# Patient Record
Sex: MALE | Race: BLACK OR AFRICAN AMERICAN | NOT HISPANIC OR LATINO | Employment: OTHER | RURAL
[De-identification: names, ages, dates, MRNs, and addresses within clinical notes are randomized per-mention and may not be internally consistent; named-entity substitution may affect disease eponyms.]

---

## 2017-01-09 ENCOUNTER — OFFICE VISIT (OUTPATIENT)
Dept: FAMILY MEDICINE CLINIC | Facility: CLINIC | Age: 57
End: 2017-01-09

## 2017-01-09 VITALS
BODY MASS INDEX: 21.69 KG/M2 | HEART RATE: 64 BPM | SYSTOLIC BLOOD PRESSURE: 130 MMHG | DIASTOLIC BLOOD PRESSURE: 98 MMHG | WEIGHT: 130.2 LBS | OXYGEN SATURATION: 98 % | HEIGHT: 65 IN | TEMPERATURE: 98.1 F

## 2017-01-09 DIAGNOSIS — K21.00 GASTROESOPHAGEAL REFLUX DISEASE WITH ESOPHAGITIS: ICD-10-CM

## 2017-01-09 DIAGNOSIS — R74.01 ELEVATED AST (SGOT): ICD-10-CM

## 2017-01-09 DIAGNOSIS — D50.9 IRON DEFICIENCY ANEMIA, UNSPECIFIED IRON DEFICIENCY ANEMIA TYPE: ICD-10-CM

## 2017-01-09 DIAGNOSIS — E87.5 HYPERKALEMIA: ICD-10-CM

## 2017-01-09 DIAGNOSIS — F10.10 ALCOHOL ABUSE: ICD-10-CM

## 2017-01-09 DIAGNOSIS — E03.9 HYPOTHYROIDISM, UNSPECIFIED TYPE: Primary | ICD-10-CM

## 2017-01-09 PROCEDURE — 99214 OFFICE O/P EST MOD 30 MIN: CPT | Performed by: FAMILY MEDICINE

## 2017-01-09 RX ORDER — OMEPRAZOLE 40 MG/1
40 CAPSULE, DELAYED RELEASE ORAL DAILY
Qty: 30 CAPSULE | Refills: 11 | Status: SHIPPED | OUTPATIENT
Start: 2017-01-09 | End: 2017-01-10 | Stop reason: CLARIF

## 2017-01-09 RX ORDER — FERROUS SULFATE 325(65) MG
325 TABLET ORAL DAILY
Qty: 30 TABLET | Refills: 11 | Status: SHIPPED | OUTPATIENT
Start: 2017-01-09 | End: 2018-03-21 | Stop reason: SDUPTHER

## 2017-01-09 RX ORDER — LEVOTHYROXINE SODIUM 0.12 MG/1
125 TABLET ORAL DAILY
Qty: 30 TABLET | Refills: 11 | Status: SHIPPED | OUTPATIENT
Start: 2017-01-09 | End: 2017-02-13

## 2017-01-09 RX ORDER — CHOLECALCIFEROL (VITAMIN D3) 1250 MCG
50000 CAPSULE ORAL
Qty: 4 CAPSULE | Refills: 11 | Status: SHIPPED | OUTPATIENT
Start: 2017-01-09 | End: 2018-03-21 | Stop reason: SDUPTHER

## 2017-01-09 NOTE — PROGRESS NOTES
"Subjective   Emerson Bang is a 56 y.o. male.     History of Present Illness     Problem List  1. 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 Hypothyroidism   3. Alcohol abuse  4. Anemia, iron deficiency  5. Vitamin D deficiency  6. GERD  7. Last colonoscopy was 3 years ago   8. Esophagitis/gastritis/internal and external hemorhoids.    Patient is 55 yo AAF with the above medical issues. States he is doing well.  Is currently on synthroid 125 mcg PO q daily for hypothyroidism. Has history of throat cancer and is sp chemo and radiation treatment.  Has appt with Dr. Washington (ENT) soon. Continues to drink alcohol daily.  Has issues with alcohol abuse States about 2 beers a day.  On last labwork had anemia and currently taking iron 325 mg PO q daily pill.  Also had elevated liver enzymes and AST elevated.  Has yet to have hepatitis panel.  Denies any abdominal pain, jaundice or fever.      The following portions of the patient's history were reviewed and updated as appropriate: allergies, current medications, past family history, past medical history, past social history, past surgical history and problem list.    Review of Systems   Constitutional: Negative.    HENT: Negative.    Eyes: Negative.    Respiratory: Negative.    Cardiovascular: Negative.    Gastrointestinal: Negative.    Endocrine: Negative.    Genitourinary: Negative.    Musculoskeletal: Negative.    Skin: Negative.    Allergic/Immunologic: Negative.    Neurological: Negative.    Hematological: Negative.    Psychiatric/Behavioral: Negative.        Objective    Visit Vitals   • /98 (BP Location: Left arm, Patient Position: Sitting, Cuff Size: Adult)   • Pulse 64   • Temp 98.1 °F (36.7 °C) (Oral)   • Ht 65\" (165.1 cm)   • Wt 130 lb 3.2 oz (59.1 kg)   • SpO2 98%   • BMI 21.67 kg/m2       Chemistry        Component Value Date/Time     10/05/2016 0919    K 5.7 (H) " 10/05/2016 0919     10/05/2016 0919    CO2 30 10/05/2016 0919    BUN 12 10/05/2016 0919    CREATININE 0.8 10/05/2016 0919        Component Value Date/Time    CALCIUM 9.4 10/05/2016 0919    ALKPHOS 106 10/05/2016 0919     (H) 10/05/2016 0919    ALT 71 10/05/2016 0919    BILITOT 0.6 10/05/2016 0919        Lab Results   Component Value Date    WBC 5.1 10/05/2016    HGB 12.1 (L) 10/05/2016    HCT 33.3 (L) 10/05/2016    MCV 81.6 10/05/2016     10/05/2016     No results found for: CHOL  Lab Results   Component Value Date    TRIG 100 10/05/2016    TRIG 48 11/25/2015     Lab Results   Component Value Date     10/05/2016     11/25/2015     Lab Results   Component Value Date    LDLCALC 76 10/05/2016    LDLCALC 114 11/25/2015     No results found for: LDL  No results found for: HDLLDLRATIO  No components found for: CHOLHDL  Lab Results   Component Value Date    HGBA1C 5.3 10/05/2016     Lab Results   Component Value Date    TSH 37.90 (H) 10/05/2016     Physical Exam   Constitutional: He is oriented to person, place, and time. He appears well-developed and well-nourished. No distress.   HENT:   Head: Normocephalic and atraumatic.   Right Ear: External ear normal.   Left Ear: External ear normal.   Post radiation changes of next   No masses   Normal pharynx   Eyes: Conjunctivae and EOM are normal. Pupils are equal, round, and reactive to light. Right eye exhibits no discharge. Left eye exhibits no discharge. No scleral icterus.   Neck: Normal range of motion. Neck supple. No JVD present. No tracheal deviation present. No thyromegaly present.   Cardiovascular: Normal rate and regular rhythm.  Exam reveals no friction rub.    No murmur heard.  Pulmonary/Chest: Effort normal and breath sounds normal. No stridor. No respiratory distress. He has no wheezes.   Abdominal: Soft. Bowel sounds are normal. He exhibits no distension and no mass. There is no tenderness. There is no rebound and no guarding.  No hernia.   Musculoskeletal: Normal range of motion. He exhibits no edema, tenderness or deformity.   Lymphadenopathy:     He has no cervical adenopathy.   Neurological: He is alert and oriented to person, place, and time. He has normal reflexes. No cranial nerve deficit. Coordination normal.   Skin: Skin is warm and dry. No rash noted. He is not diaphoretic. No erythema. No pallor.   Psychiatric: He has a normal mood and affect. His behavior is normal. Judgment and thought content normal.   Nursing note and vitals reviewed.      Assessment/Plan   Problems Addressed this Visit        Digestive    Gastroesophageal reflux disease with esophagitis    Relevant Medications    omeprazole (PRILOSEC) 40 MG capsule       Endocrine    Hypothyroidism - Primary    Relevant Medications    levothyroxine (SYNTHROID) 125 MCG tablet    Other Relevant Orders    CBC Auto Differential    Comprehensive Metabolic Panel    Lipid Panel    TSH    T4, Free    T3, Free    Ferritin    Iron Profile    Folate    Vitamin B12    Ethanol level    Hepatitis Panel, Acute       Hematopoietic and Hemostatic    Iron deficiency anemia    Relevant Medications    ferrous sulfate 325 (65 FE) MG tablet       Other    Hyperkalemia    Elevated AST (SGOT)    Alcohol abuse        -will get labwork including cbc, cmp, check thyroid studies due to hypothyroidism  - counseled on alcohol abuse and possible need for support or AA.  Will get alcohol level  - recheck AST levels   - recheck potassium levels  - will get hepatitis panel. Consider Ultrasound of liver if still elevated. Possible Gastroenterology referral  -  Recheck hemoglobin and iron levels for iron deficiency anemia  -  For GERD/Gastritis/ esophagitis - continue with omeprazole  - recheck in 1 month

## 2017-01-09 NOTE — MR AVS SNAPSHOT
Emerson Bang   1/9/2017 11:00 AM   Office Visit    Dept Phone:  [phone]   Encounter #:  [id number]    Provider:  Cristino He MD   Department:  [facility]                Your Full Care Plan              Today's Medication Changes          These changes are accurate as of: 1/9/17 11:28 AM.  If you have any questions, ask your nurse or doctor.               Medication(s)that have changed:     omeprazole 40 MG capsule   Commonly known as:  PRILOSEC   Take 1 capsule by mouth Daily.   What changed:  when to take this   Changed by:  Cristino He MD         Stop taking medication(s)listed here:     ondansetron ODT 4 MG disintegrating tablet   Commonly known as:  ZOFRAN-ODT   Stopped by:  Cristino He MD           terazosin 5 MG capsule   Commonly known as:  HYTRIN   Stopped by:  Cristino He MD                Where to Get Your Medications      These medications were sent to [address] [phone] [place] [id number] [place] [id number]     Phone:  [phone]     ferrous sulfate 325 (65 FE) MG tablet    levothyroxine 125 MCG tablet    omeprazole 40 MG capsule    Vitamin D3 60789 UNITS capsule                  Your Updated Medication List          This list is accurate as of: 1/9/17 11:28 AM.  Always use your most recent med list.                ferrous sulfate 325 (65 FE) MG tablet   Take 1 tablet by mouth Daily.       levothyroxine 125 MCG tablet   Commonly known as:  SYNTHROID   Take 1 tablet by mouth Daily.       omeprazole 40 MG capsule   Commonly known as:  PRILOSEC   Take 1 capsule by mouth Daily.       Vitamin D3 64144 UNITS capsule   Take 1 capsule by mouth Every 7 (Seven) Days.               We Performed the Following     CBC Auto Differential     Comprehensive Metabolic Panel     Ethanol level     Ferritin     Folate     Hepatitis Panel, Acute   "   Iron Profile     Lipid Panel     T3, Free     T4, Free     TSH     Vitamin B12       You Were Diagnosed With        Codes Comments    Hypothyroidism, unspecified type    -  Primary ICD-10-CM: E03.9  ICD-9-CM: 244.9       Instructions     None    Patient Instructions History      Upcoming Appointments     Visit Type Date Time Department    OFFICE VISIT 1[facility]9/2017 11:00 [facility]    FOLLOW UP 2/10/2017  9:15 AM [facility] OTOLARYNGOLOGY HOP    OFFICE VISIT 2/13/2017 11:00 AM [facility]    FOLLOW UP - ONCOLOGY 9/11/2017  9:00 AM [facility] RAD ONC MAD      MyChart Signup     Our records indicate that you have declined [facility] eMoovhart signup. If you would like to sign up for eMoovhart, please email [email or web address] or call [phone] to obtain an activation code.             Other Info from Your Visit           Your Appointments     Feb 10, 2017  9:15 AM CST   Follow Up with Bharathi Washington MD   [facility] OTOLARYNGOLOGY (--)    [address] [place] [address] Dr leung Fl[address]opher  [facility] [address] [phone]           Arrive 15 minutes prior to appointment.            Feb 13, 2017 11:00 AM CST   Office Visit with Cristino He MD   [facility] (--)    [address] [place] [id number] [place] [id number] [phone]           Arrive 15 minutes prior to appointment.            Sep 11, 2017  9:00 AM CDT   FOLLOW UP with James Morrison MD   [facility] RADIATION ONCOLOGY (--)    [address] [place] [address]   [place] [address] [phone]              Allergies     No Known Allergies      Reason for Visit     Hypothyroidism           Vital Signs     Blood Pressure Pulse Temperature Height Weight Oxygen Saturation    130/98 (BP Location: Left arm, Patient Position: Sitting, Cuff Size: Adult) 64 98.1 °F (36.7 °C) (Oral) 65\" (165.1 cm) 130 lb 3.2 oz (59.1 kg) 98%    Body Mass Index Smoking Status "                21.67 kg/m2 Former Smoker          Problems and Diagnoses Noted     Underactive thyroid    -  Primary

## 2017-01-10 LAB
ALBUMIN SERPL-MCNC: 4.7 GM/DL (ref 3.4–4.8)
ALP SERPL-CCNC: 109 U/L (ref 38–126)
ALT SERPL-CCNC: 56 U/L (ref 21–72)
ANION GAP SERPL CALCULATED.3IONS-SCNC: 15 MMOL/L (ref 5–15)
AST SERPL-CCNC: 100 U/L (ref 17–59)
BILIRUB SERPL-MCNC: 0.7 MG/DL (ref 0.2–1.3)
BUN SERPL-MCNC: 14 MG/DL (ref 7–21)
CALCIUM SERPL-MCNC: 10 MG/DL (ref 8.4–10.2)
CHLORIDE SERPL-SCNC: 100 MMOL/L (ref 95–110)
CHOLEST SERPL-MCNC: 298 MG/DL (ref 0–199)
CO2 SERPL-SCNC: 27 MMOL/L (ref 22–31)
CREAT SERPL-MCNC: 0.8 MG/DL (ref 0.7–1.3)
ETHANOL BLD-MCNC: 0.02 GM/DL (ref 0–0)
FERRITIN SERPL IA-MCNC: 178 NG/ML (ref 17.9–464)
FOLATE SERPL-MCNC: >20 NG/ML
GLUCOSE SERPL-MCNC: 122 MG/DL (ref 60–100)
HAV IGM SERPL QL IA: NEGATIVE
HBV CORE IGM SERPL QL IA: NEGATIVE
HBV SURFACE AG SERPL QL IA: NEGATIVE
HCV AB SERPL QL IA: NEGATIVE
HDLC SERPL-MCNC: 110 MG/DL (ref 60–200)
LDLC SERPL CALC-MCNC: 176 MG/DL (ref 0–129)
POTASSIUM SERPL-SCNC: 5.1 MMOL/L (ref 3.5–5.1)
PROT SERPL-MCNC: 8.8 GM/DL (ref 6.3–8.6)
SODIUM SERPL-SCNC: 142 MMOL/L (ref 137–145)
T4 FREE SERPL-MCNC: 0.54 NG/DL (ref 0.78–2.19)
TRIGL SERPL-MCNC: 59 MG/DL (ref 20–199)
TSH SERPL-ACNC: 35 UIU/ML (ref 0.46–4.68)
VIT B12 SERPL-MCNC: 793 PG/ML (ref 239–931)

## 2017-01-10 RX ORDER — NICOTINE POLACRILEX 4 MG/1
2 GUM, CHEWING ORAL DAILY
Qty: 60 EACH | Refills: 11 | Status: SHIPPED | OUTPATIENT
Start: 2017-01-10 | End: 2018-02-01

## 2017-01-16 ENCOUNTER — TELEPHONE (OUTPATIENT)
Dept: FAMILY MEDICINE CLINIC | Facility: CLINIC | Age: 57
End: 2017-01-16

## 2017-01-16 RX ORDER — LEVOTHYROXINE SODIUM 0.03 MG/1
25 TABLET ORAL DAILY
Qty: 30 TABLET | Refills: 0 | Status: SHIPPED | OUTPATIENT
Start: 2017-01-16 | End: 2017-02-10 | Stop reason: ALTCHOICE

## 2017-01-16 NOTE — TELEPHONE ENCOUNTER
Spoke with patient and relayed message. Patient expressed understanding.     From Dr. He:   TSH is still elevated and t4 low.  Already taking 125 mcg of synthroid daily.  Have pt add another 25 mcg PO daily for total of 150 mcg of synthroid daily so that he doesn't waste medication.  Give 1 months supply of 25 mcg daily of synthroid with no refills. Will reevaluate in one month and give new prescription of 150 mcg then.  Have pt call back if any questions. Thanks

## 2017-02-10 ENCOUNTER — OFFICE VISIT (OUTPATIENT)
Dept: OTOLARYNGOLOGY | Facility: CLINIC | Age: 57
End: 2017-02-10

## 2017-02-10 VITALS — BODY MASS INDEX: 22.33 KG/M2 | WEIGHT: 134 LBS | HEIGHT: 65 IN | TEMPERATURE: 97.7 F

## 2017-02-10 DIAGNOSIS — Z85.818 ENCOUNTER FOR FOLLOW-UP SURVEILLANCE OF TONSILLAR CANCER: Primary | ICD-10-CM

## 2017-02-10 DIAGNOSIS — J37.0 CHRONIC LARYNGITIS: ICD-10-CM

## 2017-02-10 DIAGNOSIS — Z08 ENCOUNTER FOR FOLLOW-UP SURVEILLANCE OF TONSILLAR CANCER: Primary | ICD-10-CM

## 2017-02-10 PROCEDURE — 99213 OFFICE O/P EST LOW 20 MIN: CPT | Performed by: OTOLARYNGOLOGY

## 2017-02-10 PROCEDURE — 31575 DIAGNOSTIC LARYNGOSCOPY: CPT | Performed by: OTOLARYNGOLOGY

## 2017-02-10 RX ORDER — TERAZOSIN 5 MG/1
5 CAPSULE ORAL NIGHTLY
COMMUNITY
End: 2018-02-01 | Stop reason: SDUPTHER

## 2017-02-10 NOTE — PROGRESS NOTES
Subjective   Emerson Bang is a 56 y.o. male.       History of Present Illness   Patient has a history of extensive squamous cell cancer of the tonsil that involved the pharyngeal wall and palate.  Achieved a complete response to chemoradiation.  Treatment was completed in 2009.  Is having no specific problems.  Is eating reasonably well.  Weight is 134 pounds.  Denies hemoptysis.      The following portions of the patient's history were reviewed and updated as appropriate: allergies, current medications, past family history, past medical history, past social history, past surgical history and problem list.     reports that he has quit smoking. He has never used smokeless tobacco. He reports that he drinks alcohol. He reports that he does not use illicit drugs.   Patient is not a tobacco user and has not been counseled for use of tobacco products      Review of Systems   Constitutional: Negative for fever.           Objective   Physical Exam  General: Thin but otherwise well-developed male in no acute distress.  Voice is hypernasal but speech is intelligible  Ears: External ears no deformity, canals no discharge, tympanic membranes intact clear and mobile bilaterally.  Nose: Nares show no discharge mass polyp or purulence.  Boggy mucosa is present.  No gross external deformity.  Septum: To the right  Oral cavity: Lips and gums without lesions.  Tongue and floor of mouth without lesions.  Parotid and submandibular ducts unobstructed.  No mucosal lesions on the buccal mucosa or vestibule of the mouth.  Pharynx: Extensive posttreatment changes in the area of the right tonsil, pharyngeal wall, and palate.  No evidence of recurrent disease.  Neck: Healed tracheostomy site.  Postradiation changes are noted.  No abnormal mass or lymphadenopathy is noted.  No thyromegaly.    Procedure Note    Pre-operative Diagnosis:   Chief Complaint   Patient presents with   • Cancer Surveillance     1 year       Post-operative  Diagnosis: Chronic laryngitis; no evidence of recurrent disease    Anesthesia: topical with xylocaine and neosynephrine    Endoscopy Type:  Flexible Laryngoscopy    Procedure Details:    The patient was placed in the sitting position.  After topical anesthesia and decongestion, the 4 mm laryngoscope was passed.  The nasal cavities, nasopharynx, oropharynx, hypopharynx, and larynx were all examined.  Vocal cords were examined during respiration and phonation.  The following findings were noted:    Findings: No masses in the nose.  Some crusted secretions at the junction of the nasopharynx to oropharynx with no associated neoplasm.  Tongue base is without evidence of neoplasm.  Endolarynx shows chronic edema and erythema consistent with postradiation changes but no evidence of neoplasm.  Vocal cord mobility is intact.    Condition:  Stable.  Patient tolerated procedure well.    Complications:  None      Assessment/Plan   Emerson was seen today for cancer surveillance.    Diagnoses and all orders for this visit:    Encounter for follow-up surveillance of tonsillar cancer    Chronic laryngitis        Plan: Maintain tobacco abstinence.  Continue to work on maintaining nutritional status.  Return in a year, call sooner for problems.

## 2017-02-13 ENCOUNTER — OFFICE VISIT (OUTPATIENT)
Dept: FAMILY MEDICINE CLINIC | Facility: CLINIC | Age: 57
End: 2017-02-13

## 2017-02-13 VITALS
WEIGHT: 131 LBS | SYSTOLIC BLOOD PRESSURE: 164 MMHG | DIASTOLIC BLOOD PRESSURE: 70 MMHG | TEMPERATURE: 97.9 F | BODY MASS INDEX: 21.83 KG/M2 | HEIGHT: 65 IN | OXYGEN SATURATION: 98 % | HEART RATE: 103 BPM

## 2017-02-13 DIAGNOSIS — R26.89 BALANCE PROBLEM: ICD-10-CM

## 2017-02-13 DIAGNOSIS — Z23 NEED FOR VACCINATION: ICD-10-CM

## 2017-02-13 DIAGNOSIS — R74.01 ELEVATED AST (SGOT): ICD-10-CM

## 2017-02-13 DIAGNOSIS — E03.9 HYPOTHYROIDISM, UNSPECIFIED TYPE: ICD-10-CM

## 2017-02-13 DIAGNOSIS — D50.9 IRON DEFICIENCY ANEMIA, UNSPECIFIED IRON DEFICIENCY ANEMIA TYPE: ICD-10-CM

## 2017-02-13 DIAGNOSIS — F10.10 ALCOHOL ABUSE: Primary | ICD-10-CM

## 2017-02-13 PROCEDURE — 99213 OFFICE O/P EST LOW 20 MIN: CPT | Performed by: FAMILY MEDICINE

## 2017-02-13 PROCEDURE — 83735 ASSAY OF MAGNESIUM: CPT | Performed by: FAMILY MEDICINE

## 2017-02-13 PROCEDURE — 36415 COLL VENOUS BLD VENIPUNCTURE: CPT | Performed by: FAMILY MEDICINE

## 2017-02-13 PROCEDURE — 90715 TDAP VACCINE 7 YRS/> IM: CPT | Performed by: FAMILY MEDICINE

## 2017-02-13 PROCEDURE — 82607 VITAMIN B-12: CPT | Performed by: FAMILY MEDICINE

## 2017-02-13 PROCEDURE — 83540 ASSAY OF IRON: CPT | Performed by: FAMILY MEDICINE

## 2017-02-13 PROCEDURE — 82728 ASSAY OF FERRITIN: CPT | Performed by: FAMILY MEDICINE

## 2017-02-13 PROCEDURE — 82746 ASSAY OF FOLIC ACID SERUM: CPT | Performed by: FAMILY MEDICINE

## 2017-02-13 PROCEDURE — 83550 IRON BINDING TEST: CPT | Performed by: FAMILY MEDICINE

## 2017-02-13 PROCEDURE — 90471 IMMUNIZATION ADMIN: CPT | Performed by: FAMILY MEDICINE

## 2017-02-13 PROCEDURE — 80076 HEPATIC FUNCTION PANEL: CPT | Performed by: FAMILY MEDICINE

## 2017-02-13 PROCEDURE — 84425 ASSAY OF VITAMIN B-1: CPT | Performed by: FAMILY MEDICINE

## 2017-02-13 RX ORDER — SIMVASTATIN 40 MG
40 TABLET ORAL NIGHTLY
Qty: 30 TABLET | Refills: 11 | Status: SHIPPED | OUTPATIENT
Start: 2017-02-13 | End: 2018-03-21 | Stop reason: SDUPTHER

## 2017-02-13 RX ORDER — LEVOTHYROXINE SODIUM 0.15 MG/1
150 TABLET ORAL DAILY
Qty: 30 TABLET | Refills: 11 | Status: SHIPPED | OUTPATIENT
Start: 2017-02-13 | End: 2017-06-29 | Stop reason: SDUPTHER

## 2017-02-13 NOTE — PROGRESS NOTES
Subjective   Emerson Bang is a 56 y.o. male.     Problem List  1. 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 Hypothyroidism   3. Alcohol abuse  4. Anemia, iron deficiency  5. Vitamin D deficiency  6. GERD  7. Last colonoscopy was 3 years ago   8. Esophagitis/gastritis/internal and external hemorhoids.  9. Elevated AST level    Patient is 57 yo AAM with the above medical issues. Is here for recheck. Is here today with daughter    Pt states he is doing overall well but daughter has concerns that he is losing balance and falling at home. Pt lives currently alone but daughter checks on him daily.  Does have history of alcohol abuse and continues to drink beer and liquor daily. Pt not interested in seeing AA at this time.   Denies any abdominal pain but does have elevated AST levels.  Has not had ultrasound of liver done yet.  Denies any fever, abdominal pain or jaundice.  Does have elevated TSH levels and is taking synthroid 125 mcg PO q daily. Last TSH was 35  An extra dose of 25 mcg of synthroid was sent to pharmacy for a total dose of 150 mcg PO q daily but pt did not pick it up yet.      Pt also had labwork and cholesterol levels were elevated. Based on ASCVD risk is >5%.  Pt is not on aspirin or statin medication currently    Regarding Iron deficiency anemia. Last hgb was normal. Currently on ferrous sulfate 325 mg PO q daily.      Is currently seeing Dr. Washington for history of throat cancer/ sqaumous cell carcinoma of right tonsil and pharynx.   Recent visit states he is doing well.       Alcohol Problem   This is a chronic problem. The current episode started more than 1 year ago. The problem occurs daily. The problem has been unchanged. Pertinent negatives include no abdominal pain, anorexia, arthralgias, change in bowel habit, chest pain, chills, congestion, coughing, diaphoresis, fatigue, fever, headaches, joint swelling,  "myalgias, nausea, neck pain, numbness, rash, sore throat, swollen glands, urinary symptoms, vertigo, vomiting or weakness. Nothing aggravates the symptoms. He has tried nothing for the symptoms. The treatment provided no relief.        The following portions of the patient's history were reviewed and updated as appropriate: allergies, current medications, past family history, past medical history, past social history, past surgical history and problem list.    Review of Systems   Constitutional: Negative.  Negative for chills, diaphoresis, fatigue and fever.   HENT: Negative.  Negative for congestion and sore throat.    Eyes: Negative.    Respiratory: Negative.  Negative for cough.    Cardiovascular: Negative.  Negative for chest pain.   Gastrointestinal: Negative.  Negative for abdominal pain, anorexia, change in bowel habit, nausea and vomiting.   Endocrine: Negative.    Genitourinary: Negative.    Musculoskeletal: Positive for gait problem. Negative for arthralgias, joint swelling, myalgias and neck pain.   Skin: Negative.  Negative for rash.   Allergic/Immunologic: Negative.    Neurological: Negative for vertigo, weakness, numbness and headaches.   Hematological: Negative.    Psychiatric/Behavioral: Negative.        Objective    Visit Vitals   • /70 (BP Location: Right arm, Patient Position: Sitting, Cuff Size: Adult)   • Pulse 103   • Temp 97.9 °F (36.6 °C) (Oral)   • Ht 65\" (165.1 cm)   • Wt 131 lb (59.4 kg)   • SpO2 98%   • BMI 21.8 kg/m2           Chemistry        Component Value Date/Time     01/09/2017 1159    K 5.1 01/09/2017 1159     01/09/2017 1159    CO2 27 01/09/2017 1159    BUN 14 01/09/2017 1159    CREATININE 0.8 01/09/2017 1159        Component Value Date/Time    CALCIUM 10.0 01/09/2017 1159    ALKPHOS 109 01/09/2017 1159     (H) 01/09/2017 1159    ALT 56 01/09/2017 1159    BILITOT 0.7 01/09/2017 1159        Lab Results   Component Value Date    WBC 6.0 01/09/2017    HGB " 13.7 01/09/2017    HCT 38.2 (L) 01/09/2017    MCV 82.0 01/09/2017     01/09/2017     No results found for: CHOL  Lab Results   Component Value Date    TRIG 59 01/09/2017    TRIG 100 10/05/2016    TRIG 48 11/25/2015     Lab Results   Component Value Date     01/09/2017     10/05/2016     11/25/2015     Lab Results   Component Value Date    LDLCALC 176 (H) 01/09/2017    LDLCALC 76 10/05/2016    LDLCALC 114 11/25/2015     No results found for: LDL  No results found for: HDLLDLRATIO  No components found for: CHOLHDL  Lab Results   Component Value Date    HGBA1C 5.3 10/05/2016     Lab Results   Component Value Date    TSH 35.00 (H) 01/09/2017    [id number] 99 01/09/2017     Physical Exam   Constitutional: He is oriented to person, place, and time. No distress.   Thin appearance    HENT:   Head: Normocephalic and atraumatic.   Right Ear: External ear normal.   Left Ear: External ear normal.   Pt has speech issues based on previous surgery and radiation therapy    Eyes: Conjunctivae and EOM are normal. Pupils are equal, round, and reactive to light. Right eye exhibits no discharge. Left eye exhibits no discharge. No scleral icterus.   Neck: Normal range of motion. Neck supple. No JVD present. No tracheal deviation present. No thyromegaly present.   Cardiovascular: Normal rate, regular rhythm and normal heart sounds.    Pulmonary/Chest: Effort normal and breath sounds normal. No stridor. No respiratory distress.   Abdominal: Soft. Bowel sounds are normal. He exhibits no distension and no mass. There is no tenderness. There is no rebound and no guarding. No hernia.   No hepatomegaly   Musculoskeletal: Normal range of motion. He exhibits no edema, tenderness or deformity.   Get up and go test  <10 seconds  Motor strength 5/5    Lymphadenopathy:     He has no cervical adenopathy.   Neurological: He is alert and oriented to person, place, and time. He has normal reflexes. No cranial nerve deficit.  Coordination normal.   No asterixis   Skin: Skin is warm and dry. No rash noted. He is not diaphoretic. No erythema. No pallor.   Psychiatric: He has a normal mood and affect. His behavior is normal. Judgment and thought content normal.   Nursing note and vitals reviewed.      Assessment/Plan   Problems Addressed this Visit        Endocrine    Hypothyroidism    Relevant Medications    levothyroxine (SYNTHROID) 150 MCG tablet       Hematopoietic and Hemostatic    Iron deficiency anemia       Other    Elevated AST (SGOT)    Relevant Orders    US Liver    Alcohol abuse - Primary    Relevant Orders    Ambulatory Referral to Home Health    Hepatic Function Panel    Ferritin    Iron Profile    Folate    Vitamin B12    Vitamin B1, Whole Blood    Magnesium    Balance problem    Need for vaccination    Relevant Orders    Tdap Vaccine Greater Than or Equal To 8yo IM (Completed)      - for balance problem will start [facility] for nursing care, BP checks, PT assessment and  to help with alcohol abuse.  Advised about alcohol withdrawal and wellfare checks at home  - for alcohol abuse - recommend AA.   Request Home Health to help pt with resources to help quit.  Gave information about alcohol withdrawal. Consider Benzodiazpene if pt not getting better  - for balance issues. PT assessment,  Consider MRI of brain or Neurology referral.  Consider EKG on next visit.  Also will check folate, b12 and B1 levels today.  - hypothyroidism - TSH elevated. Will adjust dosage and go up on synthroid from 125 mcg daily to 150 mcg daily. Recheck TSH in a few months  - for eleveted AST - recheck hepatic function. Recent hepatitis panel negative. Will get ultrasound of liver. Consider referral to Gastroenterology in future  - will get tetanus vaccination today  - recheck in 1 month  - continue with iron pill for iron deficiency anemia.

## 2017-02-13 NOTE — PATIENT INSTRUCTIONS
new prescription of synthroid 150 mcg to take once a day  Will set up with Home Health for alcohol issues BP readings   blood pressure machine and bring on next visit recordings  -will get ultrasound of liver at [facility]  - get labwork today check vitamin levels and liver enzymes.    Delirium Tremens  Delirium tremens is the worst form of alcohol withdrawal. It usually happens 2 to 4 days after the last drink, but it may occur up to 7 to 10 days after the last drink. It involves sudden and severe changes in your mental and nervous system. Many people keep drinking to get rid of the discomfort felt during delirium tremens. Symptoms may last up to 7 days. Delirium tremens is a serious condition and can be life-threatening. Most people need treatment in a hospital or a detox unit for intravenous (IV) fluids, medicines to help with symptoms, and close monitoring by a trained staff.   CAUSES   Delirium tremens can occur when you suddenly quit or greatly reduce the amount of alcohol you normally drink.   SYMPTOMS   · Fast heart rate.  · Elevated temperature.  · High blood pressure.  · Seizures.  · Tremors.  · Sweating.  · Fast breathing.  · Anxiety.  · Lack of coordination.  · Trouble thinking clearly.  · Nausea.  · Seeing, hearing, or feeling things that are not really there (hallucinations).  DIAGNOSIS   · The diagnosis is usually made from the history of alcohol consumption and the presence of symptoms.  · Medical tests may be done to rule out other conditions that can act like alcohol withdrawal.  TREATMENT   · Medicines are usually prescribed to prevent withdrawal symptoms. Some people need a calming drug (sedative) for a week or more until they are done with their withdrawal.  · Going to the hospital is necessary if you are seriously ill. You may be given:    Thiamine to prevent a serious brain disorder.    Multivitamins and folate.    IV fluids if you are dehydrated.    Minerals.    Sedatives if you are  anxious, agitated, or cannot sleep.  HOME CARE INSTRUCTIONS   After treatment, you must:  · Follow all instructions from your caregiver very carefully.  · Take all medicines as prescribed. If you cannot, call your caregiver right away.  · Keep all appointments for further evaluation and counseling.  · Not use drugs, alcohol, or any other mind-altering or mood-altering drugs.  · Not operate a motor vehicle or hazardous machinery until your caregiver says it is okay.  SEEK IMMEDIATE MEDICAL CARE IF:   · You have a seizure (convulsions).  · You become very shaky or agitated.  · You have severe nausea and vomiting.  · You throw up blood. This may be bright red or look like black coffee grounds.  · You have blood in your stool. This may be bright red, bad smelling, or appear black and tarry.  · You become lightheaded or faint.  If you have any of the above symptoms, do not drive. Have someone else drive you, or call your local emergency services (911 in [place]  FOR MORE INFORMATION  · Treatment centers are listed in telephone book under: Alcoholism and Addiction Treatment, Substance Abuse Treatment or Cocaine, Narcotics, and Alcoholics Anonymous. Most hospitals and clinics can refer you to a specialized care center.  · The [place] government maintains a toll-free number for getting treatment referrals: 1-455.152.5507 or 1-741.182.5416 (TDD). They also maintain a website: http://findtreatment.samhsa.gov. Other websites for more information are: www.mentalhealth.samhsa.gov and www.pat.gov.  · In [place], treatment centers are listed in the telephone book under each Province. Listings are available under: [employer] or similar titles.     This information is not intended to replace advice given to you by your health care provider. Make sure you discuss any questions you have with your health care provider.     Document Released: 12/18/2006 Document Revised: 03/11/2013 Document Reviewed: 06/08/2016  [employer]  Interactive Patient Education ©2016 [employer]  Alcohol Withdrawal  Alcohol withdrawal is a group of symptoms that can develop when a person who drinks heavily and regularly stops drinking or drinks less.  CAUSES  Heavy and regular drinking can cause chemicals that send signals from the brain to the body (neurotransmitters) to deactivate. Alcohol withdrawal develops when deactivated neurotransmitters reactivate because a person stops drinking or drinks less.  RISK FACTORS  The more a person drinks and the longer he or she drinks, the greater the risk of alcohol withdrawal. Severe withdrawal is more likely to develop in someone who:  · Had severe alcohol withdrawal in the past.  · Had a seizure during a previous episode of alcohol withdrawal.  · Is elderly.  · Is pregnant.  · Has been abusing drugs.  · Has other medical problems, including:    Infection.    Heart, lung, or liver disease.    Seizures.    Mental health problems.  SYMPTOMS  Symptoms of this condition can be mild to moderate, or they can be severe.  Mild to moderate symptoms may include:  · Fatigue.  · Nightmares.  · Trouble sleeping.  · Depression.  · Anxiety.  · Inability to think clearly.  · Mood swings.  · Irritability.  · Loss of appetite.  · Nausea or vomiting.  · Clammy skin.  · Extreme sweating.  · Rapid heartbeat.  · Shakiness.  · Uncontrollable shaking (tremor).  Severe symptoms may include:  · Fever.  · Seizures.  · Severe confusion.  · Feeling or seeing things that are not there (hallucinations).  Symptoms usually begin within eight hours after a person stops drinking or drinks less. They can last for weeks.  DIAGNOSIS  Alcohol withdrawal is diagnosed with a medical history and physical exam. Sometimes, urine and blood tests are also done.  TREATMENT  Treatment may involve:  · Monitoring blood pressure, pulse, and breathing.  · Getting fluids through an IV tube.  · Medicine to reduce anxiety.  · Medicine to prevent or control  seizures.  · Multivitamins and B vitamins.  · Having a health care provider check on you daily.  If symptoms are moderate to severe or if there is a risk of severe withdrawal, treatment may be done at a hospital or treatment center.  HOME CARE INSTRUCTIONS  · Take medicines and vitamin supplements only as directed by your health care provider.  · Do not drink alcohol.  · Have someone stay with you or be available if you need help.  · Drink enough fluid to keep your urine clear or pale yellow.  · Consider joining a 12-step program or another alcohol support group.  SEEK MEDICAL CARE IF:  · Your symptoms get worse or do not go away.  · You cannot keep food or water in your stomach.  · You are struggling with not drinking alcohol.  · You cannot stop drinking alcohol.  SEEK IMMEDIATE MEDICAL CARE IF:   · You have an irregular heartbeat.  · You have chest pain.  · You have trouble breathing.  · You have symptoms of severe withdrawal, such as:    A fever.    Seizures.    Severe confusion.    Hallucinations.     This information is not intended to replace advice given to you by your health care provider. Make sure you discuss any questions you have with your health care provider.     Document Released: 09/27/2006 Document Revised: 01/08/2016 Document Reviewed: 10/06/2015  [employer] Interactive Patient Education ©2016 [employer]

## 2017-02-15 LAB
ALBUMIN SERPL-MCNC: 4.7 G/DL (ref 3.4–4.8)
ALP SERPL-CCNC: 119 U/L (ref 38–126)
ALT SERPL W P-5'-P-CCNC: 49 U/L (ref 21–72)
AST SERPL-CCNC: 94 U/L (ref 17–59)
BILIRUB CONJ SERPL-MCNC: 0 MG/DL (ref 0–0.3)
BILIRUB INDIRECT SERPL-MCNC: 0.4 MG/DL (ref 0–1.1)
BILIRUB SERPL-MCNC: 0.8 MG/DL (ref 0.2–1.3)
FERRITIN SERPL-MCNC: 246 NG/ML (ref 17.9–464)
FOLATE SERPL-MCNC: >20 NG/ML (ref 2.76–21)
IRON 24H UR-MRATE: 127 MCG/DL (ref 49–181)
IRON SATN MFR SERPL: 43 % (ref 20–55)
MAGNESIUM SERPL-MCNC: 1.4 MG/DL (ref 1.6–2.3)
PROT SERPL-MCNC: 8.4 G/DL (ref 6.3–8.6)
TIBC SERPL-MCNC: 292 MCG/DL (ref 261–462)
VIT B12 BLD-MCNC: 795 PG/ML (ref 239–931)

## 2017-02-16 LAB — VIT B1 BLD-SCNC: 145.2 NMOL/L (ref 66.5–200)

## 2017-02-20 ENCOUNTER — TELEPHONE (OUTPATIENT)
Dept: FAMILY MEDICINE CLINIC | Facility: CLINIC | Age: 57
End: 2017-02-20

## 2017-02-20 RX ORDER — LANOLIN ALCOHOL/MO/W.PET/CERES
400 CREAM (GRAM) TOPICAL DAILY
Qty: 30 TABLET | Refills: 11 | Status: SHIPPED | OUTPATIENT
Start: 2017-02-20 | End: 2018-05-10 | Stop reason: SDUPTHER

## 2017-02-20 NOTE — TELEPHONE ENCOUNTER
CALLED PT HOME AND MOBILE NUMBERS , AND L/M ON BOTH . CALLED IN MAGNESIUM OXIDE TO PHARMACY ALREADY .     Call pt.  Liver enzymes elevated still. Awaiting ultrasound of liver. Also magnesium levels low. Recommend taking Magnesium Oxide 400 mg once a day.  Give 30 pills and 11 refills and send to pharmacy please. Rest of labs okay. Thanks

## 2017-02-20 NOTE — TELEPHONE ENCOUNTER
Spoke with pt daughter about lab results , us date and time and told them i called in his medications to his pharmacy.

## 2017-03-16 ENCOUNTER — OFFICE VISIT (OUTPATIENT)
Dept: FAMILY MEDICINE CLINIC | Facility: CLINIC | Age: 57
End: 2017-03-16

## 2017-03-16 VITALS
TEMPERATURE: 97.9 F | BODY MASS INDEX: 19.26 KG/M2 | WEIGHT: 130 LBS | DIASTOLIC BLOOD PRESSURE: 74 MMHG | HEART RATE: 115 BPM | SYSTOLIC BLOOD PRESSURE: 112 MMHG | HEIGHT: 69 IN | RESPIRATION RATE: 16 BRPM

## 2017-03-16 DIAGNOSIS — E03.9 HYPOTHYROIDISM, UNSPECIFIED TYPE: Primary | ICD-10-CM

## 2017-03-16 DIAGNOSIS — R79.0 LOW MAGNESIUM LEVELS: ICD-10-CM

## 2017-03-16 DIAGNOSIS — F10.10 ALCOHOL ABUSE: ICD-10-CM

## 2017-03-16 PROCEDURE — 99213 OFFICE O/P EST LOW 20 MIN: CPT | Performed by: FAMILY MEDICINE

## 2017-03-16 NOTE — PROGRESS NOTES
Subjective   Emerson Bang is a 56 y.o. male.     History of Present Illness     Problem List  1. 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 Hypothyroidism   3. Alcohol abuse  4. Anemia, iron deficiency  5. Vitamin D deficiency  6. GERD  7. Last colonoscopy was 3 years ago   8. Esophagitis/gastritis/internal and external hemorhoids.  9. Elevated AST level  10. Hypomagnesemia     Pt is 55 yo AAM with the above medical issues. Is here for recheck.  On last visit Home Health was ordered for gait instability, alcohol problem, medication management.  Pt was evaluated by [facility] but pt was absent for followup visits.  Pt states he is doing fine. Daughter is not with pt today. Pt recently had labwork done and has normal levels of Vitamin B1, B12, folate, iron and ferritin.  Pt did have low magnesium and was start on magnesium supplements which he states he is taking. Regarding his alcohol problem states he has cut down to 1 or 2 beers a week. Denies any falling today.  Has history of hypothyroidism and pt's last TSH was 35.00.  Currently taking Synthroid 150 mcg daily.  Pt also has history of throat cancer/squamous cell carcinoma of right tonsil pharynx.  Has appt later this year with Dr. Morrison his Radiation Oncologist as well as Dr. Washington (ENT)        The following portions of the patient's history were reviewed and updated as appropriate: allergies, current medications, past family history, past medical history, past social history, past surgical history and problem list.    Review of Systems   Constitutional: Negative.    HENT: Negative.    Eyes: Negative.    Respiratory: Negative.    Cardiovascular: Negative.    Gastrointestinal: Negative.    Endocrine: Negative.    Genitourinary: Negative.    Musculoskeletal: Negative.    Skin: Negative.    Allergic/Immunologic: Negative.    Neurological: Negative.    Hematological: Negative.   "  Psychiatric/Behavioral: Negative.        Objective    Visit Vitals   • /74   • Pulse 115   • Temp 97.9 °F (36.6 °C)   • Resp 16   • Ht 69\" (175.3 cm)   • Wt 130 lb (59 kg)   • BMI 19.2 kg/m2       Chemistry        Component Value Date/Time     01/09/2017 1159    K 5.1 01/09/2017 1159     01/09/2017 1159    CO2 27 01/09/2017 1159    BUN 14 01/09/2017 1159    CREATININE 0.8 01/09/2017 1159        Component Value Date/Time    CALCIUM 10.0 01/09/2017 1159    ALKPHOS 119 02/13/2017 1152    ALKPHOS 109 01/09/2017 1159    AST 94 (H) 02/13/2017 1152     (H) 01/09/2017 1159    ALT 49 02/13/2017 1152    ALT 56 01/09/2017 1159    BILITOT 0.8 02/13/2017 1152    BILITOT 0.7 01/09/2017 1159        No results found for: CHOL  Lab Results   Component Value Date    TRIG 59 01/09/2017    TRIG 100 10/05/2016    TRIG 48 11/25/2015     Lab Results   Component Value Date     01/09/2017     10/05/2016     11/25/2015     Lab Results   Component Value Date    LDLCALC 176 (H) 01/09/2017    LDLCALC 76 10/05/2016    LDLCALC 114 11/25/2015     No results found for: LDL  No results found for: HDLLDLRATIO  No components found for: CHOLHDL  Lab Results   Component Value Date    TSH 35.00 (H) 01/09/2017    S1SMWQH 99 01/09/2017       Physical Exam   Constitutional: He is oriented to person, place, and time. He appears well-developed. No distress.   HENT:   Head: Normocephalic and atraumatic.   Mouth/Throat: Oropharynx is clear and moist.   Eyes: Conjunctivae and EOM are normal. Pupils are equal, round, and reactive to light. Right eye exhibits no discharge. Left eye exhibits no discharge.   Neck: Normal range of motion. Neck supple. No JVD present. No tracheal deviation present. No thyromegaly present.   Cardiovascular: Normal rate and regular rhythm.    Pulmonary/Chest: Effort normal and breath sounds normal. No stridor. No respiratory distress. He has no wheezes.   Abdominal: Soft. Bowel sounds are " normal. He exhibits no distension and no mass. There is no tenderness. There is no rebound and no guarding. No hernia.   Musculoskeletal: Normal range of motion. He exhibits no edema, tenderness or deformity.   Lymphadenopathy:     He has no cervical adenopathy.   Neurological: He is alert and oriented to person, place, and time. He has normal reflexes. No cranial nerve deficit. Coordination normal.   Skin: Skin is warm and dry. No rash noted. He is not diaphoretic. No erythema. No pallor.   Psychiatric: He has a normal mood and affect. His behavior is normal. Judgment and thought content normal.   Nursing note and vitals reviewed.      Assessment/Plan   Problems Addressed this Visit        Endocrine    Hypothyroidism - Primary    Relevant Orders    TSH    T4, Free    T3, Free       Other    Alcohol abuse    Low magnesium levels      - will repeat thyroid studies in 3 months  - pt to continue with Home Health services for PT/OT and alcohol counseling  - continue with magnesium supplement  - counseled today to continue cutting down on drinking. Pt reports no signs of withdrawal today  - recheck in 2-3 months

## 2017-04-14 ENCOUNTER — TELEPHONE (OUTPATIENT)
Dept: FAMILY MEDICINE CLINIC | Facility: CLINIC | Age: 57
End: 2017-04-14

## 2017-04-14 NOTE — TELEPHONE ENCOUNTER
----- Message from Joann Zee sent at 4/14/2017  1:59 PM CDT -----  Regarding: clarification on labs  Contact: [phone]  Stephy with [employer] would like for you to call her regarding clarification on patient's labs.  tryed to return call,no answer,no voice mail.

## 2017-04-18 ENCOUNTER — TELEPHONE (OUTPATIENT)
Dept: FAMILY MEDICINE CLINIC | Facility: CLINIC | Age: 57
End: 2017-04-18

## 2017-04-18 NOTE — TELEPHONE ENCOUNTER
----- Message from Joann Cassius Zee sent at 4/18/2017  1:48 PM CDT -----  Regarding: Home Health  Contact: [phone]  Please call Deyanira with [facility] as she has some questions regarding patient.  Returned her call,will talk to  tomorrow to see what he wants to do.

## 2017-04-21 ENCOUNTER — TELEPHONE (OUTPATIENT)
Dept: FAMILY MEDICINE CLINIC | Facility: CLINIC | Age: 57
End: 2017-04-21

## 2017-04-21 RX ORDER — LISINOPRIL AND HYDROCHLOROTHIAZIDE 12.5; 1 MG/1; MG/1
1 TABLET ORAL DAILY
Qty: 30 TABLET | Refills: 11 | Status: SHIPPED | OUTPATIENT
Start: 2017-04-21 | End: 2018-08-20 | Stop reason: SDUPTHER

## 2017-04-21 NOTE — TELEPHONE ENCOUNTER
----- Message from Stephy Greene sent at 4/21/2017 11:35 AM CDT -----  Madina at [facility] would like to have the last couple of office notes on Mr. Bang please. She can be reached at [phone] or you can fax the information to [phone].  Faxed  office visits for feb. And march

## 2017-04-21 NOTE — TELEPHONE ENCOUNTER
----- Message from Stephy Greene sent at 4/21/2017  1:05 PM CDT -----  Madina at [facility] called and is concerned that mr. Bang BP has been running at high levels for awhile. It was at 161/92. Please call her at [phone].  Returned her call,left message that we had sent in lisinopril/hctz to his pharm.,keep eye if he starts having a dry cough and let us know

## 2017-06-16 ENCOUNTER — OFFICE VISIT (OUTPATIENT)
Dept: FAMILY MEDICINE CLINIC | Facility: CLINIC | Age: 57
End: 2017-06-16

## 2017-06-16 VITALS
HEART RATE: 86 BPM | HEIGHT: 69 IN | WEIGHT: 123.2 LBS | SYSTOLIC BLOOD PRESSURE: 126 MMHG | TEMPERATURE: 97.6 F | BODY MASS INDEX: 18.25 KG/M2 | RESPIRATION RATE: 16 BRPM | DIASTOLIC BLOOD PRESSURE: 76 MMHG

## 2017-06-16 DIAGNOSIS — R74.01 ELEVATED AST (SGOT): ICD-10-CM

## 2017-06-16 DIAGNOSIS — E03.9 HYPOTHYROIDISM, UNSPECIFIED TYPE: ICD-10-CM

## 2017-06-16 DIAGNOSIS — D50.9 IRON DEFICIENCY ANEMIA, UNSPECIFIED IRON DEFICIENCY ANEMIA TYPE: ICD-10-CM

## 2017-06-16 DIAGNOSIS — Z85.819 HISTORY OF THROAT CANCER: Primary | ICD-10-CM

## 2017-06-16 DIAGNOSIS — C14.0 SQUAMOUS CELL CARCINOMA OF PHARYNX (HCC): ICD-10-CM

## 2017-06-16 DIAGNOSIS — Z71.41 ALCOHOL ABUSE COUNSELING AND SURVEILLANCE: ICD-10-CM

## 2017-06-16 DIAGNOSIS — F10.10 ALCOHOL ABUSE: ICD-10-CM

## 2017-06-16 DIAGNOSIS — R79.0 LOW MAGNESIUM LEVELS: ICD-10-CM

## 2017-06-16 DIAGNOSIS — E55.9 VITAMIN D DEFICIENCY: ICD-10-CM

## 2017-06-16 LAB
25(OH)D3 SERPL-MCNC: 38.2 NG/ML (ref 30–100)
ALBUMIN SERPL-MCNC: 4.5 G/DL (ref 3.4–4.8)
ALBUMIN/GLOB SERPL: 1.2 G/DL (ref 1.1–1.8)
ALP SERPL-CCNC: 115 U/L (ref 38–126)
ALT SERPL W P-5'-P-CCNC: 53 U/L (ref 21–72)
ANION GAP SERPL CALCULATED.3IONS-SCNC: 16 MMOL/L (ref 5–15)
ARTICHOKE IGE QN: 102 MG/DL (ref 1–129)
AST SERPL-CCNC: 71 U/L (ref 17–59)
BASOPHILS # BLD AUTO: 0.19 10*3/MM3 (ref 0–0.2)
BASOPHILS NFR BLD AUTO: 2.9 % (ref 0–2)
BILIRUB SERPL-MCNC: 0.4 MG/DL (ref 0.2–1.3)
BUN BLD-MCNC: 8 MG/DL (ref 7–21)
BUN/CREAT SERPL: 10.5 (ref 7–25)
CALCIUM SPEC-SCNC: 9.5 MG/DL (ref 8.4–10.2)
CHLORIDE SERPL-SCNC: 98 MMOL/L (ref 95–110)
CHOLEST SERPL-MCNC: 222 MG/DL (ref 0–199)
CO2 SERPL-SCNC: 25 MMOL/L (ref 22–31)
CREAT BLD-MCNC: 0.76 MG/DL (ref 0.7–1.3)
DEPRECATED RDW RBC AUTO: 40.7 FL (ref 35.1–43.9)
EOSINOPHIL # BLD AUTO: 0.21 10*3/MM3 (ref 0–0.7)
EOSINOPHIL NFR BLD AUTO: 3.3 % (ref 0–7)
ERYTHROCYTE [DISTWIDTH] IN BLOOD BY AUTOMATED COUNT: 14.3 % (ref 11.5–14.5)
GFR SERPL CREATININE-BSD FRML MDRD: 128 ML/MIN/1.73 (ref 56–130)
GLOBULIN UR ELPH-MCNC: 3.9 GM/DL (ref 2.3–3.5)
GLUCOSE BLD-MCNC: 97 MG/DL (ref 60–100)
HCT VFR BLD AUTO: 31.5 % (ref 39–49)
HDLC SERPL-MCNC: 116 MG/DL (ref 60–200)
HGB BLD-MCNC: 11.1 G/DL (ref 13.7–17.3)
IMM GRANULOCYTES # BLD: 0.01 10*3/MM3 (ref 0–0.02)
IMM GRANULOCYTES NFR BLD: 0.2 % (ref 0–0.5)
LDLC/HDLC SERPL: 0.83 {RATIO} (ref 0–3.55)
LYMPHOCYTES # BLD AUTO: 2.72 10*3/MM3 (ref 0.6–4.2)
LYMPHOCYTES NFR BLD AUTO: 42.1 % (ref 10–50)
MAGNESIUM SERPL-MCNC: 1.5 MG/DL (ref 1.6–2.3)
MCH RBC QN AUTO: 28 PG (ref 26.5–34)
MCHC RBC AUTO-ENTMCNC: 35.2 G/DL (ref 31.5–36.3)
MCV RBC AUTO: 79.5 FL (ref 80–98)
MONOCYTES # BLD AUTO: 1.09 10*3/MM3 (ref 0–0.9)
MONOCYTES NFR BLD AUTO: 16.9 % (ref 0–12)
NEUTROPHILS # BLD AUTO: 2.24 10*3/MM3 (ref 2–8.6)
NEUTROPHILS NFR BLD AUTO: 34.6 % (ref 37–80)
PLATELET # BLD AUTO: 319 10*3/MM3 (ref 150–450)
PMV BLD AUTO: 11.6 FL (ref 8–12)
POTASSIUM BLD-SCNC: 4.3 MMOL/L (ref 3.5–5.1)
PROT SERPL-MCNC: 8.4 G/DL (ref 6.3–8.6)
RBC # BLD AUTO: 3.96 10*6/MM3 (ref 4.37–5.74)
SODIUM BLD-SCNC: 139 MMOL/L (ref 137–145)
T4 FREE SERPL-MCNC: 0.53 NG/DL (ref 0.78–2.19)
TRIGL SERPL-MCNC: 47 MG/DL (ref 20–199)
TSH SERPL DL<=0.05 MIU/L-ACNC: 32.3 MIU/ML (ref 0.46–4.68)
WBC NRBC COR # BLD: 6.46 10*3/MM3 (ref 3.2–9.8)

## 2017-06-16 PROCEDURE — 82306 VITAMIN D 25 HYDROXY: CPT | Performed by: FAMILY MEDICINE

## 2017-06-16 PROCEDURE — 84481 FREE ASSAY (FT-3): CPT | Performed by: FAMILY MEDICINE

## 2017-06-16 PROCEDURE — 80053 COMPREHEN METABOLIC PANEL: CPT | Performed by: FAMILY MEDICINE

## 2017-06-16 PROCEDURE — 84439 ASSAY OF FREE THYROXINE: CPT | Performed by: FAMILY MEDICINE

## 2017-06-16 PROCEDURE — 99214 OFFICE O/P EST MOD 30 MIN: CPT | Performed by: FAMILY MEDICINE

## 2017-06-16 PROCEDURE — 84443 ASSAY THYROID STIM HORMONE: CPT | Performed by: FAMILY MEDICINE

## 2017-06-16 PROCEDURE — 83735 ASSAY OF MAGNESIUM: CPT | Performed by: FAMILY MEDICINE

## 2017-06-16 PROCEDURE — 85025 COMPLETE CBC W/AUTO DIFF WBC: CPT | Performed by: FAMILY MEDICINE

## 2017-06-16 PROCEDURE — 80061 LIPID PANEL: CPT | Performed by: FAMILY MEDICINE

## 2017-06-16 NOTE — PROGRESS NOTES
Subjective   Emerson Bang is a 56 y.o. male.     History of Present Illness     Problem List  1. 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 Hypothyroidism   3. Alcohol abuse  4. Anemia, iron deficiency  5. Vitamin D deficiency  6. GERD  7. Last colonoscopy was 3 years ago   8. Esophagitis/gastritis/internal and external hemorhoids.  9. Elevated AST level  10. Hypomagnesemia   11. Hyperlipidemia     Pt is 57 yo AAM with the above medical issues. Is here for recheck. States he has cut down on alcohol intake and drinks twice a week now not daily.  Drinks 2-3 beers in one sitting along with some hard liquor.  Has history of low magnesium and iron deficiency anemia and is due for recheck.  Also Due for recheck on AST. Last liver US was normal. Takes Magnesium oxide 400 mg PO q daily for low magnesium     Also has history of throat cancer/squamous cell carcinoma or right tonsil/pharynx, sp chemoradiation therapy. Is seeing ENT regarding this and pt needs a new Radiation Oncologist for regular folllowup.  Next appt for pt was supposed to be in September 2017.  Pt denies any pain in throat or trouble swallowing. Has issues with speaking full sentences. Last thyroid study back in March show TSH slightly high and T3 and T4 levels low. Pt is currently taking Synthroid 150 mcg PO q daily. Pt's last appt with ENT was in February 2017 and pt had laryngoscopy and results of procedure showed it was normal. No masses or lesions. He does not see Dr. Washington (ENT) until February 2018    Daughter states pt is compliant with medication. Also takes aspirin and simvastatin for hyperlipidemia. Is due for recheck on lipid levels    BP at goal today. Pt taking Lisionpril-HCTZ daily    Also on Vitamin D pill once a week. Is due for recheck on Vitamin D levels     The following portions of the patient's history were reviewed and updated as appropriate: allergies,  "current medications, past family history, past medical history, past social history, past surgical history and problem list.    Review of Systems   Constitutional: Negative.    HENT: Positive for voice change.    Eyes: Negative.    Respiratory: Negative.    Cardiovascular: Negative.    Gastrointestinal: Negative.    Endocrine: Negative.    Genitourinary: Negative.    Musculoskeletal: Negative.    Skin: Negative.    Allergic/Immunologic: Negative.    Neurological: Negative.    Hematological: Negative.    Psychiatric/Behavioral: Negative.        Objective    /76  Pulse 86  Temp 97.6 °F (36.4 °C)  Resp 16  Ht 69\" (175.3 cm)  Wt 123 lb 3.2 oz (55.9 kg)  BMI 18.19 kg/m2    Physical Exam   Constitutional: He appears well-developed and well-nourished. No distress.   HENT:   Head: Normocephalic and atraumatic.   Right Ear: External ear normal.   No lymphadenopathy    Eyes: Conjunctivae and EOM are normal. Pupils are equal, round, and reactive to light. Right eye exhibits no discharge. Left eye exhibits no discharge. No scleral icterus.   Neck: Normal range of motion. Neck supple. No JVD present. No tracheal deviation present. No thyromegaly present.   Cardiovascular: Normal rate and regular rhythm.    Pulmonary/Chest: Effort normal and breath sounds normal. No stridor. No respiratory distress. He has no wheezes.   Abdominal: Soft. Bowel sounds are normal. He exhibits no distension and no mass. There is no tenderness. There is no rebound and no guarding. No hernia.   Lymphadenopathy:     He has no cervical adenopathy.   Skin: He is not diaphoretic.   Nursing note and vitals reviewed.      Assessment/Plan   Problems Addressed this Visit        Respiratory    Squamous cell carcinoma of pharynx    Relevant Orders    Ambulatory Referral to Radiation Oncology-External (Completed)       Digestive    Vitamin D deficiency       Endocrine    Hypothyroidism    Relevant Orders    CBC Auto Differential    Comprehensive " Metabolic Panel    Vitamin D 25 Hydroxy    Lipid Panel    Magnesium       Hematopoietic and Hemostatic    Iron deficiency anemia       Other    Elevated AST (SGOT)    Alcohol abuse    Low magnesium levels    History of throat cancer - Primary    Relevant Orders    Ambulatory Referral to Radiation Oncology-External (Completed)    Alcohol abuse counseling and surveillance        - For history of throat cancer/ squamous cell carcinoma of pharynx.  Pt will need to followup with Radiation Oncologist. Will refer to Dr. Orr in [place] for cancer surveillance and continued care.  Consultation and Services appreciated. Previous records are with previous Radiation Oncologist Dr. Morrison  - for iron deficiency anemia - will recheck cbc. Continue ferrous sulfate  - Vitamin D deficiency - continue vitamin D once a week.  Recheck Vitamin D levels  - low magnesium - recheck magnesium levels - continue with magnesium oxide once a day  - For hyperlipidemia - recheck lipid panel -continue with aspirin and statin   - Elevated AST - recheck LFT. Advised pt to cut down or quit alcohol. Consider Gastroenterology referral if consistently elevated. Recent US of liver was normal at [facility]  - hypothyroidism - reccheck thyroid function studies.  Consider changing dosage of synthroid if needed  - recheck in 3 months or earlier if any problems arise  - Counseled pt to quit or cut down drinking spent >5 minutes counseling

## 2017-06-18 LAB — T3FREE SERPL-MCNC: 1.8 PG/ML (ref 2–4.4)

## 2017-06-29 RX ORDER — LEVOTHYROXINE SODIUM 175 UG/1
175 TABLET ORAL DAILY
Qty: 30 TABLET | Refills: 11 | Status: SHIPPED | OUTPATIENT
Start: 2017-06-29 | End: 2017-11-10 | Stop reason: SDUPTHER

## 2017-09-15 ENCOUNTER — OFFICE VISIT (OUTPATIENT)
Dept: FAMILY MEDICINE CLINIC | Facility: CLINIC | Age: 57
End: 2017-09-15

## 2017-09-15 VITALS
BODY MASS INDEX: 17.36 KG/M2 | HEART RATE: 83 BPM | RESPIRATION RATE: 16 BRPM | SYSTOLIC BLOOD PRESSURE: 118 MMHG | HEIGHT: 69 IN | DIASTOLIC BLOOD PRESSURE: 78 MMHG | TEMPERATURE: 98.6 F | WEIGHT: 117.2 LBS

## 2017-09-15 DIAGNOSIS — F10.10 ALCOHOL ABUSE: ICD-10-CM

## 2017-09-15 DIAGNOSIS — D64.9 ANEMIA, UNSPECIFIED TYPE: ICD-10-CM

## 2017-09-15 DIAGNOSIS — E78.5 HYPERLIPIDEMIA, UNSPECIFIED HYPERLIPIDEMIA TYPE: ICD-10-CM

## 2017-09-15 DIAGNOSIS — E83.42 HYPOMAGNESEMIA: ICD-10-CM

## 2017-09-15 DIAGNOSIS — E55.9 VITAMIN D DEFICIENCY: ICD-10-CM

## 2017-09-15 DIAGNOSIS — E03.9 HYPOTHYROIDISM, UNSPECIFIED TYPE: ICD-10-CM

## 2017-09-15 DIAGNOSIS — R63.6 UNDERWEIGHT DUE TO INADEQUATE CALORIC INTAKE: Primary | ICD-10-CM

## 2017-09-15 DIAGNOSIS — Z13.1 ENCOUNTER FOR SCREENING FOR DIABETES MELLITUS: ICD-10-CM

## 2017-09-15 DIAGNOSIS — R74.01 ELEVATED AST (SGOT): ICD-10-CM

## 2017-09-15 PROCEDURE — 99214 OFFICE O/P EST MOD 30 MIN: CPT | Performed by: FAMILY MEDICINE

## 2017-09-15 PROCEDURE — 36415 COLL VENOUS BLD VENIPUNCTURE: CPT | Performed by: FAMILY MEDICINE

## 2017-09-15 NOTE — PROGRESS NOTES
Subjective   Emerson Bang is a 57 y.o. male.       Problem List  1. 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 Hypothyroidism, unspecified   3. Alcohol abuse  4. Anemia, iron deficiency  5. Vitamin D deficiency  6. GERD  7. Last colonoscopy was 3 years ago   8. Esophagitis/gastritis/internal and external hemorhoids.  9. Elevated AST level  10. Hypomagnesemia   11. Hyperlipidemia ASCVD risk >5%  12. Essential Hypertension     Pt is 56 yo AAM with the above medical issues. Is here for recheck. Continues to drink alcohol but has cut down to twice a week. Drinks a six pack a week. Pt had elevated AST levels on last lab draw.  Also has hypothyroidism and is due for recheck on labwork. Currently takes synthroid 175 mcg PO q daily.  Pt also has iron deficiency anemia and takes ferrous sulfate 325 mg PO q daily.  Regarding low magnesium pt takes mag oxide 400 mg PO q daily. Pt is seeing anew  Radiation Oncologist in [place] Do not have records today. Per Patient, he recently had scope done to look for any throat cancer and states it was normal.    Pt also has vitamin D deficiency and takes vitamin D once a week.  Continues to take lisinopril-HCTZ for hypertension. BP at goal.  Pt has history of throat cancer and has next appt with ENT in February 2018.  Pt has lost 6 lbs since last visit. Pt eats very little daily either 1-2 a day. Total calories add up to about <1000 calories/day.      Weight Loss   This is a new problem. The current episode started more than 1 month ago. The problem occurs daily. The problem has been unchanged. Pertinent negatives include no abdominal pain, anorexia, arthralgias, change in bowel habit, chest pain, chills, congestion, coughing, diaphoresis, fatigue, fever, headaches, joint swelling, myalgias, nausea, neck pain, numbness, rash, sore throat, swollen glands, urinary symptoms, vertigo, visual change,  "vomiting or weakness. Nothing aggravates the symptoms. He has tried nothing for the symptoms. The treatment provided no relief.        The following portions of the patient's history were reviewed and updated as appropriate: allergies, current medications, past family history, past medical history, past social history, past surgical history and problem list.    Review of Systems   Constitutional: Positive for unexpected weight change and weight loss. Negative for chills, diaphoresis, fatigue and fever.   HENT: Negative.  Negative for congestion and sore throat.    Eyes: Negative.    Respiratory: Negative.  Negative for cough.    Cardiovascular: Negative for chest pain.   Gastrointestinal: Negative.  Negative for abdominal pain, anorexia, change in bowel habit, nausea and vomiting.   Endocrine: Negative.    Genitourinary: Negative.    Musculoskeletal: Negative.  Negative for arthralgias, joint swelling, myalgias and neck pain.   Skin: Negative.  Negative for rash.   Allergic/Immunologic: Negative.    Neurological: Negative.  Negative for vertigo, weakness, numbness and headaches.   Hematological: Negative.    Psychiatric/Behavioral: Negative.        Objective    /78  Pulse 83  Temp 98.6 °F (37 °C)  Resp 16  Ht 69\" (175.3 cm)  Wt 117 lb 3.2 oz (53.2 kg)  BMI 17.31 kg/m2      Chemistry        Component Value Date/Time     06/16/2017 1055    K 4.3 06/16/2017 1055    CL 98 06/16/2017 1055    CO2 25.0 06/16/2017 1055    BUN 8 06/16/2017 1055    CREATININE 0.76 06/16/2017 1055        Component Value Date/Time    CALCIUM 9.5 06/16/2017 1055    ALKPHOS 115 06/16/2017 1055    AST 71 (H) 06/16/2017 1055    ALT 53 06/16/2017 1055    BILITOT 0.4 06/16/2017 1055        Lab Results   Component Value Date    WBC 6.46 06/16/2017    HGB 11.1 (L) 06/16/2017    HCT 31.5 (L) 06/16/2017    MCV 79.5 (L) 06/16/2017     06/16/2017     Lab Results   Component Value Date    CHOL 222 (H) 06/16/2017     Lab Results "   Component Value Date    TRIG 47 06/16/2017    TRIG 59 01/09/2017    TRIG 100 10/05/2016     Lab Results   Component Value Date     06/16/2017     01/09/2017     10/05/2016     Lab Results   Component Value Date    LDLCALC 176 (H) 01/09/2017    LDLCALC 76 10/05/2016    LDLCALC 114 11/25/2015     No results found for: LDL  No results found for: HDLLDLRATIO  No components found for: CHOLHDL  Lab Results   Component Value Date    HGBA1C 5.3 10/05/2016     Physical Exam   Constitutional: He is oriented to person, place, and time. No distress.   Thin appearance    HENT:   Head: Normocephalic and atraumatic.   Right Ear: External ear normal.   Left Ear: External ear normal.   No cervical lympadenopathy   Eyes: Conjunctivae and EOM are normal. Pupils are equal, round, and reactive to light. Right eye exhibits no discharge. Left eye exhibits no discharge. No scleral icterus.   Neck: Normal range of motion. Neck supple. No JVD present. No tracheal deviation present. No thyromegaly present.   Cardiovascular: Normal rate, regular rhythm and normal heart sounds.    Pulmonary/Chest: Effort normal. No stridor. No respiratory distress. He has no wheezes.   Abdominal: Soft. He exhibits no distension and no mass. There is no tenderness. There is no rebound and no guarding. No hernia.   Musculoskeletal: Normal range of motion. He exhibits no edema, tenderness or deformity.   Lymphadenopathy:     He has no cervical adenopathy.   Neurological: He is alert and oriented to person, place, and time. He has normal reflexes. No cranial nerve deficit. Coordination normal.   Skin: Skin is warm and dry. No rash noted. He is not diaphoretic. No erythema. No pallor.   Psychiatric: He has a normal mood and affect. His behavior is normal.   Nursing note and vitals reviewed.      Assessment/Plan   Problems Addressed this Visit        Cardiovascular and Mediastinum    Hyperlipidemia     Continue statin - recheck lipid panel.            Relevant Orders    Lipid Panel       Digestive    Vitamin D deficiency     Recheck Vitamin D levels - continue vitamin D once a week          Relevant Orders    Vitamin D 25 Hydroxy       Endocrine    Hypothyroidism     Recheck thyroid levels. Continue synthroid          Relevant Orders    TSH    T4, Free       Hematopoietic and Hemostatic    Anemia     Anemia workup including iron panel. Continue iron pill          Relevant Orders    Folate    Vitamin B12    Vitamin B1, Whole Blood    Ferritin    Reticulocytes    Peripheral Blood Smear    Hgb. Frac. Profile       Other    Elevated AST (SGOT)     Recheck liver enzymes. Counseled to abstain from alcohol if possilbe          Relevant Orders    Comprehensive Metabolic Panel    Alcohol abuse     Counseled pt to quit drinking. He does not want to quit.  Recommend AA         Relevant Orders    Ethanol    Encounter for screening for diabetes mellitus     Recheck hga1c         Relevant Orders    Comprehensive Metabolic Panel    Hemoglobin A1c    Hypomagnesemia     Recheck magnesium level. Continues Mag oxide         Relevant Orders    Magnesium    Underweight due to inadequate caloric intake - Primary     - I told pt I am concerned about recent weight loss. He is not getting enough calories a day  - gave diet information to increase caloric intake  - will refer to Dietician at [facility]. Consultation appreciated  - recheck in 1 month          Relevant Orders    Ambulatory Referral to Nutrition Services

## 2017-09-15 NOTE — PATIENT INSTRUCTIONS
Get flu vaccine at any pharmacy High-Protein and High-Calorie Diet  Eating high-protein and high-calorie foods can help you to gain weight, heal after an injury, and recover after an illness or surgery.   WHAT IS MY PLAN?  The specific amount of daily protein and calories you need depends on:  · Your body weight.  · The reason this diet is recommended for you.  Generally, a high-protein, high-calorie diet involves:   · Eating 250-500 extra calories each day.  · Making sure that 10-35% of your daily calories come from protein.  Talk to your health care provider about how much protein and how many calories you need each day. Follow the diet as directed by your health care provider.   WHAT DO I NEED TO KNOW ABOUT THIS DIET?  · Ask your health care provider if you should take a nutritional supplement.    · Try to eat six small meals each day instead of three large meals.    · Eat a balanced diet, including one food that is high in protein at each meal.  · Keep nutritious snacks handy, such as nuts, trail mixes, dried fruit, and yogurt.    · If you have kidney disease or diabetes, eating too much protein may put extra stress on your kidneys. Talk to your health care provider if you have either of those conditions.  WHAT ARE SOME HIGH-PROTEIN FOODS?  Grains  Quinoa. Bulgur wheat.  Vegetables  Soybeans. Peas.  Meats and Other Protein Sources  Beef, pork, and poultry. Fish and seafood. Eggs. Tofu. Textured vegetable protein (TVP). Peanut butter. Nuts and seeds. Dried beans. Protein powders.  Dairy   Whole milk. Whole-milk yogurt. Powdered milk. Cheese. Cottage Cheese. Eggnog.  Beverages   High-protein supplement drinks. Soy milk.  Other  Protein bars.  The items listed above may not be a complete list of recommended foods or beverages. Contact your dietitian for more options.  WHAT ARE SOME HIGH-CALORIE FOODS?  Grains   Pasta. Quick breads. Muffins. Pancakes. Ready-to-eat cereal.  Vegetables   Vegetables cooked in oil or  butter. Fried potatoes.  Fruits   Dried fruit. Fruit leather. Canned fruit in syrup. Fruit juice. Avocados.  Meats and Other Protein Sources   Peanut butter. Nuts and seeds.  Dairy   Heavy cream. Whipped cream. Cream cheese. Sour cream. Ice cream. Custard. Pudding.  Beverages   Meal-replacement beverages. Nutrition shakes. Fruit juice. Sugar-sweetened soft drinks.  Condiments   Salad dressing. Mayonnaise. Oniel sauce. Fruit preserves or jelly. Honey. Syrup.  Sweets/Desserts   Cake. Cookies. Pie. Pastries. Candy bars. Chocolate.  Fats and Oils   Butter or margarine. Oil. Gravy.  Other   Meal-replacement bars.  The items listed above may not be a complete list of recommended foods or beverages. Contact your dietitian for more options.  WHAT ARE SOME TIPS FOR INCLUDING HIGH-PROTEIN AND HIGH-CALORIE FOODS IN MY DIET?  · Add whole milk, half-and-half, or heavy cream to cereal, pudding, soup, or hot cocoa.  · Add whole milk to instant breakfast drinks.  · Add peanut butter to oatmeal or smoothies.  · Add powdered milk to baked goods, smoothies, or milkshakes.  · Add powdered milk, cream, or butter to mashed potatoes.  · Add cheese to cooked vegetables.  · Make whole-milk yogurt parfaits. Top them with granola, fruit, or nuts.  · Add cottage cheese to your fruit.  · Add avocados, cheese, or both to sandwiches or salads.  · Add meat, poultry, or seafood to rice, pasta, casseroles, salads, and soups.    · Use mayonnaise when making egg salad, chicken salad, or tuna salad.  · Use peanut butter as a topping for pretzels, celery, or crackers.  · Add beans to casseroles, dips, and spreads.  · Add pureed beans to sauces and soups.  · Replace calorie-free drinks with calorie-containing drinks, such as milk and fruit juice.     This information is not intended to replace advice given to you by your health care provider. Make sure you discuss any questions you have with your health care provider.     Document Released: 12/18/2006  Document Revised: 01/08/2016 Document Reviewed: 06/02/2015  Elsevier Interactive Patient Education ©2017 [employer]

## 2017-09-15 NOTE — ASSESSMENT & PLAN NOTE
- I told pt I am concerned about recent weight loss. He is not getting enough calories a day  - gave diet information to increase caloric intake  - will refer to Dietician at [facility]. Consultation appreciated  - recheck in 1 month

## 2017-11-01 ENCOUNTER — OFFICE VISIT (OUTPATIENT)
Dept: FAMILY MEDICINE CLINIC | Facility: CLINIC | Age: 57
End: 2017-11-01

## 2017-11-01 VITALS
WEIGHT: 124.8 LBS | SYSTOLIC BLOOD PRESSURE: 128 MMHG | BODY MASS INDEX: 18.48 KG/M2 | DIASTOLIC BLOOD PRESSURE: 88 MMHG | RESPIRATION RATE: 16 BRPM | HEART RATE: 84 BPM | TEMPERATURE: 98.6 F | HEIGHT: 69 IN

## 2017-11-01 DIAGNOSIS — D50.9 IRON DEFICIENCY ANEMIA, UNSPECIFIED IRON DEFICIENCY ANEMIA TYPE: ICD-10-CM

## 2017-11-01 DIAGNOSIS — F10.10 ALCOHOL ABUSE: ICD-10-CM

## 2017-11-01 DIAGNOSIS — R63.6 UNDERWEIGHT DUE TO INADEQUATE CALORIC INTAKE: ICD-10-CM

## 2017-11-01 DIAGNOSIS — C14.0 SQUAMOUS CELL CARCINOMA OF PHARYNX (HCC): ICD-10-CM

## 2017-11-01 DIAGNOSIS — Z23 NEED FOR IMMUNIZATION AGAINST INFLUENZA: Primary | ICD-10-CM

## 2017-11-01 DIAGNOSIS — E55.9 VITAMIN D DEFICIENCY: ICD-10-CM

## 2017-11-01 DIAGNOSIS — Z85.819 HISTORY OF THROAT CANCER: ICD-10-CM

## 2017-11-01 DIAGNOSIS — E03.9 HYPOTHYROIDISM, UNSPECIFIED TYPE: ICD-10-CM

## 2017-11-01 DIAGNOSIS — E83.42 HYPOMAGNESEMIA: ICD-10-CM

## 2017-11-01 LAB
25(OH)D3 SERPL-MCNC: 29.6 NG/ML (ref 30–100)
ALBUMIN SERPL-MCNC: 4.3 G/DL (ref 3.4–4.8)
ALBUMIN/GLOB SERPL: 1.1 G/DL (ref 1.1–1.8)
ALP SERPL-CCNC: 87 U/L (ref 38–126)
ALT SERPL W P-5'-P-CCNC: 30 U/L (ref 21–72)
ANION GAP SERPL CALCULATED.3IONS-SCNC: 15 MMOL/L (ref 5–15)
ARTICHOKE IGE QN: 81 MG/DL (ref 1–129)
AST SERPL-CCNC: 51 U/L (ref 17–59)
BASOPHILS # BLD AUTO: 0.08 10*3/MM3 (ref 0–0.2)
BASOPHILS NFR BLD AUTO: 1.2 % (ref 0–2)
BILIRUB SERPL-MCNC: 0.3 MG/DL (ref 0.2–1.3)
BUN BLD-MCNC: 9 MG/DL (ref 7–21)
BUN/CREAT SERPL: 11.3 (ref 7–25)
CALCIUM SPEC-SCNC: 9.7 MG/DL (ref 8.4–10.2)
CHLORIDE SERPL-SCNC: 95 MMOL/L (ref 95–110)
CHOLEST SERPL-MCNC: 182 MG/DL (ref 0–199)
CO2 SERPL-SCNC: 29 MMOL/L (ref 22–31)
CREAT BLD-MCNC: 0.8 MG/DL (ref 0.7–1.3)
CYTOLOGIST CVX/VAG CYTO: NORMAL
DEPRECATED RDW RBC AUTO: 39.1 FL (ref 35.1–43.9)
EOSINOPHIL # BLD AUTO: 0.35 10*3/MM3 (ref 0–0.7)
EOSINOPHIL NFR BLD AUTO: 5.2 % (ref 0–7)
ERYTHROCYTE [DISTWIDTH] IN BLOOD BY AUTOMATED COUNT: 13.4 % (ref 11.5–14.5)
ETHANOL BLD-MCNC: <10 MG/DL (ref 0–10)
ETHANOL UR QL: <0.01 %
FERRITIN SERPL-MCNC: 147 NG/ML (ref 17.9–464)
FOLATE SERPL-MCNC: >20 NG/ML (ref 2.76–21)
GFR SERPL CREATININE-BSD FRML MDRD: 120 ML/MIN/1.73 (ref 56–130)
GLOBULIN UR ELPH-MCNC: 3.8 GM/DL (ref 2.3–3.5)
GLUCOSE BLD-MCNC: 105 MG/DL (ref 60–100)
HBA1C MFR BLD: 5.4 % (ref 4–5.6)
HCT VFR BLD AUTO: 32.7 % (ref 39–49)
HCT VFR BLD AUTO: 32.7 % (ref 39–49)
HDLC SERPL-MCNC: 84 MG/DL (ref 60–200)
HGB BLD-MCNC: 11.6 G/DL (ref 13.7–17.3)
HGB RETIC QN: 28.1 PG (ref 30–38)
IMM GRANULOCYTES # BLD: 0.01 10*3/MM3 (ref 0–0.02)
IMM GRANULOCYTES NFR BLD: 0.1 % (ref 0–0.5)
IMM RETICS NFR: 15 % (ref 3–15.9)
IRON 24H UR-MRATE: 58 MCG/DL (ref 49–181)
IRON SATN MFR SERPL: 20 % (ref 20–55)
LDLC/HDLC SERPL: 1.02 {RATIO} (ref 0–3.55)
LYMPHOCYTES # BLD AUTO: 2.67 10*3/MM3 (ref 0.6–4.2)
LYMPHOCYTES NFR BLD AUTO: 39.6 % (ref 10–50)
MAGNESIUM SERPL-MCNC: 1.4 MG/DL (ref 1.6–2.3)
MCH RBC QN AUTO: 28.6 PG (ref 26.5–34)
MCHC RBC AUTO-ENTMCNC: 35.5 G/DL (ref 31.5–36.3)
MCV RBC AUTO: 80.5 FL (ref 80–98)
MONOCYTES # BLD AUTO: 1.33 10*3/MM3 (ref 0–0.9)
MONOCYTES NFR BLD AUTO: 19.7 % (ref 0–12)
NEUTROPHILS # BLD AUTO: 2.31 10*3/MM3 (ref 2–8.6)
NEUTROPHILS NFR BLD AUTO: 34.2 % (ref 37–80)
NRBC BLD MANUAL-RTO: 0 /100 WBC (ref 0–0)
PATH INTERP BLD-IMP: NORMAL
PLATELET # BLD AUTO: 358 10*3/MM3 (ref 150–450)
PMV BLD AUTO: 11.8 FL (ref 8–12)
POTASSIUM BLD-SCNC: 4.6 MMOL/L (ref 3.5–5.1)
PROT SERPL-MCNC: 8.1 G/DL (ref 6.3–8.6)
RBC # BLD AUTO: 4.06 10*6/MM3 (ref 4.37–5.74)
RETICS #: 0.06 10*6/MM3 (ref 0.03–0.12)
RETICS/RBC NFR AUTO: 1.49 % (ref 0.64–2.26)
RETICULOCYTE PRODUCTION INDEX: 0.67 % (ref 0.64–2.26)
SODIUM BLD-SCNC: 139 MMOL/L (ref 137–145)
T4 FREE SERPL-MCNC: 0.68 NG/DL (ref 0.78–2.19)
TIBC SERPL-MCNC: 293 MCG/DL (ref 261–462)
TRIGL SERPL-MCNC: 60 MG/DL (ref 20–199)
TSH SERPL DL<=0.05 MIU/L-ACNC: 78.2 MIU/ML (ref 0.46–4.68)
VIT B12 BLD-MCNC: 886 PG/ML (ref 239–931)
WBC NRBC COR # BLD: 6.75 10*3/MM3 (ref 3.2–9.8)

## 2017-11-01 PROCEDURE — 82728 ASSAY OF FERRITIN: CPT | Performed by: FAMILY MEDICINE

## 2017-11-01 PROCEDURE — 82306 VITAMIN D 25 HYDROXY: CPT | Performed by: FAMILY MEDICINE

## 2017-11-01 PROCEDURE — 85025 COMPLETE CBC W/AUTO DIFF WBC: CPT | Performed by: FAMILY MEDICINE

## 2017-11-01 PROCEDURE — 80053 COMPREHEN METABOLIC PANEL: CPT | Performed by: FAMILY MEDICINE

## 2017-11-01 PROCEDURE — 82607 VITAMIN B-12: CPT | Performed by: FAMILY MEDICINE

## 2017-11-01 PROCEDURE — 90686 IIV4 VACC NO PRSV 0.5 ML IM: CPT | Performed by: FAMILY MEDICINE

## 2017-11-01 PROCEDURE — 83550 IRON BINDING TEST: CPT | Performed by: FAMILY MEDICINE

## 2017-11-01 PROCEDURE — 85660 RBC SICKLE CELL TEST: CPT | Performed by: FAMILY MEDICINE

## 2017-11-01 PROCEDURE — 82746 ASSAY OF FOLIC ACID SERUM: CPT | Performed by: FAMILY MEDICINE

## 2017-11-01 PROCEDURE — 90471 IMMUNIZATION ADMIN: CPT | Performed by: FAMILY MEDICINE

## 2017-11-01 PROCEDURE — 85060 BLOOD SMEAR INTERPRETATION: CPT | Performed by: FAMILY MEDICINE

## 2017-11-01 PROCEDURE — 83540 ASSAY OF IRON: CPT | Performed by: FAMILY MEDICINE

## 2017-11-01 PROCEDURE — 83021 HEMOGLOBIN CHROMOTOGRAPHY: CPT | Performed by: FAMILY MEDICINE

## 2017-11-01 PROCEDURE — 84481 FREE ASSAY (FT-3): CPT | Performed by: FAMILY MEDICINE

## 2017-11-01 PROCEDURE — 83735 ASSAY OF MAGNESIUM: CPT | Performed by: FAMILY MEDICINE

## 2017-11-01 PROCEDURE — 99213 OFFICE O/P EST LOW 20 MIN: CPT | Performed by: FAMILY MEDICINE

## 2017-11-01 PROCEDURE — 80307 DRUG TEST PRSMV CHEM ANLYZR: CPT | Performed by: FAMILY MEDICINE

## 2017-11-01 PROCEDURE — 83036 HEMOGLOBIN GLYCOSYLATED A1C: CPT | Performed by: FAMILY MEDICINE

## 2017-11-01 PROCEDURE — 84425 ASSAY OF VITAMIN B-1: CPT | Performed by: FAMILY MEDICINE

## 2017-11-01 PROCEDURE — 84443 ASSAY THYROID STIM HORMONE: CPT | Performed by: FAMILY MEDICINE

## 2017-11-01 PROCEDURE — 84439 ASSAY OF FREE THYROXINE: CPT | Performed by: FAMILY MEDICINE

## 2017-11-01 PROCEDURE — 80061 LIPID PANEL: CPT | Performed by: FAMILY MEDICINE

## 2017-11-01 PROCEDURE — 85046 RETICYTE/HGB CONCENTRATE: CPT | Performed by: FAMILY MEDICINE

## 2017-11-01 NOTE — PROGRESS NOTES
Subjective   Emerson Bang is a 57 y.o. male.     History of Present Illness     Problem List  1. 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 Hypothyroidism, unspecified   3. Alcohol abuse  4. Anemia, iron deficiency  5. Vitamin D deficiency  6. GERD  7. Last colonoscopy was 3 years ago   8. Esophagitis/gastritis/internal and external hemorhoids.  9. Elevated AST level  10. Hypomagnesemia   11. Hyperlipidemia ASCVD risk >5%  12. Essential Hypertension     Pt is 58 yo AAM with the above medical issues. Is here for recheck.  Pt just got labwork done today and results are pending. Has history of hypothyroidism and is taking synthroid 175 mcg PO q daily.  Pt also has vitamin D deficiency and iron deficiency and is taking Vitamin D once a week along with iron pill daily.  Pt states he is doing well.  No issues with swallowing or pain on swallowing.  Pt has history of throat cancer and is seeing ENT along with Radiation Oncologist.  He continues to drink alcohol but has cut down to once a week. Pt has also gained 7 lbs since last visit and has seen dietician.  BP at goal today     The following portions of the patient's history were reviewed and updated as appropriate: allergies, current medications, past family history, past medical history, past social history, past surgical history and problem list.    Review of Systems   Constitutional: Positive for unexpected weight change. Negative for chills, diaphoresis, fatigue and fever.   HENT: Negative.  Negative for congestion and sore throat.    Eyes: Negative.    Respiratory: Negative.  Negative for cough.    Cardiovascular: Negative for chest pain.   Gastrointestinal: Negative.  Negative for abdominal pain, nausea and vomiting.   Endocrine: Negative.    Genitourinary: Negative.    Musculoskeletal: Negative.  Negative for arthralgias, joint swelling, myalgias and neck pain.   Skin: Negative.   "Negative for rash.   Allergic/Immunologic: Negative.    Neurological: Negative.  Negative for weakness, numbness and headaches.   Hematological: Negative.    Psychiatric/Behavioral: Negative.        Objective    /88  Pulse 84  Temp 98.6 °F (37 °C)  Resp 16  Ht 69\" (175.3 cm)  Wt 124 lb 12.8 oz (56.6 kg)  BMI 18.43 kg/m2  Office Visit on 06/16/2017   Component Date Value Ref Range Status   • WBC 06/16/2017 6.46  3.20 - 9.80 10*3/mm3 Final   • RBC 06/16/2017 3.96* 4.37 - 5.74 10*6/mm3 Final   • Hemoglobin 06/16/2017 11.1* 13.7 - 17.3 g/dL Final   • Hematocrit 06/16/2017 31.5* 39.0 - 49.0 % Final   • MCV 06/16/2017 79.5* 80.0 - 98.0 fL Final   • MCH 06/16/2017 28.0  26.5 - 34.0 pg Final   • MCHC 06/16/2017 35.2  31.5 - 36.3 g/dL Final   • RDW 06/16/2017 14.3  11.5 - 14.5 % Final   • RDW-SD 06/16/2017 40.7  35.1 - 43.9 fl Final   • MPV 06/16/2017 11.6  8.0 - 12.0 fL Final   • Platelets 06/16/2017 319  150 - 450 10*3/mm3 Final   • Neutrophil % 06/16/2017 34.6* 37.0 - 80.0 % Final   • Lymphocyte % 06/16/2017 42.1  10.0 - 50.0 % Final   • Monocyte % 06/16/2017 16.9* 0.0 - 12.0 % Final   • Eosinophil % 06/16/2017 3.3  0.0 - 7.0 % Final   • Basophil % 06/16/2017 2.9* 0.0 - 2.0 % Final   • Immature Grans % 06/16/2017 0.2  0.0 - 0.5 % Final   • Neutrophils, Absolute 06/16/2017 2.24  2.00 - 8.60 10*3/mm3 Final   • Lymphocytes, Absolute 06/16/2017 2.72  0.60 - 4.20 10*3/mm3 Final   • Monocytes, Absolute 06/16/2017 1.09* 0.00 - 0.90 10*3/mm3 Final   • Eosinophils, Absolute 06/16/2017 0.21  0.00 - 0.70 10*3/mm3 Final   • Basophils, Absolute 06/16/2017 0.19  0.00 - 0.20 10*3/mm3 Final   • Immature Grans, Absolute 06/16/2017 0.01  0.00 - 0.02 10*3/mm3 Final   • Glucose 06/16/2017 97  60 - 100 mg/dL Final   • BUN 06/16/2017 8  7 - 21 mg/dL Final   • Creatinine 06/16/2017 0.76  0.70 - 1.30 mg/dL Final   • Sodium 06/16/2017 139  137 - 145 mmol/L Final   • Potassium 06/16/2017 4.3  3.5 - 5.1 mmol/L Final   • Chloride " 06/16/2017 98  95 - 110 mmol/L Final   • CO2 06/16/2017 25.0  22.0 - 31.0 mmol/L Final   • Calcium 06/16/2017 9.5  8.4 - 10.2 mg/dL Final   • Total Protein 06/16/2017 8.4  6.3 - 8.6 g/dL Final   • Albumin 06/16/2017 4.50  3.40 - 4.80 g/dL Final   • ALT (SGPT) 06/16/2017 53  21 - 72 U/L Final   • AST (SGOT) 06/16/2017 71* 17 - 59 U/L Final   • Alkaline Phosphatase 06/16/2017 115  38 - 126 U/L Final   • Total Bilirubin 06/16/2017 0.4  0.2 - 1.3 mg/dL Final   • eGFR   Amer 06/16/2017 128  56 - 130 mL/min/1.73 Final   • Globulin 06/16/2017 3.9* 2.3 - 3.5 gm/dL Final   • A/G Ratio 06/16/2017 1.2  1.1 - 1.8 g/dL Final   • BUN/Creatinine Ratio 06/16/2017 10.5  7.0 - 25.0 Final   • Anion Gap 06/16/2017 16.0* 5.0 - 15.0 mmol/L Final   • 25 Hydroxy, Vitamin D 06/16/2017 38.2  30.0 - 100.0 ng/ml Final   • Total Cholesterol 06/16/2017 222* 0 - 199 mg/dL Final   • Triglycerides 06/16/2017 47  20 - 199 mg/dL Final   • HDL Cholesterol 06/16/2017 116  60 - 200 mg/dL Final   • LDL Cholesterol  06/16/2017 102  1 - 129 mg/dL Final   • LDL/HDL Ratio 06/16/2017 0.83  0.00 - 3.55 Final   • Magnesium 06/16/2017 1.5* 1.6 - 2.3 mg/dL Final       Physical Exam   Constitutional: He is oriented to person, place, and time. No distress.   Thin appearance    HENT:   Head: Normocephalic and atraumatic.   Right Ear: External ear normal.   Left Ear: External ear normal.   No cervical lympadenopathy   Eyes: Conjunctivae and EOM are normal. Pupils are equal, round, and reactive to light. Right eye exhibits no discharge. Left eye exhibits no discharge. No scleral icterus.   Neck: Normal range of motion. Neck supple. No JVD present. No tracheal deviation present. No thyromegaly present.   Cardiovascular: Normal rate, regular rhythm and normal heart sounds.    Pulmonary/Chest: Effort normal. No stridor. No respiratory distress. He has no wheezes.   Abdominal: Soft. He exhibits no distension and no mass. There is no tenderness. There is no rebound  and no guarding. No hernia.   Musculoskeletal: Normal range of motion. He exhibits no edema, tenderness or deformity.   Lymphadenopathy:     He has no cervical adenopathy.   Neurological: He is alert and oriented to person, place, and time. He has normal reflexes. No cranial nerve deficit. Coordination normal.   Skin: Skin is warm and dry. No rash noted. He is not diaphoretic. No erythema. No pallor.   Psychiatric: He has a normal mood and affect. His behavior is normal.   Nursing note and vitals reviewed.      Assessment/Plan   Problems Addressed this Visit        Respiratory    Squamous cell carcinoma of pharynx       Digestive    Vitamin D deficiency       Endocrine    Hypothyroidism       Hematopoietic and Hemostatic    Iron deficiency anemia       Other    Alcohol abuse    History of throat cancer    Hypomagnesemia    Need for immunization against influenza - Primary    Relevant Orders    Flu Vaccine Quad PF 3YR+ (FLUARIX/FLUZONE [id number]) (Completed)      - awaiting labs to be completed. Will adjust dosage of medications if needed once results are finished  - pt agrees to influenza vaccination today. Signed consent form  - pt has gained 7 lbs since last visit.  He continues to have good appetite  - recheck in 3 months or earlier if any problems arise

## 2017-11-02 LAB
HGB A MFR BLD: 61.9 % (ref 94–98)
HGB A2 MFR BLD COLUMN CHROM: 2.7 % (ref 0.7–3.1)
HGB C MFR BLD: 35.4 %
HGB F MFR BLD: 0 % (ref 0–2)
HGB FRACT BLD-IMP: ABNORMAL
HGB S BLD QL SOLY: NEGATIVE
HGB S MFR BLD: 0 %
T3FREE SERPL-MCNC: 2.4 PG/ML (ref 2–4.4)

## 2017-11-06 LAB — VIT B1 BLD-SCNC: 68.4 NMOL/L (ref 66.5–200)

## 2017-11-10 RX ORDER — LEVOTHYROXINE SODIUM 0.2 MG/1
200 TABLET ORAL DAILY
Qty: 30 TABLET | Refills: 11 | Status: SHIPPED | OUTPATIENT
Start: 2017-11-10 | End: 2018-02-05 | Stop reason: SDUPTHER

## 2017-12-04 RX ORDER — TERAZOSIN 5 MG/1
CAPSULE ORAL
Qty: 30 CAPSULE | Refills: 11 | Status: SHIPPED | OUTPATIENT
Start: 2017-12-04 | End: 2018-08-20 | Stop reason: SDUPTHER

## 2018-02-01 ENCOUNTER — OFFICE VISIT (OUTPATIENT)
Dept: FAMILY MEDICINE CLINIC | Facility: CLINIC | Age: 58
End: 2018-02-01

## 2018-02-01 VITALS
TEMPERATURE: 98.6 F | RESPIRATION RATE: 16 BRPM | DIASTOLIC BLOOD PRESSURE: 70 MMHG | HEART RATE: 98 BPM | SYSTOLIC BLOOD PRESSURE: 110 MMHG | HEIGHT: 69 IN | WEIGHT: 128 LBS | BODY MASS INDEX: 18.96 KG/M2

## 2018-02-01 DIAGNOSIS — R71.8 TARGET CELLS PRESENT ON PERIPHERAL BLOOD SMEAR: ICD-10-CM

## 2018-02-01 DIAGNOSIS — E03.9 HYPOTHYROIDISM, UNSPECIFIED TYPE: ICD-10-CM

## 2018-02-01 DIAGNOSIS — R63.6 UNDERWEIGHT DUE TO INADEQUATE CALORIC INTAKE: ICD-10-CM

## 2018-02-01 DIAGNOSIS — D64.9 ANEMIA, UNSPECIFIED TYPE: Primary | ICD-10-CM

## 2018-02-01 DIAGNOSIS — K21.00 GASTROESOPHAGEAL REFLUX DISEASE WITH ESOPHAGITIS: ICD-10-CM

## 2018-02-01 DIAGNOSIS — R79.0 LOW MAGNESIUM LEVELS: ICD-10-CM

## 2018-02-01 DIAGNOSIS — E83.42 HYPOMAGNESEMIA: ICD-10-CM

## 2018-02-01 DIAGNOSIS — Z71.41 ALCOHOL ABUSE COUNSELING AND SURVEILLANCE: ICD-10-CM

## 2018-02-01 DIAGNOSIS — D58.2 HEMOGLOBIN C TRAIT (HCC): ICD-10-CM

## 2018-02-01 DIAGNOSIS — E78.5 HYPERLIPIDEMIA, UNSPECIFIED HYPERLIPIDEMIA TYPE: ICD-10-CM

## 2018-02-01 DIAGNOSIS — Z85.819 HISTORY OF THROAT CANCER: ICD-10-CM

## 2018-02-01 DIAGNOSIS — D50.9 IRON DEFICIENCY ANEMIA, UNSPECIFIED IRON DEFICIENCY ANEMIA TYPE: ICD-10-CM

## 2018-02-01 DIAGNOSIS — E55.9 VITAMIN D DEFICIENCY: ICD-10-CM

## 2018-02-01 LAB
25(OH)D3 SERPL-MCNC: 56.4 NG/ML (ref 30–100)
ALBUMIN SERPL-MCNC: 4.4 G/DL (ref 3.4–4.8)
ALBUMIN/GLOB SERPL: 1.2 G/DL (ref 1.1–1.8)
ALP SERPL-CCNC: 94 U/L (ref 38–126)
ALT SERPL W P-5'-P-CCNC: 30 U/L (ref 21–72)
ANION GAP SERPL CALCULATED.3IONS-SCNC: 14 MMOL/L (ref 5–15)
ARTICHOKE IGE QN: 64 MG/DL (ref 1–129)
AST SERPL-CCNC: 34 U/L (ref 17–59)
BASOPHILS # BLD AUTO: 0.11 10*3/MM3 (ref 0–0.2)
BASOPHILS NFR BLD AUTO: 1.8 % (ref 0–2)
BILIRUB SERPL-MCNC: 0.3 MG/DL (ref 0.2–1.3)
BUN BLD-MCNC: 14 MG/DL (ref 7–21)
BUN/CREAT SERPL: 15.6 (ref 7–25)
CALCIUM SPEC-SCNC: 9.9 MG/DL (ref 8.4–10.2)
CHLORIDE SERPL-SCNC: 94 MMOL/L (ref 95–110)
CHOLEST SERPL-MCNC: 154 MG/DL (ref 0–199)
CO2 SERPL-SCNC: 26 MMOL/L (ref 22–31)
CREAT BLD-MCNC: 0.9 MG/DL (ref 0.7–1.3)
DEPRECATED RDW RBC AUTO: 37.4 FL (ref 35.1–43.9)
EOSINOPHIL # BLD AUTO: 0.36 10*3/MM3 (ref 0–0.7)
EOSINOPHIL NFR BLD AUTO: 5.9 % (ref 0–7)
ERYTHROCYTE [DISTWIDTH] IN BLOOD BY AUTOMATED COUNT: 13.2 % (ref 11.5–14.5)
FERRITIN SERPL-MCNC: 113 NG/ML (ref 17.9–464)
GFR SERPL CREATININE-BSD FRML MDRD: 105 ML/MIN/1.73 (ref 56–130)
GLOBULIN UR ELPH-MCNC: 3.6 GM/DL (ref 2.3–3.5)
GLUCOSE BLD-MCNC: 111 MG/DL (ref 60–100)
HCT VFR BLD AUTO: 34.3 % (ref 39–49)
HDLC SERPL-MCNC: 78 MG/DL (ref 60–200)
HGB BLD-MCNC: 12.2 G/DL (ref 13.7–17.3)
IMM GRANULOCYTES # BLD: 0.01 10*3/MM3 (ref 0–0.02)
IMM GRANULOCYTES NFR BLD: 0.2 % (ref 0–0.5)
IRON 24H UR-MRATE: 89 MCG/DL (ref 49–181)
IRON SATN MFR SERPL: 28 % (ref 20–55)
LDLC/HDLC SERPL: 0.85 {RATIO} (ref 0–3.55)
LYMPHOCYTES # BLD AUTO: 2.31 10*3/MM3 (ref 0.6–4.2)
LYMPHOCYTES NFR BLD AUTO: 37.6 % (ref 10–50)
MAGNESIUM SERPL-MCNC: 1.8 MG/DL (ref 1.6–2.3)
MCH RBC QN AUTO: 27.6 PG (ref 26.5–34)
MCHC RBC AUTO-ENTMCNC: 35.6 G/DL (ref 31.5–36.3)
MCV RBC AUTO: 77.6 FL (ref 80–98)
MONOCYTES # BLD AUTO: 0.99 10*3/MM3 (ref 0–0.9)
MONOCYTES NFR BLD AUTO: 16.1 % (ref 0–12)
NEUTROPHILS # BLD AUTO: 2.36 10*3/MM3 (ref 2–8.6)
NEUTROPHILS NFR BLD AUTO: 38.4 % (ref 37–80)
PLATELET # BLD AUTO: 312 10*3/MM3 (ref 150–450)
PMV BLD AUTO: 12 FL (ref 8–12)
POTASSIUM BLD-SCNC: 3.7 MMOL/L (ref 3.5–5.1)
PROT SERPL-MCNC: 8 G/DL (ref 6.3–8.6)
RBC # BLD AUTO: 4.42 10*6/MM3 (ref 4.37–5.74)
SODIUM BLD-SCNC: 134 MMOL/L (ref 137–145)
T4 FREE SERPL-MCNC: 1.5 NG/DL (ref 0.78–2.19)
TIBC SERPL-MCNC: 314 MCG/DL (ref 261–462)
TRIGL SERPL-MCNC: 49 MG/DL (ref 20–199)
TSH SERPL DL<=0.05 MIU/L-ACNC: 0.13 MIU/ML (ref 0.46–4.68)
WBC NRBC COR # BLD: 6.14 10*3/MM3 (ref 3.2–9.8)

## 2018-02-01 PROCEDURE — 82306 VITAMIN D 25 HYDROXY: CPT | Performed by: FAMILY MEDICINE

## 2018-02-01 PROCEDURE — 83540 ASSAY OF IRON: CPT | Performed by: FAMILY MEDICINE

## 2018-02-01 PROCEDURE — 83550 IRON BINDING TEST: CPT | Performed by: FAMILY MEDICINE

## 2018-02-01 PROCEDURE — 99214 OFFICE O/P EST MOD 30 MIN: CPT | Performed by: FAMILY MEDICINE

## 2018-02-01 PROCEDURE — 84481 FREE ASSAY (FT-3): CPT | Performed by: FAMILY MEDICINE

## 2018-02-01 PROCEDURE — 84443 ASSAY THYROID STIM HORMONE: CPT | Performed by: FAMILY MEDICINE

## 2018-02-01 PROCEDURE — 80053 COMPREHEN METABOLIC PANEL: CPT | Performed by: FAMILY MEDICINE

## 2018-02-01 PROCEDURE — 84439 ASSAY OF FREE THYROXINE: CPT | Performed by: FAMILY MEDICINE

## 2018-02-01 PROCEDURE — 80061 LIPID PANEL: CPT | Performed by: FAMILY MEDICINE

## 2018-02-01 PROCEDURE — 83735 ASSAY OF MAGNESIUM: CPT | Performed by: FAMILY MEDICINE

## 2018-02-01 PROCEDURE — 82728 ASSAY OF FERRITIN: CPT | Performed by: FAMILY MEDICINE

## 2018-02-01 PROCEDURE — 85025 COMPLETE CBC W/AUTO DIFF WBC: CPT | Performed by: FAMILY MEDICINE

## 2018-02-01 RX ORDER — OMEPRAZOLE 40 MG/1
40 CAPSULE, DELAYED RELEASE ORAL DAILY
Qty: 30 CAPSULE | Refills: 11 | Status: SHIPPED | OUTPATIENT
Start: 2018-02-01 | End: 2018-02-12 | Stop reason: CLARIF

## 2018-02-01 NOTE — PATIENT INSTRUCTIONS
Target Cells and Hemoglobin C      Will set up appt with Hematoligst for hemoglobin C ,target cell and anemia    Take Vitamin D    Get labwork fasting    May adjust medication based on lab results    STop drinking - consider AA      Appt with Dr. Felix DOSHI on February 9thsAlcohol Abuse and Nutrition  Alcohol abuse is any pattern of alcohol consumption that harms your health, relationships, or work. Alcohol abuse can affect how your body breaks down and absorbs nutrients from food by causing your liver to work abnormally. Additionally, many people who abuse alcohol do not eat enough carbohydrates, protein, fat, vitamins, and minerals. This can cause poor nutrition (malnutrition) and a lack of nutrients (nutrient deficiencies), which can lead to further complications.  Nutrients that are commonly lacking (deficient) among people who abuse alcohol include:  · Vitamins.  ¨ Vitamin A. This is stored in your liver. It is important for your vision, metabolism, and ability to fight off infections (immunity).  ¨ B vitamins. These include vitamins such as folate, thiamin, and niacin. These are important in new cell growth and maintenance.  ¨ Vitamin C. This plays an important role in iron absorption, wound healing, and immunity.  ¨ Vitamin D. This is produced by your liver, but you can also get vitamin D from food. Vitamin D is necessary for your body to absorb and use calcium.  · Minerals.  ¨ Calcium. This is important for your bones and your heart and blood vessel (cardiovascular) function.  ¨ Iron. This is important for blood, muscle, and nervous system functioning.  ¨ Magnesium. This plays an important role in muscle and nerve function, and it helps to control blood sugar and blood pressure.  ¨ Zinc. This is important for the normal function of your nervous system and digestive system (gastrointestinal tract).  Nutrition is an essential component of therapy for alcohol abuse. Your health care provider or dietitian will  work with you to design a plan that can help restore nutrients to your body and prevent potential complications.  What is my plan?  Your dietitian may develop a specific diet plan that is based on your condition and any other complications you may have. A diet plan will commonly include:  · A balanced diet.  ¨ Grains: 6-8 oz per day.  ¨ Vegetables: 2-3 cups per day.  ¨ Fruits: 1-2 cups per day.  ¨ Meat and other protein: 5-6 oz per day.  ¨ Dairy: 2-3 cups per day.  · Vitamin and mineral supplements.  What do I need to know about alcohol and nutrition?  · Consume foods that are high in antioxidants, such as grapes, berries, nuts, green tea, and dark green and orange vegetables. This can help to counteract some of the stress that is placed on your liver by consuming alcohol.  · Avoid food and drinks that are high in fat and sugar. Foods such as sugared soft drinks, salty snack foods, and candy contain empty calories. This means that they lack important nutrients such as protein, fiber, and vitamins.  · Eat frequent meals and snacks. Try to eat 5-6 small meals each day.  · Eat a variety of fresh fruits and vegetables each day. This will help you get plenty of water, fiber, and vitamins in your diet.  · Drink plenty of water and other clear fluids. Try to drink at least 48-64 oz (1.5-2 L) of water per day.  · If you are a vegetarian, eat a variety of protein-rich foods. Pair whole grains with plant-based proteins at meals and snacks to obtain the greatest nutrient benefit from your food. For example, eat rice with beans, put peanut butter on whole-grain toast, or eat oatmeal with sunflower seeds.  · Soak beans and whole grains overnight before cooking. This can help your body to absorb the nutrients more easily.  · Include foods fortified with vitamins and minerals in your diet. Commonly fortified foods include milk, orange juice, cereal, and bread.  · If you are malnourished, your dietitian may recommend a  high-protein, high-calorie diet. This may include:  ¨ 2,000-3,000 calories (kilocalories) per day.  ¨  grams of protein per day.  · Your health care provider may recommend a complete nutritional supplement beverage. This can help to restore calories, protein, and vitamins to your body. Depending on your condition, you may be advised to consume this instead of or in addition to meals.  · Limit your intake of caffeine. Replace drinks like coffee and black tea with decaffeinated coffee and herbal tea.  · Eat a variety of foods that are high in omega fatty acids. These include fish, nuts and seeds, and soybeans. These foods may help your liver to recover and may also stabilize your mood.  · Certain medicines may cause changes in your appetite, taste, and weight. Work with your health care provider and dietitian to make any adjustments to your medicines and diet plan.  · Include other healthy lifestyle choices in your daily routine.  ¨ Be physically active.  ¨ Get enough sleep.  ¨ Spend time doing activities that you enjoy.  · If you are unable to take in enough food and calories by mouth, your health care provider may recommend a feeding tube. This is a tube that passes through your nose and throat, directly into your stomach. Nutritional supplement beverages can be given to you through the feeding tube to help you get the nutrients you need.  · Take vitamin or mineral supplements as recommended by your health care provider.  What foods can I eat?  Grains   Enriched pasta. Enriched rice. Fortified whole-grain bread. Fortified whole-grain cereal. Barley. Brown rice. Quinoa. Millet.  Vegetables   All fresh, frozen, and canned vegetables. Spinach. Kale. Artichoke. Carrots. Winter squash and pumpkin. Sweet potatoes. Broccoli. Cabbage. Cucumbers. Tomatoes. Sweet peppers. Green beans. Peas. Corn.  Fruits   All fresh and frozen fruits. Berries. Grapes. Lucio. Papaya. Guava. Cherries. Apples. Bananas. Peaches. Plums.  Pineapple. Watermelon. Cantaloupe. Oranges. Avocado.  Meats and Other Protein Sources   Beef liver. Lean beef. Pork. Fresh and canned chicken. Fresh fish. Oysters. Sardines. Canned tuna. Shrimp. Eggs with yolks. Nuts and seeds. Peanut butter. Beans and lentils. Soybeans. Tofu.  Dairy   Whole, low-fat, and nonfat milk. Whole, low-fat, and nonfat yogurt. Cottage cheese. Sour cream. Hard and soft cheeses.  Beverages   Water. Herbal tea. Decaffeinated coffee. Decaffeinated green tea. 100% fruit juice. 100% vegetable juice. Instant breakfast shakes.  Condiments   Ketchup. Mayonnaise. Mustard. Salad dressing. Barbecue sauce.  Sweets and Desserts   Sugar-free ice cream. Sugar-free pudding. Sugar-free gelatin.  Fats and Oils   Butter. Vegetable oil, flaxseed oil, olive oil, and walnut oil.  Other   Complete nutrition shakes. Protein bars. Sugar-free gum.  The items listed above may not be a complete list of recommended foods or beverages. Contact your dietitian for more options.   What foods are not recommended?  Grains   Sugar-sweetened breakfast cereals. Flavored instant oatmeal. Fried breads.  Vegetables   Breaded or deep-fried vegetables.  Fruits   Dried fruit with added sugar. Candied fruit. Canned fruit in syrup.  Meats and Other Protein Sources   Breaded or deep-fried meats.  Dairy   Flavored milks. Fried cheese curds or fried cheese sticks.  Beverages   Alcohol. Sugar-sweetened soft drinks. Sugar-sweetened tea. Caffeinated coffee and tea.  Condiments   Sugar. Honey. Agave nectar. Molasses.  Sweets and Desserts   Chocolate. Cake. Cookies. Candy.  Other   Potato chips. Pretzels. Salted nuts. Candied nuts.  The items listed above may not be a complete list of foods and beverages to avoid. Contact your dietitian for more information.   This information is not intended to replace advice given to you by your health care provider. Make sure you discuss any questions you have with your health care provider.  Document  Released: 10/12/2006 Document Revised: 04/26/2017 Document Reviewed: 07/21/2015  Elsevier Interactive Patient Education © 2017 [employer]

## 2018-02-01 NOTE — PROGRESS NOTES
Subjective   Emerson Bang is a 57 y.o. male.     Problem List  1. 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 Hypothyroidism, unspecified   3. Alcohol abuse  4. 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 Vitamin D deficiency  6. GERD  7. Last colonoscopy was 3 years ago   8. Esophagitis/gastritis/internal and external hemorhoids.  9. Elevated AST level  10. Hypomagnesemia   11. Hyperlipidemia ASCVD risk >5%  12. Essential Hypertension     Pt is 58 yo AAM with the above medical issues. Is here for recheck.  Pt just got labwork done today and results are pending. Has history of hypothyroidism and is taking synthroid 175 mcg PO q daily.  Pt also has vitamin D deficiency and iron deficiency and is taking Vitamin D once a week along with iron pill daily.  Pt states he is doing well.  No issues with swallowing or pain on swallowing.  Pt has history of throat cancer and is seeing ENT along with Radiation Oncologist [place].  He continues to drink alcohol but has cut down to once a week. Pt has also gained 4 lbs since last visit and has seen dietician.  BP at goal today     Pt on last labwork had low Vitamin D along  With elevated TSH at 78.00. He is taking synthroid 200 mcg PO q daily T3 was at 2.4 and T4 was 0.68.  Pt also has anemia and last hga1c was 11.6.  Peripheral smear showed anemia borderline with numberous target cells. Magnesium levels were low at 1.4 and takes magnesium oxdie 400 mg daily Pt on hemoglobin electrophoresis has elevations of hemoglobin C. Pt does have some fatigue and dizziness intermittently but denies  Fever/nights or weight loss Last weight on pt was 124 lbs. He is 128 lbs today. Denies any swallowing issues.  Appetite is good    Continues to drink 2 times a week. Mainly beer. May drink 3 beers on each occasion. Does not want to quit drinking or see AA    Anemia   Presents  "for follow-up visit. Symptoms include light-headedness. There has been no abdominal pain, anorexia, bruising/bleeding easily, confusion, fever, leg swelling, malaise/fatigue, pallor, palpitations, paresthesias, pica or weight loss. Signs of blood loss that are not present include hematemesis, melena, menorrhagia and vaginal bleeding. There are no compliance problems.  Compliance with medications is 0-25%. Side effects of medications include fatigue.          The following portions of the patient's history were reviewed and updated as appropriate: allergies, current medications, past family history, past medical history, past social history, past surgical history and problem list.    Review of Systems   Constitutional: Positive for fatigue and unexpected weight change. Negative for chills, diaphoresis, fever, malaise/fatigue and weight loss.   HENT: Negative.  Negative for congestion and sore throat.    Eyes: Negative.    Respiratory: Negative.  Negative for cough.    Cardiovascular: Negative for chest pain and palpitations.   Gastrointestinal: Negative.  Negative for abdominal pain, anorexia, hematemesis, melena, nausea and vomiting.   Endocrine: Negative.    Genitourinary: Negative for menorrhagia and vaginal bleeding.   Musculoskeletal: Negative.  Negative for arthralgias, joint swelling, myalgias and neck pain.   Skin: Negative.  Negative for pallor and rash.   Allergic/Immunologic: Negative.    Neurological: Positive for dizziness and light-headedness. Negative for weakness, numbness, headaches and paresthesias.   Hematological: Negative.  Does not bruise/bleed easily.   Psychiatric/Behavioral: Negative.  Negative for confusion.       Objective    /70  Pulse 98  Temp 98.6 °F (37 °C)  Resp 16  Ht 175.3 cm (69\")  Wt 58.1 kg (128 lb)  BMI 18.9 kg/m2  Office Visit on 09/15/2017   Component Date Value Ref Range Status   • Glucose 11/01/2017 105* 60 - 100 mg/dL Final   • BUN 11/01/2017 9  7 - 21 mg/dL " Final   • Creatinine 11/01/2017 0.80  0.70 - 1.30 mg/dL Final   • Sodium 11/01/2017 139  137 - 145 mmol/L Final   • Potassium 11/01/2017 4.6  3.5 - 5.1 mmol/L Final   • Chloride 11/01/2017 95  95 - 110 mmol/L Final   • CO2 11/01/2017 29.0  22.0 - 31.0 mmol/L Final   • Calcium 11/01/2017 9.7  8.4 - 10.2 mg/dL Final   • Total Protein 11/01/2017 8.1  6.3 - 8.6 g/dL Final   • Albumin 11/01/2017 4.30  3.40 - 4.80 g/dL Final   • ALT (SGPT) 11/01/2017 30  21 - 72 U/L Final   • AST (SGOT) 11/01/2017 51  17 - 59 U/L Final   • Alkaline Phosphatase 11/01/2017 87  38 - 126 U/L Final   • Total Bilirubin 11/01/2017 0.3  0.2 - 1.3 mg/dL Final   • eGFR  African Amer 11/01/2017 120  56 - 130 mL/min/1.73 Final   • Globulin 11/01/2017 3.8* 2.3 - 3.5 gm/dL Final   • A/G Ratio 11/01/2017 1.1  1.1 - 1.8 g/dL Final   • BUN/Creatinine Ratio 11/01/2017 11.3  7.0 - 25.0 Final   • Anion Gap 11/01/2017 15.0  5.0 - 15.0 mmol/L Final   • Hemoglobin A1C 11/01/2017 5.4  4 - 5.6 % Final   • Total Cholesterol 11/01/2017 182  0 - 199 mg/dL Final   • Triglycerides 11/01/2017 60  20 - 199 mg/dL Final   • HDL Cholesterol 11/01/2017 84  60 - 200 mg/dL Final   • LDL Cholesterol  11/01/2017 81  1 - 129 mg/dL Final   • LDL/HDL Ratio 11/01/2017 1.02  0.00 - 3.55 Final   • TSH 11/01/2017 78.200* 0.460 - 4.680 mIU/mL Final   • Free T4 11/01/2017 0.68* 0.78 - 2.19 ng/dL Final   • 25 Hydroxy, Vitamin D 11/01/2017 29.6* 30.0 - 100.0 ng/ml Final   • Ethanol 11/01/2017 <10  0 - 10 mg/dL Final   • Ethanol % 11/01/2017 <0.010  % Final   • Folate 11/01/2017 >20.00  2.76 - 21.00 ng/mL Final   • Magnesium 11/01/2017 1.4* 1.6 - 2.3 mg/dL Final   • Vitamin B-12 11/01/2017 886  239 - 931 pg/mL Final   • Vitamin B1, Whole Blood 11/01/2017 68.4  66.5 - 200.0 nmol/L Final    This test was developed and its performance characteristics  determined by [facility]. It has not been cleared or approved  by the Food and Drug Administration.   • Ferritin 11/01/2017 147.00  17.90 - 464.00  ng/mL Final   • Reticulocyte % 11/01/2017 1.49  0.64 - 2.26 % Final   • Reticulocyte Absolute 11/01/2017 0.0605  0.0250 - 0.1250 10*6/mm3 Final   • Immature Reticulocyte Fraction 11/01/2017 15.0  3.0 - 15.9 % Final   • Reticulocyte Production Index 11/01/2017 0.67  0.64 - 2.26 % Final   • Hematocrit 11/01/2017 32.7* 39.0 - 49.0 % Final   • Reticulocyte Hgb 11/01/2017 28.1* 30.0 - 38.0 pg Final   • Performed by: 11/01/2017 Raghav Eaton M.D.   Final   • Pathologist Interpretation 11/01/2017    Final                    Value:Red cells are normocytic and normochromic.  Numerous target cells are present.  The leukocyte count is normal, and my differential count follows:  Neutrophils 38%, bands 1%, lymphocytes 36%, reactive lymphocytes 3%, monocytes 15%, eosinophils 7%.  Platelets are normal in number and morphology.    Hemoglobin fractionation pending - target cells may suggest thalassemia.    Impression:  Anemia, borderline, with numerous target cells.   • Hgb Solubility 11/01/2017 Negative  Negative Final   • Hgb F Quant 11/01/2017 0.0  0.0 - 2.0 % Final   • Hgb A 11/01/2017 61.9* 94.0 - 98.0 % Final   • Hgb S 11/01/2017 0.0  0.0 % Final   • Hgb C 11/01/2017 35.4* 0.0 % Final   • Hgb A2 Quant 11/01/2017 2.7  0.7 - 3.1 % Final   • Hgb Interp. 11/01/2017 Comment   Final    Hemoglobin pattern and concentration are consistent with  Hgb C trait (heterozygous). Suggest clinical and hematologic  correlation.              Hgb C-Trait Interpretation Ranges              Hgb A      50.0 - 70.0%              Hgb C      30.0 - 45.0%              Hgb A2      2.0 -  4.5%       Physical Exam   Constitutional: He is oriented to person, place, and time. No distress.   Thin appearance    HENT:   Head: Normocephalic and atraumatic.   Right Ear: External ear normal.   Left Ear: External ear normal.   No cervical lympadenopathy   Eyes: Conjunctivae and EOM are normal. Pupils are equal, round, and reactive to light. Right eye exhibits  no discharge. Left eye exhibits no discharge. No scleral icterus.   Neck: Normal range of motion. Neck supple. No JVD present. No tracheal deviation present. No thyromegaly present.   Cardiovascular: Normal rate, regular rhythm and normal heart sounds.    Pulmonary/Chest: Effort normal. No stridor. No respiratory distress. He has no wheezes.   Abdominal: Soft. He exhibits no distension and no mass. There is no tenderness. There is no rebound and no guarding. No hernia.   Musculoskeletal: Normal range of motion. He exhibits no edema, tenderness or deformity.   Lymphadenopathy:     He has no cervical adenopathy.   Neurological: He is alert and oriented to person, place, and time. He has normal reflexes. No cranial nerve deficit. Coordination normal.   Skin: Skin is warm and dry. No rash noted. He is not diaphoretic. No erythema. No pallor.   Psychiatric: He has a normal mood and affect. His behavior is normal.   Nursing note and vitals reviewed.      Assessment/Plan   Problems Addressed this Visit        Cardiovascular and Mediastinum    Hyperlipidemia    Relevant Orders    CBC Auto Differential    Comprehensive Metabolic Panel    TSH    T4, Free    T3, Free    Vitamin D 25 Hydroxy    Lipid Panel    Ferritin    Iron Profile    Magnesium       Digestive    Gastroesophageal reflux disease with esophagitis    Relevant Orders    CBC Auto Differential    Comprehensive Metabolic Panel    TSH    T4, Free    T3, Free    Vitamin D 25 Hydroxy    Lipid Panel    Ferritin    Iron Profile    Magnesium    Vitamin D deficiency    Relevant Orders    CBC Auto Differential    Comprehensive Metabolic Panel    TSH    T4, Free    T3, Free    Vitamin D 25 Hydroxy    Lipid Panel    Ferritin    Iron Profile    Magnesium       Endocrine    Hypothyroidism - Primary    Relevant Orders    CBC Auto Differential    Comprehensive Metabolic Panel    TSH    T4, Free    T3, Free    Vitamin D 25 Hydroxy    Lipid Panel    Ferritin    Iron Profile     Magnesium       Hematopoietic and Hemostatic    Iron deficiency anemia    Relevant Orders    CBC Auto Differential    Comprehensive Metabolic Panel    TSH    T4, Free    T3, Free    Vitamin D 25 Hydroxy    Lipid Panel    Ferritin    Iron Profile    Magnesium    Anemia    Relevant Orders    CBC Auto Differential    Comprehensive Metabolic Panel    TSH    T4, Free    T3, Free    Vitamin D 25 Hydroxy    Lipid Panel    Ferritin    Iron Profile    Magnesium    CBC Auto Differential    Comprehensive Metabolic Panel    TSH    T4, Free    T3, Free    Vitamin D 25 Hydroxy    Lipid Panel    Ferritin    Iron Profile    Magnesium    Hemoglobin C trait       Other    Low magnesium levels    Relevant Orders    CBC Auto Differential    Comprehensive Metabolic Panel    TSH    T4, Free    T3, Free    Vitamin D 25 Hydroxy    Lipid Panel    Ferritin    Iron Profile    Magnesium    History of throat cancer    Relevant Orders    CBC Auto Differential    Comprehensive Metabolic Panel    TSH    T4, Free    T3, Free    Vitamin D 25 Hydroxy    Lipid Panel    Ferritin    Iron Profile    Magnesium    Alcohol abuse counseling and surveillance    Relevant Orders    CBC Auto Differential    Comprehensive Metabolic Panel    TSH    T4, Free    T3, Free    Vitamin D 25 Hydroxy    Lipid Panel    Ferritin    Iron Profile    Magnesium    Hypomagnesemia    Relevant Orders    CBC Auto Differential    Comprehensive Metabolic Panel    TSH    T4, Free    T3, Free    Vitamin D 25 Hydroxy    Lipid Panel    Ferritin    Iron Profile    Magnesium    Underweight due to inadequate caloric intake    Relevant Orders    CBC Auto Differential    Comprehensive Metabolic Panel    TSH    T4, Free    T3, Free    Vitamin D 25 Hydroxy    Lipid Panel    Ferritin    Iron Profile    Magnesium      Other Visit Diagnoses     Target cells present on peripheral blood smear          - iron deficiency anemia/target cells/hemoglobin C - referral to Hematologist/oncologist for  further evaluation. Consultation appreciated. Continue iron pill. Recheck iron levels and ferritin   -vitamin D deficiency- recheck vitamin D levels continue vitamin D  -GERD -continue omeprazole but will send prilosec 40 mg PO q daily. Gave samples today     - low magnesium/hypomangesemia- recheck magnesium levels. Continue magnesium oxide 400 mg PO q daily   - Hypothyroidism -recheck thyroid levels. Continue synthroid 200 mcg PO q diily  - hyperlipidemia -r echeck lipid panel. Continue statin  --alcohol abuse - counseled on aclohol abuse - pt advised AA but he declined. Coiunseled pt to cut down but he does not want to quit drinking. Counseled for >5 minutes    - pt has gained 4 lbs since last visit.  He continues to have good appetite. He was 124 lbs and is now up to 128 lbs today   - history of thyroid cancer - seeing Radiation Oncologist in [place] also has appt with ENT soon Dr. [place]   - recheck in 3 months or earlier if any problems arise

## 2018-02-02 LAB — T3FREE SERPL-MCNC: 3.1 PG/ML (ref 2–4.4)

## 2018-02-05 RX ORDER — LEVOTHYROXINE SODIUM 175 UG/1
175 TABLET ORAL DAILY
Qty: 30 TABLET | Refills: 11 | Status: SHIPPED | OUTPATIENT
Start: 2018-02-05 | End: 2018-04-24

## 2018-02-09 ENCOUNTER — OFFICE VISIT (OUTPATIENT)
Dept: OTOLARYNGOLOGY | Facility: CLINIC | Age: 58
End: 2018-02-09

## 2018-02-09 VITALS — WEIGHT: 130 LBS | OXYGEN SATURATION: 98 % | BODY MASS INDEX: 21.66 KG/M2 | HEIGHT: 65 IN

## 2018-02-09 DIAGNOSIS — Z85.819: Primary | ICD-10-CM

## 2018-02-09 DIAGNOSIS — J37.0 CHRONIC LARYNGITIS: ICD-10-CM

## 2018-02-09 PROCEDURE — 31575 DIAGNOSTIC LARYNGOSCOPY: CPT | Performed by: OTOLARYNGOLOGY

## 2018-02-09 PROCEDURE — 99213 OFFICE O/P EST LOW 20 MIN: CPT | Performed by: OTOLARYNGOLOGY

## 2018-02-09 NOTE — PROGRESS NOTES
Subjective   Emerson Bang is a 57 y.o. male.       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  Was treated with concurrent chemoradiation and experienced a complete response.  Was eventually de-cannulated.  Is here for surveillance.  Is tobacco abstinent.  Is eating and swallowing at baseline.  Voice which is chronically hypernasal is unchanged.  No hemoptysis.  Is maintaining his weight.      The following portions of the patient's history were reviewed and updated as appropriate: allergies, current medications, past family history, past medical history, past social history, past surgical history and problem list.     reports that he has quit smoking. He has never used smokeless tobacco. He reports that he drinks alcohol. He reports that he does not use illicit drugs.   Patient is not a tobacco user and has not been counseled for use of tobacco products      Review of Systems   Constitutional: Negative for fever and unexpected weight change.           Objective   Physical Exam  General: Well-developed thin male in no acute distress.  Alert and oriented ×3. Head: Normocephalic.  Voice: Hypernasal. Speech: Reeder chewable  Ears: External ears no deformity, canals no discharge, tympanic membranes intact clear and mobile bilaterally.  Nose: Nares show no discharge mass polyp or purulence.  Boggy mucosa is present.  No gross external deformity.  Septum: To the right  Oral cavity: Lips and gums without lesions.  Tongue and floor of mouth without lesions.  Parotid and submandibular ducts unobstructed.  No mucosal lesions on the buccal mucosa or vestibule of the mouth.  Pharynx: Defect in the soft palate with some modest dried secretions.  Scarring consistent with posttreatment changes.  No evidence of recurrent neoplasm.  Neck: No lymphadenopathy.  No thyromegaly.  Trachea and larynx  midline.  No masses in the parotid or submandibular glands.  Posttreatment changes and a well-healed tracheostomy scar are noted.    Procedure Note    Pre-operative Diagnosis:   Chief Complaint   Patient presents with   • Cancer Surveillance       Post-operative Diagnosis: No evidence of recurrence; chronic laryngitis    Anesthesia: topical with xylocaine and neosynephrine    Endoscopy Type:  Flexible Laryngoscopy    Procedure Details:    The patient was placed in the sitting position.  After topical anesthesia and decongestion, the 4 mm laryngoscope was passed.  The nasal cavities, nasopharynx, oropharynx, hypopharynx, and larynx were all examined.  Vocal cords were examined during respiration and phonation.  The following findings were noted:    Findings: No masses in the nose.  Palatal defect is noted along with posttreatment changes in the oropharynx, no evidence of tumor.  No evidence of mass of the tongue base or hypopharynx.  Endolarynx shows chronic appearing edema consistent with postradiation laryngitis.  Vocal cord mobility is intact.  No evidence of the laryngeal neoplasm    Condition:  Stable.  Patient tolerated procedure well.    Complications:  None    Assessment/Plan   Emerson was seen today for cancer surveillance.    Diagnoses and all orders for this visit:    Hx of malignant neoplasm of pharynx    Chronic laryngitis        Plan: Continue tobacco abstinence and annual surveillance visits.  Return in 1 year, call sooner for problems.

## 2018-02-12 ENCOUNTER — PRIOR AUTHORIZATION (OUTPATIENT)
Dept: FAMILY MEDICINE CLINIC | Facility: CLINIC | Age: 58
End: 2018-02-12

## 2018-02-12 NOTE — TELEPHONE ENCOUNTER
P/A denied for omeprazole 40 mg.  Patient must try and fail on OTC lansoprazole, OTC Prevacid 24HR, OTC esomeprazole, and OTC Nexium 24HR.  New RX e-scribed.

## 2018-03-22 RX ORDER — SIMVASTATIN 40 MG
TABLET ORAL
Qty: 30 TABLET | Refills: 11 | Status: SHIPPED | OUTPATIENT
Start: 2018-03-22 | End: 2018-08-20 | Stop reason: SDUPTHER

## 2018-03-22 RX ORDER — METHOCARBAMOL 750 MG/1
TABLET ORAL
Qty: 4 CAPSULE | Refills: 11 | Status: SHIPPED | OUTPATIENT
Start: 2018-03-22 | End: 2018-11-01 | Stop reason: SDUPTHER

## 2018-03-22 RX ORDER — FERROUS SULFATE 325(65) MG
TABLET ORAL
Qty: 30 TABLET | Refills: 11 | Status: SHIPPED | OUTPATIENT
Start: 2018-03-22 | End: 2018-08-20 | Stop reason: SDUPTHER

## 2018-03-22 RX ORDER — ASPIRIN 81 MG
TABLET, DELAYED RELEASE (ENTERIC COATED) ORAL
Qty: 30 TABLET | Refills: 11 | Status: SHIPPED | OUTPATIENT
Start: 2018-03-22 | End: 2018-08-20 | Stop reason: SDUPTHER

## 2018-03-29 ENCOUNTER — OFFICE VISIT (OUTPATIENT)
Dept: FAMILY MEDICINE CLINIC | Facility: CLINIC | Age: 58
End: 2018-03-29

## 2018-03-29 VITALS
WEIGHT: 124.8 LBS | HEIGHT: 65 IN | TEMPERATURE: 98.6 F | BODY MASS INDEX: 20.79 KG/M2 | RESPIRATION RATE: 16 BRPM | DIASTOLIC BLOOD PRESSURE: 68 MMHG | HEART RATE: 75 BPM | SYSTOLIC BLOOD PRESSURE: 120 MMHG

## 2018-03-29 DIAGNOSIS — E78.5 HYPERLIPIDEMIA, UNSPECIFIED HYPERLIPIDEMIA TYPE: ICD-10-CM

## 2018-03-29 DIAGNOSIS — F10.10 ALCOHOL ABUSE: ICD-10-CM

## 2018-03-29 DIAGNOSIS — E03.9 HYPOTHYROIDISM, UNSPECIFIED TYPE: Primary | ICD-10-CM

## 2018-03-29 DIAGNOSIS — D58.2 HEMOGLOBIN C TRAIT (HCC): ICD-10-CM

## 2018-03-29 DIAGNOSIS — C14.0 SQUAMOUS CELL CARCINOMA OF PHARYNX (HCC): ICD-10-CM

## 2018-03-29 DIAGNOSIS — Z85.819 HISTORY OF THROAT CANCER: ICD-10-CM

## 2018-03-29 DIAGNOSIS — E55.9 VITAMIN D DEFICIENCY: ICD-10-CM

## 2018-03-29 DIAGNOSIS — K21.00 GASTROESOPHAGEAL REFLUX DISEASE WITH ESOPHAGITIS: ICD-10-CM

## 2018-03-29 DIAGNOSIS — E83.42 HYPOMAGNESEMIA: ICD-10-CM

## 2018-03-29 DIAGNOSIS — D64.9 ANEMIA, UNSPECIFIED TYPE: ICD-10-CM

## 2018-03-29 DIAGNOSIS — Z71.41 ALCOHOL ABUSE COUNSELING AND SURVEILLANCE: ICD-10-CM

## 2018-03-29 DIAGNOSIS — D50.9 IRON DEFICIENCY ANEMIA, UNSPECIFIED IRON DEFICIENCY ANEMIA TYPE: ICD-10-CM

## 2018-03-29 PROCEDURE — 99214 OFFICE O/P EST MOD 30 MIN: CPT | Performed by: FAMILY MEDICINE

## 2018-03-29 NOTE — PATIENT INSTRUCTIONS
Get labwork for thyroid tomorrow     - iron deficiency anemia/target cells/hemoglobin C - referral to Hematologist/oncologist for further evaluation. Consultation appreciated. Continue iron pill/ferrous sulfate 325 mg daily  Hematologist following - next appt in 4 weeks.  Pt advised to stop drinkin alcohol  -vitamin D deficiency- recheck vitamin D levels continue vitamin Donce a week   -GERD -continue nexium for acid reflux. Gave samples today     - low magnesium/hypomangesemia- recheck magnesium levels. Continue magnesium oxide 400 mg PO q daily   - Hypothyroidism -recheck thyroid levels. Pt currently taking synthroid 175 mcg daily.  Recheck thyroid studies  - hyperlipidemia - Continue statin  --alcohol abuse - counseled on aclohol abuse - pt advised AA but he declined. Coiunseled pt to cut down but he does not want to quit drinking. Counseled for >5 minutes. Pt continues to drink. He does not want to quit drinking   - pt has gained 4 lbs since last visit.  He continues to have good appetite. He was 124 lbs and is now up to 128 lbs today   - history of thyroid cancer - Pt recently saw ENT Dr. Washington and is doing well. Pt has appt with Dr. Orr soon in July  --hypertension - BP at goal today.  He is taking lisinopril-HCTZ 10-12.5 mg daily  -Pt advised to bring BP medications on next visit   - underweight - weight is stable today.

## 2018-03-29 NOTE — PROGRESS NOTES
Subjective   Emerson Bang is a 57 y.o. male.     Problem List  1. 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 Hypothyroidism, unspecified   3. Alcohol abuse  4. 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 Vitamin D deficiency  6. GERD  7. Last colonoscopy was 3 years ago   8. Esophagitis/gastritis/internal and external hemorhoids.  9. Elevated AST level  10. Hypomagnesemia   11. Hyperlipidemia ASCVD risk >5%  12. Essential Hypertension   13. BPH    2/1/18 Pt is 56 yo AAM with the above medical issues. Is here for recheck.  Pt just got labwork done today and results are pending. Has history of hypothyroidism and is taking synthroid 175 mcg PO q daily.  Pt also has vitamin D deficiency and iron deficiency and is taking Vitamin D once a week along with iron pill daily.  Pt states he is doing well.  No issues with swallowing or pain on swallowing.  Pt has history of throat cancer and is seeing ENT along with Radiation Oncologist [place].  He continues to drink alcohol but has cut down to once a week. Pt has also gained 4 lbs since last visit and has seen dietician.  BP at goal today     Pt on last labwork had low Vitamin D along  With elevated TSH at 78.00. He is taking synthroid 200 mcg PO q daily T3 was at 2.4 and T4 was 0.68.  Pt also has anemia and last hga1c was 11.6.  Peripheral smear showed anemia borderline with numberous target cells. Magnesium levels were low at 1.4 and takes magnesium oxdie 400 mg daily Pt on hemoglobin electrophoresis has elevations of hemoglobin C. Pt does have some fatigue and dizziness intermittently but denies  Fever/nights or weight loss Last weight on pt was 124 lbs. He is 128 lbs today. Denies any swallowing issues.  Appetite is good    Continues to drink 2 times a week. Mainly beer. May drink 3 beers on each occasion. Does not want to quit drinking or see  AA    3/19/18 pt is here for followup. He recently saw Hematologist Dr. Mckeon this past Tuesday.  Pt does have history of target cells and Hemoglobin C on peripheral smear.  He did get labwork recently and pt may be  Considered to have bone marrow biopsy in the future.  He does have intermittent fatigue. And it is harder for him to get up in the morning.  He also continues to drink twice a week and may drink 4 beers on each occasion with hard liquor.  Pt recently saw ENT Dr. Washington for his history of cancer in pharynx and chronic laryngitis. He had flexible laryngoscopy  Pt is doing well and has next appt with Radiation Oncologist Dr. Orr in July. Weight today is 128.4 lbs. It is stable from last visit     Anemia   Presents for follow-up visit. Symptoms include light-headedness. There has been no abdominal pain, anorexia, bruising/bleeding easily, confusion, fever, leg swelling, malaise/fatigue, pallor, palpitations, paresthesias, pica or weight loss. Signs of blood loss that are not present include hematemesis, melena, menorrhagia and vaginal bleeding. There are no compliance problems.  Compliance with medications is 0-25%. Side effects of medications include fatigue.          The following portions of the patient's history were reviewed and updated as appropriate: allergies, current medications, past family history, past medical history, past social history, past surgical history and problem list.    Review of Systems   Constitutional: Positive for fatigue and unexpected weight change. Negative for chills, diaphoresis, fever, malaise/fatigue and weight loss.   HENT: Negative.  Negative for congestion and sore throat.    Eyes: Negative.    Respiratory: Negative.  Negative for cough.    Cardiovascular: Negative for chest pain and palpitations.   Gastrointestinal: Negative.  Negative for abdominal pain, anorexia, hematemesis, melena, nausea and vomiting.   Endocrine: Negative.    Genitourinary: Negative for  "menorrhagia and vaginal bleeding.   Musculoskeletal: Negative.  Negative for arthralgias, joint swelling, myalgias and neck pain.   Skin: Negative.  Negative for pallor and rash.   Allergic/Immunologic: Negative.    Neurological: Positive for dizziness and light-headedness. Negative for weakness, numbness, headaches and paresthesias.   Hematological: Negative.  Does not bruise/bleed easily.   Psychiatric/Behavioral: Negative.  Negative for confusion.       Objective    /68   Pulse 75   Temp 98.6 °F (37 °C)   Resp 16   Ht 165.1 cm (65\")   Wt 56.6 kg (124 lb 12.8 oz)   BMI 20.77 kg/m²   Office Visit on 02/01/2018   Component Date Value Ref Range Status   • WBC 02/01/2018 6.14  3.20 - 9.80 10*3/mm3 Final   • RBC 02/01/2018 4.42  4.37 - 5.74 10*6/mm3 Final   • Hemoglobin 02/01/2018 12.2* 13.7 - 17.3 g/dL Final   • Hematocrit 02/01/2018 34.3* 39.0 - 49.0 % Final   • MCV 02/01/2018 77.6* 80.0 - 98.0 fL Final   • MCH 02/01/2018 27.6  26.5 - 34.0 pg Final   • MCHC 02/01/2018 35.6  31.5 - 36.3 g/dL Final   • RDW 02/01/2018 13.2  11.5 - 14.5 % Final   • RDW-SD 02/01/2018 37.4  35.1 - 43.9 fl Final   • MPV 02/01/2018 12.0  8.0 - 12.0 fL Final   • Platelets 02/01/2018 312  150 - 450 10*3/mm3 Final   • Neutrophil % 02/01/2018 38.4  37.0 - 80.0 % Final   • Lymphocyte % 02/01/2018 37.6  10.0 - 50.0 % Final   • Monocyte % 02/01/2018 16.1* 0.0 - 12.0 % Final   • Eosinophil % 02/01/2018 5.9  0.0 - 7.0 % Final   • Basophil % 02/01/2018 1.8  0.0 - 2.0 % Final   • Immature Grans % 02/01/2018 0.2  0.0 - 0.5 % Final   • Neutrophils, Absolute 02/01/2018 2.36  2.00 - 8.60 10*3/mm3 Final   • Lymphocytes, Absolute 02/01/2018 2.31  0.60 - 4.20 10*3/mm3 Final   • Monocytes, Absolute 02/01/2018 0.99* 0.00 - 0.90 10*3/mm3 Final   • Eosinophils, Absolute 02/01/2018 0.36  0.00 - 0.70 10*3/mm3 Final   • Basophils, Absolute 02/01/2018 0.11  0.00 - 0.20 10*3/mm3 Final   • Immature Grans, Absolute 02/01/2018 0.01  0.00 - 0.02 10*3/mm3 Final "   • Glucose 02/01/2018 111* 60 - 100 mg/dL Final   • BUN 02/01/2018 14  7 - 21 mg/dL Final   • Creatinine 02/01/2018 0.90  0.70 - 1.30 mg/dL Final   • Sodium 02/01/2018 134* 137 - 145 mmol/L Final   • Potassium 02/01/2018 3.7  3.5 - 5.1 mmol/L Final   • Chloride 02/01/2018 94* 95 - 110 mmol/L Final   • CO2 02/01/2018 26.0  22.0 - 31.0 mmol/L Final   • Calcium 02/01/2018 9.9  8.4 - 10.2 mg/dL Final   • Total Protein 02/01/2018 8.0  6.3 - 8.6 g/dL Final   • Albumin 02/01/2018 4.40  3.40 - 4.80 g/dL Final   • ALT (SGPT) 02/01/2018 30  21 - 72 U/L Final   • AST (SGOT) 02/01/2018 34  17 - 59 U/L Final   • Alkaline Phosphatase 02/01/2018 94  38 - 126 U/L Final   • Total Bilirubin 02/01/2018 0.3  0.2 - 1.3 mg/dL Final   • eGFR  African Amer 02/01/2018 105  56 - 130 mL/min/1.73 Final   • Globulin 02/01/2018 3.6* 2.3 - 3.5 gm/dL Final   • A/G Ratio 02/01/2018 1.2  1.1 - 1.8 g/dL Final   • BUN/Creatinine Ratio 02/01/2018 15.6  7.0 - 25.0 Final   • Anion Gap 02/01/2018 14.0  5.0 - 15.0 mmol/L Final   • TSH 02/01/2018 0.130* 0.460 - 4.680 mIU/mL Final   • Free T4 02/01/2018 1.50  0.78 - 2.19 ng/dL Final   • T3, Free 02/02/2018 3.1  2.0 - 4.4 pg/mL Final   • 25 Hydroxy, Vitamin D 02/01/2018 56.4  30.0 - 100.0 ng/ml Final   • Total Cholesterol 02/01/2018 154  0 - 199 mg/dL Final   • Triglycerides 02/01/2018 49  20 - 199 mg/dL Final   • HDL Cholesterol 02/01/2018 78  60 - 200 mg/dL Final   • LDL Cholesterol  02/01/2018 64  1 - 129 mg/dL Final   • LDL/HDL Ratio 02/01/2018 0.85  0.00 - 3.55 Final   • Ferritin 02/01/2018 113.00  17.90 - 464.00 ng/mL Final   • Iron 02/01/2018 89  49 - 181 mcg/dL Final   • TIBC 02/01/2018 314  261 - 462 mcg/dL Final   • Iron Saturation 02/01/2018 28  20 - 55 % Final   • Magnesium 02/01/2018 1.8  1.6 - 2.3 mg/dL Final       Physical Exam   Constitutional: He is oriented to person, place, and time. No distress.   Thin appearance    HENT:   Head: Normocephalic and atraumatic.   Right Ear: External ear  normal.   Left Ear: External ear normal.   No cervical lympadenopathy   Eyes: Conjunctivae and EOM are normal. Pupils are equal, round, and reactive to light. Right eye exhibits no discharge. Left eye exhibits no discharge. No scleral icterus.   Neck: Normal range of motion. Neck supple. No JVD present. No tracheal deviation present. No thyromegaly present.   Cardiovascular: Normal rate, regular rhythm and normal heart sounds.    Pulmonary/Chest: Effort normal. No stridor. No respiratory distress. He has no wheezes.   Abdominal: Soft. He exhibits no distension and no mass. There is no tenderness. There is no rebound and no guarding. No hernia.   Musculoskeletal: Normal range of motion. He exhibits no edema, tenderness or deformity.   Lymphadenopathy:     He has no cervical adenopathy.   Neurological: He is alert and oriented to person, place, and time. He has normal reflexes. No cranial nerve deficit. Coordination normal.   Skin: Skin is warm and dry. No rash noted. He is not diaphoretic. No erythema. No pallor.   Psychiatric: He has a normal mood and affect. His behavior is normal.   Nursing note and vitals reviewed.      Assessment/Plan   Problems Addressed this Visit        Cardiovascular and Mediastinum    Hyperlipidemia - Primary       Respiratory    Squamous cell carcinoma of pharynx       Digestive    Gastroesophageal reflux disease with esophagitis    Vitamin D deficiency       Endocrine    Hypothyroidism       Hematopoietic and Hemostatic    Iron deficiency anemia    Anemia    Hemoglobin C trait       Other    Alcohol abuse    History of throat cancer    Alcohol abuse counseling and surveillance    Hypomagnesemia      Other Visit Diagnoses    None.     - iron deficiency anemia/target cells/hemoglobin C - referral to Hematologist/oncologist for further evaluation. Consultation appreciated. Continue iron pill/ferrous sulfate 325 mg daily  Hematologist following - next appt in 4 weeks.  Pt advised to stop  drinkin alcohol  -vitamin D deficiency- recheck vitamin D levels continue vitamin Donce a week   -GERD -continue nexium for acid reflux. Gave samples today     - low magnesium/hypomangesemia- recheck magnesium levels. Continue magnesium oxide 400 mg PO q daily   - Hypothyroidism -recheck thyroid levels. Pt currently taking synthroid 175 mcg daily.  Recheck thyroid studies  - hyperlipidemia - Continue simvastatin and aspirin   --alcohol abuse - counseled on aclohol abuse - pt advised AA but he declined. Coiunseled pt to cut down but he does not want to quit drinking. Counseled for >5 minutes. Pt continues to drink. He does not want to quit drinking   - pt has gained 4 lbs since last visit.  He continues to have good appetite. He was 124 lbs and is now up to 128 lbs today   - history of thyroid cancer - Pt recently saw ENT Dr. Washington and is doing well. Pt has appt with Dr. Orr soon in July  --hypertension - BP at goal today.  He is taking lisinopril-HCTZ 10-12.5 mg daily  -Pt advised to bring BP medications on next visit   - underweight - weight is stable today.   -recheck in 6 weeks               Review of Systems   Constitutional: Positive for fatigue. Negative for chills, diaphoresis, fever, malaise/fatigue and unexpected weight loss.   HENT: Negative.  Negative for congestion and sore throat.    Eyes: Negative.    Respiratory: Negative.  Negative for cough.    Cardiovascular: Negative for chest pain and palpitations.   Gastrointestinal: Negative.  Negative for abdominal pain, anorexia, hematemesis, melena, nausea and vomiting.   Endocrine: Negative.    Genitourinary: Negative for menorrhagia and vaginal bleeding.   Musculoskeletal: Negative.  Negative for arthralgias, joint swelling, myalgias and neck pain.   Skin: Negative.  Negative for pallor and rash.   Allergic/Immunologic: Negative.    Neurological: Positive for dizziness and light-headedness. Negative for weakness, numbness, headaches, paresthesias and  confusion.   Hematological: Negative.  Does not bruise/bleed easily.   Psychiatric/Behavioral: Negative.        Physical Exam   Constitutional: He is oriented to person, place, and time. No distress.   Thin appearance    HENT:   Head: Normocephalic and atraumatic.   Right Ear: External ear normal.   Left Ear: External ear normal.   No cervical lympadenopathy   Eyes: Conjunctivae and EOM are normal. Pupils are equal, round, and reactive to light. Right eye exhibits no discharge. Left eye exhibits no discharge. No scleral icterus.   Neck: Normal range of motion. Neck supple. No JVD present. No tracheal deviation present. No thyromegaly present.   Cardiovascular: Normal rate, regular rhythm and normal heart sounds.    Pulmonary/Chest: Effort normal. No stridor. No respiratory distress. He has no wheezes.   Abdominal: Soft. He exhibits no distension and no mass. There is no tenderness. There is no rebound and no guarding. No hernia.   Musculoskeletal: Normal range of motion. He exhibits no edema, tenderness or deformity.   Lymphadenopathy:     He has no cervical adenopathy.   Neurological: He is alert and oriented to person, place, and time. He has normal reflexes. No cranial nerve deficit. Coordination normal.   Skin: Skin is warm and dry. No rash noted. He is not diaphoretic. No erythema. No pallor.   Psychiatric: He has a normal mood and affect. His behavior is normal.   Nursing note and vitals reviewed.

## 2018-04-20 LAB
T4 FREE SERPL-MCNC: 0.89 NG/DL (ref 0.78–2.19)
TSH SERPL DL<=0.05 MIU/L-ACNC: 21.7 MIU/ML (ref 0.46–4.68)

## 2018-04-20 PROCEDURE — 84481 FREE ASSAY (FT-3): CPT | Performed by: FAMILY MEDICINE

## 2018-04-20 PROCEDURE — 36415 COLL VENOUS BLD VENIPUNCTURE: CPT | Performed by: FAMILY MEDICINE

## 2018-04-20 PROCEDURE — 84443 ASSAY THYROID STIM HORMONE: CPT | Performed by: FAMILY MEDICINE

## 2018-04-20 PROCEDURE — 84439 ASSAY OF FREE THYROXINE: CPT | Performed by: FAMILY MEDICINE

## 2018-04-22 LAB — T3FREE SERPL-MCNC: 2.1 PG/ML (ref 2–4.4)

## 2018-04-24 RX ORDER — LEVOTHYROXINE SODIUM 0.2 MG/1
200 TABLET ORAL DAILY
Qty: 30 TABLET | Refills: 3 | Status: SHIPPED | OUTPATIENT
Start: 2018-04-24 | End: 2018-05-13 | Stop reason: SDUPTHER

## 2018-05-10 ENCOUNTER — OFFICE VISIT (OUTPATIENT)
Dept: FAMILY MEDICINE CLINIC | Facility: CLINIC | Age: 58
End: 2018-05-10

## 2018-05-10 VITALS
OXYGEN SATURATION: 98 % | DIASTOLIC BLOOD PRESSURE: 68 MMHG | WEIGHT: 127.6 LBS | HEART RATE: 92 BPM | TEMPERATURE: 98.3 F | HEIGHT: 65 IN | BODY MASS INDEX: 21.26 KG/M2 | SYSTOLIC BLOOD PRESSURE: 108 MMHG

## 2018-05-10 DIAGNOSIS — E55.9 VITAMIN D DEFICIENCY: ICD-10-CM

## 2018-05-10 DIAGNOSIS — E78.5 HYPERLIPIDEMIA, UNSPECIFIED HYPERLIPIDEMIA TYPE: ICD-10-CM

## 2018-05-10 DIAGNOSIS — F10.10 ALCOHOL ABUSE: ICD-10-CM

## 2018-05-10 DIAGNOSIS — D50.9 IRON DEFICIENCY ANEMIA, UNSPECIFIED IRON DEFICIENCY ANEMIA TYPE: ICD-10-CM

## 2018-05-10 DIAGNOSIS — Z85.819 HISTORY OF THROAT CANCER: ICD-10-CM

## 2018-05-10 DIAGNOSIS — Z71.41 ALCOHOL ABUSE COUNSELING AND SURVEILLANCE: ICD-10-CM

## 2018-05-10 DIAGNOSIS — E03.9 HYPOTHYROIDISM, UNSPECIFIED TYPE: Primary | ICD-10-CM

## 2018-05-10 DIAGNOSIS — K21.00 GASTROESOPHAGEAL REFLUX DISEASE WITH ESOPHAGITIS: ICD-10-CM

## 2018-05-10 DIAGNOSIS — D64.9 ANEMIA, UNSPECIFIED TYPE: ICD-10-CM

## 2018-05-10 DIAGNOSIS — Z00.00 ANNUAL PHYSICAL EXAM: ICD-10-CM

## 2018-05-10 PROCEDURE — 80061 LIPID PANEL: CPT | Performed by: FAMILY MEDICINE

## 2018-05-10 PROCEDURE — 82306 VITAMIN D 25 HYDROXY: CPT | Performed by: FAMILY MEDICINE

## 2018-05-10 PROCEDURE — 84481 FREE ASSAY (FT-3): CPT | Performed by: FAMILY MEDICINE

## 2018-05-10 PROCEDURE — 84443 ASSAY THYROID STIM HORMONE: CPT | Performed by: FAMILY MEDICINE

## 2018-05-10 PROCEDURE — 84439 ASSAY OF FREE THYROXINE: CPT | Performed by: FAMILY MEDICINE

## 2018-05-10 PROCEDURE — 99214 OFFICE O/P EST MOD 30 MIN: CPT | Performed by: FAMILY MEDICINE

## 2018-05-10 NOTE — PATIENT INSTRUCTIONS
Get labwork      Do not take prilosec or nexium with synthroid medication. Take prilosec or nexium 30 minutes on empty stomach prior to breakfast. Then take synthroid at breakfast.  Example if you eat at 9 am. Take nexium or prilosec at 8am       Keep cell phone nearby. Will call with results of blood work. If needed may need to adjust thyroid medication

## 2018-05-10 NOTE — PROGRESS NOTES
Subjective   Emerson Bang is a 57 y.o. male.     Problem List  1. 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 Hypothyroidism, unspecified   3. Alcohol abuse  4. 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 Vitamin D deficiency  6. GERD  7. Last colonoscopy was 3 years ago   8. Esophagitis/gastritis/internal and external hemorhoids.  9. Elevated AST level  10. Hypomagnesemia   11. Hyperlipidemia ASCVD risk >5%  12. Essential Hypertension   13. BPH    2/1/18 Pt is 58 yo AAM with the above medical issues. Is here for recheck.  Pt just got labwork done today and results are pending. Has history of hypothyroidism and is taking synthroid 175 mcg PO q daily.  Pt also has vitamin D deficiency and iron deficiency and is taking Vitamin D once a week along with iron pill daily.  Pt states he is doing well.  No issues with swallowing or pain on swallowing.  Pt has history of throat cancer and is seeing ENT along with Radiation Oncologist IN [place].  He continues to drink alcohol but has cut down to once a week. Pt has also gained 4 lbs since last visit and has seen dietician.  BP at goal today     Pt on last labwork had low Vitamin D along  With elevated TSH at 78.00. He is taking synthroid 200 mcg PO q daily T3 was at 2.4 and T4 was 0.68.  Pt also has anemia and last hga1c was 11.6.  Peripheral smear showed anemia borderline with numberous target cells. Magnesium levels were low at 1.4 and takes magnesium oxdie 400 mg daily Pt on hemoglobin electrophoresis has elevations of hemoglobin C. Pt does have some fatigue and dizziness intermittently but denies  Fever/nights or weight loss Last weight on pt was 124 lbs. He is 128 lbs today. Denies any swallowing issues.  Appetite is good    Continues to drink 2 times a week. Mainly beer. May drink 3 beers on each occasion. Does not want to quit drinking or see  AA    3/19/18 pt is here for followup. He recently saw Hematologist Dr. Mckeon this past Tuesday.  Pt does have history of target cells and Hemoglobin C on peripheral smear.  He did get labwork recently and pt may be  Considered to have bone marrow biopsy in the future.  He does have intermittent fatigue. And it is harder for him to get up in the morning.  He also continues to drink twice a week and may drink 4 beers on each occasion with hard liquor.  Pt recently saw ENT Dr. Washington for his history of cancer in pharynx and chronic laryngitis. He had flexible laryngoscopy  Pt is doing well and has next appt with Radiation Oncologist Dr. Orr in July. Weight today is 128.4 lbs. It is stable from last visit     5/10/18 pt is here for recheck and followup.. Pt recently had labwork done on 4/2018 that showed TSH being high at 21.70 and T4 at 0.89 and  T3 sy  2.1.  His synthroid was at 175 mcg PO q daily and it was increased to 200 mcg PO q daily.  He did not  new prescription of synthroid 200 mcg  PO  q daily. Pt was contacted and a voicemail was left but he did not call back. He continues to take the 175 mcg of synthroid   He also has nexium 20 mg PO q daily on medication list for GERD. Pt also has been referred to Hematologist for his history of anemia and hemoglobin C.  Anemia workup was recommended per Hematologist office note along with possible bone marrow biopsy. Do not have last office note from Hematologist. Per patient if anemia is stable no bone marrow biopsy was needed. Pt states he is doing overall well today. No issues. He has cut down on alcohol to 1/2 pint vodka     Anemia   Presents for follow-up visit. Symptoms include light-headedness. There has been no abdominal pain, anorexia, bruising/bleeding easily, confusion, fever, leg swelling, malaise/fatigue, pallor, palpitations, paresthesias, pica or weight loss. Signs of blood loss that are not present include hematemesis, melena, menorrhagia and  vaginal bleeding. There are no compliance problems.  Compliance with medications is 0-25%. Side effects of medications include fatigue.   Alcohol Problem   This is a chronic problem. The current episode started more than 1 year ago. The problem occurs constantly. The problem has been unchanged. Associated symptoms include fatigue. Pertinent negatives include no abdominal pain, anorexia, arthralgias, chest pain, chills, congestion, coughing, diaphoresis, fever, headaches, joint swelling, myalgias, nausea, neck pain, numbness, rash, sore throat, visual change, vomiting or weakness. Nothing aggravates the symptoms. He has tried nothing for the symptoms. The treatment provided no relief.   Thyroid Problem   Presents for follow-up visit. Symptoms include fatigue. Patient reports no anxiety, cold intolerance, constipation, depressed mood, diaphoresis, diarrhea, dry skin, hair loss, heat intolerance, hoarse voice, leg swelling, menstrual problem, nail problem, palpitations, tremors, visual change, weight gain or weight loss. The symptoms have been stable.          The following portions of the patient's history were reviewed and updated as appropriate: allergies, current medications, past family history, past medical history, past social history, past surgical history and problem list.  Patient Active Problem List   Diagnosis   • Hyperkalemia   • Iron deficiency anemia   • Gastroesophageal reflux disease with esophagitis   • Elevated AST (SGOT)   • Alcohol abuse   • Hypothyroidism   • Balance problem   • Need for vaccination   • Low magnesium levels   • Squamous cell carcinoma of pharynx   • History of throat cancer   • Vitamin D deficiency   • Alcohol abuse counseling and surveillance   • Encounter for screening for diabetes mellitus   • Hypomagnesemia   • Anemia   • Hyperlipidemia   • Underweight due to inadequate caloric intake   • Need for immunization against influenza   • Hemoglobin C trait     Current Outpatient  Prescriptions on File Prior to Visit   Medication Sig Dispense Refill   • ASPIRIN LOW DOSE 81 MG EC tablet TAKE ONE TABLET BY MOUTH ONCE DAILY 30 tablet 11   • D3-50 30306 units capsule TAKE ONE CAPSULE BY MOUTH ONCE A WEEK (EVERY  7  DAYS) 4 capsule 11   • esomeprazole (NEXIUM 24HR) 20 MG capsule Take 1 capsule by mouth Every Morning Before Breakfast. 30 capsule 11   • ferrous sulfate 325 (65 FE) MG tablet TAKE ONE TABLET BY MOUTH ONCE DAILY 30 tablet 11   • levothyroxine (SYNTHROID) 200 MCG tablet Take 1 tablet by mouth Daily. 30 tablet 3   • lisinopril-hydrochlorothiazide (ZESTORETIC) 10-12.5 MG per tablet Take 1 tablet by mouth Daily. 30 tablet 11   • magnesium oxide (MAGOX) 400 (241.3 MG) MG tablet tablet Take 2 tablets by mouth Daily. 30 each 11   • Magnesium Oxide 400 (240 MG) MG tablet Take 1 tablet by mouth Daily. 30 tablet 11   • Multiple Vitamin (MULTI VITAMIN PO) Take  by mouth.     • simvastatin (ZOCOR) 40 MG tablet TAKE ONE TABLET BY MOUTH ONCE DAILY IN THE EVENING 30 tablet 11   • terazosin (HYTRIN) 5 MG capsule TAKE ONE CAPSULE BY MOUTH EVERY NIGHT 30 capsule 11     No current facility-administered medications on file prior to visit.        Review of Systems   Constitutional: Positive for fatigue and unexpected weight change. Negative for chills, diaphoresis, fever, malaise/fatigue, weight gain and weight loss.   HENT: Negative.  Negative for congestion, hoarse voice and sore throat.    Eyes: Negative.    Respiratory: Negative.  Negative for cough.    Cardiovascular: Negative for chest pain and palpitations.   Gastrointestinal: Negative.  Negative for abdominal pain, anorexia, constipation, diarrhea, hematemesis, melena, nausea and vomiting.   Endocrine: Negative.  Negative for cold intolerance and heat intolerance.   Genitourinary: Negative for menorrhagia, menstrual problem and vaginal bleeding.   Musculoskeletal: Negative.  Negative for arthralgias, joint swelling, myalgias and neck pain.   Skin:  "Negative.  Negative for pallor and rash.   Allergic/Immunologic: Negative.    Neurological: Positive for dizziness and light-headedness. Negative for tremors, weakness, numbness, headaches and paresthesias.   Hematological: Negative.  Does not bruise/bleed easily.   Psychiatric/Behavioral: Negative.  Negative for confusion. The patient is not nervous/anxious.        Objective    /68 (BP Location: Left arm, Patient Position: Sitting, Cuff Size: Adult)   Pulse 92   Temp 98.3 °F (36.8 °C) (Oral)   Ht 165.1 cm (65\")   Wt 57.9 kg (127 lb 9.6 oz)   SpO2 98%   BMI 21.23 kg/m²     There were no vitals taken for this visit.  Office Visit on 03/29/2018   Component Date Value Ref Range Status   • TSH 04/20/2018 21.700* 0.460 - 4.680 mIU/mL Final   • Free T4 04/20/2018 0.89  0.78 - 2.19 ng/dL Final   • T3, Free 04/22/2018 2.1  2.0 - 4.4 pg/mL Final       Physical Exam   Constitutional: He is oriented to person, place, and time. No distress.   Thin appearance    HENT:   Head: Normocephalic and atraumatic.   Right Ear: External ear normal.   Left Ear: External ear normal.   No cervical lympadenopathy   Eyes: Conjunctivae and EOM are normal. Pupils are equal, round, and reactive to light. Right eye exhibits no discharge. Left eye exhibits no discharge. No scleral icterus.   Neck: Normal range of motion. Neck supple. No JVD present. No tracheal deviation present. No thyromegaly present.   Cardiovascular: Normal rate, regular rhythm and normal heart sounds.    Pulmonary/Chest: Effort normal. No stridor. No respiratory distress. He has no wheezes.   Abdominal: Soft. He exhibits no distension and no mass. There is no tenderness. There is no rebound and no guarding. No hernia.   Musculoskeletal: Normal range of motion. He exhibits no edema, tenderness or deformity.   Lymphadenopathy:     He has no cervical adenopathy.   Neurological: He is alert and oriented to person, place, and time. He has normal reflexes. No cranial " nerve deficit. Coordination normal.   Skin: Skin is warm and dry. No rash noted. He is not diaphoretic. No erythema. No pallor.   Psychiatric: He has a normal mood and affect. His behavior is normal.   Nursing note and vitals reviewed.      Assessment/Plan   Problems Addressed this Visit        Cardiovascular and Mediastinum    Hyperlipidemia       Digestive    Gastroesophageal reflux disease with esophagitis       Endocrine    Hypothyroidism - Primary       Hematopoietic and Hemostatic    Iron deficiency anemia    Anemia       Other    Alcohol abuse    History of throat cancer    Alcohol abuse counseling and surveillance      Other Visit Diagnoses    None.       -annual physical exam performed today   - iron deficiency anemia/target cells/hemoglobin C - referral to Hematologist/oncologist for further evaluation. Consultation appreciated. Continue iron pill/ferrous sulfate 325 mg daily  Hematologist following - next appt in 4 weeks.  Pt advised to stop drinkin alcohol. Will get last office report from Dr. Mckeon office  -vitamin D deficiency- recheck vitamin D levels continue vitamin Donce a week. Recheck vitamin D  -GERD -continue nexium for acid reflux. Gave samples today .  Pt advised to take nexium or prilosec separate from thyroid medication at least 30 minutes apart   - low magnesium/hypomangesemia- recheck magnesium levels. Continue magnesium oxide 400 mg PO q daily   - Hypothyroidism -recheck thyroid levels. Pt did not take synthroid 200 mcg daily. If still not at goal will adjust medication. Continue on synthroid 175 mcg daily for now   - hyperlipidemia - Continue simvastatin and aspirin. Recheck lipid panel   --alcohol abuse - counseled on aclohol abuse - pt advised AA but he declined. Coiunseled pt to cut down but he does not want to quit drinking. Counseled for >5 minutes. Pt continues to drink. He does not want to quit drinking   - pt has gained 4 lbs since last visit.  He continues to have good  appetite. He was 124 lbs and is now up to 128 lbs today   - history of thyroid cancer - Pt recently saw ENT Dr. Washington and is doing well. Pt has appt with Dr. Orr soon in July  --hypertension - BP at goal today.  He is taking lisinopril-HCTZ 10-12.5 mg daily  -Pt advised to bring BP medications on next visit   - underweight - weight is stable today.   -recheck in 6 weeks    -advised pt to be safe and call with any questions or concerns. All questions answered today            This document has been electronically signed by Cristino He MD on May 10, 2018 10:31 AM                 Review of Systems   Constitutional: Positive for fatigue. Negative for chills, diaphoresis, fever, malaise/fatigue, unexpected weight gain and unexpected weight loss.   HENT: Negative.  Negative for congestion, hoarse voice and sore throat.    Eyes: Negative.    Respiratory: Negative.  Negative for cough.    Cardiovascular: Negative for chest pain and palpitations.   Gastrointestinal: Negative.  Negative for abdominal pain, anorexia, constipation, diarrhea, hematemesis, melena, nausea and vomiting.   Endocrine: Negative.  Negative for cold intolerance and heat intolerance.   Genitourinary: Negative for menorrhagia, menstrual problem and vaginal bleeding.   Musculoskeletal: Negative.  Negative for arthralgias, joint swelling, myalgias and neck pain.   Skin: Negative.  Negative for dry skin, pallor and rash.   Allergic/Immunologic: Negative.    Neurological: Positive for dizziness and light-headedness. Negative for tremors, weakness, numbness, paresthesias and confusion.   Hematological: Negative.  Does not bruise/bleed easily.   Psychiatric/Behavioral: Negative.  Negative for depressed mood. The patient is not nervous/anxious.        Physical Exam   Constitutional: He is oriented to person, place, and time. No distress.   Thin appearance    HENT:   Head: Normocephalic and atraumatic.   Right Ear: External ear normal.   Left Ear:  External ear normal.   No cervical lympadenopathy   Eyes: Conjunctivae and EOM are normal. Pupils are equal, round, and reactive to light. Right eye exhibits no discharge. Left eye exhibits no discharge. No scleral icterus.   Neck: Normal range of motion. Neck supple. No JVD present. No tracheal deviation present. No thyromegaly present.   Cardiovascular: Normal rate, regular rhythm and normal heart sounds.    Pulmonary/Chest: Effort normal. No stridor. No respiratory distress. He has no wheezes.   Abdominal: Soft. He exhibits no distension and no mass. There is no tenderness. There is no rebound and no guarding. No hernia.   Musculoskeletal: Normal range of motion. He exhibits no edema, tenderness or deformity.   Lymphadenopathy:     He has no cervical adenopathy.   Neurological: He is alert and oriented to person, place, and time. He has normal reflexes. No cranial nerve deficit. Coordination normal.   Skin: Skin is warm and dry. No rash noted. He is not diaphoretic. No erythema. No pallor.   Psychiatric: He has a normal mood and affect. His behavior is normal.   Nursing note and vitals reviewed.

## 2018-05-11 LAB
25(OH)D3 SERPL-MCNC: 34 NG/ML (ref 30–100)
ARTICHOKE IGE QN: 103 MG/DL (ref 1–129)
CHOLEST SERPL-MCNC: 276 MG/DL (ref 0–199)
HDLC SERPL-MCNC: 153 MG/DL (ref 60–200)
LDLC/HDLC SERPL: 0.71 {RATIO} (ref 0–3.55)
T4 FREE SERPL-MCNC: 0.52 NG/DL (ref 0.78–2.19)
TRIGL SERPL-MCNC: 70 MG/DL (ref 20–199)
TSH SERPL DL<=0.05 MIU/L-ACNC: 58.7 MIU/ML (ref 0.46–4.68)

## 2018-05-12 LAB — T3FREE SERPL-MCNC: 1.4 PG/ML (ref 2–4.4)

## 2018-05-13 RX ORDER — ERGOCALCIFEROL 1.25 MG/1
50000 CAPSULE ORAL
Qty: 4 CAPSULE | Refills: 3 | Status: SHIPPED | OUTPATIENT
Start: 2018-05-13 | End: 2018-08-20 | Stop reason: SDUPTHER

## 2018-05-13 RX ORDER — LEVOTHYROXINE SODIUM 0.2 MG/1
200 TABLET ORAL DAILY
Qty: 30 TABLET | Refills: 3 | Status: SHIPPED | OUTPATIENT
Start: 2018-05-13 | End: 2018-08-20 | Stop reason: SDUPTHER

## 2018-06-21 ENCOUNTER — OFFICE VISIT (OUTPATIENT)
Dept: FAMILY MEDICINE CLINIC | Facility: CLINIC | Age: 58
End: 2018-06-21

## 2018-06-21 VITALS
SYSTOLIC BLOOD PRESSURE: 120 MMHG | BODY MASS INDEX: 19.99 KG/M2 | OXYGEN SATURATION: 98 % | HEIGHT: 65 IN | DIASTOLIC BLOOD PRESSURE: 80 MMHG | WEIGHT: 120 LBS | TEMPERATURE: 98.6 F | HEART RATE: 92 BPM

## 2018-06-21 DIAGNOSIS — R63.6 UNDERWEIGHT: ICD-10-CM

## 2018-06-21 DIAGNOSIS — K21.00 GASTROESOPHAGEAL REFLUX DISEASE WITH ESOPHAGITIS: ICD-10-CM

## 2018-06-21 DIAGNOSIS — F10.10 ALCOHOL ABUSE: ICD-10-CM

## 2018-06-21 DIAGNOSIS — E78.5 HYPERLIPIDEMIA, UNSPECIFIED HYPERLIPIDEMIA TYPE: ICD-10-CM

## 2018-06-21 DIAGNOSIS — E55.9 VITAMIN D DEFICIENCY: ICD-10-CM

## 2018-06-21 DIAGNOSIS — Z00.00 GENERAL MEDICAL EXAMINATION: Primary | ICD-10-CM

## 2018-06-21 DIAGNOSIS — Z85.819 HISTORY OF THROAT CANCER: ICD-10-CM

## 2018-06-21 DIAGNOSIS — E03.9 ACQUIRED HYPOTHYROIDISM: ICD-10-CM

## 2018-06-21 DIAGNOSIS — I10 ESSENTIAL HYPERTENSION: ICD-10-CM

## 2018-06-21 DIAGNOSIS — D64.9 ANEMIA, UNSPECIFIED TYPE: ICD-10-CM

## 2018-06-21 PROCEDURE — 99214 OFFICE O/P EST MOD 30 MIN: CPT | Performed by: FAMILY MEDICINE

## 2018-06-21 NOTE — PATIENT INSTRUCTIONS
Bring medications on next visit  1. Get labwork to check blood count, kidney function and thyroid.  If not improving will send to Endocrinologist     High-Protein and High-Calorie Diet  Eating high-protein and high-calorie foods can help you to gain weight, heal after an injury, and recover after an illness or surgery.  What is my plan?  The specific amount of daily protein and calories you need depends on:  · Your body weight.  · The reason this diet is recommended for you.    Generally, a high-protein, high-calorie diet involves:  · Eating 250-500 extra calories each day.  · Making sure that 10-35% of your daily calories come from protein.    Talk to your health care provider about how much protein and how many calories you need each day. Follow the diet as directed by your health care provider.  What do I need to know about this diet?  · Ask your health care provider if you should take a nutritional supplement.  · Try to eat six small meals each day instead of three large meals.  · Eat a balanced diet, including one food that is high in protein at each meal.  · Keep nutritious snacks handy, such as nuts, trail mixes, dried fruit, and yogurt.  · If you have kidney disease or diabetes, eating too much protein may put extra stress on your kidneys. Talk to your health care provider if you have either of those conditions.  What are some high-protein foods?  Grains  Quinoa. Bulgur wheat.  Vegetables  Soybeans. Peas.  Meats and Other Protein Sources  Beef, pork, and poultry. Fish and seafood. Eggs. Tofu. Textured vegetable protein (TVP). Peanut butter. Nuts and seeds. Dried beans. Protein powders.  Dairy  Whole milk. Whole-milk yogurt. Powdered milk. Cheese. Cottage Cheese. Eggnog.  Beverages  High-protein supplement drinks. Soy milk.  Other  Protein bars.  The items listed above may not be a complete list of recommended foods or beverages. Contact your dietitian for more options.  What are some high-calorie  foods?  Grains  Pasta. Quick breads. Muffins. Pancakes. Ready-to-eat cereal.  Vegetables  Vegetables cooked in oil or butter. Fried potatoes.  Fruits  Dried fruit. Fruit leather. Canned fruit in syrup. Fruit juice. Avocados.  Meats and Other Protein Sources  Peanut butter. Nuts and seeds.  Dairy  Heavy cream. Whipped cream. Cream cheese. Sour cream. Ice cream. Custard. Pudding.  Beverages  Meal-replacement beverages. Nutrition shakes. Fruit juice. Sugar-sweetened soft drinks.  Condiments  Salad dressing. Mayonnaise. Oniel sauce. Fruit preserves or jelly. Honey. Syrup.  Sweets/Desserts  Cake. Cookies. Pie. Pastries. Candy bars. Chocolate.  Fats and Oils  Butter or margarine. Oil. Gravy.  Other  Meal-replacement bars.  The items listed above may not be a complete list of recommended foods or beverages. Contact your dietitian for more options.  What are some tips for including high-protein and high-calorie foods in my diet?  · Add whole milk, half-and-half, or heavy cream to cereal, pudding, soup, or hot cocoa.  · Add whole milk to instant breakfast drinks.  · Add peanut butter to oatmeal or smoothies.  · Add powdered milk to baked goods, smoothies, or milkshakes.  · Add powdered milk, cream, or butter to mashed potatoes.  · Add cheese to cooked vegetables.  · Make whole-milk yogurt parfaits. Top them with granola, fruit, or nuts.  · Add cottage cheese to your fruit.  · Add avocados, cheese, or both to sandwiches or salads.  · Add meat, poultry, or seafood to rice, pasta, casseroles, salads, and soups.  · Use mayonnaise when making egg salad, chicken salad, or tuna salad.  · Use peanut butter as a topping for pretzels, celery, or crackers.  · Add beans to casseroles, dips, and spreads.  · Add pureed beans to sauces and soups.  · Replace calorie-free drinks with calorie-containing drinks, such as milk and fruit juice.  This information is not intended to replace advice given to you by your health care provider. Make  sure you discuss any questions you have with your health care provider.  Document Released: 12/18/2006 Document Revised: 05/25/2017 Document Reviewed: 06/02/2015  [employer] Interactive Patient Education © 2018 [employer]

## 2018-06-21 NOTE — PROGRESS NOTES
Subjective   Emerson Bang is a 57 y.o. male.     Problem List  1. 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 Hypothyroidism, unspecified   3. Alcohol abuse  4. 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 Vitamin D deficiency  6. GERD  7. Last colonoscopy was 3 years ago   8. Esophagitis/gastritis/internal and external hemorhoids.  9. Elevated AST level  10. Hypomagnesemia   11. Hyperlipidemia ASCVD risk >5%  12. Essential Hypertension   13. BPH    2/1/18 Pt is 56 yo AAM with the above medical issues. Is here for recheck.  Pt just got labwork done today and results are pending. Has history of hypothyroidism and is taking synthroid 175 mcg PO q daily.  Pt also has vitamin D deficiency and iron deficiency and is taking Vitamin D once a week along with iron pill daily.  Pt states he is doing well.  No issues with swallowing or pain on swallowing.  Pt has history of throat cancer and is seeing ENT along with Radiation Oncologist IN [place].  He continues to drink alcohol but has cut down to once a week. Pt has also gained 4 lbs since last visit and has seen dietician.  BP at goal today     Pt on last labwork had low Vitamin D along  With elevated TSH at 78.00. He is taking synthroid 200 mcg PO q daily T3 was at 2.4 and T4 was 0.68.  Pt also has anemia and last hga1c was 11.6.  Peripheral smear showed anemia borderline with numberous target cells. Magnesium levels were low at 1.4 and takes magnesium oxdie 400 mg daily Pt on hemoglobin electrophoresis has elevations of hemoglobin C. Pt does have some fatigue and dizziness intermittently but denies  Fever/nights or weight loss Last weight on pt was 124 lbs. He is 128 lbs today. Denies any swallowing issues.  Appetite is good    Continues to drink 2 times a week. Mainly beer. May drink 3 beers on each occasion. Does not want to quit drinking or see  AA    3/19/18 pt is here for followup. He recently saw Hematologist Dr. Mckeon this past Tuesday.  Pt does have history of target cells and Hemoglobin C on peripheral smear.  He did get labwork recently and pt may be  Considered to have bone marrow biopsy in the future.  He does have intermittent fatigue. And it is harder for him to get up in the morning.  He also continues to drink twice a week and may drink 4 beers on each occasion with hard liquor.  Pt recently saw ENT Dr. Washington for his history of cancer in pharynx and chronic laryngitis. He had flexible laryngoscopy  Pt is doing well and has next appt with Radiation Oncologist Dr. Orr in July. Weight today is 128.4 lbs. It is stable from last visit     5/10/18 pt is here for recheck and followup.. Pt recently had labwork done on 4/2018 that showed TSH being high at 21.70 and T4 at 0.89 and  T3 sy  2.1.  His synthroid was at 175 mcg PO q daily and it was increased to 200 mcg PO q daily.  He did not  new prescription of synthroid 200 mcg  PO  q daily. Pt was contacted and a voicemail was left but he did not call back. He continues to take the 175 mcg of synthroid   He also has nexium 20 mg PO q daily on medication list for GERD. Pt also has been referred to Hematologist for his history of anemia and hemoglobin C.  Anemia workup was recommended per Hematologist office note along with possible bone marrow biopsy. Do not have last office note from Hematologist. Per patient if anemia is stable no bone marrow biopsy was needed. Pt states he is doing overall well today. No issues. He has cut down on alcohol to 1/2 pint vodka     6/21/18 pt is here for followup and recheck.  He is currently on Vitamin D for deficiency. He takes nexium for GERD. Is on synthroid 200 mcg PO q daily  Will last TSH at 58.70 and  T4 at 0.42 and T3 at 1.4.  He also takes lisinpril 10-12.5 mg PO q daily. His dosage of synthroid was increased to 200 mcg PO q daily but he is unsure of  which dosage he is taking.  He was advised to not take it together with his PPI.   For hypertension. BP is at goal. For hyperilpidemia he is on aspirin and simvastatin. Last lipid panel showed cholesterol at 276 with HDL at 153 and LDL at 103.  He continues to drink alcohol and does not want to quit. He drinks a pint of whiskey about once a week. He has history of throat cancer and is currently seeing ENT and Radiation Oncologist.  He lost 7 lbs since last visit in May and today is 120 lbs     Anemia   Presents for follow-up visit. Symptoms include light-headedness. There has been no abdominal pain, anorexia, bruising/bleeding easily, confusion, fever, leg swelling, malaise/fatigue, pallor, palpitations, paresthesias, pica or weight loss. Signs of blood loss that are not present include hematemesis, melena, menorrhagia and vaginal bleeding. There are no compliance problems.  Compliance with medications is 0-25%. Side effects of medications include fatigue.   Alcohol Problem   This is a chronic problem. The current episode started more than 1 year ago. The problem occurs constantly. The problem has been unchanged. Associated symptoms include fatigue. Pertinent negatives include no abdominal pain, anorexia, arthralgias, chest pain, chills, congestion, coughing, diaphoresis, fever, headaches, joint swelling, myalgias, nausea, neck pain, numbness, rash, sore throat, visual change, vomiting or weakness. Nothing aggravates the symptoms. He has tried nothing for the symptoms. The treatment provided no relief.   Thyroid Problem   Presents for follow-up visit. Symptoms include fatigue. Patient reports no anxiety, cold intolerance, constipation, depressed mood, diaphoresis, diarrhea, dry skin, hair loss, heat intolerance, hoarse voice, leg swelling, menstrual problem, nail problem, palpitations, tremors, visual change, weight gain or weight loss. The symptoms have been stable.          The following portions of the patient's  history were reviewed and updated as appropriate: allergies, current medications, past family history, past medical history, past social history, past surgical history and problem list.  Patient Active Problem List   Diagnosis   • Hyperkalemia   • Iron deficiency anemia   • Gastroesophageal reflux disease with esophagitis   • Elevated AST (SGOT)   • Alcohol abuse   • Hypothyroidism   • Balance problem   • Need for vaccination   • Low magnesium levels   • Squamous cell carcinoma of pharynx   • History of throat cancer   • Vitamin D deficiency   • Alcohol abuse counseling and surveillance   • Encounter for screening for diabetes mellitus   • Hypomagnesemia   • Anemia   • Hyperlipidemia   • Underweight due to inadequate caloric intake   • Need for immunization against influenza   • Hemoglobin C trait   • Annual physical exam   • Essential hypertension   • General medical examination   • Underweight     Current Outpatient Prescriptions on File Prior to Visit   Medication Sig Dispense Refill   • ASPIRIN LOW DOSE 81 MG EC tablet TAKE ONE TABLET BY MOUTH ONCE DAILY 30 tablet 11   • D3-50 16759 units capsule TAKE ONE CAPSULE BY MOUTH ONCE A WEEK (EVERY  7  DAYS) 4 capsule 11   • esomeprazole (NEXIUM 24HR) 20 MG capsule Take 1 capsule by mouth Every Morning Before Breakfast. 30 capsule 11   • ferrous sulfate 325 (65 FE) MG tablet TAKE ONE TABLET BY MOUTH ONCE DAILY 30 tablet 11   • levothyroxine (SYNTHROID) 200 MCG tablet Take 1 tablet by mouth Daily. 30 tablet 3   • lisinopril-hydrochlorothiazide (ZESTORETIC) 10-12.5 MG per tablet Take 1 tablet by mouth Daily. 30 tablet 11   • magnesium oxide (MAGOX) 400 (241.3 MG) MG tablet tablet Take 2 tablets by mouth Daily. 30 each 11   • Multiple Vitamin (MULTI VITAMIN PO) Take  by mouth.     • simvastatin (ZOCOR) 40 MG tablet TAKE ONE TABLET BY MOUTH ONCE DAILY IN THE EVENING 30 tablet 11   • terazosin (HYTRIN) 5 MG capsule TAKE ONE CAPSULE BY MOUTH EVERY NIGHT 30 capsule 11   •  "vitamin D (ERGOCALCIFEROL) 43936 units capsule capsule Take 1 capsule by mouth Every 7 (Seven) Days. 4 capsule 3     No current facility-administered medications on file prior to visit.        Review of Systems   Constitutional: Positive for fatigue and unexpected weight change. Negative for chills, diaphoresis, fever, malaise/fatigue, weight gain and weight loss.   HENT: Negative.  Negative for congestion, hoarse voice and sore throat.    Eyes: Negative.    Respiratory: Negative.  Negative for cough.    Cardiovascular: Negative for chest pain and palpitations.   Gastrointestinal: Negative.  Negative for abdominal pain, anorexia, constipation, diarrhea, hematemesis, melena, nausea and vomiting.   Endocrine: Negative.  Negative for cold intolerance and heat intolerance.   Genitourinary: Negative for menorrhagia, menstrual problem and vaginal bleeding.   Musculoskeletal: Negative.  Negative for arthralgias, joint swelling, myalgias and neck pain.   Skin: Negative.  Negative for pallor and rash.   Allergic/Immunologic: Negative.    Neurological: Positive for dizziness and light-headedness. Negative for tremors, weakness, numbness, headaches and paresthesias.   Hematological: Negative.  Does not bruise/bleed easily.   Psychiatric/Behavioral: Negative.  Negative for confusion. The patient is not nervous/anxious.        Objective    /80   Pulse 92   Temp 98.6 °F (37 °C)   Ht 165.1 cm (65\")   Wt 54.4 kg (120 lb)   SpO2 98%   BMI 19.97 kg/m²     /80   Pulse 92   Temp 98.6 °F (37 °C)   Ht 165.1 cm (65\")   Wt 54.4 kg (120 lb)   SpO2 98%   BMI 19.97 kg/m²     /80   Pulse 92   Temp 98.6 °F (37 °C)   Ht 165.1 cm (65\")   Wt 54.4 kg (120 lb)   SpO2 98%   BMI 19.97 kg/m²   Office Visit on 05/10/2018   Component Date Value Ref Range Status   • Total Cholesterol 05/10/2018 276* 0 - 199 mg/dL Final   • Triglycerides 05/10/2018 70  20 - 199 mg/dL Final   • HDL Cholesterol 05/10/2018 153  60 - 200 " mg/dL Final   • LDL Cholesterol  05/10/2018 103  1 - 129 mg/dL Final   • LDL/HDL Ratio 05/10/2018 0.71  0.00 - 3.55 Final   • 25 Hydroxy, Vitamin D 05/10/2018 34.0  30.0 - 100.0 ng/ml Final   • TSH 05/10/2018 58.700* 0.460 - 4.680 mIU/mL Final   • Free T4 05/10/2018 0.52* 0.78 - 2.19 ng/dL Final   • T3, Free 05/10/2018 1.4* 2.0 - 4.4 pg/mL Final       Physical Exam   Constitutional: He is oriented to person, place, and time. No distress.   Thin appearance    HENT:   Head: Normocephalic and atraumatic.   Right Ear: External ear normal.   Left Ear: External ear normal.   No cervical lympadenopathy   Eyes: Conjunctivae and EOM are normal. Pupils are equal, round, and reactive to light. Right eye exhibits no discharge. Left eye exhibits no discharge. No scleral icterus.   Neck: Normal range of motion. Neck supple. No JVD present. No tracheal deviation present. No thyromegaly present.   Cardiovascular: Normal rate, regular rhythm and normal heart sounds.    Pulmonary/Chest: Effort normal. No stridor. No respiratory distress. He has no wheezes.   Abdominal: Soft. He exhibits no distension and no mass. There is no tenderness. There is no rebound and no guarding. No hernia.   Musculoskeletal: Normal range of motion. He exhibits no edema, tenderness or deformity.   Lymphadenopathy:     He has no cervical adenopathy.   Neurological: He is alert and oriented to person, place, and time. He has normal reflexes. No cranial nerve deficit. Coordination normal.   Skin: Skin is warm and dry. No rash noted. He is not diaphoretic. No erythema. No pallor.   Psychiatric: He has a normal mood and affect. His behavior is normal.   Nursing note and vitals reviewed.      Assessment/Plan   Problems Addressed this Visit        Cardiovascular and Mediastinum    Hyperlipidemia    Essential hypertension    Relevant Orders    CBC Auto Differential    Comprehensive Metabolic Panel       Digestive    Gastroesophageal reflux disease with esophagitis     Vitamin D deficiency       Endocrine    Hypothyroidism    Relevant Orders    TSH    T3, Free    T4, Free       Hematopoietic and Hemostatic    Anemia       Other    Alcohol abuse    History of throat cancer    General medical examination - Primary    Underweight        -annual physical exam performed today   -history of throat cancer -Dr. Washington (ENT) and Dr. Orr (Radiation Oncologist) following   - iron deficiency anemia/target cells/hemoglobin C - referral to Hematologist/oncologist for further evaluation. Consultation appreciated. Continue iron pill/ferrous sulfate 325 mg daily  Hematologist following - Pt advised to stop drinkin alcohol. Will get last office report from Dr. Mckeon office.  Recheck CBC   -vitamin D deficiency- recheck vitamin D levels continue vitamin Donce a week. Recheck vitamin D  -GERD -continue nexium for acid reflux. Gave samples today .  Pt advised to take nexium eparate from thyroid medication at least 30 minutes apart   - low magnesium/hypomangesemia- recheck magnesium levels. Continue magnesium oxide 400 mg PO q daily   - Hypothyroidism -recheck thyroid levels. Pt did not take synthroid 200 mcg daily. If still not at goal will adjust medication. Continue on synthroid 200  mcg daily for now. If levels not at goal, will refer to Endocrinologist   - hyperlipidemia - Continue simvastatin and aspirin. Recheck lipid panel   --alcohol abuse - counseled on aclohol abuse - pt advised AA but he declined. Coiunseled pt to cut down but he does not want to quit drinking. Counseled for >5 minutes. Pt continues to drink. He does not want to quit drinking.  He continues to drink whiskey once a week  -underweight - gave information on high caloric today   - pt has gained 4 lbs since last visit.  He continues to have good appetite. He was 124 lbs and is now up to 128 lbs today   - history of thyroid cancer - Pt recently saw ENT Dr. Washington and is doing well. Pt has appt with Dr. Orr soon in  July  --hypertension - BP at goal today.  He is taking lisinopril-HCTZ 10-12.5 mg daily. Check cbc and cmp   -Pt advised to bring BP medications on next visit   - underweight - weight is stable today.   -recheck in 6 weeks    -advised pt to be safe and call with any questions or concerns. All questions answered today            This document has been electronically signed by Cristino He MD on June 21, 2018 9:15 AM                 Review of Systems   Constitutional: Positive for fatigue. Negative for chills, diaphoresis, fever, malaise/fatigue, unexpected weight gain and unexpected weight loss.   HENT: Negative.  Negative for congestion, hoarse voice and sore throat.    Eyes: Negative.    Respiratory: Negative.  Negative for cough.    Cardiovascular: Negative for chest pain and palpitations.   Gastrointestinal: Negative.  Negative for abdominal pain, anorexia, constipation, diarrhea, hematemesis, melena, nausea and vomiting.   Endocrine: Negative.  Negative for cold intolerance and heat intolerance.   Genitourinary: Negative for menorrhagia, menstrual problem and vaginal bleeding.   Musculoskeletal: Negative.  Negative for arthralgias, joint swelling, myalgias and neck pain.   Skin: Negative.  Negative for dry skin, pallor and rash.   Allergic/Immunologic: Negative.    Neurological: Positive for dizziness and light-headedness. Negative for tremors, weakness, numbness, paresthesias and confusion.   Hematological: Negative.  Does not bruise/bleed easily.   Psychiatric/Behavioral: Negative.  Negative for depressed mood. The patient is not nervous/anxious.        Physical Exam   Constitutional: He is oriented to person, place, and time. No distress.   Thin appearance    HENT:   Head: Normocephalic and atraumatic.   Right Ear: External ear normal.   Left Ear: External ear normal.   No cervical lympadenopathy   Eyes: Conjunctivae and EOM are normal. Pupils are equal, round, and reactive to light. Right eye exhibits no  discharge. Left eye exhibits no discharge. No scleral icterus.   Neck: Normal range of motion. Neck supple. No JVD present. No tracheal deviation present. No thyromegaly present.   Cardiovascular: Normal rate, regular rhythm and normal heart sounds.    Pulmonary/Chest: Effort normal. No stridor. No respiratory distress. He has no wheezes.   Abdominal: Soft. He exhibits no distension and no mass. There is no tenderness. There is no rebound and no guarding. No hernia.   Musculoskeletal: Normal range of motion. He exhibits no edema, tenderness or deformity.   Lymphadenopathy:     He has no cervical adenopathy.   Neurological: He is alert and oriented to person, place, and time. He has normal reflexes. No cranial nerve deficit. Coordination normal.   Skin: Skin is warm and dry. No rash noted. He is not diaphoretic. No erythema. No pallor.   Psychiatric: He has a normal mood and affect. His behavior is normal.   Nursing note and vitals reviewed.

## 2018-07-17 LAB
ALBUMIN SERPL-MCNC: 4.3 G/DL (ref 3.4–4.8)
ALBUMIN/GLOB SERPL: 1.2 G/DL (ref 1.1–1.8)
ALP SERPL-CCNC: 92 U/L (ref 38–126)
ALT SERPL W P-5'-P-CCNC: 77 U/L (ref 21–72)
ANION GAP SERPL CALCULATED.3IONS-SCNC: 15 MMOL/L (ref 5–15)
AST SERPL-CCNC: 131 U/L (ref 17–59)
BASOPHILS # BLD AUTO: 0.05 10*3/MM3 (ref 0–0.2)
BASOPHILS NFR BLD AUTO: 0.9 % (ref 0–2)
BILIRUB SERPL-MCNC: 0.3 MG/DL (ref 0.2–1.3)
BUN BLD-MCNC: 14 MG/DL (ref 7–21)
BUN/CREAT SERPL: 15.1 (ref 7–25)
CALCIUM SPEC-SCNC: 9.8 MG/DL (ref 8.4–10.2)
CHLORIDE SERPL-SCNC: 97 MMOL/L (ref 95–110)
CO2 SERPL-SCNC: 24 MMOL/L (ref 22–31)
CREAT BLD-MCNC: 0.93 MG/DL (ref 0.7–1.3)
DEPRECATED RDW RBC AUTO: 42 FL (ref 35.1–43.9)
EOSINOPHIL # BLD AUTO: 0.33 10*3/MM3 (ref 0–0.7)
EOSINOPHIL NFR BLD AUTO: 5.9 % (ref 0–7)
ERYTHROCYTE [DISTWIDTH] IN BLOOD BY AUTOMATED COUNT: 14.8 % (ref 11.5–14.5)
GFR SERPL CREATININE-BSD FRML MDRD: 100 ML/MIN/1.73 (ref 49–113)
GLOBULIN UR ELPH-MCNC: 3.5 GM/DL (ref 2.3–3.5)
GLUCOSE BLD-MCNC: 120 MG/DL (ref 60–100)
HCT VFR BLD AUTO: 30.8 % (ref 39–49)
HGB BLD-MCNC: 11.2 G/DL (ref 13.7–17.3)
IMM GRANULOCYTES # BLD: 0.02 10*3/MM3 (ref 0–0.02)
IMM GRANULOCYTES NFR BLD: 0.4 % (ref 0–0.5)
LYMPHOCYTES # BLD AUTO: 1.98 10*3/MM3 (ref 0.6–4.2)
LYMPHOCYTES NFR BLD AUTO: 35.3 % (ref 10–50)
MCH RBC QN AUTO: 28.7 PG (ref 26.5–34)
MCHC RBC AUTO-ENTMCNC: 36.4 G/DL (ref 31.5–36.3)
MCV RBC AUTO: 79 FL (ref 80–98)
MONOCYTES # BLD AUTO: 1.04 10*3/MM3 (ref 0–0.9)
MONOCYTES NFR BLD AUTO: 18.5 % (ref 0–12)
NEUTROPHILS # BLD AUTO: 2.19 10*3/MM3 (ref 2–8.6)
NEUTROPHILS NFR BLD AUTO: 39 % (ref 37–80)
PLATELET # BLD AUTO: 177 10*3/MM3 (ref 150–450)
PMV BLD AUTO: 11.1 FL (ref 8–12)
POTASSIUM BLD-SCNC: 4.4 MMOL/L (ref 3.5–5.1)
PROT SERPL-MCNC: 7.8 G/DL (ref 6.3–8.6)
RBC # BLD AUTO: 3.9 10*6/MM3 (ref 4.37–5.74)
SODIUM BLD-SCNC: 136 MMOL/L (ref 137–145)
T4 FREE SERPL-MCNC: 0.8 NG/DL (ref 0.78–2.19)
TSH SERPL DL<=0.05 MIU/L-ACNC: 63 MIU/ML (ref 0.46–4.68)
WBC NRBC COR # BLD: 5.61 10*3/MM3 (ref 3.2–9.8)

## 2018-07-17 PROCEDURE — 80053 COMPREHEN METABOLIC PANEL: CPT | Performed by: FAMILY MEDICINE

## 2018-07-17 PROCEDURE — 84439 ASSAY OF FREE THYROXINE: CPT | Performed by: FAMILY MEDICINE

## 2018-07-17 PROCEDURE — 84481 FREE ASSAY (FT-3): CPT | Performed by: FAMILY MEDICINE

## 2018-07-17 PROCEDURE — 85025 COMPLETE CBC W/AUTO DIFF WBC: CPT | Performed by: FAMILY MEDICINE

## 2018-07-17 PROCEDURE — 36415 COLL VENOUS BLD VENIPUNCTURE: CPT | Performed by: FAMILY MEDICINE

## 2018-07-17 PROCEDURE — 84443 ASSAY THYROID STIM HORMONE: CPT | Performed by: FAMILY MEDICINE

## 2018-07-18 LAB — T3FREE SERPL-MCNC: 1.8 PG/ML (ref 2–4.4)

## 2018-07-19 NOTE — PROGRESS NOTES
Subjective   Emerson Bang is a 58 y.o. male.   Problem List  1. 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 Hypothyroidism, unspecified   3. Alcohol abuse  4. 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 Vitamin D deficiency  6. GERD  7. Last colonoscopy was 3 years ago   8. Esophagitis/gastritis/internal and external hemorhoids.  9. Elevated AST level  10. Hypomagnesemia   11. Hyperlipidemia ASCVD risk >5%  12. Essential Hypertension   13. BPH     2/1/18 Pt is 56 yo AAM with the above medical issues. Is here for recheck.  Pt just got labwork done today and results are pending. Has history of hypothyroidism and is taking synthroid 175 mcg PO q daily.  Pt also has vitamin D deficiency and iron deficiency and is taking Vitamin D once a week along with iron pill daily.  Pt states he is doing well.  No issues with swallowing or pain on swallowing.  Pt has history of throat cancer and is seeing ENT along with Radiation Oncologist [place].  He continues to drink alcohol but has cut down to once a week. Pt has also gained 4 lbs since last visit and has seen dietician.  BP at goal today      Pt on last labwork had low Vitamin D along  With elevated TSH at 78.00. He is taking synthroid 200 mcg PO q daily T3 was at 2.4 and T4 was 0.68.  Pt also has anemia and last hga1c was 11.6.  Peripheral smear showed anemia borderline with numberous target cells. Magnesium levels were low at 1.4 and takes magnesium oxdie 400 mg daily Pt on hemoglobin electrophoresis has elevations of hemoglobin C. Pt does have some fatigue and dizziness intermittently but denies  Fever/nights or weight loss Last weight on pt was 124 lbs. He is 128 lbs today. Denies any swallowing issues.  Appetite is good     Continues to drink 2 times a week. Mainly beer. May drink 3 beers on each occasion. Does not want to quit drinking or see  AA     3/19/18 pt is here for followup. He recently saw Hematologist Dr. Mckeon this past Tuesday.  Pt does have history of target cells and Hemoglobin C on peripheral smear.  He did get labwork recently and pt may be  Considered to have bone marrow biopsy in the future.  He does have intermittent fatigue. And it is harder for him to get up in the morning.  He also continues to drink twice a week and may drink 4 beers on each occasion with hard liquor.  Pt recently saw ENT Dr. Washington for his history of cancer in pharynx and chronic laryngitis. He had flexible laryngoscopy  Pt is doing well and has next appt with Radiation Oncologist Dr. Orr in July. Weight today is 128.4 lbs. It is stable from last visit      5/10/18 pt is here for recheck and followup.. Pt recently had labwork done on 4/2018 that showed TSH being high at 21.70 and T4 at 0.89 and  T3 sy  2.1.  His synthroid was at 175 mcg PO q daily and it was increased to 200 mcg PO q daily.  He did not  new prescription of synthroid 200 mcg  PO  q daily. Pt was contacted and a voicemail was left but he did not call back. He continues to take the 175 mcg of synthroid   He also has nexium 20 mg PO q daily on medication list for GERD. Pt also has been referred to Hematologist for his history of anemia and hemoglobin C.  Anemia workup was recommended per Hematologist office note along with possible bone marrow biopsy. Do not have last office note from Hematologist. Per patient if anemia is stable no bone marrow biopsy was needed. Pt states he is doing overall well today. No issues. He has cut down on alcohol to 1/2 pint vodka      6/21/18 pt is here for followup and recheck.  He is currently on Vitamin D for deficiency. He takes nexium for GERD. Is on synthroid 200 mcg PO q daily  Will last TSH at 58.70 and  T4 at 0.42 and T3 at 1.4.  He also takes lisinpril 10-12.5 mg PO q daily. His dosage of synthroid was increased to 200 mcg PO q daily but he is unsure of  which dosage he is taking.  He was advised to not take it together with his PPI.   For hypertension. BP is at goal. For hyperilpidemia he is on aspirin and simvastatin. Last lipid panel showed cholesterol at 276 with HDL at 153 and LDL at 103.  He continues to drink alcohol and does not want to quit. He drinks a pint of whiskey about once a week. He has history of throat cancer and is currently seeing ENT and Radiation Oncologist.  He lost 7 lbs since last visit in May and today is 120 lbs     7/20/18 pt is here for followup and recheck. He has a history of thyroid cancer sp chemotherapy and radiation therapy  He recently has labwork that showed TSH was at  63 and T3 is at 1.8 and T4 at 0.80. On CMP glucose at 120 and sodium at 136 with ALT at 77 and AST at 131. He conitnues to have elevated liver enzymes and US in Februatry 2018 showed normal liver. He continues to drink alcohol. For hypertension he is taking lisionpri-HCTZ 10-12.5 mg daily. For hyperlipidemia he is on aspirin and simvastatin.  For iron deficiency anemia is he ion Ferous sulfate 325 mg PO q daily. For GERD he is on nexium 20 mg PO qhs and for his hypothyroidism he takes synthroid 200 mcg PO q daily. His BP is at goal. Also his weight is 121 lbs  And last visit it was 120. For low magnesium he takes magnesium oxide. He recently saw his Radiation Oncologist Dr. Orr this past Wednesday. He was checked and is doing well in regards to history of thyroid cancer. He denies any weakness or fatigue. He also continues to drink 1 pint of whiskey a week      Anemia   Presents for follow-up visit. Symptoms include weight loss. There has been no abdominal pain, anorexia, bruising/bleeding easily, confusion, fever, leg swelling, light-headedness, malaise/fatigue, pallor, palpitations, paresthesias or pica. Signs of blood loss that are not present include hematemesis and hematochezia. There are no compliance problems.    Thyroid Problem   Presents for follow-up  visit. Symptoms include anxiety, fatigue, hoarse voice and weight loss. Patient reports no cold intolerance, constipation, depressed mood, diaphoresis, diarrhea, dry skin, hair loss, heat intolerance, leg swelling, menstrual problem, palpitations, tremors, visual change or weight gain. The symptoms have been worsening.   Alcohol Problem   This is a chronic problem. The current episode started more than 1 month ago. The problem occurs constantly. The problem has been waxing and waning. Associated symptoms include fatigue. Pertinent negatives include no abdominal pain, anorexia, arthralgias, change in bowel habit, chest pain, chills, congestion, diaphoresis, fever, headaches, joint swelling, myalgias, neck pain, numbness, sore throat, swollen glands, urinary symptoms, vertigo, visual change, vomiting or weakness. Nothing aggravates the symptoms. He has tried nothing (synthroid ) for the symptoms. The treatment provided no relief.              The following portions of the patient's history were reviewed and updated as appropriate: allergies, current medications, past family history, past medical history, past social history, past surgical history and problem list.  Patient Active Problem List   Diagnosis   • Hyperkalemia   • Iron deficiency anemia   • Gastroesophageal reflux disease with esophagitis   • Elevated AST (SGOT)   • Alcohol abuse   • Hypothyroidism   • Balance problem   • Need for vaccination   • Low magnesium levels   • Squamous cell carcinoma of pharynx (CMS/HCC)   • History of throat cancer   • Vitamin D deficiency   • Alcohol abuse counseling and surveillance   • Encounter for screening for diabetes mellitus   • Hypomagnesemia   • Anemia   • Hyperlipidemia   • Underweight due to inadequate caloric intake   • Need for immunization against influenza   • 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HCC)   • Annual physical exam   • Essential hypertension   • General medical examination   • Underweight     Current Outpatient  Prescriptions on File Prior to Visit   Medication Sig Dispense Refill   • ASPIRIN LOW DOSE 81 MG EC tablet TAKE ONE TABLET BY MOUTH ONCE DAILY 30 tablet 11   • D3-50 47480 units capsule TAKE ONE CAPSULE BY MOUTH ONCE A WEEK (EVERY  7  DAYS) 4 capsule 11   • esomeprazole (NEXIUM 24HR) 20 MG capsule Take 1 capsule by mouth Every Morning Before Breakfast. 30 capsule 11   • ferrous sulfate 325 (65 FE) MG tablet TAKE ONE TABLET BY MOUTH ONCE DAILY 30 tablet 11   • levothyroxine (SYNTHROID) 200 MCG tablet Take 1 tablet by mouth Daily. 30 tablet 3   • lisinopril-hydrochlorothiazide (ZESTORETIC) 10-12.5 MG per tablet Take 1 tablet by mouth Daily. 30 tablet 11   • magnesium oxide (MAGOX) 400 (241.3 MG) MG tablet tablet Take 2 tablets by mouth Daily. 30 each 11   • Multiple Vitamin (MULTI VITAMIN PO) Take  by mouth.     • simvastatin (ZOCOR) 40 MG tablet TAKE ONE TABLET BY MOUTH ONCE DAILY IN THE EVENING 30 tablet 11   • terazosin (HYTRIN) 5 MG capsule TAKE ONE CAPSULE BY MOUTH EVERY NIGHT 30 capsule 11   • vitamin D (ERGOCALCIFEROL) 54846 units capsule capsule Take 1 capsule by mouth Every 7 (Seven) Days. 4 capsule 3     No current facility-administered medications on file prior to visit.      .  Review of Systems   Constitutional: Positive for fatigue and unexpected weight loss. Negative for chills, diaphoresis, fever, malaise/fatigue and unexpected weight gain.   HENT: Positive for hoarse voice. Negative for congestion and sore throat.    Cardiovascular: Negative for chest pain and palpitations.   Gastrointestinal: Negative for abdominal pain, anorexia, change in bowel habit, constipation, diarrhea, hematemesis, hematochezia and vomiting.   Endocrine: Negative for cold intolerance and heat intolerance.   Genitourinary: Negative for menstrual problem.   Musculoskeletal: Negative for arthralgias, joint swelling, myalgias and neck pain.   Skin: Negative for dry skin and pallor.   Neurological: Negative for vertigo, tremors,  "weakness, light-headedness, numbness, paresthesias and confusion.   Hematological: Does not bruise/bleed easily.   Psychiatric/Behavioral: Negative for depressed mood. The patient is nervous/anxious.        Objective    /72   Pulse 88   Temp 97.9 °F (36.6 °C)   Ht 165.1 cm (65\")   Wt 55.1 kg (121 lb 6.4 oz)   SpO2 98%   BMI 20.20 kg/m²     Office Visit on 06/21/2018   Component Date Value Ref Range Status   • TSH 07/17/2018 63.000* 0.460 - 4.680 mIU/mL Final   • T3, Free 07/17/2018 1.8* 2.0 - 4.4 pg/mL Final   • Free T4 07/17/2018 0.80  0.78 - 2.19 ng/dL Final   • WBC 07/17/2018 5.61  3.20 - 9.80 10*3/mm3 Final   • RBC 07/17/2018 3.90* 4.37 - 5.74 10*6/mm3 Final   • Hemoglobin 07/17/2018 11.2* 13.7 - 17.3 g/dL Final   • Hematocrit 07/17/2018 30.8* 39.0 - 49.0 % Final   • MCV 07/17/2018 79.0* 80.0 - 98.0 fL Final   • MCH 07/17/2018 28.7  26.5 - 34.0 pg Final   • MCHC 07/17/2018 36.4* 31.5 - 36.3 g/dL Final   • RDW 07/17/2018 14.8* 11.5 - 14.5 % Final   • RDW-SD 07/17/2018 42.0  35.1 - 43.9 fl Final   • MPV 07/17/2018 11.1  8.0 - 12.0 fL Final   • Platelets 07/17/2018 177  150 - 450 10*3/mm3 Final   • Neutrophil % 07/17/2018 39.0  37.0 - 80.0 % Final   • Lymphocyte % 07/17/2018 35.3  10.0 - 50.0 % Final   • Monocyte % 07/17/2018 18.5* 0.0 - 12.0 % Final   • Eosinophil % 07/17/2018 5.9  0.0 - 7.0 % Final   • Basophil % 07/17/2018 0.9  0.0 - 2.0 % Final   • Immature Grans % 07/17/2018 0.4  0.0 - 0.5 % Final   • Neutrophils, Absolute 07/17/2018 2.19  2.00 - 8.60 10*3/mm3 Final   • Lymphocytes, Absolute 07/17/2018 1.98  0.60 - 4.20 10*3/mm3 Final   • Monocytes, Absolute 07/17/2018 1.04* 0.00 - 0.90 10*3/mm3 Final   • Eosinophils, Absolute 07/17/2018 0.33  0.00 - 0.70 10*3/mm3 Final   • Basophils, Absolute 07/17/2018 0.05  0.00 - 0.20 10*3/mm3 Final   • Immature Grans, Absolute 07/17/2018 0.02  0.00 - 0.02 10*3/mm3 Final   • Glucose 07/17/2018 120* 60 - 100 mg/dL Final   • BUN 07/17/2018 14  7 - 21 mg/dL Final "   • Creatinine 07/17/2018 0.93  0.70 - 1.30 mg/dL Final   • Sodium 07/17/2018 136* 137 - 145 mmol/L Final   • Potassium 07/17/2018 4.4  3.5 - 5.1 mmol/L Final   • Chloride 07/17/2018 97  95 - 110 mmol/L Final   • CO2 07/17/2018 24.0  22.0 - 31.0 mmol/L Final   • Calcium 07/17/2018 9.8  8.4 - 10.2 mg/dL Final   • Total Protein 07/17/2018 7.8  6.3 - 8.6 g/dL Final   • Albumin 07/17/2018 4.30  3.40 - 4.80 g/dL Final   • ALT (SGPT) 07/17/2018 77* 21 - 72 U/L Final   • AST (SGOT) 07/17/2018 131* 17 - 59 U/L Final   • Alkaline Phosphatase 07/17/2018 92  38 - 126 U/L Final   • Total Bilirubin 07/17/2018 0.3  0.2 - 1.3 mg/dL Final   • eGFR   Amer 07/17/2018 100  49 - 113 mL/min/1.73 Final   • Globulin 07/17/2018 3.5  2.3 - 3.5 gm/dL Final   • A/G Ratio 07/17/2018 1.2  1.1 - 1.8 g/dL Final   • BUN/Creatinine Ratio 07/17/2018 15.1  7.0 - 25.0 Final   • Anion Gap 07/17/2018 15.0  5.0 - 15.0 mmol/L Final       Physical Exam   Constitutional: He is oriented to person, place, and time. No distress.   Thin appearance   HENT:   Head: Normocephalic and atraumatic.   Right Ear: External ear normal.   Eyes: Pupils are equal, round, and reactive to light. Conjunctivae and EOM are normal. Right eye exhibits no discharge. Left eye exhibits no discharge. No scleral icterus.   Neck: Normal range of motion. Neck supple. No JVD present. No tracheal deviation present. No thyromegaly present.   Cardiovascular: Normal rate, regular rhythm and normal heart sounds.    Pulmonary/Chest: Effort normal. No stridor. No respiratory distress. He has no wheezes.   Abdominal: Soft. He exhibits no distension. There is no tenderness. There is no guarding.   Musculoskeletal: Normal range of motion. He exhibits no edema or deformity.   Lymphadenopathy:     He has no cervical adenopathy.   Neurological: He is alert and oriented to person, place, and time. No cranial nerve deficit. Coordination normal.   Skin: Skin is warm and dry. No rash noted. No  erythema. No pallor.   Psychiatric: He has a normal mood and affect. His behavior is normal.   Nursing note and vitals reviewed.        Assessment/Plan   Problems Addressed this Visit        Cardiovascular and Mediastinum    Hyperlipidemia    Essential hypertension       Digestive    Gastroesophageal reflux disease with esophagitis    Vitamin D deficiency       Endocrine    Hypothyroidism    Relevant Orders    Ambulatory Referral to Endocrinology       Hematopoietic and Hemostatic    Iron deficiency anemia    Anemia    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HCC)       Other    History of throat cancer    Alcohol abuse counseling and surveillance    Relevant Orders    Ambulatory Referral to Gastroenterology    Underweight      Other Visit Diagnoses     Elevated liver enzymes    -  Primary    Relevant Orders    Ambulatory Referral to Gastroenterology      annual physical exam performed last visit   -history of throat cancer -Dr. Washington (ENT) and Dr. Orr (Radiation Oncologist) following. He recently was seen by Dr. Orr   - iron deficiency anemia/target cells/hemoglobin C - referred to Hematologist/oncologist for further evaluation. Consultation appreciated. Continue iron pill/ferrous sulfate 325 mg daily  Hematologist following - Pt advised to stop drinkin alcohol. Will get last office report from Dr. Mckeon office.  Recheck CBC   -vitamin D deficiency- ontinue vitamin Donce a week. Recheck vitamin D  -GERD -continue nexium for acid reflux. Gave samples today .  Pt advised to take nexium eparate from thyroid medication at least 30 minutes apart   - low magnesium/hypomangesemia- recheck magnesium levels. Continue magnesium oxide 400 mg PO q daily   - Hypothyroidism -recent thyroid levels not at goal.  Will refer to Dr. Proctor for further evaluation.Consultation appreciated  If still not at goal will adjust medication. Continue on synthroid 200  mcg daily for now.   - hyperlipidemia - Continue simvastatin and aspirin. Recheck lipid  panel   --alcohol abuse - counseled on aclohol abuse - pt advised AA but he declined. Coiunseled pt to cut down but he does not want to quit drinking. Counseled for >5 minutes. Pt continues to drink. He does not want to quit drinking.  He continues to drink whiskey once a week  -elevated liver enzymes- recent US of liver was normal in February 2018.  Will refer to [facility]/Dr. Chester for further evaluation. Counseled to quit drinking alcohol   -underweight - gave information on high caloric today. Pt's weight stable   --hypertension - BP at goal today.  He is taking lisinopril-HCTZ 10-12.5 mg daily.   -Pt advised to bring BP medications on next visit   - underweight - weight is stable today.   -recheck in 2months    -advised pt to be safe and call with any questions or concerns all questions addressed today        This document has been electronically signed by Cristino He MD on July 20, 2018 9:23 AM

## 2018-07-20 ENCOUNTER — OFFICE VISIT (OUTPATIENT)
Dept: FAMILY MEDICINE CLINIC | Facility: CLINIC | Age: 58
End: 2018-07-20

## 2018-07-20 VITALS
OXYGEN SATURATION: 98 % | TEMPERATURE: 97.9 F | WEIGHT: 121.4 LBS | BODY MASS INDEX: 20.23 KG/M2 | SYSTOLIC BLOOD PRESSURE: 126 MMHG | HEART RATE: 88 BPM | HEIGHT: 65 IN | DIASTOLIC BLOOD PRESSURE: 72 MMHG

## 2018-07-20 DIAGNOSIS — R63.6 UNDERWEIGHT: ICD-10-CM

## 2018-07-20 DIAGNOSIS — E03.9 HYPOTHYROIDISM, UNSPECIFIED TYPE: ICD-10-CM

## 2018-07-20 DIAGNOSIS — Z85.819 HISTORY OF THROAT CANCER: ICD-10-CM

## 2018-07-20 DIAGNOSIS — D50.9 IRON DEFICIENCY ANEMIA, UNSPECIFIED IRON DEFICIENCY ANEMIA TYPE: ICD-10-CM

## 2018-07-20 DIAGNOSIS — K21.00 GASTROESOPHAGEAL REFLUX DISEASE WITH ESOPHAGITIS: ICD-10-CM

## 2018-07-20 DIAGNOSIS — E78.5 HYPERLIPIDEMIA, UNSPECIFIED HYPERLIPIDEMIA TYPE: ICD-10-CM

## 2018-07-20 DIAGNOSIS — R74.8 ELEVATED LIVER ENZYMES: Primary | ICD-10-CM

## 2018-07-20 DIAGNOSIS — E55.9 VITAMIN D DEFICIENCY: ICD-10-CM

## 2018-07-20 DIAGNOSIS — D58.2 HEMOGLOBIN C TRAIT (HCC): ICD-10-CM

## 2018-07-20 DIAGNOSIS — Z71.41 ALCOHOL ABUSE COUNSELING AND SURVEILLANCE: ICD-10-CM

## 2018-07-20 DIAGNOSIS — D64.9 ANEMIA, UNSPECIFIED TYPE: ICD-10-CM

## 2018-07-20 DIAGNOSIS — I10 ESSENTIAL HYPERTENSION: ICD-10-CM

## 2018-07-20 PROCEDURE — 99214 OFFICE O/P EST MOD 30 MIN: CPT | Performed by: FAMILY MEDICINE

## 2018-08-18 NOTE — PROGRESS NOTES
Subjective   Emerson Bang is a 58 y.o. male.   Problem List  1. 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 Hypothyroidism, unspecified   3. Alcohol abuse  4. 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 Vitamin D deficiency  6. GERD  7. Last colonoscopy was 3 years ago   8. Esophagitis/gastritis/internal and external hemorhoids.  9. Elevated AST level  10. Hypomagnesemia   11. Hyperlipidemia ASCVD risk >5%  12. Essential Hypertension   13. BPH     2/1/18 Pt is 56 yo AAM with the above medical issues. Is here for recheck.  Pt just got labwork done today and results are pending. Has history of hypothyroidism and is taking synthroid 175 mcg PO q daily.  Pt also has vitamin D deficiency and iron deficiency and is taking Vitamin D once a week along with iron pill daily.  Pt states he is doing well.  No issues with swallowing or pain on swallowing.  Pt has history of throat cancer and is seeing ENT along with Radiation Oncologist [place].  He continues to drink alcohol but has cut down to once a week. Pt has also gained 4 lbs since last visit and has seen dietician.  BP at goal today      Pt on last labwork had low Vitamin D along  With elevated TSH at 78.00. He is taking synthroid 200 mcg PO q daily T3 was at 2.4 and T4 was 0.68.  Pt also has anemia and last hga1c was 11.6.  Peripheral smear showed anemia borderline with numberous target cells. Magnesium levels were low at 1.4 and takes magnesium oxdie 400 mg daily Pt on hemoglobin electrophoresis has elevations of hemoglobin C. Pt does have some fatigue and dizziness intermittently but denies  Fever/nights or weight loss Last weight on pt was 124 lbs. He is 128 lbs today. Denies any swallowing issues.  Appetite is good     Continues to drink 2 times a week. Mainly beer. May drink 3 beers on each occasion. Does not want to quit drinking or see  AA     3/19/18 pt is here for followup. He recently saw Hematologist Dr. Mckeon this past Tuesday.  Pt does have history of target cells and Hemoglobin C on peripheral smear.  He did get labwork recently and pt may be  Considered to have bone marrow biopsy in the future.  He does have intermittent fatigue. And it is harder for him to get up in the morning.  He also continues to drink twice a week and may drink 4 beers on each occasion with hard liquor.  Pt recently saw ENT Dr. Washington for his history of cancer in pharynx and chronic laryngitis. He had flexible laryngoscopy  Pt is doing well and has next appt with Radiation Oncologist Dr. Orr in July. Weight today is 128.4 lbs. It is stable from last visit      5/10/18 pt is here for recheck and followup.. Pt recently had labwork done on 4/2018 that showed TSH being high at 21.70 and T4 at 0.89 and  T3 sy  2.1.  His synthroid was at 175 mcg PO q daily and it was increased to 200 mcg PO q daily.  He did not  new prescription of synthroid 200 mcg  PO  q daily. Pt was contacted and a voicemail was left but he did not call back. He continues to take the 175 mcg of synthroid   He also has nexium 20 mg PO q daily on medication list for GERD. Pt also has been referred to Hematologist for his history of anemia and hemoglobin C.  Anemia workup was recommended per Hematologist office note along with possible bone marrow biopsy. Do not have last office note from Hematologist. Per patient if anemia is stable no bone marrow biopsy was needed. Pt states he is doing overall well today. No issues. He has cut down on alcohol to 1/2 pint vodka      6/21/18 pt is here for followup and recheck.  He is currently on Vitamin D for deficiency. He takes nexium for GERD. Is on synthroid 200 mcg PO q daily  Will last TSH at 58.70 and  T4 at 0.42 and T3 at 1.4.  He also takes lisinpril 10-12.5 mg PO q daily. His dosage of synthroid was increased to 200 mcg PO q daily but he is unsure of  which dosage he is taking.  He was advised to not take it together with his PPI.   For hypertension. BP is at goal. For hyperilpidemia he is on aspirin and simvastatin. Last lipid panel showed cholesterol at 276 with HDL at 153 and LDL at 103.  He continues to drink alcohol and does not want to quit. He drinks a pint of whiskey about once a week. He has history of throat cancer and is currently seeing ENT and Radiation Oncologist.  He lost 7 lbs since last visit in May and today is 120 lbs     7/20/18 pt is here for followup and recheck. He has a history of thyroid cancer sp chemotherapy and radiation therapy  He recently has labwork that showed TSH was at  63 and T3 is at 1.8 and T4 at 0.80. On CMP glucose at 120 and sodium at 136 with ALT at 77 and AST at 131. He conitnues to have elevated liver enzymes and US in Februatry 2018 showed normal liver. He continues to drink alcohol. For hypertension he is taking lisionpri-HCTZ 10-12.5 mg daily. For hyperlipidemia he is on aspirin and simvastatin.  For iron deficiency anemia is he ion Ferous sulfate 325 mg PO q daily. For GERD he is on nexium 20 mg PO qhs and for his hypothyroidism he takes synthroid 200 mcg PO q daily. His BP is at goal. Also his weight is 121 lbs  And last visit it was 120. For low magnesium he takes magnesium oxide. He recently saw his Radiation Oncologist Dr. Orr this past Wednesday. He was checked and is doing well in regards to history of thyroid cancer. He denies any weakness or fatigue. He also continues to drink 1 pint of whiskey a week     8/20/18. Pt is here for followup. He missed his appt with Endocrinologist regarding his acquired hypothyroidism. He is on synthroid 200 mcg PO q daily. His TSH is high and T3 is low.    He also continues to drink 1/2 pint a week of whiskey he has a long history of alcohol abuse. He is needing refills on all his medicaiton. He continues to take BP medication and simvastatin for hyperlipidemia. He also  takes magnesium and vitamin D supplements.  BP is controlled today.  He is on nexium for GERD     Anemia   Presents for follow-up visit. Symptoms include weight loss. There has been no abdominal pain, anorexia, bruising/bleeding easily, confusion, fever, leg swelling, light-headedness, malaise/fatigue, pallor, palpitations, paresthesias or pica. Signs of blood loss that are not present include hematemesis and hematochezia. There are no compliance problems.    Thyroid Problem   Presents for follow-up visit. Symptoms include anxiety, fatigue, hoarse voice and weight loss. Patient reports no cold intolerance, constipation, depressed mood, diaphoresis, diarrhea, dry skin, hair loss, heat intolerance, leg swelling, menstrual problem, palpitations, tremors, visual change or weight gain. The symptoms have been worsening.   Alcohol Problem   This is a chronic problem. The current episode started more than 1 month ago. The problem occurs constantly. The problem has been waxing and waning. Associated symptoms include fatigue. Pertinent negatives include no abdominal pain, anorexia, arthralgias, change in bowel habit, chest pain, chills, congestion, diaphoresis, fever, headaches, joint swelling, myalgias, neck pain, numbness, sore throat, swollen glands, urinary symptoms, vertigo, visual change, vomiting or weakness. Nothing aggravates the symptoms. He has tried nothing (synthroid ) for the symptoms. The treatment provided no relief.              The following portions of the patient's history were reviewed and updated as appropriate: allergies, current medications, past family history, past medical history, past social history, past surgical history and problem list.  Patient Active Problem List   Diagnosis   • Hyperkalemia   • Iron deficiency anemia   • Gastroesophageal reflux disease with esophagitis   • Elevated AST (SGOT)   • Alcohol abuse   • Hypothyroidism   • Balance problem   • Need for vaccination   • Low magnesium  levels   • Squamous cell carcinoma of pharynx (CMS/HCC)   • History of throat cancer   • Vitamin D deficiency   • Alcohol abuse counseling and surveillance   • Encounter for screening for diabetes mellitus   • Hypomagnesemia   • Anemia   • Hyperlipidemia   • Underweight due to inadequate caloric intake   • Need for immunization against influenza   • 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HCC)   • Annual physical exam   • Essential hypertension   • General medical examination   • Underweight     Current Outpatient Prescriptions on File Prior to Visit   Medication Sig Dispense Refill   • D3-50 66685 units capsule TAKE ONE CAPSULE BY MOUTH ONCE A WEEK (EVERY  7  DAYS) 4 capsule 11   • Multiple Vitamin (MULTI VITAMIN PO) Take  by mouth.     • [DISCONTINUED] ASPIRIN LOW DOSE 81 MG EC tablet TAKE ONE TABLET BY MOUTH ONCE DAILY 30 tablet 11   • [DISCONTINUED] esomeprazole (NEXIUM 24HR) 20 MG capsule Take 1 capsule by mouth Every Morning Before Breakfast. 30 capsule 11   • [DISCONTINUED] ferrous sulfate 325 (65 FE) MG tablet TAKE ONE TABLET BY MOUTH ONCE DAILY 30 tablet 11   • [DISCONTINUED] levothyroxine (SYNTHROID) 200 MCG tablet Take 1 tablet by mouth Daily. 30 tablet 3   • [DISCONTINUED] lisinopril-hydrochlorothiazide (ZESTORETIC) 10-12.5 MG per tablet Take 1 tablet by mouth Daily. 30 tablet 11   • [DISCONTINUED] magnesium oxide (MAGOX) 400 (241.3 MG) MG tablet tablet Take 2 tablets by mouth Daily. 30 each 11   • [DISCONTINUED] simvastatin (ZOCOR) 40 MG tablet TAKE ONE TABLET BY MOUTH ONCE DAILY IN THE EVENING 30 tablet 11   • [DISCONTINUED] terazosin (HYTRIN) 5 MG capsule TAKE ONE CAPSULE BY MOUTH EVERY NIGHT 30 capsule 11   • [DISCONTINUED] vitamin D (ERGOCALCIFEROL) 62331 units capsule capsule Take 1 capsule by mouth Every 7 (Seven) Days. 4 capsule 3     No current facility-administered medications on file prior to visit.      .  Review of Systems   Constitutional: Positive for fatigue and unexpected weight loss. Negative for  "chills, diaphoresis, fever, malaise/fatigue and unexpected weight gain.   HENT: Positive for hoarse voice. Negative for congestion and sore throat.    Cardiovascular: Negative for chest pain and palpitations.   Gastrointestinal: Negative for abdominal pain, anorexia, change in bowel habit, constipation, diarrhea, hematemesis, hematochezia and vomiting.   Endocrine: Negative for cold intolerance and heat intolerance.   Genitourinary: Negative for menstrual problem.   Musculoskeletal: Negative for arthralgias, joint swelling, myalgias and neck pain.   Skin: Negative for dry skin and pallor.   Neurological: Negative for vertigo, tremors, weakness, light-headedness, numbness, paresthesias and confusion.   Hematological: Does not bruise/bleed easily.   Psychiatric/Behavioral: Negative for depressed mood. The patient is nervous/anxious.        Objective    /76   Pulse 90   Temp 97.9 °F (36.6 °C)   Ht 165.1 cm (65\")   Wt 53.9 kg (118 lb 12.8 oz)   SpO2 97%   BMI 19.77 kg/m²     /76   Pulse 90   Temp 97.9 °F (36.6 °C)   Ht 165.1 cm (65\")   Wt 53.9 kg (118 lb 12.8 oz)   SpO2 97%   BMI 19.77 kg/m²     Office Visit on 06/21/2018   Component Date Value Ref Range Status   • TSH 07/17/2018 63.000* 0.460 - 4.680 mIU/mL Final   • T3, Free 07/17/2018 1.8* 2.0 - 4.4 pg/mL Final   • Free T4 07/17/2018 0.80  0.78 - 2.19 ng/dL Final   • WBC 07/17/2018 5.61  3.20 - 9.80 10*3/mm3 Final   • RBC 07/17/2018 3.90* 4.37 - 5.74 10*6/mm3 Final   • Hemoglobin 07/17/2018 11.2* 13.7 - 17.3 g/dL Final   • Hematocrit 07/17/2018 30.8* 39.0 - 49.0 % Final   • MCV 07/17/2018 79.0* 80.0 - 98.0 fL Final   • MCH 07/17/2018 28.7  26.5 - 34.0 pg Final   • MCHC 07/17/2018 36.4* 31.5 - 36.3 g/dL Final   • RDW 07/17/2018 14.8* 11.5 - 14.5 % Final   • RDW-SD 07/17/2018 42.0  35.1 - 43.9 fl Final   • MPV 07/17/2018 11.1  8.0 - 12.0 fL Final   • Platelets 07/17/2018 177  150 - 450 10*3/mm3 Final   • Neutrophil % 07/17/2018 39.0  37.0 - 80.0 " % Final   • Lymphocyte % 07/17/2018 35.3  10.0 - 50.0 % Final   • Monocyte % 07/17/2018 18.5* 0.0 - 12.0 % Final   • Eosinophil % 07/17/2018 5.9  0.0 - 7.0 % Final   • Basophil % 07/17/2018 0.9  0.0 - 2.0 % Final   • Immature Grans % 07/17/2018 0.4  0.0 - 0.5 % Final   • Neutrophils, Absolute 07/17/2018 2.19  2.00 - 8.60 10*3/mm3 Final   • Lymphocytes, Absolute 07/17/2018 1.98  0.60 - 4.20 10*3/mm3 Final   • Monocytes, Absolute 07/17/2018 1.04* 0.00 - 0.90 10*3/mm3 Final   • Eosinophils, Absolute 07/17/2018 0.33  0.00 - 0.70 10*3/mm3 Final   • Basophils, Absolute 07/17/2018 0.05  0.00 - 0.20 10*3/mm3 Final   • Immature Grans, Absolute 07/17/2018 0.02  0.00 - 0.02 10*3/mm3 Final   • Glucose 07/17/2018 120* 60 - 100 mg/dL Final   • BUN 07/17/2018 14  7 - 21 mg/dL Final   • Creatinine 07/17/2018 0.93  0.70 - 1.30 mg/dL Final   • Sodium 07/17/2018 136* 137 - 145 mmol/L Final   • Potassium 07/17/2018 4.4  3.5 - 5.1 mmol/L Final   • Chloride 07/17/2018 97  95 - 110 mmol/L Final   • CO2 07/17/2018 24.0  22.0 - 31.0 mmol/L Final   • Calcium 07/17/2018 9.8  8.4 - 10.2 mg/dL Final   • Total Protein 07/17/2018 7.8  6.3 - 8.6 g/dL Final   • Albumin 07/17/2018 4.30  3.40 - 4.80 g/dL Final   • ALT (SGPT) 07/17/2018 77* 21 - 72 U/L Final   • AST (SGOT) 07/17/2018 131* 17 - 59 U/L Final   • Alkaline Phosphatase 07/17/2018 92  38 - 126 U/L Final   • Total Bilirubin 07/17/2018 0.3  0.2 - 1.3 mg/dL Final   • eGFR   Amer 07/17/2018 100  49 - 113 mL/min/1.73 Final   • Globulin 07/17/2018 3.5  2.3 - 3.5 gm/dL Final   • A/G Ratio 07/17/2018 1.2  1.1 - 1.8 g/dL Final   • BUN/Creatinine Ratio 07/17/2018 15.1  7.0 - 25.0 Final   • Anion Gap 07/17/2018 15.0  5.0 - 15.0 mmol/L Final       Physical Exam   Constitutional: He is oriented to person, place, and time. No distress.   Thin appearance   HENT:   Head: Normocephalic and atraumatic.   Right Ear: External ear normal.   Eyes: Pupils are equal, round, and reactive to light.  Conjunctivae and EOM are normal. Right eye exhibits no discharge. Left eye exhibits no discharge. No scleral icterus.   Neck: Normal range of motion. Neck supple. No JVD present. No tracheal deviation present. No thyromegaly present.   Cardiovascular: Normal rate, regular rhythm and normal heart sounds.    Pulmonary/Chest: Effort normal. No stridor. No respiratory distress. He has no wheezes.   Abdominal: Soft. He exhibits no distension. There is no tenderness. There is no guarding.   Musculoskeletal: Normal range of motion. He exhibits no edema or deformity.   Lymphadenopathy:     He has no cervical adenopathy.   Neurological: He is alert and oriented to person, place, and time. No cranial nerve deficit. Coordination normal.   Skin: Skin is warm and dry. No rash noted. No erythema. No pallor.   Psychiatric: He has a normal mood and affect. His behavior is normal.   Nursing note and vitals reviewed.        Assessment/Plan   Problems Addressed this Visit        Cardiovascular and Mediastinum    Hyperlipidemia    Relevant Medications    simvastatin (ZOCOR) 40 MG tablet       Digestive    Vitamin D deficiency       Endocrine    Hypothyroidism    Relevant Medications    levothyroxine (SYNTHROID) 200 MCG tablet       Hematopoietic and Hemostatic    Anemia - Primary    Relevant Medications    ferrous sulfate 325 (65 FE) MG tablet       Other    Alcohol abuse    History of throat cancer    Alcohol abuse counseling and surveillance    Hypomagnesemia      annual physical exam performed last visit   -history of throat cancer -Dr. Washington (ENT) and Dr. Orr (Radiation Oncologist) following. He recently was seen by Dr. Orr   - iron deficiency anemia/target cells/hemoglobin C - referred to Hematologist/oncologist for further evaluation. Consultation appreciated. Continue iron pill/ferrous sulfate 325 mg daily  Hematologist following - Pt advised to stop drinkin alcohol. Will get last office report from Dr. Mckeon office.   Recheck CBC   -vitamin D deficiency- ontinue vitamin Donce a week. Recheck vitamin D  -GERD -continue nexium for acid reflux. Gave samples today .  Pt advised to take nexium eparate from thyroid medication at least 30 minutes apart   - low magnesium/hypomangesemia- recheck magnesium levels. Continue magnesium oxide 400 mg PO q daily   - Hypothyroidism -recent thyroid levels not at goal.  Will refer to Dr. Proctor for further evaluation.Consultation appreciated  If still not at goal will adjust medication. Continue on synthroid 200  mcg daily for now.   - hyperlipidemia - Continue simvastatin and aspirin. Recheck lipid panel   --alcohol abuse - counseled on aclohol abuse - pt advised AA but he declined. Coiunseled pt to cut down but he does not want to quit drinking. Counseled for >5 minutes. Pt continues to drink. He does not want to quit drinking.  He continues to drink whiskey once a week  -elevated liver enzymes- recent US of liver was normal in February 2018.  Will refer to [facility]/Dr. Chester for further evaluation. Counseled to quit drinking alcohol   -underweight - gave information on high caloric today. Pt's weight stable   --hypertension - BP at goal today.  He is taking lisinopril-HCTZ 10-12.5 mg daily.   -Pt advised to bring BP medications on next visit   - underweight - weight is stable today.   -recheck in 2months  After pt seees all Specialist    -advised pt to be safe and call with any questions or concerns all questions addressed today        This document has been electronically signed by Cristino He MD on August 20, 2018 9:49 AM

## 2018-08-20 ENCOUNTER — OFFICE VISIT (OUTPATIENT)
Dept: FAMILY MEDICINE CLINIC | Facility: CLINIC | Age: 58
End: 2018-08-20

## 2018-08-20 VITALS
BODY MASS INDEX: 19.79 KG/M2 | OXYGEN SATURATION: 97 % | DIASTOLIC BLOOD PRESSURE: 76 MMHG | HEIGHT: 65 IN | WEIGHT: 118.8 LBS | SYSTOLIC BLOOD PRESSURE: 120 MMHG | TEMPERATURE: 97.9 F | HEART RATE: 90 BPM

## 2018-08-20 DIAGNOSIS — Z85.819 HISTORY OF THROAT CANCER: ICD-10-CM

## 2018-08-20 DIAGNOSIS — E55.9 VITAMIN D DEFICIENCY: ICD-10-CM

## 2018-08-20 DIAGNOSIS — Z71.41 ALCOHOL ABUSE COUNSELING AND SURVEILLANCE: ICD-10-CM

## 2018-08-20 DIAGNOSIS — F10.10 ALCOHOL ABUSE: ICD-10-CM

## 2018-08-20 DIAGNOSIS — E83.42 HYPOMAGNESEMIA: ICD-10-CM

## 2018-08-20 DIAGNOSIS — D64.9 ANEMIA, UNSPECIFIED TYPE: Primary | ICD-10-CM

## 2018-08-20 DIAGNOSIS — E78.5 HYPERLIPIDEMIA, UNSPECIFIED HYPERLIPIDEMIA TYPE: ICD-10-CM

## 2018-08-20 DIAGNOSIS — E03.9 HYPOTHYROIDISM, UNSPECIFIED TYPE: ICD-10-CM

## 2018-08-20 PROCEDURE — 99214 OFFICE O/P EST MOD 30 MIN: CPT | Performed by: FAMILY MEDICINE

## 2018-08-20 RX ORDER — LEVOTHYROXINE SODIUM 0.2 MG/1
200 TABLET ORAL DAILY
Qty: 30 TABLET | Refills: 3 | Status: SHIPPED | OUTPATIENT
Start: 2018-08-20 | End: 2018-10-04 | Stop reason: SDUPTHER

## 2018-08-20 RX ORDER — TERAZOSIN 5 MG/1
5 CAPSULE ORAL NIGHTLY
Qty: 30 CAPSULE | Refills: 3 | Status: SHIPPED | OUTPATIENT
Start: 2018-08-20 | End: 2018-10-04 | Stop reason: SDUPTHER

## 2018-08-20 RX ORDER — ASPIRIN 81 MG/1
81 TABLET ORAL DAILY
Qty: 30 TABLET | Refills: 3 | Status: SHIPPED | OUTPATIENT
Start: 2018-08-20 | End: 2018-10-04 | Stop reason: SDUPTHER

## 2018-08-20 RX ORDER — FERROUS SULFATE 325(65) MG
1 TABLET ORAL DAILY
Qty: 30 TABLET | Refills: 3 | Status: SHIPPED | OUTPATIENT
Start: 2018-08-20 | End: 2018-10-04 | Stop reason: SDUPTHER

## 2018-08-20 RX ORDER — ERGOCALCIFEROL 1.25 MG/1
50000 CAPSULE ORAL
Qty: 4 CAPSULE | Refills: 3 | Status: SHIPPED | OUTPATIENT
Start: 2018-08-20 | End: 2018-10-04 | Stop reason: SDUPTHER

## 2018-08-20 RX ORDER — LISINOPRIL AND HYDROCHLOROTHIAZIDE 12.5; 1 MG/1; MG/1
1 TABLET ORAL DAILY
Qty: 30 TABLET | Refills: 3 | Status: SHIPPED | OUTPATIENT
Start: 2018-08-20 | End: 2018-10-04 | Stop reason: SDUPTHER

## 2018-08-20 RX ORDER — SIMVASTATIN 40 MG
40 TABLET ORAL EVERY EVENING
Qty: 30 TABLET | Refills: 3 | Status: SHIPPED | OUTPATIENT
Start: 2018-08-20 | End: 2018-10-04 | Stop reason: SDUPTHER

## 2018-08-20 NOTE — PATIENT INSTRUCTIONS
Call for appt with Endocrinologist for followup on thyroid   - appt with Gastroenterologist is on 9/13/18

## 2018-09-13 ENCOUNTER — OFFICE VISIT (OUTPATIENT)
Dept: GASTROENTEROLOGY | Facility: CLINIC | Age: 58
End: 2018-09-13

## 2018-09-13 VITALS
WEIGHT: 122.2 LBS | HEART RATE: 77 BPM | DIASTOLIC BLOOD PRESSURE: 70 MMHG | BODY MASS INDEX: 20.36 KG/M2 | HEIGHT: 65 IN | OXYGEN SATURATION: 98 % | SYSTOLIC BLOOD PRESSURE: 118 MMHG

## 2018-09-13 DIAGNOSIS — R10.13 EPIGASTRIC PAIN: Primary | ICD-10-CM

## 2018-09-13 PROCEDURE — 99202 OFFICE O/P NEW SF 15 MIN: CPT | Performed by: INTERNAL MEDICINE

## 2018-09-13 RX ORDER — DEXTROSE AND SODIUM CHLORIDE 5; .45 G/100ML; G/100ML
30 INJECTION, SOLUTION INTRAVENOUS CONTINUOUS PRN
Status: CANCELLED | OUTPATIENT
Start: 2018-10-16

## 2018-09-13 RX ORDER — OMEPRAZOLE 40 MG/1
40 CAPSULE, DELAYED RELEASE ORAL DAILY
COMMUNITY
End: 2018-10-04 | Stop reason: SDUPTHER

## 2018-09-13 RX ORDER — SODIUM, POTASSIUM,MAG SULFATES 17.5-3.13G
1 SOLUTION, RECONSTITUTED, ORAL ORAL EVERY 12 HOURS
Qty: 1 BOTTLE | Refills: 0 | Status: SHIPPED | OUTPATIENT
Start: 2018-09-13 | End: 2018-09-18 | Stop reason: ALTCHOICE

## 2018-09-13 NOTE — PROGRESS NOTES
[facility]      Chief Complaint:   Chief Complaint   Patient presents with   • Alcohol Problem       Subjective     HPI:   Patient with history of EtOH abuse.  Patient continues to drink alcohol despite knowing the liver disease at has developed from this.  Patient has had diffuse epigastric abdominal pain and generalized abdominal pain.  Patient has marked elevation of liver function tests.  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     Plan; schedule patient for EGD and colonoscopy to evaluate for esophageal varices and colonic tumors.  Will have patient follow-up in GI office following these procedures.         Past Medical History:   Past Medical History:   Diagnosis Date   • Alcohol abuse    • Allergic rhinitis     vs URI   • Anemia    • At risk for falls    • Benign prostatic hyperplasia    • Chronic gastritis    • Complication of gastrostomy (CMS/HCC)     site not healing   • COPD (chronic obstructive pulmonary disease) (CMS/HCC)    • Dysphagia     odynophagia   • Epigastric pain    • Esophagitis    • GERD (gastroesophageal reflux disease)    • Hypothyroidism, unspecified    • Low back pain    • Malaise and fatigue    • Nausea with vomiting, unspecified    • Pain in left knee    • Pain in right knee    • Primary malignant neoplasm of pharynx (CMS/HCC)    • Screening for malignant neoplasm of colon    • Vitamin D deficiency        Family History:  Family History   Problem Relation Age of Onset   • Coronary artery disease Mother    • Diabetes Mother    • Liver cancer Sister    • Heart disease Other        Social History:   reports that he has quit smoking. He has never used smokeless tobacco. He reports that he drinks alcohol. He reports that he does not use drugs.    Medications:   Prior to Admission medications    Medication Sig Start Date End Date Taking? Authorizing Provider   aspirin (ASPIRIN LOW DOSE) 81 MG EC tablet Take 1 tablet by mouth Daily.  8/20/18  Yes Cristino He MD   D3-50 05328 units capsule TAKE ONE CAPSULE BY MOUTH ONCE A WEEK (EVERY  7  DAYS) 3/22/18  Yes Cristino He MD   ferrous sulfate 325 (65 FE) MG tablet Take 1 tablet by mouth Daily. 8/20/18  Yes Cristino He MD   levothyroxine (SYNTHROID) 200 MCG tablet Take 1 tablet by mouth Daily. 8/20/18  Yes Cristino He MD   lisinopril-hydrochlorothiazide (ZESTORETIC) 10-12.5 MG per tablet Take 1 tablet by mouth Daily. 8/20/18  Yes Cristino He MD   magnesium oxide (MAGOX) 400 (241.3 Mg) MG tablet tablet Take 2 tablets by mouth Daily. 8/20/18  Yes Cristino He MD   omeprazole (priLOSEC) 40 MG capsule Take 40 mg by mouth Daily.   Yes ProviderEsperanza MD   simvastatin (ZOCOR) 40 MG tablet Take 1 tablet by mouth Every Evening. 8/20/18  Yes Cristino He MD   terazosin (HYTRIN) 5 MG capsule Take 1 capsule by mouth Every Night. 8/20/18  Yes Cristino He MD   vitamin D (ERGOCALCIFEROL) 51953 units capsule capsule Take 1 capsule by mouth Every 7 (Seven) Days. 8/20/18  Yes Cristino He MD   esomeprazole (NEXIUM 24HR) 20 MG capsule Take 1 capsule by mouth Every Morning Before Breakfast. 8/20/18   Cristino He MD   Multiple Vitamin (MULTI VITAMIN PO) Take  by mouth.    Provider, MD Esperanza   sodium-potassium-magnesium sulfates (SUPREP) 17.5-3.13-1.6 GM/180ML solution oral solution Take 1 bottle by mouth Every 12 (Twelve) Hours. 9/13/18   Kaushal Chester MD       Allergies:  Patient has no known allergies.    ROS:    Review of Systems   Constitutional: Negative for activity change, appetite change, chills, diaphoresis, fatigue, fever and unexpected weight change.   HENT: Negative for sore throat and trouble swallowing.    Respiratory: Negative for shortness of breath.    Gastrointestinal: Negative for abdominal distention, abdominal pain, anal bleeding, blood in stool, constipation, diarrhea, nausea, rectal pain and vomiting.   Musculoskeletal: Negative for  "arthralgias.   Skin: Negative for pallor.   Neurological: Negative for light-headedness.     Objective     Blood pressure 118/70, pulse 77, height 165.1 cm (65\"), weight 55.4 kg (122 lb 3.2 oz), SpO2 98 %.    Physical Exam   Constitutional: He is oriented to person, place, and time. He appears well-developed and well-nourished. No distress.   HENT:   Head: Normocephalic and atraumatic.   Cardiovascular: Normal rate, regular rhythm, normal heart sounds and intact distal pulses.  Exam reveals no gallop and no friction rub.    No murmur heard.  Pulmonary/Chest: Breath sounds normal. No respiratory distress. He has no wheezes. He has no rales. He exhibits no tenderness.   Abdominal: Soft. Bowel sounds are normal. He exhibits no distension and no mass. There is no tenderness. There is no rebound and no guarding. No hernia.   Musculoskeletal: Normal range of motion. He exhibits no edema.   Neurological: He is alert and oriented to person, place, and time.   Skin: Skin is warm and dry. No rash noted. He is not diaphoretic. No erythema. No pallor.   Psychiatric: He has a normal mood and affect. His behavior is normal. Judgment and thought content normal.        Assessment/Plan   Emerson was seen today for alcohol problem.    Diagnoses and all orders for this visit:    Epigastric pain  -     Case Request; Standing  -     dextrose 5 % and sodium chloride 0.45 % infusion; Infuse 30 mL/hr into a venous catheter Continuous As Needed (Start Prior to Procedure).  -     Case Request    Other orders  -     Follow Anesthesia Guidelines / Standing Orders; Future  -     Implement Anesthesia Orders Day of Procedure; Standing  -     Obtain Informed Consent; Standing  -     sodium-potassium-magnesium sulfates (SUPREP) 17.5-3.13-1.6 GM/180ML solution oral solution; Take 1 bottle by mouth Every 12 (Twelve) Hours.        ESOPHAGOGASTRODUODENOSCOPY (N/A), COLONOSCOPY (N/A)     Diagnosis Plan   1. Epigastric pain  Case Request    dextrose 5 % " and sodium chloride 0.45 % infusion    Case Request       Anticipated Surgical Procedure:  Orders Placed This Encounter   Procedures   • Follow Anesthesia Guidelines / Standing Orders     Standing Status:   Future       The risks, benefits, and alternatives of this procedure have been discussed with the patient or the responsible party- the patient understands and agrees to proceed.

## 2018-09-18 ENCOUNTER — TELEPHONE (OUTPATIENT)
Dept: GASTROENTEROLOGY | Facility: CLINIC | Age: 58
End: 2018-09-18

## 2018-09-18 DIAGNOSIS — R10.13 EPIGASTRIC PAIN: Primary | ICD-10-CM

## 2018-09-18 NOTE — TELEPHONE ENCOUNTER
09/18/2018, 1110 - Patient's pharmacy of [facility], [place], [place] telephoned per this staff member [phone] with notification alternate bowel preparation, Golytely, submitted per this staff member via E-Scribe and request for confirmation of facsimile number in order to submit instructions for utilization of Golytely.  Spoke with Lead Pharmacy Technician, Martinez.  Martinez confirmed facsimile number - [phone].    09/18/2018, 1114 - Patient telephoned per this staff member [phone].  Zero answer.  Voice message submitted on patient self identified answering device with date, time, office contact information, and notification alternate bowel preparation, Golytely, submitted to patient's pharmacy of [facility], [facility], [place], [place] via E-Scribe per this staff member as facsimile received from pharmacy stating patient's insurance rejected Suprep Bowel Preparation.  Patient instructed to obtain instructions for Golytely utilization upon collecting bowel preparation from pharmacy.  Patient instructed to contact office with questions.    09/18/2018, 1117 - Patient returned this staff member's telephone call.  Previous message reiterated.  Patient given reminder of scheduled EGD/Colonoscopy date/time - Tuesday, October 16, 2018 at 1:15 p.m. Patient verbalized understanding.

## 2018-10-02 NOTE — PROGRESS NOTES
Subjective   Emerson Bang is a 58 y.o. male.   Problem List  1. 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 Hypothyroidism, unspecified   3. Alcohol abuse  4. 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 Vitamin D deficiency  6. GERD  7. Last colonoscopy was 3 years ago   8. Esophagitis/gastritis/internal and external hemorhoids.  9. Elevated AST level  10. Hypomagnesemia   11. Hyperlipidemia ASCVD risk >5%  12. Essential Hypertension   13. BPH     2/1/18 Pt is 58 yo AAM with the above medical issues. Is here for recheck.  Pt just got labwork done today and results are pending. Has history of hypothyroidism and is taking synthroid 175 mcg PO q daily.  Pt also has vitamin D deficiency and iron deficiency and is taking Vitamin D once a week along with iron pill daily.  Pt states he is doing well.  No issues with swallowing or pain on swallowing.  Pt has history of throat cancer and is seeing ENT along with Radiation Oncologist IN [place].  He continues to drink alcohol but has cut down to once a week. Pt has also gained 4 lbs since last visit and has seen dietician.  BP at goal today      Pt on last labwork had low Vitamin D along  With elevated TSH at 78.00. He is taking synthroid 200 mcg PO q daily T3 was at 2.4 and T4 was 0.68.  Pt also has anemia and last hga1c was 11.6.  Peripheral smear showed anemia borderline with numberous target cells. Magnesium levels were low at 1.4 and takes magnesium oxdie 400 mg daily Pt on hemoglobin electrophoresis has elevations of hemoglobin C. Pt does have some fatigue and dizziness intermittently but denies  Fever/nights or weight loss Last weight on pt was 124 lbs. He is 128 lbs today. Denies any swallowing issues.  Appetite is good     Continues to drink 2 times a week. Mainly beer. May drink 3 beers on each occasion. Does not want to quit drinking or see  AA     3/19/18 pt is here for followup. He recently saw Hematologist Dr. Mckeon this past Tuesday.  Pt does have history of target cells and Hemoglobin C on peripheral smear.  He did get labwork recently and pt may be  Considered to have bone marrow biopsy in the future.  He does have intermittent fatigue. And it is harder for him to get up in the morning.  He also continues to drink twice a week and may drink 4 beers on each occasion with hard liquor.  Pt recently saw ENT Dr. Washington for his history of cancer in pharynx and chronic laryngitis. He had flexible laryngoscopy  Pt is doing well and has next appt with Radiation Oncologist Dr. Orr in July. Weight today is 128.4 lbs. It is stable from last visit      5/10/18 pt is here for recheck and followup.. Pt recently had labwork done on 4/2018 that showed TSH being high at 21.70 and T4 at 0.89 and  T3 sy  2.1.  His synthroid was at 175 mcg PO q daily and it was increased to 200 mcg PO q daily.  He did not  new prescription of synthroid 200 mcg  PO  q daily. Pt was contacted and a voicemail was left but he did not call back. He continues to take the 175 mcg of synthroid   He also has nexium 20 mg PO q daily on medication list for GERD. Pt also has been referred to Hematologist for his history of anemia and hemoglobin C.  Anemia workup was recommended per Hematologist office note along with possible bone marrow biopsy. Do not have last office note from Hematologist. Per patient if anemia is stable no bone marrow biopsy was needed. Pt states he is doing overall well today. No issues. He has cut down on alcohol to 1/2 pint vodka      6/21/18 pt is here for followup and recheck.  He is currently on Vitamin D for deficiency. He takes nexium for GERD. Is on synthroid 200 mcg PO q daily  Will last TSH at 58.70 and  T4 at 0.42 and T3 at 1.4.  He also takes lisinpril 10-12.5 mg PO q daily. His dosage of synthroid was increased to 200 mcg PO q daily but he is unsure of  which dosage he is taking.  He was advised to not take it together with his PPI.   For hypertension. BP is at goal. For hyperilpidemia he is on aspirin and simvastatin. Last lipid panel showed cholesterol at 276 with HDL at 153 and LDL at 103.  He continues to drink alcohol and does not want to quit. He drinks a pint of whiskey about once a week. He has history of throat cancer and is currently seeing ENT and Radiation Oncologist.  He lost 7 lbs since last visit in May and today is 120 lbs     7/20/18 pt is here for followup and recheck. He has a history of thyroid cancer sp chemotherapy and radiation therapy  He recently has labwork that showed TSH was at  63 and T3 is at 1.8 and T4 at 0.80. On CMP glucose at 120 and sodium at 136 with ALT at 77 and AST at 131. He conitnues to have elevated liver enzymes and US in Februatry 2018 showed normal liver. He continues to drink alcohol. For hypertension he is taking lisionpri-HCTZ 10-12.5 mg daily. For hyperlipidemia he is on aspirin and simvastatin.  For iron deficiency anemia is he ion Ferous sulfate 325 mg PO q daily. For GERD he is on nexium 20 mg PO qhs and for his hypothyroidism he takes synthroid 200 mcg PO q daily. His BP is at goal. Also his weight is 121 lbs  And last visit it was 120. For low magnesium he takes magnesium oxide. He recently saw his Radiation Oncologist Dr. Orr this past Wednesday. He was checked and is doing well in regards to history of thyroid cancer. He denies any weakness or fatigue. He also continues to drink 1 pint of whiskey a week     8/20/18. Pt is here for followup. He missed his appt with Endocrinologist regarding his acquired hypothyroidism. He is on synthroid 200 mcg PO q daily. His TSH is high and T3 is low.    He also continues to drink 1/2 pint a week of whiskey he has a long history of alcohol abuse. He is needing refills on all his medicaiton. He continues to take BP medication and simvastatin for hyperlipidemia. He also  takes magnesium and vitamin D supplements.  BP is controlled today.  He is on nexium for GERD    10/4/18 pt is here for recheck and followup. He has history of acquired hypothryoidism and is on synthroid 200 mcg PO q daily He was referred to Endorcrinologist and missed appt on 8/9/18. He did see Gastroenterologist and has a EGD and colonboscopy pending for epigastric pain abnd GERD. He is on prilosec.  For hypertebnsiobn he takes lisinopril-HCTZ 10-12.5 mg Po q daily. He also takes aspirin and lipitor. He has a history of throat cancer and did have chemotherapy and radiation therapy. He has been seeing ENT and Radiation Oncologist regarding this. His weight is stable since last visit      Anemia   Presents for follow-up visit. Symptoms include weight loss. There has been no abdominal pain, anorexia, bruising/bleeding easily, confusion, fever, leg swelling, light-headedness, malaise/fatigue, pallor, palpitations, paresthesias or pica. Signs of blood loss that are not present include hematemesis and hematochezia. There are no compliance problems.    Thyroid Problem   Presents for follow-up visit. Symptoms include anxiety, fatigue, hoarse voice and weight loss. Patient reports no cold intolerance, constipation, depressed mood, diaphoresis, diarrhea, dry skin, hair loss, heat intolerance, leg swelling, menstrual problem, palpitations, tremors, visual change or weight gain. The symptoms have been worsening.   Alcohol Problem   This is a chronic problem. The current episode started more than 1 month ago. The problem occurs constantly. The problem has been waxing and waning. Associated symptoms include fatigue. Pertinent negatives include no abdominal pain, anorexia, arthralgias, change in bowel habit, chest pain, chills, congestion, diaphoresis, fever, headaches, joint swelling, myalgias, neck pain, numbness, sore throat, swollen glands, urinary symptoms, vertigo, visual change, vomiting or weakness. Nothing aggravates  the symptoms. He has tried nothing (synthroid ) for the symptoms. The treatment provided no relief.              The following portions of the patient's history were reviewed and updated as appropriate: allergies, current medications, past family history, past medical history, past social history, past surgical history and problem list.  Patient Active Problem List   Diagnosis   • Hyperkalemia   • Iron deficiency anemia   • Gastroesophageal reflux disease with esophagitis   • Elevated AST (SGOT)   • Alcohol abuse   • Hypothyroidism   • Balance problem   • Need for vaccination   • Low magnesium levels   • Squamous cell carcinoma of pharynx (CMS/HCC)   • History of throat cancer   • Vitamin D deficiency   • Alcohol abuse counseling and surveillance   • Encounter for screening for diabetes mellitus   • Hypomagnesemia   • Anemia   • Hyperlipidemia   • Underweight due to inadequate caloric intake   • Need for immunization against influenza   • 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HCC)   • Annual physical exam   • Essential hypertension   • General medical examination   • Underweight   • Epigastric pain     Current Outpatient Prescriptions on File Prior to Visit   Medication Sig Dispense Refill   • aspirin (ASPIRIN LOW DOSE) 81 MG EC tablet Take 1 tablet by mouth Daily. 30 tablet 3   • D3-50 87302 units capsule TAKE ONE CAPSULE BY MOUTH ONCE A WEEK (EVERY  7  DAYS) 4 capsule 11   • esomeprazole (NEXIUM 24HR) 20 MG capsule Take 1 capsule by mouth Every Morning Before Breakfast. 30 capsule 3   • ferrous sulfate 325 (65 FE) MG tablet Take 1 tablet by mouth Daily. 30 tablet 3   • levothyroxine (SYNTHROID) 200 MCG tablet Take 1 tablet by mouth Daily. 30 tablet 3   • lisinopril-hydrochlorothiazide (ZESTORETIC) 10-12.5 MG per tablet Take 1 tablet by mouth Daily. 30 tablet 3   • magnesium oxide (MAGOX) 400 (241.3 Mg) MG tablet tablet Take 2 tablets by mouth Daily. 30 each 3   • Multiple Vitamin (MULTI VITAMIN PO) Take  by mouth.     •  "omeprazole (priLOSEC) 40 MG capsule Take 40 mg by mouth Daily.     • polyethylene glycol (GoLYTELY) 236 g solution Utilize as directed per instructions submitted via facsimile to pharmacy from office of Dr. Kaushal Paz M.D. 4000 mL 0   • simvastatin (ZOCOR) 40 MG tablet Take 1 tablet by mouth Every Evening. 30 tablet 3   • terazosin (HYTRIN) 5 MG capsule Take 1 capsule by mouth Every Night. 30 capsule 3   • vitamin D (ERGOCALCIFEROL) 06971 units capsule capsule Take 1 capsule by mouth Every 7 (Seven) Days. 4 capsule 3     No current facility-administered medications on file prior to visit.      .  Review of Systems   Constitutional: Positive for fatigue and unexpected weight loss. Negative for chills, diaphoresis, fever, malaise/fatigue and unexpected weight gain.   HENT: Positive for hoarse voice. Negative for congestion and sore throat.    Cardiovascular: Negative for chest pain and palpitations.   Gastrointestinal: Negative for abdominal pain, anorexia, change in bowel habit, constipation, diarrhea, hematemesis, hematochezia and vomiting.   Endocrine: Negative for cold intolerance and heat intolerance.   Genitourinary: Negative for menstrual problem.   Musculoskeletal: Negative for arthralgias, joint swelling, myalgias and neck pain.   Skin: Negative for dry skin and pallor.   Neurological: Negative for vertigo, tremors, weakness, light-headedness, numbness, paresthesias and confusion.   Hematological: Does not bruise/bleed easily.   Psychiatric/Behavioral: Negative for depressed mood. The patient is nervous/anxious.        Objective    /70   Pulse 85   Temp 98.6 °F (37 °C)   Ht 165.1 cm (65\")   Wt 54 kg (119 lb)   SpO2 97%   BMI 19.80 kg/m²       Office Visit on 06/21/2018   Component Date Value Ref Range Status   • TSH 07/17/2018 63.000* 0.460 - 4.680 mIU/mL Final   • T3, Free 07/17/2018 1.8* 2.0 - 4.4 pg/mL Final   • Free T4 07/17/2018 0.80  0.78 - 2.19 ng/dL Final   • WBC 07/17/2018 5.61  " 3.20 - 9.80 10*3/mm3 Final   • RBC 07/17/2018 3.90* 4.37 - 5.74 10*6/mm3 Final   • Hemoglobin 07/17/2018 11.2* 13.7 - 17.3 g/dL Final   • Hematocrit 07/17/2018 30.8* 39.0 - 49.0 % Final   • MCV 07/17/2018 79.0* 80.0 - 98.0 fL Final   • MCH 07/17/2018 28.7  26.5 - 34.0 pg Final   • MCHC 07/17/2018 36.4* 31.5 - 36.3 g/dL Final   • RDW 07/17/2018 14.8* 11.5 - 14.5 % Final   • RDW-SD 07/17/2018 42.0  35.1 - 43.9 fl Final   • MPV 07/17/2018 11.1  8.0 - 12.0 fL Final   • Platelets 07/17/2018 177  150 - 450 10*3/mm3 Final   • Neutrophil % 07/17/2018 39.0  37.0 - 80.0 % Final   • Lymphocyte % 07/17/2018 35.3  10.0 - 50.0 % Final   • Monocyte % 07/17/2018 18.5* 0.0 - 12.0 % Final   • Eosinophil % 07/17/2018 5.9  0.0 - 7.0 % Final   • Basophil % 07/17/2018 0.9  0.0 - 2.0 % Final   • Immature Grans % 07/17/2018 0.4  0.0 - 0.5 % Final   • Neutrophils, Absolute 07/17/2018 2.19  2.00 - 8.60 10*3/mm3 Final   • Lymphocytes, Absolute 07/17/2018 1.98  0.60 - 4.20 10*3/mm3 Final   • Monocytes, Absolute 07/17/2018 1.04* 0.00 - 0.90 10*3/mm3 Final   • Eosinophils, Absolute 07/17/2018 0.33  0.00 - 0.70 10*3/mm3 Final   • Basophils, Absolute 07/17/2018 0.05  0.00 - 0.20 10*3/mm3 Final   • Immature Grans, Absolute 07/17/2018 0.02  0.00 - 0.02 10*3/mm3 Final   • Glucose 07/17/2018 120* 60 - 100 mg/dL Final   • BUN 07/17/2018 14  7 - 21 mg/dL Final   • Creatinine 07/17/2018 0.93  0.70 - 1.30 mg/dL Final   • Sodium 07/17/2018 136* 137 - 145 mmol/L Final   • Potassium 07/17/2018 4.4  3.5 - 5.1 mmol/L Final   • Chloride 07/17/2018 97  95 - 110 mmol/L Final   • CO2 07/17/2018 24.0  22.0 - 31.0 mmol/L Final   • Calcium 07/17/2018 9.8  8.4 - 10.2 mg/dL Final   • Total Protein 07/17/2018 7.8  6.3 - 8.6 g/dL Final   • Albumin 07/17/2018 4.30  3.40 - 4.80 g/dL Final   • ALT (SGPT) 07/17/2018 77* 21 - 72 U/L Final   • AST (SGOT) 07/17/2018 131* 17 - 59 U/L Final   • Alkaline Phosphatase 07/17/2018 92  38 - 126 U/L Final   • Total Bilirubin 07/17/2018  0.3  0.2 - 1.3 mg/dL Final   • eGFR   Amer 07/17/2018 100  49 - 113 mL/min/1.73 Final   • Globulin 07/17/2018 3.5  2.3 - 3.5 gm/dL Final   • A/G Ratio 07/17/2018 1.2  1.1 - 1.8 g/dL Final   • BUN/Creatinine Ratio 07/17/2018 15.1  7.0 - 25.0 Final   • Anion Gap 07/17/2018 15.0  5.0 - 15.0 mmol/L Final       Physical Exam   Constitutional: He is oriented to person, place, and time. No distress.   Thin appearance   HENT:   Head: Normocephalic and atraumatic.   Right Ear: External ear normal.   Eyes: Pupils are equal, round, and reactive to light. Conjunctivae and EOM are normal. Right eye exhibits no discharge. Left eye exhibits no discharge. No scleral icterus.   Neck: Normal range of motion. Neck supple. No JVD present. No tracheal deviation present. No thyromegaly present.   Cardiovascular: Normal rate, regular rhythm and normal heart sounds.    Pulmonary/Chest: Effort normal. No stridor. No respiratory distress. He has no wheezes.   Abdominal: Soft. He exhibits no distension. There is no tenderness. There is no guarding.   Musculoskeletal: Normal range of motion. He exhibits no edema or deformity.   Lymphadenopathy:     He has no cervical adenopathy.   Neurological: He is alert and oriented to person, place, and time. No cranial nerve deficit. Coordination normal.   Skin: Skin is warm and dry. No rash noted. No erythema. No pallor.   Psychiatric: He has a normal mood and affect. His behavior is normal.   Nursing note and vitals reviewed.        Assessment/Plan   Problems Addressed this Visit        Cardiovascular and Mediastinum    Essential hypertension - Primary       Digestive    Gastroesophageal reflux disease with esophagitis    Vitamin D deficiency       Endocrine    Hypothyroidism       Nervous and Auditory    Epigastric pain       Other    History of throat cancer    Underweight      annual physical exam performed last visit   -has upcoming EGD and colonoscopy soon with Gastroenterologist  -refilled  medication today  -will get labwork including thyroid studies, CBC, cmp vitamin D and lipid panel  -history of throat cancer -Dr. Washington (ENT) and Dr. Orr (Radiation Oncologist) following. He recently was seen by Dr. Orr   - iron deficiency anemia/target cells/hemoglobin C - referred to Hematologist/oncologist for further evaluation. Consultation appreciated. Continue iron pill/ferrous sulfate 325 mg daily  Hematologist following - Pt advised to stop drinkin alcohol. Will get last office report from Dr. Mckeon office.  Recheck CBC   -vitamin D deficiency- ontinue vitamin Donce a week. Recheck vitamin D  -GERD -continue nexium for acid reflux. Gave samples today .  Pt advised to take nexium eparate from thyroid medication at least 30 minutes apart   - low magnesium/hypomangesemia- recheck magnesium levels. Continue magnesium oxide 400 mg PO q daily   - Hypothyroidism -recent thyroid levels not at goal.  Referred to Dr. Proctor/[facility] Ruffin and pt missed appt for further evaluation.Consultation appreciated  If still not at goal will adjust medication. Continue on synthroid 200  mcg daily for now.. Pt advised to call for an appt provided number today   - hyperlipidemia - Continue simvastatin and aspirin. Recheck lipid panel   --alcohol abuse - counseled on aclohol abuse - pt advised AA but he declined. Coiunseled pt to cut down but he does not want to quit drinking. Counseled for >5 minutes. Pt continues to drink. He does not want to quit drinking.  He continues to drink whiskey once a week  -elevated liver enzymes- recent US of liver was normal in February 2018.  Will refer to BAR[facility] Melara/Dr. Chester for further evaluation. Counseled to quit drinking alcohol   -underweight - gave information on high caloric today. Pt's weight stable   --hypertension - BP at goal today.  He is taking lisinopril-HCTZ 10-12.5 mg daily.   -Pt advised to bring BP medications on next visit     -recheck in 6 weeks   After pt  seees all Specialist    -advised pt to be safe and call with any questions or concerns all questions addressed today        This document has been electronically signed by Cristino He MD on October 4, 2018 10:43 AM

## 2018-10-04 ENCOUNTER — OFFICE VISIT (OUTPATIENT)
Dept: FAMILY MEDICINE CLINIC | Facility: CLINIC | Age: 58
End: 2018-10-04

## 2018-10-04 VITALS
HEIGHT: 65 IN | BODY MASS INDEX: 19.83 KG/M2 | DIASTOLIC BLOOD PRESSURE: 70 MMHG | WEIGHT: 119 LBS | SYSTOLIC BLOOD PRESSURE: 112 MMHG | HEART RATE: 85 BPM | OXYGEN SATURATION: 97 % | TEMPERATURE: 98.6 F

## 2018-10-04 DIAGNOSIS — R10.13 EPIGASTRIC PAIN: ICD-10-CM

## 2018-10-04 DIAGNOSIS — E55.9 VITAMIN D DEFICIENCY: ICD-10-CM

## 2018-10-04 DIAGNOSIS — R63.6 UNDERWEIGHT: ICD-10-CM

## 2018-10-04 DIAGNOSIS — E03.9 ACQUIRED HYPOTHYROIDISM: ICD-10-CM

## 2018-10-04 DIAGNOSIS — I10 ESSENTIAL HYPERTENSION: ICD-10-CM

## 2018-10-04 DIAGNOSIS — K21.00 GASTROESOPHAGEAL REFLUX DISEASE WITH ESOPHAGITIS: ICD-10-CM

## 2018-10-04 DIAGNOSIS — Z00.00 GENERAL MEDICAL EXAMINATION: Primary | ICD-10-CM

## 2018-10-04 DIAGNOSIS — E78.5 HYPERLIPIDEMIA, UNSPECIFIED HYPERLIPIDEMIA TYPE: ICD-10-CM

## 2018-10-04 DIAGNOSIS — Z85.819 HISTORY OF THROAT CANCER: ICD-10-CM

## 2018-10-04 PROCEDURE — 99214 OFFICE O/P EST MOD 30 MIN: CPT | Performed by: FAMILY MEDICINE

## 2018-10-04 RX ORDER — SIMVASTATIN 40 MG
40 TABLET ORAL EVERY EVENING
Qty: 30 TABLET | Refills: 3 | Status: SHIPPED | OUTPATIENT
Start: 2018-10-04 | End: 2018-11-15 | Stop reason: SDUPTHER

## 2018-10-04 RX ORDER — FERROUS SULFATE 325(65) MG
1 TABLET ORAL DAILY
Qty: 30 TABLET | Refills: 3 | Status: SHIPPED | OUTPATIENT
Start: 2018-10-04 | End: 2018-11-15 | Stop reason: SDUPTHER

## 2018-10-04 RX ORDER — TERAZOSIN 5 MG/1
5 CAPSULE ORAL NIGHTLY
Qty: 30 CAPSULE | Refills: 3 | Status: SHIPPED | OUTPATIENT
Start: 2018-10-04 | End: 2018-11-15 | Stop reason: SDUPTHER

## 2018-10-04 RX ORDER — LISINOPRIL AND HYDROCHLOROTHIAZIDE 12.5; 1 MG/1; MG/1
1 TABLET ORAL DAILY
Qty: 30 TABLET | Refills: 3 | Status: SHIPPED | OUTPATIENT
Start: 2018-10-04 | End: 2018-11-15 | Stop reason: SDUPTHER

## 2018-10-04 RX ORDER — LEVOTHYROXINE SODIUM 0.2 MG/1
200 TABLET ORAL DAILY
Qty: 30 TABLET | Refills: 3 | Status: SHIPPED | OUTPATIENT
Start: 2018-10-04 | End: 2018-11-15 | Stop reason: SDUPTHER

## 2018-10-04 RX ORDER — ERGOCALCIFEROL 1.25 MG/1
50000 CAPSULE ORAL
Qty: 4 CAPSULE | Refills: 3 | Status: SHIPPED | OUTPATIENT
Start: 2018-10-04 | End: 2018-11-15 | Stop reason: SDUPTHER

## 2018-10-04 RX ORDER — ASPIRIN 81 MG/1
81 TABLET ORAL DAILY
Qty: 30 TABLET | Refills: 3 | Status: SHIPPED | OUTPATIENT
Start: 2018-10-04 | End: 2018-11-15 | Stop reason: SDUPTHER

## 2018-10-04 RX ORDER — OMEPRAZOLE 40 MG/1
40 CAPSULE, DELAYED RELEASE ORAL DAILY
Qty: 30 CAPSULE | Refills: 3 | Status: SHIPPED | OUTPATIENT
Start: 2018-10-04 | End: 2018-11-15 | Stop reason: SDUPTHER

## 2018-10-16 ENCOUNTER — HOSPITAL ENCOUNTER (OUTPATIENT)
Facility: HOSPITAL | Age: 58
Setting detail: HOSPITAL OUTPATIENT SURGERY
Discharge: HOME | End: 2018-10-16
Attending: INTERNAL MEDICINE | Admitting: INTERNAL MEDICINE

## 2018-10-16 ENCOUNTER — ANESTHESIA (OUTPATIENT)
Dept: GASTROENTEROLOGY | Facility: HOSPITAL | Age: 58
End: 2018-10-16

## 2018-10-16 ENCOUNTER — ANESTHESIA EVENT (OUTPATIENT)
Dept: GASTROENTEROLOGY | Facility: HOSPITAL | Age: 58
End: 2018-10-16

## 2018-10-16 VITALS
BODY MASS INDEX: 19.66 KG/M2 | SYSTOLIC BLOOD PRESSURE: 140 MMHG | DIASTOLIC BLOOD PRESSURE: 87 MMHG | WEIGHT: 118 LBS | RESPIRATION RATE: 20 BRPM | TEMPERATURE: 96.4 F | HEART RATE: 98 BPM | OXYGEN SATURATION: 100 % | HEIGHT: 65 IN

## 2018-10-16 DIAGNOSIS — R10.13 EPIGASTRIC PAIN: ICD-10-CM

## 2018-10-16 PROCEDURE — 45378 DIAGNOSTIC COLONOSCOPY: CPT | Performed by: INTERNAL MEDICINE

## 2018-10-16 PROCEDURE — 88305 TISSUE EXAM BY PATHOLOGIST: CPT | Performed by: INTERNAL MEDICINE

## 2018-10-16 PROCEDURE — 25010000002 PROPOFOL 10 MG/ML EMULSION: Performed by: NURSE ANESTHETIST, CERTIFIED REGISTERED

## 2018-10-16 PROCEDURE — 88305 TISSUE EXAM BY PATHOLOGIST: CPT | Performed by: PATHOLOGY

## 2018-10-16 PROCEDURE — 25010000002 MIDAZOLAM PER 1 MG: Performed by: NURSE ANESTHETIST, CERTIFIED REGISTERED

## 2018-10-16 PROCEDURE — 43239 EGD BIOPSY SINGLE/MULTIPLE: CPT | Performed by: INTERNAL MEDICINE

## 2018-10-16 RX ORDER — MIDAZOLAM HYDROCHLORIDE 1 MG/ML
INJECTION INTRAMUSCULAR; INTRAVENOUS AS NEEDED
Status: DISCONTINUED | OUTPATIENT
Start: 2018-10-16 | End: 2018-10-16 | Stop reason: SURG

## 2018-10-16 RX ORDER — PROMETHAZINE HYDROCHLORIDE 25 MG/1
25 TABLET ORAL ONCE AS NEEDED
Status: DISCONTINUED | OUTPATIENT
Start: 2018-10-16 | End: 2018-10-16 | Stop reason: HOSPADM

## 2018-10-16 RX ORDER — MEPERIDINE HYDROCHLORIDE 50 MG/ML
12.5 INJECTION INTRAMUSCULAR; INTRAVENOUS; SUBCUTANEOUS
Status: DISCONTINUED | OUTPATIENT
Start: 2018-10-16 | End: 2018-10-16 | Stop reason: HOSPADM

## 2018-10-16 RX ORDER — PROMETHAZINE HYDROCHLORIDE 25 MG/ML
12.5 INJECTION, SOLUTION INTRAMUSCULAR; INTRAVENOUS ONCE AS NEEDED
Status: DISCONTINUED | OUTPATIENT
Start: 2018-10-16 | End: 2018-10-16 | Stop reason: HOSPADM

## 2018-10-16 RX ORDER — ONDANSETRON 2 MG/ML
4 INJECTION INTRAMUSCULAR; INTRAVENOUS ONCE AS NEEDED
Status: DISCONTINUED | OUTPATIENT
Start: 2018-10-16 | End: 2018-10-16 | Stop reason: HOSPADM

## 2018-10-16 RX ORDER — PROMETHAZINE HYDROCHLORIDE 25 MG/1
25 SUPPOSITORY RECTAL ONCE AS NEEDED
Status: DISCONTINUED | OUTPATIENT
Start: 2018-10-16 | End: 2018-10-16 | Stop reason: HOSPADM

## 2018-10-16 RX ORDER — DEXAMETHASONE SODIUM PHOSPHATE 4 MG/ML
8 INJECTION, SOLUTION INTRA-ARTICULAR; INTRALESIONAL; INTRAMUSCULAR; INTRAVENOUS; SOFT TISSUE ONCE AS NEEDED
Status: DISCONTINUED | OUTPATIENT
Start: 2018-10-16 | End: 2018-10-16 | Stop reason: HOSPADM

## 2018-10-16 RX ORDER — DEXTROSE AND SODIUM CHLORIDE 5; .45 G/100ML; G/100ML
30 INJECTION, SOLUTION INTRAVENOUS CONTINUOUS PRN
Status: DISCONTINUED | OUTPATIENT
Start: 2018-10-16 | End: 2018-10-16 | Stop reason: HOSPADM

## 2018-10-16 RX ORDER — PROPOFOL 10 MG/ML
VIAL (ML) INTRAVENOUS AS NEEDED
Status: DISCONTINUED | OUTPATIENT
Start: 2018-10-16 | End: 2018-10-16 | Stop reason: SURG

## 2018-10-16 RX ADMIN — PROPOFOL 50 MG: 10 INJECTION, EMULSION INTRAVENOUS at 14:51

## 2018-10-16 RX ADMIN — PROPOFOL 40 MG: 10 INJECTION, EMULSION INTRAVENOUS at 15:01

## 2018-10-16 RX ADMIN — MIDAZOLAM HYDROCHLORIDE 2 MG: 2 INJECTION, SOLUTION INTRAMUSCULAR; INTRAVENOUS at 14:45

## 2018-10-16 RX ADMIN — PROPOFOL 30 MG: 10 INJECTION, EMULSION INTRAVENOUS at 14:55

## 2018-10-16 RX ADMIN — DEXTROSE AND SODIUM CHLORIDE 30 ML/HR: 5; 450 INJECTION, SOLUTION INTRAVENOUS at 14:12

## 2018-10-16 NOTE — ANESTHESIA PREPROCEDURE EVALUATION
Anesthesia Evaluation     Patient summary reviewed and Nursing notes reviewed                Airway   Mallampati: II  Dental    (+) poor dentition        Pulmonary    (+) a smoker Former, COPD mild, decreased breath sounds,   Cardiovascular - normal exam    (+) hypertension well controlled less than 2 medications, hyperlipidemia,       Neuro/Psych  GI/Hepatic/Renal/Endo    (+)  GERD poorly controlled,  hypothyroidism,     Musculoskeletal     Abdominal  - normal exam   Substance History   (+) alcohol use,      OB/GYN          Other      history of cancer remission    ROS/Med Hx Other: Cancer pharnyx                  Anesthesia Plan    ASA 3     MAC     intravenous induction   Anesthetic plan, all risks, benefits, and alternatives have been provided, discussed and informed consent has been obtained with: patient.

## 2018-10-16 NOTE — ANESTHESIA POSTPROCEDURE EVALUATION
Patient: Emerson Bang    Procedure Summary     Date:  10/16/18 Room / Location:  [facility] ENDOSCOPY 3 / [facility] ENDOSCOPY    Anesthesia Start:  1446 Anesthesia Stop:  1507    Procedures:       ESOPHAGOGASTRODUODENOSCOPY (N/A )      COLONOSCOPY (N/A ) Diagnosis:       Epigastric pain      (Epigastric pain [R10.13])    Surgeon:  Kaushal Chester MD Provider:  Deb Sung CRNA    Anesthesia Type:  MAC ASA Status:  3          Anesthesia Type: MAC  Last vitals  BP   166/87 (10/16/18 1358)   Temp   97.2 °F (36.2 °C) (10/16/18 1358)   Pulse   80 (10/16/18 1358)   Resp   18 (10/16/18 1358)     SpO2   98 % (10/16/18 1358)     Post Anesthesia Care and Evaluation    Patient location during evaluation: bedside  Patient participation: complete - patient participated  Level of consciousness: sleepy but conscious  Pain score: 0  Pain management: adequate  Airway patency: patent  Anesthetic complications: No anesthetic complications  PONV Status: none  Cardiovascular status: acceptable  Respiratory status: acceptable  Hydration status: acceptable

## 2018-10-16 NOTE — H&P
Progress Notes  Encounter Date: 10/16/2018  Kaushal Chester MD   Gastroenterology   Expand All Collapse All    []Manual[]Template  []Copied  [facility]        Chief Complaint:       Chief Complaint   Patient presents with   • Alcohol Problem            Subjective         HPI:   Patient with history of EtOH abuse.  Patient continues to drink alcohol despite knowing the liver disease at has developed from this.  Patient has had diffuse epigastric abdominal pain and generalized abdominal pain.  Patient has marked elevation of liver function tests.  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      Plan; schedule patient for EGD and colonoscopy to evaluate for esophageal varices and colonic tumors.  Will have patient follow-up in GI office following these procedures.            Past Medical History:   Medical History        Past Medical History:   Diagnosis Date   • Alcohol abuse     • Allergic rhinitis       vs URI   • Anemia     • At risk for falls     • Benign prostatic hyperplasia     • Chronic gastritis     • Complication of gastrostomy (CMS/HCC)       site not healing   • COPD (chronic obstructive pulmonary disease) (CMS/HCC)     • Dysphagia       odynophagia   • Epigastric pain     • Esophagitis     • GERD (gastroesophageal reflux disease)     • Hypothyroidism, unspecified     • Low back pain     • Malaise and fatigue     • Nausea with vomiting, unspecified     • Pain in left knee     • Pain in right knee     • Primary malignant neoplasm of pharynx (CMS/HCC)     • Screening for malignant neoplasm of colon     • Vitamin D deficiency              Family History:        Family History   Problem Relation Age of Onset   • Coronary artery disease Mother     • Diabetes Mother     • Liver cancer Sister     • Heart disease Other           Social History:   reports that he has quit smoking. He has never used smokeless tobacco. He reports that he drinks alcohol. He reports  that he does not use drugs.     Medications:           Prior to Admission medications    Medication Sig Start Date End Date Taking? Authorizing Provider   aspirin (ASPIRIN LOW DOSE) 81 MG EC tablet Take 1 tablet by mouth Daily. 8/20/18   Yes Cristino He MD   D3-50 59988 units capsule TAKE ONE CAPSULE BY MOUTH ONCE A WEEK (EVERY  7  DAYS) 3/22/18   Yes Cristino He MD   ferrous sulfate 325 (65 FE) MG tablet Take 1 tablet by mouth Daily. 8/20/18   Yes Cristino He MD   levothyroxine (SYNTHROID) 200 MCG tablet Take 1 tablet by mouth Daily. 8/20/18   Yes Cristino He MD   lisinopril-hydrochlorothiazide (ZESTORETIC) 10-12.5 MG per tablet Take 1 tablet by mouth Daily. 8/20/18   Yes Cristino He MD   magnesium oxide (MAGOX) 400 (241.3 Mg) MG tablet tablet Take 2 tablets by mouth Daily. 8/20/18   Yes Cristino He MD   omeprazole (priLOSEC) 40 MG capsule Take 40 mg by mouth Daily.     Yes Esperanza Foote MD   simvastatin (ZOCOR) 40 MG tablet Take 1 tablet by mouth Every Evening. 8/20/18   Yes Cristino He MD   terazosin (HYTRIN) 5 MG capsule Take 1 capsule by mouth Every Night. 8/20/18   Yes Cristino He MD   vitamin D (ERGOCALCIFEROL) 51228 units capsule capsule Take 1 capsule by mouth Every 7 (Seven) Days. 8/20/18   Yes Cristino He MD   esomeprazole (NEXIUM 24HR) 20 MG capsule Take 1 capsule by mouth Every Morning Before Breakfast. 8/20/18     Cristino He MD   Multiple Vitamin (MULTI VITAMIN PO) Take  by mouth.       ProviderEsperanza MD   sodium-potassium-magnesium sulfates (SUPREP) 17.5-3.13-1.6 GM/180ML solution oral solution Take 1 bottle by mouth Every 12 (Twelve) Hours. 9/13/18     Kaushal Chester MD         Allergies:  Patient has no known allergies.     ROS:    Review of Systems   Constitutional: Negative for activity change, appetite change, chills, diaphoresis, fatigue, fever and unexpected weight change.   HENT: Negative for sore throat and trouble  "swallowing.    Respiratory: Negative for shortness of breath.    Gastrointestinal: Negative for abdominal distention, abdominal pain, anal bleeding, blood in stool, constipation, diarrhea, nausea, rectal pain and vomiting.   Musculoskeletal: Negative for arthralgias.   Skin: Negative for pallor.   Neurological: Negative for light-headedness.         Objective         Blood pressure 118/70, pulse 77, height 165.1 cm (65\"), weight 55.4 kg (122 lb 3.2 oz), SpO2 98 %.     Physical Exam   Constitutional: He is oriented to person, place, and time. He appears well-developed and well-nourished. No distress.   HENT:   Head: Normocephalic and atraumatic.   Cardiovascular: Normal rate, regular rhythm, normal heart sounds and intact distal pulses.  Exam reveals no gallop and no friction rub.    No murmur heard.  Pulmonary/Chest: Breath sounds normal. No respiratory distress. He has no wheezes. He has no rales. He exhibits no tenderness.   Abdominal: Soft. Bowel sounds are normal. He exhibits no distension and no mass. There is no tenderness. There is no rebound and no guarding. No hernia.   Musculoskeletal: Normal range of motion. He exhibits no edema.   Neurological: He is alert and oriented to person, place, and time.   Skin: Skin is warm and dry. No rash noted. He is not diaphoretic. No erythema. No pallor.   Psychiatric: He has a normal mood and affect. His behavior is normal. Judgment and thought content normal.            Assessment/Plan      Emerson was seen today for alcohol problem.     Diagnoses and all orders for this visit:     Epigastric pain  -     Case Request; Standing  -     dextrose 5 % and sodium chloride 0.45 % infusion; Infuse 30 mL/hr into a venous catheter Continuous As Needed (Start Prior to Procedure).  -     Case Request     Other orders  -     Follow Anesthesia Guidelines / Standing Orders; Future  -     Implement Anesthesia Orders Day of Procedure; Standing  -     Obtain Informed Consent; Standing  - "     sodium-potassium-magnesium sulfates (SUPREP) 17.5-3.13-1.6 GM/180ML solution oral solution; Take 1 bottle by mouth Every 12 (Twelve) Hours.           ESOPHAGOGASTRODUODENOSCOPY (N/A), COLONOSCOPY (N/A)       Diagnosis Plan   1. Epigastric pain  Case Request     dextrose 5 % and sodium chloride 0.45 % infusion     Case Request         Anticipated Surgical Procedure:        Orders Placed This Encounter   Procedures   • Follow Anesthesia Guidelines / Standing Orders       Standing Status:   Future         The risks, benefits, and alternatives of this procedure have been discussed with the patient or the responsible party- the patient understands and agrees to proceed.                                                                                                          Office Visit on 9/13/2018            Detailed Report

## 2018-10-22 LAB
LAB AP CASE REPORT: NORMAL
PATH REPORT.FINAL DX SPEC: NORMAL
PATH REPORT.GROSS SPEC: NORMAL

## 2018-11-01 ENCOUNTER — LAB (OUTPATIENT)
Dept: LAB | Facility: HOSPITAL | Age: 58
End: 2018-11-01

## 2018-11-01 ENCOUNTER — OFFICE VISIT (OUTPATIENT)
Dept: GASTROENTEROLOGY | Facility: CLINIC | Age: 58
End: 2018-11-01

## 2018-11-01 VITALS
SYSTOLIC BLOOD PRESSURE: 136 MMHG | HEART RATE: 89 BPM | BODY MASS INDEX: 19.89 KG/M2 | WEIGHT: 119.4 LBS | HEIGHT: 65 IN | DIASTOLIC BLOOD PRESSURE: 70 MMHG

## 2018-11-01 DIAGNOSIS — R79.89 ELEVATED LIVER FUNCTION TESTS: Primary | ICD-10-CM

## 2018-11-01 DIAGNOSIS — K74.60 CIRRHOSIS OF LIVER WITHOUT ASCITES, UNSPECIFIED HEPATIC CIRRHOSIS TYPE (HCC): ICD-10-CM

## 2018-11-01 DIAGNOSIS — F10.10 ETOH ABUSE: ICD-10-CM

## 2018-11-01 DIAGNOSIS — R79.89 ELEVATED LIVER FUNCTION TESTS: ICD-10-CM

## 2018-11-01 LAB
ALBUMIN SERPL-MCNC: 4.5 G/DL (ref 3.4–4.8)
ALBUMIN/GLOB SERPL: 1 G/DL (ref 1.1–1.8)
ALP SERPL-CCNC: 136 U/L (ref 38–126)
ALT SERPL W P-5'-P-CCNC: 32 U/L (ref 21–72)
ANION GAP SERPL CALCULATED.3IONS-SCNC: 10 MMOL/L (ref 5–15)
AST SERPL-CCNC: 50 U/L (ref 17–59)
BASOPHILS # BLD AUTO: 0.21 10*3/MM3 (ref 0–0.2)
BASOPHILS NFR BLD AUTO: 2.8 % (ref 0–2)
BILIRUB SERPL-MCNC: 0.4 MG/DL (ref 0.2–1.3)
BUN BLD-MCNC: 11 MG/DL (ref 7–21)
BUN/CREAT SERPL: 15.5 (ref 7–25)
CALCIUM SPEC-SCNC: 9.5 MG/DL (ref 8.4–10.2)
CHLORIDE SERPL-SCNC: 100 MMOL/L (ref 95–110)
CO2 SERPL-SCNC: 28 MMOL/L (ref 22–31)
CREAT BLD-MCNC: 0.71 MG/DL (ref 0.7–1.3)
DEPRECATED RDW RBC AUTO: 39.6 FL (ref 35.1–43.9)
EOSINOPHIL # BLD AUTO: 0.45 10*3/MM3 (ref 0–0.7)
EOSINOPHIL NFR BLD AUTO: 6.1 % (ref 0–7)
ERYTHROCYTE [DISTWIDTH] IN BLOOD BY AUTOMATED COUNT: 13.5 % (ref 11.5–14.5)
GFR SERPL CREATININE-BSD FRML MDRD: 137 ML/MIN/1.73 (ref 49–113)
GLOBULIN UR ELPH-MCNC: 4.4 GM/DL (ref 2.3–3.5)
GLUCOSE BLD-MCNC: 115 MG/DL (ref 60–100)
HCT VFR BLD AUTO: 32.8 % (ref 39–49)
HGB BLD-MCNC: 12.1 G/DL (ref 13.7–17.3)
IMM GRANULOCYTES # BLD: 0.01 10*3/MM3 (ref 0–0.02)
IMM GRANULOCYTES NFR BLD: 0.1 % (ref 0–0.5)
LYMPHOCYTES # BLD AUTO: 2.64 10*3/MM3 (ref 0.6–4.2)
LYMPHOCYTES NFR BLD AUTO: 35.7 % (ref 10–50)
MCH RBC QN AUTO: 29.6 PG (ref 26.5–34)
MCHC RBC AUTO-ENTMCNC: 36.9 G/DL (ref 31.5–36.3)
MCV RBC AUTO: 80.2 FL (ref 80–98)
MONOCYTES # BLD AUTO: 1.29 10*3/MM3 (ref 0–0.9)
MONOCYTES NFR BLD AUTO: 17.4 % (ref 0–12)
NEUTROPHILS # BLD AUTO: 2.8 10*3/MM3 (ref 2–8.6)
NEUTROPHILS NFR BLD AUTO: 37.9 % (ref 37–80)
NRBC BLD MANUAL-RTO: 0 /100 WBC (ref 0–0)
PLATELET # BLD AUTO: 336 10*3/MM3 (ref 150–450)
PMV BLD AUTO: 10.5 FL (ref 8–12)
POTASSIUM BLD-SCNC: 4 MMOL/L (ref 3.5–5.1)
PROT SERPL-MCNC: 8.9 G/DL (ref 6.3–8.6)
RBC # BLD AUTO: 4.09 10*6/MM3 (ref 4.37–5.74)
SODIUM BLD-SCNC: 138 MMOL/L (ref 137–145)
WBC NRBC COR # BLD: 7.4 10*3/MM3 (ref 3.2–9.8)

## 2018-11-01 PROCEDURE — 80053 COMPREHEN METABOLIC PANEL: CPT

## 2018-11-01 PROCEDURE — 99214 OFFICE O/P EST MOD 30 MIN: CPT | Performed by: INTERNAL MEDICINE

## 2018-11-01 PROCEDURE — 85025 COMPLETE CBC W/AUTO DIFF WBC: CPT

## 2018-11-01 PROCEDURE — 36415 COLL VENOUS BLD VENIPUNCTURE: CPT

## 2018-11-01 NOTE — PATIENT INSTRUCTIONS

## 2018-11-01 NOTE — PROGRESS NOTES
"[facility]      Chief Complaint:   Chief Complaint   Patient presents with   • Abdominal Pain   • EGD/Colonoscopy Performed 10/16/2018       Subjective     HPI:   Patient with elevated liver enzymes EtOH abuse and epigastric abdominal pain patient follow-up from EGD and colonoscopy.  EGD showed mild esophagitis and gastritis colonoscopy was normal.  Patient did not have repeat liver enzymes done nor did he have his ultrasound of the right upper quadrant.  Patient continues to drink alcohol large amounts.  Discussed with patient importance of stopping drinking as this will most likely kill him if he does not.  Patient's abdominal pain is most likely secondary to Changes Secondary to EtOH.    Plan; We'll Have Patient Follow-Up in 4 Weeks Will Have Patient Do Lab Work Including CMP and CBC and Ultrasound of the Right Upper Quadrant.    Past Medical History:   Past Medical History:   Diagnosis Date   • Alcohol abuse    • Allergic rhinitis     vs URI   • Anemia    • At risk for falls    • Benign prostatic hyperplasia    • Chronic gastritis    • Complication of gastrostomy (CMS/HCC)     site not healing   • COPD (chronic obstructive pulmonary disease) (CMS/HCC)    • Dysphagia     odynophagia   • Epigastric pain    • Esophagitis    • GERD (gastroesophageal reflux disease)    • Hypothyroidism, unspecified    • Low back pain    • Malaise and fatigue    • Nausea with vomiting, unspecified    • Pain in left knee    • Pain in right knee    • Primary malignant neoplasm of pharynx (CMS/HCC)    • Screening for malignant neoplasm of colon    • Vitamin D deficiency        Family History:  Family History   Problem Relation Age of Onset   • Coronary artery disease Mother    • Diabetes Mother    • Other Mother         \"Heart And Lung Problems\"   • Liver cancer Sister    • Heart disease Other    • Stroke Other    • Hypertension Other    • Diabetes Other    • Cancer Other    • Colon polyps Other    • Other Father " "        Unknown   • Other Other         \"Lung Problems\"       Social History:   reports that he has quit smoking. He has never used smokeless tobacco. He reports that he drinks alcohol. He reports that he does not use drugs.    Medications:   Prior to Admission medications    Medication Sig Start Date End Date Taking? Authorizing Provider   aspirin (ASPIRIN LOW DOSE) 81 MG EC tablet Take 1 tablet by mouth Daily. 10/4/18  Yes Cristino He MD   ferrous sulfate 325 (65 FE) MG tablet Take 1 tablet by mouth Daily. 10/4/18  Yes Cristino He MD   levothyroxine (SYNTHROID) 200 MCG tablet Take 1 tablet by mouth Daily. 10/4/18  Yes Cristino He MD   lisinopril-hydrochlorothiazide (ZESTORETIC) 10-12.5 MG per tablet Take 1 tablet by mouth Daily. 10/4/18  Yes Cristino He MD   magnesium oxide (MAGOX) 400 (241.3 Mg) MG tablet tablet Take 2 tablets by mouth Daily. 10/4/18  Yes Cristino He MD   Multiple Vitamin (MULTI VITAMIN PO) Take  by mouth.   Yes Esperanza Foote MD   omeprazole (priLOSEC) 40 MG capsule Take 1 capsule by mouth Daily. 10/4/18  Yes Cristino He MD   simvastatin (ZOCOR) 40 MG tablet Take 1 tablet by mouth Every Evening. 10/4/18  Yes Cristino He MD   terazosin (HYTRIN) 5 MG capsule Take 1 capsule by mouth Every Night. 10/4/18  Yes Cristino He MD   vitamin D (ERGOCALCIFEROL) 40216 units capsule capsule Take 1 capsule by mouth Every 7 (Seven) Days. 10/4/18  Yes Cristino He MD   D3-50 40642 units capsule TAKE ONE CAPSULE BY MOUTH ONCE A WEEK (EVERY  7  DAYS) 3/22/18 11/1/18  Cristino He MD       Allergies:  Patient has no known allergies.    ROS:    Review of Systems   HENT: Negative for congestion, sore throat and trouble swallowing.    Respiratory: Negative for apnea, cough, choking, chest tightness, shortness of breath, wheezing and stridor.    Cardiovascular: Negative for chest pain, palpitations and leg swelling.   Gastrointestinal: Positive for abdominal pain. " "Negative for abdominal distention, anal bleeding, blood in stool, constipation, diarrhea, nausea, rectal pain and vomiting.   Endocrine: Negative for polydipsia and polyphagia.   Musculoskeletal: Negative for arthralgias.   Skin: Negative for color change, pallor, rash and wound.   Allergic/Immunologic: Negative for food allergies.   Neurological: Negative for dizziness, syncope, weakness and headaches.   Psychiatric/Behavioral: Negative for agitation, behavioral problems, confusion and decreased concentration.     Objective     Blood pressure 136/70, pulse 89, height 165.1 cm (65\"), weight 54.2 kg (119 lb 6.4 oz).    Physical Exam   Constitutional: He is oriented to person, place, and time. He appears well-developed and well-nourished. No distress.   HENT:   Head: Normocephalic and atraumatic.   Cardiovascular: Normal rate, regular rhythm, normal heart sounds and intact distal pulses.  Exam reveals no gallop and no friction rub.    No murmur heard.  Pulmonary/Chest: Breath sounds normal. No respiratory distress. He has no wheezes. He has no rales. He exhibits no tenderness.   Abdominal: Soft. Bowel sounds are normal. He exhibits no distension and no mass. There is no tenderness. There is no rebound and no guarding. No hernia.   Musculoskeletal: Normal range of motion. He exhibits no edema.   Neurological: He is alert and oriented to person, place, and time.   Skin: Skin is warm and dry. No rash noted. He is not diaphoretic. No erythema. No pallor.   Psychiatric: He has a normal mood and affect. His behavior is normal. Judgment and thought content normal.        Assessment/Plan   Emerson was seen today for abdominal pain and egd/colonoscopy performed 10/16/2018.    Diagnoses and all orders for this visit:    Elevated liver function tests  -     US Liver; Future  -     Comprehensive Metabolic Panel; Future  -     CBC & Differential; Future    Cirrhosis of liver without ascites, unspecified hepatic cirrhosis type " (CMS/HCC)  -     US Liver; Future  -     Comprehensive Metabolic Panel; Future  -     CBC & Differential; Future    ETOH abuse  -     US Liver; Future  -     Comprehensive Metabolic Panel; Future  -     CBC & Differential; Future        * Surgery not found *     Diagnosis Plan   1. Elevated liver function tests  US Liver    Comprehensive Metabolic Panel    CBC & Differential   2. Cirrhosis of liver without ascites, unspecified hepatic cirrhosis type (CMS/HCC)  US Liver    Comprehensive Metabolic Panel    CBC & Differential   3. ETOH abuse  US Liver    Comprehensive Metabolic Panel    CBC & Differential       Anticipated Surgical Procedure:  Orders Placed This Encounter   Procedures   • US Liver     Standing Status:   Future     Standing Expiration Date:   11/1/2019     Order Specific Question:   Reason for Exam:     Answer:   liver dx   • Comprehensive Metabolic Panel     Standing Status:   Future     Standing Expiration Date:   11/1/2019   • CBC & Differential     Standing Status:   Future     Standing Expiration Date:   11/1/2019     Order Specific Question:   Manual Differential     Answer:   No       The risks, benefits, and alternatives of this procedure have been discussed with the patient or the responsible party- the patient understands and agrees to proceed.

## 2018-11-08 ENCOUNTER — HOSPITAL ENCOUNTER (OUTPATIENT)
Dept: ULTRASOUND IMAGING | Facility: HOSPITAL | Age: 58
Discharge: HOME | End: 2018-11-08
Admitting: INTERNAL MEDICINE

## 2018-11-08 DIAGNOSIS — K74.60 CIRRHOSIS OF LIVER WITHOUT ASCITES, UNSPECIFIED HEPATIC CIRRHOSIS TYPE (HCC): ICD-10-CM

## 2018-11-08 DIAGNOSIS — F10.10 ETOH ABUSE: ICD-10-CM

## 2018-11-08 DIAGNOSIS — R79.89 ELEVATED LIVER FUNCTION TESTS: ICD-10-CM

## 2018-11-08 PROCEDURE — 76705 ECHO EXAM OF ABDOMEN: CPT

## 2018-11-12 NOTE — PROGRESS NOTES
Subjective   Emerson Bang is a 58 y.o. male.   Problem List  1. 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 Hypothyroidism, unspecified   3. Alcohol abuse  4. 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 Vitamin D deficiency  6. GERD  7. Last colonoscopy was 3 years ago   8. Esophagitis/gastritis/internal and external hemorhoids.  9. Elevated AST level  10. Hypomagnesemia   11. Hyperlipidemia ASCVD risk >5%  12. Essential Hypertension   13. BPH  14. Gastritis      2/1/18 Pt is 56 yo AAM with the above medical issues. Is here for recheck.  Pt just got labwork done today and results are pending. Has history of hypothyroidism and is taking synthroid 175 mcg PO q daily.  Pt also has vitamin D deficiency and iron deficiency and is taking Vitamin D once a week along with iron pill daily.  Pt states he is doing well.  No issues with swallowing or pain on swallowing.  Pt has history of throat cancer and is seeing ENT along with Radiation Oncologist [place].  He continues to drink alcohol but has cut down to once a week. Pt has also gained 4 lbs since last visit and has seen dietician.  BP at goal today      Pt on last labwork had low Vitamin D along  With elevated TSH at 78.00. He is taking synthroid 200 mcg PO q daily T3 was at 2.4 and T4 was 0.68.  Pt also has anemia and last hga1c was 11.6.  Peripheral smear showed anemia borderline with numberous target cells. Magnesium levels were low at 1.4 and takes magnesium oxdie 400 mg daily Pt on hemoglobin electrophoresis has elevations of hemoglobin C. Pt does have some fatigue and dizziness intermittently but denies  Fever/nights or weight loss Last weight on pt was 124 lbs. He is 128 lbs today. Denies any swallowing issues.  Appetite is good     Continues to drink 2 times a week. Mainly beer. May drink 3 beers on each occasion. Does not want to quit drinking  or see AA     3/19/18 pt is here for followup. He recently saw Hematologist Dr. Mckeon this past Tuesday.  Pt does have history of target cells and Hemoglobin C on peripheral smear.  He did get labwork recently and pt may be  Considered to have bone marrow biopsy in the future.  He does have intermittent fatigue. And it is harder for him to get up in the morning.  He also continues to drink twice a week and may drink 4 beers on each occasion with hard liquor.  Pt recently saw ENT Dr. Washington for his history of cancer in pharynx and chronic laryngitis. He had flexible laryngoscopy  Pt is doing well and has next appt with Radiation Oncologist Dr. Orr in July. Weight today is 128.4 lbs. It is stable from last visit      5/10/18 pt is here for recheck and followup.. Pt recently had labwork done on 4/2018 that showed TSH being high at 21.70 and T4 at 0.89 and  T3 sy  2.1.  His synthroid was at 175 mcg PO q daily and it was increased to 200 mcg PO q daily.  He did not  new prescription of synthroid 200 mcg  PO  q daily. Pt was contacted and a voicemail was left but he did not call back. He continues to take the 175 mcg of synthroid   He also has nexium 20 mg PO q daily on medication list for GERD. Pt also has been referred to Hematologist for his history of anemia and hemoglobin C.  Anemia workup was recommended per Hematologist office note along with possible bone marrow biopsy. Do not have last office note from Hematologist. Per patient if anemia is stable no bone marrow biopsy was needed. Pt states he is doing overall well today. No issues. He has cut down on alcohol to 1/2 pint vodka      6/21/18 pt is here for followup and recheck.  He is currently on Vitamin D for deficiency. He takes nexium for GERD. Is on synthroid 200 mcg PO q daily  Will last TSH at 58.70 and  T4 at 0.42 and T3 at 1.4.  He also takes lisinpril 10-12.5 mg PO q daily. His dosage of synthroid was increased to 200 mcg PO q daily but he is  unsure of which dosage he is taking.  He was advised to not take it together with his PPI.   For hypertension. BP is at goal. For hyperilpidemia he is on aspirin and simvastatin. Last lipid panel showed cholesterol at 276 with HDL at 153 and LDL at 103.  He continues to drink alcohol and does not want to quit. He drinks a pint of whiskey about once a week. He has history of throat cancer and is currently seeing ENT and Radiation Oncologist.  He lost 7 lbs since last visit in May and today is 120 lbs     7/20/18 pt is here for followup and recheck. He has a history of thyroid cancer sp chemotherapy and radiation therapy  He recently has labwork that showed TSH was at  63 and T3 is at 1.8 and T4 at 0.80. On CMP glucose at 120 and sodium at 136 with ALT at 77 and AST at 131. He conitnues to have elevated liver enzymes and US in Februatry 2018 showed normal liver. He continues to drink alcohol. For hypertension he is taking lisionpri-HCTZ 10-12.5 mg daily. For hyperlipidemia he is on aspirin and simvastatin.  For iron deficiency anemia is he ion Ferous sulfate 325 mg PO q daily. For GERD he is on nexium 20 mg PO qhs and for his hypothyroidism he takes synthroid 200 mcg PO q daily. His BP is at goal. Also his weight is 121 lbs  And last visit it was 120. For low magnesium he takes magnesium oxide. He recently saw his Radiation Oncologist Dr. Orr this past Wednesday. He was checked and is doing well in regards to history of thyroid cancer. He denies any weakness or fatigue. He also continues to drink 1 pint of whiskey a week     8/20/18. Pt is here for followup. He missed his appt with Endocrinologist regarding his acquired hypothyroidism. He is on synthroid 200 mcg PO q daily. His TSH is high and T3 is low.    He also continues to drink 1/2 pint a week of whiskey he has a long history of alcohol abuse. He is needing refills on all his medicaiton. He continues to take BP medication and simvastatin for hyperlipidemia.  He also takes magnesium and vitamin D supplements.  BP is controlled today.  He is on nexium for GERD    10/4/18 pt is here for recheck and followup. He has history of acquired hypothryoidism and is on synthroid 200 mcg PO q daily He was referred to Endorcrinologist and missed appt on 8/9/18. He did see Gastroenterologist and has a EGD and colonboscopy pending for epigastric pain abnd GERD. He is on prilosec.  For hypertebnsiobn he takes lisinopril-HCTZ 10-12.5 mg Po q daily. He also takes aspirin and lipitor. He has a history of throat cancer and did have chemotherapy and radiation therapy. He has been seeing ENT and Radiation Oncologist regarding this. His weight is stable since last visit     11/15/18 pt is here for recheck and followup. He has history of noncompliance. He has not seen Endocrinology yet regarding his acquired hypothyroidism. His last TSH was 63.00 and T3 was low at 1.8 in July 2018.  He is currently on synthroid 200 mcg PO q daily. He has seen Gastroenterology for elevated liver enzymes.  He did have US of liver done on 11/1/18 that show no suspicious hepatic lesion or ductal dilation. Findings suggestive of small cholelith. No wall thickening or pericholecystic fluid collection. . Common bile duct borderline prominent at 6.4 mm. HE did have EGD on 10/16/18 that showed severe gastritis with normal esophagus . Colonoscopy on 10/16/18 showed internal/exeternal hemorrhoids with no specimens collected. Pt continues to take prilosec for GERD/gastritis and is on simvastatin for hyperlipdiemia. He is also on lisionpril-HCTZ for hypertension. He continues to drink alcohol about 1-2 times a week. He continues followup with Radiation Oncologist and ENT regarding his history of sqaumous cell carcinoma or right tonsil/larynx . His BP is elevated today. He is currently taking lisionpril-HCTZ 1 tablet daily.  He denies any chest pain or dizziness. He gained 2 lbs since last visit and weight today is 119 lbs  from 121 lbs. He has nasal congestion lately. No fever. No sinus tenderness.  Pt denies any chest pain or dizziness.       Anemia   Presents for follow-up visit. Symptoms include weight loss. There has been no abdominal pain, anorexia, bruising/bleeding easily, confusion, fever, leg swelling, light-headedness, malaise/fatigue, pallor, palpitations, paresthesias or pica. Signs of blood loss that are not present include hematemesis and hematochezia. There are no compliance problems.    Thyroid Problem   Presents for follow-up visit. Symptoms include anxiety, fatigue, hoarse voice and weight loss. Patient reports no cold intolerance, constipation, depressed mood, diaphoresis, diarrhea, dry skin, hair loss, heat intolerance, leg swelling, menstrual problem, palpitations, tremors, visual change or weight gain. The symptoms have been worsening.   Alcohol Problem   This is a chronic problem. The current episode started more than 1 month ago. The problem occurs constantly. The problem has been waxing and waning. Associated symptoms include congestion and fatigue. Pertinent negatives include no abdominal pain, anorexia, arthralgias, change in bowel habit, chest pain, chills, diaphoresis, fever, headaches, joint swelling, myalgias, neck pain, numbness, sore throat, swollen glands, urinary symptoms, vertigo, visual change, vomiting or weakness. Nothing aggravates the symptoms. He has tried nothing (synthroid ) for the symptoms. The treatment provided no relief.              The following portions of the patient's history were reviewed and updated as appropriate: allergies, current medications, past family history, past medical history, past social history, past surgical history and problem list.  Patient Active Problem List   Diagnosis   • Hyperkalemia   • Iron deficiency anemia   • Gastroesophageal reflux disease with esophagitis   • Elevated AST (SGOT)   • Alcohol abuse   • Hypothyroidism   • Balance problem   • Need for  vaccination   • Low magnesium levels   • Squamous cell carcinoma of pharynx (CMS/HCC)   • History of throat cancer   • Vitamin D deficiency   • Alcohol abuse counseling and surveillance   • Encounter for screening for diabetes mellitus   • Hypomagnesemia   • Anemia   • Hyperlipidemia   • Underweight due to inadequate caloric intake   • Need for immunization against influenza   • 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HCC)   • Annual physical exam   • Essential hypertension   • General medical examination   • Underweight   • Epigastric pain   • Gastritis without bleeding   • Need for influenza vaccination   • Nasal congestion     Current Outpatient Medications on File Prior to Visit   Medication Sig Dispense Refill   • Multiple Vitamin (MULTI VITAMIN PO) Take  by mouth.     • [DISCONTINUED] aspirin (ASPIRIN LOW DOSE) 81 MG EC tablet Take 1 tablet by mouth Daily. 30 tablet 3   • [DISCONTINUED] ferrous sulfate 325 (65 FE) MG tablet Take 1 tablet by mouth Daily. 30 tablet 3   • [DISCONTINUED] levothyroxine (SYNTHROID) 200 MCG tablet Take 1 tablet by mouth Daily. 30 tablet 3   • [DISCONTINUED] lisinopril-hydrochlorothiazide (ZESTORETIC) 10-12.5 MG per tablet Take 1 tablet by mouth Daily. 30 tablet 3   • [DISCONTINUED] magnesium oxide (MAGOX) 400 (241.3 Mg) MG tablet tablet Take 2 tablets by mouth Daily. 30 each 3   • [DISCONTINUED] omeprazole (priLOSEC) 40 MG capsule Take 1 capsule by mouth Daily. 30 capsule 3   • [DISCONTINUED] simvastatin (ZOCOR) 40 MG tablet Take 1 tablet by mouth Every Evening. 30 tablet 3   • [DISCONTINUED] terazosin (HYTRIN) 5 MG capsule Take 1 capsule by mouth Every Night. 30 capsule 3   • [DISCONTINUED] vitamin D (ERGOCALCIFEROL) 75757 units capsule capsule Take 1 capsule by mouth Every 7 (Seven) Days. 4 capsule 3     No current facility-administered medications on file prior to visit.      .  Review of Systems   Constitutional: Positive for fatigue and unexpected weight loss. Negative for chills, diaphoresis,  "fever, malaise/fatigue and unexpected weight gain.   HENT: Positive for congestion and hoarse voice. Negative for sore throat.    Cardiovascular: Negative for chest pain and palpitations.   Gastrointestinal: Negative for abdominal pain, anorexia, change in bowel habit, constipation, diarrhea, hematemesis, hematochezia and vomiting.   Endocrine: Negative for cold intolerance and heat intolerance.   Genitourinary: Negative for menstrual problem.   Musculoskeletal: Negative for arthralgias, joint swelling, myalgias and neck pain.   Skin: Negative for dry skin and pallor.   Neurological: Negative for vertigo, tremors, weakness, light-headedness, numbness, paresthesias and confusion.   Hematological: Does not bruise/bleed easily.   Psychiatric/Behavioral: Negative for depressed mood. The patient is nervous/anxious.        Objective    /92   Pulse 100   Temp 97.9 °F (36.6 °C)   Ht 165.1 cm (65\")   Wt 55.1 kg (121 lb 6.4 oz)   SpO2 100%   BMI 20.20 kg/m²       Lab on 11/01/2018   Component Date Value Ref Range Status   • Glucose 11/01/2018 115* 60 - 100 mg/dL Final   • BUN 11/01/2018 11  7 - 21 mg/dL Final   • Creatinine 11/01/2018 0.71  0.70 - 1.30 mg/dL Final   • Sodium 11/01/2018 138  137 - 145 mmol/L Final   • Potassium 11/01/2018 4.0  3.5 - 5.1 mmol/L Final   • Chloride 11/01/2018 100  95 - 110 mmol/L Final   • CO2 11/01/2018 28.0  22.0 - 31.0 mmol/L Final   • Calcium 11/01/2018 9.5  8.4 - 10.2 mg/dL Final   • Total Protein 11/01/2018 8.9* 6.3 - 8.6 g/dL Final   • Albumin 11/01/2018 4.50  3.40 - 4.80 g/dL Final   • ALT (SGPT) 11/01/2018 32  21 - 72 U/L Final   • AST (SGOT) 11/01/2018 50  17 - 59 U/L Final   • Alkaline Phosphatase 11/01/2018 136* 38 - 126 U/L Final   • Total Bilirubin 11/01/2018 0.4  0.2 - 1.3 mg/dL Final   • eGFR  African Amer 11/01/2018 137* 49 - 113 mL/min/1.73 Final   • Globulin 11/01/2018 4.4* 2.3 - 3.5 gm/dL Final   • A/G Ratio 11/01/2018 1.0* 1.1 - 1.8 g/dL Final   • BUN/Creatinine " Ratio 11/01/2018 15.5  7.0 - 25.0 Final   • Anion Gap 11/01/2018 10.0  5.0 - 15.0 mmol/L Final   • WBC 11/01/2018 7.40  3.20 - 9.80 10*3/mm3 Final   • RBC 11/01/2018 4.09* 4.37 - 5.74 10*6/mm3 Final   • Hemoglobin 11/01/2018 12.1* 13.7 - 17.3 g/dL Final   • Hematocrit 11/01/2018 32.8* 39.0 - 49.0 % Final   • MCV 11/01/2018 80.2  80.0 - 98.0 fL Final   • MCH 11/01/2018 29.6  26.5 - 34.0 pg Final   • MCHC 11/01/2018 36.9* 31.5 - 36.3 g/dL Final   • RDW 11/01/2018 13.5  11.5 - 14.5 % Final   • RDW-SD 11/01/2018 39.6  35.1 - 43.9 fl Final   • MPV 11/01/2018 10.5  8.0 - 12.0 fL Final   • Platelets 11/01/2018 336  150 - 450 10*3/mm3 Final   • Neutrophil % 11/01/2018 37.9  37.0 - 80.0 % Final   • Lymphocyte % 11/01/2018 35.7  10.0 - 50.0 % Final   • Monocyte % 11/01/2018 17.4* 0.0 - 12.0 % Final   • Eosinophil % 11/01/2018 6.1  0.0 - 7.0 % Final   • Basophil % 11/01/2018 2.8* 0.0 - 2.0 % Final   • Immature Grans % 11/01/2018 0.1  0.0 - 0.5 % Final   • Neutrophils, Absolute 11/01/2018 2.80  2.00 - 8.60 10*3/mm3 Final   • Lymphocytes, Absolute 11/01/2018 2.64  0.60 - 4.20 10*3/mm3 Final   • Monocytes, Absolute 11/01/2018 1.29* 0.00 - 0.90 10*3/mm3 Final   • Eosinophils, Absolute 11/01/2018 0.45  0.00 - 0.70 10*3/mm3 Final   • Basophils, Absolute 11/01/2018 0.21* 0.00 - 0.20 10*3/mm3 Final   • Immature Grans, Absolute 11/01/2018 0.01  0.00 - 0.02 10*3/mm3 Final   • nRBC 11/01/2018 0.0  0.0 - 0.0 /100 WBC Final       Physical Exam   Constitutional: He is oriented to person, place, and time. No distress.   Thin appearance   HENT:   Head: Normocephalic and atraumatic.   Right Ear: External ear normal.   Eyes: Conjunctivae and EOM are normal. Pupils are equal, round, and reactive to light. Right eye exhibits no discharge. Left eye exhibits no discharge. No scleral icterus.   Neck: Normal range of motion. Neck supple. No JVD present. No tracheal deviation present. No thyromegaly present.   Cardiovascular: Normal rate, regular  rhythm and normal heart sounds.   Pulmonary/Chest: Effort normal. No stridor. No respiratory distress. He has no wheezes.   Abdominal: Soft. He exhibits no distension. There is no tenderness. There is no guarding.   Musculoskeletal: Normal range of motion. He exhibits no edema or deformity.   Lymphadenopathy:     He has no cervical adenopathy.   Neurological: He is alert and oriented to person, place, and time. No cranial nerve deficit. Coordination normal.   Skin: Skin is warm and dry. No rash noted. No erythema. No pallor.   Psychiatric: He has a normal mood and affect. His behavior is normal.   Nursing note and vitals reviewed.        Assessment/Plan   Problems Addressed this Visit        Cardiovascular and Mediastinum    Hyperlipidemia    Relevant Medications    simvastatin (ZOCOR) 40 MG tablet    Essential hypertension    Relevant Medications    terazosin (HYTRIN) 5 MG capsule    lisinopril-hydrochlorothiazide (ZESTORETIC) 10-12.5 MG per tablet       Respiratory    Nasal congestion       Digestive    Gastroesophageal reflux disease with esophagitis    Relevant Medications    omeprazole (priLOSEC) 40 MG capsule    Vitamin D deficiency    Gastritis without bleeding    Relevant Medications    omeprazole (priLOSEC) 40 MG capsule       Endocrine    Hypothyroidism - Primary    Relevant Medications    levothyroxine (SYNTHROID) 200 MCG tablet       Hematopoietic and Hemostatic    Iron deficiency anemia    Relevant Medications    ferrous sulfate 325 (65 FE) MG tablet       Other    Alcohol abuse    Alcohol abuse counseling and surveillance    Underweight    Need for influenza vaccination    Relevant Orders    Flucelvax Quad=>4Years ([id number]) (Completed)      annual physical exam performed last visit   -reviewed EGD and colonoscopy today   -Awaiting labwork today   -refilled medication today  -influenza vaccination today  -will get labwork including thyroid studies, CBC, cmp vitamin D and lipid panel  -history of  throat cancer -Dr. Washington (ENT) and Dr. Orr (Radiation Oncologist) following. He recently was seen by Dr. Orr   - iron deficiency anemia/target cells/hemoglobin C - referred to Hematologist/oncologist for further evaluation. Consultation appreciated. Continue iron pill/ferrous sulfate 325 mg daily  Hematologist following - Pt advised to stop drinkin alcohol. Will get last office report from Dr. Mckeon office.  Recheck CBC   -vitamin D deficiency- ontinue vitamin Donce a week. Recheck vitamin D  -GERD -continue nexium for acid reflux. Gave samples today .  Pt advised to take nexium eparate from thyroid medication at least 30 minutes apart   - low magnesium/hypomangesemia- recheck magnesium levels. Continue magnesium oxide 400 mg PO q daily   - Hypothyroidism -recent thyroid levels not at goal.  Referred to Dr. Proctor/[facility] and pt missed appt for further evaluation.Consultation appreciated  If still not at goal will adjust medication. Continue on synthroid 200  mcg daily for now.. Pt advised to call for an appt provided number today. Number provided today.    - hyperlipidemia - Continue simvastatin and aspirin. Recheck lipid panel   --alcohol abuse - counseled on aclohol abuse - pt advised AA but he declined. Coiunseled pt to cut down but he does not want to quit drinking. Counseled for >5 minutes. Pt continues to drink. He does not want to quit drinking.  He continues to drink whiskey once a week. He has cut down on drinking due to liver issues. He has appt with Gastroenterology soon. He denies any abdominal pain   -for nasal congestion flonase PRN   -elevated liver enzymes- recent US of liver was normal in February 2018.  Will refer to [facility]/Dr. Chester for further evaluation. Counseled to quit drinking alcohol   -underweight - gave information on high caloric today. Pt's weight stable   --hypertension - BP not  at goal today.  He is taking lisinopril-HCTZ 10-12.5 mg daily.  He did not  take medicatoin for past 2 days. Refilled medicaiton. Advised pt to take medication again   -Pt advised to bring BP medications on next visit   -recheck in 6 weeks   After pt seees all Specialist    -advised pt to be safe and call with any questions or concerns all questions addressed today        This document has been electronically signed by Cristino He MD on November 15, 2018 10:15 AM

## 2018-11-15 ENCOUNTER — LAB (OUTPATIENT)
Dept: LAB | Facility: HOSPITAL | Age: 58
End: 2018-11-15

## 2018-11-15 ENCOUNTER — OFFICE VISIT (OUTPATIENT)
Dept: FAMILY MEDICINE CLINIC | Facility: CLINIC | Age: 58
End: 2018-11-15

## 2018-11-15 VITALS
OXYGEN SATURATION: 100 % | HEIGHT: 65 IN | HEART RATE: 100 BPM | DIASTOLIC BLOOD PRESSURE: 92 MMHG | BODY MASS INDEX: 20.23 KG/M2 | SYSTOLIC BLOOD PRESSURE: 138 MMHG | WEIGHT: 121.4 LBS | TEMPERATURE: 97.9 F

## 2018-11-15 DIAGNOSIS — E78.5 HYPERLIPIDEMIA, UNSPECIFIED HYPERLIPIDEMIA TYPE: ICD-10-CM

## 2018-11-15 DIAGNOSIS — D50.9 IRON DEFICIENCY ANEMIA, UNSPECIFIED IRON DEFICIENCY ANEMIA TYPE: ICD-10-CM

## 2018-11-15 DIAGNOSIS — F10.10 ALCOHOL ABUSE: ICD-10-CM

## 2018-11-15 DIAGNOSIS — I10 ESSENTIAL HYPERTENSION: ICD-10-CM

## 2018-11-15 DIAGNOSIS — K29.70 GASTRITIS WITHOUT BLEEDING, UNSPECIFIED CHRONICITY, UNSPECIFIED GASTRITIS TYPE: ICD-10-CM

## 2018-11-15 DIAGNOSIS — Z23 NEED FOR INFLUENZA VACCINATION: ICD-10-CM

## 2018-11-15 DIAGNOSIS — Z71.41 ALCOHOL ABUSE COUNSELING AND SURVEILLANCE: ICD-10-CM

## 2018-11-15 DIAGNOSIS — R09.81 NASAL CONGESTION: ICD-10-CM

## 2018-11-15 DIAGNOSIS — E03.9 ACQUIRED HYPOTHYROIDISM: ICD-10-CM

## 2018-11-15 DIAGNOSIS — K21.00 GASTROESOPHAGEAL REFLUX DISEASE WITH ESOPHAGITIS: ICD-10-CM

## 2018-11-15 DIAGNOSIS — E55.9 VITAMIN D DEFICIENCY: ICD-10-CM

## 2018-11-15 DIAGNOSIS — R63.6 UNDERWEIGHT: ICD-10-CM

## 2018-11-15 DIAGNOSIS — Z00.00 GENERAL MEDICAL EXAMINATION: ICD-10-CM

## 2018-11-15 DIAGNOSIS — E03.9 ACQUIRED HYPOTHYROIDISM: Primary | ICD-10-CM

## 2018-11-15 LAB
25(OH)D3 SERPL-MCNC: 28.9 NG/ML (ref 30–100)
ALBUMIN SERPL-MCNC: 4.5 G/DL (ref 3.4–4.8)
ALBUMIN/GLOB SERPL: 1.2 G/DL (ref 1.1–1.8)
ALP SERPL-CCNC: 120 U/L (ref 38–126)
ALT SERPL W P-5'-P-CCNC: 18 U/L (ref 21–72)
ANION GAP SERPL CALCULATED.3IONS-SCNC: 14 MMOL/L (ref 5–15)
ARTICHOKE IGE QN: 97 MG/DL (ref 1–129)
AST SERPL-CCNC: 28 U/L (ref 17–59)
BASOPHILS # BLD AUTO: 0.12 10*3/MM3 (ref 0–0.2)
BASOPHILS NFR BLD AUTO: 1.6 % (ref 0–2)
BILIRUB SERPL-MCNC: 0.4 MG/DL (ref 0.2–1.3)
BUN BLD-MCNC: 11 MG/DL (ref 7–21)
BUN/CREAT SERPL: 14.9 (ref 7–25)
CALCIUM SPEC-SCNC: 10.6 MG/DL (ref 8.4–10.2)
CHLORIDE SERPL-SCNC: 92 MMOL/L (ref 95–110)
CHOLEST SERPL-MCNC: 186 MG/DL (ref 0–199)
CO2 SERPL-SCNC: 29 MMOL/L (ref 22–31)
CREAT BLD-MCNC: 0.74 MG/DL (ref 0.7–1.3)
DEPRECATED RDW RBC AUTO: 38 FL (ref 35.1–43.9)
EOSINOPHIL # BLD AUTO: 0.52 10*3/MM3 (ref 0–0.7)
EOSINOPHIL NFR BLD AUTO: 7.1 % (ref 0–7)
ERYTHROCYTE [DISTWIDTH] IN BLOOD BY AUTOMATED COUNT: 13 % (ref 11.5–14.5)
GFR SERPL CREATININE-BSD FRML MDRD: 131 ML/MIN/1.73 (ref 49–113)
GLOBULIN UR ELPH-MCNC: 3.8 GM/DL (ref 2.3–3.5)
GLUCOSE BLD-MCNC: 149 MG/DL (ref 60–100)
HBA1C MFR BLD: 5.4 % (ref 4–5.6)
HCT VFR BLD AUTO: 35.6 % (ref 39–49)
HDLC SERPL-MCNC: 78 MG/DL (ref 60–200)
HGB BLD-MCNC: 12.8 G/DL (ref 13.7–17.3)
IMM GRANULOCYTES # BLD: 0.01 10*3/MM3 (ref 0–0.02)
IMM GRANULOCYTES NFR BLD: 0.1 % (ref 0–0.5)
LDLC/HDLC SERPL: 1.16 {RATIO} (ref 0–3.55)
LYMPHOCYTES # BLD AUTO: 2.56 10*3/MM3 (ref 0.6–4.2)
LYMPHOCYTES NFR BLD AUTO: 35.1 % (ref 10–50)
MCH RBC QN AUTO: 29 PG (ref 26.5–34)
MCHC RBC AUTO-ENTMCNC: 36 G/DL (ref 31.5–36.3)
MCV RBC AUTO: 80.5 FL (ref 80–98)
MONOCYTES # BLD AUTO: 1.34 10*3/MM3 (ref 0–0.9)
MONOCYTES NFR BLD AUTO: 18.4 % (ref 0–12)
NEUTROPHILS # BLD AUTO: 2.74 10*3/MM3 (ref 2–8.6)
NEUTROPHILS NFR BLD AUTO: 37.7 % (ref 37–80)
PLATELET # BLD AUTO: 371 10*3/MM3 (ref 150–450)
PMV BLD AUTO: 12.2 FL (ref 8–12)
POTASSIUM BLD-SCNC: 4.5 MMOL/L (ref 3.5–5.1)
PROT SERPL-MCNC: 8.3 G/DL (ref 6.3–8.6)
RBC # BLD AUTO: 4.42 10*6/MM3 (ref 4.37–5.74)
SODIUM BLD-SCNC: 135 MMOL/L (ref 137–145)
T4 FREE SERPL-MCNC: 1.35 NG/DL (ref 0.78–2.19)
TRIGL SERPL-MCNC: 87 MG/DL (ref 20–199)
TSH SERPL DL<=0.05 MIU/L-ACNC: 0.08 MIU/ML (ref 0.46–4.68)
WBC NRBC COR # BLD: 7.29 10*3/MM3 (ref 3.2–9.8)

## 2018-11-15 PROCEDURE — 85025 COMPLETE CBC W/AUTO DIFF WBC: CPT

## 2018-11-15 PROCEDURE — 90471 IMMUNIZATION ADMIN: CPT | Performed by: FAMILY MEDICINE

## 2018-11-15 PROCEDURE — 99214 OFFICE O/P EST MOD 30 MIN: CPT | Performed by: FAMILY MEDICINE

## 2018-11-15 PROCEDURE — 84481 FREE ASSAY (FT-3): CPT

## 2018-11-15 PROCEDURE — 80053 COMPREHEN METABOLIC PANEL: CPT

## 2018-11-15 PROCEDURE — 83036 HEMOGLOBIN GLYCOSYLATED A1C: CPT

## 2018-11-15 PROCEDURE — 84443 ASSAY THYROID STIM HORMONE: CPT

## 2018-11-15 PROCEDURE — 80061 LIPID PANEL: CPT

## 2018-11-15 PROCEDURE — 84439 ASSAY OF FREE THYROXINE: CPT

## 2018-11-15 PROCEDURE — 82306 VITAMIN D 25 HYDROXY: CPT

## 2018-11-15 PROCEDURE — 90686 IIV4 VACC NO PRSV 0.5 ML IM: CPT | Performed by: FAMILY MEDICINE

## 2018-11-15 RX ORDER — ERGOCALCIFEROL 1.25 MG/1
50000 CAPSULE ORAL
Qty: 4 CAPSULE | Refills: 6 | Status: SHIPPED | OUTPATIENT
Start: 2018-11-15 | End: 2018-12-04 | Stop reason: SDUPTHER

## 2018-11-15 RX ORDER — ASPIRIN 81 MG/1
81 TABLET ORAL DAILY
Qty: 30 TABLET | Refills: 6 | Status: SHIPPED | OUTPATIENT
Start: 2018-11-15 | End: 2019-08-16 | Stop reason: SDUPTHER

## 2018-11-15 RX ORDER — SIMVASTATIN 40 MG
40 TABLET ORAL EVERY EVENING
Qty: 30 TABLET | Refills: 6 | Status: SHIPPED | OUTPATIENT
Start: 2018-11-15 | End: 2019-04-24 | Stop reason: HOSPADM

## 2018-11-15 RX ORDER — LEVOTHYROXINE SODIUM 0.2 MG/1
200 TABLET ORAL DAILY
Qty: 30 TABLET | Refills: 6 | Status: SHIPPED | OUTPATIENT
Start: 2018-11-15 | End: 2019-01-15 | Stop reason: DRUGHIGH

## 2018-11-15 RX ORDER — FERROUS SULFATE 325(65) MG
1 TABLET ORAL DAILY
Qty: 30 TABLET | Refills: 6 | Status: SHIPPED | OUTPATIENT
Start: 2018-11-15 | End: 2019-04-24 | Stop reason: HOSPADM

## 2018-11-15 RX ORDER — TERAZOSIN 5 MG/1
5 CAPSULE ORAL NIGHTLY
Qty: 30 CAPSULE | Refills: 6 | Status: SHIPPED | OUTPATIENT
Start: 2018-11-15 | End: 2019-04-24 | Stop reason: HOSPADM

## 2018-11-15 RX ORDER — LISINOPRIL AND HYDROCHLOROTHIAZIDE 12.5; 1 MG/1; MG/1
1 TABLET ORAL DAILY
Qty: 30 TABLET | Refills: 6 | Status: SHIPPED | OUTPATIENT
Start: 2018-11-15 | End: 2019-04-24 | Stop reason: HOSPADM

## 2018-11-15 RX ORDER — OMEPRAZOLE 40 MG/1
40 CAPSULE, DELAYED RELEASE ORAL DAILY
Qty: 30 CAPSULE | Refills: 6 | Status: SHIPPED | OUTPATIENT
Start: 2018-11-15 | End: 2019-04-24 | Stop reason: HOSPADM

## 2018-11-15 RX ORDER — FLUTICASONE PROPIONATE 50 MCG
2 SPRAY, SUSPENSION (ML) NASAL EVERY OTHER DAY
Qty: 1 BOTTLE | Refills: 3 | Status: SHIPPED | OUTPATIENT
Start: 2018-11-15 | End: 2019-04-24 | Stop reason: HOSPADM

## 2018-11-16 LAB — T3FREE SERPL-MCNC: 3.5 PG/ML (ref 2–4.4)

## 2018-11-20 ENCOUNTER — OFFICE VISIT (OUTPATIENT)
Dept: GASTROENTEROLOGY | Facility: CLINIC | Age: 58
End: 2018-11-20

## 2018-11-20 VITALS
HEART RATE: 91 BPM | BODY MASS INDEX: 20.12 KG/M2 | HEIGHT: 65 IN | WEIGHT: 120.8 LBS | SYSTOLIC BLOOD PRESSURE: 134 MMHG | DIASTOLIC BLOOD PRESSURE: 60 MMHG

## 2018-11-20 DIAGNOSIS — K74.60 CIRRHOSIS OF LIVER WITHOUT ASCITES, UNSPECIFIED HEPATIC CIRRHOSIS TYPE (HCC): ICD-10-CM

## 2018-11-20 DIAGNOSIS — R79.89 ELEVATED LIVER FUNCTION TESTS: Primary | ICD-10-CM

## 2018-11-20 PROCEDURE — 99213 OFFICE O/P EST LOW 20 MIN: CPT | Performed by: INTERNAL MEDICINE

## 2018-11-20 NOTE — PATIENT INSTRUCTIONS

## 2018-11-20 NOTE — PROGRESS NOTES
"[facility]      Chief Complaint:   Chief Complaint   Patient presents with   • Elevated Hepatic Enzymes       Subjective     HPI:   Patient with cirrhosis of the liver secondary to EtOH abuse.  Patient has decreased his alcohol intake significantly to 2 beers a day and a pint of vodka per week.  This is a dramatic decrease in what he has previously been drinking.  Patient is also improved his eating over the past few weeks.  Stating that he is eating more food and healthier food.    Plan; we will have patient follow-up in 3 months.  Discussed patient the importance of decreasing the amount of alcohol he drinks significantly by that time.    Past Medical History:   Past Medical History:   Diagnosis Date   • Alcohol abuse    • Allergic rhinitis     vs URI   • Anemia    • At risk for falls    • Benign prostatic hyperplasia    • Chronic gastritis    • Complication of gastrostomy (CMS/HCC)     site not healing   • COPD (chronic obstructive pulmonary disease) (CMS/HCC)    • Dysphagia     odynophagia   • Epigastric pain    • Esophagitis    • GERD (gastroesophageal reflux disease)    • Hypothyroidism, unspecified    • Low back pain    • Malaise and fatigue    • Nausea with vomiting, unspecified    • Pain in left knee    • Pain in right knee    • Primary malignant neoplasm of pharynx (CMS/HCC)    • Screening for malignant neoplasm of colon    • Vitamin D deficiency        Family History:  Family History   Problem Relation Age of Onset   • Coronary artery disease Mother    • Diabetes Mother    • Other Mother         \"Heart And Lung Problems\"   • Liver cancer Sister    • Heart disease Other    • Stroke Other    • Hypertension Other    • Diabetes Other    • Cancer Other    • Colon polyps Other    • Other Father         Unknown   • Other Other         \"Lung Problems\"       Social History:   reports that he has quit smoking. he has never used smokeless tobacco. He reports that he drinks alcohol. He " reports that he does not use drugs.    Medications:   Prior to Admission medications    Medication Sig Start Date End Date Taking? Authorizing Provider   aspirin (ASPIRIN LOW DOSE) 81 MG EC tablet Take 1 tablet by mouth Daily. 11/15/18  Yes Cristino He MD   ferrous sulfate 325 (65 FE) MG tablet Take 1 tablet by mouth Daily. 11/15/18  Yes Cristino He MD   fluticasone (FLONASE) 50 MCG/ACT nasal spray 2 sprays into the nostril(s) as directed by provider Every Other Day. 11/15/18  Yes Cristino He MD   levothyroxine (SYNTHROID) 200 MCG tablet Take 1 tablet by mouth Daily. 11/15/18  Yes Cristino He MD   lisinopril-hydrochlorothiazide (ZESTORETIC) 10-12.5 MG per tablet Take 1 tablet by mouth Daily. 11/15/18  Yes Cristino He MD   magnesium oxide (MAGOX) 400 (241.3 Mg) MG tablet tablet Take 2 tablets by mouth Daily. 11/15/18  Yes Cristino He MD   Multiple Vitamin (MULTI VITAMIN PO) Take  by mouth.   Yes Esperanza Foote MD   omeprazole (priLOSEC) 40 MG capsule Take 1 capsule by mouth Daily. 11/15/18  Yes Cristino He MD   simvastatin (ZOCOR) 40 MG tablet Take 1 tablet by mouth Every Evening. 11/15/18  Yes Cristino He MD   terazosin (HYTRIN) 5 MG capsule Take 1 capsule by mouth Every Night. 11/15/18  Yes Cristino He MD   vitamin D (ERGOCALCIFEROL) 94509 units capsule capsule Take 1 capsule by mouth Every 7 (Seven) Days. 11/15/18  Yes Cristino He MD       Allergies:  Patient has no known allergies.    ROS:    Review of Systems   Constitutional: Negative for activity change, appetite change and unexpected weight change.   HENT: Negative for congestion, sore throat and trouble swallowing.    Respiratory: Negative for cough, choking and shortness of breath.    Cardiovascular: Negative for chest pain.   Gastrointestinal: Negative for abdominal distention, abdominal pain, anal bleeding, blood in stool, constipation, diarrhea, nausea, rectal pain and vomiting.   Endocrine:  "Negative for heat intolerance, polydipsia and polyphagia.   Genitourinary: Negative for difficulty urinating.   Musculoskeletal: Negative for arthralgias.   Skin: Negative for color change, pallor, rash and wound.   Allergic/Immunologic: Negative for food allergies.   Neurological: Negative for dizziness, syncope, weakness and headaches.   Psychiatric/Behavioral: Negative for agitation, behavioral problems, confusion and decreased concentration.     Objective     Blood pressure 134/60, pulse 91, height 165.1 cm (65\"), weight 54.8 kg (120 lb 12.8 oz).    Physical Exam   Constitutional: He is oriented to person, place, and time. He appears well-developed and well-nourished. No distress.   HENT:   Head: Normocephalic and atraumatic.   Cardiovascular: Normal rate, regular rhythm, normal heart sounds and intact distal pulses. Exam reveals no gallop and no friction rub.   No murmur heard.  Pulmonary/Chest: Breath sounds normal. No respiratory distress. He has no wheezes. He has no rales. He exhibits no tenderness.   Abdominal: Soft. Bowel sounds are normal. He exhibits no distension and no mass. There is no tenderness. There is no rebound and no guarding. No hernia.   Musculoskeletal: Normal range of motion. He exhibits no edema.   Neurological: He is alert and oriented to person, place, and time.   Skin: Skin is warm and dry. No rash noted. He is not diaphoretic. No erythema. No pallor.   Psychiatric: He has a normal mood and affect. His behavior is normal. Judgment and thought content normal.        Assessment/Plan   Emerson was seen today for elevated hepatic enzymes.    Diagnoses and all orders for this visit:    Elevated liver function tests    Cirrhosis of liver without ascites, unspecified hepatic cirrhosis type (CMS/HCC)        * Surgery not found *     Diagnosis Plan   1. Elevated liver function tests     2. Cirrhosis of liver without ascites, unspecified hepatic cirrhosis type (CMS/HCC)         Anticipated " Surgical Procedure:  No orders of the defined types were placed in this encounter.      The risks, benefits, and alternatives of this procedure have been discussed with the patient or the responsible party- the patient understands and agrees to proceed.

## 2018-12-04 RX ORDER — ERGOCALCIFEROL 1.25 MG/1
CAPSULE ORAL
Qty: 8 CAPSULE | Refills: 6 | Status: SHIPPED | OUTPATIENT
Start: 2018-12-04 | End: 2019-04-24 | Stop reason: HOSPADM

## 2019-01-11 ENCOUNTER — OFFICE VISIT (OUTPATIENT)
Dept: ENDOCRINOLOGY | Facility: CLINIC | Age: 59
End: 2019-01-11

## 2019-01-11 ENCOUNTER — APPOINTMENT (OUTPATIENT)
Dept: LAB | Facility: HOSPITAL | Age: 59
End: 2019-01-11

## 2019-01-11 VITALS
DIASTOLIC BLOOD PRESSURE: 78 MMHG | HEART RATE: 86 BPM | WEIGHT: 122 LBS | BODY MASS INDEX: 20.33 KG/M2 | SYSTOLIC BLOOD PRESSURE: 124 MMHG | HEIGHT: 65 IN

## 2019-01-11 DIAGNOSIS — K90.9 INTESTINAL MALABSORPTION, UNSPECIFIED TYPE: ICD-10-CM

## 2019-01-11 DIAGNOSIS — E03.9 ACQUIRED HYPOTHYROIDISM: Primary | ICD-10-CM

## 2019-01-11 DIAGNOSIS — E53.8 B12 DEFICIENCY: ICD-10-CM

## 2019-01-11 LAB
25(OH)D3 SERPL-MCNC: 34.8 NG/ML (ref 30–100)
ALBUMIN SERPL-MCNC: 4.7 G/DL (ref 3.4–4.8)
ALBUMIN/GLOB SERPL: 1.2 G/DL (ref 1.1–1.8)
ALP SERPL-CCNC: 154 U/L (ref 38–126)
ALT SERPL W P-5'-P-CCNC: 10 U/L (ref 21–72)
ANION GAP SERPL CALCULATED.3IONS-SCNC: 12 MMOL/L (ref 5–15)
AST SERPL-CCNC: 43 U/L (ref 17–59)
BASOPHILS # BLD AUTO: 0.13 10*3/MM3 (ref 0–0.2)
BASOPHILS NFR BLD AUTO: 1.7 % (ref 0–2)
BILIRUB SERPL-MCNC: 0.2 MG/DL (ref 0.2–1.3)
BUN BLD-MCNC: 11 MG/DL (ref 7–21)
BUN/CREAT SERPL: 13.8 (ref 7–25)
CALCIUM SPEC-SCNC: 9.9 MG/DL (ref 8.4–10.2)
CHLORIDE SERPL-SCNC: 91 MMOL/L (ref 95–110)
CO2 SERPL-SCNC: 29 MMOL/L (ref 22–31)
CREAT BLD-MCNC: 0.8 MG/DL (ref 0.7–1.3)
DEPRECATED RDW RBC AUTO: 36.2 FL (ref 35.1–43.9)
EOSINOPHIL # BLD AUTO: 0.22 10*3/MM3 (ref 0–0.7)
EOSINOPHIL NFR BLD AUTO: 2.9 % (ref 0–7)
ERYTHROCYTE [DISTWIDTH] IN BLOOD BY AUTOMATED COUNT: 13.3 % (ref 11.5–14.5)
GFR SERPL CREATININE-BSD FRML MDRD: 120 ML/MIN/1.73 (ref 49–113)
GLOBULIN UR ELPH-MCNC: 4 GM/DL (ref 2.3–3.5)
GLUCOSE BLD-MCNC: 138 MG/DL (ref 60–100)
HCT VFR BLD AUTO: 33.9 % (ref 39–49)
HGB BLD-MCNC: 12.3 G/DL (ref 13.7–17.3)
IMM GRANULOCYTES # BLD AUTO: 0.02 10*3/MM3 (ref 0–0.02)
IMM GRANULOCYTES NFR BLD AUTO: 0.3 % (ref 0–0.5)
LYMPHOCYTES # BLD AUTO: 2.75 10*3/MM3 (ref 0.6–4.2)
LYMPHOCYTES NFR BLD AUTO: 36.4 % (ref 10–50)
MCH RBC QN AUTO: 27.4 PG (ref 26.5–34)
MCHC RBC AUTO-ENTMCNC: 36.3 G/DL (ref 31.5–36.3)
MCV RBC AUTO: 75.5 FL (ref 80–98)
MONOCYTES # BLD AUTO: 1.27 10*3/MM3 (ref 0–0.9)
MONOCYTES NFR BLD AUTO: 16.8 % (ref 0–12)
NEUTROPHILS # BLD AUTO: 3.16 10*3/MM3 (ref 2–8.6)
NEUTROPHILS NFR BLD AUTO: 41.9 % (ref 37–80)
PLATELET # BLD AUTO: 417 10*3/MM3 (ref 150–450)
PMV BLD AUTO: 11.3 FL (ref 8–12)
POTASSIUM BLD-SCNC: 3.6 MMOL/L (ref 3.5–5.1)
PROT SERPL-MCNC: 8.7 G/DL (ref 6.3–8.6)
RBC # BLD AUTO: 4.49 10*6/MM3 (ref 4.37–5.74)
SODIUM BLD-SCNC: 132 MMOL/L (ref 137–145)
TSH SERPL DL<=0.05 MIU/L-ACNC: <0.02 MIU/ML (ref 0.46–4.68)
VIT B12 BLD-MCNC: 659 PG/ML (ref 239–931)
WBC NRBC COR # BLD: 7.55 10*3/MM3 (ref 3.2–9.8)

## 2019-01-11 PROCEDURE — 80053 COMPREHEN METABOLIC PANEL: CPT | Performed by: NURSE PRACTITIONER

## 2019-01-11 PROCEDURE — 82306 VITAMIN D 25 HYDROXY: CPT | Performed by: NURSE PRACTITIONER

## 2019-01-11 PROCEDURE — 83516 IMMUNOASSAY NONANTIBODY: CPT | Performed by: NURSE PRACTITIONER

## 2019-01-11 PROCEDURE — 85025 COMPLETE CBC W/AUTO DIFF WBC: CPT | Performed by: NURSE PRACTITIONER

## 2019-01-11 PROCEDURE — 99244 OFF/OP CNSLTJ NEW/EST MOD 40: CPT | Performed by: NURSE PRACTITIONER

## 2019-01-11 PROCEDURE — 82784 ASSAY IGA/IGD/IGG/IGM EACH: CPT | Performed by: NURSE PRACTITIONER

## 2019-01-11 PROCEDURE — 86255 FLUORESCENT ANTIBODY SCREEN: CPT | Performed by: NURSE PRACTITIONER

## 2019-01-11 PROCEDURE — 82607 VITAMIN B-12: CPT | Performed by: NURSE PRACTITIONER

## 2019-01-11 PROCEDURE — 36415 COLL VENOUS BLD VENIPUNCTURE: CPT | Performed by: NURSE PRACTITIONER

## 2019-01-11 PROCEDURE — 84443 ASSAY THYROID STIM HORMONE: CPT | Performed by: NURSE PRACTITIONER

## 2019-01-11 NOTE — PROGRESS NOTES
Referring provider Cristino He MD     History       58 year old male presents for consultation    Reason - hypothyroidism not controlled       Duration  - since 2015    Timing - constant     Quality - not controlled     Severity - moderate     Context - routine blood work by primary revealed hypothyroidism     Location/size - no ultrasound     Quantity -     Lab Results   Component Value Date    TSH 0.080 (L) 11/15/2018         Symptoms - fatigue     Alleviating Factors - compliance    Aggravating Factors - taking 30 minutes after meal      Past Medical History:   Diagnosis Date   • Alcohol abuse    • Allergic rhinitis     vs URI   • Anemia    • At risk for falls    • Benign prostatic hyperplasia    • Chronic gastritis    • Complication of gastrostomy (CMS/HCC)     site not healing   • COPD (chronic obstructive pulmonary disease) (CMS/HCC)    • Dysphagia     odynophagia   • Epigastric pain    • Esophagitis    • GERD (gastroesophageal reflux disease)    • Hypothyroidism, unspecified    • Low back pain    • Malaise and fatigue    • Nausea with vomiting, unspecified    • Pain in left knee    • Pain in right knee    • Primary malignant neoplasm of pharynx (CMS/HCC)    • Screening for malignant neoplasm of colon    • Vitamin D deficiency      Past Surgical History:   Procedure Laterality Date   • ABDOMINAL WALL SURGERY  11/04/2008    Laparotomy with repair of stomach wall perforation. 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 Left upper quadrant abdominal wall abscess debridement. Gastrostomy tube erosion through & through the anterior gastric wall.Lupper quadrant abdominal wall abscess   • COLONOSCOPY  04/21/2014    Internal & external hemorrhoids found.   • COLONOSCOPY  2014    [place]   • COLONOSCOPY N/A 10/16/2018    Procedure: COLONOSCOPY;  Surgeon: Kaushal Chester MD;  Location: [facility] ENDOSCOPY;  Service: Gastroenterology   • ENDOSCOPY N/A 10/16/2018    Procedure: ESOPHAGOGASTRODUODENOSCOPY;  Surgeon: Nemo  "Kaushal LÓPEZ MD;  Location: [facility] ENDOSCOPY;  Service: Gastroenterology   • TRACHEOSTOMY  11/10/2008    Respiratory failure   • UPPER GASTROINTESTINAL ENDOSCOPY  04/21/2014    Esophagitis seen. Biopsy taken. Gastritis in stomach. Biopsy taken. Normal duodenum. Biopsy taken.   • UPPER GASTROINTESTINAL ENDOSCOPY  10/16/2018     Family History   Problem Relation Age of Onset   • Coronary artery disease Mother    • Diabetes Mother    • Other Mother         \"Heart And Lung Problems\"   • Liver cancer Sister    • Heart disease Other    • Stroke Other    • Hypertension Other    • Diabetes Other    • Cancer Other    • Colon polyps Other    • Other Father         Unknown   • Other Other         \"Lung Problems\"   • Thyroid disease Neg Hx      Social History     Tobacco Use   • Smoking status: Former Smoker   • Smokeless tobacco: Never Used   Substance Use Topics   • Alcohol use: Yes     Comment: 11/20/2018 - Patient confirmed heavy alcoholic beverage consumption in past with current consumption of 2 - 3 beers twice per week.   • Drug use: No           Current Outpatient Medications:   •  aspirin (ASPIRIN LOW DOSE) 81 MG EC tablet, Take 1 tablet by mouth Daily., Disp: 30 tablet, Rfl: 6  •  ferrous sulfate 325 (65 FE) MG tablet, Take 1 tablet by mouth Daily., Disp: 30 tablet, Rfl: 6  •  fluticasone (FLONASE) 50 MCG/ACT nasal spray, 2 sprays into the nostril(s) as directed by provider Every Other Day., Disp: 1 bottle, Rfl: 3  •  levothyroxine (SYNTHROID) 200 MCG tablet, Take 1 tablet by mouth Daily., Disp: 30 tablet, Rfl: 6  •  lisinopril-hydrochlorothiazide (ZESTORETIC) 10-12.5 MG per tablet, Take 1 tablet by mouth Daily., Disp: 30 tablet, Rfl: 6  •  magnesium oxide (MAGOX) 400 (241.3 Mg) MG tablet tablet, Take 2 tablets by mouth Daily., Disp: 60 each, Rfl: 6  •  Multiple Vitamin (MULTI VITAMIN PO), Take  by mouth., Disp: , Rfl:   •  omeprazole (priLOSEC) 40 MG capsule, Take 1 capsule by mouth Daily., Disp: 30 capsule, Rfl: 6  •  " "simvastatin (ZOCOR) 40 MG tablet, Take 1 tablet by mouth Every Evening., Disp: 30 tablet, Rfl: 6  •  terazosin (HYTRIN) 5 MG capsule, Take 1 capsule by mouth Every Night., Disp: 30 capsule, Rfl: 6  •  vitamin D (ERGOCALCIFEROL) 88968 units capsule capsule, Take 2 caps weekly, Disp: 8 capsule, Rfl: 6        Review of Systems   Constitutional: Negative for activity change, appetite change, diaphoresis and fatigue.   HENT: Negative for facial swelling, sneezing, sore throat, tinnitus, trouble swallowing and voice change.    Eyes: Negative for photophobia, pain, discharge, redness, itching and visual disturbance.   Respiratory: Negative for apnea, cough, choking, chest tightness and shortness of breath.    Cardiovascular: Negative for chest pain, palpitations and leg swelling.   Gastrointestinal: Negative for abdominal distention, abdominal pain, constipation, diarrhea, nausea and vomiting.   Endocrine: Negative for cold intolerance, heat intolerance, polydipsia, polyphagia and polyuria.   Genitourinary: Negative for difficulty urinating, dysuria, frequency, hematuria and urgency.   Musculoskeletal: Negative for arthralgias, back pain, gait problem, joint swelling, myalgias, neck pain and neck stiffness.   Skin: Negative for color change, pallor, rash and wound.   Neurological: Negative for dizziness, tremors, weakness, light-headedness, numbness and headaches.   Hematological: Negative for adenopathy. Does not bruise/bleed easily.   Psychiatric/Behavioral: Negative for behavioral problems, confusion and sleep disturbance.       /78 (BP Location: Right arm, Patient Position: Sitting, Cuff Size: Adult)   Pulse 86   Ht 165.1 cm (65\")   Wt 55.3 kg (122 lb)   BMI 20.30 kg/m²     Physical Exam   Constitutional: He is oriented to person, place, and time. He appears well-developed and well-nourished. No distress.   HENT:   Head: Normocephalic and atraumatic.   Right Ear: External ear normal.   Left Ear: External ear " normal.   Nose: Nose normal.   Eyes: Conjunctivae and EOM are normal. Pupils are equal, round, and reactive to light.   Neck: Normal range of motion. Neck supple. No tracheal deviation present. No thyromegaly present.   Cardiovascular: Normal rate, regular rhythm and normal heart sounds.   No murmur heard.  Pulmonary/Chest: Effort normal and breath sounds normal. No respiratory distress. He has no wheezes.   Abdominal: Soft. Bowel sounds are normal. There is no tenderness. There is no rebound and no guarding.   Musculoskeletal: Normal range of motion. He exhibits no edema, tenderness or deformity.   Neurological: He is alert and oriented to person, place, and time. No cranial nerve deficit.   Skin: Skin is warm and dry. No rash noted.   Psychiatric: He has a normal mood and affect. His behavior is normal. Judgment and thought content normal.           Labs    Lab Results   Component Value Date    GLUCOSE 149 (H) 11/15/2018    BUN 11 11/15/2018    CREATININE 0.74 11/15/2018    EGFRIFAFRI 131 (H) 11/15/2018    BCR 14.9 11/15/2018    K 4.5 11/15/2018    CO2 29.0 11/15/2018    CALCIUM 10.6 (H) 11/15/2018    ALBUMIN 4.50 11/15/2018    AST 28 11/15/2018    ALT 18 (L) 11/15/2018       Lab Results   Component Value Date    WBC 7.29 11/15/2018    HGB 12.8 (L) 11/15/2018    HCT 35.6 (L) 11/15/2018    MCV 80.5 11/15/2018     11/15/2018       Lab Results   Component Value Date    QPKQ84PC 28.9 (L) 11/15/2018       Lab Results   Component Value Date    HGBA1C 5.4 11/15/2018    HGBA1C 5.4 11/01/2017             Lab Results   Component Value Date    TSH 0.080 (L) 11/15/2018    TSH 63.000 (H) 07/17/2018       Lab Results   Component Value Date    FREET4 1.35 11/15/2018    FREET4 0.80 07/17/2018       Lab Results   Component Value Date    F9HKURG 99 01/09/2017       Thyroid Peroxidase Antibodies    No results found for: THYROIDAB    ThyroidStimulating Immunoglobulin    No results found for: LABTHYR      Assessment          ICD-10-CM ICD-9-CM   1. Acquired hypothyroidism E03.9 244.9   2. B12 deficiency E53.8 266.2   3. Intestinal malabsorption, unspecified type K90.9 579.9       Hypothyroidism not controlled     In summary  is a 58 year old male with hypothyroidism not controlled he has been referred to us for further evaluation.     PLAN:    Repeat a TSH today     He is currently on levothyroxine 200 mcg daily     Medication must me taken on an empty stomach at least one hour before food, drink and other medications. Only take with water. If taking vitamins or antiacids needs to be 4 hours before or after.    He will try to take a lunch because he takes Prilosec at breakfast    Due to variability in TSH since 2015  Check celiac and b12 for malabsorption    He states he has a history of B12 def    If no improvement will change to brand name synthroid     Follow up in 6 weeks       Records from Dr. He received and reviewed  Thank you for this consultation

## 2019-01-14 LAB
ENDOMYSIUM IGA SER QL: NEGATIVE
GLIADIN PEPTIDE IGA SER-ACNC: 6 UNITS (ref 0–19)
IGA SERPL-MCNC: 428 MG/DL (ref 90–386)
TTG IGA SER-ACNC: 4 U/ML (ref 0–3)

## 2019-01-15 ENCOUNTER — TELEPHONE (OUTPATIENT)
Dept: ENDOCRINOLOGY | Facility: CLINIC | Age: 59
End: 2019-01-15

## 2019-01-15 RX ORDER — LEVOTHYROXINE SODIUM 175 UG/1
175 TABLET ORAL DAILY
Qty: 30 TABLET | Refills: 5 | Status: SHIPPED | OUTPATIENT
Start: 2019-01-15 | End: 2019-04-12 | Stop reason: CLARIF

## 2019-01-15 NOTE — TELEPHONE ENCOUNTER
----- Message from ZARA Fenton sent at 1/11/2019  1:53 PM CST -----  Please call --- you will have to call him and his sister--- thyroid is overactive we need to decrease his thyroid pill he takes 200 mcg decrease to 175 mcg -- he needs labs in one month and he can do it a [facility] in [place] and I will call with result s

## 2019-01-15 NOTE — TELEPHONE ENCOUNTER
----- Message from ZRAA Fenton sent at 1/14/2019  4:18 PM CST -----  Let him and his sister know the celiac panel which checks for gluten showed a weak positive --- I don't want to say he is allergic to gluten based on this test we will repeat it next time -- the true test is biopsy with a colonoscopy

## 2019-01-25 ENCOUNTER — PRIOR AUTHORIZATION (OUTPATIENT)
Dept: FAMILY MEDICINE CLINIC | Facility: CLINIC | Age: 59
End: 2019-01-25

## 2019-02-08 ENCOUNTER — OFFICE VISIT (OUTPATIENT)
Dept: OTOLARYNGOLOGY | Facility: CLINIC | Age: 59
End: 2019-02-08

## 2019-02-08 VITALS — WEIGHT: 124 LBS | HEIGHT: 65 IN | BODY MASS INDEX: 20.66 KG/M2 | RESPIRATION RATE: 17 BRPM

## 2019-02-08 DIAGNOSIS — J37.0 CHRONIC LARYNGITIS: ICD-10-CM

## 2019-02-08 DIAGNOSIS — Z08 ENCOUNTER FOR FOLLOW-UP SURVEILLANCE OF TONSILLAR CANCER: Primary | ICD-10-CM

## 2019-02-08 DIAGNOSIS — H92.01 RIGHT EAR PAIN: ICD-10-CM

## 2019-02-08 DIAGNOSIS — Z85.818 ENCOUNTER FOR FOLLOW-UP SURVEILLANCE OF TONSILLAR CANCER: Primary | ICD-10-CM

## 2019-02-08 PROBLEM — R09.81 NASAL CONGESTION: Status: RESOLVED | Noted: 2018-11-15 | Resolved: 2019-02-08

## 2019-02-08 PROBLEM — C14.0 SQUAMOUS CELL CARCINOMA OF PHARYNX (HCC): Status: RESOLVED | Noted: 2017-06-16 | Resolved: 2019-02-08

## 2019-02-08 PROCEDURE — 99214 OFFICE O/P EST MOD 30 MIN: CPT | Performed by: OTOLARYNGOLOGY

## 2019-02-08 PROCEDURE — 31575 DIAGNOSTIC LARYNGOSCOPY: CPT | Performed by: OTOLARYNGOLOGY

## 2019-02-08 NOTE — PROGRESS NOTES
Subjective   Emerson Bang is a 58 y.o. male.       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  Was treated with concurrent chemoradiation with a complete response that left a significant soft tissue defect in the palate.  Is here for surveillance.  Remains tobacco abstinent.  States he has been having some right ear pain in the last couple of weeks.  No otorrhea.  Nothing in particular brought this on.  Is not having any more trouble swallowing that he ever does.  No hemoptysis.  Weight remains stable.      The following portions of the patient's history were reviewed and updated as appropriate: allergies, current medications, past family history, past medical history, past social history, past surgical history and problem list.     reports that he has quit smoking. he has never used smokeless tobacco. He reports that he drinks alcohol. He reports that he does not use drugs.   Patient is not a tobacco user and has not been counseled for use of tobacco products      Review of Systems   Constitutional: Negative for fever.   HENT: Positive for ear pain.            Objective   Physical Exam  General: Thin but otherwise well-developed male in no acute distress.  Alert and oriented x3. Head: Normocephalic.Voice: Hypernasal. Speech: Intelligible  Ears: External ears no deformity, canals no discharge, tympanic membranes intact clear and mobile bilaterally.  Nose: Nares show no discharge mass polyp or purulence.  Boggy mucosa is present.  No gross external deformity.  Septum: To the right  Oral cavity: Lips and gums without lesions.  Tongue and floor of mouth without lesions.  Parotid and submandibular ducts unobstructed.  No mucosal lesions on the buccal mucosa or vestibule of the mouth.  Pharynx: Defect in the soft palate on the right with no evidence of recurrent tumor.  Posttreatment changes and some dried secretions on the  posterior pharyngeal wall.  No mass or ulcer  Neck: No lymphadenopathy.  No thyromegaly.  Trachea and larynx midline.  No masses in the parotid or submandibular glands.  Postradiation changes noted.  Healed tracheostomy scar in the midline.  TMJs are mildly tender to palpation more so on the right than the left    Procedure Note    Pre-operative Diagnosis:   Chief Complaint   Patient presents with   • Cancer Surveillance     hx neoplasm pharynx        Post-operative Diagnosis: No evidence of recurrence; chronic laryngitis    Anesthesia: topical with xylocaine and neosynephrine    Endoscopy Type:  Flexible Laryngoscopy    Procedure Details:    The patient was placed in the sitting position.  After topical anesthesia and decongestion, the 4 mm laryngoscope was passed.  The nasal cavities, nasopharynx, oropharynx, hypopharynx, and larynx were all examined.  Vocal cords were examined during respiration and phonation.  The following findings were noted:    Findings: No masses in the nose or nasopharynx.  Some dried secretions in the nasopharynx without evidence of underlying ulcer or neoplasm.  Defect in the palate which is unchanged from previous.  Hypopharynx shows no evidence of neoplasm endolarynx shows chronic appearing edema and erythema consistent with postradiation changes but no evidence of neoplasm.  Vocal cord mobility is intact.    Condition:  Stable.  Patient tolerated procedure well.    Complications:  None      Assessment/Plan   Emerson was seen today for cancer surveillance.    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  Suspect his ear pain is musculoskeletal in nature.  Advised soft diet and that he see his dentist.  See me again in 1 year, call sooner for problems.

## 2019-02-19 ENCOUNTER — OFFICE VISIT (OUTPATIENT)
Dept: GASTROENTEROLOGY | Facility: CLINIC | Age: 59
End: 2019-02-19

## 2019-02-19 VITALS
HEIGHT: 65 IN | HEART RATE: 79 BPM | WEIGHT: 126.8 LBS | BODY MASS INDEX: 21.13 KG/M2 | DIASTOLIC BLOOD PRESSURE: 68 MMHG | OXYGEN SATURATION: 98 % | SYSTOLIC BLOOD PRESSURE: 136 MMHG

## 2019-02-19 DIAGNOSIS — K70.30 ALCOHOLIC CIRRHOSIS OF LIVER WITHOUT ASCITES (HCC): Primary | ICD-10-CM

## 2019-02-19 PROCEDURE — 99213 OFFICE O/P EST LOW 20 MIN: CPT | Performed by: INTERNAL MEDICINE

## 2019-02-19 NOTE — PROGRESS NOTES
"[facility]      Chief Complaint:   Chief Complaint   Patient presents with   • Elevated Liver Function Testing   • Cirrhosis Of Liver Without Ascites       Subjective     HPI:   Patient with cirrhosis of the liver secondary to EtOH.  Patient has decreased drinking significantly LFTs are markedly improved.  Patient feels well at this time no nausea vomiting or abdominal pain.  Discussed with patient the importance of continued abstinence from alcohol as this may save his life from progression of his liver disease.  Discussed with patient that if he does continue to drink a will end up with liver failure.  Patient understands this and will continue to attempt to decrease his amount of alcohol.    Plan; patient follow-up in 3 months with repeat labs including CMP CBC    Past Medical History:   Past Medical History:   Diagnosis Date   • Alcohol abuse    • Allergic rhinitis     vs URI   • Anemia    • At risk for falls    • Benign prostatic hyperplasia    • Chronic gastritis    • Complication of gastrostomy (CMS/HCC)     site not healing   • COPD (chronic obstructive pulmonary disease) (CMS/HCC)    • Dysphagia     odynophagia   • Epigastric pain    • Esophagitis    • GERD (gastroesophageal reflux disease)    • Hypothyroidism, unspecified    • Low back pain    • Malaise and fatigue    • Nasal congestion 11/15/2018   • Nausea with vomiting, unspecified    • Pain in left knee    • Pain in right knee    • Primary malignant neoplasm of pharynx (CMS/HCC)    • Screening for malignant neoplasm of colon    • Vitamin D deficiency        Family History:  Family History   Problem Relation Age of Onset   • Coronary artery disease Mother    • Diabetes Mother    • Other Mother         \"Heart And Lung Problems\"   • Liver cancer Sister    • Heart disease Other    • Stroke Other    • Hypertension Other    • Diabetes Other    • Cancer Other    • Colon polyps Other    • Other Father         Unknown   • Other Other  " "       \"Lung Problems\"   • Thyroid disease Neg Hx        Social History:   reports that he has quit smoking. His smoking use included cigarettes. He has a 45.00 pack-year smoking history. he has never used smokeless tobacco. He reports that he drinks alcohol. He reports that he does not use drugs.    Medications:   Prior to Admission medications    Medication Sig Start Date End Date Taking? Authorizing Provider   aspirin (ASPIRIN LOW DOSE) 81 MG EC tablet Take 1 tablet by mouth Daily. 11/15/18  Yes Cristino He MD   ferrous sulfate 325 (65 FE) MG tablet Take 1 tablet by mouth Daily. 11/15/18  Yes Cristino He MD   fluticasone (FLONASE) 50 MCG/ACT nasal spray 2 sprays into the nostril(s) as directed by provider Every Other Day. 11/15/18  Yes Cristino He MD   levothyroxine (SYNTHROID, LEVOTHROID) 175 MCG tablet Take 1 tablet by mouth Daily. 1/15/19 1/15/20 Yes Toby Ruffin APRN   lisinopril-hydrochlorothiazide (ZESTORETIC) 10-12.5 MG per tablet Take 1 tablet by mouth Daily. 11/15/18  Yes Cristino He MD   magnesium oxide (MAGOX) 400 (241.3 Mg) MG tablet tablet Take 2 tablets by mouth Daily. 11/15/18  Yes Cristino He MD   Multiple Vitamin (MULTI VITAMIN PO) Take  by mouth.   Yes Esperanza Foote MD   omeprazole (priLOSEC) 40 MG capsule Take 1 capsule by mouth Daily. 11/15/18  Yes Cristino He MD   simvastatin (ZOCOR) 40 MG tablet Take 1 tablet by mouth Every Evening. 11/15/18  Yes Cristino He MD   terazosin (HYTRIN) 5 MG capsule Take 1 capsule by mouth Every Night. 11/15/18  Yes Cristino He MD   vitamin D (ERGOCALCIFEROL) 50473 units capsule capsule Take 2 caps weekly 12/4/18  Yes Cristino He MD       Allergies:  Patient has no known allergies.    ROS:    Review of Systems   Constitutional: Negative for activity change, appetite change and unexpected weight change.   HENT: Negative for congestion, sore throat and trouble swallowing.    Respiratory: Negative for " "cough, choking and shortness of breath.    Cardiovascular: Negative for chest pain.   Gastrointestinal: Negative for abdominal distention, abdominal pain, anal bleeding, blood in stool, constipation, diarrhea, nausea, rectal pain and vomiting.   Endocrine: Negative for heat intolerance, polydipsia and polyphagia.   Genitourinary: Negative for difficulty urinating.   Musculoskeletal: Negative for arthralgias.   Skin: Negative for color change, pallor, rash and wound.   Allergic/Immunologic: Negative for food allergies.   Neurological: Negative for dizziness, syncope, weakness and headaches.   Psychiatric/Behavioral: Negative for agitation, behavioral problems, confusion and decreased concentration.     Objective     Blood pressure 136/68, pulse 79, height 165.1 cm (65\"), weight 57.5 kg (126 lb 12.8 oz), SpO2 98 %.    Physical Exam   Constitutional: He is oriented to person, place, and time. He appears well-developed and well-nourished. No distress.   HENT:   Head: Normocephalic and atraumatic.   Cardiovascular: Normal rate, regular rhythm, normal heart sounds and intact distal pulses. Exam reveals no gallop and no friction rub.   No murmur heard.  Pulmonary/Chest: Breath sounds normal. No respiratory distress. He has no wheezes. He has no rales. He exhibits no tenderness.   Abdominal: Soft. Bowel sounds are normal. He exhibits no distension and no mass. There is no tenderness. There is no rebound and no guarding. No hernia.   Musculoskeletal: Normal range of motion. He exhibits no edema.   Neurological: He is alert and oriented to person, place, and time.   Skin: Skin is warm and dry. No rash noted. He is not diaphoretic. No erythema. No pallor.   Psychiatric: He has a normal mood and affect. His behavior is normal. Judgment and thought content normal.        Assessment/Plan   Emerson was seen today for elevated liver function testing and cirrhosis of liver without ascites.    Diagnoses and all orders for this " visit:    Alcoholic cirrhosis of liver without ascites (CMS/HCC)  -     Comprehensive Metabolic Panel; Future  -     CBC & Differential; Future        * Surgery not found *     Diagnosis Plan   1. Alcoholic cirrhosis of liver without ascites (CMS/HCC)  Comprehensive Metabolic Panel    CBC & Differential       Anticipated Surgical Procedure:  Orders Placed This Encounter   Procedures   • Comprehensive Metabolic Panel     Standing Status:   Future   • CBC & Differential     Standing Status:   Future     Order Specific Question:   Manual Differential     Answer:   No       The risks, benefits, and alternatives of this procedure have been discussed with the patient or the responsible party- the patient understands and agrees to proceed.

## 2019-02-19 NOTE — PATIENT INSTRUCTIONS
Cirrhosis  Cirrhosis is long-term (chronic) liver injury. The liver is your largest internal organ, and it performs many functions. The liver converts food into energy, removes toxic material from your blood, makes important proteins, and absorbs necessary vitamins from your diet.  If you have cirrhosis, it means many of your healthy liver cells have been replaced by scar tissue. This prevents blood from flowing through your liver, which makes it difficult for your liver to function. This scarring is not reversible, but treatment can prevent it from getting worse.  What are the causes?  Hepatitis C and long-term alcohol abuse are the most common causes of cirrhosis. Other causes include:  · Nonalcoholic fatty liver disease.  · Hepatitis B infection.  · Autoimmune hepatitis.  · Diseases that cause blockage of ducts inside the liver.  · Inherited liver diseases.  · Reactions to certain long-term medicines.  · Parasitic infections.  · Long-term exposure to certain toxins.    What increases the risk?  You may have a higher risk of cirrhosis if you:  · Have certain hepatitis viruses.  · Abuse alcohol, especially if you are female.  · Are overweight.  · Share needles.  · Have unprotected sex with someone who has hepatitis.    What are the signs or symptoms?  You may not have any signs and symptoms at first. Symptoms may not develop until the damage to your liver starts to get worse. Signs and symptoms of cirrhosis may include:  · Tenderness in the right-upper part of your abdomen.  · Weakness and tiredness (fatigue).  · Loss of appetite.  · Nausea.  · Weight loss and muscle loss.  · Itchiness.  · Yellow skin and eyes (jaundice).  · Buildup of fluid in the abdomen (ascites).  · Swelling of the feet and ankles (edema).  · Appearance of tiny blood vessels under the skin.  · Mental confusion.  · Easy bruising and bleeding.    How is this diagnosed?  Your health care provider may suspect cirrhosis based on your symptoms and  medical history, especially if you have other medical conditions or a history of alcohol abuse. Your health care provider will do a physical exam to feel your liver and check for signs of cirrhosis. Your health care provider may perform other tests, including:  · Blood tests to check:  ? Whether you have hepatitis B or C.  ? Kidney function.  ? Liver function.  · Imaging tests such as:  ? MRI or CT scan to look for changes seen in advanced cirrhosis.  ? Ultrasound to see if normal liver tissue is being replaced by scar tissue.  · A procedure using a long needle to take a sample of liver tissue (biopsy) for examination under a microscope. Liver biopsy can confirm the diagnosis of cirrhosis.    How is this treated?  Treatment depends on how damaged your liver is and what caused the damage. Treatment may include treating cirrhosis symptoms or treating the underlying causes of the condition to try to slow the progression of the damage. Treatment may include:  · Making lifestyle changes, such as:  ? Eating a healthy diet.  ? Restricting salt intake.  ? Maintaining a healthy weight.  ? Not abusing drugs or alcohol.  · Taking medicines to:  ? Treat liver infections or other infections.  ? Control itching.  ? Reduce fluid buildup.  ? Reduce certain blood toxins.  ? Reduce risk of bleeding from enlarged blood vessels in the stomach or esophagus (varices).  · If varices are causing bleeding problems, you may need treatment with a procedure that ties up the vessels causing them to fall off (band ligation).  · If cirrhosis is causing your liver to fail, your health care provider may recommend a liver transplant.  · Other treatments may be recommended depending on any complications of cirrhosis, such as liver-related kidney failure (hepatorenal syndrome).    Follow these instructions at home:  · Take medicines only as directed by your health care provider. Do not use drugs that are toxic to your liver. Ask your health care  provider before taking any new medicines, including over-the-counter medicines.  · Rest as needed.  · Eat a well-balanced diet. Ask your health care provider or dietitian for more information.  · You may have to follow a low-salt diet or restrict your water intake as directed.  · Do not drink alcohol. This is especially important if you are taking acetaminophen.  · Keep all follow-up visits as directed by your health care provider. This is important.  Contact a health care provider if:  · You have fatigue or weakness that is getting worse.  · You develop swelling of the hands, feet, legs, or face.  · You have a fever.  · You develop loss of appetite.  · You have nausea or vomiting.  · You develop jaundice.  · You develop easy bruising or bleeding.  Get help right away if:  · You vomit bright red blood or a material that looks like coffee grounds.  · You have blood in your stools.  · Your stools appear black and tarry.  · You become confused.  · You have chest pain or trouble breathing.  This information is not intended to replace advice given to you by your health care provider. Make sure you discuss any questions you have with your health care provider.  Document Released: 12/18/2006 Document Revised: 04/27/2017 Document Reviewed: 08/26/2015  [employer] Patient Education © 2018 [employer]  BMI for Adults  Body mass index (BMI) is a number that is calculated from a person's weight and height. In most adults, the number is used to find how much of an adult's weight is made up of fat. BMI is not as accurate as a direct measure of body fat.  How is BMI calculated?  BMI is calculated by dividing weight in kilograms by height in meters squared. It can also be calculated by dividing weight in pounds by height in inches squared, then multiplying the resulting number by 703. Charts are available to help you find your BMI quickly and easily without doing this calculation.  How is BMI interpreted?  Health care  professionals use BMI charts to identify whether an adult is underweight, at a normal weight, or overweight based on the following guidelines:  · Underweight: BMI less than 18.5.  · Normal weight: BMI between 18.5 and 24.9.  · Overweight: BMI between 25 and 29.9.  · Obese: BMI of 30 and above.    BMI is usually interpreted the same for males and females.  Weight includes both fat and muscle, so someone with a muscular build, such as an athlete, may have a BMI that is higher than 24.9. In cases like these, BMI may not accurately depict body fat. To determine if excess body fat is the cause of a BMI of 25 or higher, further assessments may need to be done by a health care provider.  Why is BMI a useful tool?  BMI is used to identify a possible weight problem that may be related to a medical problem or may increase the risk for medical problems. BMI can also be used to promote changes to reach a healthy weight.  This information is not intended to replace advice given to you by your health care provider. Make sure you discuss any questions you have with your health care provider.  Document Released: 08/29/2005 Document Revised: 04/27/2017 Document Reviewed: 05/15/2015  [employer] Patient Education © 2018 [employer]

## 2019-04-02 ENCOUNTER — OFFICE VISIT (OUTPATIENT)
Dept: FAMILY MEDICINE CLINIC | Facility: CLINIC | Age: 59
End: 2019-04-02

## 2019-04-02 VITALS
WEIGHT: 111.8 LBS | SYSTOLIC BLOOD PRESSURE: 118 MMHG | TEMPERATURE: 98.6 F | HEIGHT: 65 IN | BODY MASS INDEX: 18.63 KG/M2 | DIASTOLIC BLOOD PRESSURE: 76 MMHG

## 2019-04-02 DIAGNOSIS — E55.9 VITAMIN D DEFICIENCY: ICD-10-CM

## 2019-04-02 DIAGNOSIS — R07.0 THROAT PAIN: ICD-10-CM

## 2019-04-02 DIAGNOSIS — Z85.819 HISTORY OF THROAT CANCER: ICD-10-CM

## 2019-04-02 DIAGNOSIS — F10.10 ALCOHOL ABUSE: ICD-10-CM

## 2019-04-02 DIAGNOSIS — E03.9 ACQUIRED HYPOTHYROIDISM: ICD-10-CM

## 2019-04-02 DIAGNOSIS — E78.5 HYPERLIPIDEMIA, UNSPECIFIED HYPERLIPIDEMIA TYPE: ICD-10-CM

## 2019-04-02 DIAGNOSIS — R63.6 UNDERWEIGHT: ICD-10-CM

## 2019-04-02 DIAGNOSIS — Z71.41 ALCOHOL ABUSE COUNSELING AND SURVEILLANCE: ICD-10-CM

## 2019-04-02 DIAGNOSIS — J06.9 UPPER RESPIRATORY TRACT INFECTION, UNSPECIFIED TYPE: ICD-10-CM

## 2019-04-02 DIAGNOSIS — I10 ESSENTIAL HYPERTENSION: ICD-10-CM

## 2019-04-02 DIAGNOSIS — J02.9 ACUTE PHARYNGITIS, UNSPECIFIED ETIOLOGY: Primary | ICD-10-CM

## 2019-04-02 LAB
EXPIRATION DATE: NORMAL
INTERNAL CONTROL: NORMAL
Lab: NORMAL
S PYO AG THROAT QL: NEGATIVE

## 2019-04-02 PROCEDURE — 99214 OFFICE O/P EST MOD 30 MIN: CPT | Performed by: FAMILY MEDICINE

## 2019-04-02 PROCEDURE — 87880 STREP A ASSAY W/OPTIC: CPT | Performed by: FAMILY MEDICINE

## 2019-04-02 RX ORDER — AZITHROMYCIN 250 MG/1
TABLET, FILM COATED ORAL
Qty: 6 TABLET | Refills: 0 | Status: SHIPPED | OUTPATIENT
Start: 2019-04-02 | End: 2019-04-12

## 2019-04-02 NOTE — PATIENT INSTRUCTIONS
Sore Throat  A sore throat is pain, burning, irritation, or scratchiness in the throat. When you have a sore throat, you may feel pain or tenderness in your throat when you swallow or talk.  Many things can cause a sore throat, including:  · An infection.  · Seasonal allergies.  · Dryness in the air.  · Irritants, such as smoke or pollution.  · Gastroesophageal reflux disease (GERD).  · A tumor.    A sore throat is often the first sign of another sickness. It may happen with other symptoms, such as coughing, sneezing, fever, and swollen neck glands. Most sore throats go away without medical treatment.  Follow these instructions at home:  · Take over-the-counter medicines only as told by your health care provider.  · Drink enough fluids to keep your urine clear or pale yellow.  · Rest as needed.  · To help with pain, try:  ? Sipping warm liquids, such as broth, herbal tea, or warm water.  ? Eating or drinking cold or frozen liquids, such as frozen ice pops.  ? Gargling with a salt-water mixture 3-4 times a day or as needed. To make a salt-water mixture, completely dissolve ½-1 tsp of salt in 1 cup of warm water.  ? Sucking on hard candy or throat lozenges.  ? Putting a cool-mist humidifier in your bedroom at night to moisten the air.  ? Sitting in the bathroom with the door closed for 5-10 minutes while you run hot water in the shower.  · Do not use any tobacco products, such as cigarettes, chewing tobacco, and e-cigarettes. If you need help quitting, ask your health care provider.  Contact a health care provider if:  · You have a fever for more than 2-3 days.  · You have symptoms that last (are persistent) for more than 2-3 days.  · Your throat does not get better within 7 days.  · You have a fever and your symptoms suddenly get worse.  Get help right away if:  · You have difficulty breathing.  · You cannot swallow fluids, soft foods, or your saliva.  · You have increased swelling in your throat or neck.  · You have  persistent nausea and vomiting.  This information is not intended to replace advice given to you by your health care provider. Make sure you discuss any questions you have with your health care provider.  Document Released: 01/25/2006 Document Revised: 08/13/2017 Document Reviewed: 10/07/2016  [employer] Interactive Patient Education © 2019 [employer]    Pharyngitis  Pharyngitis is redness, pain, and swelling (inflammation) of the throat (pharynx). It is a very common cause of sore throat. Pharyngitis can be caused by a bacteria, but it is usually caused by a virus. Most cases of pharyngitis get better on their own without treatment.  What are the causes?  This condition may be caused by:  · Infection by viruses (viral). Viral pharyngitis spreads from person to person (is contagious) through coughing, sneezing, and sharing of personal items or utensils such as cups, forks, spoons, and toothbrushes.  · Infection by bacteria (bacterial). Bacterial pharyngitis may be spread by touching the nose or face after coming in contact with the bacteria, or through more intimate contact, such as kissing.  · Allergies. Allergies can cause buildup of mucus in the throat (post-nasal drip), leading to inflammation and irritation. Allergies can also cause blocked nasal passages, forcing breathing through the mouth, which dries and irritates the throat.    What increases the risk?  You are more likely to develop this condition if:  · You are 5-24 years old.  · You are exposed to crowded environments such as , school, or dormitory living.  · You live in a cold climate.  · You have a weakened disease-fighting (immune) system.    What are the signs or symptoms?  Symptoms of this condition vary by the cause (viral, bacterial, or allergies) and can include:  · Sore throat.  · Fatigue.  · Low-grade fever.  · Headache.  · Joint pain and muscle aches.  · Skin rashes.  · Swollen glands in the throat (lymph nodes).  · Plaque-like film  on the throat or tonsils. This is often a symptom of bacterial pharyngitis.  · Vomiting.  · Stuffy nose (nasal congestion).  · Cough.  · Red, itchy eyes (conjunctivitis).  · Loss of appetite.    How is this diagnosed?  This condition is often diagnosed based on your medical history and a physical exam. Your health care provider will ask you questions about your illness and your symptoms. A swab of your throat may be done to check for bacteria (rapid strep test). Other lab tests may also be done, depending on the suspected cause, but these are rare.  How is this treated?  This condition usually gets better in 3-4 days without medicine. Bacterial pharyngitis may be treated with antibiotic medicines.  Follow these instructions at home:  · Take over-the-counter and prescription medicines only as told by your health care provider.  ? If you were prescribed an antibiotic medicine, take it as told by your health care provider. Do not stop taking the antibiotic even if you start to feel better.  ? Do not give children aspirin because of the association with Reye syndrome.  · Drink enough water and fluids to keep your urine clear or pale yellow.  · Get a lot of rest.  · Gargle with a salt-water mixture 3-4 times a day or as needed. To make a salt-water mixture, completely dissolve ½-1 tsp of salt in 1 cup of warm water.  · If your health care provider approves, you may use throat lozenges or sprays to soothe your throat.  Contact a health care provider if:  · You have large, tender lumps in your neck.  · You have a rash.  · You cough up green, yellow-brown, or bloody spit.  Get help right away if:  · Your neck becomes stiff.  · You drool or are unable to swallow liquids.  · You cannot drink or take medicines without vomiting.  · You have severe pain that does not go away, even after you take medicine.  · You have trouble breathing, and it is not caused by a stuffy nose.  · You have new pain and swelling in your joints such  as the knees, ankles, wrists, or elbows.  Summary  · Pharyngitis is redness, pain, and swelling (inflammation) of the throat (pharynx).  · While pharyngitis can be caused by a bacteria, the most common causes are viral.  · Most cases of pharyngitis get better on their own without treatment.  · Bacterial pharyngitis is treated with antibiotic medicines.  This information is not intended to replace advice given to you by your health care provider. Make sure you discuss any questions you have with your health care provider.  Document Released: 12/18/2006 Document Revised: 01/23/2018 Document Reviewed: 01/23/2018  [employer] Interactive Patient Education © 2019 [employer]    Alcohol Withdrawal Syndrome  Alcohol withdrawal syndrome is a group of symptoms that can develop when a person who drinks heavily and regularly stops drinking or drinks less. Alcohol withdrawal syndrome can be mild or severe, and it may even be life-threatening.  Alcohol withdrawal syndrome usually affects people who have alcohol use disorder, which may also be called alcoholism. Alcohol use disorder is when a person is unable to control his or her alcohol use, and drinking too much or too often causes problems at home, at work, or in relationships.  What are the causes?  Drinking heavily and drinking on a regular basis cause changes in brain chemistry. Over time, the body becomes dependent on alcohol. When alcohol use stops, the chemistry system in the brain becomes unbalanced and causes the symptoms of alcohol withdrawal.  What increases the risk?  Alcohol withdrawal syndrome is more likely to occur in people who drink more than the recommended limit of alcohol (2 drinks a day for men or 1 drink a day for non-pregnant women). It is also more likely to affect heavy drinkers who have been using alcohol for long periods of time. The more a person drinks and the longer he or she drinks, the greater the risk of alcohol withdrawal syndrome.  Severe  withdrawal is more likely to develop in someone who:  · Had severe alcohol withdrawal in the past.  · Had a seizure during a previous episode of alcohol withdrawal.  · Is elderly.  · Uses other drugs.  · Has a long-term (chronic) medical problem, such as heart, lung, or liver disease.  · Has depression.  · Does not get enough nutrients from his or her diet (malnutrition).    What are the signs or symptoms?  Symptoms of this condition can be mild to moderate, or they can be severe. Symptoms may develop a few hours (or up to a day) after a person changes his or her drinking patterns. During the 48 hours after he or she has stopped drinking, the following symptoms may go away or get better:  · Uncontrollable shaking (tremor).  · Sweating.  · Headache.  · Anxiety.  · Inability to relax (agitation).  · Trouble sleeping (insomnia).  · Irregular heartbeats (palpitations).  · Alcohol cravings.  · Seizure.    The following symptoms may get worse 24-48 hours after a person has decreased or stopped alcohol use, and they may gradually improve over a period of days or weeks:  · Nausea and vomiting.  · Fatigue.  · Sensitivity to light and sounds.  · Confusion and inability to think clearly.  · Loss of appetite.  · Mood swings, irritability, depression, and anxiety.  · Insomnia and nightmares.    The following symptoms are severe and life-threatening. When these symptoms occur together, they are called delirium tremens (DTs):  · High blood pressure.  · Increased heart rate.  · Trouble breathing.  · Seizures. These may go away along with other symptoms, or they may persist.  · Seeing, hearing, feeling, smelling, or tasting things that are not there (hallucinations). If you experience hallucinations, they usually begin 12-24 hours after a change in drinking patterns.    Delirium tremens requires immediate hospitalization.  How is this diagnosed?  This condition may be diagnosed based on:  · Your symptoms and medical history.  · Your  history of alcohol use. Your health care provider may ask questions about your drinking behavior. It is important to be honest when you answer these questions.  · A psychological assessment.  · A physical exam.  · Blood tests or urine tests to measure blood alcohol level and to rule out other causes of symptoms.  · MRI or CT scan. This may be done if you seem to have abnormal thinking or behaviors (altered mental status).    Diagnosis can be difficult. People going through withdrawal often avoid seeking medical care and are not thinking clearly. Friends and family members play an important role in recognizing symptoms and encouraging loved ones to get treatment.  How is this treated?  Most people with symptoms of withdrawal can be treated outside of a hospital setting (outpatient treatment), with close monitoring such as daily check-ins with a health care provider and counseling. You may need treatment at a hospital or treatment center (inpatient treatment) if:  · You have a history of delirium tremens or seizures.  · You have severe symptoms.  · You are addicted to other drugs.  · You cannot swallow medicine.  · You have a serious medical condition such as heart failure.  · You experienced withdrawal in the past but then you continued drinking alcohol.  · You are not likely to commit to an outpatient treatment schedule.    Treatment may involve:  · Monitoring your blood pressure, pulse, and breathing.  · IV fluids to keep you hydrated.  · Medicines to reduce withdrawal symptoms and discomfort (benzodiazepines).  · Medicine to reduce anxiety.  · Medicine to prevent or control seizures.  · Multivitamins and B vitamins.  · Having a health care provider check on you daily.    It is important to get treatment for alcohol withdrawal early. Getting treatment early can:  · Speed up your recovery from withdrawal symptoms.  · Make you more successful with long-term stoppage of alcohol use (sobriety).    If you need help to  stop drinking, your health care provider may recommend a long-term treatment plan that includes:  · Medicines to help treat alcohol use disorder.  · Substance abuse counseling.  · Support groups.    Follow these instructions at home:  · Take over-the-counter and prescription medicines (including vitamin supplements) only as told by your health care provider.  · Do not drink alcohol.  · Do not drive until your health care provider approves.  · Have someone you trust stay with you or be available if you need help with your symptoms or with not drinking.  · Drink enough fluid to keep your urine pale yellow.  · Consider joining an alcohol support group or treatment program. These can provide emotional support, advice, and guidance.  · Keep all follow-up visits as told by your health care provider. This is important.  Contact a health care provider if:  · Your symptoms get worse instead of better.  · You cannot eat or drink without vomiting.  · You are struggling with not drinking alcohol.  · You cannot stop drinking alcohol.  Get help right away if:  · You have an irregular heartbeat.  · You have chest pain.  · You have trouble breathing.  · You have a seizure for the first time.  · You hallucinate.  · You become very confused.  Summary  · Alcohol withdrawal is a group of symptoms that can develop when a person who drinks heavily and regularly stops drinking or drinks less.  · Symptoms of this condition can be mild to moderate, or they can be severe.  · Treatment may include hospitalization, medicine, and counseling.  This information is not intended to replace advice given to you by your health care provider. Make sure you discuss any questions you have with your health care provider.  Document Released: 09/27/2006 Document Revised: 08/24/2018 Document Reviewed: 08/24/2018  [employer] Interactive Patient Education © 2019 [employer]    Sore Throat  A sore throat is pain, burning, irritation, or scratchiness in the  throat. When you have a sore throat, you may feel pain or tenderness in your throat when you swallow or talk.  Many things can cause a sore throat, including:  · An infection.  · Seasonal allergies.  · Dryness in the air.  · Irritants, such as smoke or pollution.  · Gastroesophageal reflux disease (GERD).  · A tumor.    A sore throat is often the first sign of another sickness. It may happen with other symptoms, such as coughing, sneezing, fever, and swollen neck glands. Most sore throats go away without medical treatment.  Follow these instructions at home:  · Take over-the-counter medicines only as told by your health care provider.  · Drink enough fluids to keep your urine clear or pale yellow.  · Rest as needed.  · To help with pain, try:  ? Sipping warm liquids, such as broth, herbal tea, or warm water.  ? Eating or drinking cold or frozen liquids, such as frozen ice pops.  ? Gargling with a salt-water mixture 3-4 times a day or as needed. To make a salt-water mixture, completely dissolve ½-1 tsp of salt in 1 cup of warm water.  ? Sucking on hard candy or throat lozenges.  ? Putting a cool-mist humidifier in your bedroom at night to moisten the air.  ? Sitting in the bathroom with the door closed for 5-10 minutes while you run hot water in the shower.  · Do not use any tobacco products, such as cigarettes, chewing tobacco, and e-cigarettes. If you need help quitting, ask your health care provider.  Contact a health care provider if:  · You have a fever for more than 2-3 days.  · You have symptoms that last (are persistent) for more than 2-3 days.  · Your throat does not get better within 7 days.  · You have a fever and your symptoms suddenly get worse.  Get help right away if:  · You have difficulty breathing.  · You cannot swallow fluids, soft foods, or your saliva.  · You have increased swelling in your throat or neck.  · You have persistent nausea and vomiting.  This information is not intended to replace  advice given to you by your health care provider. Make sure you discuss any questions you have with your health care provider.  Document Released: 01/25/2006 Document Revised: 08/13/2017 Document Reviewed: 10/07/2016  [employer] Interactive Patient Education © 2019 [employer]    Pharyngitis  Pharyngitis is redness, pain, and swelling (inflammation) of the throat (pharynx). It is a very common cause of sore throat. Pharyngitis can be caused by a bacteria, but it is usually caused by a virus. Most cases of pharyngitis get better on their own without treatment.  What are the causes?  This condition may be caused by:  · Infection by viruses (viral). Viral pharyngitis spreads from person to person (is contagious) through coughing, sneezing, and sharing of personal items or utensils such as cups, forks, spoons, and toothbrushes.  · Infection by bacteria (bacterial). Bacterial pharyngitis may be spread by touching the nose or face after coming in contact with the bacteria, or through more intimate contact, such as kissing.  · Allergies. Allergies can cause buildup of mucus in the throat (post-nasal drip), leading to inflammation and irritation. Allergies can also cause blocked nasal passages, forcing breathing through the mouth, which dries and irritates the throat.    What increases the risk?  You are more likely to develop this condition if:  · You are 5-24 years old.  · You are exposed to crowded environments such as , school, or dormitory living.  · You live in a cold climate.  · You have a weakened disease-fighting (immune) system.    What are the signs or symptoms?  Symptoms of this condition vary by the cause (viral, bacterial, or allergies) and can include:  · Sore throat.  · Fatigue.  · Low-grade fever.  · Headache.  · Joint pain and muscle aches.  · Skin rashes.  · Swollen glands in the throat (lymph nodes).  · Plaque-like film on the throat or tonsils. This is often a symptom of bacterial  pharyngitis.  · Vomiting.  · Stuffy nose (nasal congestion).  · Cough.  · Red, itchy eyes (conjunctivitis).  · Loss of appetite.    How is this diagnosed?  This condition is often diagnosed based on your medical history and a physical exam. Your health care provider will ask you questions about your illness and your symptoms. A swab of your throat may be done to check for bacteria (rapid strep test). Other lab tests may also be done, depending on the suspected cause, but these are rare.  How is this treated?  This condition usually gets better in 3-4 days without medicine. Bacterial pharyngitis may be treated with antibiotic medicines.  Follow these instructions at home:  · Take over-the-counter and prescription medicines only as told by your health care provider.  ? If you were prescribed an antibiotic medicine, take it as told by your health care provider. Do not stop taking the antibiotic even if you start to feel better.  ? Do not give children aspirin because of the association with Reye syndrome.  · Drink enough water and fluids to keep your urine clear or pale yellow.  · Get a lot of rest.  · Gargle with a salt-water mixture 3-4 times a day or as needed. To make a salt-water mixture, completely dissolve ½-1 tsp of salt in 1 cup of warm water.  · If your health care provider approves, you may use throat lozenges or sprays to soothe your throat.  Contact a health care provider if:  · You have large, tender lumps in your neck.  · You have a rash.  · You cough up green, yellow-brown, or bloody spit.  Get help right away if:  · Your neck becomes stiff.  · You drool or are unable to swallow liquids.  · You cannot drink or take medicines without vomiting.  · You have severe pain that does not go away, even after you take medicine.  · You have trouble breathing, and it is not caused by a stuffy nose.  · You have new pain and swelling in your joints such as the knees, ankles, wrists, or elbows.  Summary  · Pharyngitis  is redness, pain, and swelling (inflammation) of the throat (pharynx).  · While pharyngitis can be caused by a bacteria, the most common causes are viral.  · Most cases of pharyngitis get better on their own without treatment.  · Bacterial pharyngitis is treated with antibiotic medicines.  This information is not intended to replace advice given to you by your health care provider. Make sure you discuss any questions you have with your health care provider.  Document Released: 12/18/2006 Document Revised: 01/23/2018 Document Reviewed: 01/23/2018  [employer] Interactive Patient Education © 2019 [email or web address]

## 2019-04-02 NOTE — PROGRESS NOTES
Subjective:  Emerson Bang is a 58 y.o. male who presents for       Patient Active Problem List   Diagnosis   • Hyperkalemia   • Iron deficiency anemia   • Gastroesophageal reflux disease with esophagitis   • Elevated AST (SGOT)   • Alcohol abuse   • Hypothyroidism   • Balance problem   • Need for vaccination   • Low magnesium levels   • History of throat cancer   • Vitamin D deficiency   • Alcohol abuse counseling and surveillance   • Encounter for screening for diabetes mellitus   • Hypomagnesemia   • Anemia   • Hyperlipidemia   • Underweight due to inadequate caloric intake   • Need for immunization against influenza   • 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HCC)   • Annual physical exam   • Essential hypertension   • General medical examination   • Underweight   • Epigastric pain   • Gastritis without bleeding   • Need for influenza vaccination   • Elevated liver function tests   • B12 deficiency   • Intestinal malabsorption   • Throat pain   • Acute pharyngitis   • Upper respiratory tract infection           Current Outpatient Medications:   •  aspirin (ASPIRIN LOW DOSE) 81 MG EC tablet, Take 1 tablet by mouth Daily., Disp: 30 tablet, Rfl: 6  •  ferrous sulfate 325 (65 FE) MG tablet, Take 1 tablet by mouth Daily., Disp: 30 tablet, Rfl: 6  •  fluticasone (FLONASE) 50 MCG/ACT nasal spray, 2 sprays into the nostril(s) as directed by provider Every Other Day., Disp: 1 bottle, Rfl: 3  •  levothyroxine (SYNTHROID, LEVOTHROID) 175 MCG tablet, Take 1 tablet by mouth Daily., Disp: 30 tablet, Rfl: 5  •  lisinopril-hydrochlorothiazide (ZESTORETIC) 10-12.5 MG per tablet, Take 1 tablet by mouth Daily., Disp: 30 tablet, Rfl: 6  •  magnesium oxide (MAGOX) 400 (241.3 Mg) MG tablet tablet, Take 2 tablets by mouth Daily., Disp: 60 each, Rfl: 6  •  Multiple Vitamin (MULTI VITAMIN PO), Take  by mouth., Disp: , Rfl:   •  omeprazole (priLOSEC) 40 MG capsule, Take 1 capsule by mouth Daily., Disp: 30 capsule, Rfl: 6  •  simvastatin (ZOCOR)  40 MG tablet, Take 1 tablet by mouth Every Evening., Disp: 30 tablet, Rfl: 6  •  terazosin (HYTRIN) 5 MG capsule, Take 1 capsule by mouth Every Night., Disp: 30 capsule, Rfl: 6  •  vitamin D (ERGOCALCIFEROL) 08593 units capsule capsule, Take 2 caps weekly, Disp: 8 capsule, Rfl: 6    Pt with history of squamous cell carcinoma  Tonsillar cancer, hypothyroidism, Vitamin D deficiency,  GERD, HLP, HTN who is here for recheck. He was treated for chemoradiation and has been seen by ENT Dr. Washington in February for cancer surveillance. He did have larygoscopy in February.  Today he has been having issue with sore throat. He also has seen Endocrinology for uncontrolled acquired hypothyroidism. He was checked for celiac and b12 malabsorption his last TSH was low and pt's dosage of synthroid was decreased from 200 to 175 mcg daily.  Celiac panel showed weak positiveresult . He has also seen Gastroenterology Dr. Chester for elevated LFT and alcoholic cirrhosis. Pt was counseled several occasions to quit or cut down on alcohol.      His sister is with patient today.  He states his throat has been hurting on the right side for 2 weeks.  He does not smokes. He has trouble eating both solids and liquids. He continues to drink alcohol. But has not done so lately due to trouble swallowing.      Sore Throat    This is a new problem. The current episode started in the past 7 days. The problem has been unchanged. There has been no fever. The pain is at a severity of 4/10. The pain is mild. Associated symptoms include congestion, coughing, a plugged ear sensation and trouble swallowing. Pertinent negatives include no abdominal pain, diarrhea, drooling, ear discharge, ear pain, headaches, hoarse voice, neck pain, shortness of breath, stridor, swollen glands or vomiting. He has tried nothing for the symptoms. The treatment provided no relief.   Cough   This is a new problem. The current episode started more than 1 year ago. The problem has  been unchanged. Associated symptoms include a sore throat. Pertinent negatives include no chest pain, chills, ear congestion, ear pain, fever, headaches, heartburn, hemoptysis, myalgias, nasal congestion, postnasal drip or shortness of breath.          Review of Systems  Review of Systems   Constitutional: Positive for activity change and fatigue. Negative for chills and fever.   HENT: Positive for congestion, sore throat and trouble swallowing. Negative for drooling, ear discharge, ear pain, hoarse voice and postnasal drip.    Respiratory: Positive for cough. Negative for hemoptysis, shortness of breath and stridor.    Cardiovascular: Negative for chest pain.   Gastrointestinal: Negative for abdominal pain, diarrhea, heartburn and vomiting.   Musculoskeletal: Negative for myalgias and neck pain.   Neurological: Negative for headaches.       Patient Active Problem List   Diagnosis   • Hyperkalemia   • Iron deficiency anemia   • Gastroesophageal reflux disease with esophagitis   • Elevated AST (SGOT)   • Alcohol abuse   • Hypothyroidism   • Balance problem   • Need for vaccination   • Low magnesium levels   • History of throat cancer   • Vitamin D deficiency   • Alcohol abuse counseling and surveillance   • Encounter for screening for diabetes mellitus   • Hypomagnesemia   • Anemia   • Hyperlipidemia   • Underweight due to inadequate caloric intake   • Need for immunization against influenza   • 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HCC)   • Annual physical exam   • Essential hypertension   • General medical examination   • Underweight   • Epigastric pain   • Gastritis without bleeding   • Need for influenza vaccination   • Elevated liver function tests   • B12 deficiency   • Intestinal malabsorption   • Throat pain   • Acute pharyngitis   • Upper respiratory tract infection     Past Surgical History:   Procedure Laterality Date   • ABDOMINAL WALL SURGERY  11/04/2008    Laparotomy with repair of stomach wall perforation.  "Gastrostomy tube in Witzel tunnel. Left upper quadrant abdominal wall abscess debridement. Gastrostomy tube erosion through & through the anterior gastric wall.Lupper quadrant abdominal wall abscess   • COLONOSCOPY  04/21/2014    Internal & external hemorrhoids found.   • COLONOSCOPY  2014    [place]   • COLONOSCOPY N/A 10/16/2018    Procedure: COLONOSCOPY;  Surgeon: Kaushal Chester MD;  Location: [facility] ENDOSCOPY;  Service: Gastroenterology   • ENDOSCOPY N/A 10/16/2018    Procedure: ESOPHAGOGASTRODUODENOSCOPY;  Surgeon: Kaushal Chester MD;  Location: [facility] ENDOSCOPY;  Service: Gastroenterology   • TRACHEOSTOMY  11/10/2008    Respiratory failure   • UPPER GASTROINTESTINAL ENDOSCOPY  04/21/2014    Esophagitis seen. Biopsy taken. Gastritis in stomach. Biopsy taken. Normal duodenum. Biopsy taken.   • UPPER GASTROINTESTINAL ENDOSCOPY  10/16/2018     Social History     Socioeconomic History   • Marital status: Single     Spouse name: Not on file   • Number of children: Not on file   • Years of education: Not on file   • Highest education level: Not on file   Occupational History   • Occupation: Disabled     Comment: Patient resides alone.   Tobacco Use   • Smoking status: Former Smoker     Packs/day: 1.50     Years: 30.00     Pack years: 45.00     Types: Cigarettes   • Smokeless tobacco: Never Used   Substance and Sexual Activity   • Alcohol use: Yes     Comment: 02/19/2019 - Patient confirmed heavy alcoholic beverage consumption in past with current consumption of 2 - 3 beers twice per week.   • Drug use: No   • Sexual activity: Defer     Family History   Problem Relation Age of Onset   • Coronary artery disease Mother    • Diabetes Mother    • Other Mother         \"Heart And Lung Problems\"   • Liver cancer Sister    • Heart disease Other    • Stroke Other    • Hypertension Other    • Diabetes Other    • Cancer Other    • Colon polyps Other    • Other Father         Unknown   • Other Other         \"Lung " "Problems\"   • Thyroid disease Neg Hx      POCT rapid strep A   Order: [id number]   Status:  Final result   Visible to patient:  No (Not Released) Dx:  Throat pain    Ref Range & Units 09:41   Rapid Strep A Screen Negative, VALID, INVALID, Not Performed Negative    Internal Control Passed Passed    Lot Number  [id number]    Expiration Date  03/29/2021    [facility] LABORATORY         Specimen Collected: 04/02/19 09:41               Office Visit on 01/11/2019   Component Date Value Ref Range Status   • TSH 01/11/2019 <0.020* 0.460 - 4.680 mIU/mL Final   • Gliadin Deamidated Peptide Ab, IgA 01/11/2019 6  0 - 19 units Final                       Negative                   0 - 19                     Weak Positive             20 - 30                     Moderate to Strong Positive   >30   • Tissue Transglutaminase IgA 01/11/2019 4* 0 - 3 U/mL Final                                  Negative        0 -  3                                Weak Positive   4 - 10                                Positive           >10   Tissue Transglutaminase (tTG) has been identified   as the endomysial antigen.  Studies have demonstr-   ated that endomysial IgA antibodies have over 99%   specificity for gluten sensitive enteropathy.   • IgA 01/11/2019 428* 90 - 386 mg/dL Final   • Glucose 01/11/2019 138* 60 - 100 mg/dL Final   • BUN 01/11/2019 11  7 - 21 mg/dL Final   • Creatinine 01/11/2019 0.80  0.70 - 1.30 mg/dL Final   • Sodium 01/11/2019 132* 137 - 145 mmol/L Final   • Potassium 01/11/2019 3.6  3.5 - 5.1 mmol/L Final   • Chloride 01/11/2019 91* 95 - 110 mmol/L Final   • CO2 01/11/2019 29.0  22.0 - 31.0 mmol/L Final   • Calcium 01/11/2019 9.9  8.4 - 10.2 mg/dL Final   • Total Protein 01/11/2019 8.7* 6.3 - 8.6 g/dL Final   • Albumin 01/11/2019 4.70  3.40 - 4.80 g/dL Final   • ALT (SGPT) 01/11/2019 10* 21 - 72 U/L Final   • AST (SGOT) 01/11/2019 43  17 - 59 U/L Final   • Alkaline Phosphatase 01/11/2019 154* 38 - " 126 U/L Final   • Total Bilirubin 01/11/2019 0.2  0.2 - 1.3 mg/dL Final   • eGFR  African Amer 01/11/2019 120* 49 - 113 mL/min/1.73 Final   • Globulin 01/11/2019 4.0* 2.3 - 3.5 gm/dL Final   • A/G Ratio 01/11/2019 1.2  1.1 - 1.8 g/dL Final   • BUN/Creatinine Ratio 01/11/2019 13.8  7.0 - 25.0 Final   • Anion Gap 01/11/2019 12.0  5.0 - 15.0 mmol/L Final   • 25 Hydroxy, Vitamin D 01/11/2019 34.8  30.0 - 100.0 ng/ml Final   • Vitamin B-12 01/11/2019 659  239 - 931 pg/mL Final   • WBC 01/11/2019 7.55  3.20 - 9.80 10*3/mm3 Final   • RBC 01/11/2019 4.49  4.37 - 5.74 10*6/mm3 Final   • Hemoglobin 01/11/2019 12.3* 13.7 - 17.3 g/dL Final   • Hematocrit 01/11/2019 33.9* 39.0 - 49.0 % Final   • MCV 01/11/2019 75.5* 80.0 - 98.0 fL Final   • MCH 01/11/2019 27.4  26.5 - 34.0 pg Final   • MCHC 01/11/2019 36.3  31.5 - 36.3 g/dL Final   • RDW 01/11/2019 13.3  11.5 - 14.5 % Final   • RDW-SD 01/11/2019 36.2  35.1 - 43.9 fl Final   • MPV 01/11/2019 11.3  8.0 - 12.0 fL Final   • Platelets 01/11/2019 417  150 - 450 10*3/mm3 Final   • Neutrophil % 01/11/2019 41.9  37.0 - 80.0 % Final   • Lymphocyte % 01/11/2019 36.4  10.0 - 50.0 % Final   • Monocyte % 01/11/2019 16.8* 0.0 - 12.0 % Final   • Eosinophil % 01/11/2019 2.9  0.0 - 7.0 % Final   • Basophil % 01/11/2019 1.7  0.0 - 2.0 % Final   • Immature Grans % 01/11/2019 0.3  0.0 - 0.5 % Final   • Neutrophils, Absolute 01/11/2019 3.16  2.00 - 8.60 10*3/mm3 Final   • Lymphocytes, Absolute 01/11/2019 2.75  0.60 - 4.20 10*3/mm3 Final   • Monocytes, Absolute 01/11/2019 1.27* 0.00 - 0.90 10*3/mm3 Final   • Eosinophils, Absolute 01/11/2019 0.22  0.00 - 0.70 10*3/mm3 Final   • Basophils, Absolute 01/11/2019 0.13  0.00 - 0.20 10*3/mm3 Final   • Immature Grans, Absolute 01/11/2019 0.02  0.00 - 0.02 10*3/mm3 Final   • Endomysial IgA 01/11/2019 Negative  Negative Final   Lab on 11/15/2018   Component Date Value Ref Range Status   • WBC 11/15/2018 7.29  3.20 - 9.80 10*3/mm3 Final   • RBC 11/15/2018 4.42   4.37 - 5.74 10*6/mm3 Final   • Hemoglobin 11/15/2018 12.8* 13.7 - 17.3 g/dL Final   • Hematocrit 11/15/2018 35.6* 39.0 - 49.0 % Final   • MCV 11/15/2018 80.5  80.0 - 98.0 fL Final   • MCH 11/15/2018 29.0  26.5 - 34.0 pg Final   • MCHC 11/15/2018 36.0  31.5 - 36.3 g/dL Final   • RDW 11/15/2018 13.0  11.5 - 14.5 % Final   • RDW-SD 11/15/2018 38.0  35.1 - 43.9 fl Final   • MPV 11/15/2018 12.2* 8.0 - 12.0 fL Final   • Platelets 11/15/2018 371  150 - 450 10*3/mm3 Final   • Neutrophil % 11/15/2018 37.7  37.0 - 80.0 % Final   • Lymphocyte % 11/15/2018 35.1  10.0 - 50.0 % Final   • Monocyte % 11/15/2018 18.4* 0.0 - 12.0 % Final   • Eosinophil % 11/15/2018 7.1* 0.0 - 7.0 % Final   • Basophil % 11/15/2018 1.6  0.0 - 2.0 % Final   • Immature Grans % 11/15/2018 0.1  0.0 - 0.5 % Final   • Neutrophils, Absolute 11/15/2018 2.74  2.00 - 8.60 10*3/mm3 Final   • Lymphocytes, Absolute 11/15/2018 2.56  0.60 - 4.20 10*3/mm3 Final   • Monocytes, Absolute 11/15/2018 1.34* 0.00 - 0.90 10*3/mm3 Final   • Eosinophils, Absolute 11/15/2018 0.52  0.00 - 0.70 10*3/mm3 Final   • Basophils, Absolute 11/15/2018 0.12  0.00 - 0.20 10*3/mm3 Final   • Immature Grans, Absolute 11/15/2018 0.01  0.00 - 0.02 10*3/mm3 Final   • Hemoglobin A1C 11/15/2018 5.4  4 - 5.6 % Final   • Total Cholesterol 11/15/2018 186  0 - 199 mg/dL Final   • Triglycerides 11/15/2018 87  20 - 199 mg/dL Final   • HDL Cholesterol 11/15/2018 78  60 - 200 mg/dL Final   • LDL Cholesterol  11/15/2018 97  1 - 129 mg/dL Final   • LDL/HDL Ratio 11/15/2018 1.16  0.00 - 3.55 Final   • Glucose 11/15/2018 149* 60 - 100 mg/dL Final   • BUN 11/15/2018 11  7 - 21 mg/dL Final   • Creatinine 11/15/2018 0.74  0.70 - 1.30 mg/dL Final   • Sodium 11/15/2018 135* 137 - 145 mmol/L Final   • Potassium 11/15/2018 4.5  3.5 - 5.1 mmol/L Final   • Chloride 11/15/2018 92* 95 - 110 mmol/L Final   • CO2 11/15/2018 29.0  22.0 - 31.0 mmol/L Final   • Calcium 11/15/2018 10.6* 8.4 - 10.2 mg/dL Final   • Total Protein  11/15/2018 8.3  6.3 - 8.6 g/dL Final   • Albumin 11/15/2018 4.50  3.40 - 4.80 g/dL Final   • ALT (SGPT) 11/15/2018 18* 21 - 72 U/L Final   • AST (SGOT) 11/15/2018 28  17 - 59 U/L Final   • Alkaline Phosphatase 11/15/2018 120  38 - 126 U/L Final   • Total Bilirubin 11/15/2018 0.4  0.2 - 1.3 mg/dL Final   • eGFR   Amer 11/15/2018 131* 49 - 113 mL/min/1.73 Final   • Globulin 11/15/2018 3.8* 2.3 - 3.5 gm/dL Final   • A/G Ratio 11/15/2018 1.2  1.1 - 1.8 g/dL Final   • BUN/Creatinine Ratio 11/15/2018 14.9  7.0 - 25.0 Final   • Anion Gap 11/15/2018 14.0  5.0 - 15.0 mmol/L Final   • TSH 11/15/2018 0.080* 0.460 - 4.680 mIU/mL Final   • Free T4 11/15/2018 1.35  0.78 - 2.19 ng/dL Final   • T3, Free 11/15/2018 3.5  2.0 - 4.4 pg/mL Final   • 25 Hydroxy, Vitamin D 11/15/2018 28.9* 30.0 - 100.0 ng/ml Final   Lab on 11/01/2018   Component Date Value Ref Range Status   • Glucose 11/01/2018 115* 60 - 100 mg/dL Final   • BUN 11/01/2018 11  7 - 21 mg/dL Final   • Creatinine 11/01/2018 0.71  0.70 - 1.30 mg/dL Final   • Sodium 11/01/2018 138  137 - 145 mmol/L Final   • Potassium 11/01/2018 4.0  3.5 - 5.1 mmol/L Final   • Chloride 11/01/2018 100  95 - 110 mmol/L Final   • CO2 11/01/2018 28.0  22.0 - 31.0 mmol/L Final   • Calcium 11/01/2018 9.5  8.4 - 10.2 mg/dL Final   • Total Protein 11/01/2018 8.9* 6.3 - 8.6 g/dL Final   • Albumin 11/01/2018 4.50  3.40 - 4.80 g/dL Final   • ALT (SGPT) 11/01/2018 32  21 - 72 U/L Final   • AST (SGOT) 11/01/2018 50  17 - 59 U/L Final   • Alkaline Phosphatase 11/01/2018 136* 38 - 126 U/L Final   • Total Bilirubin 11/01/2018 0.4  0.2 - 1.3 mg/dL Final   • eGFR  African Amer 11/01/2018 137* 49 - 113 mL/min/1.73 Final   • Globulin 11/01/2018 4.4* 2.3 - 3.5 gm/dL Final   • A/G Ratio 11/01/2018 1.0* 1.1 - 1.8 g/dL Final   • BUN/Creatinine Ratio 11/01/2018 15.5  7.0 - 25.0 Final   • Anion Gap 11/01/2018 10.0  5.0 - 15.0 mmol/L Final   • WBC 11/01/2018 7.40  3.20 - 9.80 10*3/mm3 Final   • RBC 11/01/2018  4.09* 4.37 - 5.74 10*6/mm3 Final   • Hemoglobin 11/01/2018 12.1* 13.7 - 17.3 g/dL Final   • Hematocrit 11/01/2018 32.8* 39.0 - 49.0 % Final   • MCV 11/01/2018 80.2  80.0 - 98.0 fL Final   • MCH 11/01/2018 29.6  26.5 - 34.0 pg Final   • MCHC 11/01/2018 36.9* 31.5 - 36.3 g/dL Final   • RDW 11/01/2018 13.5  11.5 - 14.5 % Final   • RDW-SD 11/01/2018 39.6  35.1 - 43.9 fl Final   • MPV 11/01/2018 10.5  8.0 - 12.0 fL Final   • Platelets 11/01/2018 336  150 - 450 10*3/mm3 Final   • Neutrophil % 11/01/2018 37.9  37.0 - 80.0 % Final   • Lymphocyte % 11/01/2018 35.7  10.0 - 50.0 % Final   • Monocyte % 11/01/2018 17.4* 0.0 - 12.0 % Final   • Eosinophil % 11/01/2018 6.1  0.0 - 7.0 % Final   • Basophil % 11/01/2018 2.8* 0.0 - 2.0 % Final   • Immature Grans % 11/01/2018 0.1  0.0 - 0.5 % Final   • Neutrophils, Absolute 11/01/2018 2.80  2.00 - 8.60 10*3/mm3 Final   • Lymphocytes, Absolute 11/01/2018 2.64  0.60 - 4.20 10*3/mm3 Final   • Monocytes, Absolute 11/01/2018 1.29* 0.00 - 0.90 10*3/mm3 Final   • Eosinophils, Absolute 11/01/2018 0.45  0.00 - 0.70 10*3/mm3 Final   • Basophils, Absolute 11/01/2018 0.21* 0.00 - 0.20 10*3/mm3 Final   • Immature Grans, Absolute 11/01/2018 0.01  0.00 - 0.02 10*3/mm3 Final   • nRBC 11/01/2018 0.0  0.0 - 0.0 /100 WBC Final   Admission on 10/16/2018, Discharged on 10/16/2018   Component Date Value Ref Range Status   • Case Report 10/16/2018    Final                    Value:Surgical Pathology Report                         Case: [id number]                                  Authorizing Provider:  Kaushal Chester MD        Collected:           10/16/2018 02:56 PM          Ordering Location:     [facility]             Received:            10/17/2018 08:27 AM                                 [place] [facility]                                                     Pathologist:           Carlos Martinez MD                                                        Specimen:    Gastric, Antrum, antrum  bx                                                                • Final Diagnosis 10/16/2018    Final                    Value:This result contains rich text formatting which cannot be displayed here.   • Gross Description 10/16/2018    Final                    Value:This result contains rich text formatting which cannot be displayed here.      US Liver  Narrative: EXAM DESCRIPTION: ULTRASOUND LIVER    CLINICAL HISTORY: 14-year-old male with history given for  elevated liver function studies.    COMPARISON: None    FINDINGS:   Liver  Size: No enlargement suspected.   Echogenicity: Relatively homogeneous without focal mass or ductal  dilation.   Surface nodularity: None   Mass (size and location): None.     Bile ducts  Intrahepatic ducts: Normal.   Common bile duct diameter: 6.4 mm     Gallbladder  There is a small fold or catheter near the fundus. There is small  echogenicity present at this location that favors cholelith. No  wall thickening or pericholecystic fluid collection.     Pancreas  Obscured by artifact.    Right kidney  Cortical-medullary differentiation maintained. No shadowing  nephrolithiasis. No suspicious lesion identified.  Hydronephrosis: None.  Size: 9.6 x 4.2 x 4.9 cm     Abdominal aorta and IVC  Visualized portions are normal.     Ascites  None   Impression: No suspicious hepatic lesion or ductal dilation.    Findings suggestive of small cholelith. No wall thickening or  pericholecystic fluid collection.    Common bile duct borderline prominent 6.4 mm.    Electronically signed by:  Uday Hebert MD  11/8/2018 7:49 PM  [facility] Workstation: [id number]    @Deed@  Immunization History   Administered Date(s) Administered   • Flu Mist 10/19/2012   • Flu Vaccine Quad PF >36MO 11/01/2017   • Tdap 02/13/2017   • Tetanus 01/01/2009   • flucelvax quad pfs =>4 YRS 11/15/2018   • influenza Split 10/07/2016       The following portions of the patient's history were reviewed and updated as appropriate:  "allergies, current medications, past family history, past medical history, past social history, past surgical history and problem list.        Physical Exam  /76   Temp 98.6 °F (37 °C)   Ht 165.1 cm (65\")   Wt 50.7 kg (111 lb 12.8 oz)   BMI 18.60 kg/m²     Physical Exam   Constitutional: He is oriented to person, place, and time. He appears well-developed and well-nourished. No distress.   Underweight    HENT:   Head: Normocephalic and atraumatic.   Right Ear: External ear normal.   Mouth/Throat: He does not have dentures. No oral lesions. Dental caries present. No dental abscesses or lacerations. No oropharyngeal exudate, posterior oropharyngeal edema or posterior oropharyngeal erythema.       Eyes: Conjunctivae and EOM are normal. Pupils are equal, round, and reactive to light.   Neck: Normal range of motion. Neck supple.       Cardiovascular: Normal rate, regular rhythm and normal heart sounds.   No murmur heard.  Pulmonary/Chest: Effort normal and breath sounds normal. No respiratory distress.   Abdominal: Soft. Bowel sounds are normal. He exhibits no distension. There is no tenderness.   Musculoskeletal: Normal range of motion. He exhibits no edema or deformity.   Neurological: He is alert and oriented to person, place, and time. No cranial nerve deficit.   Skin: Skin is warm. No rash noted. He is not diaphoretic. No erythema. No pallor.   Psychiatric: He has a normal mood and affect. His behavior is normal.   Nursing note and vitals reviewed.      Assessment/Plan   Problems Addressed this Visit        Cardiovascular and Mediastinum    Hyperlipidemia    Essential hypertension       Respiratory    Acute pharyngitis - Primary    Upper respiratory tract infection       Digestive    Vitamin D deficiency       Endocrine    Hypothyroidism       Nervous and Auditory    Throat pain    Relevant Orders    POCT rapid strep A       Other    History of throat cancer    Underweight        -for throat pain/acute " pharyngitis-  Got rapid strep test today . Recommend Magic Mouth Wash, 5 ml every 6 hours PRN. Also honey. Azithromycin 250 mg  Daily for 5 days.  Strep test negative today   -recheck in 1 week   -advised to go to ER or call 911 if symptoms worrisome or sever  -if not improving consider ENT followup  -continue synthroid 175 mcg PO q daily for hypothryoidism - Endocrinology following  -alcohol abuse/ - counseled pt >5 minutes to quit drinking alcohol. Gave information on withdrawal   -alcohol cirrhosis - Gastroenterology following  -BP at goal today        This document has been electronically signed by Cristino He MD on April 2, 2019 9:49 AM                This document has been electronically signed by Cristino He MD on April 2, 2019 9:36 AM                  This document has been electronically signed by Cristino He MD on April 2, 2019 9:36 AM

## 2019-04-07 NOTE — PROGRESS NOTES
Subjective:  Emerson Bang is a 58 y.o. male who presents for       Patient Active Problem List   Diagnosis   • Hyperkalemia   • Iron deficiency anemia   • Gastroesophageal reflux disease with esophagitis   • Elevated AST (SGOT)   • Alcohol abuse   • Hypothyroidism   • Balance problem   • Need for vaccination   • Low magnesium levels   • History of throat cancer   • Vitamin D deficiency   • Alcohol abuse counseling and surveillance   • Encounter for screening for diabetes mellitus   • Hypomagnesemia   • Anemia   • Hyperlipidemia   • Underweight due to inadequate caloric intake   • Need for immunization against influenza   • 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HCC)   • Annual physical exam   • Essential hypertension   • General medical examination   • Underweight   • Epigastric pain   • Gastritis without bleeding   • Need for influenza vaccination   • Elevated liver function tests   • B12 deficiency   • Intestinal malabsorption   • Throat pain   • Acute pharyngitis   • Upper respiratory tract infection           Current Outpatient Medications:   •  aspirin (ASPIRIN LOW DOSE) 81 MG EC tablet, Take 1 tablet by mouth Daily., Disp: 30 tablet, Rfl: 6  •  azithromycin (ZITHROMAX Z-LISSET) 250 MG tablet, Take 2 tablets the first day, then 1 tablet daily for 4 days., Disp: 6 tablet, Rfl: 0  •  ferrous sulfate 325 (65 FE) MG tablet, Take 1 tablet by mouth Daily., Disp: 30 tablet, Rfl: 6  •  fluticasone (FLONASE) 50 MCG/ACT nasal spray, 2 sprays into the nostril(s) as directed by provider Every Other Day., Disp: 1 bottle, Rfl: 3  •  levothyroxine (SYNTHROID, LEVOTHROID) 175 MCG tablet, Take 1 tablet by mouth Daily., Disp: 30 tablet, Rfl: 5  •  lisinopril-hydrochlorothiazide (ZESTORETIC) 10-12.5 MG per tablet, Take 1 tablet by mouth Daily., Disp: 30 tablet, Rfl: 6  •  magnesium oxide (MAGOX) 400 (241.3 Mg) MG tablet tablet, Take 2 tablets by mouth Daily., Disp: 60 each, Rfl: 6  •  Multiple Vitamin (MULTI VITAMIN PO), Take  by mouth.,  Disp: , Rfl:   •  nystatin (MYCOSTATIN) 050617 UNIT/ML suspension, Swish and swallow 5 mL 4 (Four) Times a Day for 10 days., Disp: 60 mL, Rfl: 1  •  omeprazole (priLOSEC) 40 MG capsule, Take 1 capsule by mouth Daily., Disp: 30 capsule, Rfl: 6  •  simvastatin (ZOCOR) 40 MG tablet, Take 1 tablet by mouth Every Evening., Disp: 30 tablet, Rfl: 6  •  terazosin (HYTRIN) 5 MG capsule, Take 1 capsule by mouth Every Night., Disp: 30 capsule, Rfl: 6  •  vitamin D (ERGOCALCIFEROL) 43221 units capsule capsule, Take 2 caps weekly, Disp: 8 capsule, Rfl: 6    HPI     Pt is 57 yo male with history of throat cancer sp chemoradiation alcohol abuse, acquired hypothyroidism,  GERD , underweight who is here for recheck on acute pharyngitis. He was given prescription for Magic Mouth Wash along with azithromycin. He finished abx already. He has been doing Magic Mouth wash. He states it hurts to swallow both solids and liquids. He has not drank alcohol since last visit. No fever. Pt's pain is 6/10 on severity today.  He has been eating soups and liquids. He lost 3 lbs since last visit     Sore Throat    This is a new problem. The current episode started in the past 7 days. The problem has been unchanged. There has been no fever. The pain is at a severity of 4/10. The pain is mild. Associated symptoms include congestion, coughing, a plugged ear sensation and trouble swallowing. Pertinent negatives include no abdominal pain, diarrhea, drooling, ear discharge, ear pain, headaches, hoarse voice, neck pain, shortness of breath, stridor, swollen glands or vomiting. He has tried nothing for the symptoms. The treatment provided no relief.   Cough   This is a new problem. The current episode started more than 1 year ago. The problem has been unchanged. Associated symptoms include a sore throat. Pertinent negatives include no chest pain, chills, ear congestion, ear pain, fever, headaches, heartburn, hemoptysis, myalgias, nasal congestion, postnasal  drip or shortness of breath.       Review of Systems  Review of Systems   Constitutional: Positive for activity change. Negative for appetite change, chills, diaphoresis, fatigue and fever.   HENT: Positive for sore throat. Negative for congestion, postnasal drip, rhinorrhea, sinus pressure, sinus pain, sneezing, trouble swallowing and voice change.    Respiratory: Negative for cough, choking, chest tightness, shortness of breath, wheezing and stridor.    Cardiovascular: Negative for chest pain.   Gastrointestinal: Negative for diarrhea, nausea and vomiting.   Neurological: Negative for weakness and headaches.       Patient Active Problem List   Diagnosis   • Hyperkalemia   • Iron deficiency anemia   • Gastroesophageal reflux disease with esophagitis   • Elevated AST (SGOT)   • Alcohol abuse   • Hypothyroidism   • Balance problem   • Need for vaccination   • Low magnesium levels   • History of throat cancer   • Vitamin D deficiency   • Alcohol abuse counseling and surveillance   • Encounter for screening for diabetes mellitus   • Hypomagnesemia   • Anemia   • Hyperlipidemia   • Underweight due to inadequate caloric intake   • Need for immunization against influenza   • 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HCC)   • Annual physical exam   • Essential hypertension   • General medical examination   • Underweight   • Epigastric pain   • Gastritis without bleeding   • Need for influenza vaccination   • Elevated liver function tests   • B12 deficiency   • Intestinal malabsorption   • Throat pain   • Acute pharyngitis   • Upper respiratory tract infection     Past Surgical History:   Procedure Laterality Date   • ABDOMINAL WALL SURGERY  11/04/2008    Laparotomy with repair of stomach wall perforation. 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 Left upper quadrant abdominal wall abscess debridement. Gastrostomy tube erosion through & through the anterior gastric wall.Lupper quadrant abdominal wall abscess   • COLONOSCOPY  04/21/2014    Internal  "& external hemorrhoids found.   • COLONOSCOPY  2014    [facility]   • COLONOSCOPY N/A 10/16/2018    Procedure: COLONOSCOPY;  Surgeon: Kaushal Chester MD;  Location: [facility] ENDOSCOPY;  Service: Gastroenterology   • ENDOSCOPY N/A 10/16/2018    Procedure: ESOPHAGOGASTRODUODENOSCOPY;  Surgeon: Kaushal Chester MD;  Location: [facility] ENDOSCOPY;  Service: Gastroenterology   • TRACHEOSTOMY  11/10/2008    Respiratory failure   • UPPER GASTROINTESTINAL ENDOSCOPY  04/21/2014    Esophagitis seen. Biopsy taken. Gastritis in stomach. Biopsy taken. Normal duodenum. Biopsy taken.   • UPPER GASTROINTESTINAL ENDOSCOPY  10/16/2018     Social History     Socioeconomic History   • Marital status: Single     Spouse name: Not on file   • Number of children: Not on file   • Years of education: Not on file   • Highest education level: Not on file   Occupational History   • Occupation: Disabled     Comment: Patient resides alone.   Tobacco Use   • Smoking status: Former Smoker     Packs/day: 1.50     Years: 30.00     Pack years: 45.00     Types: Cigarettes   • Smokeless tobacco: Never Used   Substance and Sexual Activity   • Alcohol use: Yes     Comment: 02/19/2019 - Patient confirmed heavy alcoholic beverage consumption in past with current consumption of 2 - 3 beers twice per week.   • Drug use: No   • Sexual activity: Defer     Family History   Problem Relation Age of Onset   • Coronary artery disease Mother    • Diabetes Mother    • Other Mother         \"Heart And Lung Problems\"   • Liver cancer Sister    • Heart disease Other    • Stroke Other    • Hypertension Other    • Diabetes Other    • Cancer Other    • Colon polyps Other    • Other Father         Unknown   • Other Other         \"Lung Problems\"   • Thyroid disease Neg Hx      Office Visit on 04/02/2019   Component Date Value Ref Range Status   • Rapid Strep A Screen 04/02/2019 Negative  Negative, VALID, INVALID, Not Performed Final   • Internal Control 04/02/2019 Passed  Passed " Final   • Lot Number 04/02/2019 [id number]   Final   • Expiration Date 04/02/2019 03/29/2021   Final   Office Visit on 01/11/2019   Component Date Value Ref Range Status   • TSH 01/11/2019 <0.020* 0.460 - 4.680 mIU/mL Final   • Gliadin Deamidated Peptide Ab, IgA 01/11/2019 6  0 - 19 units Final                       Negative                   0 - 19                     Weak Positive             20 - 30                     Moderate to Strong Positive   >30   • Tissue Transglutaminase IgA 01/11/2019 4* 0 - 3 U/mL Final                                  Negative        0 -  3                                Weak Positive   4 - 10                                Positive           >10   Tissue Transglutaminase (tTG) has been identified   as the endomysial antigen.  Studies have demonstr-   ated that endomysial IgA antibodies have over 99%   specificity for gluten sensitive enteropathy.   • IgA 01/11/2019 428* 90 - 386 mg/dL Final   • Glucose 01/11/2019 138* 60 - 100 mg/dL Final   • BUN 01/11/2019 11  7 - 21 mg/dL Final   • Creatinine 01/11/2019 0.80  0.70 - 1.30 mg/dL Final   • Sodium 01/11/2019 132* 137 - 145 mmol/L Final   • Potassium 01/11/2019 3.6  3.5 - 5.1 mmol/L Final   • Chloride 01/11/2019 91* 95 - 110 mmol/L Final   • CO2 01/11/2019 29.0  22.0 - 31.0 mmol/L Final   • Calcium 01/11/2019 9.9  8.4 - 10.2 mg/dL Final   • Total Protein 01/11/2019 8.7* 6.3 - 8.6 g/dL Final   • Albumin 01/11/2019 4.70  3.40 - 4.80 g/dL Final   • ALT (SGPT) 01/11/2019 10* 21 - 72 U/L Final   • AST (SGOT) 01/11/2019 43  17 - 59 U/L Final   • Alkaline Phosphatase 01/11/2019 154* 38 - 126 U/L Final   • Total Bilirubin 01/11/2019 0.2  0.2 - 1.3 mg/dL Final   • eGFR  African Amer 01/11/2019 120* 49 - 113 mL/min/1.73 Final   • Globulin 01/11/2019 4.0* 2.3 - 3.5 gm/dL Final   • A/G Ratio 01/11/2019 1.2  1.1 - 1.8 g/dL Final   • BUN/Creatinine Ratio 01/11/2019 13.8  7.0 - 25.0 Final   • Anion Gap 01/11/2019 12.0  5.0 - 15.0 mmol/L Final   • 25  Hydroxy, Vitamin D 01/11/2019 34.8  30.0 - 100.0 ng/ml Final   • Vitamin B-12 01/11/2019 659  239 - 931 pg/mL Final   • WBC 01/11/2019 7.55  3.20 - 9.80 10*3/mm3 Final   • RBC 01/11/2019 4.49  4.37 - 5.74 10*6/mm3 Final   • Hemoglobin 01/11/2019 12.3* 13.7 - 17.3 g/dL Final   • Hematocrit 01/11/2019 33.9* 39.0 - 49.0 % Final   • MCV 01/11/2019 75.5* 80.0 - 98.0 fL Final   • MCH 01/11/2019 27.4  26.5 - 34.0 pg Final   • MCHC 01/11/2019 36.3  31.5 - 36.3 g/dL Final   • RDW 01/11/2019 13.3  11.5 - 14.5 % Final   • RDW-SD 01/11/2019 36.2  35.1 - 43.9 fl Final   • MPV 01/11/2019 11.3  8.0 - 12.0 fL Final   • Platelets 01/11/2019 417  150 - 450 10*3/mm3 Final   • Neutrophil % 01/11/2019 41.9  37.0 - 80.0 % Final   • Lymphocyte % 01/11/2019 36.4  10.0 - 50.0 % Final   • Monocyte % 01/11/2019 16.8* 0.0 - 12.0 % Final   • Eosinophil % 01/11/2019 2.9  0.0 - 7.0 % Final   • Basophil % 01/11/2019 1.7  0.0 - 2.0 % Final   • Immature Grans % 01/11/2019 0.3  0.0 - 0.5 % Final   • Neutrophils, Absolute 01/11/2019 3.16  2.00 - 8.60 10*3/mm3 Final   • Lymphocytes, Absolute 01/11/2019 2.75  0.60 - 4.20 10*3/mm3 Final   • Monocytes, Absolute 01/11/2019 1.27* 0.00 - 0.90 10*3/mm3 Final   • Eosinophils, Absolute 01/11/2019 0.22  0.00 - 0.70 10*3/mm3 Final   • Basophils, Absolute 01/11/2019 0.13  0.00 - 0.20 10*3/mm3 Final   • Immature Grans, Absolute 01/11/2019 0.02  0.00 - 0.02 10*3/mm3 Final   • Endomysial IgA 01/11/2019 Negative  Negative Final   Lab on 11/15/2018   Component Date Value Ref Range Status   • WBC 11/15/2018 7.29  3.20 - 9.80 10*3/mm3 Final   • RBC 11/15/2018 4.42  4.37 - 5.74 10*6/mm3 Final   • Hemoglobin 11/15/2018 12.8* 13.7 - 17.3 g/dL Final   • Hematocrit 11/15/2018 35.6* 39.0 - 49.0 % Final   • MCV 11/15/2018 80.5  80.0 - 98.0 fL Final   • MCH 11/15/2018 29.0  26.5 - 34.0 pg Final   • MCHC 11/15/2018 36.0  31.5 - 36.3 g/dL Final   • RDW 11/15/2018 13.0  11.5 - 14.5 % Final   • RDW-SD 11/15/2018 38.0  35.1 - 43.9 fl  Final   • MPV 11/15/2018 12.2* 8.0 - 12.0 fL Final   • Platelets 11/15/2018 371  150 - 450 10*3/mm3 Final   • Neutrophil % 11/15/2018 37.7  37.0 - 80.0 % Final   • Lymphocyte % 11/15/2018 35.1  10.0 - 50.0 % Final   • Monocyte % 11/15/2018 18.4* 0.0 - 12.0 % Final   • Eosinophil % 11/15/2018 7.1* 0.0 - 7.0 % Final   • Basophil % 11/15/2018 1.6  0.0 - 2.0 % Final   • Immature Grans % 11/15/2018 0.1  0.0 - 0.5 % Final   • Neutrophils, Absolute 11/15/2018 2.74  2.00 - 8.60 10*3/mm3 Final   • Lymphocytes, Absolute 11/15/2018 2.56  0.60 - 4.20 10*3/mm3 Final   • Monocytes, Absolute 11/15/2018 1.34* 0.00 - 0.90 10*3/mm3 Final   • Eosinophils, Absolute 11/15/2018 0.52  0.00 - 0.70 10*3/mm3 Final   • Basophils, Absolute 11/15/2018 0.12  0.00 - 0.20 10*3/mm3 Final   • Immature Grans, Absolute 11/15/2018 0.01  0.00 - 0.02 10*3/mm3 Final   • Hemoglobin A1C 11/15/2018 5.4  4 - 5.6 % Final   • Total Cholesterol 11/15/2018 186  0 - 199 mg/dL Final   • Triglycerides 11/15/2018 87  20 - 199 mg/dL Final   • HDL Cholesterol 11/15/2018 78  60 - 200 mg/dL Final   • LDL Cholesterol  11/15/2018 97  1 - 129 mg/dL Final   • LDL/HDL Ratio 11/15/2018 1.16  0.00 - 3.55 Final   • Glucose 11/15/2018 149* 60 - 100 mg/dL Final   • BUN 11/15/2018 11  7 - 21 mg/dL Final   • Creatinine 11/15/2018 0.74  0.70 - 1.30 mg/dL Final   • Sodium 11/15/2018 135* 137 - 145 mmol/L Final   • Potassium 11/15/2018 4.5  3.5 - 5.1 mmol/L Final   • Chloride 11/15/2018 92* 95 - 110 mmol/L Final   • CO2 11/15/2018 29.0  22.0 - 31.0 mmol/L Final   • Calcium 11/15/2018 10.6* 8.4 - 10.2 mg/dL Final   • Total Protein 11/15/2018 8.3  6.3 - 8.6 g/dL Final   • Albumin 11/15/2018 4.50  3.40 - 4.80 g/dL Final   • ALT (SGPT) 11/15/2018 18* 21 - 72 U/L Final   • AST (SGOT) 11/15/2018 28  17 - 59 U/L Final   • Alkaline Phosphatase 11/15/2018 120  38 - 126 U/L Final   • Total Bilirubin 11/15/2018 0.4  0.2 - 1.3 mg/dL Final   • eGFR   Amer 11/15/2018 131* 49 - 113 mL/min/1.73  Final   • Globulin 11/15/2018 3.8* 2.3 - 3.5 gm/dL Final   • A/G Ratio 11/15/2018 1.2  1.1 - 1.8 g/dL Final   • BUN/Creatinine Ratio 11/15/2018 14.9  7.0 - 25.0 Final   • Anion Gap 11/15/2018 14.0  5.0 - 15.0 mmol/L Final   • TSH 11/15/2018 0.080* 0.460 - 4.680 mIU/mL Final   • Free T4 11/15/2018 1.35  0.78 - 2.19 ng/dL Final   • T3, Free 11/15/2018 3.5  2.0 - 4.4 pg/mL Final   • 25 Hydroxy, Vitamin D 11/15/2018 28.9* 30.0 - 100.0 ng/ml Final   Lab on 11/01/2018   Component Date Value Ref Range Status   • Glucose 11/01/2018 115* 60 - 100 mg/dL Final   • BUN 11/01/2018 11  7 - 21 mg/dL Final   • Creatinine 11/01/2018 0.71  0.70 - 1.30 mg/dL Final   • Sodium 11/01/2018 138  137 - 145 mmol/L Final   • Potassium 11/01/2018 4.0  3.5 - 5.1 mmol/L Final   • Chloride 11/01/2018 100  95 - 110 mmol/L Final   • CO2 11/01/2018 28.0  22.0 - 31.0 mmol/L Final   • Calcium 11/01/2018 9.5  8.4 - 10.2 mg/dL Final   • Total Protein 11/01/2018 8.9* 6.3 - 8.6 g/dL Final   • Albumin 11/01/2018 4.50  3.40 - 4.80 g/dL Final   • ALT (SGPT) 11/01/2018 32  21 - 72 U/L Final   • AST (SGOT) 11/01/2018 50  17 - 59 U/L Final   • Alkaline Phosphatase 11/01/2018 136* 38 - 126 U/L Final   • Total Bilirubin 11/01/2018 0.4  0.2 - 1.3 mg/dL Final   • eGFR  African Amer 11/01/2018 137* 49 - 113 mL/min/1.73 Final   • Globulin 11/01/2018 4.4* 2.3 - 3.5 gm/dL Final   • A/G Ratio 11/01/2018 1.0* 1.1 - 1.8 g/dL Final   • BUN/Creatinine Ratio 11/01/2018 15.5  7.0 - 25.0 Final   • Anion Gap 11/01/2018 10.0  5.0 - 15.0 mmol/L Final   • WBC 11/01/2018 7.40  3.20 - 9.80 10*3/mm3 Final   • RBC 11/01/2018 4.09* 4.37 - 5.74 10*6/mm3 Final   • Hemoglobin 11/01/2018 12.1* 13.7 - 17.3 g/dL Final   • Hematocrit 11/01/2018 32.8* 39.0 - 49.0 % Final   • MCV 11/01/2018 80.2  80.0 - 98.0 fL Final   • MCH 11/01/2018 29.6  26.5 - 34.0 pg Final   • MCHC 11/01/2018 36.9* 31.5 - 36.3 g/dL Final   • RDW 11/01/2018 13.5  11.5 - 14.5 % Final   • RDW-SD 11/01/2018 39.6  35.1 - 43.9  fl Final   • MPV 11/01/2018 10.5  8.0 - 12.0 fL Final   • Platelets 11/01/2018 336  150 - 450 10*3/mm3 Final   • Neutrophil % 11/01/2018 37.9  37.0 - 80.0 % Final   • Lymphocyte % 11/01/2018 35.7  10.0 - 50.0 % Final   • Monocyte % 11/01/2018 17.4* 0.0 - 12.0 % Final   • Eosinophil % 11/01/2018 6.1  0.0 - 7.0 % Final   • Basophil % 11/01/2018 2.8* 0.0 - 2.0 % Final   • Immature Grans % 11/01/2018 0.1  0.0 - 0.5 % Final   • Neutrophils, Absolute 11/01/2018 2.80  2.00 - 8.60 10*3/mm3 Final   • Lymphocytes, Absolute 11/01/2018 2.64  0.60 - 4.20 10*3/mm3 Final   • Monocytes, Absolute 11/01/2018 1.29* 0.00 - 0.90 10*3/mm3 Final   • Eosinophils, Absolute 11/01/2018 0.45  0.00 - 0.70 10*3/mm3 Final   • Basophils, Absolute 11/01/2018 0.21* 0.00 - 0.20 10*3/mm3 Final   • Immature Grans, Absolute 11/01/2018 0.01  0.00 - 0.02 10*3/mm3 Final   • nRBC 11/01/2018 0.0  0.0 - 0.0 /100 WBC Final   Admission on 10/16/2018, Discharged on 10/16/2018   Component Date Value Ref Range Status   • Case Report 10/16/2018    Final                    Value:Surgical Pathology Report                         Case: [id number]                                  Authorizing Provider:  Kaushal Chester MD        Collected:           10/16/2018 02:56 PM          Ordering Location:     [facility]             Received:            10/17/2018 08:27 AM                                 [facility]                                                     Pathologist:           Carlos Martinez MD                                                        Specimen:    Gastric, Antrum, antrum bx                                                                • Final Diagnosis 10/16/2018    Final                    Value:This result contains rich text formatting which cannot be displayed here.   • Gross Description 10/16/2018    Final                    Value:This result contains rich text formatting which cannot be displayed here.      US  "Liver  Narrative: EXAM DESCRIPTION: ULTRASOUND LIVER    CLINICAL HISTORY: 14-year-old male with history given for  elevated liver function studies.    COMPARISON: None    FINDINGS:   Liver  Size: No enlargement suspected.   Echogenicity: Relatively homogeneous without focal mass or ductal  dilation.   Surface nodularity: None   Mass (size and location): None.     Bile ducts  Intrahepatic ducts: Normal.   Common bile duct diameter: 6.4 mm     Gallbladder  There is a small fold or catheter near the fundus. There is small  echogenicity present at this location that favors cholelith. No  wall thickening or pericholecystic fluid collection.     Pancreas  Obscured by artifact.    Right kidney  Cortical-medullary differentiation maintained. No shadowing  nephrolithiasis. No suspicious lesion identified.  Hydronephrosis: None.  Size: 9.6 x 4.2 x 4.9 cm     Abdominal aorta and IVC  Visualized portions are normal.     Ascites  None   Impression: No suspicious hepatic lesion or ductal dilation.    Findings suggestive of small cholelith. No wall thickening or  pericholecystic fluid collection.    Common bile duct borderline prominent 6.4 mm.    Electronically signed by:  Uday Hebert MD  11/8/2018 7:49 PM  [facility] Workstation: [id number]    [email or web address]  Immunization History   Administered Date(s) Administered   • Flu Mist 10/19/2012   • Flu Vaccine Quad PF >36MO 11/01/2017   • Tdap 02/13/2017   • Tetanus 01/01/2009   • flucelvax quad pfs =>4 YRS 11/15/2018   • influenza Split 10/07/2016       The following portions of the patient's history were reviewed and updated as appropriate: allergies, current medications, past family history, past medical history, past social history, past surgical history and problem list.        Physical Exam  /80   Pulse 70   Temp 98.6 °F (37 °C)   Ht 165.1 cm (65\")   Wt 49.2 kg (108 lb 6.4 oz)   SpO2 97%   BMI 18.04 kg/m²     Physical Exam   Constitutional: He is oriented to person, place, " and time. He appears well-developed and well-nourished.   Appears thin/underweight    HENT:   Head: Normocephalic and atraumatic.   Right Ear: External ear normal.   Mouth/Throat:       Oral thrush    Eyes: Conjunctivae and EOM are normal. Pupils are equal, round, and reactive to light.   Neck: Normal range of motion. Neck supple.   Cardiovascular: Normal rate, regular rhythm and normal heart sounds.   No murmur heard.  Pulmonary/Chest: Effort normal and breath sounds normal. No respiratory distress.   Abdominal: Soft. Bowel sounds are normal. He exhibits no distension. There is no tenderness.   Musculoskeletal: Normal range of motion. He exhibits no edema or deformity.   Neurological: He is alert and oriented to person, place, and time. No cranial nerve deficit.   Skin: Skin is warm. No rash noted. He is not diaphoretic. No erythema. No pallor.   Psychiatric: He has a normal mood and affect. His behavior is normal.   Nursing note and vitals reviewed.      Assessment/Plan   Problems Addressed this Visit        Cardiovascular and Mediastinum    Hyperlipidemia       Respiratory    Acute pharyngitis    Upper respiratory tract infection       Digestive    Gastroesophageal reflux disease with esophagitis    B12 deficiency       Endocrine    Hypothyroidism       Other    Alcohol abuse    History of throat cancer    Underweight      Other Visit Diagnoses     Oral thrush    -  Primary        -for throat pain/acute pharyngitis-  rapid strep test negative last visit . Recommend Magic Mouth Wash, 5 ml every 6 hours PRN.  Pt has thrush today. Nystatin swish and swallow every 6  hours for 10 days. DRug information and thrust information provided  -underweight - pt lost 3 lbs since last visit.   -recheck in 2 weeks   -advised to go to ER or call 911 if symptoms worrisome or sever  -if not improving consider ENT followup. He has an appt coming up soon  -continue synthroid 175 mcg PO q daily for hypothryoidism - Endocrinology  following  -alcohol abuse/ - counseled pt >5 minutes to quit drinking alcohol. Gave information on withdrawal   -alcohol cirrhosis - Gastroenterology following  -BP at goal today        This document has been electronically signed by Cristino He MD on April 9, 2019 11:07 AM                      This document has been electronically signed by Cristino He MD on April 9, 2019 11:07 AM

## 2019-04-09 ENCOUNTER — OFFICE VISIT (OUTPATIENT)
Dept: FAMILY MEDICINE CLINIC | Facility: CLINIC | Age: 59
End: 2019-04-09

## 2019-04-09 VITALS
SYSTOLIC BLOOD PRESSURE: 120 MMHG | DIASTOLIC BLOOD PRESSURE: 80 MMHG | WEIGHT: 108.4 LBS | HEART RATE: 70 BPM | OXYGEN SATURATION: 97 % | TEMPERATURE: 98.6 F | HEIGHT: 65 IN | BODY MASS INDEX: 18.06 KG/M2

## 2019-04-09 DIAGNOSIS — E53.8 B12 DEFICIENCY: ICD-10-CM

## 2019-04-09 DIAGNOSIS — J02.9 ACUTE PHARYNGITIS, UNSPECIFIED ETIOLOGY: ICD-10-CM

## 2019-04-09 DIAGNOSIS — J06.9 UPPER RESPIRATORY TRACT INFECTION, UNSPECIFIED TYPE: ICD-10-CM

## 2019-04-09 DIAGNOSIS — B37.0 ORAL THRUSH: Primary | ICD-10-CM

## 2019-04-09 DIAGNOSIS — Z85.819 HISTORY OF THROAT CANCER: ICD-10-CM

## 2019-04-09 DIAGNOSIS — E78.5 HYPERLIPIDEMIA, UNSPECIFIED HYPERLIPIDEMIA TYPE: ICD-10-CM

## 2019-04-09 DIAGNOSIS — E03.9 ACQUIRED HYPOTHYROIDISM: ICD-10-CM

## 2019-04-09 DIAGNOSIS — F10.10 ALCOHOL ABUSE: ICD-10-CM

## 2019-04-09 DIAGNOSIS — K21.00 GASTROESOPHAGEAL REFLUX DISEASE WITH ESOPHAGITIS: ICD-10-CM

## 2019-04-09 DIAGNOSIS — R63.6 UNDERWEIGHT: ICD-10-CM

## 2019-04-09 PROCEDURE — 99214 OFFICE O/P EST MOD 30 MIN: CPT | Performed by: FAMILY MEDICINE

## 2019-04-09 NOTE — PATIENT INSTRUCTIONS
Nystatin oral suspension  What is this medicine?  NYSTATIN (gene STAT in) is an antifungal medicine. It is used to treat certain kinds of fungal or yeast infections.  This medicine may be used for other purposes; ask your health care provider or pharmacist if you have questions.  COMMON BRAND NAME(S): Mycostatin, Nystex  What should I tell my health care provider before I take this medicine?  They need to know if you have any of these conditions:  -diabetes  -kidney disease  -an unusual or allergic reaction to nystatin, ethylenediamine, parabens, thimerosal, other foods, dyes or preservatives  -pregnant or trying to get pregnant  -breast-feeding  How should I use this medicine?  Follow the directions on the prescription label. Shake well before using. Use a specially marked dropper to measure every dose. Ask your pharmacist if you do not have one. Put one half of the dose in each side of your mouth. Swish the medicine around in your mouth and gargle. Hold your dose in your mouth for as long as you can. Swallow or spit out as directed by your doctor. Take your medicine at regular intervals. Do not take your medicine more often than directed. Do not skip doses or stop your medicine early even if you feel better. Do not stop taking except on your doctor's advice.  Talk to your pediatrician regarding the use of this medicine in children. Special care may be needed.  Overdosage: If you think you have taken too much of this medicine contact a poison control center or emergency room at once.  NOTE: This medicine is only for you. Do not share this medicine with others.  What if I miss a dose?  If you miss a dose, take it as soon as you can. If it is almost time for your next dose, take only that dose. Do not take double or extra doses.  What may interact with this medicine?  Interactions are not expected.  This list may not describe all possible interactions. Give your health care provider a list of all the medicines, herbs,  non-prescription drugs, or dietary supplements you use. Also tell them if you smoke, drink alcohol, or use illegal drugs. Some items may interact with your medicine.  What should I watch for while using this medicine?  Tell your doctor or health care professional if your symptoms do not improve or get worse. If you wear dentures talk to your doctor about how to clean them.  What side effects may I notice from receiving this medicine?  Side effects that you should report to your doctor or health care professional as soon as possible:  -allergic reactions like skin rash, itching or hives, swelling of the face, lips, or tongue  -fast heart beat  -redness, blistering, peeling or loosening of the skin, including inside the mouth  -trouble breathing  Side effects that usually do not require medical attention (report to your doctor or health care professional if they continue or are bothersome):  -diarrhea  -muscle aches or pains  -nausea, vomiting  -stomach upset  This list may not describe all possible side effects. Call your doctor for medical advice about side effects. You may report side effects to FDA at 4-068-FDA-9474.  Where should I keep my medicine?  Keep out of the reach of children.  Store at room temperature between 15 and 25 degrees C (59 and 77 degrees F). Protect from light. Throw away any unused medicine after the expiration date.  NOTE: This sheet is a summary. It may not cover all possible information. If you have questions about this medicine, talk to your doctor, pharmacist, or health care provider.  © 2019 [employer] (2010-01-19 13:21:00)    Oral Thrush, Adult  Oral thrush, also called oral candidiasis, is a fungal infection that develops in the mouth and throat and on the tongue. It causes white patches to form on the mouth and tongue. Thrush is most common in older adults, but it can occur at any age.  Many cases of thrush are mild, but this infection can also be serious. Thrush can be a  repeated (recurrent) problem for certain people who have a weak body defense system (immune system). The weakness can be caused by chronic illnesses, or by taking medicines that limit the body's ability to fight infection. If a person has difficulty fighting infection, the fungus that causes thrush can spread through the body. This can cause life-threatening blood or organ infections.  What are the causes?  This condition is caused by a fungus (yeast) called Candida albicans.  · This fungus is normally present in small amounts in the mouth and on other mucous membranes. It usually causes no harm.  · If conditions are present that allow the fungus to grow without control, it invades surrounding tissues and becomes an infection.  · Other Candida species can also lead to thrush (rare).    What increases the risk?  This condition is more likely to develop in:  · People with a weakened immune system.  · Older adults.  · People with HIV (human immunodeficiency virus).  · People with diabetes.  · People with dry mouth (xerostomia).  · Pregnant women.  · People with poor dental care, especially people who have false teeth.  · People who use antibiotic medicines.    What are the signs or symptoms?  Symptoms of this condition can vary from mild and moderate to severe and persistent. Symptoms may include:  · A burning feeling in the mouth and throat. This can occur at the start of a thrush infection.  · White patches that stick to the mouth and tongue. The tissue around the patches may be red, raw, and painful. If rubbed (during tooth brushing, for example), the patches and the tissue of the mouth may bleed easily.  · A bad taste in the mouth or difficulty tasting foods.  · A cottony feeling in the mouth.  · Pain during eating and swallowing.  · Poor appetite.  · Cracking at the corners of the mouth.    How is this diagnosed?  This condition is diagnosed based on:  · Physical exam. Your health care provider will look in your  mouth.  · Health history. Your health care provider will ask you questions about your health.    How is this treated?  This condition is treated with medicines called antifungals, which prevent the growth of fungi. These medicines are either applied directly to the affected area (topical) or swallowed (oral). The treatment will depend on the severity of the condition.  Mild thrush  Mild cases of thrush may clear up with the use of an antifungal mouth rinse or lozenges. Treatment usually lasts about 14 days.  Moderate to severe thrush  · More severe thrush infections that have spread to the esophagus are treated with an oral antifungal medicine. A topical antifungal medicine may also be used.  · For some severe infections, treatment may need to continue for more than 14 days.  · Oral antifungal medicines are rarely used during pregnancy because they may be harmful to the unborn child. If you are pregnant, talk with your health care provider about options for treatment.  Persistent or recurrent thrush  For cases of thrush that do not go away or keep coming back:  · Treatment may be needed twice as long as the symptoms last.  · Treatment will include both oral and topical antifungal medicines.  · People with a weakened immune system can take an antifungal medicine on a continuous basis to prevent thrush infections.    It is important to treat conditions that make a person more likely to get thrush, such as diabetes or HIV.  Follow these instructions at home:  Medicines  · Take over-the-counter and prescription medicines only as told by your health care provider.  · Talk with your health care provider about an over-the-counter medicine called gentian violet, which kills bacteria and fungi.  Relieving soreness and discomfort  To help reduce the discomfort of thrush:  · Drink cold liquids such as water or iced tea.  · Try flavored ice treats or frozen juices.  · Eat foods that are easy to swallow, such as gelatin, ice  cream, or custard.  · Try drinking from a straw if the patches in your mouth are painful.    General instructions  · Eat plain, unflavored yogurt as directed by your health care provider. Check the label to make sure the yogurt contains live cultures. This yogurt can help healthy bacteria to grow in the mouth and can stop the growth of the fungus that causes thrush.  · If you wear dentures, remove the dentures before going to bed, brush them vigorously, and soak them in a cleaning solution as directed by your health care provider.  · Rinse your mouth with a warm salt-water mixture several times a day. To make a salt-water mixture, completely dissolve 1/2-1 tsp of salt in 1 cup of warm water.  Contact a health care provider if:  · Your symptoms are getting worse or are not improving within 7 days of starting treatment.  · You have symptoms of a spreading infection, such as white patches on the skin outside of the mouth.  This information is not intended to replace advice given to you by your health care provider. Make sure you discuss any questions you have with your health care provider.  Document Released: 09/12/2005 Document Revised: 09/11/2017 Document Reviewed: 09/11/2017  [employer] Patient Education © 2019 [employer]

## 2019-04-12 ENCOUNTER — HOSPITAL ENCOUNTER (OUTPATIENT)
Dept: CT IMAGING | Facility: HOSPITAL | Age: 59
Discharge: HOME | End: 2019-04-12
Admitting: OTOLARYNGOLOGY

## 2019-04-12 ENCOUNTER — OFFICE VISIT (OUTPATIENT)
Dept: OTOLARYNGOLOGY | Facility: CLINIC | Age: 59
End: 2019-04-12

## 2019-04-12 ENCOUNTER — APPOINTMENT (OUTPATIENT)
Dept: PREADMISSION TESTING | Facility: HOSPITAL | Age: 59
End: 2019-04-12

## 2019-04-12 ENCOUNTER — TELEPHONE (OUTPATIENT)
Dept: ENDOCRINOLOGY | Facility: CLINIC | Age: 59
End: 2019-04-12

## 2019-04-12 ENCOUNTER — HOSPITAL ENCOUNTER (INPATIENT)
Facility: HOSPITAL | Age: 59
LOS: 12 days | Discharge: NURSING, REHAB OR LONG-TERM CARE | End: 2019-04-24
Attending: EMERGENCY MEDICINE | Admitting: SURGERY

## 2019-04-12 ENCOUNTER — LAB (OUTPATIENT)
Dept: LAB | Facility: HOSPITAL | Age: 59
End: 2019-04-12

## 2019-04-12 ENCOUNTER — PREP FOR SURGERY (OUTPATIENT)
Dept: OTHER | Facility: HOSPITAL | Age: 59
End: 2019-04-12

## 2019-04-12 ENCOUNTER — APPOINTMENT (OUTPATIENT)
Dept: GENERAL RADIOLOGY | Facility: HOSPITAL | Age: 59
End: 2019-04-12

## 2019-04-12 ENCOUNTER — OFFICE VISIT (OUTPATIENT)
Dept: ENDOCRINOLOGY | Facility: CLINIC | Age: 59
End: 2019-04-12

## 2019-04-12 ENCOUNTER — APPOINTMENT (OUTPATIENT)
Dept: LAB | Facility: HOSPITAL | Age: 59
End: 2019-04-12

## 2019-04-12 VITALS — TEMPERATURE: 97 F | WEIGHT: 105 LBS | HEIGHT: 65 IN | BODY MASS INDEX: 17.49 KG/M2

## 2019-04-12 VITALS
HEIGHT: 65 IN | BODY MASS INDEX: 17.66 KG/M2 | WEIGHT: 106 LBS | DIASTOLIC BLOOD PRESSURE: 82 MMHG | HEART RATE: 85 BPM | SYSTOLIC BLOOD PRESSURE: 124 MMHG

## 2019-04-12 DIAGNOSIS — R13.12 OROPHARYNGEAL DYSPHAGIA: ICD-10-CM

## 2019-04-12 DIAGNOSIS — D38.0 NEOPLASM OF UNCERTAIN BEHAVIOR OF LARYNX: ICD-10-CM

## 2019-04-12 DIAGNOSIS — Z74.09 IMPAIRED MOBILITY AND ADLS: ICD-10-CM

## 2019-04-12 DIAGNOSIS — D38.0 NEOPLASM OF UNCERTAIN BEHAVIOR OF LARYNX: Primary | ICD-10-CM

## 2019-04-12 DIAGNOSIS — R13.14 PHARYNGOESOPHAGEAL DYSPHAGIA: ICD-10-CM

## 2019-04-12 DIAGNOSIS — J38.00 VOCAL CORD PARALYSIS: ICD-10-CM

## 2019-04-12 DIAGNOSIS — Z74.09 IMPAIRED PHYSICAL MOBILITY: ICD-10-CM

## 2019-04-12 DIAGNOSIS — N17.9 ACUTE RENAL FAILURE, UNSPECIFIED ACUTE RENAL FAILURE TYPE (HCC): Primary | ICD-10-CM

## 2019-04-12 DIAGNOSIS — E03.8 OTHER SPECIFIED HYPOTHYROIDISM: Primary | ICD-10-CM

## 2019-04-12 DIAGNOSIS — R64 CACHEXIA (HCC): ICD-10-CM

## 2019-04-12 DIAGNOSIS — Z78.9 IMPAIRED MOBILITY AND ADLS: ICD-10-CM

## 2019-04-12 PROBLEM — Z00.00 ANNUAL PHYSICAL EXAM: Status: RESOLVED | Noted: 2018-05-10 | Resolved: 2019-04-12

## 2019-04-12 LAB
ANION GAP SERPL CALCULATED.3IONS-SCNC: 26 MMOL/L
BASOPHILS # BLD AUTO: 0.08 10*3/MM3 (ref 0–0.2)
BASOPHILS NFR BLD AUTO: 0.8 % (ref 0–1.5)
BUN BLD-MCNC: 122 MG/DL (ref 8–23)
BUN/CREAT SERPL: 28.8 (ref 7–25)
CALCIUM SPEC-SCNC: 11 MG/DL (ref 8.6–10.5)
CHLORIDE SERPL-SCNC: 89 MMOL/L (ref 98–107)
CO2 SERPL-SCNC: 23 MMOL/L (ref 22–29)
CREAT BLD-MCNC: 4.24 MG/DL (ref 0.76–1.27)
DEPRECATED RDW RBC AUTO: 34.6 FL (ref 37–54)
EOSINOPHIL # BLD AUTO: 0.08 10*3/MM3 (ref 0–0.4)
EOSINOPHIL NFR BLD AUTO: 0.8 % (ref 0.3–6.2)
ERYTHROCYTE [DISTWIDTH] IN BLOOD BY AUTOMATED COUNT: 13.2 % (ref 12.3–15.4)
GFR SERPL CREATININE-BSD FRML MDRD: 17 ML/MIN/1.73
GLUCOSE BLD-MCNC: 122 MG/DL (ref 65–99)
HCT VFR BLD AUTO: 38.7 % (ref 37.5–51)
HGB BLD-MCNC: 13.7 G/DL (ref 13–17.7)
HOLD SPECIMEN: NORMAL
HOLD SPECIMEN: NORMAL
IMM GRANULOCYTES # BLD AUTO: 0.02 10*3/MM3 (ref 0–0.05)
IMM GRANULOCYTES NFR BLD AUTO: 0.2 % (ref 0–0.5)
LYMPHOCYTES # BLD AUTO: 1.51 10*3/MM3 (ref 0.7–3.1)
LYMPHOCYTES NFR BLD AUTO: 15.9 % (ref 19.6–45.3)
MCH RBC QN AUTO: 26.2 PG (ref 26.6–33)
MCHC RBC AUTO-ENTMCNC: 35.4 G/DL (ref 31.5–35.7)
MCV RBC AUTO: 74 FL (ref 79–97)
MONOCYTES # BLD AUTO: 1.08 10*3/MM3 (ref 0.1–0.9)
MONOCYTES NFR BLD AUTO: 11.4 % (ref 5–12)
NEUTROPHILS # BLD AUTO: 6.72 10*3/MM3 (ref 1.4–7)
NEUTROPHILS NFR BLD AUTO: 70.9 % (ref 42.7–76)
NRBC BLD AUTO-RTO: 0 /100 WBC (ref 0–0)
PLATELET # BLD AUTO: 565 10*3/MM3 (ref 140–450)
PMV BLD AUTO: 11.7 FL (ref 6–12)
POTASSIUM BLD-SCNC: 4.4 MMOL/L (ref 3.5–5.2)
RBC # BLD AUTO: 5.23 10*6/MM3 (ref 4.14–5.8)
SODIUM BLD-SCNC: 138 MMOL/L (ref 136–145)
T4 FREE SERPL-MCNC: 2.11 NG/DL (ref 0.93–1.7)
TSH SERPL DL<=0.05 MIU/L-ACNC: 0.04 MIU/ML (ref 0.27–4.2)
WBC NRBC COR # BLD: 9.49 10*3/MM3 (ref 3.4–10.8)
WHOLE BLOOD HOLD SPECIMEN: NORMAL

## 2019-04-12 PROCEDURE — 70490 CT SOFT TISSUE NECK W/O DYE: CPT

## 2019-04-12 PROCEDURE — 99213 OFFICE O/P EST LOW 20 MIN: CPT | Performed by: NURSE PRACTITIONER

## 2019-04-12 PROCEDURE — 85025 COMPLETE CBC W/AUTO DIFF WBC: CPT | Performed by: EMERGENCY MEDICINE

## 2019-04-12 PROCEDURE — 31575 DIAGNOSTIC LARYNGOSCOPY: CPT | Performed by: OTOLARYNGOLOGY

## 2019-04-12 PROCEDURE — 80048 BASIC METABOLIC PNL TOTAL CA: CPT

## 2019-04-12 PROCEDURE — 84443 ASSAY THYROID STIM HORMONE: CPT | Performed by: NURSE PRACTITIONER

## 2019-04-12 PROCEDURE — 99284 EMERGENCY DEPT VISIT MOD MDM: CPT

## 2019-04-12 PROCEDURE — 71045 X-RAY EXAM CHEST 1 VIEW: CPT

## 2019-04-12 PROCEDURE — 99223 1ST HOSP IP/OBS HIGH 75: CPT | Performed by: FAMILY MEDICINE

## 2019-04-12 PROCEDURE — 36415 COLL VENOUS BLD VENIPUNCTURE: CPT

## 2019-04-12 PROCEDURE — 99214 OFFICE O/P EST MOD 30 MIN: CPT | Performed by: OTOLARYNGOLOGY

## 2019-04-12 PROCEDURE — 84439 ASSAY OF FREE THYROXINE: CPT | Performed by: NURSE PRACTITIONER

## 2019-04-12 RX ORDER — SODIUM CHLORIDE 9 MG/ML
150 INJECTION, SOLUTION INTRAVENOUS CONTINUOUS
Status: DISCONTINUED | OUTPATIENT
Start: 2019-04-12 | End: 2019-04-13

## 2019-04-12 RX ORDER — SODIUM CHLORIDE 0.9 % (FLUSH) 0.9 %
3 SYRINGE (ML) INJECTION EVERY 12 HOURS SCHEDULED
Status: DISCONTINUED | OUTPATIENT
Start: 2019-04-12 | End: 2019-04-24 | Stop reason: HOSPADM

## 2019-04-12 RX ORDER — SODIUM CHLORIDE 9 MG/ML
125 INJECTION, SOLUTION INTRAVENOUS CONTINUOUS
Status: DISCONTINUED | OUTPATIENT
Start: 2019-04-12 | End: 2019-04-12 | Stop reason: SDUPTHER

## 2019-04-12 RX ORDER — SODIUM CHLORIDE 0.9 % (FLUSH) 0.9 %
3-10 SYRINGE (ML) INJECTION AS NEEDED
Status: DISCONTINUED | OUTPATIENT
Start: 2019-04-12 | End: 2019-04-24 | Stop reason: HOSPADM

## 2019-04-12 RX ORDER — FAMOTIDINE 10 MG/ML
20 INJECTION, SOLUTION INTRAVENOUS DAILY
Status: DISCONTINUED | OUTPATIENT
Start: 2019-04-13 | End: 2019-04-16

## 2019-04-12 RX ORDER — LEVOTHYROXINE SODIUM ANHYDROUS 100 UG/5ML
75 INJECTION, POWDER, LYOPHILIZED, FOR SOLUTION INTRAVENOUS
Status: DISCONTINUED | OUTPATIENT
Start: 2019-04-13 | End: 2019-04-16

## 2019-04-12 RX ORDER — HYDRALAZINE HYDROCHLORIDE 20 MG/ML
10 INJECTION INTRAMUSCULAR; INTRAVENOUS EVERY 6 HOURS PRN
Status: DISCONTINUED | OUTPATIENT
Start: 2019-04-12 | End: 2019-04-24 | Stop reason: HOSPADM

## 2019-04-12 RX ORDER — LEVOTHYROXINE SODIUM 150 MCG
150 TABLET ORAL DAILY
Qty: 30 TABLET | Refills: 11 | Status: SHIPPED | OUTPATIENT
Start: 2019-04-12 | End: 2019-04-24 | Stop reason: HOSPADM

## 2019-04-12 RX ADMIN — NYSTATIN 500000 UNITS: 100000 SUSPENSION ORAL at 21:14

## 2019-04-12 RX ADMIN — SODIUM CHLORIDE 1000 ML: 900 INJECTION, SOLUTION INTRAVENOUS at 15:13

## 2019-04-12 RX ADMIN — SODIUM CHLORIDE 125 ML/HR: 900 INJECTION, SOLUTION INTRAVENOUS at 15:13

## 2019-04-12 NOTE — PROGRESS NOTES
"Subjective   Emerson Bang is a 58 y.o. male.     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  Was treated with concurrent chemoradiation with a complete response that did leave a significant soft tissue defect in the palate.  Was seen on 2/8/2019.  Did not have any evidence of neoplasm on exam including fiberoptic exam at that point.  Reports that approximately 1 month ago he developed throat pain and difficulty swallowing.  Nothing in particular brought this on.  No hemoptysis.  Symptoms are present on a daily basis.  Weight is down 21 pounds since last visit with me.      The following portions of the patient's history were reviewed and updated as appropriate: allergies, current medications, past family history, past medical history, past social history, past surgical history and problem list.      Emerson Bang reports that he has quit smoking. His smoking use included cigarettes. He has a 45.00 pack-year smoking history. He has never used smokeless tobacco. He reports that he drinks alcohol. He reports that he does not use drugs.  Patient is not a tobacco user and has not been counseled for use of tobacco products    Family History   Problem Relation Age of Onset   • Coronary artery disease Mother    • Diabetes Mother    • Other Mother         \"Heart And Lung Problems\"   • Liver cancer Sister    • Heart disease Other    • Stroke Other    • Hypertension Other    • Diabetes Other    • Cancer Other    • Colon polyps Other    • Other Father         Unknown   • Other Other         \"Lung Problems\"   • Thyroid disease Neg Hx        No Known Allergies      Current Outpatient Medications:   •  aspirin (ASPIRIN LOW DOSE) 81 MG EC tablet, Take 1 tablet by mouth Daily., Disp: 30 tablet, Rfl: 6  •  azithromycin (ZITHROMAX Z-LISSET) 250 MG tablet, Take 2 tablets the first day, then 1 tablet daily for 4 days., " Disp: 6 tablet, Rfl: 0  •  ferrous sulfate 325 (65 FE) MG tablet, Take 1 tablet by mouth Daily., Disp: 30 tablet, Rfl: 6  •  fluticasone (FLONASE) 50 MCG/ACT nasal spray, 2 sprays into the nostril(s) as directed by provider Every Other Day., Disp: 1 bottle, Rfl: 3  •  levothyroxine (SYNTHROID, LEVOTHROID) 175 MCG tablet, Take 1 tablet by mouth Daily., Disp: 30 tablet, Rfl: 5  •  lisinopril-hydrochlorothiazide (ZESTORETIC) 10-12.5 MG per tablet, Take 1 tablet by mouth Daily., Disp: 30 tablet, Rfl: 6  •  magnesium oxide (MAGOX) 400 (241.3 Mg) MG tablet tablet, Take 2 tablets by mouth Daily., Disp: 60 each, Rfl: 6  •  Multiple Vitamin (MULTI VITAMIN PO), Take  by mouth., Disp: , Rfl:   •  nystatin (MYCOSTATIN) 772277 UNIT/ML suspension, Swish and swallow 5 mL 4 (Four) Times a Day for 10 days., Disp: 60 mL, Rfl: 1  •  omeprazole (priLOSEC) 40 MG capsule, Take 1 capsule by mouth Daily., Disp: 30 capsule, Rfl: 6  •  simvastatin (ZOCOR) 40 MG tablet, Take 1 tablet by mouth Every Evening., Disp: 30 tablet, Rfl: 6  •  terazosin (HYTRIN) 5 MG capsule, Take 1 capsule by mouth Every Night., Disp: 30 capsule, Rfl: 6  •  vitamin D (ERGOCALCIFEROL) 88580 units capsule capsule, Take 2 caps weekly, Disp: 8 capsule, Rfl: 6    Past Medical History:   Diagnosis Date   • Alcohol abuse    • Allergic rhinitis     vs URI   • Anemia    • At risk for falls    • Benign prostatic hyperplasia    • Chronic gastritis    • Complication of gastrostomy (CMS/HCC)     site not healing   • COPD (chronic obstructive pulmonary disease) (CMS/HCC)    • Dysphagia     odynophagia   • Epigastric pain    • Esophagitis    • GERD (gastroesophageal reflux disease)    • Hypothyroidism, unspecified    • Low back pain    • Malaise and fatigue    • Nasal congestion 11/15/2018   • Nausea with vomiting, unspecified    • Pain in left knee    • Pain in right knee    • Primary malignant neoplasm of pharynx (CMS/HCC)    • Screening for malignant neoplasm of colon    •  Vitamin D deficiency          Review of Systems   Constitutional: Positive for unexpected weight change.   HENT: Positive for sore throat and trouble swallowing.    Respiratory: Negative for cough.            Objective   Physical Exam  General: Thin, chronically ill-appearing male who is in no acute distress.  He appears alert and oriented.  Head normocephalic.  Voice hyponasal and harsh, significantly changed from previous.  Speech: Somewhat reticent but fluent  Ears: External ears no deformity, canals no discharge, tympanic membranes intact clear and mobile bilaterally.  Nose: Nares show no discharge mass polyp or purulence.  Boggy mucosa is present.  No gross external deformity.  Septum: To the right  Oral cavity: Lips and gums without lesions.  Tongue and floor of mouth without lesions.  Parotid and submandibular ducts unobstructed.  No mucosal lesions on the buccal mucosa or vestibule of the mouth.  Teeth: Numerous caries  Pharynx: Soft tissue defect in the palate and right tonsil/pharyngeal region with some crusted secretions, unchanged from previous.  Neck: Posttreatment changes in a well-healed tracheostomy site.  Subjectively tender to palpation of the sternocleidomastoid muscles bilaterally.  No palpable masses or lymphadenopathy.  Trachea and larynx are midline.  No masses in the parotid or submandibular glands  Chest: Clear.  Heart: Regular.  Abdomen: Benign.    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  After topical anesthesia and decongestion, the 4 mm laryngoscope was passed.  The nasal cavities, nasopharynx, oropharynx, hypopharynx, and larynx  were all examined.  Vocal cords were examined during respiration and phonation.  The following findings were noted:    Findings: No masses in the nose.  Some dried secretions present in the nasopharynx which is chronic.  Tongue base shows no overt neoplasm.  Endolarynx shows paralysis of the right true vocal cord which is a new finding.  There is pooling of secretions in the right piriform sinus.  Overt neoplasm is not appreciated but the right vocal cord paralysis and pooling of secretions suggest a piriform sinus lesion involving the lateral aspect right hemilarynx.    Condition:  Stable.  Patient tolerated procedure well.    Complications:  None        Assessment/Plan   Emerson was seen today for cancer surveillance.    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  Recommend proceeding expeditiously with direct laryngoscopy with biopsy and if possible esophagoscopy.  His larynx does not appear to be obstructed so I believe he could likely be oral endotracheally intubated and extubated without having to perform a tracheostomy although I did explain to the patient that if he experiences significant airway distress during surgery and emergent tracheostomy might have to be performed.  I explained the nature of the procedure to him including risks of bleeding and damage to the throat or voice box as well as almost certain need for additional treatment depending on findings.  Benefit would be to establish a definitive diagnosis.  The alternative would be observation which would leave in almost certain malignancy undetected.  Patient voices understanding all the above and wished to proceed with surgery.  This will be performed on Monday, 4/15/2019.  We will plan on obtaining a CT scan of the neck with contrast prior to surgery that morning.

## 2019-04-12 NOTE — PROGRESS NOTES
Subjective    Emerson Bang is a 58 y.o. male. he is here today for follow-up.    History of Present Illness       58 year old male presents for follow up      Reason - hypothyroidism not controlled         Duration  - since 2015     Timing - constant      Quality - not controlled      Severity - moderate      Context - routine blood work by primary revealed hypothyroidism      Location/size - no ultrasound      Quantity -       Lab Results   Component Value Date    TSH <0.020 (L) 01/11/2019                Symptoms - fatigue     Alleviating Factors - compliance     Aggravating Factors - taking 30 minutes after meal               Evaluation history:  TSH   Date Value Ref Range Status   01/11/2019 <0.020 (L) 0.460 - 4.680 mIU/mL Final     Free T4   Date Value Ref Range Status   11/15/2018 1.35 0.78 - 2.19 ng/dL Final     T3, Free   Date Value Ref Range Status   11/15/2018 3.5 2.0 - 4.4 pg/mL Final       Current medications:  Current Outpatient Medications   Medication Sig Dispense Refill   • aspirin (ASPIRIN LOW DOSE) 81 MG EC tablet Take 1 tablet by mouth Daily. 30 tablet 6   • azithromycin (ZITHROMAX Z-LISSET) 250 MG tablet Take 2 tablets the first day, then 1 tablet daily for 4 days. 6 tablet 0   • ferrous sulfate 325 (65 FE) MG tablet Take 1 tablet by mouth Daily. 30 tablet 6   • fluticasone (FLONASE) 50 MCG/ACT nasal spray 2 sprays into the nostril(s) as directed by provider Every Other Day. 1 bottle 3   • levothyroxine (SYNTHROID, LEVOTHROID) 175 MCG tablet Take 1 tablet by mouth Daily. 30 tablet 5   • lisinopril-hydrochlorothiazide (ZESTORETIC) 10-12.5 MG per tablet Take 1 tablet by mouth Daily. 30 tablet 6   • magnesium oxide (MAGOX) 400 (241.3 Mg) MG tablet tablet Take 2 tablets by mouth Daily. 60 each 6   • Multiple Vitamin (MULTI VITAMIN PO) Take  by mouth.     • nystatin (MYCOSTATIN) 271982 UNIT/ML suspension Swish and swallow 5 mL 4 (Four) Times a Day for 10 days. 60 mL 1   • omeprazole (priLOSEC) 40  MG capsule Take 1 capsule by mouth Daily. 30 capsule 6   • simvastatin (ZOCOR) 40 MG tablet Take 1 tablet by mouth Every Evening. 30 tablet 6   • terazosin (HYTRIN) 5 MG capsule Take 1 capsule by mouth Every Night. 30 capsule 6   • vitamin D (ERGOCALCIFEROL) 96567 units capsule capsule Take 2 caps weekly 8 capsule 6     No current facility-administered medications for this visit.        The following portions of the patient's history were reviewed and updated as appropriate:   Past Medical History:   Diagnosis Date   • Alcohol abuse    • Allergic rhinitis     vs URI   • Anemia    • At risk for falls    • Benign prostatic hyperplasia    • Chronic gastritis    • Complication of gastrostomy (CMS/HCC)     site not healing   • COPD (chronic obstructive pulmonary disease) (CMS/HCC)    • Dysphagia     odynophagia   • Epigastric pain    • Esophagitis    • GERD (gastroesophageal reflux disease)    • Hypothyroidism, unspecified    • Low back pain    • Malaise and fatigue    • Nasal congestion 11/15/2018   • Nausea with vomiting, unspecified    • Pain in left knee    • Pain in right knee    • Primary malignant neoplasm of pharynx (CMS/HCC)    • Screening for malignant neoplasm of colon    • Vitamin D deficiency      Past Surgical History:   Procedure Laterality Date   • ABDOMINAL WALL SURGERY  11/04/2008    Laparotomy with repair of stomach wall perforation. 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 Left upper quadrant abdominal wall abscess debridement. Gastrostomy tube erosion through & through the anterior gastric wall.Lupper quadrant abdominal wall abscess   • COLONOSCOPY  04/21/2014    Internal & external hemorrhoids found.   • COLONOSCOPY  2014    [place]   • COLONOSCOPY N/A 10/16/2018    Procedure: COLONOSCOPY;  Surgeon: Kaushal Chester MD;  Location: [facility] ENDOSCOPY;  Service: Gastroenterology   • ENDOSCOPY N/A 10/16/2018    Procedure: ESOPHAGOGASTRODUODENOSCOPY;  Surgeon: Kaushal Chester MD;  Location: [facility]  "ENDOSCOPY;  Service: Gastroenterology   • TRACHEOSTOMY  11/10/2008    Respiratory failure   • UPPER GASTROINTESTINAL ENDOSCOPY  04/21/2014    Esophagitis seen. Biopsy taken. Gastritis in stomach. Biopsy taken. Normal duodenum. Biopsy taken.   • UPPER GASTROINTESTINAL ENDOSCOPY  10/16/2018     Family History   Problem Relation Age of Onset   • Coronary artery disease Mother    • Diabetes Mother    • Other Mother         \"Heart And Lung Problems\"   • Liver cancer Sister    • Heart disease Other    • Stroke Other    • Hypertension Other    • Diabetes Other    • Cancer Other    • Colon polyps Other    • Other Father         Unknown   • Other Other         \"Lung Problems\"   • Thyroid disease Neg Hx        No Known Allergies  Social History     Socioeconomic History   • Marital status: Single     Spouse name: Not on file   • Number of children: Not on file   • Years of education: Not on file   • Highest education level: Not on file   Occupational History   • Occupation: Disabled     Comment: Patient resides alone.   Tobacco Use   • Smoking status: Former Smoker     Packs/day: 1.50     Years: 30.00     Pack years: 45.00     Types: Cigarettes   • Smokeless tobacco: Never Used   Substance and Sexual Activity   • Alcohol use: Yes     Comment: 02/19/2019 - Patient confirmed heavy alcoholic beverage consumption in past with current consumption of 2 - 3 beers twice per week.   • Drug use: No   • Sexual activity: Defer       Review of Systems  Review of Systems   Constitutional: Negative for activity change, appetite change, diaphoresis and fatigue.   HENT: Negative for facial swelling, sneezing, sore throat, tinnitus, trouble swallowing and voice change.    Eyes: Negative for photophobia, pain, discharge, redness, itching and visual disturbance.   Respiratory: Negative for apnea, cough, choking, chest tightness and shortness of breath.    Cardiovascular: Negative for chest pain, palpitations and leg swelling.   Gastrointestinal: " "Negative for abdominal distention, abdominal pain, constipation, diarrhea, nausea and vomiting.   Endocrine: Negative for cold intolerance, heat intolerance, polydipsia, polyphagia and polyuria.   Genitourinary: Negative for difficulty urinating, dysuria, frequency, hematuria and urgency.   Musculoskeletal: Negative for arthralgias, back pain, gait problem, joint swelling, myalgias, neck pain and neck stiffness.   Skin: Negative for color change, pallor, rash and wound.   Neurological: Negative for dizziness, tremors, weakness, light-headedness, numbness and headaches.   Hematological: Negative for adenopathy. Does not bruise/bleed easily.   Psychiatric/Behavioral: Negative for behavioral problems, confusion and sleep disturbance.        Objective    /82 (BP Location: Right arm, Patient Position: Sitting, Cuff Size: Adult)   Pulse 85   Ht 165.1 cm (65\")   Wt 48.1 kg (106 lb)   BMI 17.64 kg/m²   Physical Exam   Constitutional: He is oriented to person, place, and time. He appears well-developed and well-nourished. No distress.   HENT:   Head: Normocephalic and atraumatic.   Right Ear: External ear normal.   Left Ear: External ear normal.   Nose: Nose normal.   Eyes: Conjunctivae and EOM are normal. Pupils are equal, round, and reactive to light.   Neck: Normal range of motion. Neck supple. No tracheal deviation present. No thyromegaly present.   Cardiovascular: Normal rate, regular rhythm and normal heart sounds.   No murmur heard.  Pulmonary/Chest: Effort normal and breath sounds normal. No respiratory distress. He has no wheezes.   Abdominal: Soft. Bowel sounds are normal. There is no tenderness. There is no rebound and no guarding.   Musculoskeletal: Normal range of motion. He exhibits no edema, tenderness or deformity.   Neurological: He is alert and oriented to person, place, and time. No cranial nerve deficit.   Skin: Skin is warm and dry. No rash noted.   Psychiatric: He has a normal mood and affect. " His behavior is normal. Judgment and thought content normal.       Lab Review  Lab Results   Component Value Date    TSH <0.020 (L) 01/11/2019     Lab Results   Component Value Date    FREET4 1.35 11/15/2018        Assessment/Plan      1. Other specified hypothyroidism    . This diagnosis was discussed and reviewed with the patient including the advantages of drug therapy.     1. Orders placed during this encounter include:  Orders Placed This Encounter   Procedures   • T4, Free   • TSH       Medications prescribed:  Outpatient Encounter Medications as of 4/12/2019   Medication Sig Dispense Refill   • aspirin (ASPIRIN LOW DOSE) 81 MG EC tablet Take 1 tablet by mouth Daily. 30 tablet 6   • azithromycin (ZITHROMAX Z-LISSET) 250 MG tablet Take 2 tablets the first day, then 1 tablet daily for 4 days. 6 tablet 0   • ferrous sulfate 325 (65 FE) MG tablet Take 1 tablet by mouth Daily. 30 tablet 6   • fluticasone (FLONASE) 50 MCG/ACT nasal spray 2 sprays into the nostril(s) as directed by provider Every Other Day. 1 bottle 3   • levothyroxine (SYNTHROID, LEVOTHROID) 175 MCG tablet Take 1 tablet by mouth Daily. 30 tablet 5   • lisinopril-hydrochlorothiazide (ZESTORETIC) 10-12.5 MG per tablet Take 1 tablet by mouth Daily. 30 tablet 6   • magnesium oxide (MAGOX) 400 (241.3 Mg) MG tablet tablet Take 2 tablets by mouth Daily. 60 each 6   • Multiple Vitamin (MULTI VITAMIN PO) Take  by mouth.     • nystatin (MYCOSTATIN) 066425 UNIT/ML suspension Swish and swallow 5 mL 4 (Four) Times a Day for 10 days. 60 mL 1   • omeprazole (priLOSEC) 40 MG capsule Take 1 capsule by mouth Daily. 30 capsule 6   • simvastatin (ZOCOR) 40 MG tablet Take 1 tablet by mouth Every Evening. 30 tablet 6   • terazosin (HYTRIN) 5 MG capsule Take 1 capsule by mouth Every Night. 30 capsule 6   • vitamin D (ERGOCALCIFEROL) 04804 units capsule capsule Take 2 caps weekly 8 capsule 6     No facility-administered encounter medications on file as of 4/12/2019.       Hypothyroidism not controlled            levothyroxine 200 mcg daily --decreased to 175 mcg daily      Lab Results   Component Value Date    TSH <0.020 (L) 01/11/2019     Repeat lab today      Medication must me taken on an empty stomach at least one hour before food, drink and other medications. Only take with water. If taking vitamins or antiacids needs to be 4 hours before or after.     He will try to take a lunch because he takes Prilosec at breakfast     Due to variability in TSH since 2015  need brand name synthroid           If no improvement will change to brand name synthroid       Lab Results   Component Value Date    ENZKEOTV30 659 01/11/2019       Celiac showed weak negative       Labs today          4. Return in about 3 months (around 7/12/2019) for Recheck.

## 2019-04-12 NOTE — TELEPHONE ENCOUNTER
----- Message from ZARA Fenton sent at 4/12/2019  3:53 PM CDT -----  Thyroid level still overactive decrease levothryoxine to 150 mcg daily and can we try to get brand name approved

## 2019-04-13 ENCOUNTER — ANESTHESIA EVENT (OUTPATIENT)
Dept: PERIOP | Facility: HOSPITAL | Age: 59
End: 2019-04-13

## 2019-04-13 LAB
ANION GAP SERPL CALCULATED.3IONS-SCNC: 17 MMOL/L
BASOPHILS # BLD AUTO: 0.08 10*3/MM3 (ref 0–0.2)
BASOPHILS NFR BLD AUTO: 1 % (ref 0–1.5)
BUN BLD-MCNC: 87 MG/DL (ref 8–23)
BUN/CREAT SERPL: 55.1 (ref 7–25)
CALCIUM SPEC-SCNC: 10.1 MG/DL (ref 8.6–10.5)
CHLORIDE SERPL-SCNC: 104 MMOL/L (ref 98–107)
CO2 SERPL-SCNC: 22 MMOL/L (ref 22–29)
CREAT BLD-MCNC: 1.58 MG/DL (ref 0.76–1.27)
DEPRECATED RDW RBC AUTO: 35 FL (ref 37–54)
EOSINOPHIL # BLD AUTO: 0.19 10*3/MM3 (ref 0–0.4)
EOSINOPHIL NFR BLD AUTO: 2.5 % (ref 0.3–6.2)
ERYTHROCYTE [DISTWIDTH] IN BLOOD BY AUTOMATED COUNT: 13 % (ref 12.3–15.4)
GFR SERPL CREATININE-BSD FRML MDRD: 54 ML/MIN/1.73
GLUCOSE BLD-MCNC: 93 MG/DL (ref 65–99)
HCT VFR BLD AUTO: 36.1 % (ref 37.5–51)
HGB BLD-MCNC: 12.4 G/DL (ref 13–17.7)
IMM GRANULOCYTES # BLD AUTO: 0.02 10*3/MM3 (ref 0–0.05)
IMM GRANULOCYTES NFR BLD AUTO: 0.3 % (ref 0–0.5)
LYMPHOCYTES # BLD AUTO: 1.44 10*3/MM3 (ref 0.7–3.1)
LYMPHOCYTES NFR BLD AUTO: 18.8 % (ref 19.6–45.3)
MAGNESIUM SERPL-MCNC: 2.2 MG/DL (ref 1.6–2.4)
MCH RBC QN AUTO: 25.8 PG (ref 26.6–33)
MCHC RBC AUTO-ENTMCNC: 34.3 G/DL (ref 31.5–35.7)
MCV RBC AUTO: 75.2 FL (ref 79–97)
MONOCYTES # BLD AUTO: 1.21 10*3/MM3 (ref 0.1–0.9)
MONOCYTES NFR BLD AUTO: 15.8 % (ref 5–12)
NEUTROPHILS # BLD AUTO: 4.74 10*3/MM3 (ref 1.4–7)
NEUTROPHILS NFR BLD AUTO: 61.6 % (ref 42.7–76)
NRBC BLD AUTO-RTO: 0 /100 WBC (ref 0–0)
PLATELET # BLD AUTO: 557 10*3/MM3 (ref 140–450)
PMV BLD AUTO: 12.5 FL (ref 6–12)
POTASSIUM BLD-SCNC: 4.3 MMOL/L (ref 3.5–5.2)
RBC # BLD AUTO: 4.8 10*6/MM3 (ref 4.14–5.8)
SODIUM BLD-SCNC: 143 MMOL/L (ref 136–145)
WBC NRBC COR # BLD: 7.68 10*3/MM3 (ref 3.4–10.8)

## 2019-04-13 PROCEDURE — 99232 SBSQ HOSP IP/OBS MODERATE 35: CPT | Performed by: FAMILY MEDICINE

## 2019-04-13 PROCEDURE — 80048 BASIC METABOLIC PNL TOTAL CA: CPT | Performed by: FAMILY MEDICINE

## 2019-04-13 PROCEDURE — 92610 EVALUATE SWALLOWING FUNCTION: CPT | Performed by: SPEECH-LANGUAGE PATHOLOGIST

## 2019-04-13 PROCEDURE — 99231 SBSQ HOSP IP/OBS SF/LOW 25: CPT | Performed by: OTOLARYNGOLOGY

## 2019-04-13 PROCEDURE — 85025 COMPLETE CBC W/AUTO DIFF WBC: CPT | Performed by: FAMILY MEDICINE

## 2019-04-13 PROCEDURE — 83735 ASSAY OF MAGNESIUM: CPT | Performed by: FAMILY MEDICINE

## 2019-04-13 RX ORDER — DEXTROSE, SODIUM CHLORIDE, AND POTASSIUM CHLORIDE 5; .9; .15 G/100ML; G/100ML; G/100ML
150 INJECTION INTRAVENOUS CONTINUOUS
Status: DISCONTINUED | OUTPATIENT
Start: 2019-04-13 | End: 2019-04-13

## 2019-04-13 RX ORDER — DEXTROSE AND SODIUM CHLORIDE 5; .9 G/100ML; G/100ML
100 INJECTION, SOLUTION INTRAVENOUS CONTINUOUS
Status: DISCONTINUED | OUTPATIENT
Start: 2019-04-13 | End: 2019-04-14

## 2019-04-13 RX ADMIN — FAMOTIDINE 20 MG: 10 INJECTION INTRAVENOUS at 08:41

## 2019-04-13 RX ADMIN — NYSTATIN 500000 UNITS: 100000 SUSPENSION ORAL at 18:25

## 2019-04-13 RX ADMIN — NYSTATIN 500000 UNITS: 100000 SUSPENSION ORAL at 11:38

## 2019-04-13 RX ADMIN — LEVOTHYROXINE SODIUM ANHYDROUS 75 MCG: 100 INJECTION, POWDER, LYOPHILIZED, FOR SOLUTION INTRAVENOUS at 11:38

## 2019-04-13 RX ADMIN — NYSTATIN 500000 UNITS: 100000 SUSPENSION ORAL at 20:05

## 2019-04-13 RX ADMIN — DEXTROSE AND SODIUM CHLORIDE 100 ML/HR: 5; 900 INJECTION, SOLUTION INTRAVENOUS at 11:44

## 2019-04-13 RX ADMIN — DEXTROSE AND SODIUM CHLORIDE 100 ML/HR: 5; 900 INJECTION, SOLUTION INTRAVENOUS at 22:19

## 2019-04-13 RX ADMIN — SODIUM CHLORIDE 150 ML/HR: 9 INJECTION, SOLUTION INTRAVENOUS at 07:36

## 2019-04-13 RX ADMIN — NYSTATIN 500000 UNITS: 100000 SUSPENSION ORAL at 08:41

## 2019-04-13 RX ADMIN — SODIUM CHLORIDE 150 ML/HR: 9 INJECTION, SOLUTION INTRAVENOUS at 00:03

## 2019-04-14 LAB
ANION GAP SERPL CALCULATED.3IONS-SCNC: 13 MMOL/L
BASOPHILS # BLD AUTO: 0.08 10*3/MM3 (ref 0–0.2)
BASOPHILS NFR BLD AUTO: 1.1 % (ref 0–1.5)
BUN BLD-MCNC: 42 MG/DL (ref 8–23)
BUN/CREAT SERPL: 46.2 (ref 7–25)
CALCIUM SPEC-SCNC: 10 MG/DL (ref 8.6–10.5)
CHLORIDE SERPL-SCNC: 110 MMOL/L (ref 98–107)
CO2 SERPL-SCNC: 27 MMOL/L (ref 22–29)
CREAT BLD-MCNC: 0.91 MG/DL (ref 0.76–1.27)
DEPRECATED RDW RBC AUTO: 35.1 FL (ref 37–54)
EOSINOPHIL # BLD AUTO: 0.21 10*3/MM3 (ref 0–0.4)
EOSINOPHIL NFR BLD AUTO: 2.9 % (ref 0.3–6.2)
ERYTHROCYTE [DISTWIDTH] IN BLOOD BY AUTOMATED COUNT: 13.1 % (ref 12.3–15.4)
GFR SERPL CREATININE-BSD FRML MDRD: 103 ML/MIN/1.73
GLUCOSE BLD-MCNC: 163 MG/DL (ref 65–99)
HCT VFR BLD AUTO: 35 % (ref 37.5–51)
HGB BLD-MCNC: 12.1 G/DL (ref 13–17.7)
IMM GRANULOCYTES # BLD AUTO: 0.02 10*3/MM3 (ref 0–0.05)
IMM GRANULOCYTES NFR BLD AUTO: 0.3 % (ref 0–0.5)
LYMPHOCYTES # BLD AUTO: 1.33 10*3/MM3 (ref 0.7–3.1)
LYMPHOCYTES NFR BLD AUTO: 18.1 % (ref 19.6–45.3)
MCH RBC QN AUTO: 26.1 PG (ref 26.6–33)
MCHC RBC AUTO-ENTMCNC: 34.6 G/DL (ref 31.5–35.7)
MCV RBC AUTO: 75.4 FL (ref 79–97)
MONOCYTES # BLD AUTO: 1.43 10*3/MM3 (ref 0.1–0.9)
MONOCYTES NFR BLD AUTO: 19.5 % (ref 5–12)
NEUTROPHILS # BLD AUTO: 4.26 10*3/MM3 (ref 1.4–7)
NEUTROPHILS NFR BLD AUTO: 58.1 % (ref 42.7–76)
NRBC BLD AUTO-RTO: 0 /100 WBC (ref 0–0)
PLATELET # BLD AUTO: 512 10*3/MM3 (ref 140–450)
PMV BLD AUTO: 12.3 FL (ref 6–12)
POTASSIUM BLD-SCNC: 3.9 MMOL/L (ref 3.5–5.2)
RBC # BLD AUTO: 4.64 10*6/MM3 (ref 4.14–5.8)
SODIUM BLD-SCNC: 150 MMOL/L (ref 136–145)
WBC NRBC COR # BLD: 7.33 10*3/MM3 (ref 3.4–10.8)

## 2019-04-14 PROCEDURE — 85025 COMPLETE CBC W/AUTO DIFF WBC: CPT | Performed by: FAMILY MEDICINE

## 2019-04-14 PROCEDURE — 99232 SBSQ HOSP IP/OBS MODERATE 35: CPT | Performed by: FAMILY MEDICINE

## 2019-04-14 PROCEDURE — 80048 BASIC METABOLIC PNL TOTAL CA: CPT | Performed by: FAMILY MEDICINE

## 2019-04-14 PROCEDURE — 99221 1ST HOSP IP/OBS SF/LOW 40: CPT | Performed by: SURGERY

## 2019-04-14 RX ORDER — DEXTROSE MONOHYDRATE 50 MG/ML
100 INJECTION, SOLUTION INTRAVENOUS CONTINUOUS
Status: DISCONTINUED | OUTPATIENT
Start: 2019-04-14 | End: 2019-04-17

## 2019-04-14 RX ORDER — BUPIVACAINE HCL/0.9 % NACL/PF 0.1 %
2 PLASTIC BAG, INJECTION (ML) EPIDURAL
Status: DISPENSED | OUTPATIENT
Start: 2019-04-15 | End: 2019-04-16

## 2019-04-14 RX ORDER — SCOLOPAMINE TRANSDERMAL SYSTEM 1 MG/1
1 PATCH, EXTENDED RELEASE TRANSDERMAL
Status: DISCONTINUED | OUTPATIENT
Start: 2019-04-14 | End: 2019-04-24 | Stop reason: HOSPADM

## 2019-04-14 RX ADMIN — NYSTATIN 500000 UNITS: 100000 SUSPENSION ORAL at 08:33

## 2019-04-14 RX ADMIN — DEXTROSE MONOHYDRATE 75 ML/HR: 50 INJECTION, SOLUTION INTRAVENOUS at 23:07

## 2019-04-14 RX ADMIN — LEVOTHYROXINE SODIUM ANHYDROUS 75 MCG: 100 INJECTION, POWDER, LYOPHILIZED, FOR SOLUTION INTRAVENOUS at 12:00

## 2019-04-14 RX ADMIN — SCOPALAMINE 1 PATCH: 1 PATCH, EXTENDED RELEASE TRANSDERMAL at 12:23

## 2019-04-14 RX ADMIN — NYSTATIN 500000 UNITS: 100000 SUSPENSION ORAL at 12:22

## 2019-04-14 RX ADMIN — NYSTATIN 500000 UNITS: 100000 SUSPENSION ORAL at 17:05

## 2019-04-14 RX ADMIN — FAMOTIDINE 20 MG: 10 INJECTION INTRAVENOUS at 08:34

## 2019-04-14 RX ADMIN — NYSTATIN 500000 UNITS: 100000 SUSPENSION ORAL at 20:37

## 2019-04-15 ENCOUNTER — ANESTHESIA (OUTPATIENT)
Dept: PERIOP | Facility: HOSPITAL | Age: 59
End: 2019-04-15

## 2019-04-15 LAB
ANION GAP SERPL CALCULATED.3IONS-SCNC: 14 MMOL/L
BASOPHILS # BLD AUTO: 0.09 10*3/MM3 (ref 0–0.2)
BASOPHILS NFR BLD AUTO: 1.3 % (ref 0–1.5)
BUN BLD-MCNC: 19 MG/DL (ref 8–23)
BUN/CREAT SERPL: 23.8 (ref 7–25)
CALCIUM SPEC-SCNC: 9.6 MG/DL (ref 8.6–10.5)
CHLORIDE SERPL-SCNC: 110 MMOL/L (ref 98–107)
CO2 SERPL-SCNC: 26 MMOL/L (ref 22–29)
CREAT BLD-MCNC: 0.8 MG/DL (ref 0.76–1.27)
DEPRECATED RDW RBC AUTO: 35.8 FL (ref 37–54)
EOSINOPHIL # BLD AUTO: 0.18 10*3/MM3 (ref 0–0.4)
EOSINOPHIL NFR BLD AUTO: 2.5 % (ref 0.3–6.2)
ERYTHROCYTE [DISTWIDTH] IN BLOOD BY AUTOMATED COUNT: 13.2 % (ref 12.3–15.4)
GFR SERPL CREATININE-BSD FRML MDRD: 120 ML/MIN/1.73
GLUCOSE BLD-MCNC: 142 MG/DL (ref 65–99)
HCT VFR BLD AUTO: 34.6 % (ref 37.5–51)
HGB BLD-MCNC: 11.7 G/DL (ref 13–17.7)
IMM GRANULOCYTES # BLD AUTO: 0.02 10*3/MM3 (ref 0–0.05)
IMM GRANULOCYTES NFR BLD AUTO: 0.3 % (ref 0–0.5)
LYMPHOCYTES # BLD AUTO: 1.63 10*3/MM3 (ref 0.7–3.1)
LYMPHOCYTES NFR BLD AUTO: 22.7 % (ref 19.6–45.3)
MCH RBC QN AUTO: 25.9 PG (ref 26.6–33)
MCHC RBC AUTO-ENTMCNC: 33.8 G/DL (ref 31.5–35.7)
MCV RBC AUTO: 76.5 FL (ref 79–97)
MONOCYTES # BLD AUTO: 1.39 10*3/MM3 (ref 0.1–0.9)
MONOCYTES NFR BLD AUTO: 19.4 % (ref 5–12)
NEUTROPHILS # BLD AUTO: 3.87 10*3/MM3 (ref 1.4–7)
NEUTROPHILS NFR BLD AUTO: 53.8 % (ref 42.7–76)
NRBC BLD AUTO-RTO: 0 /100 WBC (ref 0–0)
PLATELET # BLD AUTO: 420 10*3/MM3 (ref 140–450)
PMV BLD AUTO: 12.6 FL (ref 6–12)
POTASSIUM BLD-SCNC: 3.7 MMOL/L (ref 3.5–5.2)
RBC # BLD AUTO: 4.52 10*6/MM3 (ref 4.14–5.8)
SODIUM BLD-SCNC: 150 MMOL/L (ref 136–145)
WBC NRBC COR # BLD: 7.18 10*3/MM3 (ref 3.4–10.8)

## 2019-04-15 PROCEDURE — C2627 CATH, SUPRAPUBIC/CYSTOSCOPIC: HCPCS | Performed by: OTOLARYNGOLOGY

## 2019-04-15 PROCEDURE — 85025 COMPLETE CBC W/AUTO DIFF WBC: CPT | Performed by: FAMILY MEDICINE

## 2019-04-15 PROCEDURE — 25010000002 HYDROMORPHONE 1 MG/ML SOLUTION: Performed by: SURGERY

## 2019-04-15 PROCEDURE — 88331 PATH CONSLTJ SURG 1 BLK 1SPC: CPT | Performed by: OTOLARYNGOLOGY

## 2019-04-15 PROCEDURE — 88305 TISSUE EXAM BY PATHOLOGIST: CPT | Performed by: OTOLARYNGOLOGY

## 2019-04-15 PROCEDURE — 25010000002 FENTANYL CITRATE (PF) 100 MCG/2ML SOLUTION: Performed by: NURSE ANESTHETIST, CERTIFIED REGISTERED

## 2019-04-15 PROCEDURE — 0CBS8ZX EXCISION OF LARYNX, VIA NATURAL OR ARTIFICIAL OPENING ENDOSCOPIC, DIAGNOSTIC: ICD-10-PCS | Performed by: OTOLARYNGOLOGY

## 2019-04-15 PROCEDURE — 31535 LARYNGOSCOPY W/BIOPSY: CPT | Performed by: OTOLARYNGOLOGY

## 2019-04-15 PROCEDURE — 0DH60UZ INSERTION OF FEEDING DEVICE INTO STOMACH, OPEN APPROACH: ICD-10-PCS | Performed by: SURGERY

## 2019-04-15 PROCEDURE — 88305 TISSUE EXAM BY PATHOLOGIST: CPT | Performed by: PATHOLOGY

## 2019-04-15 PROCEDURE — 25010000002 SUCCINYLCHOLINE PER 20 MG: Performed by: NURSE ANESTHETIST, CERTIFIED REGISTERED

## 2019-04-15 PROCEDURE — 25010000002 PHENYLEPHRINE PER 1 ML: Performed by: NURSE ANESTHETIST, CERTIFIED REGISTERED

## 2019-04-15 PROCEDURE — 80048 BASIC METABOLIC PNL TOTAL CA: CPT | Performed by: FAMILY MEDICINE

## 2019-04-15 PROCEDURE — 43830 GSTRST OPEN WO CONSTJ TUBE: CPT | Performed by: SURGERY

## 2019-04-15 PROCEDURE — 25010000002 PROPOFOL 10 MG/ML EMULSION: Performed by: NURSE ANESTHETIST, CERTIFIED REGISTERED

## 2019-04-15 PROCEDURE — 88331 PATH CONSLTJ SURG 1 BLK 1SPC: CPT | Performed by: PATHOLOGY

## 2019-04-15 PROCEDURE — 99232 SBSQ HOSP IP/OBS MODERATE 35: CPT | Performed by: FAMILY MEDICINE

## 2019-04-15 PROCEDURE — 25010000002 NEOSTIGMINE 4 MG/4ML SOLUTION PREFILLED SYRINGE: Performed by: NURSE ANESTHETIST, CERTIFIED REGISTERED

## 2019-04-15 PROCEDURE — 0CBM8ZX EXCISION OF PHARYNX, VIA NATURAL OR ARTIFICIAL OPENING ENDOSCOPIC, DIAGNOSTIC: ICD-10-PCS | Performed by: OTOLARYNGOLOGY

## 2019-04-15 RX ORDER — DIPHENHYDRAMINE HYDROCHLORIDE 50 MG/ML
12.5 INJECTION INTRAMUSCULAR; INTRAVENOUS
Status: DISCONTINUED | OUTPATIENT
Start: 2019-04-15 | End: 2019-04-15 | Stop reason: HOSPADM

## 2019-04-15 RX ORDER — ONDANSETRON 2 MG/ML
4 INJECTION INTRAMUSCULAR; INTRAVENOUS ONCE AS NEEDED
Status: DISCONTINUED | OUTPATIENT
Start: 2019-04-15 | End: 2019-04-15 | Stop reason: HOSPADM

## 2019-04-15 RX ORDER — ACETAMINOPHEN 325 MG/1
650 TABLET ORAL ONCE AS NEEDED
Status: DISCONTINUED | OUTPATIENT
Start: 2019-04-15 | End: 2019-04-15 | Stop reason: HOSPADM

## 2019-04-15 RX ORDER — BUPIVACAINE HYDROCHLORIDE AND EPINEPHRINE 5; 5 MG/ML; UG/ML
INJECTION, SOLUTION EPIDURAL; INTRACAUDAL; PERINEURAL AS NEEDED
Status: DISCONTINUED | OUTPATIENT
Start: 2019-04-15 | End: 2019-04-24 | Stop reason: HOSPADM

## 2019-04-15 RX ORDER — PROMETHAZINE HYDROCHLORIDE 25 MG/ML
12.5 INJECTION, SOLUTION INTRAMUSCULAR; INTRAVENOUS ONCE AS NEEDED
Status: DISCONTINUED | OUTPATIENT
Start: 2019-04-15 | End: 2019-04-15 | Stop reason: HOSPADM

## 2019-04-15 RX ORDER — FENTANYL CITRATE 50 UG/ML
INJECTION, SOLUTION INTRAMUSCULAR; INTRAVENOUS AS NEEDED
Status: DISCONTINUED | OUTPATIENT
Start: 2019-04-15 | End: 2019-04-15 | Stop reason: SURG

## 2019-04-15 RX ORDER — FLUMAZENIL 0.1 MG/ML
0.2 INJECTION INTRAVENOUS AS NEEDED
Status: DISCONTINUED | OUTPATIENT
Start: 2019-04-15 | End: 2019-04-15 | Stop reason: HOSPADM

## 2019-04-15 RX ORDER — EPHEDRINE SULFATE 50 MG/ML
5 INJECTION, SOLUTION INTRAVENOUS ONCE AS NEEDED
Status: DISCONTINUED | OUTPATIENT
Start: 2019-04-15 | End: 2019-04-15 | Stop reason: HOSPADM

## 2019-04-15 RX ORDER — SODIUM CHLORIDE 9 MG/ML
INJECTION, SOLUTION INTRAVENOUS CONTINUOUS PRN
Status: DISCONTINUED | OUTPATIENT
Start: 2019-04-15 | End: 2019-04-15 | Stop reason: SURG

## 2019-04-15 RX ORDER — ACETAMINOPHEN 650 MG/1
650 SUPPOSITORY RECTAL ONCE AS NEEDED
Status: DISCONTINUED | OUTPATIENT
Start: 2019-04-15 | End: 2019-04-15 | Stop reason: HOSPADM

## 2019-04-15 RX ORDER — PROPOFOL 10 MG/ML
VIAL (ML) INTRAVENOUS AS NEEDED
Status: DISCONTINUED | OUTPATIENT
Start: 2019-04-15 | End: 2019-04-15 | Stop reason: SURG

## 2019-04-15 RX ORDER — NALOXONE HCL 0.4 MG/ML
0.4 VIAL (ML) INJECTION AS NEEDED
Status: DISCONTINUED | OUTPATIENT
Start: 2019-04-15 | End: 2019-04-15 | Stop reason: HOSPADM

## 2019-04-15 RX ORDER — PROMETHAZINE HYDROCHLORIDE 25 MG/1
25 SUPPOSITORY RECTAL ONCE AS NEEDED
Status: DISCONTINUED | OUTPATIENT
Start: 2019-04-15 | End: 2019-04-15 | Stop reason: HOSPADM

## 2019-04-15 RX ORDER — NEOSTIGMINE METHYLSULFATE 4 MG/4 ML
SYRINGE (ML) INTRAVENOUS AS NEEDED
Status: DISCONTINUED | OUTPATIENT
Start: 2019-04-15 | End: 2019-04-15 | Stop reason: SURG

## 2019-04-15 RX ORDER — GLYCOPYRROLATE 0.2 MG/ML
INJECTION INTRAMUSCULAR; INTRAVENOUS AS NEEDED
Status: DISCONTINUED | OUTPATIENT
Start: 2019-04-15 | End: 2019-04-15 | Stop reason: SURG

## 2019-04-15 RX ORDER — ROCURONIUM BROMIDE 10 MG/ML
INJECTION, SOLUTION INTRAVENOUS AS NEEDED
Status: DISCONTINUED | OUTPATIENT
Start: 2019-04-15 | End: 2019-04-15 | Stop reason: SURG

## 2019-04-15 RX ORDER — DEXAMETHASONE SODIUM PHOSPHATE 4 MG/ML
8 INJECTION, SOLUTION INTRA-ARTICULAR; INTRALESIONAL; INTRAMUSCULAR; INTRAVENOUS; SOFT TISSUE ONCE AS NEEDED
Status: DISCONTINUED | OUTPATIENT
Start: 2019-04-15 | End: 2019-04-15 | Stop reason: HOSPADM

## 2019-04-15 RX ORDER — LABETALOL HYDROCHLORIDE 5 MG/ML
5 INJECTION, SOLUTION INTRAVENOUS
Status: DISCONTINUED | OUTPATIENT
Start: 2019-04-15 | End: 2019-04-15 | Stop reason: HOSPADM

## 2019-04-15 RX ORDER — SUCCINYLCHOLINE CHLORIDE 20 MG/ML
INJECTION INTRAMUSCULAR; INTRAVENOUS AS NEEDED
Status: DISCONTINUED | OUTPATIENT
Start: 2019-04-15 | End: 2019-04-15 | Stop reason: SURG

## 2019-04-15 RX ORDER — LIDOCAINE HYDROCHLORIDE 20 MG/ML
INJECTION, SOLUTION INFILTRATION; PERINEURAL AS NEEDED
Status: DISCONTINUED | OUTPATIENT
Start: 2019-04-15 | End: 2019-04-15 | Stop reason: SURG

## 2019-04-15 RX ORDER — HYDROMORPHONE HCL 110MG/55ML
0.5 PATIENT CONTROLLED ANALGESIA SYRINGE INTRAVENOUS
Status: DISCONTINUED | OUTPATIENT
Start: 2019-04-15 | End: 2019-04-15

## 2019-04-15 RX ORDER — ALBUTEROL SULFATE 2.5 MG/3ML
2.5 SOLUTION RESPIRATORY (INHALATION) ONCE
Status: DISCONTINUED | OUTPATIENT
Start: 2019-04-15 | End: 2019-04-15 | Stop reason: HOSPADM

## 2019-04-15 RX ORDER — PROMETHAZINE HYDROCHLORIDE 25 MG/1
25 TABLET ORAL ONCE AS NEEDED
Status: DISCONTINUED | OUTPATIENT
Start: 2019-04-15 | End: 2019-04-15 | Stop reason: HOSPADM

## 2019-04-15 RX ADMIN — DEXTROSE MONOHYDRATE 75 ML/HR: 50 INJECTION, SOLUTION INTRAVENOUS at 14:00

## 2019-04-15 RX ADMIN — FENTANYL CITRATE 50 MCG: 50 INJECTION, SOLUTION INTRAMUSCULAR; INTRAVENOUS at 12:10

## 2019-04-15 RX ADMIN — PHENYLEPHRINE HYDROCHLORIDE 100 MCG: 10 INJECTION INTRAVENOUS at 12:25

## 2019-04-15 RX ADMIN — FENTANYL CITRATE 50 MCG: 50 INJECTION, SOLUTION INTRAMUSCULAR; INTRAVENOUS at 12:12

## 2019-04-15 RX ADMIN — HYDROMORPHONE HYDROCHLORIDE 0.5 MG: 1 INJECTION, SOLUTION INTRAMUSCULAR; INTRAVENOUS; SUBCUTANEOUS at 16:45

## 2019-04-15 RX ADMIN — ROCURONIUM BROMIDE 35 MG: 10 INJECTION INTRAVENOUS at 12:15

## 2019-04-15 RX ADMIN — FENTANYL CITRATE 50 MCG: 50 INJECTION, SOLUTION INTRAMUSCULAR; INTRAVENOUS at 12:00

## 2019-04-15 RX ADMIN — SODIUM CHLORIDE, PRESERVATIVE FREE 10 ML: 5 INJECTION INTRAVENOUS at 09:04

## 2019-04-15 RX ADMIN — NYSTATIN 500000 UNITS: 100000 SUSPENSION ORAL at 21:17

## 2019-04-15 RX ADMIN — PHENYLEPHRINE HYDROCHLORIDE 100 MCG: 10 INJECTION INTRAVENOUS at 12:21

## 2019-04-15 RX ADMIN — SODIUM CHLORIDE, PRESERVATIVE FREE 3 ML: 5 INJECTION INTRAVENOUS at 21:17

## 2019-04-15 RX ADMIN — FAMOTIDINE 20 MG: 10 INJECTION INTRAVENOUS at 09:04

## 2019-04-15 RX ADMIN — LIDOCAINE HYDROCHLORIDE 50 MG: 20 INJECTION, SOLUTION INFILTRATION; PERINEURAL at 12:00

## 2019-04-15 RX ADMIN — SUCCINYLCHOLINE CHLORIDE 90 MG: 20 INJECTION, SOLUTION INTRAMUSCULAR; INTRAVENOUS at 12:00

## 2019-04-15 RX ADMIN — DEXTROSE MONOHYDRATE 150 ML/HR: 50 INJECTION, SOLUTION INTRAVENOUS at 21:17

## 2019-04-15 RX ADMIN — PROPOFOL 150 MG: 10 INJECTION, EMULSION INTRAVENOUS at 12:00

## 2019-04-15 RX ADMIN — Medication 3 MG: at 12:55

## 2019-04-15 RX ADMIN — SODIUM CHLORIDE: 9 INJECTION, SOLUTION INTRAVENOUS at 11:50

## 2019-04-15 RX ADMIN — GLYCOPYRROLATE 0.6 MG: 0.2 INJECTION INTRAMUSCULAR; INTRAVENOUS at 12:55

## 2019-04-15 NOTE — ANESTHESIA PROCEDURE NOTES
Airway  Urgency: elective    Difficult airway    General Information and Staff    Patient location during procedure: OR  CRNA: Daniel Amaya CRNA    Indications and Patient Condition  Indications for airway management: airway protection    Preoxygenated: yes  Mask difficulty assessment: 2 - vent by mask + OA or adjuvant +/- NMBA    Final Airway Details  Final airway type: endotracheal airway      Successful airway: ETT  Cuffed: yes   Successful intubation technique: direct laryngoscopy  Facilitating devices/methods: intubating stylet  Blade: Samuel  Blade size: 3  ETT size (mm): 7.5  Cormack-Lehane Classification: grade I - full view of glottis  Placement verified by: chest auscultation and capnometry   Measured from: lips  ETT to lips (cm): 20  Number of attempts at approach: 1

## 2019-04-15 NOTE — ANESTHESIA PREPROCEDURE EVALUATION
Anesthesia Evaluation     Patient summary reviewed and Nursing notes reviewed   NPO Solid Status: > 8 hours  NPO Liquid Status: > 8 hours           Airway   Mallampati: II  TM distance: >3 FB  Neck ROM: limited  Small opening and Difficult intubation highly probable  Comment: Pain with palpation. Cachexia.   Dental    (+) poor dentation    Comment: Remaining upper and lower dentition in hopeless repair.    Pulmonary - normal exam   (+) a smoker Former, COPD severe, decreased breath sounds, wheezes,     ROS comment:   - lines/tubes: None    - cardiac: Size within normal limits.    - mediastinum: Contour within normal limits.     - lungs: No evidence of a focal air space process, pulmonary  interstitial edema, nodule(s)/mass.     - pleura: No evidence of  fluid.      - osseous: Unremarkable for age.     IMPRESSION:  No acute cardiopulmonary process identified.        Electronically signed by:  Amanda Marquez MD  4/12/2019 3:01 PM CDT  PE comment: Albuterol treatment ordered pre-op.  Cardiovascular - normal exam    Rhythm: regular  Rate: normal    (+) hypertension, hyperlipidemia,       Neuro/Psych  (+) psychiatric history (Alcohol abuse.),     GI/Hepatic/Renal/Endo    (+)  GERD well controlled,  liver disease (Cirrhosis of the liver.), hypothyroidism,     Musculoskeletal     (+) neck pain (Throat pain with palpation and swallowing.),   Abdominal    Substance History   (+) alcohol use,      OB/GYN negative ob/gyn ROS         Other      history of cancer (Pharynx.) active                    Anesthesia Plan    ASA 4     general   Awake fiberoptic intubation  intravenous induction   Anesthetic plan, all risks, benefits, and alternatives have been provided, discussed and informed consent has been obtained with: patient and child.    Plan discussed with CRNA.

## 2019-04-15 NOTE — ANESTHESIA POSTPROCEDURE EVALUATION
Patient: Emerson Bang    Procedure Summary     Date:  04/15/19 Room / Location:  [facility] OR  / [facility] OR    Anesthesia Start:  1149 Anesthesia Stop:  1312    Procedures:       DIRECT LARYNGOSCOPY with biopsy, ESOPHAGOSCOPY (N/A Esophagus)      GASTROSTOMY FEEDING TUBE INSERTION (N/A Abdomen) Diagnosis:       Neoplasm of uncertain behavior of larynx      Pharyngoesophageal dysphagia      (Neoplasm of uncertain behavior of larynx [D38.0])      (Pharyngoesophageal dysphagia [R13.14])    Surgeon:  Bharathi Washington MD; Trenton Valera MD Provider:  Bradford Durbin MD    Anesthesia Type:  general ASA Status:  4          Anesthesia Type: general  Last vitals  BP   127/74 (04/15/19 1045)   Temp   97.9 °F (36.6 °C) (04/15/19 1045)   Pulse   77 (04/15/19 1045)   Resp   18 (04/15/19 1045)     SpO2   99 % (04/15/19 1045)     Post Anesthesia Care and Evaluation    Patient location during evaluation: PACU  Patient participation: complete - patient participated  Level of consciousness: awake and alert  Pain score: 0  Pain management: adequate  Airway patency: patent  Anesthetic complications: No anesthetic complications  PONV Status: none  Cardiovascular status: acceptable  Respiratory status: acceptable  Hydration status: acceptable

## 2019-04-16 ENCOUNTER — APPOINTMENT (OUTPATIENT)
Dept: INTERVENTIONAL RADIOLOGY/VASCULAR | Facility: HOSPITAL | Age: 59
End: 2019-04-16

## 2019-04-16 ENCOUNTER — APPOINTMENT (OUTPATIENT)
Dept: ULTRASOUND IMAGING | Facility: HOSPITAL | Age: 59
End: 2019-04-16

## 2019-04-16 LAB
ANION GAP SERPL CALCULATED.3IONS-SCNC: 12 MMOL/L
BASOPHILS # BLD AUTO: 0.05 10*3/MM3 (ref 0–0.2)
BASOPHILS NFR BLD AUTO: 0.3 % (ref 0–1.5)
BUN BLD-MCNC: 18 MG/DL (ref 8–23)
BUN/CREAT SERPL: 14.6 (ref 7–25)
CALCIUM SPEC-SCNC: 9 MG/DL (ref 8.6–10.5)
CHLORIDE SERPL-SCNC: 105 MMOL/L (ref 98–107)
CO2 SERPL-SCNC: 26 MMOL/L (ref 22–29)
CREAT BLD-MCNC: 1.23 MG/DL (ref 0.76–1.27)
CRP SERPL-MCNC: 4.51 MG/DL (ref 0–0.5)
DEPRECATED RDW RBC AUTO: 35.8 FL (ref 37–54)
EOSINOPHIL # BLD AUTO: 0.01 10*3/MM3 (ref 0–0.4)
EOSINOPHIL NFR BLD AUTO: 0.1 % (ref 0.3–6.2)
ERYTHROCYTE [DISTWIDTH] IN BLOOD BY AUTOMATED COUNT: 13.2 % (ref 12.3–15.4)
GFR SERPL CREATININE-BSD FRML MDRD: 73 ML/MIN/1.73
GLUCOSE BLD-MCNC: 184 MG/DL (ref 65–99)
HCT VFR BLD AUTO: 29.5 % (ref 37.5–51)
HGB BLD-MCNC: 10.1 G/DL (ref 13–17.7)
IMM GRANULOCYTES # BLD AUTO: 0.06 10*3/MM3 (ref 0–0.05)
IMM GRANULOCYTES NFR BLD AUTO: 0.3 % (ref 0–0.5)
LAB AP CASE REPORT: NORMAL
LYMPHOCYTES # BLD AUTO: 1.77 10*3/MM3 (ref 0.7–3.1)
LYMPHOCYTES NFR BLD AUTO: 9.9 % (ref 19.6–45.3)
Lab: NORMAL
MCH RBC QN AUTO: 26.1 PG (ref 26.6–33)
MCHC RBC AUTO-ENTMCNC: 34.2 G/DL (ref 31.5–35.7)
MCV RBC AUTO: 76.2 FL (ref 79–97)
MONOCYTES # BLD AUTO: 1.26 10*3/MM3 (ref 0.1–0.9)
MONOCYTES NFR BLD AUTO: 7.1 % (ref 5–12)
NEUTROPHILS # BLD AUTO: 14.65 10*3/MM3 (ref 1.4–7)
NEUTROPHILS NFR BLD AUTO: 82.3 % (ref 42.7–76)
NRBC BLD AUTO-RTO: 0 /100 WBC (ref 0–0)
PATH REPORT.FINAL DX SPEC: NORMAL
PATH REPORT.GROSS SPEC: NORMAL
PLATELET # BLD AUTO: 388 10*3/MM3 (ref 140–450)
PMV BLD AUTO: 12.2 FL (ref 6–12)
POTASSIUM BLD-SCNC: 4.2 MMOL/L (ref 3.5–5.2)
RBC # BLD AUTO: 3.87 10*6/MM3 (ref 4.14–5.8)
SODIUM BLD-SCNC: 143 MMOL/L (ref 136–145)
WBC NRBC COR # BLD: 17.8 10*3/MM3 (ref 3.4–10.8)

## 2019-04-16 PROCEDURE — 76937 US GUIDE VASCULAR ACCESS: CPT

## 2019-04-16 PROCEDURE — 25010000002 METOCLOPRAMIDE PER 10 MG: Performed by: FAMILY MEDICINE

## 2019-04-16 PROCEDURE — C1751 CATH, INF, PER/CENT/MIDLINE: HCPCS

## 2019-04-16 PROCEDURE — 85025 COMPLETE CBC W/AUTO DIFF WBC: CPT | Performed by: FAMILY MEDICINE

## 2019-04-16 PROCEDURE — 36410 VNPNXR 3YR/> PHY/QHP DX/THER: CPT

## 2019-04-16 PROCEDURE — 99232 SBSQ HOSP IP/OBS MODERATE 35: CPT | Performed by: FAMILY MEDICINE

## 2019-04-16 PROCEDURE — 25010000002 HYDROMORPHONE 1 MG/ML SOLUTION: Performed by: SURGERY

## 2019-04-16 PROCEDURE — 99231 SBSQ HOSP IP/OBS SF/LOW 25: CPT | Performed by: OTOLARYNGOLOGY

## 2019-04-16 PROCEDURE — 80048 BASIC METABOLIC PNL TOTAL CA: CPT | Performed by: FAMILY MEDICINE

## 2019-04-16 PROCEDURE — 86140 C-REACTIVE PROTEIN: CPT | Performed by: FAMILY MEDICINE

## 2019-04-16 RX ORDER — SODIUM CHLORIDE 0.9 % (FLUSH) 0.9 %
10 SYRINGE (ML) INJECTION EVERY 12 HOURS SCHEDULED
Status: DISCONTINUED | OUTPATIENT
Start: 2019-04-16 | End: 2019-04-24 | Stop reason: HOSPADM

## 2019-04-16 RX ORDER — SODIUM CHLORIDE 0.9 % (FLUSH) 0.9 %
10 SYRINGE (ML) INJECTION AS NEEDED
Status: DISCONTINUED | OUTPATIENT
Start: 2019-04-16 | End: 2019-04-24 | Stop reason: HOSPADM

## 2019-04-16 RX ORDER — ACETAMINOPHEN 160 MG/5ML
650 SOLUTION ORAL EVERY 6 HOURS PRN
Status: DISCONTINUED | OUTPATIENT
Start: 2019-04-16 | End: 2019-04-24 | Stop reason: HOSPADM

## 2019-04-16 RX ORDER — METOCLOPRAMIDE HYDROCHLORIDE 5 MG/ML
10 INJECTION INTRAMUSCULAR; INTRAVENOUS
Status: DISCONTINUED | OUTPATIENT
Start: 2019-04-16 | End: 2019-04-24

## 2019-04-16 RX ORDER — FAMOTIDINE 20 MG/1
20 TABLET, FILM COATED ORAL DAILY
Status: DISCONTINUED | OUTPATIENT
Start: 2019-04-16 | End: 2019-04-16

## 2019-04-16 RX ORDER — FAMOTIDINE 10 MG/ML
20 INJECTION, SOLUTION INTRAVENOUS EVERY 12 HOURS SCHEDULED
Status: DISCONTINUED | OUTPATIENT
Start: 2019-04-16 | End: 2019-04-17

## 2019-04-16 RX ORDER — LEVOTHYROXINE SODIUM 0.15 MG/1
150 TABLET ORAL
Status: DISCONTINUED | OUTPATIENT
Start: 2019-04-16 | End: 2019-04-16

## 2019-04-16 RX ORDER — SODIUM CHLORIDE 0.9 % (FLUSH) 0.9 %
20 SYRINGE (ML) INJECTION AS NEEDED
Status: DISCONTINUED | OUTPATIENT
Start: 2019-04-16 | End: 2019-04-24 | Stop reason: HOSPADM

## 2019-04-16 RX ORDER — LEVOTHYROXINE SODIUM ANHYDROUS 100 UG/5ML
75 INJECTION, POWDER, LYOPHILIZED, FOR SOLUTION INTRAVENOUS
Status: DISCONTINUED | OUTPATIENT
Start: 2019-04-17 | End: 2019-04-17

## 2019-04-16 RX ADMIN — FAMOTIDINE 20 MG: 10 INJECTION INTRAVENOUS at 20:27

## 2019-04-16 RX ADMIN — NYSTATIN 500000 UNITS: 100000 SUSPENSION ORAL at 18:00

## 2019-04-16 RX ADMIN — NYSTATIN 500000 UNITS: 100000 SUSPENSION ORAL at 14:21

## 2019-04-16 RX ADMIN — METOCLOPRAMIDE 10 MG: 5 INJECTION, SOLUTION INTRAMUSCULAR; INTRAVENOUS at 20:18

## 2019-04-16 RX ADMIN — HYDROMORPHONE HYDROCHLORIDE 0.5 MG: 1 INJECTION, SOLUTION INTRAMUSCULAR; INTRAVENOUS; SUBCUTANEOUS at 14:29

## 2019-04-16 RX ADMIN — SODIUM CHLORIDE, PRESERVATIVE FREE 10 ML: 5 INJECTION INTRAVENOUS at 14:22

## 2019-04-16 RX ADMIN — METOCLOPRAMIDE 10 MG: 5 INJECTION, SOLUTION INTRAMUSCULAR; INTRAVENOUS at 14:21

## 2019-04-16 RX ADMIN — SODIUM CHLORIDE, PRESERVATIVE FREE 10 ML: 5 INJECTION INTRAVENOUS at 14:23

## 2019-04-16 RX ADMIN — SODIUM CHLORIDE, PRESERVATIVE FREE 10 ML: 5 INJECTION INTRAVENOUS at 20:19

## 2019-04-16 RX ADMIN — HYDROMORPHONE HYDROCHLORIDE 0.5 MG: 1 INJECTION, SOLUTION INTRAMUSCULAR; INTRAVENOUS; SUBCUTANEOUS at 20:19

## 2019-04-16 RX ADMIN — NYSTATIN 500000 UNITS: 100000 SUSPENSION ORAL at 08:51

## 2019-04-16 RX ADMIN — ACETAMINOPHEN 650 MG: 650 SOLUTION ORAL at 09:42

## 2019-04-16 RX ADMIN — METOCLOPRAMIDE 10 MG: 5 INJECTION, SOLUTION INTRAMUSCULAR; INTRAVENOUS at 18:00

## 2019-04-16 RX ADMIN — LEVOTHYROXINE SODIUM 150 MCG: 150 TABLET ORAL at 09:42

## 2019-04-16 RX ADMIN — FAMOTIDINE 20 MG: 10 INJECTION INTRAVENOUS at 14:21

## 2019-04-16 RX ADMIN — DEXTROSE MONOHYDRATE 100 ML/HR: 50 INJECTION, SOLUTION INTRAVENOUS at 18:10

## 2019-04-16 RX ADMIN — FAMOTIDINE 20 MG: 20 TABLET ORAL at 09:42

## 2019-04-17 ENCOUNTER — APPOINTMENT (OUTPATIENT)
Dept: GENERAL RADIOLOGY | Facility: HOSPITAL | Age: 59
End: 2019-04-17

## 2019-04-17 LAB
ANION GAP SERPL CALCULATED.3IONS-SCNC: 12 MMOL/L
BASOPHILS # BLD AUTO: 0.07 10*3/MM3 (ref 0–0.2)
BASOPHILS NFR BLD AUTO: 0.6 % (ref 0–1.5)
BUN BLD-MCNC: 17 MG/DL (ref 8–23)
BUN/CREAT SERPL: 14.2 (ref 7–25)
CALCIUM SPEC-SCNC: 8.7 MG/DL (ref 8.6–10.5)
CHLORIDE SERPL-SCNC: 99 MMOL/L (ref 98–107)
CO2 SERPL-SCNC: 28 MMOL/L (ref 22–29)
CREAT BLD-MCNC: 1.2 MG/DL (ref 0.76–1.27)
DEPRECATED RDW RBC AUTO: 35.8 FL (ref 37–54)
EOSINOPHIL # BLD AUTO: 0.15 10*3/MM3 (ref 0–0.4)
EOSINOPHIL NFR BLD AUTO: 1.2 % (ref 0.3–6.2)
ERYTHROCYTE [DISTWIDTH] IN BLOOD BY AUTOMATED COUNT: 13.1 % (ref 12.3–15.4)
GFR SERPL CREATININE-BSD FRML MDRD: 75 ML/MIN/1.73
GLUCOSE BLD-MCNC: 148 MG/DL (ref 65–99)
HCT VFR BLD AUTO: 24.9 % (ref 37.5–51)
HGB BLD-MCNC: 8.8 G/DL (ref 13–17.7)
IMM GRANULOCYTES # BLD AUTO: 0.04 10*3/MM3 (ref 0–0.05)
IMM GRANULOCYTES NFR BLD AUTO: 0.3 % (ref 0–0.5)
LYMPHOCYTES # BLD AUTO: 1.28 10*3/MM3 (ref 0.7–3.1)
LYMPHOCYTES NFR BLD AUTO: 10.6 % (ref 19.6–45.3)
MCH RBC QN AUTO: 26.5 PG (ref 26.6–33)
MCHC RBC AUTO-ENTMCNC: 35.3 G/DL (ref 31.5–35.7)
MCV RBC AUTO: 75 FL (ref 79–97)
MONOCYTES # BLD AUTO: 1.41 10*3/MM3 (ref 0.1–0.9)
MONOCYTES NFR BLD AUTO: 11.6 % (ref 5–12)
NEUTROPHILS # BLD AUTO: 9.16 10*3/MM3 (ref 1.4–7)
NEUTROPHILS NFR BLD AUTO: 75.7 % (ref 42.7–76)
NRBC BLD AUTO-RTO: 0 /100 WBC (ref 0–0)
PLATELET # BLD AUTO: 325 10*3/MM3 (ref 140–450)
PMV BLD AUTO: 12.8 FL (ref 6–12)
POTASSIUM BLD-SCNC: 4.2 MMOL/L (ref 3.5–5.2)
RBC # BLD AUTO: 3.32 10*6/MM3 (ref 4.14–5.8)
SODIUM BLD-SCNC: 139 MMOL/L (ref 136–145)
WBC NRBC COR # BLD: 12.11 10*3/MM3 (ref 3.4–10.8)

## 2019-04-17 PROCEDURE — 80048 BASIC METABOLIC PNL TOTAL CA: CPT | Performed by: FAMILY MEDICINE

## 2019-04-17 PROCEDURE — 97162 PT EVAL MOD COMPLEX 30 MIN: CPT

## 2019-04-17 PROCEDURE — 74018 RADEX ABDOMEN 1 VIEW: CPT

## 2019-04-17 PROCEDURE — 97530 THERAPEUTIC ACTIVITIES: CPT

## 2019-04-17 PROCEDURE — 99232 SBSQ HOSP IP/OBS MODERATE 35: CPT | Performed by: FAMILY MEDICINE

## 2019-04-17 PROCEDURE — 85025 COMPLETE CBC W/AUTO DIFF WBC: CPT | Performed by: FAMILY MEDICINE

## 2019-04-17 PROCEDURE — 99024 POSTOP FOLLOW-UP VISIT: CPT | Performed by: SURGERY

## 2019-04-17 PROCEDURE — 25010000002 HYDROMORPHONE 1 MG/ML SOLUTION: Performed by: SURGERY

## 2019-04-17 PROCEDURE — 25010000002 METOCLOPRAMIDE PER 10 MG: Performed by: FAMILY MEDICINE

## 2019-04-17 RX ORDER — FAMOTIDINE 20 MG/1
20 TABLET, FILM COATED ORAL 2 TIMES DAILY
Status: DISCONTINUED | OUTPATIENT
Start: 2019-04-17 | End: 2019-04-24 | Stop reason: HOSPADM

## 2019-04-17 RX ORDER — LEVOTHYROXINE SODIUM 0.15 MG/1
150 TABLET ORAL
Status: DISCONTINUED | OUTPATIENT
Start: 2019-04-18 | End: 2019-04-18

## 2019-04-17 RX ORDER — LEVOTHYROXINE SODIUM 0.15 MG/1
150 TABLET ORAL
Status: DISCONTINUED | OUTPATIENT
Start: 2019-04-18 | End: 2019-04-17

## 2019-04-17 RX ORDER — FAMOTIDINE 20 MG/1
20 TABLET, FILM COATED ORAL 2 TIMES DAILY
Status: DISCONTINUED | OUTPATIENT
Start: 2019-04-17 | End: 2019-04-17

## 2019-04-17 RX ADMIN — SODIUM CHLORIDE, PRESERVATIVE FREE 3 ML: 5 INJECTION INTRAVENOUS at 21:33

## 2019-04-17 RX ADMIN — NYSTATIN 500000 UNITS: 100000 SUSPENSION ORAL at 17:28

## 2019-04-17 RX ADMIN — SCOPALAMINE 1 PATCH: 1 PATCH, EXTENDED RELEASE TRANSDERMAL at 11:53

## 2019-04-17 RX ADMIN — FAMOTIDINE 20 MG: 20 TABLET ORAL at 21:32

## 2019-04-17 RX ADMIN — SODIUM CHLORIDE, PRESERVATIVE FREE 10 ML: 5 INJECTION INTRAVENOUS at 21:33

## 2019-04-17 RX ADMIN — NYSTATIN 500000 UNITS: 100000 SUSPENSION ORAL at 21:32

## 2019-04-17 RX ADMIN — DEXTROSE MONOHYDRATE 100 ML/HR: 50 INJECTION, SOLUTION INTRAVENOUS at 04:01

## 2019-04-17 RX ADMIN — METOCLOPRAMIDE 10 MG: 5 INJECTION, SOLUTION INTRAMUSCULAR; INTRAVENOUS at 11:53

## 2019-04-17 RX ADMIN — HYDROMORPHONE HYDROCHLORIDE 0.5 MG: 1 INJECTION, SOLUTION INTRAMUSCULAR; INTRAVENOUS; SUBCUTANEOUS at 16:02

## 2019-04-17 RX ADMIN — HYDROMORPHONE HYDROCHLORIDE 0.5 MG: 1 INJECTION, SOLUTION INTRAMUSCULAR; INTRAVENOUS; SUBCUTANEOUS at 21:32

## 2019-04-17 RX ADMIN — HYDROMORPHONE HYDROCHLORIDE 0.5 MG: 1 INJECTION, SOLUTION INTRAMUSCULAR; INTRAVENOUS; SUBCUTANEOUS at 06:37

## 2019-04-17 RX ADMIN — NYSTATIN 500000 UNITS: 100000 SUSPENSION ORAL at 11:53

## 2019-04-17 RX ADMIN — METOCLOPRAMIDE 10 MG: 5 INJECTION, SOLUTION INTRAMUSCULAR; INTRAVENOUS at 08:38

## 2019-04-17 RX ADMIN — LEVOTHYROXINE SODIUM ANHYDROUS 75 MCG: 100 INJECTION, POWDER, LYOPHILIZED, FOR SOLUTION INTRAVENOUS at 06:37

## 2019-04-17 RX ADMIN — SODIUM CHLORIDE, PRESERVATIVE FREE 10 ML: 5 INJECTION INTRAVENOUS at 21:32

## 2019-04-17 RX ADMIN — HYDROMORPHONE HYDROCHLORIDE 0.5 MG: 1 INJECTION, SOLUTION INTRAMUSCULAR; INTRAVENOUS; SUBCUTANEOUS at 00:09

## 2019-04-17 RX ADMIN — METOCLOPRAMIDE 10 MG: 5 INJECTION, SOLUTION INTRAMUSCULAR; INTRAVENOUS at 21:32

## 2019-04-17 RX ADMIN — NYSTATIN 500000 UNITS: 100000 SUSPENSION ORAL at 08:37

## 2019-04-17 RX ADMIN — SODIUM CHLORIDE, PRESERVATIVE FREE 20 ML: 5 INJECTION INTRAVENOUS at 08:35

## 2019-04-17 RX ADMIN — FAMOTIDINE 20 MG: 10 INJECTION INTRAVENOUS at 08:38

## 2019-04-17 RX ADMIN — METOCLOPRAMIDE 10 MG: 5 INJECTION, SOLUTION INTRAMUSCULAR; INTRAVENOUS at 17:28

## 2019-04-18 PROBLEM — N17.9 ACUTE RENAL FAILURE (HCC): Status: RESOLVED | Noted: 2019-04-12 | Resolved: 2019-04-18

## 2019-04-18 LAB
ANION GAP SERPL CALCULATED.3IONS-SCNC: 14 MMOL/L
BASOPHILS # BLD AUTO: 0.07 10*3/MM3 (ref 0–0.2)
BASOPHILS NFR BLD AUTO: 0.6 % (ref 0–1.5)
BUN BLD-MCNC: 11 MG/DL (ref 8–23)
BUN/CREAT SERPL: 12.1 (ref 7–25)
CALCIUM SPEC-SCNC: 9 MG/DL (ref 8.6–10.5)
CHLORIDE SERPL-SCNC: 96 MMOL/L (ref 98–107)
CO2 SERPL-SCNC: 28 MMOL/L (ref 22–29)
CREAT BLD-MCNC: 0.91 MG/DL (ref 0.76–1.27)
DEPRECATED RDW RBC AUTO: 34.8 FL (ref 37–54)
EOSINOPHIL # BLD AUTO: 0.17 10*3/MM3 (ref 0–0.4)
EOSINOPHIL NFR BLD AUTO: 1.5 % (ref 0.3–6.2)
ERYTHROCYTE [DISTWIDTH] IN BLOOD BY AUTOMATED COUNT: 12.9 % (ref 12.3–15.4)
GFR SERPL CREATININE-BSD FRML MDRD: 103 ML/MIN/1.73
GLUCOSE BLD-MCNC: 104 MG/DL (ref 65–99)
HCT VFR BLD AUTO: 24.9 % (ref 37.5–51)
HGB BLD-MCNC: 8.7 G/DL (ref 13–17.7)
IMM GRANULOCYTES # BLD AUTO: 0.04 10*3/MM3 (ref 0–0.05)
IMM GRANULOCYTES NFR BLD AUTO: 0.4 % (ref 0–0.5)
LYMPHOCYTES # BLD AUTO: 1.31 10*3/MM3 (ref 0.7–3.1)
LYMPHOCYTES NFR BLD AUTO: 11.5 % (ref 19.6–45.3)
MCH RBC QN AUTO: 26.2 PG (ref 26.6–33)
MCHC RBC AUTO-ENTMCNC: 34.9 G/DL (ref 31.5–35.7)
MCV RBC AUTO: 75 FL (ref 79–97)
MONOCYTES # BLD AUTO: 1.29 10*3/MM3 (ref 0.1–0.9)
MONOCYTES NFR BLD AUTO: 11.3 % (ref 5–12)
NEUTROPHILS # BLD AUTO: 8.52 10*3/MM3 (ref 1.4–7)
NEUTROPHILS NFR BLD AUTO: 74.7 % (ref 42.7–76)
NRBC BLD AUTO-RTO: 0 /100 WBC (ref 0–0)
PLATELET # BLD AUTO: 334 10*3/MM3 (ref 140–450)
PMV BLD AUTO: 13 FL (ref 6–12)
POTASSIUM BLD-SCNC: 3.6 MMOL/L (ref 3.5–5.2)
RBC # BLD AUTO: 3.32 10*6/MM3 (ref 4.14–5.8)
SODIUM BLD-SCNC: 138 MMOL/L (ref 136–145)
WBC NRBC COR # BLD: 11.4 10*3/MM3 (ref 3.4–10.8)

## 2019-04-18 PROCEDURE — 25010000002 HYDROMORPHONE 1 MG/ML SOLUTION: Performed by: SURGERY

## 2019-04-18 PROCEDURE — 99232 SBSQ HOSP IP/OBS MODERATE 35: CPT | Performed by: FAMILY MEDICINE

## 2019-04-18 PROCEDURE — 99024 POSTOP FOLLOW-UP VISIT: CPT | Performed by: SURGERY

## 2019-04-18 PROCEDURE — 92526 ORAL FUNCTION THERAPY: CPT | Performed by: SPEECH-LANGUAGE PATHOLOGIST

## 2019-04-18 PROCEDURE — 80048 BASIC METABOLIC PNL TOTAL CA: CPT | Performed by: FAMILY MEDICINE

## 2019-04-18 PROCEDURE — 97166 OT EVAL MOD COMPLEX 45 MIN: CPT

## 2019-04-18 PROCEDURE — 25010000002 METOCLOPRAMIDE PER 10 MG: Performed by: FAMILY MEDICINE

## 2019-04-18 PROCEDURE — 97116 GAIT TRAINING THERAPY: CPT

## 2019-04-18 PROCEDURE — 97110 THERAPEUTIC EXERCISES: CPT

## 2019-04-18 PROCEDURE — 85025 COMPLETE CBC W/AUTO DIFF WBC: CPT | Performed by: FAMILY MEDICINE

## 2019-04-18 PROCEDURE — 94760 N-INVAS EAR/PLS OXIMETRY 1: CPT

## 2019-04-18 RX ORDER — LEVOTHYROXINE SODIUM ANHYDROUS 100 UG/5ML
75 INJECTION, POWDER, LYOPHILIZED, FOR SOLUTION INTRAVENOUS
Status: DISCONTINUED | OUTPATIENT
Start: 2019-04-18 | End: 2019-04-22

## 2019-04-18 RX ADMIN — HYDROMORPHONE HYDROCHLORIDE 0.5 MG: 1 INJECTION, SOLUTION INTRAMUSCULAR; INTRAVENOUS; SUBCUTANEOUS at 10:07

## 2019-04-18 RX ADMIN — HYDROMORPHONE HYDROCHLORIDE 0.5 MG: 1 INJECTION, SOLUTION INTRAMUSCULAR; INTRAVENOUS; SUBCUTANEOUS at 15:06

## 2019-04-18 RX ADMIN — SODIUM CHLORIDE, PRESERVATIVE FREE 3 ML: 5 INJECTION INTRAVENOUS at 21:11

## 2019-04-18 RX ADMIN — METOCLOPRAMIDE 10 MG: 5 INJECTION, SOLUTION INTRAMUSCULAR; INTRAVENOUS at 17:39

## 2019-04-18 RX ADMIN — METOCLOPRAMIDE 10 MG: 5 INJECTION, SOLUTION INTRAMUSCULAR; INTRAVENOUS at 21:12

## 2019-04-18 RX ADMIN — SODIUM CHLORIDE, PRESERVATIVE FREE 10 ML: 5 INJECTION INTRAVENOUS at 10:32

## 2019-04-18 RX ADMIN — HYDROMORPHONE HYDROCHLORIDE 0.5 MG: 1 INJECTION, SOLUTION INTRAMUSCULAR; INTRAVENOUS; SUBCUTANEOUS at 04:23

## 2019-04-18 RX ADMIN — LEVOTHYROXINE SODIUM ANHYDROUS 75 MCG: 100 INJECTION, POWDER, LYOPHILIZED, FOR SOLUTION INTRAVENOUS at 10:07

## 2019-04-18 RX ADMIN — SODIUM CHLORIDE, PRESERVATIVE FREE 10 ML: 5 INJECTION INTRAVENOUS at 21:10

## 2019-04-18 RX ADMIN — NYSTATIN 500000 UNITS: 100000 SUSPENSION ORAL at 21:10

## 2019-04-18 RX ADMIN — HYDROMORPHONE HYDROCHLORIDE 0.5 MG: 1 INJECTION, SOLUTION INTRAMUSCULAR; INTRAVENOUS; SUBCUTANEOUS at 21:10

## 2019-04-18 RX ADMIN — NYSTATIN 500000 UNITS: 100000 SUSPENSION ORAL at 10:31

## 2019-04-18 RX ADMIN — FAMOTIDINE 20 MG: 20 TABLET ORAL at 10:31

## 2019-04-18 RX ADMIN — FAMOTIDINE 20 MG: 20 TABLET ORAL at 21:10

## 2019-04-18 RX ADMIN — METOCLOPRAMIDE 10 MG: 5 INJECTION, SOLUTION INTRAMUSCULAR; INTRAVENOUS at 10:07

## 2019-04-19 LAB
ANION GAP SERPL CALCULATED.3IONS-SCNC: 14 MMOL/L
BASOPHILS # BLD AUTO: 0.08 10*3/MM3 (ref 0–0.2)
BASOPHILS NFR BLD AUTO: 1 % (ref 0–1.5)
BUN BLD-MCNC: 13 MG/DL (ref 8–23)
BUN/CREAT SERPL: 19.7 (ref 7–25)
CALCIUM SPEC-SCNC: 8.9 MG/DL (ref 8.6–10.5)
CHLORIDE SERPL-SCNC: 95 MMOL/L (ref 98–107)
CO2 SERPL-SCNC: 27 MMOL/L (ref 22–29)
CREAT BLD-MCNC: 0.66 MG/DL (ref 0.76–1.27)
DEPRECATED RDW RBC AUTO: 34.7 FL (ref 37–54)
EOSINOPHIL # BLD AUTO: 0.2 10*3/MM3 (ref 0–0.4)
EOSINOPHIL NFR BLD AUTO: 2.5 % (ref 0.3–6.2)
ERYTHROCYTE [DISTWIDTH] IN BLOOD BY AUTOMATED COUNT: 12.8 % (ref 12.3–15.4)
GFR SERPL CREATININE-BSD FRML MDRD: 149 ML/MIN/1.73
GLUCOSE BLD-MCNC: 115 MG/DL (ref 65–99)
HCT VFR BLD AUTO: 24.2 % (ref 37.5–51)
HGB BLD-MCNC: 8.4 G/DL (ref 13–17.7)
IMM GRANULOCYTES # BLD AUTO: 0.03 10*3/MM3 (ref 0–0.05)
IMM GRANULOCYTES NFR BLD AUTO: 0.4 % (ref 0–0.5)
LYMPHOCYTES # BLD AUTO: 1.38 10*3/MM3 (ref 0.7–3.1)
LYMPHOCYTES NFR BLD AUTO: 17.1 % (ref 19.6–45.3)
MCH RBC QN AUTO: 25.9 PG (ref 26.6–33)
MCHC RBC AUTO-ENTMCNC: 34.7 G/DL (ref 31.5–35.7)
MCV RBC AUTO: 74.7 FL (ref 79–97)
MONOCYTES # BLD AUTO: 1.35 10*3/MM3 (ref 0.1–0.9)
MONOCYTES NFR BLD AUTO: 16.7 % (ref 5–12)
NEUTROPHILS # BLD AUTO: 5.05 10*3/MM3 (ref 1.4–7)
NEUTROPHILS NFR BLD AUTO: 62.3 % (ref 42.7–76)
NRBC BLD AUTO-RTO: 0 /100 WBC (ref 0–0)
PLATELET # BLD AUTO: 338 10*3/MM3 (ref 140–450)
PMV BLD AUTO: 12.9 FL (ref 6–12)
POTASSIUM BLD-SCNC: 3.1 MMOL/L (ref 3.5–5.2)
RBC # BLD AUTO: 3.24 10*6/MM3 (ref 4.14–5.8)
SODIUM BLD-SCNC: 136 MMOL/L (ref 136–145)
WBC NRBC COR # BLD: 8.09 10*3/MM3 (ref 3.4–10.8)

## 2019-04-19 PROCEDURE — 80048 BASIC METABOLIC PNL TOTAL CA: CPT | Performed by: FAMILY MEDICINE

## 2019-04-19 PROCEDURE — 99232 SBSQ HOSP IP/OBS MODERATE 35: CPT | Performed by: FAMILY MEDICINE

## 2019-04-19 PROCEDURE — 25010000002 HYDROMORPHONE 1 MG/ML SOLUTION: Performed by: SURGERY

## 2019-04-19 PROCEDURE — 97116 GAIT TRAINING THERAPY: CPT

## 2019-04-19 PROCEDURE — 85025 COMPLETE CBC W/AUTO DIFF WBC: CPT | Performed by: FAMILY MEDICINE

## 2019-04-19 PROCEDURE — 97535 SELF CARE MNGMENT TRAINING: CPT

## 2019-04-19 PROCEDURE — 97110 THERAPEUTIC EXERCISES: CPT

## 2019-04-19 PROCEDURE — 25010000002 METOCLOPRAMIDE PER 10 MG: Performed by: FAMILY MEDICINE

## 2019-04-19 RX ADMIN — HYDROMORPHONE HYDROCHLORIDE 0.5 MG: 1 INJECTION, SOLUTION INTRAMUSCULAR; INTRAVENOUS; SUBCUTANEOUS at 01:28

## 2019-04-19 RX ADMIN — METOCLOPRAMIDE 10 MG: 5 INJECTION, SOLUTION INTRAMUSCULAR; INTRAVENOUS at 20:32

## 2019-04-19 RX ADMIN — FAMOTIDINE 20 MG: 20 TABLET ORAL at 09:14

## 2019-04-19 RX ADMIN — SODIUM CHLORIDE, PRESERVATIVE FREE 10 ML: 5 INJECTION INTRAVENOUS at 20:32

## 2019-04-19 RX ADMIN — LEVOTHYROXINE SODIUM ANHYDROUS 75 MCG: 100 INJECTION, POWDER, LYOPHILIZED, FOR SOLUTION INTRAVENOUS at 06:13

## 2019-04-19 RX ADMIN — FAMOTIDINE 20 MG: 20 TABLET ORAL at 20:32

## 2019-04-19 RX ADMIN — NYSTATIN 500000 UNITS: 100000 SUSPENSION ORAL at 17:49

## 2019-04-19 RX ADMIN — METOCLOPRAMIDE 10 MG: 5 INJECTION, SOLUTION INTRAMUSCULAR; INTRAVENOUS at 09:18

## 2019-04-19 RX ADMIN — NYSTATIN 500000 UNITS: 100000 SUSPENSION ORAL at 11:56

## 2019-04-19 RX ADMIN — HYDROMORPHONE HYDROCHLORIDE 0.5 MG: 1 INJECTION, SOLUTION INTRAMUSCULAR; INTRAVENOUS; SUBCUTANEOUS at 06:13

## 2019-04-19 RX ADMIN — METOCLOPRAMIDE 10 MG: 5 INJECTION, SOLUTION INTRAMUSCULAR; INTRAVENOUS at 17:50

## 2019-04-19 RX ADMIN — NYSTATIN 500000 UNITS: 100000 SUSPENSION ORAL at 09:14

## 2019-04-19 RX ADMIN — SODIUM CHLORIDE, PRESERVATIVE FREE 10 ML: 5 INJECTION INTRAVENOUS at 20:33

## 2019-04-19 RX ADMIN — HYDROMORPHONE HYDROCHLORIDE 0.5 MG: 1 INJECTION, SOLUTION INTRAMUSCULAR; INTRAVENOUS; SUBCUTANEOUS at 16:14

## 2019-04-20 LAB
ANION GAP SERPL CALCULATED.3IONS-SCNC: 15 MMOL/L
BASOPHILS # BLD AUTO: 0.05 10*3/MM3 (ref 0–0.2)
BASOPHILS NFR BLD AUTO: 0.5 % (ref 0–1.5)
BUN BLD-MCNC: 12 MG/DL (ref 8–23)
BUN/CREAT SERPL: 17.9 (ref 7–25)
CALCIUM SPEC-SCNC: 9.4 MG/DL (ref 8.6–10.5)
CHLORIDE SERPL-SCNC: 94 MMOL/L (ref 98–107)
CO2 SERPL-SCNC: 28 MMOL/L (ref 22–29)
CREAT BLD-MCNC: 0.67 MG/DL (ref 0.76–1.27)
DEPRECATED RDW RBC AUTO: 34.3 FL (ref 37–54)
EOSINOPHIL # BLD AUTO: 0.13 10*3/MM3 (ref 0–0.4)
EOSINOPHIL NFR BLD AUTO: 1.4 % (ref 0.3–6.2)
ERYTHROCYTE [DISTWIDTH] IN BLOOD BY AUTOMATED COUNT: 12.9 % (ref 12.3–15.4)
GFR SERPL CREATININE-BSD FRML MDRD: 147 ML/MIN/1.73
GLUCOSE BLD-MCNC: 158 MG/DL (ref 65–99)
HCT VFR BLD AUTO: 23.9 % (ref 37.5–51)
HGB BLD-MCNC: 8.3 G/DL (ref 13–17.7)
IMM GRANULOCYTES # BLD AUTO: 0.03 10*3/MM3 (ref 0–0.05)
IMM GRANULOCYTES NFR BLD AUTO: 0.3 % (ref 0–0.5)
LYMPHOCYTES # BLD AUTO: 1.37 10*3/MM3 (ref 0.7–3.1)
LYMPHOCYTES NFR BLD AUTO: 14.4 % (ref 19.6–45.3)
MCH RBC QN AUTO: 25.7 PG (ref 26.6–33)
MCHC RBC AUTO-ENTMCNC: 34.7 G/DL (ref 31.5–35.7)
MCV RBC AUTO: 74 FL (ref 79–97)
MONOCYTES # BLD AUTO: 1.77 10*3/MM3 (ref 0.1–0.9)
MONOCYTES NFR BLD AUTO: 18.6 % (ref 5–12)
NEUTROPHILS # BLD AUTO: 6.18 10*3/MM3 (ref 1.7–7)
NEUTROPHILS NFR BLD AUTO: 64.8 % (ref 42.7–76)
NRBC BLD AUTO-RTO: 0 /100 WBC (ref 0–0.2)
PLATELET # BLD AUTO: 344 10*3/MM3 (ref 140–450)
PMV BLD AUTO: 12.4 FL (ref 6–12)
POTASSIUM BLD-SCNC: 3.7 MMOL/L (ref 3.5–5.2)
RBC # BLD AUTO: 3.23 10*6/MM3 (ref 4.14–5.8)
SODIUM BLD-SCNC: 137 MMOL/L (ref 136–145)
WBC NRBC COR # BLD: 9.53 10*3/MM3 (ref 3.4–10.8)

## 2019-04-20 PROCEDURE — 25010000002 METOCLOPRAMIDE PER 10 MG: Performed by: FAMILY MEDICINE

## 2019-04-20 PROCEDURE — 85025 COMPLETE CBC W/AUTO DIFF WBC: CPT | Performed by: FAMILY MEDICINE

## 2019-04-20 PROCEDURE — 80048 BASIC METABOLIC PNL TOTAL CA: CPT | Performed by: FAMILY MEDICINE

## 2019-04-20 PROCEDURE — 99024 POSTOP FOLLOW-UP VISIT: CPT | Performed by: SURGERY

## 2019-04-20 PROCEDURE — 97110 THERAPEUTIC EXERCISES: CPT

## 2019-04-20 PROCEDURE — 97535 SELF CARE MNGMENT TRAINING: CPT

## 2019-04-20 PROCEDURE — 99232 SBSQ HOSP IP/OBS MODERATE 35: CPT | Performed by: STUDENT IN AN ORGANIZED HEALTH CARE EDUCATION/TRAINING PROGRAM

## 2019-04-20 RX ORDER — LOPERAMIDE HYDROCHLORIDE 2 MG/1
2 CAPSULE ORAL
Status: DISCONTINUED | OUTPATIENT
Start: 2019-04-20 | End: 2019-04-24 | Stop reason: HOSPADM

## 2019-04-20 RX ORDER — TAMSULOSIN HYDROCHLORIDE 0.4 MG/1
0.4 CAPSULE ORAL NIGHTLY
Status: DISCONTINUED | OUTPATIENT
Start: 2019-04-20 | End: 2019-04-21

## 2019-04-20 RX ORDER — LOPERAMIDE HYDROCHLORIDE 2 MG/1
4 CAPSULE ORAL ONCE
Status: COMPLETED | OUTPATIENT
Start: 2019-04-20 | End: 2019-04-20

## 2019-04-20 RX ADMIN — SODIUM CHLORIDE, PRESERVATIVE FREE 10 ML: 5 INJECTION INTRAVENOUS at 06:19

## 2019-04-20 RX ADMIN — FAMOTIDINE 20 MG: 20 TABLET ORAL at 22:25

## 2019-04-20 RX ADMIN — SCOPALAMINE 1 PATCH: 1 PATCH, EXTENDED RELEASE TRANSDERMAL at 11:31

## 2019-04-20 RX ADMIN — TAMSULOSIN HYDROCHLORIDE 0.4 MG: 0.4 CAPSULE ORAL at 22:25

## 2019-04-20 RX ADMIN — LEVOTHYROXINE SODIUM ANHYDROUS 75 MCG: 100 INJECTION, POWDER, LYOPHILIZED, FOR SOLUTION INTRAVENOUS at 06:18

## 2019-04-20 RX ADMIN — METOCLOPRAMIDE 10 MG: 5 INJECTION, SOLUTION INTRAMUSCULAR; INTRAVENOUS at 11:31

## 2019-04-20 RX ADMIN — LOPERAMIDE HYDROCHLORIDE 4 MG: 2 CAPSULE ORAL at 03:11

## 2019-04-20 RX ADMIN — FAMOTIDINE 20 MG: 20 TABLET ORAL at 09:19

## 2019-04-20 RX ADMIN — SODIUM CHLORIDE, PRESERVATIVE FREE 10 ML: 5 INJECTION INTRAVENOUS at 22:25

## 2019-04-20 RX ADMIN — SODIUM CHLORIDE, PRESERVATIVE FREE 10 ML: 5 INJECTION INTRAVENOUS at 22:26

## 2019-04-20 RX ADMIN — METOCLOPRAMIDE 10 MG: 5 INJECTION, SOLUTION INTRAMUSCULAR; INTRAVENOUS at 17:26

## 2019-04-20 RX ADMIN — METOCLOPRAMIDE 10 MG: 5 INJECTION, SOLUTION INTRAMUSCULAR; INTRAVENOUS at 22:25

## 2019-04-20 RX ADMIN — METOCLOPRAMIDE 10 MG: 5 INJECTION, SOLUTION INTRAMUSCULAR; INTRAVENOUS at 09:19

## 2019-04-21 ENCOUNTER — APPOINTMENT (OUTPATIENT)
Dept: GENERAL RADIOLOGY | Facility: HOSPITAL | Age: 59
End: 2019-04-21

## 2019-04-21 PROBLEM — R33.9 URINARY RETENTION: Status: ACTIVE | Noted: 2019-04-21

## 2019-04-21 PROBLEM — D64.9 ANEMIA: Status: ACTIVE | Noted: 2019-04-21

## 2019-04-21 LAB
ANION GAP SERPL CALCULATED.3IONS-SCNC: 16 MMOL/L
ANISOCYTOSIS BLD QL: NORMAL
BASOPHILS # BLD AUTO: 0.05 10*3/MM3 (ref 0–0.2)
BASOPHILS NFR BLD AUTO: 0.6 % (ref 0–1.5)
BUN BLD-MCNC: 14 MG/DL (ref 8–23)
BUN/CREAT SERPL: 22.2 (ref 7–25)
CALCIUM SPEC-SCNC: 9.6 MG/DL (ref 8.6–10.5)
CHLORIDE SERPL-SCNC: 94 MMOL/L (ref 98–107)
CO2 SERPL-SCNC: 28 MMOL/L (ref 22–29)
CREAT BLD-MCNC: 0.63 MG/DL (ref 0.76–1.27)
DEPRECATED RDW RBC AUTO: 34.5 FL (ref 37–54)
EOSINOPHIL # BLD AUTO: 0.24 10*3/MM3 (ref 0–0.4)
EOSINOPHIL NFR BLD AUTO: 2.6 % (ref 0.3–6.2)
ERYTHROCYTE [DISTWIDTH] IN BLOOD BY AUTOMATED COUNT: 12.9 % (ref 12.3–15.4)
GFR SERPL CREATININE-BSD FRML MDRD: >150 ML/MIN/1.73
GLUCOSE BLD-MCNC: 142 MG/DL (ref 65–99)
HCT VFR BLD AUTO: 22.3 % (ref 37.5–51)
HEMOCCULT STL QL: NEGATIVE
HGB BLD-MCNC: 7.8 G/DL (ref 13–17.7)
IMM GRANULOCYTES # BLD AUTO: 0.03 10*3/MM3 (ref 0–0.05)
IMM GRANULOCYTES NFR BLD AUTO: 0.3 % (ref 0–0.5)
IRON 24H UR-MRATE: 29 MCG/DL (ref 59–158)
IRON SATN MFR SERPL: 11 % (ref 20–50)
LYMPHOCYTES # BLD AUTO: 1.37 10*3/MM3 (ref 0.7–3.1)
LYMPHOCYTES NFR BLD AUTO: 15.1 % (ref 19.6–45.3)
MCH RBC QN AUTO: 26 PG (ref 26.6–33)
MCHC RBC AUTO-ENTMCNC: 35 G/DL (ref 31.5–35.7)
MCV RBC AUTO: 74.3 FL (ref 79–97)
MONOCYTES # BLD AUTO: 1.94 10*3/MM3 (ref 0.1–0.9)
MONOCYTES NFR BLD AUTO: 21.3 % (ref 5–12)
NEUTROPHILS # BLD AUTO: 5.46 10*3/MM3 (ref 1.7–7)
NEUTROPHILS NFR BLD AUTO: 60.1 % (ref 42.7–76)
NRBC BLD AUTO-RTO: 0.2 /100 WBC (ref 0–0.2)
PLAT MORPH BLD: NORMAL
PLATELET # BLD AUTO: 350 10*3/MM3 (ref 140–450)
PMV BLD AUTO: 12.7 FL (ref 6–12)
POLYCHROMASIA BLD QL SMEAR: NORMAL
POTASSIUM BLD-SCNC: 3.5 MMOL/L (ref 3.5–5.2)
RBC # BLD AUTO: 3 10*6/MM3 (ref 4.14–5.8)
SODIUM BLD-SCNC: 138 MMOL/L (ref 136–145)
TARGETS BLD QL SMEAR: NORMAL
TIBC SERPL-MCNC: 253 MCG/DL (ref 298–536)
TRANSFERRIN SERPL-MCNC: 170 MG/DL (ref 200–360)
WBC MORPH BLD: NORMAL
WBC NRBC COR # BLD: 9.09 10*3/MM3 (ref 3.4–10.8)

## 2019-04-21 PROCEDURE — 85007 BL SMEAR W/DIFF WBC COUNT: CPT | Performed by: FAMILY MEDICINE

## 2019-04-21 PROCEDURE — 71045 X-RAY EXAM CHEST 1 VIEW: CPT

## 2019-04-21 PROCEDURE — 84466 ASSAY OF TRANSFERRIN: CPT | Performed by: STUDENT IN AN ORGANIZED HEALTH CARE EDUCATION/TRAINING PROGRAM

## 2019-04-21 PROCEDURE — 99232 SBSQ HOSP IP/OBS MODERATE 35: CPT | Performed by: STUDENT IN AN ORGANIZED HEALTH CARE EDUCATION/TRAINING PROGRAM

## 2019-04-21 PROCEDURE — 85025 COMPLETE CBC W/AUTO DIFF WBC: CPT | Performed by: FAMILY MEDICINE

## 2019-04-21 PROCEDURE — 83540 ASSAY OF IRON: CPT | Performed by: STUDENT IN AN ORGANIZED HEALTH CARE EDUCATION/TRAINING PROGRAM

## 2019-04-21 PROCEDURE — 97530 THERAPEUTIC ACTIVITIES: CPT

## 2019-04-21 PROCEDURE — 25010000002 METOCLOPRAMIDE PER 10 MG: Performed by: FAMILY MEDICINE

## 2019-04-21 PROCEDURE — 80048 BASIC METABOLIC PNL TOTAL CA: CPT | Performed by: FAMILY MEDICINE

## 2019-04-21 PROCEDURE — 82272 OCCULT BLD FECES 1-3 TESTS: CPT | Performed by: FAMILY MEDICINE

## 2019-04-21 RX ORDER — TAMSULOSIN HYDROCHLORIDE 0.4 MG/1
0.8 CAPSULE ORAL NIGHTLY
Status: DISCONTINUED | OUTPATIENT
Start: 2019-04-21 | End: 2019-04-24 | Stop reason: HOSPADM

## 2019-04-21 RX ORDER — FERROUS SULFATE 220 (44)/5
220 ELIXIR ORAL DAILY
Status: DISCONTINUED | OUTPATIENT
Start: 2019-04-21 | End: 2019-04-24 | Stop reason: HOSPADM

## 2019-04-21 RX ADMIN — METOCLOPRAMIDE 10 MG: 5 INJECTION, SOLUTION INTRAMUSCULAR; INTRAVENOUS at 17:41

## 2019-04-21 RX ADMIN — SODIUM CHLORIDE, PRESERVATIVE FREE 10 ML: 5 INJECTION INTRAVENOUS at 21:14

## 2019-04-21 RX ADMIN — FAMOTIDINE 20 MG: 20 TABLET ORAL at 21:13

## 2019-04-21 RX ADMIN — TAMSULOSIN HYDROCHLORIDE 0.8 MG: 0.4 CAPSULE ORAL at 21:13

## 2019-04-21 RX ADMIN — LEVOTHYROXINE SODIUM ANHYDROUS 75 MCG: 100 INJECTION, POWDER, LYOPHILIZED, FOR SOLUTION INTRAVENOUS at 06:28

## 2019-04-21 RX ADMIN — METOCLOPRAMIDE 10 MG: 5 INJECTION, SOLUTION INTRAMUSCULAR; INTRAVENOUS at 09:39

## 2019-04-21 RX ADMIN — SODIUM CHLORIDE, PRESERVATIVE FREE 10 ML: 5 INJECTION INTRAVENOUS at 21:13

## 2019-04-21 RX ADMIN — METOCLOPRAMIDE 10 MG: 5 INJECTION, SOLUTION INTRAMUSCULAR; INTRAVENOUS at 21:13

## 2019-04-21 RX ADMIN — METOCLOPRAMIDE 10 MG: 5 INJECTION, SOLUTION INTRAMUSCULAR; INTRAVENOUS at 12:22

## 2019-04-21 RX ADMIN — Medication 220 MG: at 17:41

## 2019-04-22 PROBLEM — D63.8 ANEMIA OF CHRONIC DISEASE: Status: ACTIVE | Noted: 2019-04-21

## 2019-04-22 LAB
ANION GAP SERPL CALCULATED.3IONS-SCNC: 11 MMOL/L
BASOPHILS # BLD AUTO: 0.06 10*3/MM3 (ref 0–0.2)
BASOPHILS NFR BLD AUTO: 0.5 % (ref 0–1.5)
BUN BLD-MCNC: 17 MG/DL (ref 8–23)
BUN/CREAT SERPL: 26.2 (ref 7–25)
CALCIUM SPEC-SCNC: 9.7 MG/DL (ref 8.6–10.5)
CHLORIDE SERPL-SCNC: 98 MMOL/L (ref 98–107)
CO2 SERPL-SCNC: 29 MMOL/L (ref 22–29)
CREAT BLD-MCNC: 0.65 MG/DL (ref 0.76–1.27)
DEPRECATED RDW RBC AUTO: 34.8 FL (ref 37–54)
EOSINOPHIL # BLD AUTO: 0.31 10*3/MM3 (ref 0–0.4)
EOSINOPHIL NFR BLD AUTO: 2.8 % (ref 0.3–6.2)
ERYTHROCYTE [DISTWIDTH] IN BLOOD BY AUTOMATED COUNT: 12.9 % (ref 12.3–15.4)
GFR SERPL CREATININE-BSD FRML MDRD: >150 ML/MIN/1.73
GLUCOSE BLD-MCNC: 144 MG/DL (ref 65–99)
HCT VFR BLD AUTO: 21.6 % (ref 37.5–51)
HGB BLD-MCNC: 7.6 G/DL (ref 13–17.7)
IMM GRANULOCYTES # BLD AUTO: 0.05 10*3/MM3 (ref 0–0.05)
IMM GRANULOCYTES NFR BLD AUTO: 0.4 % (ref 0–0.5)
LYMPHOCYTES # BLD AUTO: 1.62 10*3/MM3 (ref 0.7–3.1)
LYMPHOCYTES NFR BLD AUTO: 14.5 % (ref 19.6–45.3)
MCH RBC QN AUTO: 26.4 PG (ref 26.6–33)
MCHC RBC AUTO-ENTMCNC: 35.2 G/DL (ref 31.5–35.7)
MCV RBC AUTO: 75 FL (ref 79–97)
MONOCYTES # BLD AUTO: 2.23 10*3/MM3 (ref 0.1–0.9)
MONOCYTES NFR BLD AUTO: 19.9 % (ref 5–12)
NEUTROPHILS # BLD AUTO: 6.91 10*3/MM3 (ref 1.7–7)
NEUTROPHILS NFR BLD AUTO: 61.9 % (ref 42.7–76)
NRBC BLD AUTO-RTO: 0.2 /100 WBC (ref 0–0.2)
PLATELET # BLD AUTO: 363 10*3/MM3 (ref 140–450)
PMV BLD AUTO: 12.1 FL (ref 6–12)
POTASSIUM BLD-SCNC: 3.5 MMOL/L (ref 3.5–5.2)
RBC # BLD AUTO: 2.88 10*6/MM3 (ref 4.14–5.8)
SODIUM BLD-SCNC: 138 MMOL/L (ref 136–145)
WBC NRBC COR # BLD: 11.18 10*3/MM3 (ref 3.4–10.8)

## 2019-04-22 PROCEDURE — 85025 COMPLETE CBC W/AUTO DIFF WBC: CPT | Performed by: FAMILY MEDICINE

## 2019-04-22 PROCEDURE — 99024 POSTOP FOLLOW-UP VISIT: CPT | Performed by: SURGERY

## 2019-04-22 PROCEDURE — 25010000002 METOCLOPRAMIDE PER 10 MG: Performed by: FAMILY MEDICINE

## 2019-04-22 PROCEDURE — 25010000002 ENOXAPARIN PER 10 MG: Performed by: STUDENT IN AN ORGANIZED HEALTH CARE EDUCATION/TRAINING PROGRAM

## 2019-04-22 PROCEDURE — 97535 SELF CARE MNGMENT TRAINING: CPT

## 2019-04-22 PROCEDURE — 97110 THERAPEUTIC EXERCISES: CPT

## 2019-04-22 PROCEDURE — 80048 BASIC METABOLIC PNL TOTAL CA: CPT | Performed by: FAMILY MEDICINE

## 2019-04-22 PROCEDURE — 99232 SBSQ HOSP IP/OBS MODERATE 35: CPT | Performed by: STUDENT IN AN ORGANIZED HEALTH CARE EDUCATION/TRAINING PROGRAM

## 2019-04-22 RX ORDER — LEVOTHYROXINE SODIUM ANHYDROUS 100 UG/5ML
50 INJECTION, POWDER, LYOPHILIZED, FOR SOLUTION INTRAVENOUS
Status: DISCONTINUED | OUTPATIENT
Start: 2019-04-23 | End: 2019-04-24

## 2019-04-22 RX ADMIN — METOCLOPRAMIDE 10 MG: 5 INJECTION, SOLUTION INTRAMUSCULAR; INTRAVENOUS at 21:49

## 2019-04-22 RX ADMIN — SODIUM CHLORIDE 1000 ML: 9 INJECTION, SOLUTION INTRAVENOUS at 05:56

## 2019-04-22 RX ADMIN — FAMOTIDINE 20 MG: 20 TABLET ORAL at 21:48

## 2019-04-22 RX ADMIN — SODIUM CHLORIDE, PRESERVATIVE FREE 10 ML: 5 INJECTION INTRAVENOUS at 21:47

## 2019-04-22 RX ADMIN — SODIUM CHLORIDE, PRESERVATIVE FREE 3 ML: 5 INJECTION INTRAVENOUS at 21:46

## 2019-04-22 RX ADMIN — METOCLOPRAMIDE 10 MG: 5 INJECTION, SOLUTION INTRAMUSCULAR; INTRAVENOUS at 16:26

## 2019-04-22 RX ADMIN — SODIUM CHLORIDE, PRESERVATIVE FREE 10 ML: 5 INJECTION INTRAVENOUS at 21:48

## 2019-04-22 RX ADMIN — FAMOTIDINE 20 MG: 20 TABLET ORAL at 07:51

## 2019-04-22 RX ADMIN — METOCLOPRAMIDE 10 MG: 5 INJECTION, SOLUTION INTRAMUSCULAR; INTRAVENOUS at 11:39

## 2019-04-22 RX ADMIN — SODIUM CHLORIDE, PRESERVATIVE FREE 10 ML: 5 INJECTION INTRAVENOUS at 07:51

## 2019-04-22 RX ADMIN — TAMSULOSIN HYDROCHLORIDE 0.8 MG: 0.4 CAPSULE ORAL at 21:48

## 2019-04-22 RX ADMIN — ENOXAPARIN SODIUM 40 MG: 40 INJECTION SUBCUTANEOUS at 16:26

## 2019-04-22 RX ADMIN — Medication 220 MG: at 07:51

## 2019-04-22 RX ADMIN — LEVOTHYROXINE SODIUM ANHYDROUS 75 MCG: 100 INJECTION, POWDER, LYOPHILIZED, FOR SOLUTION INTRAVENOUS at 05:56

## 2019-04-22 RX ADMIN — METOCLOPRAMIDE 10 MG: 5 INJECTION, SOLUTION INTRAMUSCULAR; INTRAVENOUS at 07:50

## 2019-04-22 RX ADMIN — SODIUM CHLORIDE, PRESERVATIVE FREE 10 ML: 5 INJECTION INTRAVENOUS at 07:52

## 2019-04-23 LAB
ANION GAP SERPL CALCULATED.3IONS-SCNC: 12 MMOL/L
BASOPHILS # BLD AUTO: 0.08 10*3/MM3 (ref 0–0.2)
BASOPHILS NFR BLD AUTO: 0.7 % (ref 0–1.5)
BUN BLD-MCNC: 13 MG/DL (ref 8–23)
BUN/CREAT SERPL: 23.6 (ref 7–25)
CALCIUM SPEC-SCNC: 9.9 MG/DL (ref 8.6–10.5)
CHLORIDE SERPL-SCNC: 96 MMOL/L (ref 98–107)
CO2 SERPL-SCNC: 28 MMOL/L (ref 22–29)
CREAT BLD-MCNC: 0.55 MG/DL (ref 0.76–1.27)
DEPRECATED RDW RBC AUTO: 35.1 FL (ref 37–54)
EOSINOPHIL # BLD AUTO: 0.63 10*3/MM3 (ref 0–0.4)
EOSINOPHIL NFR BLD AUTO: 5.4 % (ref 0.3–6.2)
ERYTHROCYTE [DISTWIDTH] IN BLOOD BY AUTOMATED COUNT: 12.9 % (ref 12.3–15.4)
GFR SERPL CREATININE-BSD FRML MDRD: >150 ML/MIN/1.73
GLUCOSE BLD-MCNC: 142 MG/DL (ref 65–99)
HCT VFR BLD AUTO: 22.4 % (ref 37.5–51)
HGB BLD-MCNC: 7.8 G/DL (ref 13–17.7)
IMM GRANULOCYTES # BLD AUTO: 0.06 10*3/MM3 (ref 0–0.05)
IMM GRANULOCYTES NFR BLD AUTO: 0.5 % (ref 0–0.5)
LYMPHOCYTES # BLD AUTO: 1.68 10*3/MM3 (ref 0.7–3.1)
LYMPHOCYTES NFR BLD AUTO: 14.4 % (ref 19.6–45.3)
MCH RBC QN AUTO: 26.4 PG (ref 26.6–33)
MCHC RBC AUTO-ENTMCNC: 34.8 G/DL (ref 31.5–35.7)
MCV RBC AUTO: 75.9 FL (ref 79–97)
MONOCYTES # BLD AUTO: 2.33 10*3/MM3 (ref 0.1–0.9)
MONOCYTES NFR BLD AUTO: 19.9 % (ref 5–12)
NEUTROPHILS # BLD AUTO: 6.9 10*3/MM3 (ref 1.7–7)
NEUTROPHILS NFR BLD AUTO: 59.1 % (ref 42.7–76)
NRBC BLD AUTO-RTO: 0 /100 WBC (ref 0–0.2)
PLATELET # BLD AUTO: 438 10*3/MM3 (ref 140–450)
PMV BLD AUTO: 12.8 FL (ref 6–12)
POTASSIUM BLD-SCNC: 3.8 MMOL/L (ref 3.5–5.2)
RBC # BLD AUTO: 2.95 10*6/MM3 (ref 4.14–5.8)
SODIUM BLD-SCNC: 136 MMOL/L (ref 136–145)
WBC NRBC COR # BLD: 11.68 10*3/MM3 (ref 3.4–10.8)

## 2019-04-23 PROCEDURE — 97110 THERAPEUTIC EXERCISES: CPT

## 2019-04-23 PROCEDURE — 97110 THERAPEUTIC EXERCISES: CPT | Performed by: PHYSICAL THERAPIST

## 2019-04-23 PROCEDURE — 97535 SELF CARE MNGMENT TRAINING: CPT

## 2019-04-23 PROCEDURE — 97116 GAIT TRAINING THERAPY: CPT | Performed by: PHYSICAL THERAPIST

## 2019-04-23 PROCEDURE — 25010000002 METOCLOPRAMIDE PER 10 MG: Performed by: FAMILY MEDICINE

## 2019-04-23 PROCEDURE — 80048 BASIC METABOLIC PNL TOTAL CA: CPT | Performed by: FAMILY MEDICINE

## 2019-04-23 PROCEDURE — 85025 COMPLETE CBC W/AUTO DIFF WBC: CPT | Performed by: FAMILY MEDICINE

## 2019-04-23 PROCEDURE — 25010000002 ENOXAPARIN PER 10 MG: Performed by: STUDENT IN AN ORGANIZED HEALTH CARE EDUCATION/TRAINING PROGRAM

## 2019-04-23 PROCEDURE — 99232 SBSQ HOSP IP/OBS MODERATE 35: CPT | Performed by: FAMILY MEDICINE

## 2019-04-23 PROCEDURE — 99024 POSTOP FOLLOW-UP VISIT: CPT | Performed by: SURGERY

## 2019-04-23 RX ADMIN — ENOXAPARIN SODIUM 40 MG: 40 INJECTION SUBCUTANEOUS at 12:59

## 2019-04-23 RX ADMIN — SODIUM CHLORIDE, PRESERVATIVE FREE 10 ML: 5 INJECTION INTRAVENOUS at 20:01

## 2019-04-23 RX ADMIN — TAMSULOSIN HYDROCHLORIDE 0.8 MG: 0.4 CAPSULE ORAL at 20:00

## 2019-04-23 RX ADMIN — SODIUM CHLORIDE, PRESERVATIVE FREE 10 ML: 5 INJECTION INTRAVENOUS at 09:39

## 2019-04-23 RX ADMIN — SODIUM CHLORIDE, PRESERVATIVE FREE 10 ML: 5 INJECTION INTRAVENOUS at 20:00

## 2019-04-23 RX ADMIN — METOCLOPRAMIDE 10 MG: 5 INJECTION, SOLUTION INTRAMUSCULAR; INTRAVENOUS at 09:39

## 2019-04-23 RX ADMIN — METOCLOPRAMIDE 10 MG: 5 INJECTION, SOLUTION INTRAMUSCULAR; INTRAVENOUS at 16:58

## 2019-04-23 RX ADMIN — SODIUM CHLORIDE, PRESERVATIVE FREE 3 ML: 5 INJECTION INTRAVENOUS at 20:01

## 2019-04-23 RX ADMIN — FAMOTIDINE 20 MG: 20 TABLET ORAL at 20:01

## 2019-04-23 RX ADMIN — METOCLOPRAMIDE 10 MG: 5 INJECTION, SOLUTION INTRAMUSCULAR; INTRAVENOUS at 12:59

## 2019-04-23 RX ADMIN — FAMOTIDINE 20 MG: 20 TABLET ORAL at 09:39

## 2019-04-23 RX ADMIN — METOCLOPRAMIDE 10 MG: 5 INJECTION, SOLUTION INTRAMUSCULAR; INTRAVENOUS at 20:00

## 2019-04-23 RX ADMIN — SODIUM CHLORIDE, PRESERVATIVE FREE 10 ML: 5 INJECTION INTRAVENOUS at 09:40

## 2019-04-23 RX ADMIN — SCOPALAMINE 1 PATCH: 1 PATCH, EXTENDED RELEASE TRANSDERMAL at 12:59

## 2019-04-23 RX ADMIN — Medication 220 MG: at 09:39

## 2019-04-23 RX ADMIN — LEVOTHYROXINE SODIUM ANHYDROUS 50 MCG: 100 INJECTION, POWDER, LYOPHILIZED, FOR SOLUTION INTRAVENOUS at 06:09

## 2019-04-24 VITALS
HEIGHT: 65 IN | BODY MASS INDEX: 17.65 KG/M2 | DIASTOLIC BLOOD PRESSURE: 57 MMHG | HEART RATE: 105 BPM | TEMPERATURE: 98.3 F | SYSTOLIC BLOOD PRESSURE: 117 MMHG | RESPIRATION RATE: 18 BRPM | OXYGEN SATURATION: 94 % | WEIGHT: 105.9 LBS

## 2019-04-24 DIAGNOSIS — C11.9 MALIGNANT NEOPLASM NASOPHARYNX (HCC): Primary | ICD-10-CM

## 2019-04-24 PROBLEM — R33.9 URINARY RETENTION: Status: RESOLVED | Noted: 2019-04-21 | Resolved: 2019-04-24

## 2019-04-24 LAB
ANION GAP SERPL CALCULATED.3IONS-SCNC: 10 MMOL/L
BASOPHILS # BLD AUTO: 0.08 10*3/MM3 (ref 0–0.2)
BASOPHILS NFR BLD AUTO: 0.9 % (ref 0–1.5)
BUN BLD-MCNC: 16 MG/DL (ref 8–23)
BUN/CREAT SERPL: 29.1 (ref 7–25)
CALCIUM SPEC-SCNC: 9.9 MG/DL (ref 8.6–10.5)
CHLORIDE SERPL-SCNC: 96 MMOL/L (ref 98–107)
CO2 SERPL-SCNC: 29 MMOL/L (ref 22–29)
CREAT BLD-MCNC: 0.55 MG/DL (ref 0.76–1.27)
DEPRECATED RDW RBC AUTO: 35.1 FL (ref 37–54)
EOSINOPHIL # BLD AUTO: 0.5 10*3/MM3 (ref 0–0.4)
EOSINOPHIL NFR BLD AUTO: 5.3 % (ref 0.3–6.2)
ERYTHROCYTE [DISTWIDTH] IN BLOOD BY AUTOMATED COUNT: 13 % (ref 12.3–15.4)
GFR SERPL CREATININE-BSD FRML MDRD: >150 ML/MIN/1.73
GLUCOSE BLD-MCNC: 137 MG/DL (ref 65–99)
HCT VFR BLD AUTO: 21.5 % (ref 37.5–51)
HGB BLD-MCNC: 7.6 G/DL (ref 13–17.7)
IMM GRANULOCYTES # BLD AUTO: 0.03 10*3/MM3 (ref 0–0.05)
IMM GRANULOCYTES NFR BLD AUTO: 0.3 % (ref 0–0.5)
LYMPHOCYTES # BLD AUTO: 1.34 10*3/MM3 (ref 0.7–3.1)
LYMPHOCYTES NFR BLD AUTO: 14.3 % (ref 19.6–45.3)
MCH RBC QN AUTO: 26.6 PG (ref 26.6–33)
MCHC RBC AUTO-ENTMCNC: 35.3 G/DL (ref 31.5–35.7)
MCV RBC AUTO: 75.2 FL (ref 79–97)
MONOCYTES # BLD AUTO: 1.93 10*3/MM3 (ref 0.1–0.9)
MONOCYTES NFR BLD AUTO: 20.5 % (ref 5–12)
NEUTROPHILS # BLD AUTO: 5.52 10*3/MM3 (ref 1.7–7)
NEUTROPHILS NFR BLD AUTO: 58.7 % (ref 42.7–76)
NRBC BLD AUTO-RTO: 0 /100 WBC (ref 0–0.2)
PLATELET # BLD AUTO: 437 10*3/MM3 (ref 140–450)
PMV BLD AUTO: 11.9 FL (ref 6–12)
POTASSIUM BLD-SCNC: 4.2 MMOL/L (ref 3.5–5.2)
RBC # BLD AUTO: 2.86 10*6/MM3 (ref 4.14–5.8)
SODIUM BLD-SCNC: 135 MMOL/L (ref 136–145)
WBC NRBC COR # BLD: 9.4 10*3/MM3 (ref 3.4–10.8)

## 2019-04-24 PROCEDURE — 97535 SELF CARE MNGMENT TRAINING: CPT

## 2019-04-24 PROCEDURE — 99238 HOSP IP/OBS DSCHRG MGMT 30/<: CPT | Performed by: FAMILY MEDICINE

## 2019-04-24 PROCEDURE — 97110 THERAPEUTIC EXERCISES: CPT

## 2019-04-24 PROCEDURE — 80048 BASIC METABOLIC PNL TOTAL CA: CPT | Performed by: FAMILY MEDICINE

## 2019-04-24 PROCEDURE — 85025 COMPLETE CBC W/AUTO DIFF WBC: CPT | Performed by: FAMILY MEDICINE

## 2019-04-24 PROCEDURE — 97116 GAIT TRAINING THERAPY: CPT

## 2019-04-24 PROCEDURE — 85007 BL SMEAR W/DIFF WBC COUNT: CPT | Performed by: FAMILY MEDICINE

## 2019-04-24 RX ORDER — LEVOTHYROXINE SODIUM 0.1 MG/1
100 TABLET ORAL
Status: DISCONTINUED | OUTPATIENT
Start: 2019-04-25 | End: 2019-04-24 | Stop reason: HOSPADM

## 2019-04-24 RX ORDER — LEVOTHYROXINE SODIUM 0.1 MG/1
100 TABLET ORAL DAILY
Qty: 30 TABLET | Refills: 0 | Status: SHIPPED | OUTPATIENT
Start: 2019-04-24 | End: 2019-05-22 | Stop reason: DRUGHIGH

## 2019-04-24 RX ORDER — METOCLOPRAMIDE HYDROCHLORIDE 5 MG/5ML
10 SOLUTION ORAL
Status: DISCONTINUED | OUTPATIENT
Start: 2019-04-24 | End: 2019-04-24 | Stop reason: HOSPADM

## 2019-04-24 RX ORDER — METOCLOPRAMIDE HYDROCHLORIDE 5 MG/5ML
10 SOLUTION ORAL
Qty: 1200 ML | Refills: 0 | Status: SHIPPED | OUTPATIENT
Start: 2019-04-24 | End: 2019-05-24

## 2019-04-24 RX ORDER — ACETAMINOPHEN 160 MG/5ML
650 SOLUTION ORAL EVERY 6 HOURS PRN
Start: 2019-04-24 | End: 2019-08-16

## 2019-04-24 RX ORDER — SCOLOPAMINE TRANSDERMAL SYSTEM 1 MG/1
1 PATCH, EXTENDED RELEASE TRANSDERMAL
Qty: 24 EACH | Refills: 0 | Status: SHIPPED | OUTPATIENT
Start: 2019-04-26 | End: 2019-08-16

## 2019-04-24 RX ORDER — FAMOTIDINE 20 MG/1
20 TABLET, FILM COATED ORAL 2 TIMES DAILY
Qty: 60 TABLET | Refills: 0 | Status: SHIPPED | OUTPATIENT
Start: 2019-04-24 | End: 2019-08-16

## 2019-04-24 RX ADMIN — SODIUM CHLORIDE, PRESERVATIVE FREE 10 ML: 5 INJECTION INTRAVENOUS at 07:32

## 2019-04-24 RX ADMIN — Medication 220 MG: at 07:32

## 2019-04-24 RX ADMIN — LEVOTHYROXINE SODIUM ANHYDROUS 50 MCG: 100 INJECTION, POWDER, LYOPHILIZED, FOR SOLUTION INTRAVENOUS at 05:38

## 2019-04-24 RX ADMIN — FAMOTIDINE 20 MG: 20 TABLET ORAL at 07:31

## 2019-04-24 RX ADMIN — METOCLOPRAMIDE 10 MG: 5 SOLUTION ORAL at 10:35

## 2019-04-26 ENCOUNTER — HOSPITAL ENCOUNTER (OUTPATIENT)
Dept: PET IMAGING | Facility: HOSPITAL | Age: 59
Discharge: HOME | End: 2019-04-26
Admitting: OTOLARYNGOLOGY

## 2019-04-26 DIAGNOSIS — C11.9 MALIGNANT NEOPLASM NASOPHARYNX (HCC): ICD-10-CM

## 2019-04-26 PROCEDURE — 78815 PET IMAGE W/CT SKULL-THIGH: CPT

## 2019-04-26 PROCEDURE — 0 FLUDEOXYGLUCOSE F18 SOLUTION: Performed by: OTOLARYNGOLOGY

## 2019-04-26 PROCEDURE — A9552 F18 FDG: HCPCS | Performed by: OTOLARYNGOLOGY

## 2019-04-26 RX ADMIN — FLUDEOXYGLUCOSE F18 1 DOSE: 300 INJECTION INTRAVENOUS at 10:10

## 2019-04-30 ENCOUNTER — OFFICE VISIT (OUTPATIENT)
Dept: OTOLARYNGOLOGY | Facility: CLINIC | Age: 59
End: 2019-04-30

## 2019-04-30 VITALS — HEIGHT: 65 IN | BODY MASS INDEX: 17.66 KG/M2 | WEIGHT: 106 LBS | OXYGEN SATURATION: 100 %

## 2019-04-30 DIAGNOSIS — C13.9 MALIGNANT NEOPLASM HYPOPHARYNX (HCC): Primary | ICD-10-CM

## 2019-04-30 DIAGNOSIS — R13.14 PHARYNGOESOPHAGEAL DYSPHAGIA: ICD-10-CM

## 2019-04-30 DIAGNOSIS — J38.00 VOCAL CORD PARALYSIS: ICD-10-CM

## 2019-04-30 PROCEDURE — 99214 OFFICE O/P EST MOD 30 MIN: CPT | Performed by: OTOLARYNGOLOGY

## 2019-04-30 RX ORDER — MAGNESIUM OXIDE 400 MG/1
TABLET ORAL
Refills: 3 | COMMUNITY
Start: 2019-03-26 | End: 2019-08-16

## 2019-04-30 NOTE — PROGRESS NOTES
Subjective   Emerson Bang is a 58 y.o. male.       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  Also underwent open gastrostomy tube due to dysphasia caused by the tumor.  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  After his surgery was discharged to a nursing home.  Returns today having undergone a PET scan, the results of which are personally reviewed.  This shows activity in the right hypopharynx consistent with his known malignancy, and no evidence of malignancy elsewhere in the body including the neck and chest.      The following portions of the patient's history were reviewed and updated as appropriate: allergies, current medications, past family history, past medical history, past social history, past surgical history and problem list.     reports that he has quit smoking. His smoking use included cigarettes. He has a 45.00 pack-year smoking history. He quit smokeless tobacco use about 10 years ago. He reports that he drinks alcohol. He reports that he does not use drugs.   Patient is not a tobacco user and has not been counseled for use of tobacco products      Review of Systems   Constitutional: Negative for fever.   HENT: Positive for ear pain, sore throat, trouble swallowing and voice change.            Objective   Physical Exam  General: Thin, chronically ill-appearing male in no acute distress.  No stridor.  Voice is very harsh.  Neck: No palpable masses or abnormal lymphadenopathy    Assessment/Plan   Emerson was seen today for cancer surveillance.    Diagnoses and all orders for this visit:    Malignant neoplasm  hypopharynx (CMS/HCC)    Vocal cord paralysis    Pharyngoesophageal dysphagia      Plan: Given that the patient is already undergone chemoradiation in the same general area as his current lesion and both necks were treated with radiation, I suspect surgical resection is his only 10 potentially curative option at this point.  At a minimum this would require a laryngopharyngectomy and likely some sort of reconstructive procedure.  Explained this in the past of the patient in layman's terms and reminded him of this again today.  He would like to pursue a consultation for potential surgical resection and I have recommended that he be seen by Dr. Nolan at the [facility] and he is agreeable.  Consultation will be arranged.  I told the patient that once he has completed treatment, I would be glad to resume surveillance and local follow-up at the discretion of Dr. Nolan.

## 2019-05-20 ENCOUNTER — LAB (OUTPATIENT)
Dept: LAB | Facility: HOSPITAL | Age: 59
End: 2019-05-20

## 2019-05-20 ENCOUNTER — TELEPHONE (OUTPATIENT)
Dept: GASTROENTEROLOGY | Facility: CLINIC | Age: 59
End: 2019-05-20

## 2019-05-20 DIAGNOSIS — K70.30 ALCOHOLIC CIRRHOSIS OF LIVER WITHOUT ASCITES (HCC): Primary | ICD-10-CM

## 2019-05-20 DIAGNOSIS — E03.9 ACQUIRED HYPOTHYROIDISM: ICD-10-CM

## 2019-05-20 DIAGNOSIS — K70.30 ALCOHOLIC CIRRHOSIS OF LIVER WITHOUT ASCITES (HCC): ICD-10-CM

## 2019-05-20 PROCEDURE — 85025 COMPLETE CBC W/AUTO DIFF WBC: CPT | Performed by: INTERNAL MEDICINE

## 2019-05-20 PROCEDURE — 85007 BL SMEAR W/DIFF WBC COUNT: CPT

## 2019-05-20 PROCEDURE — 85025 COMPLETE CBC W/AUTO DIFF WBC: CPT

## 2019-05-20 PROCEDURE — 80053 COMPREHEN METABOLIC PANEL: CPT | Performed by: INTERNAL MEDICINE

## 2019-05-20 PROCEDURE — 80053 COMPREHEN METABOLIC PANEL: CPT

## 2019-05-20 PROCEDURE — 84443 ASSAY THYROID STIM HORMONE: CPT

## 2019-05-20 NOTE — TELEPHONE ENCOUNTER
05/30/2019, 0919 - Patient telephoned per this staff member [phone].  Patient unavailable.  Spoke with individual identifying herself as patient's sister, Ev.  Patient's verbal release for Gastroenterology noted to list sister, Ev, with whom this staff member may speak regardng patient's medical information.  Sister made aware of patient need to complete laboratory testing as ordered 02/19/2019 per Dr. Kaushal Paz M.D. as soon as possible today to ensure laboratory values available for review with patient during clinical appointment with Dr. Kaushal Paz M.D. tomcamryn Tuesday, May 21, 2019 at 10:45 A.M.  Sister stated she would transport patient to [facility], [place], [place] for completion of laboratory testing.

## 2019-05-21 ENCOUNTER — OFFICE VISIT (OUTPATIENT)
Dept: GASTROENTEROLOGY | Facility: CLINIC | Age: 59
End: 2019-05-21

## 2019-05-21 VITALS
HEART RATE: 100 BPM | BODY MASS INDEX: 17.99 KG/M2 | OXYGEN SATURATION: 94 % | SYSTOLIC BLOOD PRESSURE: 104 MMHG | WEIGHT: 108 LBS | DIASTOLIC BLOOD PRESSURE: 62 MMHG | HEIGHT: 65 IN

## 2019-05-21 DIAGNOSIS — F10.10 ALCOHOL ABUSE: ICD-10-CM

## 2019-05-21 DIAGNOSIS — R63.6 UNDERWEIGHT DUE TO INADEQUATE CALORIC INTAKE: Primary | ICD-10-CM

## 2019-05-21 LAB
ALBUMIN SERPL-MCNC: 3.6 G/DL (ref 3.5–5.2)
ALBUMIN SERPL-MCNC: 3.9 G/DL (ref 3.5–5.2)
ALBUMIN/GLOB SERPL: 0.6 G/DL
ALBUMIN/GLOB SERPL: 0.8 G/DL
ALP SERPL-CCNC: 87 U/L (ref 39–117)
ALP SERPL-CCNC: 89 U/L (ref 39–117)
ALT SERPL W P-5'-P-CCNC: 10 U/L (ref 1–41)
ALT SERPL W P-5'-P-CCNC: 7 U/L (ref 1–41)
ANION GAP SERPL CALCULATED.3IONS-SCNC: 10 MMOL/L
ANION GAP SERPL CALCULATED.3IONS-SCNC: 12.4 MMOL/L
AST SERPL-CCNC: 17 U/L (ref 1–40)
AST SERPL-CCNC: 19 U/L (ref 1–40)
BASOPHILS # BLD AUTO: 0.09 10*3/MM3 (ref 0–0.2)
BASOPHILS NFR BLD AUTO: 0.6 % (ref 0–1.5)
BILIRUB SERPL-MCNC: 0.3 MG/DL (ref 0.2–1.2)
BILIRUB SERPL-MCNC: 0.3 MG/DL (ref 0.2–1.2)
BUN BLD-MCNC: 20 MG/DL (ref 8–23)
BUN BLD-MCNC: 21 MG/DL (ref 8–23)
BUN/CREAT SERPL: 27 (ref 7–25)
BUN/CREAT SERPL: 32.3 (ref 7–25)
CALCIUM SPEC-SCNC: 10.6 MG/DL (ref 8.6–10.5)
CALCIUM SPEC-SCNC: 10.8 MG/DL (ref 8.6–10.5)
CHLORIDE SERPL-SCNC: 92 MMOL/L (ref 98–107)
CHLORIDE SERPL-SCNC: 92 MMOL/L (ref 98–107)
CO2 SERPL-SCNC: 26.6 MMOL/L (ref 22–29)
CO2 SERPL-SCNC: 30 MMOL/L (ref 22–29)
CREAT BLD-MCNC: 0.65 MG/DL (ref 0.76–1.27)
CREAT BLD-MCNC: 0.74 MG/DL (ref 0.76–1.27)
DEPRECATED RDW RBC AUTO: 38.1 FL (ref 37–54)
DEPRECATED RDW RBC AUTO: 38.3 FL (ref 37–54)
EOSINOPHIL # BLD AUTO: 0.1 10*3/MM3 (ref 0–0.4)
EOSINOPHIL # BLD MANUAL: 0.2 10*3/MM3 (ref 0–0.4)
EOSINOPHIL NFR BLD AUTO: 0.7 % (ref 0.3–6.2)
EOSINOPHIL NFR BLD MANUAL: 2 % (ref 0.3–6.2)
ERYTHROCYTE [DISTWIDTH] IN BLOOD BY AUTOMATED COUNT: 14 % (ref 12.3–15.4)
ERYTHROCYTE [DISTWIDTH] IN BLOOD BY AUTOMATED COUNT: 14.2 % (ref 12.3–15.4)
GFR SERPL CREATININE-BSD FRML MDRD: 131 ML/MIN/1.73
GFR SERPL CREATININE-BSD FRML MDRD: >150 ML/MIN/1.73
GLOBULIN UR ELPH-MCNC: 5.2 GM/DL
GLOBULIN UR ELPH-MCNC: 5.7 GM/DL
GLUCOSE BLD-MCNC: 107 MG/DL (ref 65–99)
GLUCOSE BLD-MCNC: 88 MG/DL (ref 65–99)
HCT VFR BLD AUTO: 30.3 % (ref 37.5–51)
HCT VFR BLD AUTO: 30.7 % (ref 37.5–51)
HGB BLD-MCNC: 10.2 G/DL (ref 13–17.7)
HGB BLD-MCNC: 9.9 G/DL (ref 13–17.7)
IMM GRANULOCYTES # BLD AUTO: 0.14 10*3/MM3 (ref 0–0.05)
IMM GRANULOCYTES NFR BLD AUTO: 0.9 % (ref 0–0.5)
LYMPHOCYTES # BLD AUTO: 2.36 10*3/MM3 (ref 0.7–3.1)
LYMPHOCYTES # BLD MANUAL: 2.89 10*3/MM3 (ref 0.7–3.1)
LYMPHOCYTES NFR BLD AUTO: 15.8 % (ref 19.6–45.3)
LYMPHOCYTES NFR BLD MANUAL: 12 % (ref 5–12)
LYMPHOCYTES NFR BLD MANUAL: 29 % (ref 19.6–45.3)
MCH RBC QN AUTO: 24.8 PG (ref 26.6–33)
MCH RBC QN AUTO: 25.5 PG (ref 26.6–33)
MCHC RBC AUTO-ENTMCNC: 32.7 G/DL (ref 31.5–35.7)
MCHC RBC AUTO-ENTMCNC: 33.2 G/DL (ref 31.5–35.7)
MCV RBC AUTO: 75.8 FL (ref 79–97)
MCV RBC AUTO: 76.8 FL (ref 79–97)
MONOCYTES # BLD AUTO: 1.2 10*3/MM3 (ref 0.1–0.9)
MONOCYTES # BLD AUTO: 2.03 10*3/MM3 (ref 0.1–0.9)
MONOCYTES NFR BLD AUTO: 13.6 % (ref 5–12)
NEUTROPHILS # BLD AUTO: 10.26 10*3/MM3 (ref 1.7–7)
NEUTROPHILS # BLD AUTO: 5.68 10*3/MM3 (ref 1.7–7)
NEUTROPHILS NFR BLD AUTO: 68.4 % (ref 42.7–76)
NEUTROPHILS NFR BLD MANUAL: 57 % (ref 42.7–76)
NRBC BLD AUTO-RTO: 0 /100 WBC (ref 0–0.2)
NRBC BLD AUTO-RTO: 0 /100 WBC (ref 0–0.2)
NRBC SPEC MANUAL: 1 /100 WBC (ref 0–0.2)
PLAT MORPH BLD: NORMAL
PLATELET # BLD AUTO: 500 10*3/MM3 (ref 140–450)
PLATELET # BLD AUTO: 563 10*3/MM3 (ref 140–450)
PMV BLD AUTO: 11.4 FL (ref 6–12)
PMV BLD AUTO: 12 FL (ref 6–12)
POTASSIUM BLD-SCNC: 4.6 MMOL/L (ref 3.5–5.2)
POTASSIUM BLD-SCNC: 4.8 MMOL/L (ref 3.5–5.2)
PROT SERPL-MCNC: 9.1 G/DL (ref 6–8.5)
PROT SERPL-MCNC: 9.3 G/DL (ref 6–8.5)
RBC # BLD AUTO: 4 10*6/MM3 (ref 4.14–5.8)
RBC # BLD AUTO: 4 10*6/MM3 (ref 4.14–5.8)
SODIUM BLD-SCNC: 131 MMOL/L (ref 136–145)
SODIUM BLD-SCNC: 132 MMOL/L (ref 136–145)
TARGETS BLD QL SMEAR: ABNORMAL
TSH SERPL DL<=0.05 MIU/L-ACNC: 13.9 MIU/ML (ref 0.27–4.2)
WBC MORPH BLD: NORMAL
WBC NRBC COR # BLD: 14.98 10*3/MM3 (ref 3.4–10.8)
WBC NRBC COR # BLD: 9.97 10*3/MM3 (ref 3.4–10.8)

## 2019-05-21 PROCEDURE — 99213 OFFICE O/P EST LOW 20 MIN: CPT | Performed by: INTERNAL MEDICINE

## 2019-05-21 NOTE — PROGRESS NOTES
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  Patient is no longer drinking alcohol because he is in a nursing home.  Patient had lab work done yesterday but the lab work is not completed as of this time.  Patient denies any abdominal pain nausea vomiting.  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  Patient currently has a PEG tube for feedings.    Plan; outpatient follow-up in 1 week to evaluate the labs and review these.    Past Medical History:   Past Medical History:   Diagnosis Date   • Alcohol abuse    • Allergic rhinitis     vs URI   • Anemia    • At risk for falls    • Benign prostatic hyperplasia    • Chronic gastritis    • Cirrhosis of liver (CMS/HCC)    • Complication of gastrostomy (CMS/HCC)     site not healing   • COPD (chronic obstructive pulmonary disease) (CMS/HCC)    • Dysphagia     odynophagia   • Epigastric pain    • Esophagitis    • GERD (gastroesophageal reflux disease)    • Hypertension    • Hypothyroidism, unspecified    • Low back pain    • Malaise and fatigue    • Nasal congestion 11/15/2018   • Nausea with vomiting, unspecified    • Pain in left knee    • Pain in right knee    • Primary malignant neoplasm of pharynx (CMS/HCC)    • Screening for malignant neoplasm of colon    • Vitamin D deficiency        Past Surgical History:    Past Surgical History:   Procedure Laterality Date   • ABDOMINAL WALL SURGERY  11/04/2008    Laparotomy with repair of stomach wall perforation. 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 Left upper quadrant abdominal wall abscess debridement. Gastrostomy tube erosion through & through the anterior gastric wall.Lupper quadrant abdominal wall abscess   • COLONOSCOPY  04/21/2014    Internal & external hemorrhoids found.   •  "COLONOSCOPY  2014    [place]   • COLONOSCOPY N/A 10/16/2018    Procedure: COLONOSCOPY;  Surgeon: Kaushal Chester MD;  Location: [facility] ENDOSCOPY;  Service: Gastroenterology   • DIRECT LARYNGOSCOPY, ESOPHAGOSCOPY, BRONCHOSCOPY N/A 4/15/2019    Procedure: DIRECT LARYNGOSCOPY with biopsy, ESOPHAGOSCOPY;  Surgeon: Bharathi Washington MD;  Location: [facility] OR;  Service: ENT   • ENDOSCOPY N/A 10/16/2018    Procedure: ESOPHAGOGASTRODUODENOSCOPY;  Surgeon: Kaushal Chester MD;  Location: [facility] ENDOSCOPY;  Service: Gastroenterology   • GASTROSTOMY FEEDING TUBE INSERTION N/A 4/15/2019    Procedure: GASTROSTOMY FEEDING TUBE INSERTION;  Surgeon: Trenton Valera MD;  Location: [facility] OR;  Service: General   • TRACHEOSTOMY  11/10/2008    Respiratory failure   • UPPER GASTROINTESTINAL ENDOSCOPY  04/21/2014    Esophagitis seen. Biopsy taken. Gastritis in stomach. Biopsy taken. Normal duodenum. Biopsy taken.   • UPPER GASTROINTESTINAL ENDOSCOPY  10/16/2018       Family History:  Family History   Problem Relation Age of Onset   • Coronary artery disease Mother    • Diabetes Mother    • Other Mother         \"Heart And Lung Problems\"   • Liver cancer Sister    • Heart disease Other    • Stroke Other    • Hypertension Other    • Diabetes Other    • Cancer Other    • Colon polyps Other    • Other Father         Unknown   • Other Other         \"Lung Problems\"   • Thyroid disease Neg Hx        Social History:   reports that he has quit smoking. His smoking use included cigarettes. He has a 45.00 pack-year smoking history. He quit smokeless tobacco use about 10 years ago. He reports that he does not drink alcohol or use drugs.    Medications:   Prior to Admission medications    Medication Sig Start Date End Date Taking? Authorizing Provider   acetaminophen (TYLENOL) 160 MG/5ML solution 20.3 mL by Per G Tube route Every 6 (Six) Hours As Needed for Mild Pain . 4/24/19  Yes Adonis Matthews MD   aspirin (ASPIRIN " "LOW DOSE) 81 MG EC tablet Take 1 tablet by mouth Daily. 11/15/18  Yes Cristino He MD   famotidine (PEPCID) 20 MG tablet 1 tablet by Per G Tube route 2 (Two) Times a Day. 4/24/19  Yes Adonis Matthews MD   levothyroxine (SYNTHROID, LEVOTHROID) 100 MCG tablet 1 tablet by Per G Tube route Daily. 4/24/19  Yes Adonis Matthews MD   magnesium oxide (MAG-OX) 400 MG tablet  3/26/19  Yes Esperanza Foote MD   metoclopramide (REGLAN) 5 MG/5ML solution 10 mL by Per G Tube route 4 (Four) Times a Day Before Meals & at Bedtime for 30 days. 4/24/19 5/24/19 Yes Adonis Matthews MD   Scopolamine (TRANSDERM-SCOP) 1.5 MG/3DAYS patch Place 1 patch on the skin as directed by provider Every 72 (Seventy-Two) Hours. 4/26/19  Yes Adonis Matthews MD       Allergies:  Patient has no known allergies.    ROS:    Review of Systems   Constitutional: Negative for activity change, appetite change and unexpected weight change.   HENT: Negative for congestion, sore throat and trouble swallowing.    Respiratory: Negative for cough, choking and shortness of breath.    Cardiovascular: Negative for chest pain.   Gastrointestinal: Negative for abdominal distention, abdominal pain, anal bleeding, blood in stool, constipation, diarrhea, nausea, rectal pain and vomiting.   Endocrine: Negative for heat intolerance, polydipsia and polyphagia.   Genitourinary: Negative for difficulty urinating.   Musculoskeletal: Negative for arthralgias.   Skin: Negative for color change, pallor, rash and wound.   Allergic/Immunologic: Negative for food allergies.   Neurological: Negative for dizziness, syncope, weakness and headaches.   Psychiatric/Behavioral: Negative for agitation, behavioral problems, confusion and decreased concentration.     Objective     Blood pressure 104/62, pulse 100, height 165.1 cm (65\"), weight 49 kg (108 lb), SpO2 94 %.    Physical Exam   Constitutional: He is oriented to person, place, and time. He " appears well-developed and well-nourished. No distress.   HENT:   Head: Normocephalic and atraumatic.   Cardiovascular: Normal rate, regular rhythm, normal heart sounds and intact distal pulses. Exam reveals no gallop and no friction rub.   No murmur heard.  Pulmonary/Chest: Breath sounds normal. No respiratory distress. He has no wheezes. He has no rales. He exhibits no tenderness.   Abdominal: Soft. Bowel sounds are normal. He exhibits no distension and no mass. There is no tenderness. There is no rebound and no guarding. No hernia.   Musculoskeletal: Normal range of motion. He exhibits no edema.   Neurological: He is alert and oriented to person, place, and time.   Skin: Skin is warm and dry. No rash noted. He is not diaphoretic. No erythema. No pallor.   Psychiatric: He has a normal mood and affect. His behavior is normal. Judgment and thought content normal.        Assessment/Plan   Emerson was seen today for alcoholic cirrhosis of liver without ascites.    Diagnoses and all orders for this visit:    Underweight due to inadequate caloric intake    Alcohol abuse        * Surgery not found *     Diagnosis Plan   1. Underweight due to inadequate caloric intake     2. Alcohol abuse         Anticipated Surgical Procedure:  No orders of the defined types were placed in this encounter.      The risks, benefits, and alternatives of this procedure have been discussed with the patient or the responsible party- the patient understands and agrees to proceed.

## 2019-05-21 NOTE — PATIENT INSTRUCTIONS

## 2019-05-22 ENCOUNTER — TELEPHONE (OUTPATIENT)
Dept: ENDOCRINOLOGY | Facility: CLINIC | Age: 59
End: 2019-05-22

## 2019-05-22 RX ORDER — LEVOTHYROXINE SODIUM 175 UG/1
175 TABLET ORAL DAILY
Qty: 30 TABLET | Refills: 5 | Status: SHIPPED | OUTPATIENT
Start: 2019-05-22 | End: 2019-08-09 | Stop reason: HOSPADM

## 2019-05-22 NOTE — TELEPHONE ENCOUNTER
The sister called back and states that she did get your message about the medication increase. She is asking if you can fax that RX to the nursing home instead. They can get his meds for him there. He is at [facility] in [place]. Please fax to [facility]. Fax [phone]

## 2019-05-22 NOTE — TELEPHONE ENCOUNTER
Increase to 175 daily  ------    Notes recorded by Toby Ruffin APRN on 5/22/2019 at 7:49 AM CDT  You will have to call and talk to his sister -- his TSH is low again at 13.9 what dosage of thyroid medication is he taking? We need to increase

## 2019-05-28 ENCOUNTER — OFFICE VISIT (OUTPATIENT)
Dept: GASTROENTEROLOGY | Facility: CLINIC | Age: 59
End: 2019-05-28

## 2019-05-28 VITALS
DIASTOLIC BLOOD PRESSURE: 60 MMHG | HEIGHT: 65 IN | HEART RATE: 94 BPM | OXYGEN SATURATION: 95 % | WEIGHT: 107.6 LBS | BODY MASS INDEX: 17.93 KG/M2 | SYSTOLIC BLOOD PRESSURE: 110 MMHG

## 2019-05-28 DIAGNOSIS — R63.6 UNDERWEIGHT DUE TO INADEQUATE CALORIC INTAKE: ICD-10-CM

## 2019-05-28 DIAGNOSIS — F10.10 ALCOHOL ABUSE: Primary | ICD-10-CM

## 2019-05-28 PROCEDURE — 99213 OFFICE O/P EST LOW 20 MIN: CPT | Performed by: INTERNAL MEDICINE

## 2019-05-28 NOTE — PATIENT INSTRUCTIONS

## 2019-05-29 RX ORDER — DOCUSATE SODIUM 50 MG/5 ML
LIQUID (ML) ORAL
COMMUNITY
End: 2019-08-16

## 2019-05-29 RX ORDER — METOCLOPRAMIDE HYDROCHLORIDE 5 MG/5ML
5 SOLUTION ORAL
COMMUNITY

## 2019-05-29 RX ORDER — ERGOCALCIFEROL 1.25 MG/1
1000 CAPSULE ORAL DAILY
COMMUNITY
End: 2020-01-28

## 2019-05-29 RX ORDER — FENOFIBRATE 48 MG/1
67 TABLET, COATED ORAL DAILY
COMMUNITY
End: 2019-09-13 | Stop reason: SDUPTHER

## 2019-07-15 ENCOUNTER — TELEPHONE (OUTPATIENT)
Dept: OTOLARYNGOLOGY | Facility: CLINIC | Age: 59
End: 2019-07-15

## 2019-07-15 ENCOUNTER — HOSPITAL ENCOUNTER (INPATIENT)
Facility: HOSPITAL | Age: 59
LOS: 24 days | Discharge: HOME HEALTH | End: 2019-08-09
Attending: EMERGENCY MEDICINE | Admitting: INTERNAL MEDICINE

## 2019-07-15 ENCOUNTER — APPOINTMENT (OUTPATIENT)
Dept: CT IMAGING | Facility: HOSPITAL | Age: 59
End: 2019-07-15

## 2019-07-15 ENCOUNTER — APPOINTMENT (OUTPATIENT)
Dept: GENERAL RADIOLOGY | Facility: HOSPITAL | Age: 59
End: 2019-07-15

## 2019-07-15 DIAGNOSIS — Z74.09 IMPAIRED MOBILITY AND ADLS: ICD-10-CM

## 2019-07-15 DIAGNOSIS — E22.2 SIADH (SYNDROME OF INAPPROPRIATE ADH PRODUCTION) (HCC): ICD-10-CM

## 2019-07-15 DIAGNOSIS — I95.0 IDIOPATHIC HYPOTENSION: Chronic | ICD-10-CM

## 2019-07-15 DIAGNOSIS — Z74.09 IMPAIRED FUNCTIONAL MOBILITY, BALANCE, GAIT, AND ENDURANCE: ICD-10-CM

## 2019-07-15 DIAGNOSIS — Z78.9 IMPAIRED MOBILITY AND ADLS: ICD-10-CM

## 2019-07-15 DIAGNOSIS — E87.1 HYPONATREMIA: ICD-10-CM

## 2019-07-15 DIAGNOSIS — Z74.09 IMPAIRED FUNCTIONAL MOBILITY AND ACTIVITY TOLERANCE: ICD-10-CM

## 2019-07-15 DIAGNOSIS — E03.9 ACQUIRED HYPOTHYROIDISM: Chronic | ICD-10-CM

## 2019-07-15 DIAGNOSIS — K56.609 SBO (SMALL BOWEL OBSTRUCTION) (HCC): Primary | ICD-10-CM

## 2019-07-15 LAB
BASOPHILS # BLD AUTO: 0.03 10*3/MM3 (ref 0–0.2)
BASOPHILS NFR BLD AUTO: 0.3 % (ref 0–1.5)
DEPRECATED RDW RBC AUTO: 44.5 FL (ref 37–54)
EOSINOPHIL # BLD AUTO: 0.03 10*3/MM3 (ref 0–0.4)
EOSINOPHIL NFR BLD AUTO: 0.3 % (ref 0.3–6.2)
ERYTHROCYTE [DISTWIDTH] IN BLOOD BY AUTOMATED COUNT: 17.8 % (ref 12.3–15.4)
HCT VFR BLD AUTO: 32.9 % (ref 37.5–51)
HGB BLD-MCNC: 11.4 G/DL (ref 13–17.7)
IMM GRANULOCYTES # BLD AUTO: 0.02 10*3/MM3 (ref 0–0.05)
IMM GRANULOCYTES NFR BLD AUTO: 0.2 % (ref 0–0.5)
LYMPHOCYTES # BLD AUTO: 1.27 10*3/MM3 (ref 0.7–3.1)
LYMPHOCYTES NFR BLD AUTO: 12.5 % (ref 19.6–45.3)
MCH RBC QN AUTO: 25.1 PG (ref 26.6–33)
MCHC RBC AUTO-ENTMCNC: 34.7 G/DL (ref 31.5–35.7)
MCV RBC AUTO: 72.5 FL (ref 79–97)
MONOCYTES # BLD AUTO: 1.52 10*3/MM3 (ref 0.1–0.9)
MONOCYTES NFR BLD AUTO: 14.9 % (ref 5–12)
NEUTROPHILS # BLD AUTO: 7.32 10*3/MM3 (ref 1.7–7)
NEUTROPHILS NFR BLD AUTO: 71.8 % (ref 42.7–76)
NRBC BLD AUTO-RTO: 0 /100 WBC (ref 0–0.2)
PLATELET # BLD AUTO: 373 10*3/MM3 (ref 140–450)
PMV BLD AUTO: 11.2 FL (ref 6–12)
RBC # BLD AUTO: 4.54 10*6/MM3 (ref 4.14–5.8)
WBC NRBC COR # BLD: 10.19 10*3/MM3 (ref 3.4–10.8)

## 2019-07-15 PROCEDURE — 93010 ELECTROCARDIOGRAM REPORT: CPT | Performed by: INTERNAL MEDICINE

## 2019-07-15 PROCEDURE — 25010000002 METOCLOPRAMIDE PER 10 MG: Performed by: EMERGENCY MEDICINE

## 2019-07-15 PROCEDURE — 71045 X-RAY EXAM CHEST 1 VIEW: CPT

## 2019-07-15 PROCEDURE — 93005 ELECTROCARDIOGRAM TRACING: CPT | Performed by: EMERGENCY MEDICINE

## 2019-07-15 PROCEDURE — 74176 CT ABD & PELVIS W/O CONTRAST: CPT

## 2019-07-15 PROCEDURE — 25010000002 ONDANSETRON PER 1 MG: Performed by: EMERGENCY MEDICINE

## 2019-07-15 PROCEDURE — 85025 COMPLETE CBC W/AUTO DIFF WBC: CPT | Performed by: EMERGENCY MEDICINE

## 2019-07-15 PROCEDURE — 99284 EMERGENCY DEPT VISIT MOD MDM: CPT

## 2019-07-15 RX ORDER — ONDANSETRON 2 MG/ML
4 INJECTION INTRAMUSCULAR; INTRAVENOUS ONCE
Status: COMPLETED | OUTPATIENT
Start: 2019-07-15 | End: 2019-07-15

## 2019-07-15 RX ORDER — DOXAZOSIN MESYLATE 1 MG/1
1 TABLET ORAL NIGHTLY
COMMUNITY
End: 2019-09-13 | Stop reason: SDUPTHER

## 2019-07-15 RX ORDER — SODIUM CHLORIDE 9 MG/ML
125 INJECTION, SOLUTION INTRAVENOUS CONTINUOUS
Status: DISCONTINUED | OUTPATIENT
Start: 2019-07-15 | End: 2019-07-17

## 2019-07-15 RX ORDER — OXYCODONE HYDROCHLORIDE 15 MG/1
10 TABLET ORAL EVERY 4 HOURS PRN
COMMUNITY
End: 2019-08-16

## 2019-07-15 RX ORDER — SODIUM CHLORIDE 0.9 % (FLUSH) 0.9 %
10 SYRINGE (ML) INJECTION AS NEEDED
Status: DISCONTINUED | OUTPATIENT
Start: 2019-07-15 | End: 2019-08-09 | Stop reason: HOSPADM

## 2019-07-15 RX ORDER — METOCLOPRAMIDE HYDROCHLORIDE 5 MG/ML
10 INJECTION INTRAMUSCULAR; INTRAVENOUS ONCE
Status: COMPLETED | OUTPATIENT
Start: 2019-07-15 | End: 2019-07-15

## 2019-07-15 RX ADMIN — METOCLOPRAMIDE 10 MG: 5 INJECTION, SOLUTION INTRAMUSCULAR; INTRAVENOUS at 23:17

## 2019-07-15 RX ADMIN — ONDANSETRON 4 MG: 2 INJECTION INTRAMUSCULAR; INTRAVENOUS at 21:39

## 2019-07-15 RX ADMIN — SODIUM CHLORIDE 125 ML/HR: 900 INJECTION, SOLUTION INTRAVENOUS at 21:38

## 2019-07-15 NOTE — TELEPHONE ENCOUNTER
Spoke with Ev, Patient's sister regarding message about trouble having bowel movement and stomach pain. Let her know since  does not know what surgery he had or what's going on to have him try a glycerin suppository. She states he does not feel comfortable giving himself a suppository and wants to know if there is anything liquid he recommends so they can use his tube.Let her know that I will speak with dr chung and get back with her, it may be a little bit because he is in surgery.

## 2019-07-15 NOTE — TELEPHONE ENCOUNTER
----- Message from Bharathi Washington MD sent at 7/15/2019  9:59 AM CDT -----  Contact: [phone]  Tell them that since I dont know exactly what surgery he had or what his situation is, the only thing I can recommend is a glycerin suppository to try to get his bowels moving which should help.  If that does not work they need to call Dr. Nolan and ask what he thinks  ----- Message -----  From: Ant Green  Sent: 7/15/2019   9:21 AM  To: Bharathi Washington MD, Kerrie Busby    Mr. Bang' sister called to say that Mr. Bang was told by Dr. Nolan in [place] told him to start eating some food, he had mashed potatoes and it didn't agree with him and that he hasn't had a bowel movement and his stomach is in pain. Wants to know if there is anything OTC or something liquid by prescription that he can take to help him.      Ev [phone] cell [phone]

## 2019-07-16 ENCOUNTER — APPOINTMENT (OUTPATIENT)
Dept: ONCOLOGY | Facility: CLINIC | Age: 59
End: 2019-07-16

## 2019-07-16 ENCOUNTER — DOCUMENTATION (OUTPATIENT)
Dept: ONCOLOGY | Facility: CLINIC | Age: 59
End: 2019-07-16

## 2019-07-16 ENCOUNTER — APPOINTMENT (OUTPATIENT)
Dept: ONCOLOGY | Facility: HOSPITAL | Age: 59
End: 2019-07-16

## 2019-07-16 PROBLEM — K56.609 SBO (SMALL BOWEL OBSTRUCTION): Status: ACTIVE | Noted: 2019-07-16

## 2019-07-16 LAB
ALBUMIN SERPL-MCNC: 3.6 G/DL (ref 3.5–5.2)
ALBUMIN/GLOB SERPL: 0.8 G/DL
ALP SERPL-CCNC: 100 U/L (ref 39–117)
ALT SERPL W P-5'-P-CCNC: 14 U/L (ref 1–41)
ANION GAP SERPL CALCULATED.3IONS-SCNC: 16 MMOL/L (ref 5–15)
AST SERPL-CCNC: 18 U/L (ref 1–40)
BACTERIA UR QL AUTO: ABNORMAL /HPF
BILIRUB SERPL-MCNC: <0.2 MG/DL (ref 0.2–1.2)
BILIRUB UR QL STRIP: NEGATIVE
BUN BLD-MCNC: 57 MG/DL (ref 8–23)
BUN/CREAT SERPL: 51.4 (ref 7–25)
CALCIUM SPEC-SCNC: 9.6 MG/DL (ref 8.6–10.5)
CHLORIDE SERPL-SCNC: 89 MMOL/L (ref 98–107)
CLARITY UR: CLEAR
CO2 SERPL-SCNC: 33 MMOL/L (ref 22–29)
COLOR UR: YELLOW
CREAT BLD-MCNC: 1.11 MG/DL (ref 0.76–1.27)
FOLATE SERPL-MCNC: >20 NG/ML (ref 4.78–24.2)
GFR SERPL CREATININE-BSD FRML MDRD: 82 ML/MIN/1.73
GLOBULIN UR ELPH-MCNC: 4.7 GM/DL
GLUCOSE BLD-MCNC: 148 MG/DL (ref 65–99)
GLUCOSE UR STRIP-MCNC: NEGATIVE MG/DL
HGB UR QL STRIP.AUTO: NEGATIVE
HOLD SPECIMEN: NORMAL
HYALINE CASTS UR QL AUTO: ABNORMAL /LPF
IRON 24H UR-MRATE: 25 MCG/DL (ref 59–158)
IRON SATN MFR SERPL: 8 % (ref 20–50)
KETONES UR QL STRIP: ABNORMAL
LEUKOCYTE ESTERASE UR QL STRIP.AUTO: ABNORMAL
LIPASE SERPL-CCNC: 6 U/L (ref 13–60)
NITRITE UR QL STRIP: NEGATIVE
PH UR STRIP.AUTO: <=5 [PH] (ref 5–9)
POTASSIUM BLD-SCNC: 3 MMOL/L (ref 3.5–5.2)
POTASSIUM BLD-SCNC: 3.7 MMOL/L (ref 3.5–5.2)
PROT SERPL-MCNC: 8.3 G/DL (ref 6–8.5)
PROT UR QL STRIP: ABNORMAL
RBC # UR: ABNORMAL /HPF
REF LAB TEST METHOD: ABNORMAL
SODIUM BLD-SCNC: 138 MMOL/L (ref 136–145)
SP GR UR STRIP: 1.02 (ref 1–1.03)
SQUAMOUS #/AREA URNS HPF: ABNORMAL /HPF
TIBC SERPL-MCNC: 304 MCG/DL (ref 298–536)
TRANSFERRIN SERPL-MCNC: 204 MG/DL (ref 200–360)
UROBILINOGEN UR QL STRIP: ABNORMAL
VIT B12 BLD-MCNC: 1878 PG/ML (ref 211–946)
WBC UR QL AUTO: ABNORMAL /HPF
WHOLE BLOOD HOLD SPECIMEN: NORMAL

## 2019-07-16 PROCEDURE — 84466 ASSAY OF TRANSFERRIN: CPT | Performed by: INTERNAL MEDICINE

## 2019-07-16 PROCEDURE — 82607 VITAMIN B-12: CPT | Performed by: INTERNAL MEDICINE

## 2019-07-16 PROCEDURE — 81001 URINALYSIS AUTO W/SCOPE: CPT | Performed by: EMERGENCY MEDICINE

## 2019-07-16 PROCEDURE — 25010000003 POTASSIUM CHLORIDE 10 MEQ/100ML SOLUTION: Performed by: INTERNAL MEDICINE

## 2019-07-16 PROCEDURE — 83690 ASSAY OF LIPASE: CPT | Performed by: EMERGENCY MEDICINE

## 2019-07-16 PROCEDURE — 82746 ASSAY OF FOLIC ACID SERUM: CPT | Performed by: INTERNAL MEDICINE

## 2019-07-16 PROCEDURE — 99222 1ST HOSP IP/OBS MODERATE 55: CPT | Performed by: SURGERY

## 2019-07-16 PROCEDURE — 83540 ASSAY OF IRON: CPT | Performed by: INTERNAL MEDICINE

## 2019-07-16 PROCEDURE — 99254 IP/OBS CNSLTJ NEW/EST MOD 60: CPT | Performed by: INTERNAL MEDICINE

## 2019-07-16 PROCEDURE — 84132 ASSAY OF SERUM POTASSIUM: CPT | Performed by: FAMILY MEDICINE

## 2019-07-16 PROCEDURE — 80053 COMPREHEN METABOLIC PANEL: CPT | Performed by: EMERGENCY MEDICINE

## 2019-07-16 RX ORDER — FENOFIBRATE 48 MG/1
48 TABLET, COATED ORAL DAILY
Status: DISCONTINUED | OUTPATIENT
Start: 2019-07-16 | End: 2019-07-17

## 2019-07-16 RX ORDER — OXYCODONE HYDROCHLORIDE 5 MG/1
10 TABLET ORAL EVERY 4 HOURS PRN
Status: DISCONTINUED | OUTPATIENT
Start: 2019-07-16 | End: 2019-07-16

## 2019-07-16 RX ORDER — MELATONIN
500 DAILY
Status: DISCONTINUED | OUTPATIENT
Start: 2019-07-16 | End: 2019-07-17

## 2019-07-16 RX ORDER — ASPIRIN 81 MG/1
81 TABLET ORAL DAILY
Status: DISCONTINUED | OUTPATIENT
Start: 2019-07-16 | End: 2019-07-17

## 2019-07-16 RX ORDER — POTASSIUM CHLORIDE 7.45 MG/ML
10 INJECTION INTRAVENOUS
Status: DISCONTINUED | OUTPATIENT
Start: 2019-07-16 | End: 2019-07-17

## 2019-07-16 RX ORDER — SODIUM CHLORIDE 0.9 % (FLUSH) 0.9 %
3-10 SYRINGE (ML) INJECTION AS NEEDED
Status: DISCONTINUED | OUTPATIENT
Start: 2019-07-16 | End: 2019-07-17

## 2019-07-16 RX ORDER — TERAZOSIN 1 MG/1
1 CAPSULE ORAL NIGHTLY
Status: DISCONTINUED | OUTPATIENT
Start: 2019-07-16 | End: 2019-07-17

## 2019-07-16 RX ORDER — ACETAMINOPHEN 160 MG/5ML
650 SOLUTION ORAL EVERY 6 HOURS PRN
Status: DISCONTINUED | OUTPATIENT
Start: 2019-07-16 | End: 2019-07-17

## 2019-07-16 RX ORDER — SODIUM CHLORIDE 0.9 % (FLUSH) 0.9 %
3 SYRINGE (ML) INJECTION EVERY 12 HOURS SCHEDULED
Status: DISCONTINUED | OUTPATIENT
Start: 2019-07-16 | End: 2019-07-17

## 2019-07-16 RX ORDER — FAMOTIDINE 20 MG/1
20 TABLET, FILM COATED ORAL 2 TIMES DAILY
Status: DISCONTINUED | OUTPATIENT
Start: 2019-07-16 | End: 2019-07-17

## 2019-07-16 RX ORDER — SCOLOPAMINE TRANSDERMAL SYSTEM 1 MG/1
1 PATCH, EXTENDED RELEASE TRANSDERMAL
Status: DISCONTINUED | OUTPATIENT
Start: 2019-07-16 | End: 2019-07-31

## 2019-07-16 RX ORDER — DOCUSATE SODIUM 50 MG/5 ML
100 LIQUID (ML) ORAL 2 TIMES DAILY PRN
Status: DISCONTINUED | OUTPATIENT
Start: 2019-07-16 | End: 2019-07-17

## 2019-07-16 RX ADMIN — POTASSIUM CHLORIDE 10 MEQ: 7.46 INJECTION, SOLUTION INTRAVENOUS at 08:50

## 2019-07-16 RX ADMIN — SODIUM CHLORIDE 125 ML/HR: 900 INJECTION, SOLUTION INTRAVENOUS at 03:27

## 2019-07-16 RX ADMIN — POTASSIUM CHLORIDE 10 MEQ: 7.46 INJECTION, SOLUTION INTRAVENOUS at 07:50

## 2019-07-16 RX ADMIN — POTASSIUM CHLORIDE 10 MEQ: 7.46 INJECTION, SOLUTION INTRAVENOUS at 03:28

## 2019-07-16 RX ADMIN — POTASSIUM CHLORIDE 10 MEQ: 7.46 INJECTION, SOLUTION INTRAVENOUS at 05:49

## 2019-07-16 RX ADMIN — SODIUM CHLORIDE 125 ML/HR: 900 INJECTION, SOLUTION INTRAVENOUS at 18:43

## 2019-07-16 RX ADMIN — POTASSIUM CHLORIDE 10 MEQ: 7.46 INJECTION, SOLUTION INTRAVENOUS at 06:50

## 2019-07-16 RX ADMIN — SODIUM CHLORIDE, PRESERVATIVE FREE 3 ML: 5 INJECTION INTRAVENOUS at 03:28

## 2019-07-16 RX ADMIN — POTASSIUM CHLORIDE 10 MEQ: 7.46 INJECTION, SOLUTION INTRAVENOUS at 04:29

## 2019-07-16 RX ADMIN — SCOPALAMINE 1 PATCH: 1 PATCH, EXTENDED RELEASE TRANSDERMAL at 01:54

## 2019-07-16 NOTE — PROGRESS NOTES
Oncology SW referral received from acute care.  LCSW presented to pt room on [address] to offer support and assess for needs, strengths, coping and support.    Pt admitted to acute care with bowel obstruction; G tube has been placed.   Pt. was recently diagnosed with hypopharyngeal cancer and underwent extensive surgery at [facility] to include total laryngectomy. Thus, pt is non verbal.     Medical oncology has been consulted for recommendations regarding further treatment. Pt. Was seen by Dr. Sabas Martínez for consult this day.    Oncology SW presented to the room.  Visitors in the room were identified as pt sister and brother in law. Pt. Nodded permission for SW to speak with them present and as pt is non verbal he indicated family to respond on his behalf.  SW introduced self and explained role as oncology SW and general information provided as to oncology supports.  No treatment plan as been determined at present, per family.  Pt. Is single and lives in [place] with his adult daughter who is reportedly pt POA. No immediate needs were voiced by family and SW offered contact information with family stating understanding of support and willingness to call as plans determined and needs arise.

## 2019-07-17 ENCOUNTER — APPOINTMENT (OUTPATIENT)
Dept: GENERAL RADIOLOGY | Facility: HOSPITAL | Age: 59
End: 2019-07-17

## 2019-07-17 ENCOUNTER — ANESTHESIA (OUTPATIENT)
Dept: PERIOP | Facility: HOSPITAL | Age: 59
End: 2019-07-17

## 2019-07-17 ENCOUNTER — ANESTHESIA EVENT (OUTPATIENT)
Dept: PERIOP | Facility: HOSPITAL | Age: 59
End: 2019-07-17

## 2019-07-17 PROBLEM — K56.609 SBO (SMALL BOWEL OBSTRUCTION) (HCC): Status: RESOLVED | Noted: 2019-07-16 | Resolved: 2019-07-17

## 2019-07-17 LAB
A-A DO2: ABNORMAL MMHG
ABO GROUP BLD: NORMAL
ANION GAP SERPL CALCULATED.3IONS-SCNC: 13 MMOL/L (ref 5–15)
ARTERIAL PATENCY WRIST A: ABNORMAL
ARTERIAL PATENCY WRIST A: ABNORMAL
ATMOSPHERIC PRESS: 747 MMHG
ATMOSPHERIC PRESS: 747 MMHG
BASE EXCESS BLDA CALC-SCNC: 2.3 MMOL/L (ref 0–2)
BASE EXCESS BLDA CALC-SCNC: 4 MMOL/L (ref 0–2)
BASOPHILS # BLD AUTO: 0.03 10*3/MM3 (ref 0–0.2)
BASOPHILS NFR BLD AUTO: 0.5 % (ref 0–1.5)
BDY SITE: ABNORMAL
BDY SITE: ABNORMAL
BLD GP AB SCN SERPL QL: NEGATIVE
BUN BLD-MCNC: 36 MG/DL (ref 8–23)
BUN/CREAT SERPL: 51.4 (ref 7–25)
CA-I BLD-MCNC: 4.4 MG/DL (ref 4.6–5.6)
CALCIUM SPEC-SCNC: 9.3 MG/DL (ref 8.6–10.5)
CHLORIDE SERPL-SCNC: 93 MMOL/L (ref 98–107)
CO2 SERPL-SCNC: 34 MMOL/L (ref 22–29)
COHGB MFR BLD: 0.7 % (ref 0–5)
CREAT BLD-MCNC: 0.7 MG/DL (ref 0.76–1.27)
DEPRECATED RDW RBC AUTO: 45.9 FL (ref 37–54)
EOSINOPHIL # BLD AUTO: 0.08 10*3/MM3 (ref 0–0.4)
EOSINOPHIL NFR BLD AUTO: 1.2 % (ref 0.3–6.2)
ERYTHROCYTE [DISTWIDTH] IN BLOOD BY AUTOMATED COUNT: 17.7 % (ref 12.3–15.4)
FERRITIN SERPL-MCNC: 425.7 NG/ML (ref 30–400)
GFR SERPL CREATININE-BSD FRML MDRD: 139 ML/MIN/1.73
GLUCOSE BLD-MCNC: 80 MG/DL (ref 65–99)
GLUCOSE BLDA-MCNC: 71 MMOL/L (ref 65–95)
HCO3 BLDA-SCNC: 26.4 MMOL/L (ref 20–26)
HCO3 BLDA-SCNC: 27.3 MMOL/L (ref 20–26)
HCT VFR BLD AUTO: 33.7 % (ref 37.5–51)
HCT VFR BLD CALC: 27.8 % (ref 38–51)
HGB BLD-MCNC: 11.3 G/DL (ref 13–17.7)
HGB BLDA-MCNC: 9.1 G/DL (ref 14–18)
HOROWITZ INDEX BLD+IHG-RTO: 40 %
IMM GRANULOCYTES # BLD AUTO: 0.02 10*3/MM3 (ref 0–0.05)
IMM GRANULOCYTES NFR BLD AUTO: 0.3 % (ref 0–0.5)
LYMPHOCYTES # BLD AUTO: 1.2 10*3/MM3 (ref 0.7–3.1)
LYMPHOCYTES NFR BLD AUTO: 18.3 % (ref 19.6–45.3)
Lab: ABNORMAL
Lab: NORMAL
MCH RBC QN AUTO: 25.3 PG (ref 26.6–33)
MCHC RBC AUTO-ENTMCNC: 33.5 G/DL (ref 31.5–35.7)
MCV RBC AUTO: 75.6 FL (ref 79–97)
METHGB BLD QL: 1.8 % (ref 0–3)
MODALITY: ABNORMAL
MODALITY: ABNORMAL
MONOCYTES # BLD AUTO: 1 10*3/MM3 (ref 0.1–0.9)
MONOCYTES NFR BLD AUTO: 15.2 % (ref 5–12)
NEUTROPHILS # BLD AUTO: 4.24 10*3/MM3 (ref 1.7–7)
NEUTROPHILS NFR BLD AUTO: 64.5 % (ref 42.7–76)
NOTE: ABNORMAL
NRBC BLD AUTO-RTO: 0 /100 WBC (ref 0–0.2)
OXYHGB MFR BLDV: 95.9 % (ref 94–99)
PAW @ PEAK INSP FLOW SETTING VENT: 23 CMH2O
PCO2 BLDA: 35.1 MM HG (ref 35–45)
PCO2 BLDA: 38.4 MM HG (ref 35–45)
PCO2 TEMP ADJ BLD: ABNORMAL MM HG (ref 35–48)
PEEP RESPIRATORY: 5 CM[H2O]
PH BLDA: 7.45 PH UNITS (ref 7.35–7.45)
PH BLDA: 7.5 PH UNITS (ref 7.35–7.45)
PH, TEMP CORRECTED: ABNORMAL PH UNITS
PLATELET # BLD AUTO: 344 10*3/MM3 (ref 140–450)
PMV BLD AUTO: 12.2 FL (ref 6–12)
PO2 BLDA: 127 MM HG (ref 83–108)
PO2 BLDA: 149 MM HG (ref 83–108)
PO2 TEMP ADJ BLD: ABNORMAL MM HG (ref 83–108)
POTASSIUM BLD-SCNC: 3.5 MMOL/L (ref 3.5–5.2)
POTASSIUM BLD-SCNC: 3.7 MMOL/L (ref 3.5–5.2)
POTASSIUM BLDA-SCNC: 3.7 MMOL/L (ref 3.4–4.5)
PSV: 10 CMH2O
RBC # BLD AUTO: 4.46 10*6/MM3 (ref 4.14–5.8)
RH BLD: POSITIVE
SAO2 % BLDCOA: 96.3 % (ref 94–99)
SAO2 % BLDCOA: 98.4 % (ref 94–99)
SET MECH RESP RATE: 12
SODIUM BLD-SCNC: 140 MMOL/L (ref 136–145)
SODIUM BLDA-SCNC: 146 MMOL/L (ref 136–146)
T&S EXPIRATION DATE: NORMAL
VENTILATOR MODE: ABNORMAL
VENTILATOR MODE: ABNORMAL
VT ON VENT VENT: 500 ML
WBC NRBC COR # BLD: 6.57 10*3/MM3 (ref 3.4–10.8)

## 2019-07-17 PROCEDURE — 93010 ELECTROCARDIOGRAM REPORT: CPT | Performed by: INTERNAL MEDICINE

## 2019-07-17 PROCEDURE — 86900 BLOOD TYPING SEROLOGIC ABO: CPT | Performed by: SURGERY

## 2019-07-17 PROCEDURE — 94002 VENT MGMT INPAT INIT DAY: CPT

## 2019-07-17 PROCEDURE — 82805 BLOOD GASES W/O2 SATURATION: CPT

## 2019-07-17 PROCEDURE — 94799 UNLISTED PULMONARY SVC/PX: CPT

## 2019-07-17 PROCEDURE — 83050 HGB METHEMOGLOBIN QUAN: CPT

## 2019-07-17 PROCEDURE — 85025 COMPLETE CBC W/AUTO DIFF WBC: CPT | Performed by: STUDENT IN AN ORGANIZED HEALTH CARE EDUCATION/TRAINING PROGRAM

## 2019-07-17 PROCEDURE — 44005 FREEING OF BOWEL ADHESION: CPT | Performed by: SURGERY

## 2019-07-17 PROCEDURE — P9041 ALBUMIN (HUMAN),5%, 50ML: HCPCS | Performed by: NURSE ANESTHETIST, CERTIFIED REGISTERED

## 2019-07-17 PROCEDURE — 99232 SBSQ HOSP IP/OBS MODERATE 35: CPT | Performed by: INTERNAL MEDICINE

## 2019-07-17 PROCEDURE — 25010000002 FENTANYL CITRATE (PF) 100 MCG/2ML SOLUTION: Performed by: NURSE ANESTHETIST, CERTIFIED REGISTERED

## 2019-07-17 PROCEDURE — 94762 N-INVAS EAR/PLS OXIMTRY CONT: CPT

## 2019-07-17 PROCEDURE — 71045 X-RAY EXAM CHEST 1 VIEW: CPT

## 2019-07-17 PROCEDURE — 25010000002 MIDAZOLAM PER 1 MG: Performed by: NURSE ANESTHETIST, CERTIFIED REGISTERED

## 2019-07-17 PROCEDURE — 25010000002 PROPOFOL 10 MG/ML EMULSION: Performed by: NURSE ANESTHETIST, CERTIFIED REGISTERED

## 2019-07-17 PROCEDURE — 25010000002 HYDROMORPHONE 1 MG/ML SOLUTION: Performed by: INTERNAL MEDICINE

## 2019-07-17 PROCEDURE — 25010000003 POTASSIUM CHLORIDE 10 MEQ/100ML SOLUTION: Performed by: INTERNAL MEDICINE

## 2019-07-17 PROCEDURE — 80048 BASIC METABOLIC PNL TOTAL CA: CPT | Performed by: STUDENT IN AN ORGANIZED HEALTH CARE EDUCATION/TRAINING PROGRAM

## 2019-07-17 PROCEDURE — 99024 POSTOP FOLLOW-UP VISIT: CPT | Performed by: SURGERY

## 2019-07-17 PROCEDURE — 82375 ASSAY CARBOXYHB QUANT: CPT

## 2019-07-17 PROCEDURE — 82728 ASSAY OF FERRITIN: CPT | Performed by: INTERNAL MEDICINE

## 2019-07-17 PROCEDURE — 93005 ELECTROCARDIOGRAM TRACING: CPT | Performed by: SURGERY

## 2019-07-17 PROCEDURE — 84132 ASSAY OF SERUM POTASSIUM: CPT | Performed by: INTERNAL MEDICINE

## 2019-07-17 PROCEDURE — 25010000002 ALBUMIN HUMAN 5% PER 50 ML: Performed by: NURSE ANESTHETIST, CERTIFIED REGISTERED

## 2019-07-17 PROCEDURE — 25010000002 PHENYLEPHRINE PER 1 ML: Performed by: NURSE ANESTHETIST, CERTIFIED REGISTERED

## 2019-07-17 PROCEDURE — 86850 RBC ANTIBODY SCREEN: CPT | Performed by: SURGERY

## 2019-07-17 PROCEDURE — 74022 RADEX COMPL AQT ABD SERIES: CPT

## 2019-07-17 PROCEDURE — 25010000002 NA FERRIC GLUC CPLX PER 12.5 MG: Performed by: INTERNAL MEDICINE

## 2019-07-17 PROCEDURE — 86901 BLOOD TYPING SEROLOGIC RH(D): CPT | Performed by: SURGERY

## 2019-07-17 PROCEDURE — 82803 BLOOD GASES ANY COMBINATION: CPT

## 2019-07-17 PROCEDURE — C1751 CATH, INF, PER/CENT/MIDLINE: HCPCS | Performed by: ANESTHESIOLOGY

## 2019-07-17 PROCEDURE — 25010000002 CEFOXITIN: Performed by: SURGERY

## 2019-07-17 RX ORDER — SODIUM CHLORIDE, SODIUM LACTATE, POTASSIUM CHLORIDE, CALCIUM CHLORIDE 600; 310; 30; 20 MG/100ML; MG/100ML; MG/100ML; MG/100ML
500 INJECTION, SOLUTION INTRAVENOUS ONCE
Status: COMPLETED | OUTPATIENT
Start: 2019-07-17 | End: 2019-07-17

## 2019-07-17 RX ORDER — NALOXONE HCL 0.4 MG/ML
0.1 VIAL (ML) INJECTION
Status: DISCONTINUED | OUTPATIENT
Start: 2019-07-17 | End: 2019-08-09 | Stop reason: HOSPADM

## 2019-07-17 RX ORDER — FENTANYL CITRATE 50 UG/ML
INJECTION, SOLUTION INTRAMUSCULAR; INTRAVENOUS AS NEEDED
Status: DISCONTINUED | OUTPATIENT
Start: 2019-07-17 | End: 2019-07-17 | Stop reason: SURG

## 2019-07-17 RX ORDER — MIDAZOLAM HYDROCHLORIDE 1 MG/ML
INJECTION INTRAMUSCULAR; INTRAVENOUS AS NEEDED
Status: DISCONTINUED | OUTPATIENT
Start: 2019-07-17 | End: 2019-07-17 | Stop reason: SURG

## 2019-07-17 RX ORDER — SODIUM CHLORIDE, SODIUM GLUCONATE, SODIUM ACETATE, POTASSIUM CHLORIDE, AND MAGNESIUM CHLORIDE 526; 502; 368; 37; 30 MG/100ML; MG/100ML; MG/100ML; MG/100ML; MG/100ML
INJECTION, SOLUTION INTRAVENOUS CONTINUOUS PRN
Status: DISCONTINUED | OUTPATIENT
Start: 2019-07-17 | End: 2019-07-17 | Stop reason: SURG

## 2019-07-17 RX ORDER — SODIUM CHLORIDE 0.9 % (FLUSH) 0.9 %
3 SYRINGE (ML) INJECTION EVERY 12 HOURS SCHEDULED
Status: DISCONTINUED | OUTPATIENT
Start: 2019-07-17 | End: 2019-07-17 | Stop reason: HOSPADM

## 2019-07-17 RX ORDER — SODIUM FERRIC GLUCONATE COMPLEX IN SUCROSE 12.5 MG/ML
125 INJECTION INTRAVENOUS DAILY
Status: DISCONTINUED | OUTPATIENT
Start: 2019-07-17 | End: 2019-07-17

## 2019-07-17 RX ORDER — SODIUM CHLORIDE, SODIUM LACTATE, POTASSIUM CHLORIDE, CALCIUM CHLORIDE 600; 310; 30; 20 MG/100ML; MG/100ML; MG/100ML; MG/100ML
100 INJECTION, SOLUTION INTRAVENOUS CONTINUOUS
Status: DISCONTINUED | OUTPATIENT
Start: 2019-07-17 | End: 2019-07-18

## 2019-07-17 RX ORDER — SODIUM CHLORIDE 0.9 % (FLUSH) 0.9 %
3 SYRINGE (ML) INJECTION EVERY 12 HOURS SCHEDULED
Status: DISCONTINUED | OUTPATIENT
Start: 2019-07-18 | End: 2019-07-17

## 2019-07-17 RX ORDER — ONDANSETRON 2 MG/ML
4 INJECTION INTRAMUSCULAR; INTRAVENOUS EVERY 6 HOURS PRN
Status: DISCONTINUED | OUTPATIENT
Start: 2019-07-17 | End: 2019-08-09 | Stop reason: HOSPADM

## 2019-07-17 RX ORDER — PANTOPRAZOLE SODIUM 40 MG/10ML
40 INJECTION, POWDER, LYOPHILIZED, FOR SOLUTION INTRAVENOUS
Status: DISCONTINUED | OUTPATIENT
Start: 2019-07-18 | End: 2019-08-09 | Stop reason: HOSPADM

## 2019-07-17 RX ORDER — ROCURONIUM BROMIDE 10 MG/ML
INJECTION, SOLUTION INTRAVENOUS AS NEEDED
Status: DISCONTINUED | OUTPATIENT
Start: 2019-07-17 | End: 2019-07-17 | Stop reason: SURG

## 2019-07-17 RX ORDER — NOREPINEPHRINE BIT/0.9 % NACL 8 MG/250ML
.02-.3 INFUSION BOTTLE (ML) INTRAVENOUS
Status: DISCONTINUED | OUTPATIENT
Start: 2019-07-18 | End: 2019-07-20

## 2019-07-17 RX ORDER — SODIUM CHLORIDE 0.9 % (FLUSH) 0.9 %
3-10 SYRINGE (ML) INJECTION AS NEEDED
Status: DISCONTINUED | OUTPATIENT
Start: 2019-07-17 | End: 2019-07-17

## 2019-07-17 RX ORDER — ALBUTEROL SULFATE 2.5 MG/3ML
2.5 SOLUTION RESPIRATORY (INHALATION)
Status: DISCONTINUED | OUTPATIENT
Start: 2019-07-18 | End: 2019-07-18 | Stop reason: SDUPTHER

## 2019-07-17 RX ORDER — PROPOFOL 10 MG/ML
VIAL (ML) INTRAVENOUS AS NEEDED
Status: DISCONTINUED | OUTPATIENT
Start: 2019-07-17 | End: 2019-07-17 | Stop reason: SURG

## 2019-07-17 RX ORDER — SODIUM CHLORIDE 0.9 % (FLUSH) 0.9 %
3-10 SYRINGE (ML) INJECTION AS NEEDED
Status: DISCONTINUED | OUTPATIENT
Start: 2019-07-17 | End: 2019-07-17 | Stop reason: HOSPADM

## 2019-07-17 RX ORDER — ENALAPRILAT 2.5 MG/2ML
0.62 INJECTION INTRAVENOUS EVERY 6 HOURS PRN
Status: DISCONTINUED | OUTPATIENT
Start: 2019-07-17 | End: 2019-08-09 | Stop reason: HOSPADM

## 2019-07-17 RX ORDER — SODIUM CHLORIDE, SODIUM LACTATE, POTASSIUM CHLORIDE, CALCIUM CHLORIDE 600; 310; 30; 20 MG/100ML; MG/100ML; MG/100ML; MG/100ML
100 INJECTION, SOLUTION INTRAVENOUS CONTINUOUS
Status: DISCONTINUED | OUTPATIENT
Start: 2019-07-18 | End: 2019-07-17

## 2019-07-17 RX ORDER — ALBUMIN, HUMAN INJ 5% 5 %
SOLUTION INTRAVENOUS CONTINUOUS PRN
Status: DISCONTINUED | OUTPATIENT
Start: 2019-07-17 | End: 2019-07-17 | Stop reason: SURG

## 2019-07-17 RX ADMIN — PROPOFOL 40 MG: 10 INJECTION, EMULSION INTRAVENOUS at 21:09

## 2019-07-17 RX ADMIN — ROCURONIUM BROMIDE 20 MG: 10 INJECTION INTRAVENOUS at 21:45

## 2019-07-17 RX ADMIN — ROCURONIUM BROMIDE 30 MG: 10 INJECTION INTRAVENOUS at 21:14

## 2019-07-17 RX ADMIN — ROCURONIUM BROMIDE 10 MG: 10 INJECTION INTRAVENOUS at 21:55

## 2019-07-17 RX ADMIN — SODIUM CHLORIDE, POTASSIUM CHLORIDE, SODIUM LACTATE AND CALCIUM CHLORIDE 100 ML/HR: 600; 310; 30; 20 INJECTION, SOLUTION INTRAVENOUS at 14:02

## 2019-07-17 RX ADMIN — HYDROMORPHONE HYDROCHLORIDE 1 MG: 1 INJECTION, SOLUTION INTRAMUSCULAR; INTRAVENOUS; SUBCUTANEOUS at 05:50

## 2019-07-17 RX ADMIN — POTASSIUM CHLORIDE 10 MEQ: 7.46 INJECTION, SOLUTION INTRAVENOUS at 09:26

## 2019-07-17 RX ADMIN — MIDAZOLAM HYDROCHLORIDE 1 MG: 2 INJECTION, SOLUTION INTRAMUSCULAR; INTRAVENOUS at 21:00

## 2019-07-17 RX ADMIN — ALBUMIN HUMAN: 0.05 INJECTION, SOLUTION INTRAVENOUS at 22:28

## 2019-07-17 RX ADMIN — POTASSIUM CHLORIDE 10 MEQ: 7.46 INJECTION, SOLUTION INTRAVENOUS at 11:26

## 2019-07-17 RX ADMIN — CEFOXITIN 2 G: 2 INJECTION, POWDER, FOR SOLUTION INTRAVENOUS at 21:16

## 2019-07-17 RX ADMIN — SODIUM CHLORIDE, POTASSIUM CHLORIDE, SODIUM LACTATE AND CALCIUM CHLORIDE 500 ML/HR: 600; 310; 30; 20 INJECTION, SOLUTION INTRAVENOUS at 11:04

## 2019-07-17 RX ADMIN — PHENYLEPHRINE HYDROCHLORIDE 100 MCG: 10 INJECTION INTRAVENOUS at 21:32

## 2019-07-17 RX ADMIN — SODIUM CHLORIDE, SODIUM GLUCONATE, SODIUM ACETATE, POTASSIUM CHLORIDE, AND MAGNESIUM CHLORIDE: 526; 502; 368; 37; 30 INJECTION, SOLUTION INTRAVENOUS at 22:46

## 2019-07-17 RX ADMIN — SODIUM CHLORIDE 125 ML/HR: 900 INJECTION, SOLUTION INTRAVENOUS at 04:19

## 2019-07-17 RX ADMIN — SODIUM CHLORIDE 125 MG: 9 INJECTION, SOLUTION INTRAVENOUS at 13:54

## 2019-07-17 RX ADMIN — PHENYLEPHRINE HYDROCHLORIDE 100 MCG: 10 INJECTION INTRAVENOUS at 21:15

## 2019-07-17 RX ADMIN — PHENYLEPHRINE HYDROCHLORIDE 100 MCG: 10 INJECTION INTRAVENOUS at 21:51

## 2019-07-17 RX ADMIN — SODIUM CHLORIDE, SODIUM GLUCONATE, SODIUM ACETATE, POTASSIUM CHLORIDE, AND MAGNESIUM CHLORIDE: 526; 502; 368; 37; 30 INJECTION, SOLUTION INTRAVENOUS at 22:21

## 2019-07-17 RX ADMIN — MIDAZOLAM HYDROCHLORIDE 1 MG: 2 INJECTION, SOLUTION INTRAMUSCULAR; INTRAVENOUS at 21:06

## 2019-07-17 RX ADMIN — PHENYLEPHRINE HYDROCHLORIDE 100 MCG: 10 INJECTION INTRAVENOUS at 21:42

## 2019-07-17 RX ADMIN — POTASSIUM CHLORIDE 10 MEQ: 7.46 INJECTION, SOLUTION INTRAVENOUS at 08:26

## 2019-07-17 RX ADMIN — PHENYLEPHRINE HYDROCHLORIDE 100 MCG: 10 INJECTION INTRAVENOUS at 21:58

## 2019-07-17 RX ADMIN — FENTANYL CITRATE 50 MCG: 50 INJECTION, SOLUTION INTRAMUSCULAR; INTRAVENOUS at 21:27

## 2019-07-17 RX ADMIN — SODIUM CHLORIDE, SODIUM GLUCONATE, SODIUM ACETATE, POTASSIUM CHLORIDE, AND MAGNESIUM CHLORIDE: 526; 502; 368; 37; 30 INJECTION, SOLUTION INTRAVENOUS at 21:17

## 2019-07-17 RX ADMIN — PROPOFOL 20 MG: 10 INJECTION, EMULSION INTRAVENOUS at 21:11

## 2019-07-17 RX ADMIN — PHENYLEPHRINE HYDROCHLORIDE 100 MCG: 10 INJECTION INTRAVENOUS at 22:18

## 2019-07-17 RX ADMIN — PHENYLEPHRINE HYDROCHLORIDE 100 MCG: 10 INJECTION INTRAVENOUS at 22:10

## 2019-07-17 RX ADMIN — POTASSIUM CHLORIDE 10 MEQ: 7.46 INJECTION, SOLUTION INTRAVENOUS at 10:26

## 2019-07-17 RX ADMIN — FENTANYL CITRATE 25 MCG: 50 INJECTION, SOLUTION INTRAMUSCULAR; INTRAVENOUS at 22:02

## 2019-07-17 NOTE — ANESTHESIA PREPROCEDURE EVALUATION
Anesthesia Evaluation     Patient summary reviewed   NPO Solid Status: > 8 hours  NPO Liquid Status: > 8 hours           Airway   Mallampati: II  No difficulty expected  Comment: Patient has a trach after total laryngectomy performed.  Dental    (+) poor dentition and edentulous    Pulmonary     breath sounds clear to auscultation  (+) COPD, shortness of breath, recent URI,   Cardiovascular - normal exam  Exercise tolerance: poor (<4 METS)    NYHA Classification: III  ECG reviewed  PT is on anticoagulation therapy    (+) hypertension, hyperlipidemia,       Neuro/Psych  (+) psychiatric history,     GI/Hepatic/Renal/Endo    (+)  GERD poorly controlled,  liver disease fatty liver disease, hypothyroidism,     Musculoskeletal     (+) neck pain,   Abdominal    Substance History   (+) alcohol use,      OB/GYN          Other      history of cancer          Anesthesia Evaluation     Patient summary reviewed and Nursing notes reviewed   NPO Solid Status: > 8 hours  NPO Liquid Status: > 8 hours           Airway   Mallampati: II  TM distance: >3 FB  Neck ROM: limited  Small opening and Difficult intubation highly probable  Comment: Pain with palpation. Cachexia.   Dental    (+) poor dentation    Comment: Remaining upper and lower dentition in hopeless repair.    Pulmonary - normal exam   (+) a smoker Former, COPD severe, decreased breath sounds, wheezes,     ROS comment:   - lines/tubes: None    - cardiac: Size within normal limits.    - mediastinum: Contour within normal limits.     - lungs: No evidence of a focal air space process, pulmonary  interstitial edema, nodule(s)/mass.     - pleura: No evidence of  fluid.      - osseous: Unremarkable for age.     IMPRESSION:  No acute cardiopulmonary process identified.        Electronically signed by:  Amanda Marquez MD  4/12/2019 3:01 PM CDT  PE comment: Albuterol treatment ordered pre-op.  Cardiovascular - normal exam    Rhythm: regular  Rate: normal    (+) hypertension,  hyperlipidemia,       Neuro/Psych  (+) psychiatric history (Alcohol abuse.),     GI/Hepatic/Renal/Endo    (+)  GERD well controlled,  liver disease (Cirrhosis of the liver.), hypothyroidism,     Musculoskeletal     (+) neck pain (Throat pain with palpation and swallowing.),   Abdominal    Substance History   (+) alcohol use,      OB/GYN negative ob/gyn ROS         Other      history of cancer (Pharynx.) active                    Anesthesia Plan    ASA 4     general   Awake fiberoptic intubation  intravenous induction   Anesthetic plan, all risks, benefits, and alternatives have been provided, discussed and informed consent has been obtained with: patient and child.    Plan discussed with CRNA.     Anesthesia Evaluation     Patient summary reviewed and Nursing notes reviewed   NPO Solid Status: > 8 hours  NPO Liquid Status: > 8 hours           Airway   Mallampati: II  TM distance: >3 FB  Neck ROM: limited  Small opening and Difficult intubation highly probable  Comment: Pain with palpation. Cachexia.   Dental    (+) poor dentation    Comment: Remaining upper and lower dentition in hopeless repair.    Pulmonary - normal exam   (+) a smoker Former, COPD severe, decreased breath sounds, wheezes,     ROS comment:   - lines/tubes: None    - cardiac: Size within normal limits.    - mediastinum: Contour within normal limits.     - lungs: No evidence of a focal air space process, pulmonary  interstitial edema, nodule(s)/mass.     - pleura: No evidence of  fluid.      - osseous: Unremarkable for age.     IMPRESSION:  No acute cardiopulmonary process identified.        Electronically signed by:  Amanda Marquez MD  4/12/2019 3:01 PM CDT  PE comment: Albuterol treatment ordered pre-op.  Cardiovascular - normal exam    Rhythm: regular  Rate: normal    (+) hypertension, hyperlipidemia,       Neuro/Psych  (+) psychiatric history (Alcohol abuse.),     GI/Hepatic/Renal/Endo    (+)  GERD well controlled,  liver disease (Cirrhosis of the  liver.), hypothyroidism,     Musculoskeletal     (+) neck pain (Throat pain with palpation and swallowing.),   Abdominal    Substance History   (+) alcohol use,      OB/GYN negative ob/gyn ROS         Other      history of cancer (Pharynx.) active                    Anesthesia Plan    ASA 4     general   Awake fiberoptic intubation  intravenous induction   Anesthetic plan, all risks, benefits, and alternatives have been provided, discussed and informed consent has been obtained with: patient and child.    Plan discussed with CRNA.     Anesthesia Evaluation     Patient summary reviewed and Nursing notes reviewed   NPO Solid Status: > 8 hours  NPO Liquid Status: > 8 hours           Airway   Mallampati: II  TM distance: >3 FB  Neck ROM: limited  Small opening and Difficult intubation highly probable  Comment: Pain with palpation. Cachexia.   Dental    (+) poor dentation    Comment: Remaining upper and lower dentition in hopeless repair.    Pulmonary - normal exam   (+) a smoker Former, COPD severe, decreased breath sounds, wheezes,     ROS comment:   - lines/tubes: None    - cardiac: Size within normal limits.    - mediastinum: Contour within normal limits.     - lungs: No evidence of a focal air space process, pulmonary  interstitial edema, nodule(s)/mass.     - pleura: No evidence of  fluid.      - osseous: Unremarkable for age.     IMPRESSION:  No acute cardiopulmonary process identified.        Electronically signed by:  Amanda Marquez MD  4/12/2019 3:01 PM CDT  PE comment: Albuterol treatment ordered pre-op.  Cardiovascular - normal exam    Rhythm: regular  Rate: normal    (+) hypertension, hyperlipidemia,       Neuro/Psych  (+) psychiatric history (Alcohol abuse.),     GI/Hepatic/Renal/Endo    (+)  GERD well controlled,  liver disease (Cirrhosis of the liver.), hypothyroidism,     Musculoskeletal     (+) neck pain (Throat pain with palpation and swallowing.),   Abdominal    Substance History   (+) alcohol use,       OB/GYN negative ob/gyn ROS         Other      history of cancer (Pharynx.) active                    Anesthesia Plan    ASA 4     general   Awake fiberoptic intubation  intravenous induction   Anesthetic plan, all risks, benefits, and alternatives have been provided, discussed and informed consent has been obtained with: patient and child.    Plan discussed with CRNA.     Anesthesia Evaluation     Patient summary reviewed and Nursing notes reviewed   NPO Solid Status: > 8 hours  NPO Liquid Status: > 8 hours           Airway   Mallampati: II  TM distance: >3 FB  Neck ROM: limited  Small opening and Difficult intubation highly probable  Comment: Pain with palpation. Cachexia.   Dental    (+) poor dentation    Comment: Remaining upper and lower dentition in hopeless repair.    Pulmonary - normal exam   (+) a smoker Former, COPD severe, decreased breath sounds, wheezes,     ROS comment:   - lines/tubes: None    - cardiac: Size within normal limits.    - mediastinum: Contour within normal limits.     - lungs: No evidence of a focal air space process, pulmonary  interstitial edema, nodule(s)/mass.     - pleura: No evidence of  fluid.      - osseous: Unremarkable for age.     IMPRESSION:  No acute cardiopulmonary process identified.        Electronically signed by:  Amanda Marquez MD  4/12/2019 3:01 PM CDT  PE comment: Albuterol treatment ordered pre-op.  Cardiovascular - normal exam    Rhythm: regular  Rate: normal    (+) hypertension, hyperlipidemia,       Neuro/Psych  (+) psychiatric history (Alcohol abuse.),     GI/Hepatic/Renal/Endo    (+)  GERD well controlled,  liver disease (Cirrhosis of the liver.), hypothyroidism,     Musculoskeletal     (+) neck pain (Throat pain with palpation and swallowing.),   Abdominal    Substance History   (+) alcohol use,      OB/GYN negative ob/gyn ROS         Other      history of cancer (Pharynx.) active                    Anesthesia Plan    ASA 4     general   Awake fiberoptic  intubation  intravenous induction   Anesthetic plan, all risks, benefits, and alternatives have been provided, discussed and informed consent has been obtained with: patient and child.    Plan discussed with CRNA.             Anesthesia Plan    ASA 4     general   (This gentleman did well recently for his recent  laryngeal resection and tracheostomy; he presents today for ex lap for adhesion-mediated sbo.  The patient has a gastric tube to gravity drainage.  With gently sedation, we will change the tracheostomy to a shiley trach probably a size 6.  Then complete the induction with attachment to the ventilator tubing.)  intravenous induction

## 2019-07-18 LAB
ANION GAP SERPL CALCULATED.3IONS-SCNC: 24 MMOL/L (ref 5–15)
ANION GAP SERPL CALCULATED.3IONS-SCNC: 24 MMOL/L (ref 5–15)
ARTERIAL PATENCY WRIST A: ABNORMAL
ATMOSPHERIC PRESS: 747 MMHG
ATMOSPHERIC PRESS: 748 MMHG
ATMOSPHERIC PRESS: 748 MMHG
BASE EXCESS BLDA CALC-SCNC: 1.5 MMOL/L (ref 0–2)
BASE EXCESS BLDA CALC-SCNC: 2.2 MMOL/L (ref 0–2)
BASE EXCESS BLDA CALC-SCNC: 2.8 MMOL/L (ref 0–2)
BASOPHILS # BLD AUTO: 0.02 10*3/MM3 (ref 0–0.2)
BASOPHILS # BLD AUTO: 0.02 10*3/MM3 (ref 0–0.2)
BASOPHILS NFR BLD AUTO: 0.1 % (ref 0–1.5)
BASOPHILS NFR BLD AUTO: 0.2 % (ref 0–1.5)
BDY SITE: ABNORMAL
BUN BLD-MCNC: 24 MG/DL (ref 8–23)
BUN BLD-MCNC: 24 MG/DL (ref 8–23)
BUN/CREAT SERPL: 32.9 (ref 7–25)
BUN/CREAT SERPL: 44.4 (ref 7–25)
CALCIUM SPEC-SCNC: 7.9 MG/DL (ref 8.6–10.5)
CALCIUM SPEC-SCNC: 8.1 MG/DL (ref 8.6–10.5)
CHLORIDE SERPL-SCNC: 95 MMOL/L (ref 98–107)
CHLORIDE SERPL-SCNC: 97 MMOL/L (ref 98–107)
CO2 SERPL-SCNC: 24 MMOL/L (ref 22–29)
CO2 SERPL-SCNC: 24 MMOL/L (ref 22–29)
CREAT BLD-MCNC: 0.54 MG/DL (ref 0.76–1.27)
CREAT BLD-MCNC: 0.73 MG/DL (ref 0.76–1.27)
DEPRECATED RDW RBC AUTO: 45.6 FL (ref 37–54)
DEPRECATED RDW RBC AUTO: 46.7 FL (ref 37–54)
EOSINOPHIL # BLD AUTO: 0.01 10*3/MM3 (ref 0–0.4)
EOSINOPHIL # BLD AUTO: 0.09 10*3/MM3 (ref 0–0.4)
EOSINOPHIL NFR BLD AUTO: 0.1 % (ref 0.3–6.2)
EOSINOPHIL NFR BLD AUTO: 1.1 % (ref 0.3–6.2)
ERYTHROCYTE [DISTWIDTH] IN BLOOD BY AUTOMATED COUNT: 17.5 % (ref 12.3–15.4)
ERYTHROCYTE [DISTWIDTH] IN BLOOD BY AUTOMATED COUNT: 17.8 % (ref 12.3–15.4)
GFR SERPL CREATININE-BSD FRML MDRD: 132 ML/MIN/1.73
GFR SERPL CREATININE-BSD FRML MDRD: >150 ML/MIN/1.73
GLUCOSE BLD-MCNC: 121 MG/DL (ref 65–99)
GLUCOSE BLD-MCNC: 90 MG/DL (ref 65–99)
HCO3 BLDA-SCNC: 25.8 MMOL/L (ref 20–26)
HCO3 BLDA-SCNC: 27.5 MMOL/L (ref 20–26)
HCO3 BLDA-SCNC: 27.7 MMOL/L (ref 20–26)
HCT VFR BLD AUTO: 31 % (ref 37.5–51)
HCT VFR BLD AUTO: 32.3 % (ref 37.5–51)
HGB BLD-MCNC: 10.5 G/DL (ref 13–17.7)
HGB BLD-MCNC: 10.8 G/DL (ref 13–17.7)
HOROWITZ INDEX BLD+IHG-RTO: 30 %
IMM GRANULOCYTES # BLD AUTO: 0.02 10*3/MM3 (ref 0–0.05)
IMM GRANULOCYTES # BLD AUTO: 0.05 10*3/MM3 (ref 0–0.05)
IMM GRANULOCYTES NFR BLD AUTO: 0.2 % (ref 0–0.5)
IMM GRANULOCYTES NFR BLD AUTO: 0.4 % (ref 0–0.5)
LYMPHOCYTES # BLD AUTO: 0.62 10*3/MM3 (ref 0.7–3.1)
LYMPHOCYTES # BLD AUTO: 1.6 10*3/MM3 (ref 0.7–3.1)
LYMPHOCYTES NFR BLD AUTO: 19.2 % (ref 19.6–45.3)
LYMPHOCYTES NFR BLD AUTO: 4.6 % (ref 19.6–45.3)
Lab: ABNORMAL
MAGNESIUM SERPL-MCNC: 2.2 MG/DL (ref 1.6–2.4)
MAGNESIUM SERPL-MCNC: 2.3 MG/DL (ref 1.6–2.4)
MCH RBC QN AUTO: 25.2 PG (ref 26.6–33)
MCH RBC QN AUTO: 25.3 PG (ref 26.6–33)
MCHC RBC AUTO-ENTMCNC: 33.4 G/DL (ref 31.5–35.7)
MCHC RBC AUTO-ENTMCNC: 33.9 G/DL (ref 31.5–35.7)
MCV RBC AUTO: 74.3 FL (ref 79–97)
MCV RBC AUTO: 75.6 FL (ref 79–97)
MODALITY: ABNORMAL
MONOCYTES # BLD AUTO: 0.66 10*3/MM3 (ref 0.1–0.9)
MONOCYTES # BLD AUTO: 1.14 10*3/MM3 (ref 0.1–0.9)
MONOCYTES NFR BLD AUTO: 7.9 % (ref 5–12)
MONOCYTES NFR BLD AUTO: 8.5 % (ref 5–12)
NEUTROPHILS # BLD AUTO: 11.59 10*3/MM3 (ref 1.7–7)
NEUTROPHILS # BLD AUTO: 5.96 10*3/MM3 (ref 1.7–7)
NEUTROPHILS NFR BLD AUTO: 71.4 % (ref 42.7–76)
NEUTROPHILS NFR BLD AUTO: 86.3 % (ref 42.7–76)
NRBC BLD AUTO-RTO: 0 /100 WBC (ref 0–0.2)
NRBC BLD AUTO-RTO: 0 /100 WBC (ref 0–0.2)
PAW @ PEAK INSP FLOW SETTING VENT: 23 CMH2O
PCO2 BLDA: 38.7 MM HG (ref 35–45)
PCO2 BLDA: 42 MM HG (ref 35–45)
PCO2 BLDA: 46.3 MM HG (ref 35–45)
PEEP RESPIRATORY: 5 CM[H2O]
PH BLDA: 7.38 PH UNITS (ref 7.35–7.45)
PH BLDA: 7.42 PH UNITS (ref 7.35–7.45)
PH BLDA: 7.43 PH UNITS (ref 7.35–7.45)
PHOSPHATE SERPL-MCNC: 1.5 MG/DL (ref 2.5–4.5)
PHOSPHATE SERPL-MCNC: 2.6 MG/DL (ref 2.5–4.5)
PLATELET # BLD AUTO: 333 10*3/MM3 (ref 140–450)
PLATELET # BLD AUTO: 365 10*3/MM3 (ref 140–450)
PMV BLD AUTO: 11.3 FL (ref 6–12)
PMV BLD AUTO: 11.4 FL (ref 6–12)
PO2 BLDA: 106 MM HG (ref 83–108)
PO2 BLDA: 113 MM HG (ref 83–108)
PO2 BLDA: 98 MM HG (ref 83–108)
POTASSIUM BLD-SCNC: 3.5 MMOL/L (ref 3.5–5.2)
POTASSIUM BLD-SCNC: 3.7 MMOL/L (ref 3.5–5.2)
PSV: 10 CMH2O
PSV: 10 CMH2O
PSV: 8 CMH2O
RBC # BLD AUTO: 4.17 10*6/MM3 (ref 4.14–5.8)
RBC # BLD AUTO: 4.27 10*6/MM3 (ref 4.14–5.8)
SAO2 % BLDCOA: 95.7 % (ref 94–99)
SAO2 % BLDCOA: 96.6 % (ref 94–99)
SAO2 % BLDCOA: 97.8 % (ref 94–99)
SET MECH RESP RATE: 12
SET MECH RESP RATE: 6
SODIUM BLD-SCNC: 143 MMOL/L (ref 136–145)
SODIUM BLD-SCNC: 145 MMOL/L (ref 136–145)
VENTILATOR MODE: ABNORMAL
VT ON VENT VENT: 500 ML
VT ON VENT VENT: 500 ML
WBC NRBC COR # BLD: 13.43 10*3/MM3 (ref 3.4–10.8)
WBC NRBC COR # BLD: 8.35 10*3/MM3 (ref 3.4–10.8)

## 2019-07-18 PROCEDURE — 82803 BLOOD GASES ANY COMBINATION: CPT

## 2019-07-18 PROCEDURE — 99024 POSTOP FOLLOW-UP VISIT: CPT | Performed by: SURGERY

## 2019-07-18 PROCEDURE — 84100 ASSAY OF PHOSPHORUS: CPT | Performed by: SURGERY

## 2019-07-18 PROCEDURE — 25010000002 HYDROMORPHONE 1 MG/ML SOLUTION: Performed by: SURGERY

## 2019-07-18 PROCEDURE — 94640 AIRWAY INHALATION TREATMENT: CPT

## 2019-07-18 PROCEDURE — 25010000002 ENOXAPARIN PER 10 MG: Performed by: SURGERY

## 2019-07-18 PROCEDURE — 94799 UNLISTED PULMONARY SVC/PX: CPT

## 2019-07-18 PROCEDURE — 97167 OT EVAL HIGH COMPLEX 60 MIN: CPT

## 2019-07-18 PROCEDURE — 25010000002 ONDANSETRON PER 1 MG: Performed by: SURGERY

## 2019-07-18 PROCEDURE — 85025 COMPLETE CBC W/AUTO DIFF WBC: CPT | Performed by: SURGERY

## 2019-07-18 PROCEDURE — 94003 VENT MGMT INPAT SUBQ DAY: CPT

## 2019-07-18 PROCEDURE — 83735 ASSAY OF MAGNESIUM: CPT | Performed by: SURGERY

## 2019-07-18 PROCEDURE — 36600 WITHDRAWAL OF ARTERIAL BLOOD: CPT

## 2019-07-18 PROCEDURE — 25010000002 CEFOXITIN PER 1 G: Performed by: SURGERY

## 2019-07-18 PROCEDURE — 97163 PT EVAL HIGH COMPLEX 45 MIN: CPT

## 2019-07-18 PROCEDURE — 0DN80ZZ RELEASE SMALL INTESTINE, OPEN APPROACH: ICD-10-PCS | Performed by: SURGERY

## 2019-07-18 PROCEDURE — 80048 BASIC METABOLIC PNL TOTAL CA: CPT | Performed by: SURGERY

## 2019-07-18 PROCEDURE — 25010000002 PROPOFOL 1000 MG/ML EMULSION: Performed by: SURGERY

## 2019-07-18 RX ORDER — ALBUTEROL SULFATE 90 UG/1
2 AEROSOL, METERED RESPIRATORY (INHALATION) EVERY 6 HOURS
Status: DISCONTINUED | OUTPATIENT
Start: 2019-07-18 | End: 2019-07-29

## 2019-07-18 RX ORDER — DEXTROSE, SODIUM CHLORIDE, AND POTASSIUM CHLORIDE 5; .45; .15 G/100ML; G/100ML; G/100ML
100 INJECTION INTRAVENOUS CONTINUOUS
Status: DISPENSED | OUTPATIENT
Start: 2019-07-18 | End: 2019-07-22

## 2019-07-18 RX ADMIN — SODIUM CHLORIDE, POTASSIUM CHLORIDE, SODIUM LACTATE AND CALCIUM CHLORIDE 100 ML/HR: 600; 310; 30; 20 INJECTION, SOLUTION INTRAVENOUS at 08:20

## 2019-07-18 RX ADMIN — ONDANSETRON 4 MG: 2 INJECTION INTRAMUSCULAR; INTRAVENOUS at 01:28

## 2019-07-18 RX ADMIN — CEFOXITIN SODIUM 2 G: 2 POWDER, FOR SOLUTION INTRAVENOUS at 08:40

## 2019-07-18 RX ADMIN — CEFOXITIN SODIUM 2 G: 2 POWDER, FOR SOLUTION INTRAVENOUS at 16:23

## 2019-07-18 RX ADMIN — POTASSIUM CHLORIDE, DEXTROSE MONOHYDRATE AND SODIUM CHLORIDE 100 ML/HR: 150; 5; 450 INJECTION, SOLUTION INTRAVENOUS at 20:51

## 2019-07-18 RX ADMIN — HYDROMORPHONE HYDROCHLORIDE 1 MG: 1 INJECTION, SOLUTION INTRAMUSCULAR; INTRAVENOUS; SUBCUTANEOUS at 10:20

## 2019-07-18 RX ADMIN — SODIUM CHLORIDE, PRESERVATIVE FREE 10 ML: 5 INJECTION INTRAVENOUS at 21:00

## 2019-07-18 RX ADMIN — PANTOPRAZOLE SODIUM 40 MG: 40 INJECTION, POWDER, FOR SOLUTION INTRAVENOUS at 08:18

## 2019-07-18 RX ADMIN — LEVOTHYROXINE SODIUM 175 MCG: 150 TABLET ORAL at 08:19

## 2019-07-18 RX ADMIN — PROPOFOL 20 MCG/KG/MIN: 10 INJECTION, EMULSION INTRAVENOUS at 00:09

## 2019-07-18 RX ADMIN — SODIUM CHLORIDE, POTASSIUM CHLORIDE, SODIUM LACTATE AND CALCIUM CHLORIDE 100 ML/HR: 600; 310; 30; 20 INJECTION, SOLUTION INTRAVENOUS at 00:22

## 2019-07-18 RX ADMIN — ALBUTEROL SULFATE 2 PUFF: 90 AEROSOL, METERED RESPIRATORY (INHALATION) at 12:56

## 2019-07-18 RX ADMIN — ENOXAPARIN SODIUM 40 MG: 40 INJECTION SUBCUTANEOUS at 08:17

## 2019-07-18 RX ADMIN — HYDROMORPHONE HYDROCHLORIDE 1 MG: 1 INJECTION, SOLUTION INTRAMUSCULAR; INTRAVENOUS; SUBCUTANEOUS at 04:31

## 2019-07-18 RX ADMIN — NOREPINEPHRINE BITARTRATE 0.3 MCG/KG/MIN: 1 INJECTION, SOLUTION, CONCENTRATE INTRAVENOUS at 00:22

## 2019-07-18 RX ADMIN — HYDROMORPHONE HYDROCHLORIDE 1 MG: 1 INJECTION, SOLUTION INTRAMUSCULAR; INTRAVENOUS; SUBCUTANEOUS at 17:59

## 2019-07-18 RX ADMIN — PROPOFOL 40 MCG/KG/MIN: 10 INJECTION, EMULSION INTRAVENOUS at 06:59

## 2019-07-18 RX ADMIN — ONDANSETRON 4 MG: 2 INJECTION INTRAMUSCULAR; INTRAVENOUS at 08:40

## 2019-07-18 RX ADMIN — POTASSIUM PHOSPHATE, MONOBASIC AND POTASSIUM PHOSPHATE, DIBASIC 20 MMOL: 224; 236 INJECTION, SOLUTION, CONCENTRATE INTRAVENOUS at 11:17

## 2019-07-18 RX ADMIN — CEFOXITIN SODIUM 2 G: 2 POWDER, FOR SOLUTION INTRAVENOUS at 03:45

## 2019-07-18 RX ADMIN — HYDROMORPHONE HYDROCHLORIDE 1 MG: 1 INJECTION, SOLUTION INTRAMUSCULAR; INTRAVENOUS; SUBCUTANEOUS at 14:13

## 2019-07-18 RX ADMIN — ALBUTEROL SULFATE 2 PUFF: 90 AEROSOL, METERED RESPIRATORY (INHALATION) at 07:09

## 2019-07-18 RX ADMIN — HYDROMORPHONE HYDROCHLORIDE 1 MG: 1 INJECTION, SOLUTION INTRAMUSCULAR; INTRAVENOUS; SUBCUTANEOUS at 20:58

## 2019-07-18 RX ADMIN — ALBUTEROL SULFATE 2 PUFF: 90 AEROSOL, METERED RESPIRATORY (INHALATION) at 18:49

## 2019-07-18 NOTE — ANESTHESIA PROCEDURE NOTES
Arterial Line      Patient reassessed immediately prior to procedure    Patient location during procedure: OR   Line placed for MD/Surgeon request and hemodynamic monitoring.  Performed By   Anesthesiologist: Bradford Durbin MD  CRNA: Deb Sung CRNA  Preanesthetic Checklist  Completed: patient identified, site marked, surgical consent, pre-op evaluation, timeout performed, IV checked, risks and benefits discussed and monitors and equipment checked  Arterial Line Prep   Sterile Tech: cap, gloves, sterile barriers and mask  Prep: ChloraPrep  Patient monitoring: EKG, continuous pulse oximetry and blood pressure monitoring  Arterial Line Procedure   Laterality:right  Location:  radial artery   Guidance: Doppler guided and ultrasound guided  Number of attempts: 1  Successful placement: yes  Post Assessment   Dressing Type: wrist guard applied, secured with tape and occlusive dressing applied.   Complications no  Circ/Move/Sens Assessment: normal and unchanged.   Patient Tolerance: patient tolerated the procedure well with no apparent complications

## 2019-07-18 NOTE — ANESTHESIA PROCEDURE NOTES
Central Line      Patient reassessed immediately prior to procedure    Start time: 7/17/2019 10:27 PM  Stop Time:7/17/2019 10:40 PM  Indications: MD/Surgeon request and vascular access  Staff  Anesthesiologist: Bradford Durbin MD  Preanesthetic Checklist  Completed: patient identified, site marked, surgical consent, pre-op evaluation, timeout performed, IV checked, risks and benefits discussed and monitors and equipment checked  Central Line Prep  Sterile Tech:gloves, gown, sterile barriers, mask and cap  Prep: chloraprep  Patient monitoring: blood pressure monitoring, continuous pulse oximetry and EKG  Central Line Procedure  Laterality:left  Location:internal jugular  Catheter Type:triple lumen  Catheter Size:7 Fr  Guidance:landmark techniqueImages: still images not obtained  Assessment  Post procedure:line sutured, biopatch applied and occlusive dressing applied  Assessement:blood return through all ports and free fluid flow  Complications:no  Patient Tolerance:patient tolerated the procedure well with no apparent complications

## 2019-07-18 NOTE — ANESTHESIA PROCEDURE NOTES
Peripheral IV    Patient location during procedure: OR  Line placed for Difficult Access.  Performed By   CRNA: Deb Sung CRNA  Preanesthetic Checklist  Completed: patient identified, site marked, surgical consent, pre-op evaluation, timeout performed, IV checked, risks and benefits discussed and monitors and equipment checked  Peripheral IV Prep   Patient position: supine   Prep: ChloraPrep  Patient monitoring: heart rate, cardiac monitor and continuous pulse ox  Peripheral IV Procedure   Laterality:left  Location:  Forearm  Catheter size: 18 G         Post Assessment   Dressing Type: transparent and tape.    IV Dressing/Site: clean, dry and intact

## 2019-07-18 NOTE — ANESTHESIA POSTPROCEDURE EVALUATION
Patient: Emerson Bang    Procedure Summary     Date:  07/17/19 Room / Location:  [facility] OR 09 / [facility] OR    Anesthesia Start:  2104 Anesthesia Stop:  2312    Procedure:  DIAGNOSTIC LAPAROSCOPY, EXPLORATORY LAPAROTOMY, LYSIS OF ADHESIONS (N/A Abdomen) Diagnosis:       SBO (small bowel obstruction) (CMS/HCC)      (SBO (small bowel obstruction) (CMS/HCC) [K56.609])    Surgeon:  Parminder Kc MD Provider:  Bradford Durbin MD    Anesthesia Type:  general ASA Status:  4          Anesthesia Type: general  Last vitals  BP   118/55 (07/17/19 1809)   Temp   97.7 °F (36.5 °C) (07/17/19 1809)   Pulse   82 (07/17/19 1809)   Resp   18 (07/17/19 1809)     SpO2   94 % (07/17/19 1809)     Post Anesthesia Care and Evaluation    Patient location during evaluation: ICU  Patient participation: complete - patient cannot participate  Level of consciousness: obtunded/minimal responses  Pain management: adequate  Airway patency: patent  Anesthetic complications: No anesthetic complications  PONV Status: none  Cardiovascular status: acceptable  Respiratory status: trach and ventilator  Hydration status: acceptable

## 2019-07-18 NOTE — ANESTHESIA PROCEDURE NOTES
Airway    Additional Comments  Uncuffed #6 trach switched to #6 cuffed fred pt tolerated procedure well

## 2019-07-19 LAB
ALBUMIN SERPL-MCNC: 2.6 G/DL (ref 3.5–5.2)
ALBUMIN/GLOB SERPL: 0.8 G/DL
ALP SERPL-CCNC: 64 U/L (ref 39–117)
ALT SERPL W P-5'-P-CCNC: 9 U/L (ref 1–41)
ANION GAP SERPL CALCULATED.3IONS-SCNC: 7 MMOL/L (ref 5–15)
ARTERIAL PATENCY WRIST A: ABNORMAL
ARTERIAL PATENCY WRIST A: ABNORMAL
AST SERPL-CCNC: 26 U/L (ref 1–40)
ATMOSPHERIC PRESS: 747 MMHG
ATMOSPHERIC PRESS: 748 MMHG
BASE EXCESS BLDA CALC-SCNC: 10.5 MMOL/L (ref 0–2)
BASE EXCESS BLDA CALC-SCNC: 8.1 MMOL/L (ref 0–2)
BASOPHILS # BLD AUTO: 0.02 10*3/MM3 (ref 0–0.2)
BASOPHILS NFR BLD AUTO: 0.3 % (ref 0–1.5)
BDY SITE: ABNORMAL
BDY SITE: ABNORMAL
BILIRUB SERPL-MCNC: <0.2 MG/DL (ref 0.2–1.2)
BUN BLD-MCNC: 15 MG/DL (ref 8–23)
BUN/CREAT SERPL: 24.2 (ref 7–25)
CALCIUM SPEC-SCNC: 7.4 MG/DL (ref 8.6–10.5)
CHLORIDE SERPL-SCNC: 103 MMOL/L (ref 98–107)
CO2 SERPL-SCNC: 33 MMOL/L (ref 22–29)
CREAT BLD-MCNC: 0.62 MG/DL (ref 0.76–1.27)
DEPRECATED RDW RBC AUTO: 46.6 FL (ref 37–54)
EOSINOPHIL # BLD AUTO: 0.11 10*3/MM3 (ref 0–0.4)
EOSINOPHIL NFR BLD AUTO: 1.5 % (ref 0.3–6.2)
ERYTHROCYTE [DISTWIDTH] IN BLOOD BY AUTOMATED COUNT: 18.1 % (ref 12.3–15.4)
GFR SERPL CREATININE-BSD FRML MDRD: >150 ML/MIN/1.73
GLOBULIN UR ELPH-MCNC: 3.1 GM/DL
GLUCOSE BLD-MCNC: 183 MG/DL (ref 65–99)
HCO3 BLDA-SCNC: 34 MMOL/L (ref 20–26)
HCO3 BLDA-SCNC: 35 MMOL/L (ref 20–26)
HCT VFR BLD AUTO: 24.3 % (ref 37.5–51)
HGB BLD-MCNC: 8.3 G/DL (ref 13–17.7)
HOLD SPECIMEN: NORMAL
HOLD SPECIMEN: NORMAL
HOROWITZ INDEX BLD+IHG-RTO: 30 %
HOROWITZ INDEX BLD+IHG-RTO: 30 %
IMM GRANULOCYTES # BLD AUTO: 0.03 10*3/MM3 (ref 0–0.05)
IMM GRANULOCYTES NFR BLD AUTO: 0.4 % (ref 0–0.5)
LARGE PLATELETS: NORMAL
LYMPHOCYTES # BLD AUTO: 1.02 10*3/MM3 (ref 0.7–3.1)
LYMPHOCYTES NFR BLD AUTO: 13.5 % (ref 19.6–45.3)
Lab: ABNORMAL
Lab: ABNORMAL
MAGNESIUM SERPL-MCNC: 2.1 MG/DL (ref 1.6–2.4)
MCH RBC QN AUTO: 25.5 PG (ref 26.6–33)
MCHC RBC AUTO-ENTMCNC: 34.2 G/DL (ref 31.5–35.7)
MCV RBC AUTO: 74.8 FL (ref 79–97)
MICROCYTES BLD QL: NORMAL
MODALITY: ABNORMAL
MODALITY: ABNORMAL
MONOCYTES # BLD AUTO: 1.04 10*3/MM3 (ref 0.1–0.9)
MONOCYTES NFR BLD AUTO: 13.8 % (ref 5–12)
NEUTROPHILS # BLD AUTO: 5.34 10*3/MM3 (ref 1.7–7)
NEUTROPHILS NFR BLD AUTO: 70.5 % (ref 42.7–76)
NRBC BLD AUTO-RTO: 0.3 /100 WBC (ref 0–0.2)
PAW @ PEAK INSP FLOW SETTING VENT: 15 CMH2O
PCO2 BLDA: 45.9 MM HG (ref 35–45)
PCO2 BLDA: 55.2 MM HG (ref 35–45)
PEEP RESPIRATORY: 5 CM[H2O]
PEEP RESPIRATORY: 5 CM[H2O]
PH BLDA: 7.4 PH UNITS (ref 7.35–7.45)
PH BLDA: 7.49 PH UNITS (ref 7.35–7.45)
PHOSPHATE SERPL-MCNC: 1.5 MG/DL (ref 2.5–4.5)
PLATELET # BLD AUTO: 253 10*3/MM3 (ref 140–450)
PMV BLD AUTO: 11.5 FL (ref 6–12)
PO2 BLDA: 89.5 MM HG (ref 83–108)
PO2 BLDA: 90.3 MM HG (ref 83–108)
POTASSIUM BLD-SCNC: 4 MMOL/L (ref 3.5–5.2)
PROT SERPL-MCNC: 5.7 G/DL (ref 6–8.5)
PSV: 10 CMH2O
PSV: 10 CMH2O
RBC # BLD AUTO: 3.25 10*6/MM3 (ref 4.14–5.8)
SAO2 % BLDCOA: 95.4 % (ref 94–99)
SAO2 % BLDCOA: 96.8 % (ref 94–99)
SET MECH RESP RATE: 10
SMUDGE CELLS BLD QL SMEAR: NORMAL
SODIUM BLD-SCNC: 143 MMOL/L (ref 136–145)
TARGETS BLD QL SMEAR: NORMAL
VENTILATOR MODE: ABNORMAL
VENTILATOR MODE: ABNORMAL
VT ON VENT VENT: 500 ML
WBC NRBC COR # BLD: 7.56 10*3/MM3 (ref 3.4–10.8)
WHOLE BLOOD HOLD SPECIMEN: NORMAL

## 2019-07-19 PROCEDURE — 87205 SMEAR GRAM STAIN: CPT | Performed by: INTERNAL MEDICINE

## 2019-07-19 PROCEDURE — 97110 THERAPEUTIC EXERCISES: CPT

## 2019-07-19 PROCEDURE — 99024 POSTOP FOLLOW-UP VISIT: CPT | Performed by: SURGERY

## 2019-07-19 PROCEDURE — 25010000002 ALBUMIN HUMAN 5% PER 50 ML: Performed by: SURGERY

## 2019-07-19 PROCEDURE — 85025 COMPLETE CBC W/AUTO DIFF WBC: CPT | Performed by: INTERNAL MEDICINE

## 2019-07-19 PROCEDURE — 94799 UNLISTED PULMONARY SVC/PX: CPT

## 2019-07-19 PROCEDURE — 36600 WITHDRAWAL OF ARTERIAL BLOOD: CPT

## 2019-07-19 PROCEDURE — 25010000002 HYDROMORPHONE 1 MG/ML SOLUTION: Performed by: SURGERY

## 2019-07-19 PROCEDURE — 84100 ASSAY OF PHOSPHORUS: CPT | Performed by: INTERNAL MEDICINE

## 2019-07-19 PROCEDURE — 94003 VENT MGMT INPAT SUBQ DAY: CPT

## 2019-07-19 PROCEDURE — 25010000002 ENOXAPARIN PER 10 MG: Performed by: SURGERY

## 2019-07-19 PROCEDURE — 80053 COMPREHEN METABOLIC PANEL: CPT | Performed by: INTERNAL MEDICINE

## 2019-07-19 PROCEDURE — 85007 BL SMEAR W/DIFF WBC COUNT: CPT | Performed by: INTERNAL MEDICINE

## 2019-07-19 PROCEDURE — 83735 ASSAY OF MAGNESIUM: CPT | Performed by: SURGERY

## 2019-07-19 PROCEDURE — P9041 ALBUMIN (HUMAN),5%, 50ML: HCPCS | Performed by: SURGERY

## 2019-07-19 PROCEDURE — 99231 SBSQ HOSP IP/OBS SF/LOW 25: CPT | Performed by: INTERNAL MEDICINE

## 2019-07-19 PROCEDURE — 94762 N-INVAS EAR/PLS OXIMTRY CONT: CPT

## 2019-07-19 PROCEDURE — 82803 BLOOD GASES ANY COMBINATION: CPT

## 2019-07-19 PROCEDURE — 25010000002 NA FERRIC GLUC CPLX PER 12.5 MG: Performed by: INTERNAL MEDICINE

## 2019-07-19 PROCEDURE — 87070 CULTURE OTHR SPECIMN AEROBIC: CPT | Performed by: INTERNAL MEDICINE

## 2019-07-19 RX ORDER — BACITRACIN ZINC 500 [USP'U]/G
OINTMENT TOPICAL DAILY
Status: DISCONTINUED | OUTPATIENT
Start: 2019-07-19 | End: 2019-08-09 | Stop reason: HOSPADM

## 2019-07-19 RX ORDER — SODIUM FERRIC GLUCONATE COMPLEX IN SUCROSE 12.5 MG/ML
125 INJECTION INTRAVENOUS ONCE
Status: DISCONTINUED | OUTPATIENT
Start: 2019-07-19 | End: 2019-07-19 | Stop reason: ALTCHOICE

## 2019-07-19 RX ORDER — ALBUMIN, HUMAN INJ 5% 5 %
500 SOLUTION INTRAVENOUS ONCE
Status: COMPLETED | OUTPATIENT
Start: 2019-07-19 | End: 2019-07-19

## 2019-07-19 RX ADMIN — ALBUTEROL SULFATE 2 PUFF: 90 AEROSOL, METERED RESPIRATORY (INHALATION) at 07:01

## 2019-07-19 RX ADMIN — ALBUTEROL SULFATE 2 PUFF: 90 AEROSOL, METERED RESPIRATORY (INHALATION) at 01:45

## 2019-07-19 RX ADMIN — HYDROMORPHONE HYDROCHLORIDE 1 MG: 1 INJECTION, SOLUTION INTRAMUSCULAR; INTRAVENOUS; SUBCUTANEOUS at 09:40

## 2019-07-19 RX ADMIN — POTASSIUM CHLORIDE, DEXTROSE MONOHYDRATE AND SODIUM CHLORIDE 100 ML/HR: 150; 5; 450 INJECTION, SOLUTION INTRAVENOUS at 05:00

## 2019-07-19 RX ADMIN — BACITRACIN: 500 OINTMENT TOPICAL at 18:34

## 2019-07-19 RX ADMIN — NOREPINEPHRINE BITARTRATE 0.04 MCG/KG/MIN: 1 INJECTION, SOLUTION, CONCENTRATE INTRAVENOUS at 06:21

## 2019-07-19 RX ADMIN — SODIUM CHLORIDE 125 MG: 9 INJECTION, SOLUTION INTRAVENOUS at 13:49

## 2019-07-19 RX ADMIN — ALBUTEROL SULFATE 2 PUFF: 90 AEROSOL, METERED RESPIRATORY (INHALATION) at 12:43

## 2019-07-19 RX ADMIN — ENOXAPARIN SODIUM 40 MG: 40 INJECTION SUBCUTANEOUS at 09:14

## 2019-07-19 RX ADMIN — HYDROMORPHONE HYDROCHLORIDE 1 MG: 1 INJECTION, SOLUTION INTRAMUSCULAR; INTRAVENOUS; SUBCUTANEOUS at 20:58

## 2019-07-19 RX ADMIN — HYDROMORPHONE HYDROCHLORIDE 1 MG: 1 INJECTION, SOLUTION INTRAMUSCULAR; INTRAVENOUS; SUBCUTANEOUS at 00:58

## 2019-07-19 RX ADMIN — HYDROMORPHONE HYDROCHLORIDE 1 MG: 1 INJECTION, SOLUTION INTRAMUSCULAR; INTRAVENOUS; SUBCUTANEOUS at 14:03

## 2019-07-19 RX ADMIN — POTASSIUM PHOSPHATE, MONOBASIC AND POTASSIUM PHOSPHATE, DIBASIC 20 MMOL: 224; 236 INJECTION, SOLUTION, CONCENTRATE INTRAVENOUS at 09:12

## 2019-07-19 RX ADMIN — POTASSIUM CHLORIDE, DEXTROSE MONOHYDRATE AND SODIUM CHLORIDE 100 ML/HR: 150; 5; 450 INJECTION, SOLUTION INTRAVENOUS at 15:31

## 2019-07-19 RX ADMIN — ALBUMIN HUMAN 500 ML: 0.05 INJECTION, SOLUTION INTRAVENOUS at 09:13

## 2019-07-19 RX ADMIN — PANTOPRAZOLE SODIUM 40 MG: 40 INJECTION, POWDER, FOR SOLUTION INTRAVENOUS at 09:14

## 2019-07-19 RX ADMIN — HYDROMORPHONE HYDROCHLORIDE 1 MG: 1 INJECTION, SOLUTION INTRAMUSCULAR; INTRAVENOUS; SUBCUTANEOUS at 04:01

## 2019-07-19 RX ADMIN — SCOPALAMINE 1 PATCH: 1 PATCH, EXTENDED RELEASE TRANSDERMAL at 02:07

## 2019-07-20 LAB
ANION GAP SERPL CALCULATED.3IONS-SCNC: 11 MMOL/L (ref 5–15)
BASOPHILS # BLD AUTO: 0.02 10*3/MM3 (ref 0–0.2)
BASOPHILS NFR BLD AUTO: 0.3 % (ref 0–1.5)
BUN BLD-MCNC: 4 MG/DL (ref 8–23)
BUN/CREAT SERPL: 8 (ref 7–25)
CALCIUM SPEC-SCNC: 8.2 MG/DL (ref 8.6–10.5)
CHLORIDE SERPL-SCNC: 98 MMOL/L (ref 98–107)
CO2 SERPL-SCNC: 26 MMOL/L (ref 22–29)
CREAT BLD-MCNC: 0.5 MG/DL (ref 0.76–1.27)
DEPRECATED RDW RBC AUTO: 48.4 FL (ref 37–54)
EOSINOPHIL # BLD AUTO: 0.36 10*3/MM3 (ref 0–0.4)
EOSINOPHIL NFR BLD AUTO: 4.6 % (ref 0.3–6.2)
ERYTHROCYTE [DISTWIDTH] IN BLOOD BY AUTOMATED COUNT: 18.4 % (ref 12.3–15.4)
GFR SERPL CREATININE-BSD FRML MDRD: >150 ML/MIN/1.73
GLUCOSE BLD-MCNC: 141 MG/DL (ref 65–99)
HCT VFR BLD AUTO: 27.6 % (ref 37.5–51)
HGB BLD-MCNC: 9.2 G/DL (ref 13–17.7)
IMM GRANULOCYTES # BLD AUTO: 0.11 10*3/MM3 (ref 0–0.05)
IMM GRANULOCYTES NFR BLD AUTO: 1.4 % (ref 0–0.5)
LYMPHOCYTES # BLD AUTO: 1.34 10*3/MM3 (ref 0.7–3.1)
LYMPHOCYTES NFR BLD AUTO: 17 % (ref 19.6–45.3)
MAGNESIUM SERPL-MCNC: 1.8 MG/DL (ref 1.6–2.4)
MCH RBC QN AUTO: 25.2 PG (ref 26.6–33)
MCHC RBC AUTO-ENTMCNC: 33.3 G/DL (ref 31.5–35.7)
MCV RBC AUTO: 75.6 FL (ref 79–97)
MONOCYTES # BLD AUTO: 1.09 10*3/MM3 (ref 0.1–0.9)
MONOCYTES NFR BLD AUTO: 13.8 % (ref 5–12)
NEUTROPHILS # BLD AUTO: 4.97 10*3/MM3 (ref 1.7–7)
NEUTROPHILS NFR BLD AUTO: 62.9 % (ref 42.7–76)
NRBC BLD AUTO-RTO: 0.5 /100 WBC (ref 0–0.2)
PHOSPHATE SERPL-MCNC: 1.4 MG/DL (ref 2.5–4.5)
PLATELET # BLD AUTO: 216 10*3/MM3 (ref 140–450)
PMV BLD AUTO: 12 FL (ref 6–12)
POTASSIUM BLD-SCNC: 3.6 MMOL/L (ref 3.5–5.2)
RBC # BLD AUTO: 3.65 10*6/MM3 (ref 4.14–5.8)
SODIUM BLD-SCNC: 135 MMOL/L (ref 136–145)
WBC NRBC COR # BLD: 7.89 10*3/MM3 (ref 3.4–10.8)

## 2019-07-20 PROCEDURE — 94760 N-INVAS EAR/PLS OXIMETRY 1: CPT

## 2019-07-20 PROCEDURE — 85025 COMPLETE CBC W/AUTO DIFF WBC: CPT | Performed by: SURGERY

## 2019-07-20 PROCEDURE — 94799 UNLISTED PULMONARY SVC/PX: CPT

## 2019-07-20 PROCEDURE — 97110 THERAPEUTIC EXERCISES: CPT

## 2019-07-20 PROCEDURE — 80048 BASIC METABOLIC PNL TOTAL CA: CPT | Performed by: SURGERY

## 2019-07-20 PROCEDURE — 25010000002 HYDROMORPHONE 1 MG/ML SOLUTION: Performed by: SURGERY

## 2019-07-20 PROCEDURE — 99024 POSTOP FOLLOW-UP VISIT: CPT | Performed by: SURGERY

## 2019-07-20 PROCEDURE — 84100 ASSAY OF PHOSPHORUS: CPT | Performed by: SURGERY

## 2019-07-20 PROCEDURE — 25010000002 ONDANSETRON PER 1 MG: Performed by: SURGERY

## 2019-07-20 PROCEDURE — 97530 THERAPEUTIC ACTIVITIES: CPT

## 2019-07-20 PROCEDURE — 25010000002 ENOXAPARIN PER 10 MG: Performed by: SURGERY

## 2019-07-20 PROCEDURE — 83735 ASSAY OF MAGNESIUM: CPT | Performed by: SURGERY

## 2019-07-20 RX ADMIN — ALBUTEROL SULFATE 2 PUFF: 90 AEROSOL, METERED RESPIRATORY (INHALATION) at 19:17

## 2019-07-20 RX ADMIN — PANTOPRAZOLE SODIUM 40 MG: 40 INJECTION, POWDER, FOR SOLUTION INTRAVENOUS at 08:32

## 2019-07-20 RX ADMIN — BACITRACIN: 500 OINTMENT TOPICAL at 08:33

## 2019-07-20 RX ADMIN — HYDROMORPHONE HYDROCHLORIDE 1 MG: 1 INJECTION, SOLUTION INTRAMUSCULAR; INTRAVENOUS; SUBCUTANEOUS at 12:41

## 2019-07-20 RX ADMIN — ONDANSETRON 4 MG: 2 INJECTION INTRAMUSCULAR; INTRAVENOUS at 05:14

## 2019-07-20 RX ADMIN — POTASSIUM CHLORIDE, DEXTROSE MONOHYDRATE AND SODIUM CHLORIDE 100 ML/HR: 150; 5; 450 INJECTION, SOLUTION INTRAVENOUS at 02:50

## 2019-07-20 RX ADMIN — POTASSIUM CHLORIDE, DEXTROSE MONOHYDRATE AND SODIUM CHLORIDE 100 ML/HR: 150; 5; 450 INJECTION, SOLUTION INTRAVENOUS at 12:44

## 2019-07-20 RX ADMIN — ALBUTEROL SULFATE 2 PUFF: 90 AEROSOL, METERED RESPIRATORY (INHALATION) at 13:44

## 2019-07-20 RX ADMIN — LEVOTHYROXINE SODIUM 175 MCG: 150 TABLET ORAL at 06:05

## 2019-07-20 RX ADMIN — HYDROMORPHONE HYDROCHLORIDE 1 MG: 1 INJECTION, SOLUTION INTRAMUSCULAR; INTRAVENOUS; SUBCUTANEOUS at 05:14

## 2019-07-20 RX ADMIN — HYDROMORPHONE HYDROCHLORIDE 1 MG: 1 INJECTION, SOLUTION INTRAMUSCULAR; INTRAVENOUS; SUBCUTANEOUS at 09:31

## 2019-07-20 RX ADMIN — HYDROMORPHONE HYDROCHLORIDE 1 MG: 1 INJECTION, SOLUTION INTRAMUSCULAR; INTRAVENOUS; SUBCUTANEOUS at 17:36

## 2019-07-20 RX ADMIN — ENOXAPARIN SODIUM 40 MG: 40 INJECTION SUBCUTANEOUS at 08:32

## 2019-07-20 RX ADMIN — POTASSIUM PHOSPHATE, MONOBASIC AND POTASSIUM PHOSPHATE, DIBASIC 30 MMOL: 224; 236 INJECTION, SOLUTION, CONCENTRATE INTRAVENOUS at 09:50

## 2019-07-21 ENCOUNTER — APPOINTMENT (OUTPATIENT)
Dept: GENERAL RADIOLOGY | Facility: HOSPITAL | Age: 59
End: 2019-07-21

## 2019-07-21 LAB
ALBUMIN SERPL-MCNC: 3 G/DL (ref 3.5–5.2)
ALBUMIN/GLOB SERPL: 0.8 G/DL
ALP SERPL-CCNC: 69 U/L (ref 39–117)
ALT SERPL W P-5'-P-CCNC: 12 U/L (ref 1–41)
ANION GAP SERPL CALCULATED.3IONS-SCNC: 10 MMOL/L (ref 5–15)
ANION GAP SERPL CALCULATED.3IONS-SCNC: 12 MMOL/L (ref 5–15)
AST SERPL-CCNC: 24 U/L (ref 1–40)
BACTERIA SPEC RESP CULT: NORMAL
BILIRUB SERPL-MCNC: 0.2 MG/DL (ref 0.2–1.2)
BUN BLD-MCNC: 3 MG/DL (ref 8–23)
BUN BLD-MCNC: 3 MG/DL (ref 8–23)
BUN/CREAT SERPL: 6 (ref 7–25)
BUN/CREAT SERPL: 6.4 (ref 7–25)
CA-I BLD-MCNC: 4.29 MG/DL (ref 4.6–5.6)
CALCIUM SPEC-SCNC: 8.3 MG/DL (ref 8.6–10.5)
CALCIUM SPEC-SCNC: 8.4 MG/DL (ref 8.6–10.5)
CHLORIDE SERPL-SCNC: 94 MMOL/L (ref 98–107)
CHLORIDE SERPL-SCNC: 95 MMOL/L (ref 98–107)
CHOLEST SERPL-MCNC: 155 MG/DL (ref 0–200)
CO2 SERPL-SCNC: 24 MMOL/L (ref 22–29)
CO2 SERPL-SCNC: 27 MMOL/L (ref 22–29)
CREAT BLD-MCNC: 0.47 MG/DL (ref 0.76–1.27)
CREAT BLD-MCNC: 0.5 MG/DL (ref 0.76–1.27)
CRP SERPL-MCNC: 5.29 MG/DL (ref 0–0.5)
GFR SERPL CREATININE-BSD FRML MDRD: >150 ML/MIN/1.73
GFR SERPL CREATININE-BSD FRML MDRD: >150 ML/MIN/1.73
GLOBULIN UR ELPH-MCNC: 3.6 GM/DL
GLUCOSE BLD-MCNC: 123 MG/DL (ref 65–99)
GLUCOSE BLD-MCNC: 125 MG/DL (ref 65–99)
GRAM STN SPEC: NORMAL
MAGNESIUM SERPL-MCNC: 1.4 MG/DL (ref 1.6–2.4)
PHOSPHATE SERPL-MCNC: 1.8 MG/DL (ref 2.5–4.5)
POTASSIUM BLD-SCNC: 3.9 MMOL/L (ref 3.5–5.2)
POTASSIUM BLD-SCNC: 4 MMOL/L (ref 3.5–5.2)
PREALB SERPL-MCNC: 8.1 MG/DL (ref 20–40)
PROT SERPL-MCNC: 6.6 G/DL (ref 6–8.5)
SODIUM BLD-SCNC: 130 MMOL/L (ref 136–145)
SODIUM BLD-SCNC: 132 MMOL/L (ref 136–145)
TRIGL SERPL-MCNC: 106 MG/DL (ref 0–150)

## 2019-07-21 PROCEDURE — 80053 COMPREHEN METABOLIC PANEL: CPT | Performed by: INTERNAL MEDICINE

## 2019-07-21 PROCEDURE — 25010000002 HYDROMORPHONE 1 MG/ML SOLUTION: Performed by: SURGERY

## 2019-07-21 PROCEDURE — 94799 UNLISTED PULMONARY SVC/PX: CPT

## 2019-07-21 PROCEDURE — 84478 ASSAY OF TRIGLYCERIDES: CPT | Performed by: INTERNAL MEDICINE

## 2019-07-21 PROCEDURE — 97110 THERAPEUTIC EXERCISES: CPT

## 2019-07-21 PROCEDURE — 25010000002 ENOXAPARIN PER 10 MG: Performed by: SURGERY

## 2019-07-21 PROCEDURE — 82330 ASSAY OF CALCIUM: CPT | Performed by: INTERNAL MEDICINE

## 2019-07-21 PROCEDURE — 84134 ASSAY OF PREALBUMIN: CPT | Performed by: INTERNAL MEDICINE

## 2019-07-21 PROCEDURE — 99024 POSTOP FOLLOW-UP VISIT: CPT | Performed by: SURGERY

## 2019-07-21 PROCEDURE — 94760 N-INVAS EAR/PLS OXIMETRY 1: CPT

## 2019-07-21 PROCEDURE — 86140 C-REACTIVE PROTEIN: CPT | Performed by: INTERNAL MEDICINE

## 2019-07-21 PROCEDURE — 97535 SELF CARE MNGMENT TRAINING: CPT

## 2019-07-21 PROCEDURE — 82465 ASSAY BLD/SERUM CHOLESTEROL: CPT | Performed by: INTERNAL MEDICINE

## 2019-07-21 PROCEDURE — 97530 THERAPEUTIC ACTIVITIES: CPT

## 2019-07-21 PROCEDURE — 74018 RADEX ABDOMEN 1 VIEW: CPT

## 2019-07-21 PROCEDURE — 83735 ASSAY OF MAGNESIUM: CPT | Performed by: INTERNAL MEDICINE

## 2019-07-21 PROCEDURE — 84100 ASSAY OF PHOSPHORUS: CPT | Performed by: INTERNAL MEDICINE

## 2019-07-21 RX ORDER — LEVOTHYROXINE SODIUM ANHYDROUS 100 UG/5ML
125 INJECTION, POWDER, LYOPHILIZED, FOR SOLUTION INTRAVENOUS
Status: DISCONTINUED | OUTPATIENT
Start: 2019-07-21 | End: 2019-08-09 | Stop reason: HOSPADM

## 2019-07-21 RX ORDER — BISACODYL 10 MG
10 SUPPOSITORY, RECTAL RECTAL DAILY
Status: DISCONTINUED | OUTPATIENT
Start: 2019-07-21 | End: 2019-08-09 | Stop reason: HOSPADM

## 2019-07-21 RX ADMIN — HYDROMORPHONE HYDROCHLORIDE 1 MG: 1 INJECTION, SOLUTION INTRAMUSCULAR; INTRAVENOUS; SUBCUTANEOUS at 18:14

## 2019-07-21 RX ADMIN — SODIUM CHLORIDE, PRESERVATIVE FREE 10 ML: 5 INJECTION INTRAVENOUS at 08:47

## 2019-07-21 RX ADMIN — PANTOPRAZOLE SODIUM 40 MG: 40 INJECTION, POWDER, FOR SOLUTION INTRAVENOUS at 08:53

## 2019-07-21 RX ADMIN — HYDROMORPHONE HYDROCHLORIDE 1 MG: 1 INJECTION, SOLUTION INTRAMUSCULAR; INTRAVENOUS; SUBCUTANEOUS at 08:47

## 2019-07-21 RX ADMIN — ALBUTEROL SULFATE 2 PUFF: 90 AEROSOL, METERED RESPIRATORY (INHALATION) at 20:11

## 2019-07-21 RX ADMIN — POTASSIUM CHLORIDE, DEXTROSE MONOHYDRATE AND SODIUM CHLORIDE 100 ML/HR: 150; 5; 450 INJECTION, SOLUTION INTRAVENOUS at 10:57

## 2019-07-21 RX ADMIN — BISACODYL 10 MG: 10 SUPPOSITORY RECTAL at 11:00

## 2019-07-21 RX ADMIN — HYDROMORPHONE HYDROCHLORIDE 1 MG: 1 INJECTION, SOLUTION INTRAMUSCULAR; INTRAVENOUS; SUBCUTANEOUS at 03:45

## 2019-07-21 RX ADMIN — ALBUTEROL SULFATE 2 PUFF: 90 AEROSOL, METERED RESPIRATORY (INHALATION) at 15:06

## 2019-07-21 RX ADMIN — LEVOTHYROXINE SODIUM ANHYDROUS 125 MCG: 100 INJECTION, POWDER, LYOPHILIZED, FOR SOLUTION INTRAVENOUS at 06:01

## 2019-07-21 RX ADMIN — ALBUTEROL SULFATE 2 PUFF: 90 AEROSOL, METERED RESPIRATORY (INHALATION) at 07:05

## 2019-07-21 RX ADMIN — HYDROMORPHONE HYDROCHLORIDE 1 MG: 1 INJECTION, SOLUTION INTRAMUSCULAR; INTRAVENOUS; SUBCUTANEOUS at 21:15

## 2019-07-21 RX ADMIN — POTASSIUM CHLORIDE, DEXTROSE MONOHYDRATE AND SODIUM CHLORIDE 100 ML/HR: 150; 5; 450 INJECTION, SOLUTION INTRAVENOUS at 20:48

## 2019-07-21 RX ADMIN — ENOXAPARIN SODIUM 40 MG: 40 INJECTION SUBCUTANEOUS at 08:53

## 2019-07-21 RX ADMIN — BACITRACIN: 500 OINTMENT TOPICAL at 16:00

## 2019-07-21 RX ADMIN — POTASSIUM PHOSPHATE, MONOBASIC AND POTASSIUM PHOSPHATE, DIBASIC 30 MMOL: 224; 236 INJECTION, SOLUTION, CONCENTRATE INTRAVENOUS at 10:56

## 2019-07-22 LAB
ALBUMIN SERPL-MCNC: 3 G/DL (ref 3.5–5.2)
ALBUMIN/GLOB SERPL: 0.9 G/DL
ALP SERPL-CCNC: 72 U/L (ref 39–117)
ALT SERPL W P-5'-P-CCNC: 12 U/L (ref 1–41)
ANION GAP SERPL CALCULATED.3IONS-SCNC: 10 MMOL/L (ref 5–15)
AST SERPL-CCNC: 21 U/L (ref 1–40)
BILIRUB SERPL-MCNC: 0.2 MG/DL (ref 0.2–1.2)
BUN BLD-MCNC: 3 MG/DL (ref 8–23)
BUN/CREAT SERPL: 6.4 (ref 7–25)
CA-I BLD-MCNC: 4.23 MG/DL (ref 4.6–5.6)
CA-I BLD-MCNC: 4.45 MG/DL (ref 4.6–5.6)
CALCIUM SPEC-SCNC: 8.3 MG/DL (ref 8.6–10.5)
CHLORIDE SERPL-SCNC: 96 MMOL/L (ref 98–107)
CO2 SERPL-SCNC: 26 MMOL/L (ref 22–29)
CREAT BLD-MCNC: 0.47 MG/DL (ref 0.76–1.27)
CRP SERPL-MCNC: 3.59 MG/DL (ref 0–0.5)
GFR SERPL CREATININE-BSD FRML MDRD: >150 ML/MIN/1.73
GLOBULIN UR ELPH-MCNC: 3.4 GM/DL
GLUCOSE BLD-MCNC: 139 MG/DL (ref 65–99)
GLUCOSE BLDC GLUCOMTR-MCNC: 123 MG/DL (ref 70–130)
GLUCOSE BLDC GLUCOMTR-MCNC: 128 MG/DL (ref 70–130)
GLUCOSE BLDC GLUCOMTR-MCNC: 154 MG/DL (ref 70–130)
MAGNESIUM SERPL-MCNC: 1.5 MG/DL (ref 1.6–2.4)
POTASSIUM BLD-SCNC: 3.9 MMOL/L (ref 3.5–5.2)
PREALB SERPL-MCNC: 10 MG/DL (ref 20–40)
PROT SERPL-MCNC: 6.4 G/DL (ref 6–8.5)
SODIUM BLD-SCNC: 132 MMOL/L (ref 136–145)

## 2019-07-22 PROCEDURE — 99024 POSTOP FOLLOW-UP VISIT: CPT | Performed by: SURGERY

## 2019-07-22 PROCEDURE — 25010000002 CALCIUM GLUCONATE PER 10 ML: Performed by: INTERNAL MEDICINE

## 2019-07-22 PROCEDURE — 25010000002 HYDROMORPHONE 1 MG/ML SOLUTION: Performed by: SURGERY

## 2019-07-22 PROCEDURE — 86140 C-REACTIVE PROTEIN: CPT | Performed by: INTERNAL MEDICINE

## 2019-07-22 PROCEDURE — 97530 THERAPEUTIC ACTIVITIES: CPT

## 2019-07-22 PROCEDURE — 83735 ASSAY OF MAGNESIUM: CPT | Performed by: INTERNAL MEDICINE

## 2019-07-22 PROCEDURE — 94799 UNLISTED PULMONARY SVC/PX: CPT

## 2019-07-22 PROCEDURE — 84134 ASSAY OF PREALBUMIN: CPT | Performed by: INTERNAL MEDICINE

## 2019-07-22 PROCEDURE — 80053 COMPREHEN METABOLIC PANEL: CPT | Performed by: INTERNAL MEDICINE

## 2019-07-22 PROCEDURE — 97110 THERAPEUTIC EXERCISES: CPT

## 2019-07-22 PROCEDURE — 25010000002 MAGNESIUM SULFATE PER 500 MG OF MAGNESIUM: Performed by: INTERNAL MEDICINE

## 2019-07-22 PROCEDURE — 82962 GLUCOSE BLOOD TEST: CPT

## 2019-07-22 PROCEDURE — 25010000002 ENOXAPARIN PER 10 MG: Performed by: SURGERY

## 2019-07-22 PROCEDURE — 94760 N-INVAS EAR/PLS OXIMETRY 1: CPT

## 2019-07-22 PROCEDURE — 82330 ASSAY OF CALCIUM: CPT | Performed by: INTERNAL MEDICINE

## 2019-07-22 RX ADMIN — HYDROMORPHONE HYDROCHLORIDE 1 MG: 1 INJECTION, SOLUTION INTRAMUSCULAR; INTRAVENOUS; SUBCUTANEOUS at 16:08

## 2019-07-22 RX ADMIN — HYDROMORPHONE HYDROCHLORIDE 1 MG: 1 INJECTION, SOLUTION INTRAMUSCULAR; INTRAVENOUS; SUBCUTANEOUS at 21:51

## 2019-07-22 RX ADMIN — POTASSIUM CHLORIDE, DEXTROSE MONOHYDRATE AND SODIUM CHLORIDE 100 ML/HR: 150; 5; 450 INJECTION, SOLUTION INTRAVENOUS at 05:52

## 2019-07-22 RX ADMIN — CALCIUM GLUCONATE: 94 INJECTION, SOLUTION INTRAVENOUS at 18:21

## 2019-07-22 RX ADMIN — ALBUTEROL SULFATE 2 PUFF: 90 AEROSOL, METERED RESPIRATORY (INHALATION) at 15:30

## 2019-07-22 RX ADMIN — HYDROMORPHONE HYDROCHLORIDE 1 MG: 1 INJECTION, SOLUTION INTRAMUSCULAR; INTRAVENOUS; SUBCUTANEOUS at 08:49

## 2019-07-22 RX ADMIN — LEVOTHYROXINE SODIUM ANHYDROUS 125 MCG: 100 INJECTION, POWDER, LYOPHILIZED, FOR SOLUTION INTRAVENOUS at 05:47

## 2019-07-22 RX ADMIN — I.V. FAT EMULSION 30.8 G: 20 EMULSION INTRAVENOUS at 18:20

## 2019-07-22 RX ADMIN — ALBUTEROL SULFATE 2 PUFF: 90 AEROSOL, METERED RESPIRATORY (INHALATION) at 09:09

## 2019-07-22 RX ADMIN — BACITRACIN: 500 OINTMENT TOPICAL at 08:24

## 2019-07-22 RX ADMIN — ENOXAPARIN SODIUM 40 MG: 40 INJECTION SUBCUTANEOUS at 08:20

## 2019-07-22 RX ADMIN — SCOPALAMINE 1 PATCH: 1 PATCH, EXTENDED RELEASE TRANSDERMAL at 02:40

## 2019-07-22 RX ADMIN — PANTOPRAZOLE SODIUM 40 MG: 40 INJECTION, POWDER, FOR SOLUTION INTRAVENOUS at 08:19

## 2019-07-22 RX ADMIN — HYDROMORPHONE HYDROCHLORIDE 1 MG: 1 INJECTION, SOLUTION INTRAMUSCULAR; INTRAVENOUS; SUBCUTANEOUS at 05:48

## 2019-07-22 RX ADMIN — ALBUTEROL SULFATE 2 PUFF: 90 AEROSOL, METERED RESPIRATORY (INHALATION) at 20:35

## 2019-07-23 LAB
ALBUMIN SERPL-MCNC: 3.4 G/DL (ref 3.5–5.2)
ALBUMIN/GLOB SERPL: 0.9 G/DL
ALP SERPL-CCNC: 78 U/L (ref 39–117)
ALT SERPL W P-5'-P-CCNC: 12 U/L (ref 1–41)
ANION GAP SERPL CALCULATED.3IONS-SCNC: 13 MMOL/L (ref 5–15)
AST SERPL-CCNC: 20 U/L (ref 1–40)
BILIRUB SERPL-MCNC: 0.2 MG/DL (ref 0.2–1.2)
BUN BLD-MCNC: 7 MG/DL (ref 8–23)
BUN/CREAT SERPL: 12.7 (ref 7–25)
CA-I BLD-MCNC: 4.46 MG/DL (ref 4.6–5.6)
CALCIUM SPEC-SCNC: 9.4 MG/DL (ref 8.6–10.5)
CHLORIDE SERPL-SCNC: 96 MMOL/L (ref 98–107)
CO2 SERPL-SCNC: 24 MMOL/L (ref 22–29)
CREAT BLD-MCNC: 0.55 MG/DL (ref 0.76–1.27)
GFR SERPL CREATININE-BSD FRML MDRD: >150 ML/MIN/1.73
GLOBULIN UR ELPH-MCNC: 4 GM/DL
GLUCOSE BLD-MCNC: 160 MG/DL (ref 65–99)
GLUCOSE BLDC GLUCOMTR-MCNC: 121 MG/DL (ref 70–130)
GLUCOSE BLDC GLUCOMTR-MCNC: 132 MG/DL (ref 70–130)
GLUCOSE BLDC GLUCOMTR-MCNC: 173 MG/DL (ref 70–130)
GLUCOSE BLDC GLUCOMTR-MCNC: 174 MG/DL (ref 70–130)
MAGNESIUM SERPL-MCNC: 1.7 MG/DL (ref 1.6–2.4)
PHOSPHATE SERPL-MCNC: 3.2 MG/DL (ref 2.5–4.5)
POTASSIUM BLD-SCNC: 4.1 MMOL/L (ref 3.5–5.2)
PROT SERPL-MCNC: 7.4 G/DL (ref 6–8.5)
SODIUM BLD-SCNC: 133 MMOL/L (ref 136–145)

## 2019-07-23 PROCEDURE — 63710000001 INSULIN ASPART PER 5 UNITS: Performed by: INTERNAL MEDICINE

## 2019-07-23 PROCEDURE — 97110 THERAPEUTIC EXERCISES: CPT

## 2019-07-23 PROCEDURE — 82330 ASSAY OF CALCIUM: CPT | Performed by: INTERNAL MEDICINE

## 2019-07-23 PROCEDURE — 94799 UNLISTED PULMONARY SVC/PX: CPT

## 2019-07-23 PROCEDURE — 25010000002 CALCIUM GLUCONATE PER 10 ML: Performed by: INTERNAL MEDICINE

## 2019-07-23 PROCEDURE — 99024 POSTOP FOLLOW-UP VISIT: CPT | Performed by: SURGERY

## 2019-07-23 PROCEDURE — 84100 ASSAY OF PHOSPHORUS: CPT | Performed by: INTERNAL MEDICINE

## 2019-07-23 PROCEDURE — 80053 COMPREHEN METABOLIC PANEL: CPT | Performed by: INTERNAL MEDICINE

## 2019-07-23 PROCEDURE — 25010000002 ONDANSETRON PER 1 MG: Performed by: SURGERY

## 2019-07-23 PROCEDURE — 82962 GLUCOSE BLOOD TEST: CPT

## 2019-07-23 PROCEDURE — 94760 N-INVAS EAR/PLS OXIMETRY 1: CPT

## 2019-07-23 PROCEDURE — 25010000002 ENOXAPARIN PER 10 MG: Performed by: SURGERY

## 2019-07-23 PROCEDURE — 25010000002 MAGNESIUM SULFATE PER 500 MG OF MAGNESIUM: Performed by: INTERNAL MEDICINE

## 2019-07-23 PROCEDURE — 83735 ASSAY OF MAGNESIUM: CPT | Performed by: INTERNAL MEDICINE

## 2019-07-23 RX ADMIN — I.V. FAT EMULSION 30.8 G: 20 EMULSION INTRAVENOUS at 20:03

## 2019-07-23 RX ADMIN — SODIUM CHLORIDE 500 ML: 9 INJECTION, SOLUTION INTRAVENOUS at 21:42

## 2019-07-23 RX ADMIN — ALBUTEROL SULFATE 2 PUFF: 90 AEROSOL, METERED RESPIRATORY (INHALATION) at 01:12

## 2019-07-23 RX ADMIN — PANTOPRAZOLE SODIUM 40 MG: 40 INJECTION, POWDER, FOR SOLUTION INTRAVENOUS at 09:21

## 2019-07-23 RX ADMIN — INSULIN ASPART 2 UNITS: 100 INJECTION, SOLUTION INTRAVENOUS; SUBCUTANEOUS at 09:21

## 2019-07-23 RX ADMIN — ONDANSETRON 4 MG: 2 INJECTION INTRAMUSCULAR; INTRAVENOUS at 19:31

## 2019-07-23 RX ADMIN — LEVOTHYROXINE SODIUM ANHYDROUS 125 MCG: 100 INJECTION, POWDER, LYOPHILIZED, FOR SOLUTION INTRAVENOUS at 05:18

## 2019-07-23 RX ADMIN — ALBUTEROL SULFATE 2 PUFF: 90 AEROSOL, METERED RESPIRATORY (INHALATION) at 21:03

## 2019-07-23 RX ADMIN — ENOXAPARIN SODIUM 40 MG: 40 INJECTION SUBCUTANEOUS at 09:20

## 2019-07-23 RX ADMIN — ALBUTEROL SULFATE 2 PUFF: 90 AEROSOL, METERED RESPIRATORY (INHALATION) at 12:54

## 2019-07-23 RX ADMIN — ONDANSETRON 4 MG: 2 INJECTION INTRAMUSCULAR; INTRAVENOUS at 12:46

## 2019-07-23 RX ADMIN — CALCIUM GLUCONATE: 94 INJECTION, SOLUTION INTRAVENOUS at 20:03

## 2019-07-23 RX ADMIN — ALBUTEROL SULFATE 2 PUFF: 90 AEROSOL, METERED RESPIRATORY (INHALATION) at 07:30

## 2019-07-24 LAB
BASOPHILS # BLD AUTO: 0.02 10*3/MM3 (ref 0–0.2)
BASOPHILS NFR BLD AUTO: 0.2 % (ref 0–1.5)
DEPRECATED RDW RBC AUTO: 46.1 FL (ref 37–54)
EOSINOPHIL # BLD AUTO: 0.16 10*3/MM3 (ref 0–0.4)
EOSINOPHIL NFR BLD AUTO: 1.9 % (ref 0.3–6.2)
ERYTHROCYTE [DISTWIDTH] IN BLOOD BY AUTOMATED COUNT: 18.1 % (ref 12.3–15.4)
GLUCOSE BLDC GLUCOMTR-MCNC: 126 MG/DL (ref 70–130)
GLUCOSE BLDC GLUCOMTR-MCNC: 129 MG/DL (ref 70–130)
GLUCOSE BLDC GLUCOMTR-MCNC: 141 MG/DL (ref 70–130)
GLUCOSE BLDC GLUCOMTR-MCNC: 142 MG/DL (ref 70–130)
HCT VFR BLD AUTO: 27.3 % (ref 37.5–51)
HGB BLD-MCNC: 9.6 G/DL (ref 13–17.7)
IMM GRANULOCYTES # BLD AUTO: 0.04 10*3/MM3 (ref 0–0.05)
IMM GRANULOCYTES NFR BLD AUTO: 0.5 % (ref 0–0.5)
LYMPHOCYTES # BLD AUTO: 1.74 10*3/MM3 (ref 0.7–3.1)
LYMPHOCYTES NFR BLD AUTO: 20.7 % (ref 19.6–45.3)
MAGNESIUM SERPL-MCNC: 1.8 MG/DL (ref 1.6–2.4)
MCH RBC QN AUTO: 25.4 PG (ref 26.6–33)
MCHC RBC AUTO-ENTMCNC: 35.2 G/DL (ref 31.5–35.7)
MCV RBC AUTO: 72.2 FL (ref 79–97)
MONOCYTES # BLD AUTO: 1.55 10*3/MM3 (ref 0.1–0.9)
MONOCYTES NFR BLD AUTO: 18.4 % (ref 5–12)
NEUTROPHILS # BLD AUTO: 4.9 10*3/MM3 (ref 1.7–7)
NEUTROPHILS NFR BLD AUTO: 58.3 % (ref 42.7–76)
NRBC BLD AUTO-RTO: 0.2 /100 WBC (ref 0–0.2)
PHOSPHATE SERPL-MCNC: 3.5 MG/DL (ref 2.5–4.5)
PLATELET # BLD AUTO: 304 10*3/MM3 (ref 140–450)
PMV BLD AUTO: 11.9 FL (ref 6–12)
RBC # BLD AUTO: 3.78 10*6/MM3 (ref 4.14–5.8)
WBC NRBC COR # BLD: 8.41 10*3/MM3 (ref 3.4–10.8)

## 2019-07-24 PROCEDURE — 82962 GLUCOSE BLOOD TEST: CPT

## 2019-07-24 PROCEDURE — 94799 UNLISTED PULMONARY SVC/PX: CPT

## 2019-07-24 PROCEDURE — 85025 COMPLETE CBC W/AUTO DIFF WBC: CPT | Performed by: INTERNAL MEDICINE

## 2019-07-24 PROCEDURE — 84100 ASSAY OF PHOSPHORUS: CPT | Performed by: INTERNAL MEDICINE

## 2019-07-24 PROCEDURE — 94760 N-INVAS EAR/PLS OXIMETRY 1: CPT

## 2019-07-24 PROCEDURE — 25010000002 MAGNESIUM SULFATE PER 500 MG OF MAGNESIUM: Performed by: INTERNAL MEDICINE

## 2019-07-24 PROCEDURE — 99024 POSTOP FOLLOW-UP VISIT: CPT | Performed by: SURGERY

## 2019-07-24 PROCEDURE — 97110 THERAPEUTIC EXERCISES: CPT

## 2019-07-24 PROCEDURE — 83735 ASSAY OF MAGNESIUM: CPT | Performed by: INTERNAL MEDICINE

## 2019-07-24 PROCEDURE — 25010000002 CALCIUM GLUCONATE PER 10 ML: Performed by: INTERNAL MEDICINE

## 2019-07-24 PROCEDURE — 97530 THERAPEUTIC ACTIVITIES: CPT

## 2019-07-24 PROCEDURE — 25010000002 HYDROMORPHONE 1 MG/ML SOLUTION: Performed by: SURGERY

## 2019-07-24 PROCEDURE — 25010000002 ENOXAPARIN PER 10 MG: Performed by: SURGERY

## 2019-07-24 PROCEDURE — 97535 SELF CARE MNGMENT TRAINING: CPT

## 2019-07-24 RX ADMIN — ENOXAPARIN SODIUM 40 MG: 40 INJECTION SUBCUTANEOUS at 10:36

## 2019-07-24 RX ADMIN — HYDROMORPHONE HYDROCHLORIDE 1 MG: 1 INJECTION, SOLUTION INTRAMUSCULAR; INTRAVENOUS; SUBCUTANEOUS at 10:37

## 2019-07-24 RX ADMIN — HYDROMORPHONE HYDROCHLORIDE 1 MG: 1 INJECTION, SOLUTION INTRAMUSCULAR; INTRAVENOUS; SUBCUTANEOUS at 01:59

## 2019-07-24 RX ADMIN — ALBUTEROL SULFATE 2 PUFF: 90 AEROSOL, METERED RESPIRATORY (INHALATION) at 12:49

## 2019-07-24 RX ADMIN — CALCIUM GLUCONATE: 94 INJECTION, SOLUTION INTRAVENOUS at 18:04

## 2019-07-24 RX ADMIN — BACITRACIN: 500 OINTMENT TOPICAL at 10:35

## 2019-07-24 RX ADMIN — I.V. FAT EMULSION 30.8 G: 20 EMULSION INTRAVENOUS at 18:05

## 2019-07-24 RX ADMIN — HYDROMORPHONE HYDROCHLORIDE 1 MG: 1 INJECTION, SOLUTION INTRAMUSCULAR; INTRAVENOUS; SUBCUTANEOUS at 21:08

## 2019-07-24 RX ADMIN — PANTOPRAZOLE SODIUM 40 MG: 40 INJECTION, POWDER, FOR SOLUTION INTRAVENOUS at 10:36

## 2019-07-24 RX ADMIN — LEVOTHYROXINE SODIUM ANHYDROUS 125 MCG: 100 INJECTION, POWDER, LYOPHILIZED, FOR SOLUTION INTRAVENOUS at 05:24

## 2019-07-24 RX ADMIN — ALBUTEROL SULFATE 2 PUFF: 90 AEROSOL, METERED RESPIRATORY (INHALATION) at 07:10

## 2019-07-24 RX ADMIN — ALBUTEROL SULFATE 2 PUFF: 90 AEROSOL, METERED RESPIRATORY (INHALATION) at 20:05

## 2019-07-24 RX ADMIN — ALBUTEROL SULFATE 2 PUFF: 90 AEROSOL, METERED RESPIRATORY (INHALATION) at 02:59

## 2019-07-25 ENCOUNTER — APPOINTMENT (OUTPATIENT)
Dept: GENERAL RADIOLOGY | Facility: HOSPITAL | Age: 59
End: 2019-07-25

## 2019-07-25 LAB
ALBUMIN SERPL-MCNC: 3.3 G/DL (ref 3.5–5.2)
ALBUMIN/GLOB SERPL: 0.8 G/DL
ALP SERPL-CCNC: 85 U/L (ref 39–117)
ALT SERPL W P-5'-P-CCNC: 12 U/L (ref 1–41)
ANION GAP SERPL CALCULATED.3IONS-SCNC: 11 MMOL/L (ref 5–15)
AST SERPL-CCNC: 21 U/L (ref 1–40)
BILIRUB SERPL-MCNC: 0.2 MG/DL (ref 0.2–1.2)
BUN BLD-MCNC: 15 MG/DL (ref 8–23)
BUN/CREAT SERPL: 25 (ref 7–25)
CALCIUM SPEC-SCNC: 9.1 MG/DL (ref 8.6–10.5)
CHLORIDE SERPL-SCNC: 96 MMOL/L (ref 98–107)
CO2 SERPL-SCNC: 26 MMOL/L (ref 22–29)
CREAT BLD-MCNC: 0.6 MG/DL (ref 0.76–1.27)
GFR SERPL CREATININE-BSD FRML MDRD: >150 ML/MIN/1.73
GLOBULIN UR ELPH-MCNC: 4 GM/DL
GLUCOSE BLD-MCNC: 173 MG/DL (ref 65–99)
GLUCOSE BLDC GLUCOMTR-MCNC: 132 MG/DL (ref 70–130)
GLUCOSE BLDC GLUCOMTR-MCNC: 136 MG/DL (ref 70–130)
GLUCOSE BLDC GLUCOMTR-MCNC: 151 MG/DL (ref 70–130)
POTASSIUM BLD-SCNC: 4.3 MMOL/L (ref 3.5–5.2)
PROT SERPL-MCNC: 7.3 G/DL (ref 6–8.5)
SODIUM BLD-SCNC: 133 MMOL/L (ref 136–145)

## 2019-07-25 PROCEDURE — 74022 RADEX COMPL AQT ABD SERIES: CPT

## 2019-07-25 PROCEDURE — 25010000002 CALCIUM GLUCONATE PER 10 ML: Performed by: INTERNAL MEDICINE

## 2019-07-25 PROCEDURE — 25010000002 ENOXAPARIN PER 10 MG: Performed by: SURGERY

## 2019-07-25 PROCEDURE — 94799 UNLISTED PULMONARY SVC/PX: CPT

## 2019-07-25 PROCEDURE — 25010000002 MAGNESIUM SULFATE PER 500 MG OF MAGNESIUM: Performed by: INTERNAL MEDICINE

## 2019-07-25 PROCEDURE — 94760 N-INVAS EAR/PLS OXIMETRY 1: CPT

## 2019-07-25 PROCEDURE — 99024 POSTOP FOLLOW-UP VISIT: CPT | Performed by: SURGERY

## 2019-07-25 PROCEDURE — 25010000002 HYDROMORPHONE 1 MG/ML SOLUTION: Performed by: SURGERY

## 2019-07-25 PROCEDURE — 25010000002 ONDANSETRON PER 1 MG: Performed by: SURGERY

## 2019-07-25 PROCEDURE — 82962 GLUCOSE BLOOD TEST: CPT

## 2019-07-25 PROCEDURE — 80053 COMPREHEN METABOLIC PANEL: CPT | Performed by: SURGERY

## 2019-07-25 PROCEDURE — 97530 THERAPEUTIC ACTIVITIES: CPT

## 2019-07-25 RX ADMIN — CALCIUM GLUCONATE: 94 INJECTION, SOLUTION INTRAVENOUS at 17:30

## 2019-07-25 RX ADMIN — ONDANSETRON 4 MG: 2 INJECTION INTRAMUSCULAR; INTRAVENOUS at 08:35

## 2019-07-25 RX ADMIN — SODIUM CHLORIDE 500 ML: 9 INJECTION, SOLUTION INTRAVENOUS at 16:25

## 2019-07-25 RX ADMIN — ALBUTEROL SULFATE 2 PUFF: 90 AEROSOL, METERED RESPIRATORY (INHALATION) at 07:48

## 2019-07-25 RX ADMIN — ALBUTEROL SULFATE 2 PUFF: 90 AEROSOL, METERED RESPIRATORY (INHALATION) at 02:22

## 2019-07-25 RX ADMIN — LEVOTHYROXINE SODIUM ANHYDROUS 125 MCG: 100 INJECTION, POWDER, LYOPHILIZED, FOR SOLUTION INTRAVENOUS at 05:32

## 2019-07-25 RX ADMIN — SCOPALAMINE 1 PATCH: 1 PATCH, EXTENDED RELEASE TRANSDERMAL at 04:10

## 2019-07-25 RX ADMIN — ALBUTEROL SULFATE 2 PUFF: 90 AEROSOL, METERED RESPIRATORY (INHALATION) at 20:58

## 2019-07-25 RX ADMIN — HYDROMORPHONE HYDROCHLORIDE 1 MG: 1 INJECTION, SOLUTION INTRAMUSCULAR; INTRAVENOUS; SUBCUTANEOUS at 00:50

## 2019-07-25 RX ADMIN — PANTOPRAZOLE SODIUM 40 MG: 40 INJECTION, POWDER, FOR SOLUTION INTRAVENOUS at 08:35

## 2019-07-25 RX ADMIN — BACITRACIN: 500 OINTMENT TOPICAL at 08:35

## 2019-07-25 RX ADMIN — ENOXAPARIN SODIUM 40 MG: 40 INJECTION SUBCUTANEOUS at 08:35

## 2019-07-25 RX ADMIN — I.V. FAT EMULSION 30.8 G: 20 EMULSION INTRAVENOUS at 17:30

## 2019-07-26 ENCOUNTER — APPOINTMENT (OUTPATIENT)
Dept: GENERAL RADIOLOGY | Facility: HOSPITAL | Age: 59
End: 2019-07-26

## 2019-07-26 LAB
BASOPHILS # BLD AUTO: 0.02 10*3/MM3 (ref 0–0.2)
BASOPHILS NFR BLD AUTO: 0.3 % (ref 0–1.5)
DEPRECATED RDW RBC AUTO: 46 FL (ref 37–54)
EOSINOPHIL # BLD AUTO: 0.22 10*3/MM3 (ref 0–0.4)
EOSINOPHIL NFR BLD AUTO: 2.9 % (ref 0.3–6.2)
ERYTHROCYTE [DISTWIDTH] IN BLOOD BY AUTOMATED COUNT: 18.2 % (ref 12.3–15.4)
GLUCOSE BLDC GLUCOMTR-MCNC: 115 MG/DL (ref 70–130)
GLUCOSE BLDC GLUCOMTR-MCNC: 115 MG/DL (ref 70–130)
GLUCOSE BLDC GLUCOMTR-MCNC: 124 MG/DL (ref 70–130)
GLUCOSE BLDC GLUCOMTR-MCNC: 137 MG/DL (ref 70–130)
HCT VFR BLD AUTO: 24.1 % (ref 37.5–51)
HGB BLD-MCNC: 8.5 G/DL (ref 13–17.7)
IMM GRANULOCYTES # BLD AUTO: 0.04 10*3/MM3 (ref 0–0.05)
IMM GRANULOCYTES NFR BLD AUTO: 0.5 % (ref 0–0.5)
LYMPHOCYTES # BLD AUTO: 1.43 10*3/MM3 (ref 0.7–3.1)
LYMPHOCYTES NFR BLD AUTO: 18.7 % (ref 19.6–45.3)
MAGNESIUM SERPL-MCNC: 1.8 MG/DL (ref 1.6–2.4)
MCH RBC QN AUTO: 25.6 PG (ref 26.6–33)
MCHC RBC AUTO-ENTMCNC: 35.3 G/DL (ref 31.5–35.7)
MCV RBC AUTO: 72.6 FL (ref 79–97)
MONOCYTES # BLD AUTO: 1.42 10*3/MM3 (ref 0.1–0.9)
MONOCYTES NFR BLD AUTO: 18.5 % (ref 5–12)
NEUTROPHILS # BLD AUTO: 4.53 10*3/MM3 (ref 1.7–7)
NEUTROPHILS NFR BLD AUTO: 59.1 % (ref 42.7–76)
NRBC BLD AUTO-RTO: 0 /100 WBC (ref 0–0.2)
PHOSPHATE SERPL-MCNC: 3.2 MG/DL (ref 2.5–4.5)
PLATELET # BLD AUTO: 324 10*3/MM3 (ref 140–450)
PMV BLD AUTO: 11.9 FL (ref 6–12)
RBC # BLD AUTO: 3.32 10*6/MM3 (ref 4.14–5.8)
WBC NRBC COR # BLD: 7.66 10*3/MM3 (ref 3.4–10.8)

## 2019-07-26 PROCEDURE — 94799 UNLISTED PULMONARY SVC/PX: CPT

## 2019-07-26 PROCEDURE — 25010000002 MAGNESIUM SULFATE PER 500 MG OF MAGNESIUM: Performed by: SURGERY

## 2019-07-26 PROCEDURE — 85025 COMPLETE CBC W/AUTO DIFF WBC: CPT | Performed by: FAMILY MEDICINE

## 2019-07-26 PROCEDURE — 82962 GLUCOSE BLOOD TEST: CPT

## 2019-07-26 PROCEDURE — 99024 POSTOP FOLLOW-UP VISIT: CPT | Performed by: SURGERY

## 2019-07-26 PROCEDURE — 25010000002 CALCIUM GLUCONATE PER 10 ML: Performed by: SURGERY

## 2019-07-26 PROCEDURE — 94760 N-INVAS EAR/PLS OXIMETRY 1: CPT

## 2019-07-26 PROCEDURE — 83735 ASSAY OF MAGNESIUM: CPT | Performed by: INTERNAL MEDICINE

## 2019-07-26 PROCEDURE — 74022 RADEX COMPL AQT ABD SERIES: CPT

## 2019-07-26 PROCEDURE — 25010000002 ENOXAPARIN PER 10 MG: Performed by: SURGERY

## 2019-07-26 PROCEDURE — 97110 THERAPEUTIC EXERCISES: CPT

## 2019-07-26 PROCEDURE — 25010000002 POTASSIUM CHLORIDE PER 2 MEQ OF POTASSIUM: Performed by: SURGERY

## 2019-07-26 PROCEDURE — 84100 ASSAY OF PHOSPHORUS: CPT | Performed by: INTERNAL MEDICINE

## 2019-07-26 RX ADMIN — PANTOPRAZOLE SODIUM 40 MG: 40 INJECTION, POWDER, FOR SOLUTION INTRAVENOUS at 08:47

## 2019-07-26 RX ADMIN — ALBUTEROL SULFATE 2 PUFF: 90 AEROSOL, METERED RESPIRATORY (INHALATION) at 07:37

## 2019-07-26 RX ADMIN — I.V. FAT EMULSION 30.8 G: 20 EMULSION INTRAVENOUS at 17:41

## 2019-07-26 RX ADMIN — ALBUTEROL SULFATE 2 PUFF: 90 AEROSOL, METERED RESPIRATORY (INHALATION) at 12:02

## 2019-07-26 RX ADMIN — ENOXAPARIN SODIUM 40 MG: 40 INJECTION SUBCUTANEOUS at 08:47

## 2019-07-26 RX ADMIN — BACITRACIN: 500 OINTMENT TOPICAL at 08:46

## 2019-07-26 RX ADMIN — ALBUTEROL SULFATE 2 PUFF: 90 AEROSOL, METERED RESPIRATORY (INHALATION) at 19:22

## 2019-07-26 RX ADMIN — LEVOTHYROXINE SODIUM ANHYDROUS 125 MCG: 100 INJECTION, POWDER, LYOPHILIZED, FOR SOLUTION INTRAVENOUS at 06:39

## 2019-07-26 RX ADMIN — POTASSIUM CHLORIDE: 2 INJECTION, SOLUTION, CONCENTRATE INTRAVENOUS at 17:41

## 2019-07-27 LAB
ALBUMIN SERPL-MCNC: 3.5 G/DL (ref 3.5–5.2)
ALBUMIN/GLOB SERPL: 0.9 G/DL
ALP SERPL-CCNC: 102 U/L (ref 39–117)
ALT SERPL W P-5'-P-CCNC: 17 U/L (ref 1–41)
ANION GAP SERPL CALCULATED.3IONS-SCNC: 14 MMOL/L (ref 5–15)
AST SERPL-CCNC: 21 U/L (ref 1–40)
BASOPHILS # BLD AUTO: 0.03 10*3/MM3 (ref 0–0.2)
BASOPHILS NFR BLD AUTO: 0.4 % (ref 0–1.5)
BILIRUB SERPL-MCNC: 0.2 MG/DL (ref 0.2–1.2)
BUN BLD-MCNC: 11 MG/DL (ref 8–23)
BUN/CREAT SERPL: 21.2 (ref 7–25)
CALCIUM SPEC-SCNC: 9.4 MG/DL (ref 8.6–10.5)
CHLORIDE SERPL-SCNC: 87 MMOL/L (ref 98–107)
CO2 SERPL-SCNC: 24 MMOL/L (ref 22–29)
CREAT BLD-MCNC: 0.52 MG/DL (ref 0.76–1.27)
DEPRECATED RDW RBC AUTO: 44.8 FL (ref 37–54)
EOSINOPHIL # BLD AUTO: 0.23 10*3/MM3 (ref 0–0.4)
EOSINOPHIL NFR BLD AUTO: 3.3 % (ref 0.3–6.2)
ERYTHROCYTE [DISTWIDTH] IN BLOOD BY AUTOMATED COUNT: 17.7 % (ref 12.3–15.4)
GFR SERPL CREATININE-BSD FRML MDRD: >150 ML/MIN/1.73
GLOBULIN UR ELPH-MCNC: 4.1 GM/DL
GLUCOSE BLD-MCNC: 136 MG/DL (ref 65–99)
GLUCOSE BLDC GLUCOMTR-MCNC: 132 MG/DL (ref 70–130)
GLUCOSE BLDC GLUCOMTR-MCNC: 136 MG/DL (ref 70–130)
GLUCOSE BLDC GLUCOMTR-MCNC: 152 MG/DL (ref 70–130)
GLUCOSE BLDC GLUCOMTR-MCNC: 164 MG/DL (ref 70–130)
HCT VFR BLD AUTO: 25.8 % (ref 37.5–51)
HEMOCCULT STL QL: NEGATIVE
HGB BLD-MCNC: 9.3 G/DL (ref 13–17.7)
IMM GRANULOCYTES # BLD AUTO: 0.02 10*3/MM3 (ref 0–0.05)
IMM GRANULOCYTES NFR BLD AUTO: 0.3 % (ref 0–0.5)
LYMPHOCYTES # BLD AUTO: 1.62 10*3/MM3 (ref 0.7–3.1)
LYMPHOCYTES NFR BLD AUTO: 22.9 % (ref 19.6–45.3)
MCH RBC QN AUTO: 25.5 PG (ref 26.6–33)
MCHC RBC AUTO-ENTMCNC: 36 G/DL (ref 31.5–35.7)
MCV RBC AUTO: 70.7 FL (ref 79–97)
MONOCYTES # BLD AUTO: 1.26 10*3/MM3 (ref 0.1–0.9)
MONOCYTES NFR BLD AUTO: 17.8 % (ref 5–12)
NEUTROPHILS # BLD AUTO: 3.91 10*3/MM3 (ref 1.7–7)
NEUTROPHILS NFR BLD AUTO: 55.3 % (ref 42.7–76)
NRBC BLD AUTO-RTO: 0 /100 WBC (ref 0–0.2)
PLATELET # BLD AUTO: 284 10*3/MM3 (ref 140–450)
PMV BLD AUTO: 11.6 FL (ref 6–12)
POTASSIUM BLD-SCNC: 4.4 MMOL/L (ref 3.5–5.2)
PROT SERPL-MCNC: 7.6 G/DL (ref 6–8.5)
RBC # BLD AUTO: 3.65 10*6/MM3 (ref 4.14–5.8)
SODIUM BLD-SCNC: 125 MMOL/L (ref 136–145)
SODIUM BLD-SCNC: 125 MMOL/L (ref 136–145)
SODIUM UR-SCNC: 175 MMOL/L
WBC NRBC COR # BLD: 7.07 10*3/MM3 (ref 3.4–10.8)

## 2019-07-27 PROCEDURE — 97530 THERAPEUTIC ACTIVITIES: CPT

## 2019-07-27 PROCEDURE — 82272 OCCULT BLD FECES 1-3 TESTS: CPT | Performed by: FAMILY MEDICINE

## 2019-07-27 PROCEDURE — 82962 GLUCOSE BLOOD TEST: CPT

## 2019-07-27 PROCEDURE — 25010000002 MAGNESIUM SULFATE PER 500 MG OF MAGNESIUM: Performed by: SURGERY

## 2019-07-27 PROCEDURE — 97110 THERAPEUTIC EXERCISES: CPT

## 2019-07-27 PROCEDURE — 25010000002 POTASSIUM CHLORIDE PER 2 MEQ OF POTASSIUM: Performed by: SURGERY

## 2019-07-27 PROCEDURE — 85025 COMPLETE CBC W/AUTO DIFF WBC: CPT | Performed by: FAMILY MEDICINE

## 2019-07-27 PROCEDURE — 84300 ASSAY OF URINE SODIUM: CPT | Performed by: FAMILY MEDICINE

## 2019-07-27 PROCEDURE — 97535 SELF CARE MNGMENT TRAINING: CPT

## 2019-07-27 PROCEDURE — 80053 COMPREHEN METABOLIC PANEL: CPT | Performed by: SURGERY

## 2019-07-27 PROCEDURE — 25010000002 ENOXAPARIN PER 10 MG: Performed by: SURGERY

## 2019-07-27 PROCEDURE — 84295 ASSAY OF SERUM SODIUM: CPT | Performed by: FAMILY MEDICINE

## 2019-07-27 PROCEDURE — 94799 UNLISTED PULMONARY SVC/PX: CPT

## 2019-07-27 PROCEDURE — 94760 N-INVAS EAR/PLS OXIMETRY 1: CPT

## 2019-07-27 PROCEDURE — 25010000002 CALCIUM GLUCONATE PER 10 ML: Performed by: SURGERY

## 2019-07-27 RX ADMIN — BACITRACIN: 500 OINTMENT TOPICAL at 08:42

## 2019-07-27 RX ADMIN — ALBUTEROL SULFATE 2 PUFF: 90 AEROSOL, METERED RESPIRATORY (INHALATION) at 19:40

## 2019-07-27 RX ADMIN — ALBUTEROL SULFATE 2 PUFF: 90 AEROSOL, METERED RESPIRATORY (INHALATION) at 01:42

## 2019-07-27 RX ADMIN — LEVOTHYROXINE SODIUM ANHYDROUS 125 MCG: 100 INJECTION, POWDER, LYOPHILIZED, FOR SOLUTION INTRAVENOUS at 05:37

## 2019-07-27 RX ADMIN — ALBUTEROL SULFATE 2 PUFF: 90 AEROSOL, METERED RESPIRATORY (INHALATION) at 12:17

## 2019-07-27 RX ADMIN — ENOXAPARIN SODIUM 40 MG: 40 INJECTION SUBCUTANEOUS at 08:42

## 2019-07-27 RX ADMIN — I.V. FAT EMULSION 30.8 G: 20 EMULSION INTRAVENOUS at 17:17

## 2019-07-27 RX ADMIN — POTASSIUM CHLORIDE: 2 INJECTION, SOLUTION, CONCENTRATE INTRAVENOUS at 17:17

## 2019-07-27 RX ADMIN — PANTOPRAZOLE SODIUM 40 MG: 40 INJECTION, POWDER, FOR SOLUTION INTRAVENOUS at 08:42

## 2019-07-27 RX ADMIN — ALBUTEROL SULFATE 2 PUFF: 90 AEROSOL, METERED RESPIRATORY (INHALATION) at 07:00

## 2019-07-28 LAB
ANION GAP SERPL CALCULATED.3IONS-SCNC: 14 MMOL/L (ref 5–15)
ANION GAP SERPL CALCULATED.3IONS-SCNC: 14 MMOL/L (ref 5–15)
ANION GAP SERPL CALCULATED.3IONS-SCNC: 16 MMOL/L (ref 5–15)
ANISOCYTOSIS BLD QL: NORMAL
BASOPHILS # BLD AUTO: 0.05 10*3/MM3 (ref 0–0.2)
BASOPHILS NFR BLD AUTO: 0.6 % (ref 0–1.5)
BUN BLD-MCNC: 14 MG/DL (ref 8–23)
BUN BLD-MCNC: 14 MG/DL (ref 8–23)
BUN BLD-MCNC: 15 MG/DL (ref 8–23)
BUN/CREAT SERPL: 28.6 (ref 7–25)
BUN/CREAT SERPL: 28.8 (ref 7–25)
BUN/CREAT SERPL: 29.8 (ref 7–25)
CALCIUM SPEC-SCNC: 8.7 MG/DL (ref 8.6–10.5)
CALCIUM SPEC-SCNC: 8.9 MG/DL (ref 8.6–10.5)
CALCIUM SPEC-SCNC: 9.2 MG/DL (ref 8.6–10.5)
CHLORIDE SERPL-SCNC: 84 MMOL/L (ref 98–107)
CHLORIDE SERPL-SCNC: 85 MMOL/L (ref 98–107)
CHLORIDE SERPL-SCNC: 87 MMOL/L (ref 98–107)
CO2 SERPL-SCNC: 20 MMOL/L (ref 22–29)
CO2 SERPL-SCNC: 20 MMOL/L (ref 22–29)
CO2 SERPL-SCNC: 22 MMOL/L (ref 22–29)
CREAT BLD-MCNC: 0.47 MG/DL (ref 0.76–1.27)
CREAT BLD-MCNC: 0.49 MG/DL (ref 0.76–1.27)
CREAT BLD-MCNC: 0.52 MG/DL (ref 0.76–1.27)
DEPRECATED RDW RBC AUTO: 44.6 FL (ref 37–54)
EOSINOPHIL # BLD AUTO: 0.24 10*3/MM3 (ref 0–0.4)
EOSINOPHIL NFR BLD AUTO: 3.1 % (ref 0.3–6.2)
ERYTHROCYTE [DISTWIDTH] IN BLOOD BY AUTOMATED COUNT: 17.3 % (ref 12.3–15.4)
GFR SERPL CREATININE-BSD FRML MDRD: >150 ML/MIN/1.73
GLUCOSE BLD-MCNC: 124 MG/DL (ref 65–99)
GLUCOSE BLD-MCNC: 128 MG/DL (ref 65–99)
GLUCOSE BLD-MCNC: 140 MG/DL (ref 65–99)
GLUCOSE BLDC GLUCOMTR-MCNC: 105 MG/DL (ref 70–130)
GLUCOSE BLDC GLUCOMTR-MCNC: 133 MG/DL (ref 70–130)
GLUCOSE BLDC GLUCOMTR-MCNC: 136 MG/DL (ref 70–130)
GLUCOSE BLDC GLUCOMTR-MCNC: 94 MG/DL (ref 70–130)
HCT VFR BLD AUTO: 25.7 % (ref 37.5–51)
HGB BLD-MCNC: 9.1 G/DL (ref 13–17.7)
IMM GRANULOCYTES # BLD AUTO: 0.03 10*3/MM3 (ref 0–0.05)
IMM GRANULOCYTES NFR BLD AUTO: 0.4 % (ref 0–0.5)
LYMPHOCYTES # BLD AUTO: 1.4 10*3/MM3 (ref 0.7–3.1)
LYMPHOCYTES NFR BLD AUTO: 18.2 % (ref 19.6–45.3)
MAGNESIUM SERPL-MCNC: 1.8 MG/DL (ref 1.6–2.4)
MCH RBC QN AUTO: 25.1 PG (ref 26.6–33)
MCHC RBC AUTO-ENTMCNC: 35.4 G/DL (ref 31.5–35.7)
MCV RBC AUTO: 70.8 FL (ref 79–97)
MONOCYTES # BLD AUTO: 1.45 10*3/MM3 (ref 0.1–0.9)
MONOCYTES NFR BLD AUTO: 18.8 % (ref 5–12)
NEUTROPHILS # BLD AUTO: 4.53 10*3/MM3 (ref 1.7–7)
NEUTROPHILS NFR BLD AUTO: 58.9 % (ref 42.7–76)
NRBC BLD AUTO-RTO: 0 /100 WBC (ref 0–0.2)
PHOSPHATE SERPL-MCNC: 3.6 MG/DL (ref 2.5–4.5)
PLATELET # BLD AUTO: 463 10*3/MM3 (ref 140–450)
PMV BLD AUTO: 11.2 FL (ref 6–12)
POTASSIUM BLD-SCNC: 4.5 MMOL/L (ref 3.5–5.2)
POTASSIUM BLD-SCNC: 5.1 MMOL/L (ref 3.5–5.2)
POTASSIUM BLD-SCNC: 5.1 MMOL/L (ref 3.5–5.2)
RBC # BLD AUTO: 3.63 10*6/MM3 (ref 4.14–5.8)
SMALL PLATELETS BLD QL SMEAR: NORMAL
SODIUM BLD-SCNC: 120 MMOL/L (ref 136–145)
SODIUM BLD-SCNC: 121 MMOL/L (ref 136–145)
SODIUM BLD-SCNC: 121 MMOL/L (ref 136–145)
WBC MORPH BLD: NORMAL
WBC NRBC COR # BLD: 7.7 10*3/MM3 (ref 3.4–10.8)

## 2019-07-28 PROCEDURE — 25010000002 MAGNESIUM SULFATE PER 500 MG OF MAGNESIUM: Performed by: SURGERY

## 2019-07-28 PROCEDURE — 25010000002 CALCIUM GLUCONATE PER 10 ML: Performed by: SURGERY

## 2019-07-28 PROCEDURE — 97535 SELF CARE MNGMENT TRAINING: CPT

## 2019-07-28 PROCEDURE — 84100 ASSAY OF PHOSPHORUS: CPT | Performed by: INTERNAL MEDICINE

## 2019-07-28 PROCEDURE — 25010000002 ENOXAPARIN PER 10 MG: Performed by: SURGERY

## 2019-07-28 PROCEDURE — 83735 ASSAY OF MAGNESIUM: CPT | Performed by: INTERNAL MEDICINE

## 2019-07-28 PROCEDURE — 80048 BASIC METABOLIC PNL TOTAL CA: CPT | Performed by: FAMILY MEDICINE

## 2019-07-28 PROCEDURE — 25010000002 POTASSIUM CHLORIDE PER 2 MEQ OF POTASSIUM: Performed by: SURGERY

## 2019-07-28 PROCEDURE — 85007 BL SMEAR W/DIFF WBC COUNT: CPT | Performed by: FAMILY MEDICINE

## 2019-07-28 PROCEDURE — 82962 GLUCOSE BLOOD TEST: CPT

## 2019-07-28 PROCEDURE — 97110 THERAPEUTIC EXERCISES: CPT

## 2019-07-28 PROCEDURE — 97530 THERAPEUTIC ACTIVITIES: CPT

## 2019-07-28 PROCEDURE — 85025 COMPLETE CBC W/AUTO DIFF WBC: CPT | Performed by: FAMILY MEDICINE

## 2019-07-28 PROCEDURE — 94799 UNLISTED PULMONARY SVC/PX: CPT

## 2019-07-28 PROCEDURE — 94760 N-INVAS EAR/PLS OXIMETRY 1: CPT

## 2019-07-28 RX ORDER — SODIUM CHLORIDE 1000 MG
2 TABLET, SOLUBLE MISCELLANEOUS
Status: DISCONTINUED | OUTPATIENT
Start: 2019-07-28 | End: 2019-07-29

## 2019-07-28 RX ORDER — SODIUM CHLORIDE 9 MG/ML
75 INJECTION, SOLUTION INTRAVENOUS CONTINUOUS
Status: DISCONTINUED | OUTPATIENT
Start: 2019-07-28 | End: 2019-07-29

## 2019-07-28 RX ADMIN — SODIUM CHLORIDE 75 ML/HR: 9 INJECTION, SOLUTION INTRAVENOUS at 10:59

## 2019-07-28 RX ADMIN — LEVOTHYROXINE SODIUM ANHYDROUS 125 MCG: 100 INJECTION, POWDER, LYOPHILIZED, FOR SOLUTION INTRAVENOUS at 06:05

## 2019-07-28 RX ADMIN — ENOXAPARIN SODIUM 40 MG: 40 INJECTION SUBCUTANEOUS at 08:25

## 2019-07-28 RX ADMIN — BACITRACIN: 500 OINTMENT TOPICAL at 10:59

## 2019-07-28 RX ADMIN — PANTOPRAZOLE SODIUM 40 MG: 40 INJECTION, POWDER, FOR SOLUTION INTRAVENOUS at 08:25

## 2019-07-28 RX ADMIN — I.V. FAT EMULSION 30.8 G: 20 EMULSION INTRAVENOUS at 17:22

## 2019-07-28 RX ADMIN — POTASSIUM CHLORIDE: 2 INJECTION, SOLUTION, CONCENTRATE INTRAVENOUS at 17:22

## 2019-07-28 RX ADMIN — SODIUM CHLORIDE TAB 1 GM 2 G: 1 TAB at 17:23

## 2019-07-28 RX ADMIN — ALBUTEROL SULFATE 2 PUFF: 90 AEROSOL, METERED RESPIRATORY (INHALATION) at 13:38

## 2019-07-28 RX ADMIN — ALBUTEROL SULFATE 2 PUFF: 90 AEROSOL, METERED RESPIRATORY (INHALATION) at 07:00

## 2019-07-28 RX ADMIN — SCOPALAMINE 1 PATCH: 1 PATCH, EXTENDED RELEASE TRANSDERMAL at 02:13

## 2019-07-28 RX ADMIN — SODIUM CHLORIDE TAB 1 GM 2 G: 1 TAB at 13:56

## 2019-07-29 LAB
ALBUMIN SERPL-MCNC: 3.6 G/DL (ref 3.5–5.2)
ALBUMIN/GLOB SERPL: 0.8 G/DL
ALP SERPL-CCNC: 102 U/L (ref 39–117)
ALT SERPL W P-5'-P-CCNC: 13 U/L (ref 1–41)
ANION GAP SERPL CALCULATED.3IONS-SCNC: 10 MMOL/L (ref 5–15)
ANION GAP SERPL CALCULATED.3IONS-SCNC: 11 MMOL/L (ref 5–15)
ANION GAP SERPL CALCULATED.3IONS-SCNC: 11 MMOL/L (ref 5–15)
ANION GAP SERPL CALCULATED.3IONS-SCNC: 12 MMOL/L (ref 5–15)
ARTERIAL PATENCY WRIST A: NORMAL
AST SERPL-CCNC: 19 U/L (ref 1–40)
ATMOSPHERIC PRESS: NORMAL MMHG
BASE EXCESS BLDA CALC-SCNC: NORMAL MMOL/L (ref 0–2)
BASOPHILS # BLD AUTO: 0.06 10*3/MM3 (ref 0–0.2)
BASOPHILS NFR BLD AUTO: 0.8 % (ref 0–1.5)
BDY SITE: NORMAL
BILIRUB SERPL-MCNC: 0.2 MG/DL (ref 0.2–1.2)
BUN BLD-MCNC: 11 MG/DL (ref 8–23)
BUN BLD-MCNC: 12 MG/DL (ref 8–23)
BUN BLD-MCNC: 12 MG/DL (ref 8–23)
BUN BLD-MCNC: 13 MG/DL (ref 8–23)
BUN/CREAT SERPL: 22 (ref 7–25)
BUN/CREAT SERPL: 23.1 (ref 7–25)
BUN/CREAT SERPL: 24 (ref 7–25)
BUN/CREAT SERPL: 25.5 (ref 7–25)
CA-I BLD-MCNC: 4.68 MG/DL (ref 4.6–5.6)
CALCIUM SPEC-SCNC: 8.8 MG/DL (ref 8.6–10.5)
CALCIUM SPEC-SCNC: 8.9 MG/DL (ref 8.6–10.5)
CALCIUM SPEC-SCNC: 9.1 MG/DL (ref 8.6–10.5)
CALCIUM SPEC-SCNC: 9.3 MG/DL (ref 8.6–10.5)
CHLORIDE SERPL-SCNC: 85 MMOL/L (ref 98–107)
CHLORIDE SERPL-SCNC: 85 MMOL/L (ref 98–107)
CHLORIDE SERPL-SCNC: 86 MMOL/L (ref 98–107)
CHLORIDE SERPL-SCNC: 87 MMOL/L (ref 98–107)
CO2 SERPL-SCNC: 23 MMOL/L (ref 22–29)
CO2 SERPL-SCNC: 23 MMOL/L (ref 22–29)
CO2 SERPL-SCNC: 24 MMOL/L (ref 22–29)
CO2 SERPL-SCNC: 25 MMOL/L (ref 22–29)
CORTIS AM PEAK SERPL-MCNC: 4.25 MCG/DL
CREAT BLD-MCNC: 0.5 MG/DL (ref 0.76–1.27)
CREAT BLD-MCNC: 0.5 MG/DL (ref 0.76–1.27)
CREAT BLD-MCNC: 0.51 MG/DL (ref 0.76–1.27)
CREAT BLD-MCNC: 0.52 MG/DL (ref 0.76–1.27)
CRP SERPL-MCNC: 1.56 MG/DL (ref 0–0.5)
DEPRECATED RDW RBC AUTO: 44 FL (ref 37–54)
EOSINOPHIL # BLD AUTO: 0.24 10*3/MM3 (ref 0–0.4)
EOSINOPHIL NFR BLD AUTO: 3.1 % (ref 0.3–6.2)
ERYTHROCYTE [DISTWIDTH] IN BLOOD BY AUTOMATED COUNT: 17.3 % (ref 12.3–15.4)
GFR SERPL CREATININE-BSD FRML MDRD: >150 ML/MIN/1.73
GLOBULIN UR ELPH-MCNC: 4.5 GM/DL
GLUCOSE BLD-MCNC: 118 MG/DL (ref 65–99)
GLUCOSE BLD-MCNC: 124 MG/DL (ref 65–99)
GLUCOSE BLD-MCNC: 143 MG/DL (ref 65–99)
GLUCOSE BLD-MCNC: 96 MG/DL (ref 65–99)
GLUCOSE BLDC GLUCOMTR-MCNC: 135 MG/DL (ref 70–130)
HCO3 BLDA-SCNC: NORMAL MMOL/L (ref 20–26)
HCT VFR BLD AUTO: 28.6 % (ref 37.5–51)
HGB BLD-MCNC: 10.2 G/DL (ref 13–17.7)
HOROWITZ INDEX BLD+IHG-RTO: NORMAL %
IMM GRANULOCYTES # BLD AUTO: 0.04 10*3/MM3 (ref 0–0.05)
IMM GRANULOCYTES NFR BLD AUTO: 0.5 % (ref 0–0.5)
LYMPHOCYTES # BLD AUTO: 1.61 10*3/MM3 (ref 0.7–3.1)
LYMPHOCYTES NFR BLD AUTO: 20.9 % (ref 19.6–45.3)
Lab: NORMAL
MCH RBC QN AUTO: 25.6 PG (ref 26.6–33)
MCHC RBC AUTO-ENTMCNC: 35.7 G/DL (ref 31.5–35.7)
MCV RBC AUTO: 71.7 FL (ref 79–97)
MODALITY: NORMAL
MONOCYTES # BLD AUTO: 1.18 10*3/MM3 (ref 0.1–0.9)
MONOCYTES NFR BLD AUTO: 15.3 % (ref 5–12)
NEUTROPHILS # BLD AUTO: 4.56 10*3/MM3 (ref 1.7–7)
NEUTROPHILS NFR BLD AUTO: 59.4 % (ref 42.7–76)
NRBC BLD AUTO-RTO: 0 /100 WBC (ref 0–0.2)
OSMOLALITY UR: 549 MOSM/KG (ref 38–1400)
PCO2 BLDA: NORMAL MM HG (ref 35–45)
PH BLDA: NORMAL PH UNITS (ref 7.35–7.45)
PLATELET # BLD AUTO: 470 10*3/MM3 (ref 140–450)
PMV BLD AUTO: 11.1 FL (ref 6–12)
PO2 BLDA: NORMAL MM HG (ref 83–108)
POTASSIUM BLD-SCNC: 4.3 MMOL/L (ref 3.5–5.2)
POTASSIUM BLD-SCNC: 4.4 MMOL/L (ref 3.5–5.2)
POTASSIUM BLD-SCNC: 4.5 MMOL/L (ref 3.5–5.2)
POTASSIUM BLD-SCNC: 4.7 MMOL/L (ref 3.5–5.2)
PREALB SERPL-MCNC: 11 MG/DL (ref 20–40)
PROT SERPL-MCNC: 8.1 G/DL (ref 6–8.5)
RBC # BLD AUTO: 3.99 10*6/MM3 (ref 4.14–5.8)
SAO2 % BLDCOA: NORMAL % (ref 94–99)
SODIUM BLD-SCNC: 120 MMOL/L (ref 136–145)
SODIUM BLD-SCNC: 120 MMOL/L (ref 136–145)
SODIUM BLD-SCNC: 121 MMOL/L (ref 136–145)
SODIUM BLD-SCNC: 121 MMOL/L (ref 136–145)
TRIGL SERPL-MCNC: 157 MG/DL (ref 0–150)
URATE SERPL-MCNC: 1.8 MG/DL (ref 3.4–7)
VENTILATOR MODE: NORMAL
WBC NRBC COR # BLD: 7.69 10*3/MM3 (ref 3.4–10.8)

## 2019-07-29 PROCEDURE — 97110 THERAPEUTIC EXERCISES: CPT

## 2019-07-29 PROCEDURE — 25010000002 MAGNESIUM SULFATE PER 500 MG OF MAGNESIUM: Performed by: INTERNAL MEDICINE

## 2019-07-29 PROCEDURE — 82330 ASSAY OF CALCIUM: CPT | Performed by: INTERNAL MEDICINE

## 2019-07-29 PROCEDURE — 85025 COMPLETE CBC W/AUTO DIFF WBC: CPT | Performed by: FAMILY MEDICINE

## 2019-07-29 PROCEDURE — 94799 UNLISTED PULMONARY SVC/PX: CPT

## 2019-07-29 PROCEDURE — 82533 TOTAL CORTISOL: CPT | Performed by: INTERNAL MEDICINE

## 2019-07-29 PROCEDURE — 99024 POSTOP FOLLOW-UP VISIT: CPT | Performed by: SURGERY

## 2019-07-29 PROCEDURE — 97535 SELF CARE MNGMENT TRAINING: CPT

## 2019-07-29 PROCEDURE — 94760 N-INVAS EAR/PLS OXIMETRY 1: CPT

## 2019-07-29 PROCEDURE — 25010000002 ENOXAPARIN PER 10 MG: Performed by: SURGERY

## 2019-07-29 PROCEDURE — 83935 ASSAY OF URINE OSMOLALITY: CPT | Performed by: INTERNAL MEDICINE

## 2019-07-29 PROCEDURE — 25010000002 CALCIUM GLUCONATE PER 10 ML: Performed by: INTERNAL MEDICINE

## 2019-07-29 PROCEDURE — 84478 ASSAY OF TRIGLYCERIDES: CPT | Performed by: INTERNAL MEDICINE

## 2019-07-29 PROCEDURE — 97116 GAIT TRAINING THERAPY: CPT

## 2019-07-29 PROCEDURE — 25010000002 POTASSIUM CHLORIDE PER 2 MEQ OF POTASSIUM: Performed by: INTERNAL MEDICINE

## 2019-07-29 PROCEDURE — 80053 COMPREHEN METABOLIC PANEL: CPT | Performed by: FAMILY MEDICINE

## 2019-07-29 PROCEDURE — 84134 ASSAY OF PREALBUMIN: CPT | Performed by: INTERNAL MEDICINE

## 2019-07-29 PROCEDURE — 86140 C-REACTIVE PROTEIN: CPT | Performed by: INTERNAL MEDICINE

## 2019-07-29 PROCEDURE — 94640 AIRWAY INHALATION TREATMENT: CPT

## 2019-07-29 PROCEDURE — 82962 GLUCOSE BLOOD TEST: CPT

## 2019-07-29 PROCEDURE — 84550 ASSAY OF BLOOD/URIC ACID: CPT | Performed by: INTERNAL MEDICINE

## 2019-07-29 RX ORDER — SODIUM CHLORIDE 0.9 % (FLUSH) 0.9 %
10 SYRINGE (ML) INJECTION 2 TIMES DAILY
Status: DISCONTINUED | OUTPATIENT
Start: 2019-07-29 | End: 2019-08-09 | Stop reason: HOSPADM

## 2019-07-29 RX ORDER — ALBUTEROL SULFATE 2.5 MG/3ML
2.5 SOLUTION RESPIRATORY (INHALATION)
Status: DISCONTINUED | OUTPATIENT
Start: 2019-07-29 | End: 2019-08-09 | Stop reason: HOSPADM

## 2019-07-29 RX ADMIN — SODIUM CHLORIDE TAB 1 GM 2 G: 1 TAB at 11:50

## 2019-07-29 RX ADMIN — ALBUTEROL SULFATE 2.5 MG: 2.5 SOLUTION RESPIRATORY (INHALATION) at 13:31

## 2019-07-29 RX ADMIN — SODIUM CHLORIDE TAB 1 GM 2 G: 1 TAB at 17:39

## 2019-07-29 RX ADMIN — ALBUTEROL SULFATE 2.5 MG: 2.5 SOLUTION RESPIRATORY (INHALATION) at 19:12

## 2019-07-29 RX ADMIN — ENOXAPARIN SODIUM 40 MG: 40 INJECTION SUBCUTANEOUS at 09:06

## 2019-07-29 RX ADMIN — SODIUM CHLORIDE, PRESERVATIVE FREE 10 ML: 5 INJECTION INTRAVENOUS at 09:05

## 2019-07-29 RX ADMIN — SODIUM CHLORIDE TAB 1 GM 2 G: 1 TAB at 09:08

## 2019-07-29 RX ADMIN — PANTOPRAZOLE SODIUM 40 MG: 40 INJECTION, POWDER, FOR SOLUTION INTRAVENOUS at 09:05

## 2019-07-29 RX ADMIN — BACITRACIN: 500 OINTMENT TOPICAL at 09:05

## 2019-07-29 RX ADMIN — POTASSIUM CHLORIDE: 2 INJECTION, SOLUTION, CONCENTRATE INTRAVENOUS at 17:40

## 2019-07-29 RX ADMIN — LEVOTHYROXINE SODIUM ANHYDROUS 125 MCG: 100 INJECTION, POWDER, LYOPHILIZED, FOR SOLUTION INTRAVENOUS at 06:27

## 2019-07-30 LAB
ANION GAP SERPL CALCULATED.3IONS-SCNC: 10 MMOL/L (ref 5–15)
ANION GAP SERPL CALCULATED.3IONS-SCNC: 11 MMOL/L (ref 5–15)
ANION GAP SERPL CALCULATED.3IONS-SCNC: 12 MMOL/L (ref 5–15)
ANION GAP SERPL CALCULATED.3IONS-SCNC: 13 MMOL/L (ref 5–15)
BASOPHILS # BLD AUTO: 0.05 10*3/MM3 (ref 0–0.2)
BASOPHILS NFR BLD AUTO: 0.5 % (ref 0–1.5)
BUN BLD-MCNC: 12 MG/DL (ref 8–23)
BUN BLD-MCNC: 13 MG/DL (ref 8–23)
BUN BLD-MCNC: 14 MG/DL (ref 8–23)
BUN BLD-MCNC: 14 MG/DL (ref 8–23)
BUN/CREAT SERPL: 24.5 (ref 7–25)
BUN/CREAT SERPL: 25 (ref 7–25)
BUN/CREAT SERPL: 25.5 (ref 7–25)
BUN/CREAT SERPL: 26.4 (ref 7–25)
CALCIUM SPEC-SCNC: 9 MG/DL (ref 8.6–10.5)
CALCIUM SPEC-SCNC: 9.7 MG/DL (ref 8.6–10.5)
CALCIUM SPEC-SCNC: 9.7 MG/DL (ref 8.6–10.5)
CALCIUM SPEC-SCNC: 9.9 MG/DL (ref 8.6–10.5)
CHLORIDE SERPL-SCNC: 86 MMOL/L (ref 98–107)
CHLORIDE SERPL-SCNC: 93 MMOL/L (ref 98–107)
CHLORIDE SERPL-SCNC: 95 MMOL/L (ref 98–107)
CHLORIDE SERPL-SCNC: 96 MMOL/L (ref 98–107)
CO2 SERPL-SCNC: 21 MMOL/L (ref 22–29)
CO2 SERPL-SCNC: 22 MMOL/L (ref 22–29)
CO2 SERPL-SCNC: 25 MMOL/L (ref 22–29)
CO2 SERPL-SCNC: 25 MMOL/L (ref 22–29)
CREAT BLD-MCNC: 0.47 MG/DL (ref 0.76–1.27)
CREAT BLD-MCNC: 0.53 MG/DL (ref 0.76–1.27)
CREAT BLD-MCNC: 0.53 MG/DL (ref 0.76–1.27)
CREAT BLD-MCNC: 0.56 MG/DL (ref 0.76–1.27)
DEPRECATED RDW RBC AUTO: 43.7 FL (ref 37–54)
EOSINOPHIL # BLD AUTO: 0.11 10*3/MM3 (ref 0–0.4)
EOSINOPHIL NFR BLD AUTO: 1.1 % (ref 0.3–6.2)
ERYTHROCYTE [DISTWIDTH] IN BLOOD BY AUTOMATED COUNT: 17.3 % (ref 12.3–15.4)
GFR SERPL CREATININE-BSD FRML MDRD: >150 ML/MIN/1.73
GLUCOSE BLD-MCNC: 137 MG/DL (ref 65–99)
GLUCOSE BLD-MCNC: 138 MG/DL (ref 65–99)
GLUCOSE BLD-MCNC: 148 MG/DL (ref 65–99)
GLUCOSE BLD-MCNC: 161 MG/DL (ref 65–99)
GLUCOSE BLDC GLUCOMTR-MCNC: 133 MG/DL (ref 70–130)
GLUCOSE BLDC GLUCOMTR-MCNC: 134 MG/DL (ref 70–130)
GLUCOSE BLDC GLUCOMTR-MCNC: 143 MG/DL (ref 70–130)
HCT VFR BLD AUTO: 27.2 % (ref 37.5–51)
HGB BLD-MCNC: 9.7 G/DL (ref 13–17.7)
IMM GRANULOCYTES # BLD AUTO: 0.07 10*3/MM3 (ref 0–0.05)
IMM GRANULOCYTES NFR BLD AUTO: 0.7 % (ref 0–0.5)
LYMPHOCYTES # BLD AUTO: 1.85 10*3/MM3 (ref 0.7–3.1)
LYMPHOCYTES NFR BLD AUTO: 18.8 % (ref 19.6–45.3)
MAGNESIUM SERPL-MCNC: 2 MG/DL (ref 1.6–2.4)
MCH RBC QN AUTO: 25.3 PG (ref 26.6–33)
MCHC RBC AUTO-ENTMCNC: 35.7 G/DL (ref 31.5–35.7)
MCV RBC AUTO: 71 FL (ref 79–97)
MONOCYTES # BLD AUTO: 1.5 10*3/MM3 (ref 0.1–0.9)
MONOCYTES NFR BLD AUTO: 15.2 % (ref 5–12)
NEUTROPHILS # BLD AUTO: 6.28 10*3/MM3 (ref 1.7–7)
NEUTROPHILS NFR BLD AUTO: 63.7 % (ref 42.7–76)
NRBC BLD AUTO-RTO: 0.2 /100 WBC (ref 0–0.2)
PHOSPHATE SERPL-MCNC: 3.6 MG/DL (ref 2.5–4.5)
PLATELET # BLD AUTO: 515 10*3/MM3 (ref 140–450)
PMV BLD AUTO: 10.7 FL (ref 6–12)
POTASSIUM BLD-SCNC: 4.5 MMOL/L (ref 3.5–5.2)
POTASSIUM BLD-SCNC: 4.6 MMOL/L (ref 3.5–5.2)
POTASSIUM BLD-SCNC: 4.6 MMOL/L (ref 3.5–5.2)
POTASSIUM BLD-SCNC: 4.8 MMOL/L (ref 3.5–5.2)
RBC # BLD AUTO: 3.83 10*6/MM3 (ref 4.14–5.8)
SODIUM BLD-SCNC: 120 MMOL/L (ref 136–145)
SODIUM BLD-SCNC: 129 MMOL/L (ref 136–145)
SODIUM BLD-SCNC: 130 MMOL/L (ref 136–145)
SODIUM BLD-SCNC: 130 MMOL/L (ref 136–145)
TSH SERPL DL<=0.05 MIU/L-ACNC: 3.12 MIU/ML (ref 0.27–4.2)
WBC NRBC COR # BLD: 9.86 10*3/MM3 (ref 3.4–10.8)

## 2019-07-30 PROCEDURE — 94799 UNLISTED PULMONARY SVC/PX: CPT

## 2019-07-30 PROCEDURE — 25010000002 POTASSIUM CHLORIDE PER 2 MEQ OF POTASSIUM: Performed by: INTERNAL MEDICINE

## 2019-07-30 PROCEDURE — 85025 COMPLETE CBC W/AUTO DIFF WBC: CPT | Performed by: FAMILY MEDICINE

## 2019-07-30 PROCEDURE — 97530 THERAPEUTIC ACTIVITIES: CPT

## 2019-07-30 PROCEDURE — 99024 POSTOP FOLLOW-UP VISIT: CPT | Performed by: SURGERY

## 2019-07-30 PROCEDURE — 94760 N-INVAS EAR/PLS OXIMETRY 1: CPT

## 2019-07-30 PROCEDURE — 82962 GLUCOSE BLOOD TEST: CPT

## 2019-07-30 PROCEDURE — 25010000002 CALCIUM GLUCONATE PER 10 ML: Performed by: INTERNAL MEDICINE

## 2019-07-30 PROCEDURE — 80048 BASIC METABOLIC PNL TOTAL CA: CPT | Performed by: FAMILY MEDICINE

## 2019-07-30 PROCEDURE — 84443 ASSAY THYROID STIM HORMONE: CPT | Performed by: INTERNAL MEDICINE

## 2019-07-30 PROCEDURE — 63710000001 INSULIN ASPART PER 5 UNITS: Performed by: INTERNAL MEDICINE

## 2019-07-30 PROCEDURE — 25010000002 MAGNESIUM SULFATE PER 500 MG OF MAGNESIUM: Performed by: INTERNAL MEDICINE

## 2019-07-30 PROCEDURE — 83735 ASSAY OF MAGNESIUM: CPT | Performed by: INTERNAL MEDICINE

## 2019-07-30 PROCEDURE — 84100 ASSAY OF PHOSPHORUS: CPT | Performed by: INTERNAL MEDICINE

## 2019-07-30 PROCEDURE — 25010000002 ENOXAPARIN PER 10 MG: Performed by: SURGERY

## 2019-07-30 RX ORDER — SODIUM CHLORIDE 9 MG/ML
INJECTION, SOLUTION INTRAVENOUS
Status: DISPENSED
Start: 2019-07-30 | End: 2019-07-31

## 2019-07-30 RX ORDER — NOREPINEPHRINE BIT/0.9 % NACL 8 MG/250ML
.02-.3 INFUSION BOTTLE (ML) INTRAVENOUS
Status: DISCONTINUED | OUTPATIENT
Start: 2019-07-30 | End: 2019-08-01

## 2019-07-30 RX ORDER — MIDODRINE HYDROCHLORIDE 5 MG/1
5 TABLET ORAL
Status: DISCONTINUED | OUTPATIENT
Start: 2019-07-31 | End: 2019-07-31

## 2019-07-30 RX ORDER — SODIUM CHLORIDE 9 MG/ML
INJECTION, SOLUTION INTRAVENOUS
Status: COMPLETED
Start: 2019-07-30 | End: 2019-07-30

## 2019-07-30 RX ADMIN — SODIUM CHLORIDE, PRESERVATIVE FREE 10 ML: 5 INJECTION INTRAVENOUS at 10:10

## 2019-07-30 RX ADMIN — BACITRACIN: 500 OINTMENT TOPICAL at 10:08

## 2019-07-30 RX ADMIN — SODIUM CHLORIDE 250 ML: 9 INJECTION, SOLUTION INTRAVENOUS at 14:04

## 2019-07-30 RX ADMIN — SODIUM CHLORIDE, PRESERVATIVE FREE 10 ML: 5 INJECTION INTRAVENOUS at 20:42

## 2019-07-30 RX ADMIN — PANTOPRAZOLE SODIUM 40 MG: 40 INJECTION, POWDER, FOR SOLUTION INTRAVENOUS at 10:11

## 2019-07-30 RX ADMIN — ALBUTEROL SULFATE 2.5 MG: 2.5 SOLUTION RESPIRATORY (INHALATION) at 13:04

## 2019-07-30 RX ADMIN — POTASSIUM CHLORIDE: 2 INJECTION, SOLUTION, CONCENTRATE INTRAVENOUS at 18:24

## 2019-07-30 RX ADMIN — SODIUM CHLORIDE 500 ML: 9 INJECTION, SOLUTION INTRAVENOUS at 20:39

## 2019-07-30 RX ADMIN — SODIUM CHLORIDE, PRESERVATIVE FREE 10 ML: 5 INJECTION INTRAVENOUS at 20:41

## 2019-07-30 RX ADMIN — SODIUM CHLORIDE 250 ML: 9 INJECTION, SOLUTION INTRAVENOUS at 05:08

## 2019-07-30 RX ADMIN — SODIUM CHLORIDE 250 ML: 9 INJECTION, SOLUTION INTRAVENOUS at 10:09

## 2019-07-30 RX ADMIN — ALBUTEROL SULFATE 2.5 MG: 2.5 SOLUTION RESPIRATORY (INHALATION) at 07:22

## 2019-07-30 RX ADMIN — ALBUTEROL SULFATE 2.5 MG: 2.5 SOLUTION RESPIRATORY (INHALATION) at 19:15

## 2019-07-30 RX ADMIN — ENOXAPARIN SODIUM 40 MG: 40 INJECTION SUBCUTANEOUS at 10:11

## 2019-07-30 RX ADMIN — Medication 15 MG: at 00:55

## 2019-07-30 RX ADMIN — ALBUTEROL SULFATE 2.5 MG: 2.5 SOLUTION RESPIRATORY (INHALATION) at 00:31

## 2019-07-30 RX ADMIN — LEVOTHYROXINE SODIUM ANHYDROUS 125 MCG: 100 INJECTION, POWDER, LYOPHILIZED, FOR SOLUTION INTRAVENOUS at 05:10

## 2019-07-30 RX ADMIN — INSULIN ASPART 2 UNITS: 100 INJECTION, SOLUTION INTRAVENOUS; SUBCUTANEOUS at 18:25

## 2019-07-31 PROBLEM — D58.2 HEMOGLOBIN C TRAIT (HCC): Chronic | Status: ACTIVE | Noted: 2018-02-01

## 2019-07-31 PROBLEM — R64 MALIGNANT CACHEXIA (HCC): Chronic | Status: ACTIVE | Noted: 2019-04-12

## 2019-07-31 PROBLEM — D64.9 ANEMIA: Chronic | Status: ACTIVE | Noted: 2017-09-15

## 2019-07-31 PROBLEM — E03.9 HYPOTHYROIDISM: Chronic | Status: ACTIVE | Noted: 2017-01-09

## 2019-07-31 PROBLEM — I10 HYPERTENSION: Chronic | Status: ACTIVE | Noted: 2018-06-21

## 2019-07-31 PROBLEM — I95.9 HYPOTENSION: Chronic | Status: ACTIVE | Noted: 2019-07-31

## 2019-07-31 PROBLEM — K56.609 SBO (SMALL BOWEL OBSTRUCTION) (HCC): Chronic | Status: ACTIVE | Noted: 2019-07-16

## 2019-07-31 LAB
ANION GAP SERPL CALCULATED.3IONS-SCNC: 11 MMOL/L (ref 5–15)
ANION GAP SERPL CALCULATED.3IONS-SCNC: 12 MMOL/L (ref 5–15)
ANION GAP SERPL CALCULATED.3IONS-SCNC: 12 MMOL/L (ref 5–15)
ANION GAP SERPL CALCULATED.3IONS-SCNC: 13 MMOL/L (ref 5–15)
BASOPHILS # BLD AUTO: 0.09 10*3/MM3 (ref 0–0.2)
BASOPHILS NFR BLD AUTO: 0.7 % (ref 0–1.5)
BUN BLD-MCNC: 13 MG/DL (ref 8–23)
BUN BLD-MCNC: 14 MG/DL (ref 8–23)
BUN BLD-MCNC: 15 MG/DL (ref 8–23)
BUN BLD-MCNC: 16 MG/DL (ref 8–23)
BUN/CREAT SERPL: 24.6 (ref 7–25)
BUN/CREAT SERPL: 25.5 (ref 7–25)
BUN/CREAT SERPL: 26 (ref 7–25)
BUN/CREAT SERPL: 28.1 (ref 7–25)
CALCIUM SPEC-SCNC: 10 MG/DL (ref 8.6–10.5)
CALCIUM SPEC-SCNC: 9.6 MG/DL (ref 8.6–10.5)
CALCIUM SPEC-SCNC: 9.7 MG/DL (ref 8.6–10.5)
CALCIUM SPEC-SCNC: 9.8 MG/DL (ref 8.6–10.5)
CHLORIDE SERPL-SCNC: 92 MMOL/L (ref 98–107)
CHLORIDE SERPL-SCNC: 95 MMOL/L (ref 98–107)
CO2 SERPL-SCNC: 23 MMOL/L (ref 22–29)
CO2 SERPL-SCNC: 26 MMOL/L (ref 22–29)
CREAT BLD-MCNC: 0.5 MG/DL (ref 0.76–1.27)
CREAT BLD-MCNC: 0.55 MG/DL (ref 0.76–1.27)
CREAT BLD-MCNC: 0.57 MG/DL (ref 0.76–1.27)
CREAT BLD-MCNC: 0.61 MG/DL (ref 0.76–1.27)
DEPRECATED RDW RBC AUTO: 44.3 FL (ref 37–54)
EOSINOPHIL # BLD AUTO: 0.05 10*3/MM3 (ref 0–0.4)
EOSINOPHIL NFR BLD AUTO: 0.4 % (ref 0.3–6.2)
ERYTHROCYTE [DISTWIDTH] IN BLOOD BY AUTOMATED COUNT: 17.6 % (ref 12.3–15.4)
GFR SERPL CREATININE-BSD FRML MDRD: >150 ML/MIN/1.73
GLUCOSE BLD-MCNC: 123 MG/DL (ref 65–99)
GLUCOSE BLD-MCNC: 155 MG/DL (ref 65–99)
GLUCOSE BLD-MCNC: 161 MG/DL (ref 65–99)
GLUCOSE BLD-MCNC: 161 MG/DL (ref 65–99)
GLUCOSE BLDC GLUCOMTR-MCNC: 131 MG/DL (ref 70–130)
GLUCOSE BLDC GLUCOMTR-MCNC: 132 MG/DL (ref 70–130)
GLUCOSE BLDC GLUCOMTR-MCNC: 195 MG/DL (ref 70–130)
HCT VFR BLD AUTO: 25 % (ref 37.5–51)
HGB BLD-MCNC: 8.8 G/DL (ref 13–17.7)
IMM GRANULOCYTES # BLD AUTO: 0.06 10*3/MM3 (ref 0–0.05)
IMM GRANULOCYTES NFR BLD AUTO: 0.4 % (ref 0–0.5)
LYMPHOCYTES # BLD AUTO: 1.49 10*3/MM3 (ref 0.7–3.1)
LYMPHOCYTES NFR BLD AUTO: 10.8 % (ref 19.6–45.3)
MCH RBC QN AUTO: 25 PG (ref 26.6–33)
MCHC RBC AUTO-ENTMCNC: 35.2 G/DL (ref 31.5–35.7)
MCV RBC AUTO: 71 FL (ref 79–97)
MONOCYTES # BLD AUTO: 2.05 10*3/MM3 (ref 0.1–0.9)
MONOCYTES NFR BLD AUTO: 14.9 % (ref 5–12)
NEUTROPHILS # BLD AUTO: 10.03 10*3/MM3 (ref 1.7–7)
NEUTROPHILS NFR BLD AUTO: 72.8 % (ref 42.7–76)
NRBC BLD AUTO-RTO: 0 /100 WBC (ref 0–0.2)
PLATELET # BLD AUTO: 480 10*3/MM3 (ref 140–450)
PMV BLD AUTO: 11 FL (ref 6–12)
POTASSIUM BLD-SCNC: 4.7 MMOL/L (ref 3.5–5.2)
POTASSIUM BLD-SCNC: 4.8 MMOL/L (ref 3.5–5.2)
POTASSIUM BLD-SCNC: 4.9 MMOL/L (ref 3.5–5.2)
POTASSIUM BLD-SCNC: 5 MMOL/L (ref 3.5–5.2)
RBC # BLD AUTO: 3.52 10*6/MM3 (ref 4.14–5.8)
SODIUM BLD-SCNC: 129 MMOL/L (ref 136–145)
SODIUM BLD-SCNC: 130 MMOL/L (ref 136–145)
SODIUM BLD-SCNC: 130 MMOL/L (ref 136–145)
SODIUM BLD-SCNC: 131 MMOL/L (ref 136–145)
WBC NRBC COR # BLD: 13.77 10*3/MM3 (ref 3.4–10.8)

## 2019-07-31 PROCEDURE — 94799 UNLISTED PULMONARY SVC/PX: CPT

## 2019-07-31 PROCEDURE — 63710000001 INSULIN ASPART PER 5 UNITS: Performed by: INTERNAL MEDICINE

## 2019-07-31 PROCEDURE — 82962 GLUCOSE BLOOD TEST: CPT

## 2019-07-31 PROCEDURE — 99024 POSTOP FOLLOW-UP VISIT: CPT | Performed by: SURGERY

## 2019-07-31 PROCEDURE — 80048 BASIC METABOLIC PNL TOTAL CA: CPT | Performed by: FAMILY MEDICINE

## 2019-07-31 PROCEDURE — 94760 N-INVAS EAR/PLS OXIMETRY 1: CPT

## 2019-07-31 PROCEDURE — 25010000002 ENOXAPARIN PER 10 MG: Performed by: SURGERY

## 2019-07-31 PROCEDURE — 25010000002 NA FERRIC GLUC CPLX PER 12.5 MG: Performed by: INTERNAL MEDICINE

## 2019-07-31 PROCEDURE — 85025 COMPLETE CBC W/AUTO DIFF WBC: CPT | Performed by: FAMILY MEDICINE

## 2019-07-31 PROCEDURE — 99231 SBSQ HOSP IP/OBS SF/LOW 25: CPT | Performed by: INTERNAL MEDICINE

## 2019-07-31 RX ORDER — ACETAMINOPHEN 160 MG/5ML
650 SOLUTION ORAL EVERY 6 HOURS PRN
Status: DISCONTINUED | OUTPATIENT
Start: 2019-07-31 | End: 2019-08-09 | Stop reason: HOSPADM

## 2019-07-31 RX ORDER — TOLVAPTAN 30 MG/1
7.5 TABLET ORAL ONCE
Status: COMPLETED | OUTPATIENT
Start: 2019-07-31 | End: 2019-07-31

## 2019-07-31 RX ORDER — MIDODRINE HYDROCHLORIDE 5 MG/1
10 TABLET ORAL
Status: DISCONTINUED | OUTPATIENT
Start: 2019-07-31 | End: 2019-08-09 | Stop reason: HOSPADM

## 2019-07-31 RX ADMIN — PANTOPRAZOLE SODIUM 40 MG: 40 INJECTION, POWDER, FOR SOLUTION INTRAVENOUS at 08:36

## 2019-07-31 RX ADMIN — SCOPALAMINE 1 PATCH: 1 PATCH, EXTENDED RELEASE TRANSDERMAL at 04:06

## 2019-07-31 RX ADMIN — MIDODRINE HYDROCHLORIDE 5 MG: 5 TABLET ORAL at 08:36

## 2019-07-31 RX ADMIN — ALBUTEROL SULFATE 2.5 MG: 2.5 SOLUTION RESPIRATORY (INHALATION) at 19:35

## 2019-07-31 RX ADMIN — BACITRACIN: 500 OINTMENT TOPICAL at 12:09

## 2019-07-31 RX ADMIN — ALBUTEROL SULFATE 2.5 MG: 2.5 SOLUTION RESPIRATORY (INHALATION) at 08:05

## 2019-07-31 RX ADMIN — MIDODRINE HYDROCHLORIDE 10 MG: 5 TABLET ORAL at 18:31

## 2019-07-31 RX ADMIN — SODIUM CHLORIDE, PRESERVATIVE FREE 10 ML: 5 INJECTION INTRAVENOUS at 21:41

## 2019-07-31 RX ADMIN — NOREPINEPHRINE BITARTRATE 0.02 MCG/KG/MIN: 1 INJECTION INTRAVENOUS at 02:33

## 2019-07-31 RX ADMIN — INSULIN ASPART 2 UNITS: 100 INJECTION, SOLUTION INTRAVENOUS; SUBCUTANEOUS at 06:52

## 2019-07-31 RX ADMIN — ALBUTEROL SULFATE 2.5 MG: 2.5 SOLUTION RESPIRATORY (INHALATION) at 12:28

## 2019-07-31 RX ADMIN — MIDODRINE HYDROCHLORIDE 10 MG: 5 TABLET ORAL at 12:08

## 2019-07-31 RX ADMIN — TOLVAPTAN 7.5 MG: 30 TABLET ORAL at 12:08

## 2019-07-31 RX ADMIN — BISACODYL 10 MG: 10 SUPPOSITORY RECTAL at 08:36

## 2019-07-31 RX ADMIN — ENOXAPARIN SODIUM 40 MG: 40 INJECTION SUBCUTANEOUS at 08:36

## 2019-07-31 RX ADMIN — SODIUM CHLORIDE, PRESERVATIVE FREE 10 ML: 5 INJECTION INTRAVENOUS at 21:39

## 2019-07-31 RX ADMIN — LEVOTHYROXINE SODIUM ANHYDROUS 125 MCG: 100 INJECTION, POWDER, LYOPHILIZED, FOR SOLUTION INTRAVENOUS at 05:43

## 2019-07-31 RX ADMIN — SODIUM CHLORIDE 125 MG: 9 INJECTION, SOLUTION INTRAVENOUS at 16:30

## 2019-07-31 RX ADMIN — ALBUTEROL SULFATE 2.5 MG: 2.5 SOLUTION RESPIRATORY (INHALATION) at 01:09

## 2019-07-31 RX ADMIN — SODIUM CHLORIDE, PRESERVATIVE FREE 10 ML: 5 INJECTION INTRAVENOUS at 08:36

## 2019-08-01 PROBLEM — K56.609 SBO (SMALL BOWEL OBSTRUCTION) (HCC): Chronic | Status: RESOLVED | Noted: 2019-07-16 | Resolved: 2019-08-01

## 2019-08-01 PROBLEM — E87.1 HYPONATREMIA: Status: ACTIVE | Noted: 2019-08-01

## 2019-08-01 PROBLEM — D50.9 IRON DEFICIENCY ANEMIA: Status: ACTIVE | Noted: 2017-09-15

## 2019-08-01 PROBLEM — E43 SEVERE PROTEIN-CALORIE MALNUTRITION (HCC): Status: ACTIVE | Noted: 2019-04-12

## 2019-08-01 LAB
ALBUMIN SERPL-MCNC: 4 G/DL (ref 3.5–5.2)
ALBUMIN/GLOB SERPL: 0.9 G/DL
ALP SERPL-CCNC: 113 U/L (ref 39–117)
ALT SERPL W P-5'-P-CCNC: 18 U/L (ref 1–41)
ANION GAP SERPL CALCULATED.3IONS-SCNC: 12 MMOL/L (ref 5–15)
ANION GAP SERPL CALCULATED.3IONS-SCNC: 9 MMOL/L (ref 5–15)
AST SERPL-CCNC: 31 U/L (ref 1–40)
BASOPHILS # BLD AUTO: 0.08 10*3/MM3 (ref 0–0.2)
BASOPHILS NFR BLD AUTO: 0.7 % (ref 0–1.5)
BILIRUB SERPL-MCNC: 0.2 MG/DL (ref 0.2–1.2)
BUN BLD-MCNC: 12 MG/DL (ref 8–23)
BUN BLD-MCNC: 12 MG/DL (ref 8–23)
BUN/CREAT SERPL: 23.5 (ref 7–25)
BUN/CREAT SERPL: 23.5 (ref 7–25)
CALCIUM SPEC-SCNC: 10.1 MG/DL (ref 8.6–10.5)
CALCIUM SPEC-SCNC: 10.1 MG/DL (ref 8.6–10.5)
CHLORIDE SERPL-SCNC: 95 MMOL/L (ref 98–107)
CHLORIDE SERPL-SCNC: 96 MMOL/L (ref 98–107)
CO2 SERPL-SCNC: 24 MMOL/L (ref 22–29)
CO2 SERPL-SCNC: 29 MMOL/L (ref 22–29)
CREAT BLD-MCNC: 0.51 MG/DL (ref 0.76–1.27)
CREAT BLD-MCNC: 0.51 MG/DL (ref 0.76–1.27)
DEPRECATED RDW RBC AUTO: 44.5 FL (ref 37–54)
EOSINOPHIL # BLD AUTO: 0.23 10*3/MM3 (ref 0–0.4)
EOSINOPHIL NFR BLD AUTO: 2.1 % (ref 0.3–6.2)
ERYTHROCYTE [DISTWIDTH] IN BLOOD BY AUTOMATED COUNT: 17.5 % (ref 12.3–15.4)
GFR SERPL CREATININE-BSD FRML MDRD: >150 ML/MIN/1.73
GFR SERPL CREATININE-BSD FRML MDRD: >150 ML/MIN/1.73
GLOBULIN UR ELPH-MCNC: 4.3 GM/DL
GLUCOSE BLD-MCNC: 131 MG/DL (ref 65–99)
GLUCOSE BLD-MCNC: 152 MG/DL (ref 65–99)
GLUCOSE BLDC GLUCOMTR-MCNC: 117 MG/DL (ref 70–130)
GLUCOSE BLDC GLUCOMTR-MCNC: 128 MG/DL (ref 70–130)
GLUCOSE BLDC GLUCOMTR-MCNC: 133 MG/DL (ref 70–130)
GLUCOSE BLDC GLUCOMTR-MCNC: 139 MG/DL (ref 70–130)
GLUCOSE BLDC GLUCOMTR-MCNC: 153 MG/DL (ref 70–130)
HCT VFR BLD AUTO: 27.5 % (ref 37.5–51)
HGB BLD-MCNC: 9.4 G/DL (ref 13–17.7)
IMM GRANULOCYTES # BLD AUTO: 0.06 10*3/MM3 (ref 0–0.05)
IMM GRANULOCYTES NFR BLD AUTO: 0.6 % (ref 0–0.5)
LYMPHOCYTES # BLD AUTO: 1.71 10*3/MM3 (ref 0.7–3.1)
LYMPHOCYTES NFR BLD AUTO: 15.9 % (ref 19.6–45.3)
MAGNESIUM SERPL-MCNC: 1.8 MG/DL (ref 1.6–2.4)
MCH RBC QN AUTO: 24.8 PG (ref 26.6–33)
MCHC RBC AUTO-ENTMCNC: 34.2 G/DL (ref 31.5–35.7)
MCV RBC AUTO: 72.6 FL (ref 79–97)
MONOCYTES # BLD AUTO: 1.75 10*3/MM3 (ref 0.1–0.9)
MONOCYTES NFR BLD AUTO: 16.2 % (ref 5–12)
NEUTROPHILS # BLD AUTO: 6.95 10*3/MM3 (ref 1.7–7)
NEUTROPHILS NFR BLD AUTO: 64.5 % (ref 42.7–76)
NRBC BLD AUTO-RTO: 0 /100 WBC (ref 0–0.2)
PHOSPHATE SERPL-MCNC: 3.7 MG/DL (ref 2.5–4.5)
PLATELET # BLD AUTO: 586 10*3/MM3 (ref 140–450)
PMV BLD AUTO: 10.4 FL (ref 6–12)
POTASSIUM BLD-SCNC: 4.4 MMOL/L (ref 3.5–5.2)
POTASSIUM BLD-SCNC: 4.6 MMOL/L (ref 3.5–5.2)
PROT SERPL-MCNC: 8.3 G/DL (ref 6–8.5)
RBC # BLD AUTO: 3.79 10*6/MM3 (ref 4.14–5.8)
SODIUM BLD-SCNC: 132 MMOL/L (ref 136–145)
SODIUM BLD-SCNC: 133 MMOL/L (ref 136–145)
WBC NRBC COR # BLD: 10.78 10*3/MM3 (ref 3.4–10.8)

## 2019-08-01 PROCEDURE — 94760 N-INVAS EAR/PLS OXIMETRY 1: CPT

## 2019-08-01 PROCEDURE — 99024 POSTOP FOLLOW-UP VISIT: CPT | Performed by: SURGERY

## 2019-08-01 PROCEDURE — 84100 ASSAY OF PHOSPHORUS: CPT | Performed by: INTERNAL MEDICINE

## 2019-08-01 PROCEDURE — 85025 COMPLETE CBC W/AUTO DIFF WBC: CPT | Performed by: FAMILY MEDICINE

## 2019-08-01 PROCEDURE — 94799 UNLISTED PULMONARY SVC/PX: CPT

## 2019-08-01 PROCEDURE — 25010000002 NA FERRIC GLUC CPLX PER 12.5 MG: Performed by: INTERNAL MEDICINE

## 2019-08-01 PROCEDURE — 25010000002 ENOXAPARIN PER 10 MG: Performed by: SURGERY

## 2019-08-01 PROCEDURE — 82962 GLUCOSE BLOOD TEST: CPT

## 2019-08-01 PROCEDURE — 83735 ASSAY OF MAGNESIUM: CPT | Performed by: INTERNAL MEDICINE

## 2019-08-01 PROCEDURE — 80053 COMPREHEN METABOLIC PANEL: CPT | Performed by: FAMILY MEDICINE

## 2019-08-01 RX ORDER — SODIUM CHLORIDE 1000 MG
1 TABLET, SOLUBLE MISCELLANEOUS
Status: DISCONTINUED | OUTPATIENT
Start: 2019-08-01 | End: 2019-08-09 | Stop reason: HOSPADM

## 2019-08-01 RX ADMIN — SODIUM CHLORIDE, PRESERVATIVE FREE 10 ML: 5 INJECTION INTRAVENOUS at 20:25

## 2019-08-01 RX ADMIN — SODIUM CHLORIDE, PRESERVATIVE FREE 10 ML: 5 INJECTION INTRAVENOUS at 09:05

## 2019-08-01 RX ADMIN — ALBUTEROL SULFATE 2.5 MG: 2.5 SOLUTION RESPIRATORY (INHALATION) at 13:23

## 2019-08-01 RX ADMIN — BACITRACIN 1 EACH: 500 OINTMENT TOPICAL at 09:04

## 2019-08-01 RX ADMIN — MIDODRINE HYDROCHLORIDE 10 MG: 5 TABLET ORAL at 18:17

## 2019-08-01 RX ADMIN — ALBUTEROL SULFATE 2.5 MG: 2.5 SOLUTION RESPIRATORY (INHALATION) at 19:27

## 2019-08-01 RX ADMIN — LEVOTHYROXINE SODIUM ANHYDROUS 125 MCG: 100 INJECTION, POWDER, LYOPHILIZED, FOR SOLUTION INTRAVENOUS at 06:37

## 2019-08-01 RX ADMIN — PANTOPRAZOLE SODIUM 40 MG: 40 INJECTION, POWDER, FOR SOLUTION INTRAVENOUS at 09:04

## 2019-08-01 RX ADMIN — Medication 1 G: at 18:17

## 2019-08-01 RX ADMIN — BISACODYL 10 MG: 10 SUPPOSITORY RECTAL at 09:28

## 2019-08-01 RX ADMIN — SODIUM CHLORIDE, PRESERVATIVE FREE 10 ML: 5 INJECTION INTRAVENOUS at 20:24

## 2019-08-01 RX ADMIN — SODIUM CHLORIDE 125 MG: 9 INJECTION, SOLUTION INTRAVENOUS at 08:53

## 2019-08-01 RX ADMIN — MIDODRINE HYDROCHLORIDE 10 MG: 5 TABLET ORAL at 06:39

## 2019-08-01 RX ADMIN — ENOXAPARIN SODIUM 40 MG: 40 INJECTION SUBCUTANEOUS at 09:05

## 2019-08-01 RX ADMIN — ALBUTEROL SULFATE 2.5 MG: 2.5 SOLUTION RESPIRATORY (INHALATION) at 07:30

## 2019-08-01 RX ADMIN — Medication 1 G: at 12:02

## 2019-08-01 RX ADMIN — MIDODRINE HYDROCHLORIDE 10 MG: 5 TABLET ORAL at 12:01

## 2019-08-01 NOTE — PROGRESS NOTES
"[facility]  [address] [place] [address]. [address]  T - [phone]  F - [phone]     Progress Note          PATIENT  DEMOGRAPHICS   PATIENT NAME: Emerson Bang                      PHYSICIAN: Ulices Naik MD  : 1960  MRN: [id number]   LOS: 16 days    Patient Care Team:  Cristino He MD as PCP - General  Subjective   SUBJECTIVE   Events reviewed bp  Better, off scopolamine. Off pressor         Objective   OBJECTIVE   Vital Signs  Temp:  [97.7 °F (36.5 °C)-99.2 °F (37.3 °C)] 97.8 °F (36.6 °C)  Heart Rate:  [] 96  Resp:  [14-20] 20  BP: ()/(52-73) 117/62    Flowsheet Rows      First Filed Value   Admission Height  165.1 cm (65\") Documented at 07/15/2019 1952   Admission Weight  49.9 kg (110 lb) Documented at 07/15/2019 1952           I/O last 3 completed shifts:  In: 17396.5 [I.V.:136.5; Other:1416; NG/GT:1060]  Out: 3470 [Urine:3470]    PHYSICAL EXAM    Physical Exam   Constitutional: He is oriented to person, place, and time. He appears well-developed.   HENT:   Head: Normocephalic.   Eyes: Pupils are equal, round, and reactive to light.   Cardiovascular: Normal rate, regular rhythm and normal heart sounds.   Pulmonary/Chest: Effort normal and breath sounds normal.   Abdominal: Soft. Bowel sounds are normal.   Musculoskeletal: He exhibits edema.   Neurological: He is alert and oriented to person, place, and time.       RESULTS   Results Review:    Results from last 7 days   Lab Units 19  0253 19  0015 19  1753  19  0757  19  0835   SODIUM mmol/L 133* 132* 131*   < > 121*   < > 125*   POTASSIUM mmol/L 4.6 4.4 4.7   < > 4.3   < > 4.4   CHLORIDE mmol/L 95* 96* 95*   < > 86*   < > 87*   CO2 mmol/L 29.0 24.0 23.0   < > 24.0   < > 24.0   BUN mg/dL 12 12 13   < > 11   < > 11   CREATININE mg/dL 0.51* 0.51* 0.50*   < > 0.50*   < > 0.52*   CALCIUM mg/dL 10.1 10.1 10.0   < > 8.9   < > 9.4   BILIRUBIN mg/dL 0.2  --   --   --  0.2  --  " 0.2   ALK PHOS U/L 113  --   --   --  102  --  102   ALT (SGPT) U/L 18  --   --   --  13  --  17   AST (SGOT) U/L 31  --   --   --  19  --  21   GLUCOSE mg/dL 152* 131* 123*   < > 118*   < > 136*    < > = values in this interval not displayed.       Estimated Creatinine Clearance: 104.8 mL/min (A) (by C-G formula based on SCr of 0.51 mg/dL (L)).    Results from last 7 days   Lab Units 08/01/19  0253 07/30/19  0751 07/28/19  0532   MAGNESIUM mg/dL 1.8 2.0 1.8   PHOSPHORUS mg/dL 3.7 3.6 3.6       Results from last 7 days   Lab Units 07/29/19  1220   URIC ACID mg/dL 1.8*       Results from last 7 days   Lab Units 08/01/19  0253 07/31/19  0318 07/30/19  0750 07/29/19  0529 07/28/19  0532   WBC 10*3/mm3 10.78 13.77* 9.86 7.69 7.70   HEMOGLOBIN g/dL 9.4* 8.8* 9.7* 10.2* 9.1*   PLATELETS 10*3/mm3 586* 480* 515* 470* 463*               Imaging Results (last 24 hours)     ** No results found for the last 24 hours. **           MEDICATIONS      albuterol 2.5 mg Nebulization Q6H - RT   bacitracin  Topical Daily   bisacodyl 10 mg Rectal Daily   enoxaparin 40 mg Subcutaneous Daily   ferric gluconate (FERRLECIT) 125 mg in sodium chloride 0.9 % 100 mL 125 mg Intravenous Daily   insulin aspart 0-7 Units Subcutaneous 4x Daily AC & at Bedtime   levothyroxine sodium 125 mcg Intravenous Q AM   midodrine 10 mg Oral TID AC   pantoprazole 40 mg Intravenous Q24H   sodium chloride 10 mL Intravenous BID   sodium chloride 10 mL Intravenous BID   sodium chloride 10 mL Intravenous BID          Assessment/Plan   ASSESSMENT / PLAN      Hypotension    Hypothyroidism    Squamous cell carcinoma of pharynx (CMS/HCC)    Anemia    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HCC)    Malignant cachexia (CMS/HCC)    SBO (small bowel obstruction) (CMS/HCC)    1.hyponatremia.  This could be related to underlying malignancy and cannot rule out SIADH.  Also he is getting large volume through TPN plus also tube feeding and unable to correct with salt tablet or saline.    Urine  studies are consistent with siadh. Responded well with samsca. Na 133. Check na daily now. Lower free water to 10cc/hr. Concentrate tpn. Add salt tab 1 gm tid. His urine sodium is elevated. tsh and cortisol normal uric acid low.    2.small bowel obstruction status post laparotomy and lysis of adhesion.  Patient is now on tube feeding.    3.severe protein calorie malnutrition    4.history of hypopharyngeal cancer awaiting chemo and radiation once stable    5.urinary retention now has Palomo in place    6. Hypotension - off levophed and scopolamine. On midodrine to 10mg tid                This document has been electronically signed by Ulices Naik MD on August 1, 2019 11:18 AM

## 2019-08-01 NOTE — PLAN OF CARE
Problem: Patient Care Overview  Goal: Plan of Care Review  Outcome: Ongoing (interventions implemented as appropriate)   08/01/19 0557   Coping/Psychosocial   Plan of Care Reviewed With patient   Plan of Care Review   Progress improving   OTHER   Outcome Summary Levophed drip D/C'd; Tube feed tolerated well at goal rate of 40; Palomo with adequate output; Left IJ; V/S stable; Will continue to monitor.        Problem: Fall Risk (Adult)  Goal: Absence of Fall  Outcome: Ongoing (interventions implemented as appropriate)      Problem: Skin Injury Risk (Adult)  Goal: Skin Health and Integrity  Outcome: Ongoing (interventions implemented as appropriate)      Problem: Surgery Nonspecified (Adult)  Goal: Signs and Symptoms of Listed Potential Problems Will be Absent, Minimized or Managed (Surgery Nonspecified)  Outcome: Ongoing (interventions implemented as appropriate)      Problem: Airway, Artificial (Adult)  Goal: Signs and Symptoms of Listed Potential Problems Will be Absent, Minimized or Managed (Airway, Artificial)  Outcome: Ongoing (interventions implemented as appropriate)      Problem: Nutrition, Enteral (Adult)  Goal: Signs and Symptoms of Listed Potential Problems Will be Absent, Minimized or Managed (Nutrition, Enteral)  Outcome: Ongoing (interventions implemented as appropriate)

## 2019-08-01 NOTE — PROGRESS NOTES
FAMILY MEDICINE DAILY PROGRESS NOTE  NAME: Emerson Bang  : 1960  MRN: [id number]     LOS: 16 days     PROVIDER OF SERVICE: Bharathi Daily III, MD    Chief Complaint: Hypotension    Subjective:     Interval History:  History taken from: patient  No acute overnight events.  Patient did require vasopressors overnight.  Patient with successful bowel movement and urinary output at goal.  Patient with full orientation, limited in verbal response by tracheostomy collar.    Feeding: N.p.o., Peptamen 1.5 kg at 40 mL's per hour, free water 10 cc/h  Analgesia: Acetaminophen  Sedation: N/A  Thromboembolic prophylaxis: Enoxaparin  Head of bed elevation: 30 degrees  Ulcer prophylaxis: Pantoprazole 40 mg IV  Glycemic control: Sliding scale insulin  Spontaneous breathing trial: N/A  Bowel regimen: Bisacodyl 10 mg PA daily  Indwelling catheter removal: Triple-lumen central venous catheter, G-tube, Palomo  De-escalation of antibiotics: N/A      Review of Systems:   Review of Systems   Unable to perform ROS: Other (Acuity of condition with tracheostomy collar)   Constitutional: Negative for activity change, appetite change, chills, diaphoresis and fever.   HENT: Negative for congestion, rhinorrhea, sinus pressure, sinus pain and sore throat.    Eyes: Negative for visual disturbance.   Respiratory: Negative for apnea, cough, choking, chest tightness, shortness of breath and wheezing.    Cardiovascular: Negative for chest pain, palpitations and leg swelling.   Gastrointestinal: Negative for abdominal distention, abdominal pain, blood in stool, constipation, diarrhea, nausea and vomiting.   Genitourinary: Negative for difficulty urinating, dysuria, frequency, hematuria and urgency.   Musculoskeletal: Negative for arthralgias, back pain, joint swelling, myalgias and neck pain.   Skin: Negative for color change, pallor, rash and wound.   Neurological: Negative for dizziness, weakness, numbness and headaches.    Psychiatric/Behavioral: Negative for agitation and behavioral problems.       Objective:     Vital Signs  Temp:  [97.7 °F (36.5 °C)-99.2 °F (37.3 °C)] 97.9 °F (36.6 °C)  Heart Rate:  [] 85  Resp:  [14-20] 20  BP: ()/(52-73) 127/66    Physical Exam  Physical Exam   Constitutional: He is oriented to person, place, and time. No distress.   Thin cachectic patient of  descent in no acute cardiopulmonary distress   HENT:   Head: Normocephalic and atraumatic.   Nose: Nose normal.   Tracheostomy collar with condensation placed right of center intact and clean at this time.   Eyes: Conjunctivae and EOM are normal. Right eye exhibits no discharge. Left eye exhibits no discharge. No scleral icterus.   Neck: Normal range of motion. Neck supple. No thyromegaly present.   Cardiovascular: Normal rate, regular rhythm, normal heart sounds and intact distal pulses. Exam reveals no gallop and no friction rub.   No murmur heard.  Pulmonary/Chest: Effort normal and breath sounds normal. No respiratory distress. He has no wheezes. He has no rales. He exhibits no tenderness.   Abdominal: Soft. Bowel sounds are normal. He exhibits no distension and no mass. There is no tenderness. There is no guarding.   Midline incision without redness or irritation abdominal musculature able to be palpated, no distention noted.   Musculoskeletal: Normal range of motion. He exhibits no edema, tenderness or deformity.   Lymphadenopathy:     He has no cervical adenopathy.   Neurological: He is alert and oriented to person, place, and time. No cranial nerve deficit.   Skin: Skin is warm and dry. Capillary refill takes 2 to 3 seconds. He is not diaphoretic.       Medication Review    Current Facility-Administered Medications:   •  acetaminophen (TYLENOL) 160 MG/5ML solution 650 mg, 650 mg, Oral, Q6H PRN, Flynn Harvey MD  •  albuterol (PROVENTIL) nebulizer solution 0.083% 2.5 mg/3mL, 2.5 mg, Nebulization, Q6H - RT, Cristino Holland MD,  2.5 mg at 19 1323  •  bacitracin ointment, , Topical, Daily, Cristino Holland MD, 1 each at 19 0904  •  bisacodyl (DULCOLAX) suppository 10 mg, 10 mg, Rectal, Daily, Parminder Kc MD, 10 mg at 19 0928  •  enalaprilat (VASOTEC) injection 0.625 mg, 0.625 mg, Intravenous, Q6H PRN, Parminder Kc MD  •  enoxaparin (LOVENOX) syringe 40 mg, 40 mg, Subcutaneous, Daily, Parminder Kc MD, 40 mg at 19 0905  •  ferric gluconate (FERRLECIT) 125 mg in sodium chloride 0.9 % 100 mL, 125 mg, Intravenous, Daily, Krysten Martínez MD, 125 mg at 19 0853  •  insulin aspart (novoLOG) injection 0-7 Units, 0-7 Units, Subcutaneous, 4x Daily AC & at Bedtime, Rolando, Niles Williamson MD, 2 Units at 19 0652  •  levothyroxine sodium injection 125 mcg, 125 mcg, Intravenous, Q AM, Cristino Holland MD, 125 mcg at 19 0637  •  midodrine (PROAMATINE) tablet 10 mg, 10 mg, Oral, TID AC, Ulices Naik MD, 10 mg at 19 1201  •  [] HYDROmorphone (DILAUDID) injection 1 mg, 1 mg, Intravenous, Q3H PRN, 1 mg at 19 0050 **AND** naloxone (NARCAN) injection 0.1 mg, 0.1 mg, Intravenous, Q5 Min PRN, Parminder Kc MD  •  ondansetron (ZOFRAN) injection 4 mg, 4 mg, Intravenous, Q6H PRN, Parminder Kc MD, 4 mg at 19 0835  •  pantoprazole (PROTONIX) injection 40 mg, 40 mg, Intravenous, Q24H, Parminder Kc MD, 40 mg at 19 0904  •  [COMPLETED] Insert peripheral IV, , , Once **AND** sodium chloride 0.9 % flush 10 mL, 10 mL, Intravenous, PRN, Pillo Cardenas MD, 10 mL at 19  •  sodium chloride 0.9 % flush 10 mL, 10 mL, Intravenous, BID, Jose Antonio Leiva MD, 10 mL at 19  •  sodium chloride 0.9 % flush 10 mL, 10 mL, Intravenous, BID, Jose Antonio Leiva MD, 10 mL at 19  •  sodium chloride 0.9 % flush 10 mL, 10 mL, Intravenous, BID, Jose Antonio Leiva MD, 10 mL at 19  •  sodium chloride tablet 1 g, 1 g, Oral, TID With Meals, Ulices Naik MD, 1 g at  08/01/19 1202     Diagnostic Data    Lab Results (last 24 hours)     Procedure Component Value Units Date/Time    POC Glucose Once [[id number]]  (Abnormal) Collected:  08/01/19 1159    Specimen:  Blood Updated:  08/01/19 1222     Glucose 139 mg/dL      Comment: RN NotifiedOperator: [id number] MILIND CORTEZMeter ID: [id number]       POC Glucose Once [[id number]]  (Normal) Collected:  08/01/19 0636    Specimen:  Blood Updated:  08/01/19 0656     Glucose 128 mg/dL      Comment: RN NotifiedOperator: [id number] RAHAT PERSAUDMeter ID: [id number]       Comprehensive Metabolic Panel [[id number]]  (Abnormal) Collected:  08/01/19 0253    Specimen:  Blood Updated:  08/01/19 0326     Glucose 152 mg/dL      BUN 12 mg/dL      Creatinine 0.51 mg/dL      Sodium 133 mmol/L      Potassium 4.6 mmol/L      Chloride 95 mmol/L      CO2 29.0 mmol/L      Calcium 10.1 mg/dL      Total Protein 8.3 g/dL      Albumin 4.00 g/dL      ALT (SGPT) 18 U/L      AST (SGOT) 31 U/L      Alkaline Phosphatase 113 U/L      Total Bilirubin 0.2 mg/dL      eGFR  African Amer >150 mL/min/1.73      Globulin 4.3 gm/dL      A/G Ratio 0.9 g/dL      BUN/Creatinine Ratio 23.5     Anion Gap 9.0 mmol/L     Narrative:       GFR Normal >60  Chronic Kidney Disease <60  Kidney Failure <15    Magnesium [[id number]]  (Normal) Collected:  08/01/19 0253    Specimen:  Blood Updated:  08/01/19 0325     Magnesium 1.8 mg/dL     Phosphorus [[id number]]  (Normal) Collected:  08/01/19 0253    Specimen:  Blood Updated:  08/01/19 0325     Phosphorus 3.7 mg/dL     CBC & Differential [[id number]] Collected:  08/01/19 0253    Specimen:  Blood Updated:  08/01/19 0302    Narrative:       The following orders were created for panel order CBC & Differential.  Procedure                               Abnormality         Status                     ---------                               -----------         ------                     Scan Slide[[id number]]                                                                   CBC Auto Differential[[id number]]        Abnormal            Final result                 Please view results for these tests on the individual orders.    CBC Auto Differential [[id number]]  (Abnormal) Collected:  08/01/19 0253    Specimen:  Blood Updated:  08/01/19 0302     WBC 10.78 10*3/mm3      RBC 3.79 10*6/mm3      Hemoglobin 9.4 g/dL      Hematocrit 27.5 %      MCV 72.6 fL      MCH 24.8 pg      MCHC 34.2 g/dL      RDW 17.5 %      RDW-SD 44.5 fl      MPV 10.4 fL      Platelets 586 10*3/mm3      Neutrophil % 64.5 %      Lymphocyte % 15.9 %      Monocyte % 16.2 %      Eosinophil % 2.1 %      Basophil % 0.7 %      Immature Grans % 0.6 %      Neutrophils, Absolute 6.95 10*3/mm3      Lymphocytes, Absolute 1.71 10*3/mm3      Monocytes, Absolute 1.75 10*3/mm3      Eosinophils, Absolute 0.23 10*3/mm3      Basophils, Absolute 0.08 10*3/mm3      Immature Grans, Absolute 0.06 10*3/mm3      nRBC 0.0 /100 WBC     Basic Metabolic Panel [[id number]]  (Abnormal) Collected:  08/01/19 0015    Specimen:  Blood Updated:  08/01/19 0059     Glucose 131 mg/dL      BUN 12 mg/dL      Creatinine 0.51 mg/dL      Sodium 132 mmol/L      Potassium 4.4 mmol/L      Chloride 96 mmol/L      CO2 24.0 mmol/L      Calcium 10.1 mg/dL      eGFR  African Amer >150 mL/min/1.73      BUN/Creatinine Ratio 23.5     Anion Gap 12.0 mmol/L     Narrative:       GFR Normal >60  Chronic Kidney Disease <60  Kidney Failure <15    POC Glucose Once [[id number]]  (Abnormal) Collected:  07/31/19 2146    Specimen:  Blood Updated:  07/31/19 2158     Glucose 132 mg/dL      Comment: RN NotifiedOperator: [id number] RAHAT JESSICAMeter ID: [id number]       Basic Metabolic Panel [[id number]]  (Abnormal) Collected:  07/31/19 1753    Specimen:  Blood Updated:  07/31/19 1832     Glucose 123 mg/dL      BUN 13 mg/dL      Creatinine 0.50 mg/dL      Sodium 131 mmol/L      Potassium 4.7 mmol/L      Chloride 95 mmol/L      CO2 23.0 mmol/L      Calcium 10.0  mg/dL      eGFR  African Amer >150 mL/min/1.73      BUN/Creatinine Ratio 26.0     Anion Gap 13.0 mmol/L     Narrative:       GFR Normal >60  Chronic Kidney Disease <60  Kidney Failure <15            I reviewed the patient's new clinical results.    Assessment/Plan:     Active Hospital Problems    Diagnosis POA   • **Hypotension [I95.9] Yes     Patient is continue to maintain blood pressure stability on tube feeds and reduced fluid intake from nephrology secondary to hyponatremia requiring desmopressin analog.  We will continue to monitor off of vasopressors.  Addition of oral alpha agonist therapy per nephrology will titrate to effect.  -Nephrology consult, Dr. Naik, recommendations appreciated  - Midodrine 10 mg p.o. 3 times daily AC  -Vital signs per hospital ICU protocol     • Hyponatremia [E87.1] Yes     Sodium 133, sodium tablets added by nephrology service.  We will continue to monitor sodium status post desmopressin analog activity.  -Nephrology consult, Dr. Naik, recommendations appreciated  -Restrict free water to 10 cc/h  - Sodium tablet 1 g 3 times daily.     • Severe protein-calorie malnutrition (CMS/HCC) [E43] Unknown     Followed by dietitian.  Tube feeds of 45 mL's per hour.  Reduction of free water with tube feeds to 10 cc/h secondary to hyponatremia.  - Dietary consult, recommendations appreciated  -Nephrology consult, Dr. Naik, recommendations appreciated  - Peptamen 1.5 kcal tube feeds at 45 mL's per hour with 10 cc/h free water.       • 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HCC) [D58.2] Yes     Trending H&H daily with hematology oncology following, Dr. Martínez.  -Daily CBC     • Iron deficiency anemia [D50.9] Yes     Followed by hematology oncology.  Patient with iron infusions intravenously.  H&H 9.4/27, stable at this time.  No active signs of bleed.  We will continue to follow with daily CBC.  -Hematology consult, Dr. Martínez, recommendations appreciated  -Ferrous gluconate 125 mg IV daily     •  Squamous cell carcinoma of pharynx (CMS/HCC) [C14.0] Yes     Not acutely managed during hospital visit, however is primary cause for severe protein malnutrition.  Currently stable without signs of extension.  -We will continue to follow along with hematology/oncology, Dr. Martínez, recommendations appreciated     • Hypothyroidism [E03.9] Yes     Resume home medication of thyroid supplementation.  Levothyroxine 125 mcg IV daily           DVT prophylaxis: Enoxaparin 40 mg subcutaneous daily  Code Status and Medical Interventions:   Ordered at: 07/17/19 0683     Level Of Support Discussed With:    Patient     Code Status:    CPR     Medical Interventions (Level of Support Prior to Arrest):    Full       Plan for disposition: Anticipated discharge in 72 to 96 hours         This document has been electronically signed by Bharathi Daily III, MD on August 1, 2019 2:44 PM

## 2019-08-01 NOTE — PROGRESS NOTES
GENERAL SURGERY PROGRESS NOTE     LOS: 16 days     Chief Complaint:     Malnutrition     Interval History:     Mr. Bang, 60 YO man, POD 15 s/p lysis of adhesions is doing well. He had a BM yesterday. He is tolerating TF well at 40 cc/hr. He has been off Levophed since 2100 last night and has been maintaining BP.     F NPO, TF at 40 cc/hr with 1.5 kcal per cc  A Narcan  S NA  T Levonox  H 30 degrees  U Protonix  G 152  S NA  B 1 BM  I Urinary catheter, G tube, trach, IV   D NA      Medication Review:     albuterol 2.5 mg Nebulization Q6H - RT   bacitracin  Topical Daily   bisacodyl 10 mg Rectal Daily   enoxaparin 40 mg Subcutaneous Daily   ferric gluconate (FERRLECIT) 125 mg in sodium chloride 0.9 % 100 mL 125 mg Intravenous Daily   insulin aspart 0-7 Units Subcutaneous 4x Daily AC & at Bedtime   levothyroxine sodium 125 mcg Intravenous Q AM   midodrine 10 mg Oral TID AC   pantoprazole 40 mg Intravenous Q24H   sodium chloride 10 mL Intravenous BID   sodium chloride 10 mL Intravenous BID   sodium chloride 10 mL Intravenous BID         norepinephrine 0.02-0.3 mcg/kg/min Last Rate: Stopped (07/31/19 2131)   Objective     Vital Signs:  Temp:  [97.7 °F (36.5 °C)-99.2 °F (37.3 °C)] 97.8 °F (36.6 °C)  Heart Rate:  [] 94  Resp:  [13-20] 14  BP: ()/(49-72) 106/64    Intake/Output Summary (Last 24 hours) at 8/1/2019 0532  Last data filed at 8/1/2019 0400  Gross per 24 hour   Intake 1681 ml   Output 2570 ml   Net -889 ml       Physical Exam   Constitutional: He appears well-developed. No distress.   Cardiovascular: Normal rate, regular rhythm, normal heart sounds and intact distal pulses.   Pulmonary/Chest: Effort normal and breath sounds normal.   Abdominal: Soft. Bowel sounds are normal. He exhibits no distension. There is no tenderness.   Neurological: He is alert.   Skin: Skin is warm and dry. He is not diaphoretic.   Psychiatric: He has a normal mood and affect. His behavior is normal.   Nursing note and  vitals reviewed.      Results Review:    Results from last 7 days   Lab Units 08/01/19  0253 08/01/19  0015 07/31/19  1753   SODIUM mmol/L 133* 132* 131*   POTASSIUM mmol/L 4.6 4.4 4.7   CHLORIDE mmol/L 95* 96* 95*   CO2 mmol/L 29.0 24.0 23.0   BUN mg/dL 12 12 13   CREATININE mg/dL 0.51* 0.51* 0.50*   GLUCOSE mg/dL 152* 131* 123*   CALCIUM mg/dL 10.1 10.1 10.0     Results from last 7 days   Lab Units 08/01/19  0253 07/31/19  0318 07/30/19  0750   WBC 10*3/mm3 10.78 13.77* 9.86   HEMOGLOBIN g/dL 9.4* 8.8* 9.7*   HEMATOCRIT % 27.5* 25.0* 27.2*   PLATELETS 10*3/mm3 586* 480* 515*       Assessment:    Hypotension    Hypothyroidism    Squamous cell carcinoma of pharynx (CMS/HCC)    Anemia    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HCC)    Malignant cachexia (CMS/HCC)    SBO (small bowel obstruction) (CMS/HCC)      Pt is progressing well as they have gotten off Levophed and been able to maintain BP.    Plan:    Tube feeds are at goal  BM positive- no distension  Staples out  Will gladly see as OP once discharged and anything other surgical issue  that arises while in hospital          This document has been electronically signed by Gege Mead, Medical Student on August 1, 2019 5:32 AM

## 2019-08-01 NOTE — PROGRESS NOTES
Adult Nutrition  Assessment    Patient Name:  Emerson Bang  YOB: 1960  MRN: [id number]  Admit Date:  7/15/2019    Assessment Date:  8/1/2019    Comments:  Per MD notes pt has not had levophed since last night. Peptamen 1.5 at 40ml/hr- nephrology decreased flushes to 10ml/hr- goal rate for TF is 45ml/hr. Na 133L, alb 4.0. SSI and salt tablets TID in place. Pt wt dropped last from 120s 7/24 to 7/30 to 105# last night. Admit wt  107#. Recommend increase TF to goal rate 45ml/hr as tolerated- will consider attempting bolus TF after t/f out of CCU if pt is wanting to try to go back to bolus Tf at home. RD to monitor hospital course and make recs as indicated.     Reason for Assessment     Row Name 08/01/19 1349          Reason for Assessment    Reason For Assessment  follow-up protocol     Diagnosis  gastrointestinal disease     Identified At Risk by Screening Criteria  MST SCORE 2+;BMI             Labs/Tests/Procedures/Meds     Row Name 08/01/19 1349          Labs/Procedures/Meds    Lab Results Reviewed  reviewed     Lab Results Comments  Glu 128/139, Na 133L, Alb 4.0, triglyc and platelets elev        Diagnostic Tests/Procedures    Diagnostic Test/Procedure Reviewed  reviewed        Medications    Pertinent Medications Reviewed  reviewed     Pertinent Medications Comments  SSI, salt tablet TID             Nutrition Prescription Ordered     Row Name 08/01/19 1350          Nutrition Prescription PO    Current PO Diet  NPO        Nutrition Prescription EN    Enteral Route  Gastrostomy     Product  Peptamen 1.5 (Vital 1.5)     TF Delivery Method  Continuous     Continuous TF Goal Rate (mL/hr)  45 mL/hr     Continuous TF Current Rate (mL/hr)  40 mL/hr     Water flush (mL)   10 mL     Water Flush Frequency  Per hour                 Electronically signed by:  Alexia Bowden RD  08/01/19 1:52 PM

## 2019-08-01 NOTE — PLAN OF CARE
Problem: Patient Care Overview  Goal: Discharge Needs Assessment  Outcome: Ongoing (interventions implemented as appropriate)      Problem: Nutrition, Enteral (Adult)  Goal: Signs and Symptoms of Listed Potential Problems Will be Absent, Minimized or Managed (Nutrition, Enteral)  Outcome: Ongoing (interventions implemented as appropriate)

## 2019-08-02 LAB
ANION GAP SERPL CALCULATED.3IONS-SCNC: 13 MMOL/L (ref 5–15)
BASOPHILS # BLD AUTO: 0.07 10*3/MM3 (ref 0–0.2)
BASOPHILS NFR BLD AUTO: 0.8 % (ref 0–1.5)
BUN BLD-MCNC: 16 MG/DL (ref 8–23)
BUN/CREAT SERPL: 28.1 (ref 7–25)
CALCIUM SPEC-SCNC: 9.6 MG/DL (ref 8.6–10.5)
CHLORIDE SERPL-SCNC: 94 MMOL/L (ref 98–107)
CO2 SERPL-SCNC: 25 MMOL/L (ref 22–29)
CREAT BLD-MCNC: 0.57 MG/DL (ref 0.76–1.27)
DEPRECATED RDW RBC AUTO: 43.9 FL (ref 37–54)
EOSINOPHIL # BLD AUTO: 0.28 10*3/MM3 (ref 0–0.4)
EOSINOPHIL NFR BLD AUTO: 3.3 % (ref 0.3–6.2)
ERYTHROCYTE [DISTWIDTH] IN BLOOD BY AUTOMATED COUNT: 17.3 % (ref 12.3–15.4)
GFR SERPL CREATININE-BSD FRML MDRD: >150 ML/MIN/1.73
GLUCOSE BLD-MCNC: 136 MG/DL (ref 65–99)
GLUCOSE BLDC GLUCOMTR-MCNC: 125 MG/DL (ref 70–130)
HCT VFR BLD AUTO: 23.9 % (ref 37.5–51)
HGB BLD-MCNC: 8.3 G/DL (ref 13–17.7)
IMM GRANULOCYTES # BLD AUTO: 0.04 10*3/MM3 (ref 0–0.05)
IMM GRANULOCYTES NFR BLD AUTO: 0.5 % (ref 0–0.5)
LYMPHOCYTES # BLD AUTO: 1.41 10*3/MM3 (ref 0.7–3.1)
LYMPHOCYTES NFR BLD AUTO: 16.6 % (ref 19.6–45.3)
MCH RBC QN AUTO: 25.1 PG (ref 26.6–33)
MCHC RBC AUTO-ENTMCNC: 34.7 G/DL (ref 31.5–35.7)
MCV RBC AUTO: 72.2 FL (ref 79–97)
MONOCYTES # BLD AUTO: 1.68 10*3/MM3 (ref 0.1–0.9)
MONOCYTES NFR BLD AUTO: 19.8 % (ref 5–12)
NEUTROPHILS # BLD AUTO: 4.99 10*3/MM3 (ref 1.7–7)
NEUTROPHILS NFR BLD AUTO: 59 % (ref 42.7–76)
NRBC BLD AUTO-RTO: 0 /100 WBC (ref 0–0.2)
PLATELET # BLD AUTO: 574 10*3/MM3 (ref 140–450)
PMV BLD AUTO: 11 FL (ref 6–12)
POTASSIUM BLD-SCNC: 4.8 MMOL/L (ref 3.5–5.2)
RBC # BLD AUTO: 3.31 10*6/MM3 (ref 4.14–5.8)
SODIUM BLD-SCNC: 132 MMOL/L (ref 136–145)
WBC NRBC COR # BLD: 8.47 10*3/MM3 (ref 3.4–10.8)

## 2019-08-02 PROCEDURE — 25010000002 NA FERRIC GLUC CPLX PER 12.5 MG: Performed by: INTERNAL MEDICINE

## 2019-08-02 PROCEDURE — 94799 UNLISTED PULMONARY SVC/PX: CPT

## 2019-08-02 PROCEDURE — 25010000002 ENOXAPARIN PER 10 MG: Performed by: SURGERY

## 2019-08-02 PROCEDURE — 80048 BASIC METABOLIC PNL TOTAL CA: CPT | Performed by: INTERNAL MEDICINE

## 2019-08-02 PROCEDURE — 94760 N-INVAS EAR/PLS OXIMETRY 1: CPT

## 2019-08-02 PROCEDURE — 82962 GLUCOSE BLOOD TEST: CPT

## 2019-08-02 PROCEDURE — 85025 COMPLETE CBC W/AUTO DIFF WBC: CPT | Performed by: FAMILY MEDICINE

## 2019-08-02 PROCEDURE — 99231 SBSQ HOSP IP/OBS SF/LOW 25: CPT | Performed by: INTERNAL MEDICINE

## 2019-08-02 RX ADMIN — SODIUM CHLORIDE, PRESERVATIVE FREE 10 ML: 5 INJECTION INTRAVENOUS at 09:05

## 2019-08-02 RX ADMIN — ALBUTEROL SULFATE 2.5 MG: 2.5 SOLUTION RESPIRATORY (INHALATION) at 12:32

## 2019-08-02 RX ADMIN — SODIUM CHLORIDE 125 MG: 9 INJECTION, SOLUTION INTRAVENOUS at 14:01

## 2019-08-02 RX ADMIN — MIDODRINE HYDROCHLORIDE 10 MG: 5 TABLET ORAL at 18:00

## 2019-08-02 RX ADMIN — SODIUM CHLORIDE, PRESERVATIVE FREE 10 ML: 5 INJECTION INTRAVENOUS at 21:55

## 2019-08-02 RX ADMIN — ALBUTEROL SULFATE 2.5 MG: 2.5 SOLUTION RESPIRATORY (INHALATION) at 18:48

## 2019-08-02 RX ADMIN — SODIUM CHLORIDE, PRESERVATIVE FREE 10 ML: 5 INJECTION INTRAVENOUS at 09:04

## 2019-08-02 RX ADMIN — Medication 1 G: at 13:16

## 2019-08-02 RX ADMIN — BACITRACIN: 500 OINTMENT TOPICAL at 09:05

## 2019-08-02 RX ADMIN — Medication 1 G: at 18:00

## 2019-08-02 RX ADMIN — SODIUM CHLORIDE, PRESERVATIVE FREE 10 ML: 5 INJECTION INTRAVENOUS at 21:54

## 2019-08-02 RX ADMIN — MIDODRINE HYDROCHLORIDE 10 MG: 5 TABLET ORAL at 13:16

## 2019-08-02 RX ADMIN — PANTOPRAZOLE SODIUM 40 MG: 40 INJECTION, POWDER, FOR SOLUTION INTRAVENOUS at 09:05

## 2019-08-02 RX ADMIN — MIDODRINE HYDROCHLORIDE 10 MG: 5 TABLET ORAL at 09:04

## 2019-08-02 RX ADMIN — ENOXAPARIN SODIUM 40 MG: 40 INJECTION SUBCUTANEOUS at 09:06

## 2019-08-02 RX ADMIN — LEVOTHYROXINE SODIUM ANHYDROUS 125 MCG: 100 INJECTION, POWDER, LYOPHILIZED, FOR SOLUTION INTRAVENOUS at 05:23

## 2019-08-02 RX ADMIN — Medication 1 G: at 09:04

## 2019-08-02 RX ADMIN — ALBUTEROL SULFATE 2.5 MG: 2.5 SOLUTION RESPIRATORY (INHALATION) at 07:10

## 2019-08-02 RX ADMIN — SODIUM CHLORIDE, PRESERVATIVE FREE 10 ML: 5 INJECTION INTRAVENOUS at 09:06

## 2019-08-02 NOTE — PROGRESS NOTES
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  Patient was found to have a high-grade small bowel obstruction and underwent surgery by Dr. Kc for lysis of adhesions (7/17/2019).  The patient required intensive care secondary to being hemodynamically unstable.  Patient was started on TPN for severe protein caloric malnutrition.  Patient was started on Samsca for hyponatremia.    Review of Systems   Constitutional: Negative for activity change and fatigue.   HENT: Negative for ear pain and sore throat.    Eyes: Negative for pain and discharge.   Respiratory: Negative for cough and shortness of breath.    Cardiovascular: Negative for chest pain and palpitations.   Gastrointestinal: Negative for abdominal pain and nausea.   Endocrine: Negative for cold intolerance and heat intolerance.   Genitourinary: Negative for difficulty urinating and dysuria.   Musculoskeletal: Negative for arthralgias and gait problem.   Skin: Negative for color change and rash.   Neurological: Negative for dizziness and weakness.   Psychiatric/Behavioral: Negative for agitation and confusion.        Objective    Temp:  [96.5 °F (35.8 °C)-98.1 °F (36.7 °C)] 96.9 °F (36.1 °C)  Heart Rate:  [] 92  Resp:  [17-18] 18  BP: ()/(52-75) 100/58    Physical Exam   Constitutional: He is oriented to person, place, and time. He appears well-developed. He appears cachectic.   HENT:   Head: Normocephalic and atraumatic.   Tracheostomy collar     Eyes: EOM are normal. Pupils are equal, round, and reactive to light.   Neck: Normal range of motion. Neck supple.   Cardiovascular: Normal rate and regular rhythm.   Pulmonary/Chest: Effort normal and breath sounds normal.   Abdominal: Soft. Bowel sounds are normal.   Musculoskeletal: Normal range of motion.   Neurological: He is alert and oriented to person, place, and time.   Skin: Skin is warm and dry.   Midline incision without redness or erythema.     Psychiatric: He has a normal mood and affect. His behavior is normal.     Results Review:  I have reviewed the labs, radiology results, and diagnostic studies.    Laboratory Data:   Results from last 7 days   Lab Units 08/02/19  1005 08/01/19  0253 08/01/19  0015  07/29/19  0757  07/27/19  0835   SODIUM mmol/L 132* 133* 132*   < > 121*   < > 125*   POTASSIUM mmol/L 4.8 4.6 4.4   < > 4.3   < > 4.4   CHLORIDE mmol/L 94* 95* 96*   < > 86*   < > 87*   CO2 mmol/L 25.0 29.0 24.0   < > 24.0   < > 24.0   BUN mg/dL 16 12 12   < > 11   < > 11   CREATININE mg/dL 0.57* 0.51* 0.51*   < > 0.50*   < > 0.52*   GLUCOSE mg/dL 136* 152* 131*   < > 118*   < > 136*   CALCIUM mg/dL 9.6 10.1 10.1   < > 8.9   < > 9.4   BILIRUBIN mg/dL  --  0.2  --   --  0.2  --  0.2   ALK PHOS U/L  --  113  --   --  102  --  102   ALT (SGPT) U/L  --  18  --   --  13  --  17   AST (SGOT) U/L  --  31  --   --  19  --  21   ANION GAP mmol/L 13.0 9.0 12.0   < > 11.0   < > 14.0    < > = values in this interval not displayed.     Estimated Creatinine Clearance: 101.6 mL/min (A) (by C-G formula based on SCr of 0.57 mg/dL (L)).  Results from last 7 days   Lab Units 08/01/19  0253 07/30/19  0751 07/28/19  0532   MAGNESIUM mg/dL 1.8 2.0 1.8   PHOSPHORUS mg/dL 3.7 3.6 3.6     Results from last 7 days   Lab Units 07/29/19  1220   URIC ACID mg/dL 1.8*     Results from last 7 days   Lab Units 08/02/19  0534 08/01/19  0253 07/31/19  0318 07/30/19  0750 07/29/19  0529   WBC 10*3/mm3 8.47 10.78 13.77* 9.86 7.69    HEMOGLOBIN g/dL 8.3* 9.4* 8.8* 9.7* 10.2*   HEMATOCRIT % 23.9* 27.5* 25.0* 27.2* 28.6*   PLATELETS 10*3/mm3 574* 586* 480* 515* 470*           Culture Data:   No results found for: BLOODCX  No results found for: URINECX  No results found for: RESPCX  No results found for: WOUNDCX  No results found for: STOOLCX  No components found for: BODYFLD    Radiology Data:   Imaging Results (last 24 hours)     ** No results found for the last 24 hours. **          I have reviewed the patient's current medications.     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  Hypotension, improved:  Continue Midodrine 10 mg three times daily.    2.  Hyponatremia, improved:  Continue sodium chloride 1 gram, three times daily.  Dr. Naik following.  Sodium 132 today.    3.  Severe protein-calorie malnutrition:   Dietary following.  Continue tube feeding.   4.  Iron deficiency anemia:  Dr. Sen following.  IV iron 125 mg x 3 days.   5.  Squamous cell carcinoma, pharynx:  Dr. Prieto following.    6.  Hypothyroidism:  Continue home Synthroid.        Discharge Planning: I expect patient to be discharged to home in 2-3 days.      This document has been electronically signed by ZARA Isaacs on August 2, 2019 1:52 PM

## 2019-08-02 NOTE — PLAN OF CARE
Problem: Patient Care Overview  Goal: Plan of Care Review  Outcome: Ongoing (interventions implemented as appropriate)   08/01/19 2018 08/02/19 0104   Coping/Psychosocial   Plan of Care Reviewed With patient --    Plan of Care Review   Progress --  improving   OTHER   Outcome Summary --  tolerating tube feed, VSS, will continue to monitor       Problem: Fall Risk (Adult)  Goal: Absence of Fall  Outcome: Ongoing (interventions implemented as appropriate)   08/02/19 0104   Fall Risk (Adult)   Absence of Fall making progress toward outcome       Problem: Skin Injury Risk (Adult)  Goal: Skin Health and Integrity  Outcome: Ongoing (interventions implemented as appropriate)   08/02/19 0104   Skin Injury Risk (Adult)   Skin Health and Integrity making progress toward outcome       Problem: Surgery Nonspecified (Adult)  Goal: Signs and Symptoms of Listed Potential Problems Will be Absent, Minimized or Managed (Surgery Nonspecified)  Outcome: Ongoing (interventions implemented as appropriate)   08/01/19 0557   Goal/Outcome Evaluation   Problems Assessed (Surgery) all   Problems Present (Surgery) none       Problem: Airway, Artificial (Adult)  Goal: Signs and Symptoms of Listed Potential Problems Will be Absent, Minimized or Managed (Airway, Artificial)  Outcome: Ongoing (interventions implemented as appropriate)   08/01/19 0557 08/02/19 0104   Goal/Outcome Evaluation   Problems Assessed (Artificial Airway) all --    Problems Present (Artificial Airway) --  communication impairment       Problem: Nutrition, Enteral (Adult)  Goal: Signs and Symptoms of Listed Potential Problems Will be Absent, Minimized or Managed (Nutrition, Enteral)  Outcome: Ongoing (interventions implemented as appropriate)   08/01/19 1757 08/02/19 0104   Goal/Outcome Evaluation   Problems Assessed (Enteral Nutrition) all --    Problems Present (Enteral Nutrition) --  malnutrition

## 2019-08-02 NOTE — PROGRESS NOTES
Emerson Bang  [id number]  1960    Subjective     Patient was seen in follow-up for hypopharyngeal cancer and anemia.  Status post adhesiolysis for SBO.   Reports abdominal pain. Mainly around G tube. Improved.  Anemia worsening.   Unable to provide comprehensive review of system as patient is nonverbal.          Mr. Bang is a pleasant 59-year-old male who was seen in consultation at the request of Dr Miller for evaluation of hypopharyngeal cancer.  Patient unfortunately is a non-verbal due to his tracheostomy and most of the history was obtained by reviewing old medical charts.  No family member available at bedside to discuss the case.     Patient was admitted to our hospital on July 15, 2019 with nausea, vomiting and abdominal pain.  Emergency room he underwent CT scan of abdomen which showed findings consistent with high-grade small bowel obstruction likely due to adhesions.  Surgery was consulted and patient is currently being treated conservatively for his small bowel obstruction.     Patient has complicated oncologic history.  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  Unfortunately, patient developed recurrence of symptoms with throat pain and difficulty swallowing with hoarseness in April 2019.  He underwent CT scan of the neck on April 12, 2019 which showed soft tissue fullness in the right supraglottic larynx suspicious for malignancy.  Patient underwent direct laryngoscopy and biopsy on April 15 followed by placement of G-tube by Dr. Valera.  Laryngoscopy revealed circumferential tumor of the right pyriform sinus involving the lateral pharyngeal wall, extending around the medial wall of the pyriform sinus, lateral larynx.  Biopsy was obtained which show moderately differentiated squamous cell carcinoma.  Patient had PET CT scan performed on April 26, 2019 which showed  hypermetabolic activity with the soft tissue thickening in the right side of the larynx and a 3 mm right lower lobe lung nodule without any hypermetabolic activity.  Patient was referred to [facility] for further evaluation and underwent total laryngectomy, right partial pharyngectomy, right thyroid lobectomy, right neck dissection on June 12, 2019.His final pathology showed Moderately differentiated squamous cells carcinoma of the right hypopharynx, measuring 4.7 x 4.5 cm, involving the hyoid bone, prevertebral fascia, and carotid sheath tissue.  The pharyngeal margin was involved by invasive squamous cell carcinoma.  Perineural invasion was present.  Lymphovascular invasion was not present.  0 of 11 regional lymph nodes were involved with metastatic cancer.     Due to positive margin concurrent chemoradiation was recommended however meanwhile patient is currently being admitted with small bowel obstruction in the hospital.  I been asked to assist with evaluation/management of hypopharyngeal cancer.          Past Medical History, Past Surgical History, Social History, Family History have been reviewed and are without significant changes except as mentioned.      ROS   Unable to obtain as patient is non-verbal.              Medications:  The current medication list was reviewed in the EMR    ALLERGIES:  No Known Allergies    Objective      Vitals:    08/02/19 0904 08/02/19 1100 08/02/19 1232 08/02/19 1245   BP: 98/54 100/58     BP Location:  Right arm     Patient Position:  Lying     Pulse: 89 95 93 92   Resp:  18 18 18   Temp:  96.9 °F (36.1 °C)     TempSrc:       SpO2:  99% 98%    Weight:       Height:         No flowsheet data found.    Physical Exam      GENERAL: Alert, awake, oriented. Thin cachetic male.  Vitals as above.   HEAD: Normocephalic, atraumatic.   EYES: PERRL, EOMI.vision is grossly intact.  Severe conjunctival pallor  NECK: Supple, no adenopathy or thyromegaly.   THROAT: Normal oral  cavity and pharynx. No inflammation, swelling, exudate, or lesions.  CARDIAC: Normal S1 and S2. No S3, S4 or murmurs. Rhythm is regular.  Extremities are warm and well perfused.   LUNGS: Clear to auscultation and percussion without rales, rhonchi, wheezing or diminished breath sounds.  ABDOMEN: Generalized tenderness + distension + No BS.   BACK:  No bony tenderness.   EXTREMITIES: No significant deformity or joint abnormality.  Peripheral pulses intact. No varicosities.  SKIN: No rash or bruising.  NEUROLOGICAL: Grossly non-focal exam. No focal weakness. Gait: Not assessed due to clinical condition.   PSYCH: Mood and affect normal. No hallucination or suicidal thoughts.   LYMPHATIC: No cervical, supraclavicular or axillary adenopathy          RECENT LABS: Independently reviewed and summarized  Hematology WBC   Date Value Ref Range Status   08/02/2019 8.47 3.40 - 10.80 10*3/mm3 Final     RBC   Date Value Ref Range Status   08/02/2019 3.31 (L) 4.14 - 5.80 10*6/mm3 Final     Hemoglobin   Date Value Ref Range Status   08/02/2019 8.3 (L) 13.0 - 17.7 g/dL Final     Hematocrit   Date Value Ref Range Status   08/02/2019 23.9 (L) 37.5 - 51.0 % Final     Platelets   Date Value Ref Range Status   08/02/2019 574 (H) 140 - 450 10*3/mm3 Final          Imaging (independently reviewed and summarized):   X-ray abdomen on July 17, showed persistent small bowel obstruction.    Diagnosis:   (1) Small bowel obstruction  (2) Hypopharyngeal cancer  (3) Severe protein caloric malnutrition   (4) Hypothyroidism   (5) Anemia     Assessment/Plan     (1) SBO: Status post adhesiolysis.     (2) Hypopharyngeal cancer: Patient with stage IV hypopharyngeal squamous cell carcinoma treated with total laryngectomy, right partial pharyngectomy, neck dissection, parapharyngeal space resection and cricopharyngeal myotomy on June 12, 2019.  His margins were positive.  Given positive margin he could potentially benefit from additional chemoradiation.   However, patient needs to be stable and able to tolerate nutrition before we consider any adjuvant treatment.  We will follow-up as outpatient in a week after his discharge to discuss the possibility of adjuvant chemoradiation.     (3) Severe protein caloric malnutrition: Nutrition following. Feeding through G tube.      (4) Hypothyroidism: On Synthroid.    (5) Anemia: Worsening. Likely from iron deficiency. Repeat iron infusion tomorrow. We will check B12 and folate.       Thank you for involving me in Mr Bang' care.      Please call us if any questions/concerns.      Conner Martínez MD   Hematology Oncology             8/2/2019      CC:

## 2019-08-02 NOTE — PLAN OF CARE
Problem: Patient Care Overview  Goal: Plan of Care Review  Outcome: Ongoing (interventions implemented as appropriate)   08/02/19 1439   Coping/Psychosocial   Plan of Care Reviewed With patient   Plan of Care Review   Progress no change   OTHER   Outcome Summary vss, tolerating tube feed, denies pain      08/02/19 1439   Coping/Psychosocial   Plan of Care Reviewed With patient   Plan of Care Review   Progress no change   OTHER   Outcome Summary vss, tolerating tube feed, denies pain

## 2019-08-02 NOTE — PROGRESS NOTES
"[facility]  [address] [place] [address]. [address]   - [phone]  F - [phone]     Progress Note          PATIENT  DEMOGRAPHICS   PATIENT NAME: Emerson Bang                      PHYSICIAN: Ulices Naik MD  : 1960  MRN: [id number]   LOS: 17 days    Patient Care Team:  Cristino He MD as PCP - General  Subjective   SUBJECTIVE   On the floor now bp stable         Objective   OBJECTIVE   Vital Signs  Temp:  [96.5 °F (35.8 °C)-98.1 °F (36.7 °C)] 96.9 °F (36.1 °C)  Heart Rate:  [] 92  Resp:  [17-18] 18  BP: ()/(52-75) 100/58    Flowsheet Rows      First Filed Value   Admission Height  165.1 cm (65\") Documented at 07/15/2019 1952   Admission Weight  49.9 kg (110 lb) Documented at 07/15/2019 1952           I/O last 3 completed shifts:  In: 1593.8 [I.V.:11.8; Other:681; NG/GT:901]  Out: 1820 [Urine:1820]    PHYSICAL EXAM    Physical Exam   Constitutional: He is oriented to person, place, and time. He appears well-developed.   HENT:   Head: Normocephalic.   Eyes: Pupils are equal, round, and reactive to light.   Cardiovascular: Normal rate, regular rhythm and normal heart sounds.   Pulmonary/Chest: Effort normal and breath sounds normal.   Abdominal: Soft. Bowel sounds are normal.   Musculoskeletal: He exhibits edema.   Neurological: He is alert and oriented to person, place, and time.       RESULTS   Results Review:    Results from last 7 days   Lab Units 19  1005 19  0253 19  0015  19  0757  19  0835   SODIUM mmol/L 132* 133* 132*   < > 121*   < > 125*   POTASSIUM mmol/L 4.8 4.6 4.4   < > 4.3   < > 4.4   CHLORIDE mmol/L 94* 95* 96*   < > 86*   < > 87*   CO2 mmol/L 25.0 29.0 24.0   < > 24.0   < > 24.0   BUN mg/dL 16 12 12   < > 11   < > 11   CREATININE mg/dL 0.57* 0.51* 0.51*   < > 0.50*   < > 0.52*   CALCIUM mg/dL 9.6 10.1 10.1   < > 8.9   < > 9.4   BILIRUBIN mg/dL  --  0.2  --   --  0.2  --  0.2   ALK PHOS U/L  --  113  --   -- "  102  --  102   ALT (SGPT) U/L  --  18  --   --  13  --  17   AST (SGOT) U/L  --  31  --   --  19  --  21   GLUCOSE mg/dL 136* 152* 131*   < > 118*   < > 136*    < > = values in this interval not displayed.       Estimated Creatinine Clearance: 101.6 mL/min (A) (by C-G formula based on SCr of 0.57 mg/dL (L)).    Results from last 7 days   Lab Units 08/01/19  0253 07/30/19  0751 07/28/19  0532   MAGNESIUM mg/dL 1.8 2.0 1.8   PHOSPHORUS mg/dL 3.7 3.6 3.6       Results from last 7 days   Lab Units 07/29/19  1220   URIC ACID mg/dL 1.8*       Results from last 7 days   Lab Units 08/02/19  0534 08/01/19  0253 07/31/19  0318 07/30/19  0750 07/29/19  0529   WBC 10*3/mm3 8.47 10.78 13.77* 9.86 7.69   HEMOGLOBIN g/dL 8.3* 9.4* 8.8* 9.7* 10.2*   PLATELETS 10*3/mm3 574* 586* 480* 515* 470*               Imaging Results (last 24 hours)     ** No results found for the last 24 hours. **           MEDICATIONS      albuterol 2.5 mg Nebulization Q6H - RT   bacitracin  Topical Daily   bisacodyl 10 mg Rectal Daily   enoxaparin 40 mg Subcutaneous Daily   ferric gluconate (FERRLECIT) 125 mg in sodium chloride 0.9 % 100 mL 125 mg Intravenous Daily   levothyroxine sodium 125 mcg Intravenous Q AM   midodrine 10 mg Oral TID AC   pantoprazole 40 mg Intravenous Q24H   sodium chloride 10 mL Intravenous BID   sodium chloride 10 mL Intravenous BID   sodium chloride 10 mL Intravenous BID   sodium chloride 1 g Oral TID With Meals          Assessment/Plan   ASSESSMENT / PLAN      Hypotension    Hypothyroidism    Squamous cell carcinoma of pharynx (CMS/HCC)    Iron deficiency anemia    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HCC)    Severe protein-calorie malnutrition (CMS/HCC)    Hyponatremia    1.hyponatremia.  This could be related to underlying malignancy and cannot rule out SIADH.  Also he is getting large volume through TPN plus also tube feeding and unable to correct with salt tablet or saline.    Urine studies are consistent with siadh. Responded well with  samsca. Na 132. Lower free water to 10cc/hr. Concentrate tpn. keep salt tab 1 gm tid. His urine sodium is elevated. tsh and cortisol normal uric acid low.    Will review lab in am and if all stable will see in the office    2.small bowel obstruction status post laparotomy and lysis of adhesion.  Patient is now on tube feeding.    3.severe protein calorie malnutrition    4.history of hypopharyngeal cancer awaiting chemo and radiation once stable    5.urinary retention now has Palomo in place    6. Hypotension - off levophed and scopolamine. On midodrine to 10mg tid                This document has been electronically signed by Ulices Naik MD on August 2, 2019 2:16 PM

## 2019-08-03 LAB
ALBUMIN SERPL-MCNC: 3.8 G/DL (ref 3.5–5.2)
ALBUMIN/GLOB SERPL: 0.9 G/DL
ALP SERPL-CCNC: 107 U/L (ref 39–117)
ALT SERPL W P-5'-P-CCNC: 15 U/L (ref 1–41)
ANION GAP SERPL CALCULATED.3IONS-SCNC: 14 MMOL/L (ref 5–15)
AST SERPL-CCNC: 19 U/L (ref 1–40)
BASOPHILS # BLD AUTO: 0.09 10*3/MM3 (ref 0–0.2)
BASOPHILS NFR BLD AUTO: 1.2 % (ref 0–1.5)
BILIRUB SERPL-MCNC: <0.2 MG/DL (ref 0.2–1.2)
BUN BLD-MCNC: 16 MG/DL (ref 8–23)
BUN/CREAT SERPL: 30.8 (ref 7–25)
CALCIUM SPEC-SCNC: 9.7 MG/DL (ref 8.6–10.5)
CHLORIDE SERPL-SCNC: 95 MMOL/L (ref 98–107)
CO2 SERPL-SCNC: 25 MMOL/L (ref 22–29)
CREAT BLD-MCNC: 0.52 MG/DL (ref 0.76–1.27)
DEPRECATED RDW RBC AUTO: 45 FL (ref 37–54)
EOSINOPHIL # BLD AUTO: 0.25 10*3/MM3 (ref 0–0.4)
EOSINOPHIL NFR BLD AUTO: 3.4 % (ref 0.3–6.2)
ERYTHROCYTE [DISTWIDTH] IN BLOOD BY AUTOMATED COUNT: 17.4 % (ref 12.3–15.4)
FOLATE SERPL-MCNC: 16.5 NG/ML (ref 4.78–24.2)
GFR SERPL CREATININE-BSD FRML MDRD: >150 ML/MIN/1.73
GLOBULIN UR ELPH-MCNC: 4.4 GM/DL
GLUCOSE BLD-MCNC: 135 MG/DL (ref 65–99)
HCT VFR BLD AUTO: 26.6 % (ref 37.5–51)
HGB BLD-MCNC: 9.3 G/DL (ref 13–17.7)
IMM GRANULOCYTES # BLD AUTO: 0.03 10*3/MM3 (ref 0–0.05)
IMM GRANULOCYTES NFR BLD AUTO: 0.4 % (ref 0–0.5)
LYMPHOCYTES # BLD AUTO: 1.51 10*3/MM3 (ref 0.7–3.1)
LYMPHOCYTES NFR BLD AUTO: 20.6 % (ref 19.6–45.3)
MAGNESIUM SERPL-MCNC: 1.8 MG/DL (ref 1.6–2.4)
MCH RBC QN AUTO: 25.5 PG (ref 26.6–33)
MCHC RBC AUTO-ENTMCNC: 35 G/DL (ref 31.5–35.7)
MCV RBC AUTO: 72.9 FL (ref 79–97)
MONOCYTES # BLD AUTO: 1.37 10*3/MM3 (ref 0.1–0.9)
MONOCYTES NFR BLD AUTO: 18.7 % (ref 5–12)
NEUTROPHILS # BLD AUTO: 4.08 10*3/MM3 (ref 1.7–7)
NEUTROPHILS NFR BLD AUTO: 55.7 % (ref 42.7–76)
NRBC BLD AUTO-RTO: 0 /100 WBC (ref 0–0.2)
PHOSPHATE SERPL-MCNC: 3.5 MG/DL (ref 2.5–4.5)
PLATELET # BLD AUTO: 620 10*3/MM3 (ref 140–450)
PMV BLD AUTO: 10.7 FL (ref 6–12)
POTASSIUM BLD-SCNC: 4.6 MMOL/L (ref 3.5–5.2)
PROT SERPL-MCNC: 8.2 G/DL (ref 6–8.5)
RBC # BLD AUTO: 3.65 10*6/MM3 (ref 4.14–5.8)
SODIUM BLD-SCNC: 134 MMOL/L (ref 136–145)
VIT B12 BLD-MCNC: 1592 PG/ML (ref 211–946)
WBC NRBC COR # BLD: 7.33 10*3/MM3 (ref 3.4–10.8)

## 2019-08-03 PROCEDURE — 82746 ASSAY OF FOLIC ACID SERUM: CPT | Performed by: INTERNAL MEDICINE

## 2019-08-03 PROCEDURE — 97164 PT RE-EVAL EST PLAN CARE: CPT

## 2019-08-03 PROCEDURE — 84100 ASSAY OF PHOSPHORUS: CPT | Performed by: INTERNAL MEDICINE

## 2019-08-03 PROCEDURE — 25010000002 ENOXAPARIN PER 10 MG: Performed by: SURGERY

## 2019-08-03 PROCEDURE — 97168 OT RE-EVAL EST PLAN CARE: CPT

## 2019-08-03 PROCEDURE — 83735 ASSAY OF MAGNESIUM: CPT | Performed by: INTERNAL MEDICINE

## 2019-08-03 PROCEDURE — 94799 UNLISTED PULMONARY SVC/PX: CPT

## 2019-08-03 PROCEDURE — 97530 THERAPEUTIC ACTIVITIES: CPT

## 2019-08-03 PROCEDURE — 82607 VITAMIN B-12: CPT | Performed by: INTERNAL MEDICINE

## 2019-08-03 PROCEDURE — 80053 COMPREHEN METABOLIC PANEL: CPT | Performed by: NURSE PRACTITIONER

## 2019-08-03 PROCEDURE — 25010000002 NA FERRIC GLUC CPLX PER 12.5 MG: Performed by: INTERNAL MEDICINE

## 2019-08-03 PROCEDURE — 85025 COMPLETE CBC W/AUTO DIFF WBC: CPT | Performed by: FAMILY MEDICINE

## 2019-08-03 PROCEDURE — 94760 N-INVAS EAR/PLS OXIMETRY 1: CPT

## 2019-08-03 RX ADMIN — SODIUM CHLORIDE 125 MG: 9 INJECTION, SOLUTION INTRAVENOUS at 11:09

## 2019-08-03 RX ADMIN — SODIUM CHLORIDE, PRESERVATIVE FREE 10 ML: 5 INJECTION INTRAVENOUS at 09:13

## 2019-08-03 RX ADMIN — SODIUM CHLORIDE, PRESERVATIVE FREE 10 ML: 5 INJECTION INTRAVENOUS at 21:16

## 2019-08-03 RX ADMIN — PANTOPRAZOLE SODIUM 40 MG: 40 INJECTION, POWDER, FOR SOLUTION INTRAVENOUS at 09:12

## 2019-08-03 RX ADMIN — ENOXAPARIN SODIUM 40 MG: 40 INJECTION SUBCUTANEOUS at 09:13

## 2019-08-03 RX ADMIN — ALBUTEROL SULFATE 2.5 MG: 2.5 SOLUTION RESPIRATORY (INHALATION) at 18:52

## 2019-08-03 RX ADMIN — SODIUM CHLORIDE, PRESERVATIVE FREE 10 ML: 5 INJECTION INTRAVENOUS at 09:14

## 2019-08-03 RX ADMIN — ALBUTEROL SULFATE 2.5 MG: 2.5 SOLUTION RESPIRATORY (INHALATION) at 00:55

## 2019-08-03 RX ADMIN — Medication 1 G: at 11:10

## 2019-08-03 RX ADMIN — LEVOTHYROXINE SODIUM ANHYDROUS 125 MCG: 100 INJECTION, POWDER, LYOPHILIZED, FOR SOLUTION INTRAVENOUS at 05:50

## 2019-08-03 RX ADMIN — ALBUTEROL SULFATE 2.5 MG: 2.5 SOLUTION RESPIRATORY (INHALATION) at 13:26

## 2019-08-03 RX ADMIN — MIDODRINE HYDROCHLORIDE 10 MG: 5 TABLET ORAL at 09:12

## 2019-08-03 RX ADMIN — Medication 1 G: at 09:15

## 2019-08-03 RX ADMIN — MIDODRINE HYDROCHLORIDE 10 MG: 5 TABLET ORAL at 11:10

## 2019-08-03 RX ADMIN — MIDODRINE HYDROCHLORIDE 10 MG: 5 TABLET ORAL at 21:17

## 2019-08-03 RX ADMIN — Medication 1 G: at 21:18

## 2019-08-03 RX ADMIN — ALBUTEROL SULFATE 2.5 MG: 2.5 SOLUTION RESPIRATORY (INHALATION) at 07:39

## 2019-08-03 RX ADMIN — BACITRACIN: 500 OINTMENT TOPICAL at 11:10

## 2019-08-03 NOTE — PLAN OF CARE
Problem: Patient Care Overview  Goal: Plan of Care Review  Outcome: Ongoing (interventions implemented as appropriate)   08/03/19 6012   Coping/Psychosocial   Plan of Care Reviewed With patient   Plan of Care Review   Progress no change   OTHER   Outcome Summary vss, tolerating tube feed, voiding well

## 2019-08-03 NOTE — PLAN OF CARE
Problem: Patient Care Overview  Goal: Plan of Care Review  Outcome: Ongoing (interventions implemented as appropriate)   08/03/19 0125   Coping/Psychosocial   Plan of Care Reviewed With patient   Plan of Care Review   Progress no change   OTHER   Outcome Summary Patient VSS, tolerating tube feeding, no complaints of pain, voiding on own, continue to monitor     Goal: Individualization and Mutuality  Outcome: Ongoing (interventions implemented as appropriate)    Goal: Discharge Needs Assessment  Outcome: Ongoing (interventions implemented as appropriate)      Problem: Fall Risk (Adult)  Goal: Absence of Fall  Outcome: Ongoing (interventions implemented as appropriate)      Problem: Skin Injury Risk (Adult)  Goal: Skin Health and Integrity  Outcome: Ongoing (interventions implemented as appropriate)      Problem: Surgery Nonspecified (Adult)  Goal: Signs and Symptoms of Listed Potential Problems Will be Absent, Minimized or Managed (Surgery Nonspecified)  Outcome: Ongoing (interventions implemented as appropriate)      Problem: Airway, Artificial (Adult)  Goal: Signs and Symptoms of Listed Potential Problems Will be Absent, Minimized or Managed (Airway, Artificial)  Outcome: Ongoing (interventions implemented as appropriate)      Problem: Nutrition, Enteral (Adult)  Goal: Signs and Symptoms of Listed Potential Problems Will be Absent, Minimized or Managed (Nutrition, Enteral)  Outcome: Ongoing (interventions implemented as appropriate)

## 2019-08-03 NOTE — THERAPY RE-EVALUATION
Acute Care - Physical Therapy Re-Evaluation  [facility]     Patient Name: Emerson Bang  : 1960  MRN: [id number]  Today's Date: 8/3/2019   Onset of Illness/Injury or Date of Surgery: 07/15/19  Date of Referral to PT: 19  Referring Physician: Shanda ZUÑIGA      Admit Date: 7/15/2019    Visit Dx:     ICD-10-CM ICD-9-CM   1. SBO (small bowel obstruction) (CMS/[facility]) K56.609 560.9   2. Impaired functional mobility and activity tolerance Z74.09 V49.89   3. Impaired mobility and ADLs Z74.09 799.89   4. SIADH (syndrome of inappropriate ADH production) (CMS/[facility]) E22.2 253.6   5. Acquired hypothyroidism E03.9 244.9   6. Idiopathic hypotension I95.0 458.9   7. Hyponatremia E87.1 276.1   8. Impaired functional mobility, balance, gait, and endurance Z74.09 V49.89     Patient Active Problem List   Diagnosis   • Hyperkalemia   • Iron deficiency anemia   • Gastroesophageal reflux disease with esophagitis   • Elevated AST (SGOT)   • Alcohol abuse   • Hypothyroidism   • Balance problem   • Need for vaccination   • Low magnesium levels   • Squamous cell carcinoma of pharynx (CMS/HCC)   • History of throat cancer   • Vitamin D deficiency   • Alcohol abuse counseling and surveillance   • Encounter for screening for diabetes mellitus   • Hypomagnesemia   • Iron deficiency anemia   • Hyperlipidemia   • Underweight due to inadequate caloric intake   • Need for immunization against influenza   • 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)   • Hypertension   • General medical examination   • Underweight   • Epigastric pain   • Gastritis without bleeding   • Need for influenza vaccination   • Elevated liver function tests   • B12 deficiency   • Intestinal malabsorption   • Throat pain   • Acute pharyngitis   • Upper respiratory tract infection   • Neoplasm of uncertain behavior of larynx   • Pharyngoesophageal dysphagia   • Severe protein-calorie malnutrition (CMS/HCC)   • Anemia of chronic disease   • Hypotension   • Hyponatremia      Past Medical History:   Diagnosis Date   • Alcohol abuse    • Allergic rhinitis     vs URI   • Anemia    • At risk for falls    • Benign prostatic hyperplasia    • Chronic gastritis    • Cirrhosis of liver (CMS/HCC)    • Complication of gastrostomy (CMS/HCC)     site not healing   • COPD (chronic obstructive pulmonary disease) (CMS/HCC)    • Dysphagia     odynophagia   • Epigastric pain    • Esophagitis    • GERD (gastroesophageal reflux disease)    • Hypertension    • Hypothyroidism, unspecified    • Low back pain    • Malaise and fatigue    • Nasal congestion 11/15/2018   • Nausea with vomiting, unspecified    • Pain in left knee    • Pain in right knee    • Primary malignant neoplasm of pharynx (CMS/HCC)    • Screening for malignant neoplasm of colon    • Vitamin D deficiency      Past Surgical History:   Procedure Laterality Date   • ABDOMINAL WALL SURGERY  11/04/2008    Laparotomy with repair of stomach wall perforation. 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 Left upper quadrant abdominal wall abscess debridement. Gastrostomy tube erosion through & through the anterior gastric wall.Lupper quadrant abdominal wall abscess   • COLONOSCOPY  04/21/2014    Internal & external hemorrhoids found.   • COLONOSCOPY  2014    [place]   • COLONOSCOPY N/A 10/16/2018    Procedure: COLONOSCOPY;  Surgeon: Kaushal Chester MD;  Location: [facility] ENDOSCOPY;  Service: Gastroenterology   • DIAGNOSTIC LAPAROSCOPY EXPLORATORY LAPAROTOMY N/A 7/17/2019    Procedure: DIAGNOSTIC LAPAROSCOPY, EXPLORATORY LAPAROTOMY, LYSIS OF ADHESIONS;  Surgeon: Parminder Kc MD;  Location: [facility] OR;  Service: General   • DIRECT LARYNGOSCOPY, ESOPHAGOSCOPY, BRONCHOSCOPY N/A 4/15/2019    Procedure: DIRECT LARYNGOSCOPY with biopsy, ESOPHAGOSCOPY;  Surgeon: Bharathi Washington MD;  Location: [facility] OR;  Service: ENT   • ENDOSCOPY N/A 10/16/2018    Procedure: ESOPHAGOGASTRODUODENOSCOPY;  Surgeon: Kaushal Chester MD;  Location: [facility] ENDOSCOPY;   Service: Gastroenterology   • GASTROSTOMY FEEDING TUBE INSERTION N/A 4/15/2019    Procedure: GASTROSTOMY FEEDING TUBE INSERTION;  Surgeon: Trenton Valera MD;  Location: [facility];  Service: General   • TRACHEOSTOMY  11/10/2008    Respiratory failure   • UPPER GASTROINTESTINAL ENDOSCOPY  04/21/2014    Esophagitis seen. Biopsy taken. Gastritis in stomach. Biopsy taken. Normal duodenum. Biopsy taken.   • UPPER GASTROINTESTINAL ENDOSCOPY  10/16/2018        PT ASSESSMENT (last 12 hours)      Physical Therapy Evaluation     Row Name 08/03/19 1325          PT Evaluation Time/Intention    Subjective Information  no complaints  -GB     Document Type  re-evaluation  -GB     Mode of Treatment  co-treatment;physical therapy;occupational therapy  -GB     Patient Effort  good  -GB     Row Name 08/03/19 1325          General Information    Patient Profile Reviewed?  yes  -GB     Patient Observations  alert;cooperative;agree to therapy  -GB     Prior Level of Function  independent:;all household mobility;gait  -GB     Equipment Currently Used at Home  none  -GB     Existing Precautions/Restrictions  fall;oxygen therapy device and L/min multiple lines and tubes  -GB     Limitations/Impairments  safety/cognitive  -GB     Risks Reviewed  patient and family:;LOB;dizziness  -GB     Benefits Reviewed  patient and family:;improve function;increase independence;increase strength  -GB     Barriers to Rehab  medically complex  -GB     Row Name 08/03/19 1325          Relationship/Environment    Lives With  child(lola), adult  -GB     Row Name 08/03/19 1325          Cognitive Assessment/Intervention- PT/OT    Affect/Mental Status (Cognitive)  WFL  -GB     Orientation Status (Cognition)  oriented to;person;place  -GB     Follows Commands (Cognition)  over 90% accuracy;verbal cues/prompting required;repetition of directions required  -GB     Row Name 08/03/19 1323          Safety Issues, Functional Mobility    Impairments Affecting  Function (Mobility)  balance;endurance/activity tolerance;coordination;pain;strength;shortness of breath;postural/trunk control;motor planning;motor control  -     Row Name 08/03/19 1325          Bed Mobility Assessment/Treatment    Supine-Sit [place] (Bed Mobility)  contact guard;nonverbal cues (demo/gesture);verbal cues  -     Assistive Device (Bed Mobility)  bed rails;head of bed elevated  -     Row Name 08/03/19 1325          Transfer Assessment/Treatment    Transfer Assessment/Treatment  sit-stand transfer;stand-sit transfer;bed-chair transfer  -     Bed-Chair [place] (Transfers)  contact guard;nonverbal cues (demo/gesture);verbal cues;1 person assist;2 person assist  -     Assistive Device (Bed-Chair Transfers)  walker, front-wheeled  -     Sit-Stand [place] (Transfers)  contact guard;nonverbal cues (demo/gesture);verbal cues  -     Stand-Sit [place] (Transfers)  contact guard;nonverbal cues (demo/gesture);verbal cues  -     Row Name 08/03/19 1325          Gait/Stairs Assessment/Training    [place] Level (Gait)  contact guard;minimum assist (75% patient effort);1 person assist;1 person to manage equipment  -     Row Name 08/03/19 1325          General ROM    GENERAL ROM COMMENTS  ashley UE/LE AROM WFL  -     Row Name 08/03/19 1325          MMT (Manual Muscle Testing)    General MMT Comments  ashley LE at least 4/5 hip, knee & ankle flx/ext  -     Row Name 08/03/19 1325          Sensory Assessment/Intervention    Sensory General Assessment  no sensation deficits identified  -     Row Name 08/03/19 1325          Pain Scale: Numbers Pre/Post-Treatment    Pain Scale: Numbers, Pretreatment  3/10  -GB     Pain Scale: Numbers, Post-Treatment  3/10  -GB     Pain Location  abdomen  -     Row Name             Wound 07/17/19 2144 abdomen incision    Wound - Properties Group Date first assessed: 07/17/19  -EL Time first assessed: 2144  -EL Present On Admission : no  -EL  Location: abdomen  -EL Type: incision  -EL, laparoscopic x3     Row Name             Wound 07/17/19 1005 midline abdomen incision;surgical    Wound - Properties Group Date first assessed: 07/17/19  -EL Time first assessed: 1005  -EL Orientation: midline  -EL Location: abdomen  -EL Type: incision;surgical  -EL    Row Name             Wound 07/19/19 0800 Left anterior thigh other (see comments)    Wound - Properties Group Date first assessed: 07/19/19  -RG Time first assessed: 0800  -RG Present On Admission : yes;picture taken  -RG Side: Left  -RG Orientation: anterior  -RG Location: thigh  -RG Type: other (see comments)  -RG, skin graft sight.      Row Name 08/03/19 1325          Plan of Care Review    Plan of Care Reviewed With  patient;scarlett HANSEN     Row Name 08/03/19 1325          Physical Therapy Clinical Impression    Date of Referral to PT  08/03/19  -GB     Patient/Family Goals Statement (PT Clinical Impression)  dtr plans to help him at return home  -GB     Criteria for Skilled Interventions Met (PT Clinical Impression)  yes  -GB     Pathology/Pathophysiology Noted (Describe Specifically for Each System)  musculoskeletal;cardiovascular  -GB     Impairments Found (describe specific impairments)  aerobic capacity/endurance;gait, locomotion, and balance  -GB     Rehab Potential (PT Clinical Summary)  good, to achieve stated therapy goals  -GB     Predicted Duration of Therapy (PT)  till d/c or transfer till goals for indep mobiltiy met  -GB     Care Plan Review (PT)  patient/other agree to care plan  -GB     Care Plan Review, Other Participant (PT Clinical Impression)  scarlett HANSEN     Row Name 08/03/19 1325          Vital Signs    Pre Systolic BP Rehab  117  -GB     Pre Treatment Diastolic BP  56  -GB     Intra Systolic BP Rehab  124  -GB     Intra Treatment Diastolic BP  68  -GB     Post Systolic BP Rehab  136  -GB     Post Treatment Diastolic BP  73  -GB     Pretreatment Heart Rate (beats/min)  104  -GB      Posttreatment Heart Rate (beats/min)  93  -GB     Pre SpO2 (%)  100  -GB     O2 Delivery Pre Treatment  trach collar  -GB     Intra SpO2 (%)  98  -GB     O2 Delivery Intra Treatment  trach collar  -GB     Post SpO2 (%)  100  -GB     O2 Delivery Post Treatment  trach collar  -GB     Pre Patient Position  Supine  -GB     Intra Patient Position  Sitting  -GB     Post Patient Position  Sitting  -GB     Row Name 08/03/19 1325          Physical Therapy Goals    Bed Mobility Goal Selection (PT)  bed mobility, PT goal 1  -GB     Transfer Goal Selection (PT)  transfer, PT goal 1  -GB     Gait Training Goal Selection (PT)  gait training, PT goal 1  -GB     Additional Documentation  Stairs Goal Selection (PT) (Row)  -GB     Row Name 08/03/19 1325          Bed Mobility Goal 1 (PT)    Activity/Assistive Device (Bed Mobility Goal 1, PT)  bed mobility activities, all  -GB     [place] Level/Cues Needed (Bed Mobility Goal 1, PT)  independent;conditional independence  -GB     Time Frame (Bed Mobility Goal 1, PT)  long term goal (LTG);1 week  -GB     Barriers (Bed Mobility Goal 1, PT)  fatigue; LOB  -GB     Progress/Outcomes (Bed Mobility Goal 1, PT)  goal not met  -GB     Row Name 08/03/19 1325          Transfer Goal 1 (PT)    Activity/Assistive Device (Transfer Goal 1, PT)  bed-to-chair/chair-to-bed;toilet  -GB     [place] Level/Cues Needed (Transfer Goal 1, PT)  conditional independence  -GB     Time Frame (Transfer Goal 1, PT)  1 week  -GB     Barriers (Transfers Goal 1, PT)  fatigue/ LOB  -GB     Progress/Outcome (Transfer Goal 1, PT)  continuing progress toward goal;goal not met  -GB     Row Name 08/03/19 1325          Gait Training Goal 1 (PT)    Activity/Assistive Device (Gait Training Goal 1, PT)  gait (walking locomotion);assistive device use;decrease fall risk;increase endurance/gait distance  -GB     [place] Level (Gait Training Goal 1, PT)  conditional independence;supervision required  -GB     Distance  (Gait Goal 1, PT)  100 ft or more w/ VSS  -GB     Time Frame (Gait Training Goal 1, PT)  by discharge  -GB     Barriers (Gait Training Goal 1, PT)  weakness; fatigue  -GB     Progress/Outcome (Gait Training Goal 1, PT)  goal ongoing  -GB     Row Name 08/03/19 1325          Strength Goal 1 (PT)    Strength Goal 1 (PT)  patient will tolerate 2 sets of 15 reps for 3 LE antigravity exercises w/ VSS  -GB     Time Frame (Strength Goal 1, PT)  1 week  -GB     Barriers (Strength Goal 1, PT)  resp/cardiac precautions  -GB     Progress/Outcome (Strength Goal 1, PT)  goal not met;goal ongoing  -     Row Name 08/03/19 1323          Positioning and Restraints    Pre-Treatment Position  in bed  -GB     Post Treatment Position  chair  -GB     In Chair  notified nsg;reclined;sitting;call light within reach;encouraged to call for assist;with family/caregiver;legs elevated nursg states he is trusted to not get up w/out help;RN aware  -GB       User Key  (r) = Recorded By, (t) = Taken By, (c) = Cosigned By    Initials Name Provider Type    GB Danna Huddleston, PT Physical Therapist    Lorna Mckenzie, RN Registered Nurse    Xenia Hannah RN Registered Nurse        Physical Therapy Education     Title: PT OT SLP Therapies (In Progress)     Topic: Physical Therapy (In Progress)     Point: Mobility training (In Progress)     Learning Progress Summary           Patient Acceptance, E, NR by GIAN at 7/29/2019 10:02 AM    Acceptance, E,TB, VU by PRASANNA at 7/22/2019  4:14 PM    Acceptance, E,TB, VU by PRASANNA at 7/20/2019 12:41 PM    Acceptance, E, VU by ALECIA at 7/18/2019  1:38 PM    Comment:  Role of PT, PT POC                   Point: Home exercise program (Done)     Learning Progress Summary           Patient Acceptance, E,TB, VU by PRASANNA at 7/22/2019  4:14 PM    Acceptance, E,TB, VU by LN at 7/20/2019 12:41 PM                   Point: Precautions (Done)     Learning Progress Summary           Patient Acceptance, D, NR,VU by DONNA at  8/3/2019  4:45 PM    Comment:  Discussed d/c planning with her LTG is d/c to home with her.    Acceptance, E,TB, VU by PRASANNA at 7/22/2019  4:14 PM    Acceptance, E,TB, VU by LN at 7/20/2019 12:41 PM   Family Acceptance, D, NR,VU by DONNA at 8/3/2019  4:45 PM    Comment:  Discussed d/c planning with her LTG is d/c to home with her.                               User Key     Initials Effective Dates Name Provider Type Discipline    GB 04/03/18 -  Danna Huddleston, PT Physical Therapist PT    GIAN 03/07/18 -  Abran Wan, PTA Physical Therapy Assistant PT    LN 03/07/18 -  Lynda Ramos, PTA Physical Therapy Assistant PT    LF 07/23/18 -  Isis Miller, PT Physical Therapist PT              PT Recommendation and Plan  Anticipated Discharge Disposition (PT): anticipate therapy at next level of care, inpatient rehabilitation facility, transitional care, home with 24/7 care, home with home health  Planned Therapy Interventions (PT Eval): balance training, bed mobility training, gait training, home exercise program, patient/family education, ROM (range of motion), stair training, strengthening, transfer training  Therapy Frequency (PT Clinical Impression): daily  Outcome Summary/Treatment Plan (PT)  Anticipated Discharge Disposition (PT): anticipate therapy at next level of care, inpatient rehabilitation facility, transitional care, home with 24/7 care, home with home health  Plan of Care Reviewed With: patient, daughter  Outcome Summary: PT eval completed. Dtr w/ questions re: patient handling at home. Pt able to move sup-sit-sup, short distance gait and stand to sit w/ min- mod assist of two. Dtr present w/ questions re: prep for care at home. Pt had no increased pain or intolerance of activity & remained up in chair. Dtr well involved in prep for care so pt may benefit from dc to home w/ care of family and HH PT to assist in progressing his mobility safety.   Outcome Measures     Row Name 08/03/19 1600 08/03/19 1324           How much help from another person do you currently need...    Turning from your back to your side while in flat bed without using bedrails?  3  -GB  --     Moving from lying on back to sitting on the side of a flat bed without bedrails?  3  -GB  --     Moving to and from a bed to a chair (including a wheelchair)?  3  -GB  --     Standing up from a chair using your arms (e.g., wheelchair, bedside chair)?  3  -GB  --     Climbing 3-5 steps with a railing?  2  -GB  --     To walk in hospital room?  2  -GB  --     AM-PAC 6 Clicks Score (PT)  16  -GB  --        How much help from another is currently needed...    Putting on and taking off regular lower body clothing?  --  3  -BH     Bathing (including washing, rinsing, and drying)  --  3  -BH     Toileting (which includes using toilet bed pan or urinal)  --  3  -BH     Putting on and taking off regular upper body clothing  --  3  -     Taking care of personal grooming (such as brushing teeth)  --  4  -     Eating meals  --  1 tube feedings  -     AM-PAC 6 Clicks Score (OT)  --  17  -        Functional Assessment    Outcome Measure Options  AM-PAC 6 Clicks Basic Mobility (PT)  -  AM-PAC 6 Clicks Daily Activity (OT)  -       User Key  (r) = Recorded By, (t) = Taken By, (c) = Cosigned By    Initials Name Provider Type    Danna Medeiros, PT Physical Therapist     Jayne Antunez, OTR/L Occupational Therapist         Time Calculation:   PT Charges     Row Name 08/03/19 1656             Time Calculation    Start Time  1324  -GB      Stop Time  1414  -GB      Time Calculation (min)  50 min  -GB      PT Received On  08/03/19  -GB      PT Goal Re-Cert Due Date  08/12/19  -GB        User Key  (r) = Recorded By, (t) = Taken By, (c) = Cosigned By    Initials Name Provider Type    Danna Medeiros, PT Physical Therapist        Therapy Charges for Today     Code Description Service Date Service Provider Modifiers Qty    58382016787   PT THER SUPP EA 15 MIN 8/3/2019 Danna Huddleston, PT GP 1    18662024778 HC PT RE-EVAL ESTABLISHED PLAN 2 8/3/2019 Danna Huddleston, PT GP 1          PT G-Codes  Outcome Measure Options: AM-PAC 6 Clicks Basic Mobility (PT)  AM-PAC 6 Clicks Score (PT): 16  AM-PAC 6 Clicks Score (OT): 17      Danna Huddleston, PT  8/3/2019

## 2019-08-03 NOTE — PROGRESS NOTES
[facility]  INPATIENT PROGRESS NOTE    Length of Stay: 18  Date of Admission: 7/15/2019  Primary Care Physician: Cristino He MD    Subjective   Chief Complaint: No complaints    HPI:     8/3/2019:  The patient has no complaints.  Physical and occupational therapy restarted today.  Vital signs stable.     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  Patient was found to have a high-grade small bowel obstruction and underwent surgery by Dr. Kc for lysis of adhesions (7/17/2019).  The patient required intensive care secondary to being hemodynamically unstable.  Patient was started on TPN for severe protein caloric malnutrition.  Patient was started on Samsca for hyponatremia.    Review of Systems   Constitutional: Negative for activity change and fatigue.   HENT: Negative for ear pain and sore throat.    Eyes: Negative for pain and discharge.   Respiratory: Negative for cough and shortness of breath.    Cardiovascular: Negative for chest pain and palpitations.   Gastrointestinal: Negative for abdominal pain and nausea.   Endocrine: Negative for cold intolerance and heat intolerance.   Genitourinary: Negative for difficulty urinating and dysuria.   Musculoskeletal: Negative for arthralgias and gait problem.   Skin: Negative for color change and rash.   Neurological: Negative for dizziness and weakness.   Psychiatric/Behavioral: Negative for agitation and confusion.        Objective    Temp:  [97.4 °F (36.3 °C)-98 °F (36.7 °C)] 97.6 °F (36.4 °C)  Heart Rate:  [] 98  Resp:  [16-18] 18  BP: (112-125)/(60-80) 121/80    Physical Exam   Constitutional: He is oriented to person, place, and  time. He appears well-developed. He appears cachectic.   HENT:   Head: Normocephalic and atraumatic.   Tracheostomy collar    Eyes: EOM are normal. Pupils are equal, round, and reactive to light.   Neck: Normal range of motion. Neck supple.   Cardiovascular: Normal rate and regular rhythm.   Pulmonary/Chest: Effort normal and breath sounds normal.   Abdominal: Soft. Bowel sounds are normal.   Musculoskeletal: Normal range of motion.   Neurological: He is alert and oriented to person, place, and time.   Skin: Skin is warm and dry.   Midline incision without redness or erythema.     Psychiatric: He has a normal mood and affect. His behavior is normal.     Results Review:  I have reviewed the labs, radiology results, and diagnostic studies.    Laboratory Data:   Results from last 7 days   Lab Units 08/03/19  1007 08/02/19  1005 08/01/19  0253  07/29/19  0757   SODIUM mmol/L 134* 132* 133*   < > 121*   POTASSIUM mmol/L 4.6 4.8 4.6   < > 4.3   CHLORIDE mmol/L 95* 94* 95*   < > 86*   CO2 mmol/L 25.0 25.0 29.0   < > 24.0   BUN mg/dL 16 16 12   < > 11   CREATININE mg/dL 0.52* 0.57* 0.51*   < > 0.50*   GLUCOSE mg/dL 135* 136* 152*   < > 118*   CALCIUM mg/dL 9.7 9.6 10.1   < > 8.9   BILIRUBIN mg/dL <0.2*  --  0.2  --  0.2   ALK PHOS U/L 107  --  113  --  102   ALT (SGPT) U/L 15  --  18  --  13   AST (SGOT) U/L 19  --  31  --  19   ANION GAP mmol/L 14.0 13.0 9.0   < > 11.0    < > = values in this interval not displayed.     Estimated Creatinine Clearance: 112 mL/min (A) (by C-G formula based on SCr of 0.52 mg/dL (L)).  Results from last 7 days   Lab Units 08/03/19  0529 08/01/19  0253 07/30/19  0751   MAGNESIUM mg/dL 1.8 1.8 2.0   PHOSPHORUS mg/dL 3.5 3.7 3.6     Results from last 7 days   Lab Units 07/29/19  1220   URIC ACID mg/dL 1.8*     Results from last 7 days   Lab Units 08/03/19  0529 08/02/19  0534 08/01/19  0253 07/31/19  0318 07/30/19  0750   WBC 10*3/mm3 7.33 8.47 10.78 13.77* 9.86   HEMOGLOBIN g/dL 9.3* 8.3* 9.4*  8.8* 9.7*   HEMATOCRIT % 26.6* 23.9* 27.5* 25.0* 27.2*   PLATELETS 10*3/mm3 620* 574* 586* 480* 515*           Culture Data:   No results found for: BLOODCX  No results found for: URINECX  No results found for: RESPCX  No results found for: WOUNDCX  No results found for: STOOLCX  No components found for: BODYFLD    Radiology Data:   Imaging Results (last 24 hours)     ** No results found for the last 24 hours. **          I have reviewed the patient's current medications.     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  Hypotension, resolved:  Continue Midodrine 10 mg three times daily.    2.  Hyponatremia, improved:  Continue sodium chloride 1 gram, three times daily.  Dr. Naik following.  Sodium 134 today.    3.  Severe protein-calorie malnutrition:   Dietary following.  Continue tube feeding.   4.  Iron deficiency anemia:  Dr. Sen following.  IV iron 125 mg x 3 days.   5.  Squamous cell carcinoma, pharynx:  Dr. Prieto following.    6.  Hypothyroidism:  Continue home Synthroid.        Discharge Planning: I expect patient to be discharged to home in 1-2 days.      This document has been electronically signed by ZARA Isaacs on August 3, 2019 1:49 PM

## 2019-08-03 NOTE — PLAN OF CARE
Problem: Patient Care Overview  Goal: Plan of Care Review  Outcome: Ongoing (interventions implemented as appropriate)   08/03/19 8057   Coping/Psychosocial   Plan of Care Reviewed With patient;daughter   OTHER   Outcome Summary PT eval completed. Dtr w/ questions re: patient handling at home. Pt able to move sup-sit-sup, short distance gait and stand to sit w/ min- mod assist of two. Dtr present w/ questions re: prep for care at home. Pt had no increased pain or intolerance of activity & remained up in chair. Dtr well involved in prep for care so pt may benefit from dc to home w/ care of family and HH PT to assist in progressing his mobility safety.

## 2019-08-03 NOTE — PLAN OF CARE
Problem: Patient Care Overview  Goal: Plan of Care Review  Outcome: Ongoing (interventions implemented as appropriate)   08/03/19 1324   Coping/Psychosocial   Plan of Care Reviewed With patient;daughter   Plan of Care Review   Progress improving   OTHER   Outcome Summary OT re-eval, co-eval with PT completed this date after t/f to ICU. Pt engaged well. Pt was CGA for sup to sit. Pt CGA for sit to stand but CGA of 2 with assist with all lines and tube for moiblity with RW from bed to chair. Spent time on home safety education with dtr and pt. Pt may need a BSC and rolling walker at d/c. Pt could benefit from further skilled OT to reach PLOF/max independence with ADL. Recommend 24/7 care and further therapy at d/c.

## 2019-08-04 LAB
ALBUMIN SERPL-MCNC: 3.7 G/DL (ref 3.5–5.2)
ALBUMIN/GLOB SERPL: 0.8 G/DL
ALP SERPL-CCNC: 106 U/L (ref 39–117)
ALT SERPL W P-5'-P-CCNC: 13 U/L (ref 1–41)
ANION GAP SERPL CALCULATED.3IONS-SCNC: 12 MMOL/L (ref 5–15)
AST SERPL-CCNC: 18 U/L (ref 1–40)
BASOPHILS # BLD AUTO: 0.09 10*3/MM3 (ref 0–0.2)
BASOPHILS NFR BLD AUTO: 1.2 % (ref 0–1.5)
BILIRUB SERPL-MCNC: 0.2 MG/DL (ref 0.2–1.2)
BUN BLD-MCNC: 17 MG/DL (ref 8–23)
BUN/CREAT SERPL: 34 (ref 7–25)
CALCIUM SPEC-SCNC: 9.9 MG/DL (ref 8.6–10.5)
CHLORIDE SERPL-SCNC: 92 MMOL/L (ref 98–107)
CO2 SERPL-SCNC: 28 MMOL/L (ref 22–29)
CREAT BLD-MCNC: 0.5 MG/DL (ref 0.76–1.27)
DACRYOCYTES BLD QL SMEAR: NORMAL
DEPRECATED RDW RBC AUTO: 44.9 FL (ref 37–54)
EOSINOPHIL # BLD AUTO: 0.31 10*3/MM3 (ref 0–0.4)
EOSINOPHIL NFR BLD AUTO: 4 % (ref 0.3–6.2)
ERYTHROCYTE [DISTWIDTH] IN BLOOD BY AUTOMATED COUNT: 17.6 % (ref 12.3–15.4)
GFR SERPL CREATININE-BSD FRML MDRD: >150 ML/MIN/1.73
GLOBULIN UR ELPH-MCNC: 4.6 GM/DL
GLUCOSE BLD-MCNC: 129 MG/DL (ref 65–99)
HCT VFR BLD AUTO: 27.5 % (ref 37.5–51)
HGB BLD-MCNC: 9.5 G/DL (ref 13–17.7)
IMM GRANULOCYTES # BLD AUTO: 0.03 10*3/MM3 (ref 0–0.05)
IMM GRANULOCYTES NFR BLD AUTO: 0.4 % (ref 0–0.5)
LARGE PLATELETS: NORMAL
LYMPHOCYTES # BLD AUTO: 1.41 10*3/MM3 (ref 0.7–3.1)
LYMPHOCYTES NFR BLD AUTO: 18.1 % (ref 19.6–45.3)
MCH RBC QN AUTO: 25 PG (ref 26.6–33)
MCHC RBC AUTO-ENTMCNC: 34.5 G/DL (ref 31.5–35.7)
MCV RBC AUTO: 72.4 FL (ref 79–97)
MICROCYTES BLD QL: NORMAL
MONOCYTES # BLD AUTO: 1.44 10*3/MM3 (ref 0.1–0.9)
MONOCYTES NFR BLD AUTO: 18.5 % (ref 5–12)
NEUTROPHILS # BLD AUTO: 4.51 10*3/MM3 (ref 1.7–7)
NEUTROPHILS NFR BLD AUTO: 57.8 % (ref 42.7–76)
NRBC BLD AUTO-RTO: 0 /100 WBC (ref 0–0.2)
PLATELET # BLD AUTO: 595 10*3/MM3 (ref 140–450)
PMV BLD AUTO: 10.6 FL (ref 6–12)
POTASSIUM BLD-SCNC: 4.1 MMOL/L (ref 3.5–5.2)
PROT SERPL-MCNC: 8.3 G/DL (ref 6–8.5)
RBC # BLD AUTO: 3.8 10*6/MM3 (ref 4.14–5.8)
SODIUM BLD-SCNC: 132 MMOL/L (ref 136–145)
TARGETS BLD QL SMEAR: NORMAL
WBC MORPH BLD: NORMAL
WBC NRBC COR # BLD: 7.79 10*3/MM3 (ref 3.4–10.8)

## 2019-08-04 PROCEDURE — 97110 THERAPEUTIC EXERCISES: CPT

## 2019-08-04 PROCEDURE — 85007 BL SMEAR W/DIFF WBC COUNT: CPT | Performed by: FAMILY MEDICINE

## 2019-08-04 PROCEDURE — 94760 N-INVAS EAR/PLS OXIMETRY 1: CPT

## 2019-08-04 PROCEDURE — 80053 COMPREHEN METABOLIC PANEL: CPT | Performed by: NURSE PRACTITIONER

## 2019-08-04 PROCEDURE — 94799 UNLISTED PULMONARY SVC/PX: CPT

## 2019-08-04 PROCEDURE — 85025 COMPLETE CBC W/AUTO DIFF WBC: CPT | Performed by: FAMILY MEDICINE

## 2019-08-04 PROCEDURE — 25010000002 ENOXAPARIN PER 10 MG: Performed by: SURGERY

## 2019-08-04 PROCEDURE — 97530 THERAPEUTIC ACTIVITIES: CPT

## 2019-08-04 RX ADMIN — SODIUM CHLORIDE, PRESERVATIVE FREE 10 ML: 5 INJECTION INTRAVENOUS at 22:00

## 2019-08-04 RX ADMIN — ENOXAPARIN SODIUM 40 MG: 40 INJECTION SUBCUTANEOUS at 08:12

## 2019-08-04 RX ADMIN — LEVOTHYROXINE SODIUM ANHYDROUS 125 MCG: 100 INJECTION, POWDER, LYOPHILIZED, FOR SOLUTION INTRAVENOUS at 06:25

## 2019-08-04 RX ADMIN — SODIUM CHLORIDE, PRESERVATIVE FREE 10 ML: 5 INJECTION INTRAVENOUS at 08:11

## 2019-08-04 RX ADMIN — Medication 1 G: at 08:12

## 2019-08-04 RX ADMIN — Medication 1 G: at 12:29

## 2019-08-04 RX ADMIN — SODIUM CHLORIDE, PRESERVATIVE FREE 10 ML: 5 INJECTION INTRAVENOUS at 08:13

## 2019-08-04 RX ADMIN — SODIUM CHLORIDE, PRESERVATIVE FREE 10 ML: 5 INJECTION INTRAVENOUS at 08:12

## 2019-08-04 RX ADMIN — ALBUTEROL SULFATE 2.5 MG: 2.5 SOLUTION RESPIRATORY (INHALATION) at 00:19

## 2019-08-04 RX ADMIN — Medication 1 G: at 17:56

## 2019-08-04 RX ADMIN — ALBUTEROL SULFATE 2.5 MG: 2.5 SOLUTION RESPIRATORY (INHALATION) at 19:15

## 2019-08-04 RX ADMIN — ALBUTEROL SULFATE 2.5 MG: 2.5 SOLUTION RESPIRATORY (INHALATION) at 07:17

## 2019-08-04 RX ADMIN — MIDODRINE HYDROCHLORIDE 10 MG: 5 TABLET ORAL at 12:29

## 2019-08-04 RX ADMIN — BACITRACIN: 500 OINTMENT TOPICAL at 08:12

## 2019-08-04 RX ADMIN — ACETAMINOPHEN 650 MG: 650 SOLUTION ORAL at 02:00

## 2019-08-04 RX ADMIN — BISACODYL 10 MG: 10 SUPPOSITORY RECTAL at 08:12

## 2019-08-04 RX ADMIN — PANTOPRAZOLE SODIUM 40 MG: 40 INJECTION, POWDER, FOR SOLUTION INTRAVENOUS at 08:12

## 2019-08-04 RX ADMIN — MIDODRINE HYDROCHLORIDE 10 MG: 5 TABLET ORAL at 17:56

## 2019-08-04 RX ADMIN — ALBUTEROL SULFATE 2.5 MG: 2.5 SOLUTION RESPIRATORY (INHALATION) at 12:37

## 2019-08-04 RX ADMIN — MIDODRINE HYDROCHLORIDE 10 MG: 5 TABLET ORAL at 08:12

## 2019-08-04 NOTE — PLAN OF CARE
Problem: Patient Care Overview  Goal: Plan of Care Review  Outcome: Ongoing (interventions implemented as appropriate)   08/04/19 1035   Coping/Psychosocial   Plan of Care Reviewed With patient   Plan of Care Review   Progress improving   OTHER   Outcome Summary Pt tolerated tx well this date. Pt was sitting in chair upon arrival. Pt gave good effort with UE ther ex. No goals met this tx. Continue OT POC.

## 2019-08-04 NOTE — PLAN OF CARE
Problem: Patient Care Overview  Goal: Plan of Care Review  Outcome: Ongoing (interventions implemented as appropriate)   08/04/19 1106   Coping/Psychosocial   Plan of Care Reviewed With patient   Plan of Care Review   Progress improving   OTHER   Outcome Summary pt cooperative and gave good effort with PT. transfers to eob with sba and stands with cga but needed no help. gt to bsc with fww but really did not need ad. gt limited by tubes and lines. cont as able.

## 2019-08-04 NOTE — PROGRESS NOTES
[facility] Services  INPATIENT PROGRESS NOTE    Length of Stay: 19  Date of Admission: 7/15/2019  Primary Care Physician: Cristino He MD    Subjective   Chief Complaint: No complaints    HPI:     8/4/2019:  No complaints.  Working well with therapy.  Up in chair today.      8/3/2019:  The patient has no complaints.  Physical and occupational therapy restarted today.  Vital signs stable.     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  Patient was found to have a high-grade small bowel obstruction and underwent surgery by Dr. Kc for lysis of adhesions (7/17/2019).  The patient required intensive care secondary to being hemodynamically unstable.  Patient was started on TPN for severe protein caloric malnutrition.  Patient was started on Samsca for hyponatremia.    Review of Systems   Constitutional: Negative for activity change and fatigue.   HENT: Negative for ear pain and sore throat.    Eyes: Negative for pain and discharge.   Respiratory: Negative for cough and shortness of breath.    Cardiovascular: Negative for chest pain and palpitations.   Gastrointestinal: Negative for abdominal pain and nausea.   Endocrine: Negative for cold intolerance and heat intolerance.   Genitourinary: Negative for difficulty urinating and dysuria.   Musculoskeletal: Negative for arthralgias and gait problem.   Skin: Negative for color change and rash.   Neurological: Negative for dizziness and weakness.   Psychiatric/Behavioral: Negative for agitation and confusion.        Objective    Temp:  [96.2 °F (35.7 °C)-97.6 °F (36.4 °C)] 96.7 °F (35.9 °C)  Heart Rate:  [] 94  Resp:  [16-18] 16  BP: (114-140)/(59-88)  134/76    Physical Exam   Constitutional: He is oriented to person, place, and time. He appears well-developed. He appears cachectic.   HENT:   Head: Normocephalic and atraumatic.   Tracheostomy collar    Eyes: EOM are normal. Pupils are equal, round, and reactive to light.   Neck: Normal range of motion. Neck supple.   Cardiovascular: Normal rate and regular rhythm.   Pulmonary/Chest: Effort normal and breath sounds normal.   Abdominal: Soft. Bowel sounds are normal.   Musculoskeletal: Normal range of motion.   Neurological: He is alert and oriented to person, place, and time.   Skin: Skin is warm and dry.   Midline incision without redness or erythema.     Psychiatric: He has a normal mood and affect. His behavior is normal.     Results Review:  I have reviewed the labs, radiology results, and diagnostic studies.    Laboratory Data:   Results from last 7 days   Lab Units 08/04/19  0653 08/03/19  1007 08/02/19  1005 08/01/19  0253   SODIUM mmol/L 132* 134* 132* 133*   POTASSIUM mmol/L 4.1 4.6 4.8 4.6   CHLORIDE mmol/L 92* 95* 94* 95*   CO2 mmol/L 28.0 25.0 25.0 29.0   BUN mg/dL 17 16 16 12   CREATININE mg/dL 0.50* 0.52* 0.57* 0.51*   GLUCOSE mg/dL 129* 135* 136* 152*   CALCIUM mg/dL 9.9 9.7 9.6 10.1   BILIRUBIN mg/dL 0.2 <0.2*  --  0.2   ALK PHOS U/L 106 107  --  113   ALT (SGPT) U/L 13 15  --  18   AST (SGOT) U/L 18 19  --  31   ANION GAP mmol/L 12.0 14.0 13.0 9.0     Estimated Creatinine Clearance: 101.4 mL/min (A) (by C-G formula based on SCr of 0.5 mg/dL (L)).  Results from last 7 days   Lab Units 08/03/19  0529 08/01/19  0253 07/30/19  0751   MAGNESIUM mg/dL 1.8 1.8 2.0   PHOSPHORUS mg/dL 3.5 3.7 3.6     Results from last 7 days   Lab Units 07/29/19  1220   URIC ACID mg/dL 1.8*     Results from last 7 days   Lab Units 08/04/19  0653 08/03/19  0529 08/02/19  0534 08/01/19  0253 07/31/19  0318   WBC 10*3/mm3 7.79 7.33 8.47 10.78 13.77*   HEMOGLOBIN g/dL 9.5* 9.3* 8.3* 9.4* 8.8*   HEMATOCRIT % 27.5* 26.6* 23.9*  27.5* 25.0*   PLATELETS 10*3/mm3 595* 620* 574* 586* 480*           Culture Data:   No results found for: BLOODCX  No results found for: URINECX  No results found for: RESPCX  No results found for: WOUNDCX  No results found for: STOOLCX  No components found for: BODYFLD    Radiology Data:   Imaging Results (last 24 hours)     ** No results found for the last 24 hours. **          I have reviewed the patient's current medications.     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  Hypotension, resolved:  Continue Midodrine 10 mg three times daily.    2.  Hyponatremia, improved:  Continue sodium chloride 1 gram, three times daily.  Dr. Naik following.  Sodium 134 today.    3.  Severe protein-calorie malnutrition:   Dietary following.  Continue tube feeding.   4.  Iron deficiency anemia:  Dr. Sen following.  IV iron 125 mg x 3 days.   5.  Squamous cell carcinoma, pharynx:  Dr. Prieto following.    6.  Hypothyroidism:  Continue home Synthroid.        Discharge Planning: I expect patient to be discharged to home in 1-2 days.      This document has been electronically signed by ZARA Isaacs on August 4, 2019 12:38 PM

## 2019-08-04 NOTE — THERAPY TREATMENT NOTE
Acute Care - Occupational Therapy Treatment Note  [facility]     Patient Name: Emerson Bang  : 1960  MRN: [id number]  Today's Date: 2019  Onset of Illness/Injury or Date of Surgery: 07/15/19  Date of Referral to OT: 19  Referring Physician: Shanda ZUÑIGA    Admit Date: 7/15/2019       ICD-10-CM ICD-9-CM   1. SBO (small bowel obstruction) (CMS/[facility]) K56.609 560.9   2. Impaired functional mobility and activity tolerance Z74.09 V49.89   3. Impaired mobility and ADLs Z74.09 799.89   4. SIADH (syndrome of inappropriate ADH production) (CMS/[facility]) E22.2 253.6   5. Acquired hypothyroidism E03.9 244.9   6. Idiopathic hypotension I95.0 458.9   7. Hyponatremia E87.1 276.1   8. Impaired functional mobility, balance, gait, and endurance Z74.09 V49.89     Patient Active Problem List   Diagnosis   • Hyperkalemia   • Iron deficiency anemia   • Gastroesophageal reflux disease with esophagitis   • Elevated AST (SGOT)   • Alcohol abuse   • Hypothyroidism   • Balance problem   • Need for vaccination   • Low magnesium levels   • Squamous cell carcinoma of pharynx (CMS/HCC)   • History of throat cancer   • Vitamin D deficiency   • Alcohol abuse counseling and surveillance   • Encounter for screening for diabetes mellitus   • Hypomagnesemia   • Iron deficiency anemia   • Hyperlipidemia   • Underweight due to inadequate caloric intake   • Need for immunization against influenza   • 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HCC)   • Hypertension   • General medical examination   • Underweight   • Epigastric pain   • Gastritis without bleeding   • Need for influenza vaccination   • Elevated liver function tests   • B12 deficiency   • Intestinal malabsorption   • Throat pain   • Acute pharyngitis   • Upper respiratory tract infection   • Neoplasm of uncertain behavior of larynx   • Pharyngoesophageal dysphagia   • Severe protein-calorie malnutrition (CMS/HCC)   • Anemia of chronic disease   • Hypotension   • Hyponatremia     Past  Medical History:   Diagnosis Date   • Alcohol abuse    • Allergic rhinitis     vs URI   • Anemia    • At risk for falls    • Benign prostatic hyperplasia    • Chronic gastritis    • Cirrhosis of liver (CMS/HCC)    • Complication of gastrostomy (CMS/HCC)     site not healing   • COPD (chronic obstructive pulmonary disease) (CMS/HCC)    • Dysphagia     odynophagia   • Epigastric pain    • Esophagitis    • GERD (gastroesophageal reflux disease)    • Hypertension    • Hypothyroidism, unspecified    • Low back pain    • Malaise and fatigue    • Nasal congestion 11/15/2018   • Nausea with vomiting, unspecified    • Pain in left knee    • Pain in right knee    • Primary malignant neoplasm of pharynx (CMS/HCC)    • Screening for malignant neoplasm of colon    • Vitamin D deficiency      Past Surgical History:   Procedure Laterality Date   • ABDOMINAL WALL SURGERY  11/04/2008    Laparotomy with repair of stomach wall perforation. 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 Left upper quadrant abdominal wall abscess debridement. Gastrostomy tube erosion through & through the anterior gastric wall.Lupper quadrant abdominal wall abscess   • COLONOSCOPY  04/21/2014    Internal & external hemorrhoids found.   • COLONOSCOPY  2014    [place]   • COLONOSCOPY N/A 10/16/2018    Procedure: COLONOSCOPY;  Surgeon: Kaushal Chester MD;  Location: [facility] ENDOSCOPY;  Service: Gastroenterology   • DIAGNOSTIC LAPAROSCOPY EXPLORATORY LAPAROTOMY N/A 7/17/2019    Procedure: DIAGNOSTIC LAPAROSCOPY, EXPLORATORY LAPAROTOMY, LYSIS OF ADHESIONS;  Surgeon: Parminder Kc MD;  Location: [facility] OR;  Service: General   • DIRECT LARYNGOSCOPY, ESOPHAGOSCOPY, BRONCHOSCOPY N/A 4/15/2019    Procedure: DIRECT LARYNGOSCOPY with biopsy, ESOPHAGOSCOPY;  Surgeon: Bharathi Washington MD;  Location: [facility] OR;  Service: ENT   • ENDOSCOPY N/A 10/16/2018    Procedure: ESOPHAGOGASTRODUODENOSCOPY;  Surgeon: Kaushal Chester MD;  Location: [facility] ENDOSCOPY;  Service:  Gastroenterology   • GASTROSTOMY FEEDING TUBE INSERTION N/A 4/15/2019    Procedure: GASTROSTOMY FEEDING TUBE INSERTION;  Surgeon: Trenton Valera MD;  Location: [facility];  Service: General   • TRACHEOSTOMY  11/10/2008    Respiratory failure   • UPPER GASTROINTESTINAL ENDOSCOPY  04/21/2014    Esophagitis seen. Biopsy taken. Gastritis in stomach. Biopsy taken. Normal duodenum. Biopsy taken.   • UPPER GASTROINTESTINAL ENDOSCOPY  10/16/2018       Therapy Treatment    Rehabilitation Treatment Summary     Row Name 08/04/19 0950             Treatment Time/Intention    Discipline  occupational therapy assistant  -CS      Document Type  therapy note (daily note)  -CS      Subjective Information  no complaints  -CS      Mode of Treatment  occupational therapy  -CS      Therapy Frequency (OT Eval)  other (see comments) 5-7 days a week   -CS      Patient Effort  good  -CS      Existing Precautions/Restrictions  fall;oxygen therapy device and L/min multiple lines and tubes  -CS      Recorded by [CS] Brenda Thomas COTA/L 08/04/19 1034      Row Name 08/04/19 0950             Vital Signs    Pretreatment Heart Rate (beats/min)  92  -CS      Pre SpO2 (%)  100  -CS      O2 Delivery Pre Treatment  trach collar  -CS      Pre Patient Position  Sitting  -CS      Intra Patient Position  Sitting  -CS      Post Patient Position  Sitting  -CS      Recorded by [CS] Brenda Thomas COTA/L 08/04/19 1034      Row Name 08/04/19 0950             Cognitive Assessment/Intervention- PT/OT    Affect/Mental Status (Cognitive)  WFL  -CS      Orientation Status (Cognition)  oriented to;person;place  -CS      Follows Commands (Cognition)  over 90% accuracy;verbal cues/prompting required;repetition of directions required  -CS      Recorded by [CS] Brenda Thomas COTA/L 08/04/19 1034      Row Name 08/04/19 0950             Therapeutic Exercise    Upper Extremity (Therapeutic Exercise)  bicep curl, bilateral  -CS      Upper Extremity  Range of Motion (Therapeutic Exercise)  shoulder flexion/extension, bilateral;shoulder internal/external rotation, bilateral;elbow flexion/extension, bilateral;forearm supination/pronation, bilateral;wrist flexion/extension, bilateral  -CS      Hand (Therapeutic Exercise)  finger flexion/extension, bilateral  -CS      Weight/Resistance (Therapeutic Exercise)  2 pounds  -CS      Exercise Type (Therapeutic Exercise)  AROM (active range of motion)  -CS      Position (Therapeutic Exercise)  seated  -CS      Sets/Reps (Therapeutic Exercise)  1/20  -CS      Equipment (Therapeutic Exercise)  free weight, barbell  -CS      Expected Outcome (Therapeutic Exercise)  improve functional tolerance, self-care activity;improve performance, BADLs;improve performance, transfer skills;increase active range of motion  -CS      Recorded by [CS] Brenda Thomas COTA/L 08/04/19 1034      Row Name 08/04/19 0950             Positioning and Restraints    Pre-Treatment Position  sitting in chair/recliner  -CS      Post Treatment Position  chair  -CS      In Chair  reclined;call light within reach;encouraged to call for assist;exit alarm on  -CS      Recorded by [CS] Brenda Thomas COTA/L 08/04/19 1034      Row Name 08/04/19 0950             Pain Scale: Numbers Pre/Post-Treatment    Pain Scale: Numbers, Pretreatment  0/10 - no pain  -CS      Pain Scale: Numbers, Post-Treatment  0/10 - no pain  -CS      Recorded by [CS] Brenda Thomas COTA/L 08/04/19 1034      Row Name                Wound 07/17/19 2144 abdomen incision    Wound - Properties Group Date first assessed: 07/17/19 [EL] Time first assessed: 2144 [EL] Present On Admission : no [EL] Location: abdomen [EL] Type: incision [EL], laparoscopic x3  Recorded by:  [EL] Lorna Benedict RN 07/17/19 2217    Row Name                Wound 07/17/19 1005 midline abdomen incision;surgical    Wound - Properties Group Date first assessed: 07/17/19 [EL] Time first assessed: 1005 [EL]  Orientation: midline [EL] Location: abdomen [EL] Type: incision;surgical [EL] Recorded by:  [EL] Lorna Benedict RN 07/17/19 2218    Row Name                Wound 07/19/19 0800 Left anterior thigh other (see comments)    Wound - Properties Group Date first assessed: 07/19/19 [RG] Time first assessed: 0800 [RG] Present On Admission : yes;picture taken [RG2] Side: Left [RG] Orientation: anterior [RG] Location: thigh [RG] Type: other (see comments) [RG3], skin graft sight.   Recorded by:  [RG] Xenia Ascencio RN 07/19/19 1515 [RG2] Xenia Ascencio RN 07/19/19 1528 [RG3] Xenia Ascencio RN 07/20/19 0806    Row Name 08/04/19 0950             Outcome Summary/Treatment Plan (OT)    Daily Summary of Progress (OT)  progress toward functional goals is good  -CS      Plan for Continued Treatment (OT)  cont OT POC  -CS      Anticipated Discharge Disposition (OT)  anticipate therapy at next level of care;home with 24/7 care;home with home health  -CS      Recorded by [CS] Brenda Thomas COTA/LIANE 08/04/19 1034        User Key  (r) = Recorded By, (t) = Taken By, (c) = Cosigned By    Initials Name Effective Dates Discipline    EL Lorna Benedict RN 06/05/17 -  Nurse    Xenia Hannah RN 10/17/16 -  Nurse    Brenda Lee MADSEN/LIANE 03/07/18 -  OT        Wound 07/17/19 2144 abdomen incision (Active)   Dressing Appearance open to air 8/4/2019  8:15 AM   Closure Approximated 8/4/2019  8:15 AM   Drainage Amount none 8/4/2019  8:15 AM       Wound 07/17/19 1005 midline abdomen incision;surgical (Active)   Dressing Appearance open to air 8/4/2019  8:15 AM       Wound 07/19/19 0800 Left anterior thigh other (see comments) (Active)   Dressing Appearance dry;intact 8/4/2019  8:15 AM   Closure SAUMYA 8/4/2019  8:15 AM   Base dressing in place, unable to visualize 8/4/2019  8:15 AM   Care, Wound cleansed with;soap and water 8/4/2019  4:00 AM   Dressing Care, Wound dressing changed;non-adherent 8/4/2019  4:00 AM     Rehab Goal  Summary     Row Name 08/04/19 0950             Occupational Therapy Goals    Transfer Goal Selection (OT)  transfer, OT goal 1  -CS      Bathing Goal Selection (OT)  bathing, OT goal 1  -CS      Dressing Goal Selection (OT)  dressing, OT goal 1  -CS      Toileting Goal Selection (OT)  toileting, OT goal 1  -CS      Grooming Goal Selection (OT)  grooming, OT goal 1  -CS      Endurance Goal Selection (OT)  endurance, OT goal 1  -CS      Functional Mobility Goal Selection (OT)  functional mobility, OT goal 1  -CS      Additional Documentation  Functional Mobility Goal Selection (OT) (Row)  -CS         Transfer Goal 1 (OT)    Activity/Assistive Device (Transfer Goal 1, OT)  toilet  -CS      [place] Level/Cues Needed (Transfer Goal 1, OT)  contact guard assist  -CS      Time Frame (Transfer Goal 1, OT)  long term goal (LTG);by discharge  -CS      Progress/Outcome (Transfer Goal 1, OT)  goal not met;continuing progress toward goal  -CS         Bathing Goal 1 (OT)    Activity/Assistive Device (Bathing Goal 1, OT)  bathing skills, all  -CS      [place] Level/Cues Needed (Bathing Goal 1, OT)  set-up required;verbal cues required;contact guard assist  -CS      Time Frame (Bathing Goal 1, OT)  long term goal (LTG);by discharge  -CS      Progress/Outcomes (Bathing Goal 1, OT)  goal met;goal ongoing previously met please address again for accuracy   -CS         Dressing Goal 1 (OT)    Activity/Assistive Device (Dressing Goal 1, OT)  dressing skills, all  -CS      [place]/Cues Needed (Dressing Goal 1, OT)  set-up required;verbal cues required;contact guard assist  -CS      Time Frame (Dressing Goal 1, OT)  long term goal (LTG);by discharge  -CS      Progress/Outcome (Dressing Goal 1, OT)  goal not met;continuing progress toward goal  -CS         Toileting Goal 1 (OT)    Activity/Device (Toileting Goal 1, OT)  toileting skills, all  -CS      [place] Level/Cues Needed (Toileting Goal 1, OT)  contact guard assist   -CS      Time Frame (Toileting Goal 1, OT)  long term goal (LTG);by discharge  -CS      Progress/Outcome (Toileting Goal 1, OT)  goal not met  -CS         Grooming Goal 1 (OT)    Activity/Device (Grooming Goal 1, OT)  grooming skills, all  -CS      [place] (Grooming Goal 1, OT)  supervision required;set-up required  -CS      Time Frame (Grooming Goal 1, OT)  long term goal (LTG);by discharge  -CS      Progress/Outcome (Grooming Goal 1, OT)  goal met  -CS          Endurance Goal 1 (OT)    Activity Level (Endurance Goal 1, OT)  endurance 2 fair + 25 min functional task 3 or less rest breaks O2 90 or up  -CS      Time Frame (Endurance Goal 1, OT)  long term goal (LTG);by discharge  -CS      Progress/Outcome (Endurance Goal 1, OT)  goal met  -CS         Functional Mobility Goal 1 (OT)    Activity/Assistive Device (Functional Mobility Goal 1, OT)  walker, rolling AD as needed  -CS      [place] Level/Cues Needed (Functional Mobility Goal 1, OT)  standby assist;verbal cues required  -CS      Distance Goal 1 (Functional Mobility, OT)  for ADL tasks with good safety and balance.   -CS      Time Frame (Functional Mobility Goal 1, OT)  long term goal (LTG);by discharge  -CS      Progress/Outcome (Functional Mobility Goal 1, OT)  goal not met  -CS         Patient Education Goal (OT)    Activity (Patient Education Goal, OT)  Pt will communciate good home safety awareness.   -CS      [place]/Cues/Accuracy (Memory Goal 2, OT)  verbalizes understanding  -CS      Time Frame (Patient Education Goal, OT)  long term goal (LTG);by discharge  -CS      Progress/Outcome (Patient Education Goal, OT)  goal not met  -CS        User Key  (r) = Recorded By, (t) = Taken By, (c) = Cosigned By    Initials Name Provider Type Discipline    CS Brenda Thomas COTA/LIANE Occupational Therapy Assistant OT        Occupational Therapy Education     Title: PT OT SLP Therapies (In Progress)     Topic: Occupational Therapy (In Progress)      Point: ADL training (In Progress)     Description: Instruct learner(s) on proper safety adaptation and remediation techniques during self care or transfers.   Instruct in proper use of assistive devices.    Learning Progress Summary           Patient Acceptance, E,TB,D, NR by  at 8/4/2019 10:35 AM    Acceptance, E, VU,NR by  at 8/3/2019  2:45 PM    Comment:  Educated about OT and POC. Educated to call for assist and not get up on his own. Educated on need for assist with t/f, mobility and functional tasks. Educated on home safety with ADL need for BSC and RW.    Acceptance, E, VU,NR by  at 7/30/2019 11:58 AM    Comment:  Educated about OT and POC. Educated to call for assist. Educated on safety throughout.    Acceptance, E, VU by  at 7/27/2019 12:40 PM    Acceptance, E,TB,D, NR by CS at 7/26/2019  1:27 PM    Acceptance, E,TB,D, NR by CS at 7/25/2019 12:58 PM    Acceptance, E, NR by  at 7/24/2019 11:48 AM    Acceptance, E,TB,D, NR by CS at 7/23/2019  1:02 PM    Acceptance, E, NR by  at 7/18/2019  1:59 PM    Comment:  Educated about OT and POC. Educated on safety throughout. Educated to call for assist.   Family Acceptance, E, VU,NR by  at 8/3/2019  2:45 PM    Comment:  Educated about OT and POC. Educated to call for assist and not get up on his own. Educated on need for assist with t/f, mobility and functional tasks. Educated on home safety with ADL need for BSC and RW.                   Point: Home exercise program (In Progress)     Description: Instruct learner(s) on appropriate technique for monitoring, assisting and/or progressing therapeutic exercises/activities.    Learning Progress Summary           Patient Acceptance, E,TB,D, NR by  at 8/4/2019 10:35 AM    Acceptance, E,TB,D, NR by CS at 7/26/2019  1:27 PM    Acceptance, E,TB,D, NR by  at 7/25/2019 12:58 PM    Acceptance, E,TB,D, NR by  at 7/23/2019  1:02 PM                   Point: Precautions (In Progress)     Description: Instruct  learner(s) on prescribed precautions during self-care and functional transfers.    Learning Progress Summary           Patient Acceptance, E,TB,D, NR by  at 8/4/2019 10:35 AM    Acceptance, E, VU,NR by  at 8/3/2019  2:45 PM    Comment:  Educated about OT and POC. Educated to call for assist and not get up on his own. Educated on need for assist with t/f, mobility and functional tasks. Educated on home safety with ADL need for BSC and RW.    Acceptance, E, VU,NR by  at 7/30/2019 11:58 AM    Comment:  Educated about OT and POC. Educated to call for assist. Educated on safety throughout.    Acceptance, E,TB,D, NR by  at 7/26/2019  1:27 PM    Acceptance, E,TB,D, NR by  at 7/25/2019 12:58 PM    Acceptance, E,TB,D, NR by  at 7/23/2019  1:02 PM    Acceptance, E, NR by  at 7/18/2019  1:59 PM    Comment:  Educated about OT and POC. Educated on safety throughout. Educated to call for assist.   Family Acceptance, E, VU,NR by  at 8/3/2019  2:45 PM    Comment:  Educated about OT and POC. Educated to call for assist and not get up on his own. Educated on need for assist with t/f, mobility and functional tasks. Educated on home safety with ADL need for BSC and RW.                   Point: Body mechanics (In Progress)     Description: Instruct learner(s) on proper positioning and spine alignment during self-care, functional mobility activities and/or exercises.    Learning Progress Summary           Patient Acceptance, E,TB,D, NR by  at 8/4/2019 10:35 AM    Acceptance, E,TB,D, NR by  at 7/26/2019  1:27 PM    Acceptance, E,TB,D, NR by  at 7/25/2019 12:58 PM    Acceptance, E, NR by  at 7/24/2019 11:48 AM    Acceptance, E,TB,D, NR by  at 7/23/2019  1:02 PM                               User Key     Initials Effective Dates Name Provider Type Discipline     06/08/18 -  Jayne Antunez OTR/L Occupational Therapist OT     03/07/18 -  Kerrie Woodson COTA/L Occupational Therapy Assistant OT      03/07/18 -  Brenda Thomas COTA/L Occupational Therapy Assistant OT                OT Recommendation and Plan  Outcome Summary/Treatment Plan (OT)  Daily Summary of Progress (OT): progress toward functional goals is good  Plan for Continued Treatment (OT): cont OT POC  Anticipated Discharge Disposition (OT): anticipate therapy at next level of care, home with 24/7 care, home with home health  Therapy Frequency (OT Eval): other (see comments)(5-7 days a week )  Daily Summary of Progress (OT): progress toward functional goals is good  Plan of Care Review  Plan of Care Reviewed With: patient  Plan of Care Reviewed With: patient  Outcome Summary: Pt tolerated tx well this date. Pt was sitting in chair upon arrival. Pt gave good effort with UE ther ex. No goals met this tx. Continue OT POC.  Outcome Measures     Row Name 08/04/19 1000 08/03/19 1600 08/03/19 1324       How much help from another person do you currently need...    Turning from your back to your side while in flat bed without using bedrails?  --  3  -GB  --    Moving from lying on back to sitting on the side of a flat bed without bedrails?  --  3  -GB  --    Moving to and from a bed to a chair (including a wheelchair)?  --  3  -GB  --    Standing up from a chair using your arms (e.g., wheelchair, bedside chair)?  --  3  -GB  --    Climbing 3-5 steps with a railing?  --  2  -GB  --    To walk in hospital room?  --  2  -GB  --    AM-PAC 6 Clicks Score (PT)  --  16  -GB  --       How much help from another is currently needed...    Putting on and taking off regular lower body clothing?  3  -CS  --  3  -BH    Bathing (including washing, rinsing, and drying)  3  -CS  --  3  -BH    Toileting (which includes using toilet bed pan or urinal)  3  -CS  --  3  -BH    Putting on and taking off regular upper body clothing  3  -CS  --  3  -BH    Taking care of personal grooming (such as brushing teeth)  4  -CS  --  4  -BH    Eating meals  1  -CS  --  1 tube feedings   -    AM-PAC 6 Clicks Score (OT)  17  -CS  --  17  -       Functional Assessment    Outcome Measure Options  --  AM-PAC 6 Clicks Basic Mobility (PT)  -  AM-PAC 6 Clicks Daily Activity (OT)  -      User Key  (r) = Recorded By, (t) = Taken By, (c) = Cosigned By    Initials Name Provider Type     Danna Huddleston, PT Physical Therapist     Jayne Antunez, OTR/L Occupational Therapist     Brenda Thomas COTA/LIANE Occupational Therapy Assistant           Time Calculation:   Time Calculation- OT     Row Name 08/04/19 1037             Time Calculation- OT    OT Start Time  0950  -CS      OT Stop Time  1014  -      OT Time Calculation (min)  24 min  -CS      Total Timed Code Minutes- OT  24 minute(s)  -CS      OT Received On  08/04/19  -        User Key  (r) = Recorded By, (t) = Taken By, (c) = Cosigned By    Initials Name Provider Type     Brenda Thomas COTA/LIANE Occupational Therapy Assistant        Therapy Charges for Today     Code Description Service Date Service Provider Modifiers Qty    [id number]  OT THER PROC EA 15 MIN 8/4/2019 Brenda Thomas COTA/L GO 2               ROSEANNA Barton  8/4/2019

## 2019-08-04 NOTE — PLAN OF CARE
Problem: Patient Care Overview  Goal: Plan of Care Review  Outcome: Ongoing (interventions implemented as appropriate)   08/04/19 0122   Coping/Psychosocial   Plan of Care Reviewed With patient   Plan of Care Review   Progress improving   OTHER   Outcome Summary pt tolerating tube feeds, resting well, trach care done, VSS       Problem: Fall Risk (Adult)  Goal: Absence of Fall  Outcome: Ongoing (interventions implemented as appropriate)      Problem: Skin Injury Risk (Adult)  Goal: Skin Health and Integrity  Outcome: Ongoing (interventions implemented as appropriate)      Problem: Surgery Nonspecified (Adult)  Goal: Signs and Symptoms of Listed Potential Problems Will be Absent, Minimized or Managed (Surgery Nonspecified)  Outcome: Ongoing (interventions implemented as appropriate)      Problem: Airway, Artificial (Adult)  Goal: Signs and Symptoms of Listed Potential Problems Will be Absent, Minimized or Managed (Airway, Artificial)  Outcome: Ongoing (interventions implemented as appropriate)      Problem: Nutrition, Enteral (Adult)  Goal: Signs and Symptoms of Listed Potential Problems Will be Absent, Minimized or Managed (Nutrition, Enteral)  Outcome: Ongoing (interventions implemented as appropriate)

## 2019-08-04 NOTE — THERAPY TREATMENT NOTE
Acute Care - Physical Therapy Treatment Note  [facility]     Patient Name: Emerson Bang  : 1960  MRN: [id number]  Today's Date: 2019  Onset of Illness/Injury or Date of Surgery: 07/15/19  Date of Referral to PT: 19  Referring Physician: Shanda ZUÑIGA    Admit Date: 7/15/2019    Visit Dx:    ICD-10-CM ICD-9-CM   1. SBO (small bowel obstruction) (CMS/Formerly [facility]) K56.609 560.9   2. Impaired functional mobility and activity tolerance Z74.09 V49.89   3. Impaired mobility and ADLs Z74.09 799.89   4. SIADH (syndrome of inappropriate ADH production) (CMS/Formerly [facility]) E22.2 253.6   5. Acquired hypothyroidism E03.9 244.9   6. Idiopathic hypotension I95.0 458.9   7. Hyponatremia E87.1 276.1   8. Impaired functional mobility, balance, gait, and endurance Z74.09 V49.89     Patient Active Problem List   Diagnosis   • Hyperkalemia   • Iron deficiency anemia   • Gastroesophageal reflux disease with esophagitis   • Elevated AST (SGOT)   • Alcohol abuse   • Hypothyroidism   • Balance problem   • Need for vaccination   • Low magnesium levels   • Squamous cell carcinoma of pharynx (CMS/HCC)   • History of throat cancer   • Vitamin D deficiency   • Alcohol abuse counseling and surveillance   • Encounter for screening for diabetes mellitus   • Hypomagnesemia   • Iron deficiency anemia   • Hyperlipidemia   • Underweight due to inadequate caloric intake   • Need for immunization against influenza   • 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[facility])   • Hypertension   • General medical examination   • Underweight   • Epigastric pain   • Gastritis without bleeding   • Need for influenza vaccination   • Elevated liver function tests   • B12 deficiency   • Intestinal malabsorption   • Throat pain   • Acute pharyngitis   • Upper respiratory tract infection   • Neoplasm of uncertain behavior of larynx   • Pharyngoesophageal dysphagia   • Severe protein-calorie malnutrition (CMS/HCC)   • Anemia of chronic disease   • Hypotension   • Hyponatremia        Therapy Treatment    Rehabilitation Treatment Summary     Row Name 08/04/19 0950 08/04/19 0849          Treatment Time/Intention    Discipline  occupational therapy assistant  -CS  physical therapy assistant  -RW     Document Type  therapy note (daily note)  -CS  therapy note (daily note)  -RW     Subjective Information  no complaints  -CS  -- non verbal  -RW     Mode of Treatment  occupational therapy  -CS  physical therapy  -RW     Patient/Family Observations  --  pt in bed on trach  -RW     Total Minutes, Physical Therapy Treatment  --  26  -RW     Therapy Frequency (PT Clinical Impression)  --  daily  -RW     Therapy Frequency (OT Eval)  other (see comments) 5-7 days a week   -CS  --     Patient Effort  good  -CS  good  -RW     Comment  --  [place] [facility]  [place]     Existing Precautions/Restrictions  fall;oxygen therapy device and L/min multiple lines and tubes  -CS  fall;oxygen therapy device and L/min multiple lines and tubes  -RW     Recorded by [CS] Brenda Thomas COTA/L 08/04/19 1034 [RW] Armen Busby, PTA 08/04/19 1105     Row Name 08/04/19 0950 08/04/19 0849          Vital Signs    Pre Systolic BP Rehab  --  162  -RW     Pre Treatment Diastolic BP  --  92  -RW     Pretreatment Heart Rate (beats/min)  92  -CS  105  -RW     Pre SpO2 (%)  100  -CS  100  -RW     O2 Delivery Pre Treatment  trach collar  -CS  trach collar  -RW     Pre Patient Position  Sitting  -CS  --     Intra Patient Position  Sitting  -CS  --     Post Patient Position  Sitting  -CS  --     Recorded by [CS] Brenda Thomas COTA/LIANE 08/04/19 1034 [RW] Armen Busby, PTA 08/04/19 1105     Row Name 08/04/19 0950 08/04/19 0849          Cognitive Assessment/Intervention- PT/OT    Affect/Mental Status (Cognitive)  WFL  -CS  WFL  -RW     Orientation Status (Cognition)  oriented to;person;place  -CS  oriented to;person;place  -RW     Follows Commands (Cognition)  over 90% accuracy;verbal cues/prompting required;repetition of  directions required  -  WFL  -RW     Personal Safety Interventions  --  gait belt;nonskid shoes/slippers when out of bed  -RW     Recorded by [] Brenda Thomas, TENA/LIANE 08/04/19 1034 [RW] Armen Busby, [facility] 08/04/19 1105     Row Name 08/04/19 0849             Safety Issues, Functional Mobility    Impairments Affecting Function (Mobility)  balance;endurance/activity tolerance;coordination;pain;strength;shortness of breath;postural/trunk control;motor planning;motor control  -RW      Recorded by [RW] Armen Busby, [facility] 08/04/19 1105      Row Name 08/04/19 0849             Bed Mobility Assessment/Treatment    Supine-Sit [place] (Bed Mobility)  supervision assist only to remove covers  -RW      Assistive Device (Bed Mobility)  bed rails;head of bed elevated  -RW      Recorded by [RW] Armen Busby, [facility] 08/04/19 1105      Row Name 08/04/19 0849             Transfer Assessment/Treatment    Transfer Assessment/Treatment  sit-stand transfer;stand-sit transfer;bed-chair transfer  -RW      Recorded by [RW] Armen Busby, [facility] 08/04/19 1105      Row Name 08/04/19 0849             Bed-Chair Transfer    Bed-Chair [place] (Transfers)  contact guard;1 person assist  -RW      Assistive Device (Bed-Chair Transfers)  walker, front-wheeled  -RW      Recorded by [RW] Armen Busby, [facility] 08/04/19 1105      Row Name 08/04/19 0849             Sit-Stand Transfer    Sit-Stand [place] (Transfers)  contact guard;nonverbal cues (demo/gesture);verbal cues  -RW      Recorded by [RW] Armen Busby, [facility] 08/04/19 1105      Row Name 08/04/19 0849             Stand-Sit Transfer    Stand-Sit [place] (Transfers)  contact guard;nonverbal cues (demo/gesture);verbal cues  -RW      Recorded by [RW] Armen Busby, [facility] 08/04/19 1105      Row Name 08/04/19 0849             Gait/Stairs Assessment/Training    [place] Level (Gait)  contact guard;1 person assist  -RW      Assistive Device (Gait)  walker, front-wheeled   -RW      Distance in Feet (Gait)  6  -RW      Pattern (Gait)  step-through  -RW      Comment (Gait/Stairs)  gt limited by tubes and lines  -RW      Recorded by [RW] Armen Busby, \[facility]\"" 08/04/19 1105      Row Name 08/04/19 0849             Lower Extremity Seated Therapeutic Exercise    Performed, Seated Lower Extremity (Therapeutic Exercise)  LAQ (long arc quad), knee extension;knee flexion/extension  -RW      Exercise Type, Seated Lower Extremity (Therapeutic Exercise)  AROM (active range of motion);resistive exercise  -RW      Expected Outcomes, Seated Lower Extremity (Therapeutic Exercise)  improve functional tolerance, self-care activity;improve performance, transfer skills;improve performance, gait skills  -RW      Sets/Reps Detail, Seated Lower Extremity (Therapeutic Exercise)  15  -RW      Recorded by [RW] Armen Busby, \[facility]\"" 08/04/19 1105      Row Name 08/04/19 0849             Lower Extremity Supine Therapeutic Exercise    Performed, Supine Lower Extremity (Therapeutic Exercise)  gluteal sets;ankle pumps;heel slides  -RW      Exercise Type, Supine Lower Extremity (Therapeutic Exercise)  AROM (active range of motion)  -RW      Expected Outcomes, Supine Lower Extremity (Therapeutic Exercise)  improve functional tolerance, self-care activity;improve performance, transfer skills  -RW      Sets/Reps Detail, Supine Lower Extremity (Therapeutic Exercise)  15  -RW      Recorded by [RW] Armen Busby, \[facility]\"" 08/04/19 1105      Row Name 08/04/19 0950             Therapeutic Exercise    Upper Extremity (Therapeutic Exercise)  bicep curl, bilateral  -CS      Upper Extremity Range of Motion (Therapeutic Exercise)  shoulder flexion/extension, bilateral;shoulder internal/external rotation, bilateral;elbow flexion/extension, bilateral;forearm supination/pronation, bilateral;wrist flexion/extension, bilateral  -CS      Hand (Therapeutic Exercise)  finger flexion/extension, bilateral  -CS      Weight/Resistance (Therapeutic  Exercise)  2 pounds  -CS      Exercise Type (Therapeutic Exercise)  AROM (active range of motion)  -CS      Position (Therapeutic Exercise)  seated  -CS      Sets/Reps (Therapeutic Exercise)  1/20  -CS      Equipment (Therapeutic Exercise)  free weight, barbell  -CS      Expected Outcome (Therapeutic Exercise)  improve functional tolerance, self-care activity;improve performance, BADLs;improve performance, transfer skills;increase active range of motion  -CS      Recorded by [CS] Brenda Thomas COTA/L 08/04/19 1034      Row Name 08/04/19 0950 08/04/19 0849          Positioning and Restraints    Pre-Treatment Position  sitting in chair/recliner  -CS  in bed  -RW     Post Treatment Position  chair  -CS  chair  -RW     In Chair  reclined;call light within reach;encouraged to call for assist;exit alarm on  -CS  reclined;call light within reach;encouraged to call for assist  -RW     Recorded by [CS] Brenda Thomas COTA/LIANE 08/04/19 1034 [RW] Armen uBsby, PTA 08/04/19 1105     Row Name 08/04/19 0950 08/04/19 0849          Pain Scale: Numbers Pre/Post-Treatment    Pain Scale: Numbers, Pretreatment  0/10 - no pain  -CS  0/10 - no pain  -RW     Pain Scale: Numbers, Post-Treatment  0/10 - no pain  -CS  0/10 - no pain  -RW     Recorded by [CS] Brenda Thomas COTA/LIANE 08/04/19 1034 [RW] Armen Busby, PTA 08/04/19 1105     Row Name                Wound 07/17/19 2144 abdomen incision    Wound - Properties Group Date first assessed: 07/17/19 [EL] Time first assessed: 2144 [EL] Present On Admission : no [EL] Location: abdomen [EL] Type: incision [EL], laparoscopic x3  Recorded by:  [EL] Lorna Benedict RN 07/17/19 2217    Row Name                Wound 07/17/19 1005 midline abdomen incision;surgical    Wound - Properties Group Date first assessed: 07/17/19 [EL] Time first assessed: 1005 [EL] Orientation: midline [EL] Location: abdomen [EL] Type: incision;surgical [EL] Recorded by:  [EL] Lorna Benedict RN 07/17/19  2218    Row Name                Wound 07/19/19 0800 Left anterior thigh other (see comments)    Wound - Properties Group Date first assessed: 07/19/19 [RG] Time first assessed: 0800 [RG] Present On Admission : yes;picture taken [RG2] Side: Left [RG] Orientation: anterior [RG] Location: thigh [RG] Type: other (see comments) [RG3], skin graft sight.   Recorded by:  [RG] Xenia Ascencio RN 07/19/19 1515 [RG2] Xenia Ascencio RN 07/19/19 1528 [RG3] Xenia Ascencio RN 07/20/19 0806    Row Name 08/04/19 0950             Outcome Summary/Treatment Plan (OT)    Daily Summary of Progress (OT)  progress toward functional goals is good  -CS      Plan for Continued Treatment (OT)  cont OT POC  -CS      Anticipated Discharge Disposition (OT)  anticipate therapy at next level of care;home with 24/7 care;home with home health  -CS      Recorded by [CS] Brenda Thomas COTA/L 08/04/19 1034      Row Name 08/04/19 0849             Outcome Summary/Treatment Plan (PT)    Daily Summary of Progress (PT)  progress toward functional goals as expected  -RW      Anticipated Discharge Disposition (PT)  anticipate therapy at next level of care;inpatient rehabilitation facility;transitional care;home with 24/7 care;home with home health  -RW      Recorded by [RW] Armen Busby, PTA 08/04/19 1105        User Key  (r) = Recorded By, (t) = Taken By, (c) = Cosigned By    Initials Name Effective Dates Discipline    Lorna Mckenzie RN 06/05/17 -  Nurse    Xenia Hannah RN 10/17/16 -  Nurse    Armen Watson, PTA 03/07/18 -  PT    CS Brenda Thomas COTA/LIANE 03/07/18 -  OT          Wound 07/17/19 2144 abdomen incision (Active)   Dressing Appearance open to air 8/4/2019  8:15 AM   Closure Approximated 8/4/2019  8:15 AM   Drainage Amount none 8/4/2019  8:15 AM       Wound 07/17/19 1005 midline abdomen incision;surgical (Active)   Dressing Appearance open to air 8/4/2019  8:15 AM       Wound 07/19/19 0800 Left anterior thigh other  (see comments) (Active)   Dressing Appearance dry;intact 8/4/2019  8:15 AM   Closure SAUMYA 8/4/2019  8:15 AM   Base dressing in place, unable to visualize 8/4/2019  8:15 AM   Care, Wound cleansed with;soap and water 8/4/2019  4:00 AM   Dressing Care, Wound dressing changed;non-adherent 8/4/2019  4:00 AM       Rehab Goal Summary     Row Name 08/04/19 1106 08/04/19 0950          Physical Therapy Goals    Bed Mobility Goal Selection (PT)  bed mobility, PT goal 1  -RW  --     Transfer Goal Selection (PT)  transfer, PT goal 1  -RW  --     Gait Training Goal Selection (PT)  gait training, PT goal 1  -RW  --        Bed Mobility Goal 1 (PT)    Activity/Assistive Device (Bed Mobility Goal 1, PT)  bed mobility activities, all  -RW  --     [place] Level/Cues Needed (Bed Mobility Goal 1, PT)  independent;conditional independence  -RW  --     Time Frame (Bed Mobility Goal 1, PT)  long term goal (LTG);1 week  -RW  --     Barriers (Bed Mobility Goal 1, PT)  fatigue; LOB  -RW  --     Progress/Outcomes (Bed Mobility Goal 1, PT)  goal not met  -RW  --        Transfer Goal 1 (PT)    Activity/Assistive Device (Transfer Goal 1, PT)  bed-to-chair/chair-to-bed;toilet  -RW  --     [place] Level/Cues Needed (Transfer Goal 1, PT)  conditional independence  -RW  --     Time Frame (Transfer Goal 1, PT)  1 week  -RW  --     Barriers (Transfers Goal 1, PT)  fatigue/ LOB  -RW  --     Progress/Outcome (Transfer Goal 1, PT)  continuing progress toward goal;goal not met  -RW  --        Gait Training Goal 1 (PT)    Activity/Assistive Device (Gait Training Goal 1, PT)  gait (walking locomotion);assistive device use;decrease fall risk;increase endurance/gait distance  -RW  --     [place] Level (Gait Training Goal 1, PT)  conditional independence;supervision required  -RW  --     Distance (Gait Goal 1, PT)  100 ft or more w/ VSS  -RW  --     Time Frame (Gait Training Goal 1, PT)  by discharge  -RW  --     Barriers (Gait Training Goal 1, PT)   weakness; fatigue  -RW  --     Progress/Outcome (Gait Training Goal 1, PT)  goal ongoing  -RW  --        Strength Goal 1 (PT)    Strength Goal 1 (PT)  patient will tolerate 2 sets of 15 reps for 3 LE antigravity exercises w/ VSS  -RW  --     Time Frame (Strength Goal 1, PT)  1 week  -RW  --     Barriers (Strength Goal 1, PT)  resp/cardiac precautions  -RW  --     Progress/Outcome (Strength Goal 1, PT)  goal not met;goal ongoing  -RW  --        Occupational Therapy Goals    Transfer Goal Selection (OT)  --  transfer, OT goal 1  -CS     Bathing Goal Selection (OT)  --  bathing, OT goal 1  -CS     Dressing Goal Selection (OT)  --  dressing, OT goal 1  -CS     Toileting Goal Selection (OT)  --  toileting, OT goal 1  -CS     Grooming Goal Selection (OT)  --  grooming, OT goal 1  -CS     Endurance Goal Selection (OT)  --  endurance, OT goal 1  -CS     Functional Mobility Goal Selection (OT)  --  functional mobility, OT goal 1  -CS     Additional Documentation  --  Functional Mobility Goal Selection (OT) (Row)  -CS        Transfer Goal 1 (OT)    Activity/Assistive Device (Transfer Goal 1, OT)  --  toilet  -CS     [place] Level/Cues Needed (Transfer Goal 1, OT)  --  contact guard assist  -CS     Time Frame (Transfer Goal 1, OT)  --  long term goal (LTG);by discharge  -CS     Progress/Outcome (Transfer Goal 1, OT)  --  goal not met;continuing progress toward goal  -CS        Bathing Goal 1 (OT)    Activity/Assistive Device (Bathing Goal 1, OT)  --  bathing skills, all  -CS     [place] Level/Cues Needed (Bathing Goal 1, OT)  --  set-up required;verbal cues required;contact guard assist  -CS     Time Frame (Bathing Goal 1, OT)  --  long term goal (LTG);by discharge  -CS     Progress/Outcomes (Bathing Goal 1, OT)  --  goal met;goal ongoing previously met please address again for accuracy   -CS        Dressing Goal 1 (OT)    Activity/Assistive Device (Dressing Goal 1, OT)  --  dressing skills, all  -CS      [place]/Cues Needed (Dressing Goal 1, OT)  --  set-up required;verbal cues required;contact guard assist  -CS     Time Frame (Dressing Goal 1, OT)  --  long term goal (LTG);by discharge  -CS     Progress/Outcome (Dressing Goal 1, OT)  --  goal not met;continuing progress toward goal  -CS        Toileting Goal 1 (OT)    Activity/Device (Toileting Goal 1, OT)  --  toileting skills, all  -CS     [place] Level/Cues Needed (Toileting Goal 1, OT)  --  contact guard assist  -CS     Time Frame (Toileting Goal 1, OT)  --  long term goal (LTG);by discharge  -CS     Progress/Outcome (Toileting Goal 1, OT)  --  goal not met  -CS        Grooming Goal 1 (OT)    Activity/Device (Grooming Goal 1, OT)  --  grooming skills, all  -CS     [place] (Grooming Goal 1, OT)  --  supervision required;set-up required  -CS     Time Frame (Grooming Goal 1, OT)  --  long term goal (LTG);by discharge  -CS     Progress/Outcome (Grooming Goal 1, OT)  --  goal met  -CS         Endurance Goal 1 (OT)    Activity Level (Endurance Goal 1, OT)  --  endurance 2 fair + 25 min functional task 3 or less rest breaks O2 90 or up  -CS     Time Frame (Endurance Goal 1, OT)  --  long term goal (LTG);by discharge  -CS     Progress/Outcome (Endurance Goal 1, OT)  --  goal met  -CS        Functional Mobility Goal 1 (OT)    Activity/Assistive Device (Functional Mobility Goal 1, OT)  --  walker, rolling AD as needed  -CS     [place] Level/Cues Needed (Functional Mobility Goal 1, OT)  --  standby assist;verbal cues required  -CS     Distance Goal 1 (Functional Mobility, OT)  --  for ADL tasks with good safety and balance.   -CS     Time Frame (Functional Mobility Goal 1, OT)  --  long term goal (LTG);by discharge  -CS     Progress/Outcome (Functional Mobility Goal 1, OT)  --  goal not met  -CS        Patient Education Goal (OT)    Activity (Patient Education Goal, OT)  --  Pt will communciate good home safety awareness.   -CS      [place]/Cues/Accuracy (Memory Goal 2, OT)  --  verbalizes understanding  -CS     Time Frame (Patient Education Goal, OT)  --  long term goal (LTG);by discharge  -CS     Progress/Outcome (Patient Education Goal, OT)  --  goal not met  -CS       User Key  (r) = Recorded By, (t) = Taken By, (c) = Cosigned By    Initials Name Provider Type Discipline    RW Armen Busby, PTA Physical Therapy Assistant PT    CS Brenda Thomas, MADSEN/L Occupational Therapy Assistant OT          Physical Therapy Education     Title: PT OT SLP Therapies (In Progress)     Topic: Physical Therapy (In Progress)     Point: Mobility training (In Progress)     Learning Progress Summary           Patient Acceptance, E, NR by GIAN at 7/29/2019 10:02 AM    Acceptance, E,TB, VU by PRASANNA at 7/22/2019  4:14 PM    Acceptance, E,TB, VU by PRASANNA at 7/20/2019 12:41 PM    Acceptance, E, VU by ALECIA at 7/18/2019  1:38 PM    Comment:  Role of PT, PT POC                   Point: Home exercise program (Done)     Learning Progress Summary           Patient Acceptance, E,TB, VU by PRASANNA at 7/22/2019  4:14 PM    Acceptance, E,TB, VU by PRASANNA at 7/20/2019 12:41 PM                   Point: Precautions (Done)     Learning Progress Summary           Patient Acceptance, D, NR,VU by DONNA at 8/3/2019  4:45 PM    Comment:  Discussed d/c planning with her LTG is d/c to home with her.    Acceptance, E,TB, VU by PRASANNA at 7/22/2019  4:14 PM    Acceptance, E,TB, VU by PRASANNA at 7/20/2019 12:41 PM   Family Acceptance, D, NR,VU by DONNA at 8/3/2019  4:45 PM    Comment:  Discussed d/c planning with her LTG is d/c to home with her.                               User Key     Initials Effective Dates Name Provider Type Discipline    GB 04/03/18 -  Danna Huddleston, PT Physical Therapist PT    GIAN 03/07/18 -  Abran Wan, PTA Physical Therapy Assistant PT    LN 03/07/18 -  Lynda Ramos, PTA Physical Therapy Assistant PT    LF 07/23/18 -  Isis Miller, PT Physical Therapist PT                 PT Recommendation and Plan  Anticipated Discharge Disposition (PT): anticipate therapy at next level of care, inpatient rehabilitation facility, transitional care, home with 24/7 care, home with home health  Therapy Frequency (PT Clinical Impression): daily  Outcome Summary/Treatment Plan (PT)  Daily Summary of Progress (PT): progress toward functional goals as expected  Anticipated Equipment Needs at Discharge (PT): (TBD)  Anticipated Discharge Disposition (PT): anticipate therapy at next level of care, inpatient rehabilitation facility, transitional care, home with 24/7 care, home with home health  Plan of Care Reviewed With: patient  Progress: improving  Outcome Summary: pt cooperative and gave good effort with PT. transfers to eob wiht sba and stands with cga but needed no help. gt to bsc with fww but really did not need ad. gt limited by tubes and lines. cont as able.  Outcome Measures     Row Name 08/04/19 1100 08/04/19 1000 08/03/19 1600       How much help from another person do you currently need...    Turning from your back to your side while in flat bed without using bedrails?  3  -RW  --  3  -GB    Moving from lying on back to sitting on the side of a flat bed without bedrails?  4  -RW  --  3  -GB    Moving to and from a bed to a chair (including a wheelchair)?  4  -RW  --  3  -GB    Standing up from a chair using your arms (e.g., wheelchair, bedside chair)?  4  -RW  --  3  -GB    Climbing 3-5 steps with a railing?  2  -RW  --  2  -GB    To walk in hospital room?  3  -RW  --  2  -GB    AM-PAC 6 Clicks Score (PT)  20  -RW  --  16  -GB       How much help from another is currently needed...    Putting on and taking off regular lower body clothing?  --  3  -CS  --    Bathing (including washing, rinsing, and drying)  --  3  -CS  --    Toileting (which includes using toilet bed pan or urinal)  --  3  -CS  --    Putting on and taking off regular upper body clothing  --  3  -CS  --    Taking care of  personal grooming (such as brushing teeth)  --  4  -CS  --    Eating meals  --  1  -CS  --    AM-PAC 6 Clicks Score (OT)  --  17  -CS  --       Functional Assessment    Outcome Measure Options  --  --  AM-PAC 6 Clicks Basic Mobility (PT)  -    Row Name 08/03/19 1324             How much help from another is currently needed...    Putting on and taking off regular lower body clothing?  3  -      Bathing (including washing, rinsing, and drying)  3  -      Toileting (which includes using toilet bed pan or urinal)  3  -      Putting on and taking off regular upper body clothing  3  -      Taking care of personal grooming (such as brushing teeth)  4  -      Eating meals  1 tube feedings  -      AM-PAC 6 Clicks Score (OT)  17  -         Functional Assessment    Outcome Measure Options  AM-PAC 6 Clicks Daily Activity (OT)  -        User Key  (r) = Recorded By, (t) = Taken By, (c) = Cosigned By    Initials Name Provider Type     Danna Huddleston, PT Physical Therapist     Jayne Antunez, OTR/L Occupational Therapist    Armen Watson, MICHAEL Physical Therapy Assistant    CS Brenda Thomas, MADSEN/L Occupational Therapy Assistant         Time Calculation:   PT Charges     Row Name 08/04/19 1109             Time Calculation    Start Time  0849  -RW      Stop Time  0914  -RW      Time Calculation (min)  25 min  -         Time Calculation- PT    Total Timed Code Minutes- PT  25 minute(s)  -        User Key  (r) = Recorded By, (t) = Taken By, (c) = Cosigned By    Initials Name Provider Type    Armen Watson PTA Physical Therapy Assistant        Therapy Charges for Today     Code Description Service Date Service Provider Modifiers Qty    52636774225 HC PT THER PROC EA 15 MIN 8/4/2019 Armen Busby, MICHAEL GP 1    86172985947 HC PT THERAPEUTIC ACT EA 15 MIN 8/4/2019 Armen Busby, MICHAEL GP 1          PT G-Codes  Outcome Measure Options: AM-PAC 6 Clicks Basic Mobility (PT)  AM-PAC 6 Clicks  Score (PT): 20  AM-PAC 6 Clicks Score (OT): 17    Armen Busby, PTA  8/4/2019

## 2019-08-05 LAB
ALBUMIN SERPL-MCNC: 3.7 G/DL (ref 3.5–5.2)
ALBUMIN/GLOB SERPL: 0.8 G/DL
ALP SERPL-CCNC: 107 U/L (ref 39–117)
ALT SERPL W P-5'-P-CCNC: 13 U/L (ref 1–41)
ANION GAP SERPL CALCULATED.3IONS-SCNC: 10 MMOL/L (ref 5–15)
ANISOCYTOSIS BLD QL: NORMAL
AST SERPL-CCNC: 19 U/L (ref 1–40)
BASOPHILS # BLD AUTO: 0.09 10*3/MM3 (ref 0–0.2)
BASOPHILS NFR BLD AUTO: 1.1 % (ref 0–1.5)
BILIRUB SERPL-MCNC: 0.2 MG/DL (ref 0.2–1.2)
BUN BLD-MCNC: 16 MG/DL (ref 8–23)
BUN/CREAT SERPL: 30.8 (ref 7–25)
CALCIUM SPEC-SCNC: 9.9 MG/DL (ref 8.6–10.5)
CHLORIDE SERPL-SCNC: 95 MMOL/L (ref 98–107)
CO2 SERPL-SCNC: 27 MMOL/L (ref 22–29)
CREAT BLD-MCNC: 0.52 MG/DL (ref 0.76–1.27)
DEPRECATED RDW RBC AUTO: 45.6 FL (ref 37–54)
EOSINOPHIL # BLD AUTO: 0.24 10*3/MM3 (ref 0–0.4)
EOSINOPHIL NFR BLD AUTO: 2.9 % (ref 0.3–6.2)
ERYTHROCYTE [DISTWIDTH] IN BLOOD BY AUTOMATED COUNT: 17.5 % (ref 12.3–15.4)
GFR SERPL CREATININE-BSD FRML MDRD: >150 ML/MIN/1.73
GLOBULIN UR ELPH-MCNC: 4.6 GM/DL
GLUCOSE BLD-MCNC: 120 MG/DL (ref 65–99)
HCT VFR BLD AUTO: 27 % (ref 37.5–51)
HGB BLD-MCNC: 9.4 G/DL (ref 13–17.7)
HYPOCHROMIA BLD QL: NORMAL
IMM GRANULOCYTES # BLD AUTO: 0.05 10*3/MM3 (ref 0–0.05)
IMM GRANULOCYTES NFR BLD AUTO: 0.6 % (ref 0–0.5)
LYMPHOCYTES # BLD AUTO: 1.26 10*3/MM3 (ref 0.7–3.1)
LYMPHOCYTES NFR BLD AUTO: 15.2 % (ref 19.6–45.3)
MCH RBC QN AUTO: 25.3 PG (ref 26.6–33)
MCHC RBC AUTO-ENTMCNC: 34.8 G/DL (ref 31.5–35.7)
MCV RBC AUTO: 72.6 FL (ref 79–97)
MONOCYTES # BLD AUTO: 1.4 10*3/MM3 (ref 0.1–0.9)
MONOCYTES NFR BLD AUTO: 16.9 % (ref 5–12)
NEUTROPHILS # BLD AUTO: 5.26 10*3/MM3 (ref 1.7–7)
NEUTROPHILS NFR BLD AUTO: 63.3 % (ref 42.7–76)
NRBC BLD AUTO-RTO: 0 /100 WBC (ref 0–0.2)
PLATELET # BLD AUTO: 593 10*3/MM3 (ref 140–450)
PMV BLD AUTO: 10.2 FL (ref 6–12)
POTASSIUM BLD-SCNC: 4.3 MMOL/L (ref 3.5–5.2)
PROT SERPL-MCNC: 8.3 G/DL (ref 6–8.5)
RBC # BLD AUTO: 3.72 10*6/MM3 (ref 4.14–5.8)
SMALL PLATELETS BLD QL SMEAR: NORMAL
SODIUM BLD-SCNC: 132 MMOL/L (ref 136–145)
TARGETS BLD QL SMEAR: NORMAL
WBC MORPH BLD: NORMAL
WBC NRBC COR # BLD: 8.3 10*3/MM3 (ref 3.4–10.8)

## 2019-08-05 PROCEDURE — 94799 UNLISTED PULMONARY SVC/PX: CPT

## 2019-08-05 PROCEDURE — 25010000002 ENOXAPARIN PER 10 MG: Performed by: SURGERY

## 2019-08-05 PROCEDURE — 97110 THERAPEUTIC EXERCISES: CPT

## 2019-08-05 PROCEDURE — 97535 SELF CARE MNGMENT TRAINING: CPT

## 2019-08-05 PROCEDURE — 85025 COMPLETE CBC W/AUTO DIFF WBC: CPT | Performed by: FAMILY MEDICINE

## 2019-08-05 PROCEDURE — 85007 BL SMEAR W/DIFF WBC COUNT: CPT | Performed by: FAMILY MEDICINE

## 2019-08-05 PROCEDURE — 80053 COMPREHEN METABOLIC PANEL: CPT | Performed by: NURSE PRACTITIONER

## 2019-08-05 PROCEDURE — 94760 N-INVAS EAR/PLS OXIMETRY 1: CPT

## 2019-08-05 PROCEDURE — 97530 THERAPEUTIC ACTIVITIES: CPT

## 2019-08-05 RX ADMIN — SODIUM CHLORIDE, PRESERVATIVE FREE 10 ML: 5 INJECTION INTRAVENOUS at 20:27

## 2019-08-05 RX ADMIN — MIDODRINE HYDROCHLORIDE 10 MG: 5 TABLET ORAL at 19:33

## 2019-08-05 RX ADMIN — PANTOPRAZOLE SODIUM 40 MG: 40 INJECTION, POWDER, FOR SOLUTION INTRAVENOUS at 08:00

## 2019-08-05 RX ADMIN — BACITRACIN: 500 OINTMENT TOPICAL at 08:07

## 2019-08-05 RX ADMIN — Medication 1 G: at 08:00

## 2019-08-05 RX ADMIN — SODIUM CHLORIDE, PRESERVATIVE FREE 10 ML: 5 INJECTION INTRAVENOUS at 08:03

## 2019-08-05 RX ADMIN — ALBUTEROL SULFATE 2.5 MG: 2.5 SOLUTION RESPIRATORY (INHALATION) at 18:40

## 2019-08-05 RX ADMIN — Medication 1 G: at 11:46

## 2019-08-05 RX ADMIN — MIDODRINE HYDROCHLORIDE 10 MG: 5 TABLET ORAL at 08:00

## 2019-08-05 RX ADMIN — ALBUTEROL SULFATE 2.5 MG: 2.5 SOLUTION RESPIRATORY (INHALATION) at 13:58

## 2019-08-05 RX ADMIN — MIDODRINE HYDROCHLORIDE 10 MG: 5 TABLET ORAL at 11:59

## 2019-08-05 RX ADMIN — ALBUTEROL SULFATE 2.5 MG: 2.5 SOLUTION RESPIRATORY (INHALATION) at 02:31

## 2019-08-05 RX ADMIN — LEVOTHYROXINE SODIUM ANHYDROUS 125 MCG: 100 INJECTION, POWDER, LYOPHILIZED, FOR SOLUTION INTRAVENOUS at 06:34

## 2019-08-05 RX ADMIN — ENOXAPARIN SODIUM 40 MG: 40 INJECTION SUBCUTANEOUS at 08:00

## 2019-08-05 RX ADMIN — Medication 1 G: at 20:26

## 2019-08-05 RX ADMIN — ALBUTEROL SULFATE 2.5 MG: 2.5 SOLUTION RESPIRATORY (INHALATION) at 07:34

## 2019-08-05 RX ADMIN — ACETAMINOPHEN 650 MG: 650 SOLUTION ORAL at 01:27

## 2019-08-05 NOTE — PLAN OF CARE
Problem: Patient Care Overview  Goal: Plan of Care Review  Outcome: Ongoing (interventions implemented as appropriate)   08/05/19 2966   Coping/Psychosocial   Plan of Care Reviewed With patient   Plan of Care Review   Progress no change   OTHER   Outcome Summary VSS, pt resting well, trach care done, tolerating tube feeds, pt was complaining of being unable to urinate, bladder scan showed 500cc, MD called anchored berumen, urine is dark yellow and cloudy        Problem: Fall Risk (Adult)  Goal: Absence of Fall  Outcome: Ongoing (interventions implemented as appropriate)      Problem: Skin Injury Risk (Adult)  Goal: Skin Health and Integrity  Outcome: Ongoing (interventions implemented as appropriate)      Problem: Surgery Nonspecified (Adult)  Goal: Signs and Symptoms of Listed Potential Problems Will be Absent, Minimized or Managed (Surgery Nonspecified)  Outcome: Ongoing (interventions implemented as appropriate)      Problem: Airway, Artificial (Adult)  Goal: Signs and Symptoms of Listed Potential Problems Will be Absent, Minimized or Managed (Airway, Artificial)  Outcome: Ongoing (interventions implemented as appropriate)      Problem: Nutrition, Enteral (Adult)  Goal: Signs and Symptoms of Listed Potential Problems Will be Absent, Minimized or Managed (Nutrition, Enteral)  Outcome: Ongoing (interventions implemented as appropriate)

## 2019-08-05 NOTE — PLAN OF CARE
Problem: Patient Care Overview  Goal: Plan of Care Review  Outcome: Ongoing (interventions implemented as appropriate)   08/05/19 1143   Coping/Psychosocial   Plan of Care Reviewed With patient   Plan of Care Review   Progress improving   OTHER   Outcome Summary Pt tolerated tx well this date. Pt completed an ADL. Pt gave good effort with UE ther ex. Continue OT POC.

## 2019-08-05 NOTE — THERAPY TREATMENT NOTE
Acute Care - Occupational Therapy Treatment Note  [facility]     Patient Name: Emerson Bang  : 1960  MRN: [id number]  Today's Date: 2019  Onset of Illness/Injury or Date of Surgery: 07/15/19  Date of Referral to OT: 19  Referring Physician: Shanda ZUÑIGA    Admit Date: 7/15/2019       ICD-10-CM ICD-9-CM   1. SBO (small bowel obstruction) (CMS/[facility]) K56.609 560.9   2. Impaired functional mobility and activity tolerance Z74.09 V49.89   3. Impaired mobility and ADLs Z74.09 799.89   4. SIADH (syndrome of inappropriate ADH production) (CMS/[facility]) E22.2 253.6   5. Acquired hypothyroidism E03.9 244.9   6. Idiopathic hypotension I95.0 458.9   7. Hyponatremia E87.1 276.1   8. Impaired functional mobility, balance, gait, and endurance Z74.09 V49.89     Patient Active Problem List   Diagnosis   • Hyperkalemia   • Iron deficiency anemia   • Gastroesophageal reflux disease with esophagitis   • Elevated AST (SGOT)   • Alcohol abuse   • Hypothyroidism   • Balance problem   • Need for vaccination   • Low magnesium levels   • Squamous cell carcinoma of pharynx (CMS/HCC)   • History of throat cancer   • Vitamin D deficiency   • Alcohol abuse counseling and surveillance   • Encounter for screening for diabetes mellitus   • Hypomagnesemia   • Iron deficiency anemia   • Hyperlipidemia   • Underweight due to inadequate caloric intake   • Need for immunization against influenza   • 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HCC)   • Hypertension   • General medical examination   • Underweight   • Epigastric pain   • Gastritis without bleeding   • Need for influenza vaccination   • Elevated liver function tests   • B12 deficiency   • Intestinal malabsorption   • Throat pain   • Acute pharyngitis   • Upper respiratory tract infection   • Neoplasm of uncertain behavior of larynx   • Pharyngoesophageal dysphagia   • Severe protein-calorie malnutrition (CMS/HCC)   • Anemia of chronic disease   • Hypotension   • Hyponatremia     Past  Medical History:   Diagnosis Date   • Alcohol abuse    • Allergic rhinitis     vs URI   • Anemia    • At risk for falls    • Benign prostatic hyperplasia    • Chronic gastritis    • Cirrhosis of liver (CMS/HCC)    • Complication of gastrostomy (CMS/HCC)     site not healing   • COPD (chronic obstructive pulmonary disease) (CMS/HCC)    • Dysphagia     odynophagia   • Epigastric pain    • Esophagitis    • GERD (gastroesophageal reflux disease)    • Hypertension    • Hypothyroidism, unspecified    • Low back pain    • Malaise and fatigue    • Nasal congestion 11/15/2018   • Nausea with vomiting, unspecified    • Pain in left knee    • Pain in right knee    • Primary malignant neoplasm of pharynx (CMS/HCC)    • Screening for malignant neoplasm of colon    • Vitamin D deficiency      Past Surgical History:   Procedure Laterality Date   • ABDOMINAL WALL SURGERY  11/04/2008    Laparotomy with repair of stomach wall perforation. 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 Left upper quadrant abdominal wall abscess debridement. Gastrostomy tube erosion through & through the anterior gastric wall.Lupper quadrant abdominal wall abscess   • COLONOSCOPY  04/21/2014    Internal & external hemorrhoids found.   • COLONOSCOPY  2014    [place]   • COLONOSCOPY N/A 10/16/2018    Procedure: COLONOSCOPY;  Surgeon: Kaushal Chester MD;  Location: [facility] ENDOSCOPY;  Service: Gastroenterology   • DIAGNOSTIC LAPAROSCOPY EXPLORATORY LAPAROTOMY N/A 7/17/2019    Procedure: DIAGNOSTIC LAPAROSCOPY, EXPLORATORY LAPAROTOMY, LYSIS OF ADHESIONS;  Surgeon: Parminder Kc MD;  Location: [facility] OR;  Service: General   • DIRECT LARYNGOSCOPY, ESOPHAGOSCOPY, BRONCHOSCOPY N/A 4/15/2019    Procedure: DIRECT LARYNGOSCOPY with biopsy, ESOPHAGOSCOPY;  Surgeon: Bharathi Washington MD;  Location: [facility] OR;  Service: ENT   • ENDOSCOPY N/A 10/16/2018    Procedure: ESOPHAGOGASTRODUODENOSCOPY;  Surgeon: Kaushal Chester MD;  Location: [facility] ENDOSCOPY;  Service:  Gastroenterology   • GASTROSTOMY FEEDING TUBE INSERTION N/A 4/15/2019    Procedure: GASTROSTOMY FEEDING TUBE INSERTION;  Surgeon: Trenton Valera MD;  Location: [facility];  Service: General   • TRACHEOSTOMY  11/10/2008    Respiratory failure   • UPPER GASTROINTESTINAL ENDOSCOPY  04/21/2014    Esophagitis seen. Biopsy taken. Gastritis in stomach. Biopsy taken. Normal duodenum. Biopsy taken.   • UPPER GASTROINTESTINAL ENDOSCOPY  10/16/2018       Therapy Treatment    Rehabilitation Treatment Summary     Row Name 08/05/19 0845             Treatment Time/Intention    Discipline  occupational therapy assistant  -CS      Document Type  therapy note (daily note)  -CS      Subjective Information  no complaints  -CS      Mode of Treatment  occupational therapy  -CS      Therapy Frequency (OT Eval)  other (see comments) 5-7 days a week   -CS      Patient Effort  good  -CS      Existing Precautions/Restrictions  fall;oxygen therapy device and L/min  -CS      Recorded by [CS] Brenda Thomas COTA/L 08/05/19 1140      Row Name 08/05/19 0845             Vital Signs    Pretreatment Heart Rate (beats/min)  88  -CS      Pre SpO2 (%)  98  -CS      O2 Delivery Pre Treatment  trach collar  -CS      Pre Patient Position  Supine  -CS      Post Patient Position  Supine  -CS      Recorded by [CS] Brenda Thomas COTA/L 08/05/19 1140      Row Name 08/05/19 0845             Cognitive Assessment/Intervention- PT/OT    Affect/Mental Status (Cognitive)  WFL  -CS      Orientation Status (Cognition)  oriented to;person;place  -CS      Follows Commands (Cognition)  over 90% accuracy;verbal cues/prompting required;repetition of directions required  -CS      Recorded by [CS] Brenda Thomas COTA/L 08/05/19 1140      Row Name 08/05/19 0845             Bed Mobility Assessment/Treatment    Rolling Left [place] (Bed Mobility)  supervision  -CS      Rolling Right [place] (Bed Mobility)  supervision  -CS      Recorded by [CS]  Brenda Thomas COTA/L 08/05/19 1140      Row Name 08/05/19 0845             Bathing Assessment/Intervention    Bathing [place] Level  bathing skills;upper body;lower body;set up  -CS      Bathing Position  sitting up in bed  -CS      Recorded by [CS] Brenda Thomas COTA/L 08/05/19 1140      Row Name 08/05/19 0845             Upper Body Dressing Assessment/Training    Upper Body Dressing [place] Level  doff;don;set up HG  -CS      Upper Body Dressing Position  sitting up in bed  -CS      Recorded by [CS] Brenda Thomas COTA/L 08/05/19 1140      Row Name 08/05/19 0845             Lower Body Dressing Assessment/Training    Lower Body Dressing [place] Level  doff;don;socks;set up  -CS      Lower Body Dressing Position  sitting up in bed  -CS      Recorded by [CS] Brenda Thomas COTA/L 08/05/19 1140      Row Name 08/05/19 0845             Grooming Assessment/Training    [place] Level (Grooming)  grooming skills;oral care regimen;wash face, hands;set up apply lotion/deodorant  -CS      Grooming Position  sitting up in bed  -CS      Recorded by [CS] Brenda Thomas COTA/L 08/05/19 1140      Row Name 08/05/19 0845             Therapeutic Exercise    Upper Extremity (Therapeutic Exercise)  bicep curl, bilateral  -CS      Upper Extremity Range of Motion (Therapeutic Exercise)  shoulder flexion/extension, bilateral;shoulder internal/external rotation, bilateral;elbow flexion/extension, bilateral;forearm supination/pronation, bilateral;wrist flexion/extension, bilateral  -CS      Hand (Therapeutic Exercise)  finger flexion/extension, bilateral  -CS      Weight/Resistance (Therapeutic Exercise)  2 pounds  -CS      Exercise Type (Therapeutic Exercise)  AROM (active range of motion)  -CS      Position (Therapeutic Exercise)  seated  -CS      Sets/Reps (Therapeutic Exercise)  1/20  -CS      Equipment (Therapeutic Exercise)  free weight, barbell  -CS      Expected Outcome (Therapeutic Exercise)   improve functional tolerance, self-care activity;improve performance, BADLs;improve performance, transfer skills;increase active range of motion  -CS      Recorded by [CS] Brenda Thomas COTA/L 08/05/19 1140      Row Name 08/05/19 0845             Positioning and Restraints    Pre-Treatment Position  in bed  -CS      Post Treatment Position  bed  -CS      In Bed  fowlers;call light within reach;encouraged to call for assist;exit alarm on  -CS      Recorded by [CS] Brenda Thomas COTA/L 08/05/19 1140      Row Name 08/05/19 0845             Pain Scale: Numbers Pre/Post-Treatment    Pain Scale: Numbers, Pretreatment  0/10 - no pain  -CS      Pain Scale: Numbers, Post-Treatment  0/10 - no pain  -CS      Recorded by [CS] Brenda Thomas COTA/L 08/05/19 1140      Row Name                Wound 07/17/19 2144 abdomen incision    Wound - Properties Group Date first assessed: 07/17/19 [EL] Time first assessed: 2144 [EL] Present On Admission : no [EL] Location: abdomen [EL] Type: incision [EL], laparoscopic x3  Recorded by:  [EL] Lorna Benedict RN 07/17/19 2217    Row Name                Wound 07/17/19 1005 midline abdomen incision;surgical    Wound - Properties Group Date first assessed: 07/17/19 [EL] Time first assessed: 1005 [EL] Orientation: midline [EL] Location: abdomen [EL] Type: incision;surgical [EL] Recorded by:  [EL] Lorna Benedict RN 07/17/19 2218    Row Name                Wound 07/19/19 0800 Left anterior thigh other (see comments)    Wound - Properties Group Date first assessed: 07/19/19 [RG] Time first assessed: 0800 [RG] Present On Admission : yes;picture taken [RG2] Side: Left [RG] Orientation: anterior [RG] Location: thigh [RG] Type: other (see comments) [RG3], skin graft sight.   Recorded by:  [RG] Xenia Ascencio RN 07/19/19 1515 [RG2] Xenia Ascencio RN 07/19/19 1528 [RG3] Xenia Ascencio RN 07/20/19 0806    Row Name 08/05/19 0845             Outcome Summary/Treatment Plan (OT)    Daily  Summary of Progress (OT)  progress toward functional goals is good  -CS      Plan for Continued Treatment (OT)  cont OT POC  -CS      Anticipated Discharge Disposition (OT)  anticipate therapy at next level of care;home with 24/7 care;home with home health  -CS      Recorded by [CS] Brenda Thomas, MADSEN/L 08/05/19 1140        User Key  (r) = Recorded By, (t) = Taken By, (c) = Cosigned By    Initials Name Effective Dates Discipline    Lorna Mckenzie RN 06/05/17 -  Nurse     Xenia Ascencio RN 10/17/16 -  Nurse    Brenda Lee MADSEN/L 03/07/18 -  OT        Wound 07/17/19 2144 abdomen incision (Active)   Dressing Appearance open to air 8/5/2019  8:00 AM   Closure Approximated 8/5/2019  8:00 AM   Base dry 8/5/2019  8:00 AM   Drainage Amount none 8/4/2019  8:22 PM       Wound 07/17/19 1005 midline abdomen incision;surgical (Active)   Dressing Appearance open to air 8/5/2019  8:00 AM   Closure Approximated 8/5/2019  8:00 AM   Base scab 8/5/2019  8:00 AM       Wound 07/19/19 0800 Left anterior thigh other (see comments) (Active)   Dressing Appearance dry;intact 8/5/2019  8:00 AM   Closure SAUMYA 8/5/2019  8:00 AM   Base dressing in place, unable to visualize 8/5/2019  8:00 AM   Care, Wound cleansed with;soap and water 8/5/2019  1:00 AM   Dressing Care, Wound dressing changed 8/5/2019  1:00 AM     Rehab Goal Summary     Row Name 08/05/19 0845             Occupational Therapy Goals    Transfer Goal Selection (OT)  transfer, OT goal 1  -CS      Bathing Goal Selection (OT)  bathing, OT goal 1  -CS      Dressing Goal Selection (OT)  dressing, OT goal 1  -CS      Toileting Goal Selection (OT)  toileting, OT goal 1  -CS      Grooming Goal Selection (OT)  grooming, OT goal 1  -CS      Endurance Goal Selection (OT)  endurance, OT goal 1  -CS      Functional Mobility Goal Selection (OT)  functional mobility, OT goal 1  -CS      Additional Documentation  Functional Mobility Goal Selection (OT) (Row)  -CS          Transfer Goal 1 (OT)    Activity/Assistive Device (Transfer Goal 1, OT)  toilet  -CS      [place] Level/Cues Needed (Transfer Goal 1, OT)  contact guard assist  -CS      Time Frame (Transfer Goal 1, OT)  long term goal (LTG);by discharge  -CS      Progress/Outcome (Transfer Goal 1, OT)  goal not met;continuing progress toward goal  -CS         Bathing Goal 1 (OT)    Activity/Assistive Device (Bathing Goal 1, OT)  bathing skills, all  -CS      [place] Level/Cues Needed (Bathing Goal 1, OT)  set-up required;verbal cues required;contact guard assist  -CS      Time Frame (Bathing Goal 1, OT)  long term goal (LTG);by discharge  -CS      Progress/Outcomes (Bathing Goal 1, OT)  goal met;goal ongoing previously met please address again for accuracy   -CS         Dressing Goal 1 (OT)    Activity/Assistive Device (Dressing Goal 1, OT)  dressing skills, all  -CS      [place]/Cues Needed (Dressing Goal 1, OT)  set-up required;verbal cues required;contact guard assist  -CS      Time Frame (Dressing Goal 1, OT)  long term goal (LTG);by discharge  -CS      Progress/Outcome (Dressing Goal 1, OT)  goal met  -CS         Toileting Goal 1 (OT)    Activity/Device (Toileting Goal 1, OT)  toileting skills, all  -CS      [place] Level/Cues Needed (Toileting Goal 1, OT)  contact guard assist  -CS      Time Frame (Toileting Goal 1, OT)  long term goal (LTG);by discharge  -CS      Progress/Outcome (Toileting Goal 1, OT)  goal not met  -CS         Grooming Goal 1 (OT)    Activity/Device (Grooming Goal 1, OT)  grooming skills, all  -CS      [place] (Grooming Goal 1, OT)  supervision required;set-up required  -CS      Time Frame (Grooming Goal 1, OT)  long term goal (LTG);by discharge  -CS      Progress/Outcome (Grooming Goal 1, OT)  goal met  -CS          Endurance Goal 1 (OT)    Activity Level (Endurance Goal 1, OT)  endurance 2 fair + 25 min functional task 3 or less rest breaks O2 90 or up  -CS      Time Frame  (Endurance Goal 1, OT)  long term goal (LTG);by discharge  -CS      Progress/Outcome (Endurance Goal 1, OT)  goal met  -CS         Functional Mobility Goal 1 (OT)    Activity/Assistive Device (Functional Mobility Goal 1, OT)  walker, rolling AD as needed  -CS      [place] Level/Cues Needed (Functional Mobility Goal 1, OT)  standby assist;verbal cues required  -CS      Distance Goal 1 (Functional Mobility, OT)  for ADL tasks with good safety and balance.   -CS      Time Frame (Functional Mobility Goal 1, OT)  long term goal (LTG);by discharge  -CS      Progress/Outcome (Functional Mobility Goal 1, OT)  goal not met  -CS         Patient Education Goal (OT)    Activity (Patient Education Goal, OT)  Pt will communciate good home safety awareness.   -CS      [place]/Cues/Accuracy (Memory Goal 2, OT)  verbalizes understanding  -CS      Time Frame (Patient Education Goal, OT)  long term goal (LTG);by discharge  -CS      Progress/Outcome (Patient Education Goal, OT)  goal not met  -CS        User Key  (r) = Recorded By, (t) = Taken By, (c) = Cosigned By    Initials Name Provider Type Discipline    CS Brenda Thomas COTA/L Occupational Therapy Assistant OT        Occupational Therapy Education     Title: PT OT SLP Therapies (In Progress)     Topic: Occupational Therapy (Done)     Point: ADL training (Done)     Description: Instruct learner(s) on proper safety adaptation and remediation techniques during self care or transfers.   Instruct in proper use of assistive devices.    Learning Progress Summary           Patient Acceptance, E,TB,D, NR,VU by  at 8/5/2019 11:42 AM    Acceptance, E,TB,D, NR by  at 8/4/2019 10:35 AM    Acceptance, E, VU,NR by  at 8/3/2019  2:45 PM    Comment:  Educated about OT and POC. Educated to call for assist and not get up on his own. Educated on need for assist with t/f, mobility and functional tasks. Educated on home safety with ADL need for BSC and RW.    Acceptance, E, VU,NR  by  at 7/30/2019 11:58 AM    Comment:  Educated about OT and POC. Educated to call for assist. Educated on safety throughout.    Acceptance, E, VU by BB at 7/27/2019 12:40 PM    Acceptance, E,TB,D, NR by CS at 7/26/2019  1:27 PM    Acceptance, E,TB,D, NR by CS at 7/25/2019 12:58 PM    Acceptance, E, NR by BB at 7/24/2019 11:48 AM    Acceptance, E,TB,D, NR by CS at 7/23/2019  1:02 PM    Acceptance, E, NR by  at 7/18/2019  1:59 PM    Comment:  Educated about OT and POC. Educated on safety throughout. Educated to call for assist.   Family Acceptance, E, VU,NR by  at 8/3/2019  2:45 PM    Comment:  Educated about OT and POC. Educated to call for assist and not get up on his own. Educated on need for assist with t/f, mobility and functional tasks. Educated on home safety with ADL need for BSC and RW.                   Point: Home exercise program (Done)     Description: Instruct learner(s) on appropriate technique for monitoring, assisting and/or progressing therapeutic exercises/activities.    Learning Progress Summary           Patient Acceptance, E,TB,D, NR,VU by CS at 8/5/2019 11:42 AM    Acceptance, E,TB,D, NR by CS at 8/4/2019 10:35 AM    Acceptance, E,TB,D, NR by CS at 7/26/2019  1:27 PM    Acceptance, E,TB,D, NR by CS at 7/25/2019 12:58 PM    Acceptance, E,TB,D, NR by CS at 7/23/2019  1:02 PM                   Point: Precautions (Done)     Description: Instruct learner(s) on prescribed precautions during self-care and functional transfers.    Learning Progress Summary           Patient Acceptance, E,TB,D, NR,VU by CS at 8/5/2019 11:42 AM    Acceptance, E,TB,D, NR by CS at 8/4/2019 10:35 AM    Acceptance, E, VU,NR by  at 8/3/2019  2:45 PM    Comment:  Educated about OT and POC. Educated to call for assist and not get up on his own. Educated on need for assist with t/f, mobility and functional tasks. Educated on home safety with ADL need for BSC and RW.    Acceptance, E, VU,NR by  at 7/30/2019 11:58 AM     Comment:  Educated about OT and POC. Educated to call for assist. Educated on safety throughout.    Acceptance, E,TB,D, NR by  at 7/26/2019  1:27 PM    Acceptance, E,TB,D, NR by  at 7/25/2019 12:58 PM    Acceptance, E,TB,D, NR by  at 7/23/2019  1:02 PM    Acceptance, E, NR by  at 7/18/2019  1:59 PM    Comment:  Educated about OT and POC. Educated on safety throughout. Educated to call for assist.   Family Acceptance, E, VU,NR by  at 8/3/2019  2:45 PM    Comment:  Educated about OT and POC. Educated to call for assist and not get up on his own. Educated on need for assist with t/f, mobility and functional tasks. Educated on home safety with ADL need for BSC and RW.                   Point: Body mechanics (Done)     Description: Instruct learner(s) on proper positioning and spine alignment during self-care, functional mobility activities and/or exercises.    Learning Progress Summary           Patient Acceptance, E,TB,D, NR,VU by  at 8/5/2019 11:42 AM    Acceptance, E,TB,D, NR by  at 8/4/2019 10:35 AM    Acceptance, E,TB,D, NR by  at 7/26/2019  1:27 PM    Acceptance, E,TB,D, NR by  at 7/25/2019 12:58 PM    Acceptance, E, NR by  at 7/24/2019 11:48 AM    Acceptance, E,TB,D, NR by  at 7/23/2019  1:02 PM                               User Key     Initials Effective Dates Name Provider Type Discipline     06/08/18 -  Jayne Antunez, OTR/L Occupational Therapist OT     03/07/18 -  Kerrie Woodson MADSEN/L Occupational Therapy Assistant OT     03/07/18 -  Brenda Thomas COTA/L Occupational Therapy Assistant OT                OT Recommendation and Plan  Outcome Summary/Treatment Plan (OT)  Daily Summary of Progress (OT): progress toward functional goals is good  Plan for Continued Treatment (OT): cont OT POC  Anticipated Discharge Disposition (OT): anticipate therapy at next level of care, home with 24/7 care, home with home health  Therapy Frequency (OT Eval): other (see comments)(5-7 days  a week )  Daily Summary of Progress (OT): progress toward functional goals is good  Plan of Care Review  Plan of Care Reviewed With: patient  Plan of Care Reviewed With: patient  Outcome Summary: Pt tolerated tx well this date. Pt completed an ADL. Pt gave good effort with UE ther ex. Continue OT POC.  Outcome Measures     Row Name 08/05/19 1100 08/04/19 1100 08/04/19 1000       How much help from another person do you currently need...    Turning from your back to your side while in flat bed without using bedrails?  --  3  -RW  --    Moving from lying on back to sitting on the side of a flat bed without bedrails?  --  4  -RW  --    Moving to and from a bed to a chair (including a wheelchair)?  --  4  -RW  --    Standing up from a chair using your arms (e.g., wheelchair, bedside chair)?  --  4  -RW  --    Climbing 3-5 steps with a railing?  --  2  -RW  --    To walk in hospital room?  --  3  -RW  --    AM-PAC 6 Clicks Score (PT)  --  20  -RW  --       How much help from another is currently needed...    Putting on and taking off regular lower body clothing?  3  -CS  --  3  -CS    Bathing (including washing, rinsing, and drying)  3  -CS  --  3  -CS    Toileting (which includes using toilet bed pan or urinal)  3  -CS  --  3  -CS    Putting on and taking off regular upper body clothing  3  -CS  --  3  -CS    Taking care of personal grooming (such as brushing teeth)  4  -CS  --  4  -CS    Eating meals  1  -CS  --  1  -CS    AM-PAC 6 Clicks Score (OT)  17  -CS  --  17  -CS    Row Name 08/03/19 1600 08/03/19 1324          How much help from another person do you currently need...    Turning from your back to your side while in flat bed without using bedrails?  3  -GB  --     Moving from lying on back to sitting on the side of a flat bed without bedrails?  3  -GB  --     Moving to and from a bed to a chair (including a wheelchair)?  3  -GB  --     Standing up from a chair using your arms (e.g., wheelchair, bedside chair)?   3  -GB  --     Climbing 3-5 steps with a railing?  2  -GB  --     To walk in hospital room?  2  -GB  --     AM-PAC 6 Clicks Score (PT)  16  -GB  --        How much help from another is currently needed...    Putting on and taking off regular lower body clothing?  --  3  -BH     Bathing (including washing, rinsing, and drying)  --  3  -BH     Toileting (which includes using toilet bed pan or urinal)  --  3  -BH     Putting on and taking off regular upper body clothing  --  3  -     Taking care of personal grooming (such as brushing teeth)  --  4  -     Eating meals  --  1 tube feedings  -     AM-PAC 6 Clicks Score (OT)  --  17  -        Functional Assessment    Outcome Measure Options  AM-PAC 6 Clicks Basic Mobility (PT)  -  AM-PAC 6 Clicks Daily Activity (OT)  -       User Key  (r) = Recorded By, (t) = Taken By, (c) = Cosigned By    Initials Name Provider Type     Danna Huddleston, PT Physical Therapist     Jayne Antunez, OTR/L Occupational Therapist    Armen Watson, PTA Physical Therapy Assistant     Brenad Thomas MADSEN/LIANE Occupational Therapy Assistant           Time Calculation:   Time Calculation- OT     Row Name 08/05/19 1146             Time Calculation- OT    OT Start Time  0845  -CS      OT Stop Time  0938  -CS      OT Time Calculation (min)  53 min  -      Total Timed Code Minutes- OT  53 minute(s)  -      OT Received On  08/05/19  -        User Key  (r) = Recorded By, (t) = Taken By, (c) = Cosigned By    Initials Name Provider Type     Brenda Thomas MADSEN/LIANE Occupational Therapy Assistant        Therapy Charges for Today     Code Description Service Date Service Provider Modifiers Qty    53595608483 HC OT THER PROC EA 15 MIN 8/4/2019 Brenda Thomas COTA/L GO 2    28542515742 HC OT SELF CARE/MGMT/TRAIN EA 15 MIN 8/5/2019 Brenda Thomas COTA/L GO 2    13063942863 HC OT THER PROC EA 15 MIN 8/5/2019 Brenda Thomas COTA/L GO 2               Brenda ROB  TENA Thomas/LIANE  8/5/2019

## 2019-08-05 NOTE — THERAPY TREATMENT NOTE
Acute Care - Physical Therapy Treatment Note  [facility]     Patient Name: Emerson Bang  : 1960  MRN: [id number]  Today's Date: 2019  Onset of Illness/Injury or Date of Surgery: 07/15/19  Date of Referral to PT: 19  Referring Physician: Shanda ZUÑIGA    Admit Date: 7/15/2019    Visit Dx:    ICD-10-CM ICD-9-CM   1. SBO (small bowel obstruction) (CMS/[facility]) K56.609 560.9   2. Impaired functional mobility and activity tolerance Z74.09 V49.89   3. Impaired mobility and ADLs Z74.09 799.89   4. SIADH (syndrome of inappropriate ADH production) (CMS/[facility]) E22.2 253.6   5. Acquired hypothyroidism E03.9 244.9   6. Idiopathic hypotension I95.0 458.9   7. Hyponatremia E87.1 276.1   8. Impaired functional mobility, balance, gait, and endurance Z74.09 V49.89     Patient Active Problem List   Diagnosis   • Hyperkalemia   • Iron deficiency anemia   • Gastroesophageal reflux disease with esophagitis   • Elevated AST (SGOT)   • Alcohol abuse   • Hypothyroidism   • Balance problem   • Need for vaccination   • Low magnesium levels   • Squamous cell carcinoma of pharynx (CMS/HCC)   • History of throat cancer   • Vitamin D deficiency   • Alcohol abuse counseling and surveillance   • Encounter for screening for diabetes mellitus   • Hypomagnesemia   • Iron deficiency anemia   • Hyperlipidemia   • Underweight due to inadequate caloric intake   • Need for immunization against influenza   • 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[facility])   • Hypertension   • General medical examination   • Underweight   • Epigastric pain   • Gastritis without bleeding   • Need for influenza vaccination   • Elevated liver function tests   • B12 deficiency   • Intestinal malabsorption   • Throat pain   • Acute pharyngitis   • Upper respiratory tract infection   • Neoplasm of uncertain behavior of larynx   • Pharyngoesophageal dysphagia   • Severe protein-calorie malnutrition (CMS/HCC)   • Anemia of chronic disease   • Hypotension   • Hyponatremia        Therapy Treatment    Rehabilitation Treatment Summary     Row Name 08/05/19 1320 08/05/19 0845          Treatment Time/Intention    Discipline  physical therapy assistant  -JA  occupational therapy assistant  -CS     Document Type  therapy note (daily note)  -JA  therapy note (daily note)  -CS     Subjective Information  no complaints  -  no complaints  -CS     Mode of Treatment  physical therapy  -JA  occupational therapy  -CS     Patient/Family Observations  Pt. in bed no family present   -  --     Therapy Frequency (PT Clinical Impression)  daily  -  --     Therapy Frequency (OT Eval)  --  other (see comments) 5-7 days a week   -CS     Patient Effort  good  -  good  -CS     Existing Precautions/Restrictions  fall;oxygen therapy device and L/min multiple lines and tubes  -  fall;oxygen therapy device and L/min  -CS     Treatment Considerations/Comments  After treatment discussed with respiratory therapy regarding amb. out of room with portable O2 and reports pt. may amb. out of room with adaptor placed on trach that respiratory placed at bedside and place pt. on 6L of o2 with portable tank while amb. out or room  (Significant)   -  --     Recorded by [JA] Ken aHas, PTA 08/05/19 1611 [CS] Brenda Thomas MADSEN/L 08/05/19 1140     Row Name 08/05/19 1320 08/05/19 0845          Vital Signs    Pre Systolic BP Rehab  108  -JA  --     Pre Treatment Diastolic BP  57  -JA  --     Pretreatment Heart Rate (beats/min)  98  -JA  88  -CS     Posttreatment Heart Rate (beats/min)  106  -JA  --     Pre SpO2 (%)  97  -JA  98  -CS     O2 Delivery Pre Treatment  trach collar  -JA  trach collar  -CS     Pre Patient Position  Supine  -JA  Supine  -CS     Intra Patient Position  Standing  -JA  --     Post Patient Position  Supine  -JA  Supine  -CS     Recorded by [JA] Ken Haas, PTA 08/05/19 1611 [CS] Brenda Thomas MADSEN/L 08/05/19 1140     Row Name 08/05/19 1320 08/05/19 0845           Cognitive Assessment/Intervention- PT/OT    Affect/Mental Status (Cognitive)  WFL  -JA  WFL  -CS     Orientation Status (Cognition)  oriented to;person;place  -JA  oriented to;person;place  -CS     Follows Commands (Cognition)  WFL  -JA  over 90% accuracy;verbal cues/prompting required;repetition of directions required  -CS     Recorded by [JA] Ken Haas, PTA 08/05/19 1611 [CS] Brenda Thomas COTA/L 08/05/19 1140     Row Name 08/05/19 1320             Safety Issues, Functional Mobility    Impairments Affecting Function (Mobility)  balance;endurance/activity tolerance;coordination;pain;strength;shortness of breath;postural/trunk control;motor planning;motor control  -JA      Recorded by [CLINTON] Ken Haas, PTA 08/05/19 1611      Row Name 08/05/19 1320 08/05/19 0845          Bed Mobility Assessment/Treatment    Rolling Left [place] (Bed Mobility)  --  supervision  -CS     Rolling Right [place] (Bed Mobility)  --  supervision  -CS     Supine-Sit [place] (Bed Mobility)  supervision assist only to remove covers  -JA  --     Sit-Supine [place] (Bed Mobility)  supervision  -JA  --     Assistive Device (Bed Mobility)  bed rails;head of bed elevated  -JA  --     Recorded by [CLINTON] Ken Haas, PTA 08/05/19 1611 [CS] Brenda Thomas COTA/L 08/05/19 1140     Row Name 08/05/19 1320             Transfer Assessment/Treatment    Transfer Assessment/Treatment  sit-stand transfer;stand-sit transfer  -JA      Recorded by [CLINTON] Ken Haas, PTA 08/05/19 1611      Row Name 08/05/19 1320             Sit-Stand Transfer    Sit-Stand [place] (Transfers)  contact guard;minimum assist (75% patient effort)  -JA      Assistive Device (Sit-Stand Transfers)  other (see comments) no AD   -JA      Recorded by [CLINTON] Ken Haas, PTA 08/05/19 1611      Row Name 08/05/19 1320             Stand-Sit Transfer    Stand-Sit [place] (Transfers)  contact guard;verbal cues  -JA       Assistive Device (Stand-Sit Transfers)  other (see comments) no AD  -JA      Recorded by [JA] Ken Haas, PTA 08/05/19 1611      Row Name 08/05/19 1320             Gait/Stairs Assessment/Training    [place] Level (Gait)  minimum assist (75% patient effort)  -JA      Assistive Device (Gait)  other (see comments) no AD  -JA      Distance in Feet (Gait)  4' F-B x3 reps + 4' F-Bx4 reps in room at EOB  -JA      Pattern (Gait)  step-through  -JA      Recorded by [JA] Ken Haas, PTA 08/05/19 1611      Row Name 08/05/19 0845             Bathing Assessment/Intervention    Bathing [place] Level  bathing skills;upper body;lower body;set up  -CS      Bathing Position  sitting up in bed  -CS      Recorded by [CS] Brenda Thomas COTA/L 08/05/19 1140      Row Name 08/05/19 0845             Upper Body Dressing Assessment/Training    Upper Body Dressing [place] Level  doff;don;set up HG  -CS      Upper Body Dressing Position  sitting up in bed  -CS      Recorded by [CS] Brenda Thomas MADSEN/L 08/05/19 1140      Row Name 08/05/19 0845             Lower Body Dressing Assessment/Training    Lower Body Dressing [place] Level  doff;don;socks;set up  -CS      Lower Body Dressing Position  sitting up in bed  -CS      Recorded by [CS] Brenda Thomas MADSEN/L 08/05/19 1140      Row Name 08/05/19 0845             Grooming Assessment/Training    [place] Level (Grooming)  grooming skills;oral care regimen;wash face, hands;set up apply lotion/deodorant  -CS      Grooming Position  sitting up in bed  -CS      Recorded by [CS] Brenda Thomas MADSEN/L 08/05/19 1140      Row Name 08/05/19 1320             Lower Extremity Seated Therapeutic Exercise    Performed, Seated Lower Extremity (Therapeutic Exercise)  LAQ (long arc quad), knee extension;hip flexion/extension;hip abduction/adduction;ankle dorsiflexion/plantarflexion  -JA      Exercise Type, Seated Lower Extremity (Therapeutic Exercise)   AROM (active range of motion)  -JA      Sets/Reps Detail, Seated Lower Extremity (Therapeutic Exercise)  x20  -JA      Recorded by [JA] Ken Haas PTA 08/05/19 1611      Row Name 08/05/19 0845             Therapeutic Exercise    Upper Extremity (Therapeutic Exercise)  bicep curl, bilateral  -CS      Upper Extremity Range of Motion (Therapeutic Exercise)  shoulder flexion/extension, bilateral;shoulder internal/external rotation, bilateral;elbow flexion/extension, bilateral;forearm supination/pronation, bilateral;wrist flexion/extension, bilateral  -CS      Hand (Therapeutic Exercise)  finger flexion/extension, bilateral  -CS      Weight/Resistance (Therapeutic Exercise)  2 pounds  -CS      Exercise Type (Therapeutic Exercise)  AROM (active range of motion)  -CS      Position (Therapeutic Exercise)  seated  -CS      Sets/Reps (Therapeutic Exercise)  1/20  -CS      Equipment (Therapeutic Exercise)  free weight, barbell  -CS      Expected Outcome (Therapeutic Exercise)  improve functional tolerance, self-care activity;improve performance, BADLs;improve performance, transfer skills;increase active range of motion  -CS      Recorded by [CS] Brenda Thomas COTA/L 08/05/19 1140      Row Name 08/05/19 1320 08/05/19 0845          Positioning and Restraints    Pre-Treatment Position  in bed  -JA  in bed  -CS     Post Treatment Position  bed  -JA  bed  -CS     In Bed  supine;call light within reach;encouraged to call for assist  -  fowlers;call light within reach;encouraged to call for assist;exit alarm on  -CS     Recorded by [JA] Ken Haas PTA 08/05/19 1611 [CS] Brenda Thomas COTA/L 08/05/19 1140     Row Name 08/05/19 1320 08/05/19 0845          Pain Scale: Numbers Pre/Post-Treatment    Pain Scale: Numbers, Pretreatment  4/10  -JA  0/10 - no pain  -CS     Pain Scale: Numbers, Post-Treatment  4/10  -JA  0/10 - no pain  -CS     Pain Location  abdomen  -JA  --     Pain Intervention(s)   Repositioned;Ambulation/increased activity  -JA  --     Recorded by [JA] Ken Haas, MICHAEL 08/05/19 1611 [CS] Brenda Thomas COTA/L 08/05/19 1140     Row Name                Wound 07/17/19 2144 abdomen incision    Wound - Properties Group Date first assessed: 07/17/19 [EL] Time first assessed: 2144 [EL] Present On Admission : no [EL] Location: abdomen [EL] Type: incision [EL], laparoscopic x3  Recorded by:  [EL] Lorna Benedict RN 07/17/19 2217    Row Name                Wound 07/17/19 1005 midline abdomen incision;surgical    Wound - Properties Group Date first assessed: 07/17/19 [EL] Time first assessed: 1005 [EL] Orientation: midline [EL] Location: abdomen [EL] Type: incision;surgical [EL] Recorded by:  [EL] Lorna Benedict RN 07/17/19 2218    Row Name                Wound 07/19/19 0800 Left anterior thigh other (see comments)    Wound - Properties Group Date first assessed: 07/19/19 [RG] Time first assessed: 0800 [RG] Present On Admission : yes;picture taken [RG2] Side: Left [RG] Orientation: anterior [RG] Location: thigh [RG] Type: other (see comments) [RG3], skin graft sight.   Recorded by:  [RG] Xenia Ascencio RN 07/19/19 1515 [RG2] Xenia Ascencio RN 07/19/19 1528 [RG3] Xenia Ascencio RN 07/20/19 0806    Row Name 08/05/19 0845             Outcome Summary/Treatment Plan (OT)    Daily Summary of Progress (OT)  progress toward functional goals is good  -CS      Plan for Continued Treatment (OT)  cont OT POC  -CS      Anticipated Discharge Disposition (OT)  anticipate therapy at next level of care;home with 24/7 care;home with home health  -CS      Recorded by [CS] Brenda Thomas COTA/L 08/05/19 1140      Row Name 08/05/19 1320             Outcome Summary/Treatment Plan (PT)    Daily Summary of Progress (PT)  progress toward functional goals as expected  -JA      Plan for Continued Treatment (PT)  Gt out of room with RW  if pt. agreeable   -JA      Anticipated Discharge Disposition (PT)   anticipate therapy at next level of care;inpatient rehabilitation facility;transitional care;home with 24/7 care;home with home health  -JA      Recorded by [CLINTON] Ken Haas, PTA 08/05/19 1611        User Key  (r) = Recorded By, (t) = Taken By, (c) = Cosigned By    Initials Name Effective Dates Discipline    Lorna Mckenzie RN 06/05/17 -  Nurse    Xenia Hannah RN 10/17/16 -  Nurse    Ken Lagunas, PTA 03/07/18 -  PT    CS Brenda Thomas, MADSEN/L 03/07/18 -  OT          Wound 07/17/19 2144 abdomen incision (Active)   Dressing Appearance open to air 8/5/2019  8:00 AM   Closure Approximated 8/5/2019  8:00 AM   Base dry 8/5/2019  8:00 AM   Drainage Amount none 8/4/2019  8:22 PM       Wound 07/17/19 1005 midline abdomen incision;surgical (Active)   Dressing Appearance open to air 8/5/2019  8:00 AM   Closure Approximated 8/5/2019  8:00 AM   Base scab 8/5/2019  8:00 AM       Wound 07/19/19 0800 Left anterior thigh other (see comments) (Active)   Dressing Appearance dry;intact 8/5/2019  8:00 AM   Closure SAUMYA 8/5/2019  8:00 AM   Base dressing in place, unable to visualize 8/5/2019  8:00 AM   Care, Wound cleansed with;soap and water 8/5/2019  1:00 AM   Dressing Care, Wound dressing changed 8/5/2019  1:00 AM       Rehab Goal Summary     Row Name 08/05/19 1320 08/05/19 0845          Physical Therapy Goals    Bed Mobility Goal Selection (PT)  bed mobility, PT goal 1  -JA  --     Transfer Goal Selection (PT)  transfer, PT goal 1  -JA  --     Gait Training Goal Selection (PT)  gait training, PT goal 1  -JA  --        Bed Mobility Goal 1 (PT)    Activity/Assistive Device (Bed Mobility Goal 1, PT)  bed mobility activities, all  -JA  --     [place] Level/Cues Needed (Bed Mobility Goal 1, PT)  independent;conditional independence  -JA  --     Time Frame (Bed Mobility Goal 1, PT)  long term goal (LTG);1 week  -JA  --     Barriers (Bed Mobility Goal 1, PT)  fatigue; LOB  -JA  --     Progress/Outcomes  (Bed Mobility Goal 1, PT)  goal not met  -JA  --        Transfer Goal 1 (PT)    Activity/Assistive Device (Transfer Goal 1, PT)  bed-to-chair/chair-to-bed;toilet  -JA  --     [place] Level/Cues Needed (Transfer Goal 1, PT)  conditional independence  -JA  --     Time Frame (Transfer Goal 1, PT)  1 week  -JA  --     Barriers (Transfers Goal 1, PT)  fatigue/ LOB  -JA  --     Progress/Outcome (Transfer Goal 1, PT)  continuing progress toward goal;goal not met  -JA  --        Gait Training Goal 1 (PT)    Activity/Assistive Device (Gait Training Goal 1, PT)  gait (walking locomotion);assistive device use;decrease fall risk;increase endurance/gait distance  -JA  --     [place] Level (Gait Training Goal 1, PT)  conditional independence;supervision required  -JA  --     Distance (Gait Goal 1, PT)  100 ft or more w/ VSS  -JA  --     Time Frame (Gait Training Goal 1, PT)  by discharge  -JA  --     Barriers (Gait Training Goal 1, PT)  weakness; fatigue  -JA  --     Progress/Outcome (Gait Training Goal 1, PT)  goal ongoing  -JA  --        Strength Goal 1 (PT)    Strength Goal 1 (PT)  patient will tolerate 2 sets of 15 reps for 3 LE antigravity exercises w/ VSS  -JA  --     Time Frame (Strength Goal 1, PT)  1 week  -JA  --     Barriers (Strength Goal 1, PT)  resp/cardiac precautions  -JA  --     Progress/Outcome (Strength Goal 1, PT)  goal not met;goal ongoing  -JA  --        Occupational Therapy Goals    Transfer Goal Selection (OT)  --  transfer, OT goal 1  -CS     Bathing Goal Selection (OT)  --  bathing, OT goal 1  -CS     Dressing Goal Selection (OT)  --  dressing, OT goal 1  -CS     Toileting Goal Selection (OT)  --  toileting, OT goal 1  -CS     Grooming Goal Selection (OT)  --  grooming, OT goal 1  -CS     Endurance Goal Selection (OT)  --  endurance, OT goal 1  -CS     Functional Mobility Goal Selection (OT)  --  functional mobility, OT goal 1  -CS     Additional Documentation  --  Functional Mobility Goal  Selection (OT) (Row)  -CS        Transfer Goal 1 (OT)    Activity/Assistive Device (Transfer Goal 1, OT)  --  toilet  -CS     [place] Level/Cues Needed (Transfer Goal 1, OT)  --  contact guard assist  -CS     Time Frame (Transfer Goal 1, OT)  --  long term goal (LTG);by discharge  -CS     Progress/Outcome (Transfer Goal 1, OT)  --  goal not met;continuing progress toward goal  -CS        Bathing Goal 1 (OT)    Activity/Assistive Device (Bathing Goal 1, OT)  --  bathing skills, all  -CS     [place] Level/Cues Needed (Bathing Goal 1, OT)  --  set-up required;verbal cues required;contact guard assist  -CS     Time Frame (Bathing Goal 1, OT)  --  long term goal (LTG);by discharge  -CS     Progress/Outcomes (Bathing Goal 1, OT)  --  goal met;goal ongoing previously met please address again for accuracy   -CS        Dressing Goal 1 (OT)    Activity/Assistive Device (Dressing Goal 1, OT)  --  dressing skills, all  -CS     [place]/Cues Needed (Dressing Goal 1, OT)  --  set-up required;verbal cues required;contact guard assist  -CS     Time Frame (Dressing Goal 1, OT)  --  long term goal (LTG);by discharge  -CS     Progress/Outcome (Dressing Goal 1, OT)  --  goal met  -CS        Toileting Goal 1 (OT)    Activity/Device (Toileting Goal 1, OT)  --  toileting skills, all  -CS     [place] Level/Cues Needed (Toileting Goal 1, OT)  --  contact guard assist  -CS     Time Frame (Toileting Goal 1, OT)  --  long term goal (LTG);by discharge  -CS     Progress/Outcome (Toileting Goal 1, OT)  --  goal not met  -CS        Grooming Goal 1 (OT)    Activity/Device (Grooming Goal 1, OT)  --  grooming skills, all  -CS     [place] (Grooming Goal 1, OT)  --  supervision required;set-up required  -CS     Time Frame (Grooming Goal 1, OT)  --  long term goal (LTG);by discharge  -CS     Progress/Outcome (Grooming Goal 1, OT)  --  goal met  -CS         Endurance Goal 1 (OT)    Activity Level (Endurance Goal 1, OT)  --   endurance 2 fair + 25 min functional task 3 or less rest breaks O2 90 or up  -CS     Time Frame (Endurance Goal 1, OT)  --  long term goal (LTG);by discharge  -CS     Progress/Outcome (Endurance Goal 1, OT)  --  goal met  -CS        Functional Mobility Goal 1 (OT)    Activity/Assistive Device (Functional Mobility Goal 1, OT)  --  walker, rolling AD as needed  -CS     [place] Level/Cues Needed (Functional Mobility Goal 1, OT)  --  standby assist;verbal cues required  -CS     Distance Goal 1 (Functional Mobility, OT)  --  for ADL tasks with good safety and balance.   -CS     Time Frame (Functional Mobility Goal 1, OT)  --  long term goal (LTG);by discharge  -CS     Progress/Outcome (Functional Mobility Goal 1, OT)  --  goal not met  -CS        Patient Education Goal (OT)    Activity (Patient Education Goal, OT)  --  Pt will communciate good home safety awareness.   -CS     [place]/Cues/Accuracy (Memory Goal 2, OT)  --  verbalizes understanding  -CS     Time Frame (Patient Education Goal, OT)  --  long term goal (LTG);by discharge  -CS     Progress/Outcome (Patient Education Goal, OT)  --  goal not met  -CS       User Key  (r) = Recorded By, (t) = Taken By, (c) = Cosigned By    Initials Name Provider Type Discipline    Ken Lagunas, PTA Physical Therapy Assistant PT    Brenda Lee COTA/LIANE Occupational Therapy Assistant OT          Physical Therapy Education     Title: PT OT SLP Therapies (In Progress)     Topic: Physical Therapy (In Progress)     Point: Mobility training (In Progress)     Learning Progress Summary           Patient Acceptance, E, NR by GIAN at 7/29/2019 10:02 AM    Acceptance, E,TB, VU by PRASANNA at 7/22/2019  4:14 PM    Acceptance, E,TB, VU by PRASANNA at 7/20/2019 12:41 PM    Acceptance, E, VU by ALECIA at 7/18/2019  1:38 PM    Comment:  Role of PT, PT POC                   Point: Home exercise program (Done)     Learning Progress Summary           Patient Acceptance, E,TB, VU by PRASANNA at  7/22/2019  4:14 PM    Acceptance, E,TB, VU by PRASANNA at 7/20/2019 12:41 PM                   Point: Precautions (Done)     Learning Progress Summary           Patient Acceptance, D, NR,VU by DONNA at 8/3/2019  4:45 PM    Comment:  Discussed d/c planning with her LTG is d/c to home with her.    Acceptance, E,TB, VU by PRASANNA at 7/22/2019  4:14 PM    Acceptance, E,TB, VU by PRASANNA at 7/20/2019 12:41 PM   Family Acceptance, D, NR,VU by DONNA at 8/3/2019  4:45 PM    Comment:  Discussed d/c planning with her LTG is d/c to home with her.                               User Key     Initials Effective Dates Name Provider Type Discipline     04/03/18 -  Danna Huddleston, PT Physical Therapist PT    GIAN 03/07/18 -  Abran Wan, PTA Physical Therapy Assistant PT    LN 03/07/18 -  Lynda Ramos, PTA Physical Therapy Assistant PT     07/23/18 -  Isis Miller, PT Physical Therapist PT                PT Recommendation and Plan  Anticipated Discharge Disposition (PT): anticipate therapy at next level of care, inpatient rehabilitation facility, transitional care, home with 24/7 care, home with home health  Therapy Frequency (PT Clinical Impression): daily  Outcome Summary/Treatment Plan (PT)  Daily Summary of Progress (PT): progress toward functional goals as expected  Plan for Continued Treatment (PT): Gt out of room with RW  if pt. agreeable   Anticipated Equipment Needs at Discharge (PT): (TBD)  Anticipated Discharge Disposition (PT): anticipate therapy at next level of care, inpatient rehabilitation facility, transitional care, home with 24/7 care, home with home health  Plan of Care Reviewed With: patient  Progress: improving  Outcome Summary: Pt. transferred sup-sit-sup SBA, sit-stand-sit CGA-Jaleel, pt. amb. 4' F-B x3 reps + 4' F-B x4 reps at EOB in room, performed x20 reps seated therex EOB. Gt out of room if pt. agreeable   Outcome Measures     Row Name 08/05/19 1320 08/05/19 1100 08/04/19 1100       How much help from another  person do you currently need...    Turning from your back to your side while in flat bed without using bedrails?  4  -JA  --  3  -RW    Moving from lying on back to sitting on the side of a flat bed without bedrails?  4  -JA  --  4  -RW    Moving to and from a bed to a chair (including a wheelchair)?  4  -JA  --  4  -RW    Standing up from a chair using your arms (e.g., wheelchair, bedside chair)?  3  -JA  --  4  -RW    Climbing 3-5 steps with a railing?  2  -JA  --  2  -RW    To walk in hospital room?  3  -JA  --  3  -RW    AM-PAC 6 Clicks Score (PT)  20  -JA  --  20  -RW       How much help from another is currently needed...    Putting on and taking off regular lower body clothing?  --  3  -CS  --    Bathing (including washing, rinsing, and drying)  --  3  -CS  --    Toileting (which includes using toilet bed pan or urinal)  --  3  -CS  --    Putting on and taking off regular upper body clothing  --  3  -CS  --    Taking care of personal grooming (such as brushing teeth)  --  4  -CS  --    Eating meals  --  1  -CS  --    AM-PAC 6 Clicks Score (OT)  --  17  -CS  --       Functional Assessment    Outcome Measure Options  AM-PAC 6 Clicks Basic Mobility (PT)  -  --  --    Row Name 08/04/19 1000 08/03/19 1600 08/03/19 1324       How much help from another person do you currently need...    Turning from your back to your side while in flat bed without using bedrails?  --  3  -GB  --    Moving from lying on back to sitting on the side of a flat bed without bedrails?  --  3  -GB  --    Moving to and from a bed to a chair (including a wheelchair)?  --  3  -GB  --    Standing up from a chair using your arms (e.g., wheelchair, bedside chair)?  --  3  -GB  --    Climbing 3-5 steps with a railing?  --  2  -GB  --    To walk in hospital room?  --  2  -GB  --    AM-PAC 6 Clicks Score (PT)  --  16  -GB  --       How much help from another is currently needed...    Putting on and taking off regular lower body clothing?  3  -CS   --  3  -BH    Bathing (including washing, rinsing, and drying)  3  -CS  --  3  -BH    Toileting (which includes using toilet bed pan or urinal)  3  -CS  --  3  -BH    Putting on and taking off regular upper body clothing  3  -CS  --  3  -BH    Taking care of personal grooming (such as brushing teeth)  4  -CS  --  4  -BH    Eating meals  1  -CS  --  1 tube feedings  -    AM-PAC 6 Clicks Score (OT)  17  -CS  --  17  -       Functional Assessment    Outcome Measure Options  --  AM-PAC 6 Clicks Basic Mobility (PT)  -  AM-PAC 6 Clicks Daily Activity (OT)  -      User Key  (r) = Recorded By, (t) = Taken By, (c) = Cosigned By    Initials Name Provider Type     Danna Huddleston, PT Physical Therapist     Jayne Antunez, OTR/L Occupational Therapist    Ken Lagunas, PTA Physical Therapy Assistant    RW Armen Busby, PTA Physical Therapy Assistant    CS Brenda Thomas, MADSEN/L Occupational Therapy Assistant         Time Calculation:   PT Charges     Row Name 08/05/19 1613             Time Calculation    Start Time  1320  -JA      Stop Time  1345  -      Time Calculation (min)  25 min  -         Time Calculation- PT    Total Timed Code Minutes- PT  25 minute(s)  -         Timed Charges    08289 - PT Therapeutic Exercise Minutes  10  -JA      53023 - PT Therapeutic Activity Minutes  15  -JA        User Key  (r) = Recorded By, (t) = Taken By, (c) = Cosigned By    Initials Name Provider Type    Ken Lagunas, PTA Physical Therapy Assistant        Therapy Charges for Today     Code Description Service Date Service Provider Modifiers Qty    84537549516 HC PT THER PROC EA 15 MIN 8/5/2019 Ken Haas, PTA GP 1    05432360290 HC PT THERAPEUTIC ACT EA 15 MIN 8/5/2019 Ken Haas, PTA GP 1          PT G-Codes  Outcome Measure Options: AM-PAC 6 Clicks Basic Mobility (PT)  AM-PAC 6 Clicks Score (PT): 20  AM-PAC 6 Clicks Score (OT): 17    Ken Haas  PTA  8/5/2019

## 2019-08-05 NOTE — PROGRESS NOTES
[facility] Medicine Services  INPATIENT PROGRESS NOTE    Length of Stay: 20  Date of Admission: 7/15/2019  Primary Care Physician: Cristino He MD    Subjective   Chief Complaint: No complaints    HPI:       8/5/2019: Patient is hypotensive this morning and developed urinary retention during the night that required replacement of Palomo catheter.    8/4/2019:  No complaints.  Working well with therapy.  Up in chair today.      8/3/2019:  The patient has no complaints.  Physical and occupational therapy restarted today.  Vital signs stable.     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  Patient was found to have a high-grade small bowel obstruction and underwent surgery by Dr. Kc for lysis of adhesions (7/17/2019).  The patient required intensive care secondary to being hemodynamically unstable.  Patient was started on TPN for severe protein caloric malnutrition.  Patient was started on Samsca for hyponatremia.    Review of Systems   Constitutional: Negative for activity change and fatigue.   HENT: Negative for ear pain and sore throat.    Eyes: Negative for pain and discharge.   Respiratory: Negative for cough and shortness of breath.    Cardiovascular: Negative for chest pain and palpitations.   Gastrointestinal: Negative for abdominal pain and nausea.   Endocrine: Negative for cold intolerance and heat intolerance.   Genitourinary: Negative for difficulty urinating and dysuria.   Musculoskeletal: Negative for arthralgias and gait problem.   Skin: Negative for color change and rash.   Neurological: Negative for dizziness and weakness.   Psychiatric/Behavioral: Negative for agitation and confusion.         Objective    Temp:  [96 °F (35.6 °C)-98.8 °F (37.1 °C)] 96.8 °F (36 °C)  Heart Rate:  [] 100  Resp:  [16-18] 16  BP: ()/(52-76) 88/60    Physical Exam   Constitutional: He is oriented to person, place, and time. He appears well-developed. He appears cachectic.   HENT:   Head: Normocephalic and atraumatic.   Tracheostomy collar    Eyes: EOM are normal. Pupils are equal, round, and reactive to light.   Neck: Normal range of motion. Neck supple.   Cardiovascular: Normal rate and regular rhythm.   Pulmonary/Chest: Effort normal and breath sounds normal.   Abdominal: Soft. Bowel sounds are normal.   Musculoskeletal: Normal range of motion.   Neurological: He is alert and oriented to person, place, and time.   Skin: Skin is warm and dry.   Midline incision without redness or erythema.     Psychiatric: He has a normal mood and affect. His behavior is normal.     Results Review:  I have reviewed the labs, radiology results, and diagnostic studies.    Laboratory Data:   Results from last 7 days   Lab Units 08/05/19  0655 08/04/19  0653 08/03/19  1007   SODIUM mmol/L 132* 132* 134*   POTASSIUM mmol/L 4.3 4.1 4.6   CHLORIDE mmol/L 95* 92* 95*   CO2 mmol/L 27.0 28.0 25.0   BUN mg/dL 16 17 16   CREATININE mg/dL 0.52* 0.50* 0.52*   GLUCOSE mg/dL 120* 129* 135*   CALCIUM mg/dL 9.9 9.9 9.7   BILIRUBIN mg/dL 0.2 0.2 <0.2*   ALK PHOS U/L 107 106 107   ALT (SGPT) U/L 13 13 15   AST (SGOT) U/L 19 18 19   ANION GAP mmol/L 10.0 12.0 14.0     Estimated Creatinine Clearance: 99.6 mL/min (A) (by C-G formula based on SCr of 0.52 mg/dL (L)).  Results from last 7 days   Lab Units 08/03/19  0529 08/01/19  0253 07/30/19  0751   MAGNESIUM mg/dL 1.8 1.8 2.0   PHOSPHORUS mg/dL 3.5 3.7 3.6     Results from last 7 days   Lab Units 07/29/19  1220   URIC ACID mg/dL 1.8*     Results from last 7 days   Lab Units 08/05/19  0655 08/04/19  0653 08/03/19  0529 08/02/19  0534 08/01/19  0253   WBC 10*3/mm3 8.30 7.79 7.33 8.47 10.78   HEMOGLOBIN  g/dL 9.4* 9.5* 9.3* 8.3* 9.4*   HEMATOCRIT % 27.0* 27.5* 26.6* 23.9* 27.5*   PLATELETS 10*3/mm3 593* 595* 620* 574* 586*           Culture Data:   No results found for: BLOODCX  No results found for: URINECX  No results found for: RESPCX  No results found for: WOUNDCX  No results found for: STOOLCX  No components found for: BODYFLD    Radiology Data:   Imaging Results (last 24 hours)     ** No results found for the last 24 hours. **          I have reviewed the patient's current medications.     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  Hypotension:  Continue Midodrine 10 mg three times daily.    2.  Hyponatremia, improved:  Continue sodium chloride 1 gram, three times daily.  Dr. Naik following.  Sodium 132 today.    3.  Severe protein-calorie malnutrition:   Dietary following.  Continue tube feeding.   4.  Iron deficiency anemia:  Dr. Sen following.  IV iron 125 mg x 3 days.   5.  Squamous cell carcinoma, pharynx:  Dr. Prieto following.    6.  Hypothyroidism:  Continue home Synthroid.    7.  Urinary retention:  Palomo catheter replaced.       Discharge Planning: I expect patient to be discharged to home in 1-2 days.      This document has been electronically signed by ZARA Isaacs on August 5, 2019 11:03 AM

## 2019-08-05 NOTE — PLAN OF CARE
Problem: Patient Care Overview  Goal: Plan of Care Review  Outcome: Ongoing (interventions implemented as appropriate)   08/05/19 1320   Coping/Psychosocial   Plan of Care Reviewed With patient   Plan of Care Review   Progress improving   OTHER   Outcome Summary Pt. transferred sup-sit-sup SBA, sit-stand-sit CGA-Jaleel, pt. amb. 4' F-B x3 reps + 4' F-B x4 reps at EOB in room, performed x20 reps seated therex EOB. Gt out of room if pt. agreeable      Goal: Discharge Needs Assessment  Outcome: Ongoing (interventions implemented as appropriate)   07/31/19 1558 08/01/19 4987   Discharge Needs Assessment   Readmission Within the Last 30 Days --  no previous admission in last 30 days   Concerns to be Addressed --  denies needs/concerns at this time   Patient/Family Anticipates Transition to --  long term care facility   Patient/Family Anticipated Services at Transition --  skilled nursing   Transportation Concerns --  car, none   Transportation Anticipated --  family or friend will provide   Anticipated Changes Related to Illness none --    Equipment Needed After Discharge --  none   Discharge Facility/Level of Care Needs nursing facility, skilled --    Current Discharge Risk chronically ill;dependent with mobility/activities of daily living --    Disability   Equipment Currently Used at Home --  none

## 2019-08-05 NOTE — PROGRESS NOTES
Adult Nutrition  Assessment    Patient Name:  Emerson Bang  YOB: 1960  MRN: [id number]  Admit Date:  7/15/2019    Assessment Date:  8/5/2019    Comments:  Pt Tf at 40ml/hr with 10ml/hr flush- goal rate for TF is 45ml/hr- if to be bolused is 4-5can/day (1 at each meal time and 1-2 at night) if pt chooses to return to bolus feedings. Na improved w/ salt tablets TID. RD to follow hospital course.     Reason for Assessment     Row Name 08/05/19 1142          Reason for Assessment    Reason For Assessment  follow-up protocol     Diagnosis  gastrointestinal disease     Identified At Risk by Screening Criteria  MST SCORE 2+;BMI         Nutrition/Diet History     Row Name 08/05/19 1142          Nutrition/Diet History    Typical Food/Fluid Intake  Pt asleep at RD visit- TF at 40ml/hr and flushes at 10ml/hr         Anthropometrics     Row Name 08/05/19 0700          Anthropometrics    Weight  47.2 kg (104 lb)         Labs/Tests/Procedures/Meds     Row Name 08/05/19 1142          Labs/Procedures/Meds    Lab Results Reviewed  reviewed     Lab Results Comments  Glu x2 days 120-135, Na 132L, Alb 3.7        Diagnostic Tests/Procedures    Diagnostic Test/Procedure Reviewed  reviewed        Medications    Pertinent Medications Reviewed  reviewed     Pertinent Medications Comments  salt tablet TID                       Electronically signed by:  Alexia Bowden RD  08/05/19 11:44 AM

## 2019-08-06 LAB
ALBUMIN SERPL-MCNC: 3.6 G/DL (ref 3.5–5.2)
ALBUMIN/GLOB SERPL: 0.8 G/DL
ALP SERPL-CCNC: 94 U/L (ref 39–117)
ALT SERPL W P-5'-P-CCNC: 8 U/L (ref 1–41)
ANION GAP SERPL CALCULATED.3IONS-SCNC: 13 MMOL/L (ref 5–15)
AST SERPL-CCNC: 16 U/L (ref 1–40)
BASOPHILS # BLD AUTO: 0.11 10*3/MM3 (ref 0–0.2)
BASOPHILS NFR BLD AUTO: 1.6 % (ref 0–1.5)
BILIRUB SERPL-MCNC: <0.2 MG/DL (ref 0.2–1.2)
BUN BLD-MCNC: 16 MG/DL (ref 8–23)
BUN/CREAT SERPL: 37.2 (ref 7–25)
CALCIUM SPEC-SCNC: 9.6 MG/DL (ref 8.6–10.5)
CHLORIDE SERPL-SCNC: 94 MMOL/L (ref 98–107)
CO2 SERPL-SCNC: 26 MMOL/L (ref 22–29)
CREAT BLD-MCNC: 0.43 MG/DL (ref 0.76–1.27)
DEPRECATED RDW RBC AUTO: 45.3 FL (ref 37–54)
EOSINOPHIL # BLD AUTO: 0.31 10*3/MM3 (ref 0–0.4)
EOSINOPHIL NFR BLD AUTO: 4.4 % (ref 0.3–6.2)
ERYTHROCYTE [DISTWIDTH] IN BLOOD BY AUTOMATED COUNT: 17.4 % (ref 12.3–15.4)
GFR SERPL CREATININE-BSD FRML MDRD: >150 ML/MIN/1.73
GLOBULIN UR ELPH-MCNC: 4.3 GM/DL
GLUCOSE BLD-MCNC: 148 MG/DL (ref 65–99)
HCT VFR BLD AUTO: 23.7 % (ref 37.5–51)
HGB BLD-MCNC: 8.2 G/DL (ref 13–17.7)
IMM GRANULOCYTES # BLD AUTO: 0.04 10*3/MM3 (ref 0–0.05)
IMM GRANULOCYTES NFR BLD AUTO: 0.6 % (ref 0–0.5)
LYMPHOCYTES # BLD AUTO: 1.88 10*3/MM3 (ref 0.7–3.1)
LYMPHOCYTES NFR BLD AUTO: 26.6 % (ref 19.6–45.3)
MCH RBC QN AUTO: 25.1 PG (ref 26.6–33)
MCHC RBC AUTO-ENTMCNC: 34.6 G/DL (ref 31.5–35.7)
MCV RBC AUTO: 72.5 FL (ref 79–97)
MONOCYTES # BLD AUTO: 1.35 10*3/MM3 (ref 0.1–0.9)
MONOCYTES NFR BLD AUTO: 19.1 % (ref 5–12)
NEUTROPHILS # BLD AUTO: 3.38 10*3/MM3 (ref 1.7–7)
NEUTROPHILS NFR BLD AUTO: 47.7 % (ref 42.7–76)
NRBC BLD AUTO-RTO: 0 /100 WBC (ref 0–0.2)
PLATELET # BLD AUTO: 592 10*3/MM3 (ref 140–450)
PMV BLD AUTO: 10.5 FL (ref 6–12)
POTASSIUM BLD-SCNC: 4 MMOL/L (ref 3.5–5.2)
PROT SERPL-MCNC: 7.9 G/DL (ref 6–8.5)
RBC # BLD AUTO: 3.27 10*6/MM3 (ref 4.14–5.8)
SODIUM BLD-SCNC: 133 MMOL/L (ref 136–145)
WBC NRBC COR # BLD: 7.07 10*3/MM3 (ref 3.4–10.8)

## 2019-08-06 PROCEDURE — 97110 THERAPEUTIC EXERCISES: CPT

## 2019-08-06 PROCEDURE — 25010000002 ENOXAPARIN PER 10 MG: Performed by: SURGERY

## 2019-08-06 PROCEDURE — 94760 N-INVAS EAR/PLS OXIMETRY 1: CPT

## 2019-08-06 PROCEDURE — 97116 GAIT TRAINING THERAPY: CPT

## 2019-08-06 PROCEDURE — 94799 UNLISTED PULMONARY SVC/PX: CPT

## 2019-08-06 PROCEDURE — 80053 COMPREHEN METABOLIC PANEL: CPT | Performed by: NURSE PRACTITIONER

## 2019-08-06 PROCEDURE — 85025 COMPLETE CBC W/AUTO DIFF WBC: CPT | Performed by: FAMILY MEDICINE

## 2019-08-06 RX ADMIN — ALBUTEROL SULFATE 2.5 MG: 2.5 SOLUTION RESPIRATORY (INHALATION) at 12:41

## 2019-08-06 RX ADMIN — SODIUM CHLORIDE, PRESERVATIVE FREE 10 ML: 5 INJECTION INTRAVENOUS at 08:56

## 2019-08-06 RX ADMIN — Medication 1 G: at 08:55

## 2019-08-06 RX ADMIN — Medication 1 G: at 12:15

## 2019-08-06 RX ADMIN — SODIUM CHLORIDE, PRESERVATIVE FREE 10 ML: 5 INJECTION INTRAVENOUS at 20:50

## 2019-08-06 RX ADMIN — BISACODYL 10 MG: 10 SUPPOSITORY RECTAL at 08:56

## 2019-08-06 RX ADMIN — MIDODRINE HYDROCHLORIDE 10 MG: 5 TABLET ORAL at 12:15

## 2019-08-06 RX ADMIN — MIDODRINE HYDROCHLORIDE 10 MG: 5 TABLET ORAL at 17:20

## 2019-08-06 RX ADMIN — BACITRACIN: 500 OINTMENT TOPICAL at 08:57

## 2019-08-06 RX ADMIN — ENOXAPARIN SODIUM 40 MG: 40 INJECTION SUBCUTANEOUS at 08:56

## 2019-08-06 RX ADMIN — ALBUTEROL SULFATE 2.5 MG: 2.5 SOLUTION RESPIRATORY (INHALATION) at 18:52

## 2019-08-06 RX ADMIN — SODIUM CHLORIDE, PRESERVATIVE FREE 10 ML: 5 INJECTION INTRAVENOUS at 08:57

## 2019-08-06 RX ADMIN — MIDODRINE HYDROCHLORIDE 10 MG: 5 TABLET ORAL at 08:55

## 2019-08-06 RX ADMIN — ALBUTEROL SULFATE 2.5 MG: 2.5 SOLUTION RESPIRATORY (INHALATION) at 07:06

## 2019-08-06 RX ADMIN — ALBUTEROL SULFATE 2.5 MG: 2.5 SOLUTION RESPIRATORY (INHALATION) at 00:37

## 2019-08-06 RX ADMIN — LEVOTHYROXINE SODIUM ANHYDROUS 125 MCG: 100 INJECTION, POWDER, LYOPHILIZED, FOR SOLUTION INTRAVENOUS at 05:32

## 2019-08-06 RX ADMIN — ACETAMINOPHEN 650 MG: 650 SOLUTION ORAL at 05:40

## 2019-08-06 RX ADMIN — PANTOPRAZOLE SODIUM 40 MG: 40 INJECTION, POWDER, FOR SOLUTION INTRAVENOUS at 08:55

## 2019-08-06 RX ADMIN — Medication 1 G: at 17:20

## 2019-08-06 NOTE — PROGRESS NOTES
[facility] Medicine Services  INPATIENT PROGRESS NOTE    Length of Stay: 21  Date of Admission: 7/15/2019  Primary Care Physician: Cristino He MD    Subjective   Chief Complaint: No complaints    HPI:     8/6/2019: Patient's Palomo catheter was removed today but he was unable to void adequately and complained of dysuria.  Urology has been consulted and Palomo catheter has been replaced.  Blood pressure has remained stable.    8/5/2019: Patient is hypotensive this morning and developed urinary retention during the night that required replacement of Palomo catheter.    8/4/2019:  No complaints.  Working well with therapy.  Up in chair today.      8/3/2019:  The patient has no complaints.  Physical and occupational therapy restarted today.  Vital signs stable.     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  Patient was found to have a high-grade small bowel obstruction and underwent surgery by Dr. Kc for lysis of adhesions (7/17/2019).  The patient required intensive care secondary to being hemodynamically unstable.  Patient was started on TPN for severe protein caloric malnutrition.  Patient was started on Samsca for hyponatremia.    Review of Systems   Constitutional: Negative for activity change and fatigue.   HENT: Negative for ear pain and sore throat.    Eyes: Negative for pain and discharge.   Respiratory: Negative for cough and shortness of breath.    Cardiovascular: Negative for chest pain and palpitations.   Gastrointestinal: Negative for abdominal pain and nausea.   Endocrine: Negative for cold intolerance and heat intolerance.   Genitourinary: Negative for difficulty urinating and dysuria.    Musculoskeletal: Negative for arthralgias and gait problem.   Skin: Negative for color change and rash.   Neurological: Negative for dizziness and weakness.   Psychiatric/Behavioral: Negative for agitation and confusion.        Objective    Temp:  [96.6 °F (35.9 °C)-97.8 °F (36.6 °C)] 97.6 °F (36.4 °C)  Heart Rate:  [] 72  Resp:  [16-18] 18  BP: ()/(53-70) 106/53    Physical Exam   Constitutional: He is oriented to person, place, and time. He appears well-developed. He appears cachectic.   HENT:   Head: Normocephalic and atraumatic.   Tracheostomy collar    Eyes: EOM are normal. Pupils are equal, round, and reactive to light.   Neck: Normal range of motion. Neck supple.   Cardiovascular: Normal rate and regular rhythm.   Pulmonary/Chest: Effort normal and breath sounds normal.   Abdominal: Soft. Bowel sounds are normal.   Musculoskeletal: Normal range of motion.   Neurological: He is alert and oriented to person, place, and time.   Skin: Skin is warm and dry.   Midline incision without redness or erythema.     Psychiatric: He has a normal mood and affect. His behavior is normal.     Results Review:  I have reviewed the labs, radiology results, and diagnostic studies.    Laboratory Data:   Results from last 7 days   Lab Units 08/06/19  0612 08/05/19  0655 08/04/19  0653   SODIUM mmol/L 133* 132* 132*   POTASSIUM mmol/L 4.0 4.3 4.1   CHLORIDE mmol/L 94* 95* 92*   CO2 mmol/L 26.0 27.0 28.0   BUN mg/dL 16 16 17   CREATININE mg/dL 0.43* 0.52* 0.50*   GLUCOSE mg/dL 148* 120* 129*   CALCIUM mg/dL 9.6 9.9 9.9   BILIRUBIN mg/dL <0.2* 0.2 0.2   ALK PHOS U/L 94 107 106   ALT (SGPT) U/L 8 13 13   AST (SGOT) U/L 16 19 18   ANION GAP mmol/L 13.0 10.0 12.0     Estimated Creatinine Clearance: 118.1 mL/min (A) (by C-G formula based on SCr of 0.43 mg/dL (L)).  Results from last 7 days   Lab Units 08/03/19  0529 08/01/19  0253   MAGNESIUM mg/dL 1.8 1.8   PHOSPHORUS mg/dL 3.5 3.7         Results from last 7 days   Lab  Units 08/06/19  0612 08/05/19  0655 08/04/19  0653 08/03/19  0529 08/02/19  0534   WBC 10*3/mm3 7.07 8.30 7.79 7.33 8.47   HEMOGLOBIN g/dL 8.2* 9.4* 9.5* 9.3* 8.3*   HEMATOCRIT % 23.7* 27.0* 27.5* 26.6* 23.9*   PLATELETS 10*3/mm3 592* 593* 595* 620* 574*           Culture Data:   No results found for: BLOODCX  No results found for: URINECX  No results found for: RESPCX  No results found for: WOUNDCX  No results found for: STOOLCX  No components found for: BODYFLD    Radiology Data:   Imaging Results (last 24 hours)     ** No results found for the last 24 hours. **          I have reviewed the patient's current medications.     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  Hypotension:  Continue Midodrine 10 mg three times daily.    2.  Hyponatremia, improved:  Continue sodium chloride 1 gram, three times daily.  Dr. Naik following.  Sodium 133 today.    3.  Severe protein-calorie malnutrition:   Dietary following.  Continue tube feeding.   4.  Iron deficiency anemia:  Dr. Sen following.  IV iron 125 mg x 3 days.   5.  Squamous cell carcinoma, pharynx:  Dr. Prieto following.    6.  Hypothyroidism:  Continue home Synthroid.    7.  Urinary retention:  Palomo catheter replaced.  Urology consulted.      Discharge Planning: I expect patient to be discharged to home in 1-2 days.      This document has been electronically signed by ZARA Isaacs on August 6, 2019 2:24 PM

## 2019-08-06 NOTE — PLAN OF CARE
Problem: Patient Care Overview  Goal: Plan of Care Review  Outcome: Ongoing (interventions implemented as appropriate)   08/06/19 1045 08/06/19 1123   Coping/Psychosocial   Plan of Care Reviewed With patient --    Plan of Care Review   Progress --  improving   OTHER   Outcome Summary Pt declined any OOB/EOB @ this time. Pt agreeable to bed ex. Pt tolerated 2 sets x 20 reps BUE ther ex in all planes with good tolerance using 2lb HW and requiring several RB's to complete task. No new goals met @ this time. Cont OT POC. --

## 2019-08-06 NOTE — THERAPY TREATMENT NOTE
Acute Care - Occupational Therapy Treatment Note  [facility]     Patient Name: Emerson Bang  : 1960  MRN: [id number]  Today's Date: 2019  Onset of Illness/Injury or Date of Surgery: 07/15/19  Date of Referral to OT: 19  Referring Physician: Shanda ZUÑIGA    Admit Date: 7/15/2019       ICD-10-CM ICD-9-CM   1. SBO (small bowel obstruction) (CMS/[facility]) K56.609 560.9   2. Impaired functional mobility and activity tolerance Z74.09 V49.89   3. Impaired mobility and ADLs Z74.09 799.89   4. SIADH (syndrome of inappropriate ADH production) (CMS/[facility]) E22.2 253.6   5. Acquired hypothyroidism E03.9 244.9   6. Idiopathic hypotension I95.0 458.9   7. Hyponatremia E87.1 276.1   8. Impaired functional mobility, balance, gait, and endurance Z74.09 V49.89     Patient Active Problem List   Diagnosis   • Hyperkalemia   • Iron deficiency anemia   • Gastroesophageal reflux disease with esophagitis   • Elevated AST (SGOT)   • Alcohol abuse   • Hypothyroidism   • Balance problem   • Need for vaccination   • Low magnesium levels   • Squamous cell carcinoma of pharynx (CMS/HCC)   • History of throat cancer   • Vitamin D deficiency   • Alcohol abuse counseling and surveillance   • Encounter for screening for diabetes mellitus   • Hypomagnesemia   • Iron deficiency anemia   • Hyperlipidemia   • Underweight due to inadequate caloric intake   • Need for immunization against influenza   • 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HCC)   • Hypertension   • General medical examination   • Underweight   • Epigastric pain   • Gastritis without bleeding   • Need for influenza vaccination   • Elevated liver function tests   • B12 deficiency   • Intestinal malabsorption   • Throat pain   • Acute pharyngitis   • Upper respiratory tract infection   • Neoplasm of uncertain behavior of larynx   • Pharyngoesophageal dysphagia   • Severe protein-calorie malnutrition (CMS/HCC)   • Anemia of chronic disease   • Hypotension   • Hyponatremia     Past  Medical History:   Diagnosis Date   • Alcohol abuse    • Allergic rhinitis     vs URI   • Anemia    • At risk for falls    • Benign prostatic hyperplasia    • Chronic gastritis    • Cirrhosis of liver (CMS/HCC)    • Complication of gastrostomy (CMS/HCC)     site not healing   • COPD (chronic obstructive pulmonary disease) (CMS/HCC)    • Dysphagia     odynophagia   • Epigastric pain    • Esophagitis    • GERD (gastroesophageal reflux disease)    • Hypertension    • Hypothyroidism, unspecified    • Low back pain    • Malaise and fatigue    • Nasal congestion 11/15/2018   • Nausea with vomiting, unspecified    • Pain in left knee    • Pain in right knee    • Primary malignant neoplasm of pharynx (CMS/HCC)    • Screening for malignant neoplasm of colon    • Vitamin D deficiency      Past Surgical History:   Procedure Laterality Date   • ABDOMINAL WALL SURGERY  11/04/2008    Laparotomy with repair of stomach wall perforation. 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 Left upper quadrant abdominal wall abscess debridement. Gastrostomy tube erosion through & through the anterior gastric wall.Lupper quadrant abdominal wall abscess   • COLONOSCOPY  04/21/2014    Internal & external hemorrhoids found.   • COLONOSCOPY  2014    [place]   • COLONOSCOPY N/A 10/16/2018    Procedure: COLONOSCOPY;  Surgeon: Kaushal Chester MD;  Location: [facility] ENDOSCOPY;  Service: Gastroenterology   • DIAGNOSTIC LAPAROSCOPY EXPLORATORY LAPAROTOMY N/A 7/17/2019    Procedure: DIAGNOSTIC LAPAROSCOPY, EXPLORATORY LAPAROTOMY, LYSIS OF ADHESIONS;  Surgeon: Parminder Kc MD;  Location: [facility] OR;  Service: General   • DIRECT LARYNGOSCOPY, ESOPHAGOSCOPY, BRONCHOSCOPY N/A 4/15/2019    Procedure: DIRECT LARYNGOSCOPY with biopsy, ESOPHAGOSCOPY;  Surgeon: Bharathi [place] MD;  Location: [facility] OR;  Service: ENT   • ENDOSCOPY N/A 10/16/2018    Procedure: ESOPHAGOGASTRODUODENOSCOPY;  Surgeon: Kaushal Chester MD;  Location: [facility] ENDOSCOPY;  Service:  Gastroenterology   • GASTROSTOMY FEEDING TUBE INSERTION N/A 4/15/2019    Procedure: GASTROSTOMY FEEDING TUBE INSERTION;  Surgeon: Trenton Valera MD;  Location: [facility];  Service: General   • TRACHEOSTOMY  11/10/2008    Respiratory failure   • UPPER GASTROINTESTINAL ENDOSCOPY  04/21/2014    Esophagitis seen. Biopsy taken. Gastritis in stomach. Biopsy taken. Normal duodenum. Biopsy taken.   • UPPER GASTROINTESTINAL ENDOSCOPY  10/16/2018       Therapy Treatment    Rehabilitation Treatment Summary     Row Name 08/06/19 1123 08/06/19 1045          Treatment Time/Intention    Discipline  physical therapy assistant  -GIAN  occupational therapy assistant  -KD     Document Type  therapy note (daily note)  -GIAN  therapy note (daily note)  -KD     Subjective Information  --  no complaints  -KD     Mode of Treatment  physical therapy  -GIAN  occupational therapy  -KD     Patient/Family Observations  --  pt supine in bed  -KD     Therapy Frequency (PT Clinical Impression)  daily  -GIAN  --     Therapy Frequency (OT Eval)  --  other (see comments) 5-7x/wk  -KD     Patient Effort  good  -GIAN  good  -KD     Existing Precautions/Restrictions  fall;oxygen therapy device and L/min multiple lines and tubes  -GIAN  fall;oxygen therapy device and L/min  -KD     Recorded by [GIAN] Abran Wan, PTA 08/06/19 1138 [KD] Isabelle Schafer, MADSEN/L 08/06/19 1415     Row Name 08/06/19 1123 08/06/19 1045          Vital Signs    Pre Systolic BP Rehab  98  -GIAN  106  -KD     Pre Treatment Diastolic BP  56  -GIAN  53  -KD     Post Systolic BP Rehab  135  -JC2  --     Post Treatment Diastolic BP  54  -JC2  --     Pretreatment Heart Rate (beats/min)  91  -GIAN  72  -KD     Intratreatment Heart Rate (beats/min)  81  -JC2  --     Posttreatment Heart Rate (beats/min)  96  -JC2  78  -KD     Pre SpO2 (%)  96  -GIAN  95  -KD     O2 Delivery Pre Treatment  trach collar RA  -JC2  trach collar  -KD     Intra SpO2 (%)  93  -JC2  --     O2 Delivery Intra Treatment   trach collar  -JC2  --     Post SpO2 (%)  90  -JC2  92  -KD     O2 Delivery Post Treatment  trach collar  -JC2  trach collar  -KD     Pre Patient Position  Supine  -JC2  Supine  -KD     Intra Patient Position  --  Supine  -KD     Post Patient Position  Sitting  -JC2  Supine  -KD     Recorded by [GIAN] Abran Wan, PTA 08/06/19 1138  [JC2] Abran Wan, PTA 08/06/19 1147 [KD] Isabelle Schafer, MADSEN/L 08/06/19 1423     Row Name 08/06/19 1123 08/06/19 1045          Cognitive Assessment/Intervention- PT/OT    Affect/Mental Status (Cognitive)  WFL  -GIAN  WFL  -KD     Orientation Status (Cognition)  oriented to;person;place  -GIAN  oriented to;person  -KD     Follows Commands (Cognition)  WFL  -GIAN  WFL  -KD     Personal Safety Interventions  fall prevention program maintained;gait belt;muscle strengthening facilitated;nonskid shoes/slippers when out of bed;supervised activity  -JC2  fall prevention program maintained  -KD     Recorded by [GIAN] Abran Wan, PTA 08/06/19 1138  [JC2] Abran Wan, PTA 08/06/19 1302 [KD] Isabelle Schafer MADSEN/L 08/06/19 1423     Row Name 08/06/19 1123             Safety Issues, Functional Mobility    Impairments Affecting Function (Mobility)  balance;endurance/activity tolerance;coordination;pain;strength;shortness of breath;postural/trunk control;motor planning;motor control  -GIAN      Recorded by [GIAN] Abran Wan, PTA 08/06/19 1138      Row Name 08/06/19 1123             Bed Mobility Assessment/Treatment    Bed Mobility Assessment/Treatment  supine-sit  -GIAN      Supine-Sit [place] (Bed Mobility)  conditional independence  -JC2      Sit-Supine [place] (Bed Mobility)  --  -GIAN      Assistive Device (Bed Mobility)  bed rails;head of bed elevated  -JC3      Recorded by [GIAN] Abran Wan, PTA 08/06/19 1302  [JC2] Abran Wan, PTA 08/06/19 1304  [JC3] Abran Wan, PTA 08/06/19 1138      Row Name 08/06/19 1123             Transfer Assessment/Treatment     Transfer Assessment/Treatment  sit-stand transfer;stand-sit transfer  -GIAN      Recorded by [GIAN] Abran Wan, PTA 08/06/19 1138      Row Name 08/06/19 1123             Bed-Chair Transfer    Bed-Chair [place] (Transfers)  contact guard  -GIAN      Assistive Device (Bed-Chair Transfers)  walker, front-wheeled  -GIAN      Recorded by [GIAN] Abran Wan, PTA 08/06/19 1302      Row Name 08/06/19 1123             Sit-Stand Transfer    Sit-Stand [place] (Transfers)  contact guard  -GIAN      Assistive Device (Sit-Stand Transfers)  other (see comments) no AD   -JC2      Recorded by [GIAN] Abran Wan, PTA 08/06/19 1302  [JC2] Abran Wan, PTA 08/06/19 1138      Row Name 08/06/19 1123             Stand-Sit Transfer    Stand-Sit [place] (Transfers)  contact guard  -GIAN      Assistive Device (Stand-Sit Transfers)  other (see comments) no AD  -JC2      Recorded by [GIAN] Abran Wan, PTA 08/06/19 1302  [JC2] Abran Wan, PTA 08/06/19 1138      Row Name 08/06/19 1123             Gait/Stairs Assessment/Training    Gait/Stairs Assessment/Training  gait/ambulation independence;gait/ambulation assistive device;distance ambulated;gait pattern;gait deviations  -GIAN      [place] Level (Gait)  contact guard  -GIAN      Assistive Device (Gait)  other (see comments) no AD  -JC2      Distance in Feet (Gait)  140 in room  -GIAN      Pattern (Gait)  step-through  -JC2      Deviations/Abnormal Patterns (Gait)  essence decreased  -GIAN      Recorded by [GIAN] Abran Wan, PTA 08/06/19 1302  [JC2] Abran Wan, PTA 08/06/19 1138      Row Name 08/06/19 1123             Therapeutic Exercise    Therapeutic Exercise  supine, lower extremities  -GIAN      Recorded by [GIAN] Abran Wan, PTA 08/06/19 1302      Row Name 08/06/19 1123             Lower Extremity Supine Therapeutic Exercise    Performed, Supine Lower Extremity (Therapeutic Exercise)  ankle dorsiflexion/plantarflexion;hip abduction/adduction;heel  slides  -GIAN      Exercise Type, Supine Lower Extremity (Therapeutic Exercise)  AROM (active range of motion)  -GIAN      Sets/Reps Detail, Supine Lower Extremity (Therapeutic Exercise)  15x1 ashley  -GIAN      Recorded by [GIAN] Abran Wan PTA 08/06/19 1302      Row Name 08/06/19 1045             Therapeutic Exercise    Upper Extremity Range of Motion (Therapeutic Exercise)  shoulder flexion/extension, bilateral;shoulder abduction/adduction, bilateral;shoulder horizontal abduction/adduction, bilateral;shoulder internal/external rotation, bilateral;elbow flexion/extension, bilateral;forearm supination/pronation, bilateral;wrist flexion/extension, bilateral  -KD      Weight/Resistance (Therapeutic Exercise)  2 pounds  -KD      Exercise Type (Therapeutic Exercise)  AROM (active range of motion)  -KD      Position (Therapeutic Exercise)  supine  -KD      Sets/Reps (Therapeutic Exercise)  2/20  -KD      Equipment (Therapeutic Exercise)  free weight, barbell  -KD      Expected Outcome (Therapeutic Exercise)  improve functional tolerance, self-care activity;improve performance, transfer skills  -KD      Comment (Therapeutic Exercise)  RB's PRN  -KD      Recorded by [KD] Isabelle Schafer COTA/L 08/06/19 1423      Row Name 08/06/19 1123 08/06/19 1045          Positioning and Restraints    Pre-Treatment Position  in bed  -GIAN  in bed  -KD     Post Treatment Position  bed  -GIAN  bed  -KD     In Bed  fowlers;call light within reach;encouraged to call for assist;exit alarm on all needs met  -GIAN  fowlers;call light within reach;encouraged to call for assist;exit alarm on  -KD     Recorded by [GIAN] Abran Wan PTA 08/06/19 1302 [KD] Isabelle Schafer COTA/L 08/06/19 1423     Row Name 08/06/19 1123             Pain Assessment    Additional Documentation  Pain Scale: Numbers Pre/Post-Treatment (Group)  -GIAN      Recorded by [GIAN] Abran Wan PTA 08/06/19 1302      Row Name 08/06/19 1123 08/06/19 1045          Pain Scale: Numbers  Pre/Post-Treatment    Pain Scale: Numbers, Pretreatment  --  -GIAN  0/10 - no pain  -KD     Pain Scale: Numbers, Post-Treatment  --  -GIAN  0/10 - no pain  -KD     Pain Location  --  -GIAN  --     Recorded by [GIAN] Abran Wan PTA 08/06/19 1302 [KD] Isabelle Schafer MADSEN/L 08/06/19 1423     Row Name                Wound 07/17/19 2144 abdomen incision    Wound - Properties Group Date first assessed: 07/17/19 [EL] Time first assessed: 2144 [EL] Location: abdomen [EL] Present On Admission : no [EL] Type: incision [EL], laparoscopic x3  Recorded by:  [EL] Lorna Benedict RN 07/17/19 2217    Row Name                Wound 07/17/19 1005 midline abdomen incision;surgical    Wound - Properties Group Date first assessed: 07/17/19 [EL] Time first assessed: 1005 [EL] Orientation: midline [EL] Location: abdomen [EL] Type: incision;surgical [EL] Recorded by:  [EL] Lorna Benedict RN 07/17/19 2218    Row Name                Wound 07/19/19 0800 Left anterior thigh other (see comments)    Wound - Properties Group Date first assessed: 07/19/19 [RG] Time first assessed: 0800 [RG] Side: Left [RG] Orientation: anterior [RG] Location: thigh [RG] Present On Admission : yes;picture taken [RG2] Type: other (see comments) [RG3], skin graft sight.   Recorded by:  [RG] Xenia Ascencio RN 07/19/19 1515 [RG2] Xenia Ascencio RN 07/19/19 1528 [RG3] Xenia Ascencio RN 07/20/19 0806    Row Name 08/06/19 1045             Outcome Summary/Treatment Plan (OT)    Daily Summary of Progress (OT)  progress toward functional goals as expected  -KD      Plan for Continued Treatment (OT)  cont ot poc  -KD      Anticipated Discharge Disposition (OT)  anticipate therapy at next level of care  -KD      Recorded by [KD] Isabelle Schafer MADSEN/L 08/06/19 1423      Row Name 08/06/19 1123             Outcome Summary/Treatment Plan (PT)    Daily Summary of Progress (PT)  progress toward functional goals as expected  -GIAN      Plan for Continued Treatment (PT)   continue  -GIAN      Anticipated Discharge Disposition (PT)  anticipate therapy at next level of care;inpatient rehabilitation facility;transitional care;home with 24/7 care;home with home health  -JC2      Recorded by [GIAN] Abran Wan, PTA 08/06/19 1302  [JC2] Abran Wan, PTA 08/06/19 1138        User Key  (r) = Recorded By, (t) = Taken By, (c) = Cosigned By    Initials Name Effective Dates Discipline    Lorna Mckenzie RN 06/05/17 -  Nurse    Xenia Hannah RN 10/17/16 -  Nurse    Abran Ferreira, PTA 03/07/18 -  PT    KD Isabelle Schafer COTA/L 03/07/18 -  OT        Wound 07/17/19 2144 abdomen incision (Active)   Dressing Appearance open to air 8/5/2019  8:05 PM   Closure Approximated 8/5/2019  8:05 PM   Base dry 8/6/2019  8:54 AM       Wound 07/17/19 1005 midline abdomen incision;surgical (Active)   Dressing Appearance open to air 8/5/2019  8:05 PM   Closure Approximated 8/5/2019  8:05 PM   Base scab 8/6/2019  8:54 AM       Wound 07/19/19 0800 Left anterior thigh other (see comments) (Active)   Dressing Appearance dry;intact 8/5/2019  8:05 PM   Closure SAUMYA 8/5/2019  8:05 PM   Base dressing in place, unable to visualize 8/5/2019  8:05 PM     Rehab Goal Summary     Row Name 08/06/19 1123 08/06/19 1045          Physical Therapy Goals    Bed Mobility Goal Selection (PT)  bed mobility, PT goal 1  -GIAN  --     Transfer Goal Selection (PT)  transfer, PT goal 1  -GIAN  --     Gait Training Goal Selection (PT)  gait training, PT goal 1  -GIAN  --        Bed Mobility Goal 1 (PT)    Activity/Assistive Device (Bed Mobility Goal 1, PT)  bed mobility activities, all  -GIAN  --     [place] Level/Cues Needed (Bed Mobility Goal 1, PT)  independent;conditional independence  -GIAN  --     Time Frame (Bed Mobility Goal 1, PT)  long term goal (LTG);1 week  -GIAN  --     Barriers (Bed Mobility Goal 1, PT)  fatigue; LOB  -GIAN  --     Progress/Outcomes (Bed Mobility Goal 1, PT)  goal not met  -GIAN  --        Transfer Goal  1 (PT)    Activity/Assistive Device (Transfer Goal 1, PT)  bed-to-chair/chair-to-bed;toilet  -GIAN  --     [place] Level/Cues Needed (Transfer Goal 1, PT)  conditional independence  -GIAN  --     Time Frame (Transfer Goal 1, PT)  1 week  -GIAN  --     Barriers (Transfers Goal 1, PT)  fatigue/ LOB  -GIAN  --     Progress/Outcome (Transfer Goal 1, PT)  continuing progress toward goal;goal not met  -GIAN  --        Gait Training Goal 1 (PT)    Activity/Assistive Device (Gait Training Goal 1, PT)  gait (walking locomotion);assistive device use;decrease fall risk;increase endurance/gait distance  -GIAN  --     [place] Level (Gait Training Goal 1, PT)  conditional independence;supervision required  -GIAN  --     Distance (Gait Goal 1, PT)  100 ft or more w/ VSS  -GIAN  --     Time Frame (Gait Training Goal 1, PT)  by discharge  -GAIN  --     Barriers (Gait Training Goal 1, PT)  weakness; fatigue  -GIAN  --     Progress/Outcome (Gait Training Goal 1, PT)  goal ongoing  -GIAN  --        Strength Goal 1 (PT)    Strength Goal 1 (PT)  patient will tolerate 2 sets of 15 reps for 3 LE antigravity exercises w/ VSS  -GIAN  --     Time Frame (Strength Goal 1, PT)  1 week  -GIAN  --     Barriers (Strength Goal 1, PT)  resp/cardiac precautions  -GIAN  --     Progress/Outcome (Strength Goal 1, PT)  goal not met;goal ongoing  -GIAN  --        Occupational Therapy Goals    Transfer Goal Selection (OT)  --  transfer, OT goal 1  -KD     Bathing Goal Selection (OT)  --  bathing, OT goal 1  -KD     Dressing Goal Selection (OT)  --  dressing, OT goal 1  -KD     Toileting Goal Selection (OT)  --  toileting, OT goal 1  -KD     Grooming Goal Selection (OT)  --  grooming, OT goal 1  -KD     Endurance Goal Selection (OT)  --  endurance, OT goal 1  -KD     Functional Mobility Goal Selection (OT)  --  functional mobility, OT goal 1  -KD     Additional Documentation  --  Functional Mobility Goal Selection (OT) (Row)  -KD        Transfer Goal 1 (OT)     Activity/Assistive Device (Transfer Goal 1, OT)  --  toilet  -KD     [place] Level/Cues Needed (Transfer Goal 1, OT)  --  contact guard assist  -KD     Time Frame (Transfer Goal 1, OT)  --  long term goal (LTG);by discharge  -KD     Progress/Outcome (Transfer Goal 1, OT)  --  goal not met;continuing progress toward goal  -KD        Bathing Goal 1 (OT)    Activity/Assistive Device (Bathing Goal 1, OT)  --  bathing skills, all  -KD     [place] Level/Cues Needed (Bathing Goal 1, OT)  --  set-up required;verbal cues required;contact guard assist  -KD     Time Frame (Bathing Goal 1, OT)  --  long term goal (LTG);by discharge  -KD     Progress/Outcomes (Bathing Goal 1, OT)  --  goal met;goal ongoing previously met please address again for accuracy   -KD        Dressing Goal 1 (OT)    Activity/Assistive Device (Dressing Goal 1, OT)  --  dressing skills, all  -KD     [place]/Cues Needed (Dressing Goal 1, OT)  --  set-up required;verbal cues required;contact guard assist  -KD     Time Frame (Dressing Goal 1, OT)  --  long term goal (LTG);by discharge  -KD     Progress/Outcome (Dressing Goal 1, OT)  --  goal met  -KD        Toileting Goal 1 (OT)    Activity/Device (Toileting Goal 1, OT)  --  toileting skills, all  -KD     [place] Level/Cues Needed (Toileting Goal 1, OT)  --  contact guard assist  -KD     Time Frame (Toileting Goal 1, OT)  --  long term goal (LTG);by discharge  -KD     Progress/Outcome (Toileting Goal 1, OT)  --  goal not met  -KD        Grooming Goal 1 (OT)    Activity/Device (Grooming Goal 1, OT)  --  grooming skills, all  -KD     [place] (Grooming Goal 1, OT)  --  supervision required;set-up required  -KD     Time Frame (Grooming Goal 1, OT)  --  long term goal (LTG);by discharge  -KD     Progress/Outcome (Grooming Goal 1, OT)  --  goal met  -KD         Endurance Goal 1 (OT)    Activity Level (Endurance Goal 1, OT)  --  endurance 2 fair + 25 min functional task 3 or less rest breaks  O2 90 or up  -KD     Progress/Outcome (Endurance Goal 1, OT)  --  goal met  -KD        Functional Mobility Goal 1 (OT)    Activity/Assistive Device (Functional Mobility Goal 1, OT)  --  walker, rolling AD as needed  -KD     [place] Level/Cues Needed (Functional Mobility Goal 1, OT)  --  standby assist;verbal cues required  -KD     Distance Goal 1 (Functional Mobility, OT)  --  for ADL tasks with good safety and balance.   -KD     Time Frame (Functional Mobility Goal 1, OT)  --  long term goal (LTG);by discharge  -KD     Progress/Outcome (Functional Mobility Goal 1, OT)  --  goal not met  -KD        Patient Education Goal (OT)    Activity (Patient Education Goal, OT)  --  Pt will communciate good home safety awareness.   -KD     [place]/Cues/Accuracy (Memory Goal 2, OT)  --  verbalizes understanding  -KD     Time Frame (Patient Education Goal, OT)  --  long term goal (LTG);by discharge  -KD     Progress/Outcome (Patient Education Goal, OT)  --  goal not met  -KD       User Key  (r) = Recorded By, (t) = Taken By, (c) = Cosigned By    Initials Name Provider Type Discipline    GIAN Abran Wan, PTA Physical Therapy Assistant PT    KD Isabelle Schafer, TENA/L Occupational Therapy Assistant OT        Occupational Therapy Education     Title: PT OT SLP Therapies (In Progress)     Topic: Occupational Therapy (Done)     Point: ADL training (Done)     Description: Instruct learner(s) on proper safety adaptation and remediation techniques during self care or transfers.   Instruct in proper use of assistive devices.    Learning Progress Summary           Patient Acceptance, E,TB,D, NR,VU by  at 8/5/2019 11:42 AM    Acceptance, E,TB,D, NR by CS at 8/4/2019 10:35 AM    Acceptance, E, VU,NR by  at 8/3/2019  2:45 PM    Comment:  Educated about OT and POC. Educated to call for assist and not get up on his own. Educated on need for assist with t/f, mobility and functional tasks. Educated on home safety with ADL need  for BSC and RW.    Acceptance, E, VU,NR by  at 7/30/2019 11:58 AM    Comment:  Educated about OT and POC. Educated to call for assist. Educated on safety throughout.    Acceptance, E, VU by  at 7/27/2019 12:40 PM    Acceptance, E,TB,D, NR by CS at 7/26/2019  1:27 PM    Acceptance, E,TB,D, NR by CS at 7/25/2019 12:58 PM    Acceptance, E, NR by BB at 7/24/2019 11:48 AM    Acceptance, E,TB,D, NR by CS at 7/23/2019  1:02 PM    Acceptance, E, NR by  at 7/18/2019  1:59 PM    Comment:  Educated about OT and POC. Educated on safety throughout. Educated to call for assist.   Family Acceptance, E, VU,NR by  at 8/3/2019  2:45 PM    Comment:  Educated about OT and POC. Educated to call for assist and not get up on his own. Educated on need for assist with t/f, mobility and functional tasks. Educated on home safety with ADL need for BSC and RW.                   Point: Home exercise program (Done)     Description: Instruct learner(s) on appropriate technique for monitoring, assisting and/or progressing therapeutic exercises/activities.    Learning Progress Summary           Patient Acceptance, E,TB,D, NR,VU by CS at 8/5/2019 11:42 AM    Acceptance, E,TB,D, NR by  at 8/4/2019 10:35 AM    Acceptance, E,TB,D, NR by CS at 7/26/2019  1:27 PM    Acceptance, E,TB,D, NR by  at 7/25/2019 12:58 PM    Acceptance, E,TB,D, NR by  at 7/23/2019  1:02 PM                   Point: Precautions (Done)     Description: Instruct learner(s) on prescribed precautions during self-care and functional transfers.    Learning Progress Summary           Patient Acceptance, E,TB,D, NR,VU by  at 8/5/2019 11:42 AM    Acceptance, E,TB,D, NR by  at 8/4/2019 10:35 AM    Acceptance, E, VU,NR by  at 8/3/2019  2:45 PM    Comment:  Educated about OT and POC. Educated to call for assist and not get up on his own. Educated on need for assist with t/f, mobility and functional tasks. Educated on home safety with ADL need for BSC and RW.    Acceptance,  E, VU,NR by  at 7/30/2019 11:58 AM    Comment:  Educated about OT and POC. Educated to call for assist. Educated on safety throughout.    Acceptance, E,TB,D, NR by  at 7/26/2019  1:27 PM    Acceptance, E,TB,D, NR by  at 7/25/2019 12:58 PM    Acceptance, E,TB,D, NR by  at 7/23/2019  1:02 PM    Acceptance, E, NR by  at 7/18/2019  1:59 PM    Comment:  Educated about OT and POC. Educated on safety throughout. Educated to call for assist.   Family Acceptance, E, VU,NR by  at 8/3/2019  2:45 PM    Comment:  Educated about OT and POC. Educated to call for assist and not get up on his own. Educated on need for assist with t/f, mobility and functional tasks. Educated on home safety with ADL need for BSC and RW.                   Point: Body mechanics (Done)     Description: Instruct learner(s) on proper positioning and spine alignment during self-care, functional mobility activities and/or exercises.    Learning Progress Summary           Patient Acceptance, E,TB,D, NR,VU by  at 8/5/2019 11:42 AM    Acceptance, E,TB,D, NR by  at 8/4/2019 10:35 AM    Acceptance, E,TB,D, NR by  at 7/26/2019  1:27 PM    Acceptance, E,TB,D, NR by  at 7/25/2019 12:58 PM    Acceptance, E, NR by  at 7/24/2019 11:48 AM    Acceptance, E,TB,D, NR by  at 7/23/2019  1:02 PM                               User Key     Initials Effective Dates Name Provider Type Discipline     06/08/18 -  Jayne Antunez OTLAWSON/L Occupational Therapist OT     03/07/18 -  Kerrie Woodson COTA/L Occupational Therapy Assistant OT     03/07/18 -  Brenda Thomas COTA/LIANE Occupational Therapy Assistant OT                OT Recommendation and Plan  Outcome Summary/Treatment Plan (OT)  Daily Summary of Progress (OT): progress toward functional goals as expected  Plan for Continued Treatment (OT): cont ot poc  Anticipated Discharge Disposition (OT): anticipate therapy at next level of care  Therapy Frequency (OT Eval): other (see  comments)(5-7x/wk)  Daily Summary of Progress (OT): progress toward functional goals as expected  Plan of Care Review  Plan of Care Reviewed With: patient  Plan of Care Reviewed With: patient  Outcome Summary: Pt declined any OOB/EOB @ this time. Pt agreeable to bed ex. Pt tolerated 2 sets x 20 reps BUE ther ex in all planes with good tolerance using 2lb HW and requiring several RB's to complete task. No new goals met @ this time. Cont OT POC.  Outcome Measures     Row Name 08/06/19 1123 08/06/19 1045 08/05/19 1320       How much help from another person do you currently need...    Turning from your back to your side while in flat bed without using bedrails?  4  -GIAN  --  4  -JA    Moving from lying on back to sitting on the side of a flat bed without bedrails?  4  -GIAN  --  4  -JA    Moving to and from a bed to a chair (including a wheelchair)?  3  -GIAN  --  4  -JA    Standing up from a chair using your arms (e.g., wheelchair, bedside chair)?  3  -GIAN  --  3  -JA    Climbing 3-5 steps with a railing?  3  -GIAN  --  2  -JA    To walk in hospital room?  3  -GIAN  --  3  -JA    AM-PAC 6 Clicks Score (PT)  20  -GIAN  --  20  -JA       How much help from another is currently needed...    Putting on and taking off regular lower body clothing?  --  3  -KD  --    Bathing (including washing, rinsing, and drying)  --  3  -KD  --    Toileting (which includes using toilet bed pan or urinal)  --  3  -KD  --    Putting on and taking off regular upper body clothing  --  3  -KD  --    Taking care of personal grooming (such as brushing teeth)  --  4  -KD  --    Eating meals  --  1  -KD  --    AM-PAC 6 Clicks Score (OT)  --  17  -KD  --       Functional Assessment    Outcome Measure Options  AM-PAC 6 Clicks Basic Mobility (PT)  -GIAN  --  AM-PAC 6 Clicks Basic Mobility (PT)  -JA    Row Name 08/05/19 1100 08/04/19 1100 08/04/19 1000       How much help from another person do you currently need...    Turning from your back to your side while in  flat bed without using bedrails?  --  3  -RW  --    Moving from lying on back to sitting on the side of a flat bed without bedrails?  --  4  -RW  --    Moving to and from a bed to a chair (including a wheelchair)?  --  4  -RW  --    Standing up from a chair using your arms (e.g., wheelchair, bedside chair)?  --  4  -RW  --    Climbing 3-5 steps with a railing?  --  2  -RW  --    To walk in hospital room?  --  3  -RW  --    AM-PAC 6 Clicks Score (PT)  --  20  -RW  --       How much help from another is currently needed...    Putting on and taking off regular lower body clothing?  3  -CS  --  3  -CS    Bathing (including washing, rinsing, and drying)  3  -CS  --  3  -CS    Toileting (which includes using toilet bed pan or urinal)  3  -CS  --  3  -CS    Putting on and taking off regular upper body clothing  3  -CS  --  3  -CS    Taking care of personal grooming (such as brushing teeth)  4  -CS  --  4  -CS    Eating meals  1  -CS  --  1  -CS    AM-PAC 6 Clicks Score (OT)  17  -CS  --  17  -CS    Row Name 08/03/19 1600             How much help from another person do you currently need...    Turning from your back to your side while in flat bed without using bedrails?  3  -GB      Moving from lying on back to sitting on the side of a flat bed without bedrails?  3  -GB      Moving to and from a bed to a chair (including a wheelchair)?  3  -GB      Standing up from a chair using your arms (e.g., wheelchair, bedside chair)?  3  -GB      Climbing 3-5 steps with a railing?  2  -GB      To walk in hospital room?  2  -GB      AM-PAC 6 Clicks Score (PT)  16  -GB         Functional Assessment    Outcome Measure Options  AM-PAC 6 Clicks Basic Mobility (PT)  -GB        User Key  (r) = Recorded By, (t) = Taken By, (c) = Cosigned By    Initials Name Provider Type    Danna Medeiros, PT Physical Therapist    Ken Lagunas, PTA Physical Therapy Assistant    Abran Ferreira, PTA Physical Therapy Assistant    RW  Armen Busby, MICHAEL Physical Therapy Assistant    KD Isabelle Schafer COTA/L Occupational Therapy Assistant    Brenda Lee COTA/L Occupational Therapy Assistant           Time Calculation:   Time Calculation- OT     Row Name 08/06/19 1427             Time Calculation- OT    OT Start Time  1044  -KD      OT Stop Time  1122  -KD      OT Time Calculation (min)  38 min  -KD      Total Timed Code Minutes- OT  38 minute(s)  -KD      OT Received On  08/06/19  -        User Key  (r) = Recorded By, (t) = Taken By, (c) = Cosigned By    Initials Name Provider Type    Isabelle Colon COTA/L Occupational Therapy Assistant        Therapy Charges for Today     Code Description Service Date Service Provider Modifiers Qty    42170693221 HC OT THER PROC EA 15 MIN 8/6/2019 Isabelle Schafer COTA/L GO 1    27026693791 HC OT THER PROC EA 15 MIN 8/6/2019 Isabelle Schafer COTA/L GO 3               ROSEANNA Ruffin  8/6/2019

## 2019-08-06 NOTE — PLAN OF CARE
Problem: Patient Care Overview  Goal: Plan of Care Review  Outcome: Ongoing (interventions implemented as appropriate)   08/06/19 4683   Coping/Psychosocial   Plan of Care Reviewed With patient   Plan of Care Review   Progress no change   OTHER   Outcome Summary BP still low, suctioned, berumen replaced, Shanda aware, possible urology consult, cont to monitor

## 2019-08-06 NOTE — PLAN OF CARE
Problem: Patient Care Overview  Goal: Plan of Care Review  Outcome: Ongoing (interventions implemented as appropriate)   08/06/19 1123   Coping/Psychosocial   Plan of Care Reviewed With patient   Plan of Care Review   Progress improving   OTHER   Outcome Summary pt responded well to therapy w/ increased gait to 140 ft CGA w/ RW. pt is mod indep w/ sup-sit and CGA for t/fs. pt participated in LE ther ex. no new goals met at this time. pt would continue to benefit from PT services.

## 2019-08-06 NOTE — THERAPY TREATMENT NOTE
Acute Care - Physical Therapy Treatment Note  [facility]     Patient Name: Emerson Bang  : 1960  MRN: [id number]  Today's Date: 2019  Onset of Illness/Injury or Date of Surgery: 07/15/19  Date of Referral to PT: 19  Referring Physician: Shanda ZUÑIGA    Admit Date: 7/15/2019    Visit Dx:    ICD-10-CM ICD-9-CM   1. SBO (small bowel obstruction) (CMS/[facility]) K56.609 560.9   2. Impaired functional mobility and activity tolerance Z74.09 V49.89   3. Impaired mobility and ADLs Z74.09 799.89   4. SIADH (syndrome of inappropriate ADH production) (CMS/[facility]) E22.2 253.6   5. Acquired hypothyroidism E03.9 244.9   6. Idiopathic hypotension I95.0 458.9   7. Hyponatremia E87.1 276.1   8. Impaired functional mobility, balance, gait, and endurance Z74.09 V49.89     Patient Active Problem List   Diagnosis   • Hyperkalemia   • Iron deficiency anemia   • Gastroesophageal reflux disease with esophagitis   • Elevated AST (SGOT)   • Alcohol abuse   • Hypothyroidism   • Balance problem   • Need for vaccination   • Low magnesium levels   • Squamous cell carcinoma of pharynx (CMS/HCC)   • History of throat cancer   • Vitamin D deficiency   • Alcohol abuse counseling and surveillance   • Encounter for screening for diabetes mellitus   • Hypomagnesemia   • Iron deficiency anemia   • Hyperlipidemia   • Underweight due to inadequate caloric intake   • Need for immunization against influenza   • 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[facility])   • Hypertension   • General medical examination   • Underweight   • Epigastric pain   • Gastritis without bleeding   • Need for influenza vaccination   • Elevated liver function tests   • B12 deficiency   • Intestinal malabsorption   • Throat pain   • Acute pharyngitis   • Upper respiratory tract infection   • Neoplasm of uncertain behavior of larynx   • Pharyngoesophageal dysphagia   • Severe protein-calorie malnutrition (CMS/HCC)   • Anemia of chronic disease   • Hypotension   • Hyponatremia        Therapy Treatment    Rehabilitation Treatment Summary     Row Name 08/06/19 1123             Treatment Time/Intention    Discipline  physical therapy assistant  -GIAN      Document Type  therapy note (daily note)  -GIAN      Mode of Treatment  physical therapy  -GIAN      Therapy Frequency (PT Clinical Impression)  daily  -GIAN      Patient Effort  good  -GIAN      Existing Precautions/Restrictions  fall;oxygen therapy device and L/min multiple lines and tubes  -GIAN      Recorded by [GIAN] Abran Wan, PTA 08/06/19 1138      Row Name 08/06/19 1123             Vital Signs    Pre Systolic BP Rehab  98  -GIAN      Pre Treatment Diastolic BP  56  -GIAN      Post Systolic BP Rehab  135  -JC2      Post Treatment Diastolic BP  54  -JC2      Pretreatment Heart Rate (beats/min)  91  -GIAN      Intratreatment Heart Rate (beats/min)  81  -JC2      Posttreatment Heart Rate (beats/min)  96  -JC2      Pre SpO2 (%)  96  -GIAN      O2 Delivery Pre Treatment  trach collar RA  -JC2      Intra SpO2 (%)  93  -JC2      O2 Delivery Intra Treatment  trach collar  -JC2      Post SpO2 (%)  90  -JC2      O2 Delivery Post Treatment  trach collar  -JC2      Pre Patient Position  Supine  -JC2      Post Patient Position  Sitting  -JC2      Recorded by [GIAN] Abran Wan, PTA 08/06/19 1138  [JC2] Abran Wan, PTA 08/06/19 1147      Row Name 08/06/19 1123             Cognitive Assessment/Intervention- PT/OT    Affect/Mental Status (Cognitive)  WFL  -GIAN      Orientation Status (Cognition)  oriented to;person;place  -GIAN      Follows Commands (Cognition)  WFL  -GIAN      Personal Safety Interventions  fall prevention program maintained;gait belt;muscle strengthening facilitated;nonskid shoes/slippers when out of bed;supervised activity  -JC2      Recorded by [GIAN] Abran Wan, PTA 08/06/19 1138  [JC2] Abran Wan, PTA 08/06/19 1302      Row Name 08/06/19 1123             Safety Issues, Functional Mobility    Impairments Affecting Function  (Mobility)  balance;endurance/activity tolerance;coordination;pain;strength;shortness of breath;postural/trunk control;motor planning;motor control  -GIAN      Recorded by [GIAN] Abran Wan, PTA 08/06/19 1138      Row Name 08/06/19 1123             Bed Mobility Assessment/Treatment    Bed Mobility Assessment/Treatment  supine-sit  -GIAN      Supine-Sit [place] (Bed Mobility)  conditional independence  -JC2      Sit-Supine [place] (Bed Mobility)  --  -GIAN      Assistive Device (Bed Mobility)  bed rails;head of bed elevated  -JC3      Recorded by [GIAN] Abran Wan, PTA 08/06/19 1302  [JC2] Abran Wan, PTA 08/06/19 1304  [JC3] Abran Wan, PTA 08/06/19 1138      Row Name 08/06/19 1123             Transfer Assessment/Treatment    Transfer Assessment/Treatment  sit-stand transfer;stand-sit transfer  -GIAN      Recorded by [GIAN] Abran Wan, PTA 08/06/19 1138      Row Name 08/06/19 1123             Bed-Chair Transfer    Bed-Chair [place] (Transfers)  contact guard  -GIAN      Assistive Device (Bed-Chair Transfers)  walker, front-wheeled  -GIAN      Recorded by [GIAN] Abran Wan, PTA 08/06/19 1302      Row Name 08/06/19 1123             Sit-Stand Transfer    Sit-Stand [place] (Transfers)  contact guard  -GIAN      Assistive Device (Sit-Stand Transfers)  other (see comments) no AD   -JC2      Recorded by [GIAN] Abran Wan, PTA 08/06/19 1302  [JC2] Abran Wan, PTA 08/06/19 1138      Row Name 08/06/19 1123             Stand-Sit Transfer    Stand-Sit [place] (Transfers)  contact guard  -GIAN      Assistive Device (Stand-Sit Transfers)  other (see comments) no AD  -JC2      Recorded by [GIAN] Abran Wan, PTA 08/06/19 1302  [JC2] Abran Wan, PTA 08/06/19 1138      Row Name 08/06/19 1123             Gait/Stairs Assessment/Training    Gait/Stairs Assessment/Training  gait/ambulation independence;gait/ambulation assistive device;distance ambulated;gait pattern;gait deviations   -GIAN      [place] Level (Gait)  contact guard  -GIAN      Assistive Device (Gait)  other (see comments) no AD  -JC2      Distance in Feet (Gait)  140 in room  -GIAN      Pattern (Gait)  step-through  -JC2      Deviations/Abnormal Patterns (Gait)  essence decreased  -GINA      Recorded by [GIAN] Abran Wan, PTA 08/06/19 1302  [JC2] Abran Wan, PTA 08/06/19 1138      Row Name 08/06/19 1123             Therapeutic Exercise    Therapeutic Exercise  supine, lower extremities  -GIAN      Recorded by [GIAN] Abran Wan, PTA 08/06/19 1302      Row Name 08/06/19 1123             Lower Extremity Supine Therapeutic Exercise    Performed, Supine Lower Extremity (Therapeutic Exercise)  ankle dorsiflexion/plantarflexion;hip abduction/adduction;heel slides  -GIAN      Exercise Type, Supine Lower Extremity (Therapeutic Exercise)  AROM (active range of motion)  -GIAN      Sets/Reps Detail, Supine Lower Extremity (Therapeutic Exercise)  15x1 ashley  -GIAN      Recorded by [GIAN] Abran Wan PTA 08/06/19 1302      Row Name 08/06/19 1123             Positioning and Restraints    Pre-Treatment Position  in bed  -GIAN      Post Treatment Position  bed  -GIAN      In Bed  fowlers;call light within reach;encouraged to call for assist;exit alarm on all needs met  -GIAN      Recorded by [GIAN] Abran Wan, PTA 08/06/19 1302      Row Name 08/06/19 1123             Pain Assessment    Additional Documentation  Pain Scale: Numbers Pre/Post-Treatment (Group)  -GIAN      Recorded by [GIAN] Abran Wan, PTA 08/06/19 1302      Row Name 08/06/19 1123             Pain Scale: Numbers Pre/Post-Treatment    Pain Scale: Numbers, Pretreatment  --  -GIAN      Pain Scale: Numbers, Post-Treatment  --  -GIAN      Pain Location  --  -GIAN      Recorded by [GIAN] Abran Wan, PTA 08/06/19 1302      Row Name                Wound 07/17/19 2144 abdomen incision    Wound - Properties Group Date first assessed: 07/17/19 [EL] Time first assessed: 2144 [EL] Location:  abdomen [EL] Present On Admission : no [EL] Type: incision [EL], laparoscopic x3  Recorded by:  [EL] Lorna Benedict RN 07/17/19 2217    Row Name                Wound 07/17/19 1005 midline abdomen incision;surgical    Wound - Properties Group Date first assessed: 07/17/19 [EL] Time first assessed: 1005 [EL] Orientation: midline [EL] Location: abdomen [EL] Type: incision;surgical [EL] Recorded by:  [EL] Lorna Benedict RN 07/17/19 2218    Row Name                Wound 07/19/19 0800 Left anterior thigh other (see comments)    Wound - Properties Group Date first assessed: 07/19/19 [RG] Time first assessed: 0800 [RG] Side: Left [RG] Orientation: anterior [RG] Location: thigh [RG] Present On Admission : yes;picture taken [RG2] Type: other (see comments) [RG3], skin graft sight.   Recorded by:  [RG] Xenia Ascencio RN 07/19/19 1515 [RG2] Xenia Ascencio RN 07/19/19 1528 [RG3] Xenia Ascencio RN 07/20/19 0806    Row Name 08/06/19 1123             Outcome Summary/Treatment Plan (PT)    Daily Summary of Progress (PT)  progress toward functional goals as expected  -GIAN      Plan for Continued Treatment (PT)  continue  -GIAN      Anticipated Discharge Disposition (PT)  anticipate therapy at next level of care;inpatient rehabilitation facility;transitional care;home with 24/7 care;home with home health  -JC2      Recorded by [GIAN] Abran Wan, PTA 08/06/19 1302  [JC2] Abran Wan, PTA 08/06/19 1138        User Key  (r) = Recorded By, (t) = Taken By, (c) = Cosigned By    Initials Name Effective Dates Discipline    EL Lorna Benedict RN 06/05/17 -  Nurse    Xenia Hannah RN 10/17/16 -  Nurse    Abran Ferreira, PTA 03/07/18 -  PT          Wound 07/17/19 4217 abdomen incision (Active)   Dressing Appearance open to air 8/5/2019  8:05 PM   Closure Approximated 8/5/2019  8:05 PM   Base dry 8/6/2019  8:54 AM       Wound 07/17/19 1005 midline abdomen incision;surgical (Active)   Dressing Appearance open to air  8/5/2019  8:05 PM   Closure Approximated 8/5/2019  8:05 PM   Base scab 8/6/2019  8:54 AM       Wound 07/19/19 0800 Left anterior thigh other (see comments) (Active)   Dressing Appearance dry;intact 8/5/2019  8:05 PM   Closure SAUMYA 8/5/2019  8:05 PM   Base dressing in place, unable to visualize 8/5/2019  8:05 PM       Rehab Goal Summary     Row Name 08/06/19 1123             Physical Therapy Goals    Bed Mobility Goal Selection (PT)  bed mobility, PT goal 1  -GIAN      Transfer Goal Selection (PT)  transfer, PT goal 1  -GIAN      Gait Training Goal Selection (PT)  gait training, PT goal 1  -GIAN         Bed Mobility Goal 1 (PT)    Activity/Assistive Device (Bed Mobility Goal 1, PT)  bed mobility activities, all  -GIAN      [place] Level/Cues Needed (Bed Mobility Goal 1, PT)  independent;conditional independence  -GIAN      Time Frame (Bed Mobility Goal 1, PT)  long term goal (LTG);1 week  -GIAN      Barriers (Bed Mobility Goal 1, PT)  fatigue; LOB  -GIAN      Progress/Outcomes (Bed Mobility Goal 1, PT)  goal not met  -GIAN         Transfer Goal 1 (PT)    Activity/Assistive Device (Transfer Goal 1, PT)  bed-to-chair/chair-to-bed;toilet  -GIAN      [place] Level/Cues Needed (Transfer Goal 1, PT)  conditional independence  -GIAN      Time Frame (Transfer Goal 1, PT)  1 week  -GIAN      Barriers (Transfers Goal 1, PT)  fatigue/ LOB  -GIAN      Progress/Outcome (Transfer Goal 1, PT)  continuing progress toward goal;goal not met  -GIAN         Gait Training Goal 1 (PT)    Activity/Assistive Device (Gait Training Goal 1, PT)  gait (walking locomotion);assistive device use;decrease fall risk;increase endurance/gait distance  -GIAN      [place] Level (Gait Training Goal 1, PT)  conditional independence;supervision required  -GIAN      Distance (Gait Goal 1, PT)  100 ft or more w/ VSS  -GIAN      Time Frame (Gait Training Goal 1, PT)  by discharge  -GIAN      Barriers (Gait Training Goal 1, PT)  weakness; fatigue  -GIAN      Progress/Outcome (Gait  Training Goal 1, PT)  goal ongoing  -GIAN         Strength Goal 1 (PT)    Strength Goal 1 (PT)  patient will tolerate 2 sets of 15 reps for 3 LE antigravity exercises w/ VSS  -GIAN      Time Frame (Strength Goal 1, PT)  1 week  -GIAN      Barriers (Strength Goal 1, PT)  resp/cardiac precautions  -GIAN      Progress/Outcome (Strength Goal 1, PT)  goal not met;goal ongoing  -GIAN        User Key  (r) = Recorded By, (t) = Taken By, (c) = Cosigned By    Initials Name Provider Type Discipline    Abran Ferreira, MICHAEL Physical Therapy Assistant PT          Physical Therapy Education     Title: PT OT SLP Therapies (In Progress)     Topic: Physical Therapy (In Progress)     Point: Mobility training (In Progress)     Learning Progress Summary           Patient Acceptance, E, NR by GIAN at 8/6/2019  1:04 PM    Acceptance, E, NR by GIAN at 7/29/2019 10:02 AM    Acceptance, E,TB, VU by LN at 7/22/2019  4:14 PM    Acceptance, E,TB, VU by PRASANNA at 7/20/2019 12:41 PM    Acceptance, E, VU by ALECIA at 7/18/2019  1:38 PM    Comment:  Role of PT, PT POC                   Point: Home exercise program (Done)     Learning Progress Summary           Patient Acceptance, E,TB, VU by LN at 7/22/2019  4:14 PM    Acceptance, E,TB, VU by LN at 7/20/2019 12:41 PM                   Point: Precautions (Done)     Learning Progress Summary           Patient Acceptance, D, NR,VU by GB at 8/3/2019  4:45 PM    Comment:  Discussed d/c planning with her LTG is d/c to home with her.    Acceptance, E,TB, VU by LN at 7/22/2019  4:14 PM    Acceptance, E,TB, VU by LN at 7/20/2019 12:41 PM   Family Acceptance, D, NR,VU by GB at 8/3/2019  4:45 PM    Comment:  Discussed d/c planning with her LTG is d/c to home with her.                               User Key     Initials Effective Dates Name Provider Type Discipline     04/03/18 -  Danna Huddleston, PT Physical Therapist PT    GIAN 03/07/18 -  Abran Wan PTA Physical Therapy Assistant PT    PRASANNA 03/07/18 -  Rachel  Lynda VILLATORO PTA Physical Therapy Assistant PT     07/23/18 -  Isis Miller, FREYA Physical Therapist PT                PT Recommendation and Plan  Anticipated Discharge Disposition (PT): anticipate therapy at next level of care, inpatient rehabilitation facility, transitional care, home with 24/7 care, home with home health  Therapy Frequency (PT Clinical Impression): daily  Outcome Summary/Treatment Plan (PT)  Daily Summary of Progress (PT): progress toward functional goals as expected  Plan for Continued Treatment (PT): continue  Anticipated Equipment Needs at Discharge (PT): (TBD)  Anticipated Discharge Disposition (PT): anticipate therapy at next level of care, inpatient rehabilitation facility, transitional care, home with 24/7 care, home with home health  Plan of Care Reviewed With: patient  Progress: improving  Outcome Summary: pt responded well to therapy w/ increased gait to 140 ft CGA w/ RW. pt is mod indep w/ sup-sit and CGA for t/fs. pt participated in LE ther ex. no new goals met at this time. pt would continue to benefit from PT services.   Outcome Measures     Row Name 08/06/19 1123 08/05/19 1320 08/05/19 1100       How much help from another person do you currently need...    Turning from your back to your side while in flat bed without using bedrails?  4  -GIAN  4  -JA  --    Moving from lying on back to sitting on the side of a flat bed without bedrails?  4  -GIAN  4  -JA  --    Moving to and from a bed to a chair (including a wheelchair)?  3  -GIAN  4  -JA  --    Standing up from a chair using your arms (e.g., wheelchair, bedside chair)?  3  -GIAN  3  -JA  --    Climbing 3-5 steps with a railing?  3  -GIAN  2  -JA  --    To walk in hospital room?  3  -GIAN  3  -JA  --    AM-PAC 6 Clicks Score (PT)  20  -GIAN  20  -JA  --       How much help from another is currently needed...    Putting on and taking off regular lower body clothing?  --  --  3  -CS    Bathing (including washing, rinsing, and drying)  --  --  3  -CS     Toileting (which includes using toilet bed pan or urinal)  --  --  3  -CS    Putting on and taking off regular upper body clothing  --  --  3  -CS    Taking care of personal grooming (such as brushing teeth)  --  --  4  -CS    Eating meals  --  --  1  -CS    AM-PAC 6 Clicks Score (OT)  --  --  17  -CS       Functional Assessment    Outcome Measure Options  AM-PAC 6 Clicks Basic Mobility (PT)  -GIAN  AM-[facility] 6 Clicks Basic Mobility (PT)  -JA  --    Row Name 08/04/19 1100 08/04/19 1000 08/03/19 1600       How much help from another person do you currently need...    Turning from your back to your side while in flat bed without using bedrails?  3  -RW  --  3  -GB    Moving from lying on back to sitting on the side of a flat bed without bedrails?  4  -RW  --  3  -GB    Moving to and from a bed to a chair (including a wheelchair)?  4  -RW  --  3  -GB    Standing up from a chair using your arms (e.g., wheelchair, bedside chair)?  4  -RW  --  3  -GB    Climbing 3-5 steps with a railing?  2  -RW  --  2  -GB    To walk in hospital room?  3  -RW  --  2  -GB    AM-PAC 6 Clicks Score (PT)  20  -RW  --  16  -GB       How much help from another is currently needed...    Putting on and taking off regular lower body clothing?  --  3  -CS  --    Bathing (including washing, rinsing, and drying)  --  3  -CS  --    Toileting (which includes using toilet bed pan or urinal)  --  3  -CS  --    Putting on and taking off regular upper body clothing  --  3  -CS  --    Taking care of personal grooming (such as brushing teeth)  --  4  -CS  --    Eating meals  --  1  -CS  --    AM-PAC 6 Clicks Score (OT)  --  17  -CS  --       Functional Assessment    Outcome Measure Options  --  --  AM-PAC 6 Clicks Basic Mobility (PT)  -GB    Row Name 08/03/19 1324             How much help from another is currently needed...    Putting on and taking off regular lower body clothing?  3  -BH      Bathing (including washing, rinsing, and drying)  3  -BH       Toileting (which includes using toilet bed pan or urinal)  3  -      Putting on and taking off regular upper body clothing  3  -      Taking care of personal grooming (such as brushing teeth)  4  -      Eating meals  1 tube feedings  -      AM-PAC 6 Clicks Score (OT)  17  -         Functional Assessment    Outcome Measure Options  AM-PAC 6 Clicks Daily Activity (OT)  -        User Key  (r) = Recorded By, (t) = Taken By, (c) = Cosigned By    Initials Name Provider Type     Danna Huddleston, PT Physical Therapist     Jayne Antunez, OTR/L Occupational Therapist    Ken Lagunas, PTA Physical Therapy Assistant    Abran Ferreira, PTA Physical Therapy Assistant    Armen Watson, PTA Physical Therapy Assistant    CS Brenda Thomas, MADSEN/L Occupational Therapy Assistant         Time Calculation:   PT Charges     Row Name 08/06/19 1306             Time Calculation    Start Time  1123  -GIAN      Stop Time  1147  -GIAN      Time Calculation (min)  24 min  -GIAN         Time Calculation- PT    Total Timed Code Minutes- PT  24 minute(s)  -        User Key  (r) = Recorded By, (t) = Taken By, (c) = Cosigned By    Initials Name Provider Type    Abran Ferreira PTA Physical Therapy Assistant        Therapy Charges for Today     Code Description Service Date Service Provider Modifiers Qty    48596860717 HC GAIT TRAINING EA 15 MIN 8/6/2019 Abran Wan PTA GP 1    [id number] HC PT THER PROC EA 15 MIN 8/6/2019 Abran Wan PTA GP 1          PT G-Codes  Outcome Measure Options: AM-PAC 6 Clicks Basic Mobility (PT)  AM-PAC 6 Clicks Score (PT): 20  AM-PAC 6 Clicks Score (OT): 17    Abran Wan PTA  8/6/2019

## 2019-08-07 LAB
ALBUMIN SERPL-MCNC: 3.8 G/DL (ref 3.5–5.2)
ALBUMIN/GLOB SERPL: 0.9 G/DL
ALP SERPL-CCNC: 95 U/L (ref 39–117)
ALT SERPL W P-5'-P-CCNC: 9 U/L (ref 1–41)
ANION GAP SERPL CALCULATED.3IONS-SCNC: 11 MMOL/L (ref 5–15)
AST SERPL-CCNC: 20 U/L (ref 1–40)
BASOPHILS # BLD AUTO: 0.08 10*3/MM3 (ref 0–0.2)
BASOPHILS NFR BLD AUTO: 1.2 % (ref 0–1.5)
BILIRUB SERPL-MCNC: <0.2 MG/DL (ref 0.2–1.2)
BUN BLD-MCNC: 15 MG/DL (ref 8–23)
BUN/CREAT SERPL: 30 (ref 7–25)
CALCIUM SPEC-SCNC: 9.8 MG/DL (ref 8.6–10.5)
CHLORIDE SERPL-SCNC: 96 MMOL/L (ref 98–107)
CO2 SERPL-SCNC: 27 MMOL/L (ref 22–29)
CREAT BLD-MCNC: 0.5 MG/DL (ref 0.76–1.27)
DEPRECATED RDW RBC AUTO: 45.4 FL (ref 37–54)
EOSINOPHIL # BLD AUTO: 0.25 10*3/MM3 (ref 0–0.4)
EOSINOPHIL NFR BLD AUTO: 3.8 % (ref 0.3–6.2)
ERYTHROCYTE [DISTWIDTH] IN BLOOD BY AUTOMATED COUNT: 17.6 % (ref 12.3–15.4)
GFR SERPL CREATININE-BSD FRML MDRD: >150 ML/MIN/1.73
GLOBULIN UR ELPH-MCNC: 4.4 GM/DL
GLUCOSE BLD-MCNC: 107 MG/DL (ref 65–99)
HCT VFR BLD AUTO: 25.3 % (ref 37.5–51)
HGB BLD-MCNC: 8.8 G/DL (ref 13–17.7)
IMM GRANULOCYTES # BLD AUTO: 0.04 10*3/MM3 (ref 0–0.05)
IMM GRANULOCYTES NFR BLD AUTO: 0.6 % (ref 0–0.5)
LYMPHOCYTES # BLD AUTO: 1.47 10*3/MM3 (ref 0.7–3.1)
LYMPHOCYTES NFR BLD AUTO: 22.1 % (ref 19.6–45.3)
MCH RBC QN AUTO: 25.3 PG (ref 26.6–33)
MCHC RBC AUTO-ENTMCNC: 34.8 G/DL (ref 31.5–35.7)
MCV RBC AUTO: 72.7 FL (ref 79–97)
MONOCYTES # BLD AUTO: 1.55 10*3/MM3 (ref 0.1–0.9)
MONOCYTES NFR BLD AUTO: 23.3 % (ref 5–12)
NEUTROPHILS # BLD AUTO: 3.26 10*3/MM3 (ref 1.7–7)
NEUTROPHILS NFR BLD AUTO: 49 % (ref 42.7–76)
NRBC BLD AUTO-RTO: 0 /100 WBC (ref 0–0.2)
PLATELET # BLD AUTO: 585 10*3/MM3 (ref 140–450)
PMV BLD AUTO: 10.8 FL (ref 6–12)
POTASSIUM BLD-SCNC: 4.2 MMOL/L (ref 3.5–5.2)
PROT SERPL-MCNC: 8.2 G/DL (ref 6–8.5)
RBC # BLD AUTO: 3.48 10*6/MM3 (ref 4.14–5.8)
SODIUM BLD-SCNC: 134 MMOL/L (ref 136–145)
WBC NRBC COR # BLD: 6.65 10*3/MM3 (ref 3.4–10.8)

## 2019-08-07 PROCEDURE — 97116 GAIT TRAINING THERAPY: CPT

## 2019-08-07 PROCEDURE — 80053 COMPREHEN METABOLIC PANEL: CPT | Performed by: NURSE PRACTITIONER

## 2019-08-07 PROCEDURE — 97110 THERAPEUTIC EXERCISES: CPT

## 2019-08-07 PROCEDURE — 85025 COMPLETE CBC W/AUTO DIFF WBC: CPT | Performed by: FAMILY MEDICINE

## 2019-08-07 PROCEDURE — 94799 UNLISTED PULMONARY SVC/PX: CPT

## 2019-08-07 PROCEDURE — 94760 N-INVAS EAR/PLS OXIMETRY 1: CPT

## 2019-08-07 PROCEDURE — 25010000002 ENOXAPARIN PER 10 MG: Performed by: SURGERY

## 2019-08-07 RX ADMIN — ALBUTEROL SULFATE 2.5 MG: 2.5 SOLUTION RESPIRATORY (INHALATION) at 06:26

## 2019-08-07 RX ADMIN — Medication 1 G: at 18:02

## 2019-08-07 RX ADMIN — ALBUTEROL SULFATE 2.5 MG: 2.5 SOLUTION RESPIRATORY (INHALATION) at 01:06

## 2019-08-07 RX ADMIN — ENOXAPARIN SODIUM 40 MG: 40 INJECTION SUBCUTANEOUS at 08:55

## 2019-08-07 RX ADMIN — SODIUM CHLORIDE, PRESERVATIVE FREE 10 ML: 5 INJECTION INTRAVENOUS at 20:55

## 2019-08-07 RX ADMIN — SODIUM CHLORIDE, PRESERVATIVE FREE 10 ML: 5 INJECTION INTRAVENOUS at 08:59

## 2019-08-07 RX ADMIN — ALBUTEROL SULFATE 2.5 MG: 2.5 SOLUTION RESPIRATORY (INHALATION) at 19:10

## 2019-08-07 RX ADMIN — LEVOTHYROXINE SODIUM ANHYDROUS 125 MCG: 100 INJECTION, POWDER, LYOPHILIZED, FOR SOLUTION INTRAVENOUS at 05:54

## 2019-08-07 RX ADMIN — PANTOPRAZOLE SODIUM 40 MG: 40 INJECTION, POWDER, FOR SOLUTION INTRAVENOUS at 08:55

## 2019-08-07 RX ADMIN — SODIUM CHLORIDE, PRESERVATIVE FREE 10 ML: 5 INJECTION INTRAVENOUS at 08:56

## 2019-08-07 RX ADMIN — MIDODRINE HYDROCHLORIDE 10 MG: 5 TABLET ORAL at 08:55

## 2019-08-07 RX ADMIN — ALBUTEROL SULFATE 2.5 MG: 2.5 SOLUTION RESPIRATORY (INHALATION) at 12:03

## 2019-08-07 RX ADMIN — BACITRACIN: 500 OINTMENT TOPICAL at 08:59

## 2019-08-07 RX ADMIN — Medication 1 G: at 08:55

## 2019-08-07 RX ADMIN — MIDODRINE HYDROCHLORIDE 10 MG: 5 TABLET ORAL at 12:00

## 2019-08-07 RX ADMIN — MIDODRINE HYDROCHLORIDE 10 MG: 5 TABLET ORAL at 18:02

## 2019-08-07 RX ADMIN — Medication 1 G: at 12:00

## 2019-08-07 RX ADMIN — BISACODYL 10 MG: 10 SUPPOSITORY RECTAL at 09:00

## 2019-08-07 NOTE — PROGRESS NOTES
Adult Nutrition  Assessment    Patient Name:  Emerson Bang  YOB: 1960  MRN: [id number]  Admit Date:  7/15/2019    Assessment Date:  8/7/2019    Comments:  Pt continues with Peptamen 1.5 at 40ml/hr- Na 134L w/ salt tabs TID, Alb 3.8. Noted 7/ 15 weight 110#- now 101#- down 9# since admit. No noted intolerance to TF at current rate. RD put order to increase to goal rate of 45ml/hr. RD to follow.    Reason for Assessment     Row Name 08/07/19 0905          Reason for Assessment    Reason For Assessment  follow-up protocol     Diagnosis  gastrointestinal disease     Identified At Risk by Screening Criteria  MST SCORE 2+;BMI           Anthropometrics     Row Name 08/07/19 0600          Anthropometrics    Weight  45.9 kg (101 lb 3.2 oz)               Nutrition Prescription Ordered     Row Name 08/07/19 0905          Nutrition Prescription PO    Current PO Diet  NPO        Nutrition Prescription EN    Enteral Route  Gastrostomy     Product  Peptamen 1.5 (Vital 1.5)     TF Delivery Method  Continuous     Continuous TF Goal Rate (mL/hr)  45 mL/hr     Water flush (mL)   10 mL     Water Flush Frequency  Per hour                 Electronically signed by:  Alexia Bowden RD  08/07/19 9:21 AM

## 2019-08-07 NOTE — PLAN OF CARE
Problem: Patient Care Overview  Goal: Plan of Care Review  Outcome: Ongoing (interventions implemented as appropriate)   08/06/19 7641   Coping/Psychosocial   Plan of Care Reviewed With patient   Plan of Care Review   Progress no change   OTHER   Outcome Summary Palomo in place draining without difficulty.     Goal: Individualization and Mutuality  Outcome: Ongoing (interventions implemented as appropriate)    Goal: Discharge Needs Assessment  Outcome: Ongoing (interventions implemented as appropriate)      Problem: Fall Risk (Adult)  Goal: Absence of Fall  Outcome: Outcome(s) achieved Date Met: 08/06/19      Problem: Skin Injury Risk (Adult)  Goal: Skin Health and Integrity  Outcome: Outcome(s) achieved Date Met: 08/06/19      Problem: Surgery Nonspecified (Adult)  Goal: Signs and Symptoms of Listed Potential Problems Will be Absent, Minimized or Managed (Surgery Nonspecified)  Outcome: Ongoing (interventions implemented as appropriate)      Problem: Airway, Artificial (Adult)  Goal: Signs and Symptoms of Listed Potential Problems Will be Absent, Minimized or Managed (Airway, Artificial)  Outcome: Ongoing (interventions implemented as appropriate)      Problem: Nutrition, Enteral (Adult)  Goal: Signs and Symptoms of Listed Potential Problems Will be Absent, Minimized or Managed (Nutrition, Enteral)  Outcome: Ongoing (interventions implemented as appropriate)

## 2019-08-07 NOTE — THERAPY TREATMENT NOTE
Acute Care - Occupational Therapy Treatment Note  [facility]     Patient Name: Emerson Bang  : 1960  MRN: [id number]  Today's Date: 2019  Onset of Illness/Injury or Date of Surgery: 07/15/19  Date of Referral to OT: 19  Referring Physician: Shanda ZUÑIGA    Admit Date: 7/15/2019       ICD-10-CM ICD-9-CM   1. SBO (small bowel obstruction) (CMS/Formerly [facility]) K56.609 560.9   2. Impaired functional mobility and activity tolerance Z74.09 V49.89   3. Impaired mobility and ADLs Z74.09 799.89   4. SIADH (syndrome of inappropriate ADH production) (CMS/Formerly [facility]) E22.2 253.6   5. Acquired hypothyroidism E03.9 244.9   6. Idiopathic hypotension I95.0 458.9   7. Hyponatremia E87.1 276.1   8. Impaired functional mobility, balance, gait, and endurance Z74.09 V49.89     Patient Active Problem List   Diagnosis   • Hyperkalemia   • Iron deficiency anemia   • Gastroesophageal reflux disease with esophagitis   • Elevated AST (SGOT)   • Alcohol abuse   • Hypothyroidism   • Balance problem   • Need for vaccination   • Low magnesium levels   • Squamous cell carcinoma of pharynx (CMS/HCC)   • History of throat cancer   • Vitamin D deficiency   • Alcohol abuse counseling and surveillance   • Encounter for screening for diabetes mellitus   • Hypomagnesemia   • Iron deficiency anemia   • Hyperlipidemia   • Underweight due to inadequate caloric intake   • Need for immunization against influenza   • 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HCC)   • Hypertension   • General medical examination   • Underweight   • Epigastric pain   • Gastritis without bleeding   • Need for influenza vaccination   • Elevated liver function tests   • B12 deficiency   • Intestinal malabsorption   • Throat pain   • Acute pharyngitis   • Upper respiratory tract infection   • Neoplasm of uncertain behavior of larynx   • Pharyngoesophageal dysphagia   • Severe protein-calorie malnutrition (CMS/HCC)   • Anemia of chronic disease   • Hypotension   • Hyponatremia     Past  Medical History:   Diagnosis Date   • Alcohol abuse    • Allergic rhinitis     vs URI   • Anemia    • At risk for falls    • Benign prostatic hyperplasia    • Chronic gastritis    • Cirrhosis of liver (CMS/HCC)    • Complication of gastrostomy (CMS/HCC)     site not healing   • COPD (chronic obstructive pulmonary disease) (CMS/HCC)    • Dysphagia     odynophagia   • Epigastric pain    • Esophagitis    • GERD (gastroesophageal reflux disease)    • Hypertension    • Hypothyroidism, unspecified    • Low back pain    • Malaise and fatigue    • Nasal congestion 11/15/2018   • Nausea with vomiting, unspecified    • Pain in left knee    • Pain in right knee    • Primary malignant neoplasm of pharynx (CMS/HCC)    • Screening for malignant neoplasm of colon    • Vitamin D deficiency      Past Surgical History:   Procedure Laterality Date   • ABDOMINAL WALL SURGERY  11/04/2008    Laparotomy with repair of stomach wall perforation. 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 Left upper quadrant abdominal wall abscess debridement. Gastrostomy tube erosion through & through the anterior gastric wall.Lupper quadrant abdominal wall abscess   • COLONOSCOPY  04/21/2014    Internal & external hemorrhoids found.   • COLONOSCOPY  2014    [place]   • COLONOSCOPY N/A 10/16/2018    Procedure: COLONOSCOPY;  Surgeon: Kaushal Chester MD;  Location: [facility] ENDOSCOPY;  Service: Gastroenterology   • DIAGNOSTIC LAPAROSCOPY EXPLORATORY LAPAROTOMY N/A 7/17/2019    Procedure: DIAGNOSTIC LAPAROSCOPY, EXPLORATORY LAPAROTOMY, LYSIS OF ADHESIONS;  Surgeon: Parminder Kc MD;  Location: [facility] OR;  Service: General   • DIRECT LARYNGOSCOPY, ESOPHAGOSCOPY, BRONCHOSCOPY N/A 4/15/2019    Procedure: DIRECT LARYNGOSCOPY with biopsy, ESOPHAGOSCOPY;  Surgeon: Bharathi Washington MD;  Location: [facility] OR;  Service: ENT   • ENDOSCOPY N/A 10/16/2018    Procedure: ESOPHAGOGASTRODUODENOSCOPY;  Surgeon: Kaushal Chester MD;  Location: [facility] ENDOSCOPY;  Service:  Gastroenterology   • GASTROSTOMY FEEDING TUBE INSERTION N/A 4/15/2019    Procedure: GASTROSTOMY FEEDING TUBE INSERTION;  Surgeon: Trenton Valera MD;  Location: [facility];  Service: General   • TRACHEOSTOMY  11/10/2008    Respiratory failure   • UPPER GASTROINTESTINAL ENDOSCOPY  04/21/2014    Esophagitis seen. Biopsy taken. Gastritis in stomach. Biopsy taken. Normal duodenum. Biopsy taken.   • UPPER GASTROINTESTINAL ENDOSCOPY  10/16/2018       Therapy Treatment    Rehabilitation Treatment Summary     Row Name 08/07/19 0849             Treatment Time/Intention    Discipline  occupational therapy assistant  -CS      Document Type  therapy note (daily note)  -CS      Subjective Information  no complaints  -CS      Mode of Treatment  occupational therapy  -CS      Therapy Frequency (OT Eval)  other (see comments)  5-7 days a week   -CS      Patient Effort  good  -CS      Existing Precautions/Restrictions  fall;oxygen therapy device and L/min  -CS      Recorded by [CS] Brenda Thomas COTA/L 08/07/19 1313      Row Name 08/07/19 0849             Vital Signs    Pre Systolic BP Rehab  88  -CS      Pre Treatment Diastolic BP  57  -CS      Pretreatment Heart Rate (beats/min)  101  -CS      Pre SpO2 (%)  95  -CS      O2 Delivery Pre Treatment  trach collar  -CS      Pre Patient Position  Supine  -CS      Post Patient Position  Supine  -CS      Recorded by [CS] Brenda Thomas COTA/L 08/07/19 1313      Row Name 08/07/19 0849             Cognitive Assessment/Intervention- PT/OT    Affect/Mental Status (Cognitive)  WFL  -CS      Orientation Status (Cognition)  oriented to;person;place  -CS      Follows Commands (Cognition)  WFL  -CS      Recorded by [CS] Brenda Thomas COTA/L 08/07/19 1313      Row Name 08/07/19 0849             Therapeutic Exercise    Upper Extremity (Therapeutic Exercise)  bicep curl, bilateral  -CS      Upper Extremity Range of Motion (Therapeutic Exercise)  shoulder flexion/extension,  bilateral;elbow flexion/extension, bilateral;forearm supination/pronation, bilateral;wrist flexion/extension, bilateral  -CS      Hand (Therapeutic Exercise)  finger flexion/extension, bilateral  -CS      Weight/Resistance (Therapeutic Exercise)  2 pounds  -CS      Exercise Type (Therapeutic Exercise)  AROM (active range of motion)  -CS      Position (Therapeutic Exercise)  seated  -CS      Sets/Reps (Therapeutic Exercise)  2/20  -CS      Equipment (Therapeutic Exercise)  free weight, barbell  -CS      Expected Outcome (Therapeutic Exercise)  improve functional tolerance, self-care activity;improve performance, BADLs;improve performance, transfer skills;increase active range of motion  -CS      Recorded by [CS] Brenda Thomas COTA/L 08/07/19 1313      Row Name 08/07/19 0849             Positioning and Restraints    Pre-Treatment Position  in bed  -CS      Post Treatment Position  bed  -CS      In Bed  supine;call light within reach;encouraged to call for assist;exit alarm on  -CS      Recorded by [CS] Brenda Thomas COTA/L 08/07/19 1313      Row Name 08/07/19 0849             Pain Scale: Numbers Pre/Post-Treatment    Pain Scale: Numbers, Pretreatment  3/10  -CS      Pain Scale: Numbers, Post-Treatment  0/10 - no pain  -CS      Pain Location  abdomen  -CS      Recorded by [CS] Brenda Thomas COTA/L 08/07/19 1313      Row Name                Wound 07/17/19 2144 abdomen incision    Wound - Properties Group Date first assessed: 07/17/19 [EL] Time first assessed: 2144 [EL] Location: abdomen [EL] Present On Admission : no [EL] Type: incision [EL], laparoscopic x3  Recorded by:  [EL] Lorna Benedict RN 07/17/19 2217    Row Name                Wound 07/17/19 1005 midline abdomen incision;surgical    Wound - Properties Group Date first assessed: 07/17/19 [EL] Time first assessed: 1005 [EL] Orientation: midline [EL] Location: abdomen [EL] Type: incision;surgical [EL] Recorded by:  [EL] Lorna Benedict RN 07/17/19  2218    Row Name                Wound 07/19/19 0800 Left anterior thigh other (see comments)    Wound - Properties Group Date first assessed: 07/19/19 [RG] Time first assessed: 0800 [RG] Side: Left [RG] Orientation: anterior [RG] Location: thigh [RG] Present On Admission : yes;picture taken [RG2] Type: other (see comments) [RG3], skin graft sight.   Recorded by:  [RG] Xenia Ascencio RN 07/19/19 1515 [RG2] Xenia Ascencio RN 07/19/19 1528 [RG3] Xenia Ascencio RN 07/20/19 0806    Row Name 08/07/19 0849             Outcome Summary/Treatment Plan (OT)    Daily Summary of Progress (OT)  progress toward functional goals is good  -CS      Plan for Continued Treatment (OT)  cont OT POC  -CS      Anticipated Discharge Disposition (OT)  anticipate therapy at next level of care  -CS      Recorded by [CS] Brenda Thomas COTA/L 08/07/19 1313        User Key  (r) = Recorded By, (t) = Taken By, (c) = Cosigned By    Initials Name Effective Dates Discipline    Lorna Mckenzie RN 06/05/17 -  Nurse    Xenia Hannah RN 10/17/16 -  Nurse    CS Brenda Thomas COTA/L 03/07/18 -  OT        Wound 07/17/19 2144 abdomen incision (Active)   Dressing Appearance open to air 8/7/2019  8:50 AM   Closure Approximated 8/7/2019  8:50 AM   Base dry;clean 8/7/2019  8:50 AM   Periwound intact;dry 8/6/2019  8:45 PM   Periwound Temperature warm 8/6/2019  8:45 PM   Drainage Amount none 8/7/2019  8:50 AM   Dressing Care, Wound open to air 8/6/2019  8:45 PM   Periwound Care, Wound dry periwound area maintained 8/6/2019  8:45 PM       Wound 07/17/19 1005 midline abdomen incision;surgical (Active)   Dressing Appearance open to air 8/7/2019  8:50 AM   Closure Approximated 8/7/2019  8:50 AM   Periwound intact;dry 8/6/2019  8:45 PM   Periwound Temperature warm 8/6/2019  8:45 PM   Drainage Amount none 8/7/2019  8:50 AM   Dressing Care, Wound open to air 8/6/2019  8:45 PM       Wound 07/19/19 0800 Left anterior thigh other (see comments)  (Active)   Dressing Appearance dry;intact 8/7/2019  8:50 AM   Closure SAUMYA 8/7/2019  8:50 AM   Base dressing in place, unable to visualize 8/7/2019  8:50 AM   Periwound dry;intact 8/6/2019  8:45 PM   Drainage Amount scant 8/7/2019  8:50 AM   Care, Wound barrier applied 8/7/2019  8:50 AM   Dressing Care, Wound dressing changed 8/7/2019  8:50 AM     Rehab Goal Summary     Row Name 08/07/19 0849             Occupational Therapy Goals    Transfer Goal Selection (OT)  transfer, OT goal 1  -CS      Bathing Goal Selection (OT)  bathing, OT goal 1  -CS      Dressing Goal Selection (OT)  dressing, OT goal 1  -CS      Toileting Goal Selection (OT)  toileting, OT goal 1  -CS      Grooming Goal Selection (OT)  grooming, OT goal 1  -CS      Endurance Goal Selection (OT)  endurance, OT goal 1  -CS      Functional Mobility Goal Selection (OT)  functional mobility, OT goal 1  -CS      Additional Documentation  Functional Mobility Goal Selection (OT) (Row)  -CS         Transfer Goal 1 (OT)    Activity/Assistive Device (Transfer Goal 1, OT)  toilet  -CS      [place] Level/Cues Needed (Transfer Goal 1, OT)  contact guard assist  -CS      Time Frame (Transfer Goal 1, OT)  long term goal (LTG);by discharge  -CS      Progress/Outcome (Transfer Goal 1, OT)  goal not met;continuing progress toward goal  -CS         Bathing Goal 1 (OT)    Activity/Assistive Device (Bathing Goal 1, OT)  bathing skills, all  -CS      [place] Level/Cues Needed (Bathing Goal 1, OT)  set-up required;verbal cues required;contact guard assist  -CS      Time Frame (Bathing Goal 1, OT)  long term goal (LTG);by discharge  -CS      Progress/Outcomes (Bathing Goal 1, OT)  goal met;goal ongoing previously met please address again for accuracy   -CS         Dressing Goal 1 (OT)    Activity/Assistive Device (Dressing Goal 1, OT)  dressing skills, all  -CS      [place]/Cues Needed (Dressing Goal 1, OT)  set-up required;verbal cues required;contact guard  assist  -CS      Time Frame (Dressing Goal 1, OT)  long term goal (LTG);by discharge  -CS      Progress/Outcome (Dressing Goal 1, OT)  goal met  -CS         Toileting Goal 1 (OT)    Activity/Device (Toileting Goal 1, OT)  toileting skills, all  -CS      [place] Level/Cues Needed (Toileting Goal 1, OT)  contact guard assist  -CS      Time Frame (Toileting Goal 1, OT)  long term goal (LTG);by discharge  -CS      Progress/Outcome (Toileting Goal 1, OT)  goal not met  -CS         Grooming Goal 1 (OT)    Activity/Device (Grooming Goal 1, OT)  grooming skills, all  -CS      [place] (Grooming Goal 1, OT)  supervision required;set-up required  -CS      Time Frame (Grooming Goal 1, OT)  long term goal (LTG);by discharge  -CS      Progress/Outcome (Grooming Goal 1, OT)  goal met  -CS          Endurance Goal 1 (OT)    Activity Level (Endurance Goal 1, OT)  endurance 2 fair + 25 min functional task 3 or less rest breaks O2 90 or up  -CS      Time Frame (Endurance Goal 1, OT)  long term goal (LTG);by discharge  -CS      Progress/Outcome (Endurance Goal 1, OT)  goal met  -CS         Functional Mobility Goal 1 (OT)    Activity/Assistive Device (Functional Mobility Goal 1, OT)  walker, rolling AD as needed  -CS      [place] Level/Cues Needed (Functional Mobility Goal 1, OT)  standby assist;verbal cues required  -CS      Distance Goal 1 (Functional Mobility, OT)  for ADL tasks with good safety and balance.   -CS      Time Frame (Functional Mobility Goal 1, OT)  long term goal (LTG);by discharge  -CS      Progress/Outcome (Functional Mobility Goal 1, OT)  goal not met  -CS         Patient Education Goal (OT)    Activity (Patient Education Goal, OT)  Pt will communciate good home safety awareness.   -CS      [place]/Cues/Accuracy (Memory Goal 2, OT)  verbalizes understanding  -CS      Time Frame (Patient Education Goal, OT)  long term goal (LTG);by discharge  -CS      Progress/Outcome (Patient Education Goal, OT)   goal not met  -        User Key  (r) = Recorded By, (t) = Taken By, (c) = Cosigned By    Initials Name Provider Type Discipline    CS Brenda Thomas COTA/L Occupational Therapy Assistant OT        Occupational Therapy Education     Title: PT OT SLP Therapies (In Progress)     Topic: Occupational Therapy (In Progress)     Point: ADL training (In Progress)     Description: Instruct learner(s) on proper safety adaptation and remediation techniques during self care or transfers.   Instruct in proper use of assistive devices.    Learning Progress Summary           Patient Acceptance, E,TB,D, NR by  at 8/7/2019  1:14 PM    Acceptance, E,TB,D, NR,VU by  at 8/5/2019 11:42 AM    Acceptance, E,TB,D, NR by  at 8/4/2019 10:35 AM    Acceptance, E, VU,NR by  at 8/3/2019  2:45 PM    Comment:  Educated about OT and POC. Educated to call for assist and not get up on his own. Educated on need for assist with t/f, mobility and functional tasks. Educated on home safety with ADL need for BSC and RW.    Acceptance, E, VU,NR by  at 7/30/2019 11:58 AM    Comment:  Educated about OT and POC. Educated to call for assist. Educated on safety throughout.    Acceptance, E, VU by  at 7/27/2019 12:40 PM    Acceptance, E,TB,D, NR by  at 7/26/2019  1:27 PM    Acceptance, E,TB,D, NR by  at 7/25/2019 12:58 PM    Acceptance, E, NR by  at 7/24/2019 11:48 AM    Acceptance, E,TB,D, NR by  at 7/23/2019  1:02 PM    Acceptance, E, NR by  at 7/18/2019  1:59 PM    Comment:  Educated about OT and POC. Educated on safety throughout. Educated to call for assist.   Family Acceptance, E, VU,NR by  at 8/3/2019  2:45 PM    Comment:  Educated about OT and POC. Educated to call for assist and not get up on his own. Educated on need for assist with t/f, mobility and functional tasks. Educated on home safety with ADL need for BSC and RW.                   Point: Home exercise program (In Progress)     Description: Instruct learner(s) on  appropriate technique for monitoring, assisting and/or progressing therapeutic exercises/activities.    Learning Progress Summary           Patient Acceptance, E,TB,D, NR by  at 8/7/2019  1:14 PM    Acceptance, E,TB,D, NR,VU by  at 8/5/2019 11:42 AM    Acceptance, E,TB,D, NR by CS at 8/4/2019 10:35 AM    Acceptance, E,TB,D, NR by  at 7/26/2019  1:27 PM    Acceptance, E,TB,D, NR by  at 7/25/2019 12:58 PM    Acceptance, E,TB,D, NR by  at 7/23/2019  1:02 PM                   Point: Precautions (In Progress)     Description: Instruct learner(s) on prescribed precautions during self-care and functional transfers.    Learning Progress Summary           Patient Acceptance, E,TB,D, NR by CS at 8/7/2019  1:14 PM    Acceptance, E,TB,D, NR,VU by  at 8/5/2019 11:42 AM    Acceptance, E,TB,D, NR by  at 8/4/2019 10:35 AM    Acceptance, E, VU,NR by  at 8/3/2019  2:45 PM    Comment:  Educated about OT and POC. Educated to call for assist and not get up on his own. Educated on need for assist with t/f, mobility and functional tasks. Educated on home safety with ADL need for BSC and RW.    Acceptance, E, VU,NR by  at 7/30/2019 11:58 AM    Comment:  Educated about OT and POC. Educated to call for assist. Educated on safety throughout.    Acceptance, E,TB,D, NR by  at 7/26/2019  1:27 PM    Acceptance, E,TB,D, NR by  at 7/25/2019 12:58 PM    Acceptance, E,TB,D, NR by  at 7/23/2019  1:02 PM    Acceptance, E, NR by  at 7/18/2019  1:59 PM    Comment:  Educated about OT and POC. Educated on safety throughout. Educated to call for assist.   Family Acceptance, E, VU,NR by  at 8/3/2019  2:45 PM    Comment:  Educated about OT and POC. Educated to call for assist and not get up on his own. Educated on need for assist with t/f, mobility and functional tasks. Educated on home safety with ADL need for BSC and RW.                   Point: Body mechanics (In Progress)     Description: Instruct learner(s) on proper  positioning and spine alignment during self-care, functional mobility activities and/or exercises.    Learning Progress Summary           Patient Acceptance, E,TB,D, NR by CS at 8/7/2019  1:14 PM    Acceptance, E,TB,D, NR,VU by CS at 8/5/2019 11:42 AM    Acceptance, E,TB,D, NR by CS at 8/4/2019 10:35 AM    Acceptance, E,TB,D, NR by CS at 7/26/2019  1:27 PM    Acceptance, E,TB,D, NR by CS at 7/25/2019 12:58 PM    Acceptance, E, NR by BB at 7/24/2019 11:48 AM    Acceptance, E,TB,D, NR by CS at 7/23/2019  1:02 PM                               User Key     Initials Effective Dates Name Provider Type Discipline     06/08/18 -  Jayne Antunez OTR/L Occupational Therapist OT    BB 03/07/18 -  Kerrie Woodson, MADSEN/L Occupational Therapy Assistant OT     03/07/18 -  Brenda Thomas, MADSEN/L Occupational Therapy Assistant OT                OT Recommendation and Plan  Outcome Summary/Treatment Plan (OT)  Daily Summary of Progress (OT): progress toward functional goals is good  Plan for Continued Treatment (OT): cont OT POC  Anticipated Discharge Disposition (OT): anticipate therapy at next level of care  Therapy Frequency (OT Eval): other (see comments)( 5-7 days a week )  Daily Summary of Progress (OT): progress toward functional goals is good  Plan of Care Review  Plan of Care Reviewed With: patient  Plan of Care Reviewed With: patient  Outcome Summary: Pt tolerated tx well this date. Pt gave good effort with UE ther ex. No goals met this tx. Continue OT POC.  Outcome Measures     Row Name 08/06/19 1123 08/06/19 1045 08/05/19 1320       How much help from another person do you currently need...    Turning from your back to your side while in flat bed without using bedrails?  4  -GIAN  --  4  -JA    Moving from lying on back to sitting on the side of a flat bed without bedrails?  4  -GIAN  --  4  -JA    Moving to and from a bed to a chair (including a wheelchair)?  3  -GIAN  --  4  -JA    Standing up from a chair using  your arms (e.g., wheelchair, bedside chair)?  3  -GIAN  --  3  -JA    Climbing 3-5 steps with a railing?  3  -GIAN  --  2  -JA    To walk in hospital room?  3  -GIAN  --  3  -JA    AM-PAC 6 Clicks Score (PT)  20  -GIAN  --  20  -JA       How much help from another is currently needed...    Putting on and taking off regular lower body clothing?  --  3  -KD  --    Bathing (including washing, rinsing, and drying)  --  3  -KD  --    Toileting (which includes using toilet bed pan or urinal)  --  3  -KD  --    Putting on and taking off regular upper body clothing  --  3  -KD  --    Taking care of personal grooming (such as brushing teeth)  --  4  -KD  --    Eating meals  --  1  -KD  --    AM-PAC 6 Clicks Score (OT)  --  17  -KD  --       Functional Assessment    Outcome Measure Options  AM-PAC 6 Clicks Basic Mobility (PT)  -  --  AM-PAC 6 Clicks Basic Mobility (PT)  -    Row Name 08/05/19 1100             How much help from another is currently needed...    Putting on and taking off regular lower body clothing?  3  -CS      Bathing (including washing, rinsing, and drying)  3  -CS      Toileting (which includes using toilet bed pan or urinal)  3  -CS      Putting on and taking off regular upper body clothing  3  -CS      Taking care of personal grooming (such as brushing teeth)  4  -CS      Eating meals  1  -CS      AM-PAC 6 Clicks Score (OT)  17  -CS        User Key  (r) = Recorded By, (t) = Taken By, (c) = Cosigned By    Initials Name Provider Type    Ken Lagunas, PTA Physical Therapy Assistant    Abran Ferreira, PTA Physical Therapy Assistant    Isabelle Colon, MADSEN/L Occupational Therapy Assistant    Brenda Lee, MADSEN/L Occupational Therapy Assistant           Time Calculation:   Time Calculation- OT     Row Name 08/07/19 1316             Time Calculation- OT    OT Start Time  0849  -CS      OT Stop Time  0930  -CS      OT Time Calculation (min)  41 min  -CS      Total Timed Code Minutes- OT   41 minute(s)  -CS      OT Received On  08/07/19  -CS        User Key  (r) = Recorded By, (t) = Taken By, (c) = Cosigned By    Initials Name Provider Type    CS Brenda Thomas COTA/LIANE Occupational Therapy Assistant        Therapy Charges for Today     Code Description Service Date Service Provider Modifiers Qty    [id number] HC OT THER PROC EA 15 MIN 8/7/2019 Brenda Thomas COTA/L GO 3               ROSEANNA Barton  8/7/2019

## 2019-08-07 NOTE — DISCHARGE PLACEMENT REQUEST
"Ana Bang (59 y.o. Male)     Date of Birth Social Security Number Address Home Phone MRN    1960  [facility] [address] [phone] [id number]    Islam Marital Status          [facility] Single       Admission Date Admission Type Admitting Provider Attending Provider Department, Room/Bed    7/15/19 Emergency Cristino Holland MD Olalekan, David B, MD [address], 359/1    Discharge Date Discharge Disposition Discharge Destination                       Attending Provider:  Cristino Holland MD    Allergies:  No Known Allergies    Isolation:  None   Infection:  None   Code Status:  CPR    Ht:  165.1 cm (65\")   Wt:  45.9 kg (101 lb 3.2 oz)    Admission Cmt:  None   Principal Problem:  Hypotension [I95.9]                 Active Insurance as of 7/15/2019     Primary Coverage     Payor Plan Insurance Group Employer/Plan Group    [place]      Payor Plan Address Payor Plan Phone Number Payor Plan Fax Number Effective Dates    [address] [phone]  7/15/2019 - None Entered    [place] [id number]       Subscriber Name Subscriber Birth Date Member ID       ANA BANG 1960 [id number]                 Emergency Contacts      (Rel.) Home Phone Work Phone Mobile Phone    Ev Alexander (Sister) [phone] -- [phone]    Martha Darby (Daughter) [phone] -- [phone]        [address] [address]  Dept. Phone:  [phone]  Dept. Fax:   Date Ordered: Aug 7, 2019         Patient:  Ana Bang MRN:  [id number]    [facility] [address] :  1960  SSN:    Phone: [phone] Sex:  M     Weight: 45.9 kg (101 lb 3.2 oz)         Ht Readings from Last 1 Encounters:   19 165.1 cm (65\")         DME Enteral Feeding              (Order ID: [id number])    Diagnosis:     Quantity:  1     Tube Feeding " Delivery Method: Pump Fed  Pump Type:  Infusion Pump With Alarm  Supplies: : Enteral feeding supply kit; Pump fed  Supplies:  IV Pole  Tube Feeding Formula: Peptamen 1.5 at 45ml/hr  Tube Feeding Instructions: 45 ml/hr  Length of Need (99 Months = Lifetime): 99 Months = Lifetime        Authorizing Provider's Phone: [phone]   Authorizing Provider:Shanda Mejia APRN  Authorizing Provider's NPI: [id number]  Order Entered By: Shanda Mejia APRN 2019  2:57 PM     Electronically signed by: Shanda Mejia APRN 2019  2:57 PM               History & Physical      Ariela Miller MD at 2019 12:26 AM              HISTORY AND PHYSICAL  NAME: Emerson Bang  : 1960  MRN: [id number]    DATE OF ADMISSION: 19    DATE & TIME SEEN: 19 2:04 AM    PCP: Cristino He MD    CODE STATUS:   Code Status and Medical Interventions:   Ordered at: 19 0109     Level Of Support Discussed With:    Patient     Code Status:    CPR     Medical Interventions (Level of Support Prior to Arrest):    Full       CHIEF COMPLAINT abdominal pain     HPI:  Emerson Bang is a 59 y.o. male with a CMH to include COPD, BPH, anemia, HTN, hypothyroidism, end stage liver disease secondary to alcohol abuse and squamous cell carcinoma of the hypopharynx presents to ED with abdominal pain. He has squamous cell carcinoma of hypopharynx that involves the lateral pharyngeal wall anterior and medial aspect of the piriform sinus/last aspect of the larynx with paralysis of the right vocal cord. He has an appointment tomorrow for his first round of chemotherapy and radiation. He has a hx of squamous cell carcinoma of the right tonsil. He was treated with chemotherapy at that time in . History is obtained by sister as patient is nonverbal.     He had a g-tube placed due to dysphagia caused by the tumor. He had this placed a few months ago. He recently had follow-up and was told he could  "resume a diet. On Friday, he ate some mashed potatoes. He has not had a bowel movement in over a week. He stated complaining of abdominal pain that started yesterday and has gotten progressively worse. He has a history of abdominal surgery. He does have a history of abdominal surgery. In 2008, he had a laparotomy with repair of stomach wall perforation with left upper quadrant abdominal wall abscess debridement.     In the ED, CT abdomen showed \"Findings consistent with a relatively high-grade small bowel obstruction most likely related to an adhesion.\" He was given Reglan and Zofran. He was started on IVF at 125cc/hr. General surgery was consulted, and Dr. Kc agreed to see and evaluate the patient inpatient.       CONCURRENT MEDICAL HISTORY:  Past Medical History:   Diagnosis Date   • Alcohol abuse    • Allergic rhinitis     vs URI   • Anemia    • At risk for falls    • Benign prostatic hyperplasia    • Chronic gastritis    • Cirrhosis of liver (CMS/HCC)    • Complication of gastrostomy (CMS/HCC)     site not healing   • COPD (chronic obstructive pulmonary disease) (CMS/HCC)    • Dysphagia     odynophagia   • Epigastric pain    • Esophagitis    • GERD (gastroesophageal reflux disease)    • Hypertension    • Hypothyroidism, unspecified    • Low back pain    • Malaise and fatigue    • Nasal congestion 11/15/2018   • Nausea with vomiting, unspecified    • Pain in left knee    • Pain in right knee    • Primary malignant neoplasm of pharynx (CMS/HCC)    • Screening for malignant neoplasm of colon    • Vitamin D deficiency        PAST SURGICAL HISTORY:  Past Surgical History:   Procedure Laterality Date   • ABDOMINAL WALL SURGERY  11/04/2008    Laparotomy with repair of stomach wall perforation. 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 Left upper quadrant abdominal wall abscess debridement. Gastrostomy tube erosion through & through the anterior gastric wall.Lupper quadrant abdominal wall abscess   • COLONOSCOPY  04/21/2014 " "   Internal & external hemorrhoids found.   • COLONOSCOPY  2014    [place]   • COLONOSCOPY N/A 10/16/2018    Procedure: COLONOSCOPY;  Surgeon: Kaushal Chester MD;  Location: [facility] ENDOSCOPY;  Service: Gastroenterology   • DIRECT LARYNGOSCOPY, ESOPHAGOSCOPY, BRONCHOSCOPY N/A 4/15/2019    Procedure: DIRECT LARYNGOSCOPY with biopsy, ESOPHAGOSCOPY;  Surgeon: Bharathi Washington MD;  Location: [facility] OR;  Service: ENT   • ENDOSCOPY N/A 10/16/2018    Procedure: ESOPHAGOGASTRODUODENOSCOPY;  Surgeon: Kaushal Chester MD;  Location: [facility] ENDOSCOPY;  Service: Gastroenterology   • GASTROSTOMY FEEDING TUBE INSERTION N/A 4/15/2019    Procedure: GASTROSTOMY FEEDING TUBE INSERTION;  Surgeon: Trenton Valera MD;  Location: [facility] OR;  Service: General   • TRACHEOSTOMY  11/10/2008    Respiratory failure   • UPPER GASTROINTESTINAL ENDOSCOPY  04/21/2014    Esophagitis seen. Biopsy taken. Gastritis in stomach. Biopsy taken. Normal duodenum. Biopsy taken.   • UPPER GASTROINTESTINAL ENDOSCOPY  10/16/2018       FAMILY HISTORY:  Family History   Problem Relation Age of Onset   • Coronary artery disease Mother    • Diabetes Mother    • Other Mother         \"Heart And Lung Problems\"   • Liver cancer Sister    • Heart disease Other    • Stroke Other    • Hypertension Other    • Diabetes Other    • Cancer Other    • Colon polyps Other    • Other Father         Unknown   • Other Other         \"Lung Problems\"   • Thyroid disease Neg Hx         SOCIAL HISTORY:  Social History     Socioeconomic History   • Marital status: Single     Spouse name: Not on file   • Number of children: Not on file   • Years of education: Not on file   • Highest education level: Not on file   Occupational History   • Occupation: Disabled     Comment: Patient resides alone.   Tobacco Use   • Smoking status: Former Smoker     Packs/day: 1.50     Years: 30.00     Pack years: 45.00     Types: Cigarettes   • Smokeless tobacco: Former User     Quit " date: 4/12/2009   Substance and Sexual Activity   • Alcohol use: No     Comment: 02/19/2019 - Patient confirmed heavy alcoholic beverage consumption in past with current consumption of 2 - 3 beers twice per week.   • Drug use: No   • Sexual activity: Defer       HOME MEDICATIONS:    Current Facility-Administered Medications:   •  acetaminophen (TYLENOL) 160 MG/5ML solution 650 mg, 650 mg, Per G Tube, Q6H PRN, Ariela Miller MD  •  aspirin EC tablet 81 mg, 81 mg, Oral, Daily, Ariela Miller MD  •  cholecalciferol (VITAMIN D3) tablet 500 Units, 500 Units, Oral, Daily, Ariela Miller MD  •  docusate sodium (COLACE) liquid 100 mg, 100 mg, Per G Tube, BID PRN, Ariela Miller MD  •  famotidine (PEPCID) tablet 20 mg, 20 mg, Per G Tube, BID, Ariela Miller MD  •  fenofibrate (TRICOR) tablet 48 mg, 48 mg, Oral, Daily, Ariela Miller MD  •  HYDROcodone-acetaminophen (HYCET) 7.5-325 MG/15ML solution 15 mL, 15 mL, Oral, Q4H PRN, Ariela Miller MD  •  levothyroxine (SYNTHROID, LEVOTHROID) tablet 175 mcg, 175 mcg, Oral, Q AM, Ariela Miller MD  •  oxyCODONE (ROXICODONE) immediate release tablet 10 mg, 10 mg, Oral, Q4H PRN, Cristino Holland MD  •  potassium chloride 10 mEq in 100 mL IVPB, 10 mEq, Intravenous, Q1H PRN, Cristino Holland MD  •  Scopolamine (TRANSDERM-SCOP) 1.5 MG/3DAYS patch 1 patch, 1 patch, Transdermal, Q72H, Ariela Miller MD, 1 patch at 07/16/19 0154  •  [COMPLETED] Insert peripheral IV, , , Once **AND** sodium chloride 0.9 % flush 10 mL, 10 mL, Intravenous, PRN, Pilol Cardenas MD  •  sodium chloride 0.9 % flush 3 mL, 3 mL, Intravenous, Q12H, Ariela Miller MD  •  sodium chloride 0.9 % flush 3-10 mL, 3-10 mL, Intravenous, PRN, Ariela Miller MD  •  sodium chloride 0.9 % infusion, 125 mL/hr, Intravenous, Continuous, Pillo Cardenas MD, Last Rate: 125 mL/hr at 07/16/19 0051, 125 mL/hr at 07/16/19 0051  •  terazosin (HYTRIN) capsule 1  mg, 1 mg, Oral, Nightly, Ariela Miller MD    ALLERGIES:  Patient has no known allergies.    REVIEW OF SYSTEMS  Review of Systems   Constitutional: Negative for chills, diaphoresis and fever.   HENT: Positive for trouble swallowing and voice change. Negative for sneezing and sore throat.    Eyes: Negative for pain and discharge.   Respiratory: Negative for cough and shortness of breath.    Gastrointestinal: Positive for abdominal pain, constipation, nausea and vomiting. Negative for diarrhea.   Endocrine: Negative for cold intolerance and heat intolerance.   Genitourinary: Negative for difficulty urinating, dysuria, frequency and urgency.   Musculoskeletal: Negative for arthralgias and myalgias.   Skin: Negative for color change and pallor.   Allergic/Immunologic: Negative for environmental allergies and food allergies.   Neurological: Positive for weakness. Negative for dizziness and syncope.   Psychiatric/Behavioral: Negative for confusion and sleep disturbance.       PHYSICAL EXAM:  Temp:  [96.4 °F (35.8 °C)-98.3 °F (36.8 °C)] 98 °F (36.7 °C)  Heart Rate:  [64-94] 78  Resp:  [18] 18  BP: (103-148)/(63-84) 148/63  Body mass index is 17.96 kg/m².  Physical Exam   Constitutional: Vital signs are normal. He appears cachectic. No distress.   HENT:   Head: Normocephalic and atraumatic.   Nose: Nose normal.   Eyes: Conjunctivae and EOM are normal.   Neck: Normal range of motion. Neck supple.   trach   Cardiovascular: Normal rate, regular rhythm and normal heart sounds.   No murmur heard.  Pulmonary/Chest: Effort normal and breath sounds normal. No respiratory distress. He has no wheezes. He has no rales.   Abdominal: Soft. He exhibits no distension. Bowel sounds are decreased. There is tenderness in the right upper quadrant and right lower quadrant. There is no guarding.   g-tube to gravity  Midline abdominal scar   Musculoskeletal: Normal range of motion.   Neurological:   Unable to assess, patient nonverbal    Skin: Skin is warm and dry.   Psychiatric:   Unable to assess, patient nonverbal   Vitals reviewed.      DIAGNOSTIC DATA:   Lab Results (last 24 hours)     Procedure Component Value Units Date/Time    Extra Tubes [[id number]] Collected:  07/16/19 0020    Specimen:  Blood, Venous Line Updated:  07/16/19 0121    Narrative:       The following orders were created for panel order Extra Tubes.  Procedure                               Abnormality         Status                     ---------                               -----------         ------                     Light Blue Top[[id number]]                                   Final result               Red Top[[id number]]                                          Final result               Lavender Top[[id number]]                                     Final result               Lavender Top[[id number]]                                     Final result                 Please view results for these tests on the individual orders.    Light Blue Top [[id number]] Collected:  07/16/19 0020    Specimen:  Blood Updated:  07/16/19 0121     Extra Tube hold for add-on     Comment: Auto resulted       Red Top [[id number]] Collected:  07/16/19 0020    Specimen:  Blood Updated:  07/16/19 0121     Extra Tube Hold for add-ons.     Comment: Auto resulted.       Lavender Top [[id number]] Collected:  07/16/19 0020    Specimen:  Blood Updated:  07/16/19 0121     Extra Tube hold for add-on     Comment: Auto resulted       Lavender Top [[id number]] Collected:  07/16/19 0020    Specimen:  Blood Updated:  07/16/19 0121     Extra Tube hold for add-on     Comment: Auto resulted       Comprehensive Metabolic Panel [[id number]]  (Abnormal) Collected:  07/16/19 0020    Specimen:  Blood Updated:  07/16/19 0049     Glucose 148 mg/dL      BUN 57 mg/dL      Creatinine 1.11 mg/dL      Sodium 138 mmol/L      Potassium 3.0 mmol/L      Chloride 89 mmol/L      CO2 33.0 mmol/L      Calcium 9.6 mg/dL      Total Protein 8.3  g/dL      Albumin 3.60 g/dL      ALT (SGPT) 14 U/L      AST (SGOT) 18 U/L      Alkaline Phosphatase 100 U/L      Total Bilirubin <0.2 mg/dL      eGFR  Janna Gordon 82 mL/min/1.73      Globulin 4.7 gm/dL      A/G Ratio 0.8 g/dL      BUN/Creatinine Ratio 51.4     Anion Gap 16.0 mmol/L     Narrative:       GFR Normal >60  Chronic Kidney Disease <60  Kidney Failure <15    Lipase [[id number]]  (Abnormal) Collected:  07/16/19 0020    Specimen:  Blood Updated:  07/16/19 0049     Lipase 6 U/L     CBC & Differential [[id number]] Collected:  07/15/19 2143    Specimen:  Blood Updated:  07/15/19 2146    Narrative:       The following orders were created for panel order CBC & Differential.  Procedure                               Abnormality         Status                     ---------                               -----------         ------                     Scan Slide[[id number]]                                                                  CBC Auto Differential[[id number]]        Abnormal            Final result                 Please view results for these tests on the individual orders.    CBC Auto Differential [[id number]]  (Abnormal) Collected:  07/15/19 2143    Specimen:  Blood Updated:  07/15/19 2146     WBC 10.19 10*3/mm3      RBC 4.54 10*6/mm3      Hemoglobin 11.4 g/dL      Hematocrit 32.9 %      MCV 72.5 fL      MCH 25.1 pg      MCHC 34.7 g/dL      RDW 17.8 %      RDW-SD 44.5 fl      MPV 11.2 fL      Platelets 373 10*3/mm3      Neutrophil % 71.8 %      Lymphocyte % 12.5 %      Monocyte % 14.9 %      Eosinophil % 0.3 %      Basophil % 0.3 %      Immature Grans % 0.2 %      Neutrophils, Absolute 7.32 10*3/mm3      Lymphocytes, Absolute 1.27 10*3/mm3      Monocytes, Absolute 1.52 10*3/mm3      Eosinophils, Absolute 0.03 10*3/mm3      Basophils, Absolute 0.03 10*3/mm3      Immature Grans, Absolute 0.02 10*3/mm3      nRBC 0.0 /100 WBC            Imaging Results (last 24 hours)     Procedure Component Value Units Date/Time     CT Abdomen Pelvis Without Contrast [[id number]] Collected:  07/15/19 2300     Updated:  07/15/19 2338    Narrative:       CT abdomen and pelvis without contrast on   7/15/2019     CLINICAL INDICATION: Vomiting    TECHNIQUE: Multiple axial images are obtained throughout the  abdomen and pelvis without the administration of IV contrast,  oral contrast was administered. This exam was performed according  to our departmental dose-optimization program, which includes  automated exposure control, adjustment of the mA and/or kV  according to patient size and/or use of iterative reconstruction  technique.   Total DLP is 308.2 mGy*cm.    COMPARISON: 5/28/2012    FINDINGS:  Abdomen: Mild bilateral gynecomastia is partially imaged. There  is minimal bibasilar atelectasis. There is evidence of calcified  granulomatous disease in the lung bases. Gastrostomy tube is  noted in the distal stomach. There is a very small likely old  autoinfarcted spleen again noted in the left upper quadrant.  There are calcifications throughout the pancreas with chronic  pancreatic ductal dilatation consistent with changes of chronic  pancreatitis. The unenhanced solid abdominal organs are otherwise  unremarkable. Vascular calcifications are noted. There is no  abdominal adenopathy. There is no free air in the abdomen. Very  small amount of free fluid is noted especially in the right  paracolic gutter. There are multiple dilated fluid-filled loops  of small bowel with decompressed colon and decompressed distal  ileum consistent with small bowel obstruction. Transition point  appears to be in the left abdomen seen best on coronal image 24.  Most likely this is related to an adhesion.    Pelvis: Small amount of free fluid is noted in the pelvis. There  is no pelvic adenopathy. The pelvic portion of the GI tract  including the appendix is otherwise unremarkable. No acute bony  abnormality is noted.      Impression:       Findings consistent with a  relatively high-grade  small bowel obstruction most likely related to an adhesion.    Electronically signed by:  Cameron Lujan  7/15/2019 11:37 PM  CDT Workstation: [id number]    XR Chest 1 View [[id number]] Collected:  07/15/19 2046     Updated:  07/15/19 2102    Narrative:         Chest single view on  7/15/2019     CLINICAL INDICATION: Vomiting, nausea    COMPARISON: 4/21/2019    FINDINGS: There is minimal basilar atelectasis and/or scarring.  Tracheostomy tube tip overlies the midthoracic trachea. The lungs  are otherwise clear. Cardiac, hilar and mediastinal contours are  within normal limits. Pulmonary vascularity is within normal  limits. Partially imaged dilated gaseous small bowel loops in the  upper abdomen could be related to small bowel obstruction,  consider CT follow-up.      Impression:       1. No acute cardiopulmonary disease.  2. Partially imaged gaseous distended loops of bowel in the upper  abdomen could be related to bowel obstruction, consider CT  follow-up.    Electronically signed by:  Cameron Lujan  7/15/2019 9:01 PM  CDT Workstation: [id number]          I reviewed the patient's new clinical results.    ASSESSMENT AND PLAN: This is a 59 y.o. male with:    Active Hospital Problems    Diagnosis POA   • **SBO (small bowel obstruction) (CMS/HCC) [K56.609] Yes   • Malignant cachexia (CMS/HCC) [R64] Yes     Body mass index is 17.76 kg/m².       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 Kc has been consulted. He has agreed to see patient  -G-tube to gravity. N/V has improved.   -NPO    Neoplasm of hypopharynx   -He was scheduled to have start chemotherapy and radiation  tomorrow  -Will consult hem/onc to see patient   -Pain management  -Full Code    Hypothyroidism  -continue synthroid    Malignant Cachexia  -will resume tube feeds after resolution of SBO    Anemia  -stable  -monitor H/H     Hypertension  -monitor     DVT prophylaxis: SCDs for now. If patient is not a candidate for surgery, can start Lovenox.   Code status is   Code Status and Medical Interventions:   Ordered at: 07/16/19 0109     Level Of Support Discussed With:    Patient     Code Status:    CPR     Medical Interventions (Level of Support Prior to Arrest):    Full      BRITTNI #[id number]  , reviewed and consistent with patient reported medications.      I discussed the patients findings and my recommendations with patient.     Dr. Holland is the attending on record at time of admission, he is aware of the patient's status and agrees with the above history and physical.        This document has been electronically signed by Ariela Miller MD on July 16, 2019 2:04 AM          Electronically signed by Cristino Holland MD at 7/17/2019  7:58 AM          Operative/Procedure Notes (all)      Parminder Kc MD at 7/17/2019  8:44 PM  Version 1 of 1         DIAGNOSTIC LAPAROSCOPY EXPLORATORY LAPAROTOMY  Progress Note    Emerson Boyer Merritt  7/17/2019    Pre-op Diagnosis:   SBO (small bowel obstruction) (CMS/HCC) [K56.609]       Post-Op Diagnosis Codes:  SBO (small bowel obstruction)secondary to adhesions (CMS/HCC) [K56.609]    Procedure:  CHANGE GASTROSTOMY TUBE, DIAGNOSTIC LAPAROSCOPY, EXPLORATORY LAPAROTOMY, LYSIS OF ADHESIONS    Surgeon(s):  Parminder Kc MD    Anesthesia: General    Staff:   Circulator: Judy Zheng RN; Lorna Benedict RN  Scrub Person: Ap Perez  Assistant: Tiffanie Thomas CSA    Estimated Blood Loss: minimal    Urine Voided: 200 mL    Specimens:  None                        Drains:   Gastrostomy/Enterostomy Gastrostomy (Active)   Surrounding Skin Dry;Intact 7/17/2019  8:28 AM    Securement anchored to abdomen with adhesive device 7/17/2019  8:28 AM   Drain Status Open to gravity drainage 7/17/2019  8:28 AM   Drainage Appearance Green 7/17/2019  8:28 AM   Site Description Unable to view 7/17/2019  8:28 AM   Dressing Status Clean;Dry;Intact 7/17/2019  8:28 AM   Dressing Type Split gauze 7/16/2019  8:30 PM   Output (mL) 600 mL 7/17/2019  5:00 PM       Urethral Catheter 16 Fr. (Active)       [REMOVED] Urethral Catheter Coude (Removed)       Findings: Acute SBO secondary to adhesions    Complications: None      Parminder Kc MD     Date: 7/17/2019  Time: 11:34 PM        Electronically signed by Parminder Kc MD at 7/17/2019 11:35 PM     Parminder Kc MD at 7/18/2019  8:52 PM  Version 1 of 1         DIAGNOSTIC LAPAROSCOPY EXPLORATORY LAPAROTOMY  Procedure Note    Emerson Bang  7/17/2019    Pre-op Diagnosis:   Complete SBO (small bowel obstruction) (CMS/HCC) [K56.609]    Post-op Diagnosis:     Same    Procedure:  DIAGNOSTIC LAPAROSCOPY, EXPLORATORY LAPAROTOMY, LYSIS OF ADHESIONS    Surgeon(s):  Parminder Kc MD    Anesthesia: General    Staff:   Circulator: Judy Zheng RN; Lorna Benedict RN  Scrub Person: Ap Perez  Assistant: Tiffanie Thomas CSA    Estimated Blood Loss: minimal    Specimens:                ID Type Source Tests Collected by Time   1 :  Arterial Blood Blood, Arterial Line BLOOD GAS, ARTERIAL W/CO-OXIMETRY Parminder Kc MD 7/17/2019 2224         Drains:   Gastrostomy/Enterostomy Gastrostomy (Active)   Surrounding Skin Dry;Intact 7/18/2019  8:00 PM   Securement anchored to abdomen with adhesive device 7/18/2019  8:00 PM   Drain Status Open to gravity drainage 7/18/2019  8:00 PM   Drainage Appearance Green 7/18/2019  8:00 PM   Site Description Unable to view 7/18/2019  8:00 PM   Dressing Status Clean;Dry;Intact 7/18/2019  8:00 PM   Dressing Type Split gauze 7/18/2019  8:00 PM   Intake (mL) 30 mL 7/18/2019  8:21 AM   Output (mL) 400 mL 7/18/2019  5:32 PM        Urethral Catheter 16 Fr. (Active)   Daily Indications Monitoring of strict I &O 7/18/2019  8:00 PM   Site Assessment Clean;Skin intact 7/18/2019  8:00 PM   Collection Container Standard drainage bag 7/18/2019  8:00 PM   Securement Method Securing device 7/18/2019  8:00 PM   Catheter care complete Yes 7/18/2019  8:00 PM   Output (mL) 150 mL 7/18/2019  5:32 PM       [REMOVED] Urethral Catheter Coude (Removed)       Findings: Complete small bowel obstruction with the small bowel densely attached to adhesions in the upper abdominal wall    Complications: None    Indications:  This is a 59-year-old with an intestinal obstruction unresponsive to medical management. Taken to the operating room for laparotomy.  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    Description of procedure: The patient was brought to the operating room and placed supine on the operating table.  After adequate general endotracheal anesthesia, the abdominal area was prepped and draped in a sterile manner. A briefing and time out were performed and all parties were in agreement.     The patient's gastrostomy tube is been prepped into the field.  The old gastrostomy tube was removed and immediately replaced with a #20 Palomo catheter.  The balloon was inflated with saline and the tubing connected to a drainage bag.    A transverse 5 mm incision was made in the epigastrium slightly to the left of the midline subcostally and carried into the subcutaneous tissue.  A 5 mm atraumatic trocar was passed of the abdomen under direct vision with no visceral injury.  The abdomen was insufflated to a total of 15 mmHg, 4.0 L of CO2.  5 mm laparoscope demonstrated an excellent view of the abdominal cavity.  There were numerous adhesions in the left upper quadrant but the remainder of the bowel appeared to be free in the lower abdomen.  Marked intestinal distention was noted.  It  was also possible this is a that of the ileocecal valve the valve was collapsed.  I attempted laparoscopically to trace this superiorly but there is a limitation because of the amount of intestinal dilatation that was noted.  After unsuccessfully being able to lyse all adhesions laparoscopically I elected to proceed with an open procedure.    The abdomen was approached due to previous midline incision.  A 10 blade knife was used to make a skin incision that was carried to the linea alba. The linea alba was divided and the abdomen was entered with no visceral injury.  Upon entering the abdomen, there were numerous dilated loops of small intestine. A small amount of peritoneal fluid which was aspirated.  No palpable masses were noted in the pelvis.    Obstruction was near the patient's previous gastrostomy tube.  A wound protector was placed and adhesions to this area were taken down sharply.  Small serosal separations were oversewn with interrupted 3-0 Vicryl suture.  No further adhesions and no enterotomies were noted.  Upon lysing these adhesions, the small bowel was completely free from beginning to end. There was excellent arterial pulsation in the mesentery throughout the intestine. The intestine was replaced into the abdominal cavity.  There was little to no omentum available to cover the intestine.    At this point, the patient became hemodynamically unstable.  He required pressor support as well as large volumes of fluid.  Ultimately blood pressure was able to be restored to normal levels and urine output increased.  Peak inspiratory pressures were noted to be low so it was felt safe to be able to close the abdomen primarily. The midline wound was closed with short running segments of running #1 PDS in 4-1 ratio of suture length to incision length.  The skin was brought together with staples.    The procedure was terminated. It was well tolerated. Sponge and needle counts were correct, and the patient was  transferred to intensive care unit in critical condition.            This document has been electronically signed by Parminder Kc MD on July 18, 2019 8:53 PM       Date: 7/18/2019  Time: 8:53 PM    Electronically signed by Parminder Kc MD at 7/18/2019  9:00 PM          Physician Progress Notes (last 24 hours) (Notes from 8/6/2019  3:14 PM through 8/7/2019  3:14 PM)      Shanda Mejia APRN at 8/7/2019  2:05 PM     Attestation signed by Saul Gomez MD at 8/7/2019  2:38 PM    I personally evaluated and examined the patient in conjunction with ZARA Willson and agree with the assessment, treatment plan, and disposition of the patient as recorded.  Cardiac vascular: Normal S1 and S2.  Lungs: Good air entry bilaterally with coarse rhonchi.                      [facility] Medicine Services  INPATIENT PROGRESS NOTE    Length of Stay: 22  Date of Admission: 7/15/2019  Primary Care Physician: Cristino He MD    Subjective   Chief Complaint: No complaints    HPI:     8/7/2019:  The patient was hypotensive this morning.  Awaiting urology consult.      8/6/2019: Patient's Palomo catheter was removed today but he was unable to void adequately and complained of dysuria.  Urology has been consulted and Palomo catheter has been replaced.  Blood pressure has remained stable.    8/5/2019: Patient is hypotensive this morning and developed urinary retention during the night that required replacement of Palomo catheter.    8/4/2019:  No complaints.  Working well with therapy.  Up in chair today.      8/3/2019:  The patient has no complaints.  Physical and occupational therapy restarted today.  Vital signs stable.     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  Patient was found to have a high-grade small bowel obstruction and underwent surgery by Dr. Kc for lysis of adhesions (7/17/2019).  The patient required intensive care secondary to being hemodynamically unstable.  Patient was started on TPN for severe protein caloric malnutrition.  Patient was started on Samsca for hyponatremia.    Review of Systems   Constitutional: Negative for activity change and fatigue.   HENT: Negative for ear pain and sore throat.    Eyes: Negative for pain and discharge.   Respiratory: Negative for cough and shortness of breath.    Cardiovascular: Negative for chest pain and palpitations.   Gastrointestinal: Negative for abdominal pain and nausea.   Endocrine: Negative for cold intolerance and heat intolerance.   Genitourinary: Negative for difficulty urinating and dysuria.   Musculoskeletal: Negative for arthralgias and gait problem.   Skin: Negative for color change and rash.   Neurological: Negative for dizziness and weakness.   Psychiatric/Behavioral: Negative for agitation and confusion.        Objective    Temp:  [96.6 °F (35.9 °C)-98.3 °F (36.8 °C)] 97.3 °F (36.3 °C)  Heart Rate:  [] 92  Resp:  [16-19] 18  BP: ()/(56-70) 104/59    Physical Exam   Constitutional: He is oriented to person, place, and time. He appears well-developed. He appears cachectic.   HENT:   Head: Normocephalic and atraumatic.   Tracheostomy collar    Eyes: EOM are normal. Pupils are equal, round, and reactive to light.   Neck: Normal range of motion. Neck supple.   Cardiovascular: Normal rate and regular rhythm.   Pulmonary/Chest: Effort normal and breath sounds normal.   Abdominal: Soft. Bowel sounds are normal.   Musculoskeletal: Normal range of motion.   Neurological: He is alert and oriented to person, place, and time.   Skin: Skin is warm and dry.   Midline incision without redness or erythema.     Psychiatric: He has a normal mood and affect. His  behavior is normal.     Results Review:  I have reviewed the labs, radiology results, and diagnostic studies.    Laboratory Data:   Results from last 7 days   Lab Units 08/07/19  0505 08/06/19  0612 08/05/19  0655   SODIUM mmol/L 134* 133* 132*   POTASSIUM mmol/L 4.2 4.0 4.3   CHLORIDE mmol/L 96* 94* 95*   CO2 mmol/L 27.0 26.0 27.0   BUN mg/dL 15 16 16   CREATININE mg/dL 0.50* 0.43* 0.52*   GLUCOSE mg/dL 107* 148* 120*   CALCIUM mg/dL 9.8 9.6 9.9   BILIRUBIN mg/dL <0.2* <0.2* 0.2   ALK PHOS U/L 95 94 107   ALT (SGPT) U/L 9 8 13   AST (SGOT) U/L 20 16 19   ANION GAP mmol/L 11.0 13.0 10.0     Estimated Creatinine Clearance: 100.7 mL/min (A) (by C-G formula based on SCr of 0.5 mg/dL (L)).  Results from last 7 days   Lab Units 08/03/19  0529 08/01/19  0253   MAGNESIUM mg/dL 1.8 1.8   PHOSPHORUS mg/dL 3.5 3.7         Results from last 7 days   Lab Units 08/07/19  0505 08/06/19  0612 08/05/19  0655 08/04/19  0653 08/03/19  0529   WBC 10*3/mm3 6.65 7.07 8.30 7.79 7.33   HEMOGLOBIN g/dL 8.8* 8.2* 9.4* 9.5* 9.3*   HEMATOCRIT % 25.3* 23.7* 27.0* 27.5* 26.6*   PLATELETS 10*3/mm3 585* 592* 593* 595* 620*           Culture Data:   No results found for: BLOODCX  No results found for: URINECX  No results found for: RESPCX  No results found for: WOUNDCX  No results found for: STOOLCX  No components found for: BODYFLD    Radiology Data:   Imaging Results (last 24 hours)     ** No results found for the last 24 hours. **          I have reviewed the patient's current medications.     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  Hypotension:  Continue Midodrine 10 mg three times daily.    2.  Hyponatremia,  improved:  Continue sodium chloride 1 gram, three times daily.  Dr. Naik following.  Sodium 134 today.    3.  Severe protein-calorie malnutrition:   Dietary following.  Continue tube feeding.   4.  Iron deficiency anemia:  Dr. Sen following.  IV iron 125 mg x 3 days.   5.  Squamous cell carcinoma, pharynx:  Dr. Pireto following.    6.  Hypothyroidism:  Continue home Synthroid.    7.  Urinary retention:  Palomo catheter replaced.  Urology consulted.      Discharge Planning: I expect patient to be discharged to home in 1-2 days.      This document has been electronically signed by ZARA Isaacs on August 7, 2019 2:05 PM          Electronically signed by Saul Gomez MD at 8/7/2019  2:38 PM

## 2019-08-07 NOTE — THERAPY TREATMENT NOTE
Acute Care - Physical Therapy Treatment Note  [facility]     Patient Name: Emerson Bang  : 1960  MRN: [id number]  Today's Date: 2019  Onset of Illness/Injury or Date of Surgery: 07/15/19  Date of Referral to PT: 19  Referring Physician: Shanda ZUÑIGA    Admit Date: 7/15/2019    Visit Dx:    ICD-10-CM ICD-9-CM   1. SBO (small bowel obstruction) (CMS/[facility]) K56.609 560.9   2. Impaired functional mobility and activity tolerance Z74.09 V49.89   3. Impaired mobility and ADLs Z74.09 799.89   4. SIADH (syndrome of inappropriate ADH production) (CMS/[facility]) E22.2 253.6   5. Acquired hypothyroidism E03.9 244.9   6. Idiopathic hypotension I95.0 458.9   7. Hyponatremia E87.1 276.1   8. Impaired functional mobility, balance, gait, and endurance Z74.09 V49.89     Patient Active Problem List   Diagnosis   • Hyperkalemia   • Iron deficiency anemia   • Gastroesophageal reflux disease with esophagitis   • Elevated AST (SGOT)   • Alcohol abuse   • Hypothyroidism   • Balance problem   • Need for vaccination   • Low magnesium levels   • Squamous cell carcinoma of pharynx (CMS/HCC)   • History of throat cancer   • Vitamin D deficiency   • Alcohol abuse counseling and surveillance   • Encounter for screening for diabetes mellitus   • Hypomagnesemia   • Iron deficiency anemia   • Hyperlipidemia   • Underweight due to inadequate caloric intake   • Need for immunization against influenza   • 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[facility])   • Hypertension   • General medical examination   • Underweight   • Epigastric pain   • Gastritis without bleeding   • Need for influenza vaccination   • Elevated liver function tests   • B12 deficiency   • Intestinal malabsorption   • Throat pain   • Acute pharyngitis   • Upper respiratory tract infection   • Neoplasm of uncertain behavior of larynx   • Pharyngoesophageal dysphagia   • Severe protein-calorie malnutrition (CMS/HCC)   • Anemia of chronic disease   • Hypotension   • Hyponatremia        Therapy Treatment    Rehabilitation Treatment Summary     Row Name 08/07/19 1336 08/07/19 0849          Treatment Time/Intention    Discipline  physical therapy assistant  -TW  occupational therapy assistant  -CS     Document Type  therapy note (daily note)  -TW  therapy note (daily note)  -CS     Subjective Information  no complaints  -TW  no complaints  -CS     Mode of Treatment  physical therapy;individual therapy  -TW  occupational therapy  -CS     Patient/Family Observations  Pt supine and agreeable to tx.  -TW  --     Therapy Frequency (PT Clinical Impression)  daily  -TW  --     Therapy Frequency (OT Eval)  --  other (see comments)  5-7 days a week   -CS     Patient Effort  good  -TW  good  -CS     Existing Precautions/Restrictions  fall;oxygen therapy device and L/min  -TW  fall;oxygen therapy device and L/min  -CS     Recorded by [TW] Joseph Donnelly PTA 08/07/19 1537 [CS] Brenda Thomas COTA/L 08/07/19 1313     Row Name 08/07/19 1336 08/07/19 0849          Vital Signs    Pre Systolic BP Rehab  99  -TW  88  -CS     Pre Treatment Diastolic BP  58  -TW  57  -CS     Post Systolic BP Rehab  104  -TW  --     Post Treatment Diastolic BP  60  -TW  --     Pretreatment Heart Rate (beats/min)  98  -TW  101  -CS     Posttreatment Heart Rate (beats/min)  101  -TW  --     Pre SpO2 (%)  97  -TW  95  -CS     O2 Delivery Pre Treatment  trach collar  -TW  trach collar  -CS     Post SpO2 (%)  98  -TW  --     Pre Patient Position  Supine  -TW  Supine  -CS     Intra Patient Position  Standing  -TW  --     Post Patient Position  Sitting  -TW  Supine  -CS     Recorded by [TW] Joseph Donnelly PTA 08/07/19 4185 [CS] Brenda Thomas COTA/L 08/07/19 1313     Row Name 08/07/19 1336 08/07/19 0849          Cognitive Assessment/Intervention- PT/OT    Affect/Mental Status (Cognitive)  WFL  -TW  WFL  -CS     Orientation Status (Cognition)  oriented to;person;place  -TW  oriented to;person;place  -CS     Follows  Commands (Cognition)  WFL  -TW  WFL  -CS     Personal Safety Interventions  fall prevention program maintained;gait belt;nonskid shoes/slippers when out of bed  -TW  --     Recorded by [TW] Joseph Donnelly, MICHAEL 08/07/19 1535 [CS] Brenda Thomas COTA/L 08/07/19 1313     Row Name 08/07/19 1336             Safety Issues, Functional Mobility    Impairments Affecting Function (Mobility)  balance;endurance/activity tolerance;coordination;pain;strength;shortness of breath;postural/trunk control;motor planning;motor control  -TW      Recorded by [TW] Joseph Donnelly, PTA 08/07/19 1535      Row Name 08/07/19 1336             Bed Mobility Assessment/Treatment    Bed Mobility Assessment/Treatment  supine-sit  -TW      Supine-Sit [place] (Bed Mobility)  conditional independence  -TW      Sit-Supine [place] (Bed Mobility)  not tested  -TW      Assistive Device (Bed Mobility)  bed rails;head of bed elevated  -TW      Recorded by [TW] Joseph Donnelly, PTA 08/07/19 1535      Row Name 08/07/19 1336             Transfer Assessment/Treatment    Transfer Assessment/Treatment  sit-stand transfer;stand-sit transfer  -TW      Recorded by [TW] Joseph Donnelly, PTA 08/07/19 1535      Row Name 08/07/19 1336             Sit-Stand Transfer    Sit-Stand [place] (Transfers)  contact guard  -TW      Assistive Device (Sit-Stand Transfers)  other (see comments) no AD   -TW      Recorded by [TW] Joseph Donnelly, PTA 08/07/19 1535      Row Name 08/07/19 1336             Stand-Sit Transfer    Stand-Sit [place] (Transfers)  contact guard  -TW      Assistive Device (Stand-Sit Transfers)  other (see comments) no AD  -TW      Recorded by [TW] Joseph Donnelly, PTA 08/07/19 1535      Row Name 08/07/19 1336             Gait/Stairs Assessment/Training    Gait/Stairs Assessment/Training  gait/ambulation independence;gait/ambulation assistive device;distance ambulated;gait pattern;gait deviations  -TW       [place] Level (Gait)  contact guard  -TW      Assistive Device (Gait)  other (see comments) no AD  -TW      Distance in Feet (Gait)  212ft  -TW      Pattern (Gait)  step-through  -TW      Deviations/Abnormal Patterns (Gait)  essence decreased  -TW      Recorded by [TW] Joseph Donnelly PTA 08/07/19 1535      Row Name 08/07/19 1336             Lower Extremity Seated Therapeutic Exercise    Performed, Seated Lower Extremity (Therapeutic Exercise)  LAQ (long arc quad), knee extension;ankle dorsiflexion/plantarflexion;hip flexion/extension;hip abduction/adduction  -TW      Exercise Type, Seated Lower Extremity (Therapeutic Exercise)  AROM (active range of motion)  -TW      Sets/Reps Detail, Seated Lower Extremity (Therapeutic Exercise)  1/20  -TW      Recorded by [TW] Joseph Donnelly PTA 08/07/19 1535      Row Name 08/07/19 0849             Therapeutic Exercise    Upper Extremity (Therapeutic Exercise)  bicep curl, bilateral  -CS      Upper Extremity Range of Motion (Therapeutic Exercise)  shoulder flexion/extension, bilateral;elbow flexion/extension, bilateral;forearm supination/pronation, bilateral;wrist flexion/extension, bilateral  -CS      Hand (Therapeutic Exercise)  finger flexion/extension, bilateral  -CS      Weight/Resistance (Therapeutic Exercise)  2 pounds  -CS      Exercise Type (Therapeutic Exercise)  AROM (active range of motion)  -CS      Position (Therapeutic Exercise)  seated  -CS      Sets/Reps (Therapeutic Exercise)  2/20  -CS      Equipment (Therapeutic Exercise)  free weight, barbell  -CS      Expected Outcome (Therapeutic Exercise)  improve functional tolerance, self-care activity;improve performance, BADLs;improve performance, transfer skills;increase active range of motion  -CS      Recorded by [CS] Brenda Thomas COTA/L 08/07/19 1313      Row Name 08/07/19 1336 08/07/19 0849          Positioning and Restraints    Pre-Treatment Position  in bed  -TW  in bed  -CS     Post  Treatment Position  chair  -TW  bed  -CS     In Bed  --  supine;call light within reach;encouraged to call for assist;exit alarm on  -CS     In Chair  reclined;call light within reach;encouraged to call for assist;exit alarm on  -TW  --     Recorded by [TW] Joseph Donnelly PTA 08/07/19 1535 [CS] Brenda Thomas MADSEN/L 08/07/19 1313     Row Name 08/07/19 1336 08/07/19 0849          Pain Scale: Numbers Pre/Post-Treatment    Pain Scale: Numbers, Pretreatment  0/10 - no pain  -TW  3/10  -CS     Pain Scale: Numbers, Post-Treatment  0/10 - no pain  -TW  0/10 - no pain  -CS     Pain Location  --  abdomen  -CS     Recorded by [TW] Joseph Donnelly PTA 08/07/19 1535 [CS] Brenda Thomas MADSEN/L 08/07/19 1313     Row Name                Wound 07/17/19 2144 abdomen incision    Wound - Properties Group Date first assessed: 07/17/19 [EL] Time first assessed: 2144 [EL] Location: abdomen [EL] Present On Admission : no [EL] Type: incision [EL], laparoscopic x3  Recorded by:  [EL] Lorna Benedict RN 07/17/19 2217    Row Name                Wound 07/17/19 1005 midline abdomen incision;surgical    Wound - Properties Group Date first assessed: 07/17/19 [EL] Time first assessed: 1005 [EL] Orientation: midline [EL] Location: abdomen [EL] Type: incision;surgical [EL] Recorded by:  [EL] Lorna Benedict RN 07/17/19 2218    Row Name                Wound 07/19/19 0800 Left anterior thigh other (see comments)    Wound - Properties Group Date first assessed: 07/19/19 [RG] Time first assessed: 0800 [RG] Side: Left [RG] Orientation: anterior [RG] Location: thigh [RG] Present On Admission : yes;picture taken [RG2] Type: other (see comments) [RG3], skin graft sight.   Recorded by:  [RG] Xenia Ascencio RN 07/19/19 1515 [RG2] Xenia Ascencio RN 07/19/19 1528 [RG3] Xenia Ascencio RN 07/20/19 0806    Row Name 08/07/19 0849             Outcome Summary/Treatment Plan (OT)    Daily Summary of Progress (OT)  progress toward functional  goals is good  -CS      Plan for Continued Treatment (OT)  cont OT POC  -CS      Anticipated Discharge Disposition (OT)  anticipate therapy at next level of care  -CS      Recorded by [CS] Brenda Thomas COTA/L 08/07/19 1313      Row Name 08/07/19 1336             Outcome Summary/Treatment Plan (PT)    Daily Summary of Progress (PT)  progress toward functional goals is good  -TW      Plan for Continued Treatment (PT)  Cont  -TW      Anticipated Discharge Disposition (PT)  anticipate therapy at next level of care;inpatient rehabilitation facility;transitional care;home with 24/7 care;home with home health  -TW      Recorded by [TW] Joseph Donnelly, PTA 08/07/19 1535        User Key  (r) = Recorded By, (t) = Taken By, (c) = Cosigned By    Initials Name Effective Dates Discipline    Lorna Mckenzie RN 06/05/17 -  Nurse    RG Xenia Ascencio RN 10/17/16 -  Nurse    TW Joseph Donnelly, PTA 03/07/18 -  PT    CS Brenda Thomas COTA/LIANE 03/07/18 -  OT          Wound 07/17/19 2144 abdomen incision (Active)   Dressing Appearance open to air 8/7/2019  8:50 AM   Closure Approximated 8/7/2019  8:50 AM   Base dry;clean 8/7/2019  8:50 AM   Periwound intact;dry 8/6/2019  8:45 PM   Periwound Temperature warm 8/6/2019  8:45 PM   Drainage Amount none 8/7/2019  8:50 AM   Dressing Care, Wound open to air 8/6/2019  8:45 PM   Periwound Care, Wound dry periwound area maintained 8/6/2019  8:45 PM       Wound 07/17/19 1005 midline abdomen incision;surgical (Active)   Dressing Appearance open to air 8/7/2019  8:50 AM   Closure Approximated 8/7/2019  8:50 AM   Periwound intact;dry 8/6/2019  8:45 PM   Periwound Temperature warm 8/6/2019  8:45 PM   Drainage Amount none 8/7/2019  8:50 AM   Dressing Care, Wound open to air 8/6/2019  8:45 PM       Wound 07/19/19 0800 Left anterior thigh other (see comments) (Active)   Dressing Appearance dry;intact 8/7/2019  8:50 AM   Closure SAUMYA 8/7/2019  8:50 AM   Base dressing in place, unable  to visualize 8/7/2019  8:50 AM   Periwound dry;intact 8/6/2019  8:45 PM   Drainage Amount scant 8/7/2019  8:50 AM   Care, Wound barrier applied 8/7/2019  8:50 AM   Dressing Care, Wound dressing changed 8/7/2019  8:50 AM       Rehab Goal Summary     Row Name 08/07/19 1336 08/07/19 0849          Physical Therapy Goals    Bed Mobility Goal Selection (PT)  bed mobility, PT goal 1  -TW  --     Transfer Goal Selection (PT)  transfer, PT goal 1  -TW  --     Gait Training Goal Selection (PT)  gait training, PT goal 1  -TW  --        Bed Mobility Goal 1 (PT)    Activity/Assistive Device (Bed Mobility Goal 1, PT)  bed mobility activities, all  -TW  --     [place] Level/Cues Needed (Bed Mobility Goal 1, PT)  independent;conditional independence  -TW  --     Time Frame (Bed Mobility Goal 1, PT)  long term goal (LTG);1 week  -TW  --     Barriers (Bed Mobility Goal 1, PT)  fatigue; LOB  -TW  --     Progress/Outcomes (Bed Mobility Goal 1, PT)  goal not met  -TW  --        Transfer Goal 1 (PT)    Activity/Assistive Device (Transfer Goal 1, PT)  bed-to-chair/chair-to-bed;toilet  -TW  --     [place] Level/Cues Needed (Transfer Goal 1, PT)  conditional independence  -TW  --     Time Frame (Transfer Goal 1, PT)  1 week  -TW  --     Barriers (Transfers Goal 1, PT)  fatigue/ LOB  -TW  --     Progress/Outcome (Transfer Goal 1, PT)  continuing progress toward goal;goal not met  -TW  --        Gait Training Goal 1 (PT)    Activity/Assistive Device (Gait Training Goal 1, PT)  gait (walking locomotion);assistive device use;decrease fall risk;increase endurance/gait distance  -TW  --     [place] Level (Gait Training Goal 1, PT)  conditional independence;supervision required  -TW  --     Distance (Gait Goal 1, PT)  100 ft or more w/ VSS  -TW  --     Time Frame (Gait Training Goal 1, PT)  by discharge  -TW  --     Barriers (Gait Training Goal 1, PT)  weakness; fatigue  -TW  --     Progress/Outcome (Gait Training Goal 1, PT)  goal  ongoing  -TW  --        Strength Goal 1 (PT)    Strength Goal 1 (PT)  patient will tolerate 2 sets of 15 reps for 3 LE antigravity exercises w/ VSS  -TW  --     Time Frame (Strength Goal 1, PT)  1 week  -TW  --     Barriers (Strength Goal 1, PT)  resp/cardiac precautions  -TW  --     Progress/Outcome (Strength Goal 1, PT)  goal not met;goal ongoing  -TW  --        Occupational Therapy Goals    Transfer Goal Selection (OT)  --  transfer, OT goal 1  -CS     Bathing Goal Selection (OT)  --  bathing, OT goal 1  -CS     Dressing Goal Selection (OT)  --  dressing, OT goal 1  -CS     Toileting Goal Selection (OT)  --  toileting, OT goal 1  -CS     Grooming Goal Selection (OT)  --  grooming, OT goal 1  -CS     Endurance Goal Selection (OT)  --  endurance, OT goal 1  -CS     Functional Mobility Goal Selection (OT)  --  functional mobility, OT goal 1  -CS     Additional Documentation  --  Functional Mobility Goal Selection (OT) (Row)  -CS        Transfer Goal 1 (OT)    Activity/Assistive Device (Transfer Goal 1, OT)  --  toilet  -CS     [place] Level/Cues Needed (Transfer Goal 1, OT)  --  contact guard assist  -CS     Time Frame (Transfer Goal 1, OT)  --  long term goal (LTG);by discharge  -CS     Progress/Outcome (Transfer Goal 1, OT)  --  goal not met;continuing progress toward goal  -CS        Bathing Goal 1 (OT)    Activity/Assistive Device (Bathing Goal 1, OT)  --  bathing skills, all  -CS     [place] Level/Cues Needed (Bathing Goal 1, OT)  --  set-up required;verbal cues required;contact guard assist  -CS     Time Frame (Bathing Goal 1, OT)  --  long term goal (LTG);by discharge  -CS     Progress/Outcomes (Bathing Goal 1, OT)  --  goal met;goal ongoing previously met please address again for accuracy   -CS        Dressing Goal 1 (OT)    Activity/Assistive Device (Dressing Goal 1, OT)  --  dressing skills, all  -CS     [place]/Cues Needed (Dressing Goal 1, OT)  --  set-up required;verbal cues  required;contact guard assist  -CS     Time Frame (Dressing Goal 1, OT)  --  long term goal (LTG);by discharge  -CS     Progress/Outcome (Dressing Goal 1, OT)  --  goal met  -CS        Toileting Goal 1 (OT)    Activity/Device (Toileting Goal 1, OT)  --  toileting skills, all  -CS     [place] Level/Cues Needed (Toileting Goal 1, OT)  --  contact guard assist  -CS     Time Frame (Toileting Goal 1, OT)  --  long term goal (LTG);by discharge  -CS     Progress/Outcome (Toileting Goal 1, OT)  --  goal not met  -CS        Grooming Goal 1 (OT)    Activity/Device (Grooming Goal 1, OT)  --  grooming skills, all  -CS     [place] (Grooming Goal 1, OT)  --  supervision required;set-up required  -CS     Time Frame (Grooming Goal 1, OT)  --  long term goal (LTG);by discharge  -CS     Progress/Outcome (Grooming Goal 1, OT)  --  goal met  -CS         Endurance Goal 1 (OT)    Activity Level (Endurance Goal 1, OT)  --  endurance 2 fair + 25 min functional task 3 or less rest breaks O2 90 or up  -CS     Time Frame (Endurance Goal 1, OT)  --  long term goal (LTG);by discharge  -CS     Progress/Outcome (Endurance Goal 1, OT)  --  goal met  -CS        Functional Mobility Goal 1 (OT)    Activity/Assistive Device (Functional Mobility Goal 1, OT)  --  walker, rolling AD as needed  -CS     [place] Level/Cues Needed (Functional Mobility Goal 1, OT)  --  standby assist;verbal cues required  -CS     Distance Goal 1 (Functional Mobility, OT)  --  for ADL tasks with good safety and balance.   -CS     Time Frame (Functional Mobility Goal 1, OT)  --  long term goal (LTG);by discharge  -CS     Progress/Outcome (Functional Mobility Goal 1, OT)  --  goal not met  -CS        Patient Education Goal (OT)    Activity (Patient Education Goal, OT)  --  Pt will communciate good home safety awareness.   -CS     [place]/Cues/Accuracy (Memory Goal 2, OT)  --  verbalizes understanding  -CS     Time Frame (Patient Education Goal, OT)  --  long  term goal (LTG);by discharge  -CS     Progress/Outcome (Patient Education Goal, OT)  --  goal not met  -CS       User Key  (r) = Recorded By, (t) = Taken By, (c) = Cosigned By    Initials Name Provider Type Discipline    TW Joseph Donnelly, PTA Physical Therapy Assistant PT    CS Brenda Thomas, MADSEN/L Occupational Therapy Assistant OT          Physical Therapy Education     Title: PT OT SLP Therapies (In Progress)     Topic: Physical Therapy (In Progress)     Point: Mobility training (In Progress)     Learning Progress Summary           Patient Acceptance, E, NR by GIAN at 8/6/2019  1:04 PM    Acceptance, E, NR by GIAN at 7/29/2019 10:02 AM    Acceptance, E,TB, VU by PRASANNA at 7/22/2019  4:14 PM    Acceptance, E,TB, VU by PRASANNA at 7/20/2019 12:41 PM    Acceptance, E, VU by ALECIA at 7/18/2019  1:38 PM    Comment:  Role of PT, PT POC                   Point: Home exercise program (Done)     Learning Progress Summary           Patient Acceptance, E,TB, VU by PRASANNA at 7/22/2019  4:14 PM    Acceptance, E,TB, VU by PRASANNA at 7/20/2019 12:41 PM                   Point: Precautions (Done)     Learning Progress Summary           Patient Acceptance, D, NR,VU by DONNA at 8/3/2019  4:45 PM    Comment:  Discussed d/c planning with her LTG is d/c to home with her.    Acceptance, E,TB, VU by PRASANNA at 7/22/2019  4:14 PM    Acceptance, E,TB, VU by PRASANNA at 7/20/2019 12:41 PM   Family Acceptance, D, NR,VU by DONNA at 8/3/2019  4:45 PM    Comment:  Discussed d/c planning with her LTG is d/c to home with her.                               User Key     Initials Effective Dates Name Provider Type Discipline    GB 04/03/18 -  Danna Huddleston, PT Physical Therapist PT    GIAN 03/07/18 -  Abran Wan, PTA Physical Therapy Assistant PT    LN 03/07/18 -  Lynda Ramos, PTA Physical Therapy Assistant PT    LF 07/23/18 -  Isis Miller PT Physical Therapist PT                PT Recommendation and Plan  Anticipated Discharge Disposition (PT): anticipate  therapy at next level of care, inpatient rehabilitation facility, transitional care, home with 24/7 care, home with home health  Therapy Frequency (PT Clinical Impression): daily  Outcome Summary/Treatment Plan (PT)  Daily Summary of Progress (PT): progress toward functional goals is good  Plan for Continued Treatment (PT): Cont  Anticipated Discharge Disposition (PT): anticipate therapy at next level of care, inpatient rehabilitation facility, transitional care, home with 24/7 care, home with home health  Plan of Care Reviewed With: patient  Progress: improving  Outcome Summary: Pt amb 212ft with no AD and also performed LE ther ex in sitting. Pt able to t/f sup to sit with mod I. Pt will benefit from therapy after DC.  Outcome Measures     Row Name 08/07/19 1336 08/06/19 1123 08/06/19 1045       How much help from another person do you currently need...    Turning from your back to your side while in flat bed without using bedrails?  4  -TW  4  -GIAN  --    Moving from lying on back to sitting on the side of a flat bed without bedrails?  4  -TW  4  -GIAN  --    Moving to and from a bed to a chair (including a wheelchair)?  3  -TW  3  -GIAN  --    Standing up from a chair using your arms (e.g., wheelchair, bedside chair)?  3  -TW  3  -GIAN  --    Climbing 3-5 steps with a railing?  3  -TW  3  -GIAN  --    To walk in hospital room?  3  -TW  3  -GIAN  --    AM-PAC 6 Clicks Score (PT)  20  -TW  20  -GIAN  --       How much help from another is currently needed...    Putting on and taking off regular lower body clothing?  --  --  3  -KD    Bathing (including washing, rinsing, and drying)  --  --  3  -KD    Toileting (which includes using toilet bed pan or urinal)  --  --  3  -KD    Putting on and taking off regular upper body clothing  --  --  3  -KD    Taking care of personal grooming (such as brushing teeth)  --  --  4  -KD    Eating meals  --  --  1  -KD    AM-PAC 6 Clicks Score (OT)  --  --  17  -KD       Functional Assessment     Outcome Measure Options  -[facility] 6 Clicks Basic Mobility (PT)  -TW  [facility] 6 Clicks Basic Mobility (PT)  -  --    Row Name 08/05/19 1320 08/05/19 1100          How much help from another person do you currently need...    Turning from your back to your side while in flat bed without using bedrails?  4  -JA  --     Moving from lying on back to sitting on the side of a flat bed without bedrails?  4  -JA  --     Moving to and from a bed to a chair (including a wheelchair)?  4  -JA  --     Standing up from a chair using your arms (e.g., wheelchair, bedside chair)?  3  -JA  --     Climbing 3-5 steps with a railing?  2  -JA  --     To walk in hospital room?  3  -JA  --     AM-[facility] 6 Clicks Score (PT)  20  -JA  --        How much help from another is currently needed...    Putting on and taking off regular lower body clothing?  --  3  -CS     Bathing (including washing, rinsing, and drying)  --  3  -CS     Toileting (which includes using toilet bed pan or urinal)  --  3  -CS     Putting on and taking off regular upper body clothing  --  3  -CS     Taking care of personal grooming (such as brushing teeth)  --  4  -CS     Eating meals  --  1  -CS     AM-[facility] 6 Clicks Score (OT)  --  17  -CS        Functional Assessment    Outcome Measure Options  [facility] 6 Clicks Basic Mobility (PT)  -  --       User Key  (r) = Recorded By, (t) = Taken By, (c) = Cosigned By    Initials Name Provider Type    Ken Lagunas, PTA Physical Therapy Assistant    Abran Ferreira, PTA Physical Therapy Assistant    TW Joseph Donnelly, PTA Physical Therapy Assistant    Isabelle Colon, MADSEN/L Occupational Therapy Assistant    Brenda Lee, MADSEN/L Occupational Therapy Assistant         Time Calculation:   PT Charges     Row Name 08/07/19 1536             Time Calculation    Start Time  1336  -TW      Stop Time  1404  -TW      Time Calculation (min)  28 min  -TW      PT Received On  08/07/19  -TW      PT Goal Re-Cert Due  Date  08/12/19  -TW         Time Calculation- PT    Total Timed Code Minutes- PT  28 minute(s)  -TW        User Key  (r) = Recorded By, (t) = Taken By, (c) = Cosigned By    Initials Name Provider Type     Joseph Donnelly PTA Physical Therapy Assistant        Therapy Charges for Today     Code Description Service Date Service Provider Modifiers Qty    92719958727 HC GAIT TRAINING EA 15 MIN 8/7/2019 Joseph Donnelly PTA GP 1    63458762409 HC PT THER PROC EA 15 MIN 8/7/2019 Joseph Donnelly PTA GP 1          PT G-Codes  Outcome Measure Options: AM-PAC 6 Clicks Basic Mobility (PT)  AM-PAC 6 Clicks Score (PT): 20  AM-PAC 6 Clicks Score (OT): 17    Joseph Donnelly PTA  8/7/2019

## 2019-08-07 NOTE — CONSULTS
Consults    Patient Care Team:  Cristino He MD as PCP - General    Chief complaint: retention of urine    Subjective     Mr. 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 Valera placed first G-tube in April which was changed out by Dr. Kc on 7/17/19 when laparotomy was performed due tocomplete bowel obstruction which required adhesiolysis. Dr. Prieto is planning chemoradiation next. Patient is resting in bed, denies pain. No PSA to review.       Urinary Retention   This is a recurrent problem. The current episode started 1 to 4 weeks ago. The problem occurs constantly. The problem has been unchanged. Associated symptoms include fatigue and weakness. Pertinent negatives include no abdominal pain, fever or urinary symptoms. Nothing aggravates the symptoms. He has tried nothing for the symptoms.       Review of Systems   Constitutional: Positive for fatigue. Negative for fever.   Gastrointestinal: Negative for abdominal pain.   Neurological: Positive for weakness.        Past Medical History:   Diagnosis Date   • Alcohol abuse    • Allergic rhinitis     vs URI   • Anemia    • At risk for falls    • Benign prostatic hyperplasia    • Chronic gastritis    • Cirrhosis of liver (CMS/HCC)    • Complication of gastrostomy (CMS/HCC)     site not healing   • COPD (chronic obstructive pulmonary disease) (CMS/HCC)    • Dysphagia     odynophagia   • Epigastric pain    • Esophagitis    • GERD (gastroesophageal reflux disease)    • Hypertension    • Hypothyroidism, unspecified     • Low back pain    • Malaise and fatigue    • Nasal congestion 11/15/2018   • Nausea with vomiting, unspecified    • Pain in left knee    • Pain in right knee    • Primary malignant neoplasm of pharynx (CMS/HCC)    • Screening for malignant neoplasm of colon    • Vitamin D deficiency    ,   Past Surgical History:   Procedure Laterality Date   • ABDOMINAL WALL SURGERY  11/04/2008    Laparotomy with repair of stomach wall perforation. 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 Left upper quadrant abdominal wall abscess debridement. Gastrostomy tube erosion through & through the anterior gastric wall.Lupper quadrant abdominal wall abscess   • COLONOSCOPY  04/21/2014    Internal & external hemorrhoids found.   • COLONOSCOPY  2014    [place]   • COLONOSCOPY N/A 10/16/2018    Procedure: COLONOSCOPY;  Surgeon: Kaushal Chester MD;  Location: [facility] ENDOSCOPY;  Service: Gastroenterology   • DIAGNOSTIC LAPAROSCOPY EXPLORATORY LAPAROTOMY N/A 7/17/2019    Procedure: DIAGNOSTIC LAPAROSCOPY, EXPLORATORY LAPAROTOMY, LYSIS OF ADHESIONS;  Surgeon: Parminder Kc MD;  Location: [facility] OR;  Service: General   • DIRECT LARYNGOSCOPY, ESOPHAGOSCOPY, BRONCHOSCOPY N/A 4/15/2019    Procedure: DIRECT LARYNGOSCOPY with biopsy, ESOPHAGOSCOPY;  Surgeon: Bharathi Washington MD;  Location: [facility] OR;  Service: ENT   • ENDOSCOPY N/A 10/16/2018    Procedure: ESOPHAGOGASTRODUODENOSCOPY;  Surgeon: Kaushal Chester MD;  Location: [facility] ENDOSCOPY;  Service: Gastroenterology   • GASTROSTOMY FEEDING TUBE INSERTION N/A 4/15/2019    Procedure: GASTROSTOMY FEEDING TUBE INSERTION;  Surgeon: Trenton Valera MD;  Location: [facility] OR;  Service: General   • TRACHEOSTOMY  11/10/2008    Respiratory failure   • UPPER GASTROINTESTINAL ENDOSCOPY  04/21/2014    Esophagitis seen. Biopsy taken. Gastritis in stomach. Biopsy taken. Normal duodenum. Biopsy taken.   • UPPER GASTROINTESTINAL ENDOSCOPY  10/16/2018   ,   Family History   Problem Relation Age of  "Onset   • Coronary artery disease Mother    • Diabetes Mother    • Other Mother         \"Heart And Lung Problems\"   • Liver cancer Sister    • Heart disease Other    • Stroke Other    • Hypertension Other    • Diabetes Other    • Cancer Other    • Colon polyps Other    • Other Father         Unknown   • Other Other         \"Lung Problems\"   • Thyroid disease Neg Hx    ,   Social History     Tobacco Use   • Smoking status: Former Smoker     Packs/day: 1.50     Years: 30.00     Pack years: 45.00     Types: Cigarettes   • Smokeless tobacco: Former User     Quit date: 4/12/2009   Substance Use Topics   • Alcohol use: No     Comment: 02/19/2019 - Patient confirmed heavy alcoholic beverage consumption in past with current consumption of 2 - 3 beers twice per week.   • Drug use: No   ,   Medications Prior to Admission   Medication Sig Dispense Refill Last Dose   • doxazosin (CARDURA) 1 MG tablet Take 1 mg by mouth Every Night.      • levothyroxine (SYNTHROID, LEVOTHROID) 175 MCG tablet Take 1 tablet by mouth Daily. 30 tablet 5 Taking   • metoclopramide (REGLAN) 5 MG/5ML solution 10 ML Per G-Tube 4 Times Per Day Before Meals X 30 Days   Taking   • oxyCODONE (ROXICODONE) 15 MG immediate release tablet Take 10 mg by mouth Every 4 (Four) Hours As Needed for Moderate Pain .      • vitamin D (ERGOCALCIFEROL) 93870 units capsule capsule 1,000 Units Daily. 1 capsule by mouth weekly on Wednesday X 3 months    Taking   • acetaminophen (TYLENOL) 160 MG/5ML solution 20.3 mL by Per G Tube route Every 6 (Six) Hours As Needed for Mild Pain .   Taking   • aspirin (ASPIRIN LOW DOSE) 81 MG EC tablet Take 1 tablet by mouth Daily. 30 tablet 6 Taking   • docusate sodium (COLACE) 150 MG/15ML liquid 10 ML Via PEG Tube Twice Daily For Constipation   Taking   • famotidine (PEPCID) 20 MG tablet 1 tablet by Per G Tube route 2 (Two) Times a Day. 60 tablet 0 Taking   • fenofibrate (TRICOR) 48 MG tablet 67 mg Daily. 1 tablet by G-Tube daily for " Hyperlipidemia    Taking   • magnesium oxide (MAG-OX) 400 MG tablet   3 Unknown   • Scopolamine (TRANSDERM-SCOP) 1.5 MG/3DAYS patch Place 1 patch on the skin as directed by provider Every 72 (Seventy-Two) Hours. 24 each 0 Taking   • Unable to find Hydrocodone - Acetaminophen 7.5 - 325/5 ML.....15 ML Per G-Tube Every 4 Hours As Needed For Moderate Pain.   Taking        Allergies:  Patient has no known allergies.    Objective      Vital Signs  Temp:  [96.6 °F (35.9 °C)-98.3 °F (36.8 °C)] 97.3 °F (36.3 °C)  Heart Rate:  [] 92  Resp:  [16-19] 18  BP: ()/(56-70) 104/59    Physical Exam   Constitutional: He appears well-developed. No distress.   HENT:   Head: Normocephalic and atraumatic.   Right Ear: External ear normal.   Left Ear: External ear normal.   Tracheostomy   Eyes: Pupils are equal, round, and reactive to light. Right eye exhibits no discharge. Left eye exhibits no discharge. No scleral icterus.   Cardiovascular: Normal rate, regular rhythm and normal heart sounds.   Pulmonary/Chest: Effort normal and breath sounds normal. He has no wheezes. He exhibits no tenderness.   Abdominal: Soft. Bowel sounds are normal. He exhibits no distension. There is no tenderness. No hernia.   No problems at incision site; G tube   Genitourinary: No penile tenderness.   Genitourinary Comments: Palomo clear yellow urine   Musculoskeletal: He exhibits no edema.   Neurological: He is alert.   Skin: Skin is warm and dry. Capillary refill takes less than 2 seconds. No rash noted. He is not diaphoretic. No cyanosis. Nails show no clubbing.   Psychiatric: He is noncommunicative. He is attentive.       Results Review:   Lab Results (last 24 hours)     Procedure Component Value Units Date/Time    CBC & Differential [[id number]] Collected:  08/07/19 0505    Specimen:  Blood Updated:  08/07/19 0703    Narrative:       The following orders were created for panel order CBC & Differential.  Procedure                                Abnormality         Status                     ---------                               -----------         ------                     Scan Slide[[id number]]                                                                  CBC Auto Differential[[id number]]        Abnormal            Final result                 Please view results for these tests on the individual orders.    Comprehensive Metabolic Panel [[id number]]  (Abnormal) Collected:  08/07/19 0505    Specimen:  Blood Updated:  08/07/19 0629     Glucose 107 mg/dL      BUN 15 mg/dL      Creatinine 0.50 mg/dL      Sodium 134 mmol/L      Potassium 4.2 mmol/L      Chloride 96 mmol/L      CO2 27.0 mmol/L      Calcium 9.8 mg/dL      Total Protein 8.2 g/dL      Albumin 3.80 g/dL      ALT (SGPT) 9 U/L      AST (SGOT) 20 U/L      Alkaline Phosphatase 95 U/L      Total Bilirubin <0.2 mg/dL      eGFR  African Amer >150 mL/min/1.73      Globulin 4.4 gm/dL      A/G Ratio 0.9 g/dL      BUN/Creatinine Ratio 30.0     Anion Gap 11.0 mmol/L     Narrative:       GFR Normal >60  Chronic Kidney Disease <60  Kidney Failure <15    CBC Auto Differential [[id number]]  (Abnormal) Collected:  08/07/19 0505    Specimen:  Blood Updated:  08/07/19 0615     WBC 6.65 10*3/mm3      RBC 3.48 10*6/mm3      Hemoglobin 8.8 g/dL      Hematocrit 25.3 %      MCV 72.7 fL      MCH 25.3 pg      MCHC 34.8 g/dL      RDW 17.6 %      RDW-SD 45.4 fl      MPV 10.8 fL      Platelets 585 10*3/mm3      Neutrophil % 49.0 %      Lymphocyte % 22.1 %      Monocyte % 23.3 %      Eosinophil % 3.8 %      Basophil % 1.2 %      Immature Grans % 0.6 %      Neutrophils, Absolute 3.26 10*3/mm3      Lymphocytes, Absolute 1.47 10*3/mm3      Monocytes, Absolute 1.55 10*3/mm3      Eosinophils, Absolute 0.25 10*3/mm3      Basophils, Absolute 0.08 10*3/mm3      Immature Grans, Absolute 0.04 10*3/mm3      nRBC 0.0 /100 WBC          Imaging Results (last 24 hours)     ** No results found for the last 24 hours. **           I reviewed  the patient's new clinical results.  I reviewed the patient's new imaging results and agree with the interpretation.  I reviewed the patient's other test results and agree with the interpretation        Assessment/Plan       Hypotension    Hypothyroidism    Squamous cell carcinoma of pharynx (CMS/HCC)    Iron deficiency anemia    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HCC)    Severe protein-calorie malnutrition (CMS/HCC)    Hyponatremia      Assessment & Plan    1. BPH with retention of urine, chronicity unknown  -Palomo reanchored 8/6/19 after failing voiding trials  -No symptoms of UTI  -Estimated Creatinine Clearance: 100.7 mL/min (A) (by C-G formula based on SCr of 0.5 mg/dL (L)).    Plan:  Keep Palomo, plan cystoscopy prefer outpatient  No Flomax/alpha blocker due to hypotension.   No Proscar or Avodart until prostate is evaluated, ETIENNE will be performed when Palomo is removed.      I discussed the patient's findings and my recommendations with patient, nursing staff, consulting provider and ZARA Dubois  08/07/19  3:56 PM

## 2019-08-07 NOTE — PLAN OF CARE
Problem: Patient Care Overview  Goal: Plan of Care Review  Outcome: Ongoing (interventions implemented as appropriate)   08/07/19 1314   Coping/Psychosocial   Plan of Care Reviewed With patient   Plan of Care Review   Progress improving   OTHER   Outcome Summary Pt tolerated tx well this date. Pt gave good effort with UE ther ex. No goals met this tx. Continue OT POC.

## 2019-08-07 NOTE — PLAN OF CARE
Problem: Patient Care Overview  Goal: Plan of Care Review  Outcome: Ongoing (interventions implemented as appropriate)   08/07/19 2854   Coping/Psychosocial   Plan of Care Reviewed With patient   Plan of Care Review   Progress no change   OTHER   Outcome Summary VS stable- BP still runs lower, medication provided; trach suctioning and trach care performed; worked with therapy; multiple BMs; berumen continued-urology following; trach care education began; up to chair; possible discharge tomorrow     Goal: Individualization and Mutuality  Outcome: Ongoing (interventions implemented as appropriate)    Goal: Discharge Needs Assessment  Outcome: Ongoing (interventions implemented as appropriate)    Goal: Interprofessional Rounds/Family Conf  Outcome: Ongoing (interventions implemented as appropriate)      Problem: Oncology Care (Adult)  Goal: Signs and Symptoms of Listed Potential Problems Will be Absent, Minimized or Managed (Oncology Care)  Outcome: Ongoing (interventions implemented as appropriate)      Problem: Airway, Artificial (Adult)  Goal: Signs and Symptoms of Listed Potential Problems Will be Absent, Minimized or Managed (Airway, Artificial)  Outcome: Ongoing (interventions implemented as appropriate)      Problem: Nutrition, Enteral (Adult)  Goal: Signs and Symptoms of Listed Potential Problems Will be Absent, Minimized or Managed (Nutrition, Enteral)  Outcome: Ongoing (interventions implemented as appropriate)      Problem: Urine Elimination Impaired (Adult)  Goal: Identify Related Risk Factors and Signs and Symptoms  Outcome: Ongoing (interventions implemented as appropriate)    Goal: Effective or Improved Urinary Elimination  Outcome: Ongoing (interventions implemented as appropriate)    Goal: Effective Containment of Urine  Outcome: Ongoing (interventions implemented as appropriate)    Goal: Reduced Incontinence Episodes  Outcome: Ongoing (interventions implemented as appropriate)

## 2019-08-07 NOTE — NURSING NOTE
Trach care performed. Educated patient on how to perform trach care with mirror from bedside table. Patient nodded for understanding. However, I feel patient would benefit from reinforcement teaching and personally performing in future with nursing assistance.

## 2019-08-07 NOTE — PROGRESS NOTES
[facility]  INPATIENT PROGRESS NOTE    Length of Stay: 22  Date of Admission: 7/15/2019  Primary Care Physician: Cristino He MD    Subjective   Chief Complaint: No complaints    HPI:     8/7/2019:  The patient was hypotensive this morning.  Awaiting urology consult.      8/6/2019: Patient's Palomo catheter was removed today but he was unable to void adequately and complained of dysuria.  Urology has been consulted and Palomo catheter has been replaced.  Blood pressure has remained stable.    8/5/2019: Patient is hypotensive this morning and developed urinary retention during the night that required replacement of Palomo catheter.    8/4/2019:  No complaints.  Working well with therapy.  Up in chair today.      8/3/2019:  The patient has no complaints.  Physical and occupational therapy restarted today.  Vital signs stable.     This is a 59-year-old -American male with past medical history of alcohol abuse, BPH, liver cirrhosis, COPD, GERD, hypertension, hypothyroidism, and squamous cell carcinoma of the hypopharynx (patient has G-tube and tracheostomy) presented to [facility] on 7/15/2019 with complaints of abdominal pain.  Patient was found to have a high-grade small bowel obstruction and underwent surgery by Dr. Kc for lysis of adhesions (7/17/2019).  The patient required intensive care secondary to being hemodynamically unstable.  Patient was started on TPN for severe protein caloric malnutrition.  Patient was started on Samsca for hyponatremia.    Review of Systems   Constitutional: Negative for activity change and fatigue.   HENT: Negative for ear pain and sore throat.    Eyes: Negative for pain and discharge.   Respiratory: Negative for cough and shortness of breath.    Cardiovascular: Negative for chest pain and palpitations.   Gastrointestinal: Negative for abdominal pain and nausea.   Endocrine: Negative for cold intolerance and heat  intolerance.   Genitourinary: Negative for difficulty urinating and dysuria.   Musculoskeletal: Negative for arthralgias and gait problem.   Skin: Negative for color change and rash.   Neurological: Negative for dizziness and weakness.   Psychiatric/Behavioral: Negative for agitation and confusion.        Objective    Temp:  [96.6 °F (35.9 °C)-98.3 °F (36.8 °C)] 97.3 °F (36.3 °C)  Heart Rate:  [] 92  Resp:  [16-19] 18  BP: ()/(56-70) 104/59    Physical Exam   Constitutional: He is oriented to person, place, and time. He appears well-developed. He appears cachectic.   HENT:   Head: Normocephalic and atraumatic.   Tracheostomy collar    Eyes: EOM are normal. Pupils are equal, round, and reactive to light.   Neck: Normal range of motion. Neck supple.   Cardiovascular: Normal rate and regular rhythm.   Pulmonary/Chest: Effort normal and breath sounds normal.   Abdominal: Soft. Bowel sounds are normal.   Musculoskeletal: Normal range of motion.   Neurological: He is alert and oriented to person, place, and time.   Skin: Skin is warm and dry.   Midline incision without redness or erythema.     Psychiatric: He has a normal mood and affect. His behavior is normal.     Results Review:  I have reviewed the labs, radiology results, and diagnostic studies.    Laboratory Data:   Results from last 7 days   Lab Units 08/07/19  0505 08/06/19  0612 08/05/19  0655   SODIUM mmol/L 134* 133* 132*   POTASSIUM mmol/L 4.2 4.0 4.3   CHLORIDE mmol/L 96* 94* 95*   CO2 mmol/L 27.0 26.0 27.0   BUN mg/dL 15 16 16   CREATININE mg/dL 0.50* 0.43* 0.52*   GLUCOSE mg/dL 107* 148* 120*   CALCIUM mg/dL 9.8 9.6 9.9   BILIRUBIN mg/dL <0.2* <0.2* 0.2   ALK PHOS U/L 95 94 107   ALT (SGPT) U/L 9 8 13   AST (SGOT) U/L 20 16 19   ANION GAP mmol/L 11.0 13.0 10.0     Estimated Creatinine Clearance: 100.7 mL/min (A) (by C-G formula based on SCr of 0.5 mg/dL (L)).  Results from last 7 days   Lab Units 08/03/19  0529 08/01/19  0253   MAGNESIUM mg/dL  1.8 1.8   PHOSPHORUS mg/dL 3.5 3.7         Results from last 7 days   Lab Units 08/07/19  0505 08/06/19  0612 08/05/19  0655 08/04/19  0653 08/03/19  0529   WBC 10*3/mm3 6.65 7.07 8.30 7.79 7.33   HEMOGLOBIN g/dL 8.8* 8.2* 9.4* 9.5* 9.3*   HEMATOCRIT % 25.3* 23.7* 27.0* 27.5* 26.6*   PLATELETS 10*3/mm3 585* 592* 593* 595* 620*           Culture Data:   No results found for: BLOODCX  No results found for: URINECX  No results found for: RESPCX  No results found for: WOUNDCX  No results found for: STOOLCX  No components found for: BODYFLD    Radiology Data:   Imaging Results (last 24 hours)     ** No results found for the last 24 hours. **          I have reviewed the patient's current medications.     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  Hypotension:  Continue Midodrine 10 mg three times daily.    2.  Hyponatremia, improved:  Continue sodium chloride 1 gram, three times daily.  Dr. Niak following.  Sodium 134 today.    3.  Severe protein-calorie malnutrition:   Dietary following.  Continue tube feeding.   4.  Iron deficiency anemia:  Dr. Sen following.  IV iron 125 mg x 3 days.   5.  Squamous cell carcinoma, pharynx:  Dr. Prieto following.    6.  Hypothyroidism:  Continue home Synthroid.    7.  Urinary retention:  Palomo catheter replaced.  Urology consulted.      Discharge Planning: I expect patient to be discharged to home in 1-2 days.      This document has been electronically signed by ZARA Isaacs on August 7, 2019 2:05 PM

## 2019-08-07 NOTE — PLAN OF CARE
Problem: Patient Care Overview  Goal: Plan of Care Review  Outcome: Ongoing (interventions implemented as appropriate)   08/07/19 7172   Coping/Psychosocial   Plan of Care Reviewed With patient   Plan of Care Review   Progress improving   OTHER   Outcome Summary Pt amb 212ft with no AD and also performed LE ther ex in sitting. Pt able to t/f sup to sit with mod I. Pt will benefit from therapy after DC.

## 2019-08-08 LAB
ALBUMIN SERPL-MCNC: 3.6 G/DL (ref 3.5–5.2)
ALBUMIN/GLOB SERPL: 0.8 G/DL
ALP SERPL-CCNC: 91 U/L (ref 39–117)
ALT SERPL W P-5'-P-CCNC: 9 U/L (ref 1–41)
ANION GAP SERPL CALCULATED.3IONS-SCNC: 11 MMOL/L (ref 5–15)
AST SERPL-CCNC: 16 U/L (ref 1–40)
BASOPHILS # BLD AUTO: 0.07 10*3/MM3 (ref 0–0.2)
BASOPHILS NFR BLD AUTO: 1.2 % (ref 0–1.5)
BILIRUB SERPL-MCNC: <0.2 MG/DL (ref 0.2–1.2)
BUN BLD-MCNC: 16 MG/DL (ref 8–23)
BUN/CREAT SERPL: 30.2 (ref 7–25)
CALCIUM SPEC-SCNC: 9.6 MG/DL (ref 8.6–10.5)
CHLORIDE SERPL-SCNC: 96 MMOL/L (ref 98–107)
CO2 SERPL-SCNC: 25 MMOL/L (ref 22–29)
CREAT BLD-MCNC: 0.53 MG/DL (ref 0.76–1.27)
DEPRECATED RDW RBC AUTO: 45.2 FL (ref 37–54)
EOSINOPHIL # BLD AUTO: 0.19 10*3/MM3 (ref 0–0.4)
EOSINOPHIL NFR BLD AUTO: 3.2 % (ref 0.3–6.2)
ERYTHROCYTE [DISTWIDTH] IN BLOOD BY AUTOMATED COUNT: 17.4 % (ref 12.3–15.4)
GFR SERPL CREATININE-BSD FRML MDRD: >150 ML/MIN/1.73
GLOBULIN UR ELPH-MCNC: 4.5 GM/DL
GLUCOSE BLD-MCNC: 140 MG/DL (ref 65–99)
HCT VFR BLD AUTO: 24.7 % (ref 37.5–51)
HGB BLD-MCNC: 8.5 G/DL (ref 13–17.7)
IMM GRANULOCYTES # BLD AUTO: 0.03 10*3/MM3 (ref 0–0.05)
IMM GRANULOCYTES NFR BLD AUTO: 0.5 % (ref 0–0.5)
IRON 24H UR-MRATE: 39 MCG/DL (ref 59–158)
IRON SATN MFR SERPL: 14 % (ref 20–50)
LYMPHOCYTES # BLD AUTO: 1.78 10*3/MM3 (ref 0.7–3.1)
LYMPHOCYTES NFR BLD AUTO: 30.2 % (ref 19.6–45.3)
MCH RBC QN AUTO: 25.1 PG (ref 26.6–33)
MCHC RBC AUTO-ENTMCNC: 34.4 G/DL (ref 31.5–35.7)
MCV RBC AUTO: 72.9 FL (ref 79–97)
MONOCYTES # BLD AUTO: 1.4 10*3/MM3 (ref 0.1–0.9)
MONOCYTES NFR BLD AUTO: 23.7 % (ref 5–12)
NEUTROPHILS # BLD AUTO: 2.43 10*3/MM3 (ref 1.7–7)
NEUTROPHILS NFR BLD AUTO: 41.2 % (ref 42.7–76)
NRBC BLD AUTO-RTO: 0 /100 WBC (ref 0–0.2)
PLATELET # BLD AUTO: 573 10*3/MM3 (ref 140–450)
PMV BLD AUTO: 10.5 FL (ref 6–12)
POTASSIUM BLD-SCNC: 3.9 MMOL/L (ref 3.5–5.2)
PROT SERPL-MCNC: 8.1 G/DL (ref 6–8.5)
RBC # BLD AUTO: 3.39 10*6/MM3 (ref 4.14–5.8)
SODIUM BLD-SCNC: 132 MMOL/L (ref 136–145)
TIBC SERPL-MCNC: 283 MCG/DL (ref 298–536)
TRANSFERRIN SERPL-MCNC: 190 MG/DL (ref 200–360)
WBC NRBC COR # BLD: 5.9 10*3/MM3 (ref 3.4–10.8)

## 2019-08-08 PROCEDURE — 25010000002 ENOXAPARIN PER 10 MG: Performed by: SURGERY

## 2019-08-08 PROCEDURE — 94799 UNLISTED PULMONARY SVC/PX: CPT

## 2019-08-08 PROCEDURE — 84466 ASSAY OF TRANSFERRIN: CPT | Performed by: INTERNAL MEDICINE

## 2019-08-08 PROCEDURE — 97116 GAIT TRAINING THERAPY: CPT

## 2019-08-08 PROCEDURE — 80053 COMPREHEN METABOLIC PANEL: CPT | Performed by: NURSE PRACTITIONER

## 2019-08-08 PROCEDURE — 97110 THERAPEUTIC EXERCISES: CPT

## 2019-08-08 PROCEDURE — 83540 ASSAY OF IRON: CPT | Performed by: INTERNAL MEDICINE

## 2019-08-08 PROCEDURE — 85025 COMPLETE CBC W/AUTO DIFF WBC: CPT | Performed by: FAMILY MEDICINE

## 2019-08-08 PROCEDURE — 94760 N-INVAS EAR/PLS OXIMETRY 1: CPT

## 2019-08-08 RX ADMIN — ACETAMINOPHEN 650 MG: 650 SOLUTION ORAL at 21:45

## 2019-08-08 RX ADMIN — MIDODRINE HYDROCHLORIDE 10 MG: 5 TABLET ORAL at 09:17

## 2019-08-08 RX ADMIN — BACITRACIN: 500 OINTMENT TOPICAL at 10:06

## 2019-08-08 RX ADMIN — ALBUTEROL SULFATE 2.5 MG: 2.5 SOLUTION RESPIRATORY (INHALATION) at 00:29

## 2019-08-08 RX ADMIN — MIDODRINE HYDROCHLORIDE 10 MG: 5 TABLET ORAL at 11:45

## 2019-08-08 RX ADMIN — ENOXAPARIN SODIUM 40 MG: 40 INJECTION SUBCUTANEOUS at 09:18

## 2019-08-08 RX ADMIN — Medication 1 G: at 11:45

## 2019-08-08 RX ADMIN — MIDODRINE HYDROCHLORIDE 10 MG: 5 TABLET ORAL at 16:58

## 2019-08-08 RX ADMIN — Medication 1 G: at 09:18

## 2019-08-08 RX ADMIN — SODIUM CHLORIDE, PRESERVATIVE FREE 10 ML: 5 INJECTION INTRAVENOUS at 09:23

## 2019-08-08 RX ADMIN — SODIUM CHLORIDE, PRESERVATIVE FREE 10 ML: 5 INJECTION INTRAVENOUS at 21:51

## 2019-08-08 RX ADMIN — LEVOTHYROXINE SODIUM ANHYDROUS 125 MCG: 100 INJECTION, POWDER, LYOPHILIZED, FOR SOLUTION INTRAVENOUS at 05:25

## 2019-08-08 RX ADMIN — PANTOPRAZOLE SODIUM 40 MG: 40 INJECTION, POWDER, FOR SOLUTION INTRAVENOUS at 09:18

## 2019-08-08 RX ADMIN — ALBUTEROL SULFATE 2.5 MG: 2.5 SOLUTION RESPIRATORY (INHALATION) at 12:51

## 2019-08-08 RX ADMIN — Medication 1 G: at 16:59

## 2019-08-08 RX ADMIN — ALBUTEROL SULFATE 2.5 MG: 2.5 SOLUTION RESPIRATORY (INHALATION) at 06:46

## 2019-08-08 NOTE — THERAPY TREATMENT NOTE
Acute Care - Occupational Therapy Treatment Note  [facility]     Patient Name: Emerson Bang  : 1960  MRN: [id number]  Today's Date: 2019  Onset of Illness/Injury or Date of Surgery: 07/15/19  Date of Referral to OT: 19  Referring Physician: Shanda ZUÑIGA    Admit Date: 7/15/2019       ICD-10-CM ICD-9-CM   1. SBO (small bowel obstruction) (CMS/[facility]) K56.609 560.9   2. Impaired functional mobility and activity tolerance Z74.09 V49.89   3. Impaired mobility and ADLs Z74.09 799.89   4. SIADH (syndrome of inappropriate ADH production) (CMS/[facility]) E22.2 253.6   5. Acquired hypothyroidism E03.9 244.9   6. Idiopathic hypotension I95.0 458.9   7. Hyponatremia E87.1 276.1   8. Impaired functional mobility, balance, gait, and endurance Z74.09 V49.89     Patient Active Problem List   Diagnosis   • Hyperkalemia   • Iron deficiency anemia   • Gastroesophageal reflux disease with esophagitis   • Elevated AST (SGOT)   • Alcohol abuse   • Hypothyroidism   • Balance problem   • Need for vaccination   • Low magnesium levels   • Squamous cell carcinoma of pharynx (CMS/HCC)   • History of throat cancer   • Vitamin D deficiency   • Alcohol abuse counseling and surveillance   • Encounter for screening for diabetes mellitus   • Hypomagnesemia   • Iron deficiency anemia   • Hyperlipidemia   • Underweight due to inadequate caloric intake   • Need for immunization against influenza   • 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HCC)   • Hypertension   • General medical examination   • Underweight   • Epigastric pain   • Gastritis without bleeding   • Need for influenza vaccination   • Elevated liver function tests   • B12 deficiency   • Intestinal malabsorption   • Throat pain   • Acute pharyngitis   • Upper respiratory tract infection   • Neoplasm of uncertain behavior of larynx   • Pharyngoesophageal dysphagia   • Severe protein-calorie malnutrition (CMS/HCC)   • Anemia of chronic disease   • Hypotension   • Hyponatremia     Past  Medical History:   Diagnosis Date   • Alcohol abuse    • Allergic rhinitis     vs URI   • Anemia    • At risk for falls    • Benign prostatic hyperplasia    • Chronic gastritis    • Cirrhosis of liver (CMS/HCC)    • Complication of gastrostomy (CMS/HCC)     site not healing   • COPD (chronic obstructive pulmonary disease) (CMS/HCC)    • Dysphagia     odynophagia   • Epigastric pain    • Esophagitis    • GERD (gastroesophageal reflux disease)    • Hypertension    • Hypothyroidism, unspecified    • Low back pain    • Malaise and fatigue    • Nasal congestion 11/15/2018   • Nausea with vomiting, unspecified    • Pain in left knee    • Pain in right knee    • Primary malignant neoplasm of pharynx (CMS/HCC)    • Screening for malignant neoplasm of colon    • Vitamin D deficiency      Past Surgical History:   Procedure Laterality Date   • ABDOMINAL WALL SURGERY  11/04/2008    Laparotomy with repair of stomach wall perforation. 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 Left upper quadrant abdominal wall abscess debridement. Gastrostomy tube erosion through & through the anterior gastric wall.Lupper quadrant abdominal wall abscess   • COLONOSCOPY  04/21/2014    Internal & external hemorrhoids found.   • COLONOSCOPY  2014    Printer   • COLONOSCOPY N/A 10/16/2018    Procedure: COLONOSCOPY;  Surgeon: Kaushal Chester MD;  Location: [facility] ENDOSCOPY;  Service: Gastroenterology   • DIAGNOSTIC LAPAROSCOPY EXPLORATORY LAPAROTOMY N/A 7/17/2019    Procedure: DIAGNOSTIC LAPAROSCOPY, EXPLORATORY LAPAROTOMY, LYSIS OF ADHESIONS;  Surgeon: Parminder Kc MD;  Location: [facility] OR;  Service: General   • DIRECT LARYNGOSCOPY, ESOPHAGOSCOPY, BRONCHOSCOPY N/A 4/15/2019    Procedure: DIRECT LARYNGOSCOPY with biopsy, ESOPHAGOSCOPY;  Surgeon: Bharathi Washington MD;  Location: [facility] OR;  Service: ENT   • ENDOSCOPY N/A 10/16/2018    Procedure: ESOPHAGOGASTRODUODENOSCOPY;  Surgeon: Kaushal Chester MD;  Location: [facility] ENDOSCOPY;  Service:  Gastroenterology   • GASTROSTOMY FEEDING TUBE INSERTION N/A 4/15/2019    Procedure: GASTROSTOMY FEEDING TUBE INSERTION;  Surgeon: Trenton Valera MD;  Location: [facility];  Service: General   • TRACHEOSTOMY  11/10/2008    Respiratory failure   • UPPER GASTROINTESTINAL ENDOSCOPY  04/21/2014    Esophagitis seen. Biopsy taken. Gastritis in stomach. Biopsy taken. Normal duodenum. Biopsy taken.   • UPPER GASTROINTESTINAL ENDOSCOPY  10/16/2018       Therapy Treatment    Rehabilitation Treatment Summary     Row Name 08/08/19 1340 08/08/19 1056          Treatment Time/Intention    Discipline  occupational therapy assistant  -CS  physical therapy assistant  -GIAN     Document Type  therapy note (daily note)  -CS  therapy note (daily note)  -GIAN     Subjective Information  no complaints  -CS  --     Mode of Treatment  occupational therapy  -CS  physical therapy;individual therapy  -GIAN     Therapy Frequency (PT Clinical Impression)  --  daily  -GIAN     Therapy Frequency (OT Eval)  other (see comments) 5-7 days a week   -CS  --     Patient Effort  good  -CS  good  -GIAN     Existing Precautions/Restrictions  fall;oxygen therapy device and L/min  -CS  fall;oxygen therapy device and L/min  -GIAN     Recorded by [CS] Brenda Thomas, MADSEN/L 08/08/19 1546 [GIAN] Abran Wan, PTA 08/08/19 1107     Row Name 08/08/19 1340 08/08/19 1056          Vital Signs    Pre Systolic BP Rehab  --  105  -GIAN     Pre Treatment Diastolic BP  --  57  -GIAN     Post Systolic BP Rehab  --  117  -JC2     Post Treatment Diastolic BP  --  67  -JC2     Pretreatment Heart Rate (beats/min)  --  94  -GIAN     Intratreatment Heart Rate (beats/min)  --  95  -JC2     Posttreatment Heart Rate (beats/min)  --  100  -JC2     Pre SpO2 (%)  --  98  -GIAN     O2 Delivery Pre Treatment  --  trach collar  -GIAN     Intra SpO2 (%)  --  93  -JC2     O2 Delivery Intra Treatment  --  room air  -JC2     Post SpO2 (%)  --  96  -JC2     O2 Delivery Post Treatment  --  room air   -JC2     Pre Patient Position  Sitting  -CS  Sitting  -GIAN     Intra Patient Position  Sitting  -CS  --     Post Patient Position  Sitting  -CS  Sitting  -GIAN     Recorded by [CS] Brenda Thomas COTA/LIANE 08/08/19 1546 [GIAN] Abran Wan, PTA 08/08/19 1107  [JC2] Abran Wan, PTA 08/08/19 1141     Row Name 08/08/19 1340 08/08/19 1056          Cognitive Assessment/Intervention- PT/OT    Affect/Mental Status (Cognitive)  WFL  -CS  WFL  -GIAN     Orientation Status (Cognition)  oriented to;person;place  -CS  oriented to;person;place  -GIAN     Follows Commands (Cognition)  WFL  -CS  WFL  -GIAN     Personal Safety Interventions  --  fall prevention program maintained;gait belt;muscle strengthening facilitated;nonskid shoes/slippers when out of bed;supervised activity  -JC2     Recorded by [CS] Bredna Thomas COTA/LIANE 08/08/19 1546 [GIAN] Abran Wan, PTA 08/08/19 1107  [JC2] Abran Wan, PTA 08/08/19 1125     Row Name 08/08/19 1056             Safety Issues, Functional Mobility    Impairments Affecting Function (Mobility)  balance;endurance/activity tolerance;coordination;pain;strength;shortness of breath;postural/trunk control;motor planning;motor control  -GIAN      Recorded by [GIAN] Abran Wan, PTA 08/08/19 1107      Row Name 08/08/19 1056             Bed Mobility Assessment/Treatment    Bed Mobility Assessment/Treatment  --  -GIAN      Supine-Sit Start (Bed Mobility)  --  -GIAN      Sit-Supine Start (Bed Mobility)  --  -GIAN      Assistive Device (Bed Mobility)  --  -GIAN      Recorded by [GIAN] Abran Wan, PTA 08/08/19 1125      Row Name 08/08/19 1056             Transfer Assessment/Treatment    Transfer Assessment/Treatment  sit-stand transfer;stand-sit transfer  -GIAN      Recorded by [GIAN] Abran Wan, PTA 08/08/19 1107      Row Name 08/08/19 1056             Sit-Stand Transfer    Sit-Stand Start (Transfers)  contact guard  -GIAN      Assistive Device (Sit-Stand Transfers)  other  (see comments) no AD   -GIAN      Recorded by [GIAN] Abran Wan, PTA 08/08/19 1107      Row Name 08/08/19 1056             Stand-Sit Transfer    Stand-Sit [place]cker (Transfers)  contact guard  -GIAN      Assistive Device (Stand-Sit Transfers)  other (see comments) no AD  -GIAN      Recorded by [GIAN] Abran Wan, PTA 08/08/19 1107      Row Name 08/08/19 1056             Gait/Stairs Assessment/Training    Gait/Stairs Assessment/Training  gait/ambulation independence;gait/ambulation assistive device;distance ambulated;gait pattern;gait deviations  -GIAN      [place] Level (Gait)  contact guard;supervision  -JC2      Assistive Device (Gait)  other (see comments) no AD  -GIAN      Distance in Feet (Gait)  540  -JC2      Pattern (Gait)  step-through  -GIAN      Deviations/Abnormal Patterns (Gait)  essence decreased  -GIAN      Negotiation (Stairs)  --  -JC2      Recorded by [GIAN] Abran Wan, PTA 08/08/19 1107  [JC2] Abran Wan, PTA 08/08/19 1125      Row Name 08/08/19 1056             Therapeutic Exercise    Therapeutic Exercise  seated, lower extremities  -GIAN      Recorded by [GIAN] Abran Wan, PTA 08/08/19 1125      Row Name 08/08/19 1056             Lower Extremity Seated Therapeutic Exercise    Performed, Seated Lower Extremity (Therapeutic Exercise)  ankle dorsiflexion/plantarflexion;LAQ (long arc quad), knee extension;hip flexion/extension;hip abduction/adduction;other (see comments) GS  -GIAN      Exercise Type, Seated Lower Extremity (Therapeutic Exercise)  AROM (active range of motion)  -GIAN      Sets/Reps Detail, Seated Lower Extremity (Therapeutic Exercise)  30x1 ashley  -GIAN      Recorded by [GIAN] Abran Wan, PTA 08/08/19 1125      Row Name 08/08/19 1340             Therapeutic Exercise    Upper Extremity (Therapeutic Exercise)  bicep curl, bilateral  -CS      Upper Extremity Range of Motion (Therapeutic Exercise)  shoulder flexion/extension, bilateral;shoulder internal/external rotation,  bilateral;elbow flexion/extension, bilateral;forearm supination/pronation, bilateral;wrist flexion/extension, bilateral  -CS      Hand (Therapeutic Exercise)  finger flexion/extension, bilateral  -CS      Weight/Resistance (Therapeutic Exercise)  1 pound  -CS      Exercise Type (Therapeutic Exercise)  AROM (active range of motion)  -CS      Position (Therapeutic Exercise)  seated  -CS      Sets/Reps (Therapeutic Exercise)  1/20  -CS      Equipment (Therapeutic Exercise)  free weight, barbell  -CS      Expected Outcome (Therapeutic Exercise)  improve functional tolerance, self-care activity;improve performance, BADLs;improve performance, transfer skills;increase active range of motion  -CS      Recorded by [CS] Brenda Thomas COTA/L 08/08/19 1546      Row Name 08/08/19 1340 08/08/19 1056          Positioning and Restraints    Pre-Treatment Position  sitting in chair/recliner  -CS  sitting in chair/recliner  -GIAN     Post Treatment Position  chair  -CS  chair  -GIAN     In Chair  reclined;call light within reach;encouraged to call for assist;exit alarm on  -CS  reclined;call light within reach;encouraged to call for assist;exit alarm on all needs met.   -GIAN     Recorded by [CS] Brenda Thomas MADSEN/L 08/08/19 1546 [GIAN] Abran Wan, PTA 08/08/19 1125     Row Name 08/08/19 1340 08/08/19 1056          Pain Scale: Numbers Pre/Post-Treatment    Pain Scale: Numbers, Pretreatment  3/10  -CS  3/10  -GIAN     Pain Scale: Numbers, Post-Treatment  3/10  -CS  3/10  -JC2     Pain Location  abdomen  -CS  abdomen  -GIAN     Pain Intervention(s)  --  Repositioned  -GIAN     Recorded by [CS] Brenda Thomas MADSEN/L 08/08/19 1546 [GIAN] Abran Wan, PTA 08/08/19 1107  [JC2] Abran Wan, PTA 08/08/19 1239     Row Name                Wound 07/17/19 2144 abdomen incision    Wound - Properties Group Date first assessed: 07/17/19 [EL] Time first assessed: 2144 [EL] Location: abdomen [EL] Present On Admission : no [EL] Type:  incision [EL], laparoscopic x3  Recorded by:  [EL] Lorna Benedict RN 07/17/19 2217    Row Name                Wound 07/17/19 1005 midline abdomen incision;surgical    Wound - Properties Group Date first assessed: 07/17/19 [EL] Time first assessed: 1005 [EL] Orientation: midline [EL] Location: abdomen [EL] Type: incision;surgical [EL] Recorded by:  [EL] Lorna Benedict RN 07/17/19 2218    Row Name                Wound 07/19/19 0800 Left anterior thigh other (see comments)    Wound - Properties Group Date first assessed: 07/19/19 [RG] Time first assessed: 0800 [RG] Side: Left [RG] Orientation: anterior [RG] Location: thigh [RG] Present On Admission : yes;picture taken [RG2] Type: other (see comments) [RG3], skin graft sight.   Recorded by:  [RG] Xenia Ascencio RN 07/19/19 1515 [RG2] Xenia Ascencio RN 07/19/19 1528 [RG3] Xenia Ascencio RN 07/20/19 0806    Row Name 08/08/19 1340             Outcome Summary/Treatment Plan (OT)    Daily Summary of Progress (OT)  progress toward functional goals is good  -CS      Plan for Continued Treatment (OT)  cont OT POC  -CS      Anticipated Discharge Disposition (OT)  anticipate therapy at next level of care  -CS      Recorded by [CS] Brenda Thomas COTA/L 08/08/19 1546      Row Name 08/08/19 1056             Outcome Summary/Treatment Plan (PT)    Daily Summary of Progress (PT)  progress toward functional goals as expected  -GIAN      Plan for Continued Treatment (PT)  continue  -GIAN      Anticipated Discharge Disposition (PT)  anticipate therapy at next level of care  -GIAN      Recorded by [GIAN] Abran Wan, PTA 08/08/19 1125        User Key  (r) = Recorded By, (t) = Taken By, (c) = Cosigned By    Initials Name Effective Dates Discipline    EL Loran Benedict RN 06/05/17 -  Nurse    Xenia Hannah RN 10/17/16 -  Nurse    Abran Ferreira, PTA 03/07/18 -  PT    CS Brenda Thomas MADSEN/L 03/07/18 -  OT        Wound 07/17/19 8168 abdomen incision (Active)    Dressing Appearance open to air 8/8/2019  9:10 AM   Closure Open to air 8/8/2019  9:10 AM   Base dry;clean 8/8/2019  9:10 AM   Drainage Amount none 8/8/2019  9:10 AM   Dressing Care, Wound open to air 8/7/2019  8:00 PM       Wound 07/17/19 1005 midline abdomen incision;surgical (Active)   Dressing Appearance open to air 8/8/2019  9:10 AM   Closure Open to air 8/8/2019  9:10 AM   Base scab 8/8/2019  9:10 AM   Periwound intact;dry 8/8/2019  9:10 AM   Periwound Temperature warm 8/8/2019  9:10 AM   Drainage Amount none 8/8/2019  9:10 AM   Dressing Care, Wound open to air 8/7/2019  8:00 PM       Wound 07/19/19 0800 Left anterior thigh other (see comments) (Active)   Dressing Appearance dry;intact 8/8/2019  9:10 AM   Closure SAUMYA 8/8/2019  9:10 AM   Base dressing in place, unable to visualize 8/8/2019  9:10 AM   Periwound dry;intact 8/8/2019  9:10 AM   Periwound Temperature warm 8/8/2019  9:10 AM   Drainage Amount none 8/8/2019  9:10 AM     Rehab Goal Summary     Row Name 08/08/19 1340 08/08/19 1056          Physical Therapy Goals    Bed Mobility Goal Selection (PT)  --  bed mobility, PT goal 1  -GIAN     Transfer Goal Selection (PT)  --  transfer, PT goal 1  -     Gait Training Goal Selection (PT)  --  gait training, PT goal 1  -GIAN        Bed Mobility Goal 1 (PT)    Activity/Assistive Device (Bed Mobility Goal 1, PT)  --  bed mobility activities, all  -GIAN     [place] Level/Cues Needed (Bed Mobility Goal 1, PT)  --  independent;conditional independence  -     Time Frame (Bed Mobility Goal 1, PT)  --  long term goal (LTG);1 week  -GIAN     Barriers (Bed Mobility Goal 1, PT)  --  fatigue; LOB  -     Progress/Outcomes (Bed Mobility Goal 1, PT)  --  goal not met  -        Transfer Goal 1 (PT)    Activity/Assistive Device (Transfer Goal 1, PT)  --  bed-to-chair/chair-to-bed;toilet  -     [place] Level/Cues Needed (Transfer Goal 1, PT)  --  conditional independence  -GIAN     Time Frame (Transfer Goal 1, PT)  --   1 week  -GIAN     Barriers (Transfers Goal 1, PT)  --  fatigue/ LOB  -GIAN     Progress/Outcome (Transfer Goal 1, PT)  --  continuing progress toward goal;goal not met  -GIAN        Gait Training Goal 1 (PT)    Activity/Assistive Device (Gait Training Goal 1, PT)  --  gait (walking locomotion);assistive device use;decrease fall risk;increase endurance/gait distance  -GIAN     [place] Level (Gait Training Goal 1, PT)  --  conditional independence;supervision required  -GIAN     Distance (Gait Goal 1, PT)  --  100 ft or more w/ VSS  -GIAN     Time Frame (Gait Training Goal 1, PT)  --  by discharge  -GIAN     Barriers (Gait Training Goal 1, PT)  --  weakness; fatigue  -GIAN     Progress/Outcome (Gait Training Goal 1, PT)  --  goal ongoing  -GIAN        Strength Goal 1 (PT)    Strength Goal 1 (PT)  --  patient will tolerate 2 sets of 15 reps for 3 LE antigravity exercises w/ VSS  -GIAN     Time Frame (Strength Goal 1, PT)  --  1 week  -GIAN     Barriers (Strength Goal 1, PT)  --  resp/cardiac precautions  -GIAN     Progress/Outcome (Strength Goal 1, PT)  --  goal met  (Significant)   -        Occupational Therapy Goals    Transfer Goal Selection (OT)  transfer, OT goal 1  -CS  --     Bathing Goal Selection (OT)  bathing, OT goal 1  -CS  --     Dressing Goal Selection (OT)  dressing, OT goal 1  -CS  --     Toileting Goal Selection (OT)  toileting, OT goal 1  -CS  --     Grooming Goal Selection (OT)  grooming, OT goal 1  -CS  --     Endurance Goal Selection (OT)  endurance, OT goal 1  -CS  --     Functional Mobility Goal Selection (OT)  functional mobility, OT goal 1  -CS  --     Additional Documentation  Functional Mobility Goal Selection (OT) (Row)  -CS  --        Transfer Goal 1 (OT)    Activity/Assistive Device (Transfer Goal 1, OT)  toilet  -CS  --     [place] Level/Cues Needed (Transfer Goal 1, OT)  contact guard assist  -CS  --     Time Frame (Transfer Goal 1, OT)  long term goal (LTG);by discharge  -CS  --      Progress/Outcome (Transfer Goal 1, OT)  goal not met;continuing progress toward goal  -CS  --        Bathing Goal 1 (OT)    Activity/Assistive Device (Bathing Goal 1, OT)  bathing skills, all  -CS  --     [place] Level/Cues Needed (Bathing Goal 1, OT)  set-up required;verbal cues required;contact guard assist  -CS  --     Time Frame (Bathing Goal 1, OT)  long term goal (LTG);by discharge  -CS  --     Progress/Outcomes (Bathing Goal 1, OT)  goal met;goal ongoing previously met please address again for accuracy   -CS  --        Dressing Goal 1 (OT)    Activity/Assistive Device (Dressing Goal 1, OT)  dressing skills, all  -CS  --     [place]/Cues Needed (Dressing Goal 1, OT)  set-up required;verbal cues required;contact guard assist  -CS  --     Time Frame (Dressing Goal 1, OT)  long term goal (LTG);by discharge  -CS  --     Progress/Outcome (Dressing Goal 1, OT)  goal met  -CS  --        Toileting Goal 1 (OT)    Activity/Device (Toileting Goal 1, OT)  toileting skills, all  -CS  --     [place] Level/Cues Needed (Toileting Goal 1, OT)  contact guard assist  -CS  --     Time Frame (Toileting Goal 1, OT)  long term goal (LTG);by discharge  -CS  --     Progress/Outcome (Toileting Goal 1, OT)  goal not met  -CS  --        Grooming Goal 1 (OT)    Activity/Device (Grooming Goal 1, OT)  grooming skills, all  -CS  --     [place] (Grooming Goal 1, OT)  supervision required;set-up required  -CS  --     Time Frame (Grooming Goal 1, OT)  long term goal (LTG);by discharge  -CS  --     Progress/Outcome (Grooming Goal 1, OT)  goal met  -CS  --         Endurance Goal 1 (OT)    Activity Level (Endurance Goal 1, OT)  endurance 2 fair + 25 min functional task 3 or less rest breaks O2 90 or up  -CS  --     Time Frame (Endurance Goal 1, OT)  long term goal (LTG);by discharge  -CS  --     Progress/Outcome (Endurance Goal 1, OT)  goal met  -CS  --        Functional Mobility Goal 1 (OT)    Activity/Assistive Device  (Functional Mobility Goal 1, OT)  walker, rolling AD as needed  -CS  --     [place] Level/Cues Needed (Functional Mobility Goal 1, OT)  standby assist;verbal cues required  -CS  --     Distance Goal 1 (Functional Mobility, OT)  for ADL tasks with good safety and balance.   -CS  --     Time Frame (Functional Mobility Goal 1, OT)  long term goal (LTG);by discharge  -CS  --     Progress/Outcome (Functional Mobility Goal 1, OT)  goal not met  -CS  --        Patient Education Goal (OT)    Activity (Patient Education Goal, OT)  Pt will communciate good home safety awareness.   -CS  --     [place]/Cues/Accuracy (Memory Goal 2, OT)  verbalizes understanding  -CS  --     Time Frame (Patient Education Goal, OT)  long term goal (LTG);by discharge  -CS  --     Progress/Outcome (Patient Education Goal, OT)  goal not met  -CS  --       User Key  (r) = Recorded By, (t) = Taken By, (c) = Cosigned By    Initials Name Provider Type Discipline    Abran Ferreira, PTA Physical Therapy Assistant PT    CS Brenda Thomas COTA/L Occupational Therapy Assistant OT        Occupational Therapy Education     Title: PT OT SLP Therapies (In Progress)     Topic: Occupational Therapy (In Progress)     Point: ADL training (In Progress)     Description: Instruct learner(s) on proper safety adaptation and remediation techniques during self care or transfers.   Instruct in proper use of assistive devices.    Learning Progress Summary           Patient Acceptance, E,TB,D, NR by  at 8/8/2019  3:47 PM    Acceptance, E,TB,D, NR by CS at 8/7/2019  1:14 PM    Acceptance, E,TB,D, NR,VU by CS at 8/5/2019 11:42 AM    Acceptance, E,TB,D, NR by CS at 8/4/2019 10:35 AM    Acceptance, E, VU,NR by  at 8/3/2019  2:45 PM    Comment:  Educated about OT and POC. Educated to call for assist and not get up on his own. Educated on need for assist with t/f, mobility and functional tasks. Educated on home safety with ADL need for BSC and RW.     Acceptance, E, VU,NR by  at 7/30/2019 11:58 AM    Comment:  Educated about OT and POC. Educated to call for assist. Educated on safety throughout.    Acceptance, E, VU by  at 7/27/2019 12:40 PM    Acceptance, E,TB,D, NR by CS at 7/26/2019  1:27 PM    Acceptance, E,TB,D, NR by CS at 7/25/2019 12:58 PM    Acceptance, E, NR by BB at 7/24/2019 11:48 AM    Acceptance, E,TB,D, NR by CS at 7/23/2019  1:02 PM    Acceptance, E, NR by  at 7/18/2019  1:59 PM    Comment:  Educated about OT and POC. Educated on safety throughout. Educated to call for assist.   Family Acceptance, E, VU,NR by  at 8/3/2019  2:45 PM    Comment:  Educated about OT and POC. Educated to call for assist and not get up on his own. Educated on need for assist with t/f, mobility and functional tasks. Educated on home safety with ADL need for BSC and RW.                   Point: Home exercise program (In Progress)     Description: Instruct learner(s) on appropriate technique for monitoring, assisting and/or progressing therapeutic exercises/activities.    Learning Progress Summary           Patient Acceptance, E,TB,D, NR by CS at 8/8/2019  3:47 PM    Acceptance, E,TB,D, NR by CS at 8/7/2019  1:14 PM    Acceptance, E,TB,D, NR,VU by  at 8/5/2019 11:42 AM    Acceptance, E,TB,D, NR by  at 8/4/2019 10:35 AM    Acceptance, E,TB,D, NR by  at 7/26/2019  1:27 PM    Acceptance, E,TB,D, NR by CS at 7/25/2019 12:58 PM    Acceptance, E,TB,D, NR by  at 7/23/2019  1:02 PM                   Point: Precautions (In Progress)     Description: Instruct learner(s) on prescribed precautions during self-care and functional transfers.    Learning Progress Summary           Patient Acceptance, E,TB,D, NR by CS at 8/8/2019  3:47 PM    Acceptance, E,TB,D, NR by CS at 8/7/2019  1:14 PM    Acceptance, E,TB,D, NR,VU by CS at 8/5/2019 11:42 AM    Acceptance, ELSA BROTHERS D, NR by ERIN at 8/4/2019 10:35 AM    Acceptance, REYNA BROTHERS NR by  at 8/3/2019  2:45 PM    Comment:  Educated  about OT and POC. Educated to call for assist and not get up on his own. Educated on need for assist with t/f, mobility and functional tasks. Educated on home safety with ADL need for BSC and RW.    Acceptance, E, VU,NR by  at 7/30/2019 11:58 AM    Comment:  Educated about OT and POC. Educated to call for assist. Educated on safety throughout.    Acceptance, E,TB,D, NR by  at 7/26/2019  1:27 PM    Acceptance, E,TB,D, NR by CS at 7/25/2019 12:58 PM    Acceptance, E,TB,D, NR by CS at 7/23/2019  1:02 PM    Acceptance, E, NR by  at 7/18/2019  1:59 PM    Comment:  Educated about OT and POC. Educated on safety throughout. Educated to call for assist.   Family Acceptance, E, VU,NR by  at 8/3/2019  2:45 PM    Comment:  Educated about OT and POC. Educated to call for assist and not get up on his own. Educated on need for assist with t/f, mobility and functional tasks. Educated on home safety with ADL need for BSC and RW.                   Point: Body mechanics (In Progress)     Description: Instruct learner(s) on proper positioning and spine alignment during self-care, functional mobility activities and/or exercises.    Learning Progress Summary           Patient Acceptance, E,TB,D, NR by  at 8/8/2019  3:47 PM    Acceptance, E,TB,D, NR by  at 8/7/2019  1:14 PM    Acceptance, E,TB,D, NR,VU by  at 8/5/2019 11:42 AM    Acceptance, E,TB,D, NR by  at 8/4/2019 10:35 AM    Acceptance, E,TB,D, NR by  at 7/26/2019  1:27 PM    Acceptance, E,TB,D, NR by  at 7/25/2019 12:58 PM    Acceptance, E, NR by  at 7/24/2019 11:48 AM    Acceptance, E,TB,D, NR by  at 7/23/2019  1:02 PM                               User Key     Initials Effective Dates Name Provider Type Discipline     06/08/18 -  Jayne Antunez OTR/L Occupational Therapist OT     03/07/18 -  Kerrie Woodson COTA/L Occupational Therapy Assistant OT     03/07/18 -  Brenda Thomas COTA/L Occupational Therapy Assistant OT                OT  Recommendation and Plan  Outcome Summary/Treatment Plan (OT)  Daily Summary of Progress (OT): progress toward functional goals is good  Plan for Continued Treatment (OT): cont OT POC  Anticipated Discharge Disposition (OT): anticipate therapy at next level of care  Therapy Frequency (OT Eval): other (see comments)(5-7 days a week )  Daily Summary of Progress (OT): progress toward functional goals is good  Plan of Care Review  Plan of Care Reviewed With: patient  Plan of Care Reviewed With: patient  Outcome Summary: Pt tolerated tx well this date. Pt gave good effort with UE ther ex. Continue OT POC.  Outcome Measures     Row Name 08/08/19 1500 08/08/19 1056 08/07/19 1336       How much help from another person do you currently need...    Turning from your back to your side while in flat bed without using bedrails?  --  4  -GIAN  4  -TW    Moving from lying on back to sitting on the side of a flat bed without bedrails?  --  4  -GIAN  4  -TW    Moving to and from a bed to a chair (including a wheelchair)?  --  3  -GIAN  3  -TW    Standing up from a chair using your arms (e.g., wheelchair, bedside chair)?  --  3  -GIAN  3  -TW    Climbing 3-5 steps with a railing?  --  3  -GIAN  3  -TW    To walk in hospital room?  --  3  -GIAN  3  -TW    AM-PAC 6 Clicks Score (PT)  --  20  -GIAN  20  -TW       How much help from another is currently needed...    Putting on and taking off regular lower body clothing?  3  -CS  --  --    Bathing (including washing, rinsing, and drying)  3  -CS  --  --    Toileting (which includes using toilet bed pan or urinal)  3  -CS  --  --    Putting on and taking off regular upper body clothing  3  -CS  --  --    Taking care of personal grooming (such as brushing teeth)  4  -CS  --  --    Eating meals  1  -CS  --  --    AM-PAC 6 Clicks Score (OT)  17  -CS  --  --       Functional Assessment    Outcome Measure Options  --  AM-PAC 6 Clicks Basic Mobility (PT)  -GIAN  AM-PAC 6 Clicks Basic Mobility (PT)  -TW    Moody  Name 08/06/19 1123 08/06/19 1045          How much help from another person do you currently need...    Turning from your back to your side while in flat bed without using bedrails?  4  -GIAN  --     Moving from lying on back to sitting on the side of a flat bed without bedrails?  4  -GIAN  --     Moving to and from a bed to a chair (including a wheelchair)?  3  -GIAN  --     Standing up from a chair using your arms (e.g., wheelchair, bedside chair)?  3  -GIAN  --     Climbing 3-5 steps with a railing?  3  -GIAN  --     To walk in hospital room?  3  -GIAN  --     AM-PAC 6 Clicks Score (PT)  20  -GIAN  --        How much help from another is currently needed...    Putting on and taking off regular lower body clothing?  --  3  -KD     Bathing (including washing, rinsing, and drying)  --  3  -KD     Toileting (which includes using toilet bed pan or urinal)  --  3  -KD     Putting on and taking off regular upper body clothing  --  3  -KD     Taking care of personal grooming (such as brushing teeth)  --  4  -KD     Eating meals  --  1  -KD     AM-PAC 6 Clicks Score (OT)  --  17  -KD        Functional Assessment    Outcome Measure Options  AM-PAC 6 Clicks Basic Mobility (PT)  -GIAN  --       User Key  (r) = Recorded By, (t) = Taken By, (c) = Cosigned By    Initials Name Provider Type    GIAN Abran Wan, PTA Physical Therapy Assistant    TW Joseph Donnelly, PTA Physical Therapy Assistant    KD Isabelle Schafer, TENA/L Occupational Therapy Assistant    Brenda Lee COTA/L Occupational Therapy Assistant           Time Calculation:   Time Calculation- OT     Row Name 08/08/19 1551             Time Calculation- OT    OT Start Time  1340  -CS      OT Stop Time  1403  -CS      OT Time Calculation (min)  23 min  -CS      Total Timed Code Minutes- OT  23 minute(s)  -      OT Received On  08/08/19  -        User Key  (r) = Recorded By, (t) = Taken By, (c) = Cosigned By    Initials Name Provider Type    Brenda Lee,  TENA/L Occupational Therapy Assistant        Therapy Charges for Today     Code Description Service Date Service Provider Modifiers Qty    36388416560 HC OT THER PROC EA 15 MIN 8/8/2019 Brenda Thomas, TENA/L  2               ROSEANNA Barton  8/8/2019

## 2019-08-08 NOTE — PLAN OF CARE
Problem: Patient Care Overview  Goal: Plan of Care Review  Outcome: Ongoing (interventions implemented as appropriate)   08/08/19 1595   Coping/Psychosocial   Plan of Care Reviewed With patient   Plan of Care Review   Progress improving   OTHER   Outcome Summary vss, started bolus feeding per pump, has had BM, ambulating in halls, doing well,        Problem: Oncology Care (Adult)  Goal: Signs and Symptoms of Listed Potential Problems Will be Absent, Minimized or Managed (Oncology Care)  Outcome: Ongoing (interventions implemented as appropriate)      Problem: Airway, Artificial (Adult)  Goal: Signs and Symptoms of Listed Potential Problems Will be Absent, Minimized or Managed (Airway, Artificial)  Outcome: Ongoing (interventions implemented as appropriate)      Problem: Nutrition, Enteral (Adult)  Goal: Signs and Symptoms of Listed Potential Problems Will be Absent, Minimized or Managed (Nutrition, Enteral)  Outcome: Ongoing (interventions implemented as appropriate)      Problem: Urine Elimination Impaired (Adult)  Goal: Effective or Improved Urinary Elimination  Outcome: Ongoing (interventions implemented as appropriate)    Goal: Effective Containment of Urine  Outcome: Ongoing (interventions implemented as appropriate)    Goal: Reduced Incontinence Episodes  Outcome: Ongoing (interventions implemented as appropriate)

## 2019-08-08 NOTE — PLAN OF CARE
Problem: Patient Care Overview  Goal: Plan of Care Review  Outcome: Ongoing (interventions implemented as appropriate)   08/08/19 1053   Coping/Psychosocial   Plan of Care Reviewed With patient   Plan of Care Review   Progress improving   OTHER   Outcome Summary pt responded well to therapy w/ increased gait to 540 ft CGA initially, progressing to SBA w/o AD. pt w/ increased reps of LE ther ex. pt met 1 new goal this tx. pt would continue to benefit from PT services.

## 2019-08-08 NOTE — THERAPY TREATMENT NOTE
Acute Care - Physical Therapy Treatment Note  [facility]     Patient Name: Emerson Bang  : 1960  MRN: [id number]  Today's Date: 2019  Onset of Illness/Injury or Date of Surgery: 07/15/19  Date of Referral to PT: 19  Referring Physician: Shanda ZUÑIGA    Admit Date: 7/15/2019    Visit Dx:    ICD-10-CM ICD-9-CM   1. SBO (small bowel obstruction) (CMS/[facility]) K56.609 560.9   2. Impaired functional mobility and activity tolerance Z74.09 V49.89   3. Impaired mobility and ADLs Z74.09 799.89   4. SIADH (syndrome of inappropriate ADH production) (CMS/[facility]) E22.2 253.6   5. Acquired hypothyroidism E03.9 244.9   6. Idiopathic hypotension I95.0 458.9   7. Hyponatremia E87.1 276.1   8. Impaired functional mobility, balance, gait, and endurance Z74.09 V49.89     Patient Active Problem List   Diagnosis   • Hyperkalemia   • Iron deficiency anemia   • Gastroesophageal reflux disease with esophagitis   • Elevated AST (SGOT)   • Alcohol abuse   • Hypothyroidism   • Balance problem   • Need for vaccination   • Low magnesium levels   • Squamous cell carcinoma of pharynx (CMS/HCC)   • History of throat cancer   • Vitamin D deficiency   • Alcohol abuse counseling and surveillance   • Encounter for screening for diabetes mellitus   • Hypomagnesemia   • Iron deficiency anemia   • Hyperlipidemia   • Underweight due to inadequate caloric intake   • Need for immunization against influenza   • 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[facility])   • Hypertension   • General medical examination   • Underweight   • Epigastric pain   • Gastritis without bleeding   • Need for influenza vaccination   • Elevated liver function tests   • B12 deficiency   • Intestinal malabsorption   • Throat pain   • Acute pharyngitis   • Upper respiratory tract infection   • Neoplasm of uncertain behavior of larynx   • Pharyngoesophageal dysphagia   • Severe protein-calorie malnutrition (CMS/HCC)   • Anemia of chronic disease   • Hypotension   • Hyponatremia        Therapy Treatment    Rehabilitation Treatment Summary     Row Name 08/08/19 1056             Treatment Time/Intention    Discipline  physical therapy assistant  -GIAN      Document Type  therapy note (daily note)  -GIAN      Mode of Treatment  physical therapy;individual therapy  -GIAN      Therapy Frequency (PT Clinical Impression)  daily  -GIAN      Patient Effort  good  -GIAN      Existing Precautions/Restrictions  fall;oxygen therapy device and L/min  -GIAN      Recorded by [GIAN] Abran Wan, PTA 08/08/19 1107      Row Name 08/08/19 1056             Vital Signs    Pre Systolic BP Rehab  105  -GIAN      Pre Treatment Diastolic BP  57  -GIAN      Post Systolic BP Rehab  117  -JC2      Post Treatment Diastolic BP  67  -JC2      Pretreatment Heart Rate (beats/min)  94  -GIAN      Intratreatment Heart Rate (beats/min)  95  -JC2      Posttreatment Heart Rate (beats/min)  100  -JC2      Pre SpO2 (%)  98  -GIAN      O2 Delivery Pre Treatment  trach collar  -GIAN      Intra SpO2 (%)  93  -JC2      O2 Delivery Intra Treatment  room air  -JC2      Post SpO2 (%)  96  -JC2      O2 Delivery Post Treatment  room air  -JC2      Pre Patient Position  Sitting  -GIAN      Post Patient Position  Sitting  -GIAN      Recorded by [GIAN] Abran Wan, PTA 08/08/19 1107  [JC2] Abran Wan, PTA 08/08/19 1141      Row Name 08/08/19 1056             Cognitive Assessment/Intervention- PT/OT    Affect/Mental Status (Cognitive)  WFL  -GIAN      Orientation Status (Cognition)  oriented to;person;place  -GIAN      Follows Commands (Cognition)  WFL  -GIAN      Personal Safety Interventions  fall prevention program maintained;gait belt;muscle strengthening facilitated;nonskid shoes/slippers when out of bed;supervised activity  -JC2      Recorded by [GIAN] Abran Wan, PTA 08/08/19 1107  [JC2] Abran Wan, PTA 08/08/19 1125      Row Name 08/08/19 1056             Safety Issues, Functional Mobility    Impairments Affecting Function (Mobility)   balance;endurance/activity tolerance;coordination;pain;strength;shortness of breath;postural/trunk control;motor planning;motor control  -GIAN      Recorded by [GIAN] Abran Wan, PTA 08/08/19 1107      Row Name 08/08/19 1056             Bed Mobility Assessment/Treatment    Bed Mobility Assessment/Treatment  --  -GIAN      Supine-Sit [place] (Bed Mobility)  --  -GIAN      Sit-Supine [place] (Bed Mobility)  --  -GIAN      Assistive Device (Bed Mobility)  --  -GIAN      Recorded by [GIAN] Abran Wan, PTA 08/08/19 1125      Row Name 08/08/19 1056             Transfer Assessment/Treatment    Transfer Assessment/Treatment  sit-stand transfer;stand-sit transfer  -GIAN      Recorded by [GIAN] Abran Wan, PTA 08/08/19 1107      Row Name 08/08/19 1056             Sit-Stand Transfer    Sit-Stand [place] (Transfers)  contact guard  -GIAN      Assistive Device (Sit-Stand Transfers)  other (see comments) no AD   -GIAN      Recorded by [GIAN] Abran Wan, PTA 08/08/19 1107      Row Name 08/08/19 1056             Stand-Sit Transfer    Stand-Sit [place] (Transfers)  contact guard  -GIAN      Assistive Device (Stand-Sit Transfers)  other (see comments) no AD  -GIAN      Recorded by [GIAN] Abran Wan, PTA 08/08/19 1107      Row Name 08/08/19 1056             Gait/Stairs Assessment/Training    Gait/Stairs Assessment/Training  gait/ambulation independence;gait/ambulation assistive device;distance ambulated;gait pattern;gait deviations  -GIAN      [place] Level (Gait)  contact guard;supervision  -JC2      Assistive Device (Gait)  other (see comments) no AD  -GIAN      Distance in Feet (Gait)  540  -JC2      Pattern (Gait)  step-through  -GIAN      Deviations/Abnormal Patterns (Gait)  essence decreased  -GIAN      Negotiation (Stairs)  --  -JC2      Recorded by [GIAN] Abran Wan, PTA 08/08/19 1107  [JC2] Abran Wan, PTA 08/08/19 1125      Row Name 08/08/19 1056             Therapeutic Exercise    Therapeutic  Exercise  seated, lower extremities  -GIAN      Recorded by [GIAN] Abran Wan, PTA 08/08/19 1125      Row Name 08/08/19 1056             Lower Extremity Seated Therapeutic Exercise    Performed, Seated Lower Extremity (Therapeutic Exercise)  ankle dorsiflexion/plantarflexion;LAQ (long arc quad), knee extension;hip flexion/extension;hip abduction/adduction;other (see comments) GS  -GIAN      Exercise Type, Seated Lower Extremity (Therapeutic Exercise)  AROM (active range of motion)  -GIAN      Sets/Reps Detail, Seated Lower Extremity (Therapeutic Exercise)  30x1 ashley  -GIAN      Recorded by [GIAN] Abran Wan, PTA 08/08/19 1125      Row Name 08/08/19 1056             Positioning and Restraints    Pre-Treatment Position  sitting in chair/recliner  -GIAN      Post Treatment Position  chair  -GIAN      In Chair  reclined;call light within reach;encouraged to call for assist;exit alarm on all needs met.   -GIAN      Recorded by [GIAN] Abran Wan, PTA 08/08/19 1125      Row Name 08/08/19 1056             Pain Scale: Numbers Pre/Post-Treatment    Pain Scale: Numbers, Pretreatment  3/10  -GIAN      Pain Scale: Numbers, Post-Treatment  3/10  -JC2      Pain Location  abdomen  -GIAN      Pain Intervention(s)  Repositioned  -GIAN      Recorded by [GIAN] Abran Wan, PTA 08/08/19 1107  [JC2] Abran Wan, [facility] 08/08/19 1239      Row Name                Wound 07/17/19 2144 abdomen incision    Wound - Properties Group Date first assessed: 07/17/19 [EL] Time first assessed: 2144 [EL] Location: abdomen [EL] Present On Admission : no [EL] Type: incision [EL], laparoscopic x3  Recorded by:  [EL] Lorna Benedict RN 07/17/19 2217    Row Name                Wound 07/17/19 1005 midline abdomen incision;surgical    Wound - Properties Group Date first assessed: 07/17/19 [EL] Time first assessed: 1005 [EL] Orientation: midline [EL] Location: abdomen [EL] Type: incision;surgical [EL] Recorded by:  [EL] Lorna Benedict RN 07/17/19 2218    Row Name                 Wound 07/19/19 0800 Left anterior thigh other (see comments)    Wound - Properties Group Date first assessed: 07/19/19 [RG] Time first assessed: 0800 [RG] Side: Left [RG] Orientation: anterior [RG] Location: thigh [RG] Present On Admission : yes;picture taken [RG2] Type: other (see comments) [RG3], skin graft sight.   Recorded by:  [RG] Xenia Ascencio RN 07/19/19 1515 [RG2] Xenia Ascencio RN 07/19/19 1528 [RG3] Xenia Ascencio RN 07/20/19 0806    Row Name 08/08/19 1056             Outcome Summary/Treatment Plan (PT)    Daily Summary of Progress (PT)  progress toward functional goals as expected  -GIAN      Plan for Continued Treatment (PT)  continue  -GIAN      Anticipated Discharge Disposition (PT)  anticipate therapy at next level of care  -GIAN      Recorded by [GIAN] Abran Wan, PTA 08/08/19 1125        User Key  (r) = Recorded By, (t) = Taken By, (c) = Cosigned By    Initials Name Effective Dates Discipline    Lorna Mckenzie RN 06/05/17 -  Nurse    Xenia Hannah RN 10/17/16 -  Nurse    Abran Ferreira, MICHAEL 03/07/18 -  PT          Wound 07/17/19 2144 abdomen incision (Active)   Dressing Appearance open to air 8/8/2019  9:10 AM   Closure Open to air 8/8/2019  9:10 AM   Base dry;clean 8/8/2019  9:10 AM   Drainage Amount none 8/8/2019  9:10 AM   Dressing Care, Wound open to air 8/7/2019  8:00 PM       Wound 07/17/19 1005 midline abdomen incision;surgical (Active)   Dressing Appearance open to air 8/8/2019  9:10 AM   Closure Open to air 8/8/2019  9:10 AM   Base scab 8/8/2019  9:10 AM   Periwound intact;dry 8/8/2019  9:10 AM   Periwound Temperature warm 8/8/2019  9:10 AM   Drainage Amount none 8/8/2019  9:10 AM   Dressing Care, Wound open to air 8/7/2019  8:00 PM       Wound 07/19/19 0800 Left anterior thigh other (see comments) (Active)   Dressing Appearance dry;intact 8/8/2019  9:10 AM   Closure SAUMYA 8/8/2019  9:10 AM   Base dressing in place, unable to visualize 8/8/2019  9:10 AM    Periwound dry;intact 8/8/2019  9:10 AM   Periwound Temperature warm 8/8/2019  9:10 AM   Drainage Amount none 8/8/2019  9:10 AM       Rehab Goal Summary     Row Name 08/08/19 1056             Physical Therapy Goals    Bed Mobility Goal Selection (PT)  bed mobility, PT goal 1  -GIAN      Transfer Goal Selection (PT)  transfer, PT goal 1  -GIAN      Gait Training Goal Selection (PT)  gait training, PT goal 1  -GIAN         Bed Mobility Goal 1 (PT)    Activity/Assistive Device (Bed Mobility Goal 1, PT)  bed mobility activities, all  -GIAN      Emanuel Level/Cues Needed (Bed Mobility Goal 1, PT)  independent;conditional independence  -GIAN      Time Frame (Bed Mobility Goal 1, PT)  long term goal (LTG);1 week  -GIAN      Barriers (Bed Mobility Goal 1, PT)  fatigue; LOB  -GIAN      Progress/Outcomes (Bed Mobility Goal 1, PT)  goal not met  -GIAN         Transfer Goal 1 (PT)    Activity/Assistive Device (Transfer Goal 1, PT)  bed-to-chair/chair-to-bed;toilet  -GIAN      Emanuel Level/Cues Needed (Transfer Goal 1, PT)  conditional independence  -GIAN      Time Frame (Transfer Goal 1, PT)  1 week  -GIAN      Barriers (Transfers Goal 1, PT)  fatigue/ LOB  -GIAN      Progress/Outcome (Transfer Goal 1, PT)  continuing progress toward goal;goal not met  -GIAN         Gait Training Goal 1 (PT)    Activity/Assistive Device (Gait Training Goal 1, PT)  gait (walking locomotion);assistive device use;decrease fall risk;increase endurance/gait distance  -GIAN      [place] Level (Gait Training Goal 1, PT)  conditional independence;supervision required  -GIAN      Distance (Gait Goal 1, PT)  100 ft or more w/ VSS  -GIAN      Time Frame (Gait Training Goal 1, PT)  by discharge  -GIAN      Barriers (Gait Training Goal 1, PT)  weakness; fatigue  -GIAN      Progress/Outcome (Gait Training Goal 1, PT)  goal ongoing  -GIAN         Strength Goal 1 (PT)    Strength Goal 1 (PT)  patient will tolerate 2 sets of 15 reps for 3 LE antigravity exercises w/ VSS  -GIAN       Time Frame (Strength Goal 1, PT)  1 week  -GIAN      Barriers (Strength Goal 1, PT)  resp/cardiac precautions  -GIAN      Progress/Outcome (Strength Goal 1, PT)  goal met  (Significant)   -        User Key  (r) = Recorded By, (t) = Taken By, (c) = Cosigned By    Initials Name Provider Type Discipline    GIAN Abran Wan, PTA Physical Therapy Assistant PT          Physical Therapy Education     Title: PT OT SLP Therapies (In Progress)     Topic: Physical Therapy (In Progress)     Point: Mobility training (In Progress)     Learning Progress Summary           Patient Acceptance, E, NR by GIAN at 8/8/2019 11:27 AM    Acceptance, E, NR by GIAN at 8/6/2019  1:04 PM    Acceptance, E, NR by GIAN at 7/29/2019 10:02 AM    Acceptance, E,TB, VU by PRASANNA at 7/22/2019  4:14 PM    Acceptance, E,TB, VU by PRASANNA at 7/20/2019 12:41 PM    Acceptance, E, VU by ALECIA at 7/18/2019  1:38 PM    Comment:  Role of PT, PT POC                   Point: Home exercise program (Done)     Learning Progress Summary           Patient Acceptance, E,TB, VU by PRASANNA at 7/22/2019  4:14 PM    Acceptance, E,TB, VU by PRASANNA at 7/20/2019 12:41 PM                   Point: Precautions (Done)     Learning Progress Summary           Patient Acceptance, D, NR,VU by DONNA at 8/3/2019  4:45 PM    Comment:  Discussed d/c planning with her LTG is d/c to home with her.    Acceptance, E,TB, VU by PRASANNA at 7/22/2019  4:14 PM    Acceptance, E,TB, VU by PRASANNA at 7/20/2019 12:41 PM   Family Acceptance, D, NR,VU by DONNA at 8/3/2019  4:45 PM    Comment:  Discussed d/c planning with her LTG is d/c to home with her.                               User Key     Initials Effective Dates Name Provider Type Discipline     04/03/18 -  Danna Huddleston, PT Physical Therapist PT    IGAN 03/07/18 -  Abran Wan PTA Physical Therapy Assistant PT    LN 03/07/18 -  Lynda Ramos PTA Physical Therapy Assistant PT    LF 07/23/18 -  Isis Miller PT Physical Therapist PT                PT Recommendation and  Plan  Anticipated Discharge Disposition (PT): anticipate therapy at next level of care  Therapy Frequency (PT Clinical Impression): daily  Outcome Summary/Treatment Plan (PT)  Daily Summary of Progress (PT): progress toward functional goals as expected  Plan for Continued Treatment (PT): continue  Anticipated Equipment Needs at Discharge (PT): (TBD)  Anticipated Discharge Disposition (PT): anticipate therapy at next level of care  Plan of Care Reviewed With: patient  Progress: improving  Outcome Summary: pt responded well totherapy w/ increased gait to 540 ft CGA initially, progressing to SBA w/o AD. pt w/ increased reps of LE ther ex. pt met 1 new goal this tx. pt would continue to benefit from PT services.   Outcome Measures     Row Name 08/08/19 1056 08/07/19 1336 08/06/19 1123       How much help from another person do you currently need...    Turning from your back to your side while in flat bed without using bedrails?  4  -GIAN  4  -TW  4  -GIAN    Moving from lying on back to sitting on the side of a flat bed without bedrails?  4  -GIAN  4  -TW  4  -GIAN    Moving to and from a bed to a chair (including a wheelchair)?  3  -GIAN  3  -TW  3  -GIAN    Standing up from a chair using your arms (e.g., wheelchair, bedside chair)?  3  -IGAN  3  -TW  3  -GIAN    Climbing 3-5 steps with a railing?  3  -GIAN  3  -TW  3  -GIAN    To walk in hospital room?  3  -GIAN  3  -TW  3  -GIAN    AM-PAC 6 Clicks Score (PT)  20  -GIAN  20  -TW  20  -GIAN       Functional Assessment    Outcome Measure Options  AM-PAC 6 Clicks Basic Mobility (PT)  -GIAN  AM-PAC 6 Clicks Basic Mobility (PT)  -TW  AM-PAC 6 Clicks Basic Mobility (PT)  -    Row Name 08/06/19 1045 08/05/19 1320          How much help from another person do you currently need...    Turning from your back to your side while in flat bed without using bedrails?  --  4  -JA     Moving from lying on back to sitting on the side of a flat bed without bedrails?  --  4  -JA     Moving to and from a bed to a  chair (including a wheelchair)?  --  4  -JA     Standing up from a chair using your arms (e.g., wheelchair, bedside chair)?  --  3  -JA     Climbing 3-5 steps with a railing?  --  2  -JA     To walk in hospital room?  --  3  -JA     AM-PAC 6 Clicks Score (PT)  --  20  -JA        How much help from another is currently needed...    Putting on and taking off regular lower body clothing?  3  -KD  --     Bathing (including washing, rinsing, and drying)  3  -KD  --     Toileting (which includes using toilet bed pan or urinal)  3  -KD  --     Putting on and taking off regular upper body clothing  3  -KD  --     Taking care of personal grooming (such as brushing teeth)  4  -KD  --     Eating meals  1  -KD  --     AM-PAC 6 Clicks Score (OT)  17  -KD  --        Functional Assessment    Outcome Measure Options  --  AM-PAC 6 Clicks Basic Mobility (PT)  -       User Key  (r) = Recorded By, (t) = Taken By, (c) = Cosigned By    Initials Name Provider Type    Ken Lagunas, MICHAEL Physical Therapy Assistant    Abran Ferreira, PTA Physical Therapy Assistant    TW Joseph Donnelly, PTA Physical Therapy Assistant    KD Isabelle Schafer, MADSEN/L Occupational Therapy Assistant         Time Calculation:   PT Charges     Row Name 08/08/19 1239             Time Calculation    Start Time  1056  -GIAN      Stop Time  1145  -GIAN      Time Calculation (min)  49 min  -GIAN      PT Non-Billable Time (min)  5 min  -GIAN         Time Calculation- PT    Total Timed Code Minutes- PT  44 minute(s)  -        User Key  (r) = Recorded By, (t) = Taken By, (c) = Cosigned By    Initials Name Provider Type    Abran Ferreira, MICHAEL Physical Therapy Assistant        Therapy Charges for Today     Code Description Service Date Service Provider Modifiers Qty    52130903877 HC GAIT TRAINING EA 15 MIN 8/8/2019 Abran Wan, PTA GP 1    73389517790 HC PT THER PROC EA 15 MIN 8/8/2019 Abran Wan, PTA GP 2          PT G-Codes  Outcome Measure  Options: AM-PAC 6 Clicks Basic Mobility (PT)  AM-PAC 6 Clicks Score (PT): 20  AM-PAC 6 Clicks Score (OT): 17    Abran Wan, PTA  8/8/2019

## 2019-08-08 NOTE — CONSULTS
Adult Nutrition  Assessment    Patient Name:  Emerson Bang  YOB: 1960  MRN: [id number]  Admit Date:  7/15/2019    Assessment Date:  8/8/2019    Comments:  APRN was requesting bolus TF recommendations. Would recommend bolus 1st day- 120ml Peptamen 1.5 6x today with 30ml flush before and after each bolus. Goal for TF bolus Peptamen 1.5  is 5 cans(240ml each can) total/day with approx  40ml before and after each can (400ml)- which will provide 1875cal, 85g pro and total of 1365ml/day- which is on the low side of estimated needs d/t low Na levels.  Spoke w/ Nas, Clinical Leader this am and he is ready for order to be placed in [facility]. RD will place this. RD to follow hospital course.        Anthropometrics     Row Name 08/08/19 0639          Anthropometrics    Weight  46.8 kg (103 lb 1.6 oz)                         Electronically signed by:  Alexia Bowden RD  08/08/19 7:55 AM

## 2019-08-08 NOTE — PLAN OF CARE
Problem: Patient Care Overview  Goal: Plan of Care Review  Outcome: Ongoing (interventions implemented as appropriate)   08/08/19 0325   Coping/Psychosocial   Plan of Care Reviewed With patient   Plan of Care Review   Progress no change   OTHER   Outcome Summary VSS, pain controlled, trach suctioning performed, pt back in bed, will continue to monitor.       Problem: Oncology Care (Adult)  Goal: Signs and Symptoms of Listed Potential Problems Will be Absent, Minimized or Managed (Oncology Care)  Outcome: Ongoing (interventions implemented as appropriate)      Problem: Airway, Artificial (Adult)  Goal: Signs and Symptoms of Listed Potential Problems Will be Absent, Minimized or Managed (Airway, Artificial)  Outcome: Ongoing (interventions implemented as appropriate)      Problem: Urine Elimination Impaired (Adult)  Goal: Identify Related Risk Factors and Signs and Symptoms  Outcome: Outcome(s) achieved Date Met: 08/08/19    Goal: Effective or Improved Urinary Elimination  Outcome: Ongoing (interventions implemented as appropriate)    Goal: Effective Containment of Urine  Outcome: Ongoing (interventions implemented as appropriate)    Goal: Reduced Incontinence Episodes  Outcome: Ongoing (interventions implemented as appropriate)

## 2019-08-08 NOTE — PROGRESS NOTES
"   LOS: 23 days   Patient Care Team:  Cristino He MD as PCP - General    Subjective     Subjective:  Symptoms:  Stable.  (Slightly cloudy urine in Palomo's gravity bag. ).        History taken from: patient chart RN    Objective     Vital Signs  Temp:  [96.9 °F (36.1 °C)-98.3 °F (36.8 °C)] 97.8 °F (36.6 °C)  Heart Rate:  [] 92  Resp:  [16-18] 18  BP: ()/(44-60) 102/50    Objective:  General Appearance:  In no acute distress.    Vital signs: (most recent): Blood pressure 102/50, pulse 92, temperature 97.8 °F (36.6 °C), resp. rate 18, height 165.1 cm (65\"), weight 46.8 kg (103 lb 1.6 oz), SpO2 96 %.  Vital signs are normal.  No fever.    Output: Producing urine.    Lungs:  Normal effort and normal respiratory rate.  There are decreased breath sounds.    Heart: Normal rate.  S1 normal and S2 normal.    Chest: Symmetric chest wall expansion.   Abdomen: Abdomen is soft and non-distended.  There is generalized tenderness.     Extremities: There is no deformity or dependent edema.    Pulses: Distal pulses are intact.    Neurological: Patient is alert.    Pupils:  Pupils are equal, round, and reactive to light.    Skin:  Warm and dry.  No rash, ecchymosis or cyanosis.             Results Review:    Lab Results (last 24 hours)     Procedure Component Value Units Date/Time    Iron Profile [[id number]]  (Abnormal) Collected:  08/08/19 0526    Specimen:  Blood Updated:  08/08/19 1107     Iron 39 mcg/dL      Iron Saturation 14 %      Transferrin 190 mg/dL      TIBC 283 mcg/dL     Comprehensive Metabolic Panel [[id number]]  (Abnormal) Collected:  08/08/19 0526    Specimen:  Blood Updated:  08/08/19 0632     Glucose 140 mg/dL      BUN 16 mg/dL      Creatinine 0.53 mg/dL      Sodium 132 mmol/L      Potassium 3.9 mmol/L      Chloride 96 mmol/L      CO2 25.0 mmol/L      Calcium 9.6 mg/dL      Total Protein 8.1 g/dL      Albumin 3.60 g/dL      ALT (SGPT) 9 U/L      AST (SGOT) 16 U/L      Alkaline Phosphatase 91 U/L      " Total Bilirubin <0.2 mg/dL      eGFR  African Amer >150 mL/min/1.73      Globulin 4.5 gm/dL      A/G Ratio 0.8 g/dL      BUN/Creatinine Ratio 30.2     Anion Gap 11.0 mmol/L     Narrative:       GFR Normal >60  Chronic Kidney Disease <60  Kidney Failure <15    CBC & Differential [[id number]] Collected:  08/08/19 0526    Specimen:  Blood Updated:  08/08/19 0622    Narrative:       The following orders were created for panel order CBC & Differential.  Procedure                               Abnormality         Status                     ---------                               -----------         ------                     Scan Slide[[id number]]                                                                  CBC Auto Differential[[id number]]        Abnormal            Final result                 Please view results for these tests on the individual orders.    CBC Auto Differential [[id number]]  (Abnormal) Collected:  08/08/19 0526    Specimen:  Blood Updated:  08/08/19 0622     WBC 5.90 10*3/mm3      RBC 3.39 10*6/mm3      Hemoglobin 8.5 g/dL      Hematocrit 24.7 %      MCV 72.9 fL      MCH 25.1 pg      MCHC 34.4 g/dL      RDW 17.4 %      RDW-SD 45.2 fl      MPV 10.5 fL      Platelets 573 10*3/mm3      Neutrophil % 41.2 %      Lymphocyte % 30.2 %      Monocyte % 23.7 %      Eosinophil % 3.2 %      Basophil % 1.2 %      Immature Grans % 0.5 %      Neutrophils, Absolute 2.43 10*3/mm3      Lymphocytes, Absolute 1.78 10*3/mm3      Monocytes, Absolute 1.40 10*3/mm3      Eosinophils, Absolute 0.19 10*3/mm3      Basophils, Absolute 0.07 10*3/mm3      Immature Grans, Absolute 0.03 10*3/mm3      nRBC 0.0 /100 WBC          Imaging Results (last 24 hours)     ** No results found for the last 24 hours. **           I reviewed the patient's new clinical results.  I reviewed the patient's new imaging results and agree with the interpretation.  I reviewed the patient's other test results and agree with the  interpretation      Assessment/Plan       Hypotension    Hypothyroidism    Squamous cell carcinoma of pharynx (CMS/HCC)    Iron deficiency anemia    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HCC)    Severe protein-calorie malnutrition (CMS/HCC)    Hyponatremia      Assessment & Plan    1. BPH with retention of urine, chronicity unknown  -Palomo reanchored 8/6/19 after failing voiding trials  -No symptoms of UTI  -Estimated Creatinine Clearance: 96.9 mL/min (A) (by C-G formula based on SCr of 0.53 mg/dL (L)).     Plan:  Keep Palomo, plan cystoscopy prefer outpatient  No Flomax/alpha blocker due to hypotension.   No Proscar or Avodart until prostate is evaluated, ETIENNE will be performed when Palomo is removed.    Follow up outpatient Urology within 1 week from discharge.     ZARA Fox  08/08/19  5:39 PM

## 2019-08-08 NOTE — PROGRESS NOTES
[facility]  INPATIENT PROGRESS NOTE    Length of Stay: 23  Date of Admission: 7/15/2019  Primary Care Physician: Cristino He MD    Subjective   Chief Complaint: No new complaints.    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  Patient was found to have a high-grade small bowel obstruction and underwent surgery by Dr. Kc for lysis of adhesions (7/17/2019).  The patient required intensive care secondary to being hemodynamically unstable.  Patient was started on TPN for severe protein caloric malnutrition but has since improved and is tolerating G-tube feeding.       Review of Systems   Constitutional: Positive for activity change and fatigue. Negative for appetite change, chills, diaphoresis and fever.   HENT: Negative for trouble swallowing and voice change.    Eyes: Negative for photophobia and visual disturbance.   Respiratory: Negative for cough, choking, chest tightness, shortness of breath, wheezing and stridor.    Cardiovascular: Negative for chest pain, palpitations and leg swelling.   Gastrointestinal: Negative for abdominal distention, abdominal pain, blood in stool, constipation, diarrhea, nausea and vomiting.   Endocrine: Negative for cold intolerance, heat intolerance, polydipsia, polyphagia and polyuria.   Genitourinary: Negative for decreased urine volume, difficulty urinating, dysuria, enuresis, flank pain, frequency, hematuria and urgency.   Musculoskeletal: Negative for arthralgias, gait problem, myalgias, neck pain and neck stiffness.   Skin: Negative for pallor, rash and wound.   Neurological: Negative for dizziness, tremors, seizures, syncope, facial  asymmetry, speech difficulty, weakness, light-headedness, numbness and headaches.   Hematological: Does not bruise/bleed easily.   Psychiatric/Behavioral: Negative for agitation, behavioral problems and confusion.       Objective    Temp:  [96.9 °F (36.1 °C)-98.3 °F (36.8 °C)] 97.5 °F (36.4 °C)  Heart Rate:  [] 100  Resp:  [16-18] 16  BP: ()/(44-74) 103/57    Physical Exam   Constitutional: He is oriented to person, place, and time. He appears well-developed and well-nourished. He appears ill. No distress.   Patient is undernourished and has a BMI of 17.16.   HENT:   Head: Normocephalic and atraumatic.   Eyes: EOM are normal. Pupils are equal, round, and reactive to light. No scleral icterus.   Neck: Normal range of motion. Neck supple. No JVD present. No thyromegaly present.   Tracheostomy is in place and the site is clean and dry.   Cardiovascular: Normal rate, regular rhythm and normal heart sounds. Exam reveals no gallop and no friction rub.   No murmur heard.  Pulmonary/Chest: Effort normal and breath sounds normal. He has no wheezes. He has no rales. He exhibits no tenderness.   Abdominal: Soft. Bowel sounds are normal. He exhibits no distension and no mass. There is no tenderness. There is no rebound and no guarding.   G-tube is in place and functional.   Musculoskeletal: He exhibits no edema, tenderness or deformity.   Neurological: He is alert and oriented to person, place, and time. No cranial nerve deficit. He exhibits normal muscle tone. Coordination normal.   Skin: Skin is warm and dry. No rash noted. He is not diaphoretic. No erythema. No pallor.   Psychiatric: He has a normal mood and affect. His behavior is normal. Judgment and thought content normal.   Nursing note and vitals reviewed.        Medication Review:    Current Facility-Administered Medications:   •  acetaminophen (TYLENOL) 160 MG/5ML solution 650 mg, 650 mg, Oral, Q6H PRN, Flynn Harvey MD, 650 mg at 08/06/19 0540  •   albuterol (PROVENTIL) nebulizer solution 0.083% 2.5 mg/3mL, 2.5 mg, Nebulization, Q6H - RT, Cristino Holland MD, 2.5 mg at 19 0646  •  bacitracin ointment, , Topical, Daily, Cristino Holland MD  •  bisacodyl (DULCOLAX) suppository 10 mg, 10 mg, Rectal, Daily, Parminder Kc MD, 10 mg at 19 0900  •  enalaprilat (VASOTEC) injection 0.625 mg, 0.625 mg, Intravenous, Q6H PRN, Parminder Kc MD  •  enoxaparin (LOVENOX) syringe 40 mg, 40 mg, Subcutaneous, Daily, Parminder Kc MD, 40 mg at 19 0918  •  levothyroxine sodium injection 125 mcg, 125 mcg, Intravenous, Q AM, Cristino Holland MD, 125 mcg at 19 0525  •  midodrine (PROAMATINE) tablet 10 mg, 10 mg, Oral, TID AC, lUices Naik MD, 10 mg at 19 0917  •  [] HYDROmorphone (DILAUDID) injection 1 mg, 1 mg, Intravenous, Q3H PRN, 1 mg at 19 0050 **AND** naloxone (NARCAN) injection 0.1 mg, 0.1 mg, Intravenous, Q5 Min PRN, Parminder Kc MD  •  ondansetron (ZOFRAN) injection 4 mg, 4 mg, Intravenous, Q6H PRN, Parminder Kc MD, 4 mg at 19 0835  •  pantoprazole (PROTONIX) injection 40 mg, 40 mg, Intravenous, Q24H, Parminder Kc MD, 40 mg at 19 0918  •  [COMPLETED] Insert peripheral IV, , , Once **AND** sodium chloride 0.9 % flush 10 mL, 10 mL, Intravenous, PRN, Pillo Cardenas MD, 10 mL at 19 0905  •  sodium chloride 0.9 % flush 10 mL, 10 mL, Intravenous, BID, Jose Antonio Leiva MD, 10 mL at 19  •  sodium chloride 0.9 % flush 10 mL, 10 mL, Intravenous, BID, Jose Antonio Leiva MD, 10 mL at 19  •  sodium chloride 0.9 % flush 10 mL, 10 mL, Intravenous, BID, Jose Antonio Leiva MD, 10 mL at 19  •  sodium chloride tablet 1 g, 1 g, Oral, TID With Meals, Ulices Naik MD, 1 g at 19    Results Review:  I have reviewed the labs, radiology results, and diagnostic studies.    Laboratory Data:   Results from last 7 days   Lab Units 19  0526 19  0505 19  0612   SODIUM mmol/L 132*  134* 133*   POTASSIUM mmol/L 3.9 4.2 4.0   CHLORIDE mmol/L 96* 96* 94*   CO2 mmol/L 25.0 27.0 26.0   BUN mg/dL 16 15 16   CREATININE mg/dL 0.53* 0.50* 0.43*   GLUCOSE mg/dL 140* 107* 148*   CALCIUM mg/dL 9.6 9.8 9.6   BILIRUBIN mg/dL <0.2* <0.2* <0.2*   ALK PHOS U/L 91 95 94   ALT (SGPT) U/L 9 9 8   AST (SGOT) U/L 16 20 16   ANION GAP mmol/L 11.0 11.0 13.0     Estimated Creatinine Clearance: 96.9 mL/min (A) (by C-G formula based on SCr of 0.53 mg/dL (L)).  Results from last 7 days   Lab Units 08/03/19  0529   MAGNESIUM mg/dL 1.8   PHOSPHORUS mg/dL 3.5         Results from last 7 days   Lab Units 08/08/19  0526 08/07/19  0505 08/06/19  0612 08/05/19  0655 08/04/19  0653   WBC 10*3/mm3 5.90 6.65 7.07 8.30 7.79   HEMOGLOBIN g/dL 8.5* 8.8* 8.2* 9.4* 9.5*   HEMATOCRIT % 24.7* 25.3* 23.7* 27.0* 27.5*   PLATELETS 10*3/mm3 573* 585* 592* 593* 595*           Culture Data:   No results found for: BLOODCX  No results found for: URINECX  No results found for: RESPCX  No results found for: WOUNDCX  No results found for: STOOLCX  No components found for: BODYFLD    Radiology Data:   Imaging Results (last 24 hours)     ** No results found for the last 24 hours. **          I have reviewed the patient's current medications.     Assessment/Plan     Hospital Problem List:  Principal Problem:    Hypotension  Active Problems:    Hypothyroidism    Squamous cell carcinoma of pharynx (CMS/HCC)    Iron deficiency anemia    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HCC)    Severe protein-calorie malnutrition (CMS/HCC)    Hyponatremia    High-grade small bowel obstruction: Patient is status post laparotomy and lysis of adhesion.    Hyponatremia: Improving.  Continue sodium chloride salt.    Hypotension (likely due to autonomic insufficiency):.  Stable.  Continue Midodrin.    Severe protein calorie malnutrition: Continue G-tube feeding.  Dietitian is following.    [place] carcinoma of the pharynx status post tracheostomy: Patient to follow-up with his primary  oncologist on discharge.    Hypothyroidism: Continue Synthroid.    History of urinary distention: Palomo catheter remains in place as recommended by urologist.    Anemia (likely of chronic inflammation versus iron deficiency): Hemoglobin is stable.  Check iron profile.    Deconditioning: Continue PT and OT.    Discharge Planning: Likely discharge in a..    Saul Gomez MD   08/08/19   10:28 AM

## 2019-08-08 NOTE — PLAN OF CARE
Problem: Patient Care Overview  Goal: Plan of Care Review  Outcome: Ongoing (interventions implemented as appropriate)   08/08/19 3302   Coping/Psychosocial   Plan of Care Reviewed With patient   Plan of Care Review   Progress improving   OTHER   Outcome Summary Pt tolerated tx well this date. Pt gave good effort with UE ther ex. Continue OT POC.

## 2019-08-09 VITALS
RESPIRATION RATE: 18 BRPM | HEART RATE: 89 BPM | BODY MASS INDEX: 17.16 KG/M2 | DIASTOLIC BLOOD PRESSURE: 57 MMHG | TEMPERATURE: 96.4 F | SYSTOLIC BLOOD PRESSURE: 101 MMHG | WEIGHT: 103 LBS | HEIGHT: 65 IN | OXYGEN SATURATION: 98 %

## 2019-08-09 LAB
BASOPHILS # BLD AUTO: 0.07 10*3/MM3 (ref 0–0.2)
BASOPHILS NFR BLD AUTO: 1.3 % (ref 0–1.5)
DEPRECATED RDW RBC AUTO: 45.2 FL (ref 37–54)
EOSINOPHIL # BLD AUTO: 0.35 10*3/MM3 (ref 0–0.4)
EOSINOPHIL NFR BLD AUTO: 6.6 % (ref 0.3–6.2)
ERYTHROCYTE [DISTWIDTH] IN BLOOD BY AUTOMATED COUNT: 17.5 % (ref 12.3–15.4)
HCT VFR BLD AUTO: 24.9 % (ref 37.5–51)
HGB BLD-MCNC: 8.7 G/DL (ref 13–17.7)
IMM GRANULOCYTES # BLD AUTO: 0.02 10*3/MM3 (ref 0–0.05)
IMM GRANULOCYTES NFR BLD AUTO: 0.4 % (ref 0–0.5)
LYMPHOCYTES # BLD AUTO: 1.65 10*3/MM3 (ref 0.7–3.1)
LYMPHOCYTES NFR BLD AUTO: 31 % (ref 19.6–45.3)
MCH RBC QN AUTO: 25.4 PG (ref 26.6–33)
MCHC RBC AUTO-ENTMCNC: 34.9 G/DL (ref 31.5–35.7)
MCV RBC AUTO: 72.6 FL (ref 79–97)
MONOCYTES # BLD AUTO: 1.22 10*3/MM3 (ref 0.1–0.9)
MONOCYTES NFR BLD AUTO: 22.9 % (ref 5–12)
NEUTROPHILS # BLD AUTO: 2.02 10*3/MM3 (ref 1.7–7)
NEUTROPHILS NFR BLD AUTO: 37.8 % (ref 42.7–76)
NRBC BLD AUTO-RTO: 0 /100 WBC (ref 0–0.2)
PLATELET # BLD AUTO: 546 10*3/MM3 (ref 140–450)
PMV BLD AUTO: 10.3 FL (ref 6–12)
RBC # BLD AUTO: 3.43 10*6/MM3 (ref 4.14–5.8)
WBC NRBC COR # BLD: 5.33 10*3/MM3 (ref 3.4–10.8)

## 2019-08-09 PROCEDURE — 25010000002 ENOXAPARIN PER 10 MG: Performed by: SURGERY

## 2019-08-09 PROCEDURE — 97110 THERAPEUTIC EXERCISES: CPT

## 2019-08-09 PROCEDURE — 94799 UNLISTED PULMONARY SVC/PX: CPT

## 2019-08-09 PROCEDURE — 85025 COMPLETE CBC W/AUTO DIFF WBC: CPT | Performed by: FAMILY MEDICINE

## 2019-08-09 PROCEDURE — 97535 SELF CARE MNGMENT TRAINING: CPT

## 2019-08-09 PROCEDURE — 94760 N-INVAS EAR/PLS OXIMETRY 1: CPT

## 2019-08-09 RX ORDER — ALBUTEROL SULFATE 2.5 MG/3ML
2.5 SOLUTION RESPIRATORY (INHALATION)
Qty: 360 ML | Refills: 1 | Status: SHIPPED | OUTPATIENT
Start: 2019-08-09 | End: 2019-09-06

## 2019-08-09 RX ORDER — LEVOTHYROXINE SODIUM ANHYDROUS 100 UG/5ML
125 INJECTION, POWDER, LYOPHILIZED, FOR SOLUTION INTRAVENOUS
Qty: 30 EACH | Refills: 1 | Status: SHIPPED | OUTPATIENT
Start: 2019-08-09 | End: 2019-08-09 | Stop reason: HOSPADM

## 2019-08-09 RX ORDER — FERROUS SULFATE 220 (44)/5
220 ELIXIR ORAL DAILY
Qty: 300 ML | Refills: 1 | Status: SHIPPED | OUTPATIENT
Start: 2019-08-10

## 2019-08-09 RX ORDER — SODIUM CHLORIDE 1000 MG
1 TABLET, SOLUBLE MISCELLANEOUS
Qty: 90 TABLET | Refills: 1 | Status: SHIPPED | OUTPATIENT
Start: 2019-08-09 | End: 2019-08-16

## 2019-08-09 RX ORDER — MIDODRINE HYDROCHLORIDE 10 MG/1
10 TABLET ORAL
Qty: 90 TABLET | Refills: 1 | Status: SHIPPED | OUTPATIENT
Start: 2019-08-09 | End: 2019-08-16

## 2019-08-09 RX ORDER — LEVOTHYROXINE SODIUM 0.12 MG/1
125 TABLET ORAL DAILY
Qty: 30 TABLET | Refills: 1 | Status: SHIPPED | OUTPATIENT
Start: 2019-08-09 | End: 2019-09-13 | Stop reason: SDUPTHER

## 2019-08-09 RX ORDER — FERROUS SULFATE 220 (44)/5
220 ELIXIR ORAL DAILY
Status: DISCONTINUED | OUTPATIENT
Start: 2019-08-09 | End: 2019-08-09 | Stop reason: HOSPADM

## 2019-08-09 RX ADMIN — Medication 220 MG: at 08:45

## 2019-08-09 RX ADMIN — LEVOTHYROXINE SODIUM ANHYDROUS 125 MCG: 100 INJECTION, POWDER, LYOPHILIZED, FOR SOLUTION INTRAVENOUS at 06:09

## 2019-08-09 RX ADMIN — SODIUM CHLORIDE, PRESERVATIVE FREE 10 ML: 5 INJECTION INTRAVENOUS at 08:46

## 2019-08-09 RX ADMIN — ALBUTEROL SULFATE 2.5 MG: 2.5 SOLUTION RESPIRATORY (INHALATION) at 07:30

## 2019-08-09 RX ADMIN — BACITRACIN: 500 OINTMENT TOPICAL at 08:44

## 2019-08-09 RX ADMIN — MIDODRINE HYDROCHLORIDE 10 MG: 5 TABLET ORAL at 08:45

## 2019-08-09 RX ADMIN — MIDODRINE HYDROCHLORIDE 10 MG: 5 TABLET ORAL at 11:10

## 2019-08-09 RX ADMIN — SODIUM CHLORIDE, PRESERVATIVE FREE 10 ML: 5 INJECTION INTRAVENOUS at 08:45

## 2019-08-09 RX ADMIN — ENOXAPARIN SODIUM 40 MG: 40 INJECTION SUBCUTANEOUS at 08:44

## 2019-08-09 RX ADMIN — BISACODYL 10 MG: 10 SUPPOSITORY RECTAL at 08:45

## 2019-08-09 RX ADMIN — Medication 1 G: at 08:45

## 2019-08-09 RX ADMIN — PANTOPRAZOLE SODIUM 40 MG: 40 INJECTION, POWDER, FOR SOLUTION INTRAVENOUS at 08:44

## 2019-08-09 RX ADMIN — Medication 1 G: at 11:10

## 2019-08-09 RX ADMIN — ACETAMINOPHEN 650 MG: 650 SOLUTION ORAL at 06:50

## 2019-08-09 NOTE — DISCHARGE SUMMARY
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  Patient was found to have a high-grade small bowel obstruction and underwent surgery by Dr. Kc for lysis of adhesions (7/17/2019).  The patient required intensive care secondary to being hemodynamically unstable.  Patient was started on TPN for severe protein caloric malnutrition but has since improved and is tolerating G-tube feeding.            Final Discharge Diagnoses:  Active Hospital Problems    Diagnosis   • **Hypotension   • Hyponatremia   • Severe protein-calorie malnutrition (CMS/HCC)   • 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HCC)   • Iron deficiency anemia   • Squamous cell carcinoma of pharynx (CMS/HCC)   • Hypothyroidism       Consults:   Consults     Date and Time Order Name Status Description    8/6/2019 1400 Inpatient Urology Consult Completed     7/29/2019 0840 Inpatient Nephrology Consult Completed     7/16/2019 0808 Inpatient General Surgery Consult Completed     7/16/2019 0112 Hematology & Oncology Inpatient Consult Completed     7/16/2019 0008 Surgery (on-call MD unless specified)      7/16/2019 0007 Hospitalist (on-call MD unless specified)            Procedures Performed: Procedure(s):  DIAGNOSTIC LAPAROSCOPY, EXPLORATORY LAPAROTOMY, LYSIS OF ADHESIONS                Pertinent Test Results:   Lab Results (last 7 days)     Procedure Component Value Units Date/Time    CBC &  Differential [[id number]] Collected:  08/09/19 0552    Specimen:  Blood Updated:  08/09/19 0625    Narrative:       The following orders were created for panel order CBC & Differential.  Procedure                               Abnormality         Status                     ---------                               -----------         ------                     Scan Slide[[id number]]                                                                  CBC Auto Differential[[id number]]        Abnormal            Final result                 Please view results for these tests on the individual orders.    CBC Auto Differential [[id number]]  (Abnormal) Collected:  08/09/19 0552    Specimen:  Blood Updated:  08/09/19 0614     WBC 5.33 10*3/mm3      RBC 3.43 10*6/mm3      Hemoglobin 8.7 g/dL      Hematocrit 24.9 %      MCV 72.6 fL      MCH 25.4 pg      MCHC 34.9 g/dL      RDW 17.5 %      RDW-SD 45.2 fl      MPV 10.3 fL      Platelets 546 10*3/mm3      Neutrophil % 37.8 %      Lymphocyte % 31.0 %      Monocyte % 22.9 %      Eosinophil % 6.6 %      Basophil % 1.3 %      Immature Grans % 0.4 %      Neutrophils, Absolute 2.02 10*3/mm3      Lymphocytes, Absolute 1.65 10*3/mm3      Monocytes, Absolute 1.22 10*3/mm3      Eosinophils, Absolute 0.35 10*3/mm3      Basophils, Absolute 0.07 10*3/mm3      Immature Grans, Absolute 0.02 10*3/mm3      nRBC 0.0 /100 WBC     Iron Profile [[id number]]  (Abnormal) Collected:  08/08/19 0526    Specimen:  Blood Updated:  08/08/19 1107     Iron 39 mcg/dL      Iron Saturation 14 %      Transferrin 190 mg/dL      TIBC 283 mcg/dL     Comprehensive Metabolic Panel [[id number]]  (Abnormal) Collected:  08/08/19 0526    Specimen:  Blood Updated:  08/08/19 0632     Glucose 140 mg/dL      BUN 16 mg/dL      Creatinine 0.53 mg/dL      Sodium 132 mmol/L      Potassium 3.9 mmol/L      Chloride 96 mmol/L      CO2 25.0 mmol/L      Calcium 9.6 mg/dL      Total Protein 8.1 g/dL      Albumin 3.60 g/dL      ALT (SGPT) 9 U/L       AST (SGOT) 16 U/L      Alkaline Phosphatase 91 U/L      Total Bilirubin <0.2 mg/dL      eGFR  African Amer >150 mL/min/1.73      Globulin 4.5 gm/dL      A/G Ratio 0.8 g/dL      BUN/Creatinine Ratio 30.2     Anion Gap 11.0 mmol/L     Narrative:       GFR Normal >60  Chronic Kidney Disease <60  Kidney Failure <15    CBC & Differential [[id number]] Collected:  08/08/19 0526    Specimen:  Blood Updated:  08/08/19 0622    Narrative:       The following orders were created for panel order CBC & Differential.  Procedure                               Abnormality         Status                     ---------                               -----------         ------                     Scan Slide[[id number]]                                                                  CBC Auto Differential[[id number]]        Abnormal            Final result                 Please view results for these tests on the individual orders.    CBC Auto Differential [[id number]]  (Abnormal) Collected:  08/08/19 0526    Specimen:  Blood Updated:  08/08/19 0622     WBC 5.90 10*3/mm3      RBC 3.39 10*6/mm3      Hemoglobin 8.5 g/dL      Hematocrit 24.7 %      MCV 72.9 fL      MCH 25.1 pg      MCHC 34.4 g/dL      RDW 17.4 %      RDW-SD 45.2 fl      MPV 10.5 fL      Platelets 573 10*3/mm3      Neutrophil % 41.2 %      Lymphocyte % 30.2 %      Monocyte % 23.7 %      Eosinophil % 3.2 %      Basophil % 1.2 %      Immature Grans % 0.5 %      Neutrophils, Absolute 2.43 10*3/mm3      Lymphocytes, Absolute 1.78 10*3/mm3      Monocytes, Absolute 1.40 10*3/mm3      Eosinophils, Absolute 0.19 10*3/mm3      Basophils, Absolute 0.07 10*3/mm3      Immature Grans, Absolute 0.03 10*3/mm3      nRBC 0.0 /100 WBC     CBC & Differential [[id number]] Collected:  08/07/19 0505    Specimen:  Blood Updated:  08/07/19 0703    Narrative:       The following orders were created for panel order CBC & Differential.  Procedure                               Abnormality         Status                      ---------                               -----------         ------                     Scan Slide[[id number]]                                                                  CBC Auto Differential[[id number]]        Abnormal            Final result                 Please view results for these tests on the individual orders.    Comprehensive Metabolic Panel [[id number]]  (Abnormal) Collected:  08/07/19 0505    Specimen:  Blood Updated:  08/07/19 0629     Glucose 107 mg/dL      BUN 15 mg/dL      Creatinine 0.50 mg/dL      Sodium 134 mmol/L      Potassium 4.2 mmol/L      Chloride 96 mmol/L      CO2 27.0 mmol/L      Calcium 9.8 mg/dL      Total Protein 8.2 g/dL      Albumin 3.80 g/dL      ALT (SGPT) 9 U/L      AST (SGOT) 20 U/L      Alkaline Phosphatase 95 U/L      Total Bilirubin <0.2 mg/dL      eGFR  African Amer >150 mL/min/1.73      Globulin 4.4 gm/dL      A/G Ratio 0.9 g/dL      BUN/Creatinine Ratio 30.0     Anion Gap 11.0 mmol/L     Narrative:       GFR Normal >60  Chronic Kidney Disease <60  Kidney Failure <15    CBC Auto Differential [[id number]]  (Abnormal) Collected:  08/07/19 0505    Specimen:  Blood Updated:  08/07/19 0615     WBC 6.65 10*3/mm3      RBC 3.48 10*6/mm3      Hemoglobin 8.8 g/dL      Hematocrit 25.3 %      MCV 72.7 fL      MCH 25.3 pg      MCHC 34.8 g/dL      RDW 17.6 %      RDW-SD 45.4 fl      MPV 10.8 fL      Platelets 585 10*3/mm3      Neutrophil % 49.0 %      Lymphocyte % 22.1 %      Monocyte % 23.3 %      Eosinophil % 3.8 %      Basophil % 1.2 %      Immature Grans % 0.6 %      Neutrophils, Absolute 3.26 10*3/mm3      Lymphocytes, Absolute 1.47 10*3/mm3      Monocytes, Absolute 1.55 10*3/mm3      Eosinophils, Absolute 0.25 10*3/mm3      Basophils, Absolute 0.08 10*3/mm3      Immature Grans, Absolute 0.04 10*3/mm3      nRBC 0.0 /100 WBC     Comprehensive Metabolic Panel [[id number]]  (Abnormal) Collected:  08/06/19 0612    Specimen:  Blood Updated:  08/06/19 0732     Glucose  148 mg/dL      BUN 16 mg/dL      Creatinine 0.43 mg/dL      Sodium 133 mmol/L      Potassium 4.0 mmol/L      Chloride 94 mmol/L      CO2 26.0 mmol/L      Calcium 9.6 mg/dL      Total Protein 7.9 g/dL      Albumin 3.60 g/dL      ALT (SGPT) 8 U/L      AST (SGOT) 16 U/L      Alkaline Phosphatase 94 U/L      Total Bilirubin <0.2 mg/dL      eGFR  African Amer >150 mL/min/1.73      Globulin 4.3 gm/dL      A/G Ratio 0.8 g/dL      BUN/Creatinine Ratio 37.2     Anion Gap 13.0 mmol/L     Narrative:       GFR Normal >60  Chronic Kidney Disease <60  Kidney Failure <15    CBC & Differential [[id number]] Collected:  08/06/19 0612    Specimen:  Blood Updated:  08/06/19 0641    Narrative:       The following orders were created for panel order CBC & Differential.  Procedure                               Abnormality         Status                     ---------                               -----------         ------                     Scan Slide[[id number]]                                                                  CBC Auto Differential[[id number]]        Abnormal            Final result                 Please view results for these tests on the individual orders.    CBC Auto Differential [[id number]]  (Abnormal) Collected:  08/06/19 0612    Specimen:  Blood Updated:  08/06/19 0641     WBC 7.07 10*3/mm3      RBC 3.27 10*6/mm3      Hemoglobin 8.2 g/dL      Hematocrit 23.7 %      MCV 72.5 fL      MCH 25.1 pg      MCHC 34.6 g/dL      RDW 17.4 %      RDW-SD 45.3 fl      MPV 10.5 fL      Platelets 592 10*3/mm3      Neutrophil % 47.7 %      Lymphocyte % 26.6 %      Monocyte % 19.1 %      Eosinophil % 4.4 %      Basophil % 1.6 %      Immature Grans % 0.6 %      Neutrophils, Absolute 3.38 10*3/mm3      Lymphocytes, Absolute 1.88 10*3/mm3      Monocytes, Absolute 1.35 10*3/mm3      Eosinophils, Absolute 0.31 10*3/mm3      Basophils, Absolute 0.11 10*3/mm3      Immature Grans, Absolute 0.04 10*3/mm3      nRBC 0.0 /100 WBC     CBC &  Differential [[id number]] Collected:  08/05/19 0655    Specimen:  Blood Updated:  08/05/19 0822    Narrative:       The following orders were created for panel order CBC & Differential.  Procedure                               Abnormality         Status                     ---------                               -----------         ------                     Scan Slide[[id number]]                                       Final result               CBC Auto Differential[[id number]]        Abnormal            Final result                 Please view results for these tests on the individual orders.    Scan Slide [[id number]] Collected:  08/05/19 0655    Specimen:  Blood Updated:  08/05/19 0822     Anisocytosis Slight/1+     Hypochromia Slight/1+     Target Cells Mod/2+     WBC Morphology Normal     Platelet Estimate Increased    Comprehensive Metabolic Panel [[id number]]  (Abnormal) Collected:  08/05/19 0655    Specimen:  Blood Updated:  08/05/19 0718     Glucose 120 mg/dL      BUN 16 mg/dL      Creatinine 0.52 mg/dL      Sodium 132 mmol/L      Potassium 4.3 mmol/L      Chloride 95 mmol/L      CO2 27.0 mmol/L      Calcium 9.9 mg/dL      Total Protein 8.3 g/dL      Albumin 3.70 g/dL      ALT (SGPT) 13 U/L      AST (SGOT) 19 U/L      Alkaline Phosphatase 107 U/L      Total Bilirubin 0.2 mg/dL      eGFR  African Amer >150 mL/min/1.73      Globulin 4.6 gm/dL      A/G Ratio 0.8 g/dL      BUN/Creatinine Ratio 30.8     Anion Gap 10.0 mmol/L     Narrative:       GFR Normal >60  Chronic Kidney Disease <60  Kidney Failure <15    CBC Auto Differential [[id number]]  (Abnormal) Collected:  08/05/19 0655    Specimen:  Blood Updated:  08/05/19 0706     WBC 8.30 10*3/mm3      RBC 3.72 10*6/mm3      Hemoglobin 9.4 g/dL      Hematocrit 27.0 %      MCV 72.6 fL      MCH 25.3 pg      MCHC 34.8 g/dL      RDW 17.5 %      RDW-SD 45.6 fl      MPV 10.2 fL      Platelets 593 10*3/mm3      Neutrophil % 63.3 %      Lymphocyte % 15.2 %      Monocyte % 16.9  %      Eosinophil % 2.9 %      Basophil % 1.1 %      Immature Grans % 0.6 %      Neutrophils, Absolute 5.26 10*3/mm3      Lymphocytes, Absolute 1.26 10*3/mm3      Monocytes, Absolute 1.40 10*3/mm3      Eosinophils, Absolute 0.24 10*3/mm3      Basophils, Absolute 0.09 10*3/mm3      Immature Grans, Absolute 0.05 10*3/mm3      nRBC 0.0 /100 WBC     CBC & Differential [[id number]] Collected:  08/04/19 0653    Specimen:  Blood Updated:  08/04/19 0832    Narrative:       The following orders were created for panel order CBC & Differential.  Procedure                               Abnormality         Status                     ---------                               -----------         ------                     Scan Slide[[id number]]                                       Final result               CBC Auto Differential[[id number]]        Abnormal            Final result                 Please view results for these tests on the individual orders.    Scan Slide [[id number]] Collected:  08/04/19 0653    Specimen:  Blood Updated:  08/04/19 0832     Dacrocytes Slight/1+     Microcytes Slight/1+     Target Cells Mod/2+     WBC Morphology Normal     Large Platelets Slight/1+    Comprehensive Metabolic Panel [[id number]]  (Abnormal) Collected:  08/04/19 0653    Specimen:  Blood Updated:  08/04/19 0732     Glucose 129 mg/dL      BUN 17 mg/dL      Creatinine 0.50 mg/dL      Sodium 132 mmol/L      Potassium 4.1 mmol/L      Chloride 92 mmol/L      CO2 28.0 mmol/L      Calcium 9.9 mg/dL      Total Protein 8.3 g/dL      Albumin 3.70 g/dL      ALT (SGPT) 13 U/L      AST (SGOT) 18 U/L      Alkaline Phosphatase 106 U/L      Total Bilirubin 0.2 mg/dL      eGFR  African Amer >150 mL/min/1.73      Globulin 4.6 gm/dL      A/G Ratio 0.8 g/dL      BUN/Creatinine Ratio 34.0     Anion Gap 12.0 mmol/L     Narrative:       GFR Normal >60  Chronic Kidney Disease <60  Kidney Failure <15    CBC Auto Differential [[id number]]  (Abnormal) Collected:   08/04/19 0653    Specimen:  Blood Updated:  08/04/19 0720     WBC 7.79 10*3/mm3      RBC 3.80 10*6/mm3      Hemoglobin 9.5 g/dL      Hematocrit 27.5 %      MCV 72.4 fL      MCH 25.0 pg      MCHC 34.5 g/dL      RDW 17.6 %      RDW-SD 44.9 fl      MPV 10.6 fL      Platelets 595 10*3/mm3      Neutrophil % 57.8 %      Lymphocyte % 18.1 %      Monocyte % 18.5 %      Eosinophil % 4.0 %      Basophil % 1.2 %      Immature Grans % 0.4 %      Neutrophils, Absolute 4.51 10*3/mm3      Lymphocytes, Absolute 1.41 10*3/mm3      Monocytes, Absolute 1.44 10*3/mm3      Eosinophils, Absolute 0.31 10*3/mm3      Basophils, Absolute 0.09 10*3/mm3      Immature Grans, Absolute 0.03 10*3/mm3      nRBC 0.0 /100 WBC     Vitamin B12 [[id number]]  (Abnormal) Collected:  08/03/19 0529    Specimen:  Blood Updated:  08/03/19 1202     Vitamin B-12 1,592 pg/mL     Folate [[id number]]  (Normal) Collected:  08/03/19 0529    Specimen:  Blood Updated:  08/03/19 1202     Folate 16.50 ng/mL     Comprehensive Metabolic Panel [[id number]]  (Abnormal) Collected:  08/03/19 1007    Specimen:  Blood Updated:  08/03/19 1032     Glucose 135 mg/dL      BUN 16 mg/dL      Creatinine 0.52 mg/dL      Sodium 134 mmol/L      Potassium 4.6 mmol/L      Chloride 95 mmol/L      CO2 25.0 mmol/L      Calcium 9.7 mg/dL      Total Protein 8.2 g/dL      Albumin 3.80 g/dL      ALT (SGPT) 15 U/L      AST (SGOT) 19 U/L      Alkaline Phosphatase 107 U/L      Total Bilirubin <0.2 mg/dL      eGFR  African Amer >150 mL/min/1.73      Globulin 4.4 gm/dL      A/G Ratio 0.9 g/dL      BUN/Creatinine Ratio 30.8     Anion Gap 14.0 mmol/L     Narrative:       GFR Normal >60  Chronic Kidney Disease <60  Kidney Failure <15    Magnesium [[id number]]  (Normal) Collected:  08/03/19 0529    Specimen:  Blood Updated:  08/03/19 0614     Magnesium 1.8 mg/dL     Phosphorus [[id number]]  (Normal) Collected:  08/03/19 0529    Specimen:  Blood Updated:  08/03/19 0614     Phosphorus 3.5 mg/dL     CBC &  Differential [[id number]] Collected:  08/03/19 0529    Specimen:  Blood Updated:  08/03/19 0555    Narrative:       The following orders were created for panel order CBC & Differential.  Procedure                               Abnormality         Status                     ---------                               -----------         ------                     Scan Slide[[id number]]                                                                  CBC Auto Differential[[id number]]        Abnormal            Final result                 Please view results for these tests on the individual orders.    CBC Auto Differential [[id number]]  (Abnormal) Collected:  08/03/19 0529    Specimen:  Blood Updated:  08/03/19 0555     WBC 7.33 10*3/mm3      RBC 3.65 10*6/mm3      Hemoglobin 9.3 g/dL      Hematocrit 26.6 %      MCV 72.9 fL      MCH 25.5 pg      MCHC 35.0 g/dL      RDW 17.4 %      RDW-SD 45.0 fl      MPV 10.7 fL      Platelets 620 10*3/mm3      Neutrophil % 55.7 %      Lymphocyte % 20.6 %      Monocyte % 18.7 %      Eosinophil % 3.4 %      Basophil % 1.2 %      Immature Grans % 0.4 %      Neutrophils, Absolute 4.08 10*3/mm3      Lymphocytes, Absolute 1.51 10*3/mm3      Monocytes, Absolute 1.37 10*3/mm3      Eosinophils, Absolute 0.25 10*3/mm3      Basophils, Absolute 0.09 10*3/mm3      Immature Grans, Absolute 0.03 10*3/mm3      nRBC 0.0 /100 WBC     POC Glucose Once [[id number]]  (Normal) Collected:  08/02/19 1950    Specimen:  Blood Updated:  08/02/19 2004     Glucose 125 mg/dL      Comment: RN NotifiedOperator: [id number] MIRNA Galeas ID: [id number]       Basic Metabolic Panel [[id number]]  (Abnormal) Collected:  08/02/19 1005    Specimen:  Blood Updated:  08/02/19 1114     Glucose 136 mg/dL      BUN 16 mg/dL      Creatinine 0.57 mg/dL      Sodium 132 mmol/L      Potassium 4.8 mmol/L      Chloride 94 mmol/L      CO2 25.0 mmol/L      Calcium 9.6 mg/dL      eGFR  African Amer >150 mL/min/1.73      BUN/Creatinine Ratio  "28.1     Anion Gap 13.0 mmol/L     Narrative:       GFR Normal >60  Chronic Kidney Disease <60  Kidney Failure <15        Imaging Results (last 7 days)     ** No results found for the last 168 hours. **            Chief Complaint on Day of Discharge:     Hospital Course:  The patient was admitted for small bowel obstruction, made n.p.o., commenced on IV hydration and was seen by general surgery on consult.  He had laparotomy with lysis of adhesion.  His postoperative course was complicated by admission to intensive care unit requiring noninvasive respiratory therapy and TPN.  Patient did improve and was transferred to the floor.  He was seen by the dietitian and commenced on G-tube feeding.  He will be  discharged home to continue G-tube feeding boluses 5 times daily.    Patient also had acute urinary retention, was seen by the urologist and Palomo catheter was placed.  He was placed on Flomax and was recommended to be discharged home with indwelling Palomo catheter.  He will be seen for follow-up by the urologist in 3 to 4 days for reassessment and possible removal of Palomo catheter.    Patient also had hyponatremia which was treated with fluid restriction and sodium salt.  Hyponatremia did improve prior to discharge.    He has history of severe protein calorie malnutrition.  He was seen by the dietitian and appropriate recommendations were made.    He was continued on Synthroid secondary to history of hypothyroidism and was placed on iron supplementation secondary to iron deficiency anemia.    He also benefited from PT and OT secondary to deconditioning.    Condition on Discharge: Stable and improved.    Physical Exam on Discharge:  BP 93/56   Pulse 97   Temp 96.1 °F (35.6 °C) (Axillary)   Resp 16   Ht 165.1 cm (65\")   Wt 46.7 kg (103 lb)   SpO2 98%   BMI 17.14 kg/m²   Physical Exam  Constitutional: He is oriented to person, place, and time. He appears well-developed and well-nourished. He appears ill. No " distress.   Patient is undernourished and has a BMI of 17.16.   HENT:   Head: Normocephalic and atraumatic.   Eyes: EOM are normal. Pupils are equal, round, and reactive to light. No scleral icterus.   Neck: Normal range of motion. Neck supple. No JVD present. No thyromegaly present.   Tracheostomy is in place and the site is clean and dry.   Cardiovascular: Normal rate, regular rhythm and normal heart sounds. Exam reveals no gallop and no friction rub.   No murmur heard.  Pulmonary/Chest: Effort normal and breath sounds normal. He has no wheezes. He has no rales. He exhibits no tenderness.   Abdominal: Soft. Bowel sounds are normal. He exhibits no distension and no mass. There is no tenderness. There is no rebound and no guarding.   G-tube is in place and functional.   Musculoskeletal: He exhibits no edema, tenderness or deformity.   Neurological: He is alert and oriented to person, place, and time. No cranial nerve deficit. He exhibits normal muscle tone. Coordination normal.   Skin: Skin is warm and dry. No rash noted. He is not diaphoretic. No erythema. No pallor.   Psychiatric: He has a normal mood and affect. His behavior is normal. Judgment and thought content normal.   Nursing note and vitals reviewed.           Discharge Disposition:  Home or Self Care    Discharge Medications:     Discharge Medications      New Medications      Instructions Start Date   albuterol (2.5 MG/3ML) 0.083% nebulizer solution  Commonly known as:  PROVENTIL   2.5 mg, Nebulization, Every 6 Hours - RT      ferrous sulfate 220 (44 Fe) MG/5ML solution   220 mg, Per G Tube, Daily   Start Date:  8/10/2019     levothyroxine sodium 100 MCG reconstituted solution injection  Replaces:  levothyroxine 175 MCG tablet   125 mcg, Intravenous, Daily at 1100      midodrine 10 MG tablet  Commonly known as:  PROAMATINE   10 mg, Oral, 3 Times Daily Before Meals      sodium chloride 1 g tablet   1 g, Oral, 3 Times Daily With Meals         Continue  These Medications      Instructions Start Date   acetaminophen 160 MG/5ML solution  Commonly known as:  TYLENOL   650 mg, Per G Tube, Every 6 Hours PRN      aspirin 81 MG EC tablet  Commonly known as:  ASPIRIN LOW DOSE   81 mg, Oral, Daily      docusate sodium 150 MG/15ML liquid  Commonly known as:  COLACE   10 ML Via PEG Tube Twice Daily For Constipation       doxazosin 1 MG tablet  Commonly known as:  CARDURA   1 mg, Oral, Nightly      famotidine 20 MG tablet  Commonly known as:  PEPCID   20 mg, Per G Tube, 2 Times Daily      magnesium oxide 400 MG tablet  Commonly known as:  MAG-OX   No dose, route, or frequency recorded.      metoclopramide 5 MG/5ML solution  Commonly known as:  REGLAN   10 ML Per G-Tube 4 Times Per Day Before Meals X 30 Days       oxyCODONE 15 MG immediate release tablet  Commonly known as:  ROXICODONE   10 mg, Oral, Every 4 Hours PRN      Scopolamine 1.5 MG/3DAYS patch  Commonly known as:  TRANSDERM-SCOP   1 patch, Transdermal, Every 72 Hours      TRICOR 48 MG tablet  Generic drug:  fenofibrate   67 mg, Daily, 1 tablet by G-Tube daily for Hyperlipidemia      vitamin D 63252 units capsule capsule  Commonly known as:  ERGOCALCIFEROL   1,000 Units, Daily, 1 capsule by mouth weekly on Wednesday X 3 months         Stop These Medications    levothyroxine 175 MCG tablet  Commonly known as:  SYNTHROID, LEVOTHROID  Replaced by:  levothyroxine sodium 100 MCG reconstituted solution injection     Unable to find            Discharge Diet: Peptamen 1.5 120ml 5 times daily boluses with 20 mls of free water before and after each bolus.    Activity at Discharge: As tolerated.    Discharge Care Plan/Instructions: Patient has been advised to take his medications as prescribed and to return to the emergency room in the event of any worsening symptoms.    Follow-up Appointments:   Future Appointments   Date Time Provider Department Center   9/3/2019  9:15 AM Kaushal Chester MD [facility] None   2/14/2020  9:15 AM  Bharathi Washington MD [facility] [place] [facility] None     Follow-up with primary care provider in 1 week and with Dr. Donahue in 3 to 4 days.  Test Results Pending at Discharge:     Saul Gomez MD  08/09/19  10:33 AM    Time: 35 minutes.

## 2019-08-09 NOTE — SIGNIFICANT NOTE
08/09/19 1123   Rehab Treatment   Discipline occupational therapy assistant   Reason Treatment Not Performed other (see comments)  (Pt was with nsg. OT will check back in pm.)

## 2019-08-09 NOTE — PROGRESS NOTES
Adult Nutrition  Assessment    Patient Name:  Emerson Bang  YOB: 1960  MRN: [id number]  Admit Date:  7/15/2019    Assessment Date:  8/9/2019    Comments:  Pt toerated bolus feed of 120ml overnight- was increased to 240ml 5x daily. Pt was awkae on RD visit and seemed to understand that he was to bolus 5cans/day with appropriate flushes. Pt has d/c orders.    Reason for Assessment     Row Name 08/09/19 1156          Reason for Assessment    Reason For Assessment  follow-up protocol     Diagnosis  gastrointestinal disease     Identified At Risk by Screening Criteria  MST SCORE 2+;BMI         Nutrition/Diet History     Row Name 08/09/19 1156          Nutrition/Diet History    Typical Food/Fluid Intake  Pt states no GI problems with TF yesterday or overnight- Nurse came in and increased to 240ml 5x/day. Pt understands this is equivalent to 5cans/day.     Factors Affecting Nutritional Intake  compromised airway;difficulty/impaired swallowing;other (see comments);nausea         Anthropometrics     Row Name 08/09/19 0500          Anthropometrics    Weight  46.7 kg (103 lb)         Labs/Tests/Procedures/Meds     Row Name 08/09/19 1157          Labs/Procedures/Meds    Lab Results Comments  Na 132L        Medications    Pertinent Medications Comments  salt tablet TID                       Electronically signed by:  Alexia Bowden RD  08/09/19 11:59 AM

## 2019-08-09 NOTE — DISCHARGE PLACEMENT REQUEST
"Ana Bang (59 y.o. Male)     Date of Birth Social Security Number Address Home Phone MRN    1960  [facility] [facility] [phone] [id number]    Jainism Marital Status          [facility] Single       Admission Date Admission Type Admitting Provider Attending Provider Department, Room/Bed    7/15/19 Emergency Cristino Holland MD Olalekan, David B, MD [address], 359/1    Discharge Date Discharge Disposition Discharge Destination         Home or Self Care              Attending Provider:  Cristino Holland MD    Allergies:  No Known Allergies    Isolation:  None   Infection:  None   Code Status:  CPR    Ht:  165.1 cm (65\")   Wt:  46.7 kg (103 lb)    Admission Cmt:  None   Principal Problem:  Hypotension [I95.9]                 Active Insurance as of 7/15/2019     Primary Coverage     Payor Plan Insurance Group Employer/Plan Group    [place]      Payor Plan Address Payor Plan Phone Number Payor Plan Fax Number Effective Dates    [address] [phone]  7/15/2019 - None Entered    [facility] [address]       Subscriber Name Subscriber Birth Date Member ID       ANA BANG 1960 [id number]                 Emergency Contacts      (Rel.) Home Phone Work Phone Mobile Phone    Ev Alexander (Sister) [phone] -- [phone]    Martha Darby (Daughter) [phone] -- [phone]               Operative/Procedure Notes (all)      Parminder Kc MD at 7/17/2019  8:44 PM  Version 1 of 1         DIAGNOSTIC LAPAROSCOPY EXPLORATORY LAPAROTOMY  Progress Note    Ana Bang  7/17/2019    Pre-op Diagnosis:   SBO (small bowel obstruction) (CMS/HCC) [K56.609]       Post-Op Diagnosis Codes:  SBO (small bowel obstruction)secondary to adhesions (CMS/HCC) [K56.609]    Procedure:  CHANGE GASTROSTOMY TUBE, DIAGNOSTIC LAPAROSCOPY, EXPLORATORY LAPAROTOMY, LYSIS OF ADHESIONS    Surgeon(s):  Parminder Kc, " MD    Anesthesia: General    Staff:   Circulator: Judy Zheng RN; Lorna Benedict RN  Scrub Person: Ap Perez  Assistant: Tiffanie Thomas CSA    Estimated Blood Loss: minimal    Urine Voided: 200 mL    Specimens:  None                        Drains:   Gastrostomy/Enterostomy Gastrostomy (Active)   Surrounding Skin Dry;Intact 7/17/2019  8:28 AM   Securement anchored to abdomen with adhesive device 7/17/2019  8:28 AM   Drain Status Open to gravity drainage 7/17/2019  8:28 AM   Drainage Appearance Green 7/17/2019  8:28 AM   Site Description Unable to view 7/17/2019  8:28 AM   Dressing Status Clean;Dry;Intact 7/17/2019  8:28 AM   Dressing Type Split gauze 7/16/2019  8:30 PM   Output (mL) 600 mL 7/17/2019  5:00 PM       Urethral Catheter 16 Fr. (Active)       [REMOVED] Urethral Catheter Coude (Removed)       Findings: Acute SBO secondary to adhesions    Complications: None      Parminder Kc MD     Date: 7/17/2019  Time: 11:34 PM        Electronically signed by Parminder Kc MD at 7/17/2019 11:35 PM     Parminder Kc MD at 7/18/2019  8:52 PM  Version 1 of 1         DIAGNOSTIC LAPAROSCOPY EXPLORATORY LAPAROTOMY  Procedure Note    Emerson Bang  7/17/2019    Pre-op Diagnosis:   Complete SBO (small bowel obstruction) (CMS/HCC) [K56.609]    Post-op Diagnosis:     Same    Procedure:  DIAGNOSTIC LAPAROSCOPY, EXPLORATORY LAPAROTOMY, LYSIS OF ADHESIONS    Surgeon(s):  Parminder Kc MD    Anesthesia: General    Staff:   Circulator: Judy Zheng RN; Lorna Benedict RN  Scrub Person: Ap Perez  Assistant: Tiffanie Thomas CSA    Estimated Blood Loss: minimal    Specimens:                ID Type Source Tests Collected by Time   1 :  Arterial Blood Blood, Arterial Line BLOOD GAS, ARTERIAL W/CO-OXIMETRY Parminder Kc MD 7/17/2019 2224         Drains:   Gastrostomy/Enterostomy Gastrostomy (Active)   Surrounding Skin Dry;Intact 7/18/2019  8:00 PM   Securement anchored to abdomen with adhesive  device 7/18/2019  8:00 PM   Drain Status Open to gravity drainage 7/18/2019  8:00 PM   Drainage Appearance Green 7/18/2019  8:00 PM   Site Description Unable to view 7/18/2019  8:00 PM   Dressing Status Clean;Dry;Intact 7/18/2019  8:00 PM   Dressing Type Split gauze 7/18/2019  8:00 PM   Intake (mL) 30 mL 7/18/2019  8:21 AM   Output (mL) 400 mL 7/18/2019  5:32 PM       Urethral Catheter 16 Fr. (Active)   Daily Indications Monitoring of strict I &O 7/18/2019  8:00 PM   Site Assessment Clean;Skin intact 7/18/2019  8:00 PM   Collection Container Standard drainage bag 7/18/2019  8:00 PM   Securement Method Securing device 7/18/2019  8:00 PM   Catheter care complete Yes 7/18/2019  8:00 PM   Output (mL) 150 mL 7/18/2019  5:32 PM       [REMOVED] Urethral Catheter Coude (Removed)       Findings: Complete small bowel obstruction with the small bowel densely attached to adhesions in the upper abdominal wall    Complications: None    Indications:  This is a 59-year-old with an intestinal obstruction unresponsive to medical management. Taken to the operating room for laparotomy.  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    Description of procedure: The patient was brought to the operating room and placed supine on the operating table.  After adequate general endotracheal anesthesia, the abdominal area was prepped and draped in a sterile manner. A briefing and time out were performed and all parties were in agreement.     The patient's gastrostomy tube is been prepped into the field.  The old gastrostomy tube was removed and immediately replaced with a #20 Palomo catheter.  The balloon was inflated with saline and the tubing connected to a drainage bag.    A transverse 5 mm incision was made in the epigastrium slightly to the left of the midline subcostally and carried into the subcutaneous tissue.  A 5 mm atraumatic trocar was passed of the  abdomen under direct vision with no visceral injury.  The abdomen was insufflated to a total of 15 mmHg, 4.0 L of CO2.  5 mm laparoscope demonstrated an excellent view of the abdominal cavity.  There were numerous adhesions in the left upper quadrant but the remainder of the bowel appeared to be free in the lower abdomen.  Marked intestinal distention was noted.  It was also possible this is a that of the ileocecal valve the valve was collapsed.  I attempted laparoscopically to trace this superiorly but there is a limitation because of the amount of intestinal dilatation that was noted.  After unsuccessfully being able to lyse all adhesions laparoscopically I elected to proceed with an open procedure.    The abdomen was approached due to previous midline incision.  A 10 blade knife was used to make a skin incision that was carried to the linea alba. The linea alba was divided and the abdomen was entered with no visceral injury.  Upon entering the abdomen, there were numerous dilated loops of small intestine. A small amount of peritoneal fluid which was aspirated.  No palpable masses were noted in the pelvis.    Obstruction was near the patient's previous gastrostomy tube.  A wound protector was placed and adhesions to this area were taken down sharply.  Small serosal separations were oversewn with interrupted 3-0 Vicryl suture.  No further adhesions and no enterotomies were noted.  Upon lysing these adhesions, the small bowel was completely free from beginning to end. There was excellent arterial pulsation in the mesentery throughout the intestine. The intestine was replaced into the abdominal cavity.  There was little to no omentum available to cover the intestine.    At this point, the patient became hemodynamically unstable.  He required pressor support as well as large volumes of fluid.  Ultimately blood pressure was able to be restored to normal levels and urine output increased.  Peak inspiratory pressures  were noted to be low so it was felt safe to be able to close the abdomen primarily. The midline wound was closed with short running segments of running #1 PDS in 4-1 ratio of suture length to incision length.  The skin was brought together with staples.    The procedure was terminated. It was well tolerated. Sponge and needle counts were correct, and the patient was transferred to intensive care unit in critical condition.            This document has been electronically signed by Parminder Kc MD on July 18, 2019 8:53 PM       Date: 7/18/2019  Time: 8:53 PM    Electronically signed by Parminder Kc MD at 7/18/2019  9:00 PM

## 2019-08-09 NOTE — THERAPY TREATMENT NOTE
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 sitting on the side of a flat bed without bedrails?  4  -TW      Moving to and from a bed to a chair (including a wheelchair)?  3  -TW      Standing up from a chair using your arms (e.g., wheelchair, bedside chair)?  3  -TW      Climbing 3-5 steps with a railing?  3  -TW      To walk in hospital room?  3  -TW      AM-PAC 6 Clicks Score (PT)  20  -TW         Functional Assessment    Outcome Measure Options  AM-PAC 6 Clicks Basic Mobility (PT)  -TW        User Key  (r) = Recorded By, (t) = Taken By, (c) = Cosigned By    Initials Name Provider Type    Abran Ferreira, PTA Physical Therapy  Assistant    Joseph Copeland, MICHAEL Physical Therapy Assistant    Brenda Lee COTA/LIANE Occupational Therapy Assistant           Time Calculation:   Time Calculation- OT     Row Name 08/09/19 1442             Time Calculation- OT    OT Start Time  1303  -CS      OT Stop Time  1342  -CS      OT Time Calculation (min)  39 min  -CS      Total Timed Code Minutes- OT  39 minute(s)  -CS      OT Received On  08/09/19  -        User Key  (r) = Recorded By, (t) = Taken By, (c) = Cosigned By    Initials Name Provider Type     Brenda Thomas COTA/LIANE Occupational Therapy Assistant        Therapy Charges for Today     Code Description Service Date Service Provider Modifiers Qty    27136830570 HC OT THER PROC EA 15 MIN 8/8/2019 Brenda Thomas COTA/L GO 2    28594351774 HC OT SELF CARE/MGMT/TRAIN EA 15 MIN 8/9/2019 Brenda Thomas COTA/L GO 1    64843271692 HC OT THER PROC EA 15 MIN 8/9/2019 Brenda Thomas COTA/L GO 2               ROSEANNA Barton  8/9/2019

## 2019-08-09 NOTE — DISCHARGE PLACEMENT REQUEST
[address] [address]  Phone:  [phone]  Fax:   Date: Aug 9, 2019      Ambulatory Referral to Home Health     Patient:  Emerson Bang MRN:  [id number]   [address] [facility] [address] :  1960  SSN:    Phone: [phone] Sex:  M      INSURANCE PAYOR PLAN GROUP # SUBSCRIBER ID   Primary:    [place] [id number]   [id number]      Referring Provider Information:  KENYA HESTER Phone: [phone] Fax:       Referral Information:   # Visits:  1 Referral Type: Home Health [42]   Urgency:  Routine Referral Reason: Specialty Services Required   Start Date: Aug 9, 2019 End Date:  To be determined by Insurer   Diagnosis: Impaired functional mobility, balance, gait, and endurance (Z74.09 [ICD-10-CM] V49.89 [ICD-9-CM])      Refer to Dept:   Refer to Provider:   Refer to Facility:       Face to Face Visit Date: 2019  Follow-up Provider for Plan of Care? I treated the patient in an acute care facility and will not continue treatment after discharge.  Follow-up Provider: FERNANDO OTERO [[id number]]  Reason/Clinical Findings: weakness  Describe mobility limitations that make leaving home difficult: weakness  Nursing/Therapeutic Services Requested: Skilled Nursing  Nursing/Therapeutic Services Requested: Physical Therapy  Nursing/Therapeutic Services Requested: Occupational Therapy  Skilled nursing orders: Medication education  Skilled nursing orders: Pain management  Skilled nursing orders: Neurovascular assessments  Skilled nursing orders: Cardiopulmonary assessments  PT orders: Total joint pathway  PT orders: Gait Training  PT orders: Strengthening  PT orders: Home safety assessment  Weight Bearing Status: As Tolerated  Occupational orders: Activities of daily living  Occupational orders: Home safety assessment  Frequency: 1 Week 1     This document serves as a request of services and does not constitute Insurance  "authorization or approval of services.  To determine eligibility, please contact the members Insurance carrier to verify and review coverage.     If you have medical questions regarding this request for services. Please contact [address] at [phone] between the hours of 8:00am - 5:00pm (Mon-Fri).       Authorizing Provider:Saul Gomez MD  Authorizing Provider's NPI: [id number]  Order Entered By: Saul Gomez MD 8/9/2019 10:32 AM     Electronically signed by: Saul Gomez MD 8/9/2019 10:32 AM                  MerrittAna (59 y.o. Male)     Date of Birth Social Security Number Address Home Phone MRN    1960  [address] [place] [id number] [phone] [id number]    [facility] Mar[employer] Status          [facility] Single       Admission Date Admission Type Admitting Provider Attending Provider Department, Room/Bed    7/15/19 Emergency Cristino Holland MD Olalekan, David B, MD [address], 359/1    Discharge Date Discharge Disposition Discharge Destination         Home or Self Care              Attending Provider:  Cristino Holland MD    Allergies:  No Known Allergies    Isolation:  None   Infection:  None   Code Status:  CPR    Ht:  165.1 cm (65\")   Wt:  46.7 kg (103 lb)    Admission Cmt:  None   Principal Problem:  Hypotension [I95.9]                 Active Insurance as of 7/15/2019     Primary Coverage     Payor Plan Insurance Group Employer/Plan Group    [place]      Payor Plan Address Payor Plan Phone Number Payor Plan Fax Number Effective Dates    [address] [phone]  7/15/2019 - None Entered    [place] [address]       Subscriber Name Subscriber Birth Date Member ID       ANA MARES 1960 [id number]                 Emergency Contacts      (Rel.) Home Phone Work Phone Mobile Phone    Nettie Alexanderricia (Sister) [phone] -- [phone]    Martha Darby " (Daughter) [phone] -- [phone]              Operative/Procedure Notes (last 24 hours) (Notes from 8/8/2019 11:45 AM through 8/9/2019 11:45 AM)     No notes of this type exist for this encounter.           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  Patient was found to have a high-grade small bowel obstruction and underwent surgery by Dr. Kc for lysis of adhesions (7/17/2019).  The patient required intensive care secondary to being hemodynamically unstable.  Patient was started on TPN for severe protein caloric malnutrition but has since improved and is tolerating G-tube feeding.            Final Discharge Diagnoses:  Active Hospital Problems    Diagnosis   • **Hypotension   • Hyponatremia   • Severe protein-calorie malnutrition (CMS/HCC)   • 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HCC)   • Iron deficiency anemia   • Squamous cell carcinoma of pharynx (CMS/HCC)   • Hypothyroidism       Consults:   Consults     Date and Time Order Name Status Description    8/6/2019 1400 Inpatient Urology Consult Completed     7/29/2019 0840 Inpatient Nephrology Consult Completed     7/16/2019 0808 Inpatient General Surgery Consult Completed     7/16/2019 0112 Hematology & Oncology Inpatient Consult Completed     7/16/2019 0008 Surgery (on-call MD unless specified)      7/16/2019  0007 Hospitalist (on-call MD unless specified)            Procedures Performed: Procedure(s):  DIAGNOSTIC LAPAROSCOPY, EXPLORATORY LAPAROTOMY, LYSIS OF ADHESIONS                Pertinent Test Results:   Lab Results (last 7 days)     Procedure Component Value Units Date/Time    CBC & Differential [[id number]] Collected:  08/09/19 0552    Specimen:  Blood Updated:  08/09/19 0625    Narrative:       The following orders were created for panel order CBC & Differential.  Procedure                               Abnormality         Status                     ---------                               -----------         ------                     Scan Slide[[id number]]                                                                  CBC Auto Differential[[id number]]        Abnormal            Final result                 Please view results for these tests on the individual orders.    CBC Auto Differential [[id number]]  (Abnormal) Collected:  08/09/19 0552    Specimen:  Blood Updated:  08/09/19 0614     WBC 5.33 10*3/mm3      RBC 3.43 10*6/mm3      Hemoglobin 8.7 g/dL      Hematocrit 24.9 %      MCV 72.6 fL      MCH 25.4 pg      MCHC 34.9 g/dL      RDW 17.5 %      RDW-SD 45.2 fl      MPV 10.3 fL      Platelets 546 10*3/mm3      Neutrophil % 37.8 %      Lymphocyte % 31.0 %      Monocyte % 22.9 %      Eosinophil % 6.6 %      Basophil % 1.3 %      Immature Grans % 0.4 %      Neutrophils, Absolute 2.02 10*3/mm3      Lymphocytes, Absolute 1.65 10*3/mm3      Monocytes, Absolute 1.22 10*3/mm3      Eosinophils, Absolute 0.35 10*3/mm3      Basophils, Absolute 0.07 10*3/mm3      Immature Grans, Absolute 0.02 10*3/mm3      nRBC 0.0 /100 WBC     Iron Profile [[id number]]  (Abnormal) Collected:  08/08/19 0526    Specimen:  Blood Updated:  08/08/19 1107     Iron 39 mcg/dL      Iron Saturation 14 %      Transferrin 190 mg/dL      TIBC 283 mcg/dL     Comprehensive Metabolic Panel [[id number]]  (Abnormal) Collected:  08/08/19 0526    Specimen:   Blood Updated:  08/08/19 0632     Glucose 140 mg/dL      BUN 16 mg/dL      Creatinine 0.53 mg/dL      Sodium 132 mmol/L      Potassium 3.9 mmol/L      Chloride 96 mmol/L      CO2 25.0 mmol/L      Calcium 9.6 mg/dL      Total Protein 8.1 g/dL      Albumin 3.60 g/dL      ALT (SGPT) 9 U/L      AST (SGOT) 16 U/L      Alkaline Phosphatase 91 U/L      Total Bilirubin <0.2 mg/dL      eGFR  African Amer >150 mL/min/1.73      Globulin 4.5 gm/dL      A/G Ratio 0.8 g/dL      BUN/Creatinine Ratio 30.2     Anion Gap 11.0 mmol/L     Narrative:       GFR Normal >60  Chronic Kidney Disease <60  Kidney Failure <15    CBC & Differential [[id number]] Collected:  08/08/19 0526    Specimen:  Blood Updated:  08/08/19 0622    Narrative:       The following orders were created for panel order CBC & Differential.  Procedure                               Abnormality         Status                     ---------                               -----------         ------                     Scan Slide[[id number]]                                                                  CBC Auto Differential[[id number]]        Abnormal            Final result                 Please view results for these tests on the individual orders.    CBC Auto Differential [[id number]]  (Abnormal) Collected:  08/08/19 0526    Specimen:  Blood Updated:  08/08/19 0622     WBC 5.90 10*3/mm3      RBC 3.39 10*6/mm3      Hemoglobin 8.5 g/dL      Hematocrit 24.7 %      MCV 72.9 fL      MCH 25.1 pg      MCHC 34.4 g/dL      RDW 17.4 %      RDW-SD 45.2 fl      MPV 10.5 fL      Platelets 573 10*3/mm3      Neutrophil % 41.2 %      Lymphocyte % 30.2 %      Monocyte % 23.7 %      Eosinophil % 3.2 %      Basophil % 1.2 %      Immature Grans % 0.5 %      Neutrophils, Absolute 2.43 10*3/mm3      Lymphocytes, Absolute 1.78 10*3/mm3      Monocytes, Absolute 1.40 10*3/mm3      Eosinophils, Absolute 0.19 10*3/mm3      Basophils, Absolute 0.07 10*3/mm3      Immature Grans, Absolute 0.03  10*3/mm3      nRBC 0.0 /100 WBC     CBC & Differential [[id number]] Collected:  08/07/19 0505    Specimen:  Blood Updated:  08/07/19 0703    Narrative:       The following orders were created for panel order CBC & Differential.  Procedure                               Abnormality         Status                     ---------                               -----------         ------                     Scan Slide[[id number]]                                                                  CBC Auto Differential[[id number]]        Abnormal            Final result                 Please view results for these tests on the individual orders.    Comprehensive Metabolic Panel [[id number]]  (Abnormal) Collected:  08/07/19 0505    Specimen:  Blood Updated:  08/07/19 0629     Glucose 107 mg/dL      BUN 15 mg/dL      Creatinine 0.50 mg/dL      Sodium 134 mmol/L      Potassium 4.2 mmol/L      Chloride 96 mmol/L      CO2 27.0 mmol/L      Calcium 9.8 mg/dL      Total Protein 8.2 g/dL      Albumin 3.80 g/dL      ALT (SGPT) 9 U/L      AST (SGOT) 20 U/L      Alkaline Phosphatase 95 U/L      Total Bilirubin <0.2 mg/dL      eGFR  African Amer >150 mL/min/1.73      Globulin 4.4 gm/dL      A/G Ratio 0.9 g/dL      BUN/Creatinine Ratio 30.0     Anion Gap 11.0 mmol/L     Narrative:       GFR Normal >60  Chronic Kidney Disease <60  Kidney Failure <15    CBC Auto Differential [[id number]]  (Abnormal) Collected:  08/07/19 0505    Specimen:  Blood Updated:  08/07/19 0615     WBC 6.65 10*3/mm3      RBC 3.48 10*6/mm3      Hemoglobin 8.8 g/dL      Hematocrit 25.3 %      MCV 72.7 fL      MCH 25.3 pg      MCHC 34.8 g/dL      RDW 17.6 %      RDW-SD 45.4 fl      MPV 10.8 fL      Platelets 585 10*3/mm3      Neutrophil % 49.0 %      Lymphocyte % 22.1 %      Monocyte % 23.3 %      Eosinophil % 3.8 %      Basophil % 1.2 %      Immature Grans % 0.6 %      Neutrophils, Absolute 3.26 10*3/mm3      Lymphocytes, Absolute 1.47 10*3/mm3      Monocytes, Absolute 1.55  10*3/mm3      Eosinophils, Absolute 0.25 10*3/mm3      Basophils, Absolute 0.08 10*3/mm3      Immature Grans, Absolute 0.04 10*3/mm3      nRBC 0.0 /100 WBC     Comprehensive Metabolic Panel [[id number]]  (Abnormal) Collected:  08/06/19 0612    Specimen:  Blood Updated:  08/06/19 0732     Glucose 148 mg/dL      BUN 16 mg/dL      Creatinine 0.43 mg/dL      Sodium 133 mmol/L      Potassium 4.0 mmol/L      Chloride 94 mmol/L      CO2 26.0 mmol/L      Calcium 9.6 mg/dL      Total Protein 7.9 g/dL      Albumin 3.60 g/dL      ALT (SGPT) 8 U/L      AST (SGOT) 16 U/L      Alkaline Phosphatase 94 U/L      Total Bilirubin <0.2 mg/dL      eGFR  African Amer >150 mL/min/1.73      Globulin 4.3 gm/dL      A/G Ratio 0.8 g/dL      BUN/Creatinine Ratio 37.2     Anion Gap 13.0 mmol/L     Narrative:       GFR Normal >60  Chronic Kidney Disease <60  Kidney Failure <15    CBC & Differential [[id number]] Collected:  08/06/19 0612    Specimen:  Blood Updated:  08/06/19 0641    Narrative:       The following orders were created for panel order CBC & Differential.  Procedure                               Abnormality         Status                     ---------                               -----------         ------                     Scan Slide[[id number]]                                                                  CBC Auto Differential[[id number]]        Abnormal            Final result                 Please view results for these tests on the individual orders.    CBC Auto Differential [[id number]]  (Abnormal) Collected:  08/06/19 0612    Specimen:  Blood Updated:  08/06/19 0641     WBC 7.07 10*3/mm3      RBC 3.27 10*6/mm3      Hemoglobin 8.2 g/dL      Hematocrit 23.7 %      MCV 72.5 fL      MCH 25.1 pg      MCHC 34.6 g/dL      RDW 17.4 %      RDW-SD 45.3 fl      MPV 10.5 fL      Platelets 592 10*3/mm3      Neutrophil % 47.7 %      Lymphocyte % 26.6 %      Monocyte % 19.1 %      Eosinophil % 4.4 %      Basophil % 1.6 %      Immature  Grans % 0.6 %      Neutrophils, Absolute 3.38 10*3/mm3      Lymphocytes, Absolute 1.88 10*3/mm3      Monocytes, Absolute 1.35 10*3/mm3      Eosinophils, Absolute 0.31 10*3/mm3      Basophils, Absolute 0.11 10*3/mm3      Immature Grans, Absolute 0.04 10*3/mm3      nRBC 0.0 /100 WBC     CBC & Differential [[id number]] Collected:  08/05/19 0655    Specimen:  Blood Updated:  08/05/19 0822    Narrative:       The following orders were created for panel order CBC & Differential.  Procedure                               Abnormality         Status                     ---------                               -----------         ------                     Scan Slide[[id number]]                                       Final result               CBC Auto Differential[[id number]]        Abnormal            Final result                 Please view results for these tests on the individual orders.    Scan Slide [[id number]] Collected:  08/05/19 0655    Specimen:  Blood Updated:  08/05/19 0822     Anisocytosis Slight/1+     Hypochromia Slight/1+     Target Cells Mod/2+     WBC Morphology Normal     Platelet Estimate Increased    Comprehensive Metabolic Panel [[id number]]  (Abnormal) Collected:  08/05/19 0655    Specimen:  Blood Updated:  08/05/19 0718     Glucose 120 mg/dL      BUN 16 mg/dL      Creatinine 0.52 mg/dL      Sodium 132 mmol/L      Potassium 4.3 mmol/L      Chloride 95 mmol/L      CO2 27.0 mmol/L      Calcium 9.9 mg/dL      Total Protein 8.3 g/dL      Albumin 3.70 g/dL      ALT (SGPT) 13 U/L      AST (SGOT) 19 U/L      Alkaline Phosphatase 107 U/L      Total Bilirubin 0.2 mg/dL      eGFR  African Amer >150 mL/min/1.73      Globulin 4.6 gm/dL      A/G Ratio 0.8 g/dL      BUN/Creatinine Ratio 30.8     Anion Gap 10.0 mmol/L     Narrative:       GFR Normal >60  Chronic Kidney Disease <60  Kidney Failure <15    CBC Auto Differential [[id number]]  (Abnormal) Collected:  08/05/19 0655    Specimen:  Blood Updated:  08/05/19 0706      WBC 8.30 10*3/mm3      RBC 3.72 10*6/mm3      Hemoglobin 9.4 g/dL      Hematocrit 27.0 %      MCV 72.6 fL      MCH 25.3 pg      MCHC 34.8 g/dL      RDW 17.5 %      RDW-SD 45.6 fl      MPV 10.2 fL      Platelets 593 10*3/mm3      Neutrophil % 63.3 %      Lymphocyte % 15.2 %      Monocyte % 16.9 %      Eosinophil % 2.9 %      Basophil % 1.1 %      Immature Grans % 0.6 %      Neutrophils, Absolute 5.26 10*3/mm3      Lymphocytes, Absolute 1.26 10*3/mm3      Monocytes, Absolute 1.40 10*3/mm3      Eosinophils, Absolute 0.24 10*3/mm3      Basophils, Absolute 0.09 10*3/mm3      Immature Grans, Absolute 0.05 10*3/mm3      nRBC 0.0 /100 WBC     CBC & Differential [[id number]] Collected:  08/04/19 0653    Specimen:  Blood Updated:  08/04/19 0832    Narrative:       The following orders were created for panel order CBC & Differential.  Procedure                               Abnormality         Status                     ---------                               -----------         ------                     Scan Slide[[id number]]                                       Final result               CBC Auto Differential[[id number]]        Abnormal            Final result                 Please view results for these tests on the individual orders.    Scan Slide [[id number]] Collected:  08/04/19 0653    Specimen:  Blood Updated:  08/04/19 0832     Dacrocytes Slight/1+     Microcytes Slight/1+     Target Cells Mod/2+     WBC Morphology Normal     Large Platelets Slight/1+    Comprehensive Metabolic Panel [[id number]]  (Abnormal) Collected:  08/04/19 0653    Specimen:  Blood Updated:  08/04/19 0732     Glucose 129 mg/dL      BUN 17 mg/dL      Creatinine 0.50 mg/dL      Sodium 132 mmol/L      Potassium 4.1 mmol/L      Chloride 92 mmol/L      CO2 28.0 mmol/L      Calcium 9.9 mg/dL      Total Protein 8.3 g/dL      Albumin 3.70 g/dL      ALT (SGPT) 13 U/L      AST (SGOT) 18 U/L      Alkaline Phosphatase 106 U/L      Total Bilirubin 0.2 mg/dL       eGFR  African Amer >150 mL/min/1.73      Globulin 4.6 gm/dL      A/G Ratio 0.8 g/dL      BUN/Creatinine Ratio 34.0     Anion Gap 12.0 mmol/L     Narrative:       GFR Normal >60  Chronic Kidney Disease <60  Kidney Failure <15    CBC Auto Differential [[id number]]  (Abnormal) Collected:  08/04/19 0653    Specimen:  Blood Updated:  08/04/19 0720     WBC 7.79 10*3/mm3      RBC 3.80 10*6/mm3      Hemoglobin 9.5 g/dL      Hematocrit 27.5 %      MCV 72.4 fL      MCH 25.0 pg      MCHC 34.5 g/dL      RDW 17.6 %      RDW-SD 44.9 fl      MPV 10.6 fL      Platelets 595 10*3/mm3      Neutrophil % 57.8 %      Lymphocyte % 18.1 %      Monocyte % 18.5 %      Eosinophil % 4.0 %      Basophil % 1.2 %      Immature Grans % 0.4 %      Neutrophils, Absolute 4.51 10*3/mm3      Lymphocytes, Absolute 1.41 10*3/mm3      Monocytes, Absolute 1.44 10*3/mm3      Eosinophils, Absolute 0.31 10*3/mm3      Basophils, Absolute 0.09 10*3/mm3      Immature Grans, Absolute 0.03 10*3/mm3      nRBC 0.0 /100 WBC     Vitamin B12 [[id number]]  (Abnormal) Collected:  08/03/19 0529    Specimen:  Blood Updated:  08/03/19 1202     Vitamin B-12 1,592 pg/mL     Folate [[id number]]  (Normal) Collected:  08/03/19 0529    Specimen:  Blood Updated:  08/03/19 1202     Folate 16.50 ng/mL     Comprehensive Metabolic Panel [[id number]]  (Abnormal) Collected:  08/03/19 1007    Specimen:  Blood Updated:  08/03/19 1032     Glucose 135 mg/dL      BUN 16 mg/dL      Creatinine 0.52 mg/dL      Sodium 134 mmol/L      Potassium 4.6 mmol/L      Chloride 95 mmol/L      CO2 25.0 mmol/L      Calcium 9.7 mg/dL      Total Protein 8.2 g/dL      Albumin 3.80 g/dL      ALT (SGPT) 15 U/L      AST (SGOT) 19 U/L      Alkaline Phosphatase 107 U/L      Total Bilirubin <0.2 mg/dL      eGFR  African Amer >150 mL/min/1.73      Globulin 4.4 gm/dL      A/G Ratio 0.9 g/dL      BUN/Creatinine Ratio 30.8     Anion Gap 14.0 mmol/L     Narrative:       GFR Normal >60  Chronic Kidney Disease <60  Kidney  Failure <15    Magnesium [[id number]]  (Normal) Collected:  08/03/19 0529    Specimen:  Blood Updated:  08/03/19 0614     Magnesium 1.8 mg/dL     Phosphorus [[id number]]  (Normal) Collected:  08/03/19 0529    Specimen:  Blood Updated:  08/03/19 0614     Phosphorus 3.5 mg/dL     CBC & Differential [[id number]] Collected:  08/03/19 0529    Specimen:  Blood Updated:  08/03/19 0555    Narrative:       The following orders were created for panel order CBC & Differential.  Procedure                               Abnormality         Status                     ---------                               -----------         ------                     Scan Slide[[id number]]                                                                  CBC Auto Differential[[id number]]        Abnormal            Final result                 Please view results for these tests on the individual orders.    CBC Auto Differential [[id number]]  (Abnormal) Collected:  08/03/19 0529    Specimen:  Blood Updated:  08/03/19 0555     WBC 7.33 10*3/mm3      RBC 3.65 10*6/mm3      Hemoglobin 9.3 g/dL      Hematocrit 26.6 %      MCV 72.9 fL      MCH 25.5 pg      MCHC 35.0 g/dL      RDW 17.4 %      RDW-SD 45.0 fl      MPV 10.7 fL      Platelets 620 10*3/mm3      Neutrophil % 55.7 %      Lymphocyte % 20.6 %      Monocyte % 18.7 %      Eosinophil % 3.4 %      Basophil % 1.2 %      Immature Grans % 0.4 %      Neutrophils, Absolute 4.08 10*3/mm3      Lymphocytes, Absolute 1.51 10*3/mm3      Monocytes, Absolute 1.37 10*3/mm3      Eosinophils, Absolute 0.25 10*3/mm3      Basophils, Absolute 0.09 10*3/mm3      Immature Grans, Absolute 0.03 10*3/mm3      nRBC 0.0 /100 WBC     POC Glucose Once [[id number]]  (Normal) Collected:  08/02/19 1950    Specimen:  Blood Updated:  08/02/19 2004     Glucose 125 mg/dL      Comment: RN NotifiedOperator: [id number] MIRNA VILLALPANDOMeter ID: [id number]       Basic Metabolic Panel [[id number]]  (Abnormal) Collected:  08/02/19 1005     Specimen:  Blood Updated:  08/02/19 1114     Glucose 136 mg/dL      BUN 16 mg/dL      Creatinine 0.57 mg/dL      Sodium 132 mmol/L      Potassium 4.8 mmol/L      Chloride 94 mmol/L      CO2 25.0 mmol/L      Calcium 9.6 mg/dL      eGFR  African Amer >150 mL/min/1.73      BUN/Creatinine Ratio 28.1     Anion Gap 13.0 mmol/L     Narrative:       GFR Normal >60  Chronic Kidney Disease <60  Kidney Failure <15        Imaging Results (last 7 days)     ** No results found for the last 168 hours. **            Chief Complaint on Day of Discharge:     Hospital Course:  The patient was admitted for small bowel obstruction, made n.p.o., commenced on IV hydration and was seen by general surgery on consult.  He had laparotomy with lysis of adhesion.  His postoperative course was complicated by admission to intensive care unit requiring noninvasive respiratory therapy and TPN.  Patient did improve and was transferred to the floor.  He was seen by the dietitian and commenced on G-tube feeding.  He will be  discharged home to continue G-tube feeding boluses 5 times daily.    Patient also had acute urinary retention, was seen by the urologist and Palomo catheter was placed.  He was placed on Flomax and was recommended to be discharged home with indwelling Palomo catheter.  He will be seen for follow-up by the urologist in 3 to 4 days for reassessment and possible removal of Palomo catheter.    Patient also had hyponatremia which was treated with fluid restriction and sodium salt.  Hyponatremia did improve prior to discharge.    He has history of severe protein calorie malnutrition.  He was seen by the dietitian and appropriate recommendations were made.    He was continued on Synthroid secondary to history of hypothyroidism and was placed on iron supplementation secondary to iron deficiency anemia.    He also benefited from PT and OT secondary to deconditioning.    Condition on Discharge: Stable and improved.    Physical Exam on  "Discharge:  BP 93/56   Pulse 97   Temp 96.1 °F (35.6 °C) (Axillary)   Resp 16   Ht 165.1 cm (65\")   Wt 46.7 kg (103 lb)   SpO2 98%   BMI 17.14 kg/m²    Physical Exam  Constitutional: He is oriented to person, place, and time. He appears well-developed and well-nourished. He appears ill. No distress.   Patient is undernourished and has a BMI of 17.16.   HENT:   Head: Normocephalic and atraumatic.   Eyes: EOM are normal. Pupils are equal, round, and reactive to light. No scleral icterus.   Neck: Normal range of motion. Neck supple. No JVD present. No thyromegaly present.   Tracheostomy is in place and the site is clean and dry.   Cardiovascular: Normal rate, regular rhythm and normal heart sounds. Exam reveals no gallop and no friction rub.   No murmur heard.  Pulmonary/Chest: Effort normal and breath sounds normal. He has no wheezes. He has no rales. He exhibits no tenderness.   Abdominal: Soft. Bowel sounds are normal. He exhibits no distension and no mass. There is no tenderness. There is no rebound and no guarding.   G-tube is in place and functional.   Musculoskeletal: He exhibits no edema, tenderness or deformity.   Neurological: He is alert and oriented to person, place, and time. No cranial nerve deficit. He exhibits normal muscle tone. Coordination normal.   Skin: Skin is warm and dry. No rash noted. He is not diaphoretic. No erythema. No pallor.   Psychiatric: He has a normal mood and affect. His behavior is normal. Judgment and thought content normal.   Nursing note and vitals reviewed.           Discharge Disposition:  Home or Self Care    Discharge Medications:     Discharge Medications      New Medications      Instructions Start Date   albuterol (2.5 MG/3ML) 0.083% nebulizer solution  Commonly known as:  PROVENTIL   2.5 mg, Nebulization, Every 6 Hours - RT      ferrous sulfate 220 (44 Fe) MG/5ML solution   220 mg, Per G Tube, Daily   Start Date:  8/10/2019     levothyroxine sodium 100 MCG " reconstituted solution injection  Replaces:  levothyroxine 175 MCG tablet   125 mcg, Intravenous, Daily at 1100      midodrine 10 MG tablet  Commonly known as:  PROAMATINE   10 mg, Oral, 3 Times Daily Before Meals      sodium chloride 1 g tablet   1 g, Oral, 3 Times Daily With Meals         Continue These Medications      Instructions Start Date   acetaminophen 160 MG/5ML solution  Commonly known as:  TYLENOL   650 mg, Per G Tube, Every 6 Hours PRN      aspirin 81 MG EC tablet  Commonly known as:  ASPIRIN LOW DOSE   81 mg, Oral, Daily      docusate sodium 150 MG/15ML liquid  Commonly known as:  COLACE   10 ML Via PEG Tube Twice Daily For Constipation       doxazosin 1 MG tablet  Commonly known as:  CARDURA   1 mg, Oral, Nightly      famotidine 20 MG tablet  Commonly known as:  PEPCID   20 mg, Per G Tube, 2 Times Daily      magnesium oxide 400 MG tablet  Commonly known as:  MAG-OX   No dose, route, or frequency recorded.      metoclopramide 5 MG/5ML solution  Commonly known as:  REGLAN   10 ML Per G-Tube 4 Times Per Day Before Meals X 30 Days       oxyCODONE 15 MG immediate release tablet  Commonly known as:  ROXICODONE   10 mg, Oral, Every 4 Hours PRN      Scopolamine 1.5 MG/3DAYS patch  Commonly known as:  TRANSDERM-SCOP   1 patch, Transdermal, Every 72 Hours      TRICOR 48 MG tablet  Generic drug:  fenofibrate   67 mg, Daily, 1 tablet by G-Tube daily for Hyperlipidemia      vitamin D 27151 units capsule capsule  Commonly known as:  ERGOCALCIFEROL   1,000 Units, Daily, 1 capsule by mouth weekly on Wednesday X 3 months         Stop These Medications    levothyroxine 175 MCG tablet  Commonly known as:  SYNTHROID, LEVOTHROID  Replaced by:  levothyroxine sodium 100 MCG reconstituted solution injection     Unable to find            Discharge Diet: Peptamen 1.5 120ml 5 times daily boluses with 20 mls of free water before and after each bolus.    Activity at Discharge: As tolerated.    Discharge Care Plan/Instructions:  Patient has been advised to take his medications as prescribed and to return to the emergency room in the event of any worsening symptoms.    Follow-up Appointments:   Future Appointments   Date Time Provider Department Center   9/3/2019  9:15 AM Kaushal Chester MD [facility] None   2/14/2020  9:15 AM Bharathi Washington MD [facility] None     Follow-up with primary care provider in 1 week and with Dr. Donahue in 3 to 4 days.  Test Results Pending at Discharge:     Saul Gomez MD  08/09/19  10:33 AM    Time: 35 minutes.                Electronically signed by Saul Gomez MD at 8/9/2019 10:41 AM

## 2019-08-09 NOTE — PLAN OF CARE
Problem: Patient Care Overview  Goal: Plan of Care Review  Outcome: Ongoing (interventions implemented as appropriate)   08/09/19 0326   Coping/Psychosocial   Plan of Care Reviewed With patient   Plan of Care Review   Progress improving   OTHER   Outcome Summary VSS, pain controlled with liquid tylenol, pt rested well through the night, will continue to monitor.       Problem: Oncology Care (Adult)  Goal: Signs and Symptoms of Listed Potential Problems Will be Absent, Minimized or Managed (Oncology Care)  Outcome: Ongoing (interventions implemented as appropriate)      Problem: Airway, Artificial (Adult)  Goal: Signs and Symptoms of Listed Potential Problems Will be Absent, Minimized or Managed (Airway, Artificial)  Outcome: Ongoing (interventions implemented as appropriate)      Problem: Nutrition, Enteral (Adult)  Goal: Signs and Symptoms of Listed Potential Problems Will be Absent, Minimized or Managed (Nutrition, Enteral)  Outcome: Ongoing (interventions implemented as appropriate)      Problem: Urine Elimination Impaired (Adult)  Goal: Effective or Improved Urinary Elimination  Outcome: Ongoing (interventions implemented as appropriate)    Goal: Effective Containment of Urine  Outcome: Ongoing (interventions implemented as appropriate)    Goal: Reduced Incontinence Episodes  Outcome: Ongoing (interventions implemented as appropriate)

## 2019-08-09 NOTE — PLAN OF CARE
Problem: Patient Care Overview  Goal: Plan of Care Review  Outcome: Outcome(s) achieved Date Met: 08/09/19 08/09/19 1046   Coping/Psychosocial   Plan of Care Reviewed With patient   Plan of Care Review   Progress improving   OTHER   Outcome Summary pt to be discharged home today.     Goal: Individualization and Mutuality  Outcome: Outcome(s) achieved Date Met: 08/09/19    Goal: Discharge Needs Assessment  Outcome: Outcome(s) achieved Date Met: 08/09/19    Goal: Interprofessional Rounds/Family Conf  Outcome: Outcome(s) achieved Date Met: 08/09/19      Problem: Oncology Care (Adult)  Goal: Signs and Symptoms of Listed Potential Problems Will be Absent, Minimized or Managed (Oncology Care)  Outcome: Outcome(s) achieved Date Met: 08/09/19      Problem: Airway, Artificial (Adult)  Goal: Signs and Symptoms of Listed Potential Problems Will be Absent, Minimized or Managed (Airway, Artificial)  Outcome: Outcome(s) achieved Date Met: 08/09/19      Problem: Nutrition, Enteral (Adult)  Goal: Signs and Symptoms of Listed Potential Problems Will be Absent, Minimized or Managed (Nutrition, Enteral)  Outcome: Outcome(s) achieved Date Met: 08/09/19      Problem: Urine Elimination Impaired (Adult)  Goal: Effective or Improved Urinary Elimination  Outcome: Outcome(s) achieved Date Met: 08/09/19    Goal: Effective Containment of Urine  Outcome: Outcome(s) achieved Date Met: 08/09/19    Goal: Reduced Incontinence Episodes  Outcome: Outcome(s) achieved Date Met: 08/09/19

## 2019-08-09 NOTE — DISCHARGE INSTRUCTIONS
Goal for TF bolus Peptamen 1.5  is 5 cans(240ml each can) total/day with approx  40ml before and after each can

## 2019-08-10 ENCOUNTER — READMISSION MANAGEMENT (OUTPATIENT)
Dept: CALL CENTER | Facility: HOSPITAL | Age: 59
End: 2019-08-10

## 2019-08-10 NOTE — OUTREACH NOTE
Prep Survey      Responses   Facility patient discharged from?  [facility]   Is patient eligible?  Yes   Discharge diagnosis  Hypotension   Does the patient have one of the following disease processes/diagnoses(primary or secondary)?  General Surgery   Does the patient have Home health ordered?  Yes   What is the Home health agency?   [facility]    Is there a DME ordered?  Yes   What DME was ordered?  [facility] for tube feeding   Comments regarding appointments  BMP by Aug 9, 2019   Prep survey completed?  Yes          Myah Chandler RN

## 2019-08-11 ENCOUNTER — READMISSION MANAGEMENT (OUTPATIENT)
Dept: CALL CENTER | Facility: HOSPITAL | Age: 59
End: 2019-08-11

## 2019-08-11 NOTE — OUTREACH NOTE
General Surgery Week 1 Survey      Responses   Facility patient discharged from?  [facility]   Does the patient have one of the following disease processes/diagnoses(primary or secondary)?  General Surgery   Is there a successful TCM telephone encounter documented?  No   Week 1 attempt successful?  No   Unsuccessful attempts  Attempt 1          Kristen Nobles RN

## 2019-08-12 ENCOUNTER — READMISSION MANAGEMENT (OUTPATIENT)
Dept: CALL CENTER | Facility: HOSPITAL | Age: 59
End: 2019-08-12

## 2019-08-12 NOTE — OUTREACH NOTE
General Surgery Week 1 Survey      Responses   Facility patient discharged from?  [place]   Does the patient have one of the following disease processes/diagnoses(primary or secondary)?  General Surgery   Is there a successful TCM telephone encounter documented?  No   Week 1 attempt successful?  No   Unsuccessful attempts  Attempt 2          Aura Joiner RN

## 2019-08-14 NOTE — PROGRESS NOTES
Subjective:  Emerson Bang is a 59 y.o. male who presents for       Patient Active Problem List   Diagnosis   • Hyperkalemia   • Iron deficiency anemia   • Gastroesophageal reflux disease with esophagitis   • Elevated AST (SGOT)   • Alcohol abuse   • Hypothyroidism   • Balance problem   • Need for vaccination   • Low magnesium levels   • Squamous cell carcinoma of pharynx (CMS/HCC)   • History of throat cancer   • Vitamin D deficiency   • Alcohol abuse counseling and surveillance   • Encounter for screening for diabetes mellitus   • Hypomagnesemia   • Iron deficiency anemia   • Hyperlipidemia   • Underweight due to inadequate caloric intake   • Need for immunization against influenza   • 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HCC)   • Hypertension   • General medical examination   • Underweight   • Epigastric pain   • Gastritis without bleeding   • Need for influenza vaccination   • Elevated liver function tests   • B12 deficiency   • Intestinal malabsorption   • Throat pain   • Acute pharyngitis   • Upper respiratory tract infection   • Neoplasm of uncertain behavior of larynx   • Pharyngoesophageal dysphagia   • Severe protein-calorie malnutrition (CMS/HCC)   • Anemia of chronic disease   • Hypotension   • Hyponatremia   • Status post insertion of percutaneous endoscopic gastrostomy (PEG) tube (CMS/HCC)   • Hx of tracheostomy   • History of throat surgery   • Hx SBO   • Urinary retention   • Generalized weakness   • Acute otitis externa of right ear   • Hard stool           Current Outpatient Medications:   •  albuterol (PROVENTIL) (2.5 MG/3ML) 0.083% nebulizer solution, Take 2.5 mg by nebulization Every 6 (Six) Hours., Disp: 360 mL, Rfl: 1  •  aspirin (ASPIRIN LOW DOSE) 81 MG EC tablet, Take 1 tablet by mouth Daily., Disp: 30 tablet, Rfl: 6  •  doxazosin (CARDURA) 1 MG tablet, Take 1 mg by mouth Every Night., Disp: , Rfl:   •  fenofibrate (TRICOR) 48 MG tablet, 67 mg Daily. 1 tablet by G-Tube daily for Hyperlipidemia ,  Disp: , Rfl:   •  ferrous sulfate 220 (44 Fe) MG/5ML solution, 5 mL by Per G Tube route Daily., Disp: 300 mL, Rfl: 1  •  levothyroxine (SYNTHROID) 125 MCG tablet, Take 1 tablet by mouth Daily., Disp: 30 tablet, Rfl: 1  •  metoclopramide (REGLAN) 5 MG/5ML solution, 10 ML Per G-Tube 4 Times Per Day Before Meals X 30 Days, Disp: , Rfl:   •  NATURAL C/DARA HIPS 1000 MG tablet, Take 1,000 mg by mouth 2 (Two) Times a Day., Disp: , Rfl: 0  •  vitamin D (ERGOCALCIFEROL) 92084 units capsule capsule, 1,000 Units Daily. 1 capsule by mouth weekly on Wednesday X 3 months , Disp: , Rfl:   •  docusate (COLACE) 60 MG/15ML syrup, Take 15 mL by mouth Daily., Disp: 473 mL, Rfl: 3  •  neomycin-polymyxin-hydrocortisone (CORTISPORIN) 3.5-19769-7 otic solution, Administer 3 drops to the right ear 3 (Three) Times a Day for 4 days., Disp: 10 mL, Rfl: 0    Pt is 57 yo male with history of throat cancer sp chemoradiation, squamous cell carcinoma of right tonsil in 2008.  alcohol abuse d, acquired hypothyroidism,  GERD ,vitamin D deficiency, sp thyroidectomy,  History of throat surgery, urinary retention. Iron deficiency,  Underweight,  HLP.      8/16/19 Pt is here for recheck and hospital followup.  Pt is vague historian since he has difficulty talking and has tracheostomy. He uses a pen and paper today to help communicateHe was diagnosed in 2008 with carcinoma of right tonsil.   Since last visit pt has been admitted twice to [facility] on 4/12/19 for hoarsness odynophagia and hoarseness for the past previous month. He saw ENT and had schedule a procedure but was found to have BUN CR of 122/4.24.  Pt was sent to ED also had a 20 lbs weight loss and decreased urine output. Pt was given Iv fluids D5 1/2 NS and was given IV synthroid.  Nephrology was consulted and pt underwent direct laryngoscopy with biopsies performed by Dr. Washington. A G tube placement was done by Dr. Valera on 4/15/19.  Tissues biopsies showed infiltrating sqaumous cell carcinoma.   Surgercal resection was recommended.  Pt was on tube feedings tolerated formula and was [facility] to [facility]. He was counseled to have PET scanning  . PET CT scan in April 262019 showed soft tissue thickening in the right sdie of laryngx and 3 mm right lower lobe lung nodule.  Pt was discharged on 424/19. He had office visit on 4/30/19  By Dr. Washington who recommended a potential surgical resection along with laryngopharyngectomy and likely some reconstructive procedure. He wa referred to Dr. Nolan in [facility]  He underwent a total laryngectomy right partial pharyngectomy and right thyroid lobectomy in June 12 2019   He was seen by Gastroenterology Dr. Chester on 5/21/19 and 5/28/19  Who recommend no further workup    Pt was readmitted to [facility] on  7/15/19 for abdominal pain.  He was found to have a high grade SBO and undrwent surgery for lysis of adhesion on 7/17/19.  He was admitted to ICU and was on TPN for severe protein caloric malnutrition. His feedings improved He was given IV hydration.  He was also seen by Dietician.  He was discharged home in stable condition with G tube feedings boluses 5 times daily.   Pt also had acute urinary retention and was seen by Urology and Palomo Catheter was placed.  He was advised to see Urologsit in 3-4 days.  He also had hyponatremia and was treated with fluid restriction and sodium salt. He had PT/OT. Nephrology was also consulted to help with his hyponatremia with Dr. Naik.  Dr. Prieto (Oncology) was consulted.    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  0 of 11 regional lymph nodes were involved with metastatic cancer. He is to follow  With Oncology outpatient about possible adjuvant chemoradiation.  He is now on iron 220 mg per g tube daily. miodrine 10 mg PO TID before meals and sodium chlorid 1 gram PO TID with meals      He is  currently getting Home Health for his PEG tube and  Tracheostomy care. He also has wound on his left forearm and left leg from previous surgery. He is getting wound care and they see him 4 x a week  He is getting Home Health with [facility]     He states he gets constipated and needs a stool softener. He also has issues with his right ear along with right ear pain       Weight Loss   This is a chronic problem. The current episode started more than 1 year ago. The problem occurs constantly. The problem has been waxing and waning. Associated symptoms include abdominal pain, arthralgias, a change in bowel habit, congestion, fatigue, numbness, a sore throat and weakness. Pertinent negatives include no anorexia, chest pain, chills, coughing, diaphoresis, fever, headaches, joint swelling, myalgias, nausea, neck pain, rash, swollen glands, vertigo, visual change or vomiting. Nothing aggravates the symptoms. Treatments tried: PEG tube feedings  The treatment provided mild relief.   Cancer   This is a recurrent problem. The current episode started more than 1 year ago. The problem occurs intermittently. The problem has been gradually worsening. Associated symptoms include abdominal pain, arthralgias, a change in bowel habit, congestion, fatigue, numbness, a sore throat and weakness. Pertinent negatives include no anorexia, chest pain, chills, coughing, diaphoresis, fever, headaches, joint swelling, myalgias, nausea, neck pain, rash, swollen glands, vertigo, visual change or vomiting. Nothing aggravates the symptoms. Treatments tried: throat surgery/ chemoradiation therapy  The treatment provided mild relief.   Thyroid Problem   Presents for follow-up visit. Symptoms include fatigue and weight loss. Patient reports no anxiety, cold intolerance, constipation, depressed mood, diaphoresis, diarrhea, dry skin, hair loss, heat intolerance, hoarse voice, leg swelling, menstrual problem, nail problem, palpitations, tremors or visual  change. The symptoms have been stable.   Sore Throat    This is a new problem. The current episode started in the past 7 days. The problem has been unchanged. There has been no fever. The pain is at a severity of 4/10. The pain is mild. Associated symptoms include congestion, coughing, a plugged ear sensation and trouble swallowing. Pertinent negatives include no abdominal pain, diarrhea, drooling, ear discharge, ear pain, headaches, hoarse voice, neck pain, shortness of breath, stridor, swollen glands or vomiting. He has tried nothing for the symptoms. The treatment provided no relief.   Cough   This is a new problem. The current episode started more than 1 year ago. The problem has been unchanged. Associated symptoms include a sore throat. Pertinent negatives include no chest pain, chills, ear congestion, ear pain, fever, headaches, heartburn, hemoptysis, myalgias, nasal congestion, postnasal drip or shortness of breath.     Review of Systems  Review of Systems   Constitutional: Positive for activity change, fatigue and weight loss. Negative for appetite change, chills, diaphoresis and fever.   HENT: Positive for congestion, ear pain, sore throat and trouble swallowing. Negative for hoarse voice, postnasal drip, rhinorrhea, sinus pressure, sinus pain, sneezing and voice change.    Respiratory: Negative for cough, choking, chest tightness, shortness of breath, wheezing and stridor.    Cardiovascular: Negative for chest pain and palpitations.   Gastrointestinal: Positive for abdominal pain and change in bowel habit. Negative for anorexia, constipation, diarrhea, nausea and vomiting.   Endocrine: Negative for cold intolerance and heat intolerance.   Genitourinary: Negative for menstrual problem.   Musculoskeletal: Positive for arthralgias and back pain. Negative for joint swelling, myalgias and neck pain.   Skin: Positive for wound. Negative for rash.   Neurological: Positive for weakness and numbness. Negative for  vertigo, tremors and headaches.   Psychiatric/Behavioral: The patient is not nervous/anxious.        Patient Active Problem List   Diagnosis   • Hyperkalemia   • Iron deficiency anemia   • Gastroesophageal reflux disease with esophagitis   • Elevated AST (SGOT)   • Alcohol abuse   • Hypothyroidism   • Balance problem   • Need for vaccination   • Low magnesium levels   • Squamous cell carcinoma of pharynx (CMS/HCC)   • History of throat cancer   • Vitamin D deficiency   • Alcohol abuse counseling and surveillance   • Encounter for screening for diabetes mellitus   • Hypomagnesemia   • Iron deficiency anemia   • Hyperlipidemia   • Underweight due to inadequate caloric intake   • Need for immunization against influenza   • 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HCC)   • Hypertension   • General medical examination   • Underweight   • Epigastric pain   • Gastritis without bleeding   • Need for influenza vaccination   • Elevated liver function tests   • B12 deficiency   • Intestinal malabsorption   • Throat pain   • Acute pharyngitis   • Upper respiratory tract infection   • Neoplasm of uncertain behavior of larynx   • Pharyngoesophageal dysphagia   • Severe protein-calorie malnutrition (CMS/HCC)   • Anemia of chronic disease   • Hypotension   • Hyponatremia   • Status post insertion of percutaneous endoscopic gastrostomy (PEG) tube (CMS/HCC)   • Hx of tracheostomy   • History of throat surgery   • Hx SBO   • Urinary retention   • Generalized weakness   • Acute otitis externa of right ear   • Hard stool     Past Surgical History:   Procedure Laterality Date   • ABDOMINAL WALL SURGERY  11/04/2008    Laparotomy with repair of stomach wall perforation. 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 Left upper quadrant abdominal wall abscess debridement. Gastrostomy tube erosion through & through the anterior gastric wall.Lupper quadrant abdominal wall abscess   • COLONOSCOPY  04/21/2014    Internal & external hemorrhoids found.   • COLONOSCOPY  2014     [place]   • COLONOSCOPY N/A 10/16/2018    Procedure: COLONOSCOPY;  Surgeon: Kaushal Chester MD;  Location: [facility] ENDOSCOPY;  Service: Gastroenterology   • DIAGNOSTIC LAPAROSCOPY EXPLORATORY LAPAROTOMY N/A 7/17/2019    Procedure: DIAGNOSTIC LAPAROSCOPY, EXPLORATORY LAPAROTOMY, LYSIS OF ADHESIONS;  Surgeon: Parminder Kc MD;  Location: [facility] OR;  Service: General   • DIRECT LARYNGOSCOPY, ESOPHAGOSCOPY, BRONCHOSCOPY N/A 4/15/2019    Procedure: DIRECT LARYNGOSCOPY with biopsy, ESOPHAGOSCOPY;  Surgeon: Bharathi Washington MD;  Location: [facility] OR;  Service: ENT   • ENDOSCOPY N/A 10/16/2018    Procedure: ESOPHAGOGASTRODUODENOSCOPY;  Surgeon: Kaushal Chester MD;  Location: [facility] ENDOSCOPY;  Service: Gastroenterology   • GASTROSTOMY FEEDING TUBE INSERTION N/A 4/15/2019    Procedure: GASTROSTOMY FEEDING TUBE INSERTION;  Surgeon: Trenton Valera MD;  Location: [facility] OR;  Service: General   • TRACHEOSTOMY  11/10/2008    Respiratory failure   • UPPER GASTROINTESTINAL ENDOSCOPY  04/21/2014    Esophagitis seen. Biopsy taken. Gastritis in stomach. Biopsy taken. Normal duodenum. Biopsy taken.   • UPPER GASTROINTESTINAL ENDOSCOPY  10/16/2018     Social History     Socioeconomic History   • Marital status: Single     Spouse name: Not on file   • Number of children: Not on file   • Years of education: Not on file   • Highest education level: Not on file   Occupational History   • Occupation: Disabled     Comment: Patient resides alone.   Tobacco Use   • Smoking status: Former Smoker     Packs/day: 1.50     Years: 30.00     Pack years: 45.00     Types: Cigarettes   • Smokeless tobacco: Former User     Quit date: 4/12/2009   Substance and Sexual Activity   • Alcohol use: No     Comment: 02/19/2019 - Patient confirmed heavy alcoholic beverage consumption in past with current consumption of 2 - 3 beers twice per week.   • Drug use: No   • Sexual activity: Defer     Family History   Problem Relation Age of Onset  "  • Coronary artery disease Mother    • Diabetes Mother    • Other Mother         \"Heart And Lung Problems\"   • Liver cancer Sister    • Heart disease Other    • Stroke Other    • Hypertension Other    • Diabetes Other    • Cancer Other    • Colon polyps Other    • Other Father         Unknown   • Other Other         \"Lung Problems\"   • Thyroid disease Neg Hx      Admission on 07/15/2019, Discharged on 08/09/2019   No results displayed because visit has over 200 results.      Lab on 05/20/2019   Component Date Value Ref Range Status   • TSH 05/20/2019 13.900* 0.270 - 4.200 mIU/mL Final   • Glucose 05/20/2019 88  65 - 99 mg/dL Final   • BUN 05/20/2019 20  8 - 23 mg/dL Final   • Creatinine 05/20/2019 0.74* 0.76 - 1.27 mg/dL Final   • Sodium 05/20/2019 131* 136 - 145 mmol/L Final   • Potassium 05/20/2019 4.8  3.5 - 5.2 mmol/L Final   • Chloride 05/20/2019 92* 98 - 107 mmol/L Final   • CO2 05/20/2019 26.6  22.0 - 29.0 mmol/L Final   • Calcium 05/20/2019 10.8* 8.6 - 10.5 mg/dL Final   • Total Protein 05/20/2019 9.1* 6.0 - 8.5 g/dL Final   • Albumin 05/20/2019 3.90  3.50 - 5.20 g/dL Final   • ALT (SGPT) 05/20/2019 10  1 - 41 U/L Final   • AST (SGOT) 05/20/2019 19  1 - 40 U/L Final   • Alkaline Phosphatase 05/20/2019 87  39 - 117 U/L Final   • Total Bilirubin 05/20/2019 0.3  0.2 - 1.2 mg/dL Final   • eGFR   Amer 05/20/2019 131  >60 mL/min/1.73 Final   • Globulin 05/20/2019 5.2  gm/dL Final   • A/G Ratio 05/20/2019 0.8  g/dL Final   • BUN/Creatinine Ratio 05/20/2019 27.0* 7.0 - 25.0 Final   • Anion Gap 05/20/2019 12.4  mmol/L Final   • WBC 05/20/2019 9.97  3.40 - 10.80 10*3/mm3 Final   • RBC 05/20/2019 4.00* 4.14 - 5.80 10*6/mm3 Final   • Hemoglobin 05/20/2019 10.2* 13.0 - 17.7 g/dL Final   • Hematocrit 05/20/2019 30.7* 37.5 - 51.0 % Final   • MCV 05/20/2019 76.8* 79.0 - 97.0 fL Final   • MCH 05/20/2019 25.5* 26.6 - 33.0 pg Final   • MCHC 05/20/2019 33.2  31.5 - 35.7 g/dL Final   • RDW 05/20/2019 14.0  12.3 - 15.4 % " Final   • RDW-SD 05/20/2019 38.3  37.0 - 54.0 fl Final   • MPV 05/20/2019 12.0  6.0 - 12.0 fL Final   • Platelets 05/20/2019 500* 140 - 450 10*3/mm3 Final   • nRBC 05/20/2019 0.0  0.0 - 0.2 /100 WBC Final   • Neutrophil % 05/20/2019 57.0  42.7 - 76.0 % Final   • Lymphocyte % 05/20/2019 29.0  19.6 - 45.3 % Final   • Monocyte % 05/20/2019 12.0  5.0 - 12.0 % Final   • Eosinophil % 05/20/2019 2.0  0.3 - 6.2 % Final   • Neutrophils Absolute 05/20/2019 5.68  1.70 - 7.00 10*3/mm3 Final   • Lymphocytes Absolute 05/20/2019 2.89  0.70 - 3.10 10*3/mm3 Final   • Monocytes Absolute 05/20/2019 1.20* 0.10 - 0.90 10*3/mm3 Final   • Eosinophils Absolute 05/20/2019 0.20  0.00 - 0.40 10*3/mm3 Final   • nRBC 05/20/2019 1.0* 0.0 - 0.2 /100 WBC Final   • Target Cells 05/20/2019 Slight/1+  None Seen Final   • WBC Morphology 05/20/2019 Normal  Normal Final   • Platelet Morphology 05/20/2019 Normal  Normal Final   Orders Only on 05/20/2019   Component Date Value Ref Range Status   • Glucose 05/20/2019 107* 65 - 99 mg/dL Final   • BUN 05/20/2019 21  8 - 23 mg/dL Final   • Creatinine 05/20/2019 0.65* 0.76 - 1.27 mg/dL Final   • Sodium 05/20/2019 132* 136 - 145 mmol/L Final   • Potassium 05/20/2019 4.6  3.5 - 5.2 mmol/L Final   • Chloride 05/20/2019 92* 98 - 107 mmol/L Final   • CO2 05/20/2019 30.0* 22.0 - 29.0 mmol/L Final   • Calcium 05/20/2019 10.6* 8.6 - 10.5 mg/dL Final   • Total Protein 05/20/2019 9.3* 6.0 - 8.5 g/dL Final   • Albumin 05/20/2019 3.60  3.50 - 5.20 g/dL Final   • ALT (SGPT) 05/20/2019 7  1 - 41 U/L Final   • AST (SGOT) 05/20/2019 17  1 - 40 U/L Final   • Alkaline Phosphatase 05/20/2019 89  39 - 117 U/L Final   • Total Bilirubin 05/20/2019 0.3  0.2 - 1.2 mg/dL Final   • eGFR   Amer 05/20/2019 >150  >60 mL/min/1.73 Final   • Globulin 05/20/2019 5.7  gm/dL Final   • A/G Ratio 05/20/2019 0.6  g/dL Final   • BUN/Creatinine Ratio 05/20/2019 32.3* 7.0 - 25.0 Final   • Anion Gap 05/20/2019 10.0  mmol/L Final   • WBC  05/20/2019 14.98* 3.40 - 10.80 10*3/mm3 Final   • RBC 05/20/2019 4.00* 4.14 - 5.80 10*6/mm3 Final   • Hemoglobin 05/20/2019 9.9* 13.0 - 17.7 g/dL Final   • Hematocrit 05/20/2019 30.3* 37.5 - 51.0 % Final   • MCV 05/20/2019 75.8* 79.0 - 97.0 fL Final   • MCH 05/20/2019 24.8* 26.6 - 33.0 pg Final   • MCHC 05/20/2019 32.7  31.5 - 35.7 g/dL Final   • RDW 05/20/2019 14.2  12.3 - 15.4 % Final   • RDW-SD 05/20/2019 38.1  37.0 - 54.0 fl Final   • MPV 05/20/2019 11.4  6.0 - 12.0 fL Final   • Platelets 05/20/2019 563* 140 - 450 10*3/mm3 Final   • Neutrophil % 05/20/2019 68.4  42.7 - 76.0 % Final   • Lymphocyte % 05/20/2019 15.8* 19.6 - 45.3 % Final   • Monocyte % 05/20/2019 13.6* 5.0 - 12.0 % Final   • Eosinophil % 05/20/2019 0.7  0.3 - 6.2 % Final   • Basophil % 05/20/2019 0.6  0.0 - 1.5 % Final   • Immature Grans % 05/20/2019 0.9* 0.0 - 0.5 % Final   • Neutrophils, Absolute 05/20/2019 10.26* 1.70 - 7.00 10*3/mm3 Final   • Lymphocytes, Absolute 05/20/2019 2.36  0.70 - 3.10 10*3/mm3 Final   • Monocytes, Absolute 05/20/2019 2.03* 0.10 - 0.90 10*3/mm3 Final   • Eosinophils, Absolute 05/20/2019 0.10  0.00 - 0.40 10*3/mm3 Final   • Basophils, Absolute 05/20/2019 0.09  0.00 - 0.20 10*3/mm3 Final   • Immature Grans, Absolute 05/20/2019 0.14* 0.00 - 0.05 10*3/mm3 Final   • nRBC 05/20/2019 0.0  0.0 - 0.2 /100 WBC Final   Admission on 04/12/2019, Discharged on 04/24/2019   No results displayed because visit has over 200 results.      Lab on 04/12/2019   Component Date Value Ref Range Status   • Glucose 04/12/2019 122* 65 - 99 mg/dL Final   • BUN 04/12/2019 122* 8 - 23 mg/dL Final   • Creatinine 04/12/2019 4.24* 0.76 - 1.27 mg/dL Final   • Sodium 04/12/2019 138  136 - 145 mmol/L Final   • Potassium 04/12/2019 4.4  3.5 - 5.2 mmol/L Final   • Chloride 04/12/2019 89* 98 - 107 mmol/L Final   • CO2 04/12/2019 23.0  22.0 - 29.0 mmol/L Final   • Calcium 04/12/2019 11.0* 8.6 - 10.5 mg/dL Final   • eGFR  African Amer 04/12/2019 17* >60  mL/min/1.73 Final   • BUN/Creatinine Ratio 04/12/2019 28.8* 7.0 - 25.0 Final   • Anion Gap 04/12/2019 26.0  mmol/L Final   Office Visit on 04/12/2019   Component Date Value Ref Range Status   • Free T4 04/12/2019 2.11* 0.93 - 1.70 ng/dL Final   • TSH 04/12/2019 0.043* 0.270 - 4.200 mIU/mL Final   Office Visit on 04/02/2019   Component Date Value Ref Range Status   • Rapid Strep A Screen 04/02/2019 Negative  Negative, VALID, INVALID, Not Performed Final   • Internal Control 04/02/2019 Passed  Passed Final   • Lot Number 04/02/2019 [id number]   Final   • Expiration Date 04/02/2019 03/29/2021   Final      XR Abdomen 2 View With Chest 1 View  Narrative: Procedure: Acute abdomen series with CXR     Reason for exam: Postoperative small bowel obstruction, K56.609  Unspecified intestinal obstruction, unspecified as to partial  versus complete obstruction Z74.09 Other reduced mobility Z74.09  Other reduced mobility    Findings: Comparison exam dated July 25, 2019. Tracheostomy tube  which appears appropriately position. Left subclavian central  venous catheter with tip overlying the region of the SVC right  atrial orifice. Cardiac and pulmonary vasculature is normal.  Lungs are clear. Pleural spaces are normal. No acute osseous  abnormality.    Stable postsurgical changes in the lower mid abdomen and pelvis.   Bony structures of abdomen reveal no acute abnormality. Soft  tissue structures normal. No pathologic calcification.  Improvement in the appearance of the central abdomen small bowel  distention which appears less distended on this exam. This would  suggest improving postop ileus and/or small bowel obstruction.  Recommend clinical correlation. No free intraperitoneal air.  Stable catheter tubing overlying the superior left abdomen of  uncertain etiology. Recommend clinical correlation.  Impression: Impression:  1.  Improvement in the appearance of the central abdomen small  bowel distention which appears less distended  "on this exam. This  would suggest improving postop ileus and/or small bowel  obstruction. Recommend clinical correlation.   2.  Otherwise unremarkable acute abdomen series as described  above.    Electronically signed by:  Devon Cuadra MD  7/26/2019 9:52 AM CDT  Workstation: [id number]    @[employer]@  Immunization History   Administered Date(s) Administered   • Flu Mist 10/19/2012   • Flu Vaccine Quad PF >36MO 11/01/2017   • Tdap 02/13/2017   • Tetanus 01/01/2009   • flucelvax quad pfs =>4 YRS 11/15/2018   • influenza Split 10/07/2016       The following portions of the patient's history were reviewed and updated as appropriate: allergies, current medications, past family history, past medical history, past social history, past surgical history and problem list.        Physical Exam  /62   Pulse 105   Temp 97.9 °F (36.6 °C)   Ht 165.1 cm (65\")   Wt 48.4 kg (106 lb 12.8 oz)   SpO2 98%   BMI 17.77 kg/m²     Physical Exam   Constitutional: He is oriented to person, place, and time. He appears well-developed and well-nourished.   Thin appearance    HENT:   Head: Normocephalic and atraumatic.       Right Ear: External ear normal.   Ears:    Tracheostomy in place   Eyes: Conjunctivae and EOM are normal. Pupils are equal, round, and reactive to light.   Neck: Normal range of motion. Neck supple.   Cardiovascular: Normal rate, regular rhythm and normal heart sounds.   No murmur heard.  Pulmonary/Chest: Effort normal and breath sounds normal. No respiratory distress.   Abdominal: Soft. Bowel sounds are normal. He exhibits no distension. There is no tenderness.   PEG tube in place. Currently clean    Musculoskeletal: Normal range of motion. He exhibits no edema or deformity.   Neurological: He is alert and oriented to person, place, and time. No cranial nerve deficit.   Skin: Skin is warm. No rash noted. He is not diaphoretic. No erythema. No pallor.        Psychiatric: He has a normal mood and affect. His behavior " is normal.   Nursing note and vitals reviewed.      Assessment/Plan    Diagnosis Plan   1. Squamous cell carcinoma of pharynx (CMS/HCC)  CBC Auto Differential    Comprehensive Metabolic Panel    Hemoglobin A1c    Lipid Panel    TSH    T4, Free    T3, Free    Vitamin D 25 Hydroxy    Vitamin B12   2. Underweight due to inadequate caloric intake  CBC Auto Differential    Comprehensive Metabolic Panel    Hemoglobin A1c    Lipid Panel    TSH    T4, Free    T3, Free    Vitamin D 25 Hydroxy    Vitamin B12   3. Throat pain  CBC Auto Differential    Comprehensive Metabolic Panel    Hemoglobin A1c    Lipid Panel    TSH    T4, Free    T3, Free    Vitamin D 25 Hydroxy    Vitamin B12   4. Severe protein-calorie malnutrition (CMS/HCC)  CBC Auto Differential    Comprehensive Metabolic Panel    Hemoglobin A1c    Lipid Panel    TSH    T4, Free    T3, Free    Vitamin D 25 Hydroxy    Vitamin B12   5. Acquired hypothyroidism  CBC Auto Differential    Comprehensive Metabolic Panel    Hemoglobin A1c    Lipid Panel    TSH    T4, Free    T3, Free    Vitamin D 25 Hydroxy    Vitamin B12   6. Iron deficiency anemia, unspecified iron deficiency anemia type  CBC Auto Differential    Comprehensive Metabolic Panel    Hemoglobin A1c    Lipid Panel    TSH    T4, Free    T3, Free    Vitamin D 25 Hydroxy    Vitamin B12   7. Essential hypertension  CBC Auto Differential    Comprehensive Metabolic Panel    Hemoglobin A1c    Lipid Panel    TSH    T4, Free    T3, Free    Vitamin D 25 Hydroxy    Vitamin B12   8. Hyperlipidemia, unspecified hyperlipidemia type  CBC Auto Differential    Comprehensive Metabolic Panel    Hemoglobin A1c    Lipid Panel    TSH    T4, Free    T3, Free    Vitamin D 25 Hydroxy    Vitamin B12   9. Gastroesophageal reflux disease with esophagitis  CBC Auto Differential    Comprehensive Metabolic Panel    Hemoglobin A1c    Lipid Panel    TSH    T4, Free    T3, Free    Vitamin D 25 Hydroxy    Vitamin B12   10. Alcohol abuse counseling  and surveillance  CBC Auto Differential    Comprehensive Metabolic Panel    Hemoglobin A1c    Lipid Panel    TSH    T4, Free    T3, Free    Vitamin D 25 Hydroxy    Vitamin B12   11. Alcohol abuse  CBC Auto Differential    Comprehensive Metabolic Panel    Hemoglobin A1c    Lipid Panel    TSH    T4, Free    T3, Free    Vitamin D 25 Hydroxy    Vitamin B12   12. Anemia of chronic disease  CBC Auto Differential    Comprehensive Metabolic Panel    Hemoglobin A1c    Lipid Panel    TSH    T4, Free    T3, Free    Vitamin D 25 Hydroxy    Vitamin B12   13. Hx SBO  CBC Auto Differential    Comprehensive Metabolic Panel    Hemoglobin A1c    Lipid Panel    TSH    T4, Free    T3, Free    Vitamin D 25 Hydroxy    Vitamin B12   14. Urinary retention  CBC Auto Differential    Comprehensive Metabolic Panel    Hemoglobin A1c    Lipid Panel    TSH    T4, Free    T3, Free    Vitamin D 25 Hydroxy    Vitamin B12   15. Generalized weakness  CBC Auto Differential    Comprehensive Metabolic Panel    Hemoglobin A1c    Lipid Panel    TSH    T4, Free    T3, Free    Vitamin D 25 Hydroxy    Vitamin B12   16. S/P partial thyroidectomy  CBC Auto Differential    Comprehensive Metabolic Panel    Hemoglobin A1c    Lipid Panel    TSH    T4, Free    T3, Free    Vitamin D 25 Hydroxy    Vitamin B12   17. History of throat surgery  CBC Auto Differential    Comprehensive Metabolic Panel    Hemoglobin A1c    Lipid Panel    TSH    T4, Free    T3, Free    Vitamin D 25 Hydroxy    Vitamin B12   18. Hx of tracheostomy  CBC Auto Differential    Comprehensive Metabolic Panel    Hemoglobin A1c    Lipid Panel    TSH    T4, Free    T3, Free    Vitamin D 25 Hydroxy    Vitamin B12   19. Status post insertion of percutaneous endoscopic gastrostomy (PEG) tube (CMS/HCC)  CBC Auto Differential    Comprehensive Metabolic Panel    Hemoglobin A1c    Lipid Panel    TSH    T4, Free    T3, Free    Vitamin D 25 Hydroxy    Vitamin B12   20. Acute otitis externa of right ear,  unspecified type     21. Hard stool          -will get bloodwork  -colace syrup for his bowels and hard stools.   -otitis externa-  Cortisporin ear drops to right ear TID for 5 days.   -reviewed both last [facility] admission note.     -underweight - currently has PEG tube and is getting tube feedings. 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  Pt referred to [place]. Sp  Throat surgeyr. Appt with Oncology soon for possible chemoradiation therapy.  Pt was  on scopolamine patch and roxocodone. He was also on midodrine before  -Tracheostomy and PEG tube working properly. Currently clean   -vitamin D deficiency on vitamin D supplements  -HLP- on tricor   -urinary retention on cardura   -hyponatremia - recheck BMP   -history of SBO - sp surgery.  General Surgery following.sp exploratory laparotomy lysis of adhession.    -advised to go to ER or call 911 if symptoms worrisome or sever  -if not improving consider ENT followup. He has an appt coming up soon  -continue synthroid 175 mcg PO q daily for hypothryoidism - Endocrinology following  -alcohol abuse/ - counseled pt >5 minutes to quit drinking alcohol. Gave information on withdrawal  he has not had alcohol    -alcohol cirrhosis - Gastroenterology following        This document has been electronically signed by Cristino He MD on August 16, 2019 11:37 AM

## 2019-08-16 ENCOUNTER — APPOINTMENT (OUTPATIENT)
Dept: ONCOLOGY | Facility: CLINIC | Age: 59
End: 2019-08-16

## 2019-08-16 ENCOUNTER — LAB (OUTPATIENT)
Dept: ONCOLOGY | Facility: HOSPITAL | Age: 59
End: 2019-08-16

## 2019-08-16 ENCOUNTER — OFFICE VISIT (OUTPATIENT)
Dept: FAMILY MEDICINE CLINIC | Facility: CLINIC | Age: 59
End: 2019-08-16

## 2019-08-16 ENCOUNTER — OFFICE VISIT (OUTPATIENT)
Dept: ONCOLOGY | Facility: CLINIC | Age: 59
End: 2019-08-16

## 2019-08-16 VITALS
HEIGHT: 65 IN | OXYGEN SATURATION: 98 % | BODY MASS INDEX: 17.79 KG/M2 | SYSTOLIC BLOOD PRESSURE: 100 MMHG | HEART RATE: 105 BPM | DIASTOLIC BLOOD PRESSURE: 62 MMHG | WEIGHT: 106.8 LBS | TEMPERATURE: 97.9 F

## 2019-08-16 VITALS
BODY MASS INDEX: 17.19 KG/M2 | OXYGEN SATURATION: 99 % | HEART RATE: 102 BPM | RESPIRATION RATE: 16 BRPM | SYSTOLIC BLOOD PRESSURE: 119 MMHG | WEIGHT: 107 LBS | DIASTOLIC BLOOD PRESSURE: 68 MMHG | HEIGHT: 66 IN | TEMPERATURE: 97.5 F

## 2019-08-16 DIAGNOSIS — E03.9 ACQUIRED HYPOTHYROIDISM: Chronic | ICD-10-CM

## 2019-08-16 DIAGNOSIS — R63.6 UNDERWEIGHT DUE TO INADEQUATE CALORIC INTAKE: ICD-10-CM

## 2019-08-16 DIAGNOSIS — Z98.890 HX OF TRACHEOSTOMY: ICD-10-CM

## 2019-08-16 DIAGNOSIS — F10.10 ALCOHOL ABUSE: ICD-10-CM

## 2019-08-16 DIAGNOSIS — R53.1 GENERALIZED WEAKNESS: ICD-10-CM

## 2019-08-16 DIAGNOSIS — D50.9 IRON DEFICIENCY ANEMIA, UNSPECIFIED IRON DEFICIENCY ANEMIA TYPE: ICD-10-CM

## 2019-08-16 DIAGNOSIS — K21.00 GASTROESOPHAGEAL REFLUX DISEASE WITH ESOPHAGITIS: ICD-10-CM

## 2019-08-16 DIAGNOSIS — E89.0 S/P PARTIAL THYROIDECTOMY: ICD-10-CM

## 2019-08-16 DIAGNOSIS — Z87.19 HX SBO: ICD-10-CM

## 2019-08-16 DIAGNOSIS — D63.8 ANEMIA OF CHRONIC DISEASE: ICD-10-CM

## 2019-08-16 DIAGNOSIS — C14.0 SQUAMOUS CELL CARCINOMA OF PHARYNX (HCC): Primary | ICD-10-CM

## 2019-08-16 DIAGNOSIS — C14.0 SQUAMOUS CELL CARCINOMA OF PHARYNX (HCC): ICD-10-CM

## 2019-08-16 DIAGNOSIS — E43 SEVERE PROTEIN-CALORIE MALNUTRITION (HCC): ICD-10-CM

## 2019-08-16 DIAGNOSIS — Z98.890 HISTORY OF THROAT SURGERY: ICD-10-CM

## 2019-08-16 DIAGNOSIS — D38.0 NEOPLASM OF UNCERTAIN BEHAVIOR OF LARYNX: ICD-10-CM

## 2019-08-16 DIAGNOSIS — E78.5 HYPERLIPIDEMIA, UNSPECIFIED HYPERLIPIDEMIA TYPE: ICD-10-CM

## 2019-08-16 DIAGNOSIS — Z93.1 STATUS POST INSERTION OF PERCUTANEOUS ENDOSCOPIC GASTROSTOMY (PEG) TUBE (HCC): ICD-10-CM

## 2019-08-16 DIAGNOSIS — R33.9 URINARY RETENTION: ICD-10-CM

## 2019-08-16 DIAGNOSIS — H60.501 ACUTE OTITIS EXTERNA OF RIGHT EAR, UNSPECIFIED TYPE: ICD-10-CM

## 2019-08-16 DIAGNOSIS — R19.5 HARD STOOL: ICD-10-CM

## 2019-08-16 DIAGNOSIS — I10 ESSENTIAL HYPERTENSION: Chronic | ICD-10-CM

## 2019-08-16 DIAGNOSIS — J43.1 PANLOBULAR EMPHYSEMA (HCC): ICD-10-CM

## 2019-08-16 DIAGNOSIS — Z71.41 ALCOHOL ABUSE COUNSELING AND SURVEILLANCE: ICD-10-CM

## 2019-08-16 DIAGNOSIS — K90.9 INTESTINAL MALABSORPTION, UNSPECIFIED TYPE: ICD-10-CM

## 2019-08-16 DIAGNOSIS — R07.0 THROAT PAIN: ICD-10-CM

## 2019-08-16 LAB
BASOPHILS # BLD AUTO: 0.07 10*3/MM3 (ref 0–0.2)
BASOPHILS NFR BLD AUTO: 0.7 % (ref 0–1.5)
DEPRECATED RDW RBC AUTO: 45.7 FL (ref 37–54)
EOSINOPHIL # BLD AUTO: 0.12 10*3/MM3 (ref 0–0.4)
EOSINOPHIL NFR BLD AUTO: 1.2 % (ref 0.3–6.2)
ERYTHROCYTE [DISTWIDTH] IN BLOOD BY AUTOMATED COUNT: 17.4 % (ref 12.3–15.4)
FERRITIN SERPL-MCNC: 1247 NG/ML (ref 30–400)
HCT VFR BLD AUTO: 26.1 % (ref 37.5–51)
HGB BLD-MCNC: 8.8 G/DL (ref 13–17.7)
IMM GRANULOCYTES # BLD AUTO: 0.03 10*3/MM3 (ref 0–0.05)
IMM GRANULOCYTES NFR BLD AUTO: 0.3 % (ref 0–0.5)
IRON 24H UR-MRATE: 39 MCG/DL (ref 59–158)
IRON SATN MFR SERPL: 13 % (ref 20–50)
LYMPHOCYTES # BLD AUTO: 2.01 10*3/MM3 (ref 0.7–3.1)
LYMPHOCYTES NFR BLD AUTO: 20.5 % (ref 19.6–45.3)
MCH RBC QN AUTO: 24.9 PG (ref 26.6–33)
MCHC RBC AUTO-ENTMCNC: 33.7 G/DL (ref 31.5–35.7)
MCV RBC AUTO: 73.7 FL (ref 79–97)
MONOCYTES # BLD AUTO: 1.48 10*3/MM3 (ref 0.1–0.9)
MONOCYTES NFR BLD AUTO: 15.1 % (ref 5–12)
NEUTROPHILS # BLD AUTO: 6.09 10*3/MM3 (ref 1.7–7)
NEUTROPHILS NFR BLD AUTO: 62.2 % (ref 42.7–76)
NRBC BLD AUTO-RTO: 0 /100 WBC (ref 0–0.2)
PLATELET # BLD AUTO: 360 10*3/MM3 (ref 140–450)
PMV BLD AUTO: 11.2 FL (ref 6–12)
RBC # BLD AUTO: 3.54 10*6/MM3 (ref 4.14–5.8)
TIBC SERPL-MCNC: 305 MCG/DL (ref 298–536)
TRANSFERRIN SERPL-MCNC: 205 MG/DL (ref 200–360)
WBC NRBC COR # BLD: 9.8 10*3/MM3 (ref 3.4–10.8)

## 2019-08-16 PROCEDURE — 99214 OFFICE O/P EST MOD 30 MIN: CPT | Performed by: FAMILY MEDICINE

## 2019-08-16 PROCEDURE — G0463 HOSPITAL OUTPT CLINIC VISIT: HCPCS | Performed by: INTERNAL MEDICINE

## 2019-08-16 PROCEDURE — 84466 ASSAY OF TRANSFERRIN: CPT | Performed by: INTERNAL MEDICINE

## 2019-08-16 PROCEDURE — 85025 COMPLETE CBC W/AUTO DIFF WBC: CPT | Performed by: INTERNAL MEDICINE

## 2019-08-16 PROCEDURE — 99204 OFFICE O/P NEW MOD 45 MIN: CPT | Performed by: INTERNAL MEDICINE

## 2019-08-16 PROCEDURE — 82728 ASSAY OF FERRITIN: CPT | Performed by: INTERNAL MEDICINE

## 2019-08-16 PROCEDURE — 83540 ASSAY OF IRON: CPT | Performed by: INTERNAL MEDICINE

## 2019-08-16 RX ORDER — ASPIRIN 81 MG/1
81 TABLET ORAL DAILY
Qty: 30 TABLET | Refills: 6 | Status: ON HOLD | OUTPATIENT
Start: 2019-08-16 | End: 2020-03-25

## 2019-08-16 RX ORDER — ASCORBIC ACID 1000 MG
1000 TABLET ORAL 2 TIMES DAILY
Refills: 0 | COMMUNITY
Start: 2019-07-01 | End: 2019-09-06

## 2019-08-16 NOTE — PATIENT INSTRUCTIONS
Cisplatin injection  What is this medicine?  CISPLATIN (SIS carly tin) is a chemotherapy drug. It targets fast dividing cells, like cancer cells, and causes these cells to die. This medicine is used to treat many types of cancer like bladder, ovarian, and testicular cancers.  This medicine may be used for other purposes; ask your health care provider or pharmacist if you have questions.  COMMON BRAND NAME(S): Platinol, Platinol -AQ  What should I tell my health care provider before I take this medicine?  They need to know if you have any of these conditions:  -blood disorders  -hearing problems  -kidney disease  -recent or ongoing radiation therapy  -an unusual or allergic reaction to cisplatin, carboplatin, other chemotherapy, other medicines, foods, dyes, or preservatives  -pregnant or trying to get pregnant  -breast-feeding  How should I use this medicine?  This drug is given as an infusion into a vein. It is administered in a hospital or clinic by a specially trained health care professional.  Talk to your pediatrician regarding the use of this medicine in children. Special care may be needed.  Overdosage: If you think you have taken too much of this medicine contact a poison control center or emergency room at once.  NOTE: This medicine is only for you. Do not share this medicine with others.  What if I miss a dose?  It is important not to miss a dose. Call your doctor or health care professional if you are unable to keep an appointment.  What may interact with this medicine?  -dofetilide  -foscarnet  -medicines for seizures  -medicines to increase blood counts like filgrastim, pegfilgrastim, sargramostim  -probenecid  -pyridoxine used with altretamine  -rituximab  -some antibiotics like amikacin, gentamicin, neomycin, polymyxin B, streptomycin, tobramycin  -sulfinpyrazone  -vaccines  -zalcitabine  Talk to your doctor or health care professional before taking any of these  medicines:  -acetaminophen  -aspirin  -ibuprofen  -ketoprofen  -naproxen  This list may not describe all possible interactions. Give your health care provider a list of all the medicines, herbs, non-prescription drugs, or dietary supplements you use. Also tell them if you smoke, drink alcohol, or use illegal drugs. Some items may interact with your medicine.  What should I watch for while using this medicine?  Your condition will be monitored carefully while you are receiving this medicine. You will need important blood work done while you are taking this medicine.  This drug may make you feel generally unwell. This is not uncommon, as chemotherapy can affect healthy cells as well as cancer cells. Report any side effects. Continue your course of treatment even though you feel ill unless your doctor tells you to stop.  In some cases, you may be given additional medicines to help with side effects. Follow all directions for their use.  Call your doctor or health care professional for advice if you get a fever, chills or sore throat, or other symptoms of a cold or flu. Do not treat yourself. This drug decreases your body's ability to fight infections. Try to avoid being around people who are sick.  This medicine may increase your risk to bruise or bleed. Call your doctor or health care professional if you notice any unusual bleeding.  Be careful brushing and flossing your teeth or using a toothpick because you may get an infection or bleed more easily. If you have any dental work done, tell your dentist you are receiving this medicine.  Avoid taking products that contain aspirin, acetaminophen, ibuprofen, naproxen, or ketoprofen unless instructed by your doctor. These medicines may hide a fever.  Do not become pregnant while taking this medicine. Women should inform their doctor if they wish to become pregnant or think they might be pregnant. There is a potential for serious side effects to an unborn child. Talk to  your health care professional or pharmacist for more information. Do not breast-feed an infant while taking this medicine.  Drink fluids as directed while you are taking this medicine. This will help protect your kidneys.  Call your doctor or health care professional if you get diarrhea. Do not treat yourself.  What side effects may I notice from receiving this medicine?  Side effects that you should report to your doctor or health care professional as soon as possible:  -allergic reactions like skin rash, itching or hives, swelling of the face, lips, or tongue  -signs of infection - fever or chills, cough, sore throat, pain or difficulty passing urine  -signs of decreased platelets or bleeding - bruising, pinpoint red spots on the skin, black, tarry stools, nosebleeds  -signs of decreased red blood cells - unusually weak or tired, fainting spells, lightheadedness  -breathing problems  -changes in hearing  -gout pain  -low blood counts - This drug may decrease the number of white blood cells, red blood cells and platelets. You may be at increased risk for infections and bleeding.  -nausea and vomiting  -pain, swelling, redness or irritation at the injection site  -pain, tingling, numbness in the hands or feet  -problems with balance, movement  -trouble passing urine or change in the amount of urine  Side effects that usually do not require medical attention (report to your doctor or health care professional if they continue or are bothersome):  -changes in vision  -loss of appetite  -metallic taste in the mouth or changes in taste  This list may not describe all possible side effects. Call your doctor for medical advice about side effects. You may report side effects to FDA at 7-466-FDA-5417.  Where should I keep my medicine?  This drug is given in a hospital or clinic and will not be stored at home.  NOTE: This sheet is a summary. It may not cover all possible information. If you have questions about this medicine,  talk to your doctor, pharmacist, or health care provider.  © 2019 [employer] (2009-03-24 14:40:54)        INSTRUCTIONS FOR YOUR PET/CT EXAM AT [facility]    Report to Same Day Surgery the day of your appointment for your PET/CT scan.    DO NOT EAT, DRINK, SMOKE (THIS INCLUDES E-CIGARETTES), DIP OR CHEW AFTER MIDNIGHT THE MORNING OF YOUR EXAM.    EXCEPT, please drink 3-4 glasses of PLAIN tap water before your arrival at Same Day Surgery.  You may drink two (2) - 20 ounce bottles of unflavored bottled water before your arrival in place of the tap water.     You may take your morning medication with plain water EXCEPT YOUR DIABETIC MEDICATION.  DO NOT TAKE ANY DIABETIC MEDICATIONS THAT MORNING.    On Sunday, please eat High Protein/Low Carbohydrate diet.  For example: Protein: Chicken, Beef, Pork, Cheese, or Turkey.  You may have as much of these as you like.  Please limit Carbohydrates: Sodas, breads, pastas, gravy, cereals, chips, cakes, and cookies.    Please wear comfortable clothing.  Due to imaging, please do not wear overalls, coveralls or any clothing with metal on the shirt.    The test will take approximately 2 to 2 1/2 hours.  Upon arrival in the Radiology Department, you will be asked to fill out a questionnaire, the technologist will come and get you and escort you to the mobile PET unit.  The technologist will start an IV on you and check your blood sugar and if your blood sugar is within sufficient range do the exam, you will receive an injection of the liquid radiation.  The injection will not make you feel any different than you feel now.  It will not make you feel good but it will not make you feel bad either.  The injection must circulate for 45 minutes to an hour.  You will then lie on an imaging table and be scanned from your head to mid-thigh.  Images will take approximately 30 minutes.    A technologist will call you on the Friday before your exam to go over your  instructions.  Please make sure your physician has a good phone number to contact you.  If you do not hear from [facility] Nuclear Medicine Department by 2 p.m. On the Friday before your exam, please call [phone] or [phone].    If you need to reschedule your exam, please call the above phone numbers to reschedule your PET/CT exam.    YOU ARE BEING SCHEDULED FOR A PET/CT SCAN.  YOU SHOULD RECEIVE A PHONE CALL WITH YOUR APPOINTMENT WITHIN 3 DAYS.  IF YOU DO NOT RECEIVE A PHONE CALL WITH YOU APPOINTMENT, PLEASE CALL THE [facility] [phone].  THANK YOU!    Kerri Sykes RN  August 16, 2019  2:42 PM

## 2019-08-17 ENCOUNTER — READMISSION MANAGEMENT (OUTPATIENT)
Dept: CALL CENTER | Facility: HOSPITAL | Age: 59
End: 2019-08-17

## 2019-08-17 NOTE — OUTREACH NOTE
General Surgery Week 2 Survey      Responses   Facility patient discharged from?  [facility]   Does the patient have one of the following disease processes/diagnoses(primary or secondary)?  General Surgery   Week 2 attempt successful?  No   Unsuccessful attempts  Attempt 1          Edel Chavira RN

## 2019-08-18 ENCOUNTER — READMISSION MANAGEMENT (OUTPATIENT)
Dept: CALL CENTER | Facility: HOSPITAL | Age: 59
End: 2019-08-18

## 2019-08-18 RX ORDER — DIPHENHYDRAMINE HYDROCHLORIDE 50 MG/ML
50 INJECTION INTRAMUSCULAR; INTRAVENOUS AS NEEDED
Status: CANCELLED | OUTPATIENT
Start: 2019-08-23

## 2019-08-18 RX ORDER — SODIUM CHLORIDE 9 MG/ML
250 INJECTION, SOLUTION INTRAVENOUS ONCE
Status: CANCELLED | OUTPATIENT
Start: 2019-08-23 | End: 2019-08-23

## 2019-08-18 NOTE — OUTREACH NOTE
General Surgery Week 2 Survey      Responses   Facility patient discharged from?  [facility]   Does the patient have one of the following disease processes/diagnoses(primary or secondary)?  General Surgery   Week 2 attempt successful?  No   Unsuccessful attempts  Attempt 2          Edel Chavira RN

## 2019-08-19 ENCOUNTER — TELEPHONE (OUTPATIENT)
Dept: ONCOLOGY | Facility: HOSPITAL | Age: 59
End: 2019-08-19

## 2019-08-19 ENCOUNTER — LAB (OUTPATIENT)
Dept: LAB | Facility: HOSPITAL | Age: 59
End: 2019-08-19

## 2019-08-19 DIAGNOSIS — E87.1 HYPONATREMIA: ICD-10-CM

## 2019-08-19 DIAGNOSIS — R33.9 URINARY RETENTION: ICD-10-CM

## 2019-08-19 DIAGNOSIS — E43 SEVERE PROTEIN-CALORIE MALNUTRITION (HCC): ICD-10-CM

## 2019-08-19 DIAGNOSIS — R63.6 UNDERWEIGHT DUE TO INADEQUATE CALORIC INTAKE: ICD-10-CM

## 2019-08-19 DIAGNOSIS — F10.10 ALCOHOL ABUSE: ICD-10-CM

## 2019-08-19 DIAGNOSIS — Z98.890 HISTORY OF THROAT SURGERY: ICD-10-CM

## 2019-08-19 DIAGNOSIS — E78.5 HYPERLIPIDEMIA, UNSPECIFIED HYPERLIPIDEMIA TYPE: ICD-10-CM

## 2019-08-19 DIAGNOSIS — I95.0 IDIOPATHIC HYPOTENSION: Chronic | ICD-10-CM

## 2019-08-19 DIAGNOSIS — Z93.1 STATUS POST INSERTION OF PERCUTANEOUS ENDOSCOPIC GASTROSTOMY (PEG) TUBE (HCC): ICD-10-CM

## 2019-08-19 DIAGNOSIS — K21.00 GASTROESOPHAGEAL REFLUX DISEASE WITH ESOPHAGITIS: ICD-10-CM

## 2019-08-19 DIAGNOSIS — E22.2 SIADH (SYNDROME OF INAPPROPRIATE ADH PRODUCTION) (HCC): ICD-10-CM

## 2019-08-19 DIAGNOSIS — R07.0 THROAT PAIN: ICD-10-CM

## 2019-08-19 DIAGNOSIS — E89.0 S/P PARTIAL THYROIDECTOMY: ICD-10-CM

## 2019-08-19 DIAGNOSIS — D63.8 ANEMIA OF CHRONIC DISEASE: ICD-10-CM

## 2019-08-19 DIAGNOSIS — Z98.890 HX OF TRACHEOSTOMY: ICD-10-CM

## 2019-08-19 DIAGNOSIS — E03.9 ACQUIRED HYPOTHYROIDISM: ICD-10-CM

## 2019-08-19 DIAGNOSIS — Z71.41 ALCOHOL ABUSE COUNSELING AND SURVEILLANCE: ICD-10-CM

## 2019-08-19 DIAGNOSIS — Z87.19 HX SBO: ICD-10-CM

## 2019-08-19 DIAGNOSIS — R53.1 GENERALIZED WEAKNESS: ICD-10-CM

## 2019-08-19 DIAGNOSIS — C14.0 SQUAMOUS CELL CARCINOMA OF PHARYNX (HCC): ICD-10-CM

## 2019-08-19 DIAGNOSIS — D50.9 IRON DEFICIENCY ANEMIA, UNSPECIFIED IRON DEFICIENCY ANEMIA TYPE: ICD-10-CM

## 2019-08-19 DIAGNOSIS — I10 ESSENTIAL HYPERTENSION: ICD-10-CM

## 2019-08-19 LAB
25(OH)D3 SERPL-MCNC: 30.3 NG/ML (ref 30–100)
ALBUMIN SERPL-MCNC: 3.9 G/DL (ref 3.5–5.2)
ALBUMIN/GLOB SERPL: 0.8 G/DL
ALP SERPL-CCNC: 84 U/L (ref 39–117)
ALT SERPL W P-5'-P-CCNC: 12 U/L (ref 1–41)
ANION GAP SERPL CALCULATED.3IONS-SCNC: 13.3 MMOL/L (ref 5–15)
AST SERPL-CCNC: 18 U/L (ref 1–40)
BASOPHILS # BLD AUTO: 0.08 10*3/MM3 (ref 0–0.2)
BASOPHILS NFR BLD AUTO: 1.1 % (ref 0–1.5)
BILIRUB SERPL-MCNC: 0.2 MG/DL (ref 0.2–1.2)
BUN BLD-MCNC: 17 MG/DL (ref 8–23)
BUN/CREAT SERPL: 29.8 (ref 7–25)
CALCIUM SPEC-SCNC: 10.5 MG/DL (ref 8.6–10.5)
CHLORIDE SERPL-SCNC: 96 MMOL/L (ref 98–107)
CHOLEST SERPL-MCNC: 140 MG/DL (ref 0–200)
CO2 SERPL-SCNC: 25.7 MMOL/L (ref 22–29)
CREAT BLD-MCNC: 0.57 MG/DL (ref 0.76–1.27)
DEPRECATED RDW RBC AUTO: 48.7 FL (ref 37–54)
EOSINOPHIL # BLD AUTO: 0.12 10*3/MM3 (ref 0–0.4)
EOSINOPHIL NFR BLD AUTO: 1.6 % (ref 0.3–6.2)
ERYTHROCYTE [DISTWIDTH] IN BLOOD BY AUTOMATED COUNT: 17.8 % (ref 12.3–15.4)
GFR SERPL CREATININE-BSD FRML MDRD: >150 ML/MIN/1.73
GLOBULIN UR ELPH-MCNC: 4.6 GM/DL
GLUCOSE BLD-MCNC: 110 MG/DL (ref 65–99)
HBA1C MFR BLD: 6.21 % (ref 4.8–5.6)
HCT VFR BLD AUTO: 28.7 % (ref 37.5–51)
HDLC SERPL-MCNC: 35 MG/DL (ref 40–60)
HGB BLD-MCNC: 9.5 G/DL (ref 13–17.7)
IMM GRANULOCYTES # BLD AUTO: 0.02 10*3/MM3 (ref 0–0.05)
IMM GRANULOCYTES NFR BLD AUTO: 0.3 % (ref 0–0.5)
LDLC SERPL CALC-MCNC: 96 MG/DL (ref 0–100)
LDLC/HDLC SERPL: 2.73 {RATIO}
LYMPHOCYTES # BLD AUTO: 2.01 10*3/MM3 (ref 0.7–3.1)
LYMPHOCYTES NFR BLD AUTO: 27.5 % (ref 19.6–45.3)
MCH RBC QN AUTO: 25.3 PG (ref 26.6–33)
MCHC RBC AUTO-ENTMCNC: 33.1 G/DL (ref 31.5–35.7)
MCV RBC AUTO: 76.3 FL (ref 79–97)
MONOCYTES # BLD AUTO: 1.28 10*3/MM3 (ref 0.1–0.9)
MONOCYTES NFR BLD AUTO: 17.5 % (ref 5–12)
NEUTROPHILS # BLD AUTO: 3.81 10*3/MM3 (ref 1.7–7)
NEUTROPHILS NFR BLD AUTO: 52 % (ref 42.7–76)
NRBC BLD AUTO-RTO: 0 /100 WBC (ref 0–0.2)
PLATELET # BLD AUTO: 391 10*3/MM3 (ref 140–450)
PMV BLD AUTO: 12.7 FL (ref 6–12)
POTASSIUM BLD-SCNC: 4.4 MMOL/L (ref 3.5–5.2)
PROT SERPL-MCNC: 8.5 G/DL (ref 6–8.5)
RBC # BLD AUTO: 3.76 10*6/MM3 (ref 4.14–5.8)
SODIUM BLD-SCNC: 135 MMOL/L (ref 136–145)
T3FREE SERPL-MCNC: 4.84 PG/ML (ref 2–4.4)
T4 FREE SERPL-MCNC: 2.26 NG/DL (ref 0.93–1.7)
TRIGL SERPL-MCNC: 47 MG/DL (ref 0–150)
TSH SERPL DL<=0.05 MIU/L-ACNC: 0.06 MIU/ML (ref 0.27–4.2)
VIT B12 BLD-MCNC: 1619 PG/ML (ref 211–946)
VLDLC SERPL-MCNC: 9.4 MG/DL (ref 5–40)
WBC NRBC COR # BLD: 7.32 10*3/MM3 (ref 3.4–10.8)

## 2019-08-19 PROCEDURE — 84439 ASSAY OF FREE THYROXINE: CPT

## 2019-08-19 PROCEDURE — 85025 COMPLETE CBC W/AUTO DIFF WBC: CPT

## 2019-08-19 PROCEDURE — 84481 FREE ASSAY (FT-3): CPT

## 2019-08-19 PROCEDURE — 80061 LIPID PANEL: CPT

## 2019-08-19 PROCEDURE — 82607 VITAMIN B-12: CPT

## 2019-08-19 PROCEDURE — 80053 COMPREHEN METABOLIC PANEL: CPT

## 2019-08-19 PROCEDURE — 82306 VITAMIN D 25 HYDROXY: CPT

## 2019-08-19 PROCEDURE — 84443 ASSAY THYROID STIM HORMONE: CPT

## 2019-08-19 PROCEDURE — 83036 HEMOGLOBIN GLYCOSYLATED A1C: CPT

## 2019-08-19 NOTE — TELEPHONE ENCOUNTER
----- Message from Krysten Martínez MD sent at 8/18/2019 10:57 PM CDT -----  Patient's iron level is low. Recommend IV injectofer - 2 doses 1 week apart.

## 2019-08-19 NOTE — TELEPHONE ENCOUNTER
Attempted to call patient to inform of low iron level and IV injectofer infusions.  Message left.

## 2019-08-19 NOTE — PROGRESS NOTES
Emerson Bang  [id number]  1960      REASON FOR CONSULTATION:  Hypopharyngeal cancer  Provide an opinion on any further workup or treatment                             REQUESTING PHYSICIAN:  Bharathi Washington MD     RECORDS OBTAINED:  Records of the patients history including those obtained from the referring provider were reviewed and summarized in detail.      History of Present Illness     Mr. 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  He is a new patient at our cancer center.Patient unfortunately is a non-verbal due to his tracheostomy and most of the history was obtained by reviewing old medical charts.    Patient has complicated oncologic history.  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  Unfortunately, patient developed recurrence of symptoms with throat pain and difficulty swallowing with hoarseness in April 2019.  He underwent CT scan of the neck on April 12, 2019 which showed soft tissue fullness in the right supraglottic larynx suspicious for malignancy.  Patient underwent direct laryngoscopy and biopsy on April 15 followed by placement of G-tube by Dr. Valera.  Laryngoscopy revealed circumferential tumor of the right pyriform sinus involving the lateral pharyngeal wall, extending around the medial wall of the pyriform sinus, lateral larynx.  Biopsy was obtained which show moderately differentiated squamous cell carcinoma.  Patient had PET CT scan performed on April 26, 2019 which showed hypermetabolic activity with the soft tissue thickening in the right side of the larynx and a 3 mm right lower lobe lung nodule without any hypermetabolic activity.  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  The pharyngeal margin was involved by invasive squamous cell carcinoma.  Perineural invasion was present.  Lymphovascular invasion was not present.  0 of 11 regional lymph nodes were involved with metastatic cancer.     Due to positive margin concurrent chemoradiation was recommended; however, patient was admitted to our hospital on July 15, 2019 with small bowel obstruction and had a prolonged lengthy hospital stay and was discharged home on August 9, 2019.    He is returning to our clinic to discuss his adjuvant therapy options.                  Past Medical History:   Diagnosis Date   • Allergic rhinitis     vs URI   • Anemia    • At risk for falls    • Benign prostatic hyperplasia    • Chronic gastritis    • Cirrhosis of liver (CMS/HCC)    • Complication of gastrostomy (CMS/HCC)     site not healing   • COPD (chronic obstructive pulmonary disease) (CMS/HCC)    • Dysphagia     odynophagia   • Epigastric pain    • Esophagitis    • GERD (gastroesophageal reflux disease)    • Hypertension    • Hypothyroidism, unspecified    • Low back pain    • Malaise and fatigue    • Nasal congestion 11/15/2018   • Nausea with vomiting, unspecified    • Pain in left knee    • Pain in right knee    • Primary malignant neoplasm of pharynx (CMS/HCC)    • Screening for malignant neoplasm of colon    • Vitamin D deficiency         Past Surgical History:   Procedure Laterality Date   • ABDOMINAL WALL SURGERY  11/04/2008    Laparotomy with repair of stomach wall perforation. 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 Left upper quadrant abdominal wall abscess debridement. Gastrostomy tube erosion through & through the anterior gastric wall.Lupper quadrant abdominal wall abscess   •  COLONOSCOPY  04/21/2014    Internal & external hemorrhoids found.   • COLONOSCOPY  2014    [place]   • COLONOSCOPY N/A 10/16/2018    Procedure: COLONOSCOPY;  Surgeon: Kaushal Chester MD;  Location: [facility] ENDOSCOPY;  Service: Gastroenterology   • DIAGNOSTIC LAPAROSCOPY EXPLORATORY LAPAROTOMY N/A 7/17/2019    Procedure: DIAGNOSTIC LAPAROSCOPY, EXPLORATORY LAPAROTOMY, LYSIS OF ADHESIONS;  Surgeon: Parminder Kc MD;  Location: [facility] OR;  Service: General   • DIRECT LARYNGOSCOPY, ESOPHAGOSCOPY, BRONCHOSCOPY N/A 4/15/2019    Procedure: DIRECT LARYNGOSCOPY with biopsy, ESOPHAGOSCOPY;  Surgeon: Bharathi Washington MD;  Location: [facility] OR;  Service: ENT   • ENDOSCOPY N/A 10/16/2018    Procedure: ESOPHAGOGASTRODUODENOSCOPY;  Surgeon: Kaushal Chester MD;  Location: [facility] ENDOSCOPY;  Service: Gastroenterology   • GASTROSTOMY FEEDING TUBE INSERTION N/A 4/15/2019    Procedure: GASTROSTOMY FEEDING TUBE INSERTION;  Surgeon: Trenton Valera MD;  Location: [facility] OR;  Service: General   • TRACHEOSTOMY  11/10/2008    Respiratory failure   • UPPER GASTROINTESTINAL ENDOSCOPY  04/21/2014    Esophagitis seen. Biopsy taken. Gastritis in stomach. Biopsy taken. Normal duodenum. Biopsy taken.   • UPPER GASTROINTESTINAL ENDOSCOPY  10/16/2018        Current Outpatient Medications on File Prior to Visit   Medication Sig Dispense Refill   • aspirin (ASPIRIN LOW DOSE) 81 MG EC tablet Take 1 tablet by mouth Daily. 30 tablet 6   • docusate (COLACE) 60 MG/15ML syrup Take 15 mL by mouth Daily. 473 mL 3   • doxazosin (CARDURA) 1 MG tablet Take 1 mg by mouth Every Night.     • fenofibrate (TRICOR) 48 MG tablet 67 mg Daily. 1 tablet by G-Tube daily for Hyperlipidemia      • ferrous sulfate 220 (44 Fe) MG/5ML solution 5 mL by Per G Tube route Daily. 300 mL 1   • levothyroxine (SYNTHROID) 125 MCG tablet Take 1 tablet by mouth Daily. 30 tablet 1   • metoclopramide (REGLAN) 5 MG/5ML solution 10 ML Per G-Tube 4 Times Per Day Before  "Meals X 30 Days     • NATURAL C/DARA HIPS 1000 MG tablet Take 1,000 mg by mouth 2 (Two) Times a Day.  0   • neomycin-polymyxin-hydrocortisone (CORTISPORIN) 3.5-86913-8 otic solution Administer 3 drops to the right ear 3 (Three) Times a Day for 4 days. 10 mL 0   • vitamin D (ERGOCALCIFEROL) 25585 units capsule capsule 1,000 Units Daily. 1 capsule by mouth weekly on Wednesday X 3 months      • albuterol (PROVENTIL) (2.5 MG/3ML) 0.083% nebulizer solution Take 2.5 mg by nebulization Every 6 (Six) Hours. 360 mL 1     No current facility-administered medications on file prior to visit.         ALLERGIES:  No Known Allergies     Social History     Socioeconomic History   • Marital status: Single     Spouse name: Not on file   • Number of children: Not on file   • Years of education: Not on file   • Highest education level: Not on file   Occupational History   • Occupation: Disabled     Comment: Patient resides alone.   Tobacco Use   • Smoking status: Former Smoker     Packs/day: 1.50     Years: 30.00     Pack years: 45.00     Types: Cigarettes     Last attempt to quit: 2009     Years since quitting: 10.6   • Smokeless tobacco: Former User     Quit date: 4/12/2009   Substance and Sexual Activity   • Alcohol use: No     Comment: 02/19/2019 - Patient confirmed heavy alcoholic beverage consumption in past with current consumption of 2 - 3 beers twice per week.   • Drug use: No   • Sexual activity: Defer        Family History   Problem Relation Age of Onset   • Coronary artery disease Mother    • Diabetes Mother    • Other Mother         \"Heart And Lung Problems\"   • Liver cancer Sister    • Heart disease Other    • Stroke Other    • Hypertension Other    • Diabetes Other    • Cancer Other    • Colon polyps Other    • Other Father         Unknown   • Other Other         \"Lung Problems\"   • Thyroid disease Neg Hx         Review of Systems               Objective     Vitals:    08/16/19 1403   BP: 119/68   Pulse: 102   Resp: 16 " "  Temp: 97.5 °F (36.4 °C)   SpO2: 99%   Weight: 48.5 kg (107 lb)   Height: 167.1 cm (65.8\")   PainSc:   3   PainLoc: Shoulder     Current Status 8/16/2019   ECOG score 0       Physical Exam        GENERAL: Alert, awake, oriented. Thin cachetic male.  Vitals as above.   HEAD: Normocephalic, atraumatic.   EYES: PERRL, EOMI.vision is grossly intact. Conjunctival pallor +   NECK: Supple, no adenopathy or thyromegaly.   THROAT: Normal oral cavity and pharynx. No inflammation, swelling, exudate, or lesions.  CARDIAC: Normal S1 and S2. No S3, S4 or murmurs. Rhythm is regular.  Extremities are warm and well perfused.   LUNGS: Clear to auscultation and percussion without rales, rhonchi, wheezing or diminished breath sounds.  ABDOMEN: Soft, non-teneder. G tube +.  + BS. No hepatosplenomegaly.   BACK:  No bony tenderness.   EXTREMITIES: No significant deformity or joint abnormality.  Peripheral pulses intact. No varicosities.  SKIN: No rash or bruising.  NEUROLOGICAL: Grossly non-focal exam. No focal weakness. Gait: Not assessed due to clinical condition.   PSYCH: Mood and affect normal. No hallucination or suicidal thoughts.   LYMPHATIC: No cervical, supraclavicular or axillary adenopathy    RECENT LABS: Independently reviewed and summarized.  Hematology WBC   Date Value Ref Range Status   08/16/2019 9.80 3.40 - 10.80 10*3/mm3 Final     RBC   Date Value Ref Range Status   08/16/2019 3.54 (L) 4.14 - 5.80 10*6/mm3 Final     Hemoglobin   Date Value Ref Range Status   08/16/2019 8.8 (L) 13.0 - 17.7 g/dL Final     Hematocrit   Date Value Ref Range Status   08/16/2019 26.1 (L) 37.5 - 51.0 % Final     Platelets   Date Value Ref Range Status   08/16/2019 360 140 - 450 10*3/mm3 Final          Imaging (independently reviewed and summarized):   CT abdomen pelvis on July 15, 2019 showed high-grade small bowel obstruction.  No prior comparison available.     Pathology result reviewed.   Moderately differentiated squamous cells carcinoma " of the right hypopharynx, measuring 4.7 x 4.5 cm, involving the hyoid bone, prevertebral fascia, and carotid sheath tissue.  The pharyngeal margin was involved by invasive squamous cell carcinoma.  Perineural invasion was present.  Lymphovascular invasion was not present.  0 of 11 regional lymph nodes were involved with metastatic cancer.     I have reviewed old records and summarized them in HPI as well as assessment and plan section of this note.       Diagnosis:   (1) Hypopharyngeal cancer  (2) Iron deficiency anemia   (3) Iron malabsorption   (4) Severe protein caloric malnutrition   (5) Hypothyroidism         Assessment/Plan     (1) Hypopharyngeal cancer:   - Resected with positive margins.   -We will obtain PET scan to see if there is any residual or recurrent cancer.  -Discussed diagnosis, prognosis and potential treatment options at length with the patient.  -Recommend radiation oncology consultation.  -If he is not a candidate for radiation then we will consider a weekly cisplatin.  If he is a candidate for radiation we will consider concurrent chemoradiation using weekly cisplatin.  Side effects associated with chemotherapy discussed at length with the patient and family.  They are in agreement with proceeding with the PET scan and radiation oncology consultation.     (2) Iron deficiency anemia (3)  Iron malabsorption:   - Patient unable to absorb oral iron due to surgery on small bowel.   - He is severely anemic with hg 8.8, iron saturation of 13.   -We will arrange for 2 doses of IV Injectafer 750 intravenously 1 week apart.    (4) Severe protein caloric malnutrition: G tube. Nutrition consult. Recommend high calorie high protein diet.      (5) Hypothyroidism: On synthroid.     Conner Martínez MD

## 2019-08-20 ENCOUNTER — TELEPHONE (OUTPATIENT)
Dept: ONCOLOGY | Facility: HOSPITAL | Age: 59
End: 2019-08-20

## 2019-08-20 NOTE — TELEPHONE ENCOUNTER
Informed the patient's sister of this information. I gave her the appointment date/times and she verbalized understanding.

## 2019-08-22 ENCOUNTER — TELEPHONE (OUTPATIENT)
Dept: ONCOLOGY | Facility: CLINIC | Age: 59
End: 2019-08-22

## 2019-08-22 ENCOUNTER — TELEPHONE (OUTPATIENT)
Dept: NUTRITION | Facility: HOSPITAL | Age: 59
End: 2019-08-22

## 2019-08-22 NOTE — PROGRESS NOTES
Adult Outpatient Nutrition  Assessment    Patient Name:  Emerson Bang  YOB: 1960  MRN: [id number]    Assessment Date:  8/22/2019    Comments: Pt is 59BM coming to [facility] due to pharyngeal cancer (recurrent). Severe protein/calorie malnutrition. Recent hospital stay with small bowel obstruction. History tonsil cancer 2008 (received chemo + rad). Pt non-verbal due to recent tracheostomy. Pt TEN dependant. Takes x5 cans Peptamen 1.5 bolus via G-tube daily. Spoke with sister via phone to check tolerance of home bolus feedings. Pt is living with his daughter, but does most of  trache/tube care himself. Ht 66 in. Wt 107 lb--down from 124 lb 7/28/19 (bed scale). BMI 17.4. Alb 3.9. Sister unsure if pt NPO. Pt coming to  tomorrow 8/23/19. Will assess further and offer nutrition services at that time.                       Electronically signed by:  Joann Willoughby RD  08/22/19 2:26 PM

## 2019-08-22 NOTE — TELEPHONE ENCOUNTER
He can still get IV iron infusion.  His follow-up appointment needs to be moved as well.  I will see him after his PET scan

## 2019-08-23 ENCOUNTER — INFUSION (OUTPATIENT)
Dept: ONCOLOGY | Facility: HOSPITAL | Age: 59
End: 2019-08-23

## 2019-08-23 ENCOUNTER — TELEPHONE (OUTPATIENT)
Dept: FAMILY MEDICINE CLINIC | Facility: CLINIC | Age: 59
End: 2019-08-23

## 2019-08-23 ENCOUNTER — APPOINTMENT (OUTPATIENT)
Dept: PET IMAGING | Facility: HOSPITAL | Age: 59
End: 2019-08-23

## 2019-08-23 ENCOUNTER — DOCUMENTATION (OUTPATIENT)
Dept: ONCOLOGY | Facility: CLINIC | Age: 59
End: 2019-08-23

## 2019-08-23 ENCOUNTER — DOCUMENTATION (OUTPATIENT)
Dept: NUTRITION | Facility: HOSPITAL | Age: 59
End: 2019-08-23

## 2019-08-23 ENCOUNTER — TELEPHONE (OUTPATIENT)
Dept: ONCOLOGY | Facility: CLINIC | Age: 59
End: 2019-08-23

## 2019-08-23 VITALS
RESPIRATION RATE: 18 BRPM | HEART RATE: 88 BPM | DIASTOLIC BLOOD PRESSURE: 82 MMHG | SYSTOLIC BLOOD PRESSURE: 136 MMHG | TEMPERATURE: 97.8 F

## 2019-08-23 DIAGNOSIS — D50.9 IRON DEFICIENCY ANEMIA, UNSPECIFIED IRON DEFICIENCY ANEMIA TYPE: Primary | ICD-10-CM

## 2019-08-23 PROCEDURE — 96374 THER/PROPH/DIAG INJ IV PUSH: CPT | Performed by: INTERNAL MEDICINE

## 2019-08-23 PROCEDURE — 25010000002 FERRIC CARBOXYMALTOSE 750 MG/15ML SOLUTION 15 ML VIAL: Performed by: INTERNAL MEDICINE

## 2019-08-23 RX ORDER — SODIUM CHLORIDE 9 MG/ML
250 INJECTION, SOLUTION INTRAVENOUS ONCE
Status: COMPLETED | OUTPATIENT
Start: 2019-08-23 | End: 2019-08-23

## 2019-08-23 RX ORDER — SODIUM CHLORIDE 9 MG/ML
250 INJECTION, SOLUTION INTRAVENOUS ONCE
Status: CANCELLED | OUTPATIENT
Start: 2019-08-23 | End: 2019-08-23

## 2019-08-23 RX ORDER — DIPHENHYDRAMINE HYDROCHLORIDE 50 MG/ML
50 INJECTION INTRAMUSCULAR; INTRAVENOUS AS NEEDED
Status: CANCELLED | OUTPATIENT
Start: 2019-08-23

## 2019-08-23 RX ORDER — METHYLPREDNISOLONE SODIUM SUCCINATE 125 MG/2ML
125 INJECTION, POWDER, LYOPHILIZED, FOR SOLUTION INTRAMUSCULAR; INTRAVENOUS AS NEEDED
Status: CANCELLED | OUTPATIENT
Start: 2019-08-23

## 2019-08-23 RX ADMIN — FERRIC CARBOXYMALTOSE INJECTION 750 MG: 50 INJECTION, SOLUTION INTRAVENOUS at 13:19

## 2019-08-23 RX ADMIN — SODIUM CHLORIDE 250 ML: 9 INJECTION, SOLUTION INTRAVENOUS at 12:55

## 2019-08-23 NOTE — PROGRESS NOTES
Adult Outpatient Nutrition  Assessment    Patient Name:  Emerson Bang  YOB: 1960  MRN: [id number]    Assessment Date:  8/23/2019    Comments: Met with pt face-to-face today. No family with Mr Bang. Pt unable to speak, but does shake head yes/no. Did offer pt paper and pen, but did not accept. Pt NPO. Pt asking when will be able to eat/drink. Spoke with Dr Prieto re: pt's question. Dr Prieto is consulting speech to assess swallowing & educate on swallowing exercises. Explained plan to patient. No problems with nausea/vomiting/constipation/  diarrhea. NKFA. Pt states x5 cans Peptamen 1.5 is satisfying hunger. Did okay x6 cans as needed. Urged flushed as well.                         Electronically signed by:  Joann Willoughby RD  08/23/19 3:31 PM

## 2019-08-23 NOTE — TELEPHONE ENCOUNTER
SW attempted to reach pt by phone to inquire as to needs, support and distress. Unable to reach pt.

## 2019-08-23 NOTE — TELEPHONE ENCOUNTER
----- Message from Cristino He MD sent at 8/22/2019  9:49 PM CDT -----  Call pt    Cut back to taking synthroid medication 6 days a week insteadof a full week. TSH low and T3 and T4 high     Vitamin D in normal range but continue with supplement     Cut back on vitamin B12 to every other day if taking supplement since b12 levels high     Hemoglobin improved to 9.5 Continue with Hematology/Oncology followup     On CMP sodium and chloride improved     On lipid panel HDL is low     hga1c at 6.21. Needs to watch sugar and carb intake. Pt is prediabetic     Recheck on next visit  Called pt daughter

## 2019-08-23 NOTE — PROGRESS NOTES
Oncology SW met with patient as he presents for infusion.  Pt completed  distress during initial  consultation and scored indicators of personal stress and distress.  SW met with pt for the first time since hospitalization on this date.  SW reiterated role and support and inquired with pt as to needs, coping and support.  Pt diagnosed with cancer of pharynx and is non verbal due to tracheostomy. In turn, history is difficult to obtain. In addition to encounter, SW reviewed medical history and collaborated with nursing and physician as to pt history and needs.  Pt. Responded in written form that his daughter, Martha, brought him. He indicated no immediate needs and nodded understanding of SW role and support. Contact info was given to pt.  SW will continue to follow and has requested infusion nursing to advise if needs are identified.

## 2019-08-30 ENCOUNTER — INFUSION (OUTPATIENT)
Dept: ONCOLOGY | Facility: HOSPITAL | Age: 59
End: 2019-08-30

## 2019-08-30 ENCOUNTER — HOSPITAL ENCOUNTER (OUTPATIENT)
Dept: PET IMAGING | Facility: HOSPITAL | Age: 59
Discharge: HOME | End: 2019-08-30
Admitting: INTERNAL MEDICINE

## 2019-08-30 VITALS
RESPIRATION RATE: 18 BRPM | SYSTOLIC BLOOD PRESSURE: 123 MMHG | DIASTOLIC BLOOD PRESSURE: 79 MMHG | TEMPERATURE: 97.6 F | HEART RATE: 80 BPM

## 2019-08-30 DIAGNOSIS — C14.0 SQUAMOUS CELL CARCINOMA OF PHARYNX (HCC): ICD-10-CM

## 2019-08-30 DIAGNOSIS — Z98.890 HX OF TRACHEOSTOMY: ICD-10-CM

## 2019-08-30 DIAGNOSIS — D50.9 IRON DEFICIENCY ANEMIA, UNSPECIFIED IRON DEFICIENCY ANEMIA TYPE: Primary | ICD-10-CM

## 2019-08-30 PROCEDURE — 25010000002 FERRIC CARBOXYMALTOSE 750 MG/15ML SOLUTION 15 ML VIAL: Performed by: INTERNAL MEDICINE

## 2019-08-30 PROCEDURE — 78815 PET IMAGE W/CT SKULL-THIGH: CPT

## 2019-08-30 PROCEDURE — 0 FLUDEOXYGLUCOSE F18 SOLUTION: Performed by: INTERNAL MEDICINE

## 2019-08-30 PROCEDURE — 96374 THER/PROPH/DIAG INJ IV PUSH: CPT | Performed by: INTERNAL MEDICINE

## 2019-08-30 PROCEDURE — A9552 F18 FDG: HCPCS | Performed by: INTERNAL MEDICINE

## 2019-08-30 RX ORDER — DIPHENHYDRAMINE HYDROCHLORIDE 50 MG/ML
50 INJECTION INTRAMUSCULAR; INTRAVENOUS AS NEEDED
OUTPATIENT
Start: 2019-08-30

## 2019-08-30 RX ORDER — METHYLPREDNISOLONE SODIUM SUCCINATE 125 MG/2ML
125 INJECTION, POWDER, LYOPHILIZED, FOR SOLUTION INTRAMUSCULAR; INTRAVENOUS AS NEEDED
Status: DISCONTINUED | OUTPATIENT
Start: 2019-08-30 | End: 2019-08-30 | Stop reason: HOSPADM

## 2019-08-30 RX ORDER — SODIUM CHLORIDE 9 MG/ML
250 INJECTION, SOLUTION INTRAVENOUS ONCE
Status: CANCELLED | OUTPATIENT
Start: 2019-08-30 | End: 2019-08-30

## 2019-08-30 RX ORDER — METHYLPREDNISOLONE SODIUM SUCCINATE 125 MG/2ML
125 INJECTION, POWDER, LYOPHILIZED, FOR SOLUTION INTRAMUSCULAR; INTRAVENOUS AS NEEDED
OUTPATIENT
Start: 2019-08-30

## 2019-08-30 RX ORDER — SODIUM CHLORIDE 9 MG/ML
250 INJECTION, SOLUTION INTRAVENOUS ONCE
Status: COMPLETED | OUTPATIENT
Start: 2019-08-30 | End: 2019-08-30

## 2019-08-30 RX ORDER — DIPHENHYDRAMINE HYDROCHLORIDE 50 MG/ML
50 INJECTION INTRAMUSCULAR; INTRAVENOUS AS NEEDED
Status: DISCONTINUED | OUTPATIENT
Start: 2019-08-30 | End: 2019-08-30 | Stop reason: HOSPADM

## 2019-08-30 RX ADMIN — FLUDEOXYGLUCOSE F18 1 DOSE: 300 INJECTION INTRAVENOUS at 09:03

## 2019-08-30 RX ADMIN — FERRIC CARBOXYMALTOSE INJECTION 750 MG: 50 INJECTION, SOLUTION INTRAVENOUS at 11:43

## 2019-08-30 RX ADMIN — SODIUM CHLORIDE 250 ML: 9 INJECTION, SOLUTION INTRAVENOUS at 11:43

## 2019-09-06 ENCOUNTER — OFFICE VISIT (OUTPATIENT)
Dept: ONCOLOGY | Facility: CLINIC | Age: 59
End: 2019-09-06

## 2019-09-06 ENCOUNTER — APPOINTMENT (OUTPATIENT)
Dept: PREADMISSION TESTING | Facility: HOSPITAL | Age: 59
End: 2019-09-06

## 2019-09-06 ENCOUNTER — DOCUMENTATION (OUTPATIENT)
Dept: NUTRITION | Facility: HOSPITAL | Age: 59
End: 2019-09-06

## 2019-09-06 ENCOUNTER — HOSPITAL ENCOUNTER (OUTPATIENT)
Dept: RADIATION ONCOLOGY | Facility: HOSPITAL | Age: 59
Setting detail: RADIATION/ONCOLOGY SERIES
End: 2019-09-06

## 2019-09-06 ENCOUNTER — CONSULT (OUTPATIENT)
Dept: RADIATION ONCOLOGY | Facility: HOSPITAL | Age: 59
End: 2019-09-06

## 2019-09-06 ENCOUNTER — OFFICE VISIT (OUTPATIENT)
Dept: SURGERY | Facility: CLINIC | Age: 59
End: 2019-09-06

## 2019-09-06 ENCOUNTER — ANESTHESIA EVENT (OUTPATIENT)
Dept: PERIOP | Facility: HOSPITAL | Age: 59
End: 2019-09-06

## 2019-09-06 VITALS
BODY MASS INDEX: 17.29 KG/M2 | SYSTOLIC BLOOD PRESSURE: 118 MMHG | WEIGHT: 103.8 LBS | DIASTOLIC BLOOD PRESSURE: 71 MMHG | RESPIRATION RATE: 16 BRPM | HEIGHT: 65 IN | TEMPERATURE: 97.1 F | HEART RATE: 88 BPM

## 2019-09-06 VITALS
RESPIRATION RATE: 14 BRPM | WEIGHT: 103 LBS | HEART RATE: 84 BPM | HEIGHT: 65 IN | SYSTOLIC BLOOD PRESSURE: 130 MMHG | DIASTOLIC BLOOD PRESSURE: 70 MMHG | BODY MASS INDEX: 17.16 KG/M2 | OXYGEN SATURATION: 97 %

## 2019-09-06 VITALS
DIASTOLIC BLOOD PRESSURE: 74 MMHG | HEART RATE: 78 BPM | SYSTOLIC BLOOD PRESSURE: 118 MMHG | HEIGHT: 65 IN | WEIGHT: 103.4 LBS | BODY MASS INDEX: 17.23 KG/M2 | TEMPERATURE: 97 F

## 2019-09-06 DIAGNOSIS — E43 SEVERE PROTEIN-CALORIE MALNUTRITION (HCC): ICD-10-CM

## 2019-09-06 DIAGNOSIS — C13.9 CANCER OF HYPOPHARYNX (HCC): ICD-10-CM

## 2019-09-06 DIAGNOSIS — E03.9 ACQUIRED HYPOTHYROIDISM: Chronic | ICD-10-CM

## 2019-09-06 DIAGNOSIS — C13.9 CANCER OF HYPOPHARYNX (HCC): Primary | ICD-10-CM

## 2019-09-06 DIAGNOSIS — D50.9 IRON DEFICIENCY ANEMIA, UNSPECIFIED IRON DEFICIENCY ANEMIA TYPE: ICD-10-CM

## 2019-09-06 DIAGNOSIS — R19.7 DIARRHEA, UNSPECIFIED TYPE: ICD-10-CM

## 2019-09-06 DIAGNOSIS — Z87.19 HX SBO: ICD-10-CM

## 2019-09-06 LAB
APTT PPP: 32.5 SECONDS (ref 20–40.3)
INR PPP: 1.34 (ref 0.8–1.2)
PROTHROMBIN TIME: 16.4 SECONDS (ref 11.1–15.3)

## 2019-09-06 PROCEDURE — 99205 OFFICE O/P NEW HI 60 MIN: CPT | Performed by: RADIOLOGY

## 2019-09-06 PROCEDURE — 93010 ELECTROCARDIOGRAM REPORT: CPT | Performed by: INTERNAL MEDICINE

## 2019-09-06 PROCEDURE — 85610 PROTHROMBIN TIME: CPT | Performed by: ANESTHESIOLOGY

## 2019-09-06 PROCEDURE — 99214 OFFICE O/P EST MOD 30 MIN: CPT | Performed by: INTERNAL MEDICINE

## 2019-09-06 PROCEDURE — 99024 POSTOP FOLLOW-UP VISIT: CPT | Performed by: SURGERY

## 2019-09-06 PROCEDURE — G0463 HOSPITAL OUTPT CLINIC VISIT: HCPCS | Performed by: RADIOLOGY

## 2019-09-06 PROCEDURE — 85730 THROMBOPLASTIN TIME PARTIAL: CPT | Performed by: ANESTHESIOLOGY

## 2019-09-06 PROCEDURE — G0463 HOSPITAL OUTPT CLINIC VISIT: HCPCS | Performed by: INTERNAL MEDICINE

## 2019-09-06 PROCEDURE — 93005 ELECTROCARDIOGRAM TRACING: CPT

## 2019-09-06 PROCEDURE — 77263 THER RADIOLOGY TX PLNG CPLX: CPT | Performed by: RADIOLOGY

## 2019-09-06 PROCEDURE — 36415 COLL VENOUS BLD VENIPUNCTURE: CPT

## 2019-09-06 RX ORDER — SODIUM CHLORIDE 1000 MG
1 TABLET, SOLUBLE MISCELLANEOUS
Refills: 1 | COMMUNITY
Start: 2019-08-16 | End: 2019-09-24

## 2019-09-06 RX ORDER — SODIUM CHLORIDE, SODIUM LACTATE, POTASSIUM CHLORIDE, CALCIUM CHLORIDE 600; 310; 30; 20 MG/100ML; MG/100ML; MG/100ML; MG/100ML
100 INJECTION, SOLUTION INTRAVENOUS CONTINUOUS
Status: CANCELLED | OUTPATIENT
Start: 2019-09-11

## 2019-09-06 RX ORDER — DEXAMETHASONE 4 MG/1
TABLET ORAL
Qty: 6 TABLET | Refills: 5 | Status: SHIPPED | OUTPATIENT
Start: 2019-09-06 | End: 2019-09-06

## 2019-09-06 RX ORDER — DOCUSATE SODIUM 10 MG/ML
LIQUID ORAL
Refills: 3 | COMMUNITY
Start: 2019-08-16 | End: 2019-09-06

## 2019-09-06 RX ORDER — ONDANSETRON HYDROCHLORIDE 8 MG/1
8 TABLET, FILM COATED ORAL 3 TIMES DAILY PRN
Qty: 30 TABLET | Refills: 5 | Status: SHIPPED | OUTPATIENT
Start: 2019-09-06 | End: 2019-11-14

## 2019-09-06 NOTE — PROGRESS NOTES
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  He recently underwent surgical excision with reconstruction at the [facility], and has been referred to the [facility] for adjuvant treatment recommendations.    Below is a summary of his relevant history.    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  He received 7000 cGy to the tumor bed, utilizing IMRT, along with concurrent carboplatin/Taxol chemotherapy.  He was subsequently followed in our clinic (up through September 2016) with no sign of recurrence.  The patient also followed with Dr. Washington.    2/8/2019 the patient was seen in follow-up with Dr. Washington.  At that time, there was no evidence of recurrent disease.  He was noted to have a significant soft tissue defect in the palate.  He reported that he had quit smoking, but continued to drink alcohol regularly.    4/12/2019 the patient was seen in follow-up by Dr. Washington (at his [place] office), at which time he complained of a 1 month history of throat pain, difficulty swallowing, and hoarseness.  He also reported a 20 pound weight  loss over the previous month.  FFL revealed right vocal cord paralysis, along with pooling of secretions in the right piriform sinus.  Overt neoplasm was not appreciated, but the other findings suggested a piriform sinus lesion involving the lateral aspect of the right hemilarynx.  Direct laryngoscopy with biopsy and esophagoscopy was recommended.    4/12/2019 the patient underwent a CT of the neck which revealed soft tissue fullness on the right along the right area epiglottic fold in the right supraglottic larynx that could represent tumor.  Increased secretions in this region led this unenhanced exam to be more indeterminate.  The patient was not able to tolerate contrast due to his elevated BUN/creatinine.  No other evidence of mucosal lesions or lymphadenopathy were noted.    4/12/2019 the patient was direct admitted to [facility] for further evaluation and treatment of his acute renal failure, dehydration, and malnutrition.    4/15/2019 the patient underwent direct laryngoscopy with biopsy per Dr. Washington and placement of a gastric feeding tube per Dr. Valera.     Laryngoscopy revealed a circumferential tumor of the right piriform sinus involving the lateral pharyngeal wall, extending around to the medial wall of the piriform sinus/lateral larynx.  Anatomic limitations including trismus and limited neck mobility precluded performing a rigid esophagoscopy.  Pathology: Biopsies from the right piriform sinus revealed moderately differentiated infiltrating squamous cell carcinoma.     Since the patient had previously received definitive chemoradiation therapy to the head neck in 2008, Dr. Washington felt that his only potential curative treatment modality would be surgical resection.  He recommended referral to a tertiary care center such as [facility] or the [facility].    4/24/2019 the patient was discharged from the hospital to [facility] in [place], [place].    4/26/2019 PET/CT revealed  hypermetabolic activity with soft tissue thickening in the right side of the larynx, just to the right of the epiglottis (SUV 6.2).  A 3 mm RLL lung nodule, without hypermetabolic activity, was felt to be too small to characterize with PET.  No evidence of distant metastatic disease was identified.    4/30/2019 the patient was seen in follow-up by Dr. Washington, who subsequently referred the patient to Dr. Hitesh Nolan at the [facility] for further evaluation.    5/13/2019 the patient was seen in consultation by Dr. Hitesh Nolan at the [facility].  Following examination, Dr. Nolan recommended surgical resection, with adjuvant treatment following resection, as the patient's best option for curative treatment, in light of his previous chemo XRT.    5/29/2019 CT of the neck with contrast revealed a fairly large mass diffusely involving the right piriform sinus, with invasion into the right paraglottic space extending to the level of the false vocal cord, with arytenoid cartilage not well demonstrated.  Findings suggest some right true cord paralysis versus mass-effect.  A single borderline enlarged right level 1B lymph node (measuring 0.9 cm) was noted, with no other cervical lymphadenopathy.  Radiation changes from his previous treatment, poor dentition, and were noted.      6/12/2019 the patient underwent full dental extraction followed by a total laryngectomy, right partial pharyngectomy, right thyroid lobectomy and right neck dissection (levels 2-4) per Dr. Nolan.  He then received a left radial forearm free flap with pharyngoplasty and split thickness skin graft per Dr. Keaton Clarke.    Pathology: Moderately differentiating squamous cell carcinoma of the right hypopharynx, measuring 4.7 x 4.5 cm, involving the hyoid bone, prevertebral fascia, and carotid sheath tissue.  The pharyngeal margin was involved by invasive SCCA.  Lymphovascular invasion was not present, however,  perineural invasion was present.  0 of 11 regional lymph nodes were involved with metastatic disease (pT4b N0).    The patient healed well in rehab at [facility].    7/12/2019 the patient was seen in follow-up by Dr. Nolan who referred the patient back to the [facility] for consideration of adjuvant chemotherapy with concurrent reirradiation.       He was scheduled to be seen in consultation in both [facility] clinics on 7/16/2019, however, he was admitted to [facility] the previous day (7/15/2019 with a small bowel obstruction.    7/15/2019 through 8/9/2019 the patient was admitted to [facility] for treatment of a small bowel obstruction.  He underwent an exploratory laparotomy with lysis of adhesions on 7/17/2019 per Dr. Kc.  He required intensive care secondary to being hemodynamically unstable.  He was started on TPN for severe protein caloric malnutrition, but subsequently progressed back to tolerating G-tube feedings.    8/16/2019 the patient was seen in consultation by Dr. Prieto, who ordered a PET/CT for restaging.  Dr. Prieto recommended adjuvant chemotherapy with/without radiation therapy.    8/30/2019 PET/CT revealed interval resection of the mass in the right supraglottic region, with surgical clips noted.  Increased uptake in the left anterior submandibular region of uncertain significance was noted (SUV 3.85).  Subtle areas of bilateral hilar uptake, of uncertain significance, were noted, similar to prior exam.  No evidence of distant metastatic disease was identified.    The patient presents to our clinic today to discuss reirradiation of the head neck.    This is a new problem to me, and one that is potentially life-threatening.  (Old medical records were requested, reviewed, and summarized in the HPI)    Review of Systems   HENT: Positive for sore throat, trouble swallowing and voice change.         Feeding tube intact   Respiratory: Positive for cough and shortness of breath.     Gastrointestinal: Positive for diarrhea.   Musculoskeletal:        Right Shoulder Pain       All other systems reviewed and are negative.    Past Medical History:   Diagnosis Date   • Allergic rhinitis     vs URI   • Anemia    • At risk for falls    • Benign prostatic hyperplasia    • Chronic gastritis    • Cirrhosis of liver (CMS/HCC)    • Complication of gastrostomy (CMS/HCC)     site not healing   • COPD (chronic obstructive pulmonary disease) (CMS/HCC)    • Dysphagia     odynophagia   • Epigastric pain    • Esophagitis    • GERD (gastroesophageal reflux disease)    • Hypertension    • Hypothyroidism, unspecified    • Low back pain    • Malaise and fatigue    • Nasal congestion 11/15/2018   • Nausea with vomiting, unspecified    • Pain in left knee    • Pain in right knee    • Primary malignant neoplasm of pharynx (CMS/HCC)    • Screening for malignant neoplasm of colon    • Tonsil cancer (CMS/HCC) 2008    Right Tonsil         Chemo/Radiation   • Vitamin D deficiency         Past Surgical History:   Procedure Laterality Date   • ABDOMINAL WALL SURGERY  11/04/2008    Laparotomy with repair of stomach wall perforation. 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 Left upper quadrant abdominal wall abscess debridement. Gastrostomy tube erosion through & through the anterior gastric wall.Lupper quadrant abdominal wall abscess   • COLONOSCOPY  04/21/2014    Internal & external hemorrhoids found.   • COLONOSCOPY  2014    [place]   • COLONOSCOPY N/A 10/16/2018    Procedure: COLONOSCOPY;  Surgeon: Kaushal Chester MD;  Location: [facility] ENDOSCOPY;  Service: Gastroenterology   • DIAGNOSTIC LAPAROSCOPY EXPLORATORY LAPAROTOMY N/A 7/17/2019    Procedure: DIAGNOSTIC LAPAROSCOPY, EXPLORATORY LAPAROTOMY, LYSIS OF ADHESIONS;  Surgeon: Parminder Kc MD;  Location: [facility] OR;  Service: General   • DIRECT LARYNGOSCOPY, ESOPHAGOSCOPY, BRONCHOSCOPY N/A 4/15/2019    Procedure: DIRECT LARYNGOSCOPY with biopsy, ESOPHAGOSCOPY;  Surgeon:  "Bharathi Washington MD;  Location: [facility] OR;  Service: ENT   • ENDOSCOPY N/A 10/16/2018    Procedure: ESOPHAGOGASTRODUODENOSCOPY;  Surgeon: Kaushal Chester MD;  Location: [facility] ENDOSCOPY;  Service: Gastroenterology   • GASTROSTOMY FEEDING TUBE INSERTION N/A 4/15/2019    Procedure: GASTROSTOMY FEEDING TUBE INSERTION;  Surgeon: Trenton Valera MD;  Location: [facility] OR;  Service: General   • TRACHEOSTOMY  11/10/2008    Respiratory failure   • UPPER GASTROINTESTINAL ENDOSCOPY  04/21/2014    Esophagitis seen. Biopsy taken. Gastritis in stomach. Biopsy taken. Normal duodenum. Biopsy taken.   • UPPER GASTROINTESTINAL ENDOSCOPY  10/16/2018      Family History   Problem Relation Age of Onset   • Coronary artery disease Mother    • Diabetes Mother    • Other Mother         \"Heart And Lung Problems\"   • Lung cancer Mother    • Ovarian cancer Sister    • Heart disease Other    • Stroke Other    • Hypertension Other    • Diabetes Other    • Cancer Other    • Colon polyps Other    • Other Father         Unknown   • Other Other         \"Lung Problems\"   • Thyroid disease Neg Hx         Social History     Socioeconomic History   • Marital status: Single     Spouse name: Not on file   • Number of children: Not on file   • Years of education: Not on file   • Highest education level: Not on file   Occupational History   • Occupation: Disabled     Comment: Patient resides alone.   Tobacco Use   • Smoking status: Former Smoker     Packs/day: 1.50     Years: 30.00     Pack years: 45.00     Types: Cigarettes     Last attempt to quit: 2009     Years since quitting: 10.6   • Smokeless tobacco: Former User     Quit date: 4/12/2009   Substance and Sexual Activity   • Alcohol use: No     Comment: 02/19/2019 - Patient confirmed heavy alcoholic beverage consumption in past with current consumption of 2 - 3 beers twice per week.   • Drug use: No   • Sexual activity: Defer     Allergies: Patient has no known allergies.     Prior " "to Admission medications    Medication Sig Start Date End Date Taking? Authorizing Provider   albuterol (PROVENTIL) (2.5 MG/3ML) 0.083% nebulizer solution Take 2.5 mg by nebulization Every 6 (Six) Hours. 8/9/19  Yes Saul Gomez MD   aspirin (ASPIRIN LOW DOSE) 81 MG EC tablet Take 1 tablet by mouth Daily. 8/16/19  Yes Cristino He MD   docusate (COLACE) 60 MG/15ML syrup Take 15 mL by mouth Daily. 8/16/19  Yes Cristino He MD   doxazosin (CARDURA) 1 MG tablet Take 1 mg by mouth Every Night.   Yes Esperanza Foote MD   fenofibrate (TRICOR) 48 MG tablet 67 mg Daily. 1 tablet by G-Tube daily for Hyperlipidemia    Yes Esperanza Foote MD   ferrous sulfate 220 (44 Fe) MG/5ML solution 5 mL by Per G Tube route Daily. 8/10/19  Yes Saul Gomez MD   levothyroxine (SYNTHROID) 125 MCG tablet Take 1 tablet by mouth Daily. 8/9/19  Yes Saul Gomez MD   metoclopramide (REGLAN) 5 MG/5ML solution 10 ML Per G-Tube 4 Times Per Day Before Meals X 30 Days   Yes Esperanza Foote MD   NATURAL C/DARA HIPS 1000 MG tablet Take 1,000 mg by mouth 2 (Two) Times a Day. 7/1/19  Yes Esperanza Foote MD   vitamin D (ERGOCALCIFEROL) 63569 units capsule capsule 1,000 Units Daily. 1 capsule by mouth weekly on Wednesday X 3 months    Yes Esperanza Foote MD      Pain: (on a scale of 0-10)     Pain Score    09/06/19 0859   PainSc:   3   PainLoc: Shoulder  Comment: Right shoulder       Quality of Life:  70 - Difficulty Walking, Needs Assistance   Advanced Care Plan: N     Physical Examination:  Vitals:     Vitals:    09/06/19 0859   BP: 118/71   Pulse: 88   Resp: 16   Temp: 97.1 °F (36.2 °C)       Height: 165.1 cm (65\")  Weight: Weight: 47.1 kg (103 lb 12.8 oz)   Body mass index is 17.27 kg/m².      Constitutional: The patient is a well-developed, well-nourished black  male in no acute distress.  Alert and oriented ×3.  Unable to speak due to laryngectomy with tracheostomy.  HEENT: Patient with " well-healed scarred from multiple previous surgeries. Tracheostomy in place.  No suspicious nodules palpated in the neck.  Lymphatics: No cervical, supraclavicular, or axillary lymphadenopathy is palpated.  CV: Regular rate and rhythm.  No murmurs, rubs, or gallops are appreciated.  Respiratory: Lungs clear to auscultation.  Breath sounds equal bilaterally.  GI: Well-healed scars from previous surgeries noted.  Abdomen soft, nontender, nondistended, with no hepatosplenomegaly or masses palpated.  Feeding tube in place.  Extremities: No clubbing, cyanosis, or edema.  Neurologic: Cranial nerves II through XII are grossly intact, with no focal neurological deficits noted on exam.  Psychiatric: Alert and oriented x3. Normal affect, with no anxiety or depression noted.    Radiographs :     Nuclear Medicine Body PET/CT  (4/26/19)   COMPARISON EXAMINATIONS:   CT neck 4/12/2019. CT abdomen and pelvis dated 5/28/2012.      HISTORY: SCC nasopharynx, C11.9 Malignant neoplasm of nasopharynx, unspecified.   --------------  FINDINGS:  HEAD and NECK:  There is hypermetabolic activity (SUV max 6.2) associated with  soft tissue thickening in the right side of the larynx and just  to the right of the epiglottis consistent with patient's known  neoplasm.  There is bilateral hypermetabolic activity in the region of the  lateral pterygoid muscles most pronounced on the left likely due  to a muscular activity following injection (SUV max 4.1). This is  only seen on the head and neck dedicated imaging and not on the  imaging performed from the distal thigh., Therefore this is  consistent with muscular activity at the time of scanning.   THORAX:  No suspicious hypermetabolic activity   ABDOMEN/PELVIS:  No suspicious hypermetabolic activity   OSSEOUS / MISC:  No suspicious hypermetabolic activity   ADDITIONAL FINDINGS:   Biapical pleural thickening in the anterior lung apices. 3 mm  right lower lobe superior segment lung nodule without  associated  activity, probably too small to characterize with PET.  Small partially calcified spleen.  Free gas is noted in the upper abdomen, probably associated with  recent surgery.  There is a gastric tube.  There is a focus of gas in the urinary bladder which is likely  from recent instrumentation.   IMPRESSION:  CONCLUSION:  1.  Hypermetabolic activity (SUV max 6.2) associated with soft  tissue thickening in the right side of the larynx and just to the  right of the epiglottis consistent with patient's known neoplasm.  2.  3 mm right lower lobe superior segment lung nodule without  associated activity, probably too small to characterize with PET.  3.  Free gas is noted in the upper abdomen, probably associated  with recent open gastric tube placement.  4.  There is a focus of gas in the urinary bladder which is  likely from recent instrumentation.    Nuclear medicine PET/CT imaging study. (8/30/19)   CLINICAL INDICATION:  Head and neck cancer, malignant neoplasm of  nasopharynx follow-up post resection.   CORRELATIVE IMAGING: PET/CT April 26, 2019.   FINDINGS:  --------------   HEAD and NECK:   Evidence of prior nasopharyngeal resection. Large surgical defect  in the nasopharynx.   Interval resection of a mass in the right supraglottic glottic  region. Surgical clips noted in this region on today's exam.    Increased uptake left anterior submandibular region of uncertain  significance. Physiological strap muscle uptake often seen in  patients with COPD.   Tracheostomy tube in trachea in satisfactory position.   THORAX:   There are subtle bilateral hilar uptake of uncertain  significance. This is similar to prior exam. Small micronodule  right lower lobe unchanged since prior exam. There are calcified  granuloma left lower lobe.   ABDOMEN:   Enteric feeding tube, feeding gastrostomy tube with balloon in  the projection of the antrum of the stomach.   PELVIS:    - Scintigraphic:  physiologic distribution of  radiotracer     - Anatomic:  no discernable pathologic findings   OSSEOUS / MISC:    - Scintigraphic:  physiologic distribution of radiotracer    - Anatomic:  no discernable pathologic findings   IMPRESSION:  CONCLUSION:  1.  Large surgical defect in the region of nasopharynx and upper  oropharynx, prior surgical resection. This is similar to prior  exam.  2. The large focus of increased activity, mass in the right neck,  right paralaryngeal region seen on prior examination is now  surgically absent. Surgical clips are noted in this region.  Favorable change.    Uptake left anterior submandibular region of uncertain  significance. (SUV 3.85)    Tracheostomy tube with tip in trachea in satisfactory position.  Feeding gastrostomy tube in stomach. Nuclear medicine PET/CT  imaging study is otherwise unremarkable.      Pathology:     Tissue Pathology Exam: (4/15/2019)   Order: [id number]   Status:  Final result   Visible to patient:  No (Not Released) Next appt:  09/12/2019 at 02:00 PM in Gastroenterology (Kaushal Chester MD) Dx:  Neoplasm of uncertain behavior of lar...   Component    Case Report   Surgical Pathology Report                         Case: [id number]                                   Authorizing Provider:  Bharathi Washington,   Collected:           04/15/2019 12:12 PM                                  MD                                                                            Ordering Location:     [facility]             Received:            04/15/2019 12:15 PM                                  [place]                                                               Pathologist:           Carlos Martinez MD                                                         Specimens:   1) - Sinus, frontal right, RIGHT PIRIFORM SINUS                                                      2) - Sinus, frontal right, RIGHT PIRIFORM SINUS PERMENANT                                  Final Diagnosis   1 AND 2.   RIGHT PIRIFORM SINUS, BIOPSIES:  INFILTRATING SQUAMOUS CELL CARCINOMA, MODERATELY DIFFERENTIATED.                       Labs:   Lab Results   Component Value Date    GLUCOSE 110 (H) 08/19/2019    BUN 17 08/19/2019    CREATININE 0.57 (L) 08/19/2019    EGFRIFAFRI >150 08/19/2019    BCR 29.8 (H) 08/19/2019    K 4.4 08/19/2019    CO2 25.7 08/19/2019    CALCIUM 10.5 08/19/2019    ALBUMIN 3.90 08/19/2019    AST 18 08/19/2019    ALT 12 08/19/2019      WBC   Date Value Ref Range Status   08/19/2019 7.32 3.40 - 10.80 10*3/mm3 Final     Hemoglobin   Date Value Ref Range Status   08/19/2019 9.5 (L) 13.0 - 17.7 g/dL Final     Hematocrit   Date Value Ref Range Status   08/19/2019 28.7 (L) 37.5 - 51.0 % Final     Platelets   Date Value Ref Range Status   08/19/2019 391 140 - 450 10*3/mm3 Final         ASSESSMENT/PLAN: Mr Emerson Bang is a 59-year-old black male with a history of Stage IV (T4 N2 M0) squamous cell carcinoma of the right tonsil, treated with concurrent chemoradiation therapy in 2008 with complete response, now status post total laryngectomy, right partial pharyngectomy, and right thyroid lobectomy (with positive margins) for 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  I agree with Dr. Nolan that the patient's best chance of local control would be with adjuvant reirradiation following surgery.  The NCCN Guidelines for the treatment of disease recurrence within a previously radiated treatment field were discussed at length with the patient and his sister.  We discussed the increased risks of re-irradiating a previously treated field.  They voice understanding and wish to proceed with therapy.  We will have the patient return to our clinic for radiation therapy planning in the near future, with plans to treat the area of known disease only (prophylactic treatment of subclinical disease is not indicated) to a dose of  5400 cGy in 30 fractions, along with concurrent cisplatin chemotherapy per Dr. Prieto.    Sincerely,        Keaton Alfred MD  Radiation Oncology    Electronically signed by Keaton Alfred MD 9/6/2019 10:17 AM     cc: Dr. Hitesh He

## 2019-09-06 NOTE — PAT
Dr. Staton here to review EKG, no further orders or instructions, regarding EKG.  Informed Dr. Staton of abnormal thyroid lab results and that pt medication was decreased approx 2 weeks ago, no further orders or instructions regarding thyroid studies.  Patient also has hx of cirrhosis of liver, pt has current CBC and CMP, Dr. Staton reviewed CMP received instructions/orders to obtain PT/PTT.  Opioid pain medication pamphlet given.  Instructed pt to flush with 5 cc or less after levothyroxine am of surgery.per peg tube.  Chlorhexidine given with written and verbal instructions, (also instructed pt to stay away from trach area and peg tube areas when using chlorhexidine) patient relates that he understands information given ans has no questions.    Patient not sure about some of the meds on his med list, instructed patient to bring updated list with him day of surgery to compare.

## 2019-09-06 NOTE — PROGRESS NOTES
"Adult Outpatient Nutrition  Assessment    Patient Name:  Emerson Bang  YOB: 1960  MRN: [id number]    Assessment Date:  9/6/2019    Comments:  Follow-up visit to check tolerance of TEN--x5 cans Peptamen 1.5 daily via G-tube. Pt indicated is experiencing frequent diarrhea. When ask number of episodes daily, pt held up 10 digits. Dr Alfred recommended liquid imodium through feeding tube--to give according to OTC directions (to start with basic dose and increase slowly due to fear of another bowel obstruction). RDN discussed importance of hydration. Pt plans to take extra water through tube. Wt down to 103 lb. Family member present for education. Both indicated understanding of instructions.         Anthropometrics     Row Name 09/06/19 1310 09/06/19 1139       Anthropometrics    Height  165.1 cm (65\")  165.1 cm (65\")    Weight  46.7 kg (103 lb)  46.9 kg (103 lb 6.4 oz)       Ideal Body Weight (IBW)    Ideal Body Weight (IBW) (kg)  62.51  62.51    % Ideal Body Weight  74.74  75.03       Body Mass Index (BMI)    BMI (kg/m2)  17.18  17.24    Row Name 09/06/19 0859          Anthropometrics    Height  165.1 cm (65\")     Weight  47.1 kg (103 lb 12.8 oz)        Ideal Body Weight (IBW)    Ideal Body Weight (IBW) (kg)  62.51     % Ideal Body Weight  75.32        Body Mass Index (BMI)    BMI (kg/m2)  17.31             Estimated/Assessed Needs     Row Name 09/06/19 1310 09/06/19 1139       Calculation Measurements    Height  165.1 cm (65\")  165.1 cm (65\")    Row Name 09/06/19 0859          Calculation Measurements    Height  165.1 cm (65\")                 Electronically signed by:  Joann Willoughby RD  09/06/19 2:48 PM   "

## 2019-09-06 NOTE — H&P (VIEW-ONLY)
CHIEF COMPLAINT:   Chief Complaint   Patient presents with   • Follow-up     Recheck Diagnostic Laparoscopy Exploratory Laparotomy 7-18-19.       HPI: This patient is seen for a post-operatively following above.  Patient  is doing well. Eating well without any significant nausea. Having good bowel function. No problems with constipation or diarrhea. No urinary complaints. Denies fever. Ambulating well and slowly returning to normal activities.      PHYSICAL EXAM:    ABD: Incisions are healing well without any erythema or signs of infection. G-tube healing nicely    ASSESSMENT    Emerson was seen today for follow-up.    Diagnoses and all orders for this visit:    Hx SBO        PLAN:    1. The patient will follow-up on a prn basis unless there are any problems.  2. May shower.   3. Gradually return to normal activity without restrictions.          This document has been electronically signed by Parminder Kc MD on September 6, 2019 11:58 AM      Addendum    Here also for consideration of port placement for chemotherapy. Hx of head and neck cancer.       Review of Systems   Unable to perform ROS: Patient nonverbal   Constitutional: Negative for chills, diaphoresis, fatigue and fever.   HENT: Negative for congestion.    Respiratory: Negative for cough.    Cardiovascular: Negative for chest pain.   Gastrointestinal: Positive for abdominal pain, anorexia and nausea. Negative for change in bowel habit.   Musculoskeletal: Negative for arthralgias, joint swelling and myalgias.   Neurological: Negative for headaches.         Medical History        Past Medical History:   Diagnosis Date   • Alcohol abuse     • Allergic rhinitis       vs URI   • Anemia     • At risk for falls     • Benign prostatic hyperplasia     • Chronic gastritis     • Cirrhosis of liver (CMS/HCC)     • Complication of gastrostomy (CMS/HCC)       site not healing   • COPD (chronic obstructive pulmonary disease) (CMS/HCC)     • Dysphagia       odynophagia      • Epigastric pain     • Esophagitis     • GERD (gastroesophageal reflux disease)     • Hypertension     • Hypothyroidism, unspecified     • Low back pain     • Malaise and fatigue     • Nasal congestion 11/15/2018   • Nausea with vomiting, unspecified     • Pain in left knee     • Pain in right knee     • Primary malignant neoplasm of pharynx (CMS/HCC)     • Screening for malignant neoplasm of colon     • Vitamin D deficiency        ,   Surgical History   Past Surgical History:   Procedure Laterality Date   • ABDOMINAL WALL SURGERY   11/04/2008     Laparotomy with repair of stomach wall perforation. 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 Left upper quadrant abdominal wall abscess debridement. Gastrostomy tube erosion through & through the anterior gastric wall.Lupper quadrant abdominal wall abscess   • COLONOSCOPY   04/21/2014     Internal & external hemorrhoids found.   • COLONOSCOPY   2014     [place]   • COLONOSCOPY N/A 10/16/2018     Procedure: COLONOSCOPY;  Surgeon: Kaushal Chester MD;  Location: [facility] ENDOSCOPY;  Service: Gastroenterology   • DIRECT LARYNGOSCOPY, ESOPHAGOSCOPY, BRONCHOSCOPY N/A 4/15/2019     Procedure: DIRECT LARYNGOSCOPY with biopsy, ESOPHAGOSCOPY;  Surgeon: Bharathi Washington MD;  Location: [facility] OR;  Service: ENT   • ENDOSCOPY N/A 10/16/2018     Procedure: ESOPHAGOGASTRODUODENOSCOPY;  Surgeon: Kaushal Chester MD;  Location: [facility] ENDOSCOPY;  Service: Gastroenterology   • GASTROSTOMY FEEDING TUBE INSERTION N/A 4/15/2019     Procedure: GASTROSTOMY FEEDING TUBE INSERTION;  Surgeon: Trenton Valera MD;  Location: [facility] OR;  Service: General   • TRACHEOSTOMY   11/10/2008     Respiratory failure   • UPPER GASTROINTESTINAL ENDOSCOPY   04/21/2014     Esophagitis seen. Biopsy taken. Gastritis in stomach. Biopsy taken. Normal duodenum. Biopsy taken.   • UPPER GASTROINTESTINAL ENDOSCOPY   10/16/2018      ,         Family History   Problem Relation Age of Onset   • Coronary  "artery disease Mother     • Diabetes Mother     • Other Mother           \"Heart And Lung Problems\"   • Liver cancer Sister     • Heart disease Other     • Stroke Other     • Hypertension Other     • Diabetes Other     • Cancer Other     • Colon polyps Other     • Other Father           Unknown   • Other Other           \"Lung Problems\"   • Thyroid disease Neg Hx     ,   Social History            Tobacco Use   • Smoking status: Former Smoker       Packs/day: 1.50       Years: 30.00       Pack years: 45.00       Types: Cigarettes   • Smokeless tobacco: Former User       Quit date: 4/12/2009   Substance Use Topics   • Alcohol use: No       Comment: 02/19/2019 - Patient confirmed heavy alcoholic beverage consumption in past with current consumption of 2 - 3 beers twice per week.   • Drug use: No   ,   Prescriptions Prior to Admission           Medications Prior to Admission   Medication Sig Dispense Refill Last Dose   • doxazosin (CARDURA) 1 MG tablet Take 1 mg by mouth Every Night.         • levothyroxine (SYNTHROID, LEVOTHROID) 175 MCG tablet Take 1 tablet by mouth Daily. 30 tablet 5 Taking   • metoclopramide (REGLAN) 5 MG/5ML solution 10 ML Per G-Tube 4 Times Per Day Before Meals X 30 Days     Taking   • oxyCODONE (ROXICODONE) 15 MG immediate release tablet Take 10 mg by mouth Every 4 (Four) Hours As Needed for Moderate Pain .         • vitamin D (ERGOCALCIFEROL) 84232 units capsule capsule 1,000 Units Daily. 1 capsule by mouth weekly on Wednesday X 3 months      Taking   • acetaminophen (TYLENOL) 160 MG/5ML solution 20.3 mL by Per G Tube route Every 6 (Six) Hours As Needed for Mild Pain .     Taking   • aspirin (ASPIRIN LOW DOSE) 81 MG EC tablet Take 1 tablet by mouth Daily. 30 tablet 6 Taking   • docusate sodium (COLACE) 150 MG/15ML liquid 10 ML Via PEG Tube Twice Daily For Constipation     Taking   • famotidine (PEPCID) 20 MG tablet 1 tablet by Per G Tube route 2 (Two) Times a Day. 60 tablet 0 Taking   • " fenofibrate (TRICOR) 48 MG tablet 67 mg Daily. 1 tablet by G-Tube daily for Hyperlipidemia      Taking   • magnesium oxide (MAG-OX) 400 MG tablet     3 Unknown   • Scopolamine (TRANSDERM-SCOP) 1.5 MG/3DAYS patch Place 1 patch on the skin as directed by provider Every 72 (Seventy-Two) Hours. 24 each 0 Taking   • Unable to find Hydrocodone - Acetaminophen 7.5 - 325/5 ML.....15 ML Per G-Tube Every 4 Hours As Needed For Moderate Pain.     Taking      , Scheduled Meds:    aspirin 81 mg Oral Daily   cholecalciferol 500 Units Oral Daily   famotidine 20 mg Per G Tube BID   fenofibrate 48 mg Oral Daily   levothyroxine 175 mcg Oral Q AM   Scopolamine 1 patch Transdermal Q72H   sodium chloride 3 mL Intravenous Q12H   terazosin 1 mg Oral Nightly   , Continuous Infusions:    sodium chloride 125 mL/hr Last Rate: 125 mL/hr (07/16/19 0327)   , PRN Meds:  •  acetaminophen  •  docusate sodium  •  potassium chloride  •  [COMPLETED] Insert peripheral IV **AND** sodium chloride  •  sodium chloride and Allergies:  Patient has no known allergies.        Objective          Vital Signs  Temp:  [96.4 °F (35.8 °C)-98.3 °F (36.8 °C)] 97 °F (36.1 °C)  Heart Rate:  [64-94] 82  Resp:  [18] 18  BP: (103-148)/(60-84) 133/62     Physical Exam   Abdominal: Soft. Bowel sounds are normal. He exhibits distension (mild- improving). He exhibits no mass. There is no tenderness. There is no rebound and no guarding. No hernia.   Cor RR no murmur  Lungs clear- tracheostomy in place     Results Review:               I reviewed the patient's new clinical results.  I reviewed the patient's new imaging results and agree with the interpretation.              Assessment/Plan      1. Laryngeal cancer       Mediport placement is planned. The following were discussed:      What are the indications that have led your doctor to the opinion that an operation is necessary?    A mediport is a device placed under the skin, that connects to a large vein in the chest or neck.  It is used for the administration of fluid or medication.     What, if any, alternative treatments are available for your condition?    Peripheral veins or a peripherally placed PICC line may be used.    What will be the likely result if you don't have the operation?    It may not be possible to administer needed medications. Toxic medications may be harmful to the veins if given in a peripheral vein.    What are the basic procedures involved in the operation?    After anesthesia, a needle is passed into a large vein in the neck or chest. Ultrasound may be used to find the veins of the neck, but not the chest. Once accessed, a wire is passed through the needle and into the central veins using fluoroscopy.  Sheath is passed over the wire and the wire is removed. The tubing is passed under the skin and through the sheath. The sheath is pealed away and the tubing is left in the vein. Placement is confirmed with xray.    What are the risks?    Risks of the procedure include but are not limited to bleeding, infection, pneumothorax, blood clots, poor wound healing, poor port function, skin erosion, and pain. Facial and arm swelling require immediate evaluation.    How is the operation expected to improve your health or quality of life?    IV access is more easily obtained.    Is hospitalization necessary and, if so, how long can you expect to be hospitalized?    The procedure is performed on an outpatient basis.    What can you expect during your recovery period?    Minimal pain is usually controlled with non-narcotic pain medication.    When can you expect to resume normal activities?    Normal activity may occur within a few days. The port may be accessed immediately for treatment.    Are there likely to be residual effects from the operation?    There are usually no residual effects of operation.    He understands, agrees, and desires to proceed.

## 2019-09-06 NOTE — PATIENT INSTRUCTIONS
Radiation Simulation  Radiation Simulation is a process where the radiation treatment fields are defined, filmed and drawn on your skin. The simulator is actually a computed tomography (CT) scanner that we use to contour your body. The images are then sent to the physics department where the team and your doctor -- arrange the radiation beams and make a customized plan. We will take special care to make your position as comfortable as possible while making sure treatment will be given the same every time. Since people come in all shapes and sizes, very specific patient measurements are important.  Treatment unit parameters are finalized and recorded. All setup information is documented to make your treatment record complete. This is an important part of the planning process. The CT simulation scan is not like a regular CT scan, instead we use it to contour the shape of your body and visualize its structures. You doctor can then arrange the beams and make a plan using a computer system.      The following are members of the radiation therapy team that is involved and you may see during your simulation:  Physician:  is in charge of your simulation and gives direction to the therapists in the simulator.  Radiation therapists: trained, certified, and qualified to participate in CT simulation and administer daily treatments.      Oncology nurse: is a registered nurse who specializes in the needs of patients receiving radiation treatments.   Medical physicist: specializes in the principles of radiation physics. He is responsible for maintaining all therapy equipment as well as overseeing the treatment planning process.  : is responsible for performing dose calculations as well as developing treatment plans in conjunction with the physician and physicist.    What to Wear  Since we will be marking on your skin, it is necessary that we expose the treatment site. We may also need you to remove other  clothing worn close to the treatment are so we can produce the same treatment every time. . For example, having a shirt rumpled underneath you or snugly fitting pantyhose around your waist may change your position slightly from day to day. We can easily avoid this problem by simply having you change into a gown.  Please leave your jewelry at home, especially if the simulation involves the head, neck, or chest region.   Choose clothing that is comfortable and easy to remove.   Please do not bother the skin markings unless your therapist tells you so. These markings may rub off slightly onto your clothing, especially during warm summer months, so consider wearing old clothing and underwear when you come for your appointment.  How to Prepare  There are no dietary restrictions prior to your simulation. You may eat and drink as usual, unless instructed otherwise.       Immobilization  Immobilization means “to prevent movement or to keep in place.” We use immobilization devices that do just that. These are treatment aids that are pre-made, or that we construct, to help maintain the desired treatment position throughout the entire course of therapy. Some of these aids, to name a few, are:  • Tape (simple masking, paper or silk tape)  • Foam sponges  • Specially designed headrests  • Acrylic molds  • Foam body molds  • Plaster casts      Keep in mind that your particular setup may not require any special devices, or it may require a combination of items. It depends on your treatment site, position and your ability to stay in a position for a limited amount of time. We try to anticipate which simulations will require special devices so that we correctly estimate the amount of time you will need to allow for your appointment.  Contrast  In order to better visualize organs and other anatomy in or around the treatment area, we may need to administer contrast material (radiographic dye) during simulation. Contrast can be given  intravenously (with a needle through the vein), by mouth or through a catheter inserted by the nurse or therapist. This procedure will be done for simulation only -- not for daily treatment -- and requires no special preparation prior to your appointment.  Marking the Fields  The therapist will take a few preliminary measurements. A rough sketch of the area may be marked on your skin by using markers directly on the skin with ink by the physician and/or the therapist. The simulation team will then leave the room. We will watch you and can hear you at all times.    Then a series of things may happen:  • Room lights may go on and off  • Red laser lights may appear  • The table will move into the scanner  • All kinds of noises will be audible  The CT scanner is a large hole in a machine that your body will pass through. There is plenty of room and you should not feel enclosed or claustrophobic.  Measurements  The physician will create a radiation therapy prescription, a written directive for the radiation team to follow. In order to carry this out, we must combine treatment field information with data taken about your body shape and size. Using the CT scan, the physician will contour the areas needed and generate the plan while you are at home.  Tattoos  In most cases, we do not apply tattoos to claribel pateints unless it is requested or you cant keep the marks on your skin. Marks that are needed are covered with a special clear dressing called Tegaderm.   Photographs  We will take your photograph on the day of simulation for two reasons: 1) for chart documentation, and 2) for a treatment set-up aid. We will take a facial picture for identification purposes and this will be part of your permanent treatment record. Each day before your treatment, we will ask you for your name and birth date for verification.  We will also take photographs of the treatment and setup fields. Since the treatment therapist may not be present  at the time of simulation, using photos along with tripathi makes daily setup easier. The same is true for the dosimetrist. Sometimes viewing a field on the patient via photograph can answer questions that arise during the calculation process. These photos are necessary and they remain a confidential part of your treatment record.  How Long Does Planning Take  Our Dosimetrist and Physicist will use the images captured during your CT simulation to plan your treatments. The planning process is very complex and takes a lot of time and detail.    Depending on the type of treatment you are going to receive, your radiation plan may take up to 2 weeks from the day of your simulation.   ? The radiation staff will call you when your plan is ready and you will be scheduled a date to start your radiation treatments.   ? We will also call you to update you on the progress of your plan as needed.   ? If we have not called you within 10 days from your simulation, please call us. The time between simulation and treatment in no way compromises the outcome of your therapy. These factors are considered by the radiation therapy team when planning your specific course of treatment.  Since the course of treatment can run from two to eight weeks, we constantly monitor each patient’s setup in a number of ways. If a change occurs, it may be necessary to schedule a return visit to the simulator on short notice. An example of this might be a weight gain or loss that would require new calculations. In order to interrupt your treatment schedule as little as possible, a simulation check that same day or the following morning may be appropriate. Again, this is routine and does not suggest a change in your health status. It simply means that we are doing our job in providing the best care we know how. It is important that we stay aware of all patient changes.  Our goal is to make every patient as knowledgeable about the radiation therapy process as  possible and comfortable enough to ask questions. If at any time during your many visits with us you are unsure as to what is happening around you, please do not hesitate to ask. At a time in your life when things are seemingly out of your control, knowing what to expect can be a powerful tool in becoming refocused and moving onward.      If you have any concerns or questions before your treatment gets started, please do not hesitate to call the Radiation staff at [phone].   External Beam Radiation Therapy  External beam radiation therapy is a type of radiation treatment. This type of radiation therapy can deliver radiation to a fairly large area. This is the most common type of radiation therapy for cancer. This therapy may be done to:  · Treat cancer by:  ? Destroying cancer cells. Radiation delivered during the treatment damages cancer cells. It may also damage normal cells, but normal cells have the DNA to repair themselves while cancer cells do not.  ? Helping with symptoms of your cancer.  ? Stopping the growth of any remaining cancer cells after surgery.  ? Preventing cancer cells from growing in areas that do not have cancer (prophylactic radiation therapy).  · Treat or shrink a tumor.  · Reduce pain (palliative therapy).  The amount of radiation you will receive and the length of therapy depend on your medical condition. You should not feel the radiation being delivered or any pain during your therapy.  Let your health care provider know about:  · Any allergies you have.  · All medicines you are taking, including vitamins, herbs, eye drops, creams, and over-the-counter medicines.  · Any problems you or family members have had with anesthetic medicines.  · Any blood disorders you have.  · Any surgeries you have had.  · Any medical conditions you have.  · Whether you are pregnant or may be pregnant.  What are the risks?  Generally, this is a safe procedure. However, radiation therapy can place a person  at a higher risk for a second cancer later in life.  Most people experience side effects from the therapy. Side effects depend on the amount of radiation and the part of the body that was exposed to radiation. The most common side effects include:  · Skin changes.  · Hair loss.  · Fatigue.  · Nausea and vomiting.  What happens before the procedure?  · There will be a planning session (simulation). During the session:  ? Your health care provider will plan exactly where the radiation will be delivered (treatment field).  ? You will be positioned for your therapy. The goal is to have a position that can be reproduced for each therapy session.  ? Temporary marks may be drawn on your body. Permanent marks may also be drawn on your body in order for you to be positioned the same way for each therapy session.  ? A tool that holds a body part in place (immobilization device) may be used to keep the area of treatment in the correct position.  · Follow instructions from your health care provider about eating or drinking restrictions.  · Ask your health care provider about changing or stopping your regular medicines. This is especially important if you are taking diabetes medicines or blood thinners.  What happens during the procedure?    · You will either lie on a table or sit in a chair in the position determined for your therapy.  · You may have a heavy shield placed on you to protect tissues and organs that are not being treated.  · The radiation machine (linear accelerator) will move around you to deliver the radiation in exact doses from different angles. The machine will not touch you.  The procedure may vary among health care providers and hospitals.  What happens after the procedure?  · You may return to your normal routine including diet, activities, and medicines as told by your health care provider.  · You may want to have someone take you home from the hospital or clinic.  Summary  · External beam radiation  therapy is a type of radiation treatment for cancer.  · The amount of radiation you will receive and the length of therapy depend on your medical condition.  · Most people experience side effects from the therapy. Side effects depend on the amount of radiation and the part of the body that was exposed to radiation.  This information is not intended to replace advice given to you by your health care provider. Make sure you discuss any questions you have with your health care provider.  Document Released: 05/06/2010 Document Revised: 12/18/2017 Document Reviewed: 12/18/2017  [employer] Interactive Patient Education © 2019 [employer]  Nutrition Supplements    MILKSHAKE (compare to “Frosty”)  1/2 cup Eggbeaters (any pasteurized egg substitute)  1/2 cup Vanilla Ice Cream  1/3 cup Whipped Topping (such as Cool Whip)  1/2 tea. Vanilla  1 TBS. Flavored Syrup (Chocolate, Strawberry, Butterscotch…)  Blend until smooth. Contains 350 calories & 17 grams protein.    SNOW CREAM (without the snow)   1/2 cup Egg Beater Egg Whites  1/2 cup Vanilla Ice Cream  1/3 cup Whipped Topping (such as Cool Whip)  1/2 tea. Vanilla  1 tea. Sugar (if desired)  Blend until smooth. Contains 325 calories & 17 grams protein.    DREAMSICLE JELLO  1-3oz. pkg. Orange Jello  3/4 cup Boiling Water  1 ¼ cup Whole Milk  2/3 cup Dry Powdered Milk  Add dry milk to liquid milk. Stir. Set aside. Dissolve jello powder in boiling water.  Once jello mixture cools, add to milk mixture. Stir. Chill until set, or make into freezer pops.   Makes 4- 1/2cup servings. Each serving contains 135 calories & 8 grams protein.    PEANUT BUTTER SHAKE  1/2 cup Half & Half  3 Tbs. Smooth Peanut Butter  1/2 cup Vanilla Ice Cream  Blend until smooth. For a special treat, make freezer pops.  Recipe contains 607 calories & 20 grams protein.     COMMERCIAL SUPPLEMENTS  Ensure, Ensure Complete, Ensure Plus, Boost, Boost Plus, Boost Compact, [place] Instant Breakfast  Essentials    COMMERCIAL SUPPLEMENTS (diabetic friendly)  Glucerna, Boost Glucose Control, Ensure Muscle, High Protein Ensure, High Protein Boost, Sugar Free Instant Breakfast Essentials

## 2019-09-06 NOTE — PROGRESS NOTES
CHIEF COMPLAINT:   Chief Complaint   Patient presents with   • Follow-up     Recheck Diagnostic Laparoscopy Exploratory Laparotomy 7-18-19.       HPI: This patient is seen for a post-operatively following above.  Patient  is doing well. Eating well without any significant nausea. Having good bowel function. No problems with constipation or diarrhea. No urinary complaints. Denies fever. Ambulating well and slowly returning to normal activities.      PHYSICAL EXAM:    ABD: Incisions are healing well without any erythema or signs of infection. G-tube healing nicely    ASSESSMENT    Emerson was seen today for follow-up.    Diagnoses and all orders for this visit:    Hx SBO        PLAN:    1. The patient will follow-up on a prn basis unless there are any problems.  2. May shower.   3. Gradually return to normal activity without restrictions.          This document has been electronically signed by Parminder Kc MD on September 6, 2019 11:58 AM      Addendum    Here also for consideration of port placement for chemotherapy. Hx of head and neck cancer.       Review of Systems   Unable to perform ROS: Patient nonverbal   Constitutional: Negative for chills, diaphoresis, fatigue and fever.   HENT: Negative for congestion.    Respiratory: Negative for cough.    Cardiovascular: Negative for chest pain.   Gastrointestinal: Positive for abdominal pain, anorexia and nausea. Negative for change in bowel habit.   Musculoskeletal: Negative for arthralgias, joint swelling and myalgias.   Neurological: Negative for headaches.         Medical History        Past Medical History:   Diagnosis Date   • Alcohol abuse     • Allergic rhinitis       vs URI   • Anemia     • At risk for falls     • Benign prostatic hyperplasia     • Chronic gastritis     • Cirrhosis of liver (CMS/HCC)     • Complication of gastrostomy (CMS/HCC)       site not healing   • COPD (chronic obstructive pulmonary disease) (CMS/HCC)     • Dysphagia       odynophagia    • Epigastric pain     • Esophagitis     • GERD (gastroesophageal reflux disease)     • Hypertension     • Hypothyroidism, unspecified     • Low back pain     • Malaise and fatigue     • Nasal congestion 11/15/2018   • Nausea with vomiting, unspecified     • Pain in left knee     • Pain in right knee     • Primary malignant neoplasm of pharynx (CMS/HCC)     • Screening for malignant neoplasm of colon     • Vitamin D deficiency        ,   Surgical History   Past Surgical History:   Procedure Laterality Date   • ABDOMINAL WALL SURGERY   11/04/2008     Laparotomy with repair of stomach wall perforation. 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 Left upper quadrant abdominal wall abscess debridement. Gastrostomy tube erosion through & through the anterior gastric wall.Lupper quadrant abdominal wall abscess   • COLONOSCOPY   04/21/2014     Internal & external hemorrhoids found.   • COLONOSCOPY   2014     [place]   • COLONOSCOPY N/A 10/16/2018     Procedure: COLONOSCOPY;  Surgeon: Kaushal Chester MD;  Location: [facility] ENDOSCOPY;  Service: Gastroenterology   • DIRECT LARYNGOSCOPY, ESOPHAGOSCOPY, BRONCHOSCOPY N/A 4/15/2019     Procedure: DIRECT LARYNGOSCOPY with biopsy, ESOPHAGOSCOPY;  Surgeon: Bharathi Washington MD;  Location: [facility] OR;  Service: ENT   • ENDOSCOPY N/A 10/16/2018     Procedure: ESOPHAGOGASTRODUODENOSCOPY;  Surgeon: Kaushal Chester MD;  Location: [facility] ENDOSCOPY;  Service: Gastroenterology   • GASTROSTOMY FEEDING TUBE INSERTION N/A 4/15/2019     Procedure: GASTROSTOMY FEEDING TUBE INSERTION;  Surgeon: Trenton Valera MD;  Location: [facility] OR;  Service: General   • TRACHEOSTOMY   11/10/2008     Respiratory failure   • UPPER GASTROINTESTINAL ENDOSCOPY   04/21/2014     Esophagitis seen. Biopsy taken. Gastritis in stomach. Biopsy taken. Normal duodenum. Biopsy taken.   • UPPER GASTROINTESTINAL ENDOSCOPY   10/16/2018      ,         Family History   Problem Relation Age of Onset   • Coronary  "artery disease Mother     • Diabetes Mother     • Other Mother           \"Heart And Lung Problems\"   • Liver cancer Sister     • Heart disease Other     • Stroke Other     • Hypertension Other     • Diabetes Other     • Cancer Other     • Colon polyps Other     • Other Father           Unknown   • Other Other           \"Lung Problems\"   • Thyroid disease Neg Hx     ,   Social History            Tobacco Use   • Smoking status: Former Smoker       Packs/day: 1.50       Years: 30.00       Pack years: 45.00       Types: Cigarettes   • Smokeless tobacco: Former User       Quit date: 4/12/2009   Substance Use Topics   • Alcohol use: No       Comment: 02/19/2019 - Patient confirmed heavy alcoholic beverage consumption in past with current consumption of 2 - 3 beers twice per week.   • Drug use: No   ,   Prescriptions Prior to Admission           Medications Prior to Admission   Medication Sig Dispense Refill Last Dose   • doxazosin (CARDURA) 1 MG tablet Take 1 mg by mouth Every Night.         • levothyroxine (SYNTHROID, LEVOTHROID) 175 MCG tablet Take 1 tablet by mouth Daily. 30 tablet 5 Taking   • metoclopramide (REGLAN) 5 MG/5ML solution 10 ML Per G-Tube 4 Times Per Day Before Meals X 30 Days     Taking   • oxyCODONE (ROXICODONE) 15 MG immediate release tablet Take 10 mg by mouth Every 4 (Four) Hours As Needed for Moderate Pain .         • vitamin D (ERGOCALCIFEROL) 03038 units capsule capsule 1,000 Units Daily. 1 capsule by mouth weekly on Wednesday X 3 months      Taking   • acetaminophen (TYLENOL) 160 MG/5ML solution 20.3 mL by Per G Tube route Every 6 (Six) Hours As Needed for Mild Pain .     Taking   • aspirin (ASPIRIN LOW DOSE) 81 MG EC tablet Take 1 tablet by mouth Daily. 30 tablet 6 Taking   • docusate sodium (COLACE) 150 MG/15ML liquid 10 ML Via PEG Tube Twice Daily For Constipation     Taking   • famotidine (PEPCID) 20 MG tablet 1 tablet by Per G Tube route 2 (Two) Times a Day. 60 tablet 0 Taking   • " fenofibrate (TRICOR) 48 MG tablet 67 mg Daily. 1 tablet by G-Tube daily for Hyperlipidemia      Taking   • magnesium oxide (MAG-OX) 400 MG tablet     3 Unknown   • Scopolamine (TRANSDERM-SCOP) 1.5 MG/3DAYS patch Place 1 patch on the skin as directed by provider Every 72 (Seventy-Two) Hours. 24 each 0 Taking   • Unable to find Hydrocodone - Acetaminophen 7.5 - 325/5 ML.....15 ML Per G-Tube Every 4 Hours As Needed For Moderate Pain.     Taking      , Scheduled Meds:    aspirin 81 mg Oral Daily   cholecalciferol 500 Units Oral Daily   famotidine 20 mg Per G Tube BID   fenofibrate 48 mg Oral Daily   levothyroxine 175 mcg Oral Q AM   Scopolamine 1 patch Transdermal Q72H   sodium chloride 3 mL Intravenous Q12H   terazosin 1 mg Oral Nightly   , Continuous Infusions:    sodium chloride 125 mL/hr Last Rate: 125 mL/hr (07/16/19 0327)   , PRN Meds:  •  acetaminophen  •  docusate sodium  •  potassium chloride  •  [COMPLETED] Insert peripheral IV **AND** sodium chloride  •  sodium chloride and Allergies:  Patient has no known allergies.        Objective          Vital Signs  Temp:  [96.4 °F (35.8 °C)-98.3 °F (36.8 °C)] 97 °F (36.1 °C)  Heart Rate:  [64-94] 82  Resp:  [18] 18  BP: (103-148)/(60-84) 133/62     Physical Exam   Abdominal: Soft. Bowel sounds are normal. He exhibits distension (mild- improving). He exhibits no mass. There is no tenderness. There is no rebound and no guarding. No hernia.   Cor RR no murmur  Lungs clear- tracheostomy in place     Results Review:               I reviewed the patient's new clinical results.  I reviewed the patient's new imaging results and agree with the interpretation.              Assessment/Plan      1. Laryngeal cancer       Mediport placement is planned. The following were discussed:      What are the indications that have led your doctor to the opinion that an operation is necessary?    A mediport is a device placed under the skin, that connects to a large vein in the chest or neck.  It is used for the administration of fluid or medication.     What, if any, alternative treatments are available for your condition?    Peripheral veins or a peripherally placed PICC line may be used.    What will be the likely result if you don't have the operation?    It may not be possible to administer needed medications. Toxic medications may be harmful to the veins if given in a peripheral vein.    What are the basic procedures involved in the operation?    After anesthesia, a needle is passed into a large vein in the neck or chest. Ultrasound may be used to find the veins of the neck, but not the chest. Once accessed, a wire is passed through the needle and into the central veins using fluoroscopy.  Sheath is passed over the wire and the wire is removed. The tubing is passed under the skin and through the sheath. The sheath is pealed away and the tubing is left in the vein. Placement is confirmed with xray.    What are the risks?    Risks of the procedure include but are not limited to bleeding, infection, pneumothorax, blood clots, poor wound healing, poor port function, skin erosion, and pain. Facial and arm swelling require immediate evaluation.    How is the operation expected to improve your health or quality of life?    IV access is more easily obtained.    Is hospitalization necessary and, if so, how long can you expect to be hospitalized?    The procedure is performed on an outpatient basis.    What can you expect during your recovery period?    Minimal pain is usually controlled with non-narcotic pain medication.    When can you expect to resume normal activities?    Normal activity may occur within a few days. The port may be accessed immediately for treatment.    Are there likely to be residual effects from the operation?    There are usually no residual effects of operation.    He understands, agrees, and desires to proceed.

## 2019-09-06 NOTE — PATIENT INSTRUCTIONS
Essential Thrombocythemia    Essential thrombocythemia is a condition in which a person has too many platelets (thrombocytes) in the blood. Platelets are parts of blood that stick together and form a clot (thrombus) to help the body stop bleeding after an injury. This condition may also be called primary or essential thrombocytosis.  Essential thrombocythemia happens when abnormal cells in the bone marrow (megakaryocytes) ma

## 2019-09-06 NOTE — ANESTHESIA PREPROCEDURE EVALUATION
Anesthesia Evaluation     Patient summary reviewed and Nursing notes reviewed   no history of anesthetic complications:  NPO Solid Status: > 8 hours  NPO Liquid Status: > 8 hours           Airway   Comment: Permanent Trach in place.  Cachexia.   Dental      Pulmonary    (+) a smoker Former, COPD severe, decreased breath sounds, wheezes,     ROS comment:   - lines/tubes: None    - cardiac: Size within normal limits.    - mediastinum: Contour within normal limits.     - lungs: No evidence of a focal air space process, pulmonary  interstitial edema, nodule(s)/mass.     - pleura: No evidence of  fluid.      - osseous: Unremarkable for age.     IMPRESSION:  No acute cardiopulmonary process identified.        Electronically signed by:  Amanda Marquez MD  4/12/2019 3:01 PM CDT  PE comment: Albuterol treatment ordered pre-op.  Cardiovascular - normal exam    ECG reviewed  Rhythm: regular  Rate: normal    (+) hypertension, hyperlipidemia,   (-) murmur    ROS comment: Sinus rhythm with occasional premature ventricular complexes and premature  atrial complexes  Otherwise normal ECG  When compared with ECG of 17-JUL-2019 08:48,  Nonspecific T wave abnormality no longer evident in Inferior leads  T wave amplitude has increased in Anterior leads    Referred By:             Confirmed By:     Specimen Collected: 09/06/19 12:43          Neuro/Psych  (+) psychiatric history (Alcohol abuse.),     GI/Hepatic/Renal/Endo    (+)  GERD well controlled,  liver disease (Cirrhosis of the liver.), hypothyroidism,     ROS Comment: HGB 9.5 HCT 28.7    Musculoskeletal     Abdominal    Substance History   (+) alcohol use,      OB/GYN negative ob/gyn ROS         Other      history of cancer (Pharynx.) active                    Anesthesia Plan    ASA 4     MAC and general     intravenous induction   Anesthetic plan, all risks, benefits, and alternatives have been provided, discussed and informed consent has been obtained with: patient and  father.

## 2019-09-06 NOTE — DISCHARGE INSTRUCTIONS
[facility]  Pre-op Information and Guidelines    You will be called after 2 p.m. the day before your surgery (Friday for Monday surgery) and notified of your time for arrival and approximate surgery time.  If you have not received a call by 4P.M., please contact Same Day Surgery at [phone] of if outside [facility] call [phone].    Please Follow these Important Safety Guidelines:    • The morning of your procedure, take only the medications listed below with   A sip of water:_____________________________________________       ______________________________________________    • DO NOT eat or drink anything after 12:00 midnight the night before surgery  Specific instructions concerning drinking clear liquids will be discussed during  the pre-surgery instruction call the day before your surgery.    • If you take a blood thinner (ex. Plavix, Coumadin, aspirin), ask your doctor when to stop it before surgery  STOP DATE: _________________    • Only 2 visitors are allowed in patient rooms at a time  Your visitors will be asked to wait in the lobby until the admission process is complete with the exception of a parent with a child and patients in need of special assistance.    • YOU CANNOT DRIVE YOURSELF HOME  You must be accompanied by someone who will be responsible for driving you home after surgery and for your care at home.    • DO NOT chew gum, use breath mints, hard candy, or smoke the day of surgery  • DO NOT drink alcohol for at least 24 hours before your surgery  • DO NOT wear any jewelry and remove all body piercing before coming to the hospital  • DO NOT wear make-up to the hospital  • If you are having surgery on an extremity (arm/leg/foot) remove nail polish/artificial nails on the surgical side  • Clothing, glasses, contacts, dentures, and hairpieces must be removed before surgery  • Bathe the night before or the morning of your surgery and do not use powders/lotions on  skin.

## 2019-09-08 PROBLEM — R19.7 DIARRHEA: Status: ACTIVE | Noted: 2019-09-08

## 2019-09-08 NOTE — PROGRESS NOTES
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   We reviewed the results of PET scan which showed no evidence of recurrent or residual cancer.  Discussed treatment option for hypopharyngeal cancer resected with positive margins.  Discussed observation versus concurrent chemoradiation versus radiation therapy alone.  Discussed side effects associated with chemotherapy at length with the patient and family.  After lengthy discussion patient and family were in agreement with proceeding with concurrent chemoradiation.  Patient is reporting new diarrhea.  He denies any blood in stool.  Denies any abdominal pain.  We will send prescription of Imodium to his pharmacy.  ROS as below.           Background History:   Mr. Bang is a pleasant 59-year-old male who was seen in consultation at the request of Dr Washington for evaluation of hypopharyngeal cancer.  He is a new patient at our cancer center.Patient unfortunately is a non-verbal due to his tracheostomy and most of the history was obtained by reviewing old medical charts.     Patient has complicated oncologic history.  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  Unfortunately, patient developed recurrence of symptoms with throat pain and difficulty swallowing with hoarseness in April 2019.  He underwent CT scan of the neck on April 12, 2019 which showed soft tissue fullness in the right supraglottic larynx suspicious for malignancy.  Patient underwent direct laryngoscopy and biopsy on April 15 followed by placement of G-tube by Dr. Valera.  Laryngoscopy revealed circumferential tumor of the right pyriform sinus involving the lateral pharyngeal wall, extending around the medial wall of the pyriform sinus, lateral larynx.  Biopsy was obtained which show moderately differentiated  squamous cell carcinoma.  Patient had PET CT scan performed on April 26, 2019 which showed hypermetabolic activity with the soft tissue thickening in the right side of the larynx and a 3 mm right lower lobe lung nodule without any hypermetabolic activity.  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  The pharyngeal margin was involved by invasive squamous cell carcinoma.  Perineural invasion was present.  Lymphovascular invasion was not present.  0 of 11 regional lymph nodes were involved with metastatic cancer.     Due to positive margin concurrent chemoradiation was recommended; however, patient was admitted to our hospital on July 15, 2019 with small bowel obstruction and had a prolonged lengthy hospital stay and was discharged home on August 9, 2019.     He is returning to our clinic to discuss his adjuvant therapy options.    Past Medical History, Past Surgical History, Social History, Family History have been reviewed and are without significant changes except as mentioned.    Review of Systems     CONSTITUTIONAL: fatigue + weakness + weight loss + No  fever, chills.  HEENT: Eyes: No visual loss, blurred vision, double vision or yellow sclerae. Ears, Nose, Throat: No hearing loss, sneezing, congestion, runny nose or sore throat.  SKIN: No rash or itching.  CARDIOVASCULAR: No chest pain, chest pressure or chest discomfort. No palpitations or edema.  RESPIRATORY: cough + exertional SOB +   GASTROINTESTINAL: Diarrhea + No anorexia, nausea, vomiting. No abdominal pain or blood.  GENITOURINARY: Negative for urgency, frequency or dysuria.   NEUROLOGICAL: No  headache, dizziness, syncope, paralysis, ataxia, numbness or tingling in the extremities. No change in bowel or bladder control.  MUSCULOSKELETAL: Chronic joint pain +   HEMATOLOGIC: anemia + No  bleeding or bruising.  LYMPHATICS: No enlarged nodes. No history of splenectomy.  PSYCHIATRIC: No history of depression or anxiety.  ENDOCRINOLOGIC: No reports of sweating, cold or heat intolerance. No polyuria or polydipsia.  ALLERGIES: No history of asthma, hives, eczema or rhinitis.      Medications:  The current medication list was reviewed in the EMR    ALLERGIES:  No Known Allergies    Objective      There were no vitals filed for this visit.  Current Status 9/6/2019   ECOG score 0       Physical Exam      GENERAL: Alert, awake, oriented. Thin cachetic male.  Vitals as above.   HEAD: Normocephalic, atraumatic.   EYES: PERRL, EOMI.vision is grossly intact. Conjunctival pallor +   NECK: Supple, no adenopathy or thyromegaly.   THROAT: Normal oral cavity and pharynx. No inflammation, swelling, exudate, or lesions.  CARDIAC: Normal S1 and S2. No S3, S4 or murmurs. Rhythm is regular.  Extremities are warm and well perfused.   LUNGS: Clear to auscultation and percussion without rales, rhonchi, wheezing or diminished breath sounds.  ABDOMEN: Soft, non-teneder. G tube +.  + BS. No hepatosplenomegaly.   BACK:  No bony tenderness.   EXTREMITIES: No significant deformity or joint abnormality.  Peripheral pulses intact. No varicosities.  SKIN: No rash or bruising.  NEUROLOGICAL: Grossly non-focal exam. No focal weakness. Gait: Not assessed due to clinical condition.   PSYCH: Mood and affect normal. No hallucination or suicidal thoughts.   LYMPHATIC: No cervical, supraclavicular or axillary adenopathy    RECENT LABS:Independently reviewed and summarized.  Hematology WBC   Date Value Ref Range Status   08/19/2019 7.32 3.40 - 10.80 10*3/mm3 Final     RBC   Date Value Ref Range Status   08/19/2019 3.76 (L) 4.14 - 5.80 10*6/mm3 Final      Hemoglobin   Date Value Ref Range Status   08/19/2019 9.5 (L) 13.0 - 17.7 g/dL Final     Hematocrit   Date Value Ref Range Status   08/19/2019 28.7 (L) 37.5 - 51.0 % Final     Platelets   Date Value Ref Range Status   08/19/2019 391 140 - 450 10*3/mm3 Final          Imaging (independently reviewed and summarized):   CT abdomen pelvis on July 15, 2019 showed high-grade small bowel obstruction.  No prior comparison available.     Pathology result reviewed.   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  The pharyngeal margin was involved by invasive squamous cell carcinoma.  Perineural invasion was present.  Lymphovascular invasion was not present.  0 of 11 regional lymph nodes were involved with metastatic cancer.     I have reviewed old records and summarized them in HPI as well as assessment and plan section of this note.         Diagnosis:   (1) Hypopharyngeal cancer  (2) Iron deficiency anemia   (3) Iron malabsorption   (4) Severe protein caloric malnutrition   (5) Hypothyroidism   (6) Diarrhea (new issue)     Assessment/Plan     (1) Hypopharyngeal cancer    - Resected with positive margins.   - Results of PET scan reviewed which show no evidence of residual or recurrent cancer.  - Discussed diagnosis, prognosis and potential treatment options at length with the patient.  -Recommend concurrent chemoradiation using weekly cisplatin.  -We will send referral to surgery for chemotherapy port placement.  -Side effects associated with chemotherapy regimen discussed at length with the patient and family.  -Alternative treatment options including observation versus radiation alone are discussed with the patient and family as well.  -After discussion patient and family were in agreement with proceeding with concurrent chemoradiation.      (2) Iron deficiency anemia (3)  Iron malabsorption:    - Patient unable to absorb oral iron due to surgery on small bowel.   - Status post 2 doses of IV iron.   - We will re-check his CBC prior initiation of chemotherapy.      (4) Severe protein caloric malnutrition: G tube. Nutrition consult. Recommend high calorie high protein diet.      (5) Hypothyroidism: On synthroid.     (6) Diarrhea (new issue): Prescription of imodium given to the patient.      Conner Martínez MD         9/8/2019      CC:

## 2019-09-09 ENCOUNTER — TELEPHONE (OUTPATIENT)
Dept: ONCOLOGY | Facility: CLINIC | Age: 59
End: 2019-09-09

## 2019-09-09 NOTE — PROGRESS NOTES
Subjective:  Emerson Bang is a 59 y.o. male who presents for       Patient Active Problem List   Diagnosis   • Hyperkalemia   • Iron deficiency anemia   • Gastroesophageal reflux disease with esophagitis   • Elevated AST (SGOT)   • Alcohol abuse   • Hypothyroidism   • Balance problem   • Need for vaccination   • Low magnesium levels   • Squamous cell carcinoma of pharynx (CMS/HCC)   • History of throat cancer   • Vitamin D deficiency   • Alcohol abuse counseling and surveillance   • Encounter for screening for diabetes mellitus   • Hypomagnesemia   • Iron deficiency anemia   • Hyperlipidemia   • Underweight due to inadequate caloric intake   • Need for immunization against influenza   • 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HCC)   • Hypertension   • General medical examination   • Underweight   • Epigastric pain   • Gastritis without bleeding   • Need for influenza vaccination   • Elevated liver function tests   • B12 deficiency   • Intestinal malabsorption   • Throat pain   • Acute pharyngitis   • Upper respiratory tract infection   • Neoplasm of uncertain behavior of larynx   • Pharyngoesophageal dysphagia   • Severe protein-calorie malnutrition (CMS/HCC)   • Anemia of chronic disease   • Hypotension   • Hyponatremia   • Status post insertion of percutaneous endoscopic gastrostomy (PEG) tube (CMS/HCC)   • Hx of tracheostomy   • History of throat surgery   • Hx SBO   • Urinary retention   • Generalized weakness   • Acute otitis externa of right ear   • Hard stool   • Panlobular emphysema (CMS/HCC)   • Cancer of hypopharynx (CMS/HCC)   • Diarrhea   • Encounter for venous access device care   • Prediabetes   • Status post neck dissection   • S/P partial thyroidectomy   • S/P laryngectomy           Current Outpatient Medications:   •  Ascorbic Acid (C/DARA HIPS PO), 1,000 mg by Per PEG Tube route 2 (Two) Times a Day., Disp: , Rfl:   •  aspirin (ASPIRIN LOW DOSE) 81 MG EC tablet, Take 1 tablet by mouth Daily., Disp: 30  tablet, Rfl: 6  •  docusate (COLACE) 60 MG/15ML syrup, Take 15 mL by mouth Daily., Disp: 473 mL, Rfl: 3  •  doxazosin (CARDURA) 1 MG tablet, Take 1 mg by mouth Every Night., Disp: , Rfl:   •  fenofibrate (TRICOR) 48 MG tablet, 67 mg Daily. 1 tablet by G-Tube daily for Hyperlipidemia , Disp: , Rfl:   •  ferrous sulfate 220 (44 Fe) MG/5ML solution, 5 mL by Per G Tube route Daily., Disp: 300 mL, Rfl: 1  •  levothyroxine (SYNTHROID) 125 MCG tablet, Take 1 tablet by mouth Daily., Disp: 30 tablet, Rfl: 1  •  loperamide (IMODIUM) 1 MG/5ML solution, Take 10 mL by mouth 4 (Four) Times a Day As Needed for Diarrhea., Disp: 118 mL, Rfl: 2  •  metoclopramide (REGLAN) 5 MG/5ML solution, 10 ML Per G-Tube 4 Times Per Day Before Meals X 30 Days, Disp: , Rfl:   •  ondansetron (ZOFRAN) 8 MG tablet, Take 1 tablet by mouth 3 (Three) Times a Day As Needed for Nausea or Vomiting., Disp: 30 tablet, Rfl: 5  •  sodium chloride 1 g tablet, Take 1 g by mouth 3 (Three) Times a Day With Meals. NULL, Disp: , Rfl: 1  •  vitamin D (ERGOCALCIFEROL) 62885 units capsule capsule, 1,000 Units Daily. 1 capsule by mouth weekly on Wednesday X 3 months , Disp: , Rfl:   No current facility-administered medications for this visit.         Pt is 59 yo male with history of throat cancer  (cancer hypopharynx) sp chemoradiation, squamous cell carcinoma of right tonsil in 2008.  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 Iron deficiency, anemia of chronic disease   Underweight,  HLP., COPD(panlobar emphysema), chronic constipation, sp PEG tube, severe protein calorie malnutrition , history of SBO, sp total laryngectomy 2019, right partial pharyngectomy 2019 right thyroid lobectomy 2019 right neck dissection in 2019, currently on chemoradiation therapy, sp chemotherapy port  placement (Power Port)      8/16/19 Pt is here for recheck and hospital followup.  Pt is vague historian since he has difficulty talking and has tracheostomy. He uses a pen and paper today to help communicateHe was diagnosed in 2008 with carcinoma of right tonsil.   Since last visit pt has been admitted twice to [facility] on 4/12/19 for hoarsness odynophagia and hoarseness for the past previous month. He saw ENT and had schedule a procedure but was found to have BUN CR of 122/4.24.  Pt was sent to ED also had a 20 lbs weight loss and decreased urine output. Pt was given Iv fluids D5 1/2 NS and was given IV synthroid.  Nephrology was consulted and pt underwent direct laryngoscopy with biopsies performed by Dr. Washington. A G tube placement was done by Dr. Valera on 4/15/19.  Tissues biopsies showed infiltrating sqaumous cell carcinoma.  Surgercal resection was recommended.  Pt was on tube feedings tolerated formula and was accepted to [facility]. He was counseled to have PET scanning  . PET CT scan in April 262019 showed soft tissue thickening in the right sdie of laryngx and 3 mm right lower lobe lung nodule.  Pt was discharged on 424/19. He had office visit on 4/30/19  By Dr. Washington who recommended a potential surgical resection along with laryngopharyngectomy and likely some reconstructive procedure. He wa referred to Dr. Nolan in [facility]  He underwent a total laryngectomy right partial pharyngectomy and right thyroid lobectomy in June 12 2019   He was seen by Gastroenterology Dr. Chester on 5/21/19 and 5/28/19  Who recommend no further workup Pt was readmitted to [facility] on  7/15/19 for abdominal pain.  He was found to have a high grade SBO and undrwent surgery for lysis of adhesion on 7/17/19.  He was admitted to ICU and was on TPN for severe protein caloric malnutrition. His feedings improved He was given IV hydration.  He was also seen by Dietician.  He was discharged home in stable condition with G tube  feedings boluses 5 times daily.   Pt also had acute urinary retention and was seen by Urology and Palomo Catheter was placed.  He was advised to see Urologsit in 3-4 days.  He also had hyponatremia and was treated with fluid restriction and sodium salt. He had PT/OT. Nephrology was also consulted to help with his hyponatremia with Dr. Naik.  Dr. Prieto (Oncology) was consulted.    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  0 of 11 regional lymph nodes were involved with metastatic cancer. He is to follow  With Oncology outpatient about possible adjuvant chemoradiation.  He is now on iron 220 mg per g tube daily. miodrine 10 mg PO TID before meals and sodium chlorid 1 gram PO TID with meals  He is currently getting Home Health for his PEG tube and  Tracheostomy care. He also has wound on his left forearm and left leg from previous surgery. He is getting wound care and they see him 4 x a week  He is getting Home Health with [facility]  He states he gets constipated and needs a stool softener. He also has issues with his right ear along with right ear pain.    9/13/19 pt is here for recheck and followup.   sincel ast visit pt has been following Oncology for his cancer of hypopharynx.  Had PET Scan done recently that showed no recurrent or residual cancer. Pt proceeded and agreed to concurrent chemoradiation therapy. He has had issues with diarrhea and was prescribed imodium. He continues to have a tracheostomy.  Pt has history of stage IV carcinoma of right tonsil in 2008 and received chemoradiation therapy. He developed recurrence of symptoms in April 2019 and had CT of neck taht showed righ supraglottic larynx suspicious for maligancy. Laryngoscopy and biopsy on 4/15/19 showed squamous cell carinoma with tumo of the right pyriform sinus involving the lateral pharyngeal wall. PET Scan on $/26/19  showed thickening of right side of laryng and a 3 mm right lower lobe lung nodule 3 mm in size. Was referred to [facility] and underwent total laryngectomy right partial pharyngectomy right thyroid lobectomy right neck dissection on 6/12/19.  Final pathology showed squamous cell carcinoma of right hypopharynx that involved the hyoid bone prevertebral fascia and carotid sheath tissues.   0 of 11 regional lymph nodes were not involved with metastatic cancer. He is currently getting weekly cisplastin,  He did have port placement for chemotherapy performed by Dr. Kc on 9/11/19,  He also is getting iron IV therapy for iron deficiency. Had labwork on 8/19/19 that showed normal Vitamin D  TSH was at 0.055 with T4 at 2.26 and T3 at 4.84. Lipid panel showed HDL at 35 with LDL at 96 hga1c at 6.21. CMP   shwoed CR at 0.57 sodiim at 135 from 132 on 8/8/19 chloride stable at 96. GFR >150. CBC showed hemoglobin at 9.5. Saw oncology yesterday on 9/12/19 to discuss chemotherapy. Pt is stable today. He lost 2 lbs since last visit.  No chest pain.  He continues to have diarrhea but cannot place his imodium that was prescribe.d Has appt with speech and swallow next week.  May start chemotherapy next week for 6 weeks along with 30 treatments of radiation therapy. Home health is coming once a week for tracheostomy care, PEG tube and  IV port          Diarrhea    This is a recurrent problem. The current episode started more than 1 month ago. The patient states that diarrhea does not awaken him from sleep. Associated symptoms include arthralgias and coughing. Pertinent negatives include no abdominal pain, bloating, chills, fever, headaches, increased  flatus, myalgias, URI or vomiting. He has tried nothing (imodium ) for the symptoms. The treatment provided mild relief. There is no history of bowel resection, inflammatory bowel disease, irritable bowel syndrome, malabsorption, a recent abdominal surgery or short gut  syndrome.   Weight Loss   This is a chronic problem. The current episode started more than 1 year ago. The problem occurs constantly. The problem has been waxing and waning. Associated symptoms include abdominal pain, arthralgias, a change in bowel habit, congestion, fatigue, numbness, a sore throat and weakness. Pertinent negatives include no anorexia, chest pain, chills, coughing, diaphoresis, fever, headaches, joint swelling, myalgias, nausea, neck pain, rash, swollen glands, vertigo, visual change or vomiting. Nothing aggravates the symptoms. Treatments tried: PEG tube feedings  The treatment provided mild relief.   Cancer   This is a recurrent problem. The current episode started more than 1 year ago. The problem occurs intermittently. The problem has been gradually worsening. Associated symptoms include abdominal pain, arthralgias, a change in bowel habit, congestion, fatigue, numbness, a sore throat and weakness. Pertinent negatives include no anorexia, chest pain, chills, coughing, diaphoresis, fever, headaches, joint swelling, myalgias, nausea, neck pain, rash, swollen glands, vertigo, visual change or vomiting. Nothing aggravates the symptoms. Treatments tried: throat surgery/ chemoradiation therapy  The treatment provided mild relief.   Thyroid Problem   Presents for follow-up visit. Symptoms include fatigue and weight loss. Patient reports no anxiety, cold intolerance, constipation, depressed mood, diaphoresis, diarrhea, dry skin, hair loss, heat intolerance, hoarse voice, leg swelling, menstrual problem, nail problem, palpitations, tremors or visual change. The symptoms have been stable.   Sore Throat    This is a new problem. The current episode started in the past 7 days. The problem has been unchanged. There has been no fever. The pain is at a severity of 4/10. The pain is mild. Associated symptoms include congestion, coughing, a plugged ear sensation and trouble swallowing. Pertinent negatives  include no abdominal pain, diarrhea, drooling, ear discharge, ear pain, headaches, hoarse voice, neck pain, shortness of breath, stridor, swollen glands or vomiting. He has tried nothing for the symptoms. The treatment provided no relief.   Cough   This is a new problem. The current episode started more than 1 year ago. The problem has been unchanged. Associated symptoms include a sore throat. Pertinent negatives include no chest pain, chills, ear congestion, ear pain, fever, headaches, heartburn, hemoptysis, myalgias, nasal congestion, postnasal drip or shortness of breath.     Review of Systems  Review of Systems   Constitutional: Positive for activity change, fatigue and unexpected weight change. Negative for appetite change, chills, diaphoresis and fever.   HENT: Positive for congestion, trouble swallowing and voice change. Negative for postnasal drip, rhinorrhea, sinus pressure, sinus pain, sneezing and sore throat.    Respiratory: Positive for cough and shortness of breath. Negative for choking, chest tightness, wheezing and stridor.    Cardiovascular: Negative for chest pain.   Gastrointestinal: Positive for diarrhea. Negative for abdominal pain, bloating, flatus, nausea and vomiting.   Musculoskeletal: Positive for arthralgias and back pain. Negative for myalgias.   Neurological: Positive for weakness and numbness. Negative for headaches.       Patient Active Problem List   Diagnosis   • Hyperkalemia   • Iron deficiency anemia   • Gastroesophageal reflux disease with esophagitis   • Elevated AST (SGOT)   • Alcohol abuse   • Hypothyroidism   • Balance problem   • Need for vaccination   • Low magnesium levels   • Squamous cell carcinoma of pharynx (CMS/HCC)   • History of throat cancer   • Vitamin D deficiency   • Alcohol abuse counseling and surveillance   • Encounter for screening for diabetes mellitus   • Hypomagnesemia   • Iron deficiency anemia   • Hyperlipidemia   • Underweight due to inadequate caloric  intake   • Need for immunization against influenza   • 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HCC)   • Hypertension   • General medical examination   • Underweight   • Epigastric pain   • Gastritis without bleeding   • Need for influenza vaccination   • Elevated liver function tests   • B12 deficiency   • Intestinal malabsorption   • Throat pain   • Acute pharyngitis   • Upper respiratory tract infection   • Neoplasm of uncertain behavior of larynx   • Pharyngoesophageal dysphagia   • Severe protein-calorie malnutrition (CMS/HCC)   • Anemia of chronic disease   • Hypotension   • Hyponatremia   • Status post insertion of percutaneous endoscopic gastrostomy (PEG) tube (CMS/HCC)   • Hx of tracheostomy   • History of throat surgery   • Hx SBO   • Urinary retention   • Generalized weakness   • Acute otitis externa of right ear   • Hard stool   • Panlobular emphysema (CMS/HCC)   • Cancer of hypopharynx (CMS/HCC)   • Diarrhea   • Encounter for venous access device care   • Prediabetes   • Status post neck dissection   • S/P partial thyroidectomy   • S/P laryngectomy     Past Surgical History:   Procedure Laterality Date   • ABDOMINAL WALL SURGERY  11/04/2008    Laparotomy with repair of stomach wall perforation. 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 Left upper quadrant abdominal wall abscess debridement. Gastrostomy tube erosion through & through the anterior gastric wall.Lupper quadrant abdominal wall abscess   • COLONOSCOPY  04/21/2014    Internal & external hemorrhoids found.   • COLONOSCOPY  2014    [place]   • COLONOSCOPY N/A 10/16/2018    Procedure: COLONOSCOPY;  Surgeon: Kaushal Chester MD;  Location: [facility] ENDOSCOPY;  Service: Gastroenterology   • DIAGNOSTIC LAPAROSCOPY EXPLORATORY LAPAROTOMY N/A 7/17/2019    Procedure: DIAGNOSTIC LAPAROSCOPY, EXPLORATORY LAPAROTOMY, LYSIS OF ADHESIONS;  Surgeon: Parminder Kc MD;  Location: [facility] OR;  Service: General   • DIRECT LARYNGOSCOPY, ESOPHAGOSCOPY, BRONCHOSCOPY N/A 4/15/2019     "Procedure: DIRECT LARYNGOSCOPY with biopsy, ESOPHAGOSCOPY;  Surgeon: Bharathi Washington MD;  Location: [facility] OR;  Service: ENT   • ENDOSCOPY N/A 10/16/2018    Procedure: ESOPHAGOGASTRODUODENOSCOPY;  Surgeon: Kaushal Chester MD;  Location: [facility] ENDOSCOPY;  Service: Gastroenterology   • GASTROSTOMY FEEDING TUBE INSERTION N/A 4/15/2019    Procedure: GASTROSTOMY FEEDING TUBE INSERTION;  Surgeon: Trenton Valera MD;  Location: [facility] OR;  Service: General   • TRACHEOSTOMY  11/10/2008    Respiratory failure   • UPPER GASTROINTESTINAL ENDOSCOPY  04/21/2014    Esophagitis seen. Biopsy taken. Gastritis in stomach. Biopsy taken. Normal duodenum. Biopsy taken.   • UPPER GASTROINTESTINAL ENDOSCOPY  10/16/2018   • VENOUS ACCESS DEVICE (PORT) INSERTION N/A 9/11/2019    Procedure: INSERTION VENOUS ACCESS DEVICE (MEDIPORT)        (c-arm#1);  Surgeon: Parminder Kc MD;  Location: [facility];  Service: General     Social History     Socioeconomic History   • Marital status: Single     Spouse name: Not on file   • Number of children: Not on file   • Years of education: Not on file   • Highest education level: Not on file   Occupational History   • Occupation: Disabled     Comment: Patient resides alone.   Tobacco Use   • Smoking status: Former Smoker     Packs/day: 1.50     Years: 30.00     Pack years: 45.00     Types: Cigarettes     Last attempt to quit: 2009     Years since quitting: 10.7   • Smokeless tobacco: Former User     Quit date: 4/12/2009   Substance and Sexual Activity   • Alcohol use: No     Comment: 02/19/2019 - Patient confirmed heavy alcoholic beverage consumption in past with current consumption of 2 - 3 beers twice per week.   • Drug use: No   • Sexual activity: Defer     Family History   Problem Relation Age of Onset   • Coronary artery disease Mother    • Diabetes Mother    • Other Mother         \"Heart And Lung Problems\"   • Lung cancer Mother    • Ovarian cancer Sister    • Heart disease Other  " "  • Stroke Other    • Hypertension Other    • Diabetes Other    • Cancer Other    • Colon polyps Other    • Other Father         Unknown   • Other Other         \"Lung Problems\"   • Thyroid disease Neg Hx      Appointment on 09/06/2019   Component Date Value Ref Range Status   • PTT 09/06/2019 32.5  20.0 - 40.3 seconds Final   • Protime 09/06/2019 16.4* 11.1 - 15.3 Seconds Final   • INR 09/06/2019 1.34* 0.80 - 1.20 Final   Lab on 08/19/2019   Component Date Value Ref Range Status   • WBC 08/19/2019 7.32  3.40 - 10.80 10*3/mm3 Final   • RBC 08/19/2019 3.76* 4.14 - 5.80 10*6/mm3 Final   • Hemoglobin 08/19/2019 9.5* 13.0 - 17.7 g/dL Final   • Hematocrit 08/19/2019 28.7* 37.5 - 51.0 % Final   • MCV 08/19/2019 76.3* 79.0 - 97.0 fL Final   • MCH 08/19/2019 25.3* 26.6 - 33.0 pg Final   • MCHC 08/19/2019 33.1  31.5 - 35.7 g/dL Final   • RDW 08/19/2019 17.8* 12.3 - 15.4 % Final   • RDW-SD 08/19/2019 48.7  37.0 - 54.0 fl Final   • MPV 08/19/2019 12.7* 6.0 - 12.0 fL Final   • Platelets 08/19/2019 391  140 - 450 10*3/mm3 Final   • Neutrophil % 08/19/2019 52.0  42.7 - 76.0 % Final   • Lymphocyte % 08/19/2019 27.5  19.6 - 45.3 % Final   • Monocyte % 08/19/2019 17.5* 5.0 - 12.0 % Final   • Eosinophil % 08/19/2019 1.6  0.3 - 6.2 % Final   • Basophil % 08/19/2019 1.1  0.0 - 1.5 % Final   • Immature Grans % 08/19/2019 0.3  0.0 - 0.5 % Final   • Neutrophils, Absolute 08/19/2019 3.81  1.70 - 7.00 10*3/mm3 Final   • Lymphocytes, Absolute 08/19/2019 2.01  0.70 - 3.10 10*3/mm3 Final   • Monocytes, Absolute 08/19/2019 1.28* 0.10 - 0.90 10*3/mm3 Final   • Eosinophils, Absolute 08/19/2019 0.12  0.00 - 0.40 10*3/mm3 Final   • Basophils, Absolute 08/19/2019 0.08  0.00 - 0.20 10*3/mm3 Final   • Immature Grans, Absolute 08/19/2019 0.02  0.00 - 0.05 10*3/mm3 Final   • nRBC 08/19/2019 0.0  0.0 - 0.2 /100 WBC Final   • Glucose 08/19/2019 110* 65 - 99 mg/dL Final   • BUN 08/19/2019 17  8 - 23 mg/dL Final   • Creatinine 08/19/2019 0.57* 0.76 - 1.27 " mg/dL Final   • Sodium 08/19/2019 135* 136 - 145 mmol/L Final   • Potassium 08/19/2019 4.4  3.5 - 5.2 mmol/L Final   • Chloride 08/19/2019 96* 98 - 107 mmol/L Final   • CO2 08/19/2019 25.7  22.0 - 29.0 mmol/L Final   • Calcium 08/19/2019 10.5  8.6 - 10.5 mg/dL Final   • Total Protein 08/19/2019 8.5  6.0 - 8.5 g/dL Final   • Albumin 08/19/2019 3.90  3.50 - 5.20 g/dL Final   • ALT (SGPT) 08/19/2019 12  1 - 41 U/L Final   • AST (SGOT) 08/19/2019 18  1 - 40 U/L Final   • Alkaline Phosphatase 08/19/2019 84  39 - 117 U/L Final   • Total Bilirubin 08/19/2019 0.2  0.2 - 1.2 mg/dL Final   • eGFR   Amer 08/19/2019 >150  >60 mL/min/1.73 Final   • Globulin 08/19/2019 4.6  gm/dL Final   • A/G Ratio 08/19/2019 0.8  g/dL Final   • BUN/Creatinine Ratio 08/19/2019 29.8* 7.0 - 25.0 Final   • Anion Gap 08/19/2019 13.3  5.0 - 15.0 mmol/L Final   • Hemoglobin A1C 08/19/2019 6.21* 4.80 - 5.60 % Final   • Total Cholesterol 08/19/2019 140  0 - 200 mg/dL Final   • Triglycerides 08/19/2019 47  0 - 150 mg/dL Final   • HDL Cholesterol 08/19/2019 35* 40 - 60 mg/dL Final   • LDL Cholesterol  08/19/2019 96  0 - 100 mg/dL Final   • VLDL Cholesterol 08/19/2019 9.4  5 - 40 mg/dL Final   • LDL/HDL Ratio 08/19/2019 2.73   Final   • TSH 08/19/2019 0.055* 0.270 - 4.200 mIU/mL Final   • Free T4 08/19/2019 2.26* 0.93 - 1.70 ng/dL Final   • T3, Free 08/19/2019 4.84* 2.00 - 4.40 pg/mL Final   • 25 Hydroxy, Vitamin D 08/19/2019 30.3  30.0 - 100.0 ng/ml Final   • Vitamin B-12 08/19/2019 1,619* 211 - 946 pg/mL Final   Lab on 08/16/2019   Component Date Value Ref Range Status   • Ferritin 08/16/2019 1,247.00* 30.00 - 400.00 ng/mL Final   • Iron 08/16/2019 39* 59 - 158 mcg/dL Final   • Iron Saturation 08/16/2019 13* 20 - 50 % Final   • Transferrin 08/16/2019 205  200 - 360 mg/dL Final   • TIBC 08/16/2019 305  298 - 536 mcg/dL Final   • WBC 08/16/2019 9.80  3.40 - 10.80 10*3/mm3 Final   • RBC 08/16/2019 3.54* 4.14 - 5.80 10*6/mm3 Final   • Hemoglobin  08/16/2019 8.8* 13.0 - 17.7 g/dL Final   • Hematocrit 08/16/2019 26.1* 37.5 - 51.0 % Final   • MCV 08/16/2019 73.7* 79.0 - 97.0 fL Final   • MCH 08/16/2019 24.9* 26.6 - 33.0 pg Final   • MCHC 08/16/2019 33.7  31.5 - 35.7 g/dL Final   • RDW 08/16/2019 17.4* 12.3 - 15.4 % Final   • RDW-SD 08/16/2019 45.7  37.0 - 54.0 fl Final   • MPV 08/16/2019 11.2  6.0 - 12.0 fL Final   • Platelets 08/16/2019 360  140 - 450 10*3/mm3 Final   • Neutrophil % 08/16/2019 62.2  42.7 - 76.0 % Final   • Lymphocyte % 08/16/2019 20.5  19.6 - 45.3 % Final   • Monocyte % 08/16/2019 15.1* 5.0 - 12.0 % Final   • Eosinophil % 08/16/2019 1.2  0.3 - 6.2 % Final   • Basophil % 08/16/2019 0.7  0.0 - 1.5 % Final   • Immature Grans % 08/16/2019 0.3  0.0 - 0.5 % Final   • Neutrophils, Absolute 08/16/2019 6.09  1.70 - 7.00 10*3/mm3 Final   • Lymphocytes, Absolute 08/16/2019 2.01  0.70 - 3.10 10*3/mm3 Final   • Monocytes, Absolute 08/16/2019 1.48* 0.10 - 0.90 10*3/mm3 Final   • Eosinophils, Absolute 08/16/2019 0.12  0.00 - 0.40 10*3/mm3 Final   • Basophils, Absolute 08/16/2019 0.07  0.00 - 0.20 10*3/mm3 Final   • Immature Grans, Absolute 08/16/2019 0.03  0.00 - 0.05 10*3/mm3 Final   • nRBC 08/16/2019 0.0  0.0 - 0.2 /100 WBC Final   Admission on 07/15/2019, Discharged on 08/09/2019   No results displayed because visit has over 200 results.      Lab on 05/20/2019   Component Date Value Ref Range Status   • TSH 05/20/2019 13.900* 0.270 - 4.200 mIU/mL Final   • Glucose 05/20/2019 88  65 - 99 mg/dL Final   • BUN 05/20/2019 20  8 - 23 mg/dL Final   • Creatinine 05/20/2019 0.74* 0.76 - 1.27 mg/dL Final   • Sodium 05/20/2019 131* 136 - 145 mmol/L Final   • Potassium 05/20/2019 4.8  3.5 - 5.2 mmol/L Final   • Chloride 05/20/2019 92* 98 - 107 mmol/L Final   • CO2 05/20/2019 26.6  22.0 - 29.0 mmol/L Final   • Calcium 05/20/2019 10.8* 8.6 - 10.5 mg/dL Final   • Total Protein 05/20/2019 9.1* 6.0 - 8.5 g/dL Final   • Albumin 05/20/2019 3.90  3.50 - 5.20 g/dL Final   •  ALT (SGPT) 05/20/2019 10  1 - 41 U/L Final   • AST (SGOT) 05/20/2019 19  1 - 40 U/L Final   • Alkaline Phosphatase 05/20/2019 87  39 - 117 U/L Final   • Total Bilirubin 05/20/2019 0.3  0.2 - 1.2 mg/dL Final   • eGFR   Amer 05/20/2019 131  >60 mL/min/1.73 Final   • Globulin 05/20/2019 5.2  gm/dL Final   • A/G Ratio 05/20/2019 0.8  g/dL Final   • BUN/Creatinine Ratio 05/20/2019 27.0* 7.0 - 25.0 Final   • Anion Gap 05/20/2019 12.4  mmol/L Final   • WBC 05/20/2019 9.97  3.40 - 10.80 10*3/mm3 Final   • RBC 05/20/2019 4.00* 4.14 - 5.80 10*6/mm3 Final   • Hemoglobin 05/20/2019 10.2* 13.0 - 17.7 g/dL Final   • Hematocrit 05/20/2019 30.7* 37.5 - 51.0 % Final   • MCV 05/20/2019 76.8* 79.0 - 97.0 fL Final   • MCH 05/20/2019 25.5* 26.6 - 33.0 pg Final   • MCHC 05/20/2019 33.2  31.5 - 35.7 g/dL Final   • RDW 05/20/2019 14.0  12.3 - 15.4 % Final   • RDW-SD 05/20/2019 38.3  37.0 - 54.0 fl Final   • MPV 05/20/2019 12.0  6.0 - 12.0 fL Final   • Platelets 05/20/2019 500* 140 - 450 10*3/mm3 Final   • nRBC 05/20/2019 0.0  0.0 - 0.2 /100 WBC Final   • Neutrophil % 05/20/2019 57.0  42.7 - 76.0 % Final   • Lymphocyte % 05/20/2019 29.0  19.6 - 45.3 % Final   • Monocyte % 05/20/2019 12.0  5.0 - 12.0 % Final   • Eosinophil % 05/20/2019 2.0  0.3 - 6.2 % Final   • Neutrophils Absolute 05/20/2019 5.68  1.70 - 7.00 10*3/mm3 Final   • Lymphocytes Absolute 05/20/2019 2.89  0.70 - 3.10 10*3/mm3 Final   • Monocytes Absolute 05/20/2019 1.20* 0.10 - 0.90 10*3/mm3 Final   • Eosinophils Absolute 05/20/2019 0.20  0.00 - 0.40 10*3/mm3 Final   • nRBC 05/20/2019 1.0* 0.0 - 0.2 /100 WBC Final   • Target Cells 05/20/2019 Slight/1+  None Seen Final   • WBC Morphology 05/20/2019 Normal  Normal Final   • Platelet Morphology 05/20/2019 Normal  Normal Final   Orders Only on 05/20/2019   Component Date Value Ref Range Status   • Glucose 05/20/2019 107* 65 - 99 mg/dL Final   • BUN 05/20/2019 21  8 - 23 mg/dL Final   • Creatinine 05/20/2019 0.65* 0.76 - 1.27  mg/dL Final   • Sodium 05/20/2019 132* 136 - 145 mmol/L Final   • Potassium 05/20/2019 4.6  3.5 - 5.2 mmol/L Final   • Chloride 05/20/2019 92* 98 - 107 mmol/L Final   • CO2 05/20/2019 30.0* 22.0 - 29.0 mmol/L Final   • Calcium 05/20/2019 10.6* 8.6 - 10.5 mg/dL Final   • Total Protein 05/20/2019 9.3* 6.0 - 8.5 g/dL Final   • Albumin 05/20/2019 3.60  3.50 - 5.20 g/dL Final   • ALT (SGPT) 05/20/2019 7  1 - 41 U/L Final   • AST (SGOT) 05/20/2019 17  1 - 40 U/L Final   • Alkaline Phosphatase 05/20/2019 89  39 - 117 U/L Final   • Total Bilirubin 05/20/2019 0.3  0.2 - 1.2 mg/dL Final   • eGFR   Amer 05/20/2019 >150  >60 mL/min/1.73 Final   • Globulin 05/20/2019 5.7  gm/dL Final   • A/G Ratio 05/20/2019 0.6  g/dL Final   • BUN/Creatinine Ratio 05/20/2019 32.3* 7.0 - 25.0 Final   • Anion Gap 05/20/2019 10.0  mmol/L Final   • WBC 05/20/2019 14.98* 3.40 - 10.80 10*3/mm3 Final   • RBC 05/20/2019 4.00* 4.14 - 5.80 10*6/mm3 Final   • Hemoglobin 05/20/2019 9.9* 13.0 - 17.7 g/dL Final   • Hematocrit 05/20/2019 30.3* 37.5 - 51.0 % Final   • MCV 05/20/2019 75.8* 79.0 - 97.0 fL Final   • MCH 05/20/2019 24.8* 26.6 - 33.0 pg Final   • MCHC 05/20/2019 32.7  31.5 - 35.7 g/dL Final   • RDW 05/20/2019 14.2  12.3 - 15.4 % Final   • RDW-SD 05/20/2019 38.1  37.0 - 54.0 fl Final   • MPV 05/20/2019 11.4  6.0 - 12.0 fL Final   • Platelets 05/20/2019 563* 140 - 450 10*3/mm3 Final   • Neutrophil % 05/20/2019 68.4  42.7 - 76.0 % Final   • Lymphocyte % 05/20/2019 15.8* 19.6 - 45.3 % Final   • Monocyte % 05/20/2019 13.6* 5.0 - 12.0 % Final   • Eosinophil % 05/20/2019 0.7  0.3 - 6.2 % Final   • Basophil % 05/20/2019 0.6  0.0 - 1.5 % Final   • Immature Grans % 05/20/2019 0.9* 0.0 - 0.5 % Final   • Neutrophils, Absolute 05/20/2019 10.26* 1.70 - 7.00 10*3/mm3 Final   • Lymphocytes, Absolute 05/20/2019 2.36  0.70 - 3.10 10*3/mm3 Final   • Monocytes, Absolute 05/20/2019 2.03* 0.10 - 0.90 10*3/mm3 Final   • Eosinophils, Absolute 05/20/2019 0.10  0.00  - 0.40 10*3/mm3 Final   • Basophils, Absolute 05/20/2019 0.09  0.00 - 0.20 10*3/mm3 Final   • Immature Grans, Absolute 05/20/2019 0.14* 0.00 - 0.05 10*3/mm3 Final   • nRBC 05/20/2019 0.0  0.0 - 0.2 /100 WBC Final   Admission on 04/12/2019, Discharged on 04/24/2019   No results displayed because visit has over 200 results.      Lab on 04/12/2019   Component Date Value Ref Range Status   • Glucose 04/12/2019 122* 65 - 99 mg/dL Final   • BUN 04/12/2019 122* 8 - 23 mg/dL Final   • Creatinine 04/12/2019 4.24* 0.76 - 1.27 mg/dL Final   • Sodium 04/12/2019 138  136 - 145 mmol/L Final   • Potassium 04/12/2019 4.4  3.5 - 5.2 mmol/L Final   • Chloride 04/12/2019 89* 98 - 107 mmol/L Final   • CO2 04/12/2019 23.0  22.0 - 29.0 mmol/L Final   • Calcium 04/12/2019 11.0* 8.6 - 10.5 mg/dL Final   • eGFR  African Amer 04/12/2019 17* >60 mL/min/1.73 Final   • BUN/Creatinine Ratio 04/12/2019 28.8* 7.0 - 25.0 Final   • Anion Gap 04/12/2019 26.0  mmol/L Final   Office Visit on 04/12/2019   Component Date Value Ref Range Status   • Free T4 04/12/2019 2.11* 0.93 - 1.70 ng/dL Final   • TSH 04/12/2019 0.043* 0.270 - 4.200 mIU/mL Final   Office Visit on 04/02/2019   Component Date Value Ref Range Status   • Rapid Strep A Screen 04/02/2019 Negative  Negative, VALID, INVALID, Not Performed Final   • Internal Control 04/02/2019 Passed  Passed Final   • Lot Number 04/02/2019 [id number]   Final   • Expiration Date 04/02/2019 03/29/2021   Final   There may be more visits with results that are not included.      XR Chest Post CVA Port  Narrative: PROCEDURE: Single chest view portable erect    REASON FOR EXAM:port placement, C13.9 Malignant neoplasm of  hypopharynx, unspecified    FINDINGS: Comparison exam dated July 26, 2019. Tracheostomy tube  which appears appropriately positioned. Left subclavian  Port-A-Cath with tip in the region of SVC. Cardiac and pulmonary  vasculature are normal. Lungs are clear. Pleural spaces are  normal. Left lateral  "hemithorax inferior overlying soft tissue  plane artifact which has lung markings extending beyond the  artifact. No acute osseous abnormality.  Impression: No acute cardiopulmonary abnormality.    Electronically signed by:  Devon Cuadra MD  9/11/2019 3:44 PM CDT  Workstation: [id number]    [unfilled]  Immunization History   Administered Date(s) Administered   • Flu Mist 10/19/2012   • Flu Vaccine Quad PF >36MO 11/01/2017   • Tdap 02/13/2017   • Tetanus 01/01/2009   • flucelvax quad pfs =>4 YRS 11/15/2018   • influenza Split 10/07/2016       The following portions of the patient's history were reviewed and updated as appropriate: allergies, current medications, past family history, past medical history, past social history, past surgical history and problem list.        Physical Exam  /70   Pulse 87   Temp 97.9 °F (36.6 °C)   Ht 165.1 cm (65\")   Wt 47.4 kg (104 lb 9.6 oz)   SpO2 98%   BMI 17.41 kg/m²     Physical Exam   Constitutional: He is oriented to person, place, and time. He appears well-developed and well-nourished.   Thin appearance    HENT:   Head: Normocephalic and atraumatic.   Right Ear: External ear normal.   Tracheostomy in place. Clean    Eyes: Conjunctivae and EOM are normal. Pupils are equal, round, and reactive to light.   Neck: Normal range of motion. Neck supple.   Cardiovascular: Normal rate, regular rhythm and normal heart sounds.   No murmur heard.  Pulmonary/Chest: Effort normal and breath sounds normal. No respiratory distress.       Abdominal: Soft. Bowel sounds are normal. He exhibits no distension. There is no tenderness.   PEG tube in place clean to site    Musculoskeletal: Normal range of motion. He exhibits no edema or deformity.   Neurological: He is alert and oriented to person, place, and time. No cranial nerve deficit.   Skin: Skin is warm. No rash noted. He is not diaphoretic. No erythema. No pallor.   Psychiatric: He has a normal mood and affect. His behavior is normal. "   Nursing note and vitals reviewed.      Assessment/Plan    Diagnosis Plan   1. Squamous cell carcinoma of pharynx (CMS/HCC)     2. Status post insertion of percutaneous endoscopic gastrostomy (PEG) tube (CMS/[facility])     3. Underweight     4. Underweight due to inadequate caloric intake     5. Vitamin D deficiency     6. Severe protein-calorie malnutrition (CMS/HCC)     7. Iron deficiency anemia, unspecified iron deficiency anemia type     8. Hypomagnesemia     9. Hyponatremia     10. Hx SBO     11. Hx of tracheostomy     12. History of throat cancer     13. 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 Gastroesophageal reflux disease with esophagitis     15. Gastritis without bleeding, unspecified chronicity, unspecified gastritis type     16. Alcohol abuse counseling and surveillance     17. Anemia of chronic disease     18. B12 deficiency     19. Diarrhea, unspecified type     20. Prediabetes     21. S/P laryngectomy     22. S/P partial thyroidectomy     23. Status post neck dissection          -went over labwork   -annual physical exam today   -diarrhea - on imodium. Refilled medication today  -continue home health for tracheostomy care, Port Care and Peg Tube care    -otitis externa-  Cortisporin ear drops to right ear TID for 5 days.   -reviewed both last [facility] admission note.     -underweight - currently has PEG tube and is getting tube feedings. 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  Pt referred to [place]. Sp  Throat surgeyr. Dr. Prieto Oncology following. Has Port access for chemotherapy. Chemotherapy next week along with radiation therapy   -Tracheostomy and PEG tube working properly. Currently clean   -vitamin D deficiency on vitamin D supplements  -HLP- on tricor   -prediabetes meal plan information   -urinary retention on cardura   -hyponatremia/hypochlroemia  - sodium stable   -history of SBO - sp surgery.  General Surgery  following.sp exploratory laparotomy lysis of adhession.    -advised to go to ER or call 911 if symptoms worrisome or sever  -continue synthroid 175 mcg PO q daily for hypothryoidism - Endocrinology following. Pt will need to make an appt. Last TSH low and T3 and T4 elevated. Recommend synthroid 175 mcg for 6 days a week  -alcohol abuse/ - counseled pt >5 minutes to quit drinking alcohol. Gave information on withdrawal  he has not had alcohol    -alcohol cirrhosis - Gastroenterology following. He will also need to make appt   -recheck in6 weeks         This document has been electronically signed by Cristino He MD on September 13, 2019 8:08 AM

## 2019-09-11 ENCOUNTER — APPOINTMENT (OUTPATIENT)
Dept: GENERAL RADIOLOGY | Facility: HOSPITAL | Age: 59
End: 2019-09-11

## 2019-09-11 ENCOUNTER — HOSPITAL ENCOUNTER (OUTPATIENT)
Facility: HOSPITAL | Age: 59
Setting detail: HOSPITAL OUTPATIENT SURGERY
Discharge: HOME | End: 2019-09-11
Attending: SURGERY | Admitting: SURGERY

## 2019-09-11 ENCOUNTER — ANESTHESIA (OUTPATIENT)
Dept: PERIOP | Facility: HOSPITAL | Age: 59
End: 2019-09-11

## 2019-09-11 VITALS
TEMPERATURE: 97.4 F | RESPIRATION RATE: 18 BRPM | WEIGHT: 103.4 LBS | SYSTOLIC BLOOD PRESSURE: 133 MMHG | OXYGEN SATURATION: 98 % | BODY MASS INDEX: 17.23 KG/M2 | HEART RATE: 72 BPM | HEIGHT: 65 IN | DIASTOLIC BLOOD PRESSURE: 68 MMHG

## 2019-09-11 DIAGNOSIS — C13.9 CANCER OF HYPOPHARYNX (HCC): ICD-10-CM

## 2019-09-11 PROCEDURE — 25010000002 PHENYLEPHRINE PER 1 ML: Performed by: NURSE ANESTHETIST, CERTIFIED REGISTERED

## 2019-09-11 PROCEDURE — 25010000002 PROPOFOL 10 MG/ML EMULSION: Performed by: NURSE ANESTHETIST, CERTIFIED REGISTERED

## 2019-09-11 PROCEDURE — C1788 PORT, INDWELLING, IMP: HCPCS | Performed by: SURGERY

## 2019-09-11 PROCEDURE — 25010000002 MIDAZOLAM PER 1 MG: Performed by: NURSE ANESTHETIST, CERTIFIED REGISTERED

## 2019-09-11 PROCEDURE — 36561 INSERT TUNNELED CV CATH: CPT | Performed by: SURGERY

## 2019-09-11 PROCEDURE — 77001 FLUOROGUIDE FOR VEIN DEVICE: CPT | Performed by: SURGERY

## 2019-09-11 PROCEDURE — 76000 FLUOROSCOPY <1 HR PHYS/QHP: CPT

## 2019-09-11 PROCEDURE — 94640 AIRWAY INHALATION TREATMENT: CPT

## 2019-09-11 DEVICE — POWERPORT M.R.I. IMPLANTABLE PORT WITH PRE-ATTACHED 9.6F  OPEN-ENDED SINGLE-LUMEN VENOUS CATHETER. INTERMEDIATE KIT (WITH SUTURE PLUGS)
Type: IMPLANTABLE DEVICE | Site: CHEST | Status: FUNCTIONAL
Brand: POWERPORT M.R.I.

## 2019-09-11 RX ORDER — MIDAZOLAM HYDROCHLORIDE 1 MG/ML
INJECTION INTRAMUSCULAR; INTRAVENOUS AS NEEDED
Status: DISCONTINUED | OUTPATIENT
Start: 2019-09-11 | End: 2019-09-11 | Stop reason: SURG

## 2019-09-11 RX ORDER — ROCURONIUM BROMIDE 10 MG/ML
INJECTION, SOLUTION INTRAVENOUS AS NEEDED
Status: DISCONTINUED | OUTPATIENT
Start: 2019-09-11 | End: 2019-09-11 | Stop reason: SURG

## 2019-09-11 RX ORDER — PROPOFOL 10 MG/ML
VIAL (ML) INTRAVENOUS AS NEEDED
Status: DISCONTINUED | OUTPATIENT
Start: 2019-09-11 | End: 2019-09-11 | Stop reason: SURG

## 2019-09-11 RX ORDER — ALBUTEROL SULFATE 2.5 MG/3ML
2.5 SOLUTION RESPIRATORY (INHALATION) ONCE
Status: COMPLETED | OUTPATIENT
Start: 2019-09-11 | End: 2019-09-11

## 2019-09-11 RX ORDER — KETAMINE HYDROCHLORIDE 100 MG/ML
INJECTION INTRAMUSCULAR; INTRAVENOUS AS NEEDED
Status: DISCONTINUED | OUTPATIENT
Start: 2019-09-11 | End: 2019-09-11 | Stop reason: SURG

## 2019-09-11 RX ORDER — SODIUM CHLORIDE, SODIUM LACTATE, POTASSIUM CHLORIDE, CALCIUM CHLORIDE 600; 310; 30; 20 MG/100ML; MG/100ML; MG/100ML; MG/100ML
100 INJECTION, SOLUTION INTRAVENOUS CONTINUOUS
Status: DISCONTINUED | OUTPATIENT
Start: 2019-09-11 | End: 2019-09-11 | Stop reason: HOSPADM

## 2019-09-11 RX ORDER — LIDOCAINE HYDROCHLORIDE 20 MG/ML
INJECTION, SOLUTION EPIDURAL; INFILTRATION; INTRACAUDAL; PERINEURAL AS NEEDED
Status: DISCONTINUED | OUTPATIENT
Start: 2019-09-11 | End: 2019-09-11 | Stop reason: SURG

## 2019-09-11 RX ADMIN — PHENYLEPHRINE HYDROCHLORIDE 100 MCG: 10 INJECTION INTRAVENOUS at 14:04

## 2019-09-11 RX ADMIN — PHENYLEPHRINE HYDROCHLORIDE 100 MCG: 10 INJECTION INTRAVENOUS at 13:36

## 2019-09-11 RX ADMIN — PHENYLEPHRINE HYDROCHLORIDE 100 MCG: 10 INJECTION INTRAVENOUS at 14:20

## 2019-09-11 RX ADMIN — PHENYLEPHRINE HYDROCHLORIDE 100 MCG: 10 INJECTION INTRAVENOUS at 14:09

## 2019-09-11 RX ADMIN — PHENYLEPHRINE HYDROCHLORIDE 100 MCG: 10 INJECTION INTRAVENOUS at 14:22

## 2019-09-11 RX ADMIN — PHENYLEPHRINE HYDROCHLORIDE 100 MCG: 10 INJECTION INTRAVENOUS at 14:14

## 2019-09-11 RX ADMIN — ROCURONIUM BROMIDE 10 MG: 10 INJECTION INTRAVENOUS at 13:31

## 2019-09-11 RX ADMIN — PROPOFOL 70 MG: 10 INJECTION, EMULSION INTRAVENOUS at 13:25

## 2019-09-11 RX ADMIN — PHENYLEPHRINE HYDROCHLORIDE 100 MCG: 10 INJECTION INTRAVENOUS at 13:50

## 2019-09-11 RX ADMIN — PROPOFOL 20 MG: 10 INJECTION, EMULSION INTRAVENOUS at 13:31

## 2019-09-11 RX ADMIN — PROPOFOL 60 MG: 10 INJECTION, EMULSION INTRAVENOUS at 13:28

## 2019-09-11 RX ADMIN — KETAMINE HYDROCHLORIDE 10 MG: 100 INJECTION INTRAMUSCULAR; INTRAVENOUS at 13:38

## 2019-09-11 RX ADMIN — KETAMINE HYDROCHLORIDE 20 MG: 100 INJECTION INTRAMUSCULAR; INTRAVENOUS at 13:25

## 2019-09-11 RX ADMIN — LIDOCAINE HYDROCHLORIDE 60 MG: 20 INJECTION, SOLUTION EPIDURAL; INFILTRATION; INTRACAUDAL; PERINEURAL at 13:25

## 2019-09-11 RX ADMIN — SODIUM CHLORIDE, POTASSIUM CHLORIDE, SODIUM LACTATE AND CALCIUM CHLORIDE 100 ML/HR: 600; 310; 30; 20 INJECTION, SOLUTION INTRAVENOUS at 11:42

## 2019-09-11 RX ADMIN — PHENYLEPHRINE HYDROCHLORIDE 100 MCG: 10 INJECTION INTRAVENOUS at 14:31

## 2019-09-11 RX ADMIN — CEFAZOLIN 1 G: 1 INJECTION, POWDER, FOR SOLUTION INTRAMUSCULAR; INTRAVENOUS; PARENTERAL at 13:16

## 2019-09-11 RX ADMIN — PHENYLEPHRINE HYDROCHLORIDE 100 MCG: 10 INJECTION INTRAVENOUS at 14:01

## 2019-09-11 RX ADMIN — PHENYLEPHRINE HYDROCHLORIDE 100 MCG: 10 INJECTION INTRAVENOUS at 13:48

## 2019-09-11 RX ADMIN — PROPOFOL 50 MG: 10 INJECTION, EMULSION INTRAVENOUS at 13:49

## 2019-09-11 RX ADMIN — MIDAZOLAM HYDROCHLORIDE 1 MG: 2 INJECTION, SOLUTION INTRAMUSCULAR; INTRAVENOUS at 13:12

## 2019-09-11 RX ADMIN — ALBUTEROL SULFATE 2.5 MG: 2.5 SOLUTION RESPIRATORY (INHALATION) at 11:42

## 2019-09-11 RX ADMIN — ROCURONIUM BROMIDE 10 MG: 10 INJECTION INTRAVENOUS at 13:52

## 2019-09-11 NOTE — DISCHARGE INSTRUCTIONS
Dr. Parminder Kc  [address] [place] [address]  [phone] (office)  [phone] (hospital)    Discharge Instructions for Port Placement      1. Go home, rest and take it easy today; however, you should get up and move about several times today to reduce the risk of developing a clot in your legs.      2. You may experience some dizziness or memory loss from the anesthesia.  This may last for the next 24 hours.  Someone should plan on staying with you for the first 24 hours for your safety.    3. Do not make any important legal decisions or sign any legal papers for the next 24 hours.      4. Eat and drink lightly today.  Start off with liquids, jello, soup, crackers or other bland foods at first. You may advance your diet tomorrow as tolerated as long as you do not experience any nausea or vomiting.     5. You may remove your outer dressings in 3-4 days or until your first treatment whichever comes first. If you remove the dressing before your first treatment, please leave the little white tapes known as steri-strips alone.  They usually fall off in 1-2 weeks.  Do not worry if they come off sooner.  No upper arm reaching or exercises until seen in the office.    6. You may notice some bleeding/drainage on your outer dressings. A little bloody drainage is normal. If the bleeding/drainage is such that the bandage cannot absorb it, remove the dressing, apply clean gauze and apply firm pressure for a full 15 minutes.  If the bleeding continues, please call me.    7. You may shower tomorrow.  No tub baths until your incisions are completely healed.  8.     9. No driving for 24 hours and for as long as you are taking your prescription pain medicine.        10. No surgical follow-up is needed unless a problem (such as drainage, redness of the incision, malfunction of the port) arises.              11. Remember to contact me for any of the following:    • Fever> 101 degrees  • Severe pain that cannot be  controlled by taking your pain pills  • Severe nausea or vomiting that cannot be controlled by taking your nausea medicine  • Significant bleeding from your incision  • Drainage that has a bad smell or is yellow or green in appearance  • Any other questions or concerns

## 2019-09-11 NOTE — ANESTHESIA PROCEDURE NOTES
Airway  Date/Time: 9/11/2019 1:27 PM    Additional Comments  Jennifer 7.5 reinforced ETT to trach stoma

## 2019-09-11 NOTE — INTERVAL H&P NOTE
H&P reviewed. The patient was examined and there are no changes to the H&P.      Temp:  [97.7 °F (36.5 °C)] 97.7 °F (36.5 °C)  Heart Rate:  [80] 80  Resp:  [18] 18  BP: (122)/(64) 122/64

## 2019-09-11 NOTE — ANESTHESIA POSTPROCEDURE EVALUATION
Patient: Emerson Bang    Procedure Summary     Date:  09/11/19 Room / Location:  [facility] OR 08 /  [facility] OR    Anesthesia Start:  1311 Anesthesia Stop:  1501    Procedure:  INSERTION VENOUS ACCESS DEVICE (MEDIPORT)        (c-arm#1) (N/A ) Diagnosis:       Cancer of hypopharynx (CMS/HCC)      (Cancer of hypopharynx (CMS/HCC) [C13.9])    Surgeon:  Parminder Kc MD Provider:  Reza Staton MD    Anesthesia Type:  MAC, general ASA Status:  4          Anesthesia Type: MAC, general  Last vitals  BP   110/61 (09/11/19 1515)   Temp   97.3 °F (36.3 °C) (09/11/19 1500)   Pulse   76 (09/11/19 1515)   Resp   16 (09/11/19 1515)     SpO2   98 % (09/11/19 1515)     Post Anesthesia Care and Evaluation    Patient location during evaluation: PACU  Patient participation: complete - patient participated  Level of consciousness: awake and alert  Pain score: 0  Pain management: adequate  Airway patency: patent  Anesthetic complications: No anesthetic complications  PONV Status: none  Cardiovascular status: acceptable  Respiratory status: acceptable  Hydration status: acceptable

## 2019-09-11 NOTE — OP NOTE
INSERTION VENOUS ACCESS DEVICE  Procedure Note    Emerson Bang  9/11/2019    Pre-op Diagnosis:   Cancer of hypopharynx (CMS/HCC) [C13.9]    Post-op Diagnosis:     Cancer of hypopharynx (CMS/HCC) [C13.9]    Procedure:  INSERTION VENOUS ACCESS DEVICE (MEDIPORT)        (c-arm#1)    Surgeon(s):  Parminder Kc MD    Anesthesia: General    Staff:   Circulator: Judy Zheng RN; Judy Young RN  Scrub Person: Noa Currie  Assistant: Tiffanie Thomas CSA    Estimated Blood Loss: minimal    Specimens:                None      Drains:   Gastrostomy/Enterostomy Gastrostomy 22 Fr. LUQ (Active)       Urethral Catheter (Active)       [REMOVED] Gastrostomy/Enterostomy Gastrostomy (Removed)       [REMOVED] Urethral Catheter Coude (Removed)       [REMOVED] Urethral Catheter 16 Fr. (Removed)       [REMOVED] Urethral Catheter (Removed)       [REMOVED] Urethral Catheter (Removed)       Findings: Subclavian is too small to cannulate a direct stick    Complications: None    Indications: 59-year-old man with a head neck cancer.  Needs Mediport placement for venous access and chemotherapy.  Brought to the operating room today for same.    Description of procedure: The patient was brought to the operating room and placed supine on the operating table.  The tracheostomy tube was removed and replaced with an anode tube.  The neck and chest wall were prepped and draped in sterile manner.  Briefing and timeout were performed and all parties were in agreement    Direct stick into the subclavian vein on the left side was attempted with the patient in steep Trendelenburg.  Unfortunately, no blood return could be obtained as the pain was likely very small.  He is also had bilateral procedures on his neck and treatment of his head and neck cancer.    An incision was made over the skin on the deltopectoral groove left side.  Scaring the subcutaneous tissue.  The cephalic vein was visualized and encircled with a tie.  It was  opened with an 11 blade knife and with some difficulty, wire was able be passed through this and directed centrally.  This was able to be accomplished by placing a 16-gauge angiocatheter into the vein and placing a wire through this.    Its good position was confirmed with fluoroscopy.  The angiocatheter was removed and the wire in good location.    A subcutaneous pocket was created on the chest wall and a port was placed into the pocket and sutured in place with 3-0 Prolene.  The tubing was cut to proper length.  The vein was dilated by placing a dilator over the wire under fluoroscopic guidance. The wire was removed and the tubing was then passed into the cephalic vein directed centrally, leaving the tubing in excellent position in the superior vena cava.  Excellent flow and aspiration were achieved through the port and good position was confirmed fluoroscopically. No pneumothorax was detected. The Prolene sutures were tied. The chest wound was closed with interrupted 3-0 Vicryl deep and continuous 4-0 subcuticular Vicryl on skin.      Procedure was terminated. The patient tolerated well.  Sponge and needle counts were correct and the patient was returned to recovery in satisfactory condition.          This document has been electronically signed by Parminder Kc MD on September 11, 2019 3:13 PM        Date: 9/11/2019  Time: 3:06 PM

## 2019-09-12 ENCOUNTER — OFFICE VISIT (OUTPATIENT)
Dept: ONCOLOGY | Facility: CLINIC | Age: 59
End: 2019-09-12

## 2019-09-12 ENCOUNTER — INFUSION (OUTPATIENT)
Dept: ONCOLOGY | Facility: HOSPITAL | Age: 59
End: 2019-09-12

## 2019-09-12 ENCOUNTER — HOSPITAL ENCOUNTER (OUTPATIENT)
Dept: RADIATION ONCOLOGY | Facility: HOSPITAL | Age: 59
Discharge: HOME | End: 2019-09-12

## 2019-09-12 VITALS
BODY MASS INDEX: 17.36 KG/M2 | TEMPERATURE: 97.8 F | DIASTOLIC BLOOD PRESSURE: 79 MMHG | RESPIRATION RATE: 20 BRPM | HEART RATE: 97 BPM | SYSTOLIC BLOOD PRESSURE: 114 MMHG | OXYGEN SATURATION: 98 % | HEIGHT: 65 IN | WEIGHT: 104.2 LBS

## 2019-09-12 DIAGNOSIS — D63.8 ANEMIA OF CHRONIC DISEASE: ICD-10-CM

## 2019-09-12 DIAGNOSIS — Z45.2 ENCOUNTER FOR VENOUS ACCESS DEVICE CARE: Primary | ICD-10-CM

## 2019-09-12 DIAGNOSIS — C13.9 CANCER OF HYPOPHARYNX (HCC): ICD-10-CM

## 2019-09-12 PROCEDURE — 99214 OFFICE O/P EST MOD 30 MIN: CPT | Performed by: NURSE PRACTITIONER

## 2019-09-12 PROCEDURE — 96523 IRRIG DRUG DELIVERY DEVICE: CPT | Performed by: NURSE PRACTITIONER

## 2019-09-12 PROCEDURE — 77334 RADIATION TREATMENT AID(S): CPT | Performed by: RADIOLOGY

## 2019-09-12 RX ORDER — SODIUM CHLORIDE 0.9 % (FLUSH) 0.9 %
20 SYRINGE (ML) INJECTION AS NEEDED
Status: CANCELLED | OUTPATIENT
Start: 2019-09-25

## 2019-09-12 RX ORDER — SODIUM CHLORIDE 0.9 % (FLUSH) 0.9 %
20 SYRINGE (ML) INJECTION AS NEEDED
Status: DISCONTINUED | OUTPATIENT
Start: 2019-09-12 | End: 2019-09-12 | Stop reason: HOSPADM

## 2019-09-12 RX ORDER — SODIUM CHLORIDE 0.9 % (FLUSH) 0.9 %
10 SYRINGE (ML) INJECTION AS NEEDED
Status: DISCONTINUED | OUTPATIENT
Start: 2019-09-12 | End: 2019-09-12 | Stop reason: HOSPADM

## 2019-09-12 RX ORDER — SODIUM CHLORIDE 0.9 % (FLUSH) 0.9 %
10 SYRINGE (ML) INJECTION AS NEEDED
Status: CANCELLED | OUTPATIENT
Start: 2019-09-25

## 2019-09-12 RX ADMIN — SODIUM CHLORIDE, PRESERVATIVE FREE 10 ML: 5 INJECTION INTRAVENOUS at 11:00

## 2019-09-12 RX ADMIN — Medication 500 UNITS: at 11:00

## 2019-09-12 NOTE — PROGRESS NOTES
CHEMOTHERAPY PREPARATION    Emerson Bang  [id number]  1960    Chief Complaint: chemo education     History of present illness:  Emerson Bang is a 59 y.o. year old male who is here today for chemotherapy preparation and needs assessment. The patient has been diagnosed with hypopharyngeal cancer and is scheduled to begin treatment with concurrent weekly Cisplatin with radiation therapy.     Oncology History:     No history exists.       Past Medical History:   Diagnosis Date   • Allergic rhinitis     vs URI   • Anemia    • At risk for falls    • Benign prostatic hyperplasia    • Chronic gastritis    • Cirrhosis of liver (CMS/HCC)    • Complication of gastrostomy (CMS/HCC)     site not healing   • COPD (chronic obstructive pulmonary disease) (CMS/HCC)    • Dysphagia     odynophagia   • Epigastric pain    • Esophagitis    • GERD (gastroesophageal reflux disease)    • Hypertension    • Hypothyroidism, unspecified    • Low back pain    • Malaise and fatigue    • Nasal congestion 11/15/2018   • Nausea with vomiting, unspecified    • Pain in left knee    • Pain in right knee    • Primary malignant neoplasm of pharynx (CMS/HCC)    • Screening for malignant neoplasm of colon    • 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 Left upper quadrant abdominal wall abscess debridement. Gastrostomy tube erosion through & through the anterior gastric wall.Lupper quadrant abdominal wall abscess   • COLONOSCOPY  04/21/2014    Internal & external hemorrhoids found.   • COLONOSCOPY  2014    [place]   • COLONOSCOPY N/A 10/16/2018    Procedure: COLONOSCOPY;  Surgeon: Kaushal Chester MD;  Location: [facility] ENDOSCOPY;  Service: Gastroenterology   •  "DIAGNOSTIC LAPAROSCOPY EXPLORATORY LAPAROTOMY N/A 7/17/2019    Procedure: DIAGNOSTIC LAPAROSCOPY, EXPLORATORY LAPAROTOMY, LYSIS OF ADHESIONS;  Surgeon: Parminder Kc MD;  Location: [facility] OR;  Service: General   • DIRECT LARYNGOSCOPY, ESOPHAGOSCOPY, BRONCHOSCOPY N/A 4/15/2019    Procedure: DIRECT LARYNGOSCOPY with biopsy, ESOPHAGOSCOPY;  Surgeon: Bharathi Washington MD;  Location: [facility] OR;  Service: ENT   • ENDOSCOPY N/A 10/16/2018    Procedure: ESOPHAGOGASTRODUODENOSCOPY;  Surgeon: Kaushal Chester MD;  Location: [facility] ENDOSCOPY;  Service: Gastroenterology   • GASTROSTOMY FEEDING TUBE INSERTION N/A 4/15/2019    Procedure: GASTROSTOMY FEEDING TUBE INSERTION;  Surgeon: Trenton Valera MD;  Location: [facility] OR;  Service: General   • TRACHEOSTOMY  11/10/2008    Respiratory failure   • UPPER GASTROINTESTINAL ENDOSCOPY  04/21/2014    Esophagitis seen. Biopsy taken. Gastritis in stomach. Biopsy taken. Normal duodenum. Biopsy taken.   • UPPER GASTROINTESTINAL ENDOSCOPY  10/16/2018   • VENOUS ACCESS DEVICE (PORT) INSERTION N/A 9/11/2019    Procedure: INSERTION VENOUS ACCESS DEVICE (MEDIPORT)        (c-arm#1);  Surgeon: Parminder Kc MD;  Location: [facility] OR;  Service: General       MEDICATIONS: The current medication list was reviewed and reconciled.     Allergies:  has No Known Allergies.    Family History   Problem Relation Age of Onset   • Coronary artery disease Mother    • Diabetes Mother    • Other Mother         \"Heart And Lung Problems\"   • Lung cancer Mother    • Ovarian cancer Sister    • Heart disease Other    • Stroke Other    • Hypertension Other    • Diabetes Other    • Cancer Other    • Colon polyps Other    • Other Father         Unknown   • Other Other         \"Lung Problems\"   • Thyroid disease Neg Hx          Review of Systems    Physical Exam  Vital Signs: /79   Pulse 97   Temp 97.8 °F (36.6 °C) (Temporal)   Resp 20   Ht 165.1 cm (65\")   Wt 47.3 kg (104 lb 3.2 oz)   SpO2 98%  "  BMI 17.34 kg/m²    General Appearance:  alert, cooperative, no apparent distress and appears stated age   Neurologic/Psychiatric: A&O x 3, gait steady, appropriate affect   HEENT:  Normocephalic, without obvious abnormality, mucous membranes moist   Lungs:   Clear to auscultation bilaterally; respirations regular, even, and unlabored bilaterally   Heart:  Regular rate and rhythm, no murmurs appreciated   Extremities: Normal, atraumatic; no clubbing, cyanosis, or edema    Skin: No rashes, lesions, or abnormal coloration noted     ECOG Performance Status: (2) Ambulatory and capable of self care, unable to carry out work activity, up and about > 50% or waking hours          NEEDS ASSESSMENTS    Genetics  The patient's new diagnosis and family history have been reviewed for genetic counseling needs. A genetic referral is recommended.     Psychosocial  The patient has completed a PHQ-9 Depression Screening and the Distress Thermometer (DT) today.   PHQ-9 results show 5-9 (Mild Depression). The patient scored their distress today as 4 on a scale of 0-10 with 0 being no distress and 10 being extreme distress.   Problems marked by the patient as being an issue for them within the last week include emotional problems.   Results were reviewed along with psychosocial resources offered by our cancer center. Our oncology social worker will be flagged for a DT score of 4 or above, and a same day call will be made for a score of 9 or 10. Patient has already met with Myrna López LCSW and aware of her services available. Encouraged him to reach out to her when needed.  Copies of patient's questionnaires will be scanned into EMR for details and further reference.    Barriers to care  A barriers form was also completed by the patient today. We discussed services offered by our facility to help him have adequate access to care.  Based upon barriers assessment today, the patient will not require a follow-up call from the   "to further discuss needs.   A copy of the barriers form will also be scanned into EMR for details and further reference.     VAD Assessment  The patient and I discussed planned intervenous chemotherapy as well as other IV treatments that are often needed throughout the course of treatment. These may include, but are not limited to blood transfusions, antibiotics, and IV hydration. Patient already has Port-a-Cath in place.    Advanced Care Planning  The patient and I discussed advanced care planning, \"Conversations that Matter\".   This service was offered, free of charge, for development of advance directives with a certified ACP facilitator.  The patient does not have an up-to-date advanced directive. This document is not on file with our office. The patient is interested in an appointment with one of our facilitators to create or update their advanced directives.          CHEMOTHERAPY EDUCATION    Booklets Given: [facility] Chemotherapy Binder and chemocares document on Cisplatin.     Chemotherapy/Biotherapy Education Sheets: (list all that apply)  nausea management, acid reflux management, diarrhea management, Cancer resourse contacts information, skin and mouth care and vaccination information                                                                                                                                                                 Chemotherapy Regimen:   Treatment Plans     Name Type Hold Status Plan dates Plan Provider       Active    OP HEAD & NECK CISplatin (weekly) + XRT ONCOLOGY TREATMENT On Automatic Hold  9/15/2019 - Present Krysten Martínez MD                   TOPICS EDUCATION PROVIDED COMMENTS   ANEMIA:  role of RBC, cause, s/s, ways to manage, role of transfusion [x]    THROMBOCYTOPENIA:  role of platelet, cause, s/s, ways to prevent bleeding, things to avoid, when to seek help [x]    NEUTROPENIA:  role of WBC, cause, infection precautions, s/s of infection, when to " call MD [x]    NUTRITION & APPETITE CHANGES:  importance of maintaining healthy diet & weight, ways to manage to improve intake, dietary consult, exercise regimen [x]    DIARRHEA:  causes, s/s of dehydration, ways to manage, dietary changes, when to call MD [x]    CONSTIPATION:  causes, ways to manage, dietary changes, when to call MD [x]    NAUSEA & VOMITING:  cause, use of antiemetics, dietary changes, when to call MD [x]    MOUTH SORES:  causes, oral care, ways to manage [x]    ALOPECIA:  cause, ways to manage, resources [x]    INFERTILITY & SEXUALITY:  causes, fertility preservation options, sexuality changes, ways to manage, importance of birth control [x]    NERVOUS SYSTEM CHANGES:  causes, s/s, neuropathies, cognitive changes, ways to manage [x]    PAIN:  causes, ways to manage [x] ????   SKIN & NAIL CHANGES:  cause, s/s, ways to manage [x]    ORGAN TOXICITIES:  cause, s/s, need for diagnostic tests, labs, when to notify MD []    SURVIVORSHIP:  distress, distress assessment, secondary malignancies, early/late effects, follow-up, social issues, social support []    HOME CARE:  use of spill kits, storing of PO chemo, how to manage bodily fluids []    MISCELLANEOUS:  drug interactions, administration, vesicant, et []        Assessment and Plan:    Emerson was seen today for follow-up.    Diagnoses and all orders for this visit:    Anemia of chronic disease    Cancer of hypopharynx (CMS/HCC)        This was a 30 minute face-to-face visit with 30 minutes spent in  counseling and coordination of care as documented above.   The patient and I have reviewed their new cancer diagnosis and scheduled treatment plan. Needs assessment was completed including genetics, psychosocial needs, barriers to care, VAD evaluation, advanced care planning, and palliative care services. Referrals have been ordered as appropriate based upon our evaluation and patient desires.     Chemotherapy teaching was also completed today as  documented above. Adequate time was given to answer all questions to his satisfaction. Patient and family are aware of their care team members and contact information if they have questions or problems throughout the treatment course. Needs assessments and education has been completed. The patient is adequately prepared to begin treatment as scheduled.       Dee Bajwa, APRN

## 2019-09-13 ENCOUNTER — OFFICE VISIT (OUTPATIENT)
Dept: FAMILY MEDICINE CLINIC | Facility: CLINIC | Age: 59
End: 2019-09-13

## 2019-09-13 VITALS
BODY MASS INDEX: 17.43 KG/M2 | SYSTOLIC BLOOD PRESSURE: 110 MMHG | TEMPERATURE: 97.9 F | HEART RATE: 87 BPM | WEIGHT: 104.6 LBS | OXYGEN SATURATION: 98 % | HEIGHT: 65 IN | DIASTOLIC BLOOD PRESSURE: 70 MMHG

## 2019-09-13 DIAGNOSIS — Z00.00 ANNUAL PHYSICAL EXAM: ICD-10-CM

## 2019-09-13 DIAGNOSIS — E87.1 HYPONATREMIA: ICD-10-CM

## 2019-09-13 DIAGNOSIS — R19.7 DIARRHEA, UNSPECIFIED TYPE: ICD-10-CM

## 2019-09-13 DIAGNOSIS — E89.0 S/P PARTIAL THYROIDECTOMY: ICD-10-CM

## 2019-09-13 DIAGNOSIS — Z93.1 STATUS POST INSERTION OF PERCUTANEOUS ENDOSCOPIC GASTROSTOMY (PEG) TUBE (HCC): ICD-10-CM

## 2019-09-13 DIAGNOSIS — R63.6 UNDERWEIGHT: ICD-10-CM

## 2019-09-13 DIAGNOSIS — E55.9 VITAMIN D DEFICIENCY: ICD-10-CM

## 2019-09-13 DIAGNOSIS — Z71.41 ALCOHOL ABUSE COUNSELING AND SURVEILLANCE: ICD-10-CM

## 2019-09-13 DIAGNOSIS — E53.8 B12 DEFICIENCY: ICD-10-CM

## 2019-09-13 DIAGNOSIS — K29.70 GASTRITIS WITHOUT BLEEDING, UNSPECIFIED CHRONICITY, UNSPECIFIED GASTRITIS TYPE: ICD-10-CM

## 2019-09-13 DIAGNOSIS — D58.2 HEMOGLOBIN C TRAIT (HCC): Chronic | ICD-10-CM

## 2019-09-13 DIAGNOSIS — E83.42 HYPOMAGNESEMIA: ICD-10-CM

## 2019-09-13 DIAGNOSIS — E43 SEVERE PROTEIN-CALORIE MALNUTRITION (HCC): ICD-10-CM

## 2019-09-13 DIAGNOSIS — D50.9 IRON DEFICIENCY ANEMIA, UNSPECIFIED IRON DEFICIENCY ANEMIA TYPE: ICD-10-CM

## 2019-09-13 DIAGNOSIS — D63.8 ANEMIA OF CHRONIC DISEASE: ICD-10-CM

## 2019-09-13 DIAGNOSIS — K21.00 GASTROESOPHAGEAL REFLUX DISEASE WITH ESOPHAGITIS: ICD-10-CM

## 2019-09-13 DIAGNOSIS — Z90.02 S/P LARYNGECTOMY: ICD-10-CM

## 2019-09-13 DIAGNOSIS — Z87.19 HX SBO: ICD-10-CM

## 2019-09-13 DIAGNOSIS — Z98.890 HX OF TRACHEOSTOMY: ICD-10-CM

## 2019-09-13 DIAGNOSIS — Z85.819 HISTORY OF THROAT CANCER: ICD-10-CM

## 2019-09-13 DIAGNOSIS — R73.03 PREDIABETES: ICD-10-CM

## 2019-09-13 DIAGNOSIS — Z98.890 STATUS POST NECK DISSECTION: ICD-10-CM

## 2019-09-13 DIAGNOSIS — R63.6 UNDERWEIGHT DUE TO INADEQUATE CALORIC INTAKE: ICD-10-CM

## 2019-09-13 DIAGNOSIS — C14.0 SQUAMOUS CELL CARCINOMA OF PHARYNX (HCC): Primary | ICD-10-CM

## 2019-09-13 DIAGNOSIS — K70.30 ALCOHOLIC CIRRHOSIS OF LIVER WITHOUT ASCITES (HCC): ICD-10-CM

## 2019-09-13 PROCEDURE — 99214 OFFICE O/P EST MOD 30 MIN: CPT | Performed by: FAMILY MEDICINE

## 2019-09-13 RX ORDER — DOXAZOSIN MESYLATE 1 MG/1
1 TABLET ORAL NIGHTLY
Qty: 30 TABLET | Refills: 3 | Status: SHIPPED | OUTPATIENT
Start: 2019-09-13 | End: 2020-01-28

## 2019-09-13 RX ORDER — FENOFIBRATE 48 MG/1
67 TABLET, COATED ORAL DAILY
Qty: 30 TABLET | Refills: 3 | Status: SHIPPED | OUTPATIENT
Start: 2019-09-13 | End: 2020-01-28

## 2019-09-13 RX ORDER — LEVOTHYROXINE SODIUM 0.12 MG/1
TABLET ORAL
Qty: 30 TABLET | Refills: 3 | Status: SHIPPED | OUTPATIENT
Start: 2019-09-13

## 2019-09-17 ENCOUNTER — HOSPITAL ENCOUNTER (OUTPATIENT)
Dept: SPEECH THERAPY | Facility: HOSPITAL | Age: 59
Setting detail: THERAPIES SERIES
Discharge: HOME | End: 2019-09-17

## 2019-09-17 DIAGNOSIS — R47.89 OTHER SPEECH DISTURBANCE: Primary | ICD-10-CM

## 2019-09-17 PROCEDURE — 92522 EVALUATE SPEECH PRODUCTION: CPT | Performed by: SPEECH-LANGUAGE PATHOLOGIST

## 2019-09-17 NOTE — THERAPY EVALUATION
"Outpatient Speech Language Pathology   Adult Speech Language Cognitive Initial Evaluation  [facility]     Patient Name: Emerson Bang  : 1960  MRN: [id number]  Today's Date: 2019        Visit Date: 2019     Mr. Bang is a 59 year old male with a hx of right tonsillar carcinoma in  with concurrent chemo radiation with complete response. Follow-up with Dr. Washington on 2019 with compliant of a 1 month hx of throat pain, difficulty swallowing, and hoarseness with 20 Lb weight loss. At this time testing began and on 4/15/2019 pathology reports indicated tumor of the right piriform sinus involving the lateral pharyngeal wall, extending around to the medial wall of the piriform sinus/lateral larynx.    Gastric tube feeding placed by Dr. Valera on 4/15/2019.     Patient underwent full dental extraction followed by a total laryngectomy, right partial pharyngectomy, right thyroid lobectomy and right neck dissection at [facility] by Dr. Nolan on 2019. Pt currently has a tracheostomy and g-tube placement. He is getting all nutrition and hydration from feeding tube. Daughter was unsure about type and amount but stated patient was tolerating feedings.     Pt is now being seen at the [facility] at [facility]. Pt had consultation at the [facility] with Dr. Alfred on 2019. He does not have radiation scheduled at this time. Daughter stated they were just waiting to be scheduled.     Patients goal is to improve functional communication at this time. 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 He is not using  AAC but does gesture at times to communicate need. Pt presents with moist sticky oral cavity. Daughter stated that patient has \"lots of spit\". While at [place] patient was using an electrolarynx. He does not currently have one for home use. SLP will provide documentation needed to request an electrolarynx " through insurance. Pt was in agreement.     Oral motor exercises and functional communication with the use of the electrolarynx with be targeted in POC. Pt presents with global oral weakness and decreased ROM with more significance on right side. Pt presented with coughing on our secretions. Swallowing will be targeted as patient is appropriate. POC reviewed with daughter and patient who were in agreement.     Review of importance of oral care and hydration through feeding tube was reviewed.     Ariela iRce MS CCC-SLP            Patient Active Problem List   Diagnosis   • Hyperkalemia   • Iron deficiency anemia   • Gastroesophageal reflux disease with esophagitis   • Elevated AST (SGOT)   • Alcohol abuse   • Hypothyroidism   • Balance problem   • Need for vaccination   • Low magnesium levels   • Squamous cell carcinoma of pharynx (CMS/HCC)   • History of throat cancer   • Vitamin D deficiency   • Alcohol abuse counseling and surveillance   • Encounter for screening for diabetes mellitus   • Hypomagnesemia   • Iron deficiency anemia   • Hyperlipidemia   • Underweight due to inadequate caloric intake   • Need for immunization against influenza   • 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HCC)   • Hypertension   • General medical examination   • Underweight   • Epigastric pain   • Gastritis without bleeding   • Need for influenza vaccination   • Elevated liver function tests   • B12 deficiency   • Intestinal malabsorption   • Throat pain   • Acute pharyngitis   • Upper respiratory tract infection   • Neoplasm of uncertain behavior of larynx   • Pharyngoesophageal dysphagia   • Severe protein-calorie malnutrition (CMS/HCC)   • Anemia of chronic disease   • Hypotension   • Hyponatremia   • Status post insertion of percutaneous endoscopic gastrostomy (PEG) tube (CMS/HCC)   • Hx of tracheostomy   • History of throat surgery   • Hx SBO   • Urinary retention   • Generalized weakness   • Acute otitis externa of right ear   • Hard  stool   • Panlobular emphysema (CMS/HCC)   • Cancer of hypopharynx (CMS/HCC)   • Diarrhea   • Encounter for venous access device care   • Prediabetes   • Status post neck dissection   • S/P partial thyroidectomy   • S/P laryngectomy   • Annual physical exam   • Alcoholic cirrhosis of liver without ascites (CMS/HCC)        Past Medical History:   Diagnosis Date   • Allergic rhinitis     vs URI   • Anemia    • At risk for falls    • Benign prostatic hyperplasia    • Chronic gastritis    • Cirrhosis of liver (CMS/HCC)    • Complication of gastrostomy (CMS/HCC)     site not healing   • COPD (chronic obstructive pulmonary disease) (CMS/HCC)    • Dysphagia     odynophagia   • Epigastric pain    • Esophagitis    • GERD (gastroesophageal reflux disease)    • Hypertension    • Hypothyroidism, unspecified    • Low back pain    • Malaise and fatigue    • Nasal congestion 11/15/2018   • Nausea with vomiting, unspecified    • Pain in left knee    • Pain in right knee    • Primary malignant neoplasm of pharynx (CMS/HCC)    • Screening for malignant neoplasm of colon    • Tonsil cancer (CMS/HCC) 2008    Right Tonsil         Chemo/Radiation   • Urination disorder     difficulty   • Vitamin D deficiency         Past Surgical History:   Procedure Laterality Date   • ABDOMINAL WALL SURGERY  11/04/2008    Laparotomy with repair of stomach wall perforation. 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 Left upper quadrant abdominal wall abscess debridement. Gastrostomy tube erosion through & through the anterior gastric wall.Lupper quadrant abdominal wall abscess   • COLONOSCOPY  04/21/2014    Internal & external hemorrhoids found.   • COLONOSCOPY  2014    [place]   • COLONOSCOPY N/A 10/16/2018    Procedure: COLONOSCOPY;  Surgeon: Kaushal Chester MD;  Location: [facility] ENDOSCOPY;  Service: Gastroenterology   • DIAGNOSTIC LAPAROSCOPY EXPLORATORY LAPAROTOMY N/A 7/17/2019    Procedure: DIAGNOSTIC LAPAROSCOPY, EXPLORATORY LAPAROTOMY, LYSIS OF  ADHESIONS;  Surgeon: Parminder Kc MD;  Location: [facility] OR;  Service: General   • DIRECT LARYNGOSCOPY, ESOPHAGOSCOPY, BRONCHOSCOPY N/A 4/15/2019    Procedure: DIRECT LARYNGOSCOPY with biopsy, ESOPHAGOSCOPY;  Surgeon: Bharathi Washington MD;  Location: [facility] OR;  Service: ENT   • ENDOSCOPY N/A 10/16/2018    Procedure: ESOPHAGOGASTRODUODENOSCOPY;  Surgeon: Kaushal Chester MD;  Location: [facility] ENDOSCOPY;  Service: Gastroenterology   • GASTROSTOMY FEEDING TUBE INSERTION N/A 4/15/2019    Procedure: GASTROSTOMY FEEDING TUBE INSERTION;  Surgeon: Trenton Valera MD;  Location: [facility] OR;  Service: General   • TRACHEOSTOMY  11/10/2008    Respiratory failure   • UPPER GASTROINTESTINAL ENDOSCOPY  04/21/2014    Esophagitis seen. Biopsy taken. Gastritis in stomach. Biopsy taken. Normal duodenum. Biopsy taken.   • UPPER GASTROINTESTINAL ENDOSCOPY  10/16/2018   • VENOUS ACCESS DEVICE (PORT) INSERTION N/A 9/11/2019    Procedure: INSERTION VENOUS ACCESS DEVICE (MEDIPORT)        (c-arm#1);  Surgeon: Parminder Kc MD;  Location: [facility] OR;  Service: General         Visit Dx:    ICD-10-CM ICD-9-CM   1. Other speech disturbance R47.89 784.59           SLP SLC Evaluation - 09/17/19 0903        Communication Assessment/Intervention    Document Type  evaluation   -EA    Subjective Information  no complaints   -EA    Patient Observations  alert;cooperative;agree to therapy   -EA    Patient/Family Observations  daughter present    -EA    Patient Effort  good   -EA    Symptoms Noted During/After Treatment  none   -EA       General Information    Patient Profile Reviewed  yes   -EA    Pertinent History Of Current Problem  Hx of tonsiliar cancer in 2008; total laryngectomy 6/12/2019   -EA    Precautions/Limitations, Vision  WFL;for purposes of eval   -EA    Precautions/Limitations, Hearing  WFL;for purposes of eval   -EA    Prior Level of Function-Communication  WFL   -EA    Plans/Goals Discussed with  patient;family   -EA     Barriers to Rehab  medically complex   -EA    Patient's Goals for Discharge  functional communication   -EA       Pain Assessment    Additional Documentation  Pain Scale: Numbers Pre/Post-Treatment (Group)   -EA       Pain Scale: Numbers Pre/Post-Treatment    Pain Scale: Numbers, Pretreatment  0/10 - no pain   -EA    Pain Scale: Numbers, Post-Treatment  0/10 - no pain   -EA       Oral Motor Structure and Function    Oral Motor Structure and Function  moderate impairment   -EA    Oral Lesions or Structural Abnormalities and/or variants  Oral opening limited; right weakness and decreased ROM; lingual tremor on tongue protrusion    -EA    Dentition Assessment  edentulous, does not have dentures   -EA    Mucosal Quality  moist, healthy;sticky;atrophy   -EA       Cursory Voice Assessment/Intervention    Quality and Resonance (Voice)  aphonia;other (see comment) Total laryngectomy     Total laryngectomy   -EA    Voice, Comment  Pt has a total laryngectomy with trach placement    -EA       Speaking Valve    Respiratory Status  trach collar   -EA       Augmentative/Alternative Communication    Gestures (Alternative Communication)  gesture   -EA       SLP Clinical Impressions    SLP Diagnosis  Total laryngectomy; funtional communication    -EA    Rehab Potential/Prognosis  fair   -EA    SLC Criteria for Skilled Therapy Interventions Met  yes   -EA    Functional Impact  functional impact in social situations;functional impact in ADLs;unable to care for self;difficulty communicating wants, needs;difficulty communicating in an emergency;difficulty in expressing complex messages;restrictions in personal and social life   -EA       Recommendations    Therapy Frequency (SLP SLC)  1 day per week   -EA    Predicted Duration Therapy Intervention (Days)  until discharge   -EA    Anticipated Dischage Disposition  home with assist   -EA       Communication Treatment Objective and Progress Goals (SLP)    Motor Speech/Dysarthria Treatment  Objectives  --   -EA    Speaking Valve Treatment Objectives  --   -EA    Additional Goals  Additional Goals (Group)   -EA       Additional Goals    Additional Goal Selection  additional goal, SLP goal 1;additional goal, SLP goal 2;additional goal, SLP goal (free text)   -EA       Additional Goal 1 (SLP)    Additional Goal 1, SLP  Patient will demonstrate appropriate use and placement of the electrolarynx for optimal speech during functional communication task independently with 90% accuracy.    -EA    Time Frame (Additional Goal 1, SLP)  by discharge   -EA    Barriers (Additional Goal 1, SLP)  total laryngectomy    -EA    Progress/Outcomes (Additional Goal 1, SLP)  goal ongoing   -EA       Additional Goal 2 (SLP)    Additional Goal 2, SLP  Patient will complete oral motor exercise program to strengthen and improve ROM 2 times each day as reported by patient with 90% accuracy independently.    -EA    Time Frame (Additional Goal 2, SLP)  by discharge   -EA    Barriers (Additional Goal 2, SLP)  total laryngectomy    -EA    Progress/Outcomes (Additional Goal 2, SLP)  goal ongoing   -EA      User Key  (r) = Recorded By, (t) = Taken By, (c) = Cosigned By    Initials Name Provider Type    Ariela Carvalho MS CCC-SLP Speech and Language Pathologist                         OP SLP Education     Row Name 09/17/19 1400       Education    Barriers to Learning  No barriers identified  -EA    Education Provided  Described results of evaluation;Patient expressed understanding of evaluation;Family/caregivers expressed understanding of evaluation  -EA    Assessed  Learning needs;Learning motivation  -EA    Learning Motivation  Moderate  -EA    Learning Method  Explanation;Demonstration  -EA    Teaching Response  Verbalized understanding;Demonstrated understanding  -EA    Education Comments  SLP reviewed POC and goals. Pt was in agreement.   -EA      User Key  (r) = Recorded By, (t) = Taken By, (c) = Cosigned By    Initials  Name Effective Dates    Ariela Carvalho MS CCC-SLP 10/17/16 -           SLP OP Goals     Row Name 09/17/19 1421          SLP Time Calculation    SLP Goal Re-Cert Due Date  10/17/19  -EA       User Key  (r) = Recorded By, (t) = Taken By, (c) = Cosigned By    Initials Name Provider Type    Ariela Carvalho MS CCC-SLP Speech and Language Pathologist          OP SLP Assessment/Plan - 09/17/19 1400        SLP Assessment    Functional Problems  Speech Language- Adult/Cognition   -EA    Impact on Function: Adult Speech Language/Cognition  Difficulty communicating wants, needs, and ideas;Difficulty communicating in a medical emergency;Restrictions in personal and social life   -EA    Clinical Impression: Speech Language-Adult/Congnition  Moderate-Severe:;Other (comment) Total laryngectomy     Total laryngectomy   -EA    Functional Problems Comment  6/12/2019 total laryngectomy and right partial pharyngectomy at [facility] by Dr. Nolan    -EA    Clinical Impression Comments  Pt presented with aphonia following laryngectomy on 6/12/2019; tracheostomy ; g-tube feed    -EA    SLP Diagnosis  Speech Abnormality - aphonia total laryngectomy    -EA    Prognosis  Fair (comment)   -EA    Patient/caregiver participated in establishment of treatment plan and goals  Yes   -EA    Patient would benefit from skilled therapy intervention  Yes   -EA       SLP Plan    Frequency  1x a week    -EA    Duration  until discharge    -EA    Planned CPT's?  SLP INDIVIDUAL SPEECH THERAPY: 53860   -EA    Expected Duration Therapy Session - minutes  30-45 minutes   -EA    Plan Comments  Initiate POC   -EA      User Key  (r) = Recorded By, (t) = Taken By, (c) = Cosigned By    Initials Name Provider Type    HALLE Ariela Rice MS CCC-SLP Speech and Language Pathologist                 Time Calculation:   SLP Start Time: 0903  SLP Stop Time: 0943  SLP Time Calculation (min): 40 min  Total Timed Code Minutes- SLP: 40 minute(s)    Therapy  Charges for Today     Code Description Service Date Service Provider Modifiers Qty    [id number] [facility] EVAL SPEECH SOUND PRODUCTION 3 9/17/2019 Ariela Rice, MS CCC-SLP GN 1                   Ariela Rice MS CCC-SLP  9/17/2019

## 2019-09-23 ENCOUNTER — APPOINTMENT (OUTPATIENT)
Dept: SPEECH THERAPY | Facility: HOSPITAL | Age: 59
End: 2019-09-23

## 2019-09-23 PROCEDURE — 77300 RADIATION THERAPY DOSE PLAN: CPT | Performed by: RADIOLOGY

## 2019-09-23 PROCEDURE — 77338 DESIGN MLC DEVICE FOR IMRT: CPT | Performed by: RADIOLOGY

## 2019-09-23 PROCEDURE — 77301 RADIOTHERAPY DOSE PLAN IMRT: CPT | Performed by: RADIOLOGY

## 2019-09-24 ENCOUNTER — HOSPITAL ENCOUNTER (OUTPATIENT)
Dept: SPEECH THERAPY | Facility: HOSPITAL | Age: 59
Setting detail: THERAPIES SERIES
Discharge: HOME | End: 2019-09-24

## 2019-09-24 ENCOUNTER — DOCUMENTATION (OUTPATIENT)
Dept: ONCOLOGY | Facility: CLINIC | Age: 59
End: 2019-09-24

## 2019-09-24 ENCOUNTER — OFFICE VISIT (OUTPATIENT)
Dept: GASTROENTEROLOGY | Facility: CLINIC | Age: 59
End: 2019-09-24

## 2019-09-24 VITALS
BODY MASS INDEX: 17.22 KG/M2 | WEIGHT: 103.38 LBS | DIASTOLIC BLOOD PRESSURE: 60 MMHG | HEART RATE: 89 BPM | HEIGHT: 65 IN | SYSTOLIC BLOOD PRESSURE: 126 MMHG

## 2019-09-24 DIAGNOSIS — R13.14 PHARYNGOESOPHAGEAL DYSPHAGIA: Primary | ICD-10-CM

## 2019-09-24 DIAGNOSIS — F80.9 COMMUNICATION DISORDER: ICD-10-CM

## 2019-09-24 DIAGNOSIS — R13.12 OROPHARYNGEAL DYSPHAGIA: Primary | ICD-10-CM

## 2019-09-24 DIAGNOSIS — C13.9 CANCER OF HYPOPHARYNX (HCC): Primary | ICD-10-CM

## 2019-09-24 PROCEDURE — 92526 ORAL FUNCTION THERAPY: CPT | Performed by: SPEECH-LANGUAGE PATHOLOGIST

## 2019-09-24 PROCEDURE — 92507 TX SP LANG VOICE COMM INDIV: CPT | Performed by: SPEECH-LANGUAGE PATHOLOGIST

## 2019-09-24 PROCEDURE — 99213 OFFICE O/P EST LOW 20 MIN: CPT | Performed by: INTERNAL MEDICINE

## 2019-09-24 RX ORDER — DEXAMETHASONE 1 MG
1 TABLET ORAL 2 TIMES DAILY WITH MEALS
COMMUNITY
End: 2020-01-28

## 2019-09-24 NOTE — PROGRESS NOTES
[facility]      Chief Complaint:   Chief Complaint   Patient presents with   • Alcohol Problem       Subjective     HPI:   Patient post laryngectomy for squamous cell carcinoma.  Patient has some difficulty with swallowing but is able to swallow his saliva at this time.  Patient is currently being evaluated by swallow team.  Patient is to receive chemotherapy and radiation over the next few months.  Will have patient follow-up in 1 month to see if any suggestion of EGD with dilatation.  Patient is currently being fed by PEG tube    Plan; we will have patient follow-up in 1 month for review suggestions at that time from oncology and speech pathology to see if any indication for dilatation.    Past Medical History:   Past Medical History:   Diagnosis Date   • Allergic rhinitis     vs URI   • Anemia    • At risk for falls    • Benign prostatic hyperplasia    • Chronic gastritis    • Cirrhosis of liver (CMS/HCC)    • Complication of gastrostomy (CMS/HCC)     site not healing   • COPD (chronic obstructive pulmonary disease) (CMS/HCC)    • Dysphagia     odynophagia   • Epigastric pain    • Esophagitis    • GERD (gastroesophageal reflux disease)    • Hypertension    • Hypothyroidism, unspecified    • Low back pain    • Malaise and fatigue    • Nasal congestion 11/15/2018   • Nausea with vomiting, unspecified    • Pain in left knee    • Pain in right knee    • Primary malignant neoplasm of pharynx (CMS/HCC)    • Screening for malignant neoplasm of colon    • Tonsil cancer (CMS/HCC) 2008    Right Tonsil         Chemo/Radiation   • Urination disorder     difficulty   • Vitamin D deficiency        Past Surgical History:  Past Surgical History:   Procedure Laterality Date   • ABDOMINAL WALL SURGERY  11/04/2008    Laparotomy with repair of stomach wall perforation. 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 Left upper quadrant abdominal wall abscess debridement. Gastrostomy tube erosion through & through the  "anterior gastric wall.Lupper quadrant abdominal wall abscess   • COLONOSCOPY  04/21/2014    Internal & external hemorrhoids found.   • COLONOSCOPY  2014    [place]   • COLONOSCOPY N/A 10/16/2018    Procedure: COLONOSCOPY;  Surgeon: Kaushal Chester MD;  Location: [facility] ENDOSCOPY;  Service: Gastroenterology   • DIAGNOSTIC LAPAROSCOPY EXPLORATORY LAPAROTOMY N/A 7/17/2019    Procedure: DIAGNOSTIC LAPAROSCOPY, EXPLORATORY LAPAROTOMY, LYSIS OF ADHESIONS;  Surgeon: Parminder Kc MD;  Location: [facility] OR;  Service: General   • DIRECT LARYNGOSCOPY, ESOPHAGOSCOPY, BRONCHOSCOPY N/A 4/15/2019    Procedure: DIRECT LARYNGOSCOPY with biopsy, ESOPHAGOSCOPY;  Surgeon: Bharathi Washington MD;  Location: [facility] OR;  Service: ENT   • ENDOSCOPY N/A 10/16/2018    Procedure: ESOPHAGOGASTRODUODENOSCOPY;  Surgeon: Kaushal Chester MD;  Location: [facility] ENDOSCOPY;  Service: Gastroenterology   • GASTROSTOMY FEEDING TUBE INSERTION N/A 4/15/2019    Procedure: GASTROSTOMY FEEDING TUBE INSERTION;  Surgeon: Trenton Valera MD;  Location: [facility] OR;  Service: General   • TRACHEOSTOMY  11/10/2008    Respiratory failure   • UPPER GASTROINTESTINAL ENDOSCOPY  04/21/2014    Esophagitis seen. Biopsy taken. Gastritis in stomach. Biopsy taken. Normal duodenum. Biopsy taken.   • UPPER GASTROINTESTINAL ENDOSCOPY  10/16/2018   • VENOUS ACCESS DEVICE (PORT) INSERTION N/A 9/11/2019    Procedure: INSERTION VENOUS ACCESS DEVICE (MEDIPORT)        (c-arm#1);  Surgeon: Parminder Kc MD;  Location: [facility];  Service: General       Family History:  Family History   Problem Relation Age of Onset   • Coronary artery disease Mother    • Diabetes Mother    • Other Mother         \"Heart And Lung Problems\"   • Lung cancer Mother    • Ovarian cancer Sister    • Heart disease Other    • Stroke Other    • Hypertension Other    • Diabetes Other    • Cancer Other    • Colon polyps Other    • Other Father         Unknown   • Other Other         \"Lung " "Problems\"   • Thyroid disease Neg Hx        Social History:   reports that he quit smoking about 10 years ago. His smoking use included cigarettes. He has a 45.00 pack-year smoking history. He quit smokeless tobacco use about 10 years ago. He reports that he does not drink alcohol or use drugs.    Medications:   Prior to Admission medications    Medication Sig Start Date End Date Taking? Authorizing Provider   Ascorbic Acid (C/DARA HIPS PO) 1,000 mg by Per PEG Tube route 2 (Two) Times a Day.   Yes Esperanza Foote MD   aspirin (ASPIRIN LOW DOSE) 81 MG EC tablet Take 1 tablet by mouth Daily. 8/16/19  Yes Cristino He MD   dexamethasone (DECADRON) 1 MG tablet Take 1 mg by mouth 2 (Two) Times a Day With Meals.   Yes Esperanza Foote MD   doxazosin (CARDURA) 1 MG tablet Take 1 tablet by mouth Every Night. 9/13/19  Yes Cristino He MD   fenofibrate (TRICOR) 48 MG tablet Take 1.5 tablets by mouth Daily. 1 tablet by G-Tube daily for Hyperlipidemia 9/13/19  Yes Cristino He MD   ferrous sulfate 220 (44 Fe) MG/5ML solution 5 mL by Per G Tube route Daily. 8/10/19  Yes Saul Gomez MD   levothyroxine (SYNTHROID) 125 MCG tablet Take 1 pill/daily for 6 days a week 9/13/19  Yes Cristino He MD   metoclopramide (REGLAN) 5 MG/5ML solution 10 ML Per G-Tube 4 Times Per Day Before Meals X 30 Days   Yes Esperanza Foote MD   ondansetron (ZOFRAN) 8 MG tablet Take 1 tablet by mouth 3 (Three) Times a Day As Needed for Nausea or Vomiting. 9/6/19  Yes Krysten Martínez MD   vitamin D (ERGOCALCIFEROL) 70645 units capsule capsule 1,000 Units Daily. 1 capsule by mouth weekly on Wednesday X 3 months    Yes Esperanza Foote MD   loperamide (IMODIUM) 1 MG/5ML solution Take 10 mL by mouth 4 (Four) Times a Day As Needed for Diarrhea. 9/13/19 9/24/19  Cristino He MD   sodium chloride 1 g tablet Take 1 g by mouth 3 (Three) Times a Day With Meals. NULL 8/16/19 9/24/19  Provider, MD Esperanza " "      Allergies:  Patient has no known allergies.    ROS:    Review of Systems   Constitutional: Positive for unexpected weight change. Negative for activity change and appetite change.   HENT: Positive for trouble swallowing. Negative for congestion and sore throat.    Respiratory: Negative for cough, choking and shortness of breath.    Cardiovascular: Negative for chest pain.   Gastrointestinal: Negative for abdominal distention, abdominal pain, anal bleeding, blood in stool, constipation, diarrhea, nausea, rectal pain and vomiting.   Endocrine: Negative for heat intolerance, polydipsia and polyphagia.   Genitourinary: Negative for difficulty urinating.   Musculoskeletal: Negative for arthralgias.   Skin: Negative for color change, pallor, rash and wound.   Allergic/Immunologic: Negative for food allergies.   Neurological: Negative for dizziness, syncope, weakness and headaches.   Psychiatric/Behavioral: Negative for agitation, behavioral problems, confusion and decreased concentration.     Objective     Blood pressure 126/60, pulse 89, height 165.1 cm (65\"), weight 46.9 kg (103 lb 6 oz).    Physical Exam   Constitutional: He is oriented to person, place, and time. He appears well-developed and well-nourished. No distress.   HENT:   Head: Normocephalic and atraumatic.   Cardiovascular: Normal rate, regular rhythm, normal heart sounds and intact distal pulses. Exam reveals no gallop and no friction rub.   No murmur heard.  Pulmonary/Chest: Breath sounds normal. No respiratory distress. He has no wheezes. He has no rales. He exhibits no tenderness.   Abdominal: Soft. Bowel sounds are normal. He exhibits no distension and no mass. There is no tenderness. There is no rebound and no guarding. No hernia.   Musculoskeletal: Normal range of motion. He exhibits no edema.   Neurological: He is alert and oriented to person, place, and time.   Skin: Skin is warm and dry. No rash noted. He is not diaphoretic. No erythema. No " pallor.   Psychiatric: He has a normal mood and affect. His behavior is normal. Judgment and thought content normal.        Assessment/Plan   Emerson was seen today for alcohol problem.    Diagnoses and all orders for this visit:    Pharyngoesophageal dysphagia        * Surgery not found *     Diagnosis Plan   1. Pharyngoesophageal dysphagia         Anticipated Surgical Procedure:  No orders of the defined types were placed in this encounter.      The risks, benefits, and alternatives of this procedure have been discussed with the patient or the responsible party- the patient understands and agrees to proceed.

## 2019-09-24 NOTE — THERAPY TREATMENT NOTE
Outpatient Speech Language Pathology   Adult Speech Language Cognitive Treatment Note  [facility]     Patient Name: Emerson Bang  : 1960  MRN: [id number]  Today's Date: 2019         Visit Date: 2019     Mr. Bang is a 59 year old male with a hx of right tonsillar carcinoma in  with concurrent chemo radiation with complete response. Follow-up with Dr. Washington on 2019 with compliant of a 1 month hx of throat pain, difficulty swallowing, and hoarseness with 20 Lb weight loss. At this time testing began and on 4/15/2019 pathology reports indicated tumor of the right piriform sinus involving the lateral pharyngeal wall, extending around to the medial wall of the piriform sinus/lateral larynx.     Gastric tube feeding placed by Dr. Valera on 4/15/2019.      Patient underwent full dental extraction followed by a total laryngectomy, right partial pharyngectomy, right thyroid lobectomy and right neck dissection at [facility] by Dr. Nolan on 2019. Pt currently has a tracheostomy and g-tube placement. He is getting all nutrition and hydration from feeding tube. Daughter was unsure about type and amount but stated patient was tolerating feedings.      Pt is now being seen at the [facility] at [facility]. Pt had consultation at the [facility] with Dr. Alfred on 2019. Family stated that they believe treatments will start at the end of this week either  Thursday or Friday.      SLP spoke with Jayne Hays RN this date regarding MBS study order. Order was received and MBS will be scheduled.     Pt is unable to manage secretions. SLP provided water by toothette with delayed swallow initiation and expectoration. Pt is not eating or drinking anything by mouth.     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 He is not using  AAC but does gesture at times to communicate need. SLP provided whiteboard this date for  written communication. SLP provided a servox inton this date for use but patient was not successful with use. While at [facility] patient was using an electrolarynx but indicated that he required an oral adaptor which was unavailable this date. He does not currently have one for home use. SLP has contacted Myrna López LCSW to help with finding options for getting patient and electrolarynx through thony or medicaid. Written information sheet provided this date regarding total laryngectomy.      SLP and student provided OME program and swallowing exercises and stretches for use x4 daily. Pt was in agreement.      Review of importance of oral care and hydration through feeding tube was reviewed.     MBS will be completed. Following MBS SLP will see pt for next appointment at the [facility].      Ariela Rice MS CCC-SLP     Patient Active Problem List   Diagnosis   • Hyperkalemia   • Iron deficiency anemia   • Gastroesophageal reflux disease with esophagitis   • Elevated AST (SGOT)   • Alcohol abuse   • Hypothyroidism   • Balance problem   • Need for vaccination   • Low magnesium levels   • Squamous cell carcinoma of pharynx (CMS/HCC)   • History of throat cancer   • Vitamin D deficiency   • Alcohol abuse counseling and surveillance   • Encounter for screening for diabetes mellitus   • Hypomagnesemia   • Iron deficiency anemia   • Hyperlipidemia   • Underweight due to inadequate caloric intake   • Need for immunization against influenza   • 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HCC)   • Hypertension   • General medical examination   • Underweight   • Epigastric pain   • Gastritis without bleeding   • Need for influenza vaccination   • Elevated liver function tests   • B12 deficiency   • Intestinal malabsorption   • Throat pain   • Acute pharyngitis   • Upper respiratory tract infection   • Neoplasm of uncertain behavior of larynx   • Pharyngoesophageal dysphagia   • Severe protein-calorie malnutrition (CMS/HCC)   • Anemia of  chronic disease   • Hypotension   • Hyponatremia   • Status post insertion of percutaneous endoscopic gastrostomy (PEG) tube (CMS/HCC)   • Hx of tracheostomy   • History of throat surgery   • Hx SBO   • Urinary retention   • Generalized weakness   • Acute otitis externa of right ear   • Hard stool   • Panlobular emphysema (CMS/HCC)   • Cancer of hypopharynx (CMS/HCC)   • Diarrhea   • Encounter for venous access device care   • Prediabetes   • Status post neck dissection   • S/P partial thyroidectomy   • S/P laryngectomy   • Annual physical exam   • Alcoholic cirrhosis of liver without ascites (CMS/HCC)          Visit Dx:    ICD-10-CM ICD-9-CM   1. Oropharyngeal dysphagia R13.12 787.22   2. Communication disorder F80.9 307.9       SLP SLC Evaluation - 09/24/19 1056        Communication Assessment/Intervention    Document Type  evaluation   -EA       General Information    Prior Level of Function-Communication  WFL   -EA    Patient's Goals for Discharge  functional communication   -EA       Oral Motor Structure and Function    Oral Motor Structure and Function  moderate impairment   -EA       Speaking Valve    Respiratory Status  trach collar   -EA       Augmentative/Alternative Communication    Gestures (Alternative Communication)  gesture   -EA       SLP Clinical Impressions    Rehab Potential/Prognosis  fair   -EA    SLC Criteria for Skilled Therapy Interventions Met  yes   -EA    Functional Impact  functional impact in social situations;functional impact in ADLs;unable to care for self;difficulty communicating wants, needs;difficulty communicating in an emergency;difficulty in expressing complex messages;restrictions in personal and social life   -EA       Recommendations    Therapy Frequency (SLP SLC)  1 day per week   -EA       Communication Treatment Objective and Progress Goals (SLP)    Additional Goals  Additional Goals (Group)   -EA       Additional Goals    Additional Goal Selection  additional goal, SLP  goal 1;additional goal, SLP goal 2;additional goal, SLP goal (free text)   -EA       Additional Goal 1 (SLP)    Additional Goal 1, SLP  Patient will demonstrate appropriate use and placement of the electrolarynx for optimal speech during functional communication task independently with 90% accuracy.    -EA    Time Frame (Additional Goal 1, SLP)  by discharge   -EA    Barriers (Additional Goal 1, SLP)  total laryngectomy    -EA    Progress/Outcomes (Additional Goal 1, SLP)  goal ongoing   -EA       Additional Goal 2 (SLP)    Additional Goal 2, SLP  Patient will complete oral motor exercise program to strengthen and improve ROM 2 times each day as reported by patient with 90% accuracy independently.    -EA    Time Frame (Additional Goal 2, SLP)  by discharge   -EA    Barriers (Additional Goal 2, SLP)  total laryngectomy    -EA    Progress/Outcomes (Additional Goal 2, SLP)  goal ongoing   -EA      User Key  (r) = Recorded By, (t) = Taken By, (c) = Cosigned By    Initials Name Provider Type    EA Ariela Rice MS CCC-SLP Speech and Language Pathologist        SLP Adult Swallow Evaluation - 09/24/19 1056        Rehab Evaluation    Subjective Information  no complaints   -EA    Patient Observations  alert;cooperative;agree to therapy   -EA    Patient/Family Observations  daughter was not present this date; pt was unable to successfully use electrolarynx this date. Pt stated that he used a straw attachment in [place]    -EA    Patient Effort  good   -EA    Symptoms Noted During/After Treatment  none   -EA       General Information    Patient Profile Reviewed  yes   -EA    Precautions/Limitations, Vision  WFL;for purposes of eval   -EA    Precautions/Limitations, Hearing  WFL;for purposes of eval   -EA    Plans/Goals Discussed with  patient;family   -EA    Barriers to Rehab  medically complex   -EA       Pain Scale: Numbers Pre/Post-Treatment    Pain Scale: Numbers, Pretreatment  0/10 - no pain   -EA    Pain  Scale: Numbers, Post-Treatment  0/10 - no pain   -EA       Oral Motor and Function    Dentition Assessment  edentulous, does not have dentures   -EA    Secretion Management  problems swallowing secretions;other (see comments)   -EA    Mucosal Quality  moist, healthy;sticky;atrophy   -EA    Volitional Swallow  delayed;weak   -EA       General Eating/Swallowing Observations    Respiratory Support Currently in Use  trach collar   -EA       Clinical Swallow Eval    Clinical Swallow Evaluation Summary  Pt unable to swallow secretions    -EA       Clinical Impression    SLP Swallowing Diagnosis  mod-severe;pharyngeal dysfunction;oral dysfunction   -EA    Functional Impact  risk of malnutrition;risk of dehydration   -EA    Rehab Potential/Prognosis, Swallowing  adequate, monitor progress closely   -EA    Swallow Criteria for Skilled Therapeutic Interventions Met  demonstrates skilled criteria   -EA       Recommendations    Therapy Frequency (Swallow)  1 day per week   -EA    Predicted Duration Therapy Intervention (Days)  until discharge   -EA    SLP Diet Recommendation  NPO;long term alternate methods of nutrition/hydration   -EA    Recommended Diagnostics  VFSS (MBS)   -EA    SLP Rec. for Method of Medication Administration  meds via alternate route   -EA    Anticipated Dischage Disposition  home with assist   -EA       Swallow Goals (SLP)    Oral Nutrition/Hydration Goal Selection (SLP)  oral nutrition/hydration, SLP goal 1;oral nutrition/hydration, SLP goal 2   -EA       Oral Nutrition/Hydration Goal 1 (SLP)    Oral Nutrition/Hydration Goal 1, SLP  Pt will completed MBS to further assess swallowing function and sasfety.    -EA    Time Frame (Oral Nutrition/Hydration Goal 1, SLP)  by discharge   -EA    Progress/Outcomes (Oral Nutrition/Hydration Goal 1, SLP)  goal ongoing   -EA       Oral Nutrition/Hydration Goal 2 (SLP)    Oral Nutrition/Hydration Goal 2, SLP  Pt will completed base of tongue and pharyngeal  strengthening exercises x 4 daily as reported by patient.    -EA    Time Frame (Oral Nutrition/Hydration Goal 2, SLP)  by discharge   -EA    Progress/Outcomes (Oral Nutrition/Hydration Goal 2, SLP)  goal ongoing   -EA      User Key  (r) = Recorded By, (t) = Taken By, (c) = Cosigned By    Initials Name Provider Type    HALLE ClaytonAriela MS CCC-SLP Speech and Language Pathologist                      SLP OP Goals     Row Name 09/24/19 1213          SLP Time Calculation    SLP Goal Re-Cert Due Date  10/17/19  -EA       User Key  (r) = Recorded By, (t) = Taken By, (c) = Cosigned By    Initials Name Provider Type    HALLE ClaytonAriela MS CCC-SLP Speech and Language Pathologist          OP SLP Education     Row Name 09/24/19 1200       Education    Barriers to Learning  No barriers identified  -EA    Education Provided  Patient demonstrated recommended strategies;Family/caregivers demonstrated recommended strategies;Patient requires further education on strategies, risks;Family/caregivers require further education on strategies, risks  -EA    Assessed  Learning needs;Learning motivation  -EA    Learning Motivation  Strong  -EA    Learning Method  Explanation;Demonstration;Written materials  -EA    Teaching Response  Verbalized understanding;Demonstrated understanding  -EA    Education Comments  SLP reviewed MBS recommendation and electrolarynx. Pt was in agreement. SLP reviewed OME program and provided swallow exercises of kezia, effortful swallow, and tongue press with swallow. Pt in agreement.   -EA      User Key  (r) = Recorded By, (t) = Taken By, (c) = Cosigned By    Initials Name Effective Dates    Ariela Carvalho MS CCC-SLP 10/17/16 -           OP SLP Assessment/Plan - 09/24/19 1200        SLP Assessment    Functional Problems  Speech Language- Adult/Cognition;Swallowing   -EA    Impact on Function: Adult Speech Language/Cognition  Difficulty communicating wants, needs, and ideas;Difficulty  communicating in a medical emergency;Restrictions in personal and social life   -EA    Clinical Impression: Speech Language-Adult/Congnition  Moderate-Severe: total larngectomy     total larngectomy   -EA    Impact on Function: Swallowing  Risk of malnourishment;Risk of dehydration;Impact on social aspects of eating   -EA    Clinical Impression: Swallowing  Severe:;oropharyngeal phase dysphagia   -EA    Functional Problems Comment  Total laryngectomy 6/12/2019 and partial pharyngectomy.    -EA    Clinical Impression Comments  Nutirition and hydration through feeding tube. MBS order requested    -EA    SLP Diagnosis  Speech Impairment/Dysphagia    -EA    Prognosis  Fair (comment)   -EA    Patient/caregiver participated in establishment of treatment plan and goals  Yes   -EA    Patient would benefit from skilled therapy intervention  Yes   -EA       SLP Plan    Frequency  1x a week    -EA    Duration  until discharge    -EA    Planned CPT's?  SLP INDIVIDUAL SPEECH THERAPY: 10833   -EA    Expected Duration Therapy Session - minutes  30-45 minutes   -EA    Plan Comments  Continue    -EA      User Key  (r) = Recorded By, (t) = Taken By, (c) = Cosigned By    Initials Name Provider Type    Ariela Carvalho MS CCC-SLP Speech and Language Pathologist                 Time Calculation:   SLP Start Time: 1056  SLP Stop Time: 1200  SLP Time Calculation (min): 64 min  Total Timed Code Minutes- SLP: 64 minute(s)    Therapy Charges for Today     Code Description Service Date Service Provider Modifiers Qty    14792197336 HC ST TREATMENT SWALLOW 2 9/24/2019 Ariela Rice MS CCC-SLP GN 1    39312015109 HC ST TREATMENT SPEECH 2 9/24/2019 Ariela Rice MS CCC-SLP GN 1                   Ariela Rice MS CCC-SLP  9/24/2019

## 2019-09-25 ENCOUNTER — INFUSION (OUTPATIENT)
Dept: ONCOLOGY | Facility: HOSPITAL | Age: 59
End: 2019-09-25

## 2019-09-25 ENCOUNTER — HOSPITAL ENCOUNTER (OUTPATIENT)
Dept: RADIATION ONCOLOGY | Facility: HOSPITAL | Age: 59
Discharge: HOME | End: 2019-09-25

## 2019-09-25 ENCOUNTER — RADIATION ONCOLOGY WEEKLY ASSESSMENT (OUTPATIENT)
Dept: RADIATION ONCOLOGY | Facility: HOSPITAL | Age: 59
End: 2019-09-25

## 2019-09-25 VITALS
TEMPERATURE: 97 F | RESPIRATION RATE: 18 BRPM | DIASTOLIC BLOOD PRESSURE: 71 MMHG | HEART RATE: 73 BPM | SYSTOLIC BLOOD PRESSURE: 103 MMHG

## 2019-09-25 VITALS
BODY MASS INDEX: 17.66 KG/M2 | HEIGHT: 65 IN | SYSTOLIC BLOOD PRESSURE: 108 MMHG | DIASTOLIC BLOOD PRESSURE: 67 MMHG | TEMPERATURE: 97.7 F | RESPIRATION RATE: 18 BRPM | WEIGHT: 106 LBS | HEART RATE: 93 BPM

## 2019-09-25 DIAGNOSIS — C14.0 SQUAMOUS CELL CARCINOMA OF PHARYNX (HCC): ICD-10-CM

## 2019-09-25 DIAGNOSIS — C13.9 CANCER OF HYPOPHARYNX (HCC): Primary | ICD-10-CM

## 2019-09-25 DIAGNOSIS — C13.9 CANCER OF HYPOPHARYNX (HCC): ICD-10-CM

## 2019-09-25 DIAGNOSIS — Z45.2 ENCOUNTER FOR VENOUS ACCESS DEVICE CARE: Primary | ICD-10-CM

## 2019-09-25 LAB
ALBUMIN SERPL-MCNC: 4.2 G/DL (ref 3.5–5.2)
ALBUMIN/GLOB SERPL: 1.1 G/DL
ALP SERPL-CCNC: 79 U/L (ref 39–117)
ALT SERPL W P-5'-P-CCNC: 29 U/L (ref 1–41)
ANION GAP SERPL CALCULATED.3IONS-SCNC: 9 MMOL/L (ref 5–15)
AST SERPL-CCNC: 39 U/L (ref 1–40)
BASOPHILS # BLD AUTO: 0.08 10*3/MM3 (ref 0–0.2)
BASOPHILS NFR BLD AUTO: 0.9 % (ref 0–1.5)
BILIRUB SERPL-MCNC: <0.2 MG/DL (ref 0.2–1.2)
BUN BLD-MCNC: 27 MG/DL (ref 8–23)
BUN/CREAT SERPL: 54 (ref 7–25)
CALCIUM SPEC-SCNC: 9 MG/DL (ref 8.6–10.5)
CHLORIDE SERPL-SCNC: 100 MMOL/L (ref 98–107)
CO2 SERPL-SCNC: 27 MMOL/L (ref 22–29)
CREAT BLD-MCNC: 0.5 MG/DL (ref 0.76–1.27)
DEPRECATED RDW RBC AUTO: 47.6 FL (ref 37–54)
EOSINOPHIL # BLD AUTO: 0.18 10*3/MM3 (ref 0–0.4)
EOSINOPHIL NFR BLD AUTO: 2 % (ref 0.3–6.2)
ERYTHROCYTE [DISTWIDTH] IN BLOOD BY AUTOMATED COUNT: 17.7 % (ref 12.3–15.4)
GFR SERPL CREATININE-BSD FRML MDRD: >150 ML/MIN/1.73
GLOBULIN UR ELPH-MCNC: 3.9 GM/DL
GLUCOSE BLD-MCNC: 149 MG/DL (ref 65–99)
HCT VFR BLD AUTO: 30 % (ref 37.5–51)
HGB BLD-MCNC: 10.4 G/DL (ref 13–17.7)
IMM GRANULOCYTES # BLD AUTO: 0.02 10*3/MM3 (ref 0–0.05)
IMM GRANULOCYTES NFR BLD AUTO: 0.2 % (ref 0–0.5)
LYMPHOCYTES # BLD AUTO: 1.64 10*3/MM3 (ref 0.7–3.1)
LYMPHOCYTES NFR BLD AUTO: 17.8 % (ref 19.6–45.3)
MAGNESIUM SERPL-MCNC: 2.2 MG/DL (ref 1.6–2.4)
MCH RBC QN AUTO: 25.8 PG (ref 26.6–33)
MCHC RBC AUTO-ENTMCNC: 34.7 G/DL (ref 31.5–35.7)
MCV RBC AUTO: 74.4 FL (ref 79–97)
MONOCYTES # BLD AUTO: 0.46 10*3/MM3 (ref 0.1–0.9)
MONOCYTES NFR BLD AUTO: 5 % (ref 5–12)
NEUTROPHILS # BLD AUTO: 6.82 10*3/MM3 (ref 1.7–7)
NEUTROPHILS NFR BLD AUTO: 74.1 % (ref 42.7–76)
NRBC BLD AUTO-RTO: 0 /100 WBC (ref 0–0.2)
PLATELET # BLD AUTO: 380 10*3/MM3 (ref 140–450)
PMV BLD AUTO: 10.4 FL (ref 6–12)
POTASSIUM BLD-SCNC: 3.9 MMOL/L (ref 3.5–5.2)
PROT SERPL-MCNC: 8.1 G/DL (ref 6–8.5)
RBC # BLD AUTO: 4.03 10*6/MM3 (ref 4.14–5.8)
SODIUM BLD-SCNC: 136 MMOL/L (ref 136–145)
WBC NRBC COR # BLD: 9.2 10*3/MM3 (ref 3.4–10.8)

## 2019-09-25 PROCEDURE — 96375 TX/PRO/DX INJ NEW DRUG ADDON: CPT | Performed by: NURSE PRACTITIONER

## 2019-09-25 PROCEDURE — 25010000002 MAGNESIUM SULFATE PER 500 MG OF MAGNESIUM: Performed by: NURSE PRACTITIONER

## 2019-09-25 PROCEDURE — 83735 ASSAY OF MAGNESIUM: CPT | Performed by: NURSE PRACTITIONER

## 2019-09-25 PROCEDURE — 77014 CHG CT GUIDANCE RADIATION THERAPY FLDS PLACEMENT: CPT | Performed by: RADIOLOGY

## 2019-09-25 PROCEDURE — 25010000002 PALONOSETRON PER 25 MCG: Performed by: NURSE PRACTITIONER

## 2019-09-25 PROCEDURE — 77386: CPT | Performed by: RADIOLOGY

## 2019-09-25 PROCEDURE — 25010000002 FOSAPREPITANT PER 1 MG: Performed by: NURSE PRACTITIONER

## 2019-09-25 PROCEDURE — 80053 COMPREHEN METABOLIC PANEL: CPT | Performed by: NURSE PRACTITIONER

## 2019-09-25 PROCEDURE — 25010000002 POTASSIUM CHLORIDE PER 2 MEQ OF POTASSIUM: Performed by: NURSE PRACTITIONER

## 2019-09-25 PROCEDURE — 96367 TX/PROPH/DG ADDL SEQ IV INF: CPT | Performed by: NURSE PRACTITIONER

## 2019-09-25 PROCEDURE — 77427 RADIATION TX MANAGEMENT X5: CPT | Performed by: RADIOLOGY

## 2019-09-25 PROCEDURE — 96413 CHEMO IV INFUSION 1 HR: CPT | Performed by: NURSE PRACTITIONER

## 2019-09-25 PROCEDURE — 25010000002 DEXAMETHASONE SODIUM PHOSPHATE 100 MG/10ML SOLUTION: Performed by: NURSE PRACTITIONER

## 2019-09-25 PROCEDURE — 25010000002 CISPLATIN PER 50 MG: Performed by: NURSE PRACTITIONER

## 2019-09-25 PROCEDURE — 85025 COMPLETE CBC W/AUTO DIFF WBC: CPT | Performed by: NURSE PRACTITIONER

## 2019-09-25 PROCEDURE — 96361 HYDRATE IV INFUSION ADD-ON: CPT | Performed by: NURSE PRACTITIONER

## 2019-09-25 RX ORDER — PALONOSETRON 0.05 MG/ML
0.25 INJECTION, SOLUTION INTRAVENOUS ONCE
Status: COMPLETED | OUTPATIENT
Start: 2019-09-25 | End: 2019-09-25

## 2019-09-25 RX ORDER — SODIUM CHLORIDE 0.9 % (FLUSH) 0.9 %
10 SYRINGE (ML) INJECTION AS NEEDED
Status: DISCONTINUED | OUTPATIENT
Start: 2019-09-25 | End: 2019-09-25 | Stop reason: HOSPADM

## 2019-09-25 RX ORDER — SODIUM CHLORIDE 0.9 % (FLUSH) 0.9 %
10 SYRINGE (ML) INJECTION AS NEEDED
OUTPATIENT
Start: 2020-07-02

## 2019-09-25 RX ORDER — HEPARIN SODIUM (PORCINE) LOCK FLUSH IV SOLN 100 UNIT/ML 100 UNIT/ML
500 SOLUTION INTRAVENOUS AS NEEDED
OUTPATIENT
Start: 2020-07-02

## 2019-09-25 RX ORDER — SODIUM CHLORIDE 9 MG/ML
250 INJECTION, SOLUTION INTRAVENOUS ONCE
Status: CANCELLED | OUTPATIENT
Start: 2019-09-25

## 2019-09-25 RX ORDER — SODIUM CHLORIDE 9 MG/ML
250 INJECTION, SOLUTION INTRAVENOUS ONCE
Status: COMPLETED | OUTPATIENT
Start: 2019-09-25 | End: 2019-09-25

## 2019-09-25 RX ORDER — PALONOSETRON 0.05 MG/ML
0.25 INJECTION, SOLUTION INTRAVENOUS ONCE
Status: CANCELLED | OUTPATIENT
Start: 2019-09-25

## 2019-09-25 RX ORDER — SODIUM CHLORIDE 0.9 % (FLUSH) 0.9 %
20 SYRINGE (ML) INJECTION AS NEEDED
OUTPATIENT
Start: 2020-07-02

## 2019-09-25 RX ADMIN — SODIUM CHLORIDE 100 ML: 9 INJECTION, SOLUTION INTRAVENOUS at 09:23

## 2019-09-25 RX ADMIN — DEXAMETHASONE SODIUM PHOSPHATE 12 MG: 10 INJECTION, SOLUTION INTRAMUSCULAR; INTRAVENOUS at 11:06

## 2019-09-25 RX ADMIN — SODIUM CHLORIDE 250 ML: 9 INJECTION, SOLUTION INTRAVENOUS at 09:23

## 2019-09-25 RX ADMIN — MAGNESIUM SULFATE HEPTAHYDRATE 500 ML: 500 INJECTION, SOLUTION INTRAMUSCULAR; INTRAVENOUS at 13:12

## 2019-09-25 RX ADMIN — MAGNESIUM SULFATE HEPTAHYDRATE 500 ML: 500 INJECTION, SOLUTION INTRAMUSCULAR; INTRAVENOUS at 10:17

## 2019-09-25 RX ADMIN — CISPLATIN 60 MG: 50 INJECTION, SOLUTION INTRAVENOUS at 12:01

## 2019-09-25 RX ADMIN — SODIUM CHLORIDE, PRESERVATIVE FREE 10 ML: 5 INJECTION INTRAVENOUS at 14:19

## 2019-09-25 RX ADMIN — PALONOSETRON HYDROCHLORIDE 0.25 MG: 0.25 INJECTION, SOLUTION INTRAVENOUS at 11:53

## 2019-09-25 RX ADMIN — Medication 500 UNITS: at 14:19

## 2019-09-25 NOTE — PATIENT INSTRUCTIONS
Chemotherapy  Chemotherapy is a cancer treatment. It uses medicines to slow down or stop the growth of cancer. You may have chemotherapy to:  · Cure your cancer.  · Prevent the cancer from growing or spreading (metastasizing).  · Ease symptoms and improve your quality of life (palliative care).  · Improve the effects of radiation treatment.  · Shrink a tumor before surgery.  · Rid the body of cancer cells that remain after having a tumor surgically removed.  The length of chemotherapy treatment depends on many factors, including:  · The type and stage of your cancer.  · How you respond to the chemotherapy.  · Your side effects.  What are the risks?  Generally, this is a safe treatment. However, problems may occur, including:  · Infection.  · Bleeding at the IV site.  · Allergic reactions to medicines.  You may have side effects from chemotherapy. What side effects you have depends on a variety of factors, including:  · The type of chemotherapy medicine used.  · Your dosage.  · How long the medicine is used for.  · Your overall health.  What happens before treatment?  · You will meet with your cancer care team to discuss:  ? How your chemotherapy medicine will be given.  ? Common side effects and how to manage them.  ? Your treatment schedule.  · You may have blood tests.  · You may be given medicine to help prevent common side effects.  What happens during treatment?  Chemotherapy may be given continuously over time, or it may be given in cycles. Some common ways chemotherapy may be given include:  · As a pill or capsule.  · As an injection.  · As a skin (topical) cream.  · As a special wafer that is put in your body where the cancer is.  · As an injection into the cerebrospinal fluid (CSF) in the brain or spinal cord (intraventricular or intrathecal chemotherapy).  · Through a small, thin tube (catheter). There are different kinds of catheters. You might have one that:  ? Goes into a vein (intravenous  catheter).  ? Connects to a device (port) that is inserted under the skin of your chest (port catheter). A port catheter connects the port to a large vein in your chest or upper arm. The port may stay in place for many weeks or months.  ? Goes into a vein near your elbow (PICC line). This may be used for weeks or months.  ? Goes into a vein in your neck that leads to your heart (non-tunneled catheter). This catheter has a risk of infection, so it is used for only a short time.  ? Goes through the skin of your chest and into a large vein that leads to your heart (tunneled catheter). This catheter can stay in your body for months or years.  While you are receiving your medicine, your cancer care team will monitor your blood pressure, heart rate, breathing rate, and blood oxygen level (vital signs) and watch for any problems.  Some types of chemotherapy medicine are given only one time. Others are given for months, years, or for life.  What can I expect after treatment?  After chemotherapy, you may have side effects, such as:  · Nausea and vomiting.  · Appetite loss.  · Constipation or diarrhea.  · Fatigue.  · Increased risk of infections, bruising, or bleeding.  · Hair loss.  · Mouth or throat sores.  · Tingling, pain, or numbness in the hands and feet.  · Dry, sensitive, itchy, or sore skin.  · Memory changes.  Follow these instructions at home:    General instructions  · If you get chemotherapy through an IV, PICC line, or port, check the site every day for signs of infection. Check for redness, swelling, pain, fluid, or warmth.  · Wash your hands frequently with soap and water. If soap and water are not available, use hand . Have other members of your household wash their hands often.  · Chemotherapy medicines leave the body through urine, but they can also be present in other body fluids including stool (feces), saliva, sweat, tears, vaginal fluids, and semen. You must carefully follow some safety  precautions to prevent harm to others while you are taking these medicines:  ? Wash any clothes, towels, and linens that may have your bodily fluids on them twice in a washing machine. Use very hot water.  ? Use a condom during vaginal, anal, and oral sex while you are taking chemotherapy medicines and for 48 hours after your last dose.  ? Practice good bathroom hygiene:  § Always sit when using the toilet. Close the toilet seat lid before you flush.  § Wash your hands thoroughly with soap and water after each time you use the toilet.  · Keep all follow-up visits as told by your cancer care team. This is important.  Eating and drinking  · Talk with a dietitian about what you should eat and drink during cancer treatment.  · Always wash fresh fruits and vegetables well before eating them.  · Drink enough fluid to keep urine pale yellow.  · Do not share food or utensils with others.  Medicines  · Take over-the-counter and prescription medicines only as told by your health care provider.  · Talk with your health care provider before taking vitamins and supplements. Some can interfere with chemotherapy.  Activity  · Get plenty of rest.  · Get regular exercise such as walking, gentle yoga, or morelia chi.  · Return to your normal activities as told by your health care provider. Ask your health care provider what activities are safe for you.  Contact a health care provider if:  · You have:  ? A skin rash that does not go away.  ? A headache.  ? A stiff neck.  ? A cough.  ? Cold or flu symptoms.  ? A burning feeling when urinating.  ? Urine that smells bad.  ? Diarrhea.  ? Nausea.  ? Vomiting.  ? Blood in your urine or stool.  · You bleed or bruise often.  · You urinate more frequently than usual.  · You cannot eat because of mouth or throat pain.  Get help right away if:  · You have:  ? A fever.  ? Redness, swelling, pain, fluid, or warmth near your IV site.  ? Bleeding that does not stop.  ? A seizure.  ? Chest  pain.  ? Difficulty breathing.  · You cannot swallow.  Summary  · Chemotherapy is a way to treat cancer. It uses medicines to slow down or stop the growth of cancer.  · Before treatment, you and your cancer care team will discuss common side effects and how to manage them.  · The way that you will get chemotherapy medicines depends on your condition.  · Take over-the-counter and prescription medicines only as told by your health care provider.  This information is not intended to replace advice given to you by your health care provider. Make sure you discuss any questions you have with your health care provider.  Document Released: 10/14/2008 Document Revised: 10/04/2018 Document Reviewed: 10/04/2018  [employer] Interactive Patient Education © 2019 [employer]  Cisplatin injection  What is this medicine?  CISPLATIN (SIS carly tin) is a chemotherapy drug. It targets fast dividing cells, like cancer cells, and causes these cells to die. This medicine is used to treat many types of cancer like bladder, ovarian, and testicular cancers.  This medicine may be used for other purposes; ask your health care provider or pharmacist if you have questions.  COMMON BRAND NAME(S): Platinol, Platinol -AQ  What should I tell my health care provider before I take this medicine?  They need to know if you have any of these conditions:  -blood disorders  -hearing problems  -kidney disease  -recent or ongoing radiation therapy  -an unusual or allergic reaction to cisplatin, carboplatin, other chemotherapy, other medicines, foods, dyes, or preservatives  -pregnant or trying to get pregnant  -breast-feeding  How should I use this medicine?  This drug is given as an infusion into a vein. It is administered in a hospital or clinic by a specially trained health care professional.  Talk to your pediatrician regarding the use of this medicine in children. Special care may be needed.  Overdosage: If you think you have taken too much of this  medicine contact a poison control center or emergency room at once.  NOTE: This medicine is only for you. Do not share this medicine with others.  What if I miss a dose?  It is important not to miss a dose. Call your doctor or health care professional if you are unable to keep an appointment.  What may interact with this medicine?  -dofetilide  -foscarnet  -medicines for seizures  -medicines to increase blood counts like filgrastim, pegfilgrastim, sargramostim  -probenecid  -pyridoxine used with altretamine  -rituximab  -some antibiotics like amikacin, gentamicin, neomycin, polymyxin B, streptomycin, tobramycin  -sulfinpyrazone  -vaccines  -zalcitabine  Talk to your doctor or health care professional before taking any of these medicines:  -acetaminophen  -aspirin  -ibuprofen  -ketoprofen  -naproxen  This list may not describe all possible interactions. Give your health care provider a list of all the medicines, herbs, non-prescription drugs, or dietary supplements you use. Also tell them if you smoke, drink alcohol, or use illegal drugs. Some items may interact with your medicine.  What should I watch for while using this medicine?  Your condition will be monitored carefully while you are receiving this medicine. You will need important blood work done while you are taking this medicine.  This drug may make you feel generally unwell. This is not uncommon, as chemotherapy can affect healthy cells as well as cancer cells. Report any side effects. Continue your course of treatment even though you feel ill unless your doctor tells you to stop.  In some cases, you may be given additional medicines to help with side effects. Follow all directions for their use.  Call your doctor or health care professional for advice if you get a fever, chills or sore throat, or other symptoms of a cold or flu. Do not treat yourself. This drug decreases your body's ability to fight infections. Try to avoid being around people who are  sick.  This medicine may increase your risk to bruise or bleed. Call your doctor or health care professional if you notice any unusual bleeding.  Be careful brushing and flossing your teeth or using a toothpick because you may get an infection or bleed more easily. If you have any dental work done, tell your dentist you are receiving this medicine.  Avoid taking products that contain aspirin, acetaminophen, ibuprofen, naproxen, or ketoprofen unless instructed by your doctor. These medicines may hide a fever.  Do not become pregnant while taking this medicine. Women should inform their doctor if they wish to become pregnant or think they might be pregnant. There is a potential for serious side effects to an unborn child. Talk to your health care professional or pharmacist for more information. Do not breast-feed an infant while taking this medicine.  Drink fluids as directed while you are taking this medicine. This will help protect your kidneys.  Call your doctor or health care professional if you get diarrhea. Do not treat yourself.  What side effects may I notice from receiving this medicine?  Side effects that you should report to your doctor or health care professional as soon as possible:  -allergic reactions like skin rash, itching or hives, swelling of the face, lips, or tongue  -signs of infection - fever or chills, cough, sore throat, pain or difficulty passing urine  -signs of decreased platelets or bleeding - bruising, pinpoint red spots on the skin, black, tarry stools, nosebleeds  -signs of decreased red blood cells - unusually weak or tired, fainting spells, lightheadedness  -breathing problems  -changes in hearing  -gout pain  -low blood counts - This drug may decrease the number of white blood cells, red blood cells and platelets. You may be at increased risk for infections and bleeding.  -nausea and vomiting  -pain, swelling, redness or irritation at the injection site  -pain, tingling, numbness in  the hands or feet  -problems with balance, movement  -trouble passing urine or change in the amount of urine  Side effects that usually do not require medical attention (report to your doctor or health care professional if they continue or are bothersome):  -changes in vision  -loss of appetite  -metallic taste in the mouth or changes in taste  This list may not describe all possible side effects. Call your doctor for medical advice about side effects. You may report side effects to FDA at 4-859-NCA-3973.  Where should I keep my medicine?  This drug is given in a hospital or clinic and will not be stored at home.  NOTE: This sheet is a summary. It may not cover all possible information. If you have questions about this medicine, talk to your doctor, pharmacist, or health care provider.  © 2019 Elsevier/Gold Standard (2009-03-24 14:40:54)

## 2019-09-25 NOTE — PROGRESS NOTES
On Treatment Visit       Patient: Emerson Bang   YOB: 1960   Medical Record Number: [id number]     Date of Visit  September 25, 2019   Primary Diagnosis:    1. 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  He is receiving re-radiation therapy to the right neck and hypopharynx, following radiation therapy to the right tonsil and neck in 2008. He  has received 180 cGy in 1 fraction out of a planned dose of 5400 cGy in 30 fractions. He is receiving concurrent cisplatin chemotherapy per Dr. Prieto.  He received his 1st cycle of chemotherapy today.     Today on exam the patient is tolerating radiation therapy well and has no new disease or treatment-related complaints.                                             Vitals:     Vitals:    09/25/19 1533   BP: 108/67   Pulse: 93   Resp: 18   Temp: 97.7 °F (36.5 °C)       Weight:   Wt Readings from Last 3 Encounters:   09/25/19 48.1 kg (106 lb)   09/24/19 46.9 kg (103 lb 6 oz)   09/13/19 47.4 kg (104 lb 9.6 oz)      Pain:    Pain Score    09/25/19 1533   PainSc:   3   PainLoc: Shoulder         Plan: We plan to continue radiation therapy as prescribed.    Keaton Alfred MD  Radiation Oncology    Electronically signed by Keaton Alfred MD 9/25/2019  3:43 PM     cc: Dr. Hitesh He

## 2019-09-26 ENCOUNTER — APPOINTMENT (OUTPATIENT)
Dept: ONCOLOGY | Facility: HOSPITAL | Age: 59
End: 2019-09-26

## 2019-09-26 ENCOUNTER — HOSPITAL ENCOUNTER (OUTPATIENT)
Dept: RADIATION ONCOLOGY | Facility: HOSPITAL | Age: 59
Discharge: HOME | End: 2019-09-26

## 2019-09-26 PROCEDURE — 77014 CHG CT GUIDANCE RADIATION THERAPY FLDS PLACEMENT: CPT | Performed by: RADIOLOGY

## 2019-09-26 PROCEDURE — 77386: CPT | Performed by: RADIOLOGY

## 2019-09-26 NOTE — PROGRESS NOTES
Oncology CHRISTOS met with patient and his sister as he presents to begin concurrent chemo/radiation next day.  Plans completed and SW advised that pt will need PACS next day.  SW explained 72 hour notice requirement and discussed options.  Pt. Would like to pursue urgent request with PACS for Wed 9-25-19 treatment and sister will bring pt for single tx of radiation (10:15) on Thursday.  PACS has been arranged by undersigned beginning Friday 9-27.   CHRISTOS contacted PACS regional office and urgent request compelled and signed by physician. Per PACS, cannot justify two urgent requests (et both days).  Pt. Encouraged to communicate with pt after chemo treatment so that SW can assist with weekly chemo transport 72 hours in advance as pt chemo times consistently changing.   CHRISTOS offered feedback as to ongoing support of CHRISTOS.  Pt. Non verbal secondary to laryngectomy.  He was provided paper for written communication.  Pt. Daughter and sister are his primary supports.  Arrangements made for pt daughter to ride with pt on PACS bus.   Pt. Would like to pursue electrolarynx and CHRISTOS has discussed with pt, family, and speech therapist with all checking into insurance/ Medicaid reimbursement.

## 2019-09-27 ENCOUNTER — HOSPITAL ENCOUNTER (EMERGENCY)
Facility: HOSPITAL | Age: 59
Discharge: HOME | End: 2019-09-28
Attending: FAMILY MEDICINE | Admitting: FAMILY MEDICINE

## 2019-09-27 ENCOUNTER — APPOINTMENT (OUTPATIENT)
Dept: GENERAL RADIOLOGY | Facility: HOSPITAL | Age: 59
End: 2019-09-27

## 2019-09-27 ENCOUNTER — HOSPITAL ENCOUNTER (OUTPATIENT)
Dept: RADIATION ONCOLOGY | Facility: HOSPITAL | Age: 59
Discharge: HOME | End: 2019-09-27

## 2019-09-27 DIAGNOSIS — E86.0 DEHYDRATION: ICD-10-CM

## 2019-09-27 DIAGNOSIS — N30.00 ACUTE CYSTITIS WITHOUT HEMATURIA: Primary | ICD-10-CM

## 2019-09-27 LAB
ALBUMIN SERPL-MCNC: 4.4 G/DL (ref 3.5–5.2)
ALBUMIN/GLOB SERPL: 1.1 G/DL
ALP SERPL-CCNC: 82 U/L (ref 39–117)
ALT SERPL W P-5'-P-CCNC: 27 U/L (ref 1–41)
ANION GAP SERPL CALCULATED.3IONS-SCNC: 10 MMOL/L (ref 5–15)
AST SERPL-CCNC: 33 U/L (ref 1–40)
BACTERIA UR QL AUTO: ABNORMAL /HPF
BASOPHILS # BLD AUTO: 0.07 10*3/MM3 (ref 0–0.2)
BASOPHILS NFR BLD AUTO: 0.9 % (ref 0–1.5)
BILIRUB SERPL-MCNC: 0.2 MG/DL (ref 0.2–1.2)
BILIRUB UR QL STRIP: NEGATIVE
BUN BLD-MCNC: 29 MG/DL (ref 8–23)
BUN/CREAT SERPL: 53.7 (ref 7–25)
CALCIUM SPEC-SCNC: 8.9 MG/DL (ref 8.6–10.5)
CHLORIDE SERPL-SCNC: 97 MMOL/L (ref 98–107)
CLARITY UR: ABNORMAL
CO2 SERPL-SCNC: 29 MMOL/L (ref 22–29)
COLOR UR: YELLOW
CREAT BLD-MCNC: 0.54 MG/DL (ref 0.76–1.27)
DEPRECATED RDW RBC AUTO: 49.1 FL (ref 37–54)
EOSINOPHIL # BLD AUTO: 0.11 10*3/MM3 (ref 0–0.4)
EOSINOPHIL NFR BLD AUTO: 1.5 % (ref 0.3–6.2)
ERYTHROCYTE [DISTWIDTH] IN BLOOD BY AUTOMATED COUNT: 18 % (ref 12.3–15.4)
GFR SERPL CREATININE-BSD FRML MDRD: >150 ML/MIN/1.73
GLOBULIN UR ELPH-MCNC: 4.1 GM/DL
GLUCOSE BLD-MCNC: 102 MG/DL (ref 65–99)
GLUCOSE UR STRIP-MCNC: NEGATIVE MG/DL
HCT VFR BLD AUTO: 33.4 % (ref 37.5–51)
HGB BLD-MCNC: 11.6 G/DL (ref 13–17.7)
HGB UR QL STRIP.AUTO: ABNORMAL
HYALINE CASTS UR QL AUTO: ABNORMAL /LPF
IMM GRANULOCYTES # BLD AUTO: 0.01 10*3/MM3 (ref 0–0.05)
IMM GRANULOCYTES NFR BLD AUTO: 0.1 % (ref 0–0.5)
KETONES UR QL STRIP: ABNORMAL
LEUKOCYTE ESTERASE UR QL STRIP.AUTO: ABNORMAL
LIPASE SERPL-CCNC: 9 U/L (ref 13–60)
LYMPHOCYTES # BLD AUTO: 2.46 10*3/MM3 (ref 0.7–3.1)
LYMPHOCYTES NFR BLD AUTO: 33 % (ref 19.6–45.3)
MCH RBC QN AUTO: 26 PG (ref 26.6–33)
MCHC RBC AUTO-ENTMCNC: 34.7 G/DL (ref 31.5–35.7)
MCV RBC AUTO: 74.9 FL (ref 79–97)
MONOCYTES # BLD AUTO: 0.94 10*3/MM3 (ref 0.1–0.9)
MONOCYTES NFR BLD AUTO: 12.6 % (ref 5–12)
NEUTROPHILS # BLD AUTO: 3.86 10*3/MM3 (ref 1.7–7)
NEUTROPHILS NFR BLD AUTO: 51.9 % (ref 42.7–76)
NITRITE UR QL STRIP: POSITIVE
NRBC BLD AUTO-RTO: 0 /100 WBC (ref 0–0.2)
PH UR STRIP.AUTO: 6 [PH] (ref 5–9)
PLATELET # BLD AUTO: 394 10*3/MM3 (ref 140–450)
PMV BLD AUTO: 10.8 FL (ref 6–12)
POTASSIUM BLD-SCNC: 4.1 MMOL/L (ref 3.5–5.2)
PROT SERPL-MCNC: 8.5 G/DL (ref 6–8.5)
PROT UR QL STRIP: ABNORMAL
RBC # BLD AUTO: 4.46 10*6/MM3 (ref 4.14–5.8)
RBC # UR: ABNORMAL /HPF
REF LAB TEST METHOD: ABNORMAL
SODIUM BLD-SCNC: 136 MMOL/L (ref 136–145)
SP GR UR STRIP: 1.01 (ref 1–1.03)
SQUAMOUS #/AREA URNS HPF: ABNORMAL /HPF
UROBILINOGEN UR QL STRIP: ABNORMAL
WBC NRBC COR # BLD: 7.45 10*3/MM3 (ref 3.4–10.8)
WBC UR QL AUTO: ABNORMAL /HPF

## 2019-09-27 PROCEDURE — 87086 URINE CULTURE/COLONY COUNT: CPT | Performed by: FAMILY MEDICINE

## 2019-09-27 PROCEDURE — 77014 CHG CT GUIDANCE RADIATION THERAPY FLDS PLACEMENT: CPT | Performed by: RADIOLOGY

## 2019-09-27 PROCEDURE — 87186 SC STD MICRODIL/AGAR DIL: CPT | Performed by: FAMILY MEDICINE

## 2019-09-27 PROCEDURE — 87077 CULTURE AEROBIC IDENTIFY: CPT | Performed by: FAMILY MEDICINE

## 2019-09-27 PROCEDURE — 77386: CPT | Performed by: RADIOLOGY

## 2019-09-27 PROCEDURE — 25010000002 MORPHINE PER 10 MG: Performed by: FAMILY MEDICINE

## 2019-09-27 PROCEDURE — 99284 EMERGENCY DEPT VISIT MOD MDM: CPT

## 2019-09-27 PROCEDURE — 85025 COMPLETE CBC W/AUTO DIFF WBC: CPT | Performed by: FAMILY MEDICINE

## 2019-09-27 PROCEDURE — 25010000002 IOPAMIDOL 61 % SOLUTION: Performed by: FAMILY MEDICINE

## 2019-09-27 PROCEDURE — 96365 THER/PROPH/DIAG IV INF INIT: CPT

## 2019-09-27 PROCEDURE — 80053 COMPREHEN METABOLIC PANEL: CPT | Performed by: FAMILY MEDICINE

## 2019-09-27 PROCEDURE — 83690 ASSAY OF LIPASE: CPT | Performed by: FAMILY MEDICINE

## 2019-09-27 PROCEDURE — 81001 URINALYSIS AUTO W/SCOPE: CPT | Performed by: FAMILY MEDICINE

## 2019-09-27 PROCEDURE — 74018 RADEX ABDOMEN 1 VIEW: CPT

## 2019-09-27 PROCEDURE — 96375 TX/PRO/DX INJ NEW DRUG ADDON: CPT

## 2019-09-27 RX ADMIN — IOPAMIDOL 10 ML: 612 INJECTION, SOLUTION INTRAVENOUS at 23:37

## 2019-09-27 RX ADMIN — SODIUM CHLORIDE 1000 ML: 9 INJECTION, SOLUTION INTRAVENOUS at 23:38

## 2019-09-27 RX ADMIN — MORPHINE SULFATE 4 MG: 4 INJECTION INTRAVENOUS at 22:22

## 2019-09-28 VITALS
SYSTOLIC BLOOD PRESSURE: 134 MMHG | RESPIRATION RATE: 20 BRPM | WEIGHT: 106 LBS | OXYGEN SATURATION: 100 % | DIASTOLIC BLOOD PRESSURE: 70 MMHG | TEMPERATURE: 97.4 F | HEIGHT: 65 IN | HEART RATE: 68 BPM | BODY MASS INDEX: 17.66 KG/M2

## 2019-09-28 PROCEDURE — 25010000002 CEFTRIAXONE PER 250 MG: Performed by: FAMILY MEDICINE

## 2019-09-28 PROCEDURE — 96365 THER/PROPH/DIAG IV INF INIT: CPT

## 2019-09-28 RX ORDER — HYDROCODONE BITARTRATE AND ACETAMINOPHEN 7.5; 325 MG/1; MG/1
1 TABLET ORAL ONCE
Status: COMPLETED | OUTPATIENT
Start: 2019-09-28 | End: 2019-09-28

## 2019-09-28 RX ORDER — ONDANSETRON 4 MG/1
4 TABLET, ORALLY DISINTEGRATING ORAL EVERY 6 HOURS PRN
Qty: 10 TABLET | Refills: 0 | Status: SHIPPED | OUTPATIENT
Start: 2019-09-28 | End: 2020-01-28

## 2019-09-28 RX ORDER — OXYCODONE AND ACETAMINOPHEN 7.5; 325 MG/1; MG/1
1 TABLET ORAL EVERY 6 HOURS PRN
Qty: 12 TABLET | Refills: 0 | Status: SHIPPED | OUTPATIENT
Start: 2019-09-28 | End: 2019-10-25 | Stop reason: HOSPADM

## 2019-09-28 RX ADMIN — CEFTRIAXONE SODIUM 1 G: 1 INJECTION, POWDER, FOR SOLUTION INTRAMUSCULAR; INTRAVENOUS at 00:08

## 2019-09-28 RX ADMIN — HYDROCODONE BITARTRATE AND ACETAMINOPHEN 1 TABLET: 7.5; 325 TABLET ORAL at 01:20

## 2019-09-30 ENCOUNTER — HOSPITAL ENCOUNTER (INPATIENT)
Facility: HOSPITAL | Age: 59
LOS: 25 days | Discharge: NURSING, REHAB OR LONG-TERM CARE | End: 2019-10-25
Attending: SURGERY | Admitting: SURGERY

## 2019-09-30 ENCOUNTER — HOSPITAL ENCOUNTER (OUTPATIENT)
Dept: RADIATION ONCOLOGY | Facility: HOSPITAL | Age: 59
Discharge: HOME | End: 2019-09-30

## 2019-09-30 ENCOUNTER — TELEPHONE (OUTPATIENT)
Dept: RADIATION ONCOLOGY | Facility: HOSPITAL | Age: 59
End: 2019-09-30

## 2019-09-30 ENCOUNTER — TELEPHONE (OUTPATIENT)
Dept: ONCOLOGY | Facility: CLINIC | Age: 59
End: 2019-09-30

## 2019-09-30 ENCOUNTER — OFFICE VISIT (OUTPATIENT)
Dept: SURGERY | Facility: CLINIC | Age: 59
End: 2019-09-30

## 2019-09-30 VITALS
HEIGHT: 65 IN | WEIGHT: 106 LBS | DIASTOLIC BLOOD PRESSURE: 80 MMHG | BODY MASS INDEX: 17.66 KG/M2 | TEMPERATURE: 98 F | SYSTOLIC BLOOD PRESSURE: 140 MMHG

## 2019-09-30 DIAGNOSIS — K31.6 GASTROCUTANEOUS FISTULA DUE TO GASTROSTOMY TUBE: Primary | ICD-10-CM

## 2019-09-30 DIAGNOSIS — C14.0 SQUAMOUS CELL CARCINOMA OF PHARYNX (HCC): Primary | ICD-10-CM

## 2019-09-30 DIAGNOSIS — Z74.09 IMPAIRED PHYSICAL MOBILITY: ICD-10-CM

## 2019-09-30 DIAGNOSIS — Z74.09 IMPAIRED MOBILITY AND ACTIVITIES OF DAILY LIVING: ICD-10-CM

## 2019-09-30 DIAGNOSIS — Z78.9 IMPAIRED MOBILITY AND ACTIVITIES OF DAILY LIVING: ICD-10-CM

## 2019-09-30 LAB
ANION GAP SERPL CALCULATED.3IONS-SCNC: 20 MMOL/L (ref 5–15)
BACTERIA SPEC AEROBE CULT: ABNORMAL
BASOPHILS # BLD AUTO: 0.06 10*3/MM3 (ref 0–0.2)
BASOPHILS NFR BLD AUTO: 0.5 % (ref 0–1.5)
BUN BLD-MCNC: 23 MG/DL (ref 8–23)
BUN/CREAT SERPL: 38.3 (ref 7–25)
CALCIUM SPEC-SCNC: 9.2 MG/DL (ref 8.6–10.5)
CHLORIDE SERPL-SCNC: 87 MMOL/L (ref 98–107)
CO2 SERPL-SCNC: 28 MMOL/L (ref 22–29)
CRE SCREEN PCR: NOT DETECTED
CREAT BLD-MCNC: 0.6 MG/DL (ref 0.76–1.27)
DEPRECATED RDW RBC AUTO: 47.8 FL (ref 37–54)
EOSINOPHIL # BLD AUTO: 0.05 10*3/MM3 (ref 0–0.4)
EOSINOPHIL NFR BLD AUTO: 0.4 % (ref 0.3–6.2)
ERYTHROCYTE [DISTWIDTH] IN BLOOD BY AUTOMATED COUNT: 17.9 % (ref 12.3–15.4)
GFR SERPL CREATININE-BSD FRML MDRD: >150 ML/MIN/1.73
GLUCOSE BLD-MCNC: 102 MG/DL (ref 65–99)
HCT VFR BLD AUTO: 36.1 % (ref 37.5–51)
HGB BLD-MCNC: 12.3 G/DL (ref 13–17.7)
IMM GRANULOCYTES # BLD AUTO: 0.07 10*3/MM3 (ref 0–0.05)
IMM GRANULOCYTES NFR BLD AUTO: 0.5 % (ref 0–0.5)
IMP STRAIN: NOT DETECTED
KPC STRAIN: NOT DETECTED
LYMPHOCYTES # BLD AUTO: 1.45 10*3/MM3 (ref 0.7–3.1)
LYMPHOCYTES NFR BLD AUTO: 11.1 % (ref 19.6–45.3)
MAGNESIUM SERPL-MCNC: 2.3 MG/DL (ref 1.6–2.4)
MCH RBC QN AUTO: 25.7 PG (ref 26.6–33)
MCHC RBC AUTO-ENTMCNC: 34.1 G/DL (ref 31.5–35.7)
MCV RBC AUTO: 75.4 FL (ref 79–97)
MONOCYTES # BLD AUTO: 1.37 10*3/MM3 (ref 0.1–0.9)
MONOCYTES NFR BLD AUTO: 10.4 % (ref 5–12)
NDM STRAIN: NOT DETECTED
NEUTROPHILS # BLD AUTO: 10.12 10*3/MM3 (ref 1.7–7)
NEUTROPHILS NFR BLD AUTO: 77.1 % (ref 42.7–76)
NRBC BLD AUTO-RTO: 0 /100 WBC (ref 0–0.2)
OXA 48 STRAIN: NOT DETECTED
PHOSPHATE SERPL-MCNC: 1.6 MG/DL (ref 2.5–4.5)
PLATELET # BLD AUTO: 294 10*3/MM3 (ref 140–450)
PMV BLD AUTO: 12.6 FL (ref 6–12)
POTASSIUM BLD-SCNC: 3.7 MMOL/L (ref 3.5–5.2)
RBC # BLD AUTO: 4.79 10*6/MM3 (ref 4.14–5.8)
SODIUM BLD-SCNC: 135 MMOL/L (ref 136–145)
VIM STRAIN: NOT DETECTED
WBC NRBC COR # BLD: 13.12 10*3/MM3 (ref 3.4–10.8)

## 2019-09-30 PROCEDURE — 77014 CHG CT GUIDANCE RADIATION THERAPY FLDS PLACEMENT: CPT | Performed by: RADIOLOGY

## 2019-09-30 PROCEDURE — 77386: CPT | Performed by: RADIOLOGY

## 2019-09-30 PROCEDURE — 84100 ASSAY OF PHOSPHORUS: CPT | Performed by: SURGERY

## 2019-09-30 PROCEDURE — 85025 COMPLETE CBC W/AUTO DIFF WBC: CPT | Performed by: SURGERY

## 2019-09-30 PROCEDURE — 83735 ASSAY OF MAGNESIUM: CPT | Performed by: SURGERY

## 2019-09-30 PROCEDURE — 80048 BASIC METABOLIC PNL TOTAL CA: CPT | Performed by: SURGERY

## 2019-09-30 PROCEDURE — 25010000002 HYDROMORPHONE 1 MG/ML SOLUTION: Performed by: SURGERY

## 2019-09-30 PROCEDURE — 25010000002 ENOXAPARIN PER 10 MG: Performed by: SURGERY

## 2019-09-30 PROCEDURE — 99024 POSTOP FOLLOW-UP VISIT: CPT | Performed by: SURGERY

## 2019-09-30 PROCEDURE — 87081 CULTURE SCREEN ONLY: CPT | Performed by: SURGERY

## 2019-09-30 RX ORDER — FERROUS SULFATE 220 (44)/5
220 ELIXIR ORAL DAILY
Status: DISCONTINUED | OUTPATIENT
Start: 2019-09-30 | End: 2019-10-02

## 2019-09-30 RX ORDER — SODIUM CHLORIDE 0.9 % (FLUSH) 0.9 %
10 SYRINGE (ML) INJECTION EVERY 12 HOURS SCHEDULED
Status: DISCONTINUED | OUTPATIENT
Start: 2019-09-30 | End: 2019-10-02

## 2019-09-30 RX ORDER — TERAZOSIN 1 MG/1
1 CAPSULE ORAL NIGHTLY
Status: DISCONTINUED | OUTPATIENT
Start: 2019-09-30 | End: 2019-10-02

## 2019-09-30 RX ORDER — ONDANSETRON 2 MG/ML
4 INJECTION INTRAMUSCULAR; INTRAVENOUS EVERY 4 HOURS
Status: DISCONTINUED | OUTPATIENT
Start: 2019-09-30 | End: 2019-10-02

## 2019-09-30 RX ORDER — OXYCODONE AND ACETAMINOPHEN 7.5; 325 MG/1; MG/1
1 TABLET ORAL EVERY 6 HOURS PRN
Status: DISCONTINUED | OUTPATIENT
Start: 2019-09-30 | End: 2019-09-30

## 2019-09-30 RX ORDER — ONDANSETRON 4 MG/1
4 TABLET, ORALLY DISINTEGRATING ORAL EVERY 6 HOURS PRN
Status: DISCONTINUED | OUTPATIENT
Start: 2019-09-30 | End: 2019-09-30 | Stop reason: SDUPTHER

## 2019-09-30 RX ORDER — ONDANSETRON 4 MG/1
8 TABLET, FILM COATED ORAL 3 TIMES DAILY PRN
Status: DISCONTINUED | OUTPATIENT
Start: 2019-09-30 | End: 2019-09-30

## 2019-09-30 RX ORDER — SODIUM CHLORIDE 0.9 % (FLUSH) 0.9 %
10 SYRINGE (ML) INJECTION AS NEEDED
Status: DISCONTINUED | OUTPATIENT
Start: 2019-09-30 | End: 2019-10-02

## 2019-09-30 RX ORDER — DEXAMETHASONE 0.5 MG/1
1 TABLET ORAL 2 TIMES DAILY WITH MEALS
Status: DISCONTINUED | OUTPATIENT
Start: 2019-09-30 | End: 2019-10-02

## 2019-09-30 RX ORDER — LEVOTHYROXINE SODIUM 0.12 MG/1
125 TABLET ORAL
Status: DISCONTINUED | OUTPATIENT
Start: 2019-10-01 | End: 2019-10-02

## 2019-09-30 RX ORDER — DEXTROSE, SODIUM CHLORIDE, AND POTASSIUM CHLORIDE 5; .2; .15 G/100ML; G/100ML; G/100ML
100 INJECTION INTRAVENOUS CONTINUOUS
Status: DISCONTINUED | OUTPATIENT
Start: 2019-09-30 | End: 2019-10-02

## 2019-09-30 RX ORDER — ASPIRIN 81 MG/1
81 TABLET ORAL DAILY
Status: DISCONTINUED | OUTPATIENT
Start: 2019-09-30 | End: 2019-10-02

## 2019-09-30 RX ADMIN — POTASSIUM CHLORIDE, DEXTROSE MONOHYDRATE AND SODIUM CHLORIDE 100 ML/HR: 150; 5; 200 INJECTION, SOLUTION INTRAVENOUS at 16:37

## 2019-09-30 RX ADMIN — HYDROMORPHONE HYDROCHLORIDE 1 MG: 1 INJECTION, SOLUTION INTRAMUSCULAR; INTRAVENOUS; SUBCUTANEOUS at 18:06

## 2019-09-30 RX ADMIN — ENOXAPARIN SODIUM 40 MG: 40 INJECTION SUBCUTANEOUS at 18:06

## 2019-09-30 RX ADMIN — SILVER SULFADIAZINE: 10 CREAM TOPICAL at 15:15

## 2019-09-30 NOTE — TELEPHONE ENCOUNTER
Dr Valera's office called to see if they could see Mr Bang. He is having leakage around feeding tube. They said to bring him over now. I took him to Dr Valera's office via W/C 7 signed him in.

## 2019-09-30 NOTE — TELEPHONE ENCOUNTER
Called Mr. Bang's sister to tell her that he was going to Dr Valera's office to be seen. She said she would meet him there.

## 2019-10-01 ENCOUNTER — TELEPHONE (OUTPATIENT)
Dept: ONCOLOGY | Facility: CLINIC | Age: 59
End: 2019-10-01

## 2019-10-01 ENCOUNTER — DOCUMENTATION (OUTPATIENT)
Dept: NUTRITION | Facility: HOSPITAL | Age: 59
End: 2019-10-01

## 2019-10-01 LAB
ABO GROUP BLD: NORMAL
ALBUMIN SERPL-MCNC: 3.3 G/DL (ref 3.5–5.2)
ALBUMIN/GLOB SERPL: 0.9 G/DL
ALP SERPL-CCNC: 91 U/L (ref 39–117)
ALT SERPL W P-5'-P-CCNC: 17 U/L (ref 1–41)
ANION GAP SERPL CALCULATED.3IONS-SCNC: 8 MMOL/L (ref 5–15)
AST SERPL-CCNC: 21 U/L (ref 1–40)
BILIRUB SERPL-MCNC: 0.2 MG/DL (ref 0.2–1.2)
BLD GP AB SCN SERPL QL: NEGATIVE
BUN BLD-MCNC: 16 MG/DL (ref 8–23)
BUN/CREAT SERPL: 23.9 (ref 7–25)
CALCIUM SPEC-SCNC: 8.8 MG/DL (ref 8.6–10.5)
CHLORIDE SERPL-SCNC: 86 MMOL/L (ref 98–107)
CO2 SERPL-SCNC: 34 MMOL/L (ref 22–29)
CREAT BLD-MCNC: 0.67 MG/DL (ref 0.76–1.27)
DEPRECATED RDW RBC AUTO: 47 FL (ref 37–54)
ERYTHROCYTE [DISTWIDTH] IN BLOOD BY AUTOMATED COUNT: 17.7 % (ref 12.3–15.4)
GFR SERPL CREATININE-BSD FRML MDRD: 146 ML/MIN/1.73
GLOBULIN UR ELPH-MCNC: 3.8 GM/DL
GLUCOSE BLD-MCNC: 140 MG/DL (ref 65–99)
HCT VFR BLD AUTO: 33.2 % (ref 37.5–51)
HGB BLD-MCNC: 11.8 G/DL (ref 13–17.7)
HOLD SPECIMEN: NORMAL
Lab: NORMAL
MCH RBC QN AUTO: 26.3 PG (ref 26.6–33)
MCHC RBC AUTO-ENTMCNC: 35.5 G/DL (ref 31.5–35.7)
MCV RBC AUTO: 73.9 FL (ref 79–97)
PLATELET # BLD AUTO: 344 10*3/MM3 (ref 140–450)
PMV BLD AUTO: 10.9 FL (ref 6–12)
POTASSIUM BLD-SCNC: 3.1 MMOL/L (ref 3.5–5.2)
PROT SERPL-MCNC: 7.1 G/DL (ref 6–8.5)
RBC # BLD AUTO: 4.49 10*6/MM3 (ref 4.14–5.8)
RH BLD: POSITIVE
SODIUM BLD-SCNC: 128 MMOL/L (ref 136–145)
T&S EXPIRATION DATE: NORMAL
WBC NRBC COR # BLD: 6.97 10*3/MM3 (ref 3.4–10.8)
WHOLE BLOOD HOLD SPECIMEN: NORMAL

## 2019-10-01 PROCEDURE — 86901 BLOOD TYPING SEROLOGIC RH(D): CPT | Performed by: SURGERY

## 2019-10-01 PROCEDURE — 86900 BLOOD TYPING SEROLOGIC ABO: CPT | Performed by: SURGERY

## 2019-10-01 PROCEDURE — 25010000002 HYDROMORPHONE 1 MG/ML SOLUTION: Performed by: SURGERY

## 2019-10-01 PROCEDURE — 99024 POSTOP FOLLOW-UP VISIT: CPT | Performed by: SURGERY

## 2019-10-01 PROCEDURE — 25010000002 ONDANSETRON PER 1 MG: Performed by: SURGERY

## 2019-10-01 PROCEDURE — 25010000002 ENOXAPARIN PER 10 MG: Performed by: SURGERY

## 2019-10-01 PROCEDURE — 86850 RBC ANTIBODY SCREEN: CPT | Performed by: SURGERY

## 2019-10-01 PROCEDURE — 86923 COMPATIBILITY TEST ELECTRIC: CPT

## 2019-10-01 PROCEDURE — 85027 COMPLETE CBC AUTOMATED: CPT | Performed by: SURGERY

## 2019-10-01 PROCEDURE — 80053 COMPREHEN METABOLIC PANEL: CPT | Performed by: SURGERY

## 2019-10-01 RX ADMIN — ONDANSETRON 4 MG: 2 INJECTION INTRAMUSCULAR; INTRAVENOUS at 03:27

## 2019-10-01 RX ADMIN — POTASSIUM PHOSPHATE, MONOBASIC AND POTASSIUM PHOSPHATE, DIBASIC 10 MMOL: 224; 236 INJECTION, SOLUTION, CONCENTRATE INTRAVENOUS at 21:30

## 2019-10-01 RX ADMIN — SILVER SULFADIAZINE: 10 CREAM TOPICAL at 12:24

## 2019-10-01 RX ADMIN — ONDANSETRON 4 MG: 2 INJECTION INTRAMUSCULAR; INTRAVENOUS at 09:30

## 2019-10-01 RX ADMIN — ONDANSETRON 4 MG: 2 INJECTION INTRAMUSCULAR; INTRAVENOUS at 17:32

## 2019-10-01 RX ADMIN — ONDANSETRON 4 MG: 2 INJECTION INTRAMUSCULAR; INTRAVENOUS at 15:22

## 2019-10-01 RX ADMIN — POTASSIUM PHOSPHATE, MONOBASIC AND POTASSIUM PHOSPHATE, DIBASIC 10 MMOL: 224; 236 INJECTION, SOLUTION, CONCENTRATE INTRAVENOUS at 12:24

## 2019-10-01 RX ADMIN — POTASSIUM CHLORIDE, DEXTROSE MONOHYDRATE AND SODIUM CHLORIDE 100 ML/HR: 150; 5; 200 INJECTION, SOLUTION INTRAVENOUS at 16:34

## 2019-10-01 RX ADMIN — ONDANSETRON 4 MG: 2 INJECTION INTRAMUSCULAR; INTRAVENOUS at 00:16

## 2019-10-01 RX ADMIN — ENOXAPARIN SODIUM 40 MG: 40 INJECTION SUBCUTANEOUS at 17:20

## 2019-10-01 RX ADMIN — ONDANSETRON 4 MG: 2 INJECTION INTRAMUSCULAR; INTRAVENOUS at 22:26

## 2019-10-01 RX ADMIN — HYDROMORPHONE HYDROCHLORIDE 1 MG: 1 INJECTION, SOLUTION INTRAMUSCULAR; INTRAVENOUS; SUBCUTANEOUS at 22:25

## 2019-10-01 RX ADMIN — HYDROMORPHONE HYDROCHLORIDE 1 MG: 1 INJECTION, SOLUTION INTRAMUSCULAR; INTRAVENOUS; SUBCUTANEOUS at 18:11

## 2019-10-01 RX ADMIN — SODIUM CHLORIDE, PRESERVATIVE FREE 10 ML: 5 INJECTION INTRAVENOUS at 09:30

## 2019-10-01 RX ADMIN — POTASSIUM CHLORIDE, DEXTROSE MONOHYDRATE AND SODIUM CHLORIDE 100 ML/HR: 150; 5; 200 INJECTION, SOLUTION INTRAVENOUS at 03:27

## 2019-10-01 RX ADMIN — ONDANSETRON 4 MG: 2 INJECTION INTRAMUSCULAR; INTRAVENOUS at 05:43

## 2019-10-01 NOTE — PLAN OF CARE
Problem: Patient Care Overview  Goal: Plan of Care Review  Outcome: Ongoing (interventions implemented as appropriate)   10/01/19 1215   Coping/Psychosocial   Plan of Care Reviewed With caregiver;patient   Plan of Care Review   Progress no change   OTHER   Outcome Summary New Assessment: Pt with a hx of Laryngeal CA requiring EN for total nutrition. No EN route at this time. JTube to be placed. Malnutrition present. Rd will monitor.

## 2019-10-01 NOTE — CONSULTS
Adult Nutrition  Assessment    Patient Name:  Emerson Bang  YOB: 1960  MRN: [id number]  Admit Date:  9/30/2019    Assessment Date:  10/1/2019    Comments:   Pt admitted for the closure of gastrocutaneous Fistula and the placement of a JTube for nutrition.  Apparently his Gtube has been leaking, causing an abd skin burn.  After the removal the G tube the site continued to leak requiring hospital admission.  He has a hx of Laryngeal CA with a permanent trach.  He takes no oral po and is nonverbal.  He requires EN for total nutrition.  Presently NPO with IVF. Malnutrition present with severe fat loss and muscle wasting.  RD will continue to monitor tx plans.  Of note, he was receiving Peptamen 1.5, 5 cans per day.      Reason for Assessment     Row Name 10/01/19 1206          Reason for Assessment    Reason For Assessment  per organizational policy     Diagnosis  cancer diagnosis/related complications     Identified At Risk by Screening Criteria  BMI;tube feeding or parenteral nutrition         Nutrition/Diet History     Row Name 10/01/19 1206          Nutrition/Diet History    Typical Food/Fluid Intake  Pt is non-verbal.  He indicates that he does not take po.  He indicated by shaking his head when I mentioned tube feeding and Peptamen that he takes it at home.           Labs/Tests/Procedures/Meds     Row Name 10/01/19 1207          Labs/Procedures/Meds    Lab Results Reviewed  reviewed, pertinent     Lab Results Comments  Na 128; Glucose 140; K+ 3.1;  Creat 0.67; Phos 1.6; Alb 3.30; Lipase 9        Diagnostic Tests/Procedures    Diagnostic Test/Procedure Reviewed  reviewed, pertinent     Diagnostic Test/Procedures Comments  IVF; Surgery tomorrow for JTube placment and closure of gastrocutaneous fistula        Medications    Pertinent Medications Reviewed  reviewed, pertinent     Pertinent Medications Comments   K+Phos; Decadron; Ferrous Sulfate; IVF          Physical Findings     Row Name  10/01/19 1211          Physical Findings    Overall Physical Appearance  underweight;loss of subcutaneous fat;loss of muscle mass;generalized wasting;on oxygen therapy         Estimated/Assessed Needs     Row Name 10/01/19 1158          Calculation Measurements    Weight Used For Calculations  61.7 kg (136 lb)        Estimated/Assessed Needs    Additional Documentation  Additional Requirements (Group);Fluid Requirements (Group);Burnet-St. Jeor Equation (Group);Protein Requirements (Group);Calorie Requirements (Group);KCAL/KG (Group)        Calorie Requirements    Estimated Calorie Requirement (kcal/day)  -- 1600--1800        KCAL/KG    KCAL/KG  25 Kcal/Kg (kcal);30 Kcal/Kg (kcal)     25 Kcal/Kg (kcal)  1542.225     30 Kcal/Kg (kcal)  1850.67        Protein Requirements    Est Protein Requirement Amount (gms/kg)  1.3 gm protein 74--80 gms (1.2-1.3 gms/kg IBW)     Estimated Protein Requirements (gms/day)  80.2        Fluid Requirements    Estimated Fluid Requirements (mL/day)  1800     Estimated Fluid Requirement Method  RDA Method     RDA Method (mL)  1800     Radha-Rasta Method (over 20 kg)  2733.78         Nutrition Prescription Ordered     Row Name 10/01/19 1211          Nutrition Prescription PO    Current PO Diet  NPO                 Electronically signed by:  Jane Ghosh RD  10/01/19 12:16 PM

## 2019-10-01 NOTE — TELEPHONE ENCOUNTER
Contacted PACS this am to cancel pt transport for today. Note; pt is admitted to acute care. Transport for 10-2-19 is still scheduled.

## 2019-10-01 NOTE — PROGRESS NOTES
Malnutrition Severity Assessment    Patient Name:  Emerson Bang  YOB: 1960  MRN: [id number]  Admit Date:  9/30/2019    Patient meets criteria for : Severe Malnutrition    Comments:  Pt presents at severe malnutrition as evidenced by BMI of 17.64 and being 76% of his IBW (based on UBW of 103#).  He has a hx of Laryngeal CA and has a permanent Trach.  He received EN for for total nutrition.  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  Severe fat loss and muscle wasting present.  Please see below for details:     Malnutrition Severity Assessment  Malnutrition Type: Chronic Disease - Related Malnutrition     Malnutrition Type (last 8 hours)      Malnutrition Severity Assessment     Row Name 10/01/19 1250       Malnutrition Severity Assessment    Malnutrition Type  Chronic Disease - Related Malnutrition    Row Name 10/01/19 1250       Muscle Loss    Loss of Muscle Mass Findings  Severe    Zoroastrian Region  Severe - deep hollowing/scooping, lack of muscle to touch, facial bones well defined    Clavicle Bone Region  Severe - protruding prominent bone    Acromion Bone Region  Severe - squared shoulders, bones, and acromion process protrusion prominent    Scapular Bone Region  Severe - prominent bones, depressions easily visible between ribs, scapula, spine, shoulders    Dorsal Hand Region  Severe - prominent depression    Row Name 10/01/19 1250       Fat Loss    Subcutaneous Fat Loss Findings  Severe    Orbital Region   Severe - pronounced hollowness/depression, dark circles, loose saggy skin    Upper Arm Region  Severe - mostly skin, very little space between folds, fingers touch    Row Name 10/01/19 1250       Fluid Accumulation (Edema)    Fluid Acumulation Findings  Severe    Row Name 10/01/19 1250       Criteria Met (Must meet criteria for severity in at least 2 of these categories: M Wasting, Fat Loss, Fluid, Secondary Signs, Wt. Status,  Intake)    Patient meets criteria for   Severe Malnutrition          Electronically signed by:  Jane Ghosh RD  10/01/19 12:51 PM

## 2019-10-01 NOTE — PLAN OF CARE
Problem: Patient Care Overview  Goal: Plan of Care Review  Outcome: Ongoing (interventions implemented as appropriate)   10/01/19 0513   Coping/Psychosocial   Plan of Care Reviewed With patient   Plan of Care Review   Progress no change   OTHER   Outcome Summary VSS; pt denies need for pain meds; dressing changed; pt resting between care; will continue to monitor     Goal: Individualization and Mutuality  Outcome: Ongoing (interventions implemented as appropriate)    Goal: Discharge Needs Assessment  Outcome: Ongoing (interventions implemented as appropriate)    Goal: Interprofessional Rounds/Family Conf  Outcome: Ongoing (interventions implemented as appropriate)      Problem: Fall Risk (Adult)  Goal: Identify Related Risk Factors and Signs and Symptoms  Outcome: Ongoing (interventions implemented as appropriate)    Goal: Absence of Fall  Outcome: Ongoing (interventions implemented as appropriate)      Problem: Skin Injury Risk (Adult)  Goal: Identify Related Risk Factors and Signs and Symptoms  Outcome: Ongoing (interventions implemented as appropriate)    Goal: Skin Health and Integrity  Outcome: Ongoing (interventions implemented as appropriate)      Problem: Pain, Chronic (Adult)  Goal: Identify Related Risk Factors and Signs and Symptoms  Outcome: Ongoing (interventions implemented as appropriate)    Goal: Acceptable Pain/Comfort Level and Functional Ability  Outcome: Ongoing (interventions implemented as appropriate)      Problem: Wound (Includes Pressure Injury) (Adult)  Goal: Signs and Symptoms of Listed Potential Problems Will be Absent, Minimized or Managed (Wound)  Outcome: Ongoing (interventions implemented as appropriate)

## 2019-10-01 NOTE — PROGRESS NOTES
Adult Outpatient Nutrition  Assessment    Patient Name:  Emerson Bang  YOB: 1960  MRN: [id number]    Assessment Date:  10/1/2019    Comments: Pt came to [facility] yesterday with significant drainage at feeding tube site. Pt held up 3 fingers when RDN ask number of days experiencing problems. RN stated past diarrhea controlled.   Pt admitted to hospital. Spoke with clinical RDN who stated G-tube removed due to fistula. States plan is to place J-tube. Discussed TEN formula options. Peptamen 1.5 continuous feeding recommended. Pt did most TEN care on own at home up until this point. Pt/daughter (who pt  lives with) will need to be educated on use of pump at home. Wt 106 lb 9/30/19. Alb 3.3.      Physical Findings     Row Name 10/01/19 1211          Physical Findings    Overall Physical Appearance  underweight;loss of subcutaneous fat;loss of muscle mass;generalized wasting;on oxygen therapy             Home Nutrition Report     Row Name 10/01/19 1206          Home Nutrition Report    Typical Food/Fluid Intake  Pt is non-verbal.  He indicates that he does not take po.  He indicated by shaking his head when I mentioned tube feeding and Peptamen that he takes it at home.         Estimated/Assessed Needs     Row Name 10/01/19 1158          Calculation Measurements    Weight Used For Calculations  61.7 kg (136 lb)        Estimated/Assessed Needs    Additional Documentation  Additional Requirements (Group);Fluid Requirements (Group);Winn-St. Jeor Equation (Group);Protein Requirements (Group);Calorie Requirements (Group);KCAL/KG (Group)        Calorie Requirements    Estimated Calorie Requirement (kcal/day)  -- 1600--1800        KCAL/KG    KCAL/KG  25 Kcal/Kg (kcal);30 Kcal/Kg (kcal)     25 Kcal/Kg (kcal)  1542.225     30 Kcal/Kg (kcal)  1850.67        Protein Requirements    Est Protein Requirement Amount (gms/kg)  1.3 gm protein 74--80 gms (1.2-1.3 gms/kg IBW)     Estimated Protein Requirements  (gms/day)  80.2        Fluid Requirements    Estimated Fluid Requirements (mL/day)  1800     Estimated Fluid Requirement Method  RDA Method     RDA Method (mL)  1800     Radha-Rasta Method (over 20 kg)  2733.78           Labs/Tests/Procedures/Meds     Row Name 10/01/19 1207          Labs/Procedures/Meds    Lab Results Reviewed  reviewed, pertinent     Lab Results Comments  Na 128; Glucose 140; K+ 3.1;  Creat 0.67; Phos 1.6; Alb 3.30; Lipase 9        Diagnostic Tests/Procedures    Diagnostic Test/Procedure Reviewed  reviewed, pertinent     Diagnostic Test/Procedures Comments  IVF; Surgery tomorrow for JTube placment and closure of gastrocutaneous fistula        Medications    Pertinent Medications Reviewed  reviewed, pertinent     Pertinent Medications Comments   K+Phos; Decadron; Ferrous Sulfate; IVF          Malnutrition Severity Assessment     Row Name 10/01/19 1250          Malnutrition Severity Assessment    Malnutrition Type  Chronic Disease - Related Malnutrition        Muscle Loss    Loss of Muscle Mass Findings  Severe     [place] Region  Severe - deep hollowing/scooping, lack of muscle to touch, facial bones well defined     Clavicle Bone Region  Severe - protruding prominent bone     Acromion Bone Region  Severe - squared shoulders, bones, and acromion process protrusion prominent     Scapular Bone Region  Severe - prominent bones, depressions easily visible between ribs, scapula, spine, shoulders     Dorsal Hand Region  Severe - prominent depression        Fat Loss    Subcutaneous Fat Loss Findings  Severe     Orbital Region   Severe - pronounced hollowness/depression, dark circles, loose saggy skin     Upper Arm Region  Severe - mostly skin, very little space between folds, fingers touch        Fluid Accumulation (Edema)    Fluid Acumulation Findings  Severe        Criteria Met (Must meet criteria for severity in at least 2 of these categories: M Wasting, Fat Loss, Fluid, Secondary Signs, Wt. Status,  Intake)    Patient meets criteria for   Severe Malnutrition           Electronically signed by:  Joann Willoughby RD  10/01/19 1:14 PM

## 2019-10-01 NOTE — PLAN OF CARE
Problem: Patient Care Overview  Goal: Plan of Care Review  Outcome: Ongoing (interventions implemented as appropriate)   10/01/19 1650   Coping/Psychosocial   Plan of Care Reviewed With patient   Plan of Care Review   Progress no change   OTHER   Outcome Summary vss, g tube placment in the am, trach care performed, cont to monitor       Problem: Fall Risk (Adult)  Goal: Absence of Fall  Outcome: Ongoing (interventions implemented as appropriate)   10/01/19 1650   Fall Risk (Adult)   Absence of Fall making progress toward outcome       Problem: Skin Injury Risk (Adult)  Goal: Identify Related Risk Factors and Signs and Symptoms  Outcome: Ongoing (interventions implemented as appropriate)   10/01/19 1650   Skin Injury Risk (Adult)   Related Risk Factors (Skin Injury Risk) advanced age;mobility impaired;skeletal deformities;nutritional deficiencies     Goal: Skin Health and Integrity  Outcome: Ongoing (interventions implemented as appropriate)   10/01/19 1650   Skin Injury Risk (Adult)   Skin Health and Integrity making progress toward outcome       Problem: Pain, Chronic (Adult)  Goal: Identify Related Risk Factors and Signs and Symptoms  Outcome: Ongoing (interventions implemented as appropriate)   10/01/19 1650   Pain, Chronic (Adult)   Related Risk Factors (Chronic Pain) disease process   Signs and Symptoms (Chronic Pain) verbalization of pain descriptors       Problem: Wound (Includes Pressure Injury) (Adult)  Goal: Signs and Symptoms of Listed Potential Problems Will be Absent, Minimized or Managed (Wound)  Outcome: Ongoing (interventions implemented as appropriate)   10/01/19 1650   Goal/Outcome Evaluation   Problems Present (Wound) situational response

## 2019-10-01 NOTE — H&P (VIEW-ONLY)
Malnutrition Severity Assessment    Patient Name:  Emerson Bang  YOB: 1960  MRN: [id number]  Admit Date:  9/30/2019    Patient meets criteria for : Severe Malnutrition    Comments:  Pt presents at severe malnutrition as evidenced by BMI of 17.64 and being 76% of his IBW (based on UBW of 103#).  He has a hx of Laryngeal CA and has a permanent Trach.  He received EN for for total nutrition.  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  Severe fat loss and muscle wasting present.  Please see below for details:     Malnutrition Severity Assessment  Malnutrition Type: Chronic Disease - Related Malnutrition     Malnutrition Type (last 8 hours)      Malnutrition Severity Assessment     Row Name 10/01/19 1250       Malnutrition Severity Assessment    Malnutrition Type  Chronic Disease - Related Malnutrition    Row Name 10/01/19 1250       Muscle Loss    Loss of Muscle Mass Findings  Severe    [place] Region  Severe - deep hollowing/scooping, lack of muscle to touch, facial bones well defined    Clavicle Bone Region  Severe - protruding prominent bone    Acromion Bone Region  Severe - squared shoulders, bones, and acromion process protrusion prominent    Scapular Bone Region  Severe - prominent bones, depressions easily visible between ribs, scapula, spine, shoulders    Dorsal Hand Region  Severe - prominent depression    Row Name 10/01/19 1250       Fat Loss    Subcutaneous Fat Loss Findings  Severe    Orbital Region   Severe - pronounced hollowness/depression, dark circles, loose saggy skin    Upper Arm Region  Severe - mostly skin, very little space between folds, fingers touch    Row Name 10/01/19 1250       Fluid Accumulation (Edema)    Fluid Acumulation Findings  Severe    Row Name 10/01/19 1250       Criteria Met (Must meet criteria for severity in at least 2 of these categories: M Wasting, Fat Loss, Fluid, Secondary Signs, Wt. Status,  Intake)    Patient meets criteria for   Severe Malnutrition          Electronically signed by:  Jane Ghosh RD  10/01/19 12:51 PM
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
## 2019-10-01 NOTE — NURSING NOTE
CO2 monitor not detected thru patients nose due to having trach collar on.  Spot check O2 sat performed.

## 2019-10-02 ENCOUNTER — ANESTHESIA EVENT (OUTPATIENT)
Dept: PERIOP | Facility: HOSPITAL | Age: 59
End: 2019-10-02

## 2019-10-02 ENCOUNTER — ANESTHESIA (OUTPATIENT)
Dept: PERIOP | Facility: HOSPITAL | Age: 59
End: 2019-10-02

## 2019-10-02 ENCOUNTER — APPOINTMENT (OUTPATIENT)
Dept: ONCOLOGY | Facility: HOSPITAL | Age: 59
End: 2019-10-02

## 2019-10-02 PROBLEM — K31.6 GASTROCUTANEOUS FISTULA DUE TO GASTROSTOMY TUBE: Status: RESOLVED | Noted: 2019-09-30 | Resolved: 2019-10-02

## 2019-10-02 LAB
ANION GAP SERPL CALCULATED.3IONS-SCNC: 11 MMOL/L (ref 5–15)
ANION GAP SERPL CALCULATED.3IONS-SCNC: 15 MMOL/L (ref 5–15)
ARTERIAL PATENCY WRIST A: ABNORMAL
ATMOSPHERIC PRESS: 746 MMHG
BASE EXCESS BLDA CALC-SCNC: 2.1 MMOL/L (ref 0–2)
BASOPHILS # BLD AUTO: 0.03 10*3/MM3 (ref 0–0.2)
BASOPHILS # BLD AUTO: 0.06 10*3/MM3 (ref 0–0.2)
BASOPHILS NFR BLD AUTO: 0.3 % (ref 0–1.5)
BASOPHILS NFR BLD AUTO: 0.9 % (ref 0–1.5)
BDY SITE: ABNORMAL
BUN BLD-MCNC: 9 MG/DL (ref 8–23)
BUN BLD-MCNC: 9 MG/DL (ref 8–23)
BUN/CREAT SERPL: 14.8 (ref 7–25)
BUN/CREAT SERPL: 17 (ref 7–25)
CALCIUM SPEC-SCNC: 7 MG/DL (ref 8.6–10.5)
CALCIUM SPEC-SCNC: 8.7 MG/DL (ref 8.6–10.5)
CHLORIDE SERPL-SCNC: 91 MMOL/L (ref 98–107)
CHLORIDE SERPL-SCNC: 93 MMOL/L (ref 98–107)
CO2 SERPL-SCNC: 25 MMOL/L (ref 22–29)
CO2 SERPL-SCNC: 29 MMOL/L (ref 22–29)
CREAT BLD-MCNC: 0.53 MG/DL (ref 0.76–1.27)
CREAT BLD-MCNC: 0.61 MG/DL (ref 0.76–1.27)
DEPRECATED RDW RBC AUTO: 45.9 FL (ref 37–54)
DEPRECATED RDW RBC AUTO: 47.2 FL (ref 37–54)
EOSINOPHIL # BLD AUTO: 0.13 10*3/MM3 (ref 0–0.4)
EOSINOPHIL # BLD AUTO: 0.24 10*3/MM3 (ref 0–0.4)
EOSINOPHIL NFR BLD AUTO: 1.5 % (ref 0.3–6.2)
EOSINOPHIL NFR BLD AUTO: 3.8 % (ref 0.3–6.2)
ERYTHROCYTE [DISTWIDTH] IN BLOOD BY AUTOMATED COUNT: 17.5 % (ref 12.3–15.4)
ERYTHROCYTE [DISTWIDTH] IN BLOOD BY AUTOMATED COUNT: 17.5 % (ref 12.3–15.4)
GFR SERPL CREATININE-BSD FRML MDRD: >150 ML/MIN/1.73
GFR SERPL CREATININE-BSD FRML MDRD: >150 ML/MIN/1.73
GLUCOSE BLD-MCNC: 111 MG/DL (ref 65–99)
GLUCOSE BLD-MCNC: 97 MG/DL (ref 65–99)
GLUCOSE BLDC GLUCOMTR-MCNC: 96 MG/DL (ref 70–130)
HCO3 BLDA-SCNC: 24.9 MMOL/L (ref 20–26)
HCT VFR BLD AUTO: 23.8 % (ref 37.5–51)
HCT VFR BLD AUTO: 33.7 % (ref 37.5–51)
HGB BLD-MCNC: 12 G/DL (ref 13–17.7)
HGB BLD-MCNC: 8.3 G/DL (ref 13–17.7)
IMM GRANULOCYTES # BLD AUTO: 0.02 10*3/MM3 (ref 0–0.05)
IMM GRANULOCYTES # BLD AUTO: 0.03 10*3/MM3 (ref 0–0.05)
IMM GRANULOCYTES NFR BLD AUTO: 0.3 % (ref 0–0.5)
IMM GRANULOCYTES NFR BLD AUTO: 0.3 % (ref 0–0.5)
LYMPHOCYTES # BLD AUTO: 1.6 10*3/MM3 (ref 0.7–3.1)
LYMPHOCYTES # BLD AUTO: 1.66 10*3/MM3 (ref 0.7–3.1)
LYMPHOCYTES NFR BLD AUTO: 18.6 % (ref 19.6–45.3)
LYMPHOCYTES NFR BLD AUTO: 26.2 % (ref 19.6–45.3)
MAGNESIUM SERPL-MCNC: 2.1 MG/DL (ref 1.6–2.4)
MAGNESIUM SERPL-MCNC: 2.1 MG/DL (ref 1.6–2.4)
MCH RBC QN AUTO: 25.9 PG (ref 26.6–33)
MCH RBC QN AUTO: 26.2 PG (ref 26.6–33)
MCHC RBC AUTO-ENTMCNC: 34.9 G/DL (ref 31.5–35.7)
MCHC RBC AUTO-ENTMCNC: 35.6 G/DL (ref 31.5–35.7)
MCV RBC AUTO: 73.6 FL (ref 79–97)
MCV RBC AUTO: 74.4 FL (ref 79–97)
MODALITY: ABNORMAL
MONOCYTES # BLD AUTO: 0.66 10*3/MM3 (ref 0.1–0.9)
MONOCYTES # BLD AUTO: 1.07 10*3/MM3 (ref 0.1–0.9)
MONOCYTES NFR BLD AUTO: 16.9 % (ref 5–12)
MONOCYTES NFR BLD AUTO: 7.7 % (ref 5–12)
NEUTROPHILS # BLD AUTO: 3.29 10*3/MM3 (ref 1.7–7)
NEUTROPHILS # BLD AUTO: 6.16 10*3/MM3 (ref 1.7–7)
NEUTROPHILS NFR BLD AUTO: 51.9 % (ref 42.7–76)
NEUTROPHILS NFR BLD AUTO: 71.6 % (ref 42.7–76)
NRBC BLD AUTO-RTO: 0 /100 WBC (ref 0–0.2)
NRBC BLD AUTO-RTO: 0.2 /100 WBC (ref 0–0.2)
PCO2 BLDA: 31.1 MM HG (ref 35–45)
PH BLDA: 7.51 PH UNITS (ref 7.35–7.45)
PHOSPHATE SERPL-MCNC: 1.4 MG/DL (ref 2.5–4.5)
PHOSPHATE SERPL-MCNC: 2.6 MG/DL (ref 2.5–4.5)
PLATELET # BLD AUTO: 230 10*3/MM3 (ref 140–450)
PLATELET # BLD AUTO: 304 10*3/MM3 (ref 140–450)
PMV BLD AUTO: 10.9 FL (ref 6–12)
PMV BLD AUTO: 11.7 FL (ref 6–12)
PO2 BLDA: 109 MM HG (ref 83–108)
POTASSIUM BLD-SCNC: 3.5 MMOL/L (ref 3.5–5.2)
POTASSIUM BLD-SCNC: 3.9 MMOL/L (ref 3.5–5.2)
RBC # BLD AUTO: 3.2 10*6/MM3 (ref 4.14–5.8)
RBC # BLD AUTO: 4.58 10*6/MM3 (ref 4.14–5.8)
RBC MORPH BLD: NORMAL
SAO2 % BLDCOA: 98.4 % (ref 94–99)
SMALL PLATELETS BLD QL SMEAR: ADEQUATE
SODIUM BLD-SCNC: 131 MMOL/L (ref 136–145)
SODIUM BLD-SCNC: 133 MMOL/L (ref 136–145)
VENTILATOR MODE: ABNORMAL
WBC MORPH BLD: NORMAL
WBC NRBC COR # BLD: 6.34 10*3/MM3 (ref 3.4–10.8)
WBC NRBC COR # BLD: 8.61 10*3/MM3 (ref 3.4–10.8)

## 2019-10-02 PROCEDURE — 94799 UNLISTED PULMONARY SVC/PX: CPT

## 2019-10-02 PROCEDURE — 85025 COMPLETE CBC W/AUTO DIFF WBC: CPT | Performed by: SURGERY

## 2019-10-02 PROCEDURE — 25010000002 HYDROMORPHONE 1 MG/ML SOLUTION: Performed by: SURGERY

## 2019-10-02 PROCEDURE — 0DN80ZZ RELEASE SMALL INTESTINE, OPEN APPROACH: ICD-10-PCS | Performed by: SURGERY

## 2019-10-02 PROCEDURE — 25010000002 MIDAZOLAM PER 1 MG: Performed by: NURSE ANESTHETIST, CERTIFIED REGISTERED

## 2019-10-02 PROCEDURE — 25010000002 PHENYLEPHRINE PER 1 ML: Performed by: NURSE ANESTHETIST, CERTIFIED REGISTERED

## 2019-10-02 PROCEDURE — 94002 VENT MGMT INPAT INIT DAY: CPT

## 2019-10-02 PROCEDURE — 36430 TRANSFUSION BLD/BLD COMPNT: CPT

## 2019-10-02 PROCEDURE — 25010000002 CEFAZOLIN PER 500 MG: Performed by: SURGERY

## 2019-10-02 PROCEDURE — 44650 REPAIR BOWEL FISTULA: CPT | Performed by: SURGERY

## 2019-10-02 PROCEDURE — 86900 BLOOD TYPING SEROLOGIC ABO: CPT

## 2019-10-02 PROCEDURE — 0DHA0UZ INSERTION OF FEEDING DEVICE INTO JEJUNUM, OPEN APPROACH: ICD-10-PCS | Performed by: SURGERY

## 2019-10-02 PROCEDURE — 87205 SMEAR GRAM STAIN: CPT | Performed by: SURGERY

## 2019-10-02 PROCEDURE — 25010000002 ONDANSETRON PER 1 MG: Performed by: SURGERY

## 2019-10-02 PROCEDURE — 88304 TISSUE EXAM BY PATHOLOGIST: CPT | Performed by: SURGERY

## 2019-10-02 PROCEDURE — 84100 ASSAY OF PHOSPHORUS: CPT | Performed by: SURGERY

## 2019-10-02 PROCEDURE — P9016 RBC LEUKOCYTES REDUCED: HCPCS

## 2019-10-02 PROCEDURE — 83735 ASSAY OF MAGNESIUM: CPT | Performed by: SURGERY

## 2019-10-02 PROCEDURE — 25010000002 PROPOFOL 10 MG/ML EMULSION: Performed by: NURSE ANESTHETIST, CERTIFIED REGISTERED

## 2019-10-02 PROCEDURE — C2627 CATH, SUPRAPUBIC/CYSTOSCOPIC: HCPCS | Performed by: SURGERY

## 2019-10-02 PROCEDURE — 80048 BASIC METABOLIC PNL TOTAL CA: CPT | Performed by: SURGERY

## 2019-10-02 PROCEDURE — 25010000002 ALBUMIN HUMAN 5% PER 50 ML: Performed by: NURSE ANESTHETIST, CERTIFIED REGISTERED

## 2019-10-02 PROCEDURE — 94760 N-INVAS EAR/PLS OXIMETRY 1: CPT

## 2019-10-02 PROCEDURE — P9041 ALBUMIN (HUMAN),5%, 50ML: HCPCS | Performed by: NURSE ANESTHETIST, CERTIFIED REGISTERED

## 2019-10-02 PROCEDURE — 88304 TISSUE EXAM BY PATHOLOGIST: CPT | Performed by: PATHOLOGY

## 2019-10-02 PROCEDURE — 87077 CULTURE AEROBIC IDENTIFY: CPT | Performed by: SURGERY

## 2019-10-02 PROCEDURE — 85007 BL SMEAR W/DIFF WBC COUNT: CPT | Performed by: SURGERY

## 2019-10-02 PROCEDURE — 0DQ60ZZ REPAIR STOMACH, OPEN APPROACH: ICD-10-PCS | Performed by: SURGERY

## 2019-10-02 PROCEDURE — 44300 OPEN BOWEL TO SKIN: CPT | Performed by: SURGERY

## 2019-10-02 PROCEDURE — 82803 BLOOD GASES ANY COMBINATION: CPT

## 2019-10-02 PROCEDURE — 87070 CULTURE OTHR SPECIMN AEROBIC: CPT | Performed by: SURGERY

## 2019-10-02 PROCEDURE — 25010000002 CEFOXITIN: Performed by: SURGERY

## 2019-10-02 PROCEDURE — 82962 GLUCOSE BLOOD TEST: CPT

## 2019-10-02 PROCEDURE — 25010000002 FENTANYL CITRATE (PF) 100 MCG/2ML SOLUTION: Performed by: NURSE ANESTHETIST, CERTIFIED REGISTERED

## 2019-10-02 PROCEDURE — 87186 SC STD MICRODIL/AGAR DIL: CPT | Performed by: SURGERY

## 2019-10-02 DEVICE — PROXIMATE RELOADABLE LINEAR STAPLER, 60MM
Type: IMPLANTABLE DEVICE | Site: ABDOMEN | Status: FUNCTIONAL
Brand: PROXIMATE

## 2019-10-02 DEVICE — PROXIMATE LINEAR STAPLER RELOADS
Type: IMPLANTABLE DEVICE | Site: ABDOMEN | Status: FUNCTIONAL
Brand: PROXIMATE

## 2019-10-02 RX ORDER — ALBUMIN, HUMAN INJ 5% 5 %
SOLUTION INTRAVENOUS CONTINUOUS PRN
Status: DISCONTINUED | OUTPATIENT
Start: 2019-10-02 | End: 2019-10-02 | Stop reason: SURG

## 2019-10-02 RX ORDER — SODIUM CHLORIDE 0.9 % (FLUSH) 0.9 %
3 SYRINGE (ML) INJECTION EVERY 12 HOURS SCHEDULED
Status: DISCONTINUED | OUTPATIENT
Start: 2019-10-02 | End: 2019-10-02 | Stop reason: HOSPADM

## 2019-10-02 RX ORDER — SODIUM CHLORIDE, SODIUM LACTATE, POTASSIUM CHLORIDE, CALCIUM CHLORIDE 600; 310; 30; 20 MG/100ML; MG/100ML; MG/100ML; MG/100ML
100 INJECTION, SOLUTION INTRAVENOUS CONTINUOUS
Status: DISCONTINUED | OUTPATIENT
Start: 2019-10-02 | End: 2019-10-02

## 2019-10-02 RX ORDER — ONDANSETRON 2 MG/ML
4 INJECTION INTRAMUSCULAR; INTRAVENOUS ONCE AS NEEDED
Status: COMPLETED | OUTPATIENT
Start: 2019-10-02 | End: 2019-10-04

## 2019-10-02 RX ORDER — DIPHENHYDRAMINE HYDROCHLORIDE 50 MG/ML
12.5 INJECTION INTRAMUSCULAR; INTRAVENOUS
Status: DISCONTINUED | OUTPATIENT
Start: 2019-10-02 | End: 2019-10-04

## 2019-10-02 RX ORDER — ACETAMINOPHEN 325 MG/1
650 TABLET ORAL ONCE AS NEEDED
Status: DISCONTINUED | OUTPATIENT
Start: 2019-10-02 | End: 2019-10-04

## 2019-10-02 RX ORDER — PROPOFOL 10 MG/ML
VIAL (ML) INTRAVENOUS AS NEEDED
Status: DISCONTINUED | OUTPATIENT
Start: 2019-10-02 | End: 2019-10-02 | Stop reason: SURG

## 2019-10-02 RX ORDER — SODIUM CHLORIDE 0.9 % (FLUSH) 0.9 %
10 SYRINGE (ML) INJECTION AS NEEDED
Status: DISCONTINUED | OUTPATIENT
Start: 2019-10-02 | End: 2019-10-25 | Stop reason: HOSPADM

## 2019-10-02 RX ORDER — SODIUM CHLORIDE 0.9 % (FLUSH) 0.9 %
10 SYRINGE (ML) INJECTION AS NEEDED
Status: DISCONTINUED | OUTPATIENT
Start: 2019-10-02 | End: 2019-10-02 | Stop reason: HOSPADM

## 2019-10-02 RX ORDER — NOREPINEPHRINE BIT/0.9 % NACL 8 MG/250ML
.02-.3 INFUSION BOTTLE (ML) INTRAVENOUS
Status: DISCONTINUED | OUTPATIENT
Start: 2019-10-02 | End: 2019-10-03

## 2019-10-02 RX ORDER — DEXAMETHASONE SODIUM PHOSPHATE 4 MG/ML
8 INJECTION, SOLUTION INTRA-ARTICULAR; INTRALESIONAL; INTRAMUSCULAR; INTRAVENOUS; SOFT TISSUE ONCE AS NEEDED
Status: DISCONTINUED | OUTPATIENT
Start: 2019-10-02 | End: 2019-10-02 | Stop reason: SDUPTHER

## 2019-10-02 RX ORDER — NALOXONE HCL 0.4 MG/ML
0.4 VIAL (ML) INJECTION AS NEEDED
Status: DISCONTINUED | OUTPATIENT
Start: 2019-10-02 | End: 2019-10-04

## 2019-10-02 RX ORDER — EPHEDRINE SULFATE 50 MG/ML
5 INJECTION, SOLUTION INTRAVENOUS ONCE AS NEEDED
Status: DISCONTINUED | OUTPATIENT
Start: 2019-10-02 | End: 2019-10-03

## 2019-10-02 RX ORDER — ROCURONIUM BROMIDE 10 MG/ML
INJECTION, SOLUTION INTRAVENOUS AS NEEDED
Status: DISCONTINUED | OUTPATIENT
Start: 2019-10-02 | End: 2019-10-02 | Stop reason: SURG

## 2019-10-02 RX ORDER — FLUMAZENIL 0.1 MG/ML
0.2 INJECTION INTRAVENOUS AS NEEDED
Status: DISCONTINUED | OUTPATIENT
Start: 2019-10-02 | End: 2019-10-04

## 2019-10-02 RX ORDER — CHLORHEXIDINE GLUCONATE 0.12 MG/ML
15 RINSE ORAL EVERY 12 HOURS SCHEDULED
Status: DISCONTINUED | OUTPATIENT
Start: 2019-10-02 | End: 2019-10-03

## 2019-10-02 RX ORDER — NALOXONE HCL 0.4 MG/ML
0.1 VIAL (ML) INJECTION
Status: DISCONTINUED | OUTPATIENT
Start: 2019-10-02 | End: 2019-10-25 | Stop reason: HOSPADM

## 2019-10-02 RX ORDER — EPHEDRINE SULFATE 50 MG/ML
INJECTION, SOLUTION INTRAVENOUS AS NEEDED
Status: DISCONTINUED | OUTPATIENT
Start: 2019-10-02 | End: 2019-10-02 | Stop reason: SURG

## 2019-10-02 RX ORDER — FENTANYL CITRATE 50 UG/ML
INJECTION, SOLUTION INTRAMUSCULAR; INTRAVENOUS AS NEEDED
Status: DISCONTINUED | OUTPATIENT
Start: 2019-10-02 | End: 2019-10-02 | Stop reason: SURG

## 2019-10-02 RX ORDER — SODIUM CHLORIDE, SODIUM LACTATE, POTASSIUM CHLORIDE, CALCIUM CHLORIDE 600; 310; 30; 20 MG/100ML; MG/100ML; MG/100ML; MG/100ML
100 INJECTION, SOLUTION INTRAVENOUS CONTINUOUS
Status: DISCONTINUED | OUTPATIENT
Start: 2019-10-02 | End: 2019-10-03

## 2019-10-02 RX ORDER — SODIUM CHLORIDE 9 MG/ML
INJECTION, SOLUTION INTRAVENOUS
Status: COMPLETED
Start: 2019-10-02 | End: 2019-10-02

## 2019-10-02 RX ORDER — ONDANSETRON 2 MG/ML
4 INJECTION INTRAMUSCULAR; INTRAVENOUS EVERY 6 HOURS PRN
Status: DISCONTINUED | OUTPATIENT
Start: 2019-10-02 | End: 2019-10-03

## 2019-10-02 RX ORDER — LABETALOL HYDROCHLORIDE 5 MG/ML
5 INJECTION, SOLUTION INTRAVENOUS
Status: DISCONTINUED | OUTPATIENT
Start: 2019-10-02 | End: 2019-10-04

## 2019-10-02 RX ORDER — NALOXONE HCL 0.4 MG/ML
0.1 VIAL (ML) INJECTION
Status: DISCONTINUED | OUTPATIENT
Start: 2019-10-02 | End: 2019-10-02 | Stop reason: SDUPTHER

## 2019-10-02 RX ORDER — PROMETHAZINE HYDROCHLORIDE 25 MG/ML
12.5 INJECTION, SOLUTION INTRAMUSCULAR; INTRAVENOUS ONCE AS NEEDED
Status: DISCONTINUED | OUTPATIENT
Start: 2019-10-02 | End: 2019-10-03

## 2019-10-02 RX ORDER — ALBUTEROL SULFATE 2.5 MG/3ML
2.5 SOLUTION RESPIRATORY (INHALATION)
Status: DISCONTINUED | OUTPATIENT
Start: 2019-10-02 | End: 2019-10-25 | Stop reason: HOSPADM

## 2019-10-02 RX ORDER — SODIUM CHLORIDE 0.9 % (FLUSH) 0.9 %
20 SYRINGE (ML) INJECTION AS NEEDED
Status: DISCONTINUED | OUTPATIENT
Start: 2019-10-02 | End: 2019-10-25 | Stop reason: HOSPADM

## 2019-10-02 RX ORDER — MIDAZOLAM HYDROCHLORIDE 1 MG/ML
INJECTION INTRAMUSCULAR; INTRAVENOUS AS NEEDED
Status: DISCONTINUED | OUTPATIENT
Start: 2019-10-02 | End: 2019-10-02 | Stop reason: SURG

## 2019-10-02 RX ORDER — MAGNESIUM HYDROXIDE 1200 MG/15ML
LIQUID ORAL AS NEEDED
Status: DISCONTINUED | OUTPATIENT
Start: 2019-10-02 | End: 2019-10-02 | Stop reason: HOSPADM

## 2019-10-02 RX ORDER — SODIUM CHLORIDE 0.9 % (FLUSH) 0.9 %
10 SYRINGE (ML) INJECTION EVERY 12 HOURS SCHEDULED
Status: DISCONTINUED | OUTPATIENT
Start: 2019-10-02 | End: 2019-10-25 | Stop reason: HOSPADM

## 2019-10-02 RX ORDER — BUPIVACAINE HCL/0.9 % NACL/PF 0.1 %
2 PLASTIC BAG, INJECTION (ML) EPIDURAL EVERY 8 HOURS
Status: COMPLETED | OUTPATIENT
Start: 2019-10-02 | End: 2019-10-03

## 2019-10-02 RX ORDER — SODIUM CHLORIDE, SODIUM GLUCONATE, SODIUM ACETATE, POTASSIUM CHLORIDE, AND MAGNESIUM CHLORIDE 526; 502; 368; 37; 30 MG/100ML; MG/100ML; MG/100ML; MG/100ML; MG/100ML
INJECTION, SOLUTION INTRAVENOUS CONTINUOUS PRN
Status: DISCONTINUED | OUTPATIENT
Start: 2019-10-02 | End: 2019-10-02 | Stop reason: SURG

## 2019-10-02 RX ORDER — ACETAMINOPHEN 650 MG/1
650 SUPPOSITORY RECTAL ONCE AS NEEDED
Status: DISCONTINUED | OUTPATIENT
Start: 2019-10-02 | End: 2019-10-04

## 2019-10-02 RX ORDER — SODIUM CHLORIDE 0.9 % (FLUSH) 0.9 %
10 SYRINGE (ML) INJECTION EVERY 12 HOURS SCHEDULED
Status: DISCONTINUED | OUTPATIENT
Start: 2019-10-02 | End: 2019-10-04

## 2019-10-02 RX ORDER — SODIUM CHLORIDE 0.9 % (FLUSH) 0.9 %
3-10 SYRINGE (ML) INJECTION AS NEEDED
Status: DISCONTINUED | OUTPATIENT
Start: 2019-10-02 | End: 2019-10-02 | Stop reason: HOSPADM

## 2019-10-02 RX ORDER — PROMETHAZINE HYDROCHLORIDE 12.5 MG/1
25 TABLET ORAL ONCE AS NEEDED
Status: DISCONTINUED | OUTPATIENT
Start: 2019-10-02 | End: 2019-10-03

## 2019-10-02 RX ORDER — PROMETHAZINE HYDROCHLORIDE 25 MG/1
25 SUPPOSITORY RECTAL ONCE AS NEEDED
Status: DISCONTINUED | OUTPATIENT
Start: 2019-10-02 | End: 2019-10-03

## 2019-10-02 RX ORDER — SODIUM CHLORIDE, SODIUM GLUCONATE, SODIUM ACETATE, POTASSIUM CHLORIDE, AND MAGNESIUM CHLORIDE 526; 502; 368; 37; 30 MG/100ML; MG/100ML; MG/100ML; MG/100ML; MG/100ML
9 INJECTION, SOLUTION INTRAVENOUS CONTINUOUS
Status: DISCONTINUED | OUTPATIENT
Start: 2019-10-02 | End: 2019-10-02

## 2019-10-02 RX ADMIN — PHENYLEPHRINE HYDROCHLORIDE 200 MCG: 10 INJECTION INTRAVENOUS at 14:42

## 2019-10-02 RX ADMIN — PHENYLEPHRINE HYDROCHLORIDE 200 MCG: 10 INJECTION INTRAVENOUS at 15:10

## 2019-10-02 RX ADMIN — SODIUM CHLORIDE, POTASSIUM CHLORIDE, SODIUM LACTATE AND CALCIUM CHLORIDE 1000 ML: 600; 310; 30; 20 INJECTION, SOLUTION INTRAVENOUS at 17:24

## 2019-10-02 RX ADMIN — ROCURONIUM BROMIDE 20 MG: 10 INJECTION INTRAVENOUS at 13:42

## 2019-10-02 RX ADMIN — HYDROMORPHONE HYDROCHLORIDE 1 MG: 1 INJECTION, SOLUTION INTRAMUSCULAR; INTRAVENOUS; SUBCUTANEOUS at 09:35

## 2019-10-02 RX ADMIN — POTASSIUM PHOSPHATE, MONOBASIC AND POTASSIUM PHOSPHATE, DIBASIC 10 MMOL: 224; 236 INJECTION, SOLUTION, CONCENTRATE INTRAVENOUS at 03:40

## 2019-10-02 RX ADMIN — CEFOXITIN 2 G: 2 INJECTION, POWDER, FOR SOLUTION INTRAVENOUS at 13:22

## 2019-10-02 RX ADMIN — PHENYLEPHRINE HYDROCHLORIDE 200 MCG: 10 INJECTION INTRAVENOUS at 14:34

## 2019-10-02 RX ADMIN — PHENYLEPHRINE HYDROCHLORIDE 200 MCG: 10 INJECTION INTRAVENOUS at 14:50

## 2019-10-02 RX ADMIN — FENTANYL CITRATE 100 MCG: 50 INJECTION, SOLUTION INTRAMUSCULAR; INTRAVENOUS at 13:56

## 2019-10-02 RX ADMIN — ALBUMIN HUMAN: 0.05 INJECTION, SOLUTION INTRAVENOUS at 15:25

## 2019-10-02 RX ADMIN — NOREPINEPHRINE BITARTRATE 0.02 MCG/KG/MIN: 1 INJECTION, SOLUTION, CONCENTRATE INTRAVENOUS at 18:00

## 2019-10-02 RX ADMIN — MIDAZOLAM HYDROCHLORIDE 1 MG: 2 INJECTION, SOLUTION INTRAMUSCULAR; INTRAVENOUS at 15:40

## 2019-10-02 RX ADMIN — PHENYLEPHRINE HYDROCHLORIDE 200 MCG: 10 INJECTION INTRAVENOUS at 14:38

## 2019-10-02 RX ADMIN — ONDANSETRON 4 MG: 2 INJECTION INTRAMUSCULAR; INTRAVENOUS at 06:24

## 2019-10-02 RX ADMIN — FENTANYL CITRATE 50 MCG: 50 INJECTION, SOLUTION INTRAMUSCULAR; INTRAVENOUS at 15:29

## 2019-10-02 RX ADMIN — SODIUM CHLORIDE, PRESERVATIVE FREE 10 ML: 5 INJECTION INTRAVENOUS at 22:04

## 2019-10-02 RX ADMIN — POTASSIUM PHOSPHATE, MONOBASIC AND POTASSIUM PHOSPHATE, DIBASIC 40 MMOL: 224; 236 INJECTION, SOLUTION, CONCENTRATE INTRAVENOUS at 18:42

## 2019-10-02 RX ADMIN — SILVER SULFADIAZINE: 10 CREAM TOPICAL at 22:03

## 2019-10-02 RX ADMIN — FENTANYL CITRATE 50 MCG: 50 INJECTION, SOLUTION INTRAMUSCULAR; INTRAVENOUS at 13:29

## 2019-10-02 RX ADMIN — SODIUM CHLORIDE, SODIUM GLUCONATE, SODIUM ACETATE, POTASSIUM CHLORIDE, AND MAGNESIUM CHLORIDE: 526; 502; 368; 37; 30 INJECTION, SOLUTION INTRAVENOUS at 14:50

## 2019-10-02 RX ADMIN — ONDANSETRON 4 MG: 2 INJECTION INTRAMUSCULAR; INTRAVENOUS at 09:35

## 2019-10-02 RX ADMIN — SODIUM CHLORIDE 40 MG: 900 INJECTION INTRAVENOUS at 21:52

## 2019-10-02 RX ADMIN — SODIUM CHLORIDE, PRESERVATIVE FREE 3 ML: 5 INJECTION INTRAVENOUS at 09:36

## 2019-10-02 RX ADMIN — PHENYLEPHRINE HYDROCHLORIDE 200 MCG: 10 INJECTION INTRAVENOUS at 14:29

## 2019-10-02 RX ADMIN — EPHEDRINE SULFATE 10 MG: 50 INJECTION INTRAVENOUS at 13:34

## 2019-10-02 RX ADMIN — SODIUM CHLORIDE 500 ML: 9 INJECTION, SOLUTION INTRAVENOUS at 18:59

## 2019-10-02 RX ADMIN — PHENYLEPHRINE HYDROCHLORIDE 100 MCG: 10 INJECTION INTRAVENOUS at 14:25

## 2019-10-02 RX ADMIN — FENTANYL CITRATE 50 MCG: 50 INJECTION, SOLUTION INTRAMUSCULAR; INTRAVENOUS at 14:15

## 2019-10-02 RX ADMIN — PHENYLEPHRINE HYDROCHLORIDE 100 MCG: 10 INJECTION INTRAVENOUS at 13:20

## 2019-10-02 RX ADMIN — ROCURONIUM BROMIDE 20 MG: 10 INJECTION INTRAVENOUS at 14:33

## 2019-10-02 RX ADMIN — SODIUM CHLORIDE 2 G: 9 INJECTION, SOLUTION INTRAVENOUS at 22:03

## 2019-10-02 RX ADMIN — FENTANYL CITRATE 50 MCG: 50 INJECTION, SOLUTION INTRAMUSCULAR; INTRAVENOUS at 13:46

## 2019-10-02 RX ADMIN — SODIUM CHLORIDE, POTASSIUM CHLORIDE, SODIUM LACTATE AND CALCIUM CHLORIDE 100 ML/HR: 600; 310; 30; 20 INJECTION, SOLUTION INTRAVENOUS at 03:40

## 2019-10-02 RX ADMIN — SILVER SULFADIAZINE: 10 CREAM TOPICAL at 09:35

## 2019-10-02 RX ADMIN — ROCURONIUM BROMIDE 30 MG: 10 INJECTION INTRAVENOUS at 13:29

## 2019-10-02 RX ADMIN — SODIUM CHLORIDE, SODIUM GLUCONATE, SODIUM ACETATE, POTASSIUM CHLORIDE, AND MAGNESIUM CHLORIDE 9 ML/HR: 526; 502; 368; 37; 30 INJECTION, SOLUTION INTRAVENOUS at 12:44

## 2019-10-02 RX ADMIN — MIDAZOLAM HYDROCHLORIDE 1 MG: 2 INJECTION, SOLUTION INTRAMUSCULAR; INTRAVENOUS at 13:12

## 2019-10-02 RX ADMIN — SODIUM CHLORIDE 40 MG: 900 INJECTION INTRAVENOUS at 16:45

## 2019-10-02 RX ADMIN — ROCURONIUM BROMIDE 30 MG: 10 INJECTION INTRAVENOUS at 15:20

## 2019-10-02 RX ADMIN — SODIUM CHLORIDE, PRESERVATIVE FREE 10 ML: 5 INJECTION INTRAVENOUS at 09:35

## 2019-10-02 RX ADMIN — EPHEDRINE SULFATE 10 MG: 50 INJECTION INTRAVENOUS at 14:48

## 2019-10-02 RX ADMIN — SODIUM CHLORIDE, SODIUM GLUCONATE, SODIUM ACETATE, POTASSIUM CHLORIDE, AND MAGNESIUM CHLORIDE: 526; 502; 368; 37; 30 INJECTION, SOLUTION INTRAVENOUS at 15:06

## 2019-10-02 RX ADMIN — SODIUM CHLORIDE, POTASSIUM CHLORIDE, SODIUM LACTATE AND CALCIUM CHLORIDE 100 ML/HR: 600; 310; 30; 20 INJECTION, SOLUTION INTRAVENOUS at 16:42

## 2019-10-02 RX ADMIN — FENTANYL CITRATE 50 MCG: 50 INJECTION, SOLUTION INTRAMUSCULAR; INTRAVENOUS at 13:49

## 2019-10-02 RX ADMIN — PHENYLEPHRINE HYDROCHLORIDE 200 MCG: 10 INJECTION INTRAVENOUS at 14:57

## 2019-10-02 RX ADMIN — PROPOFOL 30 MG: 10 INJECTION, EMULSION INTRAVENOUS at 13:17

## 2019-10-02 RX ADMIN — CHLORHEXIDINE GLUCONATE 0.12% ORAL RINSE 15 ML: 1.2 LIQUID ORAL at 22:02

## 2019-10-02 RX ADMIN — PHENYLEPHRINE HYDROCHLORIDE 200 MCG: 10 INJECTION INTRAVENOUS at 15:26

## 2019-10-02 RX ADMIN — ONDANSETRON 4 MG: 2 INJECTION INTRAMUSCULAR; INTRAVENOUS at 03:39

## 2019-10-02 NOTE — INTERVAL H&P NOTE
H&P reviewed. The patient was examined and there are no changes to the H&P.      Temp:  [96.2 °F (35.7 °C)-97.1 °F (36.2 °C)] 97.1 °F (36.2 °C)  Heart Rate:  [56-69] 57  Resp:  [18] 18  BP: ()/(53-67) 140/67

## 2019-10-02 NOTE — BRIEF OP NOTE
GASTROCUTANEOUS FISTULA CLOSURE, JEJUNOSTOMY  Progress Note    Emerson Bang  10/2/2019    Pre-op Diagnosis:   Gastrocutaneous fistula due to gastrostomy tube [K31.6]       Post-Op Diagnosis Codes:     * Gastrocutaneous fistula due to gastrostomy tube [K31.6]    Procedure:  GASTROCUTANEOUS FISTULA CLOSURE  JEJUNOSTOMY    Surgeon(s):  Parminder Kc MD    Anesthesia: General    Staff:   Circulator: Eileen Tyson RN; Elizabeth Ng RN  Scrub Person: Joann Mccauley; Ap Perez  Assistant: Tiffanie Thomas CSA    Estimated Blood Loss: 200 mL    Urine Voided: * No values recorded between 10/2/2019  1:10 PM and 10/2/2019  4:02 PM *    Specimens:                Specimens     ID Source Type Tests Collected By Collected At Frozen?      A Stomach Tissue · TISSUE PATHOLOGY EXAM   Parminder Kc MD 10/2/19 [id number]      Description: gastrocutaneous fistula                Drains:   Gastrostomy/Enterostomy Jejunostomy 1 18 Fr. (Active)       Findings: Dense adhesions    Complications: None      Parminder Kc MD     Date: 10/2/2019  Time: 4:14 PM

## 2019-10-02 NOTE — ANESTHESIA PROCEDURE NOTES
Airway  Urgency: elective      General Information and Staff    Patient location during procedure: OR  CRNA: Sarah Ng CRNA    Indications and Patient Condition    Preoxygenated: yes      Final Airway Details  Final airway type: endotracheal airway      Successful airway: ETT  Cuffed: yes   Successful intubation technique: direct laryngoscopy  Endotracheal tube insertion site: oral  Blade: Eda  Blade size: 3  Placement verified by: chest auscultation   Measured from: lips

## 2019-10-02 NOTE — ANESTHESIA PREPROCEDURE EVALUATION
Anesthesia Evaluation                  Airway   Comment: Trach (cuffed) w/ trach collar for supplemental O2  Dental      Pulmonary    (+) a smoker Former, COPD (panlobular emphysema, COPD),     PE comment: nonlabored  Cardiovascular     Rhythm: irregular  Rate: normal    (+) hypertension, dysrhythmias (PACs & PVCs), hyperlipidemia,       Neuro/Psych  GI/Hepatic/Renal/Endo    (+)  GERD,  liver disease (cirrhosis) history of elevated LFT, hypothyroidism,   Diabetes: preDM.    ROS Comment: Gastritis;  Pharyngoesophageal dysphagia;  Gastrocutaneous Fistula    Musculoskeletal     (+) back pain, neck pain,   Abdominal    Substance History   (+) alcohol use,      OB/GYN          Other   (+) blood dyscrasia (anemia of chronic disease)   history of cancer (pharyngeal cancer-->chronic trach )      Other Comment: malnutrition                  Anesthesia Plan    ASA 4     general     Anesthetic plan, all risks, benefits, and alternatives have been provided, discussed and informed consent has been obtained with: patient.

## 2019-10-02 NOTE — ANESTHESIA POSTPROCEDURE EVALUATION
Patient: Emerson Bang    Procedure Summary     Date:  10/02/19 Room / Location:  [facility] OR 03 /  [facility] OR    Anesthesia Start:  1310 Anesthesia Stop:  1614    Procedures:       GASTROCUTANEOUS FISTULA CLOSURE (N/A Abdomen)      JEJUNOSTOMY (N/A Abdomen) Diagnosis:       Gastrocutaneous fistula due to gastrostomy tube      (Gastrocutaneous fistula due to gastrostomy tube [K31.6])    Surgeon:  Parminder Kc MD Provider:  Daniel Romero MD    Anesthesia Type:  general ASA Status:  4          Anesthesia Type: general  Last vitals  BP   140/67 (10/02/19 1230)   Temp   97.1 °F (36.2 °C) (10/02/19 1230)   Pulse   57 (10/02/19 1230)   Resp   18 (10/02/19 1230)     SpO2   97 % (10/02/19 1230)     Post Anesthesia Care and Evaluation    Patient location during evaluation: ICU  Patient participation: complete - patient cannot participate  Level of consciousness: obtunded/minimal responses  Pain management: adequate  Airway patency: patent  Anesthetic complications: No anesthetic complications  PONV Status: none  Cardiovascular status: acceptable, hemodynamically stable and blood pressure returned to baseline  Respiratory status: acceptable, trach and ventilator  Hydration status: acceptable

## 2019-10-02 NOTE — ANESTHESIA PROCEDURE NOTES
Peripheral IV    Patient location during procedure: OR  Line placed for Fluids/Medication Admin.  Performed By   Anesthesiologist: Daniel Romero MD  Peripheral IV Prep   Patient position: supine   Prep: ChloraPrep and alcohol swabs  Patient monitoring: heart rate, cardiac monitor and continuous pulse ox  Peripheral IV Procedure   Laterality:right  Location:  Hand  Catheter size: 18 G         Post Assessment   Dressing Type: tape and transparent.    IV Dressing/Site: clean, dry and intact

## 2019-10-02 NOTE — PLAN OF CARE
Problem: Patient Care Overview  Goal: Plan of Care Review  Outcome: Ongoing (interventions implemented as appropriate)   10/02/19 1227   Coping/Psychosocial   Plan of Care Reviewed With patient   Plan of Care Review   Progress no change   OTHER   Outcome Summary Patient VSS, NPO, surgery today for g tube repair and replacement, continue to monitor     Goal: Individualization and Mutuality  Outcome: Ongoing (interventions implemented as appropriate)    Goal: Discharge Needs Assessment  Outcome: Ongoing (interventions implemented as appropriate)      Problem: Fall Risk (Adult)  Goal: Identify Related Risk Factors and Signs and Symptoms  Outcome: Ongoing (interventions implemented as appropriate)    Goal: Absence of Fall  Outcome: Ongoing (interventions implemented as appropriate)      Problem: Skin Injury Risk (Adult)  Goal: Identify Related Risk Factors and Signs and Symptoms  Outcome: Ongoing (interventions implemented as appropriate)    Goal: Skin Health and Integrity  Outcome: Ongoing (interventions implemented as appropriate)      Problem: Pain, Chronic (Adult)  Goal: Identify Related Risk Factors and Signs and Symptoms  Outcome: Ongoing (interventions implemented as appropriate)    Goal: Acceptable Pain/Comfort Level and Functional Ability  Outcome: Ongoing (interventions implemented as appropriate)      Problem: Wound (Includes Pressure Injury) (Adult)  Goal: Signs and Symptoms of Listed Potential Problems Will be Absent, Minimized or Managed (Wound)  Outcome: Ongoing (interventions implemented as appropriate)      Problem: Airway, Artificial (Adult)  Goal: Signs and Symptoms of Listed Potential Problems Will be Absent, Minimized or Managed (Airway, Artificial)  Outcome: Ongoing (interventions implemented as appropriate)

## 2019-10-02 NOTE — PLAN OF CARE
Problem: Ventilation, Mechanical Invasive (Adult)  Intervention: Prevent Airway Displacement/Mechanical Dysfunction   10/02/19 1744   Prevent Airway Displacement/Mechanical Dysfunction   Airway Safety Measures manual resuscitator/mask/valve in room     Intervention: Prevent Ventilator-Induced Lung Injury   10/02/19 1744   Respiratory Interventions   Lung Protection Measures low tidal volume provided;lung compliance monitored;optimal PEEP applied;plateau/inspiratory pressure monitored;ventilator synchrony promoted;ventilator waveforms monitored     Intervention: Prevent Ventilator-Associated Pneumonia (VAP)   10/02/19 1744   Positioning   Head of Bed (HOB) HOB at 45 degrees   VAP Prevention Measures   VAP Prevention Bundle HOB elevation maintained     Intervention: Optimize Oxygenation/Ventilation   10/02/19 1744   Respiratory Interventions   Airway/Ventilation Management airway patency maintained       Goal: Signs and Symptoms of Listed Potential Problems Will be Absent, Minimized or Managed (Ventilation, Mechanical Invasive)  Outcome: Ongoing (interventions implemented as appropriate)

## 2019-10-03 ENCOUNTER — TELEPHONE (OUTPATIENT)
Dept: EMERGENCY DEPT | Facility: HOSPITAL | Age: 59
End: 2019-10-03

## 2019-10-03 LAB
ANION GAP SERPL CALCULATED.3IONS-SCNC: 15 MMOL/L (ref 5–15)
ARTERIAL PATENCY WRIST A: ABNORMAL
ATMOSPHERIC PRESS: 748 MMHG
BASE EXCESS BLDA CALC-SCNC: 0.5 MMOL/L (ref 0–2)
BASOPHILS # BLD AUTO: 0.03 10*3/MM3 (ref 0–0.2)
BASOPHILS NFR BLD AUTO: 0.3 % (ref 0–1.5)
BDY SITE: ABNORMAL
BUN BLD-MCNC: 9 MG/DL (ref 8–23)
BUN/CREAT SERPL: 12.2 (ref 7–25)
CALCIUM SPEC-SCNC: 7.2 MG/DL (ref 8.6–10.5)
CHLORIDE SERPL-SCNC: 96 MMOL/L (ref 98–107)
CO2 SERPL-SCNC: 25 MMOL/L (ref 22–29)
CREAT BLD-MCNC: 0.74 MG/DL (ref 0.76–1.27)
DEPRECATED RDW RBC AUTO: 47.8 FL (ref 37–54)
EOSINOPHIL # BLD AUTO: 0.02 10*3/MM3 (ref 0–0.4)
EOSINOPHIL NFR BLD AUTO: 0.2 % (ref 0.3–6.2)
ERYTHROCYTE [DISTWIDTH] IN BLOOD BY AUTOMATED COUNT: 17.1 % (ref 12.3–15.4)
GAS FLOW AIRWAY: 10 LPM
GFR SERPL CREATININE-BSD FRML MDRD: 130 ML/MIN/1.73
GLUCOSE BLD-MCNC: 125 MG/DL (ref 65–99)
HCO3 BLDA-SCNC: 26.1 MMOL/L (ref 20–26)
HCT VFR BLD AUTO: 36 % (ref 37.5–51)
HGB BLD-MCNC: 12.7 G/DL (ref 13–17.7)
HOROWITZ INDEX BLD+IHG-RTO: 30 %
IMM GRANULOCYTES # BLD AUTO: 0.04 10*3/MM3 (ref 0–0.05)
IMM GRANULOCYTES NFR BLD AUTO: 0.3 % (ref 0–0.5)
LYMPHOCYTES # BLD AUTO: 1.01 10*3/MM3 (ref 0.7–3.1)
LYMPHOCYTES NFR BLD AUTO: 8.6 % (ref 19.6–45.3)
Lab: ABNORMAL
MAGNESIUM SERPL-MCNC: 1.8 MG/DL (ref 1.6–2.4)
MCH RBC QN AUTO: 27.2 PG (ref 26.6–33)
MCHC RBC AUTO-ENTMCNC: 35.3 G/DL (ref 31.5–35.7)
MCV RBC AUTO: 77.1 FL (ref 79–97)
MODALITY: ABNORMAL
MONOCYTES # BLD AUTO: 0.78 10*3/MM3 (ref 0.1–0.9)
MONOCYTES NFR BLD AUTO: 6.7 % (ref 5–12)
NEUTROPHILS # BLD AUTO: 9.8 10*3/MM3 (ref 1.7–7)
NEUTROPHILS NFR BLD AUTO: 83.9 % (ref 42.7–76)
NRBC BLD AUTO-RTO: 0 /100 WBC (ref 0–0.2)
PCO2 BLDA: 44.7 MM HG (ref 35–45)
PH BLDA: 7.38 PH UNITS (ref 7.35–7.45)
PHOSPHATE SERPL-MCNC: 3.6 MG/DL (ref 2.5–4.5)
PLATELET # BLD AUTO: 259 10*3/MM3 (ref 140–450)
PMV BLD AUTO: 11.7 FL (ref 6–12)
PO2 BLDA: 103 MM HG (ref 83–108)
POTASSIUM BLD-SCNC: 4.5 MMOL/L (ref 3.5–5.2)
RBC # BLD AUTO: 4.67 10*6/MM3 (ref 4.14–5.8)
SAO2 % BLDCOA: 98.1 % (ref 94–99)
SODIUM BLD-SCNC: 136 MMOL/L (ref 136–145)
VENTILATOR MODE: ABNORMAL
WBC NRBC COR # BLD: 11.68 10*3/MM3 (ref 3.4–10.8)

## 2019-10-03 PROCEDURE — 97162 PT EVAL MOD COMPLEX 30 MIN: CPT

## 2019-10-03 PROCEDURE — 80048 BASIC METABOLIC PNL TOTAL CA: CPT | Performed by: SURGERY

## 2019-10-03 PROCEDURE — 94799 UNLISTED PULMONARY SVC/PX: CPT

## 2019-10-03 PROCEDURE — 25010000002 HYDROMORPHONE 1 MG/ML SOLUTION: Performed by: SURGERY

## 2019-10-03 PROCEDURE — 97530 THERAPEUTIC ACTIVITIES: CPT

## 2019-10-03 PROCEDURE — 83735 ASSAY OF MAGNESIUM: CPT | Performed by: SURGERY

## 2019-10-03 PROCEDURE — 25010000002 ENOXAPARIN PER 10 MG: Performed by: SURGERY

## 2019-10-03 PROCEDURE — 82803 BLOOD GASES ANY COMBINATION: CPT

## 2019-10-03 PROCEDURE — 25010000002 CEFAZOLIN PER 500 MG: Performed by: SURGERY

## 2019-10-03 PROCEDURE — 85025 COMPLETE CBC W/AUTO DIFF WBC: CPT | Performed by: SURGERY

## 2019-10-03 PROCEDURE — 36600 WITHDRAWAL OF ARTERIAL BLOOD: CPT

## 2019-10-03 PROCEDURE — 84100 ASSAY OF PHOSPHORUS: CPT | Performed by: SURGERY

## 2019-10-03 PROCEDURE — 94760 N-INVAS EAR/PLS OXIMETRY 1: CPT

## 2019-10-03 RX ORDER — PANTOPRAZOLE SODIUM 40 MG/10ML
40 INJECTION, POWDER, LYOPHILIZED, FOR SOLUTION INTRAVENOUS
Status: DISCONTINUED | OUTPATIENT
Start: 2019-10-03 | End: 2019-10-13

## 2019-10-03 RX ORDER — DEXTROSE, SODIUM CHLORIDE, AND POTASSIUM CHLORIDE 5; .45; .15 G/100ML; G/100ML; G/100ML
75 INJECTION INTRAVENOUS CONTINUOUS
Status: DISCONTINUED | OUTPATIENT
Start: 2019-10-03 | End: 2019-10-07

## 2019-10-03 RX ADMIN — Medication 10 ML: at 10:05

## 2019-10-03 RX ADMIN — SODIUM CHLORIDE 40 MG: 900 INJECTION INTRAVENOUS at 01:49

## 2019-10-03 RX ADMIN — HYDROMORPHONE HYDROCHLORIDE 1 MG: 1 INJECTION, SOLUTION INTRAMUSCULAR; INTRAVENOUS; SUBCUTANEOUS at 10:36

## 2019-10-03 RX ADMIN — POTASSIUM CHLORIDE, DEXTROSE MONOHYDRATE AND SODIUM CHLORIDE 75 ML/HR: 150; 5; 450 INJECTION, SOLUTION INTRAVENOUS at 10:04

## 2019-10-03 RX ADMIN — ENOXAPARIN SODIUM 40 MG: 40 INJECTION SUBCUTANEOUS at 11:32

## 2019-10-03 RX ADMIN — ALBUTEROL SULFATE 2.5 MG: 2.5 SOLUTION RESPIRATORY (INHALATION) at 08:02

## 2019-10-03 RX ADMIN — PANTOPRAZOLE SODIUM 40 MG: 40 INJECTION, POWDER, FOR SOLUTION INTRAVENOUS at 09:59

## 2019-10-03 RX ADMIN — HYDROMORPHONE HYDROCHLORIDE 1 MG: 1 INJECTION, SOLUTION INTRAMUSCULAR; INTRAVENOUS; SUBCUTANEOUS at 03:31

## 2019-10-03 RX ADMIN — SODIUM CHLORIDE, POTASSIUM CHLORIDE, SODIUM LACTATE AND CALCIUM CHLORIDE 100 ML/HR: 600; 310; 30; 20 INJECTION, SOLUTION INTRAVENOUS at 05:17

## 2019-10-03 RX ADMIN — Medication 10 ML: at 10:04

## 2019-10-03 RX ADMIN — SILVER SULFADIAZINE: 10 CREAM TOPICAL at 09:59

## 2019-10-03 RX ADMIN — SODIUM CHLORIDE 2 G: 9 INJECTION, SOLUTION INTRAVENOUS at 05:20

## 2019-10-03 RX ADMIN — SILVER SULFADIAZINE: 10 CREAM TOPICAL at 21:00

## 2019-10-03 RX ADMIN — ALBUTEROL SULFATE 2.5 MG: 2.5 SOLUTION RESPIRATORY (INHALATION) at 13:45

## 2019-10-03 RX ADMIN — SODIUM CHLORIDE, PRESERVATIVE FREE 10 ML: 5 INJECTION INTRAVENOUS at 10:05

## 2019-10-03 RX ADMIN — POTASSIUM CHLORIDE, DEXTROSE MONOHYDRATE AND SODIUM CHLORIDE 75 ML/HR: 150; 5; 450 INJECTION, SOLUTION INTRAVENOUS at 20:58

## 2019-10-03 RX ADMIN — HYDROMORPHONE HYDROCHLORIDE 1 MG: 1 INJECTION, SOLUTION INTRAMUSCULAR; INTRAVENOUS; SUBCUTANEOUS at 22:46

## 2019-10-03 RX ADMIN — HYDROMORPHONE HYDROCHLORIDE 1 MG: 1 INJECTION, SOLUTION INTRAMUSCULAR; INTRAVENOUS; SUBCUTANEOUS at 17:48

## 2019-10-03 RX ADMIN — SODIUM CHLORIDE 40 MG: 900 INJECTION INTRAVENOUS at 06:08

## 2019-10-03 NOTE — PLAN OF CARE
Problem: Patient Care Overview  Goal: Plan of Care Review  Outcome: Ongoing (interventions implemented as appropriate)   10/03/19 2699   Coping/Psychosocial   Plan of Care Reviewed With caregiver   Plan of Care Review   Progress improving   OTHER   Outcome Summary JTube placed and tube feeding started. RD will monitor tolerance and increase.        Problem: Nutrition, Enteral (Adult)  Goal: Signs and Symptoms of Listed Potential Problems Will be Absent, Minimized or Managed (Nutrition, Enteral)  Outcome: Ongoing (interventions implemented as appropriate)   10/03/19 3791   Goal/Outcome Evaluation   Problems Assessed (Enteral Nutrition) all   Problems Present (Enteral Nutrition) malnutrition

## 2019-10-03 NOTE — MEDICAL STUDENT
GENERAL SURGERY PROGRESS NOTE     LOS: 3 days     Chief Complaint:     Gastrocutaneous Fistula Closure & Jejunostomy    Interval History:     Mr. Bang is POD1 following gastrocutaneous fistula closure. Today he is alert, pleasant, and oriented. This morning, RT removed him from ventilator and placed him on 30% trach collar. His berumen catheter was also removed.    Medication Review:     albuterol 2.5 mg Nebulization Q6H - RT   chlorhexidine 15 mL Mouth/Throat Q12H   enoxaparin 40 mg Subcutaneous Daily   silver sulfadiazine  Topical Q12H   sodium chloride 10 mL Intravenous Q12H   sodium chloride 10 mL Intravenous Q12H   sodium chloride 10 mL Intravenous Q12H         lactated ringers 100 mL/hr Last Rate: 100 mL/hr (10/03/19 0517)   norepinephrine 0.02-0.3 mcg/kg/min Last Rate: Stopped (10/03/19 0010)   pantoprazole 40 mg Last Rate: 40 mg (10/03/19 0608)   propofol 5-50 mcg/kg/min Last Rate: Stopped (10/02/19 1859)   Objective     Vital Signs:  Temp:  [95.3 °F (35.2 °C)-98.6 °F (37 °C)] 98.5 °F (36.9 °C)  Heart Rate:  [56-96] 76  Resp:  [6-34] 15  BP: ()/(35-77) 126/68  FiO2 (%):  [30 %-60 %] 30 %    Intake/Output Summary (Last 24 hours) at 10/3/2019 0626  Last data filed at 10/3/2019 0607  Gross per 24 hour   Intake 4071 ml   Output 1525 ml   Net 2546 ml       Physical Exam   Abdominal: Soft. Bowel sounds are normal.              Results Review:    Results from last 7 days   Lab Units 10/03/19  0441 10/02/19  1625 10/02/19  0524   SODIUM mmol/L 136 133* 131*   POTASSIUM mmol/L 4.5 3.5 3.9   CHLORIDE mmol/L 96* 93* 91*   CO2 mmol/L 25.0 25.0 29.0   BUN mg/dL 9 9 9   CREATININE mg/dL 0.74* 0.53* 0.61*   GLUCOSE mg/dL 125* 111* 97   CALCIUM mg/dL 7.2* 7.0* 8.7     Results from last 7 days   Lab Units 10/03/19  0441 10/02/19  1625 10/02/19  0524   WBC 10*3/mm3 11.68* 8.61 6.34   HEMOGLOBIN g/dL 12.7* 8.3* 12.0*   HEMATOCRIT % 36.0* 23.8* 33.7*   PLATELETS 10*3/mm3 259 230 304       Assessment:    * No active  hospital problems. *      Mr. Bang is recovering from his surgery satisfactorily.    Plan:    Advance feeding via J-Tube as tolerable. Plan to move to medicine floor once spontaneous breathing via trach is achieved.    Feeding: NPO with goal of starting Peptamen 1.5 via J-Tube once recovered.  Analgesia: Dilaudid 1 mg Q3H PRN until 10/12  Sedation: Propofol was DCd evening of 10/2  Thromboembolic Prophylaxis: Lovenox 40   Head of bed: Elevated  Ulcer:Protonix 20mL/hr  Glycemic Control: None  Spontaneous breathing: RT took Mr. Bang off of ventilator and placed him on 30% trach collar morning of 10/3  Bowel regimen: NPO  Indwelling catheter: Palomo removed morning of 10/3  De-escalation of Abx: Will finish Cefazolin 2g 10/3      This document has been electronically signed by Kaushal Dill, Medical Student on October 3, 2019 6:26 AM

## 2019-10-03 NOTE — PLAN OF CARE
Problem: Patient Care Overview  Goal: Plan of Care Review  Outcome: Ongoing (interventions implemented as appropriate)   10/03/19 0502   Coping/Psychosocial   Plan of Care Reviewed With patient   Plan of Care Review   Progress improving   OTHER   Outcome Summary sedation on hold overnight, pt removed off vent and trach collar 3L applied, pain controlled by IV Dilaudid, pt tolerated dressing change well, Levophed on hold - V/S stable       Problem: Fall Risk (Adult)  Goal: Identify Related Risk Factors and Signs and Symptoms  Outcome: Outcome(s) achieved Date Met: 10/03/19    Goal: Absence of Fall  Outcome: Ongoing (interventions implemented as appropriate)      Problem: Skin Injury Risk (Adult)  Goal: Identify Related Risk Factors and Signs and Symptoms  Outcome: Outcome(s) achieved Date Met: 10/03/19    Goal: Skin Health and Integrity  Outcome: Ongoing (interventions implemented as appropriate)      Problem: Pain, Chronic (Adult)  Goal: Identify Related Risk Factors and Signs and Symptoms  Outcome: Ongoing (interventions implemented as appropriate)    Goal: Acceptable Pain/Comfort Level and Functional Ability  Outcome: Ongoing (interventions implemented as appropriate)      Problem: Wound (Includes Pressure Injury) (Adult)  Goal: Signs and Symptoms of Listed Potential Problems Will be Absent, Minimized or Managed (Wound)  Outcome: Ongoing (interventions implemented as appropriate)

## 2019-10-03 NOTE — PLAN OF CARE
Problem: Patient Care Overview  Goal: Plan of Care Review  Outcome: Ongoing (interventions implemented as appropriate)   10/02/19 2006   Coping/Psychosocial   Plan of Care Reviewed With patient   Plan of Care Review   Progress declining   OTHER   Outcome Summary Patient was brought over to CCU post-op; patients blood pressure began to drop, levophed was started, hgb was 8.4, 2 units PRBC ordered, first unit infusing currently; potassium phosphate replaced; central line placed by Dr Kc at bedside; patient temp was 95, bear hugger in place; berumen with adequate urine output; trach to vent, continue to wean; will continue to monitor       Problem: Fall Risk (Adult)  Goal: Identify Related Risk Factors and Signs and Symptoms  Outcome: Ongoing (interventions implemented as appropriate)    Goal: Absence of Fall  Outcome: Ongoing (interventions implemented as appropriate)      Problem: Skin Injury Risk (Adult)  Goal: Identify Related Risk Factors and Signs and Symptoms  Outcome: Ongoing (interventions implemented as appropriate)    Goal: Skin Health and Integrity  Outcome: Ongoing (interventions implemented as appropriate)      Problem: Pain, Chronic (Adult)  Goal: Identify Related Risk Factors and Signs and Symptoms  Outcome: Ongoing (interventions implemented as appropriate)    Goal: Acceptable Pain/Comfort Level and Functional Ability  Outcome: Ongoing (interventions implemented as appropriate)      Problem: Wound (Includes Pressure Injury) (Adult)  Goal: Signs and Symptoms of Listed Potential Problems Will be Absent, Minimized or Managed (Wound)  Outcome: Ongoing (interventions implemented as appropriate)      Problem: Airway, Artificial (Adult)  Goal: Signs and Symptoms of Listed Potential Problems Will be Absent, Minimized or Managed (Airway, Artificial)  Outcome: Ongoing (interventions implemented as appropriate)      Problem: Ventilation, Mechanical Invasive (Adult)  Goal: Signs and Symptoms of Listed Potential  Problems Will be Absent, Minimized or Managed (Ventilation, Mechanical Invasive)  Outcome: Ongoing (interventions implemented as appropriate)

## 2019-10-03 NOTE — OP NOTE
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 (Active)   Surrounding Skin Dry;Intact 10/3/2019  8:00 AM   Securement sutured to abdomen 10/3/2019  8:00 AM   Drain Status Clamped 10/3/2019  8:00 AM   Site Description Unable to view 10/3/2019  8:00 AM   Dressing Status Clean;Intact;Dry 10/3/2019  8:00 AM   Dressing Type Split gauze 10/3/2019  8:00 AM   Dressing Change Due 10/04/19 10/3/2019  8:00 AM   Feeding Tube Flushed With Other (Comment) 10/3/2019  7:22 AM   Flush/ Irrigation Intake (mL) 30 10/3/2019  8:00 AM       [REMOVED] Urethral Catheter 16 Fr. 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  Has been fed with a gastrostomy tube which has been leaking ascitic contents on his abdominal wall  causing a deep partial-thickness burn.  He is brought to the operating room today for closure of his gastrocutaneous fistula which is still open despite tube removal and placement of a jejunostomy tube of note he has had recent laparotomy for small bowel obstruction.    Description of procedure: The patient is brought to the operating room and placed supine on the operating table.  After adequate general endotracheal anesthesia, the abdominal area is prepped and draped in a sterile manner.  A briefing and timeout were performed and all parties are in agreement.  A long midline incision was made to the previous midline laparotomy and carried into the subcutaneous tissue.    We are able to avoid most of the area of burn on the left side of the abdomen.  The linea alba was divided and the abdominal cavity was easily entered with no visceral injury.  Dense adhesions were noted in all areas.  The stomach was able to be brought into the wound and the connection between the stomach and the abdominal wall was detached sharply.  This revealed a persistently large opening within the stomach that had been draining to the outside.  This was grasped with Allises on each side and closed in a transverse manner so that there would be no narrowing of the lumen.  We used a TA 60 stapler with 4.5 mm height staples for this closure.  Following firing the stapler, there was no hole in the stomach any further and the staple line was oversewn with interrupted 3-0 Vicryl sutures.    There were numerous dense adhesions throughout the abdomen was necessary to take these adhesions down in order to identify the proximal intestine the proximal intestine was ultimately found at the ligament of Treitz with identification of the inferior mesenteric vein.  Adhesions within the progressively taken down from beginning to and sharply.  When area became a bit thin with the adhesions were extremely dense this area was opened and then closed transversely  with a single fire of the TA 60 stapler with 3.5 mm height staples.  No leak from the site was noted.  This was felt to be the safest approach rather than resecting the section of intestine.  The terminal ileum was identified at the fold of Treves.    Proximal jejunum was properly identified and a 3-0 Vicryl pursestring suture was placed on the antimesenteric side.  An 18 Malecot catheter was passed through a small opening in the jejunum at the side of the pursestring and passed distally.  The pursestring was then tied and tied to the tube.  30 Vicryl Witzel Tunl. was created over the top of the tube with the last suture placed behind the tube and tied to the tube.  Was brought out through a separate stab incision on the right side of the abdomen and sutured in place with 2-0 nylon suture.  It was therefore brought out in a site that was different from the burn.    The hole in the fascia on the left side of the abdomen where the gastrostomy tube had been present was quite large.  This was opened in an elliptical fashion and the fascia was identified.  Fascia was closed with interrupted #1 PDS sutures.  This wound was left open.    The midline wound was closed with short running segments of #1 PDS suture.  The skin was brought together with staples.    The procedure was terminated.  He tolerated it well.  Sponge needle counts were correct.  Transferred to the intensive care unit..            This document has been electronically signed by Parminder Kc MD on October 3, 2019 10:43 AM      Date: 10/3/2019  Time: 10:43 AM

## 2019-10-03 NOTE — PROGRESS NOTES
On Treatment Visit       Patient: Emerson Bang   YOB: 1960   Medical Record Number: [id number]     Date of Visit  October 3, 2019   Primary Diagnosis:    1. 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  Recent events noted, including closure of a gastrocutaneous fistula and placement of a jejunostomy tube.      He has been receiving radiation therapy to the right neck and hypopharynx. He  has received 720 cGy in 4 fractions out of a planned dose of 5400 cGy in 30 fractions. He has also been receiving concurrent cisplatin chemotherapy per Dr. Prieto.   His most recent radiation treatment was on 9/30/19.     Today on exam the patient nonverbally communicates that he is not feeling well enough to resume XRT at this time.                                             Vitals:     Vitals:    10/03/19 1224   BP:    Pulse: 84   Resp:    Temp:    SpO2: 98%       Weight:   Wt Readings from Last 3 Encounters:   10/03/19 52.5 kg (115 lb 11.9 oz)   09/30/19 48.1 kg (106 lb)   09/27/19 48.1 kg (106 lb)      Pain:  There were no vitals filed for this visit.      Plan:  We will hold off until at least Monday, 10/7/2019, before considering resumption of radiation therapy treatments, depending on how rapidly he recovers from his recent surgery.    Keaton Alfred MD  Radiation Oncology    Electronically signed by Keaton Alfred MD 9/30/2019  1:25 PM

## 2019-10-03 NOTE — CONSULTS
Adult Nutrition  Assessment    Patient Name:  Emerson Bang  YOB: 1960  MRN: [id number]  Admit Date:  9/30/2019    Assessment Date:  10/3/2019    Comments:  Pt is s/p Gastrocutaneous fistula closure and J Tube placement.  MD started EN this am @ 20cc/hr-- do not advance.  Pt was taken off the vent and placed on trach collar this am.  He is requiring Levophed at the time of my visit.  RD will continue to monitor on tube feeding.      Reason for Assessment     Row Name 10/03/19 1547          Reason for Assessment    Reason For Assessment  follow-up protocol         Nutrition/Diet History     Row Name 10/03/19 1547          Nutrition/Diet History    Typical Food/Fluid Intake  Pt resting.  Per staff EN to be started per MD           Labs/Tests/Procedures/Meds     Row Name 10/03/19 1548          Labs/Procedures/Meds    Lab Results Reviewed  reviewed, pertinent        Diagnostic Tests/Procedures    Diagnostic Test/Procedure Reviewed  reviewed, pertinent     Diagnostic Test/Procedures Comments  s/p Gastrocutaneous fistrula closure and J Tube placement; Extubated        Medications    Pertinent Medications Reviewed  reviewed, pertinent     Pertinent Medications Comments  Levophed         Physical Findings     Row Name 10/03/19 1550          Physical Findings    Overall Physical Appearance  generalized wasting;loss of muscle mass;loss of subcutaneous fat;underweight;on oxygen therapy trach     Gastrointestinal  feeding tube     Tubes  jejunostomy tube           Nutrition Prescription Ordered     Row Name 10/03/19 1551          Nutrition Prescription PO    Current PO Diet  NPO        Nutrition Prescription EN    Enteral Route  Jejunostomy Just started this am     Product  Peptamen 1.5 (Vital 1.5)     TF Delivery Method  Continuous     Continuous TF Goal Rate (mL/hr)  20 mL/hr     Continuous TF Current Rate (mL/hr)  20 mL/hr                 Electronically signed by:  Jane Ghosh RD  10/03/19 3:54 PM

## 2019-10-03 NOTE — THERAPY EVALUATION
Patient Name: Emerson Bang  : 1960    MRN: [id number]                              Today's Date: 10/3/2019       Admit Date: 2019    Visit Dx:     ICD-10-CM ICD-9-CM   1. 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 Impaired physical mobility Z74.09 781.99     Patient Active Problem List   Diagnosis   • Hyperkalemia   • Iron deficiency anemia   • Gastroesophageal reflux disease with esophagitis   • Elevated AST (SGOT)   • Alcohol abuse   • Hypothyroidism   • Balance problem   • Need for vaccination   • Low magnesium levels   • Squamous cell carcinoma of pharynx (CMS/HCC)   • History of throat cancer   • Vitamin D deficiency   • Alcohol abuse counseling and surveillance   • Encounter for screening for diabetes mellitus   • Hypomagnesemia   • Iron deficiency anemia   • Hyperlipidemia   • Underweight due to inadequate caloric intake   • Need for immunization against influenza   • 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HCC)   • Hypertension   • General medical examination   • Underweight   • Epigastric pain   • Gastritis without bleeding   • Need for influenza vaccination   • Elevated liver function tests   • B12 deficiency   • Intestinal malabsorption   • Throat pain   • Acute pharyngitis   • Upper respiratory tract infection   • Neoplasm of uncertain behavior of larynx   • Pharyngoesophageal dysphagia   • Severe protein-calorie malnutrition (CMS/HCC)   • Anemia of chronic disease   • Hypotension   • Hyponatremia   • Status post insertion of percutaneous endoscopic gastrostomy (PEG) tube (CMS/HCC)   • Hx of tracheostomy   • History of throat surgery   • Hx SBO   • Urinary retention   • Generalized weakness   • Acute otitis externa of right ear   • Hard stool   • Panlobular emphysema (CMS/HCC)   • Cancer of hypopharynx (CMS/HCC)   • Diarrhea   • Encounter for venous access device care   • Prediabetes   • Status post neck dissection   • S/P partial thyroidectomy   • S/P laryngectomy   • Annual  physical exam   • Alcoholic cirrhosis of liver without ascites (CMS/HCC)     Past Medical History:   Diagnosis Date   • Allergic rhinitis     vs URI   • Anemia    • At risk for falls    • Benign prostatic hyperplasia    • Chronic gastritis    • Cirrhosis of liver (CMS/HCC)    • Complication of gastrostomy (CMS/HCC)     site not healing   • COPD (chronic obstructive pulmonary disease) (CMS/HCC)    • Dysphagia     odynophagia   • Epigastric pain    • Esophagitis    • GERD (gastroesophageal reflux disease)    • Hypertension    • Hypothyroidism, unspecified    • Low back pain    • Malaise and fatigue    • Nasal congestion 11/15/2018   • Nausea with vomiting, unspecified    • Pain in left knee    • Pain in right knee    • Primary malignant neoplasm of pharynx (CMS/HCC)    • Screening for malignant neoplasm of colon    • 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 Left upper quadrant abdominal wall abscess debridement. Gastrostomy tube erosion through & through the anterior gastric wall.Lupper quadrant abdominal wall abscess   • COLONOSCOPY  04/21/2014    Internal & external hemorrhoids found.   • COLONOSCOPY  2014    [facility]   • COLONOSCOPY N/A 10/16/2018    Procedure: COLONOSCOPY;  Surgeon: Kaushal Chester MD;  Location: [facility] ENDOSCOPY;  Service: Gastroenterology   • DIAGNOSTIC LAPAROSCOPY EXPLORATORY LAPAROTOMY N/A 7/17/2019    Procedure: DIAGNOSTIC LAPAROSCOPY, EXPLORATORY LAPAROTOMY, LYSIS OF ADHESIONS;  Surgeon: Parminder Kc MD;  Location: [facility] OR;  Service: General   • DIRECT LARYNGOSCOPY, ESOPHAGOSCOPY, BRONCHOSCOPY N/A 4/15/2019    Procedure: DIRECT LARYNGOSCOPY with biopsy, ESOPHAGOSCOPY;  Surgeon: Bharathi Washington MD;   Location: [facility] OR;  Service: ENT   • ENDOSCOPY N/A 10/16/2018    Procedure: ESOPHAGOGASTRODUODENOSCOPY;  Surgeon: Kaushal Chester MD;  Location: [facility] ENDOSCOPY;  Service: Gastroenterology   • GASTROSTOMY FEEDING TUBE INSERTION N/A 4/15/2019    Procedure: GASTROSTOMY FEEDING TUBE INSERTION;  Surgeon: Trenton Valera MD;  Location: [facility] OR;  Service: General   • TRACHEOSTOMY  11/10/2008    Respiratory failure   • UPPER GASTROINTESTINAL ENDOSCOPY  04/21/2014    Esophagitis seen. Biopsy taken. Gastritis in stomach. Biopsy taken. Normal duodenum. Biopsy taken.   • UPPER GASTROINTESTINAL ENDOSCOPY  10/16/2018   • VENOUS ACCESS DEVICE (PORT) INSERTION N/A 9/11/2019    Procedure: INSERTION VENOUS ACCESS DEVICE (MEDIPORT)        (c-arm#1);  Surgeon: Parminder Kc MD;  Location: [facility];  Service: General     General Information     Row Name 10/03/19 1157          PT Evaluation Time/Intention    Document Type  evaluation  -     Mode of Treatment  individual therapy;physical therapy  -     Row Name 10/03/19 1157          General Information    Patient Profile Reviewed?  yes  -GIAN     Prior Level of Function  independent:;gait;transfer;all household mobility;home management;ADL's dtr and sister assist with IADL  -     Existing Precautions/Restrictions  fall;other (see comments) watch gait belt placement  -     Barriers to Rehab  medically complex  -GIAN     Row Name 10/03/19 1157          Relationship/Environment    Lives With  child(lola), adult dtr  -     Row Name 10/03/19 1157          Resource/Environmental Concerns    Current Living Arrangements  home/apartment/condo  -GIAN     Row Name 10/03/19 1157          Home Main Entrance    Number of Stairs, Main Entrance  five  -GIAN     Stair Railings, Main Entrance  none  -GIAN     Row Name 10/03/19 1157          Stairs Within Home, Primary    Number of Stairs, Within Home, Primary  none  -GIAN     Row Name 10/03/19 1157          Cognitive Assessment/Intervention-  PT/OT    Orientation Status (Cognition)  oriented to;person;place;situation  -     Row Name 10/03/19 1157          Safety Issues, Functional Mobility    Safety Issues Affecting Function (Mobility)  insight into deficits/self awareness;safety precaution awareness;safety precautions follow-through/compliance  -     Impairments Affecting Function (Mobility)  balance;endurance/activity tolerance;pain;strength;shortness of breath  -       User Key  (r) = Recorded By, (t) = Taken By, (c) = Cosigned By    Initials Name Provider Type    Judy Becerra PT Physical Therapist        Mobility     Row Name 10/03/19 1157          Bed Mobility Assessment/Treatment    Bed Mobility Assessment/Treatment  supine-sit;sit-supine  -     Supine-Sit [place] (Bed Mobility)  moderate assist (50% patient effort);1 person assist  -     Sit-Supine [place] (Bed Mobility)  moderate assist (50% patient effort);1 person assist  -     Assistive Device (Bed Mobility)  head of bed elevated;bed rails  -     Comment (Bed Mobility)  Pt sat EOB 15 minutes with SBA with VSS  -     Row Name 10/03/19 1157          Transfer Assessment/Treatment    Comment (Transfers)  deferred standing/transfer to chair as patient fatigued  -     Row Name 10/03/19 1157          Bed-Chair Transfer    Bed-Chair [place] (Transfers)  not tested  -     Row Name 10/03/19 1157          Sit-Stand Transfer    Sit-Stand [place] (Transfers)  not tested  -     Row Name 10/03/19 1157          Gait/Stairs Assessment/Training    [place] Level (Gait)  not tested  -       User Key  (r) = Recorded By, (t) = Taken By, (c) = Cosigned By    Initials Name Provider Type    Judy Becerra PT Physical Therapist        Obj/Interventions     Row Name 10/03/19 1157          General ROM    GENERAL ROM COMMENTS  AROM WFL all extremities  -     Row Name 10/03/19 1157          MMT (Manual Muscle Testing)    General MMT Comments  BUE: grossly  4-/5;BLE: grossly 4-/5  -     Row Name 10/03/19 1157          Static Sitting Balance    Level of [place] (Unsupported Sitting, Static Balance)  standby assist  -GIAN     Sitting Position (Unsupported Sitting, Static Balance)  sitting on edge of bed  -     Time Able to Maintain Position (Unsupported Sitting, Static Balance)  more than 5 minutes  -     Row Name 10/03/19 1157          Dynamic Sitting Balance    Level of [place], Reaches Outside Midline (Sitting, Dynamic Balance)  contact guard assist  -GIAN     Sitting Position, Reaches Outside Midline (Sitting, Dynamic Balance)  sitting on edge of bed  -     Row Name 10/03/19 1157          Sensory Assessment/Intervention    Sensory General Assessment  no sensation deficits identified to light touch  -       User Key  (r) = Recorded By, (t) = Taken By, (c) = Cosigned By    Initials Name Provider Type    Judy Becerra, PT Physical Therapist        Goals/Plan     Row Name 10/03/19 1157          Bed Mobility Goal 1 (PT)    Activity/Assistive Device (Bed Mobility Goal 1, PT)  sit to supine;supine to sit  -     [place] Level/Cues Needed (Bed Mobility Goal 1, PT)  independent  -GIAN     Time Frame (Bed Mobility Goal 1, PT)  by discharge  -GIAN     Progress/Outcomes (Bed Mobility Goal 1, PT)  goal not met  -     Row Name 10/03/19 1157          Transfer Goal 1 (PT)    Activity/Assistive Device (Transfer Goal 1, PT)  bed-to-chair/chair-to-bed;toilet  -     [place] Level/Cues Needed (Transfer Goal 1, PT)  conditional independence;independent  -GIAN     Time Frame (Transfer Goal 1, PT)  by discharge  -GIAN     Progress/Outcome (Transfer Goal 1, PT)  goal not met  -     Row Name 10/03/19 1157          Gait Training Goal 1 (PT)    Activity/Assistive Device (Gait Training Goal 1, PT)  gait (walking locomotion)  -     [place] Level (Gait Training Goal 1, PT)  conditional independence;independent  -GIAN     Distance (Gait Goal 1, PT)  [id number]  -      Time Frame (Gait Training Goal 1, PT)  by discharge  -GIAN     Progress/Outcome (Gait Training Goal 1, PT)  goal not met  -     Row Name 10/03/19 1157          Stairs Goal 1 (PT)    Activity/Assistive Device (Stairs Goal 1, PT)  descending stairs;ascending stairs  -     [place] Level/Cues Needed (Stairs Goal 1, PT)  standby assist  -     Number of Stairs (Stairs Goal 1, PT)  5  -GIAN     Time Frame (Stairs Goal 1, PT)  by discharge  -GIAN     Progress/Outcome (Stairs Goal 1, PT)  goal not met  -       User Key  (r) = Recorded By, (t) = Taken By, (c) = Cosigned By    Initials Name Provider Type    GIAN Judy Watkins, PT Physical Therapist        Clinical Impression     Row Name 10/03/19 1157          Pain Assessment    Additional Documentation  Pain Scale: Numbers Pre/Post-Treatment (Group)  -     Row Name 10/03/19 1157          Pain Scale: Numbers Pre/Post-Treatment    Pain Scale: Numbers, Pretreatment  8/10  -     Pain Scale: Numbers, Post-Treatment  9/10  -     Pain Location  abdomen  -     Pain Intervention(s)  Medication (See MAR);Rest;Repositioned  -     Row Name 10/03/19 1157          Plan of Care Review    Plan of Care Reviewed With  patient  -     Row Name 10/03/19 1157          Physical Therapy Clinical Impression    Patient/Family Goals Statement (PT Clinical Impression)  return to PLOF  -     Criteria for Skilled Interventions Met (PT Clinical Impression)  yes;treatment indicated  -     Rehab Potential (PT Clinical Summary)  good, to achieve stated therapy goals  -     Predicted Duration of Therapy (PT)  until discharge or goals met  -     Row Name 10/03/19 1157          Vital Signs    Pre Systolic BP Rehab  130  -GIAN     Pre Treatment Diastolic BP  70  -GIAN     Intra Systolic BP Rehab  136  -GIAN     Intra Treatment Diastolic BP  74  -GIAN     Post Systolic BP Rehab  160  -GIAN     Post Treatment Diastolic BP  77  -GIAN     Pretreatment Heart Rate (beats/min)  75  -GIAN     Posttreatment  Heart Rate (beats/min)  85  -GIAN     Pre SpO2 (%)  100  -GIAN     O2 Delivery Pre Treatment  trach collar  -GIAN     Intra SpO2 (%)  92  -GIAN     O2 Delivery Intra Treatment  trach collar  -GIAN     Post SpO2 (%)  99  -GIAN     O2 Delivery Post Treatment  trach collar  -GIAN     Pre Patient Position  Supine  -GIAN     Intra Patient Position  Sitting  -GIAN     Post Patient Position  Supine  -GIAN     Row Name 10/03/19 1157          Positioning and Restraints    Pre-Treatment Position  in bed  -GIAN     Post Treatment Position  bed  -GIAN     In Bed  notified nsg;fowlers;call light within reach;encouraged to call for assist;patient within staff view  -       User Key  (r) = Recorded By, (t) = Taken By, (c) = Cosigned By    Initials Name Provider Type    Judy Becerra PT Physical Therapist        Outcome Measures     Row Name 10/03/19 1157          How much help from another person do you currently need...    Turning from your back to your side while in flat bed without using bedrails?  2  -GIAN     Moving from lying on back to sitting on the side of a flat bed without bedrails?  2  -GIAN     Moving to and from a bed to a chair (including a wheelchair)?  2  -GIAN     Standing up from a chair using your arms (e.g., wheelchair, bedside chair)?  2  -GIAN     Climbing 3-5 steps with a railing?  2  -GIAN     To walk in hospital room?  2  -GIAN     AM-PAC 6 Clicks Score (PT)  12  -     Row Name 10/03/19 1157          Functional Assessment    Outcome Measure Options  AM-PAC 6 Clicks Basic Mobility (PT)  -       User Key  (r) = Recorded By, (t) = Taken By, (c) = Cosigned By    Initials Name Provider Type    Judy Becerra PT Physical Therapist        Physical Therapy Education     Title: PT OT SLP Therapies (In Progress)     Topic: Physical Therapy (In Progress)     Point: Mobility training (In Progress)     Learning Progress Summary           Patient Acceptance, E, NR by GIAN at 10/3/2019  3:43 PM                   Point: Body mechanics (In  Progress)     Learning Progress Summary           Patient Acceptance, E, NR by  at 10/3/2019  3:43 PM                   Point: Precautions (In Progress)     Learning Progress Summary           Patient Acceptance, E, NR by  at 10/3/2019  3:43 PM                               User Key     Initials Effective Dates Name Provider Type Discipline     04/03/18 -  Judy Watkins PT Physical Therapist PT              PT Recommendation and Plan  Planned Therapy Interventions (PT Eval): balance training, bed mobility training, gait training, home exercise program, patient/family education, stair training, strengthening, transfer training  Outcome Summary/Treatment Plan (PT)  Anticipated Discharge Disposition (PT): anticipate therapy at next level of care  Plan of Care Reviewed With: patient  Outcome Summary: PT evaluation completed. Pt transferred supine to sit to supine with moderate assistance of 1 and sat EOB 15 minutes with SBA with VSS. Function limited by decreased strength, balance, and tolerance for functional mobility and activities. Pt will benefit from PT to regain lost function. Anticipate need for skilled PT at next level of care.     Time Calculation:   PT Charges     Row Name 10/03/19 1546             Time Calculation    Start Time  1157  -      Stop Time  1247  -      Time Calculation (min)  50 min  -      PT Received On  10/03/19  -      PT Goal Re-Cert Due Date  10/16/19  -         Time Calculation- PT    Total Timed Code Minutes- PT  30 minute(s)  -        User Key  (r) = Recorded By, (t) = Taken By, (c) = Cosigned By    Initials Name Provider Type     Judy Watkins, PT Physical Therapist        Therapy Charges for Today     Code Description Service Date Service Provider Modifiers Qty    71254020803 HC PT EVAL MOD COMPLEXITY 1 10/3/2019 Judy Watkins, PT GP 1    78713330700 HC PT THERAPEUTIC ACT EA 15 MIN 10/3/2019 Judy Watkins, PT GP 2          PT G-Codes  Outcome Measure Options:  -[facility] 6 Clicks Basic Mobility (PT)  -[facility] 6 Clicks Score (PT): 12    Judy Watkins, PT  10/3/2019

## 2019-10-03 NOTE — NURSING NOTE
This therapist was able to take pt off ventilator and placed pt on 30% trach collar at 0430. Pt resting well.

## 2019-10-03 NOTE — PLAN OF CARE
Problem: Patient Care Overview  Goal: Plan of Care Review   10/03/19 1544   Coping/Psychosocial   Plan of Care Reviewed With patient   OTHER   Outcome Summary PT evaluation completed. Pt transferred supine to sit to supine with moderate assistance of 1 and sat EOB 15 minutes with SBA with VSS. Function limited by decreased strength, balance, and tolerance for functional mobility and activities. Pt will benefit from PT to regain lost function. Anticipate need for skilled PT at next level of care.

## 2019-10-04 LAB
ABO + RH BLD: NORMAL
ABO + RH BLD: NORMAL
BACTERIA SPEC RESP CULT: ABNORMAL
BACTERIA SPEC RESP CULT: ABNORMAL
BH BB BLOOD EXPIRATION DATE: NORMAL
BH BB BLOOD EXPIRATION DATE: NORMAL
BH BB BLOOD TYPE BARCODE: 5100
BH BB BLOOD TYPE BARCODE: 5100
BH BB DISPENSE STATUS: NORMAL
BH BB DISPENSE STATUS: NORMAL
BH BB PRODUCT CODE: NORMAL
BH BB PRODUCT CODE: NORMAL
BH BB UNIT NUMBER: NORMAL
BH BB UNIT NUMBER: NORMAL
GRAM STN SPEC: ABNORMAL
UNIT  ABO: NORMAL
UNIT  ABO: NORMAL
UNIT  RH: NORMAL
UNIT  RH: NORMAL

## 2019-10-04 PROCEDURE — 25010000002 ENOXAPARIN PER 10 MG: Performed by: SURGERY

## 2019-10-04 PROCEDURE — 97166 OT EVAL MOD COMPLEX 45 MIN: CPT

## 2019-10-04 PROCEDURE — 94799 UNLISTED PULMONARY SVC/PX: CPT

## 2019-10-04 PROCEDURE — 99024 POSTOP FOLLOW-UP VISIT: CPT | Performed by: SURGERY

## 2019-10-04 PROCEDURE — 94760 N-INVAS EAR/PLS OXIMETRY 1: CPT

## 2019-10-04 PROCEDURE — 25010000002 ONDANSETRON PER 1 MG: Performed by: NURSE ANESTHETIST, CERTIFIED REGISTERED

## 2019-10-04 PROCEDURE — 25010000002 HYDROMORPHONE 1 MG/ML SOLUTION: Performed by: SURGERY

## 2019-10-04 PROCEDURE — 25010000002 METOCLOPRAMIDE PER 10 MG: Performed by: SURGERY

## 2019-10-04 RX ORDER — ONDANSETRON 2 MG/ML
4 INJECTION INTRAMUSCULAR; INTRAVENOUS EVERY 4 HOURS PRN
Status: DISCONTINUED | OUTPATIENT
Start: 2019-10-04 | End: 2019-10-13

## 2019-10-04 RX ORDER — METOCLOPRAMIDE HYDROCHLORIDE 5 MG/ML
10 INJECTION INTRAMUSCULAR; INTRAVENOUS EVERY 8 HOURS PRN
Status: DISCONTINUED | OUTPATIENT
Start: 2019-10-04 | End: 2019-10-05

## 2019-10-04 RX ADMIN — ENOXAPARIN SODIUM 40 MG: 40 INJECTION SUBCUTANEOUS at 08:07

## 2019-10-04 RX ADMIN — ALBUTEROL SULFATE 2.5 MG: 2.5 SOLUTION RESPIRATORY (INHALATION) at 08:13

## 2019-10-04 RX ADMIN — ALBUTEROL SULFATE 2.5 MG: 2.5 SOLUTION RESPIRATORY (INHALATION) at 19:00

## 2019-10-04 RX ADMIN — SILVER SULFADIAZINE: 10 CREAM TOPICAL at 08:07

## 2019-10-04 RX ADMIN — ONDANSETRON 4 MG: 2 INJECTION INTRAMUSCULAR; INTRAVENOUS at 04:57

## 2019-10-04 RX ADMIN — SILVER SULFADIAZINE: 10 CREAM TOPICAL at 22:53

## 2019-10-04 RX ADMIN — ALBUTEROL SULFATE 2.5 MG: 2.5 SOLUTION RESPIRATORY (INHALATION) at 12:43

## 2019-10-04 RX ADMIN — POTASSIUM CHLORIDE, DEXTROSE MONOHYDRATE AND SODIUM CHLORIDE 75 ML/HR: 150; 5; 450 INJECTION, SOLUTION INTRAVENOUS at 21:42

## 2019-10-04 RX ADMIN — HYDROMORPHONE HYDROCHLORIDE 1 MG: 1 INJECTION, SOLUTION INTRAMUSCULAR; INTRAVENOUS; SUBCUTANEOUS at 22:04

## 2019-10-04 RX ADMIN — HYDROMORPHONE HYDROCHLORIDE 1 MG: 1 INJECTION, SOLUTION INTRAMUSCULAR; INTRAVENOUS; SUBCUTANEOUS at 16:42

## 2019-10-04 RX ADMIN — METOCLOPRAMIDE 10 MG: 5 INJECTION, SOLUTION INTRAMUSCULAR; INTRAVENOUS at 09:50

## 2019-10-04 RX ADMIN — SODIUM CHLORIDE, PRESERVATIVE FREE 10 ML: 5 INJECTION INTRAVENOUS at 22:04

## 2019-10-04 RX ADMIN — POTASSIUM CHLORIDE, DEXTROSE MONOHYDRATE AND SODIUM CHLORIDE 75 ML/HR: 150; 5; 450 INJECTION, SOLUTION INTRAVENOUS at 09:50

## 2019-10-04 RX ADMIN — PANTOPRAZOLE SODIUM 40 MG: 40 INJECTION, POWDER, FOR SOLUTION INTRAVENOUS at 06:18

## 2019-10-04 RX ADMIN — HYDROMORPHONE HYDROCHLORIDE 1 MG: 1 INJECTION, SOLUTION INTRAMUSCULAR; INTRAVENOUS; SUBCUTANEOUS at 05:39

## 2019-10-04 NOTE — PLAN OF CARE
Problem: Patient Care Overview  Goal: Plan of Care Review  Outcome: Ongoing (interventions implemented as appropriate)   10/04/19 0460   Coping/Psychosocial   Plan of Care Reviewed With patient   Plan of Care Review   Progress no change   OTHER   Outcome Summary new transfer from [facility]. vomiting upon arrival. Orders to place 14 Fr NG tube and stop tube feedings. Will continue to monitor.     Goal: Individualization and Mutuality  Outcome: Ongoing (interventions implemented as appropriate)    Goal: Discharge Needs Assessment  Outcome: Ongoing (interventions implemented as appropriate)    Goal: Interprofessional Rounds/Family Conf  Outcome: Ongoing (interventions implemented as appropriate)      Problem: Fall Risk (Adult)  Goal: Absence of Fall  Outcome: Ongoing (interventions implemented as appropriate)      Problem: Skin Injury Risk (Adult)  Goal: Skin Health and Integrity  Outcome: Ongoing (interventions implemented as appropriate)      Problem: Pain, Chronic (Adult)  Goal: Acceptable Pain/Comfort Level and Functional Ability  Outcome: Ongoing (interventions implemented as appropriate)      Problem: Wound (Includes Pressure Injury) (Adult)  Goal: Signs and Symptoms of Listed Potential Problems Will be Absent, Minimized or Managed (Wound)  Outcome: Ongoing (interventions implemented as appropriate)      Problem: Nutrition, Enteral (Adult)  Goal: Signs and Symptoms of Listed Potential Problems Will be Absent, Minimized or Managed (Nutrition, Enteral)  Outcome: Ongoing (interventions implemented as appropriate)

## 2019-10-04 NOTE — PROGRESS NOTES
GENERAL SURGERY PROGRESS NOTE      Chief Complaint:     POD2 gastrocutaneous fistula closure & jejunostomy    Interval History:     Patient has an episode of green bilious vomiting this morning but was resting comfortably previously. He is no longer nauseated after vomiting. His pain was not controlled this morning, but he was due for his next dose of pain med. He was started on tube feeding yesterday, which was turned off after the episode of vomiting. He has not had any bowel movements or passed any flatus. Still on 30% trach collar. Per nurse, patient's wound is still healing and his burn site looked less red compared to the day before.    Medication Review:     albuterol 2.5 mg Nebulization Q6H - RT   enoxaparin 40 mg Subcutaneous Daily   pantoprazole 40 mg Intravenous Q AM   silver sulfadiazine  Topical Q12H   sodium chloride 10 mL Intravenous Q12H   sodium chloride 10 mL Intravenous Q12H   sodium chloride 10 mL Intravenous Q12H         dextrose 5 % and sodium chloride 0.45 % with KCl 20 mEq/L 75 mL/hr Last Rate: 75 mL/hr (10/03/19 2058)   Objective     Vital Signs:  Temp:  [97.2 °F (36.2 °C)-97.8 °F (36.6 °C)] 97.3 °F (36.3 °C)  Heart Rate:  [74-93] 79  Resp:  [10-16] 12  BP: ()/(55-79) 111/69    Intake/Output Summary (Last 24 hours) at 10/4/2019 0517  Last data filed at 10/4/2019 0400  Gross per 24 hour   Intake 1254 ml   Output 985 ml   Net 269 ml       Physical Exam   Constitutional: He is oriented to person, place, and time. He appears cachectic.   30% trach collar   HENT:   Head: Normocephalic and atraumatic.   Cardiovascular: Normal rate, regular rhythm and normal heart sounds.   No murmur heard.  Abdominal:   Dressing over anterior abdomen clean and dry.   Musculoskeletal: He exhibits no edema.   Neurological: He is alert and oriented to person, place, and time.   Skin: Skin is warm and dry.   Psychiatric: He has a normal mood and affect. His behavior is normal. Judgment and thought content  normal.   Vitals reviewed.      Results Review:    Results from last 7 days   Lab Units 10/03/19  0441 10/02/19  1625 10/02/19  0524   SODIUM mmol/L 136 133* 131*   POTASSIUM mmol/L 4.5 3.5 3.9   CHLORIDE mmol/L 96* 93* 91*   CO2 mmol/L 25.0 25.0 29.0   BUN mg/dL 9 9 9   CREATININE mg/dL 0.74* 0.53* 0.61*   GLUCOSE mg/dL 125* 111* 97   CALCIUM mg/dL 7.2* 7.0* 8.7     Results from last 7 days   Lab Units 10/03/19  0441 10/02/19  1625 10/02/19  0524   WBC 10*3/mm3 11.68* 8.61 6.34   HEMOGLOBIN g/dL 12.7* 8.3* 12.0*   HEMATOCRIT % 36.0* 23.8* 33.7*   PLATELETS 10*3/mm3 259 230 304     Results from last 7 days   Lab Units 10/01/19  0721 19  2217   ALT (SGPT) U/L 17 27   AST (SGOT) U/L 21 33       Assessment:  Patient is slowly recovering.    Plan:    Advance J-tube feeding as tolerated. Continue working with PT/OT. Plan to move to medicine floor once spontaneous breathing via trach is achieved.    Feeding: NPO with Peptamen 1.5 tube feedings to 20mL/hr, turned off temporarily after vomiting  Analgesia: Dilaudid 1mg q3 hrs PRN  Sedation: n/a  Thromboembolic ppx: lovenox, SCDs  Head of bed: elevated  Ulcer: pantoprazole  Glycemic Control: n/a  Spontaneous breathin% trach collar  Bowel regimen: n/a  Indwelling catheter: n/a  De-escalation of abx: n/a    This document has been electronically signed by Meena Mujica, Medical Student on 2019 5:17 AM

## 2019-10-04 NOTE — THERAPY EVALUATION
Acute Care - Occupational Therapy Initial Evaluation  [facility]     Patient Name: Emerson Bang  : 1960  MRN: [id number]  Today's Date: 10/4/2019  Onset of Illness/Injury or Date of Surgery: 19  Date of Referral to OT: 10/02/19  Referring Physician: Dr. Kc    Admit Date: 2019       ICD-10-CM ICD-9-CM   1. 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 Impaired physical mobility Z74.09 781.99   3. Impaired mobility and activities of daily living Z74.09 799.89     Patient Active Problem List   Diagnosis   • Hyperkalemia   • Iron deficiency anemia   • Gastroesophageal reflux disease with esophagitis   • Elevated AST (SGOT)   • Alcohol abuse   • Hypothyroidism   • Balance problem   • Need for vaccination   • Low magnesium levels   • Squamous cell carcinoma of pharynx (CMS/HCC)   • History of throat cancer   • Vitamin D deficiency   • Alcohol abuse counseling and surveillance   • Encounter for screening for diabetes mellitus   • Hypomagnesemia   • Iron deficiency anemia   • Hyperlipidemia   • Underweight due to inadequate caloric intake   • Need for immunization against influenza   • 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HCC)   • Hypertension   • General medical examination   • Underweight   • Epigastric pain   • Gastritis without bleeding   • Need for influenza vaccination   • Elevated liver function tests   • B12 deficiency   • Intestinal malabsorption   • Throat pain   • Acute pharyngitis   • Upper respiratory tract infection   • Neoplasm of uncertain behavior of larynx   • Pharyngoesophageal dysphagia   • Severe protein-calorie malnutrition (CMS/HCC)   • Anemia of chronic disease   • Hypotension   • Hyponatremia   • Status post insertion of percutaneous endoscopic gastrostomy (PEG) tube (CMS/HCC)   • Hx of tracheostomy   • History of throat surgery   • Hx SBO   • Urinary retention   • Generalized weakness   • Acute otitis externa of right ear   • Hard stool   • Panlobular emphysema  (CMS/HCC)   • Cancer of hypopharynx (CMS/HCC)   • Diarrhea   • Encounter for venous access device care   • Prediabetes   • Status post neck dissection   • S/P partial thyroidectomy   • S/P laryngectomy   • Annual physical exam   • Alcoholic cirrhosis of liver without ascites (CMS/HCC)     Past Medical History:   Diagnosis Date   • Allergic rhinitis     vs URI   • Anemia    • At risk for falls    • Benign prostatic hyperplasia    • Chronic gastritis    • Cirrhosis of liver (CMS/HCC)    • Complication of gastrostomy (CMS/HCC)     site not healing   • COPD (chronic obstructive pulmonary disease) (CMS/HCC)    • Dysphagia     odynophagia   • Epigastric pain    • Esophagitis    • GERD (gastroesophageal reflux disease)    • Hypertension    • Hypothyroidism, unspecified    • Low back pain    • Malaise and fatigue    • Nasal congestion 11/15/2018   • Nausea with vomiting, unspecified    • Pain in left knee    • Pain in right knee    • Primary malignant neoplasm of pharynx (CMS/HCC)    • Screening for malignant neoplasm of colon    • 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 Left upper quadrant abdominal wall abscess debridement. Gastrostomy tube erosion through & through the anterior gastric wall.Lupper quadrant abdominal wall abscess   • COLONOSCOPY  04/21/2014    Internal & external hemorrhoids found.   • COLONOSCOPY  2014    [place]   • COLONOSCOPY N/A 10/16/2018    Procedure: COLONOSCOPY;  Surgeon: Kaushal Chester MD;  Location: [facility] ENDOSCOPY;  Service: Gastroenterology   • DIAGNOSTIC LAPAROSCOPY EXPLORATORY LAPAROTOMY N/A 7/17/2019    Procedure: DIAGNOSTIC LAPAROSCOPY, EXPLORATORY LAPAROTOMY, LYSIS OF ADHESIONS;  Surgeon: Parminder Kc MD;   Location: [facility] OR;  Service: General   • DIRECT LARYNGOSCOPY, ESOPHAGOSCOPY, BRONCHOSCOPY N/A 4/15/2019    Procedure: DIRECT LARYNGOSCOPY with biopsy, ESOPHAGOSCOPY;  Surgeon: Bharathi Washington MD;  Location: [facility] OR;  Service: ENT   • ENDOSCOPY N/A 10/16/2018    Procedure: ESOPHAGOGASTRODUODENOSCOPY;  Surgeon: Kaushal Chester MD;  Location: [facility] ENDOSCOPY;  Service: Gastroenterology   • GASTROSTOMY FEEDING TUBE INSERTION N/A 4/15/2019    Procedure: GASTROSTOMY FEEDING TUBE INSERTION;  Surgeon: Trenton Valera MD;  Location: [facility] OR;  Service: General   • TRACHEOSTOMY  11/10/2008    Respiratory failure   • UPPER GASTROINTESTINAL ENDOSCOPY  04/21/2014    Esophagitis seen. Biopsy taken. Gastritis in stomach. Biopsy taken. Normal duodenum. Biopsy taken.   • UPPER GASTROINTESTINAL ENDOSCOPY  10/16/2018   • VENOUS ACCESS DEVICE (PORT) INSERTION N/A 9/11/2019    Procedure: INSERTION VENOUS ACCESS DEVICE (MEDIPORT)        (c-arm#1);  Surgeon: Parminder Kc MD;  Location: [facility];  Service: General          OT ASSESSMENT FLOWSHEET (last 12 hours)      Occupational Therapy Evaluation     Row Name 10/04/19 1026                   OT Evaluation Time/Intention    Subjective Information  complains of;pain;nausea/vomiting in mouth  -AS        Document Type  evaluation  -AS        Mode of Treatment  individual therapy;occupational therapy  -AS        Total Evaluation Minutes, Occupational Therapy  20  -AS        Patient Effort  fair  -AS        Symptoms Noted During/After Treatment  none  -AS        Comment  Upon arrival, nurse reports pt is about to be transferred to medical floor. Pt c/o pain and nausea, but agreeable to participate with in-bed evaluation. Nurse reports she had just given him nausea medication prior to eval. Pt was unable to communicate verbally this date, but was able to nod yes or no, mouth responses, and use nonverbal communication  -AS           General Information    Patient  Profile Reviewed?  yes  -AS        Onset of Illness/Injury or Date of Surgery  09/30/19  -AS        Referring Physician  Dr. Kc  -AS        Patient Observations  alert;cooperative;agree to therapy  -AS        Patient/Family Observations  no family present  -AS        General Observations of Patient  supine in bed, trach collar, O2, IV,tele, G-tube  -AS        Prior Level of Function  independent:;gait;transfer;all household mobility;home management;ADL's dght and sister assist with IADLs  -AS        Equipment Currently Used at Home  shower chair  -AS        Pertinent History of Current Functional Problem  Pt is a 60 yo M s/p J tube placement and gastrocutaneous fistula closure performed by Dr. Kc  -AS        Existing Precautions/Restrictions  fall;other (see comments);oxygen therapy device and L/min  watch gait belt placement  -AS        Risks Reviewed  patient:;LOB;increased discomfort;other:;change in vital signs  -AS        Benefits Reviewed  patient:;improve function;increase independence;increase strength;increase balance;decrease pain;decrease risk of DVT;improve skin integrity;increase knowledge  -AS        Barriers to Rehab  medically complex  -AS           Relationship/Environment    Lives With  child(lola), adult dght  -AS           Resource/Environmental Concerns    Current Living Arrangements  home/apartment/condo  -AS           Home Main Entrance    Number of Stairs, Main Entrance  five  -AS        Stair Railings, Main Entrance  none  -AS           Cognitive Assessment/Interventions    Additional Documentation  Cognitive Assessment/Intervention (Group)  -AS           Cognitive Assessment/Intervention- PT/OT    Affect/Mental Status (Cognitive)  flat/blunted affect;sad/depressed affect  -AS        Behavioral Issues (Cognitive)  withdrawn  -AS        Orientation Status (Cognition)  oriented x 4  -AS        Follows Commands (Cognition)  WFL  -AS           Safety Issues, Functional Mobility    Safety Issues  Affecting Function (Mobility)  safety precaution awareness;safety precautions follow-through/compliance  -AS        Impairments Affecting Function (Mobility)  balance;endurance/activity tolerance;grasp;pain;postural/trunk control;strength  -AS        Comment, Safety Issues/Impairments (Mobility)  mobility deferred due to fatigue and pain  -AS           Bed Mobility Assessment/Treatment    Bed Mobility, Safety Issues  decreased use of arms for pushing/pulling;decreased use of legs for bridging/pushing  -AS        Comment (Bed Mobility)  mobility deferred due to fatigue and pain  -AS           Functional Mobility    Functional Mobility- Comment  mobility deferred due to fatigue and pain  -AS           Transfer Assessment/Treatment    Comment (Transfers)  transfers deferred due to fatigue and pain  -AS           ADL Assessment/Intervention    BADL Assessment/Intervention  toileting  -AS           Toileting Assessment/Training    [place] Level (Toileting)  toileting skills;moderate assist (50% patient effort)  -AS        Assistive Devices (Toileting)  urinal  -AS        Toileting Position  supine  -AS        Comment (Toileting)  Pt requiring assist for clothing management and to place self into urinal  -AS           BADL Safety/Performance    Impairments, BADL Safety/Performance  balance;endurance/activity tolerance;grasp/prehension;pain;strength;trunk/postural control  -AS           General ROM    GENERAL ROM COMMENTS  BUE AROM WFL grossly throughout  -AS           MMT (Manual Muscle Testing)    General MMT Comments  BUE 3+/5 grossly throughout  -AS           Sensory Assessment/Intervention    Sensory General Assessment  light touch sensation deficits identified;other (see comments) BUE  light touch  -AS           Light Touch Sensation Assessment    Left Upper Extremity: Light Touch Sensation Assessment  intact  -AS        Comment, Left Upper Extremity: Light Touch Sensation Assessment  When asked about sensation,  "pt pointed to L hand, indicating he has decreased sensation in that area. Pt can feel area, but sensation is decreased compared to the rest of BUE  -AS           Positioning and Restraints    Pre-Treatment Position  in bed  -AS        Post Treatment Position  bed  -AS        In Bed  notified nsg;supine;call light within reach;encouraged to call for assist;exit alarm on  -AS           Pain Assessment    Additional Documentation  Pain Scale: Numbers Pre/Post-Treatment (Group)  -AS           Pain Scale: Numbers Pre/Post-Treatment    Pre/Post Treatment Pain Comment  Pt nodded \"yes\" when asked if he has pain. When asked about pain location, he pointed to his mouth. Pt did not rate the pain on a scale of 1-10. RN notified  -AS           Wound 09/30/19 1330 Left medial abdomen Fistula    Wound - Properties Group Date first assessed: 09/30/19  -SS Time first assessed: 1330  -SS Side: Left  -SS Orientation: medial  -SS Location: abdomen  -SS Primary Wound Type: Fistula  -SS       Wound 10/02/19 1347 abdomen Incision    Wound - Properties Group Date first assessed: 10/02/19  -CF Time first assessed: 1347  -CF Present on Hospital Admission: N  -CF Location: abdomen  -CF Primary Wound Type: Incision  -CF       Plan of Care Review    Plan of Care Reviewed With  patient  -AS           Clinical Impression (OT)    Date of Referral to OT  10/02/19  -AS        OT Diagnosis  impaired ADL's and functional mobility  -AS        Patient/Family Goals Statement (OT Eval)  return to PLOF  -AS        Criteria for Skilled Therapeutic Interventions Met (OT Eval)  yes;treatment indicated  -AS        Rehab Potential (OT Eval)  good, to achieve stated therapy goals  -AS        Therapy Frequency (OT Eval)  other (see comments) 5-7 days/wk  -AS        Predicted Duration of Therapy Intervention (Therapy Eval)  until all goals met or d/c from facility  -AS        Care Plan Review (OT)  evaluation/treatment results reviewed;care plan/treatment goals " reviewed;risks/benefits reviewed;current/potential barriers reviewed;patient/other agree to care plan  -AS        Anticipated Discharge Disposition (OT)  home with 24/7 care;home with home health  -AS           Vital Signs    Pre Systolic BP Rehab  132  -AS        Pre Treatment Diastolic BP  68  -AS        Pretreatment Heart Rate (beats/min)  90  -AS        Pre SpO2 (%)  99  -AS        O2 Delivery Pre Treatment  trach collar  -AS        Pre Patient Position  Supine  -AS           Planned OT Interventions    Planned Therapy Interventions (OT Eval)  activity tolerance training;BADL retraining;functional balance retraining;occupation/activity based interventions;patient/caregiver education/training;ROM/therapeutic exercise;transfer/mobility retraining;strengthening exercise;adaptive equipment training;neuromuscular control/coordination retraining  -AS           OT Goals    Transfer Goal Selection (OT)  transfer, OT goal 1  -AS        Bathing Goal Selection (OT)  bathing, OT goal 1  -AS        Dressing Goal Selection (OT)  dressing, OT goal 1  -AS        Toileting Goal Selection (OT)  toileting, OT goal 1  -AS        Activity Tolerance Goal Selection (OT)  activity tolerance, OT goal 1  -AS        Additional Documentation  Activity Tolerance Goal Selection (OT) (Row)  -AS           Transfer Goal 1 (OT)    Activity/Assistive Device (Transfer Goal 1, OT)  sit-to-stand/stand-to-sit;bed-to-chair/chair-to-bed;toilet  -AS        [place] Level/Cues Needed (Transfer Goal 1, OT)  supervision required  -AS        Time Frame (Transfer Goal 1, OT)  long term goal (LTG);by discharge  -AS        Progress/Outcome (Transfer Goal 1, OT)  goal not met  -AS           Bathing Goal 1 (OT)    Activity/Assistive Device (Bathing Goal 1, OT)  bathing skills, all  -AS        [place] Level/Cues Needed (Bathing Goal 1, OT)  minimum assist (75% or more patient effort)  -AS        Time Frame (Bathing Goal 1, OT)  long term goal (LTG);by  discharge  -AS        Barriers (Bathing Goal 1, OT)  Pt will need extra time and rest breaks PRN  -AS        Progress/Outcomes (Bathing Goal 1, OT)  goal not met  -AS           Dressing Goal 1 (OT)    Activity/Assistive Device (Dressing Goal 1, OT)  dressing skills, all  -AS        [place]/Cues Needed (Dressing Goal 1, OT)  minimum assist (75% or more patient effort)  -AS        Time Frame (Dressing Goal 1, OT)  long term goal (LTG);by discharge  -AS        Barriers (Dressing Goal 1, OT)  Pt will need extra time and rest breaks PRN  -AS        Progress/Outcome (Dressing Goal 1, OT)  goal not met  -AS           Toileting Goal 1 (OT)    Activity/Device (Toileting Goal 1, OT)  toileting skills, all  -AS        [place] Level/Cues Needed (Toileting Goal 1, OT)  minimum assist (75% or more patient effort)  -AS        Time Frame (Toileting Goal 1, OT)  long term goal (LTG);by discharge  -AS        Barriers (Toileting Goal 1, OT)  Pt will need extra time and rest breaks PRN  -AS        Progress/Outcome (Toileting Goal 1, OT)  goal not met  -AS            Activity Tolerance Goal 1 (OT)    Activity Level (Endurance Goal 1, OT)  15 min activity functional/therapeutic activity  -AS        Time Frame (Activity Tolerance Goal 1, OT)  long term goal (LTG);by discharge  -AS        Progress/Outcome (Activity Tolerance Goal 1, OT)  goal not met  -AS           Living Environment    Home Accessibility  stairs to enter home;tub/shower is not walk in tub/shower combo  -AS          User Key  (r) = Recorded By, (t) = Taken By, (c) = Cosigned By    Initials Name Effective Dates    SS Citlalli Abdi RN 07/10/18 -     CF Eileen Tyson RN 10/17/16 -     AS Destinee Yarbrough OT 03/25/19 -          Occupational Therapy Education     Title: PT OT SLP Therapies (In Progress)     Topic: Occupational Therapy (In Progress)     Point: ADL training (In Progress)     Description: Instruct learner(s) on proper safety adaptation and  remediation techniques during self care or transfers.   Instruct in proper use of assistive devices.    Learning Progress Summary           Patient Acceptance, E, NR by AS at 10/4/2019 11:45 AM    Comment:  Role of OT, OT POC, importance of OOB activity for strength and healing, ADL training                   Point: Precautions (In Progress)     Description: Instruct learner(s) on prescribed precautions during self-care and functional transfers.    Learning Progress Summary           Patient Acceptance, E, NR by AS at 10/4/2019 11:45 AM    Comment:  Role of OT, OT POC, importance of OOB activity for strength and healing, ADL training                               User Key     Initials Effective Dates Name Provider Type Discipline    AS 03/25/19 -  Destinee Yarbrough, OT Occupational Therapist OT                  OT Recommendation and Plan  Outcome Summary/Treatment Plan (OT)  Anticipated Discharge Disposition (OT): home with 24/7 care, home with home health  Planned Therapy Interventions (OT Eval): activity tolerance training, BADL retraining, functional balance retraining, occupation/activity based interventions, patient/caregiver education/training, ROM/therapeutic exercise, transfer/mobility retraining, strengthening exercise, adaptive equipment training, neuromuscular control/coordination retraining  Therapy Frequency (OT Eval): other (see comments)(5-7 days/wk)  Plan of Care Review  Plan of Care Reviewed With: patient  Plan of Care Reviewed With: patient  Outcome Summary: OT eval completed this date. Pt c/o pain and nausea, but agreeable to participate in in-bed evaluation. RN notified and reports pt just given nausea medication prior to eval. Pt's BUE AROM WFL and strength 3+/5 grossly throughout. Pt was mod A for toileting using urinal while supine in bed. Pt deferred all other ADL's, transfers, and functional mobility 2/2 pain, nausea, and weakness. Pt's participation in ADL's and functional mobility limited  by pain, weakness, decreased strength and endurance, and fatigue. Pt would benefit from skilled OT to reach max indpendence and address the stated deficits. Recommend pt return to home with 24/7 assist and HHOT for safe transition home.    Outcome Measures     Row Name 10/04/19 1026             How much help from another is currently needed...    Putting on and taking off regular lower body clothing?  2  -AS      Bathing (including washing, rinsing, and drying)  2  -AS      Toileting (which includes using toilet bed pan or urinal)  2  -AS      Putting on and taking off regular upper body clothing  2  -AS      Taking care of personal grooming (such as brushing teeth)  2  -AS      Eating meals  1  -AS      AM-PAC 6 Clicks Score (OT)  11  -AS         Functional Assessment    Outcome Measure Options  AM-PAC 6 Clicks Daily Activity (OT)  -AS        User Key  (r) = Recorded By, (t) = Taken By, (c) = Cosigned By    Initials Name Provider Type    AS Destinee Yarbrough OT Occupational Therapist          Time Calculation:   Time Calculation- OT     Row Name 10/04/19 1309             Time Calculation- OT    OT Start Time  1026  -AS      OT Stop Time  1046  -AS      OT Time Calculation (min)  20 min  -AS      OT Received On  10/04/19  -AS      OT Goal Re-Cert Due Date  10/17/19  -AS        User Key  (r) = Recorded By, (t) = Taken By, (c) = Cosigned By    Initials Name Provider Type    AS Destinee Yarbrough OT Occupational Therapist        Therapy Charges for Today     Code Description Service Date Service Provider Modifiers Qty    [id number]  OT EVAL MOD COMPLEXITY 1 10/4/2019 Destinee Yarbrough OT GO 1               Destinee Yarbrough OT  10/4/2019

## 2019-10-04 NOTE — PLAN OF CARE
Problem: Patient Care Overview  Goal: Plan of Care Review  Outcome: Ongoing (interventions implemented as appropriate)   10/04/19 0428   Coping/Psychosocial   Plan of Care Reviewed With patient   Plan of Care Review   Progress improving   OTHER   Outcome Summary tube feeding started yesterday - pt tolerating well, V/S stable overnight, pt still on 3L trach collar, adequate urine output, pain controlled by IV Dilaudid       Problem: Fall Risk (Adult)  Goal: Absence of Fall  Outcome: Ongoing (interventions implemented as appropriate)      Problem: Skin Injury Risk (Adult)  Goal: Skin Health and Integrity  Outcome: Ongoing (interventions implemented as appropriate)      Problem: Pain, Chronic (Adult)  Goal: Identify Related Risk Factors and Signs and Symptoms  Outcome: Outcome(s) achieved Date Met: 10/04/19    Goal: Acceptable Pain/Comfort Level and Functional Ability  Outcome: Ongoing (interventions implemented as appropriate)      Problem: Wound (Includes Pressure Injury) (Adult)  Goal: Signs and Symptoms of Listed Potential Problems Will be Absent, Minimized or Managed (Wound)  Outcome: Ongoing (interventions implemented as appropriate)      Problem: Nutrition, Enteral (Adult)  Goal: Signs and Symptoms of Listed Potential Problems Will be Absent, Minimized or Managed (Nutrition, Enteral)  Outcome: Ongoing (interventions implemented as appropriate)

## 2019-10-04 NOTE — PLAN OF CARE
Problem: Patient Care Overview  Goal: Plan of Care Review  Outcome: Ongoing (interventions implemented as appropriate)   10/04/19 2975   Coping/Psychosocial   Plan of Care Reviewed With patient   OTHER   Outcome Summary OT eval completed this date. Pt c/o pain and nausea, but agreeable to participate in in-bed evaluation. RN notified and reports pt just given nausea medication prior to eval. Pt's BUE AROM WFL and strength 3+/5 grossly throughout. Pt was mod A for toileting using urinal while supine in bed. Pt deferred all other ADL's, transfers, and functional mobility 2/2 pain, nausea, and weakness. Pt's participation in ADL's and functional mobility limited by pain, weakness, decreased strength and endurance, and fatigue. Pt would benefit from skilled OT to reach max indpendence and address the stated deficits. Anticipate with progress pt return to home with 24/7 assist and HHOT for safe transition home.

## 2019-10-05 PROCEDURE — 25010000002 HYDROMORPHONE 1 MG/ML SOLUTION: Performed by: SURGERY

## 2019-10-05 PROCEDURE — 97110 THERAPEUTIC EXERCISES: CPT

## 2019-10-05 PROCEDURE — 94799 UNLISTED PULMONARY SVC/PX: CPT

## 2019-10-05 PROCEDURE — 94760 N-INVAS EAR/PLS OXIMETRY 1: CPT

## 2019-10-05 PROCEDURE — 25010000002 ENOXAPARIN PER 10 MG: Performed by: SURGERY

## 2019-10-05 RX ADMIN — SODIUM CHLORIDE, PRESERVATIVE FREE 10 ML: 5 INJECTION INTRAVENOUS at 22:43

## 2019-10-05 RX ADMIN — POTASSIUM CHLORIDE, DEXTROSE MONOHYDRATE AND SODIUM CHLORIDE 75 ML/HR: 150; 5; 450 INJECTION, SOLUTION INTRAVENOUS at 10:05

## 2019-10-05 RX ADMIN — SODIUM CHLORIDE, PRESERVATIVE FREE 10 ML: 5 INJECTION INTRAVENOUS at 09:38

## 2019-10-05 RX ADMIN — HYDROMORPHONE HYDROCHLORIDE 1 MG: 1 INJECTION, SOLUTION INTRAMUSCULAR; INTRAVENOUS; SUBCUTANEOUS at 03:08

## 2019-10-05 RX ADMIN — SILVER SULFADIAZINE: 10 CREAM TOPICAL at 09:38

## 2019-10-05 RX ADMIN — SILVER SULFADIAZINE: 10 CREAM TOPICAL at 22:43

## 2019-10-05 RX ADMIN — ALBUTEROL SULFATE 2.5 MG: 2.5 SOLUTION RESPIRATORY (INHALATION) at 06:53

## 2019-10-05 RX ADMIN — ENOXAPARIN SODIUM 40 MG: 40 INJECTION SUBCUTANEOUS at 09:37

## 2019-10-05 RX ADMIN — POTASSIUM CHLORIDE, DEXTROSE MONOHYDRATE AND SODIUM CHLORIDE 75 ML/HR: 150; 5; 450 INJECTION, SOLUTION INTRAVENOUS at 22:51

## 2019-10-05 RX ADMIN — HYDROMORPHONE HYDROCHLORIDE 1 MG: 1 INJECTION, SOLUTION INTRAMUSCULAR; INTRAVENOUS; SUBCUTANEOUS at 16:19

## 2019-10-05 RX ADMIN — ALBUTEROL SULFATE 2.5 MG: 2.5 SOLUTION RESPIRATORY (INHALATION) at 13:12

## 2019-10-05 RX ADMIN — PANTOPRAZOLE SODIUM 40 MG: 40 INJECTION, POWDER, FOR SOLUTION INTRAVENOUS at 07:23

## 2019-10-05 RX ADMIN — ALBUTEROL SULFATE 2.5 MG: 2.5 SOLUTION RESPIRATORY (INHALATION) at 00:18

## 2019-10-05 RX ADMIN — HYDROMORPHONE HYDROCHLORIDE 1 MG: 1 INJECTION, SOLUTION INTRAMUSCULAR; INTRAVENOUS; SUBCUTANEOUS at 22:43

## 2019-10-05 RX ADMIN — ALBUTEROL SULFATE 2.5 MG: 2.5 SOLUTION RESPIRATORY (INHALATION) at 20:37

## 2019-10-05 RX ADMIN — HYDROMORPHONE HYDROCHLORIDE 1 MG: 1 INJECTION, SOLUTION INTRAMUSCULAR; INTRAVENOUS; SUBCUTANEOUS at 07:23

## 2019-10-05 NOTE — PLAN OF CARE
Problem: Patient Care Overview  Goal: Plan of Care Review  Outcome: Ongoing (interventions implemented as appropriate)   10/05/19 6142   Coping/Psychosocial   Plan of Care Reviewed With patient   Plan of Care Review   Progress no change   OTHER   Outcome Summary dressing changed, NG and J tube flushed, reseting between care, to start J tube feed @ 10ml/hr per MD, cont to josse

## 2019-10-05 NOTE — PROGRESS NOTES
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  J-tube feedings off currently patient not tolerating them.  NG tube functioning     /67 (BP Location: Left arm, Patient Position: Lying)   Pulse 89   Temp 97 °F (36.1 °C) (Axillary)   Resp 18   Wt 55.4 kg (122 lb 3.2 oz)   SpO2 98%   BMI 20.34 kg/m²     Lab Results (last 24 hours)     Procedure Component Value Units Date/Time    Respiratory Culture - Sputum, ET Suction [[id number]]  (Abnormal)  (Susceptibility) Collected:  10/02/19 1620    Specimen:  Sputum from ET Suction Updated:  10/04/19 1028     Respiratory Culture Light growth (2+) Serratia marcescens      Light growth (2+) Normal Respiratory Valentina     Gram Stain Rare (1+) Squamous epithelial cells      Moderate (3+) WBCs seen      Few (2+) Gram negative bacilli    Susceptibility      Serratia marcescens     LOLIS     Cefazolin Resistant     Cefepime Susceptible     Ceftazidime Susceptible     Ceftriaxone Susceptible     Gentamicin Susceptible     Levofloxacin Susceptible     Tetracycline Susceptible     Trimethoprim + Sulfamethoxazole Susceptible                          Current Medications:  Current Facility-Administered Medications   Medication Dose Route Frequency Provider Last Rate Last Dose   • albuterol (PROVENTIL) nebulizer solution 0.083% 2.5 mg/3mL  2.5 mg Nebulization Q6H - RT Parminder Kc MD   2.5 mg at 10/05/19 0653   • dextrose 5 % and sodium chloride 0.45 % with KCl 20 mEq/L infusion  75 mL/hr Intravenous Continuous Parminder Kc MD 75 mL/hr at 10/04/19 2142 75 mL/hr at 10/04/19 2142   • enoxaparin (LOVENOX) syringe 40 mg  40 mg Subcutaneous Daily Parminder Kc MD   40 mg at 10/05/19 0937   • HYDROmorphone (DILAUDID) injection 1 mg  1 mg Intravenous Q3H PRN Parminder Kc MD   1 mg at 10/05/19 0723    And   • naloxone (NARCAN) injection 0.1 mg  0.1 mg Intravenous Q5 Min PRN Parminder Kc MD       • Influenza Vac Subunit Quad (FLUCELVAX)  injection 0.5 mL  0.5 mL Intramuscular During Hospitalization Parminder Kc MD       • ondansetron (ZOFRAN) injection 4 mg  4 mg Intravenous Q4H PRN Parminder Kc MD       • pantoprazole (PROTONIX) injection 40 mg  40 mg Intravenous Q AM Parminder Kc MD   40 mg at 10/05/19 0723   • silver sulfadiazine (SILVADENE, SSD) 1 % cream   Topical Q12H Parminder Kc MD       • sodium chloride 0.9 % flush 10 mL  10 mL Intravenous Q12H Parminder Kc MD   10 mL at 10/05/19 0938   • sodium chloride 0.9 % flush 10 mL  10 mL Intravenous PRN Parminder Kc MD       • sodium chloride 0.9 % flush 10 mL  10 mL Intravenous PRN Parminder Kc MD       • sodium chloride 0.9 % flush 20 mL  20 mL Intravenous PRN Parminder Kc MD           Prior to admission medications:  Medications Prior to Admission   Medication Sig Dispense Refill Last Dose   • aspirin (ASPIRIN LOW DOSE) 81 MG EC tablet Take 1 tablet by mouth Daily. 30 tablet 6 9/29/2019 at Unknown time   • dexamethasone (DECADRON) 1 MG tablet Take 1 mg by mouth 2 (Two) Times a Day With Meals.   9/29/2019 at Unknown time   • doxazosin (CARDURA) 1 MG tablet Take 1 tablet by mouth Every Night. 30 tablet 3 9/29/2019 at Unknown time   • fenofibrate (TRICOR) 48 MG tablet Take 1.5 tablets by mouth Daily. 1 tablet by G-Tube daily for Hyperlipidemia 30 tablet 3 9/29/2019 at Unknown time   • ferrous sulfate 220 (44 Fe) MG/5ML solution 5 mL by Per G Tube route Daily. 300 mL 1 9/29/2019 at Unknown time   • levothyroxine (SYNTHROID) 125 MCG tablet Take 1 pill/daily for 6 days a week 30 tablet 3 9/29/2019 at Unknown time   • loperamide (IMODIUM) 1 MG/5ML solution Take 10 mL by mouth 4 (Four) Times a Day As Needed for Diarrhea. 118 mL 1 Past Week at Unknown time   • metoclopramide (REGLAN) 5 MG/5ML solution 10 ML Per G-Tube 4 Times Per Day Before Meals X 30 Days   9/29/2019 at Unknown time   • ondansetron (ZOFRAN) 8 MG tablet Take 1 tablet by mouth 3 (Three) Times a Day As Needed for Nausea or  Vomiting. 30 tablet 5 Past Week at Unknown time   • ondansetron ODT (ZOFRAN-ODT) 4 MG disintegrating tablet Take 1 tablet by mouth Every 6 (Six) Hours As Needed for Nausea or Vomiting. 10 tablet 0 Past Week at Unknown time   • oxyCODONE-acetaminophen (PERCOCET) 7.5-325 MG per tablet Take 1 tablet by mouth Every 6 (Six) Hours As Needed for Severe Pain . 12 tablet 0 9/30/2019 at Unknown time   • vitamin D (ERGOCALCIFEROL) 07524 units capsule capsule 1,000 Units Daily. 1 capsule by mouth weekly on Wednesday X 3 months    Past Week at Unknown time   • Ascorbic Acid (C/DARA HIPS PO) 1,000 mg by Per PEG Tube route 2 (Two) Times a Day.   More than a month at Unknown time       Physical exam: Nontoxic  NG tube with bilious drainage but functioning  Abdomen scaphoid with few bowel sounds  Dressing intact  Assessment : Postop day 3 slowly recovering    Plan: Slowly start advance tube feedings as tolerated  Continue present care

## 2019-10-05 NOTE — PLAN OF CARE
Problem: Patient Care Overview  Goal: Plan of Care Review  Outcome: Ongoing (interventions implemented as appropriate)   10/05/19 5751   Coping/Psychosocial   Plan of Care Reviewed With patient   Plan of Care Review   Progress improving   OTHER   Outcome Summary Pt tolerated tx fair this date. Pt c/o pain. Pt gave fair effort with UE ther ex. No goals met this tx. Continue OT POC.

## 2019-10-06 PROCEDURE — 97110 THERAPEUTIC EXERCISES: CPT

## 2019-10-06 PROCEDURE — 25010000002 HYDROMORPHONE 1 MG/ML SOLUTION: Performed by: SURGERY

## 2019-10-06 PROCEDURE — 94799 UNLISTED PULMONARY SVC/PX: CPT

## 2019-10-06 PROCEDURE — 25010000002 ENOXAPARIN PER 10 MG: Performed by: SURGERY

## 2019-10-06 RX ADMIN — ALBUTEROL SULFATE 2.5 MG: 2.5 SOLUTION RESPIRATORY (INHALATION) at 01:16

## 2019-10-06 RX ADMIN — SILVER SULFADIAZINE: 10 CREAM TOPICAL at 22:57

## 2019-10-06 RX ADMIN — PANTOPRAZOLE SODIUM 40 MG: 40 INJECTION, POWDER, FOR SOLUTION INTRAVENOUS at 06:31

## 2019-10-06 RX ADMIN — ENOXAPARIN SODIUM 40 MG: 40 INJECTION SUBCUTANEOUS at 09:09

## 2019-10-06 RX ADMIN — HYDROMORPHONE HYDROCHLORIDE 1 MG: 1 INJECTION, SOLUTION INTRAMUSCULAR; INTRAVENOUS; SUBCUTANEOUS at 01:14

## 2019-10-06 RX ADMIN — HYDROMORPHONE HYDROCHLORIDE 1 MG: 1 INJECTION, SOLUTION INTRAMUSCULAR; INTRAVENOUS; SUBCUTANEOUS at 22:56

## 2019-10-06 RX ADMIN — HYDROMORPHONE HYDROCHLORIDE 1 MG: 1 INJECTION, SOLUTION INTRAMUSCULAR; INTRAVENOUS; SUBCUTANEOUS at 09:42

## 2019-10-06 RX ADMIN — POTASSIUM CHLORIDE, DEXTROSE MONOHYDRATE AND SODIUM CHLORIDE 75 ML/HR: 150; 5; 450 INJECTION, SOLUTION INTRAVENOUS at 11:59

## 2019-10-06 RX ADMIN — HYDROMORPHONE HYDROCHLORIDE 1 MG: 1 INJECTION, SOLUTION INTRAMUSCULAR; INTRAVENOUS; SUBCUTANEOUS at 19:08

## 2019-10-06 RX ADMIN — ALBUTEROL SULFATE 2.5 MG: 2.5 SOLUTION RESPIRATORY (INHALATION) at 07:21

## 2019-10-06 RX ADMIN — SODIUM CHLORIDE, PRESERVATIVE FREE 10 ML: 5 INJECTION INTRAVENOUS at 22:56

## 2019-10-06 RX ADMIN — HYDROMORPHONE HYDROCHLORIDE 1 MG: 1 INJECTION, SOLUTION INTRAMUSCULAR; INTRAVENOUS; SUBCUTANEOUS at 06:31

## 2019-10-06 RX ADMIN — ALBUTEROL SULFATE 2.5 MG: 2.5 SOLUTION RESPIRATORY (INHALATION) at 18:52

## 2019-10-06 RX ADMIN — SILVER SULFADIAZINE: 10 CREAM TOPICAL at 09:09

## 2019-10-06 RX ADMIN — HYDROMORPHONE HYDROCHLORIDE 1 MG: 1 INJECTION, SOLUTION INTRAMUSCULAR; INTRAVENOUS; SUBCUTANEOUS at 14:35

## 2019-10-06 NOTE — THERAPY TREATMENT NOTE
Acute Care - Physical Therapy Treatment Note  [facility]     Patient Name: Emerson Bang  : 1960  MRN: [id number]  Today's Date: 10/6/2019  Onset of Illness/Injury or Date of Surgery: 19     Referring Physician: Dr. Kc    Admit Date: 2019    Visit Dx:    ICD-10-CM ICD-9-CM   1. 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 Impaired physical mobility Z74.09 781.99   3. Impaired mobility and activities of daily living Z74.09 799.89     Patient Active Problem List   Diagnosis   • Hyperkalemia   • Iron deficiency anemia   • Gastroesophageal reflux disease with esophagitis   • Elevated AST (SGOT)   • Alcohol abuse   • Hypothyroidism   • Balance problem   • Need for vaccination   • Low magnesium levels   • Squamous cell carcinoma of pharynx (CMS/HCC)   • History of throat cancer   • Vitamin D deficiency   • Alcohol abuse counseling and surveillance   • Encounter for screening for diabetes mellitus   • Hypomagnesemia   • Iron deficiency anemia   • Hyperlipidemia   • Underweight due to inadequate caloric intake   • Need for immunization against influenza   • 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HCC)   • Hypertension   • General medical examination   • Underweight   • Epigastric pain   • Gastritis without bleeding   • Need for influenza vaccination   • Elevated liver function tests   • B12 deficiency   • Intestinal malabsorption   • Throat pain   • Acute pharyngitis   • Upper respiratory tract infection   • Neoplasm of uncertain behavior of larynx   • Pharyngoesophageal dysphagia   • Severe protein-calorie malnutrition (CMS/HCC)   • Anemia of chronic disease   • Hypotension   • Hyponatremia   • Status post insertion of percutaneous endoscopic gastrostomy (PEG) tube (CMS/HCC)   • Hx of tracheostomy   • History of throat surgery   • Hx SBO   • Urinary retention   • Generalized weakness   • Acute otitis externa of right ear   • Hard stool   • Panlobular emphysema (CMS/HCC)   • Cancer of  hypopharynx (CMS/HCC)   • Diarrhea   • Encounter for venous access device care   • Prediabetes   • Status post neck dissection   • S/P partial thyroidectomy   • S/P laryngectomy   • Annual physical exam   • Alcoholic cirrhosis of liver without ascites (CMS/HCC)       Therapy Treatment    Rehabilitation Treatment Summary     Row Name 10/06/19 1342 10/06/19 0935          Treatment Time/Intention    Discipline  physical therapy assistant  -TA  occupational therapy assistant  -CS     Document Type  therapy note (daily note)  -TA  therapy note (daily note)  -CS     Subjective Information  complains of;pain  -TA  complains of;pain  -CS     Mode of Treatment  individual therapy;physical therapy  -TA  occupational therapy  -CS     Therapy Frequency (PT Clinical Impression)  other (see comments) 6days per week  -TA  --     Therapy Frequency (OT Eval)  --  other (see comments) 5-7 days/wk  -CS     Patient Effort  --  fair  -CS     Existing Precautions/Restrictions  fall;other (see comments) watch gait belt placement  -TA  fall;other (see comments) watch gait belt placement  -CS     Patient Response to Treatment  pt defered EOB/OOB  -TA  --     Recorded by [TA] Pepper Dudley PTA 10/06/19 1553 [CS] Brenda Thomas, MADSEN/LIANE 10/06/19 1303     Row Name 10/06/19 1342 10/06/19 0935          Vital Signs    Pre Systolic BP Rehab  156  -TA  --     Pre Treatment Diastolic BP  84  -TA  --     Post Systolic BP Rehab  162  -TA  --     Post Treatment Diastolic BP  81  -TA  --     Pretreatment Heart Rate (beats/min)  86  -TA  --     Posttreatment Heart Rate (beats/min)  84  -TA  --     Pre SpO2 (%)  967  -TA  --     O2 Delivery Pre Treatment  supplemental O2  -TA  --     Post SpO2 (%)  96  -TA  --     O2 Delivery Post Treatment  supplemental O2  -TA  --     Pre Patient Position  Supine  -TA  Supine  -CS     Intra Patient Position  Supine  -TA  --     Post Patient Position  Supine  -TA  Supine  -CS     Recorded by [TA] Pepper Dudley,  PTA 10/06/19 1553 [CS] Brenda Thomas MADSEN/L 10/06/19 1303     Row Name 10/06/19 1342 10/06/19 0935          Cognitive Assessment/Intervention- PT/OT    Affect/Mental Status (Cognitive)  --  flat/blunted affect;sad/depressed affect  -CS     Behavioral Issues (Cognitive)  --  withdrawn  -CS     Orientation Status (Cognition)  oriented to;person;place;situation  -TA  oriented to;person;place;situation  -CS     Follows Commands (Cognition)  --  WFL  -CS     Recorded by [TA] Pepper Dudley, PTA 10/06/19 1553 [CS] Brenda Thomas MADSEN/L 10/06/19 1303     Row Name 10/06/19 1342             Safety Issues, Functional Mobility    Impairments Affecting Function (Mobility)  balance;endurance/activity tolerance;pain;strength;shortness of breath  -TA      Recorded by [TA] Pepper Dudley, PTA 10/06/19 1553      Row Name 10/06/19 1342 10/06/19 0935          Bed Mobility Assessment/Treatment    Bed Mobility Assessment/Treatment  supine-sit;sit-supine  -TA  scooting/bridging  -CS     Scooting/Bridging [place] (Bed Mobility)  --  maximum assist (25% patient effort);2 person assist  -CS     Supine-Sit [place] (Bed Mobility)  not tested  -TA  --     Sit-Supine [place] (Bed Mobility)  not tested  -TA  --     Assistive Device (Bed Mobility)  head of bed elevated;bed rails  -TA  --     Recorded by [TA] Pepper Dudley, PTA 10/06/19 1553 [CS] Brenda Thomas MADSEN/L 10/06/19 1303     Row Name 10/06/19 1342             Bed-Chair Transfer    Bed-Chair [place] (Transfers)  not tested  -TA      Recorded by [TA] Pepper Dudley, PTA 10/06/19 1553      Row Name 10/06/19 1342             Sit-Stand Transfer    Sit-Stand [place] (Transfers)  not tested  -TA      Recorded by [TA] Pepper Dudley, PTA 10/06/19 1553      Row Name 10/06/19 1342             Gait/Stairs Assessment/Training    [place] Level (Gait)  not tested  -TA      Recorded by [TA] Pepper Dudley, PTA 10/06/19 1553      Row Name 10/06/19 2785  10/06/19 0935          Therapeutic Exercise    Upper Extremity (Therapeutic Exercise)  --  bicep curl, bilateral  -CS     Upper Extremity Range of Motion (Therapeutic Exercise)  --  shoulder flexion/extension, bilateral;shoulder internal/external rotation, bilateral;elbow flexion/extension, bilateral;forearm supination/pronation, bilateral;wrist flexion/extension, bilateral  -CS     Hand (Therapeutic Exercise)  --  finger flexion/extension, bilateral  -CS     Lower Extremity (Therapeutic Exercise)  gluteal sets;quad sets, bilateral;heel slides, bilateral  -TA  --     Lower Extremity Range of Motion (Therapeutic Exercise)  ankle dorsiflexion/plantar flexion, bilateral;hip abduction/adduction, bilateral  -TA  --     Weight/Resistance (Therapeutic Exercise)  --  1 pound  -CS     Exercise Type (Therapeutic Exercise)  AROM (active range of motion)  -TA  AROM (active range of motion)  -CS     Position (Therapeutic Exercise)  supine  -TA  seated  -CS     Sets/Reps (Therapeutic Exercise)  20  -TA  1/20  -CS     Equipment (Therapeutic Exercise)  --  free weight, barbell  -CS     Expected Outcome (Therapeutic Exercise)  facilitate normal movement patterns;improve functional stability;improve motor control;increase active range of motion  -TA  improve functional tolerance, self-care activity;improve performance, BADLs;improve performance, transfer skills;increase active range of motion  -CS     Recorded by [TA] Pepper Dudley PTA 10/06/19 6773 [CS] Brenda Thomas COTA/LIANE 10/06/19 1303     Row Name 10/06/19 1342 10/06/19 0935          Positioning and Restraints    Pre-Treatment Position  in bed  -TA  in bed  -CS     Post Treatment Position  bed  -TA  bed  -CS     In Bed  supine;call light within reach;exit alarm on;with family/caregiver  -TA  supine;call light within reach;encouraged to call for assist;exit alarm on  -CS     Recorded by [TA] Pepper Dduley PTA 10/06/19 9133 [CS] Brenda Thomas COTA/LIANE 10/06/19 1303      Row Name 10/06/19 1342 10/06/19 0935          Pain Scale: Numbers Pre/Post-Treatment    Pain Scale: Numbers, Pretreatment  8/10  -TA  8/10  -CS     Pain Scale: Numbers, Post-Treatment  8/10  -TA  8/10  -CS     Pain Location  abdomen  -TA  abdomen  -CS     Pain Intervention(s)  --  Medication (See MAR);Repositioned  -CS     Recorded by [TA] Pepper Dudley, PTA 10/06/19 1553 [CS] Brenda Thomas MADSEN/L 10/06/19 1303     Row Name 10/06/19 1342             Sensory Assessment/Intervention    Sensory General Assessment  no sensation deficits identified to light touch  -TA      Recorded by [TA] Pepper Dudley, PTA 10/06/19 1553      Row Name                Wound 09/30/19 1330 Left medial abdomen Fistula    Wound - Properties Group Date first assessed: 09/30/19 [SS] Time first assessed: 1330 [SS] Side: Left [SS] Orientation: medial [SS] Location: abdomen [SS] Primary Wound Type: Fistula [SS] Recorded by:  [SS] Citlalli Abdi RN 09/30/19 1619    Row Name                Wound 10/02/19 1347 abdomen Incision    Wound - Properties Group Date first assessed: 10/02/19 [CF] Time first assessed: 1347 [CF] Present on Hospital Admission: N [CF] Location: abdomen [CF] Primary Wound Type: Incision [CF] Recorded by:  [CF] Eileen Tyson RN 10/02/19 1347    Row Name 10/06/19 0935             Outcome Summary/Treatment Plan (OT)    Daily Summary of Progress (OT)  progress toward functional goals is good  -CS      Plan for Continued Treatment (OT)  cont OT POC  -CS      Anticipated Discharge Disposition (OT)  home with 24/7 care;home with home health  -CS      Recorded by [CS] Brenda Thomas MADSEN/L 10/06/19 1303      Row Name 10/06/19 1342             Outcome Summary/Treatment Plan (PT)    Daily Summary of Progress (PT)  progress toward functional goals is gradual  -TA      Plan for Continued Treatment (PT)  continue  -TA      Anticipated Discharge Disposition (PT)  anticipate therapy at next level of care;home with 24/7 care   -TA      Recorded by [TA] Luis Enrique Pepper TURK, PTA 10/06/19 5478        User Key  (r) = Recorded By, (t) = Taken By, (c) = Cosigned By    Initials Name Effective Dates Discipline    SS Citlalli Abdi, RN 07/10/18 -  Nurse    TA Luis Enrique Pepper M, PTA 03/07/18 -  PT    CS Brenda Thomas, MADSEN/L 03/07/18 -  OT    CF Eileen Tyson RN 10/17/16 -  Nurse          Wound 09/30/19 1330 Left medial abdomen Fistula (Active)   Dressing Appearance dried drainage 10/6/2019  8:30 AM   Base moist;red 10/6/2019  8:30 AM   Periwound Temperature warm 10/6/2019  8:30 AM   [place]iwound Skin Turgor soft 10/6/2019  8:30 AM   Drainage Characteristics/Odor serosanguineous;tan 10/6/2019  8:30 AM   Drainage Amount moderate 10/6/2019  8:30 AM   Dressing Care, Wound dressing changed 10/6/2019  8:30 AM       Wound 10/02/19 1347 abdomen Incision (Active)   Closure [place] 10/6/2019  8:30 AM   Base clean;pink 10/6/2019  8:30 AM   Drainage Characteristics/Odor serosanguineous 10/5/2019 10:45 PM   Dressing Care, Wound dressing changed 10/5/2019 10:45 PM       Rehab Goal Summary     Row Name 10/06/19 1342 10/06/19 0935          Bed Mobility Goal 1 (PT)    Activity/Assistive Device (Bed Mobility Goal 1, PT)  sit to supine;supine to sit  -TA  --     [place] Level/Cues Needed (Bed Mobility Goal 1, PT)  independent  -TA  --     Time Frame (Bed Mobility Goal 1, PT)  by discharge  -TA  --     Progress/Outcomes (Bed Mobility Goal 1, PT)  goal not met  -TA  --        Transfer Goal 1 (PT)    Activity/Assistive Device (Transfer Goal 1, PT)  bed-to-chair/chair-to-bed;toilet  -TA  --     [place] Level/Cues Needed (Transfer Goal 1, PT)  conditional independence;independent  -TA  --     Time Frame (Transfer Goal 1, PT)  by discharge  -TA  --     Progress/Outcome (Transfer Goal 1, PT)  goal not met  -TA  --        Gait Training Goal 1 (PT)    Activity/Assistive Device (Gait Training Goal 1, PT)  gait (walking locomotion)  -TA  --     [place] Level  (Gait Training Goal 1, PT)  conditional independence;independent  -TA  --     Distance (Gait Goal 1, PT)  [id number]  -TA  --     Time Frame (Gait Training Goal 1, PT)  by discharge  -TA  --     Progress/Outcome (Gait Training Goal 1, PT)  goal not met  -TA  --        Stairs Goal 1 (PT)    Activity/Assistive Device (Stairs Goal 1, PT)  descending stairs;ascending stairs  -TA  --     [employer] Level/Cues Needed (Stairs Goal 1, PT)  standby assist  -TA  --     Number of Stairs (Stairs Goal 1, PT)  5  -TA  --     Time Frame (Stairs Goal 1, PT)  by discharge  -TA  --     Progress/Outcome (Stairs Goal 1, PT)  goal not met  -TA  --        Occupational Therapy Goals    Transfer Goal Selection (OT)  --  transfer, OT goal 1  -CS     Bathing Goal Selection (OT)  --  bathing, OT goal 1  -CS     Dressing Goal Selection (OT)  --  dressing, OT goal 1  -CS     Toileting Goal Selection (OT)  --  toileting, OT goal 1  -CS     Activity Tolerance Goal Selection (OT)  --  activity tolerance, OT goal 1  -CS        Transfer Goal 1 (OT)    Activity/Assistive Device (Transfer Goal 1, OT)  --  sit-to-stand/stand-to-sit;bed-to-chair/chair-to-bed;toilet  -CS     [employer] Level/Cues Needed (Transfer Goal 1, OT)  --  supervision required  -CS     Time Frame (Transfer Goal 1, OT)  --  long term goal (LTG);by discharge  -CS     Progress/Outcome (Transfer Goal 1, OT)  --  goal not met  -CS        Bathing Goal 1 (OT)    Activity/Assistive Device (Bathing Goal 1, OT)  --  bathing skills, all  -CS     [employer] Level/Cues Needed (Bathing Goal 1, OT)  --  minimum assist (75% or more patient effort)  -CS     Time Frame (Bathing Goal 1, OT)  --  long term goal (LTG);by discharge  -CS     Barriers (Bathing Goal 1, OT)  --  Pt will need extra time and rest breaks PRN  -CS     Progress/Outcomes (Bathing Goal 1, OT)  --  goal not met  -CS        Dressing Goal 1 (OT)    Activity/Assistive Device (Dressing Goal 1, OT)  --  dressing skills, all  -CS      [place]/Cues Needed (Dressing Goal 1, OT)  --  minimum assist (75% or more patient effort)  -CS     Time Frame (Dressing Goal 1, OT)  --  long term goal (LTG);by discharge  -CS     Barriers (Dressing Goal 1, OT)  --  Pt will need extra time and rest breaks PRN  -CS     Progress/Outcome (Dressing Goal 1, OT)  --  goal not met  -CS        Toileting Goal 1 (OT)    Activity/Device (Toileting Goal 1, OT)  --  toileting skills, all  -CS     [place] Level/Cues Needed (Toileting Goal 1, OT)  --  minimum assist (75% or more patient effort)  -CS     Time Frame (Toileting Goal 1, OT)  --  long term goal (LTG);by discharge  -CS     Barriers (Toileting Goal 1, OT)  --  Pt will need extra time and rest breaks PRN  -CS     Progress/Outcome (Toileting Goal 1, OT)  --  goal not met  -CS         Activity Tolerance Goal 1 (OT)    Activity Level (Endurance Goal 1, OT)  --  15 min activity functional/therapeutic activity  -CS     Time Frame (Activity Tolerance Goal 1, OT)  --  long term goal (LTG);by discharge  -CS     Progress/Outcome (Activity Tolerance Goal 1, OT)  --  goal not met  -CS       User Key  (r) = Recorded By, (t) = Taken By, (c) = Cosigned By    Initials Name Provider Type Discipline    TA Pepper Dudley, PTA Physical Therapy Assistant PT    CS Brenda Thomas COTA/L Occupational Therapy Assistant OT          Physical Therapy Education     Title: PT OT SLP Therapies (In Progress)     Topic: Physical Therapy (In Progress)     Point: Mobility training (In Progress)     Learning Progress Summary           Patient Acceptance, E, NR by GIAN at 10/3/2019  3:43 PM                   Point: Body mechanics (In Progress)     Learning Progress Summary           Patient Acceptance, E, NR by GIAN at 10/3/2019  3:43 PM                   Point: Precautions (In Progress)     Learning Progress Summary           Patient Acceptance, E, NR by GIAN at 10/3/2019  3:43 PM                               User Key     Initials Effective  Dates Name Provider Type Discipline     04/03/18 -  Judy Watkins, PT Physical Therapist PT                PT Recommendation and Plan  Anticipated Discharge Disposition (PT): anticipate therapy at next level of care, home with 24/7 care  Therapy Frequency (PT Clinical Impression): other (see comments)(6days per week)  Outcome Summary/Treatment Plan (PT)  Daily Summary of Progress (PT): progress toward functional goals is gradual  Plan for Continued Treatment (PT): continue  Anticipated Discharge Disposition (PT): anticipate therapy at next level of care, home with 24/7 care  Outcome Summary: pt performed AROM B LE exercises in supine. pt will require 24/7 care @ D/C  Outcome Measures     Row Name 10/06/19 1500 10/06/19 1300 10/05/19 1100       How much help from another person do you currently need...    Turning from your back to your side while in flat bed without using bedrails?  2  -TA  --  --    Moving from lying on back to sitting on the side of a flat bed without bedrails?  2  -TA  --  --    Moving to and from a bed to a chair (including a wheelchair)?  2  -TA  --  --    Standing up from a chair using your arms (e.g., wheelchair, bedside chair)?  2  -TA  --  --    Climbing 3-5 steps with a railing?  2  -TA  --  --    To walk in hospital room?  2  -TA  --  --    AM-PAC 6 Clicks Score (PT)  12  -TA  --  --       How much help from another is currently needed...    Putting on and taking off regular lower body clothing?  --  2  -CS  2  -CS    Bathing (including washing, rinsing, and drying)  --  2  -CS  2  -CS    Toileting (which includes using toilet bed pan or urinal)  --  2  -CS  2  -CS    Putting on and taking off regular upper body clothing  --  2  -CS  2  -CS    Taking care of personal grooming (such as brushing teeth)  --  2  -CS  2  -CS    Eating meals  --  1  -CS  1  -CS    AM-PAC 6 Clicks Score (OT)  --  11  -CS  11  -CS       Functional Assessment    Outcome Measure Options  AM-PAC 6 Clicks Basic  Mobility (PT)  -TA  --  --    Row Name 10/04/19 1026             How much help from another is currently needed...    Putting on and taking off regular lower body clothing?  2  -AS      Bathing (including washing, rinsing, and drying)  2  -AS      Toileting (which includes using toilet bed pan or urinal)  2  -AS      Putting on and taking off regular upper body clothing  2  -AS      Taking care of personal grooming (such as brushing teeth)  2  -AS      Eating meals  1  -AS      AM-PAC 6 Clicks Score (OT)  11  -AS         Functional Assessment    Outcome Measure Options  AM-PAC 6 Clicks Daily Activity (OT)  -AS        User Key  (r) = Recorded By, (t) = Taken By, (c) = Cosigned By    Initials Name Provider Type    Pepper Lay PTA Physical Therapy Assistant    Brenda Lee, MADSEN/L Occupational Therapy Assistant    AS Destinee Yarbrough, OT Occupational Therapist         Time Calculation:   PT Charges     Row Name 10/06/19 1554             Time Calculation    Start Time  1342  -TA      Stop Time  1420  -TA      Time Calculation (min)  38 min  -TA      PT Received On  10/06/19  -TA         Time Calculation- PT    Total Timed Code Minutes- PT  38 minute(s)  -TA        User Key  (r) = Recorded By, (t) = Taken By, (c) = Cosigned By    Initials Name Provider Type    Pepper Lay PTA Physical Therapy Assistant        Therapy Charges for Today     Code Description Service Date Service Provider Modifiers Qty    [id number] HC PT THER PROC EA 15 MIN 10/6/2019 Pepper Dudley PTA GP 3          PT G-Codes  Outcome Measure Options: AM-PAC 6 Clicks Basic Mobility (PT)  AM-PAC 6 Clicks Score (PT): 12  AM-PAC 6 Clicks Score (OT): 11    Pepper Dudley PTA  10/6/2019

## 2019-10-06 NOTE — PLAN OF CARE
Problem: Patient Care Overview  Goal: Plan of Care Review  Outcome: Ongoing (interventions implemented as appropriate)   10/06/19 3348   Coping/Psychosocial   Plan of Care Reviewed With patient   Plan of Care Review   Progress no change   OTHER   Outcome Summary Pt resting between care. Pt pulled out NG, another NG anchored. VSS. Pain controlled with IV meds. Tube feeding increased to 20 ml/hr.

## 2019-10-06 NOTE — THERAPY TREATMENT NOTE
Acute Care - Occupational Therapy Treatment Note  [facility]     Patient Name: Emerson Bang  : 1960  MRN: [id number]  Today's Date: 10/6/2019  Onset of Illness/Injury or Date of Surgery: 19  Date of Referral to OT: 10/02/19  Referring Physician: Dr. Kc    Admit Date: 2019       ICD-10-CM ICD-9-CM   1. 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 Impaired physical mobility Z74.09 781.99   3. Impaired mobility and activities of daily living Z74.09 799.89     Patient Active Problem List   Diagnosis   • Hyperkalemia   • Iron deficiency anemia   • Gastroesophageal reflux disease with esophagitis   • Elevated AST (SGOT)   • Alcohol abuse   • Hypothyroidism   • Balance problem   • Need for vaccination   • Low magnesium levels   • Squamous cell carcinoma of pharynx (CMS/HCC)   • History of throat cancer   • Vitamin D deficiency   • Alcohol abuse counseling and surveillance   • Encounter for screening for diabetes mellitus   • Hypomagnesemia   • Iron deficiency anemia   • Hyperlipidemia   • Underweight due to inadequate caloric intake   • Need for immunization against influenza   • 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HCC)   • Hypertension   • General medical examination   • Underweight   • Epigastric pain   • Gastritis without bleeding   • Need for influenza vaccination   • Elevated liver function tests   • B12 deficiency   • Intestinal malabsorption   • Throat pain   • Acute pharyngitis   • Upper respiratory tract infection   • Neoplasm of uncertain behavior of larynx   • Pharyngoesophageal dysphagia   • Severe protein-calorie malnutrition (CMS/HCC)   • Anemia of chronic disease   • Hypotension   • Hyponatremia   • Status post insertion of percutaneous endoscopic gastrostomy (PEG) tube (CMS/HCC)   • Hx of tracheostomy   • History of throat surgery   • Hx SBO   • Urinary retention   • Generalized weakness   • Acute otitis externa of right ear   • Hard stool   • Panlobular emphysema  (CMS/HCC)   • Cancer of hypopharynx (CMS/HCC)   • Diarrhea   • Encounter for venous access device care   • Prediabetes   • Status post neck dissection   • S/P partial thyroidectomy   • S/P laryngectomy   • Annual physical exam   • Alcoholic cirrhosis of liver without ascites (CMS/HCC)     Past Medical History:   Diagnosis Date   • Allergic rhinitis     vs URI   • Anemia    • At risk for falls    • Benign prostatic hyperplasia    • Chronic gastritis    • Cirrhosis of liver (CMS/HCC)    • Complication of gastrostomy (CMS/HCC)     site not healing   • COPD (chronic obstructive pulmonary disease) (CMS/HCC)    • Dysphagia     odynophagia   • Epigastric pain    • Esophagitis    • GERD (gastroesophageal reflux disease)    • Hypertension    • Hypothyroidism, unspecified    • Low back pain    • Malaise and fatigue    • Nasal congestion 11/15/2018   • Nausea with vomiting, unspecified    • Pain in left knee    • Pain in right knee    • Primary malignant neoplasm of pharynx (CMS/HCC)    • Screening for malignant neoplasm of colon    • 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 Left upper quadrant abdominal wall abscess debridement. Gastrostomy tube erosion through & through the anterior gastric wall.Lupper quadrant abdominal wall abscess   • COLONOSCOPY  04/21/2014    Internal & external hemorrhoids found.   • COLONOSCOPY  2014    [facility]   • COLONOSCOPY N/A 10/16/2018    Procedure: COLONOSCOPY;  Surgeon: Kaushal Chester MD;  Location: [facility] ENDOSCOPY;  Service: Gastroenterology   • DIAGNOSTIC LAPAROSCOPY EXPLORATORY LAPAROTOMY N/A 7/17/2019    Procedure: DIAGNOSTIC LAPAROSCOPY, EXPLORATORY LAPAROTOMY, LYSIS OF ADHESIONS;  Surgeon: Parminder Kc MD;   Location: [facility] OR;  Service: General   • DIRECT LARYNGOSCOPY, ESOPHAGOSCOPY, BRONCHOSCOPY N/A 4/15/2019    Procedure: DIRECT LARYNGOSCOPY with biopsy, ESOPHAGOSCOPY;  Surgeon: Bharathi Washington MD;  Location: [facility] OR;  Service: ENT   • ENDOSCOPY N/A 10/16/2018    Procedure: ESOPHAGOGASTRODUODENOSCOPY;  Surgeon: Kaushal Chester MD;  Location: [facility] ENDOSCOPY;  Service: Gastroenterology   • GASTROCUTANEOUS FISTULA CLOSURE N/A 10/2/2019    Procedure: GASTROCUTANEOUS FISTULA CLOSURE;  Surgeon: Parminder Kc MD;  Location: [facility] OR;  Service: General   • GASTROSTOMY FEEDING TUBE INSERTION N/A 4/15/2019    Procedure: GASTROSTOMY FEEDING TUBE INSERTION;  Surgeon: Trenton Valera MD;  Location: [facility] OR;  Service: General   • JEJUNOSTOMY N/A 10/2/2019    Procedure: JEJUNOSTOMY;  Surgeon: Parminder Kc MD;  Location: [facility] OR;  Service: General   • TRACHEOSTOMY  11/10/2008    Respiratory failure   • UPPER GASTROINTESTINAL ENDOSCOPY  04/21/2014    Esophagitis seen. Biopsy taken. Gastritis in stomach. Biopsy taken. Normal duodenum. Biopsy taken.   • UPPER GASTROINTESTINAL ENDOSCOPY  10/16/2018   • VENOUS ACCESS DEVICE (PORT) INSERTION N/A 9/11/2019    Procedure: INSERTION VENOUS ACCESS DEVICE (MEDIPORT)        (c-arm#1);  Surgeon: Parminder Kc MD;  Location: [facility];  Service: General       Therapy Treatment    Rehabilitation Treatment Summary     Row Name 10/06/19 5035             Treatment Time/Intention    Discipline  occupational therapy assistant  -CS      Document Type  therapy note (daily note)  -CS      Subjective Information  complains of;pain  -CS      Mode of Treatment  occupational therapy  -CS      Therapy Frequency (OT Eval)  other (see comments) 5-7 days/wk  -CS      Patient Effort  fair  -CS      Existing Precautions/Restrictions  fall;other (see comments) watch gait belt placement  -CS      Recorded by [CS] Brenda Thomas COTA/LIANE 10/06/19 4301      Row Name 10/06/19 4017              Vital Signs    Pre Patient Position  Supine  -CS      Post Patient Position  Supine  -CS      Recorded by [CS] Brenda Thomas COTA/L 10/06/19 1303      Row Name 10/06/19 0935             Cognitive Assessment/Intervention- PT/OT    Affect/Mental Status (Cognitive)  flat/blunted affect;sad/depressed affect  -CS      Behavioral Issues (Cognitive)  withdrawn  -CS      Orientation Status (Cognition)  oriented to;person;place;situation  -CS      Follows Commands (Cognition)  WFL  -CS      Recorded by [CS] Brenda Thomas COTA/LIANE 10/06/19 1303      Row Name 10/06/19 0935             Bed Mobility Assessment/Treatment    Bed Mobility Assessment/Treatment  scooting/bridging  -CS      Scooting/Bridging [place] (Bed Mobility)  maximum assist (25% patient effort);2 person assist  -CS      Recorded by [CS] Brenda Thomas COTA/LIANE 10/06/19 1303      Row Name 10/06/19 0935             Therapeutic Exercise    Upper Extremity (Therapeutic Exercise)  bicep curl, bilateral  -CS      Upper Extremity Range of Motion (Therapeutic Exercise)  shoulder flexion/extension, bilateral;shoulder internal/external rotation, bilateral;elbow flexion/extension, bilateral;forearm supination/pronation, bilateral;wrist flexion/extension, bilateral  -CS      Hand (Therapeutic Exercise)  finger flexion/extension, bilateral  -CS      Weight/Resistance (Therapeutic Exercise)  1 pound  -CS      Exercise Type (Therapeutic Exercise)  AROM (active range of motion)  -CS      Position (Therapeutic Exercise)  seated  -CS      Sets/Reps (Therapeutic Exercise)  1/20  -CS      Equipment (Therapeutic Exercise)  free weight, barbell  -CS      Expected Outcome (Therapeutic Exercise)  improve functional tolerance, self-care activity;improve performance, BADLs;improve performance, transfer skills;increase active range of motion  -CS      Recorded by [CS] Brenda Thomas COTA/LIANE 10/06/19 1303      Row Name 10/06/19 0935             Positioning and  Restraints    Pre-Treatment Position  in bed  -CS      Post Treatment Position  bed  -CS      In Bed  supine;call light within reach;encouraged to call for assist;exit alarm on  -CS      Recorded by [CS] Brenda Thomas COTA/LIANE 10/06/19 1303      Row Name 10/06/19 0935             Pain Scale: Numbers Pre/Post-Treatment    Pain Scale: Numbers, Pretreatment  8/10  -CS      Pain Scale: Numbers, Post-Treatment  8/10  -CS      Pain Location  abdomen  -CS      Pain Intervention(s)  Medication (See MAR);Repositioned  -CS      Recorded by [CS] Brenda Thomas COTA/LIANE 10/06/19 1303      Row Name                Wound 09/30/19 1330 Left medial abdomen Fistula    Wound - Properties Group Date first assessed: 09/30/19 [SS] Time first assessed: 1330 [SS] Side: Left [SS] Orientation: medial [SS] Location: abdomen [SS] Primary Wound Type: Fistula [SS] Recorded by:  [SS] Citlalli Abdi RN 09/30/19 1619    Row Name                Wound 10/02/19 1347 abdomen Incision    Wound - Properties Group Date first assessed: 10/02/19 [CF] Time first assessed: 1347 [CF] Present on Hospital Admission: N [CF] Location: abdomen [CF] Primary Wound Type: Incision [CF] Recorded by:  [CF] Eileen Tyson RN 10/02/19 1347    Row Name 10/06/19 0935             Outcome Summary/Treatment Plan (OT)    Daily Summary of Progress (OT)  progress toward functional goals is good  -CS      Plan for Continued Treatment (OT)  cont OT POC  -CS      Anticipated Discharge Disposition (OT)  home with 24/7 care;home with home health  -CS      Recorded by [CS] Brenda Thomas COTA/LIANE 10/06/19 1303        User Key  (r) = Recorded By, (t) = Taken By, (c) = Cosigned By    Initials Name Effective Dates Discipline    SS Citlalli Abdi RN 07/10/18 -  Nurse    CS Brenda Thomas COTA/L 03/07/18 -  OT    CF Eileen Tyson RN 10/17/16 -  Nurse        Wound 09/30/19 1330 Left medial abdomen Fistula (Active)   Dressing Appearance dried drainage 10/6/2019  8:30 AM    Base moist;red 10/6/2019  8:30 AM   Periwound Temperature warm 10/6/2019  8:30 AM   Periwound Skin Turgor soft 10/6/2019  8:30 AM   Drainage Characteristics/Odor serosanguineous;tan 10/6/2019  8:30 AM   Drainage Amount moderate 10/6/2019  8:30 AM   Dressing Care, Wound dressing changed 10/6/2019  8:30 AM       Wound 10/02/19 1347 abdomen Incision (Active)   Closure Staples 10/6/2019  8:30 AM   Base clean;pink 10/6/2019  8:30 AM   Drainage Characteristics/Odor serosanguineous 10/5/2019 10:45 PM   Dressing Care, Wound dressing changed 10/5/2019 10:45 PM     Rehab Goal Summary     Row Name 10/06/19 0935             Occupational Therapy Goals    Transfer Goal Selection (OT)  transfer, OT goal 1  -CS      Bathing Goal Selection (OT)  bathing, OT goal 1  -CS      Dressing Goal Selection (OT)  dressing, OT goal 1  -CS      Toileting Goal Selection (OT)  toileting, OT goal 1  -CS      Activity Tolerance Goal Selection (OT)  activity tolerance, OT goal 1  -CS         Transfer Goal 1 (OT)    Activity/Assistive Device (Transfer Goal 1, OT)  sit-to-stand/stand-to-sit;bed-to-chair/chair-to-bed;toilet  -CS      [place] Level/Cues Needed (Transfer Goal 1, OT)  supervision required  -CS      Time Frame (Transfer Goal 1, OT)  long term goal (LTG);by discharge  -CS      Progress/Outcome (Transfer Goal 1, OT)  goal not met  -CS         Bathing Goal 1 (OT)    Activity/Assistive Device (Bathing Goal 1, OT)  bathing skills, all  -CS      [place] Level/Cues Needed (Bathing Goal 1, OT)  minimum assist (75% or more patient effort)  -CS      Time Frame (Bathing Goal 1, OT)  long term goal (LTG);by discharge  -CS      Barriers (Bathing Goal 1, OT)  Pt will need extra time and rest breaks PRN  -CS      Progress/Outcomes (Bathing Goal 1, OT)  goal not met  -CS         Dressing Goal 1 (OT)    Activity/Assistive Device (Dressing Goal 1, OT)  dressing skills, all  -CS      [place]/Cues Needed (Dressing Goal 1, OT)  minimum assist  (75% or more patient effort)  -CS      Time Frame (Dressing Goal 1, OT)  long term goal (LTG);by discharge  -CS      Barriers (Dressing Goal 1, OT)  Pt will need extra time and rest breaks PRN  -CS      Progress/Outcome (Dressing Goal 1, OT)  goal not met  -CS         Toileting Goal 1 (OT)    Activity/Device (Toileting Goal 1, OT)  toileting skills, all  -CS      [place] Level/Cues Needed (Toileting Goal 1, OT)  minimum assist (75% or more patient effort)  -CS      Time Frame (Toileting Goal 1, OT)  long term goal (LTG);by discharge  -CS      Barriers (Toileting Goal 1, OT)  Pt will need extra time and rest breaks PRN  -CS      Progress/Outcome (Toileting Goal 1, OT)  goal not met  -CS          Activity Tolerance Goal 1 (OT)    Activity Level (Endurance Goal 1, OT)  15 min activity functional/therapeutic activity  -CS      Time Frame (Activity Tolerance Goal 1, OT)  long term goal (LTG);by discharge  -CS      Progress/Outcome (Activity Tolerance Goal 1, OT)  goal not met  -CS        User Key  (r) = Recorded By, (t) = Taken By, (c) = Cosigned By    Initials Name Provider Type Discipline    CS Brenda Thomas COTA/L Occupational Therapy Assistant OT        Occupational Therapy Education     Title: PT OT SLP Therapies (In Progress)     Topic: Occupational Therapy (In Progress)     Point: ADL training (In Progress)     Description: Instruct learner(s) on proper safety adaptation and remediation techniques during self care or transfers.   Instruct in proper use of assistive devices.    Learning Progress Summary           Patient Acceptance, E,TB,D, NR by CS at 10/6/2019  1:05 PM    Acceptance, E,TB,D, NR by CS at 10/5/2019 11:11 AM    Acceptance, E, NR by AS at 10/4/2019 11:45 AM    Comment:  Role of OT, OT POC, importance of OOB activity for strength and healing, ADL training                   Point: Home exercise program (In Progress)     Description: Instruct learner(s) on appropriate technique for monitoring,  assisting and/or progressing therapeutic exercises/activities.    Learning Progress Summary           Patient Acceptance, E,TB,D, NR by CS at 10/6/2019  1:05 PM    Acceptance, E,TB,D, NR by CS at 10/5/2019 11:11 AM                   Point: Precautions (In Progress)     Description: Instruct learner(s) on prescribed precautions during self-care and functional transfers.    Learning Progress Summary           Patient Acceptance, E,TB,D, NR by CS at 10/6/2019  1:05 PM    Acceptance, E,TB,D, NR by CS at 10/5/2019 11:11 AM    Acceptance, E, NR by AS at 10/4/2019 11:45 AM    Comment:  Role of OT, OT POC, importance of OOB activity for strength and healing, ADL training                   Point: Body mechanics (In Progress)     Description: Instruct learner(s) on proper positioning and spine alignment during self-care, functional mobility activities and/or exercises.    Learning Progress Summary           Patient Acceptance, E,TB,D, NR by CS at 10/6/2019  1:05 PM    Acceptance, E,TB,D, NR by CS at 10/5/2019 11:11 AM                               User Key     Initials Effective Dates Name Provider Type Discipline     03/07/18 -  Brenda Thomas, TENA/L Occupational Therapy Assistant OT    AS 03/25/19 -  Destinee Yarbrough, OT Occupational Therapist OT                OT Recommendation and Plan  Outcome Summary/Treatment Plan (OT)  Daily Summary of Progress (OT): progress toward functional goals is good  Plan for Continued Treatment (OT): cont OT POC  Anticipated Discharge Disposition (OT): home with 24/7 care, home with home health  Therapy Frequency (OT Eval): other (see comments)(5-7 days/wk)  Daily Summary of Progress (OT): progress toward functional goals is good  Plan of Care Review  Plan of Care Reviewed With: patient  Plan of Care Reviewed With: patient  Outcome Summary: Pt tolerated tx fair this date. Pt was max A x 2 with scooting/bridging. Pt gave fair effort with UE ther ex. Continue OT POC.  Outcome Measures      Row Name 10/06/19 1300 10/05/19 1100 10/04/19 1026       How much help from another is currently needed...    Putting on and taking off regular lower body clothing?  2  -CS  2  -CS  2  -AS    Bathing (including washing, rinsing, and drying)  2  -CS  2  -CS  2  -AS    Toileting (which includes using toilet bed pan or urinal)  2  -CS  2  -CS  2  -AS    Putting on and taking off regular upper body clothing  2  -CS  2  -CS  2  -AS    Taking care of personal grooming (such as brushing teeth)  2  -CS  2  -CS  2  -AS    Eating meals  1  -CS  1  -CS  1  -AS    AM-PAC 6 Clicks Score (OT)  11  -CS  11  -CS  11  -AS       Functional Assessment    Outcome Measure Options  --  --  AM-PAC 6 Clicks Daily Activity (OT)  -AS      User Key  (r) = Recorded By, (t) = Taken By, (c) = Cosigned By    Initials Name Provider Type    CS Brenda Thomas COTA/LIANE Occupational Therapy Assistant    AS Destinee Yarbrough OT Occupational Therapist           Time Calculation:   Time Calculation- OT     Row Name 10/06/19 1311             Time Calculation- OT    OT Start Time  0935  -CS      OT Stop Time  1010  -CS      OT Time Calculation (min)  35 min  -CS      Total Timed Code Minutes- OT  35 minute(s)  -      OT Received On  10/06/19  -        User Key  (r) = Recorded By, (t) = Taken By, (c) = Cosigned By    Initials Name Provider Type    CS Brenda Thomas COTA/LIANE Occupational Therapy Assistant        Therapy Charges for Today     Code Description Service Date Service Provider Modifiers Qty    71584833912 HC OT THER PROC EA 15 MIN 10/5/2019 Brenda Thomas COTA/L GO 2    06849539402 HC OT THER PROC EA 15 MIN 10/6/2019 Brenda Thomas COTA/L GO 2               ROSEANNA Barton  10/6/2019

## 2019-10-06 NOTE — PLAN OF CARE
Problem: Patient Care Overview  Goal: Plan of Care Review  Outcome: Ongoing (interventions implemented as appropriate)   10/06/19 9815   Coping/Psychosocial   Plan of Care Reviewed With patient   Plan of Care Review   Progress improving   OTHER   Outcome Summary Pt tolerated tx fair this date. Pt was max A x 2 with scooting/bridging. Pt gave fair effort with UE ther ex. Continue OT POC.

## 2019-10-06 NOTE — PROGRESS NOTES
Subjective:  Does not like his NG tube but otherwise no complaints.  Tolerating his tube feeds and there at 20 cc/h     /68   Pulse 74   Temp 98.9 °F (37.2 °C) (Axillary)   Resp 18   Wt 56.1 kg (123 lb 9.6 oz)   SpO2 96%   BMI 20.57 kg/m²     Lab Results (last 24 hours)     ** No results found for the last 24 hours. **          Current Medications:  Current Facility-Administered Medications   Medication Dose Route Frequency Provider Last Rate Last Dose   • albuterol (PROVENTIL) nebulizer solution 0.083% 2.5 mg/3mL  2.5 mg Nebulization Q6H - RT Parminder Kc MD   2.5 mg at 10/06/19 0721   • dextrose 5 % and sodium chloride 0.45 % with KCl 20 mEq/L infusion  75 mL/hr Intravenous Continuous Parminder Kc MD 75 mL/hr at 10/05/19 2251 75 mL/hr at 10/05/19 2251   • enoxaparin (LOVENOX) syringe 40 mg  40 mg Subcutaneous Daily Parminder Kc MD   40 mg at 10/05/19 0937   • HYDROmorphone (DILAUDID) injection 1 mg  1 mg Intravenous Q3H PRN Parminder Kc MD   1 mg at 10/06/19 0631    And   • naloxone (NARCAN) injection 0.1 mg  0.1 mg Intravenous Q5 Min PRN Parminder Kc MD       • Influenza Vac Subunit Quad (FLUCELVAX) injection 0.5 mL  0.5 mL Intramuscular During Hospitalization Parminder Kc MD       • ondansetron (ZOFRAN) injection 4 mg  4 mg Intravenous Q4H PRN Parminder Kc MD       • pantoprazole (PROTONIX) injection 40 mg  40 mg Intravenous Q AM Parminder Kc MD   40 mg at 10/06/19 0631   • silver sulfadiazine (SILVADENE, SSD) 1 % cream   Topical Q12H Parminder Kc MD       • sodium chloride 0.9 % flush 10 mL  10 mL Intravenous Q12H Parminder Kc MD   10 mL at 10/05/19 2243   • sodium chloride 0.9 % flush 10 mL  10 mL Intravenous PRN Parminder Kc MD       • sodium chloride 0.9 % flush 10 mL  10 mL Intravenous PRN Parminder Kc MD       • sodium chloride 0.9 % flush 20 mL  20 mL Intravenous PRN Parminder Kc MD           Prior to admission medications:  Medications Prior to Admission   Medication Sig  Dispense Refill Last Dose   • aspirin (ASPIRIN LOW DOSE) 81 MG EC tablet Take 1 tablet by mouth Daily. 30 tablet 6 9/29/2019 at Unknown time   • dexamethasone (DECADRON) 1 MG tablet Take 1 mg by mouth 2 (Two) Times a Day With Meals.   9/29/2019 at Unknown time   • doxazosin (CARDURA) 1 MG tablet Take 1 tablet by mouth Every Night. 30 tablet 3 9/29/2019 at Unknown time   • fenofibrate (TRICOR) 48 MG tablet Take 1.5 tablets by mouth Daily. 1 tablet by G-Tube daily for Hyperlipidemia 30 tablet 3 9/29/2019 at Unknown time   • ferrous sulfate 220 (44 Fe) MG/5ML solution 5 mL by Per G Tube route Daily. 300 mL 1 9/29/2019 at Unknown time   • levothyroxine (SYNTHROID) 125 MCG tablet Take 1 pill/daily for 6 days a week 30 tablet 3 9/29/2019 at Unknown time   • loperamide (IMODIUM) 1 MG/5ML solution Take 10 mL by mouth 4 (Four) Times a Day As Needed for Diarrhea. 118 mL 1 Past Week at Unknown time   • metoclopramide (REGLAN) 5 MG/5ML solution 10 ML Per G-Tube 4 Times Per Day Before Meals X 30 Days   9/29/2019 at Unknown time   • ondansetron (ZOFRAN) 8 MG tablet Take 1 tablet by mouth 3 (Three) Times a Day As Needed for Nausea or Vomiting. 30 tablet 5 Past Week at Unknown time   • ondansetron ODT (ZOFRAN-ODT) 4 MG disintegrating tablet Take 1 tablet by mouth Every 6 (Six) Hours As Needed for Nausea or Vomiting. 10 tablet 0 Past Week at Unknown time   • oxyCODONE-acetaminophen (PERCOCET) 7.5-325 MG per tablet Take 1 tablet by mouth Every 6 (Six) Hours As Needed for Severe Pain . 12 tablet 0 9/30/2019 at Unknown time   • vitamin D (ERGOCALCIFEROL) 37513 units capsule capsule 1,000 Units Daily. 1 capsule by mouth weekly on Wednesday X 3 months    Past Week at Unknown time   • Ascorbic Acid (C/DARA HIPS PO) 1,000 mg by Per PEG Tube route 2 (Two) Times a Day.   More than a month at Unknown time       Physical exam:    Nontoxic  NG output bilious and had over 1500 cc out yesterday     Assessment continue slow improvement  Plan:  Advance tube feeding slowly  Continue NG tube

## 2019-10-06 NOTE — PLAN OF CARE
Problem: Patient Care Overview  Goal: Plan of Care Review  Outcome: Ongoing (interventions implemented as appropriate)   10/06/19 1417   Coping/Psychosocial   Plan of Care Reviewed With patient   Plan of Care Review   Progress improving   OTHER   Outcome Summary pt. VSS; toleratin TF at 30ml/hr; will cont. to monitor     Goal: Individualization and Mutuality  Outcome: Ongoing (interventions implemented as appropriate)    Goal: Discharge Needs Assessment  Outcome: Ongoing (interventions implemented as appropriate)    Goal: Interprofessional Rounds/Family Conf  Outcome: Ongoing (interventions implemented as appropriate)      Problem: Fall Risk (Adult)  Goal: Absence of Fall  Outcome: Ongoing (interventions implemented as appropriate)      Problem: Skin Injury Risk (Adult)  Goal: Skin Health and Integrity  Outcome: Ongoing (interventions implemented as appropriate)      Problem: Pain, Chronic (Adult)  Goal: Acceptable Pain/Comfort Level and Functional Ability  Outcome: Ongoing (interventions implemented as appropriate)      Problem: Wound (Includes Pressure Injury) (Adult)  Goal: Signs and Symptoms of Listed Potential Problems Will be Absent, Minimized or Managed (Wound)  Outcome: Ongoing (interventions implemented as appropriate)      Problem: Nutrition, Enteral (Adult)  Goal: Signs and Symptoms of Listed Potential Problems Will be Absent, Minimized or Managed (Nutrition, Enteral)  Outcome: Ongoing (interventions implemented as appropriate)

## 2019-10-06 NOTE — PLAN OF CARE
Problem: Patient Care Overview  Goal: Plan of Care Review  Outcome: Ongoing (interventions implemented as appropriate)   10/06/19 1342 10/06/19 1417   Coping/Psychosocial   Plan of Care Reviewed With --  patient   Plan of Care Review   Progress --  improving   OTHER   Outcome Summary pt performed AROM B LE exercises in supine. pt will require 24/7 care @ D/C --

## 2019-10-07 LAB
ALBUMIN SERPL-MCNC: 2.8 G/DL (ref 3.5–5.2)
ALBUMIN/GLOB SERPL: 0.8 G/DL
ALP SERPL-CCNC: 75 U/L (ref 39–117)
ALT SERPL W P-5'-P-CCNC: 13 U/L (ref 1–41)
ANION GAP SERPL CALCULATED.3IONS-SCNC: 12 MMOL/L (ref 5–15)
AST SERPL-CCNC: 24 U/L (ref 1–40)
BASOPHILS # BLD AUTO: 0.02 10*3/MM3 (ref 0–0.2)
BASOPHILS NFR BLD AUTO: 0.3 % (ref 0–1.5)
BILIRUB SERPL-MCNC: 0.3 MG/DL (ref 0.2–1.2)
BUN BLD-MCNC: 7 MG/DL (ref 8–23)
BUN/CREAT SERPL: 14 (ref 7–25)
CA-I BLD-MCNC: 4.49 MG/DL (ref 4.6–5.6)
CALCIUM SPEC-SCNC: 8.5 MG/DL (ref 8.6–10.5)
CHLORIDE SERPL-SCNC: 95 MMOL/L (ref 98–107)
CHOLEST SERPL-MCNC: 182 MG/DL (ref 0–200)
CO2 SERPL-SCNC: 20 MMOL/L (ref 22–29)
CREAT BLD-MCNC: 0.5 MG/DL (ref 0.76–1.27)
DEPRECATED RDW RBC AUTO: 48.7 FL (ref 37–54)
EOSINOPHIL # BLD AUTO: 0 10*3/MM3 (ref 0–0.4)
EOSINOPHIL NFR BLD AUTO: 0 % (ref 0.3–6.2)
ERYTHROCYTE [DISTWIDTH] IN BLOOD BY AUTOMATED COUNT: 17.7 % (ref 12.3–15.4)
GFR SERPL CREATININE-BSD FRML MDRD: >150 ML/MIN/1.73
GLOBULIN UR ELPH-MCNC: 3.4 GM/DL
GLUCOSE BLD-MCNC: 216 MG/DL (ref 65–99)
GLUCOSE BLDC GLUCOMTR-MCNC: 142 MG/DL (ref 70–130)
GLUCOSE BLDC GLUCOMTR-MCNC: 203 MG/DL (ref 70–130)
HCT VFR BLD AUTO: 35.6 % (ref 37.5–51)
HGB BLD-MCNC: 12.7 G/DL (ref 13–17.7)
IMM GRANULOCYTES # BLD AUTO: 0.03 10*3/MM3 (ref 0–0.05)
IMM GRANULOCYTES NFR BLD AUTO: 0.5 % (ref 0–0.5)
LAB AP CASE REPORT: NORMAL
LYMPHOCYTES # BLD AUTO: 0.42 10*3/MM3 (ref 0.7–3.1)
LYMPHOCYTES NFR BLD AUTO: 7 % (ref 19.6–45.3)
MAGNESIUM SERPL-MCNC: 1.6 MG/DL (ref 1.6–2.4)
MCH RBC QN AUTO: 27.3 PG (ref 26.6–33)
MCHC RBC AUTO-ENTMCNC: 35.7 G/DL (ref 31.5–35.7)
MCV RBC AUTO: 76.6 FL (ref 79–97)
MONOCYTES # BLD AUTO: 1.06 10*3/MM3 (ref 0.1–0.9)
MONOCYTES NFR BLD AUTO: 17.7 % (ref 5–12)
NEUTROPHILS # BLD AUTO: 4.47 10*3/MM3 (ref 1.7–7)
NEUTROPHILS NFR BLD AUTO: 74.5 % (ref 42.7–76)
NRBC BLD AUTO-RTO: 0 /100 WBC (ref 0–0.2)
PATH REPORT.FINAL DX SPEC: NORMAL
PATH REPORT.GROSS SPEC: NORMAL
PHOSPHATE SERPL-MCNC: 2.1 MG/DL (ref 2.5–4.5)
PLATELET # BLD AUTO: 315 10*3/MM3 (ref 140–450)
PMV BLD AUTO: 10.6 FL (ref 6–12)
POTASSIUM BLD-SCNC: 4.1 MMOL/L (ref 3.5–5.2)
PREALB SERPL-MCNC: 5.6 MG/DL (ref 20–40)
PROT SERPL-MCNC: 6.2 G/DL (ref 6–8.5)
RBC # BLD AUTO: 4.65 10*6/MM3 (ref 4.14–5.8)
SODIUM BLD-SCNC: 127 MMOL/L (ref 136–145)
TRIGL SERPL-MCNC: 132 MG/DL (ref 0–150)
WBC NRBC COR # BLD: 6 10*3/MM3 (ref 3.4–10.8)

## 2019-10-07 PROCEDURE — 82465 ASSAY BLD/SERUM CHOLESTEROL: CPT | Performed by: SURGERY

## 2019-10-07 PROCEDURE — 85025 COMPLETE CBC W/AUTO DIFF WBC: CPT | Performed by: SURGERY

## 2019-10-07 PROCEDURE — 82962 GLUCOSE BLOOD TEST: CPT

## 2019-10-07 PROCEDURE — 63710000001 INSULIN ASPART PER 5 UNITS: Performed by: SURGERY

## 2019-10-07 PROCEDURE — 25010000002 ONDANSETRON PER 1 MG: Performed by: SURGERY

## 2019-10-07 PROCEDURE — 25010000002 ENOXAPARIN PER 10 MG: Performed by: SURGERY

## 2019-10-07 PROCEDURE — 83735 ASSAY OF MAGNESIUM: CPT | Performed by: SURGERY

## 2019-10-07 PROCEDURE — 84134 ASSAY OF PREALBUMIN: CPT | Performed by: SURGERY

## 2019-10-07 PROCEDURE — 84100 ASSAY OF PHOSPHORUS: CPT | Performed by: SURGERY

## 2019-10-07 PROCEDURE — 25010000002 CALCIUM GLUCONATE PER 10 ML: Performed by: SURGERY

## 2019-10-07 PROCEDURE — 99024 POSTOP FOLLOW-UP VISIT: CPT | Performed by: SURGERY

## 2019-10-07 PROCEDURE — 25010000002 MAGNESIUM SULFATE PER 500 MG OF MAGNESIUM: Performed by: SURGERY

## 2019-10-07 PROCEDURE — 94799 UNLISTED PULMONARY SVC/PX: CPT

## 2019-10-07 PROCEDURE — 80053 COMPREHEN METABOLIC PANEL: CPT | Performed by: SURGERY

## 2019-10-07 PROCEDURE — 82330 ASSAY OF CALCIUM: CPT | Performed by: SURGERY

## 2019-10-07 PROCEDURE — 25010000002 HYDROMORPHONE 1 MG/ML SOLUTION: Performed by: SURGERY

## 2019-10-07 PROCEDURE — 84478 ASSAY OF TRIGLYCERIDES: CPT | Performed by: SURGERY

## 2019-10-07 PROCEDURE — 94760 N-INVAS EAR/PLS OXIMETRY 1: CPT

## 2019-10-07 RX ORDER — SODIUM CHLORIDE 9 MG/ML
50 INJECTION, SOLUTION INTRAVENOUS CONTINUOUS
Status: DISPENSED | OUTPATIENT
Start: 2019-10-07 | End: 2019-10-07

## 2019-10-07 RX ORDER — SODIUM CHLORIDE 9 MG/ML
50 INJECTION, SOLUTION INTRAVENOUS CONTINUOUS
Status: DISCONTINUED | OUTPATIENT
Start: 2019-10-07 | End: 2019-10-25 | Stop reason: HOSPADM

## 2019-10-07 RX ADMIN — HYDROMORPHONE HYDROCHLORIDE 1 MG: 1 INJECTION, SOLUTION INTRAMUSCULAR; INTRAVENOUS; SUBCUTANEOUS at 02:35

## 2019-10-07 RX ADMIN — HYDROMORPHONE HYDROCHLORIDE 1 MG: 1 INJECTION, SOLUTION INTRAMUSCULAR; INTRAVENOUS; SUBCUTANEOUS at 17:47

## 2019-10-07 RX ADMIN — SODIUM CHLORIDE, PRESERVATIVE FREE 10 ML: 5 INJECTION INTRAVENOUS at 10:03

## 2019-10-07 RX ADMIN — POTASSIUM CHLORIDE, DEXTROSE MONOHYDRATE AND SODIUM CHLORIDE 75 ML/HR: 150; 5; 450 INJECTION, SOLUTION INTRAVENOUS at 01:14

## 2019-10-07 RX ADMIN — SODIUM CHLORIDE 50 ML/HR: 9 INJECTION, SOLUTION INTRAVENOUS at 14:21

## 2019-10-07 RX ADMIN — ONDANSETRON 4 MG: 2 INJECTION INTRAMUSCULAR; INTRAVENOUS at 05:07

## 2019-10-07 RX ADMIN — I.V. FAT EMULSION 35 G: 20 EMULSION INTRAVENOUS at 17:46

## 2019-10-07 RX ADMIN — HYDROMORPHONE HYDROCHLORIDE 1 MG: 1 INJECTION, SOLUTION INTRAMUSCULAR; INTRAVENOUS; SUBCUTANEOUS at 10:01

## 2019-10-07 RX ADMIN — SILVER SULFADIAZINE: 10 CREAM TOPICAL at 21:36

## 2019-10-07 RX ADMIN — ALBUTEROL SULFATE 2.5 MG: 2.5 SOLUTION RESPIRATORY (INHALATION) at 07:05

## 2019-10-07 RX ADMIN — ENOXAPARIN SODIUM 40 MG: 40 INJECTION SUBCUTANEOUS at 10:00

## 2019-10-07 RX ADMIN — ALBUTEROL SULFATE 2.5 MG: 2.5 SOLUTION RESPIRATORY (INHALATION) at 19:10

## 2019-10-07 RX ADMIN — PANTOPRAZOLE SODIUM 40 MG: 40 INJECTION, POWDER, FOR SOLUTION INTRAVENOUS at 06:35

## 2019-10-07 RX ADMIN — SILVER SULFADIAZINE: 10 CREAM TOPICAL at 10:03

## 2019-10-07 RX ADMIN — HYDROMORPHONE HYDROCHLORIDE 1 MG: 1 INJECTION, SOLUTION INTRAMUSCULAR; INTRAVENOUS; SUBCUTANEOUS at 05:06

## 2019-10-07 RX ADMIN — ALBUTEROL SULFATE 2.5 MG: 2.5 SOLUTION RESPIRATORY (INHALATION) at 01:00

## 2019-10-07 RX ADMIN — SODIUM CHLORIDE, PRESERVATIVE FREE 10 ML: 5 INJECTION INTRAVENOUS at 21:37

## 2019-10-07 RX ADMIN — CALCIUM GLUCONATE: 94 INJECTION, SOLUTION INTRAVENOUS at 17:47

## 2019-10-07 RX ADMIN — INSULIN ASPART 3 UNITS: 100 INJECTION, SOLUTION INTRAVENOUS; SUBCUTANEOUS at 21:37

## 2019-10-07 NOTE — PROGRESS NOTES
LOS: 7 days   Patient Care Team:  Cristino He MD as PCP - General  Soto, Trenton Lewis MD as Surgeon (General Surgery)  Krysten Martínez MD as Consulting Physician (Hematology and Oncology)  Keaton Alfred MD as Consulting Physician (Radiation Oncology)  Dee Bajwa APRN as Nurse Practitioner (Oncology)    Chief Complaint: Postoperative day 6    Subjective     History of Present Illness    Subjective:  Symptoms:  Stable.    Diet:  NPO.  No nausea or vomiting.    Activity level: Returning to normal.    Pain:  He reports no pain.        History taken from: patient chart RN    Objective     Vital Signs  Temp:  [96.8 °F (36 °C)-97.5 °F (36.4 °C)] 96.8 °F (36 °C)  Heart Rate:  [] 72  Resp:  [16-18] 16  BP: (109-150)/(56-90) 109/56    Objective:  General Appearance:  Comfortable.    Vital signs: (most recent): Blood pressure 109/56, pulse 72, temperature 96.8 °F (36 °C), resp. rate 16, weight 56.1 kg (123 lb 9.6 oz), SpO2 95 %.  Vital signs are normal.  No fever.    Output: Producing urine and no stool output.    Abdomen: Abdomen is soft.  (Nasogastric output remains high).              Results Review:     None    Medication Review: Performed    Assessment/Plan       * No active hospital problems. *      Assessment:    Condition: In stable condition.  Unchanged.       Plan:   (1.  Stop feeds  2.  Initiate intravenous hyperalimentation).       Parminder Kc MD  10/07/19  8:38 AM    Time: 10 minutes

## 2019-10-07 NOTE — PLAN OF CARE
Problem: Patient Care Overview  Goal: Plan of Care Review  Outcome: Ongoing (interventions implemented as appropriate)   10/07/19 0801   Coping/Psychosocial   Plan of Care Reviewed With patient   Plan of Care Review   Progress no change   OTHER   Outcome Summary Pt resting between care. VSS. Pt had increased stomach pain and nausea early this morning around 5am. Meds were given and tube feeding reduced from 30ml to 20 ml/hr.

## 2019-10-07 NOTE — PLAN OF CARE
Problem: Patient Care Overview  Goal: Plan of Care Review  Outcome: Ongoing (interventions implemented as appropriate)   10/07/19 9460   Coping/Psychosocial   Plan of Care Reviewed With caregiver   Plan of Care Review   Progress no change   OTHER   Outcome Summary Pt was started on tube feeding but had episodes of emesis. Tube feeding was stopped and PN has been ordered. RD will monitor.

## 2019-10-07 NOTE — CONSULTS
Adult Nutrition  Assessment    Patient Name:  Emerson Bang  YOB: 1960  MRN: [id number]  Admit Date:  9/30/2019    Assessment Date:  10/7/2019    Comments:  Pt remains NPO--EN was started via the Jtube but pt developed vomiting.  NGT was placed and tube feeding was restarted several times but he continued to have emesis even with the NGT.  Output from the NGT noted to be high.  Tube feeding has been stopped and PN ordered.  Rd will follow up tomorrow for PN orders.  We may need to go slow due to the possibility of refeeding syndrome.      Reason for Assessment     Row Name 10/07/19 1532          Reason for Assessment    Reason For Assessment  follow-up protocol         Nutrition/Diet History     Row Name 10/07/19 1532          Nutrition/Diet History    Typical Food/Fluid Intake  Pt sleeping.  Per staff pt once again had emesis so Tube feeding was stopped and MD ordered PN.            Labs/Tests/Procedures/Meds     Row Name 10/07/19 1534          Labs/Procedures/Meds    Lab Results Reviewed  reviewed, pertinent     Lab Results Comments  Glucose 125; Creat 0.74; Alb 3.30        Diagnostic Tests/Procedures    Diagnostic Test/Procedure Reviewed  reviewed, pertinent     Diagnostic Test/Procedures Comments  PN ordered; NGT        Medications    Pertinent Medications Reviewed  reviewed, pertinent         Physical Findings     Row Name 10/07/19 1535          Physical Findings    Overall Physical Appearance  loss of subcutaneous fat;loss of muscle mass;overweight;generalized wasting;on oxygen therapy     Gastrointestinal  feeding tube     Tubes  jejunostomy tube;nasogastric tube           Nutrition Prescription Ordered     Row Name 10/07/19 1536          Nutrition Prescription PO    Current PO Diet  NPO                 Electronically signed by:  Jane Ghosh RD  10/07/19 3:41 PM

## 2019-10-07 NOTE — PROGRESS NOTES
Parenteral Nutrition by Pharmacy    Patient: Emerson Bang is a 59 y.o. male  [Ht:  ; Wt: 56.1 kg (123 lb 9.6 oz)]  MRN#: [id number]  Attending: No name on file.  Admission Date: 093019    Subjective/Objective     Diagnosis: Prolonged Paralytic Ileus  Assessment      Lab Results   Component Value Date    GLUCOSE 125 (H) 10/03/2019    BUN 9 10/03/2019    CREATININE 0.74 (L) 10/03/2019    EGFRIFAFRI 130 10/03/2019    BCR 12.2 10/03/2019    K 4.5 10/03/2019    CO2 25.0 10/03/2019    CALCIUM 7.2 (L) 10/03/2019    ALBUMIN 3.30 (L) 10/01/2019    AST 21 10/01/2019    ALT 17 10/01/2019     Lab Results   Component Value Date    GLUCOSE 125 (H) 10/03/2019    CALCIUM 7.2 (L) 10/03/2019     10/03/2019    K 4.5 10/03/2019    CO2 25.0 10/03/2019    CL 96 (L) 10/03/2019    BUN 9 10/03/2019    CREATININE 0.74 (L) 10/03/2019    EGFRIFAFRI 130 10/03/2019    BCR 12.2 10/03/2019    ANIONGAP 15.0 10/03/2019     No results found for: MG  No results found for: PHOS  No results found for: CALCIUM  No results found for: TRIG     Patient has not had labs since 10/3 -  Labs ordered but there has been a delay in getting the results  Will set up a generic TPN from patient's weight and labs from 10/3 at this point as benefit of starting the TPN outweighs the risk of delay.   Verified that we can use the patient's central implanted port for tpn    Plan   K - WNL patient has been receiving infusion of 20mEq/L K+ at 75mL/h our rate will be slightly slower but last available potassium was well within normal - will include 44 mEq in TPN  Na - WNL on low side - has been receiving half normal saline at 75ml/h - will add 160 mEq to our 2L bag to match  Phos - WNL starting with 30 mmol in bag  Mag WNL low side - will start with 8 mEq  Cl - low expect will correct with NaCl  CO2 WNL  Ca - low at 7.2 - will correct at 16mEq  Corrected Ca cannot be calculated as there is no albumin lab value available - will order albumin     WBC slightly  elevated  Liver function will be monitored with lipids  Glucose well controlled  Will add SSI    New labs resulted after TPN has been made - all electrolytes are reasonably similar and should be adequately replaced in the TPN except sodium which came back as 127 today. Since TPN has already been made I spoke with Dr. Kc to DC patient's Current D5w and 1/2 NS and started NS at 50 mL/hour    Estimated REE:  REE: 1250 kcal x1.4 stress factor = 1750 kcal    TPN will provide:  Lipids: 350 kcal =  A rate of 14.5 mL/h for 12 hours  Protein:  100 g = 400 kcal  Dextrose: 300g = 1020 kcal  Total: 1770 kcal     TPN formula:  Clinimix 5/15% 2000 ml  KPhos 44 mEq / 30 mMol  NaCl 120 mEq  NaAcetate 40 mEq  Calcium Gluconate 16 mEq  Mag Sulfate 8 mEq  MVI 10 ml    Rate 83.3 ml/hr    Adonis Rodriguez [facility]  10/07/19  11:21 AM

## 2019-10-07 NOTE — TELEPHONE ENCOUNTER
Called pt to discuss my role as the nurse navigator in cancer care and my support services to pt and family. Left a detailed message offering my contact information for pt to return my call should pt want to discuss further prior to appt at the cancer center.   Bcc Infiltrative Histology Text: There were numerous aggregates of basaloid cells demonstrating an infiltrative pattern.

## 2019-10-07 NOTE — PLAN OF CARE
Problem: Patient Care Overview  Goal: Plan of Care Review  Outcome: Ongoing (interventions implemented as appropriate)   10/07/19 1701   Coping/Psychosocial   Plan of Care Reviewed With patient   Plan of Care Review   Progress declining   OTHER   Outcome Summary no fall, no new skin issues, pain controled with meds, no new skin issues tx continue, to start tpn d/c j tube feedign     Goal: Individualization and Mutuality  Outcome: Ongoing (interventions implemented as appropriate)    Goal: Discharge Needs Assessment  Outcome: Ongoing (interventions implemented as appropriate)    Goal: Interprofessional Rounds/Family Conf  Outcome: Ongoing (interventions implemented as appropriate)      Problem: Fall Risk (Adult)  Goal: Absence of Fall  Outcome: Ongoing (interventions implemented as appropriate)      Problem: Skin Injury Risk (Adult)  Goal: Skin Health and Integrity  Outcome: Ongoing (interventions implemented as appropriate)      Problem: Pain, Chronic (Adult)  Goal: Acceptable Pain/Comfort Level and Functional Ability  Outcome: Ongoing (interventions implemented as appropriate)      Problem: Wound (Includes Pressure Injury) (Adult)  Goal: Signs and Symptoms of Listed Potential Problems Will be Absent, Minimized or Managed (Wound)  Outcome: Ongoing (interventions implemented as appropriate)      Problem: Nutrition, Parenteral (Adult)  Goal: Signs and Symptoms of Listed Potential Problems Will be Absent, Minimized or Managed (Nutrition, Parenteral)  Outcome: Ongoing (interventions implemented as appropriate)    Goal: Signs and Symptoms of Listed Potential Problems Will be Absent, Minimized or Managed (Nutrition, Parenteral)  Outcome: Ongoing (interventions implemented as appropriate)

## 2019-10-08 LAB
ALBUMIN SERPL-MCNC: 2.5 G/DL (ref 3.5–5.2)
ALBUMIN/GLOB SERPL: 0.8 G/DL
ALP SERPL-CCNC: 64 U/L (ref 39–117)
ALT SERPL W P-5'-P-CCNC: 10 U/L (ref 1–41)
ANION GAP SERPL CALCULATED.3IONS-SCNC: 8 MMOL/L (ref 5–15)
AST SERPL-CCNC: 15 U/L (ref 1–40)
BASOPHILS # BLD AUTO: 0.04 10*3/MM3 (ref 0–0.2)
BASOPHILS NFR BLD AUTO: 0.5 % (ref 0–1.5)
BILIRUB SERPL-MCNC: 0.3 MG/DL (ref 0.2–1.2)
BUN BLD-MCNC: 14 MG/DL (ref 8–23)
BUN/CREAT SERPL: 22.2 (ref 7–25)
CALCIUM SPEC-SCNC: 8.3 MG/DL (ref 8.6–10.5)
CHLORIDE SERPL-SCNC: 97 MMOL/L (ref 98–107)
CO2 SERPL-SCNC: 25 MMOL/L (ref 22–29)
CREAT BLD-MCNC: 0.63 MG/DL (ref 0.76–1.27)
DEPRECATED RDW RBC AUTO: 50.5 FL (ref 37–54)
EOSINOPHIL # BLD AUTO: 0.06 10*3/MM3 (ref 0–0.4)
EOSINOPHIL NFR BLD AUTO: 0.8 % (ref 0.3–6.2)
ERYTHROCYTE [DISTWIDTH] IN BLOOD BY AUTOMATED COUNT: 17.8 % (ref 12.3–15.4)
GFR SERPL CREATININE-BSD FRML MDRD: >150 ML/MIN/1.73
GLOBULIN UR ELPH-MCNC: 3.2 GM/DL
GLUCOSE BLD-MCNC: 164 MG/DL (ref 65–99)
GLUCOSE BLDC GLUCOMTR-MCNC: 109 MG/DL (ref 70–130)
GLUCOSE BLDC GLUCOMTR-MCNC: 118 MG/DL (ref 70–130)
GLUCOSE BLDC GLUCOMTR-MCNC: 147 MG/DL (ref 70–130)
GLUCOSE BLDC GLUCOMTR-MCNC: 148 MG/DL (ref 70–130)
GLUCOSE BLDC GLUCOMTR-MCNC: 205 MG/DL (ref 70–130)
HCT VFR BLD AUTO: 33.6 % (ref 37.5–51)
HGB BLD-MCNC: 11.6 G/DL (ref 13–17.7)
IMM GRANULOCYTES # BLD AUTO: 0.04 10*3/MM3 (ref 0–0.05)
IMM GRANULOCYTES NFR BLD AUTO: 0.5 % (ref 0–0.5)
LYMPHOCYTES # BLD AUTO: 0.78 10*3/MM3 (ref 0.7–3.1)
LYMPHOCYTES NFR BLD AUTO: 9.9 % (ref 19.6–45.3)
MAGNESIUM SERPL-MCNC: 1.6 MG/DL (ref 1.6–2.4)
MCH RBC QN AUTO: 26.9 PG (ref 26.6–33)
MCHC RBC AUTO-ENTMCNC: 34.5 G/DL (ref 31.5–35.7)
MCV RBC AUTO: 78 FL (ref 79–97)
MONOCYTES # BLD AUTO: 1.57 10*3/MM3 (ref 0.1–0.9)
MONOCYTES NFR BLD AUTO: 20 % (ref 5–12)
NEUTROPHILS # BLD AUTO: 5.37 10*3/MM3 (ref 1.7–7)
NEUTROPHILS NFR BLD AUTO: 68.3 % (ref 42.7–76)
NRBC BLD AUTO-RTO: 0 /100 WBC (ref 0–0.2)
PHOSPHATE SERPL-MCNC: 2.2 MG/DL (ref 2.5–4.5)
PLATELET # BLD AUTO: 307 10*3/MM3 (ref 140–450)
PMV BLD AUTO: 10.5 FL (ref 6–12)
POTASSIUM BLD-SCNC: 4 MMOL/L (ref 3.5–5.2)
PROT SERPL-MCNC: 5.7 G/DL (ref 6–8.5)
RBC # BLD AUTO: 4.31 10*6/MM3 (ref 4.14–5.8)
SODIUM BLD-SCNC: 130 MMOL/L (ref 136–145)
WBC NRBC COR # BLD: 7.86 10*3/MM3 (ref 3.4–10.8)

## 2019-10-08 PROCEDURE — 25010000002 ENOXAPARIN PER 10 MG: Performed by: SURGERY

## 2019-10-08 PROCEDURE — 83735 ASSAY OF MAGNESIUM: CPT | Performed by: SURGERY

## 2019-10-08 PROCEDURE — 94799 UNLISTED PULMONARY SVC/PX: CPT

## 2019-10-08 PROCEDURE — 97530 THERAPEUTIC ACTIVITIES: CPT

## 2019-10-08 PROCEDURE — 85025 COMPLETE CBC W/AUTO DIFF WBC: CPT | Performed by: SURGERY

## 2019-10-08 PROCEDURE — 80053 COMPREHEN METABOLIC PANEL: CPT | Performed by: SURGERY

## 2019-10-08 PROCEDURE — 84100 ASSAY OF PHOSPHORUS: CPT | Performed by: SURGERY

## 2019-10-08 PROCEDURE — 82962 GLUCOSE BLOOD TEST: CPT

## 2019-10-08 PROCEDURE — 94760 N-INVAS EAR/PLS OXIMETRY 1: CPT

## 2019-10-08 PROCEDURE — 97110 THERAPEUTIC EXERCISES: CPT

## 2019-10-08 PROCEDURE — 99024 POSTOP FOLLOW-UP VISIT: CPT | Performed by: SURGERY

## 2019-10-08 PROCEDURE — 25010000002 HYDROMORPHONE 1 MG/ML SOLUTION: Performed by: SURGERY

## 2019-10-08 RX ORDER — SODIUM CHLORIDE, SODIUM LACTATE, POTASSIUM CHLORIDE, CALCIUM CHLORIDE 600; 310; 30; 20 MG/100ML; MG/100ML; MG/100ML; MG/100ML
1000 INJECTION, SOLUTION INTRAVENOUS ONCE
Status: COMPLETED | OUTPATIENT
Start: 2019-10-08 | End: 2019-10-08

## 2019-10-08 RX ORDER — SODIUM CHLORIDE, SODIUM LACTATE, POTASSIUM CHLORIDE, CALCIUM CHLORIDE 600; 310; 30; 20 MG/100ML; MG/100ML; MG/100ML; MG/100ML
INJECTION, SOLUTION INTRAVENOUS
Status: DISPENSED
Start: 2019-10-08 | End: 2019-10-08

## 2019-10-08 RX ADMIN — HYDROMORPHONE HYDROCHLORIDE 1 MG: 1 INJECTION, SOLUTION INTRAMUSCULAR; INTRAVENOUS; SUBCUTANEOUS at 02:16

## 2019-10-08 RX ADMIN — HYDROMORPHONE HYDROCHLORIDE 1 MG: 1 INJECTION, SOLUTION INTRAMUSCULAR; INTRAVENOUS; SUBCUTANEOUS at 14:09

## 2019-10-08 RX ADMIN — SODIUM CHLORIDE, POTASSIUM CHLORIDE, SODIUM LACTATE AND CALCIUM CHLORIDE 1000 ML: 600; 310; 30; 20 INJECTION, SOLUTION INTRAVENOUS at 07:22

## 2019-10-08 RX ADMIN — SILVER SULFADIAZINE: 10 CREAM TOPICAL at 21:17

## 2019-10-08 RX ADMIN — PANTOPRAZOLE SODIUM 40 MG: 40 INJECTION, POWDER, FOR SOLUTION INTRAVENOUS at 05:49

## 2019-10-08 RX ADMIN — HYDROMORPHONE HYDROCHLORIDE 1 MG: 1 INJECTION, SOLUTION INTRAMUSCULAR; INTRAVENOUS; SUBCUTANEOUS at 21:16

## 2019-10-08 RX ADMIN — SODIUM CHLORIDE, PRESERVATIVE FREE 10 ML: 5 INJECTION INTRAVENOUS at 10:02

## 2019-10-08 RX ADMIN — ALBUTEROL SULFATE 2.5 MG: 2.5 SOLUTION RESPIRATORY (INHALATION) at 20:36

## 2019-10-08 RX ADMIN — ALBUTEROL SULFATE 2.5 MG: 2.5 SOLUTION RESPIRATORY (INHALATION) at 00:52

## 2019-10-08 RX ADMIN — ALBUTEROL SULFATE 2.5 MG: 2.5 SOLUTION RESPIRATORY (INHALATION) at 14:01

## 2019-10-08 RX ADMIN — ENOXAPARIN SODIUM 40 MG: 40 INJECTION SUBCUTANEOUS at 09:08

## 2019-10-08 RX ADMIN — SILVER SULFADIAZINE: 10 CREAM TOPICAL at 09:58

## 2019-10-08 RX ADMIN — SODIUM CHLORIDE 50 ML/HR: 9 INJECTION, SOLUTION INTRAVENOUS at 14:09

## 2019-10-08 RX ADMIN — ALBUTEROL SULFATE 2.5 MG: 2.5 SOLUTION RESPIRATORY (INHALATION) at 07:04

## 2019-10-08 RX ADMIN — SODIUM CHLORIDE, POTASSIUM CHLORIDE, SODIUM LACTATE AND CALCIUM CHLORIDE 1000 ML: 600; 310; 30; 20 INJECTION, SOLUTION INTRAVENOUS at 10:00

## 2019-10-08 NOTE — PLAN OF CARE
Problem: Patient Care Overview  Goal: Plan of Care Review  Outcome: Ongoing (interventions implemented as appropriate)   10/08/19 7943   Coping/Psychosocial   Plan of Care Reviewed With patient   Plan of Care Review   Progress improving   OTHER   Outcome Summary pt participated in bed mobility but declined further tx. pt requires min A to roll R/L but able to maintain SBA. pt able to bridge w/ min A but requires dep assist to scoot. vitals stable throughout tx. pt would continue to benefit from PT services.

## 2019-10-08 NOTE — PROGRESS NOTES
Parenteral Nutrition by Pharmacy - Day 2    Patient: Emerson Bang  MRN#: [id number]  Attending: No name on file.  Admission Date: 093019    Subjective/Objective   Progress Notes Reviewed    Assessment      Lab Results   Component Value Date    GLUCOSE 164 (H) 10/08/2019    BUN 14 10/08/2019    CREATININE 0.63 (L) 10/08/2019    EGFRIFAFRI >150 10/08/2019    BCR 22.2 10/08/2019    K 4.0 10/08/2019    CO2 25.0 10/08/2019    CALCIUM 8.3 (L) 10/08/2019    ALBUMIN 2.50 (L) 10/08/2019    AST 15 10/08/2019    ALT 10 10/08/2019     Lab Results   Component Value Date    GLUCOSE 164 (H) 10/08/2019    CALCIUM 8.3 (L) 10/08/2019     (L) 10/08/2019    K 4.0 10/08/2019    CO2 25.0 10/08/2019    CL 97 (L) 10/08/2019    BUN 14 10/08/2019    CREATININE 0.63 (L) 10/08/2019    EGFRIFAFRI >150 10/08/2019    BCR 22.2 10/08/2019    ANIONGAP 8.0 10/08/2019     Magnesium   Date Value Ref Range Status   10/08/2019 1.6 1.6 - 2.4 mg/dL Final     Phosphorus   Date Value Ref Range Status   10/08/2019 2.2 (L) 2.5 - 4.5 mg/dL Final     Calcium   Date Value Ref Range Status   10/08/2019 8.3 (L) 8.6 - 10.5 mg/dL Final     Triglycerides   Date Value Ref Range Status   10/07/2019 132 0 - 150 mg/dL Final         Diagnosis: prolonged paralytic ileus    Baseline abs resulted yesterday after TPN was already made - see 10/7 note    Above labs reviewed.  Sodium remains low at 130 - uptrending  Chloride remains low at 97 - uptrending  Phos remains low at 2.2 - uptrending  Albumin 2.5, Calcium 8.3, Corrected Calcium 9.5 (WNL)  CO2 has corrected  All other pertinent levels stable and WNL    NS is running at 50 mL/hr due to low blood pressure  Patient also received a liter bolus of LR this morning due to low blood pressure    Patient has not yet received a full bag of TPN - given that and the recent addition of NS and LR bolus for hypotension, will trend electrolytes on current formulation for now - tomorrow's electrolytes will be more indicative  of adjustments that need to be made      Plan   Continue current TPN formulation - no changes    TPN Formulation:  Clinimix 5/15% 2000 ml  KPhos 44 mEq / 30 mMol  NaCl 120 mEq  NaAcetate 40 mEq  Calcium Gluconate 16 mEq  Mag Sulfate 8 mEq  MVI 10 ml    Rate 83.3 mL/hr    Lipids: 175 mL over 12 hours    Pharmacy will continue to monitor and adjust formula as needed.    Keshia Martinez RPH  10/08/19  8:37 AM

## 2019-10-08 NOTE — THERAPY TREATMENT NOTE
Acute Care - Physical Therapy Treatment Note  [facility]     Patient Name: Emerson Bang  : 1960  MRN: [id number]  Today's Date: 10/8/2019  Onset of Illness/Injury or Date of Surgery: 19     Referring Physician: Dr. Kc    Admit Date: 2019    Visit Dx:    ICD-10-CM ICD-9-CM   1. 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 Impaired physical mobility Z74.09 781.99   3. Impaired mobility and activities of daily living Z74.09 799.89     Patient Active Problem List   Diagnosis   • Hyperkalemia   • Iron deficiency anemia   • Gastroesophageal reflux disease with esophagitis   • Elevated AST (SGOT)   • Alcohol abuse   • Hypothyroidism   • Balance problem   • Need for vaccination   • Low magnesium levels   • Squamous cell carcinoma of pharynx (CMS/HCC)   • History of throat cancer   • Vitamin D deficiency   • Alcohol abuse counseling and surveillance   • Encounter for screening for diabetes mellitus   • Hypomagnesemia   • Iron deficiency anemia   • Hyperlipidemia   • Underweight due to inadequate caloric intake   • Need for immunization against influenza   • 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HCC)   • Hypertension   • General medical examination   • Underweight   • Epigastric pain   • Gastritis without bleeding   • Need for influenza vaccination   • Elevated liver function tests   • B12 deficiency   • Intestinal malabsorption   • Throat pain   • Acute pharyngitis   • Upper respiratory tract infection   • Neoplasm of uncertain behavior of larynx   • Pharyngoesophageal dysphagia   • Severe protein-calorie malnutrition (CMS/HCC)   • Anemia of chronic disease   • Hypotension   • Hyponatremia   • Status post insertion of percutaneous endoscopic gastrostomy (PEG) tube (CMS/HCC)   • Hx of tracheostomy   • History of throat surgery   • Hx SBO   • Urinary retention   • Generalized weakness   • Acute otitis externa of right ear   • Hard stool   • Panlobular emphysema (CMS/HCC)   • Cancer of  hypopharynx (CMS/HCC)   • Diarrhea   • Encounter for venous access device care   • Prediabetes   • Status post neck dissection   • S/P partial thyroidectomy   • S/P laryngectomy   • Annual physical exam   • Alcoholic cirrhosis of liver without ascites (CMS/HCC)       Therapy Treatment    Rehabilitation Treatment Summary     Row Name 10/08/19 1134 10/08/19 0748          Treatment Time/Intention    Discipline  physical therapy assistant  -GIAN  occupational therapy assistant  -BB     Document Type  therapy note (daily note)  -GIAN  therapy note (daily note)  -BB     Subjective Information  --  complains of;pain  -BB     Mode of Treatment  individual therapy;physical therapy  -GIAN  individual therapy;occupational therapy  -BB     Patient/Family Observations  --  no family present  -BB     Therapy Frequency (PT Clinical Impression)  other (see comments) 6days per week  -GIAN  --     Total Minutes, Occupational Therapy Treatment  --  25  -BB     Therapy Frequency (OT Eval)  --  other (see comments) 5-7 days/wk  -BB     Patient Effort  fair  -GIAN  fair  -BB     Comment  pt assisted w/ bed mobility but pt declines further tx.   -GIAN  --     Existing Precautions/Restrictions  fall;other (see comments) watch gait belt placement  -GIAN  fall;other (see comments) watch gait belt placement  -BB     Patient Response to Treatment  --  Pt deferred EOB/OOB 2' to pain  -BB     Recorded by [GIAN] Abran Wan, PTA 10/08/19 1558 [BB] Kerrie Woodson, MADSEN/L 10/08/19 1143     Row Name 10/08/19 1134 10/08/19 0748          Vital Signs    Pre Systolic BP Rehab  107 initial vitals per nsg  -GIAN  --     Pre Treatment Diastolic BP  54  -GIAN  --     Post Systolic BP Rehab  145  -GIAN  --     Post Treatment Diastolic BP  75  -GIAN  --     Pretreatment Heart Rate (beats/min)  86  -GIAN  83  -BB     Posttreatment Heart Rate (beats/min)  106  -GIAN  84  -BB     Pre SpO2 (%)  96  -GINA  96  -BB     O2 Delivery Pre Treatment  supplemental O2  -GIAN  supplemental O2   -BB     Post SpO2 (%)  98  -GIAN  93  -BB     O2 Delivery Post Treatment  supplemental O2  -GIAN  supplemental O2  -BB     Pre Patient Position  Supine  -GIAN  Supine  -BB     Post Patient Position  Supine  -GIAN  Supine  -BB     Recorded by [GIAN] Abran Wan, PTA 10/08/19 1558 [BB] Kerrie Woodson MADSEN/L 10/08/19 1143     Row Name 10/08/19 1134 10/08/19 0748          Cognitive Assessment/Intervention- PT/OT    Affect/Mental Status (Cognitive)  unable/difficult to assess  -GIAN  flat/blunted affect;sad/depressed affect  -BB     Orientation Status (Cognition)  unable/difficult to assess  -GIAN  oriented to;person;place;situation  -BB     Follows Commands (Cognition)  WFL  -GIAN  WFL  -BB     Recorded by [GIAN] Abran Wan PTA 10/08/19 1558 [BB] Kerrie Woodson MADSEN/L 10/08/19 1143     Row Name 10/08/19 1134             Bed Mobility Assessment/Treatment    Bed Mobility Assessment/Treatment  rolling left;rolling right;scooting/bridging  -GIAN      Rolling Left [place] (Bed Mobility)  minimum assist (75% patient effort)  -GIAN      Rolling Right [place] (Bed Mobility)  minimum assist (75% patient effort)  -GIAN      Scooting/Bridging [place] (Bed Mobility)  dependent (less than 25% patient effort);2 person assist pt able to bridge w/ min A  -GIAN      Supine-Sit [place] (Bed Mobility)  --  -GIAN      Sit-Supine [place] (Bed Mobility)  not tested  -GIAN      Assistive Device (Bed Mobility)  head of bed elevated;bed rails  -GIAN      Comment (Bed Mobility)  pt requires min A to roll but able to maintain SBA once on R or L side. pt can bridge w/ min A but dep to scoot. pt assisted w/ bed mobility for pericare and repositioning. pt defers durther tx.    -GIAN      Recorded by [GIAN] Abran Wan PTA 10/08/19 1558      Row Name 10/08/19 1134             Bed-Chair Transfer    Bed-Chair [place] (Transfers)  not tested  -GIAN      Recorded by [GIAN] Arban Wan, PTA 10/08/19 1558      Row Name 10/08/19  1134             Sit-Stand Transfer    Sit-Stand [place] (Transfers)  not tested  -GIAN      Recorded by [GIAN] Abran Wan, PTA 10/08/19 1558      Row Name 10/08/19 1134             Gait/Stairs Assessment/Training    [place] Level (Gait)  not tested  -GIAN      Recorded by [GIAN] Abran Wan, PTA 10/08/19 1558      Row Name 10/08/19 0748             Therapeutic Exercise    Upper Extremity Range of Motion (Therapeutic Exercise)  shoulder flexion/extension, bilateral;shoulder horizontal abduction/adduction, bilateral;elbow flexion/extension, bilateral;forearm supination/pronation, bilateral;wrist flexion/extension, bilateral chest press  -BB      Hand (Therapeutic Exercise)  finger flexion/extension, bilateral  -BB      Weight/Resistance (Therapeutic Exercise)  1 pound  -BB      Exercise Type (Therapeutic Exercise)  AROM (active range of motion)  -BB      Position (Therapeutic Exercise)  supine  -BB      Sets/Reps (Therapeutic Exercise)  1x20  -BB      Equipment (Therapeutic Exercise)  free weight, barbell  -BB      Expected Outcome (Therapeutic Exercise)  improve functional tolerance, self-care activity;improve performance, transfer skills;improve functional stability  -BB      Recorded by [BB] Kerrie Woodson COTA/L 10/08/19 1143      Row Name 10/08/19 1134 10/08/19 0748          Positioning and Restraints    Pre-Treatment Position  in bed  -GIAN  in bed  -BB     Post Treatment Position  bed  -  bed  -BB     In Bed  with nsg  -GIAN  supine;encouraged to call for assist;call light within reach;exit alarm on  -BB     Recorded by [GIAN] Abran Wan, PTA 10/08/19 1558 [BB] Kerrie Woodson COTA/L 10/08/19 1143     Row Name 10/08/19 1134 10/08/19 0748          Pain Scale: Numbers Pre/Post-Treatment    Pain Scale: Numbers, Pretreatment  --  -GIAN  7/10  -BB     Pain Scale: Numbers, Post-Treatment  --  -GIAN  8/10  -BB     Pain Location  --  -GIAN  abdomen  -BB     Pain Intervention(s)  --  Medication (See  MAR)  -BB     Recorded by [GIAN] Abran Wan, PTA 10/08/19 1558 [BB] Kerrie Woodson COTA/L 10/08/19 1143     Row Name 10/08/19 1134             Sensory Assessment/Intervention    Sensory General Assessment  --  -GIAN      Recorded by [GIAN] Abran Wan, PTA 10/08/19 1558      Row Name                Wound 09/30/19 1330 Left medial abdomen Fistula    Wound - Properties Group Date first assessed: 09/30/19 [SS] Time first assessed: 1330 [SS] Side: Left [SS] Orientation: medial [SS] Location: abdomen [SS] Primary Wound Type: Fistula [SS] Recorded by:  [SS] Citlalli Abdi RN 09/30/19 1619    Row Name                Wound 10/02/19 1347 abdomen Incision    Wound - Properties Group Date first assessed: 10/02/19 [CF] Time first assessed: 1347 [CF] Present on Hospital Admission: N [CF] Location: abdomen [CF] Primary Wound Type: Incision [CF] Recorded by:  [CF] Eileen Tyson RN 10/02/19 1347    Row Name 10/08/19 0748             Plan of Care Review    Plan of Care Reviewed With  patient  -BB      Recorded by [BB] Kerrie Woodson COTA/L 10/08/19 1143      Row Name 10/08/19 0748             Outcome Summary/Treatment Plan (OT)    Daily Summary of Progress (OT)  progress toward functional goals is gradual  -BB      Plan for Continued Treatment (OT)  continue POC  -BB      Anticipated Discharge Disposition (OT)  anticipate therapy at next level of care  -BB      Recorded by [BB] Kerrie Woodson COTA/L 10/08/19 1143      Row Name 10/08/19 1134             Outcome Summary/Treatment Plan (PT)    Daily Summary of Progress (PT)  progress toward functional goals as expected  -GIAN      Plan for Continued Treatment (PT)  continue  -GIAN      Anticipated Discharge Disposition (PT)  anticipate therapy at next level of care;home with 24/7 care  -GIAN      Recorded by [GIAN] Abran Wan, PTA 10/08/19 1558        User Key  (r) = Recorded By, (t) = Taken By, (c) = Cosigned By    Initials Name Effective Dates Discipline     SS Citlalli Abdi, RN 07/10/18 -  Nurse    GIAN Abran Wan, PTA 03/07/18 -  PT    BB Kerrie Woodson, MADSEN/L 03/07/18 -  OT    CF Eileen Tyson, RN 10/17/16 -  Nurse          Wound 09/30/19 1330 Left medial abdomen Fistula (Active)   Dressing Appearance dry;intact 10/8/2019  8:00 AM   Closure SAUMYA 10/8/2019  8:00 AM   Base dressing in place, unable to visualize 10/8/2019  8:00 AM   Periwound redness 10/8/2019  8:00 AM   Periwound Temperature warm 10/8/2019  8:00 AM   Periwound Skin Turgor soft 10/8/2019  8:00 AM   Edges other (see comments) 10/8/2019  8:00 AM   Drainage Characteristics/Odor serosanguineous 10/8/2019  8:00 AM   Care, Wound cleansed with 10/8/2019  8:00 AM   Dressing Care, Wound dressing changed 10/8/2019  8:00 AM       Wound 10/02/19 1347 abdomen Incision (Active)   Dressing Appearance dry;intact 10/8/2019  8:00 AM   Closure SAUMYA 10/8/2019  8:00 AM   Base dressing in place, unable to visualize 10/8/2019  8:00 AM   Periwound dry 10/8/2019  8:00 AM   Drainage Characteristics/Odor serosanguineous 10/8/2019  8:00 AM       Rehab Goal Summary     Row Name 10/08/19 1134 10/08/19 0748          Bed Mobility Goal 1 (PT)    Activity/Assistive Device (Bed Mobility Goal 1, PT)  sit to supine;supine to sit  -GIAN  --     [place] Level/Cues Needed (Bed Mobility Goal 1, PT)  independent  -GIAN  --     Time Frame (Bed Mobility Goal 1, PT)  by discharge  -GIAN  --     Progress/Outcomes (Bed Mobility Goal 1, PT)  goal not met  -GIAN  --        Transfer Goal 1 (PT)    Activity/Assistive Device (Transfer Goal 1, PT)  bed-to-chair/chair-to-bed;toilet  -GIAN  --     [place] Level/Cues Needed (Transfer Goal 1, PT)  conditional independence;independent  -GIAN  --     Time Frame (Transfer Goal 1, PT)  by discharge  -GIAN  --     Progress/Outcome (Transfer Goal 1, PT)  goal not met  -GIAN  --        Gait Training Goal 1 (PT)    Activity/Assistive Device (Gait Training Goal 1, PT)  gait (walking locomotion)  -GIAN  --      [place] Level (Gait Training Goal 1, PT)  conditional independence;independent  -GIAN  --     Distance (Gait Goal 1, PT)  [id number]  -GIAN  --     Time Frame (Gait Training Goal 1, PT)  by discharge  -  --     Progress/Outcome (Gait Training Goal 1, PT)  goal not met  -GIAN  --        Stairs Goal 1 (PT)    Activity/Assistive Device (Stairs Goal 1, PT)  descending stairs;ascending stairs  -  --     [place] Level/Cues Needed (Stairs Goal 1, PT)  standby assist  -  --     Number of Stairs (Stairs Goal 1, PT)  5  -GIAN  --     Time Frame (Stairs Goal 1, PT)  by discharge  -GIAN  --     Progress/Outcome (Stairs Goal 1, PT)  goal not met  -GIAN  --        Occupational Therapy Goals    Transfer Goal Selection (OT)  --  transfer, OT goal 1  -BB     Bathing Goal Selection (OT)  --  bathing, OT goal 1  -BB     Dressing Goal Selection (OT)  --  dressing, OT goal 1  -BB     Toileting Goal Selection (OT)  --  toileting, OT goal 1  -BB     Activity Tolerance Goal Selection (OT)  --  activity tolerance, OT goal 1  -BB        Transfer Goal 1 (OT)    Activity/Assistive Device (Transfer Goal 1, OT)  --  sit-to-stand/stand-to-sit;bed-to-chair/chair-to-bed;toilet  -BB     [place] Level/Cues Needed (Transfer Goal 1, OT)  --  supervision required  -BB     Time Frame (Transfer Goal 1, OT)  --  long term goal (LTG);by discharge  -BB     Progress/Outcome (Transfer Goal 1, OT)  --  goal not met  -BB        Bathing Goal 1 (OT)    Activity/Assistive Device (Bathing Goal 1, OT)  --  bathing skills, all  -BB     [place] Level/Cues Needed (Bathing Goal 1, OT)  --  minimum assist (75% or more patient effort)  -BB     Time Frame (Bathing Goal 1, OT)  --  long term goal (LTG);by discharge  -BB     Barriers (Bathing Goal 1, OT)  --  Pt will need extra time and rest breaks PRN  -BB     Progress/Outcomes (Bathing Goal 1, OT)  --  goal not met  -BB        Dressing Goal 1 (OT)    Activity/Assistive Device (Dressing Goal 1, OT)  --  dressing  skills, all  -BB     [place]/Cues Needed (Dressing Goal 1, OT)  --  minimum assist (75% or more patient effort)  -BB     Time Frame (Dressing Goal 1, OT)  --  long term goal (LTG);by discharge  -BB     Barriers (Dressing Goal 1, OT)  --  Pt will need extra time and rest breaks PRN  -BB     Progress/Outcome (Dressing Goal 1, OT)  --  goal not met  -BB        Toileting Goal 1 (OT)    Activity/Device (Toileting Goal 1, OT)  --  toileting skills, all  -BB     [place] Level/Cues Needed (Toileting Goal 1, OT)  --  minimum assist (75% or more patient effort)  -BB     Time Frame (Toileting Goal 1, OT)  --  long term goal (LTG);by discharge  -BB     Barriers (Toileting Goal 1, OT)  --  Pt will need extra time and rest breaks PRN  -BB     Progress/Outcome (Toileting Goal 1, OT)  --  goal not met  -BB         Activity Tolerance Goal 1 (OT)    Activity Level (Endurance Goal 1, OT)  --  15 min activity functional/therapeutic activity  -BB     Time Frame (Activity Tolerance Goal 1, OT)  --  long term goal (LTG)  -BB     Progress/Outcome (Activity Tolerance Goal 1, OT)  --  goal not met  -BB       User Key  (r) = Recorded By, (t) = Taken By, (c) = Cosigned By    Initials Name Provider Type Discipline    Abran Ferreira, PTA Physical Therapy Assistant PT    BB Kerrie Woodson, TENA/L Occupational Therapy Assistant OT          Physical Therapy Education     Title: PT OT SLP Therapies (In Progress)     Topic: Physical Therapy (In Progress)     Point: Mobility training (In Progress)     Learning Progress Summary           Patient Acceptance, E, NR by GIAN at 10/8/2019  4:00 PM    Acceptance, E, NR by NANETTE at 10/3/2019  3:43 PM                   Point: Body mechanics (In Progress)     Learning Progress Summary           Patient Acceptance, E, NR by NANETTE at 10/3/2019  3:43 PM                   Point: Precautions (In Progress)     Learning Progress Summary           Patient Acceptance, E, NR by NANETTE at 10/3/2019  3:43 PM                                User Key     Initials Effective Dates Name Provider Type Discipline    [facility] 04/03/18 -  Judy Watkins, PT Physical Therapist PT    GIAN 03/07/18 -  Abran Wan, PTA Physical Therapy Assistant PT                PT Recommendation and Plan  Anticipated Discharge Disposition (PT): anticipate therapy at next level of care, home with 24/7 care  Therapy Frequency (PT Clinical Impression): other (see comments)(6days per week)  Outcome Summary/Treatment Plan (PT)  Daily Summary of Progress (PT): progress toward functional goals as expected  Plan for Continued Treatment (PT): continue  Anticipated Discharge Disposition (PT): anticipate therapy at next level of care, home with 24/7 care  Plan of Care Reviewed With: patient  Progress: improving  Outcome Summary: pt participated in bed mobility but declined further tx. pt requires min A to roll R/L but able to maintain SBA. pt able to bridge w/ min A but requires dep assist to scoot. vitals stable throughout tx. pt would continue to benefit from PT services.   Outcome Measures     Row Name 10/08/19 1134 10/08/19 0748 10/06/19 1500       How much help from another person do you currently need...    Turning from your back to your side while in flat bed without using bedrails?  3  -GIAN  --  2  -TA    Moving from lying on back to sitting on the side of a flat bed without bedrails?  2  -GIAN  --  2  -TA    Moving to and from a bed to a chair (including a wheelchair)?  2  -GIAN  --  2  -TA    Standing up from a chair using your arms (e.g., wheelchair, bedside chair)?  2  -GIAN  --  2  -TA    Climbing 3-5 steps with a railing?  1  -GIAN  --  2  -TA    To walk in hospital room?  2  -GIAN  --  2  -TA    AM-PAC 6 Clicks Score (PT)  12  -GIAN  --  12  -TA       How much help from another is currently needed...    Putting on and taking off regular lower body clothing?  --  2  -BB  --    Bathing (including washing, rinsing, and drying)  --  2  -BB  --    Toileting (which  includes using toilet bed pan or urinal)  --  2  -BB  --    Putting on and taking off regular upper body clothing  --  2  -BB  --    Taking care of personal grooming (such as brushing teeth)  --  2  -BB  --    Eating meals  --  1  -BB  --    AM-PAC 6 Clicks Score (OT)  --  11  -BB  --       Functional Assessment    Outcome Measure Options  AM-PAC 6 Clicks Basic Mobility (PT)  -GIAN  --  AM-PAC 6 Clicks Basic Mobility (PT)  -TA    Row Name 10/06/19 1300             How much help from another is currently needed...    Putting on and taking off regular lower body clothing?  2  -CS      Bathing (including washing, rinsing, and drying)  2  -CS      Toileting (which includes using toilet bed pan or urinal)  2  -CS      Putting on and taking off regular upper body clothing  2  -CS      Taking care of personal grooming (such as brushing teeth)  2  -CS      Eating meals  1  -CS      AM-PAC 6 Clicks Score (OT)  11  -CS        User Key  (r) = Recorded By, (t) = Taken By, (c) = Cosigned By    Initials Name Provider Type     Pepper Dudley PTA Physical Therapy Assistant    Abran Ferreira PTA Physical Therapy Assistant    Kerrie Spencer, MADSEN/L Occupational Therapy Assistant    Brenda Lee, MADSEN/L Occupational Therapy Assistant         Time Calculation:   PT Charges     Row Name 10/08/19 1602             Time Calculation    Start Time  1134  -GIAN      Stop Time  1143  -GIAN      Time Calculation (min)  9 min  -GIAN         Time Calculation- PT    Total Timed Code Minutes- PT  9 minute(s)  -GIAN        User Key  (r) = Recorded By, (t) = Taken By, (c) = Cosigned By    Initials Name Provider Type    Abran Ferreira PTA Physical Therapy Assistant        Therapy Charges for Today     Code Description Service Date Service Provider Modifiers Qty    [id number]  PT THERAPEUTIC ACT EA 15 MIN 10/8/2019 Abran Wan PTA GP 1          PT G-Codes  Outcome Measure Options: AM-PAC 6 Clicks Basic Mobility  (PT)  AM-PAC 6 Clicks Score (PT): 12  AM-PAC 6 Clicks Score (OT): 11    Abran Wan, PTA  10/8/2019

## 2019-10-08 NOTE — PLAN OF CARE
Problem: Patient Care Overview  Goal: Plan of Care Review  Outcome: Ongoing (interventions implemented as appropriate)   10/08/19 0501   Coping/Psychosocial   Plan of Care Reviewed With patient   Plan of Care Review   Progress no change   OTHER   Outcome Summary pt hypotensive, NS started @50ml/hr; refused trach care but was suctioned twice.      Goal: Individualization and Mutuality  Outcome: Ongoing (interventions implemented as appropriate)    Goal: Discharge Needs Assessment  Outcome: Ongoing (interventions implemented as appropriate)      Problem: Fall Risk (Adult)  Goal: Absence of Fall  Outcome: Ongoing (interventions implemented as appropriate)      Problem: Skin Injury Risk (Adult)  Goal: Skin Health and Integrity  Outcome: Ongoing (interventions implemented as appropriate)      Problem: Pain, Chronic (Adult)  Goal: Acceptable Pain/Comfort Level and Functional Ability  Outcome: Ongoing (interventions implemented as appropriate)      Problem: Wound (Includes Pressure Injury) (Adult)  Goal: Signs and Symptoms of Listed Potential Problems Will be Absent, Minimized or Managed (Wound)  Outcome: Ongoing (interventions implemented as appropriate)      Problem: Nutrition, Parenteral (Adult)  Goal: Signs and Symptoms of Listed Potential Problems Will be Absent, Minimized or Managed (Nutrition, Parenteral)  Outcome: Ongoing (interventions implemented as appropriate)    Goal: Signs and Symptoms of Listed Potential Problems Will be Absent, Minimized or Managed (Nutrition, Parenteral)  Outcome: Ongoing (interventions implemented as appropriate)

## 2019-10-08 NOTE — PLAN OF CARE
Problem: Patient Care Overview  Goal: Plan of Care Review  Outcome: Ongoing (interventions implemented as appropriate)   10/08/19 1146   Coping/Psychosocial   Plan of Care Reviewed With patient   Plan of Care Review   Progress no change   OTHER   Outcome Summary Pt just got cleaned up prior to OT tx. Pt performed B UE exercises in supine 2' to pt not wanting to get up. No goals met this tx.

## 2019-10-08 NOTE — NURSING NOTE
Patient still having large bm's foul smell with red particle in it multiple times said will order bolus for blood pressure

## 2019-10-08 NOTE — PLAN OF CARE
Problem: Patient Care Overview  Goal: Plan of Care Review  Outcome: Ongoing (interventions implemented as appropriate)   10/08/19 1551   Coping/Psychosocial   Plan of Care Reviewed With caregiver;patient   Plan of Care Review   Progress improving   OTHER   Outcome Summary PN started and is meeting >/= 100% of estimated needs. _Diarrhea. Will monitor.        Problem: Nutrition, Parenteral (Adult)  Goal: Signs and Symptoms of Listed Potential Problems Will be Absent, Minimized or Managed (Nutrition, Parenteral)  Outcome: Ongoing (interventions implemented as appropriate)   10/08/19 8771   Goal/Outcome Evaluation   Problems Assessed (Parenteral Nutrition) all   Problems Present (Parenteral Nutrition) malnutrition

## 2019-10-08 NOTE — THERAPY TREATMENT NOTE
Acute Care - Occupational Therapy Treatment Note  [facility]     Patient Name: Emerson Bang  : 1960  MRN: [id number]  Today's Date: 10/8/2019  Onset of Illness/Injury or Date of Surgery: 19  Date of Referral to OT: 10/02/19  Referring Physician: Dr. Kc    Admit Date: 2019       ICD-10-CM ICD-9-CM   1. 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 Impaired physical mobility Z74.09 781.99   3. Impaired mobility and activities of daily living Z74.09 799.89     Patient Active Problem List   Diagnosis   • Hyperkalemia   • Iron deficiency anemia   • Gastroesophageal reflux disease with esophagitis   • Elevated AST (SGOT)   • Alcohol abuse   • Hypothyroidism   • Balance problem   • Need for vaccination   • Low magnesium levels   • Squamous cell carcinoma of pharynx (CMS/HCC)   • History of throat cancer   • Vitamin D deficiency   • Alcohol abuse counseling and surveillance   • Encounter for screening for diabetes mellitus   • Hypomagnesemia   • Iron deficiency anemia   • Hyperlipidemia   • Underweight due to inadequate caloric intake   • Need for immunization against influenza   • 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HCC)   • Hypertension   • General medical examination   • Underweight   • Epigastric pain   • Gastritis without bleeding   • Need for influenza vaccination   • Elevated liver function tests   • B12 deficiency   • Intestinal malabsorption   • Throat pain   • Acute pharyngitis   • Upper respiratory tract infection   • Neoplasm of uncertain behavior of larynx   • Pharyngoesophageal dysphagia   • Severe protein-calorie malnutrition (CMS/HCC)   • Anemia of chronic disease   • Hypotension   • Hyponatremia   • Status post insertion of percutaneous endoscopic gastrostomy (PEG) tube (CMS/HCC)   • Hx of tracheostomy   • History of throat surgery   • Hx SBO   • Urinary retention   • Generalized weakness   • Acute otitis externa of right ear   • Hard stool   • Panlobular emphysema  (CMS/HCC)   • Cancer of hypopharynx (CMS/HCC)   • Diarrhea   • Encounter for venous access device care   • Prediabetes   • Status post neck dissection   • S/P partial thyroidectomy   • S/P laryngectomy   • Annual physical exam   • Alcoholic cirrhosis of liver without ascites (CMS/HCC)     Past Medical History:   Diagnosis Date   • Allergic rhinitis     vs URI   • Anemia    • At risk for falls    • Benign prostatic hyperplasia    • Chronic gastritis    • Cirrhosis of liver (CMS/HCC)    • Complication of gastrostomy (CMS/HCC)     site not healing   • COPD (chronic obstructive pulmonary disease) (CMS/HCC)    • Dysphagia     odynophagia   • Epigastric pain    • Esophagitis    • GERD (gastroesophageal reflux disease)    • Hypertension    • Hypothyroidism, unspecified    • Low back pain    • Malaise and fatigue    • Nasal congestion 11/15/2018   • Nausea with vomiting, unspecified    • Pain in left knee    • Pain in right knee    • Primary malignant neoplasm of pharynx (CMS/HCC)    • Screening for malignant neoplasm of colon    • 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 Left upper quadrant abdominal wall abscess debridement. Gastrostomy tube erosion through & through the anterior gastric wall.Lupper quadrant abdominal wall abscess   • COLONOSCOPY  04/21/2014    Internal & external hemorrhoids found.   • COLONOSCOPY  2014    [place]   • COLONOSCOPY N/A 10/16/2018    Procedure: COLONOSCOPY;  Surgeon: Kaushal Chester MD;  Location: [facility] ENDOSCOPY;  Service: Gastroenterology   • DIAGNOSTIC LAPAROSCOPY EXPLORATORY LAPAROTOMY N/A 7/17/2019    Procedure: DIAGNOSTIC LAPAROSCOPY, EXPLORATORY LAPAROTOMY, LYSIS OF ADHESIONS;  Surgeon: Parminder Kc MD;   Location: [facility] OR;  Service: General   • DIRECT LARYNGOSCOPY, ESOPHAGOSCOPY, BRONCHOSCOPY N/A 4/15/2019    Procedure: DIRECT LARYNGOSCOPY with biopsy, ESOPHAGOSCOPY;  Surgeon: Bharathi Washington MD;  Location: [facility] OR;  Service: ENT   • ENDOSCOPY N/A 10/16/2018    Procedure: ESOPHAGOGASTRODUODENOSCOPY;  Surgeon: Kaushal Chester MD;  Location: [facility] ENDOSCOPY;  Service: Gastroenterology   • GASTROCUTANEOUS FISTULA CLOSURE N/A 10/2/2019    Procedure: GASTROCUTANEOUS FISTULA CLOSURE;  Surgeon: Parminder Kc MD;  Location: [facility] OR;  Service: General   • GASTROSTOMY FEEDING TUBE INSERTION N/A 4/15/2019    Procedure: GASTROSTOMY FEEDING TUBE INSERTION;  Surgeon: Trenton Valera MD;  Location: [facility] OR;  Service: General   • JEJUNOSTOMY N/A 10/2/2019    Procedure: JEJUNOSTOMY;  Surgeon: Parminder Kc MD;  Location: [facility];  Service: General   • TRACHEOSTOMY  11/10/2008    Respiratory failure   • UPPER GASTROINTESTINAL ENDOSCOPY  04/21/2014    Esophagitis seen. Biopsy taken. Gastritis in stomach. Biopsy taken. Normal duodenum. Biopsy taken.   • UPPER GASTROINTESTINAL ENDOSCOPY  10/16/2018   • VENOUS ACCESS DEVICE (PORT) INSERTION N/A 9/11/2019    Procedure: INSERTION VENOUS ACCESS DEVICE (MEDIPORT)        (c-arm#1);  Surgeon: Parminder Kc MD;  Location: [facility];  Service: General       Therapy Treatment    Rehabilitation Treatment Summary     Row Name 10/08/19 0748             Treatment Time/Intention    Discipline  occupational therapy assistant  -BB      Document Type  therapy note (daily note)  -BB      Subjective Information  complains of;pain  -BB      Mode of Treatment  individual therapy;occupational therapy  -BB      Patient/Family Observations  no family present  -BB      Total Minutes, Occupational Therapy Treatment  25  -BB      Therapy Frequency (OT Eval)  other (see comments) 5-7 days/wk  -BB      Patient Effort  fair  -BB      Existing Precautions/Restrictions  fall;other (see  comments) watch gait belt placement  -BB      Patient Response to Treatment  Pt deferred EOB/OOB 2' to pain  -BB      Recorded by [BB] Kerrie Woodson COTA/L 10/08/19 1143      Row Name 10/08/19 0748             Vital Signs    Pretreatment Heart Rate (beats/min)  83  -BB      Posttreatment Heart Rate (beats/min)  84  -BB      Pre SpO2 (%)  96  -BB      O2 Delivery Pre Treatment  supplemental O2  -BB      Post SpO2 (%)  93  -BB      O2 Delivery Post Treatment  supplemental O2  -BB      Pre Patient Position  Supine  -BB      Post Patient Position  Supine  -BB      Recorded by [BB] Kerrie Woodson COTA/L 10/08/19 1143      Row Name 10/08/19 0748             Cognitive Assessment/Intervention- PT/OT    Affect/Mental Status (Cognitive)  flat/blunted affect;sad/depressed affect  -BB      Orientation Status (Cognition)  oriented to;person;place;situation  -BB      Follows Commands (Cognition)  WFL  -BB      Recorded by [BB] Kerrie Woodson COTA/L 10/08/19 1143      Row Name 10/08/19 0748             Therapeutic Exercise    Upper Extremity Range of Motion (Therapeutic Exercise)  shoulder flexion/extension, bilateral;shoulder horizontal abduction/adduction, bilateral;elbow flexion/extension, bilateral;forearm supination/pronation, bilateral;wrist flexion/extension, bilateral chest press  -BB      Hand (Therapeutic Exercise)  finger flexion/extension, bilateral  -BB      Weight/Resistance (Therapeutic Exercise)  1 pound  -BB      Exercise Type (Therapeutic Exercise)  AROM (active range of motion)  -BB      Position (Therapeutic Exercise)  supine  -BB      Sets/Reps (Therapeutic Exercise)  1x20  -BB      Equipment (Therapeutic Exercise)  free weight, barbell  -BB      Expected Outcome (Therapeutic Exercise)  improve functional tolerance, self-care activity;improve performance, transfer skills;improve functional stability  -BB      Recorded by [BB] Kerrie Woodson COTA/L 10/08/19 1143      Row Name 10/08/19  0748             Positioning and Restraints    Pre-Treatment Position  in bed  -BB      Post Treatment Position  bed  -BB      In Bed  supine;encouraged to call for assist;call light within reach;exit alarm on  -BB      Recorded by [BB] Kerrie Woodson COTA/L 10/08/19 1143      Row Name 10/08/19 0748             Pain Scale: Numbers Pre/Post-Treatment    Pain Scale: Numbers, Pretreatment  7/10  -BB      Pain Scale: Numbers, Post-Treatment  8/10  -BB      Pain Location  abdomen  -BB      Pain Intervention(s)  Medication (See MAR)  -BB      Recorded by [BB] Kerrie Woodson COTA/L 10/08/19 1143      Row Name                Wound 09/30/19 1330 Left medial abdomen Fistula    Wound - Properties Group Date first assessed: 09/30/19 [SS] Time first assessed: 1330 [SS] Side: Left [SS] Orientation: medial [SS] Location: abdomen [SS] Primary Wound Type: Fistula [SS] Recorded by:  [SS] Citlalli Abdi RN 09/30/19 1619    Row Name                Wound 10/02/19 1347 abdomen Incision    Wound - Properties Group Date first assessed: 10/02/19 [CF] Time first assessed: 1347 [CF] Present on Hospital Admission: N [CF] Location: abdomen [CF] Primary Wound Type: Incision [CF] Recorded by:  [CF] Eileen Tyson RN 10/02/19 1347    Row Name 10/08/19 0748             Plan of Care Review    Plan of Care Reviewed With  patient  -BB      Recorded by [BB] Kerrie Woodson COTA/L 10/08/19 1143      Row Name 10/08/19 0748             Outcome Summary/Treatment Plan (OT)    Daily Summary of Progress (OT)  progress toward functional goals is gradual  -BB      Plan for Continued Treatment (OT)  continue POC  -BB      Anticipated Discharge Disposition (OT)  anticipate therapy at next level of care  -BB      Recorded by [LISA] Kerrie Woodson COTA/L 10/08/19 1143        User Key  (r) = Recorded By, (t) = Taken By, (c) = Cosigned By    Initials Name Effective Dates Discipline    SS Citlalli Abdi RN 07/10/18 -  Nurse    LISA Woodson  Kerrie KEMP, TENA/LIANE 03/07/18 -  OT    CF Eileen Tyson, RN 10/17/16 -  Nurse        Wound 09/30/19 1330 Left medial abdomen Fistula (Active)   Dressing Appearance dry;intact 10/7/2019  8:15 PM   Closure SAUMYA 10/7/2019  8:15 PM   Base dressing in place, unable to visualize 10/7/2019  8:15 PM       Wound 10/02/19 1347 abdomen Incision (Active)   Dressing Appearance dry;intact 10/7/2019  8:15 PM   Closure SAUMYA 10/7/2019  8:15 PM   Base dressing in place, unable to visualize 10/7/2019  8:15 PM     Rehab Goal Summary     Row Name 10/08/19 0748             Occupational Therapy Goals    Transfer Goal Selection (OT)  transfer, OT goal 1  -BB      Bathing Goal Selection (OT)  bathing, OT goal 1  -BB      Dressing Goal Selection (OT)  dressing, OT goal 1  -BB      Toileting Goal Selection (OT)  toileting, OT goal 1  -BB      Activity Tolerance Goal Selection (OT)  activity tolerance, OT goal 1  -BB         Transfer Goal 1 (OT)    Activity/Assistive Device (Transfer Goal 1, OT)  sit-to-stand/stand-to-sit;bed-to-chair/chair-to-bed;toilet  -BB      [place] Level/Cues Needed (Transfer Goal 1, OT)  supervision required  -BB      Time Frame (Transfer Goal 1, OT)  long term goal (LTG);by discharge  -BB      Progress/Outcome (Transfer Goal 1, OT)  goal not met  -BB         Bathing Goal 1 (OT)    Activity/Assistive Device (Bathing Goal 1, OT)  bathing skills, all  -BB      [place] Level/Cues Needed (Bathing Goal 1, OT)  minimum assist (75% or more patient effort)  -BB      Time Frame (Bathing Goal 1, OT)  long term goal (LTG);by discharge  -BB      Barriers (Bathing Goal 1, OT)  Pt will need extra time and rest breaks PRN  -BB      Progress/Outcomes (Bathing Goal 1, OT)  goal not met  -BB         Dressing Goal 1 (OT)    Activity/Assistive Device (Dressing Goal 1, OT)  dressing skills, all  -BB      [place]/Cues Needed (Dressing Goal 1, OT)  minimum assist (75% or more patient effort)  -BB      Time Frame (Dressing Goal 1,  OT)  long term goal (LTG);by discharge  -BB      Barriers (Dressing Goal 1, OT)  Pt will need extra time and rest breaks PRN  -BB      Progress/Outcome (Dressing Goal 1, OT)  goal not met  -BB         Toileting Goal 1 (OT)    Activity/Device (Toileting Goal 1, OT)  toileting skills, all  -BB      [place] Level/Cues Needed (Toileting Goal 1, OT)  minimum assist (75% or more patient effort)  -BB      Time Frame (Toileting Goal 1, OT)  long term goal (LTG);by discharge  -BB      Barriers (Toileting Goal 1, OT)  Pt will need extra time and rest breaks PRN  -BB      Progress/Outcome (Toileting Goal 1, OT)  goal not met  -BB          Activity Tolerance Goal 1 (OT)    Activity Level (Endurance Goal 1, OT)  15 min activity functional/therapeutic activity  -BB      Time Frame (Activity Tolerance Goal 1, OT)  long term goal (LTG)  -BB      Progress/Outcome (Activity Tolerance Goal 1, OT)  goal not met  -BB        User Key  (r) = Recorded By, (t) = Taken By, (c) = Cosigned By    Initials Name Provider Type Discipline    BB Kerrie Woodson COTA/L Occupational Therapy Assistant OT        Occupational Therapy Education     Title: PT OT SLP Therapies (In Progress)     Topic: Occupational Therapy (In Progress)     Point: ADL training (In Progress)     Description: Instruct learner(s) on proper safety adaptation and remediation techniques during self care or transfers.   Instruct in proper use of assistive devices.    Learning Progress Summary           Patient Acceptance, E,TB,D, NR by CS at 10/6/2019  1:05 PM    Acceptance, E,TB,D, NR by CS at 10/5/2019 11:11 AM    Acceptance, E, NR by AS at 10/4/2019 11:45 AM    Comment:  Role of OT, OT POC, importance of OOB activity for strength and healing, ADL training                   Point: Home exercise program (In Progress)     Description: Instruct learner(s) on appropriate technique for monitoring, assisting and/or progressing therapeutic exercises/activities.    Learning  Progress Summary           Patient Acceptance, E, NR by BB at 10/8/2019 11:46 AM    Acceptance, E,TB,D, NR by CS at 10/6/2019  1:05 PM    Acceptance, E,TB,D, NR by CS at 10/5/2019 11:11 AM                   Point: Precautions (In Progress)     Description: Instruct learner(s) on prescribed precautions during self-care and functional transfers.    Learning Progress Summary           Patient Acceptance, E,TB,D, NR by CS at 10/6/2019  1:05 PM    Acceptance, E,TB,D, NR by CS at 10/5/2019 11:11 AM    Acceptance, E, NR by AS at 10/4/2019 11:45 AM    Comment:  Role of OT, OT POC, importance of OOB activity for strength and healing, ADL training                   Point: Body mechanics (In Progress)     Description: Instruct learner(s) on proper positioning and spine alignment during self-care, functional mobility activities and/or exercises.    Learning Progress Summary           Patient Acceptance, E,TB,D, NR by CS at 10/6/2019  1:05 PM    Acceptance, E,TB,D, NR by CS at 10/5/2019 11:11 AM                               User Key     Initials Effective Dates Name Provider Type Discipline     03/07/18 -  Kerrie Woodson COTA/L Occupational Therapy Assistant OT    CS 03/07/18 -  Brenda Thomas COTA/LIANE Occupational Therapy Assistant OT    AS 03/25/19 -  Destinee Yarbrough, OT Occupational Therapist OT              Non-skid socks and gait belt in place. Toileting offered. Call light and needs within reach. Pt advised to not get up alone and call the nurse for assistance.  Bed alarm on.     OT Recommendation and Plan  Outcome Summary/Treatment Plan (OT)  Daily Summary of Progress (OT): progress toward functional goals is gradual  Plan for Continued Treatment (OT): continue POC  Anticipated Discharge Disposition (OT): anticipate therapy at next level of care  Therapy Frequency (OT Eval): other (see comments)(5-7 days/wk)  Daily Summary of Progress (OT): progress toward functional goals is gradual  Plan of Care Review  Plan  of Care Reviewed With: patient  Plan of Care Reviewed With: patient  Outcome Summary: Pt just got cleaned up prior to OT tx. Pt performed B UE exercises in supine 2' to pt not wanting to get up. No goals met this tx.  Outcome Measures     Row Name 10/08/19 0748 10/06/19 1500 10/06/19 1300       How much help from another person do you currently need...    Turning from your back to your side while in flat bed without using bedrails?  --  2  -TA  --    Moving from lying on back to sitting on the side of a flat bed without bedrails?  --  2  -TA  --    Moving to and from a bed to a chair (including a wheelchair)?  --  2  -TA  --    Standing up from a chair using your arms (e.g., wheelchair, bedside chair)?  --  2  -TA  --    Climbing 3-5 steps with a railing?  --  2  -TA  --    To walk in hospital room?  --  2  -TA  --    AM-PAC 6 Clicks Score (PT)  --  12  -TA  --       How much help from another is currently needed...    Putting on and taking off regular lower body clothing?  2  -BB  --  2  -CS    Bathing (including washing, rinsing, and drying)  2  -BB  --  2  -CS    Toileting (which includes using toilet bed pan or urinal)  2  -BB  --  2  -CS    Putting on and taking off regular upper body clothing  2  -BB  --  2  -CS    Taking care of personal grooming (such as brushing teeth)  2  -BB  --  2  -CS    Eating meals  1  -BB  --  1  -CS    AM-PAC 6 Clicks Score (OT)  11  -BB  --  11  -CS       Functional Assessment    Outcome Measure Options  --  AM-PAC 6 Clicks Basic Mobility (PT)  -TA  --      User Key  (r) = Recorded By, (t) = Taken By, (c) = Cosigned By    Initials Name Provider Type    TA Pepper Dudley, PTA Physical Therapy Assistant    BB Kerrie Woodson, MADSEN/L Occupational Therapy Assistant    Brenda Lee, MADSEN/L Occupational Therapy Assistant           Time Calculation:   Time Calculation- OT     Row Name 10/08/19 1147             Time Calculation- OT    OT Start Time  0748  -BB      OT Stop Time   0813  -LISA      OT Time Calculation (min)  25 min  -LISA      Total Timed Code Minutes- OT  25 minute(s)  -BB      OT Received On  10/08/19  -LISA        User Key  (r) = Recorded By, (t) = Taken By, (c) = Cosigned By    Initials Name Provider Type    Kerrie Spencer COTA/L Occupational Therapy Assistant        Therapy Charges for Today     Code Description Service Date Service Provider Modifiers Qty    23563734554 HC OT THER PROC EA 15 MIN 10/8/2019 Kerrie Woodson COTA/L GO 2               ROSEANNA Merchant  10/8/2019

## 2019-10-08 NOTE — CONSULTS
"Adult Nutrition  Assessment    Patient Name:  Emerson Bang  YOB: 1960  MRN: [id number]  Admit Date:  9/30/2019    Assessment Date:  10/8/2019    Comments:  Pt remains NGT.  He is having large Multiple BM's with red particles.  MD Aware.  PN started and is meeting >/=100% of estimated needs.  PreAlb was severely depressed prior to PN at 5.6.  RD will continue to monitor tx plans.       Reason for Assessment     Row Name 10/08/19 1540          Reason for Assessment    Reason For Assessment  follow-up protocol         Nutrition/Diet History     Row Name 10/08/19 1540          Nutrition/Diet History    Typical Food/Fluid Intake  Pt shook head \"no\" that he wasn't doing well.   Per staff pt continues to have large multiple BM's with red particules in it.             Labs/Tests/Procedures/Meds     Row Name 10/08/19 1541          Labs/Procedures/Meds    Lab Results Reviewed  reviewed, pertinent     Lab Results Comments  Phos 2.2' Creat 0.63; na 130; Alb 2.50; PreAlb 5.6        Diagnostic Tests/Procedures    Diagnostic Test/Procedure Reviewed  reviewed, pertinent     Diagnostic Test/Procedures Comments  NGT        Medications    Pertinent Medications Reviewed  reviewed, pertinent     Pertinent Medications Comments  Bolus fluids; PN;          Physical Findings     Row Name 10/08/19 1544          Physical Findings    Overall Physical Appearance  generalized wasting;loss of subcutaneous fat;loss of muscle mass;underweight;on oxygen therapy     Gastrointestinal  feeding tube;diarrhea     Tubes  jejunostomy tube;nasogastric tube           Nutrition Prescription Ordered     Row Name 10/08/19 1545          Nutrition Prescription PO    Current PO Diet  NPO        Nutrition Prescription PN    PN Route  PICC     PN Goal Rate (mL/hr)  83.3 mL/hr     PN Current Rate (mL/hr)  83.3 mL/hr     Dextrose Concentration (%)  15 %     Dextrose (Kcal)  1016     Amino Acid Concentration (%)  5 %     Amino Acid (gm)  100     " Lipid Concentration (%)  20%     Lipid Volume (mL)  Other (comment) 175     Lipid Frequency  Daily         Evaluation of Received Nutrient/Fluid Intake     Row Name 10/08/19 1547          Calories Evaluation    Parenteral Calories (kcal)  1766 1366 NPC     % of Kcal Needs  100        Protein Evaluation    Parenteral Protein (gm)  100     % of Protein Needs  125        Intake Assessment    Energy/Calorie Requirement Assessment  meeting needs     Protein Requirement Assessment  exceeds needs               Electronically signed by:  Jane Ghosh RD  10/08/19 3:52 PM

## 2019-10-09 LAB
ALBUMIN SERPL-MCNC: 2.2 G/DL (ref 3.5–5.2)
ALBUMIN/GLOB SERPL: 0.7 G/DL
ALP SERPL-CCNC: 69 U/L (ref 39–117)
ALT SERPL W P-5'-P-CCNC: 9 U/L (ref 1–41)
ANION GAP SERPL CALCULATED.3IONS-SCNC: 6 MMOL/L (ref 5–15)
AST SERPL-CCNC: 13 U/L (ref 1–40)
BILIRUB SERPL-MCNC: 0.3 MG/DL (ref 0.2–1.2)
BUN BLD-MCNC: 10 MG/DL (ref 8–23)
BUN/CREAT SERPL: 20.4 (ref 7–25)
CALCIUM SPEC-SCNC: 8.1 MG/DL (ref 8.6–10.5)
CHLORIDE SERPL-SCNC: 94 MMOL/L (ref 98–107)
CO2 SERPL-SCNC: 29 MMOL/L (ref 22–29)
CREAT BLD-MCNC: 0.49 MG/DL (ref 0.76–1.27)
GFR SERPL CREATININE-BSD FRML MDRD: >150 ML/MIN/1.73
GLOBULIN UR ELPH-MCNC: 3 GM/DL
GLUCOSE BLD-MCNC: 141 MG/DL (ref 65–99)
GLUCOSE BLDC GLUCOMTR-MCNC: 129 MG/DL (ref 70–130)
GLUCOSE BLDC GLUCOMTR-MCNC: 133 MG/DL (ref 70–130)
GLUCOSE BLDC GLUCOMTR-MCNC: 137 MG/DL (ref 70–130)
HOLD SPECIMEN: NORMAL
POTASSIUM BLD-SCNC: 3 MMOL/L (ref 3.5–5.2)
PROT SERPL-MCNC: 5.2 G/DL (ref 6–8.5)
SODIUM BLD-SCNC: 129 MMOL/L (ref 136–145)
WHOLE BLOOD HOLD SPECIMEN: NORMAL
WHOLE BLOOD HOLD SPECIMEN: NORMAL

## 2019-10-09 PROCEDURE — 82962 GLUCOSE BLOOD TEST: CPT

## 2019-10-09 PROCEDURE — 25010000002 CALCIUM GLUCONATE PER 10 ML: Performed by: SURGERY

## 2019-10-09 PROCEDURE — 97535 SELF CARE MNGMENT TRAINING: CPT

## 2019-10-09 PROCEDURE — 94799 UNLISTED PULMONARY SVC/PX: CPT

## 2019-10-09 PROCEDURE — 94760 N-INVAS EAR/PLS OXIMETRY 1: CPT

## 2019-10-09 PROCEDURE — 97110 THERAPEUTIC EXERCISES: CPT

## 2019-10-09 PROCEDURE — 25010000002 HYDROMORPHONE 1 MG/ML SOLUTION: Performed by: SURGERY

## 2019-10-09 PROCEDURE — 80053 COMPREHEN METABOLIC PANEL: CPT | Performed by: SURGERY

## 2019-10-09 PROCEDURE — 25010000002 ENOXAPARIN PER 10 MG: Performed by: SURGERY

## 2019-10-09 PROCEDURE — 25010000002 POTASSIUM CHLORIDE PER 2 MEQ OF POTASSIUM: Performed by: SURGERY

## 2019-10-09 PROCEDURE — 99024 POSTOP FOLLOW-UP VISIT: CPT | Performed by: SURGERY

## 2019-10-09 PROCEDURE — 25010000002 MAGNESIUM SULFATE PER 500 MG OF MAGNESIUM: Performed by: SURGERY

## 2019-10-09 RX ADMIN — HYDROMORPHONE HYDROCHLORIDE 1 MG: 1 INJECTION, SOLUTION INTRAMUSCULAR; INTRAVENOUS; SUBCUTANEOUS at 11:58

## 2019-10-09 RX ADMIN — ENOXAPARIN SODIUM 40 MG: 40 INJECTION SUBCUTANEOUS at 09:01

## 2019-10-09 RX ADMIN — PANTOPRAZOLE SODIUM 40 MG: 40 INJECTION, POWDER, FOR SOLUTION INTRAVENOUS at 06:29

## 2019-10-09 RX ADMIN — SILVER SULFADIAZINE: 10 CREAM TOPICAL at 09:02

## 2019-10-09 RX ADMIN — HYDROMORPHONE HYDROCHLORIDE 1 MG: 1 INJECTION, SOLUTION INTRAMUSCULAR; INTRAVENOUS; SUBCUTANEOUS at 09:14

## 2019-10-09 RX ADMIN — I.V. FAT EMULSION 35 G: 20 EMULSION INTRAVENOUS at 17:52

## 2019-10-09 RX ADMIN — SILVER SULFADIAZINE: 10 CREAM TOPICAL at 20:52

## 2019-10-09 RX ADMIN — SODIUM CHLORIDE, PRESERVATIVE FREE 10 ML: 5 INJECTION INTRAVENOUS at 20:52

## 2019-10-09 RX ADMIN — ALBUTEROL SULFATE 2.5 MG: 2.5 SOLUTION RESPIRATORY (INHALATION) at 08:27

## 2019-10-09 RX ADMIN — HYDROMORPHONE HYDROCHLORIDE 1 MG: 1 INJECTION, SOLUTION INTRAMUSCULAR; INTRAVENOUS; SUBCUTANEOUS at 21:00

## 2019-10-09 RX ADMIN — HYDROMORPHONE HYDROCHLORIDE 1 MG: 1 INJECTION, SOLUTION INTRAMUSCULAR; INTRAVENOUS; SUBCUTANEOUS at 04:46

## 2019-10-09 RX ADMIN — SODIUM CHLORIDE, PRESERVATIVE FREE 10 ML: 5 INJECTION INTRAVENOUS at 09:00

## 2019-10-09 RX ADMIN — ALBUTEROL SULFATE 2.5 MG: 2.5 SOLUTION RESPIRATORY (INHALATION) at 00:56

## 2019-10-09 RX ADMIN — CALCIUM GLUCONATE: 94 INJECTION, SOLUTION INTRAVENOUS at 17:52

## 2019-10-09 RX ADMIN — ALBUTEROL SULFATE 2.5 MG: 2.5 SOLUTION RESPIRATORY (INHALATION) at 19:47

## 2019-10-09 RX ADMIN — HYDROMORPHONE HYDROCHLORIDE 1 MG: 1 INJECTION, SOLUTION INTRAMUSCULAR; INTRAVENOUS; SUBCUTANEOUS at 15:41

## 2019-10-09 NOTE — PLAN OF CARE
Problem: Patient Care Overview  Goal: Plan of Care Review  Outcome: Ongoing (interventions implemented as appropriate)   10/09/19 4079   Coping/Psychosocial   Plan of Care Reviewed With patient   Plan of Care Review   Progress improving   OTHER   Outcome Summary Pt performed B UE exercises with good tolerance. Pt deferred EOB/OOB 2' to pain. No new goals met.

## 2019-10-09 NOTE — PLAN OF CARE
Problem: Patient Care Overview  Goal: Plan of Care Review  Outcome: Ongoing (interventions implemented as appropriate)   10/09/19 1332   Coping/Psychosocial   Plan of Care Reviewed With patient   Plan of Care Review   Progress improving   OTHER   Outcome Summary pt only agreeable to LE ther ex in supine despite encouragement to attempt EOB. pt completed AROM ashley LE. no new goals met at this time. pt would continue to benefit from PT services.
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
## 2019-10-09 NOTE — THERAPY TREATMENT NOTE
Acute Care - Occupational Therapy Treatment Note  [facility]     Patient Name: Emerson Bang  : 1960  MRN: [id number]  Today's Date: 10/9/2019  Onset of Illness/Injury or Date of Surgery: 19  Date of Referral to OT: 10/02/19  Referring Physician: Dr. Kc    Admit Date: 2019       ICD-10-CM ICD-9-CM   1. 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 Impaired physical mobility Z74.09 781.99   3. Impaired mobility and activities of daily living Z74.09 799.89     Patient Active Problem List   Diagnosis   • Hyperkalemia   • Iron deficiency anemia   • Gastroesophageal reflux disease with esophagitis   • Elevated AST (SGOT)   • Alcohol abuse   • Hypothyroidism   • Balance problem   • Need for vaccination   • Low magnesium levels   • Squamous cell carcinoma of pharynx (CMS/HCC)   • History of throat cancer   • Vitamin D deficiency   • Alcohol abuse counseling and surveillance   • Encounter for screening for diabetes mellitus   • Hypomagnesemia   • Iron deficiency anemia   • Hyperlipidemia   • Underweight due to inadequate caloric intake   • Need for immunization against influenza   • 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HCC)   • Hypertension   • General medical examination   • Underweight   • Epigastric pain   • Gastritis without bleeding   • Need for influenza vaccination   • Elevated liver function tests   • B12 deficiency   • Intestinal malabsorption   • Throat pain   • Acute pharyngitis   • Upper respiratory tract infection   • Neoplasm of uncertain behavior of larynx   • Pharyngoesophageal dysphagia   • Severe protein-calorie malnutrition (CMS/HCC)   • Anemia of chronic disease   • Hypotension   • Hyponatremia   • Status post insertion of percutaneous endoscopic gastrostomy (PEG) tube (CMS/HCC)   • Hx of tracheostomy   • History of throat surgery   • Hx SBO   • Urinary retention   • Generalized weakness   • Acute otitis externa of right ear   • Hard stool   • Panlobular emphysema  (CMS/HCC)   • Cancer of hypopharynx (CMS/HCC)   • Diarrhea   • Encounter for venous access device care   • Prediabetes   • Status post neck dissection   • S/P partial thyroidectomy   • S/P laryngectomy   • Annual physical exam   • Alcoholic cirrhosis of liver without ascites (CMS/HCC)     Past Medical History:   Diagnosis Date   • Allergic rhinitis     vs URI   • Anemia    • At risk for falls    • Benign prostatic hyperplasia    • Chronic gastritis    • Cirrhosis of liver (CMS/HCC)    • Complication of gastrostomy (CMS/HCC)     site not healing   • COPD (chronic obstructive pulmonary disease) (CMS/HCC)    • Dysphagia     odynophagia   • Epigastric pain    • Esophagitis    • GERD (gastroesophageal reflux disease)    • Hypertension    • Hypothyroidism, unspecified    • Low back pain    • Malaise and fatigue    • Nasal congestion 11/15/2018   • Nausea with vomiting, unspecified    • Pain in left knee    • Pain in right knee    • Primary malignant neoplasm of pharynx (CMS/HCC)    • Screening for malignant neoplasm of colon    • 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 Left upper quadrant abdominal wall abscess debridement. Gastrostomy tube erosion through & through the anterior gastric wall.Lupper quadrant abdominal wall abscess   • COLONOSCOPY  04/21/2014    Internal & external hemorrhoids found.   • COLONOSCOPY  2014    [place]   • COLONOSCOPY N/A 10/16/2018    Procedure: COLONOSCOPY;  Surgeon: Kaushal Chester MD;  Location: [facility] ENDOSCOPY;  Service: Gastroenterology   • DIAGNOSTIC LAPAROSCOPY EXPLORATORY LAPAROTOMY N/A 7/17/2019    Procedure: DIAGNOSTIC LAPAROSCOPY, EXPLORATORY LAPAROTOMY, LYSIS OF ADHESIONS;  Surgeon: Parminder Kc MD;   Location: [facility] OR;  Service: General   • DIRECT LARYNGOSCOPY, ESOPHAGOSCOPY, BRONCHOSCOPY N/A 4/15/2019    Procedure: DIRECT LARYNGOSCOPY with biopsy, ESOPHAGOSCOPY;  Surgeon: Bharathi Washington MD;  Location: [facility] OR;  Service: ENT   • ENDOSCOPY N/A 10/16/2018    Procedure: ESOPHAGOGASTRODUODENOSCOPY;  Surgeon: Kaushal Chester MD;  Location: [facility] ENDOSCOPY;  Service: Gastroenterology   • GASTROCUTANEOUS FISTULA CLOSURE N/A 10/2/2019    Procedure: GASTROCUTANEOUS FISTULA CLOSURE;  Surgeon: Parminder Kc MD;  Location: [facility] OR;  Service: General   • GASTROSTOMY FEEDING TUBE INSERTION N/A 4/15/2019    Procedure: GASTROSTOMY FEEDING TUBE INSERTION;  Surgeon: Trenton Valera MD;  Location: [facility] OR;  Service: General   • JEJUNOSTOMY N/A 10/2/2019    Procedure: JEJUNOSTOMY;  Surgeon: Parminder Kc MD;  Location: [facility];  Service: General   • TRACHEOSTOMY  11/10/2008    Respiratory failure   • UPPER GASTROINTESTINAL ENDOSCOPY  04/21/2014    Esophagitis seen. Biopsy taken. Gastritis in stomach. Biopsy taken. Normal duodenum. Biopsy taken.   • UPPER GASTROINTESTINAL ENDOSCOPY  10/16/2018   • VENOUS ACCESS DEVICE (PORT) INSERTION N/A 9/11/2019    Procedure: INSERTION VENOUS ACCESS DEVICE (MEDIPORT)        (c-arm#1);  Surgeon: Parminder Kc MD;  Location: [facility];  Service: General       Therapy Treatment    Rehabilitation Treatment Summary     Row Name 10/09/19 0938             Treatment Time/Intention    Discipline  occupational therapy assistant  -BB      Document Type  therapy note (daily note)  -BB      Subjective Information  complains of;pain  -BB      Mode of Treatment  individual therapy;occupational therapy  -BB      Patient/Family Observations  no family present  -BB      Total Minutes, Occupational Therapy Treatment  39  -BB      Therapy Frequency (OT Eval)  other (see comments) 5-7 days/wk  -BB      Patient Effort  good  -BB      Existing Precautions/Restrictions  fall;other (see  comments) watch gait belt placement  -BB      Recorded by [BB] Kerrie Woodson COTA/L 10/09/19 1336      Row Name 10/09/19 0938             Vital Signs    Pretreatment Heart Rate (beats/min)  86  -BB      Posttreatment Heart Rate (beats/min)  86  -BB      Pre SpO2 (%)  93  -BB      O2 Delivery Pre Treatment  supplemental O2  -BB      Post SpO2 (%)  93  -BB      O2 Delivery Post Treatment  supplemental O2  -BB      Pre Patient Position  Supine  -BB      Post Patient Position  Supine  -BB      Recorded by [BB] Kerrie Woodson COTA/L 10/09/19 1336      Row Name 10/09/19 0938             Cognitive Assessment/Intervention- PT/OT    Affect/Mental Status (Cognitive)  unable/difficult to assess  -BB      Orientation Status (Cognition)  unable/difficult to assess  -BB      Follows Commands (Cognition)  WFL  -BB      Recorded by [BB] Kerrie Woodson COTA/L 10/09/19 1336      Row Name 10/09/19 0938             Bathing Assessment/Intervention    Bathing [place] Level  upper extremities;set up;supervision  -BB      Bathing Position  supine  -BB      Recorded by [BB] Kerrie Woodson COTA/L 10/09/19 1336      Row Name 10/09/19 0938             Grooming Assessment/Training    [place] Level (Grooming)  grooming skills;wash face, hands;set up;supervision  -BB      Grooming Position  supine  -BB      Recorded by [BB] Kerrie Woodson COTA/L 10/09/19 1336      Row Name 10/09/19 0938             Therapeutic Exercise    Upper Extremity Range of Motion (Therapeutic Exercise)  shoulder flexion/extension, bilateral;elbow flexion/extension, bilateral;forearm supination/pronation, bilateral;wrist flexion/extension, bilateral chest press  -BB      Hand (Therapeutic Exercise)  finger flexion/extension, bilateral  -BB      Weight/Resistance (Therapeutic Exercise)  1 pound  -BB      Exercise Type (Therapeutic Exercise)  AROM (active range of motion)  -BB      Position (Therapeutic Exercise)  supine  -BB       Sets/Reps (Therapeutic Exercise)  2x20  -BB      Equipment (Therapeutic Exercise)  free weight, barbell  -BB      Expected Outcome (Therapeutic Exercise)  improve functional stability;improve performance, transfer skills;improve functional tolerance, self-care activity  -BB      Recorded by [BB] Kerrie Woodson COTA/L 10/09/19 1336      Row Name 10/09/19 0938             Positioning and Restraints    Pre-Treatment Position  in bed  -BB      Post Treatment Position  bed  -BB      In Bed  supine;call light within reach;encouraged to call for assist;exit alarm on  -BB      Recorded by [BB] Kerrie Woodson COTA/L 10/09/19 1336      Row Name 10/09/19 0938             Pain Scale: Numbers Pre/Post-Treatment    Pain Scale: Numbers, Pretreatment  5/10  -BB      Pain Scale: Numbers, Post-Treatment  6/10  -BB      Pain Location  abdomen  -BB      Pain Intervention(s)  Medication (See MAR)  -BB      Recorded by [BB] Kerrie Woodson COTA/L 10/09/19 1336      Row Name                Wound 09/30/19 1330 Left medial abdomen Fistula    Wound - Properties Group Date first assessed: 09/30/19 [SS] Time first assessed: 1330 [SS] Side: Left [SS] Orientation: medial [SS] Location: abdomen [SS] Primary Wound Type: Fistula [SS] Recorded by:  [SS] Citlalli Abdi RN 09/30/19 1619    Row Name                Wound 10/02/19 1347 abdomen Incision    Wound - Properties Group Date first assessed: 10/02/19 [CF] Time first assessed: 1347 [CF] Present on Hospital Admission: N [CF] Location: abdomen [CF] Primary Wound Type: Incision [CF] Recorded by:  [CF] Eileen Tyson RN 10/02/19 1347    Row Name 10/09/19 0938             Plan of Care Review    Plan of Care Reviewed With  patient  -BB      Recorded by [BB] Kerrie Woodson COTA/L 10/09/19 1336      Row Name 10/09/19 0938             Outcome Summary/Treatment Plan (OT)    Daily Summary of Progress (OT)  progress toward functional goals as expected  -BB      Plan for Continued  Treatment (OT)  continue POC  -BB      Anticipated Discharge Disposition (OT)  anticipate therapy at next level of care  -BB      Recorded by [BB] Kerrie oWodson COTA/L 10/09/19 1336        User Key  (r) = Recorded By, (t) = Taken By, (c) = Cosigned By    Initials Name Effective Dates Discipline    SS Citlalli Abdi RN 07/10/18 -  Nurse    BB Kerrie Woodson COTA/L 03/07/18 -  OT    CF Eileen Tyson RN 10/17/16 -  Nurse        Wound 09/30/19 1330 Left medial abdomen Fistula (Active)   Dressing Appearance dry;intact;moist drainage 10/9/2019 12:00 PM   Closure Other (Comment) 10/9/2019 12:00 PM   Base slough;non-blanchable;pink;moist 10/9/2019 12:00 PM   Periwound redness;other (see comments) 10/9/2019 12:00 PM   Periwound Temperature warm 10/9/2019 12:00 PM   Periwound Skin Turgor soft 10/9/2019 12:00 PM   Edges irregular 10/9/2019 12:00 PM   Drainage Characteristics/Odor serosanguineous 10/9/2019 12:00 PM   Drainage Amount small 10/9/2019 12:00 PM   Care, Wound cleansed with;sterile normal saline 10/9/2019 12:00 PM   Dressing Care, Wound dressing changed 10/9/2019 12:00 PM   Periwound Care, Wound absorptive dressing applied 10/9/2019 12:00 PM       Wound 10/02/19 1347 abdomen Incision (Active)   Dressing Appearance dry;intact 10/9/2019 12:00 PM   Closure Other (Comment) 10/9/2019 12:00 PM   Base clean;dry 10/9/2019 12:00 PM   Periwound dry 10/9/2019 12:00 PM   Periwound Temperature warm 10/9/2019 12:00 PM   Periwound Skin Turgor soft 10/9/2019 12:00 PM   Drainage Characteristics/Odor serosanguineous 10/8/2019  9:10 PM   Drainage Amount none 10/9/2019 12:00 PM   Dressing Care, Wound dressing changed 10/9/2019 12:00 PM     Rehab Goal Summary     Row Name 10/09/19 0914             Occupational Therapy Goals    Transfer Goal Selection (OT)  transfer, OT goal 1  -BB      Bathing Goal Selection (OT)  bathing, OT goal 1  -BB      Dressing Goal Selection (OT)  dressing, OT goal 1  -BB      Toileting Goal  Selection (OT)  toileting, OT goal 1  -BB      Activity Tolerance Goal Selection (OT)  activity tolerance, OT goal 1  -BB         Transfer Goal 1 (OT)    Activity/Assistive Device (Transfer Goal 1, OT)  sit-to-stand/stand-to-sit;bed-to-chair/chair-to-bed;toilet  -BB      [place] Level/Cues Needed (Transfer Goal 1, OT)  supervision required  -BB      Time Frame (Transfer Goal 1, OT)  long term goal (LTG);by discharge  -BB      Progress/Outcome (Transfer Goal 1, OT)  goal not met  -BB         Bathing Goal 1 (OT)    Activity/Assistive Device (Bathing Goal 1, OT)  bathing skills, all  -BB      [place] Level/Cues Needed (Bathing Goal 1, OT)  minimum assist (75% or more patient effort)  -BB      Time Frame (Bathing Goal 1, OT)  long term goal (LTG);by discharge  -BB      Barriers (Bathing Goal 1, OT)  Pt will need extra time and rest breaks PRN  -BB      Progress/Outcomes (Bathing Goal 1, OT)  goal not met  -BB         Dressing Goal 1 (OT)    Activity/Assistive Device (Dressing Goal 1, OT)  dressing skills, all  -BB      [place]/Cues Needed (Dressing Goal 1, OT)  minimum assist (75% or more patient effort)  -BB      Time Frame (Dressing Goal 1, OT)  long term goal (LTG);by discharge  -BB      Barriers (Dressing Goal 1, OT)  Pt will need extra time and rest breaks PRN  -BB      Progress/Outcome (Dressing Goal 1, OT)  goal not met  -BB         Toileting Goal 1 (OT)    Activity/Device (Toileting Goal 1, OT)  toileting skills, all  -BB      [place] Level/Cues Needed (Toileting Goal 1, OT)  minimum assist (75% or more patient effort)  -BB      Time Frame (Toileting Goal 1, OT)  long term goal (LTG);by discharge  -BB      Barriers (Toileting Goal 1, OT)  Pt will need extra time and rest breaks PRN  -BB      Progress/Outcome (Toileting Goal 1, OT)  goal not met  -BB          Activity Tolerance Goal 1 (OT)    Activity Level (Endurance Goal 1, OT)  15 min activity functional/therapeutic activity  -BB      Time  Frame (Activity Tolerance Goal 1, OT)  long term goal (LTG)  -BB      Progress/Outcome (Activity Tolerance Goal 1, OT)  goal not met  -BB        User Key  (r) = Recorded By, (t) = Taken By, (c) = Cosigned By    Initials Name Provider Type Discipline    Kerrie Spencer COTA/L Occupational Therapy Assistant OT        Occupational Therapy Education     Title: PT OT SLP Therapies (In Progress)     Topic: Occupational Therapy (In Progress)     Point: ADL training (In Progress)     Description: Instruct learner(s) on proper safety adaptation and remediation techniques during self care or transfers.   Instruct in proper use of assistive devices.    Learning Progress Summary           Patient Acceptance, E, NR by BB at 10/9/2019  1:37 PM    Acceptance, E,TB,D, NR by CS at 10/6/2019  1:05 PM    Acceptance, E,TB,D, NR by CS at 10/5/2019 11:11 AM    Acceptance, E, NR by AS at 10/4/2019 11:45 AM    Comment:  Role of OT, OT POC, importance of OOB activity for strength and healing, ADL training                   Point: Home exercise program (In Progress)     Description: Instruct learner(s) on appropriate technique for monitoring, assisting and/or progressing therapeutic exercises/activities.    Learning Progress Summary           Patient Acceptance, E, NR by BB at 10/8/2019 11:46 AM    Acceptance, E,TB,D, NR by CS at 10/6/2019  1:05 PM    Acceptance, E,TB,D, NR by CS at 10/5/2019 11:11 AM                   Point: Precautions (In Progress)     Description: Instruct learner(s) on prescribed precautions during self-care and functional transfers.    Learning Progress Summary           Patient Acceptance, E,TB,D, NR by CS at 10/6/2019  1:05 PM    Acceptance, E,TB,D, NR by CS at 10/5/2019 11:11 AM    Acceptance, E, NR by AS at 10/4/2019 11:45 AM    Comment:  Role of OT, OT POC, importance of OOB activity for strength and healing, ADL training                   Point: Body mechanics (In Progress)     Description: Instruct  learner(s) on proper positioning and spine alignment during self-care, functional mobility activities and/or exercises.    Learning Progress Summary           Patient Acceptance, E,TB,D, NR by CS at 10/6/2019  1:05 PM    Acceptance, E,TB,D, NR by CS at 10/5/2019 11:11 AM                               User Key     Initials Effective Dates Name Provider Type Discipline    BB 03/07/18 -  Kerrie Woodson COTA/L Occupational Therapy Assistant OT    ERIN 03/07/18 -  Brenda Thomas COTA/LIANE Occupational Therapy Assistant OT    AS 03/25/19 -  Destinee Yarbrough, BILL Occupational Therapist OT                OT Recommendation and Plan  Outcome Summary/Treatment Plan (OT)  Daily Summary of Progress (OT): progress toward functional goals as expected  Plan for Continued Treatment (OT): continue POC  Anticipated Discharge Disposition (OT): anticipate therapy at next level of care  Therapy Frequency (OT Eval): other (see comments)(5-7 days/wk)  Daily Summary of Progress (OT): progress toward functional goals as expected  Plan of Care Review  Plan of Care Reviewed With: (P) patient  Plan of Care Reviewed With: (P) patient  Outcome Summary: (P) Pt performed B UE exercises with good tolerance. Pt deferred EOB/OOB 2' to pain. No new goals met.  Outcome Measures     Row Name 10/09/19 0938 10/08/19 1134 10/08/19 0748       How much help from another person do you currently need...    Turning from your back to your side while in flat bed without using bedrails?  --  3  -GIAN  --    Moving from lying on back to sitting on the side of a flat bed without bedrails?  --  2  -GIAN  --    Moving to and from a bed to a chair (including a wheelchair)?  --  2  -GIAN  --    Standing up from a chair using your arms (e.g., wheelchair, bedside chair)?  --  2  -GIAN  --    Climbing 3-5 steps with a railing?  --  1  -GIAN  --    To walk in hospital room?  --  2  -GIAN  --    AM-PAC 6 Clicks Score (PT)  --  12  -GIAN  --       How much help from another is currently  needed...    Putting on and taking off regular lower body clothing?  2  -BB  --  2  -BB    Bathing (including washing, rinsing, and drying)  2  -BB  --  2  -BB    Toileting (which includes using toilet bed pan or urinal)  2  -BB  --  2  -BB    Putting on and taking off regular upper body clothing  2  -BB  --  2  -BB    Taking care of personal grooming (such as brushing teeth)  2  -BB  --  2  -BB    Eating meals  1  -BB  --  1  -BB    AM-PAC 6 Clicks Score (OT)  11  -BB  --  11  -BB       Functional Assessment    Outcome Measure Options  --  AM-[facility] 6 Clicks Basic Mobility (PT)  -GIAN  --    Row Name 10/06/19 1500             How much help from another person do you currently need...    Turning from your back to your side while in flat bed without using bedrails?  2  -TA      Moving from lying on back to sitting on the side of a flat bed without bedrails?  2  -TA      Moving to and from a bed to a chair (including a wheelchair)?  2  -TA      Standing up from a chair using your arms (e.g., wheelchair, bedside chair)?  2  -TA      Climbing 3-5 steps with a railing?  2  -TA      To walk in hospital room?  2  -TA      AM-PAC 6 Clicks Score (PT)  12  -TA         Functional Assessment    Outcome Measure Options  AM-[facility] 6 Clicks Basic Mobility (PT)  -TA        User Key  (r) = Recorded By, (t) = Taken By, (c) = Cosigned By    Initials Name Provider Type    TA Pepper Dudley, PTA Physical Therapy Assistant    Abran Ferreira, PTA Physical Therapy Assistant    BB Kerrie Woodson, MADSEN/L Occupational Therapy Assistant       Non-skid socks and gait belt in place. Toileting offered. Call light and needs within reach. Pt advised to not get up alone and call the nurse for assistance.  Bed alarm on.       Time Calculation:   Time Calculation- OT     Row Name 10/09/19 1338             Time Calculation- OT    OT Start Time  0938  -BB      OT Stop Time  1017  -BB      OT Time Calculation (min)  39 min  -BB      Total Timed Code  Minutes- OT  39 minute(s)  -BB      OT Received On  10/09/19  -LISA        User Key  (r) = Recorded By, (t) = Taken By, (c) = Cosigned By    Initials Name Provider Type    Kerrie Spencer COTA/L Occupational Therapy Assistant        Therapy Charges for Today     Code Description Service Date Service Provider Modifiers Qty    57314090002 HC OT THER PROC EA 15 MIN 10/8/2019 Kerrie Woodson COTA/L GO 2    09456759974 HC OT SELF CARE/MGMT/TRAIN EA 15 MIN 10/9/2019 Kerrie Woodson COTA/L GO 1    35540630470 HC OT THER PROC EA 15 MIN 10/9/2019 Kerrie Woodson COTA/L GO 2               TENA Merchant/LIANE  10/9/2019

## 2019-10-09 NOTE — THERAPY TREATMENT NOTE
Acute Care - Physical Therapy Treatment Note  [facility]     Patient Name: Emerson Bang  : 1960  MRN: [id number]  Today's Date: 10/9/2019  Onset of Illness/Injury or Date of Surgery: 19     Referring Physician: Dr. Kc    Admit Date: 2019    Visit Dx:    ICD-10-CM ICD-9-CM   1. 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 Impaired physical mobility Z74.09 781.99   3. Impaired mobility and activities of daily living Z74.09 799.89     Patient Active Problem List   Diagnosis   • Hyperkalemia   • Iron deficiency anemia   • Gastroesophageal reflux disease with esophagitis   • Elevated AST (SGOT)   • Alcohol abuse   • Hypothyroidism   • Balance problem   • Need for vaccination   • Low magnesium levels   • Squamous cell carcinoma of pharynx (CMS/HCC)   • History of throat cancer   • Vitamin D deficiency   • Alcohol abuse counseling and surveillance   • Encounter for screening for diabetes mellitus   • Hypomagnesemia   • Iron deficiency anemia   • Hyperlipidemia   • Underweight due to inadequate caloric intake   • Need for immunization against influenza   • 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HCC)   • Hypertension   • General medical examination   • Underweight   • Epigastric pain   • Gastritis without bleeding   • Need for influenza vaccination   • Elevated liver function tests   • B12 deficiency   • Intestinal malabsorption   • Throat pain   • Acute pharyngitis   • Upper respiratory tract infection   • Neoplasm of uncertain behavior of larynx   • Pharyngoesophageal dysphagia   • Severe protein-calorie malnutrition (CMS/HCC)   • Anemia of chronic disease   • Hypotension   • Hyponatremia   • Status post insertion of percutaneous endoscopic gastrostomy (PEG) tube (CMS/HCC)   • Hx of tracheostomy   • History of throat surgery   • Hx SBO   • Urinary retention   • Generalized weakness   • Acute otitis externa of right ear   • Hard stool   • Panlobular emphysema (CMS/HCC)   • Cancer of  hypopharynx (CMS/HCC)   • Diarrhea   • Encounter for venous access device care   • Prediabetes   • Status post neck dissection   • S/P partial thyroidectomy   • S/P laryngectomy   • Annual physical exam   • Alcoholic cirrhosis of liver without ascites (CMS/HCC)       Therapy Treatment    Rehabilitation Treatment Summary     Row Name 10/09/19 1423 10/09/19 0938          Treatment Time/Intention    Discipline  physical therapy assistant  -GIAN  occupational therapy assistant  -BB     Document Type  therapy note (daily note)  -GIAN  therapy note (daily note)  -BB     Subjective Information  --  complains of;pain  -BB     Mode of Treatment  individual therapy;physical therapy  -GIAN  individual therapy;occupational therapy  -BB     Patient/Family Observations  --  no family present  -BB     Therapy Frequency (PT Clinical Impression)  other (see comments) 6days per week  -GIAN  --     Total Minutes, Occupational Therapy Treatment  --  39  -BB     Therapy Frequency (OT Eval)  --  other (see comments) 5-7 days/wk  -BB     Patient Effort  fair  -GIAN  good  -BB     Comment  pt agreeable only to ther ex in supine despite encouragement to attempt EOB.   -GIAN  --     Existing Precautions/Restrictions  fall;other (see comments) watch gait belt placement  -GIAN  fall;other (see comments) watch gait belt placement  -BB     Recorded by [GIAN] Abran Wan, PTA 10/09/19 1459 [BB] Kerrie Woodson, MADSEN/L 10/09/19 1336     Row Name 10/09/19 1423 10/09/19 0938          Vital Signs    Pre Systolic BP Rehab  130  -GIAN  --     Pre Treatment Diastolic BP  70  -GIAN  --     Post Systolic BP Rehab  144  -JC2  --     Post Treatment Diastolic BP  82  -JC2  --     Pretreatment Heart Rate (beats/min)  84  -GIAN  86  -BB     Posttreatment Heart Rate (beats/min)  96  -JC2  86  -BB     Pre SpO2 (%)  96  -GIAN  93  -BB     O2 Delivery Pre Treatment  supplemental O2  -GIAN  supplemental O2  -BB     Post SpO2 (%)  91  -JC2  93  -BB     O2 Delivery Post Treatment   supplemental O2  -JC2  supplemental O2  -BB     Pre Patient Position  Supine  -GIAN  Supine  -BB     Post Patient Position  Supine  -GIAN  Supine  -BB     Recorded by [GIAN] Abran Wan, PTA 10/09/19 1459  [JC2] Abran Wan, PTA 10/09/19 1508 [BB] Kerrie Woodson MADSEN/L 10/09/19 1336     Row Name 10/09/19 1423 10/09/19 0938          Cognitive Assessment/Intervention- PT/OT    Affect/Mental Status (Cognitive)  unable/difficult to assess  -GIAN  unable/difficult to assess  -BB     Behavioral Issues (Cognitive)  withdrawn  -JC2  --     Orientation Status (Cognition)  unable/difficult to assess  -GIAN  unable/difficult to assess  -BB     Follows Commands (Cognition)  WFL  -GIAN  WFL  -BB     Recorded by [GIAN] Abran Wan, PTA 10/09/19 1459  [JC2] Abran Wan, PTA 10/09/19 1514 [BB] Kerrie Woodson MADSEN/L 10/09/19 1336     Row Name 10/09/19 1423             Bed Mobility Assessment/Treatment    Bed Mobility Assessment/Treatment  scooting/bridging  -GIAN      Scooting/Bridging [place] (Bed Mobility)  supervision  -GIAN      Assistive Device (Bed Mobility)  head of bed elevated  -GIAN      Recorded by [GIAN] Abran Wan, PTA 10/09/19 1459      Row Name 10/09/19 1423             Bed-Chair Transfer    Bed-Chair [place] (Transfers)  not tested  -GIAN      Recorded by [GIAN] Abran Wan, PTA 10/09/19 1459      Row Name 10/09/19 1423             Sit-Stand Transfer    Sit-Stand [place] (Transfers)  not tested  -GIAN      Recorded by [GIAN] Abran Wan, PTA 10/09/19 1459      Row Name 10/09/19 1423             Gait/Stairs Assessment/Training    [place] Level (Gait)  not tested  -GIAN      Recorded by [GIAN] Abran Wan, PTA 10/09/19 1459      Row Name 10/09/19 0938             Bathing Assessment/Intervention    Bathing [place] Level  upper extremities;set up;supervision  -BB      Bathing Position  supine  -BB      Recorded by [BB] Kerrie Woodson MADSEN/L 10/09/19 1336      Row Name  10/09/19 0938             Grooming Assessment/Training    [place] Level (Grooming)  grooming skills;wash face, hands;set up;supervision  -BB      Grooming Position  supine  -BB      Recorded by [BB] Kerrie Woodson COTA/L 10/09/19 1336      Row Name 10/09/19 1423             Therapeutic Exercise    Therapeutic Exercise  supine, lower extremities  -GIAN      Recorded by [GIAN] Abran Wan, PTA 10/09/19 1459      Row Name 10/09/19 1423             Lower Extremity Supine Therapeutic Exercise    Performed, Supine Lower Extremity (Therapeutic Exercise)  ankle dorsiflexion/plantarflexion;hip abduction/adduction;heel slides;gluteal sets;SAQ (short arc quad) over bolster  -GIAN      Exercise Type, Supine Lower Extremity (Therapeutic Exercise)  AROM (active range of motion)  -GIAN      Sets/Reps Detail, Supine Lower Extremity (Therapeutic Exercise)  20x1 ashley  -GIAN      Recorded by [GIAN] Abran Wan, PTA 10/09/19 1459      Row Name 10/09/19 0938             Therapeutic Exercise    Upper Extremity Range of Motion (Therapeutic Exercise)  shoulder flexion/extension, bilateral;elbow flexion/extension, bilateral;forearm supination/pronation, bilateral;wrist flexion/extension, bilateral chest press  -BB      Hand (Therapeutic Exercise)  finger flexion/extension, bilateral  -BB      Weight/Resistance (Therapeutic Exercise)  1 pound  -BB      Exercise Type (Therapeutic Exercise)  AROM (active range of motion)  -BB      Position (Therapeutic Exercise)  supine  -BB      Sets/Reps (Therapeutic Exercise)  2x20  -BB      Equipment (Therapeutic Exercise)  free weight, barbell  -BB      Expected Outcome (Therapeutic Exercise)  improve functional stability;improve performance, transfer skills;improve functional tolerance, self-care activity  -BB      Recorded by [BB] Kerrie Woodson COTA/L 10/09/19 1336      Row Name 10/09/19 1423 10/09/19 0938          Positioning and Restraints    Pre-Treatment Position  in bed  -GIAN  in bed   -BB     Post Treatment Position  bed  -GIAN  bed  -BB     In Bed  fowlers;call light within reach;encouraged to call for assist;exit alarm on all needs met.   -GIAN  supine;call light within reach;encouraged to call for assist;exit alarm on  -BB     Recorded by [GIAN] Abran Wan, PTA 10/09/19 1459 [BB] Kerrie Woodson COTA/L 10/09/19 1336     Row Name 10/09/19 1423 10/09/19 0938          Pain Scale: Numbers Pre/Post-Treatment    Pain Scale: Numbers, Pretreatment  8/10  -GIAN  5/10  -BB     Pain Scale: Numbers, Post-Treatment  8/10  -GIAN  6/10  -BB     Pain Location  abdomen  -GIAN  abdomen  -BB     Pain Intervention(s)  Repositioned pain meds prior to tx.   -GIAN  Medication (See MAR)  -BB     Recorded by [GIAN] Abran Wan PTA 10/09/19 1459 [BB] Kerrie Woodson MADSEN/L 10/09/19 1336     Row Name                Wound 09/30/19 1330 Left medial abdomen Fistula    Wound - Properties Group Date first assessed: 09/30/19 [SS] Time first assessed: 1330 [SS] Side: Left [SS] Orientation: medial [SS] Location: abdomen [SS] Primary Wound Type: Fistula [SS] Recorded by:  [SS] Citlalli Abdi RN 09/30/19 1619    Row Name                Wound 10/02/19 1347 abdomen Incision    Wound - Properties Group Date first assessed: 10/02/19 [CF] Time first assessed: 1347 [CF] Present on Hospital Admission: N [CF] Location: abdomen [CF] Primary Wound Type: Incision [CF] Recorded by:  [CF] Eileen Tyson RN 10/02/19 1347    Row Name 10/09/19 0938             Plan of Care Review    Plan of Care Reviewed With  patient  -BB      Recorded by [BB] Kerrie Woodson MADSEN/L 10/09/19 1336      Row Name 10/09/19 0938             Outcome Summary/Treatment Plan (OT)    Daily Summary of Progress (OT)  progress toward functional goals as expected  -BB      Plan for Continued Treatment (OT)  continue POC  -BB      Anticipated Discharge Disposition (OT)  anticipate therapy at next level of care  -BB      Recorded by [BB] Kerrie Woodson,  MADSEN/L 10/09/19 1336      Row Name 10/09/19 1423             Outcome Summary/Treatment Plan (PT)    Daily Summary of Progress (PT)  progress toward functional goals as expected  -GIAN      Plan for Continued Treatment (PT)  continue  -GIAN      Anticipated Discharge Disposition (PT)  anticipate therapy at next level of care;home with 24/7 care  -GIAN      Recorded by [GIAN] Abran Wan, PTA 10/09/19 1459        User Key  (r) = Recorded By, (t) = Taken By, (c) = Cosigned By    Initials Name Effective Dates Discipline    SS Citlalli Abdi, RN 07/10/18 -  Nurse    GIAN Abran Wan, PTA 03/07/18 -  PT    BB Kerrie Woodson MADSEN/L 03/07/18 -  OT    CF Eileen Tyson RN 10/17/16 -  Nurse          Wound 09/30/19 1330 Left medial abdomen Fistula (Active)   Dressing Appearance dry;intact;moist drainage 10/9/2019 12:00 PM   Closure Other (Comment) 10/9/2019 12:00 PM   Base slough;non-blanchable;pink;moist 10/9/2019 12:00 PM   Periwound redness;other (see comments) 10/9/2019 12:00 PM   Periwound Temperature warm 10/9/2019 12:00 PM   Periwound Skin Turgor soft 10/9/2019 12:00 PM   Edges irregular 10/9/2019 12:00 PM   Drainage Characteristics/Odor serosanguineous 10/9/2019 12:00 PM   Drainage Amount small 10/9/2019 12:00 PM   Care, Wound cleansed with;sterile normal saline 10/9/2019 12:00 PM   Dressing Care, Wound dressing changed 10/9/2019 12:00 PM   Periwound Care, Wound absorptive dressing applied 10/9/2019 12:00 PM       Wound 10/02/19 1347 abdomen Incision (Active)   Dressing Appearance dry;intact 10/9/2019 12:00 PM   Closure Other (Comment) 10/9/2019 12:00 PM   Base clean;dry 10/9/2019 12:00 PM   Periwound dry 10/9/2019 12:00 PM   Periwound Temperature warm 10/9/2019 12:00 PM   Periwound Skin Turgor soft 10/9/2019 12:00 PM   Drainage Characteristics/Odor serosanguineous 10/8/2019  9:10 PM   Drainage Amount none 10/9/2019 12:00 PM   Dressing Care, Wound dressing changed 10/9/2019 12:00 PM       Rehab Goal Summary      Row Name 10/09/19 1423 10/09/19 0938          Bed Mobility Goal 1 (PT)    Activity/Assistive Device (Bed Mobility Goal 1, PT)  sit to supine;supine to sit  -GIAN  --     [place] Level/Cues Needed (Bed Mobility Goal 1, PT)  independent  -GIAN  --     Time Frame (Bed Mobility Goal 1, PT)  by discharge  -GIAN  --     Progress/Outcomes (Bed Mobility Goal 1, PT)  goal not met  -GIAN  --        Transfer Goal 1 (PT)    Activity/Assistive Device (Transfer Goal 1, PT)  bed-to-chair/chair-to-bed;toilet  -GIAN  --     [place] Level/Cues Needed (Transfer Goal 1, PT)  conditional independence;independent  -GIAN  --     Time Frame (Transfer Goal 1, PT)  by discharge  -GIAN  --     Progress/Outcome (Transfer Goal 1, PT)  goal not met  -GIAN  --        Gait Training Goal 1 (PT)    Activity/Assistive Device (Gait Training Goal 1, PT)  gait (walking locomotion)  -GIAN  --     [place] Level (Gait Training Goal 1, PT)  conditional independence;independent  -GIAN  --     Distance (Gait Goal 1, PT)  610mhe6  -GIAN  --     Time Frame (Gait Training Goal 1, PT)  by discharge  -GIAN  --     Progress/Outcome (Gait Training Goal 1, PT)  goal not met  -GIAN  --        Stairs Goal 1 (PT)    Activity/Assistive Device (Stairs Goal 1, PT)  descending stairs;ascending stairs  -GIAN  --     [place] Level/Cues Needed (Stairs Goal 1, PT)  standby assist  -GIAN  --     Number of Stairs (Stairs Goal 1, PT)  5  -GIAN  --     Time Frame (Stairs Goal 1, PT)  by discharge  -GIAN  --     Progress/Outcome (Stairs Goal 1, PT)  goal not met  -GIAN  --        Occupational Therapy Goals    Transfer Goal Selection (OT)  --  transfer, OT goal 1  -BB     Bathing Goal Selection (OT)  --  bathing, OT goal 1  -BB     Dressing Goal Selection (OT)  --  dressing, OT goal 1  -BB     Toileting Goal Selection (OT)  --  toileting, OT goal 1  -BB     Activity Tolerance Goal Selection (OT)  --  activity tolerance, OT goal 1  -BB        Transfer Goal 1 (OT)    Activity/Assistive Device  (Transfer Goal 1, OT)  --  sit-to-stand/stand-to-sit;bed-to-chair/chair-to-bed;toilet  -BB     [place] Level/Cues Needed (Transfer Goal 1, OT)  --  supervision required  -BB     Time Frame (Transfer Goal 1, OT)  --  long term goal (LTG);by discharge  -BB     Progress/Outcome (Transfer Goal 1, OT)  --  goal not met  -BB        Bathing Goal 1 (OT)    Activity/Assistive Device (Bathing Goal 1, OT)  --  bathing skills, all  -BB     [place] Level/Cues Needed (Bathing Goal 1, OT)  --  minimum assist (75% or more patient effort)  -BB     Time Frame (Bathing Goal 1, OT)  --  long term goal (LTG);by discharge  -BB     Barriers (Bathing Goal 1, OT)  --  Pt will need extra time and rest breaks PRN  -BB     Progress/Outcomes (Bathing Goal 1, OT)  --  goal not met  -BB        Dressing Goal 1 (OT)    Activity/Assistive Device (Dressing Goal 1, OT)  --  dressing skills, all  -BB     [place]/Cues Needed (Dressing Goal 1, OT)  --  minimum assist (75% or more patient effort)  -BB     Time Frame (Dressing Goal 1, OT)  --  long term goal (LTG);by discharge  -BB     Barriers (Dressing Goal 1, OT)  --  Pt will need extra time and rest breaks PRN  -BB     Progress/Outcome (Dressing Goal 1, OT)  --  goal not met  -BB        Toileting Goal 1 (OT)    Activity/Device (Toileting Goal 1, OT)  --  toileting skills, all  -BB     [place] Level/Cues Needed (Toileting Goal 1, OT)  --  minimum assist (75% or more patient effort)  -BB     Time Frame (Toileting Goal 1, OT)  --  long term goal (LTG);by discharge  -BB     Barriers (Toileting Goal 1, OT)  --  Pt will need extra time and rest breaks PRN  -BB     Progress/Outcome (Toileting Goal 1, OT)  --  goal not met  -BB         Activity Tolerance Goal 1 (OT)    Activity Level (Endurance Goal 1, OT)  --  15 min activity functional/therapeutic activity  -BB     Time Frame (Activity Tolerance Goal 1, OT)  --  long term goal (LTG)  -BB     Progress/Outcome (Activity Tolerance Goal 1,  OT)  --  goal not met  -BB       User Key  (r) = Recorded By, (t) = Taken By, (c) = Cosigned By    Initials Name Provider Type Discipline     Abran Wan PTA Physical Therapy Assistant PT    BB Kerrie Woodson COTA/L Occupational Therapy Assistant OT          Physical Therapy Education     Title: PT OT SLP Therapies (In Progress)     Topic: Physical Therapy (In Progress)     Point: Mobility training (In Progress)     Learning Progress Summary           Patient Acceptance, E, NR by  at 10/9/2019  2:59 PM    Acceptance, E, NR by  at 10/8/2019  4:00 PM    Acceptance, E, NR by [facility] at 10/3/2019  3:43 PM                   Point: Body mechanics (In Progress)     Learning Progress Summary           Patient Acceptance, E, NR by [facility] at 10/3/2019  3:43 PM                   Point: Precautions (In Progress)     Learning Progress Summary           Patient Acceptance, E, NR by [facility] at 10/3/2019  3:43 PM                               User Key     Initials Effective Dates Name Provider Type Discipline    [facility] 04/03/18 -  Judy Watkins, PT Physical Therapist PT     03/07/18 -  Abran Wan PTA Physical Therapy Assistant PT                PT Recommendation and Plan  Anticipated Discharge Disposition (PT): anticipate therapy at next level of care, home with 24/7 care  Therapy Frequency (PT Clinical Impression): other (see comments)(6days per week)  Outcome Summary/Treatment Plan (PT)  Daily Summary of Progress (PT): progress toward functional goals as expected  Plan for Continued Treatment (PT): continue  Anticipated Discharge Disposition (PT): anticipate therapy at next level of care, home with 24/7 care  Plan of Care Reviewed With: patient  Progress: improving  Outcome Summary: pt only agreeable to LE ther ex in supine despite encouragement to attempt EOB. pt completed AROM ashley LE. no new goals met at this time. pt would continue to benefit from PT services.   Outcome Measures     Row Name 10/09/19 0369  10/09/19 0938 10/08/19 1134       How much help from another person do you currently need...    Turning from your back to your side while in flat bed without using bedrails?  3  -GIAN  --  3  -GIAN    Moving from lying on back to sitting on the side of a flat bed without bedrails?  2  -GIAN  --  2  -GIAN    Moving to and from a bed to a chair (including a wheelchair)?  2  -GIAN  --  2  -GIAN    Standing up from a chair using your arms (e.g., wheelchair, bedside chair)?  2  -GIAN  --  2  -GIAN    Climbing 3-5 steps with a railing?  1  -GIAN  --  1  -GIAN    To walk in hospital room?  2  -GIAN  --  2  -GIAN    AM-PAC 6 Clicks Score (PT)  12  -GIAN  --  12  -GIAN       How much help from another is currently needed...    Putting on and taking off regular lower body clothing?  --  2  -BB  --    Bathing (including washing, rinsing, and drying)  --  2  -BB  --    Toileting (which includes using toilet bed pan or urinal)  --  2  -BB  --    Putting on and taking off regular upper body clothing  --  2  -BB  --    Taking care of personal grooming (such as brushing teeth)  --  2  -BB  --    Eating meals  --  1  -BB  --    AM-PAC 6 Clicks Score (OT)  --  11  -BB  --       Functional Assessment    Outcome Measure Options  AM-PAC 6 Clicks Basic Mobility (PT)  -  --  AM-[facility] 6 Clicks Basic Mobility (PT)  -    Row Name 10/08/19 0748             How much help from another is currently needed...    Putting on and taking off regular lower body clothing?  2  -BB      Bathing (including washing, rinsing, and drying)  2  -BB      Toileting (which includes using toilet bed pan or urinal)  2  -BB      Putting on and taking off regular upper body clothing  2  -BB      Taking care of personal grooming (such as brushing teeth)  2  -BB      Eating meals  1  -BB      AM-PAC 6 Clicks Score (OT)  11  -BB        User Key  (r) = Recorded By, (t) = Taken By, (c) = Cosigned By    Initials Name Provider Type    Abran Ferreira PTA Physical Therapy Assistant    LISA  Kerrie Woodson, TENA/L Occupational Therapy Assistant         Time Calculation:   PT Charges     Row Name 10/09/19 1515             Time Calculation    Start Time  1443  -GIAN      Stop Time  1508  -GIAN      Time Calculation (min)  25 min  -GIAN         Time Calculation- PT    Total Timed Code Minutes- PT  25 minute(s)  -GIAN        User Key  (r) = Recorded By, (t) = Taken By, (c) = Cosigned By    Initials Name Provider Type     Abran Wan PTA Physical Therapy Assistant        Therapy Charges for Today     Code Description Service Date Service Provider Modifiers Qty    20499564401 HC PT THERAPEUTIC ACT EA 15 MIN 10/8/2019 Abran Wan PTA GP 1    51708087931 HC PT THER PROC EA 15 MIN 10/9/2019 Abran Wan PTA GP 2          PT G-Codes  Outcome Measure Options: AM-PAC 6 Clicks Basic Mobility (PT)  AM-PAC 6 Clicks Score (PT): 12  AM-PAC 6 Clicks Score (OT): 11    Abran Wan PTA  10/9/2019

## 2019-10-09 NOTE — PROGRESS NOTES
LOS: 8 days   Patient Care Team:  Cristino He MD as PCP - General  Soto, Trenton Lewis MD as Surgeon (General Surgery)  Krysten Martínez MD as Consulting Physician (Hematology and Oncology)  Keaton Alfred MD as Consulting Physician (Radiation Oncology)  Dee Bajwa APRN as Nurse Practitioner (Oncology)    Chief Complaint: Postoperative day 7    Subjective   Low blood pressure this morning and decreased urine output.  Responded to bolus of fluid  History of Present Illness    Subjective:  Symptoms:  Stable.    Diet:  NPO.  No nausea or vomiting.    Activity level: Impaired due to weakness.    Pain:  He reports no pain.        History taken from: chart RN    Objective     Vital Signs  Temp:  [96.7 °F (35.9 °C)-97.6 °F (36.4 °C)] 96.7 °F (35.9 °C)  Heart Rate:  [] 79  Resp:  [16-20] 18  BP: ()/(40-74) 113/55    Objective:  General Appearance:  Comfortable.    Vital signs: (most recent): Blood pressure 113/55, pulse 79, temperature 96.7 °F (35.9 °C), temperature source Axillary, resp. rate 18, weight 54.4 kg (119 lb 14.4 oz), SpO2 99 %.  Vital signs are normal.  No fever.    Output: Producing urine and producing stool.    Abdomen: Abdomen is soft and non-distended.  (Decrease drainage from the nasogastric tube).              Results Review:     I reviewed the patient's new clinical results.    Medication Review: Performed    Assessment:    Condition: In stable condition.  Improving.   (Decreased drainage from the nasogastric tube).     Plan:   (1.  We will place nasogastric tube drainage and plan to remove tomorrow if no nausea or vomiting  2.  Slow advance of diet following that).       Parminder Kc MD  10/08/19  7:35 PM    Time: 15 minutes

## 2019-10-09 NOTE — CONSULTS
Nutrition Services    Patient Name:  Emerson Bang  YOB: 1960  MRN: [id number]  Admit Date:  9/30/2019    Per staff no diarrhea noted.  Pt did have a low Blood pressure and decreased UOP but he responded to bolus fluids.  Decreased NGT drainage.  MD notes NGT to gravity and remove tomorrow if no more N/V.  PN continues.      Electronically signed by:  Jane Ghosh RD  10/09/19 12:10 PM

## 2019-10-09 NOTE — PROGRESS NOTES
Parenteral Nutrition by Pharmacy - Day 3    Patient: Emerson Bang  MRN#: [id number]  Attending: No name on file.  Admission Date: 093019    Subjective/Objective   Progress Notes Reviewed    Assessment      Lab Results   Component Value Date    GLUCOSE 141 (H) 10/09/2019    BUN 10 10/09/2019    CREATININE 0.49 (L) 10/09/2019    EGFRIFAFRI >150 10/09/2019    BCR 20.4 10/09/2019    K 3.0 (L) 10/09/2019    CO2 29.0 10/09/2019    CALCIUM 8.1 (L) 10/09/2019    ALBUMIN 2.20 (L) 10/09/2019    AST 13 10/09/2019    ALT 9 10/09/2019     Lab Results   Component Value Date    GLUCOSE 141 (H) 10/09/2019    CALCIUM 8.1 (L) 10/09/2019     (L) 10/09/2019    K 3.0 (L) 10/09/2019    CO2 29.0 10/09/2019    CL 94 (L) 10/09/2019    BUN 10 10/09/2019    CREATININE 0.49 (L) 10/09/2019    EGFRIFAFRI >150 10/09/2019    BCR 20.4 10/09/2019    ANIONGAP 6.0 10/09/2019     Magnesium   Date Value Ref Range Status   10/08/2019 1.6 1.6 - 2.4 mg/dL Final     Phosphorus   Date Value Ref Range Status   10/08/2019 2.2 (L) 2.5 - 4.5 mg/dL Final     Calcium   Date Value Ref Range Status   10/09/2019 8.1 (L) 8.6 - 10.5 mg/dL Final     Triglycerides   Date Value Ref Range Status   10/07/2019 132 0 - 150 mg/dL Final         Diagnosis: prolonged paralytic ileus    Above labs reviewed.  Sodium low at 129 - decreasing  Chloride low at 94 - decreasing  Potassium low at 3.0 - decreasing  No phosphorus reported today - was low yesterday at 2.2  Albumin 2.2, Calcium 8.1, Corrected Calcium 9.5 - WNL    Patient has been experiencing diarrhea, likely contributing to electrolyte imbalances    NS is running at 50 mL/hr - patient requiring for hypotension    Plan   Increase total sodium, chloride, phosphorus, and potassium    TPN Formulation:  Clinimix 5/15% 2000 ml  KPhos 52.8 mEq / 36 mMol  KCl 30 mEq  NaCl 180 mEq  NaAcetate 40 mEq  Calcium Gluconate 16 mEq  Mag Sulfate 8 mEq  MVI 10 ml    Rate 83.3 mL/hr    Lipids: 175 mL over 12 hours    Pharmacy will  continue to monitor and adjust formula as needed.    Keshia Martinez RPH  10/09/19  8:58 AM

## 2019-10-10 LAB
ALBUMIN SERPL-MCNC: 2.5 G/DL (ref 3.5–5.2)
ALBUMIN/GLOB SERPL: 0.8 G/DL
ALP SERPL-CCNC: 83 U/L (ref 39–117)
ALT SERPL W P-5'-P-CCNC: 10 U/L (ref 1–41)
ANION GAP SERPL CALCULATED.3IONS-SCNC: 10 MMOL/L (ref 5–15)
AST SERPL-CCNC: 23 U/L (ref 1–40)
BILIRUB SERPL-MCNC: 0.3 MG/DL (ref 0.2–1.2)
BUN BLD-MCNC: 12 MG/DL (ref 8–23)
BUN/CREAT SERPL: 19 (ref 7–25)
CALCIUM SPEC-SCNC: 8.4 MG/DL (ref 8.6–10.5)
CHLORIDE SERPL-SCNC: 95 MMOL/L (ref 98–107)
CO2 SERPL-SCNC: 26 MMOL/L (ref 22–29)
CREAT BLD-MCNC: 0.63 MG/DL (ref 0.76–1.27)
GFR SERPL CREATININE-BSD FRML MDRD: >150 ML/MIN/1.73
GLOBULIN UR ELPH-MCNC: 3.3 GM/DL
GLUCOSE BLD-MCNC: 102 MG/DL (ref 65–99)
GLUCOSE BLDC GLUCOMTR-MCNC: 117 MG/DL (ref 70–130)
GLUCOSE BLDC GLUCOMTR-MCNC: 129 MG/DL (ref 70–130)
GLUCOSE BLDC GLUCOMTR-MCNC: 131 MG/DL (ref 70–130)
GLUCOSE BLDC GLUCOMTR-MCNC: 150 MG/DL (ref 70–130)
POTASSIUM BLD-SCNC: 5.1 MMOL/L (ref 3.5–5.2)
PROT SERPL-MCNC: 5.8 G/DL (ref 6–8.5)
SODIUM BLD-SCNC: 131 MMOL/L (ref 136–145)

## 2019-10-10 PROCEDURE — 97110 THERAPEUTIC EXERCISES: CPT

## 2019-10-10 PROCEDURE — 82962 GLUCOSE BLOOD TEST: CPT

## 2019-10-10 PROCEDURE — 80053 COMPREHEN METABOLIC PANEL: CPT | Performed by: SURGERY

## 2019-10-10 PROCEDURE — 25010000002 MAGNESIUM SULFATE PER 500 MG OF MAGNESIUM: Performed by: SURGERY

## 2019-10-10 PROCEDURE — 25010000002 HYDROMORPHONE 1 MG/ML SOLUTION: Performed by: SURGERY

## 2019-10-10 PROCEDURE — 99024 POSTOP FOLLOW-UP VISIT: CPT | Performed by: SURGERY

## 2019-10-10 PROCEDURE — 97535 SELF CARE MNGMENT TRAINING: CPT

## 2019-10-10 PROCEDURE — 94799 UNLISTED PULMONARY SVC/PX: CPT

## 2019-10-10 PROCEDURE — 94760 N-INVAS EAR/PLS OXIMETRY 1: CPT

## 2019-10-10 PROCEDURE — 25010000002 CALCIUM GLUCONATE PER 10 ML: Performed by: SURGERY

## 2019-10-10 PROCEDURE — 25010000002 ENOXAPARIN PER 10 MG: Performed by: SURGERY

## 2019-10-10 RX ADMIN — HYDROMORPHONE HYDROCHLORIDE 1 MG: 1 INJECTION, SOLUTION INTRAMUSCULAR; INTRAVENOUS; SUBCUTANEOUS at 02:35

## 2019-10-10 RX ADMIN — SILVER SULFADIAZINE: 10 CREAM TOPICAL at 20:45

## 2019-10-10 RX ADMIN — HYDROMORPHONE HYDROCHLORIDE 1 MG: 1 INJECTION, SOLUTION INTRAMUSCULAR; INTRAVENOUS; SUBCUTANEOUS at 06:17

## 2019-10-10 RX ADMIN — HYDROMORPHONE HYDROCHLORIDE 1 MG: 1 INJECTION, SOLUTION INTRAMUSCULAR; INTRAVENOUS; SUBCUTANEOUS at 22:35

## 2019-10-10 RX ADMIN — PANTOPRAZOLE SODIUM 40 MG: 40 INJECTION, POWDER, FOR SOLUTION INTRAVENOUS at 06:11

## 2019-10-10 RX ADMIN — CALCIUM GLUCONATE: 94 INJECTION, SOLUTION INTRAVENOUS at 17:47

## 2019-10-10 RX ADMIN — ALBUTEROL SULFATE 2.5 MG: 2.5 SOLUTION RESPIRATORY (INHALATION) at 11:57

## 2019-10-10 RX ADMIN — ALBUTEROL SULFATE 2.5 MG: 2.5 SOLUTION RESPIRATORY (INHALATION) at 01:08

## 2019-10-10 RX ADMIN — ALBUTEROL SULFATE 2.5 MG: 2.5 SOLUTION RESPIRATORY (INHALATION) at 18:43

## 2019-10-10 RX ADMIN — I.V. FAT EMULSION 35 G: 20 EMULSION INTRAVENOUS at 17:47

## 2019-10-10 RX ADMIN — ENOXAPARIN SODIUM 40 MG: 40 INJECTION SUBCUTANEOUS at 10:44

## 2019-10-10 RX ADMIN — SILVER SULFADIAZINE: 10 CREAM TOPICAL at 17:49

## 2019-10-10 RX ADMIN — HYDROMORPHONE HYDROCHLORIDE 1 MG: 1 INJECTION, SOLUTION INTRAMUSCULAR; INTRAVENOUS; SUBCUTANEOUS at 10:44

## 2019-10-10 RX ADMIN — HYDROMORPHONE HYDROCHLORIDE 1 MG: 1 INJECTION, SOLUTION INTRAMUSCULAR; INTRAVENOUS; SUBCUTANEOUS at 17:47

## 2019-10-10 RX ADMIN — ALBUTEROL SULFATE 2.5 MG: 2.5 SOLUTION RESPIRATORY (INHALATION) at 06:53

## 2019-10-10 NOTE — PLAN OF CARE
Problem: Patient Care Overview  Goal: Plan of Care Review  Outcome: Ongoing (interventions implemented as appropriate)   10/09/19 1780   Coping/Psychosocial   Plan of Care Reviewed With patient   Plan of Care Review   Progress improving   OTHER   Outcome Summary VSS, pain medicated, no other complaints at this time, he is currently resting, we are working on better pain control. Will cont to monitor.       Problem: Fall Risk (Adult)  Goal: Absence of Fall  Outcome: Ongoing (interventions implemented as appropriate)      Problem: Skin Injury Risk (Adult)  Goal: Skin Health and Integrity  Outcome: Ongoing (interventions implemented as appropriate)      Problem: Pain, Chronic (Adult)  Goal: Acceptable Pain/Comfort Level and Functional Ability  Outcome: Ongoing (interventions implemented as appropriate)      Problem: Wound (Includes Pressure Injury) (Adult)  Goal: Signs and Symptoms of Listed Potential Problems Will be Absent, Minimized or Managed (Wound)  Outcome: Ongoing (interventions implemented as appropriate)      Problem: Nutrition, Parenteral (Adult)  Goal: Signs and Symptoms of Listed Potential Problems Will be Absent, Minimized or Managed (Nutrition, Parenteral)  Outcome: Ongoing (interventions implemented as appropriate)    Goal: Signs and Symptoms of Listed Potential Problems Will be Absent, Minimized or Managed (Nutrition, Parenteral)  Outcome: Ongoing (interventions implemented as appropriate)

## 2019-10-10 NOTE — PLAN OF CARE
Problem: Patient Care Overview  Goal: Plan of Care Review  Outcome: Ongoing (interventions implemented as appropriate)   10/10/19 3016   Coping/Psychosocial   Plan of Care Reviewed With patient   Plan of Care Review   Progress no change   OTHER   Outcome Summary TOR NAVARRETE'elo today. Tolerating.        Problem: Fall Risk (Adult)  Goal: Absence of Fall  Outcome: Ongoing (interventions implemented as appropriate)      Problem: Skin Injury Risk (Adult)  Goal: Skin Health and Integrity  Outcome: Ongoing (interventions implemented as appropriate)      Problem: Pain, Chronic (Adult)  Goal: Acceptable Pain/Comfort Level and Functional Ability  Outcome: Ongoing (interventions implemented as appropriate)      Problem: Wound (Includes Pressure Injury) (Adult)  Goal: Signs and Symptoms of Listed Potential Problems Will be Absent, Minimized or Managed (Wound)  Outcome: Ongoing (interventions implemented as appropriate)      Problem: Nutrition, Parenteral (Adult)  Goal: Signs and Symptoms of Listed Potential Problems Will be Absent, Minimized or Managed (Nutrition, Parenteral)  Outcome: Ongoing (interventions implemented as appropriate)

## 2019-10-10 NOTE — PLAN OF CARE
Problem: Patient Care Overview  Goal: Plan of Care Review  Outcome: Ongoing (interventions implemented as appropriate)   10/10/19 1414   Coping/Psychosocial   Plan of Care Reviewed With patient   Plan of Care Review   Progress improving   OTHER   Outcome Summary Pt only agreed to B UE bed ex. Pt performed grooming tasks with set up. No goals met this tx.

## 2019-10-10 NOTE — PLAN OF CARE
Problem: Patient Care Overview  Goal: Plan of Care Review  Outcome: Ongoing (interventions implemented as appropriate)   10/10/19 1010   OTHER   Outcome Summary Pt agreeable to bed ther ex. B LE ther ex performed supine. Pt will cont to benefit from further PT.

## 2019-10-10 NOTE — CONSULTS
Adult Nutrition  Assessment    Patient Name:  Emerson Bang  YOB: 1960  MRN: [id number]  Admit Date:  9/30/2019    Assessment Date:  10/10/2019    Comments:  Pt appears to be feeling better.  NGT still in place but drainage has decreased.  No Abd pain or diarrhea.  PN continues.  RD will monitor tx plans.  Expect PN to be decreased and EN to be started.    Reason for Assessment     Row Name 10/10/19 1632          Reason for Assessment    Reason For Assessment  follow-up protocol         Nutrition/Diet History     Row Name 10/10/19 1632          Nutrition/Diet History    Typical Food/Fluid Intake  Pt seems to be more alert and in better spirits.  HE shook head no --no diarrhea.  No abd pains           Labs/Tests/Procedures/Meds     Row Name 10/10/19 1633          Labs/Procedures/Meds    Lab Results Reviewed  reviewed, pertinent        Diagnostic Tests/Procedures    Diagnostic Test/Procedure Reviewed  reviewed, pertinent        Medications    Pertinent Medications Reviewed  reviewed, pertinent         Physical Findings     Row Name 10/10/19 1633          Physical Findings    Overall Physical Appearance  on oxygen therapy;loss of subcutaneous fat;loss of muscle mass;generalized wasting     Gastrointestinal  feeding tube     Tubes  jejunostomy tube           Nutrition Prescription Ordered     Row Name 10/10/19 1634          Nutrition Prescription PO    Current PO Diet  NPO        Nutrition Prescription PN    PN Route  PICC     PN Goal Rate (mL/hr)  83.3 mL/hr     PN Current Rate (mL/hr)  83.3 mL/hr     Dextrose Concentration (%)  15 %     Dextrose (Kcal)  1016     Amino Acid Concentration (%)  5 %     Amino Acid (gm)  100     Lipid Concentration (%)  20%     Lipid Volume (mL)  Other (comment) 175cc     Lipid Frequency  Daily         Evaluation of Received Nutrient/Fluid Intake     Row Name 10/10/19 1636          Calories Evaluation    Parenteral Calories (kcal)  1766 1366 NPC     % of Kcal Needs   100        Protein Evaluation    Parenteral Protein (gm)  100     % of Protein Needs  125        Intake Assessment    Energy/Calorie Requirement Assessment  meeting needs     Protein Requirement Assessment  exceeds needs               Electronically signed by:  Jane Ghosh RD  10/10/19 4:39 PM

## 2019-10-10 NOTE — THERAPY TREATMENT NOTE
Acute Care - Physical Therapy Treatment Note  [facility]     Patient Name: Emerson Bang  : 1960  MRN: [id number]  Today's Date: 10/10/2019  Onset of Illness/Injury or Date of Surgery: 19     Referring Physician: Dr. Kc    Admit Date: 2019    Visit Dx:    ICD-10-CM ICD-9-CM   1. 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 Impaired physical mobility Z74.09 781.99   3. Impaired mobility and activities of daily living Z74.09 799.89     Patient Active Problem List   Diagnosis   • Hyperkalemia   • Iron deficiency anemia   • Gastroesophageal reflux disease with esophagitis   • Elevated AST (SGOT)   • Alcohol abuse   • Hypothyroidism   • Balance problem   • Need for vaccination   • Low magnesium levels   • Squamous cell carcinoma of pharynx (CMS/HCC)   • History of throat cancer   • Vitamin D deficiency   • Alcohol abuse counseling and surveillance   • Encounter for screening for diabetes mellitus   • Hypomagnesemia   • Iron deficiency anemia   • Hyperlipidemia   • Underweight due to inadequate caloric intake   • Need for immunization against influenza   • 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HCC)   • Hypertension   • General medical examination   • Underweight   • Epigastric pain   • Gastritis without bleeding   • Need for influenza vaccination   • Elevated liver function tests   • B12 deficiency   • Intestinal malabsorption   • Throat pain   • Acute pharyngitis   • Upper respiratory tract infection   • Neoplasm of uncertain behavior of larynx   • Pharyngoesophageal dysphagia   • Severe protein-calorie malnutrition (CMS/HCC)   • Anemia of chronic disease   • Hypotension   • Hyponatremia   • Status post insertion of percutaneous endoscopic gastrostomy (PEG) tube (CMS/HCC)   • Hx of tracheostomy   • History of throat surgery   • Hx SBO   • Urinary retention   • Generalized weakness   • Acute otitis externa of right ear   • Hard stool   • Panlobular emphysema (CMS/HCC)   • Cancer of  hypopharynx (CMS/HCC)   • Diarrhea   • Encounter for venous access device care   • Prediabetes   • Status post neck dissection   • S/P partial thyroidectomy   • S/P laryngectomy   • Annual physical exam   • Alcoholic cirrhosis of liver without ascites (CMS/HCC)       Therapy Treatment    Rehabilitation Treatment Summary     Row Name 10/10/19 1010             Treatment Time/Intention    Discipline  physical therapy assistant  -JW      Document Type  therapy note (daily note)  -JW      Subjective Information  no complaints  -JW      Mode of Treatment  individual therapy;physical therapy  -JW      Patient/Family Observations  no family present.  -JW      Therapy Frequency (PT Clinical Impression)  other (see comments) 6days per week  -JW      Patient Effort  fair  -JW      Existing Precautions/Restrictions  fall;other (see comments) watch gait belt placement  -JW      Recorded by [JW] Daniel Putnam, PTA 10/10/19 1230      Row Name 10/10/19 1010             Cognitive Assessment/Intervention- PT/OT    Affect/Mental Status (Cognitive)  unable/difficult to assess  -JW      Behavioral Issues (Cognitive)  unable/difficult to assess  -JW      Orientation Status (Cognition)  unable/difficult to assess  -JW      Follows Commands (Cognition)  WFL  -JW      Recorded by [JW] Daniel Putnam, PTA 10/10/19 1230      Row Name 10/10/19 1010             Bed Mobility Assessment/Treatment    Bed Mobility Assessment/Treatment  scooting/bridging  -JW      Scooting/Bridging [place] (Bed Mobility)  supervision  -JW      Assistive Device (Bed Mobility)  head of bed elevated  -JW      Recorded by [JW] Daniel Putnam, PTA 10/10/19 1230      Row Name 10/10/19 1010             Bed-Chair Transfer    Bed-Chair [place] (Transfers)  not tested  -JW      Recorded by [JW] Daniel Putnam, PTA 10/10/19 1230      Row Name 10/10/19 1010             Sit-Stand Transfer    Sit-Stand [place] (Transfers)  not tested  -JW      Recorded by  [JW] Daniel Putnam, PTA 10/10/19 1230      Row Name 10/10/19 1010             Gait/Stairs Assessment/Training    [place] Level (Gait)  not tested  -      Recorded by [JW] Daniel Putnam, PTA 10/10/19 1230      Row Name 10/10/19 1010             Lower Extremity Supine Therapeutic Exercise    Performed, Supine Lower Extremity (Therapeutic Exercise)  SLR (straight leg raise);quadriceps sets;gluteal sets;heel slides;hip abduction/adduction  -JW      Exercise Type, Supine Lower Extremity (Therapeutic Exercise)  AROM (active range of motion)  -JW      Sets/Reps Detail, Supine Lower Extremity (Therapeutic Exercise)  1-2/10  -JW      Recorded by [JW] Daniel Putnam, PTA 10/10/19 1230      Row Name 10/10/19 1010             Positioning and Restraints    Pre-Treatment Position  in bed  -JW      Post Treatment Position  bed  -JW      In Bed  call light within reach;encouraged to call for assist;exit alarm on  -JW      Recorded by [JW] Daniel Putnam, PTA 10/10/19 1230      Row Name                Wound 09/30/19 1330 Left medial abdomen Fistula    Wound - Properties Group Date first assessed: 09/30/19 [SS] Time first assessed: 1330 [SS] Side: Left [SS] Orientation: medial [SS] Location: abdomen [SS] Primary Wound Type: Fistula [SS] Recorded by:  [SS] Citlalli Abdi RN 09/30/19 1619    Row Name                Wound 10/02/19 1347 abdomen Incision    Wound - Properties Group Date first assessed: 10/02/19 [CF] Time first assessed: 1347 [CF] Present on Hospital Admission: N [CF] Location: abdomen [CF] Primary Wound Type: Incision [CF] Recorded by:  [CF] Eileen Tyson RN 10/02/19 1347    Row Name 10/10/19 1010             Outcome Summary/Treatment Plan (PT)    Daily Summary of Progress (PT)  progress toward functional goals is gradual  -JW      Plan for Continued Treatment (PT)  Cont per POC  -JW      Anticipated Discharge Disposition (PT)  anticipate therapy at next level of care;home with 24/7 care  -       Recorded by [JW] Daniel Putnam, PTA 10/10/19 1230        User Key  (r) = Recorded By, (t) = Taken By, (c) = Cosigned By    Initials Name Effective Dates Discipline    Daniel Chaney, PTA 03/07/18 -  PT    SS Citlalli Abdi, RN 07/10/18 -  Nurse    CF Eileen Tyson RN 10/17/16 -  Nurse          Wound 09/30/19 1330 Left medial abdomen Fistula (Active)   Dressing Appearance dry;intact 10/9/2019  8:50 PM   Base clean;moist 10/9/2019  8:50 PM   Periwound redness;other (see comments) 10/9/2019  8:50 PM   Periwound Temperature warm 10/9/2019  8:50 PM   Periwound Skin Turgor soft 10/9/2019  8:50 PM   Edges other (see comments) 10/9/2019  8:50 PM   Drainage Characteristics/Odor serosanguineous 10/9/2019  8:50 PM   Drainage Amount moderate 10/9/2019  8:50 PM   Care, Wound cleansed with;sterile water;barrier applied 10/9/2019  8:50 PM   Dressing Care, Wound dressing changed;dressing applied 10/9/2019  8:50 PM   Periwound Care, Wound absorptive dressing applied 10/9/2019  8:50 PM       Wound 10/02/19 1347 abdomen Incision (Active)   Dressing Appearance dry;intact 10/9/2019  8:50 PM   Periwound dry 10/9/2019  8:50 PM   Periwound Temperature warm 10/9/2019  8:50 PM   Periwound Skin Turgor soft 10/9/2019  8:50 PM   Drainage Characteristics/Odor serosanguineous 10/9/2019  8:50 PM   Drainage Amount scant 10/9/2019  8:50 PM   Dressing Care, Wound dressing changed;dressing applied 10/9/2019  8:50 PM       Rehab Goal Summary     Row Name 10/10/19 1010             Bed Mobility Goal 1 (PT)    Activity/Assistive Device (Bed Mobility Goal 1, PT)  sit to supine;supine to sit  -      [place] Level/Cues Needed (Bed Mobility Goal 1, PT)  independent  -      Time Frame (Bed Mobility Goal 1, PT)  by discharge  -      Progress/Outcomes (Bed Mobility Goal 1, PT)  goal not met  -JW         Transfer Goal 1 (PT)    Activity/Assistive Device (Transfer Goal 1, PT)  bed-to-chair/chair-to-bed;toilet  -JW      [place]  Level/Cues Needed (Transfer Goal 1, PT)  conditional independence;independent  -JW      Time Frame (Transfer Goal 1, PT)  by discharge  -JW      Progress/Outcome (Transfer Goal 1, PT)  goal not met  -JW         Gait Training Goal 1 (PT)    Activity/Assistive Device (Gait Training Goal 1, PT)  gait (walking locomotion)  -JW      [place] Level (Gait Training Goal 1, PT)  conditional independence;independent  -JW      Distance (Gait Goal 1, PT)  [id number]  -JW      Time Frame (Gait Training Goal 1, PT)  by discharge  -JW      Progress/Outcome (Gait Training Goal 1, PT)  goal not met  -JW         Stairs Goal 1 (PT)    Activity/Assistive Device (Stairs Goal 1, PT)  descending stairs;ascending stairs  -JW      [place] Level/Cues Needed (Stairs Goal 1, PT)  standby assist  -JW      Number of Stairs (Stairs Goal 1, PT)  5  -JW      Time Frame (Stairs Goal 1, PT)  by discharge  -JW      Progress/Outcome (Stairs Goal 1, PT)  goal not met  -JW        User Key  (r) = Recorded By, (t) = Taken By, (c) = Cosigned By    Initials Name Provider Type Discipline    Daniel Chaney, PTA Physical Therapy Assistant PT          Physical Therapy Education     Title: PT OT SLP Therapies (In Progress)     Topic: Physical Therapy (In Progress)     Point: Mobility training (In Progress)     Learning Progress Summary           Patient Acceptance, E, NR by GIAN at 10/9/2019  2:59 PM    Acceptance, E, NR by GIAN at 10/8/2019  4:00 PM    Acceptance, E, NR by GIAN1 at 10/3/2019  3:43 PM                   Point: Body mechanics (In Progress)     Learning Progress Summary           Patient Acceptance, E, NR by NANETTE at 10/3/2019  3:43 PM                   Point: Precautions (In Progress)     Learning Progress Summary           Patient Acceptance, E, NR by NANETTE at 10/3/2019  3:43 PM                               User Key     Initials Effective Dates Name Provider Type Discipline    [facility] 04/03/18 -  Judy Watkins, PT Physical Therapist PT    GIAN  03/07/18 -  Abran Wan, PTA Physical Therapy Assistant PT                PT Recommendation and Plan  Anticipated Discharge Disposition (PT): anticipate therapy at next level of care, home with 24/7 care  Therapy Frequency (PT Clinical Impression): other (see comments)(6days per week)  Outcome Summary/Treatment Plan (PT)  Daily Summary of Progress (PT): progress toward functional goals is gradual  Plan for Continued Treatment (PT): Cont per POC  Anticipated Discharge Disposition (PT): anticipate therapy at next level of care, home with 24/7 care  Outcome Summary: Pt agreeable to bed ther ex. B LE ther ex performed supine. Pt will cont to benefit from further PT.   Outcome Measures     Row Name 10/10/19 1200 10/09/19 1423 10/09/19 0938       How much help from another person do you currently need...    Turning from your back to your side while in flat bed without using bedrails?  2  -JW  3  -GIAN  --    Moving from lying on back to sitting on the side of a flat bed without bedrails?  2  -JW  2  -GIAN  --    Moving to and from a bed to a chair (including a wheelchair)?  2  -JW  2  -GIAN  --    Standing up from a chair using your arms (e.g., wheelchair, bedside chair)?  2  -JW  2  -GIAN  --    Climbing 3-5 steps with a railing?  1  -JW  1  -GIAN  --    To walk in hospital room?  2  -JW  2  -GIAN  --    AM-PAC 6 Clicks Score (PT)  11  -JW  12  -GIAN  --       How much help from another is currently needed...    Putting on and taking off regular lower body clothing?  --  --  2  -BB    Bathing (including washing, rinsing, and drying)  --  --  2  -BB    Toileting (which includes using toilet bed pan or urinal)  --  --  2  -BB    Putting on and taking off regular upper body clothing  --  --  2  -BB    Taking care of personal grooming (such as brushing teeth)  --  --  2  -BB    Eating meals  --  --  1  -BB    AM-PAC 6 Clicks Score (OT)  --  --  11  -BB       Functional Assessment    Outcome Measure Options  AM-PAC 6 Clicks Basic  Mobility (PT)  -[facility] 6 Clicks Basic Mobility (PT)  -  --    Row Name 10/08/19 1134 10/08/19 0748          How much help from another person do you currently need...    Turning from your back to your side while in flat bed without using bedrails?  3  -GIAN  --     Moving from lying on back to sitting on the side of a flat bed without bedrails?  2  -GIAN  --     Moving to and from a bed to a chair (including a wheelchair)?  2  -GIAN  --     Standing up from a chair using your arms (e.g., wheelchair, bedside chair)?  2  -GIAN  --     Climbing 3-5 steps with a railing?  1  -GIAN  --     To walk in hospital room?  2  -GIAN  --     AM-[facility] 6 Clicks Score (PT)  12  -GIAN  --        How much help from another is currently needed...    Putting on and taking off regular lower body clothing?  --  2  -BB     Bathing (including washing, rinsing, and drying)  --  2  -BB     Toileting (which includes using toilet bed pan or urinal)  --  2  -BB     Putting on and taking off regular upper body clothing  --  2  -BB     Taking care of personal grooming (such as brushing teeth)  --  2  -BB     Eating meals  --  1  -BB     AM-[facility] 6 Clicks Score (OT)  --  11  -BB        Functional Assessment    Outcome Measure Options  [facility] 6 Clicks Basic Mobility (PT)  -  --       User Key  (r) = Recorded By, (t) = Taken By, (c) = Cosigned By    Initials Name Provider Type    Daniel Chaney PTA Physical Therapy Assistant    Abran Ferreira PTA Physical Therapy Assistant    Kerrie Spencer, TENA/L Occupational Therapy Assistant         Time Calculation:   PT Charges     Row Name 10/10/19 1232             Time Calculation    Start Time  1010  -      Stop Time  1025  -      Time Calculation (min)  15 min  -      PT Received On  10/10/19  -         Time Calculation- PT    Total Timed Code Minutes- PT  15 minute(s)  -        User Key  (r) = Recorded By, (t) = Taken By, (c) = Cosigned By    Initials Name Provider Type    JOHNNY Putnam  Daniel KEMP PTA Physical Therapy Assistant        Therapy Charges for Today     Code Description Service Date Service Provider Modifiers Qty    39049625131 HC PT THER PROC EA 15 MIN 10/10/2019 Daniel Putnam PTA GP 1          PT G-Codes  Outcome Measure Options: AM-PAC 6 Clicks Basic Mobility (PT)  AM-PAC 6 Clicks Score (PT): 11  AM-PAC 6 Clicks Score (OT): 11    Daniel Putnam PTA  10/10/2019

## 2019-10-10 NOTE — THERAPY TREATMENT NOTE
Acute Care - Occupational Therapy Treatment Note  [facility]     Patient Name: Emerson Bang  : 1960  MRN: [id number]  Today's Date: 10/10/2019  Onset of Illness/Injury or Date of Surgery: 19  Date of Referral to OT: 10/02/19  Referring Physician: Dr. Kc    Admit Date: 2019       ICD-10-CM ICD-9-CM   1. 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 Impaired physical mobility Z74.09 781.99   3. Impaired mobility and activities of daily living Z74.09 799.89     Patient Active Problem List   Diagnosis   • Hyperkalemia   • Iron deficiency anemia   • Gastroesophageal reflux disease with esophagitis   • Elevated AST (SGOT)   • Alcohol abuse   • Hypothyroidism   • Balance problem   • Need for vaccination   • Low magnesium levels   • Squamous cell carcinoma of pharynx (CMS/HCC)   • History of throat cancer   • Vitamin D deficiency   • Alcohol abuse counseling and surveillance   • Encounter for screening for diabetes mellitus   • Hypomagnesemia   • Iron deficiency anemia   • Hyperlipidemia   • Underweight due to inadequate caloric intake   • Need for immunization against influenza   • 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HCC)   • Hypertension   • General medical examination   • Underweight   • Epigastric pain   • Gastritis without bleeding   • Need for influenza vaccination   • Elevated liver function tests   • B12 deficiency   • Intestinal malabsorption   • Throat pain   • Acute pharyngitis   • Upper respiratory tract infection   • Neoplasm of uncertain behavior of larynx   • Pharyngoesophageal dysphagia   • Severe protein-calorie malnutrition (CMS/HCC)   • Anemia of chronic disease   • Hypotension   • Hyponatremia   • Status post insertion of percutaneous endoscopic gastrostomy (PEG) tube (CMS/HCC)   • Hx of tracheostomy   • History of throat surgery   • Hx SBO   • Urinary retention   • Generalized weakness   • Acute otitis externa of right ear   • Hard stool   • Panlobular emphysema  (CMS/HCC)   • Cancer of hypopharynx (CMS/HCC)   • Diarrhea   • Encounter for venous access device care   • Prediabetes   • Status post neck dissection   • S/P partial thyroidectomy   • S/P laryngectomy   • Annual physical exam   • Alcoholic cirrhosis of liver without ascites (CMS/HCC)     Past Medical History:   Diagnosis Date   • Allergic rhinitis     vs URI   • Anemia    • At risk for falls    • Benign prostatic hyperplasia    • Chronic gastritis    • Cirrhosis of liver (CMS/HCC)    • Complication of gastrostomy (CMS/HCC)     site not healing   • COPD (chronic obstructive pulmonary disease) (CMS/HCC)    • Dysphagia     odynophagia   • Epigastric pain    • Esophagitis    • GERD (gastroesophageal reflux disease)    • Hypertension    • Hypothyroidism, unspecified    • Low back pain    • Malaise and fatigue    • Nasal congestion 11/15/2018   • Nausea with vomiting, unspecified    • Pain in left knee    • Pain in right knee    • Primary malignant neoplasm of pharynx (CMS/HCC)    • Screening for malignant neoplasm of colon    • 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 Left upper quadrant abdominal wall abscess debridement. Gastrostomy tube erosion through & through the anterior gastric wall.Lupper quadrant abdominal wall abscess   • COLONOSCOPY  04/21/2014    Internal & external hemorrhoids found.   • COLONOSCOPY  2014    [place]   • COLONOSCOPY N/A 10/16/2018    Procedure: COLONOSCOPY;  Surgeon: Kaushal Chester MD;  Location: [facility] ENDOSCOPY;  Service: Gastroenterology   • DIAGNOSTIC LAPAROSCOPY EXPLORATORY LAPAROTOMY N/A 7/17/2019    Procedure: DIAGNOSTIC LAPAROSCOPY, EXPLORATORY LAPAROTOMY, LYSIS OF ADHESIONS;  Surgeon: Parminder Kc MD;   Location: [facility] OR;  Service: General   • DIRECT LARYNGOSCOPY, ESOPHAGOSCOPY, BRONCHOSCOPY N/A 4/15/2019    Procedure: DIRECT LARYNGOSCOPY with biopsy, ESOPHAGOSCOPY;  Surgeon: Bharathi Washington MD;  Location: [facility] OR;  Service: ENT   • ENDOSCOPY N/A 10/16/2018    Procedure: ESOPHAGOGASTRODUODENOSCOPY;  Surgeon: Kaushal Chester MD;  Location: [facility] ENDOSCOPY;  Service: Gastroenterology   • GASTROCUTANEOUS FISTULA CLOSURE N/A 10/2/2019    Procedure: GASTROCUTANEOUS FISTULA CLOSURE;  Surgeon: Parminder Kc MD;  Location: [facility] OR;  Service: General   • GASTROSTOMY FEEDING TUBE INSERTION N/A 4/15/2019    Procedure: GASTROSTOMY FEEDING TUBE INSERTION;  Surgeon: Trenton Valera MD;  Location: [facility] OR;  Service: General   • JEJUNOSTOMY N/A 10/2/2019    Procedure: JEJUNOSTOMY;  Surgeon: Parminder Kc MD;  Location: [facility];  Service: General   • TRACHEOSTOMY  11/10/2008    Respiratory failure   • UPPER GASTROINTESTINAL ENDOSCOPY  04/21/2014    Esophagitis seen. Biopsy taken. Gastritis in stomach. Biopsy taken. Normal duodenum. Biopsy taken.   • UPPER GASTROINTESTINAL ENDOSCOPY  10/16/2018   • VENOUS ACCESS DEVICE (PORT) INSERTION N/A 9/11/2019    Procedure: INSERTION VENOUS ACCESS DEVICE (MEDIPORT)        (c-arm#1);  Surgeon: Parminder Kc MD;  Location: [facility];  Service: General       Therapy Treatment    Rehabilitation Treatment Summary     Row Name 10/10/19 1010 10/10/19 0833          Treatment Time/Intention    Discipline  physical therapy assistant  -JW  occupational therapy assistant  -BB     Document Type  therapy note (daily note)  -JW  therapy note (daily note)  -BB     Subjective Information  no complaints  -JW  no complaints  -BB     Mode of Treatment  individual therapy;physical therapy  -JW  individual therapy;occupational therapy  -BB     Patient/Family Observations  no family present.  -JW  no family present  -BB     Therapy Frequency (PT Clinical Impression)  other (see  comments) 6days per week  -JW  --     Total Minutes, Occupational Therapy Treatment  --  38  -BB     Therapy Frequency (OT Eval)  --  other (see comments) 5-7 days/wk  -BB     Patient Effort  fair  -JW  fair  -BB     Comment  --  Pt only agreed to bed exercises and grooming tasks  -BB     Existing Precautions/Restrictions  fall;other (see comments) watch gait belt placement  -JW  hip;other (see comments) watch gait belt placement  -BB     Recorded by [JW] Daniel Putnam, PTA 10/10/19 1230 [BB] Kerrie Woodson MADSEN/L 10/10/19 1413     Row Name 10/10/19 0833             Vital Signs    Pretreatment Heart Rate (beats/min)  86  -BB      Posttreatment Heart Rate (beats/min)  87  -BB      Pre SpO2 (%)  96  -BB      O2 Delivery Pre Treatment  supplemental O2  -BB      Post SpO2 (%)  92  -BB      O2 Delivery Post Treatment  supplemental O2  -BB      Pre Patient Position  Supine  -BB      Post Patient Position  Supine  -BB      Recorded by [BB] Kerrie Woodson MADSEN/L 10/10/19 1413      Row Name 10/10/19 1010 10/10/19 0833          Cognitive Assessment/Intervention- PT/OT    Affect/Mental Status (Cognitive)  unable/difficult to assess  -JW  unable/difficult to assess  -BB     Behavioral Issues (Cognitive)  unable/difficult to assess  -JW  unable/difficult to assess  -BB     Orientation Status (Cognition)  unable/difficult to assess  -JW  unable/difficult to assess  -BB     Follows Commands (Cognition)  WFL  -JW  --     Recorded by [JW] Daniel Putnam, PTA 10/10/19 1230 [BB] Kerrie Woodson MADSEN/L 10/10/19 1413     Row Name 10/10/19 1010             Bed Mobility Assessment/Treatment    Bed Mobility Assessment/Treatment  scooting/bridging  -JW      Scooting/Bridging [place] (Bed Mobility)  supervision  -JW      Assistive Device (Bed Mobility)  head of bed elevated  -JW      Recorded by [JW] Daniel Putnam, PTA 10/10/19 1230      Row Name 10/10/19 1010             Bed-Chair Transfer    Bed-Chair  [place] (Transfers)  not tested  -JW      Recorded by [JW] Daniel Putnam, PTA 10/10/19 1230      Row Name 10/10/19 1010             Sit-Stand Transfer    Sit-Stand [place] (Transfers)  not tested  -JW      Recorded by [JW] Daniel Putnam, PTA 10/10/19 1230      Row Name 10/10/19 1010             Gait/Stairs Assessment/Training    [place] Level (Gait)  not tested  -JW      Recorded by [JW] Daniel Putnam, PTA 10/10/19 1230      Row Name 10/10/19 0833             Grooming Assessment/Training    [place] Level (Grooming)  grooming skills;wash face, hands;set up;conditional independence  -BB      Grooming Position  supine  -BB      Recorded by [BB] Kerrie Woodson COTA/L 10/10/19 1413      Row Name 10/10/19 0833             Toileting Assessment/Training    [place] Level (Toileting)  toileting skills;supervision  -BB      Assistive Devices (Toileting)  urinal  -BB      Toileting Position  supine  -BB      Recorded by [BB] Kerrie Woodson COTA/L 10/10/19 1413      Row Name 10/10/19 1010             Lower Extremity Supine Therapeutic Exercise    Performed, Supine Lower Extremity (Therapeutic Exercise)  SLR (straight leg raise);quadriceps sets;gluteal sets;heel slides;hip abduction/adduction  -      Exercise Type, Supine Lower Extremity (Therapeutic Exercise)  AROM (active range of motion)  -      Sets/Reps Detail, Supine Lower Extremity (Therapeutic Exercise)  1-2/10  -JW      Recorded by [JW] Daniel Putnam, PTA 10/10/19 1230      Row Name 10/10/19 0833             Therapeutic Exercise    Upper Extremity Range of Motion (Therapeutic Exercise)  shoulder flexion/extension, bilateral;shoulder horizontal abduction/adduction, bilateral;elbow flexion/extension, bilateral;forearm supination/pronation, bilateral;wrist flexion/extension, bilateral chest press  -BB      Hand (Therapeutic Exercise)  finger flexion/extension, bilateral  -BB      Weight/Resistance (Therapeutic Exercise)   1 pound  -BB      Exercise Type (Therapeutic Exercise)  AROM (active range of motion)  -BB      Position (Therapeutic Exercise)  supine  -BB      Sets/Reps (Therapeutic Exercise)  2x20  -BB      Equipment (Therapeutic Exercise)  free weight, barbell  -BB      Expected Outcome (Therapeutic Exercise)  improve functional tolerance, self-care activity;improve performance, transfer skills;improve functional stability  -BB      Recorded by [BB] Kerrie Woodson COTA/L 10/10/19 1413      Row Name 10/10/19 1010 10/10/19 0833          Positioning and Restraints    Pre-Treatment Position  in bed  -JW  in bed  -BB     Post Treatment Position  bed  -JW  bed  -BB     In Bed  call light within reach;encouraged to call for assist;exit alarm on  -JW  supine;call light within reach;encouraged to call for assist;exit alarm on  -BB     Recorded by [JW] Daniel Putnam, MICHAEL 10/10/19 1230 [BB] Kerrie Woodson COTA/L 10/10/19 1413     Row Name 10/10/19 0833             Pain Scale: Numbers Pre/Post-Treatment    Pain Scale: Numbers, Pretreatment  8/10  -BB      Pain Scale: Numbers, Post-Treatment  8/10  -BB      Pain Location  abdomen  -BB      Pain Intervention(s)  Medication (See MAR)  -BB      Recorded by [BB] Kerrie Woodson COTA/L 10/10/19 1413      Row Name                Wound 09/30/19 1330 Left medial abdomen Fistula    Wound - Properties Group Date first assessed: 09/30/19 [SS] Time first assessed: 1330 [SS] Side: Left [SS] Orientation: medial [SS] Location: abdomen [SS] Primary Wound Type: Fistula [SS] Recorded by:  [SS] Citlalli Abdi RN 09/30/19 1619    Row Name                Wound 10/02/19 1347 abdomen Incision    Wound - Properties Group Date first assessed: 10/02/19 [CF] Time first assessed: 1347 [CF] Present on Hospital Admission: N [CF] Location: abdomen [CF] Primary Wound Type: Incision [CF] Recorded by:  [CF] Eileen Tyson RN 10/02/19 1347    Row Name 10/10/19 0833             Plan of Care Review     Plan of Care Reviewed With  patient  -BB      Recorded by [BB] Kerrie Woodson MADSEN/L 10/10/19 1413      Row Name 10/10/19 0833             Outcome Summary/Treatment Plan (OT)    Daily Summary of Progress (OT)  progress toward functional goals is gradual  -BB      Plan for Continued Treatment (OT)  continue POC  -BB      Anticipated Discharge Disposition (OT)  anticipate therapy at next level of care  -BB      Recorded by [BB] Kerrie Woodson MADSEN/L 10/10/19 1413      Row Name 10/10/19 1010             Outcome Summary/Treatment Plan (PT)    Daily Summary of Progress (PT)  progress toward functional goals is gradual  -JW      Plan for Continued Treatment (PT)  Cont per POC  -JW      Anticipated Discharge Disposition (PT)  anticipate therapy at next level of care;home with 24/7 care  -JW      Recorded by [JW] Daniel Putnam, PTA 10/10/19 1230        User Key  (r) = Recorded By, (t) = Taken By, (c) = Cosigned By    Initials Name Effective Dates Discipline     Daniel Putnam, PTA 03/07/18 -  PT    SS Citlalli Abdi, RN 07/10/18 -  Nurse    BB Kerrie Woodson MADSEN/L 03/07/18 -  OT    CF Eileen Tyson, RN 10/17/16 -  Nurse        Wound 09/30/19 1330 Left medial abdomen Fistula (Active)   Dressing Appearance dry;intact 10/10/2019 10:44 AM   Closure SAUMYA 10/10/2019 10:44 AM   Base dressing in place, unable to visualize 10/10/2019 10:44 AM   Periwound redness;other (see comments) 10/9/2019  8:50 PM   Periwound Temperature warm 10/9/2019  8:50 PM   Periwound Skin Turgor soft 10/9/2019  8:50 PM   Edges other (see comments) 10/9/2019  8:50 PM   Drainage Characteristics/Odor serosanguineous 10/9/2019  8:50 PM   Drainage Amount moderate 10/9/2019  8:50 PM   Care, Wound cleansed with;sterile water;barrier applied 10/9/2019  8:50 PM   Dressing Care, Wound dressing changed;dressing applied 10/9/2019  8:50 PM   Periwound Care, Wound absorptive dressing applied 10/9/2019  8:50 PM       Wound 10/02/19 1347  abdomen Incision (Active)   Dressing Appearance dry;intact 10/10/2019 10:44 AM   Base clean;dry 10/10/2019 10:44 AM   Periwound dry 10/10/2019 10:44 AM   Periwound Temperature warm 10/10/2019 10:44 AM   Periwound Skin Turgor soft 10/10/2019 10:44 AM   Drainage Characteristics/Odor serosanguineous 10/9/2019  8:50 PM   Drainage Amount scant 10/9/2019  8:50 PM   Dressing Care, Wound dressing changed;dressing applied 10/9/2019  8:50 PM     Rehab Goal Summary     Row Name 10/10/19 1010 10/10/19 0833          Bed Mobility Goal 1 (PT)    Activity/Assistive Device (Bed Mobility Goal 1, PT)  sit to supine;supine to sit  -JW  --     [place] Level/Cues Needed (Bed Mobility Goal 1, PT)  independent  -JW  --     Time Frame (Bed Mobility Goal 1, PT)  by discharge  -JW  --     Progress/Outcomes (Bed Mobility Goal 1, PT)  goal not met  -JW  --        Transfer Goal 1 (PT)    Activity/Assistive Device (Transfer Goal 1, PT)  bed-to-chair/chair-to-bed;toilet  -JW  --     [place] Level/Cues Needed (Transfer Goal 1, PT)  conditional independence;independent  -JW  --     Time Frame (Transfer Goal 1, PT)  by discharge  -JW  --     Progress/Outcome (Transfer Goal 1, PT)  goal not met  -JW  --        Gait Training Goal 1 (PT)    Activity/Assistive Device (Gait Training Goal 1, PT)  gait (walking locomotion)  -JW  --     [place] Level (Gait Training Goal 1, PT)  conditional independence;independent  -JW  --     Distance (Gait Goal 1, PT)  [id number]  -JW  --     Time Frame (Gait Training Goal 1, PT)  by discharge  -JW  --     Progress/Outcome (Gait Training Goal 1, PT)  goal not met  -JW  --        Stairs Goal 1 (PT)    Activity/Assistive Device (Stairs Goal 1, PT)  descending stairs;ascending stairs  -JW  --     [place] Level/Cues Needed (Stairs Goal 1, PT)  standby assist  -JW  --     Number of Stairs (Stairs Goal 1, PT)  5  -JW  --     Time Frame (Stairs Goal 1, PT)  by discharge  -JW  --     Progress/Outcome (Stairs Goal  1, PT)  goal not met  -JW  --        Occupational Therapy Goals    Transfer Goal Selection (OT)  --  transfer, OT goal 1  -BB     Bathing Goal Selection (OT)  --  bathing, OT goal 1  -BB     Dressing Goal Selection (OT)  --  dressing, OT goal 1  -BB     Toileting Goal Selection (OT)  --  toileting, OT goal 1  -BB     Activity Tolerance Goal Selection (OT)  --  activity tolerance, OT goal 1  -BB        Transfer Goal 1 (OT)    Activity/Assistive Device (Transfer Goal 1, OT)  --  sit-to-stand/stand-to-sit;bed-to-chair/chair-to-bed;toilet  -BB     [place] Level/Cues Needed (Transfer Goal 1, OT)  --  supervision required  -BB     Time Frame (Transfer Goal 1, OT)  --  long term goal (LTG);by discharge  -BB     Progress/Outcome (Transfer Goal 1, OT)  --  goal not met  -BB        Bathing Goal 1 (OT)    Activity/Assistive Device (Bathing Goal 1, OT)  --  bathing skills, all  -BB     [place] Level/Cues Needed (Bathing Goal 1, OT)  --  minimum assist (75% or more patient effort)  -BB     Time Frame (Bathing Goal 1, OT)  --  long term goal (LTG);by discharge  -BB     Barriers (Bathing Goal 1, OT)  --  Pt will need extra time and rest breaks PRN  -BB     Progress/Outcomes (Bathing Goal 1, OT)  --  goal not met  -BB        Dressing Goal 1 (OT)    Activity/Assistive Device (Dressing Goal 1, OT)  --  dressing skills, all  -BB     [place]/Cues Needed (Dressing Goal 1, OT)  --  minimum assist (75% or more patient effort)  -BB     Time Frame (Dressing Goal 1, OT)  --  long term goal (LTG);by discharge  -BB     Barriers (Dressing Goal 1, OT)  --  Pt will need extra time and rest breaks PRN  -BB     Progress/Outcome (Dressing Goal 1, OT)  --  goal not met  -BB        Toileting Goal 1 (OT)    Activity/Device (Toileting Goal 1, OT)  --  toileting skills, all  -BB     [place] Level/Cues Needed (Toileting Goal 1, OT)  --  minimum assist (75% or more patient effort)  -BB     Time Frame (Toileting Goal 1, OT)  --  long  term goal (LTG);by discharge  -BB     Barriers (Toileting Goal 1, OT)  --  Pt will need extra time and rest breaks PRN  -BB     Progress/Outcome (Toileting Goal 1, OT)  --  goal not met  -BB         Activity Tolerance Goal 1 (OT)    Activity Level (Endurance Goal 1, OT)  --  15 min activity functional/therapeutic activity  -BB     Time Frame (Activity Tolerance Goal 1, OT)  --  long term goal (LTG)  -BB     Progress/Outcome (Activity Tolerance Goal 1, OT)  --  goal not met  -BB       User Key  (r) = Recorded By, (t) = Taken By, (c) = Cosigned By    Initials Name Provider Type Discipline    JW Daniel Putnam, MICHAEL Physical Therapy Assistant PT    Kerrie Spencer, TENA/L Occupational Therapy Assistant OT        Occupational Therapy Education     Title: PT OT SLP Therapies (In Progress)     Topic: Occupational Therapy (In Progress)     Point: ADL training (In Progress)     Description: Instruct learner(s) on proper safety adaptation and remediation techniques during self care or transfers.   Instruct in proper use of assistive devices.    Learning Progress Summary           Patient Acceptance, E, NR by BB at 10/9/2019  1:37 PM    Acceptance, E,TB,D, NR by CS at 10/6/2019  1:05 PM    Acceptance, E,TB,D, NR by CS at 10/5/2019 11:11 AM    Acceptance, E, NR by AS at 10/4/2019 11:45 AM    Comment:  Role of OT, OT POC, importance of OOB activity for strength and healing, ADL training                   Point: Home exercise program (In Progress)     Description: Instruct learner(s) on appropriate technique for monitoring, assisting and/or progressing therapeutic exercises/activities.    Learning Progress Summary           Patient Acceptance, E, NR by BB at 10/8/2019 11:46 AM    Acceptance, E,TB,D, NR by CS at 10/6/2019  1:05 PM    Acceptance, E,TB,D, NR by CS at 10/5/2019 11:11 AM                   Point: Precautions (In Progress)     Description: Instruct learner(s) on prescribed precautions during self-care and  functional transfers.    Learning Progress Summary           Patient Acceptance, E,TB,D, NR by CS at 10/6/2019  1:05 PM    Acceptance, E,TB,D, NR by CS at 10/5/2019 11:11 AM    Acceptance, E, NR by AS at 10/4/2019 11:45 AM    Comment:  Role of OT, OT POC, importance of OOB activity for strength and healing, ADL training                   Point: Body mechanics (In Progress)     Description: Instruct learner(s) on proper positioning and spine alignment during self-care, functional mobility activities and/or exercises.    Learning Progress Summary           Patient Acceptance, E,TB,D, NR by CS at 10/6/2019  1:05 PM    Acceptance, E,TB,D, NR by CS at 10/5/2019 11:11 AM                               User Key     Initials Effective Dates Name Provider Type Discipline    BB 03/07/18 -  Kerrie Woodson COTA/L Occupational Therapy Assistant OT     03/07/18 -  Brenda Thomas COTA/LIANE Occupational Therapy Assistant OT    AS 03/25/19 -  Destinee Yarbrough, OT Occupational Therapist OT                OT Recommendation and Plan  Outcome Summary/Treatment Plan (OT)  Daily Summary of Progress (OT): progress toward functional goals is gradual  Plan for Continued Treatment (OT): continue POC  Anticipated Discharge Disposition (OT): anticipate therapy at next level of care  Therapy Frequency (OT Eval): other (see comments)(5-7 days/wk)  Daily Summary of Progress (OT): progress toward functional goals is gradual  Plan of Care Review  Plan of Care Reviewed With: patient  Plan of Care Reviewed With: patient  Outcome Summary: Pt only agreed to B UE bed ex. Pt performed grooming tasks with set up. No goals met this tx.  Outcome Measures     Row Name 10/10/19 1200 10/10/19 0833 10/09/19 1423       How much help from another person do you currently need...    Turning from your back to your side while in flat bed without using bedrails?  2  -JW  --  3  -GIAN    Moving from lying on back to sitting on the side of a flat bed without  bedrails?  2  -JW  --  2  -GIAN    Moving to and from a bed to a chair (including a wheelchair)?  2  -JW  --  2  -GIAN    Standing up from a chair using your arms (e.g., wheelchair, bedside chair)?  2  -JW  --  2  -GIAN    Climbing 3-5 steps with a railing?  1  -JW  --  1  -GIAN    To walk in hospital room?  2  -JW  --  2  -GIAN    AM-[facility] 6 Clicks Score (PT)  11  -JW  --  12  -GIAN       How much help from another is currently needed...    Putting on and taking off regular lower body clothing?  --  2  -BB  --    Bathing (including washing, rinsing, and drying)  --  2  -BB  --    Toileting (which includes using toilet bed pan or urinal)  --  2  -BB  --    Putting on and taking off regular upper body clothing  --  2  -BB  --    Taking care of personal grooming (such as brushing teeth)  --  2  -BB  --    Eating meals  --  1  -BB  --    AM-[facility] 6 Clicks Score (OT)  --  11  -BB  --       Functional Assessment    Outcome Measure Options  [facility] 6 Clicks Basic Mobility (PT)  -  --  [facility] 6 Clicks Basic Mobility (PT)  -    Row Name 10/09/19 0938 10/08/19 1134 10/08/19 0748       How much help from another person do you currently need...    Turning from your back to your side while in flat bed without using bedrails?  --  3  -GIAN  --    Moving from lying on back to sitting on the side of a flat bed without bedrails?  --  2  -GIAN  --    Moving to and from a bed to a chair (including a wheelchair)?  --  2  -GIAN  --    Standing up from a chair using your arms (e.g., wheelchair, bedside chair)?  --  2  -GIAN  --    Climbing 3-5 steps with a railing?  --  1  -GIAN  --    To walk in hospital room?  --  2  -GIAN  --    AM-[facility] 6 Clicks Score (PT)  --  12  -GIAN  --       How much help from another is currently needed...    Putting on and taking off regular lower body clothing?  2  -BB  --  2  -BB    Bathing (including washing, rinsing, and drying)  2  -BB  --  2  -BB    Toileting (which includes using toilet bed pan or urinal)  2  -BB  --  2  -BB     Putting on and taking off regular upper body clothing  2  -BB  --  2  -BB    Taking care of personal grooming (such as brushing teeth)  2  -BB  --  2  -BB    Eating meals  1  -BB  --  1  -BB    AM-[facility] 6 Clicks Score (OT)  11  -BB  --  11  -BB       Functional Assessment    Outcome Measure Options  --  AM-[facility] 6 Clicks Basic Mobility (PT)  -GIAN  --      User Key  (r) = Recorded By, (t) = Taken By, (c) = Cosigned By    Initials Name Provider Type    Daniel Chaney, PTA Physical Therapy Assistant    [place] Ferreira, PTA Physical Therapy Assistant    Kerrie Spencer COTA/L Occupational Therapy Assistant           Time Calculation:   Time Calculation- OT     Row Name 10/10/19 1419             Time Calculation- OT    OT Start Time  0833  -BB      OT Stop Time  0926  -BB      OT Time Calculation (min)  53 min  -BB      Total Timed Code Minutes- OT  53 minute(s)  -BB      OT Received On  10/10/19  -BB        User Key  (r) = Recorded By, (t) = Taken By, (c) = Cosigned By    Initials Name Provider Type    Kerrie Spencer COTA/L Occupational Therapy Assistant        Therapy Charges for Today     Code Description Service Date Service Provider Modifiers Qty    19575973965 HC OT SELF CARE/MGMT/TRAIN EA 15 MIN 10/9/2019 Kerrie Woodson COTA/L GO 1    32709763716 HC OT THER PROC EA 15 MIN 10/9/2019 Kerrie Woodson COTA/L GO 2    54308473948 HC OT SELF CARE/MGMT/TRAIN EA 15 MIN 10/10/2019 Kerrie Woodson COTA/L GO 1    67277672687 HC OT THER PROC EA 15 MIN 10/10/2019 Kerrie Woodson COTA/L GO 3        Non-skid socks and gait belt in place. Toileting offered. Call light and needs within reach. Pt advised to not get up alone and call the nurse for assistance.  Bed alarm on.          ROSEANNA Merchant  10/10/2019

## 2019-10-10 NOTE — PLAN OF CARE
Problem: Patient Care Overview  Goal: Plan of Care Review  Outcome: Ongoing (interventions implemented as appropriate)   10/10/19 0708   Coping/Psychosocial   Plan of Care Reviewed With caregiver;patient   Plan of Care Review   Progress no change   OTHER   Outcome Summary PN continues. NGT still in place but with decreased drainage. No diarrhea. Will monitor.        Problem: Nutrition, Parenteral (Adult)  Goal: Signs and Symptoms of Listed Potential Problems Will be Absent, Minimized or Managed (Nutrition, Parenteral)  Outcome: Ongoing (interventions implemented as appropriate)   10/10/19 6128   Goal/Outcome Evaluation   Problems Assessed (Parenteral Nutrition) all   Problems Present (Parenteral Nutrition) malnutrition

## 2019-10-10 NOTE — PLAN OF CARE
Problem: Patient Care Overview  Goal: Plan of Care Review  Outcome: Ongoing (interventions implemented as appropriate)   10/09/19 1942   Coping/Psychosocial   Plan of Care Reviewed With patient   Plan of Care Review   Progress no change   OTHER   Outcome Summary Prn pain medications given, fall precautions in place, call light within reach to make needs known     Goal: Individualization and Mutuality  Outcome: Ongoing (interventions implemented as appropriate)   10/09/19 1720   Individualization   Patient Specific Interventions Assess pain q2hr and prn       Problem: Fall Risk (Adult)  Goal: Absence of Fall  Outcome: Ongoing (interventions implemented as appropriate)   10/09/19 1750   Fall Risk (Adult)   Absence of Fall making progress toward outcome       Problem: Skin Injury Risk (Adult)  Goal: Skin Health and Integrity  Outcome: Ongoing (interventions implemented as appropriate)   10/09/19 1750   Skin Injury Risk (Adult)   Skin Health and Integrity making progress toward outcome       Problem: Pain, Chronic (Adult)  Goal: Acceptable Pain/Comfort Level and Functional Ability  Outcome: Ongoing (interventions implemented as appropriate)   10/09/19 1750   Pain, Chronic (Adult)   Acceptable Pain/Comfort Level and Functional Ability making progress toward outcome       Problem: Wound (Includes Pressure Injury) (Adult)  Goal: Signs and Symptoms of Listed Potential Problems Will be Absent, Minimized or Managed (Wound)  Outcome: Ongoing (interventions implemented as appropriate)   10/09/19 1750   Goal/Outcome Evaluation   Problems Assessed (Wound) all   Problems Present (Wound) pain

## 2019-10-10 NOTE — PROGRESS NOTES
Parenteral Nutrition by Pharmacy - Day 3    Patient: Emerson Bang  MRN#: [id number]  Attending: No name on file.  Admission Date: 093019    Subjective/Objective   Progress Notes Reviewed    Assessment      Lab Results   Component Value Date    GLUCOSE 102 (H) 10/10/2019    BUN 12 10/10/2019    CREATININE 0.63 (L) 10/10/2019    EGFRIFAFRI >150 10/10/2019    BCR 19.0 10/10/2019    K 5.1 10/10/2019    CO2 26.0 10/10/2019    CALCIUM 8.4 (L) 10/10/2019    ALBUMIN 2.50 (L) 10/10/2019    AST 23 10/10/2019    ALT 10 10/10/2019     Lab Results   Component Value Date    GLUCOSE 102 (H) 10/10/2019    CALCIUM 8.4 (L) 10/10/2019     (L) 10/10/2019    K 5.1 10/10/2019    CO2 26.0 10/10/2019    CL 95 (L) 10/10/2019    BUN 12 10/10/2019    CREATININE 0.63 (L) 10/10/2019    EGFRIFAFRI >150 10/10/2019    BCR 19.0 10/10/2019    ANIONGAP 10.0 10/10/2019     Magnesium   Date Value Ref Range Status   10/08/2019 1.6 1.6 - 2.4 mg/dL Final     Phosphorus   Date Value Ref Range Status   10/08/2019 2.2 (L) 2.5 - 4.5 mg/dL Final     Calcium   Date Value Ref Range Status   10/10/2019 8.4 (L) 8.6 - 10.5 mg/dL Final     Triglycerides   Date Value Ref Range Status   10/07/2019 132 0 - 150 mg/dL Final         Diagnosis: prolonged paralytic ileus    Above labs reviewed.  Sodium low at 131 - still receiving NS at 50 mL/h will increase in bag slightly  Chloride low at 95 - being replaced  Potassium WNL at 5.1 - increase greatly overnight will decrease in bag  No phosphorus reported yet  Albumin 2.5, Calcium 8.4, Corrected Calcium 9.6 - WNL  Mag WNL from 10/8    Patient has been experiencing diarrhea, likely contributing to electrolyte imbalances  NS is running at 50 mL/hr - patient requiring for hypotension    Plan   Decrease K by 52 mEq  Increase Nacl by 20 mEq  Decreased phos slightly with goal of decreasing potassium    TPN Formulation:  Clinimix 5/15% 2000 ml  KPhos 44 meq/30mmol  KCl REMOVED  NaCl 200 mEq  NaAcetate 40 mEq  Calcium  Gluconate 16 mEq  Mag Sulfate 8 mEq  MVI 10 ml    Rate 83.3 mL/hr    Lipids: 175 mL over 12 hours  Glucose remains controlled  No labs for WBC    Pharmacy will continue to monitor and adjust formula as needed.    Adonis Rodriguez RPH  10/10/19  11:16 AM

## 2019-10-11 LAB
ANION GAP SERPL CALCULATED.3IONS-SCNC: 11 MMOL/L (ref 5–15)
BUN BLD-MCNC: 12 MG/DL (ref 8–23)
BUN/CREAT SERPL: 21.1 (ref 7–25)
CALCIUM SPEC-SCNC: 8.4 MG/DL (ref 8.6–10.5)
CHLORIDE SERPL-SCNC: 93 MMOL/L (ref 98–107)
CO2 SERPL-SCNC: 26 MMOL/L (ref 22–29)
CREAT BLD-MCNC: 0.57 MG/DL (ref 0.76–1.27)
GFR SERPL CREATININE-BSD FRML MDRD: >150 ML/MIN/1.73
GLUCOSE BLD-MCNC: 157 MG/DL (ref 65–99)
GLUCOSE BLDC GLUCOMTR-MCNC: 134 MG/DL (ref 70–130)
GLUCOSE BLDC GLUCOMTR-MCNC: 140 MG/DL (ref 70–130)
GLUCOSE BLDC GLUCOMTR-MCNC: 144 MG/DL (ref 70–130)
GLUCOSE BLDC GLUCOMTR-MCNC: 146 MG/DL (ref 70–130)
POTASSIUM BLD-SCNC: 4.9 MMOL/L (ref 3.5–5.2)
SODIUM BLD-SCNC: 130 MMOL/L (ref 136–145)

## 2019-10-11 PROCEDURE — 25010000002 HYDROMORPHONE 1 MG/ML SOLUTION: Performed by: SURGERY

## 2019-10-11 PROCEDURE — 25010000002 ONDANSETRON PER 1 MG: Performed by: SURGERY

## 2019-10-11 PROCEDURE — 80048 BASIC METABOLIC PNL TOTAL CA: CPT | Performed by: SURGERY

## 2019-10-11 PROCEDURE — 99024 POSTOP FOLLOW-UP VISIT: CPT | Performed by: SURGERY

## 2019-10-11 PROCEDURE — 97110 THERAPEUTIC EXERCISES: CPT

## 2019-10-11 PROCEDURE — 25010000002 CALCIUM GLUCONATE PER 10 ML: Performed by: SURGERY

## 2019-10-11 PROCEDURE — 94799 UNLISTED PULMONARY SVC/PX: CPT

## 2019-10-11 PROCEDURE — 25010000002 ENOXAPARIN PER 10 MG: Performed by: SURGERY

## 2019-10-11 PROCEDURE — 94760 N-INVAS EAR/PLS OXIMETRY 1: CPT

## 2019-10-11 PROCEDURE — 82962 GLUCOSE BLOOD TEST: CPT

## 2019-10-11 PROCEDURE — 25010000002 MAGNESIUM SULFATE PER 500 MG OF MAGNESIUM: Performed by: SURGERY

## 2019-10-11 RX ADMIN — I.V. FAT EMULSION 35 G: 20 EMULSION INTRAVENOUS at 18:11

## 2019-10-11 RX ADMIN — ENOXAPARIN SODIUM 40 MG: 40 INJECTION SUBCUTANEOUS at 09:03

## 2019-10-11 RX ADMIN — SILVER SULFADIAZINE: 10 CREAM TOPICAL at 09:03

## 2019-10-11 RX ADMIN — PANTOPRAZOLE SODIUM 40 MG: 40 INJECTION, POWDER, FOR SOLUTION INTRAVENOUS at 05:21

## 2019-10-11 RX ADMIN — ONDANSETRON 4 MG: 2 INJECTION INTRAMUSCULAR; INTRAVENOUS at 05:30

## 2019-10-11 RX ADMIN — CALCIUM GLUCONATE: 94 INJECTION, SOLUTION INTRAVENOUS at 18:11

## 2019-10-11 RX ADMIN — SILVER SULFADIAZINE: 10 CREAM TOPICAL at 21:50

## 2019-10-11 RX ADMIN — ALBUTEROL SULFATE 2.5 MG: 2.5 SOLUTION RESPIRATORY (INHALATION) at 07:12

## 2019-10-11 RX ADMIN — HYDROMORPHONE HYDROCHLORIDE 1 MG: 1 INJECTION, SOLUTION INTRAMUSCULAR; INTRAVENOUS; SUBCUTANEOUS at 22:52

## 2019-10-11 RX ADMIN — HYDROMORPHONE HYDROCHLORIDE 1 MG: 1 INJECTION, SOLUTION INTRAMUSCULAR; INTRAVENOUS; SUBCUTANEOUS at 04:58

## 2019-10-11 RX ADMIN — ALBUTEROL SULFATE 2.5 MG: 2.5 SOLUTION RESPIRATORY (INHALATION) at 00:22

## 2019-10-11 RX ADMIN — ALBUTEROL SULFATE 2.5 MG: 2.5 SOLUTION RESPIRATORY (INHALATION) at 19:01

## 2019-10-11 NOTE — PLAN OF CARE
Problem: Patient Care Overview  Goal: Plan of Care Review  Outcome: Ongoing (interventions implemented as appropriate)   10/11/19 1500   Coping/Psychosocial   Plan of Care Reviewed With patient   Plan of Care Review   Progress improving   OTHER   Outcome Summary pt agreeable only to ther ex in supine despite encouragement to attempt sitting EOB. pt completed 20 reps of AROM ashley LE. no new goals met at this time. pt would continue to benefit from PT services.

## 2019-10-11 NOTE — CONSULTS
Adult Nutrition  Assessment    Patient Name:  Emerson Bang  YOB: 1960  MRN: [id number]  Admit Date:  9/30/2019    Assessment Date:  10/11/2019    Comments:  Pt started on EN this am, Isosource HN with @ 20cc/hr--do not advance.  MD is going slowly on tube feeding increase.  PN continues.  Spoke with staff who reports that EN had to be held last night for nausea.  PharmD called about decreased rate due to EN being started.  They will not decrease at this time but will continue to monitor tolerance.  Goal for EN will be 60cc/hr.  RD at the [facility] can then monitor and increase based on tolerance and wt.      Reason for Assessment     Row Name 10/11/19 1030          Reason for Assessment    Reason For Assessment  TF/PN           Anthropometrics     Row Name 10/11/19 0305          Anthropometrics    Weight  54.1 kg (119 lb 4.8 oz)               Nutrition Prescription Ordered     Row Name 10/11/19 1031          Nutrition Prescription PO    Current PO Diet  NPO        Nutrition Prescription EN    Enteral Route  NJ     Product  Augustinesourcariel HN (Osmolite 1.2)     TF Delivery Method  Continuous     Continuous TF Goal Rate (mL/hr)  20 mL/hr     Continuous TF Current Rate (mL/hr)  20 mL/hr     Continuous TF Goal Volume (mL)  480 mL        Nutrition Prescription PN    PN Route  PICC     PN Goal Rate (mL/hr)  83.3 mL/hr     PN Current Rate (mL/hr)  83.3 mL/hr     Dextrose Concentration (%)  15 %     Dextrose (Kcal)  1016     Amino Acid Concentration (%)  5 %     Amino Acid (gm)  100     Lipid Concentration (%)  20%     Lipid Volume (mL)  Other (comment) 175                 Electronically signed by:  Jane Ghosh RD  10/11/19 10:34 AM

## 2019-10-11 NOTE — PROGRESS NOTES
Parenteral Nutrition by Pharmacy    Patient: Emerson Bang is a 59 y.o. male  [Ht:  ; Wt: 54.1 kg (119 lb 4.8 oz)]  MRN#: [id number]  Attending: No name on file.  Admission Date: 093019    Subjective/Objective     Diagnosis: prolonged paralytic ileus  Assessment      Lab Results   Component Value Date    GLUCOSE 157 (H) 10/11/2019    BUN 12 10/11/2019    CREATININE 0.57 (L) 10/11/2019    EGFRIFAFRI >150 10/11/2019    BCR 21.1 10/11/2019    K 4.9 10/11/2019    CO2 26.0 10/11/2019    CALCIUM 8.4 (L) 10/11/2019    ALBUMIN 2.50 (L) 10/10/2019    AST 23 10/10/2019    ALT 10 10/10/2019     Lab Results   Component Value Date    GLUCOSE 157 (H) 10/11/2019    CALCIUM 8.4 (L) 10/11/2019     (L) 10/11/2019    K 4.9 10/11/2019    CO2 26.0 10/11/2019    CL 93 (L) 10/11/2019    BUN 12 10/11/2019    CREATININE 0.57 (L) 10/11/2019    EGFRIFAFRI >150 10/11/2019    BCR 21.1 10/11/2019    ANIONGAP 11.0 10/11/2019     Magnesium   Date Value Ref Range Status   10/08/2019 1.6 1.6 - 2.4 mg/dL Final     Phosphorus   Date Value Ref Range Status   10/08/2019 2.2 (L) 2.5 - 4.5 mg/dL Final     Calcium   Date Value Ref Range Status   10/11/2019 8.4 (L) 8.6 - 10.5 mg/dL Final     Triglycerides   Date Value Ref Range Status   10/07/2019 132 0 - 150 mg/dL Final     Above labs reviewed.  Corrected Ca = 9.6 WNL (albumin from 10/10)  Sodium is low and slightly decreased from yesterday - receiving NS at 50 ml/hr currently  Chloride is low currently as well with slight trend down from yesterday  Potassium is WNL  No magnesium or phosphate levels today    Patient has been started on tube feeds at 20 ml/hr  Spoke with Tammi Registered dietitian, recommended maintaining TPN rate at this time, may consider decrease in rate tomorrow    Plan       NaCl increased yesterday, as patient has not had full bag since increase and tube feeds have been started as well will hold off on changes to formula at this time.  May consider decreasing rate of TPN  tomorrow depending upon tube feed rate and patient tolerance      TPN formula:  Clinimix 5/15 % 2000 ml  KPhos 44 mEq / 30 mMol  NaCl 200 mEq  Na Acetate 40 mEq  Calcium Gluconate 16 mEq  Mag Sulfate 8 mEq  MVI 10 ml    Rate 83.3 ml/hr    TPN Medication Recent History (Show up to 4 orders; newest on the left. Changes between the two most recent orders are indicated.)       Start date and time   10/10/2019 1800 10/09/2019 1800 10/07/2019 1800      Adult Central Clinimix TPN [[id number]] Adult Central Clinimix TPN [[id number]] Adult Central Clinimix TPN [[id number]] linked to Fat Emulsion Plant Based (INTRALIPID,LIPOSYN) 20 % infusion 35 g    Order Status  Active Discontinued     Lipid Status    Active    Last Given  10/10/2019 1747 10/09/2019 1752 10/09/2019 0118       Electrolytes    sodium acetate  40 mEq 40 mEq 40 mEq    potassium phosphate  -- -- 30 mmol    potassium chloride  -- 30 mEq --    magnesium sulfate  8 mEq 8 mEq 8 mEq    calcium gluconate  16 mEq 16 mEq 16 mEq    sodium chloride  200 mEq 180 mEq 120 mEq    Potassium Phosphates  30 mmol 36 mmol --       Additives    multivitamin (adult)  10 mL 10 mL 10 mL       Amino Acids in Dextrose Premix    amino acids 5% in dextrose 15%  2,000 mL 2,000 mL 2,000 mL       Energy Contribution    Proteins  400 kcal 400 kcal 400 kcal    Dextrose  1,020 kcal 1,020 kcal 1,020 kcal    Lipids  -- -- --    Total  1,420 kcal 1,420 kcal 1,420 kcal       Electrolyte Ion Calculated Amount    Sodium  240 mEq 220 mEq 160 mEq    Potassium  44 mEq 82.8 mEq 44 mEq    Calcium  16 mEq 16 mEq 16 mEq    Magnesium  8.12 mEq 8.12 mEq 8.12 mEq    Aluminum  -- -- --    Phosphate  30 mmol 36 mmol 30 mmol    Chloride  240 mEq 250 mEq 160 mEq    Acetate  124 mEq 124 mEq 124 mEq       Other    Total Amino Acid  100 g 100 g 100 g    Total Amino Acid/kg  1.79 g/kg 1.77 g/kg 1.78 g/kg    Glucose Infusion Rate  3.5 mg/kg/min 3.45 mg/kg/min 3.5 mg/kg/min    Osmolarity  1,465.5 1,474.86 1,403.46     Volume  2,126.4 mL 2,138.4 mL 2,106.4 mL    Rate  83.3 mL/hr 83.3 mL/hr 83.3 mL/hr    Dosing Weight  55.9 kg 56.5 kg 56.1 kg    Infusion [facility]  [facility]            Mohit Dimas RPH  10/11/19  11:07 AM
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
## 2019-10-11 NOTE — PLAN OF CARE
Problem: Patient Care Overview  Goal: Plan of Care Review  Outcome: Ongoing (interventions implemented as appropriate)   10/11/19 9037   Coping/Psychosocial   Plan of Care Reviewed With patient   Plan of Care Review   Progress no change   OTHER   Outcome Summary vss, refused trach care, denies pain

## 2019-10-11 NOTE — PLAN OF CARE
Problem: Patient Care Overview  Goal: Plan of Care Review   10/11/19 1037   Coping/Psychosocial   Plan of Care Reviewed With caregiver   Plan of Care Review   Progress improving   OTHER   Outcome Summary Tube feeding started at 20cc/hr to be increased by MD only. PN continues. Goal rate for EN is 60cc/hr

## 2019-10-11 NOTE — PLAN OF CARE
Problem: Patient Care Overview  Goal: Plan of Care Review  Outcome: Ongoing (interventions implemented as appropriate)   10/11/19 0330   Coping/Psychosocial   Plan of Care Reviewed With patient   Plan of Care Review   Progress improving   OTHER   Outcome Summary TF restarted, tolerating well     Goal: Individualization and Mutuality  Outcome: Ongoing (interventions implemented as appropriate)    Goal: Discharge Needs Assessment  Outcome: Ongoing (interventions implemented as appropriate)      Problem: Fall Risk (Adult)  Goal: Absence of Fall  Outcome: Ongoing (interventions implemented as appropriate)      Problem: Skin Injury Risk (Adult)  Goal: Skin Health and Integrity  Outcome: Ongoing (interventions implemented as appropriate)      Problem: Pain, Chronic (Adult)  Goal: Acceptable Pain/Comfort Level and Functional Ability  Outcome: Ongoing (interventions implemented as appropriate)      Problem: Nutrition, Parenteral (Adult)  Goal: Signs and Symptoms of Listed Potential Problems Will be Absent, Minimized or Managed (Nutrition, Parenteral)  Outcome: Ongoing (interventions implemented as appropriate)    Goal: Signs and Symptoms of Listed Potential Problems Will be Absent, Minimized or Managed (Nutrition, Parenteral)  Outcome: Ongoing (interventions implemented as appropriate)

## 2019-10-11 NOTE — NURSING NOTE
"Spoke with Dr. Kc regarding 125ml of residual from J tube. Ordered to hold TF for \"a few hours\"  "

## 2019-10-11 NOTE — THERAPY TREATMENT NOTE
Acute Care - Occupational Therapy Treatment Note  [facility]     Patient Name: Emerson Bang  : 1960  MRN: [id number]  Today's Date: 10/11/2019  Onset of Illness/Injury or Date of Surgery: 19  Date of Referral to OT: 10/02/19  Referring Physician: Dr. Kc    Admit Date: 2019       ICD-10-CM ICD-9-CM   1. 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 Impaired physical mobility Z74.09 781.99   3. Impaired mobility and activities of daily living Z74.09 799.89     Patient Active Problem List   Diagnosis   • Hyperkalemia   • Iron deficiency anemia   • Gastroesophageal reflux disease with esophagitis   • Elevated AST (SGOT)   • Alcohol abuse   • Hypothyroidism   • Balance problem   • Need for vaccination   • Low magnesium levels   • Squamous cell carcinoma of pharynx (CMS/HCC)   • History of throat cancer   • Vitamin D deficiency   • Alcohol abuse counseling and surveillance   • Encounter for screening for diabetes mellitus   • Hypomagnesemia   • Iron deficiency anemia   • Hyperlipidemia   • Underweight due to inadequate caloric intake   • Need for immunization against influenza   • 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HCC)   • Hypertension   • General medical examination   • Underweight   • Epigastric pain   • Gastritis without bleeding   • Need for influenza vaccination   • Elevated liver function tests   • B12 deficiency   • Intestinal malabsorption   • Throat pain   • Acute pharyngitis   • Upper respiratory tract infection   • Neoplasm of uncertain behavior of larynx   • Pharyngoesophageal dysphagia   • Severe protein-calorie malnutrition (CMS/HCC)   • Anemia of chronic disease   • Hypotension   • Hyponatremia   • Status post insertion of percutaneous endoscopic gastrostomy (PEG) tube (CMS/HCC)   • Hx of tracheostomy   • History of throat surgery   • Hx SBO   • Urinary retention   • Generalized weakness   • Acute otitis externa of right ear   • Hard stool   • Panlobular emphysema  (CMS/HCC)   • Cancer of hypopharynx (CMS/HCC)   • Diarrhea   • Encounter for venous access device care   • Prediabetes   • Status post neck dissection   • S/P partial thyroidectomy   • S/P laryngectomy   • Annual physical exam   • Alcoholic cirrhosis of liver without ascites (CMS/HCC)     Past Medical History:   Diagnosis Date   • Allergic rhinitis     vs URI   • Anemia    • At risk for falls    • Benign prostatic hyperplasia    • Chronic gastritis    • Cirrhosis of liver (CMS/HCC)    • Complication of gastrostomy (CMS/HCC)     site not healing   • COPD (chronic obstructive pulmonary disease) (CMS/HCC)    • Dysphagia     odynophagia   • Epigastric pain    • Esophagitis    • GERD (gastroesophageal reflux disease)    • Hypertension    • Hypothyroidism, unspecified    • Low back pain    • Malaise and fatigue    • Nasal congestion 11/15/2018   • Nausea with vomiting, unspecified    • Pain in left knee    • Pain in right knee    • Primary malignant neoplasm of pharynx (CMS/HCC)    • Screening for malignant neoplasm of colon    • 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 Left upper quadrant abdominal wall abscess debridement. Gastrostomy tube erosion through & through the anterior gastric wall.Lupper quadrant abdominal wall abscess   • COLONOSCOPY  04/21/2014    Internal & external hemorrhoids found.   • COLONOSCOPY  2014    [place]   • COLONOSCOPY N/A 10/16/2018    Procedure: COLONOSCOPY;  Surgeon: Kaushal Chester MD;  Location: [facility] ENDOSCOPY;  Service: Gastroenterology   • DIAGNOSTIC LAPAROSCOPY EXPLORATORY LAPAROTOMY N/A 7/17/2019    Procedure: DIAGNOSTIC LAPAROSCOPY, EXPLORATORY LAPAROTOMY, LYSIS OF ADHESIONS;  Surgeon: Parminder Kc MD;   Location: [facility] OR;  Service: General   • DIRECT LARYNGOSCOPY, ESOPHAGOSCOPY, BRONCHOSCOPY N/A 4/15/2019    Procedure: DIRECT LARYNGOSCOPY with biopsy, ESOPHAGOSCOPY;  Surgeon: Bharathi Washington MD;  Location: [facility] OR;  Service: ENT   • ENDOSCOPY N/A 10/16/2018    Procedure: ESOPHAGOGASTRODUODENOSCOPY;  Surgeon: Kaushal Chester MD;  Location: [facility] ENDOSCOPY;  Service: Gastroenterology   • GASTROCUTANEOUS FISTULA CLOSURE N/A 10/2/2019    Procedure: GASTROCUTANEOUS FISTULA CLOSURE;  Surgeon: Parminder Kc MD;  Location: [facility] OR;  Service: General   • GASTROSTOMY FEEDING TUBE INSERTION N/A 4/15/2019    Procedure: GASTROSTOMY FEEDING TUBE INSERTION;  Surgeon: Trenton Valera MD;  Location: [facility] OR;  Service: General   • JEJUNOSTOMY N/A 10/2/2019    Procedure: JEJUNOSTOMY;  Surgeon: Parminder Kc MD;  Location: [facility] OR;  Service: General   • TRACHEOSTOMY  11/10/2008    Respiratory failure   • UPPER GASTROINTESTINAL ENDOSCOPY  04/21/2014    Esophagitis seen. Biopsy taken. Gastritis in stomach. Biopsy taken. Normal duodenum. Biopsy taken.   • UPPER GASTROINTESTINAL ENDOSCOPY  10/16/2018   • VENOUS ACCESS DEVICE (PORT) INSERTION N/A 9/11/2019    Procedure: INSERTION VENOUS ACCESS DEVICE (MEDIPORT)        (c-arm#1);  Surgeon: Parminder Kc MD;  Location: [facility];  Service: General       Therapy Treatment    Rehabilitation Treatment Summary     Row Name 10/11/19 0630             Treatment Time/Intention    Discipline  occupational therapy assistant  -KD      Document Type  therapy note (daily note)  -KD      Subjective Information  no complaints  -KD      Mode of Treatment  occupational therapy  -KD      Patient/Family Observations  no family present  -KD      Therapy Frequency (OT Eval)  other (see comments) 5-7x/wk  -KD      Patient Effort  fair  -KD      Existing Precautions/Restrictions  fall  -KD      Recorded by [KD] Isabelle Schafer COTA/L 10/11/19 1110      Row Name 10/11/19 0088              Vital Signs    Pre Systolic BP Rehab  166  -KD      Pre Treatment Diastolic BP  84  -KD      Pretreatment Heart Rate (beats/min)  84  -KD      Posttreatment Heart Rate (beats/min)  87  -KD      Pre SpO2 (%)  96  -KD      O2 Delivery Pre Treatment  supplemental O2  -KD      Post SpO2 (%)  92  -KD      O2 Delivery Post Treatment  supplemental O2  -KD      Pre Patient Position  Supine  -KD      Intra Patient Position  Supine  -KD      Post Patient Position  Supine  -KD      Recorded by [KD] Isabelle Schafer MADSEN/L 10/11/19 1112      Row Name 10/11/19 0630             Cognitive Assessment/Intervention- PT/OT    Affect/Mental Status (Cognitive)  unable/difficult to assess  -KD      Behavioral Issues (Cognitive)  unable/difficult to assess  -KD      Orientation Status (Cognition)  unable/difficult to assess  -KD      Follows Commands (Cognition)  WFL  -KD      Recorded by [KD] Isabelle Schafer COTA/L 10/11/19 1112      Row Name 10/11/19 0630             Therapeutic Exercise    Upper Extremity Range of Motion (Therapeutic Exercise)  shoulder abduction/adduction, bilateral;shoulder horizontal abduction/adduction, bilateral;shoulder internal/external rotation, bilateral;elbow flexion/extension, bilateral;forearm supination/pronation, bilateral;wrist flexion/extension, bilateral  -KD      Weight/Resistance (Therapeutic Exercise)  1 pound  -KD      Exercise Type (Therapeutic Exercise)  AROM (active range of motion)  -KD      Position (Therapeutic Exercise)  supine  -KD      Sets/Reps (Therapeutic Exercise)  1/20  -KD      Expected Outcome (Therapeutic Exercise)  improve performance, transfer skills;improve functional tolerance, self-care activity  -KD      Recorded by [KD] Isabelle Schafer COTA/L 10/11/19 1112      Row Name 10/11/19 0630             Positioning and Restraints    Pre-Treatment Position  in bed  -KD      Post Treatment Position  bed  -KD      In Bed  fowlers;call light within reach;encouraged to call for  assist;exit alarm on  -KD      Recorded by [KD] Isabelle Schafer COTA/L 10/11/19 1112      Row Name 10/11/19 0630             Pain Scale: Numbers Pre/Post-Treatment    Pain Scale: Numbers, Pretreatment  6/10  -KD      Pain Scale: Numbers, Post-Treatment  6/10  -KD      Pain Location  abdomen  -KD      Pain Intervention(s)  Medication (See MAR)  -KD      Recorded by [KD] Isabelle Schafer COTA/L 10/11/19 1112      Row Name                Wound 09/30/19 1330 Left medial abdomen Fistula    Wound - Properties Group Date first assessed: 09/30/19 [SS] Time first assessed: 1330 [SS] Side: Left [SS] Orientation: medial [SS] Location: abdomen [SS] Primary Wound Type: Fistula [SS] Recorded by:  [SS] Citlalli Abdi RN 09/30/19 1619    Row Name                Wound 10/02/19 1347 abdomen Incision    Wound - Properties Group Date first assessed: 10/02/19 [CF] Time first assessed: 1347 [CF] Present on Hospital Admission: N [CF] Location: abdomen [CF] Primary Wound Type: Incision [CF] Recorded by:  [CF] Eileen Tyson RN 10/02/19 1347    Row Name 10/11/19 0630             Outcome Summary/Treatment Plan (OT)    Daily Summary of Progress (OT)  progress toward functional goals as expected  -KD      Plan for Continued Treatment (OT)  cont ot poc  -KD      Anticipated Discharge Disposition (OT)  anticipate therapy at next level of care  -KD      Recorded by [KD] Isabelle Schafer COTA/LIANE 10/11/19 1112        User Key  (r) = Recorded By, (t) = Taken By, (c) = Cosigned By    Initials Name Effective Dates Discipline    SS Citlalli Abdi RN 07/10/18 -  Nurse    KD Isabelle Schafer COTA/L 03/07/18 -  OT    CF Eileen Tyson RN 10/17/16 -  Nurse        Wound 09/30/19 1330 Left medial abdomen Fistula (Active)   Dressing Appearance dry;intact 10/11/2019  7:15 AM   Closure SAUMYA 10/11/2019  7:15 AM   Base dressing in place, unable to visualize 10/11/2019  7:15 AM   Dressing Care, Wound dressing changed 10/10/2019  8:10 PM       Wound 10/02/19  1347 abdomen Incision (Active)   Dressing Appearance dry;intact 10/11/2019  7:15 AM   Closure SAUMYA 10/11/2019  7:15 AM   Base dressing in place, unable to visualize 10/11/2019  7:15 AM     Rehab Goal Summary     Row Name 10/11/19 0630             Occupational Therapy Goals    Transfer Goal Selection (OT)  transfer, OT goal 1  -KD      Bathing Goal Selection (OT)  bathing, OT goal 1  -KD      Dressing Goal Selection (OT)  dressing, OT goal 1  -KD      Toileting Goal Selection (OT)  toileting, OT goal 1  -KD      Activity Tolerance Goal Selection (OT)  activity tolerance, OT goal 1  -KD         Transfer Goal 1 (OT)    Activity/Assistive Device (Transfer Goal 1, OT)  sit-to-stand/stand-to-sit;bed-to-chair/chair-to-bed;toilet  -KD      [place] Level/Cues Needed (Transfer Goal 1, OT)  supervision required  -KD      Time Frame (Transfer Goal 1, OT)  long term goal (LTG);by discharge  -KD      Progress/Outcome (Transfer Goal 1, OT)  goal not met  -KD         Bathing Goal 1 (OT)    Activity/Assistive Device (Bathing Goal 1, OT)  bathing skills, all  -KD      [place] Level/Cues Needed (Bathing Goal 1, OT)  minimum assist (75% or more patient effort)  -KD      Time Frame (Bathing Goal 1, OT)  long term goal (LTG);by discharge  -KD      Barriers (Bathing Goal 1, OT)  Pt will need extra time and rest breaks PRN  -KD      Progress/Outcomes (Bathing Goal 1, OT)  goal not met  -KD         Dressing Goal 1 (OT)    Activity/Assistive Device (Dressing Goal 1, OT)  dressing skills, all  -KD      [place]/Cues Needed (Dressing Goal 1, OT)  minimum assist (75% or more patient effort)  -KD      Time Frame (Dressing Goal 1, OT)  long term goal (LTG);by discharge  -KD      Barriers (Dressing Goal 1, OT)  Pt will need extra time and rest breaks PRN  -KD      Progress/Outcome (Dressing Goal 1, OT)  goal not met  -KD         Toileting Goal 1 (OT)    Activity/Device (Toileting Goal 1, OT)  toileting skills, all  -KD       [place] Level/Cues Needed (Toileting Goal 1, OT)  minimum assist (75% or more patient effort)  -KD      Time Frame (Toileting Goal 1, OT)  long term goal (LTG);by discharge  -KD      Barriers (Toileting Goal 1, OT)  Pt will need extra time and rest breaks PRN  -KD      Progress/Outcome (Toileting Goal 1, OT)  goal not met  -KD          Activity Tolerance Goal 1 (OT)    Activity Level (Endurance Goal 1, OT)  15 min activity functional/therapeutic activity  -KD      Progress/Outcome (Activity Tolerance Goal 1, OT)  goal not met  -KD        User Key  (r) = Recorded By, (t) = Taken By, (c) = Cosigned By    Initials Name Provider Type Discipline    Isabelle Colon COTA/L Occupational Therapy Assistant OT        Occupational Therapy Education     Title: PT OT SLP Therapies (In Progress)     Topic: Occupational Therapy (In Progress)     Point: ADL training (In Progress)     Description: Instruct learner(s) on proper safety adaptation and remediation techniques during self care or transfers.   Instruct in proper use of assistive devices.    Learning Progress Summary           Patient Acceptance, E, NR by BB at 10/9/2019  1:37 PM    Acceptance, E,TB,D, NR by CS at 10/6/2019  1:05 PM    Acceptance, E,TB,D, NR by CS at 10/5/2019 11:11 AM    Acceptance, E, NR by AS at 10/4/2019 11:45 AM    Comment:  Role of OT, OT POC, importance of OOB activity for strength and healing, ADL training                   Point: Home exercise program (In Progress)     Description: Instruct learner(s) on appropriate technique for monitoring, assisting and/or progressing therapeutic exercises/activities.    Learning Progress Summary           Patient Acceptance, E, NR by BB at 10/8/2019 11:46 AM    Acceptance, E,TB,D, NR by CS at 10/6/2019  1:05 PM    Acceptance, E,TB,D, NR by CS at 10/5/2019 11:11 AM                   Point: Precautions (In Progress)     Description: Instruct learner(s) on prescribed precautions during self-care and  functional transfers.    Learning Progress Summary           Patient Acceptance, E,TB,D, NR by CS at 10/6/2019  1:05 PM    Acceptance, E,TB,D, NR by CS at 10/5/2019 11:11 AM    Acceptance, E, NR by AS at 10/4/2019 11:45 AM    Comment:  Role of OT, OT POC, importance of OOB activity for strength and healing, ADL training                   Point: Body mechanics (In Progress)     Description: Instruct learner(s) on proper positioning and spine alignment during self-care, functional mobility activities and/or exercises.    Learning Progress Summary           Patient Acceptance, E,TB,D, NR by CS at 10/6/2019  1:05 PM    Acceptance, E,TB,D, NR by CS at 10/5/2019 11:11 AM                               User Key     Initials Effective Dates Name Provider Type Discipline     03/07/18 -  Kerrie Woodson COTA/L Occupational Therapy Assistant OT    CS 03/07/18 -  Brenda Thomas COTA/LIANE Occupational Therapy Assistant OT    AS 03/25/19 -  Destinee Yarbrough, OT Occupational Therapist OT                OT Recommendation and Plan  Outcome Summary/Treatment Plan (OT)  Daily Summary of Progress (OT): progress toward functional goals as expected  Plan for Continued Treatment (OT): cont ot poc  Anticipated Discharge Disposition (OT): anticipate therapy at next level of care  Therapy Frequency (OT Eval): other (see comments)(5-7x/wk)  Daily Summary of Progress (OT): progress toward functional goals as expected  Outcome Summary: Pt completed BUE ther es wwith 1lb HW and fair tolerance w/ enouragement this AM.  Outcome Measures     Row Name 10/11/19 0630 10/10/19 1200 10/10/19 0833       How much help from another person do you currently need...    Turning from your back to your side while in flat bed without using bedrails?  --  2  -JW  --    Moving from lying on back to sitting on the side of a flat bed without bedrails?  --  2  -JW  --    Moving to and from a bed to a chair (including a wheelchair)?  --  2  -JW  --    Standing  up from a chair using your arms (e.g., wheelchair, bedside chair)?  --  2  -JW  --    Climbing 3-5 steps with a railing?  --  1  -JW  --    To walk in hospital room?  --  2  -JW  --    AM-PAC 6 Clicks Score (PT)  --  11  -JW  --       How much help from another is currently needed...    Putting on and taking off regular lower body clothing?  2  -KD  --  2  -BB    Bathing (including washing, rinsing, and drying)  2  -KD  --  2  -BB    Toileting (which includes using toilet bed pan or urinal)  2  -KD  --  2  -BB    Putting on and taking off regular upper body clothing  2  -KD  --  2  -BB    Taking care of personal grooming (such as brushing teeth)  2  -KD  --  2  -BB    Eating meals  1  -KD  --  1  -BB    AM-PAC 6 Clicks Score (OT)  11  -KD  --  11  -BB       Functional Assessment    Outcome Measure Options  --  AM-PAC 6 Clicks Basic Mobility (PT)  -JW  --    Row Name 10/09/19 1423 10/09/19 0938 10/08/19 1134       How much help from another person do you currently need...    Turning from your back to your side while in flat bed without using bedrails?  3  -GIAN  --  3  -GIAN    Moving from lying on back to sitting on the side of a flat bed without bedrails?  2  -GIAN  --  2  -GIAN    Moving to and from a bed to a chair (including a wheelchair)?  2  -GIAN  --  2  -GIAN    Standing up from a chair using your arms (e.g., wheelchair, bedside chair)?  2  -GIAN  --  2  -GIAN    Climbing 3-5 steps with a railing?  1  -GIAN  --  1  -GIAN    To walk in hospital room?  2  -GIAN  --  2  -GIAN    AM-PAC 6 Clicks Score (PT)  12  -GIAN  --  12  -GIAN       How much help from another is currently needed...    Putting on and taking off regular lower body clothing?  --  2  -BB  --    Bathing (including washing, rinsing, and drying)  --  2  -BB  --    Toileting (which includes using toilet bed pan or urinal)  --  2  -BB  --    Putting on and taking off regular upper body clothing  --  2  -BB  --    Taking care of personal grooming (such as brushing teeth)  --  2   -BB  --    Eating meals  --  1 -BB  --    AM-PAC 6 Clicks Score (OT)  --  11 -BB  --       Functional Assessment    Outcome Measure Options  AM-PAC 6 Clicks Basic Mobility (PT)  -GIAN  --  AM-PAC 6 Clicks Basic Mobility (PT)  -GIAN      User Key  (r) = Recorded By, (t) = Taken By, (c) = Cosigned By    Initials Name Provider Type     Daniel Putnam, PTA Physical Therapy Assistant    Abran Ferreira, PTA Physical Therapy Assistant    Kerrie Spencer, TENA/L Occupational Therapy Assistant    Isabelle Colon COTA/L Occupational Therapy Assistant           Time Calculation:   Time Calculation- OT     Row Name 10/11/19 1114             Time Calculation- OT    OT Start Time  0630  -KD      OT Stop Time  0645  -KD      OT Time Calculation (min)  15 min  -KD      Total Timed Code Minutes- OT  15 minute(s)  -KD      OT Received On  10/11/19  -KD        User Key  (r) = Recorded By, (t) = Taken By, (c) = Cosigned By    Initials Name Provider Type    Isabelle Colon COTA/L Occupational Therapy Assistant        Therapy Charges for Today     Code Description Service Date Service Provider Modifiers Qty    [id number] HC OT THER PROC EA 15 MIN 10/11/2019 Isabelle Schafer COTA/L GO 1               ROSEANNA Ruffin  10/11/2019

## 2019-10-11 NOTE — PLAN OF CARE
Problem: Patient Care Overview  Goal: Plan of Care Review  Outcome: Ongoing (interventions implemented as appropriate)   10/11/19 0630 10/11/19 1037   Coping/Psychosocial   Plan of Care Reviewed With --  caregiver   Plan of Care Review   Progress --  improving   OTHER   Outcome Summary Pt completed BUE ther ex with 1lb HW and fair tolerance w/ enouragement this AM. --

## 2019-10-11 NOTE — THERAPY TREATMENT NOTE
Acute Care - Physical Therapy Treatment Note  [facility]     Patient Name: Emerson Bang  : 1960  MRN: [id number]  Today's Date: 10/11/2019  Onset of Illness/Injury or Date of Surgery: 19     Referring Physician: Dr. Kc    Admit Date: 2019    Visit Dx:    ICD-10-CM ICD-9-CM   1. 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 Impaired physical mobility Z74.09 781.99   3. Impaired mobility and activities of daily living Z74.09 799.89     Patient Active Problem List   Diagnosis   • Hyperkalemia   • Iron deficiency anemia   • Gastroesophageal reflux disease with esophagitis   • Elevated AST (SGOT)   • Alcohol abuse   • Hypothyroidism   • Balance problem   • Need for vaccination   • Low magnesium levels   • Squamous cell carcinoma of pharynx (CMS/HCC)   • History of throat cancer   • Vitamin D deficiency   • Alcohol abuse counseling and surveillance   • Encounter for screening for diabetes mellitus   • Hypomagnesemia   • Iron deficiency anemia   • Hyperlipidemia   • Underweight due to inadequate caloric intake   • Need for immunization against influenza   • 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HCC)   • Hypertension   • General medical examination   • Underweight   • Epigastric pain   • Gastritis without bleeding   • Need for influenza vaccination   • Elevated liver function tests   • B12 deficiency   • Intestinal malabsorption   • Throat pain   • Acute pharyngitis   • Upper respiratory tract infection   • Neoplasm of uncertain behavior of larynx   • Pharyngoesophageal dysphagia   • Severe protein-calorie malnutrition (CMS/HCC)   • Anemia of chronic disease   • Hypotension   • Hyponatremia   • Status post insertion of percutaneous endoscopic gastrostomy (PEG) tube (CMS/HCC)   • Hx of tracheostomy   • History of throat surgery   • Hx SBO   • Urinary retention   • Generalized weakness   • Acute otitis externa of right ear   • Hard stool   • Panlobular emphysema (CMS/HCC)   • Cancer of  hypopharynx (CMS/HCC)   • Diarrhea   • Encounter for venous access device care   • Prediabetes   • Status post neck dissection   • S/P partial thyroidectomy   • S/P laryngectomy   • Annual physical exam   • Alcoholic cirrhosis of liver without ascites (CMS/HCC)       Therapy Treatment    Rehabilitation Treatment Summary     Row Name 10/11/19 1500 10/11/19 0630          Treatment Time/Intention    Discipline  physical therapy assistant  -GIAN  occupational therapy assistant  -KD     Document Type  therapy note (daily note)  -GIAN  therapy note (daily note)  -KD     Subjective Information  --  no complaints  -KD     Mode of Treatment  individual therapy;physical therapy  -GIAN  occupational therapy  -KD     Patient/Family Observations  --  no family present  -KD     Therapy Frequency (PT Clinical Impression)  other (see comments) 6days per week  -GIAN  --     Therapy Frequency (OT Eval)  --  other (see comments) 5-7x/wk  -KD     Patient Effort  fair  -GIAN  fair  -KD     Comment  pt agreeable only to ther ex in supine despite encouragement to attempt EOB.   -GIAN  --     Existing Precautions/Restrictions  fall;other (see comments) watch gait belt placement  -GIAN  fall  -KD     Recorded by [GIAN] Abran Wan, PTA 10/11/19 1515 [KD] Isabelle Schafer, MADSEN/L 10/11/19 1110     Row Name 10/11/19 1500 10/11/19 0630          Vital Signs    Pre Systolic BP Rehab  124  -GIAN  166  -KD     Pre Treatment Diastolic BP  58  -GIAN  84  -KD     Post Systolic BP Rehab  134  -JC2  --     Post Treatment Diastolic BP  70  -JC2  --     Pretreatment Heart Rate (beats/min)  90  -GIAN  84  -KD     Posttreatment Heart Rate (beats/min)  92  -JC2  87  -KD     Pre SpO2 (%)  96  -GIAN  96  -KD     O2 Delivery Pre Treatment  supplemental O2  -GIAN  supplemental O2  -KD     Post SpO2 (%)  99  -JC2  92  -KD     O2 Delivery Post Treatment  supplemental O2  -JC2  supplemental O2  -KD     Pre Patient Position  Supine  -GIAN  Supine  -KD     Intra Patient Position  --   Supine  -KD     Post Patient Position  Supine  -GIAN  Supine  -KD     Recorded by [GIAN] Abran Wan, PTA 10/11/19 1515  [JC2] Abran Wan, PTA 10/11/19 1525 [KD] Isabelle Schafer MADSEN/L 10/11/19 1112     Row Name 10/11/19 1500 10/11/19 0630          Cognitive Assessment/Intervention- PT/OT    Affect/Mental Status (Cognitive)  unable/difficult to assess  -GIAN  unable/difficult to assess  -KD     Behavioral Issues (Cognitive)  unable/difficult to assess  -GIAN  unable/difficult to assess  -KD     Orientation Status (Cognition)  unable/difficult to assess  -GIAN  unable/difficult to assess  -KD     Follows Commands (Cognition)  WFL  -GIAN  WFL  -KD     Recorded by [GIAN] Abran Wan, PTA 10/11/19 1515 [KD] Isabelle Schafer MADSEN/L 10/11/19 1112     Row Name 10/11/19 1500             Bed Mobility Assessment/Treatment    Bed Mobility Assessment/Treatment  --  -GIAN      Scooting/Bridging [place] (Bed Mobility)  --  -GIAN      Assistive Device (Bed Mobility)  --  -GAIN      Recorded by [GIAN] Abran Wan, PTA 10/11/19 1525      Row Name 10/11/19 1500             Bed-Chair Transfer    Bed-Chair [place] (Transfers)  not tested  -GIAN      Recorded by [GIAN] Abran Wan, PTA 10/11/19 1515      Row Name 10/11/19 1500             Sit-Stand Transfer    Sit-Stand [place] (Transfers)  not tested  -GIAN      Recorded by [GIAN] Abran Wan, PTA 10/11/19 1515      Row Name 10/11/19 1500             Gait/Stairs Assessment/Training    [place] Level (Gait)  not tested  -GIAN      Recorded by [GIAN] Abran Wan, PTA 10/11/19 1515      Row Name 10/11/19 1500             Lower Extremity Supine Therapeutic Exercise    Performed, Supine Lower Extremity (Therapeutic Exercise)  ankle dorsiflexion/plantarflexion;quadriceps sets;gluteal sets;hip abduction/adduction;heel slides;SLR (straight leg raise)  -GIAN      Exercise Type, Supine Lower Extremity (Therapeutic Exercise)  AROM (active range of motion)  -GIAN      Sets/Reps  Detail, Supine Lower Extremity (Therapeutic Exercise)  20x1 ashley  -GIAN      Recorded by [GIAN] Abran Wan PTA 10/11/19 1515      Row Name 10/11/19 0630             Therapeutic Exercise    Upper Extremity Range of Motion (Therapeutic Exercise)  shoulder abduction/adduction, bilateral;shoulder horizontal abduction/adduction, bilateral;shoulder internal/external rotation, bilateral;elbow flexion/extension, bilateral;forearm supination/pronation, bilateral;wrist flexion/extension, bilateral  -KD      Weight/Resistance (Therapeutic Exercise)  1 pound  -KD      Exercise Type (Therapeutic Exercise)  AROM (active range of motion)  -KD      Position (Therapeutic Exercise)  supine  -KD      Sets/Reps (Therapeutic Exercise)  1/20  -KD      Expected Outcome (Therapeutic Exercise)  improve performance, transfer skills;improve functional tolerance, self-care activity  -KD      Recorded by [KD] Isabelle Schafer COTA/L 10/11/19 1112      Row Name 10/11/19 1500 10/11/19 0630          Positioning and Restraints    Pre-Treatment Position  in bed  -GIAN  in bed  -KD     Post Treatment Position  bed  -GIAN  bed  -KD     In Bed  fowlers;call light within reach;encouraged to call for assist;exit alarm on all needs met.   -GIAN  fowlers;call light within reach;encouraged to call for assist;exit alarm on  -KD     Recorded by [GIAN] Abran Wan PTA 10/11/19 1515 [KD] Isabelle Schafer COTA/L 10/11/19 1112     Row Name 10/11/19 1500             Pain Assessment    Additional Documentation  Pain Scale: Numbers Pre/Post-Treatment (Group)  -GIAN      Recorded by [GIAN] Abran Wan PTA 10/11/19 1515      Row Name 10/11/19 1500 10/11/19 0630          Pain Scale: Numbers Pre/Post-Treatment    Pain Scale: Numbers, Pretreatment  8/10  -GIAN  6/10  -KD     Pain Scale: Numbers, Post-Treatment  8/10  -GIAN  6/10  -KD     Pain Location  abdomen and chest. pt states the chest pain is minor and he can...  -GIAN  abdomen  -KD     Pre/Post Treatment Pain Comment   .. still exercise. nsg notified.   -GIAN  --     Pain Intervention(s)  --  Medication (See MAR)  -KD     Recorded by [GIAN] Abran Wan, PTA 10/11/19 1515 [KD] Isabelle Schafer MADSEN/L 10/11/19 1112     Row Name                Wound 09/30/19 1330 Left medial abdomen Fistula    Wound - Properties Group Date first assessed: 09/30/19 [SS] Time first assessed: 1330 [SS] Side: Left [SS] Orientation: medial [SS] Location: abdomen [SS] Primary Wound Type: Fistula [SS] Recorded by:  [SS] Citlalli Abdi RN 09/30/19 1619    Row Name                Wound 10/02/19 1347 abdomen Incision    Wound - Properties Group Date first assessed: 10/02/19 [CF] Time first assessed: 1347 [CF] Present on Hospital Admission: N [CF] Location: abdomen [CF] Primary Wound Type: Incision [CF] Recorded by:  [CF] Eileen Tyson RN 10/02/19 1347    Row Name 10/11/19 0630             Outcome Summary/Treatment Plan (OT)    Daily Summary of Progress (OT)  progress toward functional goals as expected  -KD      Plan for Continued Treatment (OT)  cont ot poc  -KD      Anticipated Discharge Disposition (OT)  anticipate therapy at next level of care  -KD      Recorded by [KD] Isabelle Schafer MADSEN/L 10/11/19 1112      Row Name 10/11/19 1500             Outcome Summary/Treatment Plan (PT)    Daily Summary of Progress (PT)  progress toward functional goals as expected  -GIAN      Plan for Continued Treatment (PT)  continue  -GIAN      Anticipated Discharge Disposition (PT)  anticipate therapy at next level of care;home with 24/7 care  -GIAN      Recorded by [GIAN] Abran Wan, PTA 10/11/19 1515        User Key  (r) = Recorded By, (t) = Taken By, (c) = Cosigned By    Initials Name Effective Dates Discipline    SS Citlalli Abdi, RN 07/10/18 -  Nurse    GIAN Abran Wan, PTA 03/07/18 -  PT    KD Isabelle Schafer MADSEN/L 03/07/18 -  OT    CF Eileen Tyson RN 10/17/16 -  Nurse          Wound 09/30/19 1330 Left medial abdomen Fistula (Active)   Dressing  Appearance dry;intact 10/11/2019  7:15 AM   Closure SAUMYA 10/11/2019  7:15 AM   Base dressing in place, unable to visualize 10/11/2019  7:15 AM   Dressing Care, Wound dressing changed 10/10/2019  8:10 PM       Wound 10/02/19 1347 abdomen Incision (Active)   Dressing Appearance dry;intact 10/11/2019  7:15 AM   Closure SAUMYA 10/11/2019  7:15 AM   Base dressing in place, unable to visualize 10/11/2019  7:15 AM       Rehab Goal Summary     Row Name 10/11/19 1500 10/11/19 0630          Bed Mobility Goal 1 (PT)    Activity/Assistive Device (Bed Mobility Goal 1, PT)  sit to supine;supine to sit  -GIAN  --     [place] Level/Cues Needed (Bed Mobility Goal 1, PT)  independent  -GIAN  --     Time Frame (Bed Mobility Goal 1, PT)  by discharge  -GIAN  --     Progress/Outcomes (Bed Mobility Goal 1, PT)  goal not met  -GIAN  --        Transfer Goal 1 (PT)    Activity/Assistive Device (Transfer Goal 1, PT)  bed-to-chair/chair-to-bed;toilet  -GIAN  --     [place] Level/Cues Needed (Transfer Goal 1, PT)  conditional independence;independent  -GIAN  --     Time Frame (Transfer Goal 1, PT)  by discharge  -GIAN  --     Progress/Outcome (Transfer Goal 1, PT)  goal not met  -GIAN  --        Gait Training Goal 1 (PT)    Activity/Assistive Device (Gait Training Goal 1, PT)  gait (walking locomotion)  -GIAN  --     [place] Level (Gait Training Goal 1, PT)  conditional independence;independent  -GIAN  --     Distance (Gait Goal 1, PT)  [id number]  -GIAN  --     Time Frame (Gait Training Goal 1, PT)  by discharge  -GIAN  --     Progress/Outcome (Gait Training Goal 1, PT)  goal not met  -GIAN  --        Stairs Goal 1 (PT)    Activity/Assistive Device (Stairs Goal 1, PT)  descending stairs;ascending stairs  -GIAN  --     [place] Level/Cues Needed (Stairs Goal 1, PT)  standby assist  -GIAN  --     Number of Stairs (Stairs Goal 1, PT)  5  -GIAN  --     Time Frame (Stairs Goal 1, PT)  by discharge  -GIAN  --     Progress/Outcome (Stairs Goal 1, PT)  goal not met  -GIAN   --        Occupational Therapy Goals    Transfer Goal Selection (OT)  --  transfer, OT goal 1  -KD     Bathing Goal Selection (OT)  --  bathing, OT goal 1  -KD     Dressing Goal Selection (OT)  --  dressing, OT goal 1  -KD     Toileting Goal Selection (OT)  --  toileting, OT goal 1  -KD     Activity Tolerance Goal Selection (OT)  --  activity tolerance, OT goal 1  -KD        Transfer Goal 1 (OT)    Activity/Assistive Device (Transfer Goal 1, OT)  --  sit-to-stand/stand-to-sit;bed-to-chair/chair-to-bed;toilet  -KD     [place] Level/Cues Needed (Transfer Goal 1, OT)  --  supervision required  -KD     Time Frame (Transfer Goal 1, OT)  --  long term goal (LTG);by discharge  -KD     Progress/Outcome (Transfer Goal 1, OT)  --  goal not met  -KD        Bathing Goal 1 (OT)    Activity/Assistive Device (Bathing Goal 1, OT)  --  bathing skills, all  -KD     [place] Level/Cues Needed (Bathing Goal 1, OT)  --  minimum assist (75% or more patient effort)  -KD     Time Frame (Bathing Goal 1, OT)  --  long term goal (LTG);by discharge  -KD     Barriers (Bathing Goal 1, OT)  --  Pt will need extra time and rest breaks PRN  -KD     Progress/Outcomes (Bathing Goal 1, OT)  --  goal not met  -KD        Dressing Goal 1 (OT)    Activity/Assistive Device (Dressing Goal 1, OT)  --  dressing skills, all  -KD     [place]/Cues Needed (Dressing Goal 1, OT)  --  minimum assist (75% or more patient effort)  -KD     Time Frame (Dressing Goal 1, OT)  --  long term goal (LTG);by discharge  -KD     Barriers (Dressing Goal 1, OT)  --  Pt will need extra time and rest breaks PRN  -KD     Progress/Outcome (Dressing Goal 1, OT)  --  goal not met  -KD        Toileting Goal 1 (OT)    Activity/Device (Toileting Goal 1, OT)  --  toileting skills, all  -KD     [place] Level/Cues Needed (Toileting Goal 1, OT)  --  minimum assist (75% or more patient effort)  -KD     Time Frame (Toileting Goal 1, OT)  --  long term goal (LTG);by discharge   -KD     Barriers (Toileting Goal 1, OT)  --  Pt will need extra time and rest breaks PRN  -KD     Progress/Outcome (Toileting Goal 1, OT)  --  goal not met  -KD         Activity Tolerance Goal 1 (OT)    Activity Level (Endurance Goal 1, OT)  --  15 min activity functional/therapeutic activity  -KD     Progress/Outcome (Activity Tolerance Goal 1, OT)  --  goal not met  -KD       User Key  (r) = Recorded By, (t) = Taken By, (c) = Cosigned By    Initials Name Provider Type Discipline     Abran Wan, MICHAEL Physical Therapy Assistant PT    KD Isabelle Schafer COTA/L Occupational Therapy Assistant OT          Physical Therapy Education     Title: PT OT SLP Therapies (In Progress)     Topic: Physical Therapy (In Progress)     Point: Mobility training (In Progress)     Learning Progress Summary           Patient Acceptance, E, NR by  at 10/11/2019  3:17 PM    Acceptance, E, NR by  at 10/9/2019  2:59 PM    Acceptance, E, NR by  at 10/8/2019  4:00 PM    Acceptance, E, NR by [facility] at 10/3/2019  3:43 PM                   Point: Body mechanics (In Progress)     Learning Progress Summary           Patient Acceptance, E, NR by [facility] at 10/3/2019  3:43 PM                   Point: Precautions (In Progress)     Learning Progress Summary           Patient Acceptance, E, NR by [facility] at 10/3/2019  3:43 PM                               User Key     Initials Effective Dates Name Provider Type Discipline    [facility] 04/03/18 -  Judy Watkins, PT Physical Therapist PT     03/07/18 -  Abran Wan, MICHAEL Physical Therapy Assistant PT                PT Recommendation and Plan  Anticipated Discharge Disposition (PT): anticipate therapy at next level of care, home with 24/7 care  Therapy Frequency (PT Clinical Impression): other (see comments)(6days per week)  Outcome Summary/Treatment Plan (PT)  Daily Summary of Progress (PT): progress toward functional goals as expected  Plan for Continued Treatment (PT): continue  Anticipated Discharge  Disposition (PT): anticipate therapy at next level of care, home with 24/7 care  Plan of Care Reviewed With: patient  Progress: improving  Outcome Summary: pt agreeable only to ther ex in supine despite encouragement to attempt sitting EOB. pt completed 20 reps of AROM ashley LE. no new goals met at this time. pt would continue to benefit from PT services.   Outcome Measures     Row Name 10/11/19 1500 10/11/19 0630 10/10/19 1200       How much help from another person do you currently need...    Turning from your back to your side while in flat bed without using bedrails?  2  -GIAN  --  2  -JW    Moving from lying on back to sitting on the side of a flat bed without bedrails?  2  -GIAN  --  2  -JW    Moving to and from a bed to a chair (including a wheelchair)?  2  -GIAN  --  2  -JW    Standing up from a chair using your arms (e.g., wheelchair, bedside chair)?  2  -GIAN  --  2  -JW    Climbing 3-5 steps with a railing?  1  -GIAN  --  1  -JW    To walk in hospital room?  2  -GIAN  --  2  -JW    AM-PAC 6 Clicks Score (PT)  11  -GIAN  --  11  -JW       How much help from another is currently needed...    Putting on and taking off regular lower body clothing?  --  2  -KD  --    Bathing (including washing, rinsing, and drying)  --  2  -KD  --    Toileting (which includes using toilet bed pan or urinal)  --  2  -KD  --    Putting on and taking off regular upper body clothing  --  2  -KD  --    Taking care of personal grooming (such as brushing teeth)  --  2  -KD  --    Eating meals  --  1  -KD  --    AM-PAC 6 Clicks Score (OT)  --  11  -KD  --       Functional Assessment    Outcome Measure Options  AM-PAC 6 Clicks Basic Mobility (PT)  -GIAN  --  AM-PAC 6 Clicks Basic Mobility (PT)  -JW    Row Name 10/10/19 0833 10/09/19 1423 10/09/19 0938       How much help from another person do you currently need...    Turning from your back to your side while in flat bed without using bedrails?  --  3  -GIAN  --    Moving from lying on back to sitting on  the side of a flat bed without bedrails?  --  2  -GIAN  --    Moving to and from a bed to a chair (including a wheelchair)?  --  2  -GIAN  --    Standing up from a chair using your arms (e.g., wheelchair, bedside chair)?  --  2  -GIAN  --    Climbing 3-5 steps with a railing?  --  1  -GIAN  --    To walk in hospital room?  --  2  -GIAN  --    AM-PAC 6 Clicks Score (PT)  --  12  -GIAN  --       How much help from another is currently needed...    Putting on and taking off regular lower body clothing?  2  -BB  --  2  -BB    Bathing (including washing, rinsing, and drying)  2  -BB  --  2  -BB    Toileting (which includes using toilet bed pan or urinal)  2  -BB  --  2  -BB    Putting on and taking off regular upper body clothing  2  -BB  --  2  -BB    Taking care of personal grooming (such as brushing teeth)  2  -BB  --  2  -BB    Eating meals  1  -BB  --  1  -BB    AM-PAC 6 Clicks Score (OT)  11  -BB  --  11  -BB       Functional Assessment    Outcome Measure Options  --  AM-PAC 6 Clicks Basic Mobility (PT)  -GIAN  --      User Key  (r) = Recorded By, (t) = Taken By, (c) = Cosigned By    Initials Name Provider Type    Daniel Chaney PTA Physical Therapy Assistant    Abran Ferreira PTA Physical Therapy Assistant    Kerrie Spencer, MADSEN/L Occupational Therapy Assistant     Isabelle Schafer, MADSEN/L Occupational Therapy Assistant         Time Calculation:   PT Charges     Row Name 10/11/19 1525             Time Calculation    Start Time  1500  -      Stop Time  1525  -      Time Calculation (min)  25 min  -         Time Calculation- PT    Total Timed Code Minutes- PT  25 minute(s)  -        User Key  (r) = Recorded By, (t) = Taken By, (c) = Cosigned By    Initials Name Provider Type    Abran Ferreira PTA Physical Therapy Assistant        Therapy Charges for Today     Code Description Service Date Service Provider Modifiers Qty    [id number] HC PT THER PROC EA 15 MIN 10/11/2019 Abran Wan, MICHAEL GP  2          PT G-Codes  Outcome Measure Options: AM-PAC 6 Clicks Basic Mobility (PT)  AM-PAC 6 Clicks Score (PT): 11  AM-PAC 6 Clicks Score (OT): 11    Abran Wan, PTA  10/11/2019

## 2019-10-12 LAB
ANION GAP SERPL CALCULATED.3IONS-SCNC: 7 MMOL/L (ref 5–15)
BUN BLD-MCNC: 10 MG/DL (ref 8–23)
BUN/CREAT SERPL: 23.3 (ref 7–25)
CALCIUM SPEC-SCNC: 7.9 MG/DL (ref 8.6–10.5)
CHLORIDE SERPL-SCNC: 88 MMOL/L (ref 98–107)
CO2 SERPL-SCNC: 35 MMOL/L (ref 22–29)
CREAT BLD-MCNC: 0.43 MG/DL (ref 0.76–1.27)
GFR SERPL CREATININE-BSD FRML MDRD: >150 ML/MIN/1.73
GLUCOSE BLD-MCNC: 148 MG/DL (ref 65–99)
GLUCOSE BLDC GLUCOMTR-MCNC: 122 MG/DL (ref 70–130)
GLUCOSE BLDC GLUCOMTR-MCNC: 140 MG/DL (ref 70–130)
MAGNESIUM SERPL-MCNC: 1.1 MG/DL (ref 1.6–2.4)
PHOSPHATE SERPL-MCNC: 2 MG/DL (ref 2.5–4.5)
POTASSIUM BLD-SCNC: 3.3 MMOL/L (ref 3.5–5.2)
SODIUM BLD-SCNC: 130 MMOL/L (ref 136–145)

## 2019-10-12 PROCEDURE — 80048 BASIC METABOLIC PNL TOTAL CA: CPT | Performed by: SURGERY

## 2019-10-12 PROCEDURE — 82962 GLUCOSE BLOOD TEST: CPT

## 2019-10-12 PROCEDURE — 25010000002 POTASSIUM CHLORIDE PER 2 MEQ OF POTASSIUM: Performed by: SURGERY

## 2019-10-12 PROCEDURE — 84100 ASSAY OF PHOSPHORUS: CPT | Performed by: SURGERY

## 2019-10-12 PROCEDURE — 25010000002 CALCIUM GLUCONATE PER 10 ML: Performed by: SURGERY

## 2019-10-12 PROCEDURE — 94799 UNLISTED PULMONARY SVC/PX: CPT

## 2019-10-12 PROCEDURE — 97530 THERAPEUTIC ACTIVITIES: CPT

## 2019-10-12 PROCEDURE — 25010000002 MAGNESIUM SULFATE PER 500 MG OF MAGNESIUM: Performed by: SURGERY

## 2019-10-12 PROCEDURE — 25010000002 ENOXAPARIN PER 10 MG: Performed by: SURGERY

## 2019-10-12 PROCEDURE — 99024 POSTOP FOLLOW-UP VISIT: CPT | Performed by: SURGERY

## 2019-10-12 PROCEDURE — 25010000002 HYDROMORPHONE 1 MG/ML SOLUTION: Performed by: SURGERY

## 2019-10-12 PROCEDURE — 94760 N-INVAS EAR/PLS OXIMETRY 1: CPT

## 2019-10-12 PROCEDURE — 83735 ASSAY OF MAGNESIUM: CPT | Performed by: SURGERY

## 2019-10-12 RX ADMIN — HYDROMORPHONE HYDROCHLORIDE 1 MG: 1 INJECTION, SOLUTION INTRAMUSCULAR; INTRAVENOUS; SUBCUTANEOUS at 05:04

## 2019-10-12 RX ADMIN — I.V. FAT EMULSION 35 G: 20 EMULSION INTRAVENOUS at 17:06

## 2019-10-12 RX ADMIN — SILVER SULFADIAZINE: 10 CREAM TOPICAL at 08:00

## 2019-10-12 RX ADMIN — HYDROMORPHONE HYDROCHLORIDE 1 MG: 1 INJECTION, SOLUTION INTRAMUSCULAR; INTRAVENOUS; SUBCUTANEOUS at 14:26

## 2019-10-12 RX ADMIN — PANTOPRAZOLE SODIUM 40 MG: 40 INJECTION, POWDER, FOR SOLUTION INTRAVENOUS at 05:10

## 2019-10-12 RX ADMIN — CALCIUM GLUCONATE: 94 INJECTION, SOLUTION INTRAVENOUS at 17:07

## 2019-10-12 RX ADMIN — ALBUTEROL SULFATE 2.5 MG: 2.5 SOLUTION RESPIRATORY (INHALATION) at 07:38

## 2019-10-12 RX ADMIN — SODIUM CHLORIDE, PRESERVATIVE FREE 10 ML: 5 INJECTION INTRAVENOUS at 20:41

## 2019-10-12 RX ADMIN — ALBUTEROL SULFATE 2.5 MG: 2.5 SOLUTION RESPIRATORY (INHALATION) at 00:42

## 2019-10-12 RX ADMIN — ENOXAPARIN SODIUM 40 MG: 40 INJECTION SUBCUTANEOUS at 08:00

## 2019-10-12 RX ADMIN — SILVER SULFADIAZINE: 10 CREAM TOPICAL at 20:41

## 2019-10-12 NOTE — THERAPY TREATMENT NOTE
Acute Care - Occupational Therapy Treatment Note  [facility]     Patient Name: Emerson Bang  : 1960  MRN: [id number]  Today's Date: 10/12/2019  Onset of Illness/Injury or Date of Surgery: 19  Date of Referral to OT: 10/02/19  Referring Physician: Dr. Kc    Admit Date: 2019       ICD-10-CM ICD-9-CM   1. 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 Impaired physical mobility Z74.09 781.99   3. Impaired mobility and activities of daily living Z74.09 799.89     Patient Active Problem List   Diagnosis   • Hyperkalemia   • Iron deficiency anemia   • Gastroesophageal reflux disease with esophagitis   • Elevated AST (SGOT)   • Alcohol abuse   • Hypothyroidism   • Balance problem   • Need for vaccination   • Low magnesium levels   • Squamous cell carcinoma of pharynx (CMS/HCC)   • History of throat cancer   • Vitamin D deficiency   • Alcohol abuse counseling and surveillance   • Encounter for screening for diabetes mellitus   • Hypomagnesemia   • Iron deficiency anemia   • Hyperlipidemia   • Underweight due to inadequate caloric intake   • Need for immunization against influenza   • 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HCC)   • Hypertension   • General medical examination   • Underweight   • Epigastric pain   • Gastritis without bleeding   • Need for influenza vaccination   • Elevated liver function tests   • B12 deficiency   • Intestinal malabsorption   • Throat pain   • Acute pharyngitis   • Upper respiratory tract infection   • Neoplasm of uncertain behavior of larynx   • Pharyngoesophageal dysphagia   • Severe protein-calorie malnutrition (CMS/HCC)   • Anemia of chronic disease   • Hypotension   • Hyponatremia   • Status post insertion of percutaneous endoscopic gastrostomy (PEG) tube (CMS/HCC)   • Hx of tracheostomy   • History of throat surgery   • Hx SBO   • Urinary retention   • Generalized weakness   • Acute otitis externa of right ear   • Hard stool   • Panlobular emphysema  (CMS/HCC)   • Cancer of hypopharynx (CMS/HCC)   • Diarrhea   • Encounter for venous access device care   • Prediabetes   • Status post neck dissection   • S/P partial thyroidectomy   • S/P laryngectomy   • Annual physical exam   • Alcoholic cirrhosis of liver without ascites (CMS/HCC)     Past Medical History:   Diagnosis Date   • Allergic rhinitis     vs URI   • Anemia    • At risk for falls    • Benign prostatic hyperplasia    • Chronic gastritis    • Cirrhosis of liver (CMS/HCC)    • Complication of gastrostomy (CMS/HCC)     site not healing   • COPD (chronic obstructive pulmonary disease) (CMS/HCC)    • Dysphagia     odynophagia   • Epigastric pain    • Esophagitis    • GERD (gastroesophageal reflux disease)    • Hypertension    • Hypothyroidism, unspecified    • Low back pain    • Malaise and fatigue    • Nasal congestion 11/15/2018   • Nausea with vomiting, unspecified    • Pain in left knee    • Pain in right knee    • Primary malignant neoplasm of pharynx (CMS/HCC)    • Screening for malignant neoplasm of colon    • 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 Left upper quadrant abdominal wall abscess debridement. Gastrostomy tube erosion through & through the anterior gastric wall.Lupper quadrant abdominal wall abscess   • COLONOSCOPY  04/21/2014    Internal & external hemorrhoids found.   • COLONOSCOPY  2014    Declo   • COLONOSCOPY N/A 10/16/2018    Procedure: COLONOSCOPY;  Surgeon: Kaushal Chester MD;  Location: [facility] ENDOSCOPY;  Service: Gastroenterology   • DIAGNOSTIC LAPAROSCOPY EXPLORATORY LAPAROTOMY N/A 7/17/2019    Procedure: DIAGNOSTIC LAPAROSCOPY, EXPLORATORY LAPAROTOMY, LYSIS OF ADHESIONS;  Surgeon: Parminder Kc MD;   Location: [facility] OR;  Service: General   • DIRECT LARYNGOSCOPY, ESOPHAGOSCOPY, BRONCHOSCOPY N/A 4/15/2019    Procedure: DIRECT LARYNGOSCOPY with biopsy, ESOPHAGOSCOPY;  Surgeon: Bharathi Washington MD;  Location: [facility] OR;  Service: ENT   • ENDOSCOPY N/A 10/16/2018    Procedure: ESOPHAGOGASTRODUODENOSCOPY;  Surgeon: Kaushal Chester MD;  Location: [facility] ENDOSCOPY;  Service: Gastroenterology   • GASTROCUTANEOUS FISTULA CLOSURE N/A 10/2/2019    Procedure: GASTROCUTANEOUS FISTULA CLOSURE;  Surgeon: Parminder Kc MD;  Location: [facility] OR;  Service: General   • GASTROSTOMY FEEDING TUBE INSERTION N/A 4/15/2019    Procedure: GASTROSTOMY FEEDING TUBE INSERTION;  Surgeon: Trenton Valera MD;  Location: [facility] OR;  Service: General   • JEJUNOSTOMY N/A 10/2/2019    Procedure: JEJUNOSTOMY;  Surgeon: Parminder Kc MD;  Location: [facility] OR;  Service: General   • TRACHEOSTOMY  11/10/2008    Respiratory failure   • UPPER GASTROINTESTINAL ENDOSCOPY  04/21/2014    Esophagitis seen. Biopsy taken. Gastritis in stomach. Biopsy taken. Normal duodenum. Biopsy taken.   • UPPER GASTROINTESTINAL ENDOSCOPY  10/16/2018   • VENOUS ACCESS DEVICE (PORT) INSERTION N/A 9/11/2019    Procedure: INSERTION VENOUS ACCESS DEVICE (MEDIPORT)        (c-arm#1);  Surgeon: Parminder Kc MD;  Location: [facility];  Service: General       Therapy Treatment    Rehabilitation Treatment Summary     Row Name 10/12/19 1032             Treatment Time/Intention    Discipline  occupational therapy assistant  -TO      Document Type  therapy note (daily note)  -TO2      Subjective Information  complains of;pain  -TO2      Mode of Treatment  individual therapy;occupational therapy  -TO2      Therapy Frequency (OT Eval)  -- 5-7x/week  -TO      Patient Effort  adequate  -TO2      Recorded by [TO] Nelson Oliveira COTA/L 10/12/19 1216  [TO2] Nelson Oliveira COTA/L 10/12/19 1308      Row Name 10/12/19 1032             Vital Signs    Pretreatment Heart Rate  (beats/min)  95  -TO      Posttreatment Heart Rate (beats/min)  92  -TO2      Pre SpO2 (%)  97  -TO      O2 Delivery Pre Treatment  trach collar  -TO      Post SpO2 (%)  96  -TO2      O2 Delivery Post Treatment  trach collar  -TO2      Recorded by [TO] Nelson Oliveira MADSEN/L 10/12/19 1216  [TO2] Nelson Oliveira MADSEN/L 10/12/19 1308      Row Name 10/12/19 1032             Cognitive Assessment/Intervention- PT/OT    Affect/Mental Status (Cognitive)  unable/difficult to assess  -TO      Behavioral Issues (Cognitive)  unable/difficult to assess  -TO      Orientation Status (Cognition)  unable/difficult to assess  -TO      Follows Commands (Cognition)  WFL  -TO      Recorded by [TO] Nelson Oliveira MADSEN/L 10/12/19 1308      Row Name 10/12/19 1032             Bed Mobility Assessment/Treatment    Bed Mobility Assessment/Treatment  supine-sit;sit-supine  -TO      Supine-Sit [place] (Bed Mobility)  contact guard  -TO2      Sit-Supine [place] (Bed Mobility)  contact guard  -TO2      Recorded by [TO] Nelson Oliveira MADSEN/L 10/12/19 1308  [TO2] Nelson Oliveira MADSEN/L 10/12/19 1216      Row Name 10/12/19 1032             Static Sitting Balance    Level of [place] (Unsupported Sitting, Static Balance)  supervision  -TO      Sitting Position (Unsupported Sitting, Static Balance)  sitting on edge of bed  -TO      Time Able to Maintain Position (Unsupported Sitting, Static Balance)  more than 5 minutes ~10 mins  -TO      Recorded by [TO] Nelson Oliveira MADSEN/L 10/12/19 1308      Row Name 10/12/19 1032             Positioning and Restraints    Pre-Treatment Position  in bed  -TO      Post Treatment Position  bed  -TO      In Bed  supine;call light within reach;encouraged to call for assist;exit alarm on HOB> 30*  -TO      Recorded by [TO] Nelson Oliveira MADSEN/L 10/12/19 1308      Row Name 10/12/19 1032             Pain Assessment    Additional Documentation  Pain Scale: Numbers Pre/Post-Treatment (Group)  -TO       Recorded by [TO] Nelson Oliveira MADSEN/L 10/12/19 1308      Row Name 10/12/19 1032             Pain Scale: Numbers Pre/Post-Treatment    Pain Scale: Numbers, Pretreatment  8/10  -TO      Pain Scale: Numbers, Post-Treatment  8/10  -TO      Pain Location  abdomen  -TO      Pain Intervention(s)  Repositioned  -TO      Recorded by [TO] Nelson Oliveira MADSEN/L 10/12/19 1308      Row Name                Wound 09/30/19 1330 Left medial abdomen Fistula    Wound - Properties Group Date first assessed: 09/30/19 [SS] Time first assessed: 1330 [SS] Side: Left [SS] Orientation: medial [SS] Location: abdomen [SS] Primary Wound Type: Fistula [SS] Recorded by:  [SS] Citlalli Abdi RN 09/30/19 1619    Row Name                Wound 10/02/19 1347 abdomen Incision    Wound - Properties Group Date first assessed: 10/02/19 [CF] Time first assessed: 1347 [CF] Present on Hospital Admission: N [CF] Location: abdomen [CF] Primary Wound Type: Incision [CF] Recorded by:  [CF] Eileen Tyson RN 10/02/19 1347    Row Name 10/12/19 1032             Outcome Summary/Treatment Plan (OT)    Daily Summary of Progress (OT)  progress towards functional goals is fair  -TO      Plan for Continued Treatment (OT)  cont OT poc  -TO      Anticipated Discharge Disposition (OT)  anticipate therapy at next level of care  -TO      Recorded by [TO] Nelson Oliveira MADSEN/L 10/12/19 1308        User Key  (r) = Recorded By, (t) = Taken By, (c) = Cosigned By    Initials Name Effective Dates Discipline    SS Citlalli Abdi RN 07/10/18 -  Nurse    TO Nelson Oliveira MADSEN/L 03/07/18 -  OT    CF Eileen Tyson RN 10/17/16 -  Nurse        Wound 09/30/19 1330 Left medial abdomen Fistula (Active)   Dressing Appearance dried drainage 10/12/2019  7:10 AM   Closure SAUMYA 10/12/2019  7:10 AM   Base moist;slough;yellow 10/11/2019 10:30 PM   Periwound redness 10/12/2019  7:10 AM   Periwound Temperature warm 10/12/2019  7:10 AM   Periwound Skin Turgor soft 10/12/2019  7:10  AM   Drainage Characteristics/Odor serosanguineous 10/12/2019  7:10 AM   Care, Wound cleansed with;sterile normal saline 10/11/2019 10:30 PM   Dressing Care, Wound dressing changed;other (see comments) 10/11/2019 10:30 PM       Wound 10/02/19 1347 abdomen Incision (Active)   Dressing Appearance dry;intact 10/12/2019  7:10 AM   Closure Staples 10/12/2019  7:10 AM   Periwound dry 10/12/2019  7:10 AM   Periwound Temperature warm 10/12/2019  7:10 AM   Periwound Skin Turgor soft 10/12/2019  7:10 AM   Drainage Amount none 10/12/2019  7:10 AM   Dressing Care, Wound open to air 10/11/2019 10:30 PM     Rehab Goal Summary     Row Name 10/12/19 1023             Occupational Therapy Goals    Transfer Goal Selection (OT)  transfer, OT goal 1  -TO      Bathing Goal Selection (OT)  bathing, OT goal 1  -TO      Dressing Goal Selection (OT)  dressing, OT goal 1  -TO      Toileting Goal Selection (OT)  toileting, OT goal 1  -TO      Activity Tolerance Goal Selection (OT)  activity tolerance, OT goal 1  -TO         Transfer Goal 1 (OT)    Activity/Assistive Device (Transfer Goal 1, OT)  sit-to-stand/stand-to-sit;bed-to-chair/chair-to-bed;toilet  -TO      [place] Level/Cues Needed (Transfer Goal 1, OT)  supervision required  -TO      Time Frame (Transfer Goal 1, OT)  long term goal (LTG);by discharge  -TO      Progress/Outcome (Transfer Goal 1, OT)  goal not met  -TO         Bathing Goal 1 (OT)    Activity/Assistive Device (Bathing Goal 1, OT)  bathing skills, all  -TO      [place] Level/Cues Needed (Bathing Goal 1, OT)  minimum assist (75% or more patient effort)  -TO      Time Frame (Bathing Goal 1, OT)  long term goal (LTG);by discharge  -TO      Barriers (Bathing Goal 1, OT)  Pt will need extra time and rest breaks PRN  -TO      Progress/Outcomes (Bathing Goal 1, OT)  goal not met  -TO         Dressing Goal 1 (OT)    Activity/Assistive Device (Dressing Goal 1, OT)  dressing skills, all  -TO      [place]/Cues Needed  (Dressing Goal 1, OT)  minimum assist (75% or more patient effort)  -TO      Time Frame (Dressing Goal 1, OT)  long term goal (LTG);by discharge  -TO      Barriers (Dressing Goal 1, OT)  Pt will need extra time and rest breaks PRN  -TO      Progress/Outcome (Dressing Goal 1, OT)  goal not met  -TO         Toileting Goal 1 (OT)    Activity/Device (Toileting Goal 1, OT)  toileting skills, all  -TO      [place] Level/Cues Needed (Toileting Goal 1, OT)  minimum assist (75% or more patient effort)  -TO      Time Frame (Toileting Goal 1, OT)  long term goal (LTG);by discharge  -TO      Barriers (Toileting Goal 1, OT)  Pt will need extra time and rest breaks PRN  -TO      Progress/Outcome (Toileting Goal 1, OT)  goal not met  -TO          Activity Tolerance Goal 1 (OT)    Activity Level (Endurance Goal 1, OT)  15 min activity functional/therapeutic activity  -TO      Progress/Outcome (Activity Tolerance Goal 1, OT)  goal not met  -TO        User Key  (r) = Recorded By, (t) = Taken By, (c) = Cosigned By    Initials Name Provider Type Discipline    TO Nelson Oliveira COTA/L Occupational Therapy Assistant OT        Occupational Therapy Education     Title: PT OT SLP Therapies (In Progress)     Topic: Occupational Therapy (In Progress)     Point: ADL training (In Progress)     Description: Instruct learner(s) on proper safety adaptation and remediation techniques during self care or transfers.   Instruct in proper use of assistive devices.    Learning Progress Summary           Patient Acceptance, E, NR by BB at 10/9/2019  1:37 PM    Acceptance, E,TB,D, NR by CS at 10/6/2019  1:05 PM    Acceptance, E,TB,D, NR by CS at 10/5/2019 11:11 AM    Acceptance, E, NR by AS at 10/4/2019 11:45 AM    Comment:  Role of OT, OT POC, importance of OOB activity for strength and healing, ADL training                   Point: Home exercise program (In Progress)     Description: Instruct learner(s) on appropriate technique for monitoring,  assisting and/or progressing therapeutic exercises/activities.    Learning Progress Summary           Patient Acceptance, E, NR by BB at 10/8/2019 11:46 AM    Acceptance, E,TB,D, NR by CS at 10/6/2019  1:05 PM    Acceptance, E,TB,D, NR by CS at 10/5/2019 11:11 AM                   Point: Precautions (In Progress)     Description: Instruct learner(s) on prescribed precautions during self-care and functional transfers.    Learning Progress Summary           Patient Acceptance, E,TB,D, NR by CS at 10/6/2019  1:05 PM    Acceptance, E,TB,D, NR by CS at 10/5/2019 11:11 AM    Acceptance, E, NR by AS at 10/4/2019 11:45 AM    Comment:  Role of OT, OT POC, importance of OOB activity for strength and healing, ADL training                   Point: Body mechanics (In Progress)     Description: Instruct learner(s) on proper positioning and spine alignment during self-care, functional mobility activities and/or exercises.    Learning Progress Summary           Patient Acceptance, E,TB,D, NR by CS at 10/6/2019  1:05 PM    Acceptance, E,TB,D, NR by CS at 10/5/2019 11:11 AM                               User Key     Initials Effective Dates Name Provider Type Discipline     03/07/18 -  Kerrie Woodson COTA/L Occupational Therapy Assistant OT     03/07/18 -  Brenda Thomas COTA/LIANE Occupational Therapy Assistant OT    AS 03/25/19 -  Destinee Yarbrough, OT Occupational Therapist OT                OT Recommendation and Plan  Outcome Summary/Treatment Plan (OT)  Daily Summary of Progress (OT): progress towards functional goals is fair  Plan for Continued Treatment (OT): cont OT poc  Anticipated Discharge Disposition (OT): anticipate therapy at next level of care  Therapy Frequency (OT Eval): (5-7x/week)  Daily Summary of Progress (OT): progress towards functional goals is fair  Plan of Care Review  Plan of Care Reviewed With: patient  Plan of Care Reviewed With: patient  Outcome Summary: Pt agree to EOB only, declining ADLs ot  ther ax. Pt CGA sup<>sit with SUP once EOB x 10 mins with 0 LOB.  Outcome Measures     Row Name 10/12/19 1032 10/11/19 1500 10/11/19 0630       How much help from another person do you currently need...    Turning from your back to your side while in flat bed without using bedrails?  --  2  -GIAN  --    Moving from lying on back to sitting on the side of a flat bed without bedrails?  --  2  -GIAN  --    Moving to and from a bed to a chair (including a wheelchair)?  --  2  -GIAN  --    Standing up from a chair using your arms (e.g., wheelchair, bedside chair)?  --  2  -GIAN  --    Climbing 3-5 steps with a railing?  --  1  -GIAN  --    To walk in hospital room?  --  2  -GIAN  --    AM-PAC 6 Clicks Score (PT)  --  11  -GIAN  --       How much help from another is currently needed...    Putting on and taking off regular lower body clothing?  2  -TO  --  2  -KD    Bathing (including washing, rinsing, and drying)  2  -TO  --  2  -KD    Toileting (which includes using toilet bed pan or urinal)  2  -TO  --  2  -KD    Putting on and taking off regular upper body clothing  2  -TO  --  2  -KD    Taking care of personal grooming (such as brushing teeth)  2  -TO  --  2  -KD    Eating meals  1  -TO  --  1  -KD    AM-PAC 6 Clicks Score (OT)  11  -TO  --  11  -KD       Functional Assessment    Outcome Measure Options  --  AM-PAC 6 Clicks Basic Mobility (PT)  -GIAN  --    Row Name 10/10/19 1200 10/10/19 0833 10/09/19 1423       How much help from another person do you currently need...    Turning from your back to your side while in flat bed without using bedrails?  2  -JW  --  3  -GIAN    Moving from lying on back to sitting on the side of a flat bed without bedrails?  2  -JW  --  2  -GIAN    Moving to and from a bed to a chair (including a wheelchair)?  2  -JW  --  2  -GIAN    Standing up from a chair using your arms (e.g., wheelchair, bedside chair)?  2  -JW  --  2  -GIAN    Climbing 3-5 steps with a railing?  1  -JW  --  1  -GIAN    To walk in hospital  room?  2  -JW  --  2  -GIAN    AM-[facility] 6 Clicks Score (PT)  11 -JW  --  12  -GIAN       How much help from another is currently needed...    Putting on and taking off regular lower body clothing?  --  2  -BB  --    Bathing (including washing, rinsing, and drying)  --  2  -BB  --    Toileting (which includes using toilet bed pan or urinal)  --  2  -BB  --    Putting on and taking off regular upper body clothing  --  2  -BB  --    Taking care of personal grooming (such as brushing teeth)  --  2  -BB  --    Eating meals  --  1  -BB  --    AM-[facility] 6 Clicks Score (OT)  --  11  -BB  --       Functional Assessment    Outcome Measure Options  AM-[facility] 6 Clicks Basic Mobility (PT)  -  --  -[facility] 6 Clicks Basic Mobility (PT)  -      User Key  (r) = Recorded By, (t) = Taken By, (c) = Cosigned By    Initials Name Provider Type     Daniel Putnam, PTA Physical Therapy Assistant    Abran Ferreira PTA Physical Therapy Assistant    Kerrie Spencer, MADSEN/L Occupational Therapy Assistant    Isabelle Colon, MADSEN/L Occupational Therapy Assistant    TO Nelson Oliveira COTA/L Occupational Therapy Assistant           Time Calculation:   Time Calculation- OT     Row Name 10/12/19 1310             Time Calculation- OT    OT Start Time  1032  -TO      OT Stop Time  1100  -TO      OT Time Calculation (min)  28 min  -TO      Total Timed Code Minutes- OT  28 minute(s)  -TO      OT Received On  10/12/19  -TO        User Key  (r) = Recorded By, (t) = Taken By, (c) = Cosigned By    Initials Name Provider Type    TO Nelson Oliveira COTA/L Occupational Therapy Assistant        Therapy Charges for Today     Code Description Service Date Service Provider Modifiers Qty    [id number]  OT THERAPEUTIC ACT EA 15 MIN 10/12/2019 Nelson Oliveira COTA/L GO 2               ROSEANNA Mao  10/12/2019

## 2019-10-12 NOTE — PROGRESS NOTES
LOS: 11 days   Patient Care Team:  Cristino He MD as PCP - General  Trenton Valera MD as Surgeon (General Surgery)  Krysten Martínez MD as Consulting Physician (Hematology and Oncology)  Keaton Alfred MD as Consulting Physician (Radiation Oncology)  Dee Bajwa APRN as Nurse Practitioner (Oncology)    Chief Complaint: Status post repair of gastrocutaneous fistula; burn to the anterior abdominal wall; postoperative nausea and vomiting    Subjective     History of Present Illness  Is unable to tolerate tube feeds last night.  Continues to have good bowel function  Subjective    History taken from: patient chart RN    Objective     Vital Signs  Temp:  [97.2 °F (36.2 °C)-97.3 °F (36.3 °C)] 97.3 °F (36.3 °C)  Heart Rate:  [] 82  Resp:  [16-18] 18  BP: (132-166)/(74-84) 140/75    Objective  Abdomen is soft and nontender.  All incisions are healing well.  The burn on the anterior abdominal wall is healing nicely as well.  Results Review:     I reviewed the patient's new clinical results.    Medication Review: Performed    Assessment/Plan       * No active hospital problems. *      Assessment:    Condition: In stable condition.  Improving.       Plan:   (1.  We will hold tube feeds for today  2.  Restart in the morning   3.  Continue local wound care).       Parminder Kc MD  10/11/19  7:30 PM    Time: 15 minutes

## 2019-10-12 NOTE — PLAN OF CARE
Problem: Patient Care Overview  Goal: Plan of Care Review  Outcome: Ongoing (interventions implemented as appropriate)   10/12/19 0414   Coping/Psychosocial   Plan of Care Reviewed With patient   Plan of Care Review   Progress no change   OTHER   Outcome Summary vitals stable, dressing done x 1, no acute changes.

## 2019-10-12 NOTE — PROGRESS NOTES
LOS: 12 days   Patient Care Team:  Cristino He MD as PCP - General  Soto, Trenton Lewis MD as Surgeon (General Surgery)  Krysten Martínez MD as Consulting Physician (Hematology and Oncology)  Keaton Alfred MD as Consulting Physician (Radiation Oncology)  Dee Bajwa APRN as Nurse Practitioner (Oncology)    Chief Complaint: Postoperative day 11 following closure of gastrocutaneous fistula, lysis of adhesions, and jejunostomy tube placement    Subjective     History of Present Illness  Tolerating tube feeds which were started this morning at 20 cc/h.  Remains on intravenous hyperalimentation.  Subjective:  Symptoms:  Improved.  He reports malaise and weakness.  No shortness of breath, cough, chest pain, headache, chest pressure, anorexia, diarrhea or anxiety.    Diet:  Poor intake.  No nausea or vomiting.    Activity level: Returning to normal.        History taken from: patient chart RN    Objective     Vital Signs  Temp:  [96.8 °F (36 °C)-97.3 °F (36.3 °C)] 96.8 °F (36 °C)  Heart Rate:  [70-89] 72  Resp:  [18] 18  BP: (131-140)/(75-89) 131/89    Objective:  Vital signs: (most recent): Blood pressure 131/89, pulse 72, temperature 96.8 °F (36 °C), temperature source Axillary, resp. rate 18, weight 53.2 kg (117 lb 5.3 oz), SpO2 96 %.    Lungs:  Normal effort and normal respiratory rate.    Heart: Normal rate.  Regular rhythm.    Abdomen: Abdomen is soft and non-distended.  (Burn around the gastrostomy site continues to epithelialize well.  There is still a very deep open area present here which we are treating with cellulitis Silvadene.  There is no cellulitis.  Midline incision is healing nicely with intact staples).              Results Review:     I reviewed the patient's new clinical results.    Medication Review: Performed    Assessment/Plan       * No active hospital problems. *      Assessment:    Condition: In stable condition.  Improving.       Plan:   (1.  We will advance  tube feeds slowly and decrease hyperarousal  2.  Continue local wound care to the anterior abdominal wall).       Parminder Kc MD  10/12/19  11:09 AM    Time: 15 minutes

## 2019-10-12 NOTE — NURSING NOTE
Unable to obtain a baseline ETco2 reading as it will not read on the pt. Resp was contacted to see if they had an alternative method and they did not. Will obtain O2 readings before administration of IV narcotics until this can be resolved.

## 2019-10-12 NOTE — CONSULTS
Adult Nutrition  Assessment    Patient Name:  Emerson Bang  YOB: 1960  MRN: [id number]  Admit Date:  9/30/2019    Assessment Date:  10/12/2019    Comments:  TF was held last night d/t n/v. NGT restarted this am and is currently running at 20ml/hr- has been since early this am and so far is being tolerated well. TPN + lipids continue. TF goal rate is 60ml/hr and would recommend continue parental nutrition until pt is tolerating 35-40ml/hr on TF d/t severity of malnutrition. RD to follow hospital course and make recs as indicated.     Reason for Assessment     Row Name 10/12/19 1437          Reason for Assessment    Reason For Assessment  nurse/nurse practitioner consult;TF/PN     Diagnosis  cancer diagnosis/related complications     Identified At Risk by Screening Criteria  BMI;tube feeding or parenteral nutrition         Nutrition/Diet History     Row Name 10/12/19 1437          Nutrition/Diet History    Typical Food/Fluid Intake  Pt indicated no GI problems with TF currently at 20ml.          Anthropometrics     Row Name 10/12/19 0645          Anthropometrics    Weight  53.2 kg (117 lb 5.3 oz)         Labs/Tests/Procedures/Meds     Row Name 10/12/19 1437          Labs/Procedures/Meds    Lab Results Comments  Glu x24hrs 140-148, Na/K/Phos/Mg low, alb 2.5L        Medications    Pertinent Medications Comments  TPN + lipids             Nutrition Prescription Ordered     Row Name 10/12/19 1438          Nutrition Prescription PO    Current PO Diet  NPO        Nutrition Prescription EN    Enteral Route  NJ     Product  Isosource HN (Osmolite 1.2)     TF Delivery Method  Continuous     Continuous TF Goal Rate (mL/hr)  20 mL/hr     Continuous TF Goal Volume (mL)  480 mL        Nutrition Prescription PN    PN Route  PICC TPN + lipids                 Electronically signed by:  Alexia Bowden RD  10/12/19 2:40 PM

## 2019-10-12 NOTE — PLAN OF CARE
Problem: Patient Care Overview  Goal: Plan of Care Review  Outcome: Ongoing (interventions implemented as appropriate)   10/12/19 1032   Coping/Psychosocial   Plan of Care Reviewed With patient   Plan of Care Review   Progress no change   OTHER   Outcome Summary Pt agree to EOB only, declining ADLs ot ther ax. Pt CGA sup<>sit with SUP once EOB x 10 mins with 0 LOB.

## 2019-10-12 NOTE — PROGRESS NOTES
Parenteral Nutrition by Pharmacy - Day 6    Patient: Emerson Bang  MRN#: [id number]  Attending: No name on file.  Admission Date: 093019    Subjective/Objective   Progress Notes Reviewed    Assessment      Lab Results   Component Value Date    GLUCOSE 148 (H) 10/12/2019    BUN 10 10/12/2019    CREATININE 0.43 (L) 10/12/2019    EGFRIFAFRI >150 10/12/2019    BCR 23.3 10/12/2019    K 3.3 (L) 10/12/2019    CO2 35.0 (H) 10/12/2019    CALCIUM 7.9 (L) 10/12/2019    ALBUMIN 2.50 (L) 10/10/2019    AST 23 10/10/2019    ALT 10 10/10/2019     Lab Results   Component Value Date    GLUCOSE 148 (H) 10/12/2019    CALCIUM 7.9 (L) 10/12/2019     (L) 10/12/2019    K 3.3 (L) 10/12/2019    CO2 35.0 (H) 10/12/2019    CL 88 (L) 10/12/2019    BUN 10 10/12/2019    CREATININE 0.43 (L) 10/12/2019    EGFRIFAFRI >150 10/12/2019    BCR 23.3 10/12/2019    ANIONGAP 7.0 10/12/2019     Magnesium   Date Value Ref Range Status   10/12/2019 1.1 (L) 1.6 - 2.4 mg/dL Final     Phosphorus   Date Value Ref Range Status   10/12/2019 2.0 (L) 2.5 - 4.5 mg/dL Final     Calcium   Date Value Ref Range Status   10/12/2019 7.9 (L) 8.6 - 10.5 mg/dL Final     Triglycerides   Date Value Ref Range Status   10/07/2019 132 0 - 150 mg/dL Final         Diagnosis: prolonged paralytic ileus    Above labs reviewed.  Major changes in electrolytes today - no pertinent notes yet this morning concerning patient status  Sodium remains low at 130 - stable  Potassium low at 3.3 - down from 4.9  Chloride low at 88 - down from 93  CO2 high at 35 - up from 26  Phosphorus low at 2.0  Magnesium low at 1.1  Calcium 7.9 - Corrected Calcium 9.1 (using albumin from 10/10)    Per notes, tube feeds held yesterday with intentions to restart this morning    Plan   Increase Potassium Phosphate  Add Potassium Chloride  Increase Sodium Chloride  Add Sodium Phosphate  Removed Sodium Acetate  Increase Magnesium Sulfate    TPN Formulation:  Clinimix 5/15 % 2000 ml  KPhos 52.8 mEq / 36  mMol  KCl 40 mEq  NaCl 230 mEq  NaPhos 13 mEq / 10 mMol  Calcium Gluconate 16 mEq  Mag Sulfate 12 mEq  MVI 10 ml    Rate 83.3 mL/hr    Lipids: 175 mL over 12 hours    Pharmacy will continue to monitor and adjust formula as needed.    Keshia Martinez [facility]  10/12/19  9:03 AM

## 2019-10-13 LAB
ANION GAP SERPL CALCULATED.3IONS-SCNC: 10 MMOL/L (ref 5–15)
BUN BLD-MCNC: 12 MG/DL (ref 8–23)
BUN/CREAT SERPL: 26.1 (ref 7–25)
CALCIUM SPEC-SCNC: 7.9 MG/DL (ref 8.6–10.5)
CHLORIDE SERPL-SCNC: 92 MMOL/L (ref 98–107)
CO2 SERPL-SCNC: 27 MMOL/L (ref 22–29)
CREAT BLD-MCNC: 0.46 MG/DL (ref 0.76–1.27)
GFR SERPL CREATININE-BSD FRML MDRD: >150 ML/MIN/1.73
GLUCOSE BLD-MCNC: 102 MG/DL (ref 65–99)
GLUCOSE BLDC GLUCOMTR-MCNC: 111 MG/DL (ref 70–130)
GLUCOSE BLDC GLUCOMTR-MCNC: 115 MG/DL (ref 70–130)
GLUCOSE BLDC GLUCOMTR-MCNC: 121 MG/DL (ref 70–130)
GLUCOSE BLDC GLUCOMTR-MCNC: 121 MG/DL (ref 70–130)
GLUCOSE BLDC GLUCOMTR-MCNC: 124 MG/DL (ref 70–130)
GLUCOSE BLDC GLUCOMTR-MCNC: 129 MG/DL (ref 70–130)
POTASSIUM BLD-SCNC: 3.3 MMOL/L (ref 3.5–5.2)
SODIUM BLD-SCNC: 129 MMOL/L (ref 136–145)

## 2019-10-13 PROCEDURE — 80048 BASIC METABOLIC PNL TOTAL CA: CPT | Performed by: SURGERY

## 2019-10-13 PROCEDURE — 99024 POSTOP FOLLOW-UP VISIT: CPT | Performed by: SURGERY

## 2019-10-13 PROCEDURE — 82962 GLUCOSE BLOOD TEST: CPT

## 2019-10-13 PROCEDURE — 94799 UNLISTED PULMONARY SVC/PX: CPT

## 2019-10-13 PROCEDURE — 97110 THERAPEUTIC EXERCISES: CPT

## 2019-10-13 PROCEDURE — 25010000002 ENOXAPARIN PER 10 MG: Performed by: SURGERY

## 2019-10-13 RX ORDER — DEXTROSE, SODIUM CHLORIDE, AND POTASSIUM CHLORIDE 5; .45; .15 G/100ML; G/100ML; G/100ML
50 INJECTION INTRAVENOUS CONTINUOUS
Status: DISCONTINUED | OUTPATIENT
Start: 2019-10-13 | End: 2019-10-25 | Stop reason: HOSPADM

## 2019-10-13 RX ORDER — ONDANSETRON 4 MG/1
4 TABLET, ORALLY DISINTEGRATING ORAL EVERY 6 HOURS PRN
Status: DISCONTINUED | OUTPATIENT
Start: 2019-10-13 | End: 2019-10-25 | Stop reason: HOSPADM

## 2019-10-13 RX ORDER — FAMOTIDINE 40 MG/5ML
20 POWDER, FOR SUSPENSION ORAL EVERY 12 HOURS
Status: DISCONTINUED | OUTPATIENT
Start: 2019-10-13 | End: 2019-10-25 | Stop reason: HOSPADM

## 2019-10-13 RX ADMIN — HYDROCODONE BITARTRATE AND ACETAMINOPHEN 15 ML: 7.5; 325 SOLUTION ORAL at 12:22

## 2019-10-13 RX ADMIN — ALBUTEROL SULFATE 2.5 MG: 2.5 SOLUTION RESPIRATORY (INHALATION) at 19:10

## 2019-10-13 RX ADMIN — ENOXAPARIN SODIUM 40 MG: 40 INJECTION SUBCUTANEOUS at 08:39

## 2019-10-13 RX ADMIN — ALBUTEROL SULFATE 2.5 MG: 2.5 SOLUTION RESPIRATORY (INHALATION) at 13:20

## 2019-10-13 RX ADMIN — PANTOPRAZOLE SODIUM 40 MG: 40 INJECTION, POWDER, FOR SOLUTION INTRAVENOUS at 06:11

## 2019-10-13 RX ADMIN — SILVER SULFADIAZINE: 10 CREAM TOPICAL at 21:35

## 2019-10-13 RX ADMIN — SODIUM CHLORIDE, PRESERVATIVE FREE 10 ML: 5 INJECTION INTRAVENOUS at 21:35

## 2019-10-13 RX ADMIN — HYDROCODONE BITARTRATE AND ACETAMINOPHEN 15 ML: 7.5; 325 SOLUTION ORAL at 08:39

## 2019-10-13 RX ADMIN — POTASSIUM CHLORIDE, DEXTROSE MONOHYDRATE AND SODIUM CHLORIDE 75 ML/HR: 150; 5; 450 INJECTION, SOLUTION INTRAVENOUS at 12:22

## 2019-10-13 RX ADMIN — ALBUTEROL SULFATE 2.5 MG: 2.5 SOLUTION RESPIRATORY (INHALATION) at 00:22

## 2019-10-13 RX ADMIN — FAMOTIDINE 20 MG: 40 POWDER, FOR SUSPENSION ORAL at 12:22

## 2019-10-13 RX ADMIN — SILVER SULFADIAZINE: 10 CREAM TOPICAL at 08:39

## 2019-10-13 RX ADMIN — HYDROCODONE BITARTRATE AND ACETAMINOPHEN 15 ML: 7.5; 325 SOLUTION ORAL at 21:34

## 2019-10-13 RX ADMIN — HYDROCODONE BITARTRATE AND ACETAMINOPHEN 15 ML: 7.5; 325 SOLUTION ORAL at 01:49

## 2019-10-13 NOTE — PROGRESS NOTES
LOS: 13 days   Patient Care Team:  Cristino He MD as PCP - General  Valera, Trenton Lewis MD as Surgeon (General Surgery)  Krysten Martínez MD as Consulting Physician (Hematology and Oncology)  Keaton Alfred MD as Consulting Physician (Radiation Oncology)  Dee Bajwa APRN as Nurse Practitioner (Oncology)    Chief Complaint: Postoperative day 12 closure of gastrocutaneous fistula and placement of jejunostomy tube    Subjective     History of Present Illness    Subjective:  Symptoms:  Improved.  He reports malaise and weakness.  No shortness of breath, cough, chest pain, headache, chest pressure, anorexia, diarrhea or anxiety.    Diet:  Adequate intake.  No nausea or vomiting.    Activity level: Impaired due to weakness.    Pain:  He reports no pain.        History taken from: patient chart RN    Objective     Vital Signs  Temp:  [96.2 °F (35.7 °C)-97.1 °F (36.2 °C)] 97.1 °F (36.2 °C)  Heart Rate:  [77-96] 88  Resp:  [16-18] 18  BP: (100-110)/(56-60) 106/56    Objective:  General Appearance:  Comfortable.    Vital signs: (most recent): Blood pressure 106/56, pulse 88, temperature 97.1 °F (36.2 °C), resp. rate 18, weight 56.3 kg (124 lb 1.6 oz), SpO2 96 %.  Vital signs are normal.  No fever.    Output: Producing urine and producing stool.    Heart: Normal rate.  Regular rhythm.    Abdomen: Abdomen is soft.  (The burn is epithelializing well.  The deepest areas around the site where the gastrostomy was placed.  No cellulitis is noted.  The wound is redressed and Silvadene).              Results Review:     I reviewed the patient's new clinical results.    Medication Review: Performed    Assessment/Plan       * No active hospital problems. *      Assessment:    Condition: In stable condition.  Improving.       Plan:   (1.  Stop intravenous hyperalimentation  2.  Increase tube feedings).       Parminder Kc MD  10/13/19  11:57 AM    Time: 20 minutes

## 2019-10-13 NOTE — PLAN OF CARE
Problem: Patient Care Overview  Goal: Plan of Care Review  Outcome: Ongoing (interventions implemented as appropriate)   10/13/19 3665   Coping/Psychosocial   Plan of Care Reviewed With patient   Plan of Care Review   Progress no change   OTHER   Outcome Summary Drsg changed x 1, trach care done, pain medication now liquid Norco per g tube, tolerating feeding well, no residual noted, will contunue to monitor.

## 2019-10-13 NOTE — PLAN OF CARE
Problem: Patient Care Overview  Goal: Plan of Care Review  Outcome: Ongoing (interventions implemented as appropriate)   10/13/19 3192   Coping/Psychosocial   Plan of Care Reviewed With patient   Plan of Care Review   Progress improving   OTHER   Outcome Summary pt only agreeable to LE ther ex in supine. pt w/ increased reps to 25 x1 ashley, AROM. no new goals met at this time. Follow up plan: speak w/ eval PT about lack of participation.

## 2019-10-13 NOTE — THERAPY TREATMENT NOTE
Acute Care - Physical Therapy Treatment Note  [facility]     Patient Name: Emerson Bang  : 1960  MRN: [id number]  Today's Date: 10/13/2019  Onset of Illness/Injury or Date of Surgery: 19     Referring Physician: Dr. Kc    Admit Date: 2019    Visit Dx:    ICD-10-CM ICD-9-CM   1. 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 Impaired physical mobility Z74.09 781.99   3. Impaired mobility and activities of daily living Z74.09 799.89     Patient Active Problem List   Diagnosis   • Hyperkalemia   • Iron deficiency anemia   • Gastroesophageal reflux disease with esophagitis   • Elevated AST (SGOT)   • Alcohol abuse   • Hypothyroidism   • Balance problem   • Need for vaccination   • Low magnesium levels   • Squamous cell carcinoma of pharynx (CMS/HCC)   • History of throat cancer   • Vitamin D deficiency   • Alcohol abuse counseling and surveillance   • Encounter for screening for diabetes mellitus   • Hypomagnesemia   • Iron deficiency anemia   • Hyperlipidemia   • Underweight due to inadequate caloric intake   • Need for immunization against influenza   • 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HCC)   • Hypertension   • General medical examination   • Underweight   • Epigastric pain   • Gastritis without bleeding   • Need for influenza vaccination   • Elevated liver function tests   • B12 deficiency   • Intestinal malabsorption   • Throat pain   • Acute pharyngitis   • Upper respiratory tract infection   • Neoplasm of uncertain behavior of larynx   • Pharyngoesophageal dysphagia   • Severe protein-calorie malnutrition (CMS/HCC)   • Anemia of chronic disease   • Hypotension   • Hyponatremia   • Status post insertion of percutaneous endoscopic gastrostomy (PEG) tube (CMS/HCC)   • Hx of tracheostomy   • History of throat surgery   • Hx SBO   • Urinary retention   • Generalized weakness   • Acute otitis externa of right ear   • Hard stool   • Panlobular emphysema (CMS/HCC)   • Cancer of  hypopharynx (CMS/HCC)   • Diarrhea   • Encounter for venous access device care   • Prediabetes   • Status post neck dissection   • S/P partial thyroidectomy   • S/P laryngectomy   • Annual physical exam   • Alcoholic cirrhosis of liver without ascites (CMS/HCC)       Therapy Treatment    Rehabilitation Treatment Summary     Row Name 10/13/19 1105 10/13/19 0924          Treatment Time/Intention    Discipline  occupational therapy assistant  -RC  physical therapy assistant  -GIAN     Document Type  --  therapy note (daily note)  -GIAN     Mode of Treatment  --  individual therapy;physical therapy  -GIAN     Therapy Frequency (PT Clinical Impression)  --  other (see comments) 6days per week  -GIAN     Therapy Frequency (OT Eval)  -- 5-7 x week   -RC  --     Patient Effort  --  fair  -GIAN     Comment  --  pt agreeable only to ther ex in supine  -GIAN     Reason Treatment Not Performed  patient/family declined treatment  -  --     Existing Precautions/Restrictions  --  fall;other (see comments) watch gait belt placement  -GIAN     Recorded by [RC] Joana Helton COTA/L 10/13/19 1126 [GIAN] Abran Wan PTA 10/13/19 0936     Row Name 10/13/19 0924             Vital Signs    Pre Systolic BP Rehab  110  -GIAN      Pre Treatment Diastolic BP  62  -GIAN      Post Systolic BP Rehab  106  -GIAN      Post Treatment Diastolic BP  62  -GIAN      Pretreatment Heart Rate (beats/min)  93  -GIAN      Posttreatment Heart Rate (beats/min)  92  -GIAN      Pre SpO2 (%)  100  -GIAN      O2 Delivery Pre Treatment  supplemental O2  -GIAN      Post SpO2 (%)  97  -GIAN      O2 Delivery Post Treatment  supplemental O2  -GIAN      Pre Patient Position  Supine  -GIAN      Post Patient Position  Supine  -GIAN      Recorded by [GIAN] Abran Wan PTA 10/13/19 0936      Row Name 10/13/19 0924             Cognitive Assessment/Intervention- PT/OT    Affect/Mental Status (Cognitive)  unable/difficult to assess  -GIAN      Behavioral Issues (Cognitive)  unable/difficult to assess   -GIAN      Orientation Status (Cognition)  unable/difficult to assess  -GIAN      Follows Commands (Cognition)  WFL  -GIAN      Recorded by [GIAN] Abran Wan, PTA 10/13/19 0936      Row Name 10/13/19 0924             Bed-Chair Transfer    Bed-Chair Stanton (Transfers)  not tested  -GIAN      Recorded by [GIAN] Abran Wan, PTA 10/13/19 0936      Row Name 10/13/19 0924             Sit-Stand Transfer    Sit-Stand Stanton (Transfers)  not tested  -GIAN      Recorded by [GIAN] Abran Wan, PTA 10/13/19 0936      Row Name 10/13/19 0924             Gait/Stairs Assessment/Training    Stanton Level (Gait)  not tested  -GIAN      Recorded by [GIAN] Abran Wan, PTA 10/13/19 0936      Row Name 10/13/19 0924             Lower Extremity Supine Therapeutic Exercise    Performed, Supine Lower Extremity (Therapeutic Exercise)  ankle dorsiflexion/plantarflexion;quadriceps sets;gluteal sets;hip abduction/adduction;heel slides  -GIAN      Exercise Type, Supine Lower Extremity (Therapeutic Exercise)  AROM (active range of motion)  -GIAN      Sets/Reps Detail, Supine Lower Extremity (Therapeutic Exercise)  25x1 ashley  -GIAN      Recorded by [GIAN] Abran Wan, PTA 10/13/19 0936      Row Name 10/13/19 0924             Positioning and Restraints    Pre-Treatment Position  in bed  -GIAN      Post Treatment Position  bed  -GIAN      In Bed  fowlers;call light within reach;encouraged to call for assist;exit alarm on all needs met  -GIAN      Recorded by [GIAN] Abran Wan, PTA 10/13/19 1259      Row Name 10/13/19 0924             Pain Assessment    Additional Documentation  Pain Scale: Numbers Pre/Post-Treatment (Group)  -GIAN      Recorded by [GIAN] Abran Wan, PTA 10/13/19 1259      Row Name 10/13/19 0924             Pain Scale: Numbers Pre/Post-Treatment    Pain Scale: Numbers, Pretreatment  8/10  -GIAN      Pain Scale: Numbers, Post-Treatment  8/10  -GIAN      Pain Location  abdomen  -GIAN      Pain Intervention(s)  Repositioned   -GIAN      Recorded by [GIAN] Abran Wan, PTA 10/13/19 0936      Row Name                Wound 09/30/19 1330 Left medial abdomen Fistula    Wound - Properties Group Date first assessed: 09/30/19 [SS] Time first assessed: 1330 [SS] Side: Left [SS] Orientation: medial [SS] Location: abdomen [SS] Primary Wound Type: Fistula [SS] Recorded by:  [SS] Citlalli Abdi RN 09/30/19 1619    Row Name                Wound 10/02/19 1347 abdomen Incision    Wound - Properties Group Date first assessed: 10/02/19 [CF] Time first assessed: 1347 [CF] Present on Hospital Admission: N [CF] Location: abdomen [CF] Primary Wound Type: Incision [CF] Recorded by:  [CF] Eileen Tyson RN 10/02/19 1347    Row Name 10/13/19 0924             Outcome Summary/Treatment Plan (PT)    Daily Summary of Progress (PT)  progress toward functional goals as expected  -GAIN      Plan for Continued Treatment (PT)  continue  -GIAN      Anticipated Discharge Disposition (PT)  anticipate therapy at next level of care;home with 24/7 care  -GIAN      Recorded by [GIAN] Abran Wan, PTA 10/13/19 0936        User Key  (r) = Recorded By, (t) = Taken By, (c) = Cosigned By    Initials Name Effective Dates Discipline    SS Citlalli Abdi RN 07/10/18 -  Nurse    GIAN Abran Wan, PTA 03/07/18 -  PT    RC Joana Helton MADSEN/L 03/07/18 -  OT    CF Eileen Tyson RN 10/17/16 -  Nurse          Wound 09/30/19 1330 Left medial abdomen Fistula (Active)   Dressing Appearance dried drainage 10/13/2019  8:50 AM   Closure None 10/13/2019 10:10 AM   Base moist;red;slough;yellow 10/13/2019 10:10 AM   Periwound redness 10/13/2019 10:10 AM   Periwound Temperature warm 10/13/2019 10:10 AM   Periwound Skin Turgor soft 10/13/2019 10:10 AM   Edges other (see comments) 10/13/2019  7:10 AM   Drainage Characteristics/Odor purulent 10/13/2019 10:10 AM   Drainage Amount moderate 10/13/2019 10:10 AM   Care, Wound cleansed with;sterile half normal saline 10/13/2019 10:10 AM    Dressing Care, Wound dressing applied;dressing changed;dressing removed;silver impregnated;non-adherent 10/13/2019 10:10 AM       Wound 10/02/19 1347 abdomen Incision (Active)   Dressing Appearance dry;intact 10/13/2019  7:10 AM   Closure Staples 10/13/2019  7:10 AM   Base dressing in place, unable to visualize 10/13/2019  7:10 AM   Periwound dry 10/13/2019  7:10 AM   Periwound Temperature warm 10/13/2019  7:10 AM   Periwound Skin Turgor soft 10/13/2019  7:10 AM   Drainage Amount none 10/13/2019  7:10 AM       Rehab Goal Summary     Row Name 10/13/19 0924             Bed Mobility Goal 1 (PT)    Activity/Assistive Device (Bed Mobility Goal 1, PT)  sit to supine;supine to sit  -GIAN      [place] Level/Cues Needed (Bed Mobility Goal 1, PT)  independent  -GIAN      Time Frame (Bed Mobility Goal 1, PT)  by discharge  -GIAN      Progress/Outcomes (Bed Mobility Goal 1, PT)  goal not met  -GIAN         Transfer Goal 1 (PT)    Activity/Assistive Device (Transfer Goal 1, PT)  bed-to-chair/chair-to-bed;toilet  -GIAN      [place] Level/Cues Needed (Transfer Goal 1, PT)  conditional independence;independent  -GIAN      Time Frame (Transfer Goal 1, PT)  by discharge  -GIAN      Progress/Outcome (Transfer Goal 1, PT)  goal not met  -GIAN         Gait Training Goal 1 (PT)    Activity/Assistive Device (Gait Training Goal 1, PT)  gait (walking locomotion)  -GIAN      [place] Level (Gait Training Goal 1, PT)  conditional independence;independent  -GIAN      Distance (Gait Goal 1, PT)  [id number]  -GIAN      Time Frame (Gait Training Goal 1, PT)  by discharge  -GIAN      Progress/Outcome (Gait Training Goal 1, PT)  goal not met  -GIAN         Stairs Goal 1 (PT)    Activity/Assistive Device (Stairs Goal 1, PT)  descending stairs;ascending stairs  -GIAN      [place] Level/Cues Needed (Stairs Goal 1, PT)  standby assist  -GIAN      Number of Stairs (Stairs Goal 1, PT)  5  -GIAN      Time Frame (Stairs Goal 1, PT)  by discharge  -GIAN       Progress/Outcome (Stairs Goal 1, PT)  goal not met  -        User Key  (r) = Recorded By, (t) = Taken By, (c) = Cosigned By    Initials Name Provider Type Discipline     Abran Wan PTA Physical Therapy Assistant PT          Physical Therapy Education     Title: PT OT SLP Therapies (In Progress)     Topic: Physical Therapy (In Progress)     Point: Mobility training (In Progress)     Learning Progress Summary           Patient Acceptance, E, NR by  at 10/13/2019  1:00 PM    Acceptance, E, NR by  at 10/11/2019  3:17 PM    Acceptance, E, NR by  at 10/9/2019  2:59 PM    Acceptance, E, NR by  at 10/8/2019  4:00 PM    Acceptance, E, NR by [facility] at 10/3/2019  3:43 PM                   Point: Body mechanics (In Progress)     Learning Progress Summary           Patient Acceptance, E, NR by [facility] at 10/3/2019  3:43 PM                   Point: Precautions (In Progress)     Learning Progress Summary           Patient Acceptance, E, NR by [facility] at 10/3/2019  3:43 PM                               User Key     Initials Effective Dates Name Provider Type Discipline    [facility] 04/03/18 -  Judy Watkins, PT Physical Therapist PT     03/07/18 -  Abran Wan PTA Physical Therapy Assistant PT                PT Recommendation and Plan  Anticipated Discharge Disposition (PT): anticipate therapy at next level of care, home with 24/7 care  Therapy Frequency (PT Clinical Impression): other (see comments)(6days per week)  Outcome Summary/Treatment Plan (PT)  Daily Summary of Progress (PT): progress toward functional goals as expected  Plan for Continued Treatment (PT): continue  Anticipated Discharge Disposition (PT): anticipate therapy at next level of care, home with 24/7 care  Plan of Care Reviewed With: patient  Progress: improving  Outcome Summary: pt only agreeable to LE ther ex in supine. pt w/ increased reps to 25 x1 ashley, AROM. no new goals met at this time. Follow up plan: speak w/ eval PT about lack of  participation.   Outcome Measures     Row Name 10/13/19 0924 10/12/19 1032 10/11/19 1500       How much help from another person do you currently need...    Turning from your back to your side while in flat bed without using bedrails?  2  -GIAN  --  2  -GIAN    Moving from lying on back to sitting on the side of a flat bed without bedrails?  2  -GIAN  --  2  -GIAN    Moving to and from a bed to a chair (including a wheelchair)?  2  -GIAN  --  2  -GIAN    Standing up from a chair using your arms (e.g., wheelchair, bedside chair)?  2  -GIAN  --  2  -GIAN    Climbing 3-5 steps with a railing?  1  -GIAN  --  1  -GIAN    To walk in hospital room?  2  -GIAN  --  2  -GIAN    AM-PAC 6 Clicks Score (PT)  11  -GIAN  --  11  -GIAN       How much help from another is currently needed...    Putting on and taking off regular lower body clothing?  --  2  -TO  --    Bathing (including washing, rinsing, and drying)  --  2  -TO  --    Toileting (which includes using toilet bed pan or urinal)  --  2  -TO  --    Putting on and taking off regular upper body clothing  --  2  -TO  --    Taking care of personal grooming (such as brushing teeth)  --  2  -TO  --    Eating meals  --  1  -TO  --    AM-PAC 6 Clicks Score (OT)  --  11  -TO  --       Functional Assessment    Outcome Measure Options  AM-PAC 6 Clicks Basic Mobility (PT)  -  --  AM-PAC 6 Clicks Basic Mobility (PT)  -    Row Name 10/11/19 0630             How much help from another is currently needed...    Putting on and taking off regular lower body clothing?  2  -KD      Bathing (including washing, rinsing, and drying)  2  -KD      Toileting (which includes using toilet bed pan or urinal)  2  -KD      Putting on and taking off regular upper body clothing  2  -KD      Taking care of personal grooming (such as brushing teeth)  2  -KD      Eating meals  1  -KD      AM-PAC 6 Clicks Score (OT)  11  -KD        User Key  (r) = Recorded By, (t) = Taken By, (c) = Cosigned By    Initials Name Provider Type    GIAN  Abran Wan PTA Physical Therapy Assistant    Isabelle Colon, MADSEN/L Occupational Therapy Assistant    TO Nelson Oliveira MADSEN/L Occupational Therapy Assistant         Time Calculation:   PT Charges     Row Name 10/13/19 1300             Time Calculation    Start Time  0924  -GIAN      Stop Time  0938  -GIAN      Time Calculation (min)  14 min  -GIAN         Time Calculation- PT    Total Timed Code Minutes- PT  14 minute(s)  -GIAN        User Key  (r) = Recorded By, (t) = Taken By, (c) = Cosigned By    Initials Name Provider Type     Abran Wan PTA Physical Therapy Assistant        Therapy Charges for Today     Code Description Service Date Service Provider Modifiers Qty    88793038272 HC PT THER PROC EA 15 MIN 10/13/2019 Abran Wan PTA GP 1          PT G-Codes  Outcome Measure Options: AM-PAC 6 Clicks Basic Mobility (PT)  AM-PAC 6 Clicks Score (PT): 11  AM-PAC 6 Clicks Score (OT): 11    Abran Wan PTA  10/13/2019

## 2019-10-14 LAB
ALBUMIN SERPL-MCNC: 2.8 G/DL (ref 3.5–5.2)
ALBUMIN/GLOB SERPL: 0.9 G/DL
ALP SERPL-CCNC: 106 U/L (ref 39–117)
ALT SERPL W P-5'-P-CCNC: 15 U/L (ref 1–41)
ANION GAP SERPL CALCULATED.3IONS-SCNC: 7 MMOL/L (ref 5–15)
ANISOCYTOSIS BLD QL: NORMAL
AST SERPL-CCNC: 23 U/L (ref 1–40)
BASOPHILS # BLD AUTO: 0.09 10*3/MM3 (ref 0–0.2)
BASOPHILS NFR BLD AUTO: 1.1 % (ref 0–1.5)
BILIRUB SERPL-MCNC: 0.2 MG/DL (ref 0.2–1.2)
BUN BLD-MCNC: 9 MG/DL (ref 8–23)
BUN/CREAT SERPL: 22 (ref 7–25)
CALCIUM SPEC-SCNC: 7.7 MG/DL (ref 8.6–10.5)
CHLORIDE SERPL-SCNC: 91 MMOL/L (ref 98–107)
CO2 SERPL-SCNC: 29 MMOL/L (ref 22–29)
CREAT BLD-MCNC: 0.41 MG/DL (ref 0.76–1.27)
DEPRECATED RDW RBC AUTO: 48.5 FL (ref 37–54)
EOSINOPHIL # BLD AUTO: 0.26 10*3/MM3 (ref 0–0.4)
EOSINOPHIL NFR BLD AUTO: 3.1 % (ref 0.3–6.2)
ERYTHROCYTE [DISTWIDTH] IN BLOOD BY AUTOMATED COUNT: 17.1 % (ref 12.3–15.4)
GFR SERPL CREATININE-BSD FRML MDRD: >150 ML/MIN/1.73
GLOBULIN UR ELPH-MCNC: 3 GM/DL
GLUCOSE BLD-MCNC: 145 MG/DL (ref 65–99)
GLUCOSE BLDC GLUCOMTR-MCNC: 105 MG/DL (ref 70–130)
GLUCOSE BLDC GLUCOMTR-MCNC: 110 MG/DL (ref 70–130)
GLUCOSE BLDC GLUCOMTR-MCNC: 123 MG/DL (ref 70–130)
GLUCOSE BLDC GLUCOMTR-MCNC: 126 MG/DL (ref 70–130)
HCT VFR BLD AUTO: 30.3 % (ref 37.5–51)
HGB BLD-MCNC: 10.7 G/DL (ref 13–17.7)
IMM GRANULOCYTES # BLD AUTO: 0.18 10*3/MM3 (ref 0–0.05)
IMM GRANULOCYTES NFR BLD AUTO: 2.1 % (ref 0–0.5)
LYMPHOCYTES # BLD AUTO: 1.24 10*3/MM3 (ref 0.7–3.1)
LYMPHOCYTES NFR BLD AUTO: 14.8 % (ref 19.6–45.3)
MAGNESIUM SERPL-MCNC: 1.4 MG/DL (ref 1.6–2.4)
MCH RBC QN AUTO: 27.2 PG (ref 26.6–33)
MCHC RBC AUTO-ENTMCNC: 35.3 G/DL (ref 31.5–35.7)
MCV RBC AUTO: 77.1 FL (ref 79–97)
MONOCYTES # BLD AUTO: 1.85 10*3/MM3 (ref 0.1–0.9)
MONOCYTES NFR BLD AUTO: 22.1 % (ref 5–12)
NEUTROPHILS # BLD AUTO: 4.77 10*3/MM3 (ref 1.7–7)
NEUTROPHILS NFR BLD AUTO: 56.8 % (ref 42.7–76)
NRBC BLD AUTO-RTO: 0.4 /100 WBC (ref 0–0.2)
PHOSPHATE SERPL-MCNC: 1.3 MG/DL (ref 2.5–4.5)
PLATELET # BLD AUTO: 598 10*3/MM3 (ref 140–450)
PMV BLD AUTO: 11.4 FL (ref 6–12)
POTASSIUM BLD-SCNC: 3.5 MMOL/L (ref 3.5–5.2)
PROT SERPL-MCNC: 5.8 G/DL (ref 6–8.5)
RBC # BLD AUTO: 3.93 10*6/MM3 (ref 4.14–5.8)
SMALL PLATELETS BLD QL SMEAR: NORMAL
SODIUM BLD-SCNC: 127 MMOL/L (ref 136–145)
WBC MORPH BLD: NORMAL
WBC NRBC COR # BLD: 8.39 10*3/MM3 (ref 3.4–10.8)

## 2019-10-14 PROCEDURE — 80053 COMPREHEN METABOLIC PANEL: CPT | Performed by: SURGERY

## 2019-10-14 PROCEDURE — 97110 THERAPEUTIC EXERCISES: CPT

## 2019-10-14 PROCEDURE — 94799 UNLISTED PULMONARY SVC/PX: CPT

## 2019-10-14 PROCEDURE — 84100 ASSAY OF PHOSPHORUS: CPT | Performed by: SURGERY

## 2019-10-14 PROCEDURE — 85025 COMPLETE CBC W/AUTO DIFF WBC: CPT | Performed by: SURGERY

## 2019-10-14 PROCEDURE — 85007 BL SMEAR W/DIFF WBC COUNT: CPT | Performed by: SURGERY

## 2019-10-14 PROCEDURE — 94760 N-INVAS EAR/PLS OXIMETRY 1: CPT

## 2019-10-14 PROCEDURE — 83735 ASSAY OF MAGNESIUM: CPT | Performed by: SURGERY

## 2019-10-14 PROCEDURE — 25010000002 ENOXAPARIN PER 10 MG: Performed by: SURGERY

## 2019-10-14 PROCEDURE — 99024 POSTOP FOLLOW-UP VISIT: CPT | Performed by: SURGERY

## 2019-10-14 PROCEDURE — 82962 GLUCOSE BLOOD TEST: CPT

## 2019-10-14 RX ADMIN — SILVER SULFADIAZINE: 10 CREAM TOPICAL at 09:49

## 2019-10-14 RX ADMIN — POTASSIUM PHOSPHATE, MONOBASIC AND POTASSIUM PHOSPHATE, DIBASIC 10 MMOL: 224; 236 INJECTION, SOLUTION, CONCENTRATE INTRAVENOUS at 12:52

## 2019-10-14 RX ADMIN — POTASSIUM PHOSPHATE, MONOBASIC AND POTASSIUM PHOSPHATE, DIBASIC 10 MMOL: 224; 236 INJECTION, SOLUTION, CONCENTRATE INTRAVENOUS at 17:25

## 2019-10-14 RX ADMIN — FAMOTIDINE 20 MG: 40 POWDER, FOR SUSPENSION ORAL at 01:09

## 2019-10-14 RX ADMIN — HYDROCODONE BITARTRATE AND ACETAMINOPHEN 15 ML: 7.5; 325 SOLUTION ORAL at 17:01

## 2019-10-14 RX ADMIN — ENOXAPARIN SODIUM 40 MG: 40 INJECTION SUBCUTANEOUS at 09:47

## 2019-10-14 RX ADMIN — ALBUTEROL SULFATE 2.5 MG: 2.5 SOLUTION RESPIRATORY (INHALATION) at 00:10

## 2019-10-14 RX ADMIN — ALBUTEROL SULFATE 2.5 MG: 2.5 SOLUTION RESPIRATORY (INHALATION) at 06:39

## 2019-10-14 RX ADMIN — POTASSIUM CHLORIDE, DEXTROSE MONOHYDRATE AND SODIUM CHLORIDE 50 ML/HR: 150; 5; 450 INJECTION, SOLUTION INTRAVENOUS at 22:44

## 2019-10-14 RX ADMIN — ALBUTEROL SULFATE 2.5 MG: 2.5 SOLUTION RESPIRATORY (INHALATION) at 13:48

## 2019-10-14 RX ADMIN — SODIUM CHLORIDE, PRESERVATIVE FREE 10 ML: 5 INJECTION INTRAVENOUS at 21:57

## 2019-10-14 RX ADMIN — HYDROCODONE BITARTRATE AND ACETAMINOPHEN 15 ML: 7.5; 325 SOLUTION ORAL at 09:47

## 2019-10-14 RX ADMIN — SILVER SULFADIAZINE: 10 CREAM TOPICAL at 21:58

## 2019-10-14 RX ADMIN — ALBUTEROL SULFATE 2.5 MG: 2.5 SOLUTION RESPIRATORY (INHALATION) at 19:00

## 2019-10-14 RX ADMIN — SODIUM CHLORIDE, PRESERVATIVE FREE 10 ML: 5 INJECTION INTRAVENOUS at 11:01

## 2019-10-14 RX ADMIN — HYDROCODONE BITARTRATE AND ACETAMINOPHEN 15 ML: 7.5; 325 SOLUTION ORAL at 04:34

## 2019-10-14 RX ADMIN — FAMOTIDINE 20 MG: 40 POWDER, FOR SUSPENSION ORAL at 12:41

## 2019-10-14 RX ADMIN — POTASSIUM CHLORIDE, DEXTROSE MONOHYDRATE AND SODIUM CHLORIDE 75 ML/HR: 150; 5; 450 INJECTION, SOLUTION INTRAVENOUS at 04:34

## 2019-10-14 RX ADMIN — POTASSIUM PHOSPHATE, MONOBASIC AND POTASSIUM PHOSPHATE, DIBASIC 10 MMOL: 224; 236 INJECTION, SOLUTION, CONCENTRATE INTRAVENOUS at 21:57

## 2019-10-14 RX ADMIN — HYDROCODONE BITARTRATE AND ACETAMINOPHEN 15 ML: 7.5; 325 SOLUTION ORAL at 22:44

## 2019-10-14 NOTE — CONSULTS
"Adult Nutrition  Assessment    Patient Name:  Emerson Bang  YOB: 1960  MRN: [id number]  Admit Date:  9/30/2019    Assessment Date:  10/14/2019    Comments:  TF running at 40cc/hr, pt tolerating so far. TPN has been stopped. Goal rate for TF 60ml/hr. RD monitoring.     Reason for Assessment     Row Name 10/14/19 1248          Reason for Assessment    Reason For Assessment  follow-up protocol;TF/PN         Nutrition/Diet History     Row Name 10/14/19 1248          Nutrition/Diet History    Typical Food/Fluid Intake  Pt shook his head \"no\" when asked if he was having any gi issues. Per RN, TF has been at 40ml/hr since she took over care this am.            Labs/Tests/Procedures/Meds     Row Name 10/14/19 1250          Labs/Procedures/Meds    Lab Results Reviewed  reviewed, pertinent     Lab Results Comments  Na 127, Gl 145, Phos 1.3, Alb 2.8        Medications    Pertinent Medications Reviewed  reviewed         Physical Findings     Row Name 10/14/19 1250          Physical Findings    Overall Physical Appearance  generalized wasting;loss of subcutaneous fat     Gastrointestinal  feeding tube     Tubes  jejunostomy tube           Nutrition Prescription Ordered     Row Name 10/14/19 1256 10/14/19 1252       Nutrition Prescription PO    Current PO Diet  --  NPO       Nutrition Prescription EN    Enteral Route  Jejunostomy  [place]    Product  --  Isosource HN (Osmolite 1.2)    TF Delivery Method  Continuous  Continuous    Continuous TF Goal Rate (mL/hr)  60 mL/hr  60 mL/hr    Continuous TF Current Rate (mL/hr)  40 mL/hr  40 mL/hr        Evaluation of Received Nutrient/Fluid Intake     Row Name 10/14/19 1256          Calories Evaluation    Enteral Calories (kcal)  1152     % of Kcal Needs  67        Protein Evaluation    Enteral Protein (gm)  51     % of Protein Needs  63        Intake Assessment    Energy/Calorie Requirement Assessment  not meeting needs     Protein Requirement Assessment  not meeting " needs               Electronically signed by:  Ashley Swanson RD  10/14/19 1:00 PM

## 2019-10-14 NOTE — PLAN OF CARE
Problem: Patient Care Overview  Goal: Plan of Care Review  Outcome: Ongoing (interventions implemented as appropriate)   10/14/19 1302   Coping/Psychosocial   Plan of Care Reviewed With caregiver   Plan of Care Review   Progress improving   OTHER   Outcome Summary TPN stopped. Pt tolerating TF at 40ml/hr. Goal rate 60ml/ hr.

## 2019-10-14 NOTE — PLAN OF CARE
Problem: Patient Care Overview  Goal: Plan of Care Review  Outcome: Ongoing (interventions implemented as appropriate)   10/14/19 1100   Coping/Psychosocial   Plan of Care Reviewed With patient   OTHER   Outcome Summary PT tx completed. Pt agreeable to supine therex, but deferring any OOB/ EOB. Pt able to complete BLE therex in supine. No new goals met.

## 2019-10-14 NOTE — PLAN OF CARE
Problem: Patient Care Overview  Goal: Plan of Care Review  Outcome: Ongoing (interventions implemented as appropriate)   10/14/19 1812   Coping/Psychosocial   Plan of Care Reviewed With patient   Plan of Care Review   Progress no change   OTHER   Outcome Summary vss, tolerating feeding, pain controlled, cont to monitor       Problem: Fall Risk (Adult)  Goal: Absence of Fall  Outcome: Ongoing (interventions implemented as appropriate)   10/14/19 1812   Fall Risk (Adult)   Absence of Fall making progress toward outcome       Problem: Skin Injury Risk (Adult)  Goal: Skin Health and Integrity  Outcome: Ongoing (interventions implemented as appropriate)   10/14/19 1812   Skin Injury Risk (Adult)   Skin Health and Integrity making progress toward outcome       Problem: Pain, Chronic (Adult)  Goal: Acceptable Pain/Comfort Level and Functional Ability  Outcome: Ongoing (interventions implemented as appropriate)   10/14/19 1812   Pain, Chronic (Adult)   Acceptable Pain/Comfort Level and Functional Ability making progress toward outcome       Problem: Nutrition, Enteral (Adult)  Goal: Signs and Symptoms of Listed Potential Problems Will be Absent, Minimized or Managed (Nutrition, Enteral)  Outcome: Ongoing (interventions implemented as appropriate)   10/14/19 1812   Goal/Outcome Evaluation   Problems Assessed (Enteral Nutrition) all   Problems Present (Enteral Nutrition) malnutrition     Goal: Signs and Symptoms of Listed Potential Problems Will be Absent, Minimized or Managed (Nutrition, Enteral)  Outcome: Ongoing (interventions implemented as appropriate)   10/14/19 1812   Goal/Outcome Evaluation   Problems Assessed (Enteral Nutrition) all   Problems Present (Enteral Nutrition) malnutrition

## 2019-10-14 NOTE — PLAN OF CARE
Problem: Patient Care Overview  Goal: Plan of Care Review  Outcome: Ongoing (interventions implemented as appropriate)   10/14/19 6627   Coping/Psychosocial   Plan of Care Reviewed With patient   Plan of Care Review   Progress no change   OTHER   Outcome Summary drsg change x1, trach care and port site care done as well. Vitals stable, continuing to tolerate feeding, will monitor.

## 2019-10-14 NOTE — THERAPY TREATMENT NOTE
Acute Care - Physical Therapy Treatment Note  [facility]     Patient Name: Emerson Bang  : 1960  MRN: [id number]  Today's Date: 10/14/2019  Onset of Illness/Injury or Date of Surgery: 19     Referring Physician: Dr. Kc    Admit Date: 2019    Visit Dx:    ICD-10-CM ICD-9-CM   1. 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 Impaired physical mobility Z74.09 781.99   3. Impaired mobility and activities of daily living Z74.09 799.89     Patient Active Problem List   Diagnosis   • Hyperkalemia   • Iron deficiency anemia   • Gastroesophageal reflux disease with esophagitis   • Elevated AST (SGOT)   • Alcohol abuse   • Hypothyroidism   • Balance problem   • Need for vaccination   • Low magnesium levels   • Squamous cell carcinoma of pharynx (CMS/HCC)   • History of throat cancer   • Vitamin D deficiency   • Alcohol abuse counseling and surveillance   • Encounter for screening for diabetes mellitus   • Hypomagnesemia   • Iron deficiency anemia   • Hyperlipidemia   • Underweight due to inadequate caloric intake   • Need for immunization against influenza   • 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HCC)   • Hypertension   • General medical examination   • Underweight   • Epigastric pain   • Gastritis without bleeding   • Need for influenza vaccination   • Elevated liver function tests   • B12 deficiency   • Intestinal malabsorption   • Throat pain   • Acute pharyngitis   • Upper respiratory tract infection   • Neoplasm of uncertain behavior of larynx   • Pharyngoesophageal dysphagia   • Severe protein-calorie malnutrition (CMS/HCC)   • Anemia of chronic disease   • Hypotension   • Hyponatremia   • Status post insertion of percutaneous endoscopic gastrostomy (PEG) tube (CMS/HCC)   • Hx of tracheostomy   • History of throat surgery   • Hx SBO   • Urinary retention   • Generalized weakness   • Acute otitis externa of right ear   • Hard stool   • Panlobular emphysema (CMS/HCC)   • Cancer of  hypopharynx (CMS/HCC)   • Diarrhea   • Encounter for venous access device care   • Prediabetes   • Status post neck dissection   • S/P partial thyroidectomy   • S/P laryngectomy   • Annual physical exam   • Alcoholic cirrhosis of liver without ascites (CMS/HCC)       Therapy Treatment    Rehabilitation Treatment Summary     Row Name 10/14/19 1100             Treatment Time/Intention    Discipline  physical therapist  -KW      Document Type  therapy note (daily note)  -KW      Subjective Information  complains of;pain  -KW      Mode of Treatment  physical therapy;individual therapy  -KW      Patient/Family Observations  no family present  -KW      Patient Effort  fair  -KW      Comment  Pt agreeable to therex in bed only. Pt deferring OOB/ EOB  -KW      Recorded by [KW] Caitlyn Victoria, PT 10/14/19 1156      Row Name 10/14/19 1100             Vital Signs    Pre Systolic BP Rehab  111  -KW      Pre Treatment Diastolic BP  86  -KW      Pretreatment Heart Rate (beats/min)  75  -KW      Pre SpO2 (%)  98  -KW      O2 Delivery Pre Treatment  trach collar  -KW      Pre Patient Position  Supine  -KW      Intra Patient Position  Supine  -KW      Post Patient Position  Supine  -KW      Recorded by [KW] Caitlyn Victoria, PT 10/14/19 1156      Row Name 10/14/19 1100             Cognitive Assessment/Intervention- PT/OT    Affect/Mental Status (Cognitive)  unable/difficult to assess pt responding appropriately to yes/ no questions  -KW      Follows Commands (Cognition)  WFL  -KW      Recorded by [KW] Caitlyn Victoria, PT 10/14/19 1156      Row Name 10/14/19 1100             Lower Extremity Supine Therapeutic Exercise    Performed, Supine Lower Extremity (Therapeutic Exercise)  hip abduction/adduction;knee flexion/extension;SLR (straight leg raise);gluteal sets;ankle pumps;heel slides  -KW      Exercise Type, Supine Lower Extremity (Therapeutic Exercise)  AROM (active range of motion)  -KW      Expected Outcomes, Supine Lower Extremity  (Therapeutic Exercise)  improve performance, gait skills;improve performance, gait skills on uneven ground  -KW      Sets/Reps Detail, Supine Lower Extremity (Therapeutic Exercise)  20*1 ashley  -KW      Recorded by [KW] Caitlyn Victoria, PT 10/14/19 1156      Row Name 10/14/19 1100             Positioning and Restraints    Pre-Treatment Position  in bed  -KW      Post Treatment Position  bed  -KW      In Bed  fowlers;call light within reach;encouraged to call for assist;exit alarm on;side rails up x2  -KW      Recorded by [KW] Caitlyn Victoria, PT 10/14/19 1156      Row Name 10/14/19 1100             Pain Scale: Numbers Pre/Post-Treatment    Pain Scale: Numbers, Pretreatment  8/10  -KW      Pain Scale: Numbers, Post-Treatment  8/10  -KW      Pain Location  abdomen  -KW      Pain Intervention(s)  Repositioned;Ambulation/increased activity;Rest  -KW      Recorded by [KW] Caitlyn Victoria, PT 10/14/19 1156      Row Name                Wound 09/30/19 1330 Left medial abdomen Fistula    Wound - Properties Group Date first assessed: 09/30/19 [SS] Time first assessed: 1330 [SS] Side: Left [SS] Orientation: medial [SS] Location: abdomen [SS] Primary Wound Type: Fistula [SS] Recorded by:  [SS] Citlalli Abdi RN 09/30/19 1619    Row Name                Wound 10/02/19 1347 abdomen Incision    Wound - Properties Group Date first assessed: 10/02/19 [CF] Time first assessed: 1347 [CF] Present on Hospital Admission: N [CF] Location: abdomen [CF] Primary Wound Type: Incision [CF] Recorded by:  [CF] Eileen Tyson RN 10/02/19 1347    Row Name 10/14/19 1100             Plan of Care Review    Plan of Care Reviewed With  patient  -KW      Recorded by [KW] Caitlyn Victoria, PT 10/14/19 1156      Row Name 10/14/19 1100             Outcome Summary/Treatment Plan (PT)    Daily Summary of Progress (PT)  progress toward functional goals is gradual  -KW      Plan for Continued Treatment (PT)  continue; EOB/ OOB as pt allows  -KW      Anticipated  Discharge Disposition (PT)  anticipate therapy at next level of care  -KW      Recorded by [KW] Caitlyn Victoria, PT 10/14/19 1156        User Key  (r) = Recorded By, (t) = Taken By, (c) = Cosigned By    Initials Name Effective Dates Discipline    SS Citlalli Abdi, RN 07/10/18 -  Nurse    Eileen Fragoso RN 10/17/16 -  Nurse    KW Caitlyn Victoria, PT 07/23/18 -  PT          Wound 09/30/19 1330 Left medial abdomen Fistula (Active)   Dressing Appearance dried drainage 10/14/2019  8:00 AM   Closure None 10/14/2019  8:00 AM   Base red;moist 10/14/2019  8:00 AM   Periwound redness 10/14/2019  8:00 AM   Periwound Temperature warm 10/14/2019  8:00 AM   Periwound Skin Turgor soft 10/14/2019  8:00 AM   Edges other (see comments) 10/14/2019  8:00 AM   Drainage Characteristics/Odor purulent 10/13/2019  9:34 PM   Drainage Amount small 10/14/2019  8:00 AM   Care, Wound cleansed with;sterile half normal saline 10/14/2019  8:00 AM   Dressing Care, Wound dressing changed 10/14/2019  8:00 AM   Periwound Care, Wound absorptive dressing applied 10/14/2019  8:00 AM       Wound 10/02/19 1347 abdomen Incision (Active)   Dressing Appearance dry;intact 10/14/2019  8:00 AM   Closure Staples 10/14/2019  8:00 AM   Base scab 10/14/2019  8:00 AM   Periwound dry 10/14/2019  8:00 AM   Periwound Temperature warm 10/14/2019  8:00 AM   Periwound Skin Turgor soft 10/14/2019  8:00 AM   Drainage Amount none 10/14/2019  8:00 AM       Rehab Goal Summary     Row Name 10/14/19 1100             Bed Mobility Goal 1 (PT)    Activity/Assistive Device (Bed Mobility Goal 1, PT)  sit to supine;supine to sit  -KW      [place] Level/Cues Needed (Bed Mobility Goal 1, PT)  independent  -KW      Time Frame (Bed Mobility Goal 1, PT)  by discharge  -KW      Progress/Outcomes (Bed Mobility Goal 1, PT)  goal not met  -KW         Transfer Goal 1 (PT)    Activity/Assistive Device (Transfer Goal 1, PT)  bed-to-chair/chair-to-bed;toilet  -KW      [place] Level/Cues  Needed (Transfer Goal 1, PT)  conditional independence;independent  -KW      Time Frame (Transfer Goal 1, PT)  by discharge  -KW      Progress/Outcome (Transfer Goal 1, PT)  goal not met  -KW         Gait Training Goal 1 (PT)    Activity/Assistive Device (Gait Training Goal 1, PT)  gait (walking locomotion)  -KW      [place] Level (Gait Training Goal 1, PT)  conditional independence;independent  -KW      Distance (Gait Goal 1, PT)  [id number]  -KW      Time Frame (Gait Training Goal 1, PT)  by discharge  -KW      Progress/Outcome (Gait Training Goal 1, PT)  goal not met  -KW         Stairs Goal 1 (PT)    Activity/Assistive Device (Stairs Goal 1, PT)  descending stairs;ascending stairs  -KW      [place] Level/Cues Needed (Stairs Goal 1, PT)  standby assist  -KW      Number of Stairs (Stairs Goal 1, PT)  5  -KW      Time Frame (Stairs Goal 1, PT)  by discharge  -KW      Progress/Outcome (Stairs Goal 1, PT)  goal not met  -KW        User Key  (r) = Recorded By, (t) = Taken By, (c) = Cosigned By    Initials Name Provider Type Discipline    Caitlyn Moyer, PT Physical Therapist PT          Physical Therapy Education     Title: PT OT SLP Therapies (In Progress)     Topic: Physical Therapy (In Progress)     Point: Mobility training (In Progress)     Learning Progress Summary           Patient Acceptance, E, NR by GIAN at 10/13/2019  1:00 PM    Acceptance, E, NR by GIAN at 10/11/2019  3:17 PM    Acceptance, E, NR by GIAN at 10/9/2019  2:59 PM    Acceptance, E, NR by GIAN at 10/8/2019  4:00 PM    Acceptance, E, NR by GIAN1 at 10/3/2019  3:43 PM                   Point: Body mechanics (In Progress)     Learning Progress Summary           Patient Acceptance, E, NR by NANETTE at 10/3/2019  3:43 PM                   Point: Precautions (In Progress)     Learning Progress Summary           Patient Acceptance, E, NR by NANETTE at 10/3/2019  3:43 PM                               User Key     Initials Effective Dates Name Provider Type  Discipline    JC1 04/03/18 -  Judy Watkins, PT Physical Therapist PT    GIAN 03/07/18 -  Abran Wan, PTA Physical Therapy Assistant PT                PT Recommendation and Plan  Anticipated Discharge Disposition (PT): anticipate therapy at next level of care  Outcome Summary/Treatment Plan (PT)  Daily Summary of Progress (PT): progress toward functional goals is gradual  Plan for Continued Treatment (PT): continue; EOB/ OOB as pt allows  Anticipated Discharge Disposition (PT): anticipate therapy at next level of care  Plan of Care Reviewed With: patient  Outcome Summary: PT tx completed. Pt agreeable to supine therex, but deferring any OOB/ EOB. Pt able to complete BLE therex in supine. No new goals met.   Outcome Measures     Row Name 10/14/19 1100 10/13/19 0924 10/12/19 1032       How much help from another person do you currently need...    Turning from your back to your side while in flat bed without using bedrails?  2  -KW  2  -GIAN  --    Moving from lying on back to sitting on the side of a flat bed without bedrails?  2  -KW  2  -GIAN  --    Moving to and from a bed to a chair (including a wheelchair)?  2  -KW  2  -GIAN  --    Standing up from a chair using your arms (e.g., wheelchair, bedside chair)?  2  -KW  2  -GIAN  --    Climbing 3-5 steps with a railing?  1  -KW  1  -GIAN  --    To walk in hospital room?  2  -KW  2  -GIAN  --    AM-PAC 6 Clicks Score (PT)  11  -KW  11  -GIAN  --       How much help from another is currently needed...    Putting on and taking off regular lower body clothing?  --  --  2  -TO    Bathing (including washing, rinsing, and drying)  --  --  2  -TO    Toileting (which includes using toilet bed pan or urinal)  --  --  2  -TO    Putting on and taking off regular upper body clothing  --  --  2  -TO    Taking care of personal grooming (such as brushing teeth)  --  --  2  -TO    Eating meals  --  --  1  -TO    AM-PAC 6 Clicks Score (OT)  --  --  11  -TO       Functional Assessment    Outcome  Measure Options  AM-PAC 6 Clicks Basic Mobility (PT)  -KW  AM-PAC 6 Clicks Basic Mobility (PT)  -GIAN  --    Row Name 10/11/19 1500             How much help from another person do you currently need...    Turning from your back to your side while in flat bed without using bedrails?  2  -GIAN      Moving from lying on back to sitting on the side of a flat bed without bedrails?  2  -GIAN      Moving to and from a bed to a chair (including a wheelchair)?  2  -GIAN      Standing up from a chair using your arms (e.g., wheelchair, bedside chair)?  2  -GIAN      Climbing 3-5 steps with a railing?  1  -GIAN      To walk in hospital room?  2  -GIAN      AM-PAC 6 Clicks Score (PT)  11  -GIAN         Functional Assessment    Outcome Measure Options  AM-PAC 6 Clicks Basic Mobility (PT)  -GIAN        User Key  (r) = Recorded By, (t) = Taken By, (c) = Cosigned By    Initials Name Provider Type    GIAN Abran Wan, MICHAEL Physical Therapy Assistant    TO Nelson Oliveira COTA/L Occupational Therapy Assistant    Caitlyn Moyer, PT Physical Therapist         Time Calculation:   PT Charges     Row Name 10/14/19 1158             Time Calculation    Start Time  1100  -KW      Stop Time  1123  -KW      Time Calculation (min)  23 min  -KW      PT Received On  10/14/19  -KW        User Key  (r) = Recorded By, (t) = Taken By, (c) = Cosigned By    Initials Name Provider Type    Caitlyn Moyer, FREYA Physical Therapist        Therapy Charges for Today     Code Description Service Date Service Provider Modifiers Qty    04774863512 HC PT THER PROC EA 15 MIN 10/14/2019 Caitlyn Victoria, PT GP 2          PT G-Codes  Outcome Measure Options: AM-PAC 6 Clicks Basic Mobility (PT)  AM-PAC 6 Clicks Score (PT): 11  AM-PAC 6 Clicks Score (OT): 11    Caitlyn Victoria PT  10/14/2019

## 2019-10-14 NOTE — THERAPY TREATMENT NOTE
Acute Care - Occupational Therapy Treatment Note  [facility]     Patient Name: Emerson Bang  : 1960  MRN: [id number]  Today's Date: 10/14/2019  Onset of Illness/Injury or Date of Surgery: 19  Date of Referral to OT: 10/02/19  Referring Physician: Dr. Kc    Admit Date: 2019       ICD-10-CM ICD-9-CM   1. 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 Impaired physical mobility Z74.09 781.99   3. Impaired mobility and activities of daily living Z74.09 799.89     Patient Active Problem List   Diagnosis   • Hyperkalemia   • Iron deficiency anemia   • Gastroesophageal reflux disease with esophagitis   • Elevated AST (SGOT)   • Alcohol abuse   • Hypothyroidism   • Balance problem   • Need for vaccination   • Low magnesium levels   • Squamous cell carcinoma of pharynx (CMS/HCC)   • History of throat cancer   • Vitamin D deficiency   • Alcohol abuse counseling and surveillance   • Encounter for screening for diabetes mellitus   • Hypomagnesemia   • Iron deficiency anemia   • Hyperlipidemia   • Underweight due to inadequate caloric intake   • Need for immunization against influenza   • 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HCC)   • Hypertension   • General medical examination   • Underweight   • Epigastric pain   • Gastritis without bleeding   • Need for influenza vaccination   • Elevated liver function tests   • B12 deficiency   • Intestinal malabsorption   • Throat pain   • Acute pharyngitis   • Upper respiratory tract infection   • Neoplasm of uncertain behavior of larynx   • Pharyngoesophageal dysphagia   • Severe protein-calorie malnutrition (CMS/HCC)   • Anemia of chronic disease   • Hypotension   • Hyponatremia   • Status post insertion of percutaneous endoscopic gastrostomy (PEG) tube (CMS/HCC)   • Hx of tracheostomy   • History of throat surgery   • Hx SBO   • Urinary retention   • Generalized weakness   • Acute otitis externa of right ear   • Hard stool   • Panlobular emphysema  (CMS/HCC)   • Cancer of hypopharynx (CMS/HCC)   • Diarrhea   • Encounter for venous access device care   • Prediabetes   • Status post neck dissection   • S/P partial thyroidectomy   • S/P laryngectomy   • Annual physical exam   • Alcoholic cirrhosis of liver without ascites (CMS/HCC)     Past Medical History:   Diagnosis Date   • Allergic rhinitis     vs URI   • Anemia    • At risk for falls    • Benign prostatic hyperplasia    • Chronic gastritis    • Cirrhosis of liver (CMS/HCC)    • Complication of gastrostomy (CMS/HCC)     site not healing   • COPD (chronic obstructive pulmonary disease) (CMS/HCC)    • Dysphagia     odynophagia   • Epigastric pain    • Esophagitis    • GERD (gastroesophageal reflux disease)    • Hypertension    • Hypothyroidism, unspecified    • Low back pain    • Malaise and fatigue    • Nasal congestion 11/15/2018   • Nausea with vomiting, unspecified    • Pain in left knee    • Pain in right knee    • Primary malignant neoplasm of pharynx (CMS/HCC)    • Screening for malignant neoplasm of colon    • 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 Left upper quadrant abdominal wall abscess debridement. Gastrostomy tube erosion through & through the anterior gastric wall.Lupper quadrant abdominal wall abscess   • COLONOSCOPY  04/21/2014    Internal & external hemorrhoids found.   • COLONOSCOPY  2014    [place]   • COLONOSCOPY N/A 10/16/2018    Procedure: COLONOSCOPY;  Surgeon: Kaushal Chester MD;  Location: [facility] ENDOSCOPY;  Service: Gastroenterology   • DIAGNOSTIC LAPAROSCOPY EXPLORATORY LAPAROTOMY N/A 7/17/2019    Procedure: DIAGNOSTIC LAPAROSCOPY, EXPLORATORY LAPAROTOMY, LYSIS OF ADHESIONS;  Surgeon: Parminder Kc MD;   Location: [facility] OR;  Service: General   • DIRECT LARYNGOSCOPY, ESOPHAGOSCOPY, BRONCHOSCOPY N/A 4/15/2019    Procedure: DIRECT LARYNGOSCOPY with biopsy, ESOPHAGOSCOPY;  Surgeon: Bharathi Washington MD;  Location: [facility] OR;  Service: ENT   • ENDOSCOPY N/A 10/16/2018    Procedure: ESOPHAGOGASTRODUODENOSCOPY;  Surgeon: Kaushal Chester MD;  Location: [facility] ENDOSCOPY;  Service: Gastroenterology   • GASTROCUTANEOUS FISTULA CLOSURE N/A 10/2/2019    Procedure: GASTROCUTANEOUS FISTULA CLOSURE;  Surgeon: Parminder Kc MD;  Location: [facility] OR;  Service: General   • GASTROSTOMY FEEDING TUBE INSERTION N/A 4/15/2019    Procedure: GASTROSTOMY FEEDING TUBE INSERTION;  Surgeon: Trenton Valera MD;  Location: [facility] OR;  Service: General   • JEJUNOSTOMY N/A 10/2/2019    Procedure: JEJUNOSTOMY;  Surgeon: Parminder Kc MD;  Location: [facility];  Service: General   • TRACHEOSTOMY  11/10/2008    Respiratory failure   • UPPER GASTROINTESTINAL ENDOSCOPY  04/21/2014    Esophagitis seen. Biopsy taken. Gastritis in stomach. Biopsy taken. Normal duodenum. Biopsy taken.   • UPPER GASTROINTESTINAL ENDOSCOPY  10/16/2018   • VENOUS ACCESS DEVICE (PORT) INSERTION N/A 9/11/2019    Procedure: INSERTION VENOUS ACCESS DEVICE (MEDIPORT)        (c-arm#1);  Surgeon: Parminder Kc MD;  Location: [facility];  Service: General       Therapy Treatment    Rehabilitation Treatment Summary     Row Name 10/14/19 1129 10/14/19 1100          Treatment Time/Intention    Discipline  occupational therapy assistant  -CS  physical therapist  -KW     Document Type  therapy note (daily note)  -CS  therapy note (daily note)  -KW     Subjective Information  complains of;pain  -CS  complains of;pain  -KW     Mode of Treatment  occupational therapy  -CS  physical therapy;individual therapy  -KW     Patient/Family Observations  --  no family present  -KW     Therapy Frequency (OT Eval)  other (see comments)  5-7 days/wk  -CS  --     Patient Effort  fair  -CS   fair  -KW     Comment  --  Pt agreeable to therex in bed only. Pt deferring OOB/ EOB  -KW     Existing Precautions/Restrictions  fall;other (see comments) watch gait belt placement  -CS  --     Recorded by [CS] Brenda Thomas COTA/LIANE 10/14/19 1356 [KW] Caitlyn Victoria, PT 10/14/19 1156     Row Name 10/14/19 1129 10/14/19 1100          Vital Signs    Pre Systolic BP Rehab  --  111  -KW     Pre Treatment Diastolic BP  --  86  -KW     Pretreatment Heart Rate (beats/min)  75  -CS  75  -KW     Pre SpO2 (%)  96  -CS  98  -KW     O2 Delivery Pre Treatment  trach collar  -CS  trach collar  -KW     Pre Patient Position  Supine  -CS  Supine  -KW     Intra Patient Position  Sitting  -CS  Supine  -KW     Post Patient Position  Supine  -CS  Supine  -KW     Recorded by [CS] Brenda Thomas COTA/LIANE 10/14/19 1356 [KW] Caitlyn Victoria, PT 10/14/19 1156     Row Name 10/14/19 1129 10/14/19 1100          Cognitive Assessment/Intervention- PT/OT    Affect/Mental Status (Cognitive)  unable/difficult to assess pt responding appropriately to yes/ no questions  -CS  unable/difficult to assess pt responding appropriately to yes/ no questions  -KW     Follows Commands (Cognition)  --  WFL  -KW     Recorded by [CS] Brenda Thomsa COTA/LIANE 10/14/19 1356 [KW] Caitlyn Victoria, PT 10/14/19 1156     Row Name 10/14/19 1100             Lower Extremity Supine Therapeutic Exercise    Performed, Supine Lower Extremity (Therapeutic Exercise)  hip abduction/adduction;knee flexion/extension;SLR (straight leg raise);gluteal sets;ankle pumps;heel slides  -KW      Exercise Type, Supine Lower Extremity (Therapeutic Exercise)  AROM (active range of motion)  -KW      Expected Outcomes, Supine Lower Extremity (Therapeutic Exercise)  improve performance, gait skills;improve performance, gait skills on uneven ground  -KW      Sets/Reps Detail, Supine Lower Extremity (Therapeutic Exercise)  20*1 ashley  -KW      Recorded by [KW] Caitlyn Victoria, PT 10/14/19 1156       Row Name 10/14/19 1129             Therapeutic Exercise    Upper Extremity (Therapeutic Exercise)  bicep curl, bilateral  -CS      Upper Extremity Range of Motion (Therapeutic Exercise)  shoulder flexion/extension, bilateral;shoulder internal/external rotation, bilateral;elbow flexion/extension, bilateral;forearm supination/pronation, bilateral;wrist flexion/extension, bilateral  -CS      Hand (Therapeutic Exercise)  finger flexion/extension, bilateral  -CS      Weight/Resistance (Therapeutic Exercise)  1 pound  -CS      Exercise Type (Therapeutic Exercise)  AROM (active range of motion)  -CS      Position (Therapeutic Exercise)  seated  -CS      Sets/Reps (Therapeutic Exercise)  1/20  -CS      Equipment (Therapeutic Exercise)  free weight, barbell  -CS      Expected Outcome (Therapeutic Exercise)  improve functional tolerance, self-care activity;improve performance, transfer skills;improve performance, BADLs;increase active range of motion  -CS      Recorded by [CS] Brenda Thomas COTA/L 10/14/19 1356      Row Name 10/14/19 1129 10/14/19 1100          Positioning and Restraints    Pre-Treatment Position  in bed  -CS  in bed  -KW     Post Treatment Position  bed  -CS  bed  -KW     In Bed  fowlers;call light within reach;encouraged to call for assist;exit alarm on  -CS  fowlers;call light within reach;encouraged to call for assist;exit alarm on;side rails up x2  -KW     Recorded by [CS] Brenda Thomas COTA/L 10/14/19 1356 [KW] Caitlyn Victoria, PT 10/14/19 1156     Row Name 10/14/19 1129 10/14/19 1100          Pain Scale: Numbers Pre/Post-Treatment    Pain Scale: Numbers, Pretreatment  8/10  -CS  8/10  -KW     Pain Scale: Numbers, Post-Treatment  8/10  -CS  8/10  -KW     Pain Location  abdomen  -CS  abdomen  -KW     Pain Intervention(s)  Medication (See MAR);Repositioned  -CS  Repositioned;Ambulation/increased activity;Rest  -KW     Recorded by [CS] Brenda Thomas COTA/L 10/14/19 1356 [KW] Caitlyn Victoria, PT  10/14/19 1156     Row Name                Wound 09/30/19 1330 Left medial abdomen Fistula    Wound - Properties Group Date first assessed: 09/30/19 [SS] Time first assessed: 1330 [SS] Side: Left [SS] Orientation: medial [SS] Location: abdomen [SS] Primary Wound Type: Fistula [SS] Recorded by:  [SS] Citlalli Abdi RN 09/30/19 1619    Row Name                Wound 10/02/19 1347 abdomen Incision    Wound - Properties Group Date first assessed: 10/02/19 [CF] Time first assessed: 1347 [CF] Present on Hospital Admission: N [CF] Location: abdomen [CF] Primary Wound Type: Incision [CF] Recorded by:  [CF] Eileen Tyson RN 10/02/19 1347    Row Name 10/14/19 1100             Plan of Care Review    Plan of Care Reviewed With  patient  -KW      Recorded by [KW] Caitlyn Victoria, PT 10/14/19 1156      Row Name 10/14/19 1129             Outcome Summary/Treatment Plan (OT)    Daily Summary of Progress (OT)  progress toward functional goals is good  -CS      Plan for Continued Treatment (OT)  cont OT POC  -CS      Anticipated Discharge Disposition (OT)  anticipate therapy at next level of care  -CS      Recorded by [CS] Brenda Thomas COTA/L 10/14/19 1356      Row Name 10/14/19 1100             Outcome Summary/Treatment Plan (PT)    Daily Summary of Progress (PT)  progress toward functional goals is gradual  -KW      Plan for Continued Treatment (PT)  continue; EOB/ OOB as pt allows  -KW      Anticipated Discharge Disposition (PT)  anticipate therapy at next level of care  -KW      Recorded by [KW] Caitlyn Victoria, PT 10/14/19 1156        User Key  (r) = Recorded By, (t) = Taken By, (c) = Cosigned By    Initials Name Effective Dates Discipline    SS Citlalli Abdi RN 07/10/18 -  Nurse    CS Brenda Thomas, MADSEN/L 03/07/18 -  OT    CF Eileen Tyson RN 10/17/16 -  Nurse    KW Caitlyn Victoria, PT 07/23/18 -  PT        Wound 09/30/19 1330 Left medial abdomen Fistula (Active)   Dressing Appearance dried drainage 10/14/2019   8:00 AM   Closure None 10/14/2019  8:00 AM   Base red;moist 10/14/2019  8:00 AM   Periwound redness 10/14/2019  8:00 AM   Periwound Temperature warm 10/14/2019  8:00 AM   Periwound Skin Turgor soft 10/14/2019  8:00 AM   Edges other (see comments) 10/14/2019  8:00 AM   Drainage Characteristics/Odor purulent 10/13/2019  9:34 PM   Drainage Amount small 10/14/2019  8:00 AM   Care, Wound cleansed with;sterile half normal saline 10/14/2019  8:00 AM   Dressing Care, Wound dressing changed 10/14/2019  8:00 AM   Periwound Care, Wound absorptive dressing applied 10/14/2019  8:00 AM       Wound 10/02/19 1347 abdomen Incision (Active)   Dressing Appearance dry;intact 10/14/2019  8:00 AM   Closure Staples 10/14/2019  8:00 AM   Base scab 10/14/2019  8:00 AM   Periwound dry 10/14/2019  8:00 AM   Periwound Temperature warm 10/14/2019  8:00 AM   Periwound Skin Turgor soft 10/14/2019  8:00 AM   Drainage Amount none 10/14/2019  8:00 AM   Staples Removed Intact Yes 10/14/2019 12:00 PM   Number of Staples Removed 20 10/14/2019 12:00 PM     Rehab Goal Summary     Row Name 10/14/19 1129 10/14/19 1100          Bed Mobility Goal 1 (PT)    Activity/Assistive Device (Bed Mobility Goal 1, PT)  --  sit to supine;supine to sit  -KW     [place] Level/Cues Needed (Bed Mobility Goal 1, PT)  --  independent  -KW     Time Frame (Bed Mobility Goal 1, PT)  --  by discharge  -KW     Progress/Outcomes (Bed Mobility Goal 1, PT)  --  goal not met  -KW        Transfer Goal 1 (PT)    Activity/Assistive Device (Transfer Goal 1, PT)  --  bed-to-chair/chair-to-bed;toilet  -KW     [place] Level/Cues Needed (Transfer Goal 1, PT)  --  conditional independence;independent  -KW     Time Frame (Transfer Goal 1, PT)  --  by discharge  -KW     Progress/Outcome (Transfer Goal 1, PT)  --  goal not met  -KW        Gait Training Goal 1 (PT)    Activity/Assistive Device (Gait Training Goal 1, PT)  --  gait (walking locomotion)  -KW     [place] Level (Gait  Training Goal 1, PT)  --  conditional independence;independent  -KW     Distance (Gait Goal 1, PT)  --  [id number]  -KW     Time Frame (Gait Training Goal 1, PT)  --  by discharge  -KW     Progress/Outcome (Gait Training Goal 1, PT)  --  goal not met  -KW        Stairs Goal 1 (PT)    Activity/Assistive Device (Stairs Goal 1, PT)  --  descending stairs;ascending stairs  -KW     [place] Level/Cues Needed (Stairs Goal 1, PT)  --  standby assist  -KW     Number of Stairs (Stairs Goal 1, PT)  --  5  -KW     Time Frame (Stairs Goal 1, PT)  --  by discharge  -KW     Progress/Outcome (Stairs Goal 1, PT)  --  goal not met  -KW        Occupational Therapy Goals    Transfer Goal Selection (OT)  transfer, OT goal 1  -CS  --     Bathing Goal Selection (OT)  bathing, OT goal 1  -CS  --     Dressing Goal Selection (OT)  dressing, OT goal 1  -CS  --     Toileting Goal Selection (OT)  toileting, OT goal 1  -CS  --     Activity Tolerance Goal Selection (OT)  activity tolerance, OT goal 1  -CS  --        Transfer Goal 1 (OT)    Activity/Assistive Device (Transfer Goal 1, OT)  sit-to-stand/stand-to-sit;bed-to-chair/chair-to-bed;toilet  -CS  --     [place] Level/Cues Needed (Transfer Goal 1, OT)  supervision required  -CS  --     Time Frame (Transfer Goal 1, OT)  long term goal (LTG);by discharge  -CS  --     Progress/Outcome (Transfer Goal 1, OT)  goal not met  -CS  --        Bathing Goal 1 (OT)    Activity/Assistive Device (Bathing Goal 1, OT)  bathing skills, all  -CS  --     [place] Level/Cues Needed (Bathing Goal 1, OT)  minimum assist (75% or more patient effort)  -CS  --     Time Frame (Bathing Goal 1, OT)  long term goal (LTG);by discharge  -CS  --     Barriers (Bathing Goal 1, OT)  Pt will need extra time and rest breaks PRN  -CS  --     Progress/Outcomes (Bathing Goal 1, OT)  goal not met  -CS  --        Dressing Goal 1 (OT)    Activity/Assistive Device (Dressing Goal 1, OT)  dressing skills, all  -CS  --      [place]/Cues Needed (Dressing Goal 1, OT)  minimum assist (75% or more patient effort)  -CS  --     Time Frame (Dressing Goal 1, OT)  long term goal (LTG);by discharge  -CS  --     Barriers (Dressing Goal 1, OT)  Pt will need extra time and rest breaks PRN  -CS  --     Progress/Outcome (Dressing Goal 1, OT)  goal not met  -CS  --        Toileting Goal 1 (OT)    Activity/Device (Toileting Goal 1, OT)  toileting skills, all  -CS  --     [place] Level/Cues Needed (Toileting Goal 1, OT)  minimum assist (75% or more patient effort)  -CS  --     Time Frame (Toileting Goal 1, OT)  long term goal (LTG);by discharge  -CS  --     Barriers (Toileting Goal 1, OT)  Pt will need extra time and rest breaks PRN  -CS  --     Progress/Outcome (Toileting Goal 1, OT)  goal not met  -CS  --         Activity Tolerance Goal 1 (OT)    Activity Level (Endurance Goal 1, OT)  15 min activity functional/therapeutic activity  -CS  --     Time Frame (Activity Tolerance Goal 1, OT)  long term goal (LTG);by discharge  -CS  --     Progress/Outcome (Activity Tolerance Goal 1, OT)  goal not met  -CS  --       User Key  (r) = Recorded By, (t) = Taken By, (c) = Cosigned By    Initials Name Provider Type Discipline    CS Brenda Thomas COTA/LIANE Occupational Therapy Assistant OT    Caitlyn Moyer, PT Physical Therapist PT        Occupational Therapy Education     Title: PT OT SLP Therapies (In Progress)     Topic: Occupational Therapy (In Progress)     Point: ADL training (In Progress)     Description: Instruct learner(s) on proper safety adaptation and remediation techniques during self care or transfers.   Instruct in proper use of assistive devices.    Learning Progress Summary           Patient Acceptance, E,TB,D, NR by CS at 10/14/2019  1:57 PM    Acceptance, E, NR by BB at 10/9/2019  1:37 PM    Acceptance, E,TB,D, NR by CS at 10/6/2019  1:05 PM    Acceptance, E,TB,D, NR by CS at 10/5/2019 11:11 AM    Acceptance, E, NR by AS at  10/4/2019 11:45 AM    Comment:  Role of OT, OT POC, importance of OOB activity for strength and healing, ADL training                   Point: Home exercise program (In Progress)     Description: Instruct learner(s) on appropriate technique for monitoring, assisting and/or progressing therapeutic exercises/activities.    Learning Progress Summary           Patient Acceptance, E,TB,D, NR by CS at 10/14/2019  1:57 PM    Acceptance, E, NR by BB at 10/8/2019 11:46 AM    Acceptance, E,TB,D, NR by CS at 10/6/2019  1:05 PM    Acceptance, E,TB,D, NR by CS at 10/5/2019 11:11 AM                   Point: Precautions (In Progress)     Description: Instruct learner(s) on prescribed precautions during self-care and functional transfers.    Learning Progress Summary           Patient Acceptance, E,TB,D, NR by CS at 10/14/2019  1:57 PM    Acceptance, E,TB,D, NR by CS at 10/6/2019  1:05 PM    Acceptance, E,TB,D, NR by CS at 10/5/2019 11:11 AM    Acceptance, E, NR by AS at 10/4/2019 11:45 AM    Comment:  Role of OT, OT POC, importance of OOB activity for strength and healing, ADL training                   Point: Body mechanics (In Progress)     Description: Instruct learner(s) on proper positioning and spine alignment during self-care, functional mobility activities and/or exercises.    Learning Progress Summary           Patient Acceptance, E,TB,D, NR by CS at 10/14/2019  1:57 PM    Acceptance, E,TB,D, NR by CS at 10/6/2019  1:05 PM    Acceptance, E,TB,D, NR by CS at 10/5/2019 11:11 AM                               User Key     Initials Effective Dates Name Provider Type Discipline    BB 03/07/18 -  Kerrie Woodson COTA/L Occupational Therapy Assistant OT    CS 03/07/18 -  Brenda Thomas COTA/LIANE Occupational Therapy Assistant OT    AS 03/25/19 -  Destinee Yarbrough OT Occupational Therapist OT                OT Recommendation and Plan  Outcome Summary/Treatment Plan (OT)  Daily Summary of Progress (OT): progress toward  functional goals is good  Plan for Continued Treatment (OT): cont OT POC  Anticipated Discharge Disposition (OT): anticipate therapy at next level of care  Therapy Frequency (OT Eval): other (see comments)( 5-7 days/wk)  Daily Summary of Progress (OT): progress toward functional goals is good  Plan of Care Review  Plan of Care Reviewed With: patient  Plan of Care Reviewed With: patient  Outcome Summary: Pt tolerated tx well this date. Pt c/o pain. Pt gave good effort with UE ther ex. No goals met this tx. Continue OT POC.  Outcome Measures     Row Name 10/14/19 1300 10/14/19 1100 10/13/19 0924       How much help from another person do you currently need...    Turning from your back to your side while in flat bed without using bedrails?  --  2  -KW  2  -GIAN    Moving from lying on back to sitting on the side of a flat bed without bedrails?  --  2  -KW  2  -GIAN    Moving to and from a bed to a chair (including a wheelchair)?  --  2  -KW  2  -GIAN    Standing up from a chair using your arms (e.g., wheelchair, bedside chair)?  --  2  -KW  2  -GIAN    Climbing 3-5 steps with a railing?  --  1  -KW  1  -GIAN    To walk in hospital room?  --  2  -KW  2  -GIAN    AM-PAC 6 Clicks Score (PT)  --  11  -KW  11  -GIAN       How much help from another is currently needed...    Putting on and taking off regular lower body clothing?  2  -CS  --  --    Bathing (including washing, rinsing, and drying)  2  -CS  --  --    Toileting (which includes using toilet bed pan or urinal)  2  -CS  --  --    Putting on and taking off regular upper body clothing  2  -CS  --  --    Taking care of personal grooming (such as brushing teeth)  2  -CS  --  --    Eating meals  1  -CS  --  --    AM-PAC 6 Clicks Score (OT)  11  -CS  --  --       Functional Assessment    Outcome Measure Options  --  AM-PAC 6 Clicks Basic Mobility (PT)  -KW  AM-PAC 6 Clicks Basic Mobility (PT)  -GIAN    Row Name 10/12/19 1032 10/11/19 1500          How much help from another person do  you currently need...    Turning from your back to your side while in flat bed without using bedrails?  --  2  -GIAN     Moving from lying on back to sitting on the side of a flat bed without bedrails?  --  2  -GIAN     Moving to and from a bed to a chair (including a wheelchair)?  --  2  -GIAN     Standing up from a chair using your arms (e.g., wheelchair, bedside chair)?  --  2  -GIAN     Climbing 3-5 steps with a railing?  --  1  -GIAN     To walk in hospital room?  --  2  -GIAN     AM-PAC 6 Clicks Score (PT)  --  11  -GIAN        How much help from another is currently needed...    Putting on and taking off regular lower body clothing?  2  -TO  --     Bathing (including washing, rinsing, and drying)  2  -TO  --     Toileting (which includes using toilet bed pan or urinal)  2  -TO  --     Putting on and taking off regular upper body clothing  2  -TO  --     Taking care of personal grooming (such as brushing teeth)  2  -TO  --     Eating meals  1  -TO  --     AM-PAC 6 Clicks Score (OT)  11  -TO  --        Functional Assessment    Outcome Measure Options  --  AM-PAC 6 Clicks Basic Mobility (PT)  -       User Key  (r) = Recorded By, (t) = Taken By, (c) = Cosigned By    Initials Name Provider Type    GIAN Abran Wan, MICHAEL Physical Therapy Assistant    TO Nelson Oliveira COTA/L Occupational Therapy Assistant    Brenda Lee COTA/LIANE Occupational Therapy Assistant    Caitlyn Moyer, PT Physical Therapist           Time Calculation:   Time Calculation- OT     Row Name 10/14/19 1358             Time Calculation- OT    OT Start Time  1129  -CS      OT Stop Time  1155  -CS      OT Time Calculation (min)  26 min  -CS      Total Timed Code Minutes- OT  26 minute(s)  -CS      OT Received On  10/14/19  -        User Key  (r) = Recorded By, (t) = Taken By, (c) = Cosigned By    Initials Name Provider Type    Brenda Lee COTA/LIANE Occupational Therapy Assistant        Therapy Charges for Today     Code Description  Service Date Service Provider Modifiers Qty    [id number] HC OT THER PROC EA 15 MIN 10/14/2019 Brenda Thomas, TENA/L GO 2               ROSEANNA Barton  10/14/2019

## 2019-10-14 NOTE — PROGRESS NOTES
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    Diet:  Adequate intake.    Activity level: Returning to normal.    Pain:  He reports no pain.      Tolerating feeds without difficulty at this point  History taken from: patient chart RN    Objective     Vital Signs  Temp:  [97.1 °F (36.2 °C)-97.2 °F (36.2 °C)] 97.2 °F (36.2 °C)  Heart Rate:  [85-97] 88  Resp:  [18] 18  BP: (106-138)/(56-69) 138/64    Objective:  General Appearance:  Comfortable.    Vital signs: (most recent): Blood pressure 138/64, pulse 88, temperature 97.2 °F (36.2 °C), temperature source Oral, resp. rate 18, weight 54.8 kg (120 lb 14.4 oz), SpO2 95 %.  Vital signs are normal.    Output: Producing urine and producing stool.    Abdomen: Abdomen is soft and non-distended.  (Abdominal wall burn is considerably better.  All areas appear to be healing without difficulty.).  There is no abdominal tenderness.               Results Review:     I reviewed the patient's new clinical results.  Hypophosphatemia is noted this morning with minor hypokalemia  Medication Review: Performed    Assessment/Plan       * No active hospital problems. *      Assessment:    Condition: In stable condition.  Improving.       Plan:   (1.  Replete phosphorus and potassium  2.  Discharge planning).       Parminder Kc MD  10/14/19  7:32 AM    Time: 15

## 2019-10-14 NOTE — PLAN OF CARE
Problem: Patient Care Overview  Goal: Plan of Care Review  Outcome: Ongoing (interventions implemented as appropriate)   10/14/19 4337   Coping/Psychosocial   Plan of Care Reviewed With patient   Plan of Care Review   Progress improving   OTHER   Outcome Summary Pt tolerated tx well this date. Pt c/o pain. Pt gave good effort with UE ther ex. No goals met this tx. Continue OT POC.

## 2019-10-15 LAB
GLUCOSE BLDC GLUCOMTR-MCNC: 109 MG/DL (ref 70–130)
GLUCOSE BLDC GLUCOMTR-MCNC: 113 MG/DL (ref 70–130)
GLUCOSE BLDC GLUCOMTR-MCNC: 122 MG/DL (ref 70–130)
GLUCOSE BLDC GLUCOMTR-MCNC: 98 MG/DL (ref 70–130)

## 2019-10-15 PROCEDURE — 94760 N-INVAS EAR/PLS OXIMETRY 1: CPT

## 2019-10-15 PROCEDURE — 99024 POSTOP FOLLOW-UP VISIT: CPT | Performed by: SURGERY

## 2019-10-15 PROCEDURE — 97530 THERAPEUTIC ACTIVITIES: CPT

## 2019-10-15 PROCEDURE — 97110 THERAPEUTIC EXERCISES: CPT

## 2019-10-15 PROCEDURE — 82962 GLUCOSE BLOOD TEST: CPT

## 2019-10-15 PROCEDURE — 77336 RADIATION PHYSICS CONSULT: CPT | Performed by: RADIOLOGY

## 2019-10-15 PROCEDURE — 94799 UNLISTED PULMONARY SVC/PX: CPT

## 2019-10-15 PROCEDURE — 77014 CHG CT GUIDANCE RADIATION THERAPY FLDS PLACEMENT: CPT | Performed by: RADIOLOGY

## 2019-10-15 PROCEDURE — 77386: CPT | Performed by: RADIOLOGY

## 2019-10-15 PROCEDURE — 25010000002 ENOXAPARIN PER 10 MG: Performed by: SURGERY

## 2019-10-15 RX ADMIN — HYDROCODONE BITARTRATE AND ACETAMINOPHEN 15 ML: 7.5; 325 SOLUTION ORAL at 16:50

## 2019-10-15 RX ADMIN — ALBUTEROL SULFATE 2.5 MG: 2.5 SOLUTION RESPIRATORY (INHALATION) at 00:20

## 2019-10-15 RX ADMIN — ENOXAPARIN SODIUM 40 MG: 40 INJECTION SUBCUTANEOUS at 08:40

## 2019-10-15 RX ADMIN — HYDROCODONE BITARTRATE AND ACETAMINOPHEN 15 ML: 7.5; 325 SOLUTION ORAL at 23:13

## 2019-10-15 RX ADMIN — ALBUTEROL SULFATE 2.5 MG: 2.5 SOLUTION RESPIRATORY (INHALATION) at 13:15

## 2019-10-15 RX ADMIN — POTASSIUM PHOSPHATE, MONOBASIC AND POTASSIUM PHOSPHATE, DIBASIC 10 MMOL: 224; 236 INJECTION, SOLUTION, CONCENTRATE INTRAVENOUS at 08:40

## 2019-10-15 RX ADMIN — POTASSIUM CHLORIDE, DEXTROSE MONOHYDRATE AND SODIUM CHLORIDE 50 ML/HR: 150; 5; 450 INJECTION, SOLUTION INTRAVENOUS at 23:53

## 2019-10-15 RX ADMIN — SODIUM CHLORIDE, PRESERVATIVE FREE 10 ML: 5 INJECTION INTRAVENOUS at 23:12

## 2019-10-15 RX ADMIN — ALBUTEROL SULFATE 2.5 MG: 2.5 SOLUTION RESPIRATORY (INHALATION) at 06:41

## 2019-10-15 RX ADMIN — FAMOTIDINE 20 MG: 40 POWDER, FOR SUSPENSION ORAL at 02:02

## 2019-10-15 RX ADMIN — FAMOTIDINE 20 MG: 40 POWDER, FOR SUSPENSION ORAL at 12:11

## 2019-10-15 RX ADMIN — HYDROCODONE BITARTRATE AND ACETAMINOPHEN 15 ML: 7.5; 325 SOLUTION ORAL at 08:51

## 2019-10-15 RX ADMIN — SODIUM CHLORIDE, PRESERVATIVE FREE 10 ML: 5 INJECTION INTRAVENOUS at 08:40

## 2019-10-15 RX ADMIN — SILVER SULFADIAZINE: 10 CREAM TOPICAL at 23:11

## 2019-10-15 RX ADMIN — SILVER SULFADIAZINE: 10 CREAM TOPICAL at 08:40

## 2019-10-15 RX ADMIN — ALBUTEROL SULFATE 2.5 MG: 2.5 SOLUTION RESPIRATORY (INHALATION) at 19:20

## 2019-10-15 NOTE — PLAN OF CARE
Problem: Patient Care Overview  Goal: Plan of Care Review  Outcome: Ongoing (interventions implemented as appropriate)   10/15/19 1342   Coping/Psychosocial   Plan of Care Reviewed With patient   Plan of Care Review   Progress no change   OTHER   Outcome Summary Pt tolerated tx fair this date. Pt c/o pain. Pt gave good effort with UE ther ex. Pt deferred to do any ADLs this date. Continue OT POC.

## 2019-10-15 NOTE — PLAN OF CARE
Problem: Patient Care Overview  Goal: Plan of Care Review  Outcome: Ongoing (interventions implemented as appropriate)   10/15/19 0411   Coping/Psychosocial   Plan of Care Reviewed With patient   Plan of Care Review   Progress no change   OTHER   Outcome Summary vss, tolerating feeding, pain controlled, will cont to monitor.       Problem: Fall Risk (Adult)  Goal: Absence of Fall  Outcome: Ongoing (interventions implemented as appropriate)      Problem: Skin Injury Risk (Adult)  Goal: Skin Health and Integrity  Outcome: Ongoing (interventions implemented as appropriate)      Problem: Pain, Chronic (Adult)  Goal: Acceptable Pain/Comfort Level and Functional Ability  Outcome: Ongoing (interventions implemented as appropriate)      Problem: Wound (Includes Pressure Injury) (Adult)  Goal: Signs and Symptoms of Listed Potential Problems Will be Absent, Minimized or Managed (Wound)  Outcome: Ongoing (interventions implemented as appropriate)      Problem: Airway, Artificial (Adult)  Goal: Signs and Symptoms of Listed Potential Problems Will be Absent, Minimized or Managed (Airway, Artificial)  Outcome: Ongoing (interventions implemented as appropriate)

## 2019-10-15 NOTE — THERAPY PROGRESS REPORT/RE-CERT
Acute Care - Physical Therapy Progress Note  [facility]     Patient Name: Emerson Bang  : 1960  MRN: [id number]  Today's Date: 10/15/2019  Onset of Illness/Injury or Date of Surgery: 19     Referring Physician: Dr. Kc    Admit Date: 2019    Visit Dx:    ICD-10-CM ICD-9-CM   1. 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 Impaired physical mobility Z74.09 781.99   3. Impaired mobility and activities of daily living Z74.09 799.89     Patient Active Problem List   Diagnosis   • Hyperkalemia   • Iron deficiency anemia   • Gastroesophageal reflux disease with esophagitis   • Elevated AST (SGOT)   • Alcohol abuse   • Hypothyroidism   • Balance problem   • Need for vaccination   • Low magnesium levels   • Squamous cell carcinoma of pharynx (CMS/HCC)   • History of throat cancer   • Vitamin D deficiency   • Alcohol abuse counseling and surveillance   • Encounter for screening for diabetes mellitus   • Hypomagnesemia   • Iron deficiency anemia   • Hyperlipidemia   • Underweight due to inadequate caloric intake   • Need for immunization against influenza   • 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HCC)   • Hypertension   • General medical examination   • Underweight   • Epigastric pain   • Gastritis without bleeding   • Need for influenza vaccination   • Elevated liver function tests   • B12 deficiency   • Intestinal malabsorption   • Throat pain   • Acute pharyngitis   • Upper respiratory tract infection   • Neoplasm of uncertain behavior of larynx   • Pharyngoesophageal dysphagia   • Severe protein-calorie malnutrition (CMS/HCC)   • Anemia of chronic disease   • Hypotension   • Hyponatremia   • Status post insertion of percutaneous endoscopic gastrostomy (PEG) tube (CMS/HCC)   • Hx of tracheostomy   • History of throat surgery   • Hx SBO   • Urinary retention   • Generalized weakness   • Acute otitis externa of right ear   • Hard stool   • Panlobular emphysema (CMS/HCC)   • Cancer of  hypopharynx (CMS/HCC)   • Diarrhea   • Encounter for venous access device care   • Prediabetes   • Status post neck dissection   • S/P partial thyroidectomy   • S/P laryngectomy   • Annual physical exam   • Alcoholic cirrhosis of liver without ascites (CMS/HCC)       Therapy Treatment    Rehabilitation Treatment Summary     Row Name 10/15/19 1554 10/15/19 1134          Treatment Time/Intention    Discipline  physical therapist  -GIAN  occupational therapy assistant  -CS     Document Type  progress note/recertification  -GIAN  therapy note (daily note)  -CS     Subjective Information  complains of;pain  -GIAN  complains of;pain  -CS     Mode of Treatment  physical therapy;individual therapy  -GIAN  occupational therapy  -CS     Patient/Family Observations  no family present;pt supine in bed ;noted IV, trach collar with O2, bed exit armed  -GIAN  --     Care Plan Review  care plan/treatment goals reviewed;risks/benefits reviewed;current/potential barriers reviewed;patient/other agree to care plan  -GIAN  --     Therapy Frequency (PT Clinical Impression)  daily  -GIAN  --     Therapy Frequency (OT Eval)  --  other (see comments) 5-7 days/wk  -CS     Patient Effort  fair  -GIAN  fair  -CS     Comment  Pt required coaxing, but was agreeable to PT  -GIAN  --     Existing Precautions/Restrictions  fall;other (see comments) watch gait belt placement s/p abdominal surgery  -GIAN  fall;other (see comments) watch gait belt placement  -CS     Patient Response to Treatment  VSS throughout  -GIAN  --     Recorded by [GIAN] Judy Watkins, PT 10/15/19 0994 [CS] Brenda Thomas, MADSEN/L 10/15/19 1341     Row Name 10/15/19 1554 10/15/19 1134          Vital Signs    Pre Systolic BP Rehab  113  -GIAN  --     Pre Treatment Diastolic BP  60  -GIAN  --     Pretreatment Heart Rate (beats/min)  93  -GIAN  --     Posttreatment Heart Rate (beats/min)  100  -GIAN  --     Pre SpO2 (%)  100  -GIAN  --     O2 Delivery Pre Treatment  trach collar  -GIAN  --     Intra SpO2 (%)  98   -GIAN  --     O2 Delivery Intra Treatment  trach collar  -GIAN  --     Post SpO2 (%)  99  -GIAN  --     O2 Delivery Post Treatment  trach collar  -GIAN  --     Pre Patient Position  Supine  -GIAN  Supine  -CS     Intra Patient Position  Sitting  -GIAN  --     Post Patient Position  Supine  -GIAN  Supine  -CS     Recorded by [GIAN] Judy Watkins, PT 10/15/19 1811 [CS] Brenda Thomas MADSEN/L 10/15/19 1341     Row Name 10/15/19 1554 10/15/19 1134          Cognitive Assessment/Intervention- PT/OT    Affect/Mental Status (Cognitive)  --  unable/difficult to assess;flat/blunted affect pt responding appropriately to yes/ no questions  -CS     Behavioral Issues (Cognitive)  --  unable/difficult to assess  -CS     Orientation Status (Cognition)  oriented x 4  -GIAN  unable/difficult to assess  -CS     Follows Commands (Cognition)  WFL  -GIAN  --     Recorded by [GIAN] Judy Watkins, PT 10/15/19 1811 [CS] Brenda Thomas MADSEN/L 10/15/19 1341     Row Name 10/15/19 1557             Safety Issues, Functional Mobility    Safety Issues Affecting Function (Mobility)  insight into deficits/self awareness;safety precaution awareness;safety precautions follow-through/compliance  -GIAN      Impairments Affecting Function (Mobility)  balance;endurance/activity tolerance;shortness of breath;strength  -GIAN      Recorded by [GIAN] Judy Watkins, PT 10/15/19 1811      Row Name 10/15/19 1554             Bed Mobility Assessment/Treatment    Bed Mobility Assessment/Treatment  supine-sit;sit-supine  -GIAN      Rolling Right [place] (Bed Mobility)  contact guard  -GIAN      Supine-Sit [place] (Bed Mobility)  contact guard  -GIAN      Sit-Supine [place] (Bed Mobility)  contact guard  -GIAN      Assistive Device (Bed Mobility)  bed rails;head of bed elevated  -GIAN      Recorded by [GIAN] Judy Watkins, PT 10/15/19 1811      Row Name 10/15/19 1554             Transfer Assessment/Treatment    Transfer Assessment/Treatment  sit-stand transfer;stand-sit transfer   -GIAN      Comment (Transfers)  Pt stood with RW 5 minutes with CGA. Pt able to use urinal. Pt also able to stand while being cleaned as pt had bowel movement as well  -GIAN      Recorded by [GIAN] Judy Watkins, PT 10/15/19 1811      Row Name 10/15/19 1554             Sit-Stand Transfer    Sit-Stand [place] (Transfers)  minimum assist (75% patient effort);verbal cues  -GIAN      Assistive Device (Sit-Stand Transfers)  walker, front-wheeled  -GIAN      Recorded by [GIAN] Judy Watkins, PT 10/15/19 1811      Row Name 10/15/19 1554             Stand-Sit Transfer    Stand-Sit [place] (Transfers)  contact guard;verbal cues  -GIAN      Assistive Device (Stand-Sit Transfers)  walker, front-wheeled  -GIAN      Recorded by [GIAN] Judy Watkins, PT 10/15/19 1811      Row Name 10/15/19 1554             Gait/Stairs Assessment/Training    Gait/Stairs Assessment/Training  gait/ambulation assistive device  -GIAN      [place] Level (Gait)  minimum assist (75% patient effort);verbal cues;nonverbal cues (demo/gesture)  -GIAN      Assistive Device (Gait)  walker, front-wheeled  -GIAN      Distance in Feet (Gait)  5 to HOB  -GIAN      Recorded by [GIAN] Judy Watkins, PT 10/15/19 1811      Row Name 10/15/19 1134             ADL Assessment/Intervention    BADL Assessment/Intervention  bathing  -CS      Recorded by [CS] Brenda Thomas COTA/L 10/15/19 1341      Row Name 10/15/19 1134             Bathing Assessment/Intervention    Comment (Bathing)  pt deferred  -CS      Recorded by [CS] Brenda Thomas MADSEN/L 10/15/19 1341      Row Name 10/15/19 1554             General ROM    GENERAL ROM COMMENTS  AROM WFL all extremities  -GIAN      Recorded by [GIAN] Judy Watkins, PT 10/15/19 1811      Row Name 10/15/19 1554             MMT (Manual Muscle Testing)    General MMT Comments  BLE: 4-/5 ankle/foot, knee, hip  -GIAN      Recorded by [GIAN] Judy Watkins, PT 10/15/19 1811      Row Name 10/15/19 1559             Therapeutic Exercise    Therapeutic  Exercise  seated, lower extremities  -GIAN      Recorded by [GIAN] Judy Watkins, PT 10/15/19 1811      Row Name 10/15/19 155             Lower Extremity Seated Therapeutic Exercise    Performed, Seated Lower Extremity (Therapeutic Exercise)  LAQ (long arc quad), knee extension;ankle dorsiflexion/plantarflexion;knee flexion/extension  -GIAN      Exercise Type, Seated Lower Extremity (Therapeutic Exercise)  AROM (active range of motion)  -GIAN      Sets/Reps Detail, Seated Lower Extremity (Therapeutic Exercise)  2/10 just 1/10 reps LAQas exercise was increasing abd pain  -GIAN      Recorded by [GIAN] Judy Watkins, PT 10/15/19 1811      Row Name 10/15/19 1134             Therapeutic Exercise    Upper Extremity (Therapeutic Exercise)  bicep curl, bilateral  -CS      Upper Extremity Range of Motion (Therapeutic Exercise)  shoulder flexion/extension, bilateral;shoulder internal/external rotation, bilateral;elbow flexion/extension, bilateral;forearm supination/pronation, bilateral;wrist flexion/extension, bilateral  -CS      Hand (Therapeutic Exercise)  finger flexion/extension, bilateral  -CS      Weight/Resistance (Therapeutic Exercise)  1 pound  -CS      Exercise Type (Therapeutic Exercise)  AROM (active range of motion)  -CS      Position (Therapeutic Exercise)  supine  -CS      Sets/Reps (Therapeutic Exercise)  1/20  -CS      Equipment (Therapeutic Exercise)  free weight, barbell  -CS      Expected Outcome (Therapeutic Exercise)  improve functional tolerance, self-care activity;improve performance, transfer skills  -CS      Recorded by [CS] Brenda Thomas COTA/L 10/15/19 1341      Row Name 10/15/19 7643             Static Sitting Balance    Level of [place] (Unsupported Sitting, Static Balance)  independent  -GIAN      Sitting Position (Unsupported Sitting, Static Balance)  sitting on edge of bed  -GIAN      Time Able to Maintain Position (Unsupported Sitting, Static Balance)  more than 5 minutes  -GIAN      Recorded by  [GIAN] Judy Watkins, PT 10/15/19 1811      Row Name 10/15/19 1554             Dynamic Sitting Balance    Level of [place], Reaches Outside Midline (Sitting, Dynamic Balance)  standby assist  -GIAN      Sitting Position, Reaches Outside Midline (Sitting, Dynamic Balance)  sitting on edge of bed  -GIAN      Recorded by [GIAN] Judy Watkins, PT 10/15/19 1811      Row Name 10/15/19 1554 10/15/19 1134          Positioning and Restraints    Pre-Treatment Position  in bed  -GIAN  in bed  -CS     Post Treatment Position  bed  -GIAN  bed  -CS     In Bed  fowlers;call light within reach;encouraged to call for assist;exit alarm on;side rails up x2  -GIAN  fowlers;call light within reach;encouraged to call for assist;exit alarm on  -CS     Recorded by [GIAN] Judy Watkins, PT 10/15/19 1811 [CS] Brenda Thomas, MADSEN/L 10/15/19 1341     Row Name 10/15/19 1557             Pain Assessment    Additional Documentation  Pain Scale: Numbers Pre/Post-Treatment (Group)  -GIAN      Recorded by [IGAN] Judy Watkins, PT 10/15/19 1811      Row Name 10/15/19 1554 10/15/19 1134          Pain Scale: Numbers Pre/Post-Treatment    Pain Scale: Numbers, Pretreatment  9/10  -  9/10  -CS     Pain Scale: Numbers, Post-Treatment  9/10  -  9/10  -CS     Pain Location  abdomen  -GIAN  abdomen  -CS     Pre/Post Treatment Pain Comment  eliminated LAQ's as pt complained of increased abdominal with this exercise  -GIAN  --     Pain Intervention(s)  Repositioned;Rest  -GIAN  Medication (See MAR)  -CS     Recorded by [GIAN] Judy Watkins, PT 10/15/19 1811 [CS] Brenda Thomas, MADSEN/L 10/15/19 1341     Row Name 10/15/19 1554             Sensory Assessment/Intervention    Sensory General Assessment  no sensation deficits identified to lighttouch'Pt did report numbness R thumb  -GIAN      Recorded by [GIAN] Judy Watkins, PT 10/15/19 1811      Row Name                Wound 09/30/19 1330 Left medial abdomen Fistula    Wound - Properties Group Date first assessed: 09/30/19  [SS] Time first assessed: 1330 [SS] Side: Left [SS] Orientation: medial [SS] Location: abdomen [SS] Primary Wound Type: Fistula [SS] Recorded by:  [SS] Citlalli Abdi RN 09/30/19 1619    Row Name                Wound 10/02/19 1347 abdomen Incision    Wound - Properties Group Date first assessed: 10/02/19 [CF] Time first assessed: 1347 [CF] Present on Hospital Admission: N [CF] Location: abdomen [CF] Primary Wound Type: Incision [CF] Recorded by:  [CF] Eileen Tyson 10/02/19 1347    Row Name 10/15/19 1554             Plan of Care Review    Plan of Care Reviewed With  patient  -GIAN      Recorded by [GIAN] Judy Watkins, PT 10/15/19 1811      Row Name 10/15/19 1134             Outcome Summary/Treatment Plan (OT)    Daily Summary of Progress (OT)  progress toward functional goals is good  -CS      Plan for Continued Treatment (OT)  cont OT POC  -CS      Anticipated Discharge Disposition (OT)  anticipate therapy at next level of care  -CS      Recorded by [CS] Brenda Thomas COTA/L 10/15/19 1341      Row Name 10/15/19 1554             Outcome Summary/Treatment Plan (PT)    Daily Summary of Progress (PT)  progress toward functional goals is gradual  -GIAN      Plan for Continued Treatment (PT)  continue;OOB/ambulation as tolerated  -GIAN      Anticipated Discharge Disposition (PT)  anticipate therapy at next level of care  -GIAN      Recorded by [GIAN] Judy Watkins, PT 10/15/19 1811        User Key  (r) = Recorded By, (t) = Taken By, (c) = Cosigned By    Initials Name Effective Dates Discipline    GIAN Judy Watkins, PT 04/03/18 -  PT    SS Citlalli Abdi RN 07/10/18 -  Nurse    CS Brenda Thomas MADSEN/L 03/07/18 -  OT    CF Eileen Tyson - -          Wound 09/30/19 1330 Left medial abdomen Fistula (Active)   Dressing Appearance dry;intact 10/15/2019  8:51 AM   Closure None 10/15/2019  8:51 AM   Base dressing in place, unable to visualize 10/15/2019  8:51 AM   Periwound redness 10/14/2019 10:20 PM   Periwound  Temperature warm 10/14/2019 10:20 PM   Periwound Skin Turgor soft 10/14/2019 10:20 PM   Edges other (see comments) 10/14/2019 10:20 PM   Drainage Amount small 10/15/2019  8:51 AM   Care, Wound cleansed with;sterile half normal saline;barrier applied;pressure dressing removed;silver agent applied 10/14/2019 10:20 PM   Dressing Care, Wound dressing changed 10/14/2019 10:20 PM   Periwound Care, Wound absorptive dressing applied 10/14/2019 10:20 PM       Wound 10/02/19 1347 abdomen Incision (Active)   Dressing Appearance dry;intact 10/15/2019  8:51 AM   Periwound dry 10/14/2019 10:20 PM   Periwound Temperature warm 10/14/2019 10:20 PM   Periwound Skin Turgor soft 10/14/2019 10:20 PM   Drainage Amount none 10/14/2019 10:20 PM       Rehab Goal Summary     Row Name 10/15/19 1554 10/15/19 1134          Bed Mobility Goal 1 (PT)    Activity/Assistive Device (Bed Mobility Goal 1, PT)  sit to supine;supine to sit  -GIAN  --     [place] Level/Cues Needed (Bed Mobility Goal 1, PT)  independent  -GIAN  --     Time Frame (Bed Mobility Goal 1, PT)  by discharge  -GIAN  --     Progress/Outcomes (Bed Mobility Goal 1, PT)  goal not met  -GIAN  --        Transfer Goal 1 (PT)    Activity/Assistive Device (Transfer Goal 1, PT)  bed-to-chair/chair-to-bed;toilet  -GIAN  --     [place] Level/Cues Needed (Transfer Goal 1, PT)  standby assist  -GIAN  --     Time Frame (Transfer Goal 1, PT)  by discharge  -GIAN  --     Progress/Outcome (Transfer Goal 1, PT)  goal not met;goal revised this date  -GIAN  --        Gait Training Goal 1 (PT)    Activity/Assistive Device (Gait Training Goal 1, PT)  gait (walking locomotion);assistive device use  -GIAN  --     [place] Level (Gait Training Goal 1, PT)  contact guard assist  -GIAN  --     Distance (Gait Goal 1, PT)  150ft  -GIAN  --     Time Frame (Gait Training Goal 1, PT)  by discharge  -GIAN  --     Progress/Outcome (Gait Training Goal 1, PT)  goal not met;goal revised this date  -GIAN  --        Stairs Goal 1  (PT)    Activity/Assistive Device (Stairs Goal 1, PT)  ascending stairs;descending stairs  -GIAN  --     [place] Level/Cues Needed (Stairs Goal 1, PT)  standby assist  -GIAN  --     Number of Stairs (Stairs Goal 1, PT)  5  -GIAN  --     Time Frame (Stairs Goal 1, PT)  by discharge  -  --     Progress/Outcome (Stairs Goal 1, PT)  goal not met  -IGAN  --        Occupational Therapy Goals    Transfer Goal Selection (OT)  --  transfer, OT goal 1  -CS     Bathing Goal Selection (OT)  --  bathing, OT goal 1  -CS     Dressing Goal Selection (OT)  --  dressing, OT goal 1  -CS     Toileting Goal Selection (OT)  --  toileting, OT goal 1  -CS     Activity Tolerance Goal Selection (OT)  --  activity tolerance, OT goal 1  -CS        Transfer Goal 1 (OT)    Activity/Assistive Device (Transfer Goal 1, OT)  --  sit-to-stand/stand-to-sit;bed-to-chair/chair-to-bed;toilet  -CS     [place] Level/Cues Needed (Transfer Goal 1, OT)  --  supervision required  -CS     Time Frame (Transfer Goal 1, OT)  --  long term goal (LTG);by discharge  -CS     Progress/Outcome (Transfer Goal 1, OT)  --  goal not met  -CS        Bathing Goal 1 (OT)    Activity/Assistive Device (Bathing Goal 1, OT)  --  bathing skills, all  -CS     [place] Level/Cues Needed (Bathing Goal 1, OT)  --  minimum assist (75% or more patient effort)  -CS     Time Frame (Bathing Goal 1, OT)  --  long term goal (LTG);by discharge  -CS     Barriers (Bathing Goal 1, OT)  --  Pt will need extra time and rest breaks PRN  -CS     Progress/Outcomes (Bathing Goal 1, OT)  --  goal not met  -CS        Dressing Goal 1 (OT)    Activity/Assistive Device (Dressing Goal 1, OT)  --  dressing skills, all  -CS     [place]/Cues Needed (Dressing Goal 1, OT)  --  minimum assist (75% or more patient effort)  -CS     Time Frame (Dressing Goal 1, OT)  --  long term goal (LTG);by discharge  -CS     Barriers (Dressing Goal 1, OT)  --  Pt will need extra time and rest breaks PRN  -CS      Progress/Outcome (Dressing Goal 1, OT)  --  goal not met  -CS        Toileting Goal 1 (OT)    Activity/Device (Toileting Goal 1, OT)  --  toileting skills, all  -CS     [place] Level/Cues Needed (Toileting Goal 1, OT)  --  minimum assist (75% or more patient effort)  -CS     Time Frame (Toileting Goal 1, OT)  --  long term goal (LTG);by discharge  -CS     Barriers (Toileting Goal 1, OT)  --  Pt will need extra time and rest breaks PRN  -CS     Progress/Outcome (Toileting Goal 1, OT)  --  goal not met  -CS         Activity Tolerance Goal 1 (OT)    Activity Level (Endurance Goal 1, OT)  --  15 min activity functional/therapeutic activity  -CS     Time Frame (Activity Tolerance Goal 1, OT)  --  long term goal (LTG);by discharge  -CS     Progress/Outcome (Activity Tolerance Goal 1, OT)  --  goal not met  -CS       User Key  (r) = Recorded By, (t) = Taken By, (c) = Cosigned By    Initials Name Provider Type Discipline    Judy Becerra PT Physical Therapist PT    CS Brenda Thomas COTA/L Occupational Therapy Assistant OT          Physical Therapy Education     Title: PT OT SLP Therapies (In Progress)     Topic: Physical Therapy (In Progress)     Point: Mobility training (In Progress)     Learning Progress Summary           Patient Acceptance, E, NR by GIAN at 10/13/2019  1:00 PM    Acceptance, E, NR by GIAN at 10/11/2019  3:17 PM    Acceptance, E, NR by GIAN at 10/9/2019  2:59 PM    Acceptance, E, NR by GIAN at 10/8/2019  4:00 PM    Acceptance, E, NR by [facility] at 10/3/2019  3:43 PM                   Point: Body mechanics (In Progress)     Learning Progress Summary           Patient Acceptance, E, NR by NANETTE at 10/3/2019  3:43 PM                   Point: Precautions (In Progress)     Learning Progress Summary           Patient Acceptance, E, NR by NANETTE at 10/3/2019  3:43 PM                               User Key     Initials Effective Dates Name Provider Type Discipline    [facility] 04/03/18 -  Judy Watkins, PT Physical  Therapist PT    GIAN 03/07/18 -  Abran Wan PTA Physical Therapy Assistant PT                PT Recommendation and Plan  Anticipated Discharge Disposition (PT): anticipate therapy at next level of care  Planned Therapy Interventions (PT Eval): balance training, bed mobility training, gait training, home exercise program, patient/family education, stair training, strengthening, transfer training  Therapy Frequency (PT Clinical Impression): daily  Outcome Summary/Treatment Plan (PT)  Daily Summary of Progress (PT): progress toward functional goals is gradual  Plan for Continued Treatment (PT): continue;OOB/ambulation as tolerated  Anticipated Discharge Disposition (PT): anticipate therapy at next level of care  Plan of Care Reviewed With: patient  Outcome Summary: PT recertification completed. Pt required coaxing to participate, but was cooperative with treatment. Pt transferred supine to sit with CGA and sit to stand to sit with min assistance. Pt stood 5 minutes with RW and CGA  and ambulated 5ft to HOB with CGA and verbal cues. Function limited by decreased strength, balance, and tolerance for functional mobility and activities. Pt has not met goals from evaluation but has demonstrated good progress today. Pt will benefit from continued PT to improve OOB and mobility tolerance to regain lost function. Anticipate need for skilled PT at next level of care.  Outcome Measures     Row Name 10/15/19 1554 10/15/19 1300 10/14/19 1300       How much help from another person do you currently need...    Turning from your back to your side while in flat bed without using bedrails?  3  -GIAN  --  --    Moving from lying on back to sitting on the side of a flat bed without bedrails?  3  -GIAN  --  --    Moving to and from a bed to a chair (including a wheelchair)?  3  -GIAN  --  --    Standing up from a chair using your arms (e.g., wheelchair, bedside chair)?  3  -GIAN  --  --    Climbing 3-5 steps with a railing?  2  -GIAN  --  --     To walk in hospital room?  3  -GIAN  --  --    AM-PAC 6 Clicks Score (PT)  17  -GIAN  --  --       How much help from another is currently needed...    Putting on and taking off regular lower body clothing?  --  2  -CS  2  -CS    Bathing (including washing, rinsing, and drying)  --  2  -CS  2  -CS    Toileting (which includes using toilet bed pan or urinal)  --  2  -CS  2  -CS    Putting on and taking off regular upper body clothing  --  2  -CS  2  -CS    Taking care of personal grooming (such as brushing teeth)  --  2  -CS  2  -CS    Eating meals  --  1  -CS  1  -CS    AM-PAC 6 Clicks Score (OT)  --  11  -CS  11  -CS       Functional Assessment    Outcome Measure Options  AM-PAC 6 Clicks Basic Mobility (PT)  -GIAN  --  --    Row Name 10/14/19 1100 10/13/19 0924          How much help from another person do you currently need...    Turning from your back to your side while in flat bed without using bedrails?  2  -KW  2  -JCA     Moving from lying on back to sitting on the side of a flat bed without bedrails?  2  -KW  2  -JCA     Moving to and from a bed to a chair (including a wheelchair)?  2  -KW  2  -JCA     Standing up from a chair using your arms (e.g., wheelchair, bedside chair)?  2  -KW  2  -JCA     Climbing 3-5 steps with a railing?  1  -KW  1  -JCA     To walk in hospital room?  2  -KW  2  -JCA     AM-PAC 6 Clicks Score (PT)  11  -KW  11  -JCA        Functional Assessment    Outcome Measure Options  AM-PAC 6 Clicks Basic Mobility (PT)  -KW  AM-PAC 6 Clicks Basic Mobility (PT)  -JCA       User Key  (r) = Recorded By, (t) = Taken By, (c) = Cosigned By    Initials Name Provider Type    GIAN Judy Watkins, PT Physical Therapist    Abran Hope, PTA Physical Therapy Assistant    CS Brenda Thomas, TENA/LIANE Occupational Therapy Assistant    KW Caitlyn Victoria, PT Physical Therapist         Time Calculation:   PT Charges     Row Name 10/15/19 1821             Time Calculation    Start Time  1554  -GIAN      Stop  Time  1640  -      Time Calculation (min)  46 min  -      PT Received On  10/15/19  -      PT Goal Re-Cert Due Date  10/28/19  -         Time Calculation- PT    Total Timed Code Minutes- PT  46 minute(s)  -        User Key  (r) = Recorded By, (t) = Taken By, (c) = Cosigned By    Initials Name Provider Type    GIAN Judy Watkins, PT Physical Therapist        Therapy Charges for Today     Code Description Service Date Service Provider Modifiers Qty    [id number]  PT THERAPEUTIC ACT EA 15 MIN 10/15/2019 Judy Watkins, PT GP 3          PT G-Codes  Outcome Measure Options: AM-PAC 6 Clicks Basic Mobility (PT)  AM-PAC 6 Clicks Score (PT): 17  AM-PAC 6 Clicks Score (OT): 11    Judy Watkins, PT  10/15/2019

## 2019-10-15 NOTE — THERAPY TREATMENT NOTE
Acute Care - Occupational Therapy Treatment Note  [facility]     Patient Name: Emerson Bang  : 1960  MRN: [id number]  Today's Date: 10/15/2019  Onset of Illness/Injury or Date of Surgery: 19  Date of Referral to OT: 10/02/19  Referring Physician: Dr. Kc    Admit Date: 2019       ICD-10-CM ICD-9-CM   1. 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 Impaired physical mobility Z74.09 781.99   3. Impaired mobility and activities of daily living Z74.09 799.89     Patient Active Problem List   Diagnosis   • Hyperkalemia   • Iron deficiency anemia   • Gastroesophageal reflux disease with esophagitis   • Elevated AST (SGOT)   • Alcohol abuse   • Hypothyroidism   • Balance problem   • Need for vaccination   • Low magnesium levels   • Squamous cell carcinoma of pharynx (CMS/HCC)   • History of throat cancer   • Vitamin D deficiency   • Alcohol abuse counseling and surveillance   • Encounter for screening for diabetes mellitus   • Hypomagnesemia   • Iron deficiency anemia   • Hyperlipidemia   • Underweight due to inadequate caloric intake   • Need for immunization against influenza   • 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HCC)   • Hypertension   • General medical examination   • Underweight   • Epigastric pain   • Gastritis without bleeding   • Need for influenza vaccination   • Elevated liver function tests   • B12 deficiency   • Intestinal malabsorption   • Throat pain   • Acute pharyngitis   • Upper respiratory tract infection   • Neoplasm of uncertain behavior of larynx   • Pharyngoesophageal dysphagia   • Severe protein-calorie malnutrition (CMS/HCC)   • Anemia of chronic disease   • Hypotension   • Hyponatremia   • Status post insertion of percutaneous endoscopic gastrostomy (PEG) tube (CMS/HCC)   • Hx of tracheostomy   • History of throat surgery   • Hx SBO   • Urinary retention   • Generalized weakness   • Acute otitis externa of right ear   • Hard stool   • Panlobular emphysema  (CMS/HCC)   • Cancer of hypopharynx (CMS/HCC)   • Diarrhea   • Encounter for venous access device care   • Prediabetes   • Status post neck dissection   • S/P partial thyroidectomy   • S/P laryngectomy   • Annual physical exam   • Alcoholic cirrhosis of liver without ascites (CMS/HCC)     Past Medical History:   Diagnosis Date   • Allergic rhinitis     vs URI   • Anemia    • At risk for falls    • Benign prostatic hyperplasia    • Chronic gastritis    • Cirrhosis of liver (CMS/HCC)    • Complication of gastrostomy (CMS/HCC)     site not healing   • COPD (chronic obstructive pulmonary disease) (CMS/HCC)    • Dysphagia     odynophagia   • Epigastric pain    • Esophagitis    • GERD (gastroesophageal reflux disease)    • Hypertension    • Hypothyroidism, unspecified    • Low back pain    • Malaise and fatigue    • Nasal congestion 11/15/2018   • Nausea with vomiting, unspecified    • Pain in left knee    • Pain in right knee    • Primary malignant neoplasm of pharynx (CMS/HCC)    • Screening for malignant neoplasm of colon    • 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 Left upper quadrant abdominal wall abscess debridement. Gastrostomy tube erosion through & through the anterior gastric wall.Lupper quadrant abdominal wall abscess   • COLONOSCOPY  04/21/2014    Internal & external hemorrhoids found.   • COLONOSCOPY  2014    [place]   • COLONOSCOPY N/A 10/16/2018    Procedure: COLONOSCOPY;  Surgeon: Kaushal Chester MD;  Location: [facility] ENDOSCOPY;  Service: Gastroenterology   • DIAGNOSTIC LAPAROSCOPY EXPLORATORY LAPAROTOMY N/A 7/17/2019    Procedure: DIAGNOSTIC LAPAROSCOPY, EXPLORATORY LAPAROTOMY, LYSIS OF ADHESIONS;  Surgeon: Parminder Kc MD;   Location: [facility] [place] OR;  Service: General   • DIRECT LARYNGOSCOPY, ESOPHAGOSCOPY, BRONCHOSCOPY N/A 4/15/2019    Procedure: DIRECT LARYNGOSCOPY with biopsy, ESOPHAGOSCOPY;  Surgeon: Bharathi Washington MD;  Location: [facility] [place] OR;  Service: ENT   • ENDOSCOPY N/A 10/16/2018    Procedure: ESOPHAGOGASTRODUODENOSCOPY;  Surgeon: Kaushal Chester MD;  Location: [facility] [place] ENDOSCOPY;  Service: Gastroenterology   • GASTROCUTANEOUS FISTULA CLOSURE N/A 10/2/2019    Procedure: GASTROCUTANEOUS FISTULA CLOSURE;  Surgeon: Parminder Kc MD;  Location: [facility] [place] OR;  Service: General   • GASTROSTOMY FEEDING TUBE INSERTION N/A 4/15/2019    Procedure: GASTROSTOMY FEEDING TUBE INSERTION;  Surgeon: Trenton Valera MD;  Location: [facility] [place] OR;  Service: General   • JEJUNOSTOMY N/A 10/2/2019    Procedure: JEJUNOSTOMY;  Surgeon: Parminder Kc MD;  Location: [facility];  Service: General   • TRACHEOSTOMY  11/10/2008    Respiratory failure   • UPPER GASTROINTESTINAL ENDOSCOPY  04/21/2014    Esophagitis seen. Biopsy taken. Gastritis in stomach. Biopsy taken. Normal duodenum. Biopsy taken.   • UPPER GASTROINTESTINAL ENDOSCOPY  10/16/2018   • VENOUS ACCESS DEVICE (PORT) INSERTION N/A 9/11/2019    Procedure: INSERTION VENOUS ACCESS DEVICE (MEDIPORT)        (c-arm#1);  Surgeon: Parminder Kc MD;  Location: [facility];  Service: General       Therapy Treatment    Rehabilitation Treatment Summary     Row Name 10/15/19 2557             Treatment Time/Intention    Discipline  occupational therapy assistant  -CS      Document Type  therapy note (daily note)  -CS      Subjective Information  complains of;pain  -CS      Mode of Treatment  occupational therapy  -CS      Therapy Frequency (OT Eval)  other (see comments) 5-7 days/wk  -CS      Patient Effort  fair  -CS      Existing Precautions/Restrictions  fall;other (see comments) watch gait belt placement  -CS      Recorded by [CS] Brenda Thomas COTA/L 10/15/19 1341      Row Name 10/15/19 7334              Vital Signs    Pre Patient Position  Supine  -CS      Post Patient Position  Supine  -CS      Recorded by [CS] Brenda Thomas MADSEN/L 10/15/19 1341      Row Name 10/15/19 1134             Cognitive Assessment/Intervention- PT/OT    Affect/Mental Status (Cognitive)  unable/difficult to assess;flat/blunted affect pt responding appropriately to yes/ no questions  -CS      Behavioral Issues (Cognitive)  unable/difficult to assess  -CS      Orientation Status (Cognition)  unable/difficult to assess  -CS      Recorded by [CS] Brenda Thomas MADSEN/L 10/15/19 1341      Row Name 10/15/19 1134             ADL Assessment/Intervention    BADL Assessment/Intervention  bathing  -CS      Recorded by [CS] Brenda Thomas MADSEN/L 10/15/19 1341      Row Name 10/15/19 1134             Bathing Assessment/Intervention    Comment (Bathing)  pt deferred  -CS      Recorded by [CS] Brenda Thomas MADSEN/L 10/15/19 1341      Row Name 10/15/19 1134             Therapeutic Exercise    Upper Extremity (Therapeutic Exercise)  bicep curl, bilateral  -CS      Upper Extremity Range of Motion (Therapeutic Exercise)  shoulder flexion/extension, bilateral;shoulder internal/external rotation, bilateral;elbow flexion/extension, bilateral;forearm supination/pronation, bilateral;wrist flexion/extension, bilateral  -CS      Hand (Therapeutic Exercise)  finger flexion/extension, bilateral  -CS      Weight/Resistance (Therapeutic Exercise)  1 pound  -CS      Exercise Type (Therapeutic Exercise)  AROM (active range of motion)  -CS      Position (Therapeutic Exercise)  supine  -CS      Sets/Reps (Therapeutic Exercise)  1/20  -CS      Equipment (Therapeutic Exercise)  free weight, barbell  -CS      Expected Outcome (Therapeutic Exercise)  improve functional tolerance, self-care activity;improve performance, transfer skills  -CS      Recorded by [CS] Brenda Thomas MADSEN/L 10/15/19 1341      Row Name 10/15/19 1134             Positioning and  Restraints    Pre-Treatment Position  in bed  -CS      Post Treatment Position  bed  -CS      In Bed  fowlers;call light within reach;encouraged to call for assist;exit alarm on  -CS      Recorded by [CS] Brenda Thomas COTA/L 10/15/19 1341      Row Name 10/15/19 1134             Pain Scale: Numbers Pre/Post-Treatment    Pain Scale: Numbers, Pretreatment  9/10  -CS      Pain Scale: Numbers, Post-Treatment  9/10  -CS      Pain Location  abdomen  -CS      Pain Intervention(s)  Medication (See MAR)  -CS      Recorded by [CS] Brenda Thomas COTA/L 10/15/19 1341      Row Name                Wound 09/30/19 1330 Left medial abdomen Fistula    Wound - Properties Group Date first assessed: 09/30/19 [SS] Time first assessed: 1330 [SS] Side: Left [SS] Orientation: medial [SS] Location: abdomen [SS] Primary Wound Type: Fistula [SS] Recorded by:  [SS] Citlalli Abdi RN 09/30/19 1619    Row Name                Wound 10/02/19 1347 abdomen Incision    Wound - Properties Group Date first assessed: 10/02/19 [CF] Time first assessed: 1347 [CF] Present on Hospital Admission: N [CF] Location: abdomen [CF] Primary Wound Type: Incision [CF] Recorded by:  [CF] Eileen Tyson 10/02/19 1347    Row Name 10/15/19 1134             Outcome Summary/Treatment Plan (OT)    Daily Summary of Progress (OT)  progress toward functional goals is good  -CS      Plan for Continued Treatment (OT)  cont OT POC  -CS      Anticipated Discharge Disposition (OT)  anticipate therapy at next level of care  -CS      Recorded by [CS] Brenda Thomas COTA/L 10/15/19 1341        User Key  (r) = Recorded By, (t) = Taken By, (c) = Cosigned By    Initials Name Effective Dates Discipline    SS Citlalli Abdi RN 07/10/18 -  Nurse    CS Brenda Thomas COTA/L 03/07/18 -  OT    CF Eileen Tyson - -        Wound 09/30/19 1330 Left medial abdomen Fistula (Active)   Dressing Appearance dry;intact 10/15/2019  8:51 AM   Closure None 10/15/2019  8:51 AM    Base dressing in place, unable to visualize 10/15/2019  8:51 AM   Periwound redness 10/14/2019 10:20 PM   Periwound Temperature warm 10/14/2019 10:20 PM   Periwound Skin Turgor soft 10/14/2019 10:20 PM   Edges other (see comments) 10/14/2019 10:20 PM   Drainage Amount small 10/15/2019  8:51 AM   Care, Wound cleansed with;sterile half normal saline;barrier applied;pressure dressing removed;silver agent applied 10/14/2019 10:20 PM   Dressing Care, Wound dressing changed 10/14/2019 10:20 PM   Periwound Care, Wound absorptive dressing applied 10/14/2019 10:20 PM       Wound 10/02/19 1347 abdomen Incision (Active)   Dressing Appearance dry;intact 10/15/2019  8:51 AM   Periwound dry 10/14/2019 10:20 PM   Periwound Temperature warm 10/14/2019 10:20 PM   Periwound Skin Turgor soft 10/14/2019 10:20 PM   Drainage Amount none 10/14/2019 10:20 PM     Rehab Goal Summary     Row Name 10/15/19 1134             Occupational Therapy Goals    Transfer Goal Selection (OT)  transfer, OT goal 1  -CS      Bathing Goal Selection (OT)  bathing, OT goal 1  -CS      Dressing Goal Selection (OT)  dressing, OT goal 1  -CS      Toileting Goal Selection (OT)  toileting, OT goal 1  -CS      Activity Tolerance Goal Selection (OT)  activity tolerance, OT goal 1  -CS         Transfer Goal 1 (OT)    Activity/Assistive Device (Transfer Goal 1, OT)  sit-to-stand/stand-to-sit;bed-to-chair/chair-to-bed;toilet  -CS      [place] Level/Cues Needed (Transfer Goal 1, OT)  supervision required  -CS      Time Frame (Transfer Goal 1, OT)  long term goal (LTG);by discharge  -CS      Progress/Outcome (Transfer Goal 1, OT)  goal not met  -CS         Bathing Goal 1 (OT)    Activity/Assistive Device (Bathing Goal 1, OT)  bathing skills, all  -CS      [place] Level/Cues Needed (Bathing Goal 1, OT)  minimum assist (75% or more patient effort)  -CS      Time Frame (Bathing Goal 1, OT)  long term goal (LTG);by discharge  -CS      Barriers (Bathing Goal 1, OT)   Pt will need extra time and rest breaks PRN  -CS      Progress/Outcomes (Bathing Goal 1, OT)  goal not met  -CS         Dressing Goal 1 (OT)    Activity/Assistive Device (Dressing Goal 1, OT)  dressing skills, all  -CS      [place]/Cues Needed (Dressing Goal 1, OT)  minimum assist (75% or more patient effort)  -CS      Time Frame (Dressing Goal 1, OT)  long term goal (LTG);by discharge  -CS      Barriers (Dressing Goal 1, OT)  Pt will need extra time and rest breaks PRN  -CS      Progress/Outcome (Dressing Goal 1, OT)  goal not met  -CS         Toileting Goal 1 (OT)    Activity/Device (Toileting Goal 1, OT)  toileting skills, all  -CS      [place] Level/Cues Needed (Toileting Goal 1, OT)  minimum assist (75% or more patient effort)  -CS      Time Frame (Toileting Goal 1, OT)  long term goal (LTG);by discharge  -CS      Barriers (Toileting Goal 1, OT)  Pt will need extra time and rest breaks PRN  -CS      Progress/Outcome (Toileting Goal 1, OT)  goal not met  -CS          Activity Tolerance Goal 1 (OT)    Activity Level (Endurance Goal 1, OT)  15 min activity functional/therapeutic activity  -CS      Time Frame (Activity Tolerance Goal 1, OT)  long term goal (LTG);by discharge  -CS      Progress/Outcome (Activity Tolerance Goal 1, OT)  goal not met  -CS        User Key  (r) = Recorded By, (t) = Taken By, (c) = Cosigned By    Initials Name Provider Type Discipline    CS Brenda Thomas COTA/LIANE Occupational Therapy Assistant OT        Occupational Therapy Education     Title: PT OT SLP Therapies (In Progress)     Topic: Occupational Therapy (In Progress)     Point: ADL training (In Progress)     Description: Instruct learner(s) on proper safety adaptation and remediation techniques during self care or transfers.   Instruct in proper use of assistive devices.    Learning Progress Summary           Patient Acceptance, E,TB,D, NR by ERIN at 10/15/2019  1:42 PM    Acceptance, E,TB,D, NR by ERIN at 10/14/2019  1:57  PM    Acceptance, E, NR by BB at 10/9/2019  1:37 PM    Acceptance, E,TB,D, NR by CS at 10/6/2019  1:05 PM    Acceptance, E,TB,D, NR by CS at 10/5/2019 11:11 AM    Acceptance, E, NR by AS at 10/4/2019 11:45 AM    Comment:  Role of OT, OT POC, importance of OOB activity for strength and healing, ADL training                   Point: Home exercise program (In Progress)     Description: Instruct learner(s) on appropriate technique for monitoring, assisting and/or progressing therapeutic exercises/activities.    Learning Progress Summary           Patient Acceptance, E,TB,D, NR by CS at 10/15/2019  1:42 PM    Acceptance, E,TB,D, NR by CS at 10/14/2019  1:57 PM    Acceptance, E, NR by BB at 10/8/2019 11:46 AM    Acceptance, E,TB,D, NR by CS at 10/6/2019  1:05 PM    Acceptance, E,TB,D, NR by CS at 10/5/2019 11:11 AM                   Point: Precautions (In Progress)     Description: Instruct learner(s) on prescribed precautions during self-care and functional transfers.    Learning Progress Summary           Patient Acceptance, E,TB,D, NR by CS at 10/15/2019  1:42 PM    Acceptance, E,TB,D, NR by CS at 10/14/2019  1:57 PM    Acceptance, E,TB,D, NR by CS at 10/6/2019  1:05 PM    Acceptance, E,TB,D, NR by CS at 10/5/2019 11:11 AM    Acceptance, E, NR by AS at 10/4/2019 11:45 AM    Comment:  Role of OT, OT POC, importance of OOB activity for strength and healing, ADL training                   Point: Body mechanics (In Progress)     Description: Instruct learner(s) on proper positioning and spine alignment during self-care, functional mobility activities and/or exercises.    Learning Progress Summary           Patient Acceptance, E,TB,D, NR by CS at 10/15/2019  1:42 PM    Acceptance, E,TB,D, NR by CS at 10/14/2019  1:57 PM    Acceptance, E,TB,D, NR by CS at 10/6/2019  1:05 PM    Acceptance, E,TB,D, NR by CS at 10/5/2019 11:11 AM                               User Key     Initials Effective Dates Name Provider Type Discipline     BB 03/07/18 -  Kerrie Woodson COTA/L Occupational Therapy Assistant OT    CS 03/07/18 -  Brenda Thomas COTA/LIANE Occupational Therapy Assistant OT    AS 03/25/19 -  Destinee Yarbrough, OT Occupational Therapist OT                OT Recommendation and Plan  Outcome Summary/Treatment Plan (OT)  Daily Summary of Progress (OT): progress toward functional goals is good  Plan for Continued Treatment (OT): cont OT POC  Anticipated Discharge Disposition (OT): anticipate therapy at next level of care  Therapy Frequency (OT Eval): other (see comments)(5-7 days/wk)  Daily Summary of Progress (OT): progress toward functional goals is good  Plan of Care Review  Plan of Care Reviewed With: patient  Plan of Care Reviewed With: patient  Outcome Summary: Pt tolerated tx fair this date. Pt c/o pain. Pt gave good effort with UE ther ex. Pt deferred to do any ADLs this date. Continue OT POC.  Outcome Measures     Row Name 10/15/19 1300 10/14/19 1300 10/14/19 1100       How much help from another person do you currently need...    Turning from your back to your side while in flat bed without using bedrails?  --  --  2  -KW    Moving from lying on back to sitting on the side of a flat bed without bedrails?  --  --  2  -KW    Moving to and from a bed to a chair (including a wheelchair)?  --  --  2  -KW    Standing up from a chair using your arms (e.g., wheelchair, bedside chair)?  --  --  2  -KW    Climbing 3-5 steps with a railing?  --  --  1  -KW    To walk in hospital room?  --  --  2  -KW    AM-PAC 6 Clicks Score (PT)  --  --  11  -KW       How much help from another is currently needed...    Putting on and taking off regular lower body clothing?  2  -CS  2  -CS  --    Bathing (including washing, rinsing, and drying)  2  -CS  2  -CS  --    Toileting (which includes using toilet bed pan or urinal)  2  -CS  2  -CS  --    Putting on and taking off regular upper body clothing  2  -CS  2  -CS  --    Taking care of personal  grooming (such as brushing teeth)  2  -CS  2  -CS  --    Eating meals  1  -CS  1  -CS  --    AM-PAC 6 Clicks Score (OT)  11  -CS  11  -CS  --       Functional Assessment    Outcome Measure Options  --  --  AM-PAC 6 Clicks Basic Mobility (PT)  -KW    Row Name 10/13/19 0924             How much help from another person do you currently need...    Turning from your back to your side while in flat bed without using bedrails?  2  -GIAN      Moving from lying on back to sitting on the side of a flat bed without bedrails?  2  -GIAN      Moving to and from a bed to a chair (including a wheelchair)?  2  -GIAN      Standing up from a chair using your arms (e.g., wheelchair, bedside chair)?  2  -GIAN      Climbing 3-5 steps with a railing?  1  -GIAN      To walk in hospital room?  2  -GIAN      AM-PAC 6 Clicks Score (PT)  11  -GIAN         Functional Assessment    Outcome Measure Options  AM-PAC 6 Clicks Basic Mobility (PT)  -GIAN        User Key  (r) = Recorded By, (t) = Taken By, (c) = Cosigned By    Initials Name Provider Type    GIAN Abran Wan PTA Physical Therapy Assistant    CS Brenda Thomas COTA/LIANE Occupational Therapy Assistant    Caitlyn Moyer, PT Physical Therapist           Time Calculation:   Time Calculation- OT     Row Name 10/15/19 1343             Time Calculation- OT    OT Start Time  1134  -CS      OT Stop Time  1157  -CS      OT Time Calculation (min)  23 min  -CS      Total Timed Code Minutes- OT  23 minute(s)  -CS      OT Received On  10/15/19  -        User Key  (r) = Recorded By, (t) = Taken By, (c) = Cosigned By    Initials Name Provider Type     Brenda Thomas COTA/LIANE Occupational Therapy Assistant        Therapy Charges for Today     Code Description Service Date Service Provider Modifiers Qty    46685547457 HC OT THER PROC EA 15 MIN 10/14/2019 Brenda Thomas COTA/L GO 2    74875438493 HC OT THER PROC EA 15 MIN 10/15/2019 Brenda Thomas COTA/L GO 2               Brenda Thomas  MADSEN/L  10/15/2019

## 2019-10-15 NOTE — PLAN OF CARE
Problem: Patient Care Overview  Goal: Plan of Care Review  Outcome: Ongoing (interventions implemented as appropriate)   10/15/19 2584   Coping/Psychosocial   Plan of Care Reviewed With patient   Plan of Care Review   Progress improving   OTHER   Outcome Summary vss. working on pain control. tube feeding at 40ml/hr. continue to monitor.       Problem: Fall Risk (Adult)  Goal: Absence of Fall  Outcome: Ongoing (interventions implemented as appropriate)      Problem: Skin Injury Risk (Adult)  Goal: Skin Health and Integrity  Outcome: Ongoing (interventions implemented as appropriate)      Problem: Pain, Chronic (Adult)  Goal: Acceptable Pain/Comfort Level and Functional Ability  Outcome: Ongoing (interventions implemented as appropriate)      Problem: Wound (Includes Pressure Injury) (Adult)  Goal: Signs and Symptoms of Listed Potential Problems Will be Absent, Minimized or Managed (Wound)  Outcome: Ongoing (interventions implemented as appropriate)      Problem: Airway, Artificial (Adult)  Goal: Signs and Symptoms of Listed Potential Problems Will be Absent, Minimized or Managed (Airway, Artificial)  Outcome: Ongoing (interventions implemented as appropriate)      Problem: Nutrition, Enteral (Adult)  Goal: Signs and Symptoms of Listed Potential Problems Will be Absent, Minimized or Managed (Nutrition, Enteral)  Outcome: Ongoing (interventions implemented as appropriate)    Goal: Signs and Symptoms of Listed Potential Problems Will be Absent, Minimized or Managed (Nutrition, Enteral)  Outcome: Ongoing (interventions implemented as appropriate)

## 2019-10-15 NOTE — PLAN OF CARE
Problem: Patient Care Overview  Goal: Plan of Care Review   10/15/19 1814   Coping/Psychosocial   Plan of Care Reviewed With patient   OTHER   Outcome Summary PT recertification completed. Pt required coaxing to participate, but was cooperative with treatment. Pt transferred supine to sit with CGA and sit to stand to sit with min assistance. Pt stood 5 minutes with RW and CGA and ambulated 5ft to HOB with CGA and verbal cues. Function limited by decreased strength, balance, and tolerance for functional mobility and activities. Pt has not met goals from evaluation but has demonstrated good progress today. Pt will benefit from continued PT to improve OOB and mobility tolerance to regain lost function. Anticipate need for skilled PT at next level of care.

## 2019-10-16 LAB
GLUCOSE BLDC GLUCOMTR-MCNC: 137 MG/DL (ref 70–130)
GLUCOSE BLDC GLUCOMTR-MCNC: 148 MG/DL (ref 70–130)
GLUCOSE BLDC GLUCOMTR-MCNC: 164 MG/DL (ref 70–130)

## 2019-10-16 PROCEDURE — 97110 THERAPEUTIC EXERCISES: CPT

## 2019-10-16 PROCEDURE — 94760 N-INVAS EAR/PLS OXIMETRY 1: CPT

## 2019-10-16 PROCEDURE — 94799 UNLISTED PULMONARY SVC/PX: CPT

## 2019-10-16 PROCEDURE — 99024 POSTOP FOLLOW-UP VISIT: CPT | Performed by: SURGERY

## 2019-10-16 PROCEDURE — 77386: CPT | Performed by: RADIOLOGY

## 2019-10-16 PROCEDURE — 97530 THERAPEUTIC ACTIVITIES: CPT

## 2019-10-16 PROCEDURE — 82962 GLUCOSE BLOOD TEST: CPT

## 2019-10-16 PROCEDURE — 25010000002 ENOXAPARIN PER 10 MG: Performed by: SURGERY

## 2019-10-16 PROCEDURE — 97116 GAIT TRAINING THERAPY: CPT

## 2019-10-16 PROCEDURE — 77014 CHG CT GUIDANCE RADIATION THERAPY FLDS PLACEMENT: CPT | Performed by: RADIOLOGY

## 2019-10-16 RX ADMIN — FAMOTIDINE 20 MG: 40 POWDER, FOR SUSPENSION ORAL at 01:30

## 2019-10-16 RX ADMIN — ALBUTEROL SULFATE 2.5 MG: 2.5 SOLUTION RESPIRATORY (INHALATION) at 13:30

## 2019-10-16 RX ADMIN — ENOXAPARIN SODIUM 40 MG: 40 INJECTION SUBCUTANEOUS at 08:32

## 2019-10-16 RX ADMIN — FAMOTIDINE 20 MG: 40 POWDER, FOR SUSPENSION ORAL at 13:13

## 2019-10-16 RX ADMIN — ALBUTEROL SULFATE 2.5 MG: 2.5 SOLUTION RESPIRATORY (INHALATION) at 20:11

## 2019-10-16 RX ADMIN — ALBUTEROL SULFATE 2.5 MG: 2.5 SOLUTION RESPIRATORY (INHALATION) at 00:14

## 2019-10-16 RX ADMIN — HYDROCODONE BITARTRATE AND ACETAMINOPHEN 15 ML: 7.5; 325 SOLUTION ORAL at 23:12

## 2019-10-16 RX ADMIN — SILVER SULFADIAZINE: 10 CREAM TOPICAL at 08:32

## 2019-10-16 RX ADMIN — SODIUM CHLORIDE, PRESERVATIVE FREE 10 ML: 5 INJECTION INTRAVENOUS at 22:38

## 2019-10-16 RX ADMIN — HYDROCODONE BITARTRATE AND ACETAMINOPHEN 15 ML: 7.5; 325 SOLUTION ORAL at 10:18

## 2019-10-16 RX ADMIN — SILVER SULFADIAZINE: 10 CREAM TOPICAL at 22:37

## 2019-10-16 RX ADMIN — HYDROCODONE BITARTRATE AND ACETAMINOPHEN 15 ML: 7.5; 325 SOLUTION ORAL at 15:44

## 2019-10-16 RX ADMIN — HYDROCODONE BITARTRATE AND ACETAMINOPHEN 15 ML: 7.5; 325 SOLUTION ORAL at 05:57

## 2019-10-16 RX ADMIN — SODIUM CHLORIDE, PRESERVATIVE FREE 10 ML: 5 INJECTION INTRAVENOUS at 08:32

## 2019-10-16 RX ADMIN — ALBUTEROL SULFATE 2.5 MG: 2.5 SOLUTION RESPIRATORY (INHALATION) at 06:46

## 2019-10-16 NOTE — PLAN OF CARE
Problem: Patient Care Overview  Goal: Plan of Care Review  Outcome: Ongoing (interventions implemented as appropriate)   10/16/19 2896   Coping/Psychosocial   Plan of Care Reviewed With patient   Plan of Care Review   Progress improving   OTHER   Outcome Summary Pt continues to tolerate TF at 40ml/hr. Goal rate is 60ml/hr to meet needs---RD will speak to RN about increasing rate.

## 2019-10-16 NOTE — CONSULTS
"Adult Nutrition  Assessment    Patient Name:  Emerson Bang  YOB: 1960  MRN: [id number]  Admit Date:  9/30/2019    Assessment Date:  10/16/2019    Comments:  Pt continues w/Isosource HN running at 40cc/hr w/good tolerance. Goal rate to meet needs is 60cc/hr--RD will contact RN and ask for rate to be increased. Weight record shows gain of 3#. Pt awaiting Ltach consult. RD will continue to monitor.     Reason for Assessment     Row Name 10/16/19 1301          Reason for Assessment    Reason For Assessment  TF/PN;follow-up protocol         Nutrition/Diet History     Row Name 10/16/19 1301          Nutrition/Diet History    Typical Food/Fluid Intake  Pt shook head \"no\" when asked if he had any gi distress/pain. TF continues running at 40ml/hr.         Anthropometrics     Row Name 10/16/19 0646          Anthropometrics    Weight  53.6 kg (118 lb 3.2 oz)         Labs/Tests/Procedures/Meds     Row Name 10/16/19 1302          Labs/Procedures/Meds    Lab Results Reviewed  reviewed        Medications    Pertinent Medications Reviewed  reviewed         Physical Findings     Row Name 10/16/19 1302          Physical Findings    Overall Physical Appearance  generalized wasting;loss of subcutaneous fat     Gastrointestinal  feeding tube     Tubes  jejunostomy tube           Nutrition Prescription Ordered     Row Name 10/16/19 1304          Nutrition Prescription EN    Enteral Route  NJ     TF Delivery Method  Continuous     Continuous TF Goal Rate (mL/hr)  60 mL/hr     Continuous TF Current Rate (mL/hr)  40 mL/hr         Evaluation of Received Nutrient/Fluid Intake     Row Name 10/16/19 1304          Calories Evaluation    Enteral Calories (kcal)  1152     % of Kcal Needs  67        Protein Evaluation    Total Protein (gm)  52     % of Protein Needs  63        Intake Assessment    Energy/Calorie Requirement Assessment  not meeting needs     Protein Requirement Assessment  not meeting needs     "           Electronically signed by:  Ashley Swanson RD  10/16/19 1:10 PM
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
## 2019-10-16 NOTE — THERAPY TREATMENT NOTE
Acute Care - Physical Therapy Treatment Note  [facility]     Patient Name: Emerson Bang  : 1960  MRN: [id number]  Today's Date: 10/16/2019  Onset of Illness/Injury or Date of Surgery: 19     Referring Physician: Dr. Kc    Admit Date: 2019    Visit Dx:    ICD-10-CM ICD-9-CM   1. 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 Impaired physical mobility Z74.09 781.99   3. Impaired mobility and activities of daily living Z74.09 799.89     Patient Active Problem List   Diagnosis   • Hyperkalemia   • Iron deficiency anemia   • Gastroesophageal reflux disease with esophagitis   • Elevated AST (SGOT)   • Alcohol abuse   • Hypothyroidism   • Balance problem   • Need for vaccination   • Low magnesium levels   • Squamous cell carcinoma of pharynx (CMS/HCC)   • History of throat cancer   • Vitamin D deficiency   • Alcohol abuse counseling and surveillance   • Encounter for screening for diabetes mellitus   • Hypomagnesemia   • Iron deficiency anemia   • Hyperlipidemia   • Underweight due to inadequate caloric intake   • Need for immunization against influenza   • 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HCC)   • Hypertension   • General medical examination   • Underweight   • Epigastric pain   • Gastritis without bleeding   • Need for influenza vaccination   • Elevated liver function tests   • B12 deficiency   • Intestinal malabsorption   • Throat pain   • Acute pharyngitis   • Upper respiratory tract infection   • Neoplasm of uncertain behavior of larynx   • Pharyngoesophageal dysphagia   • Severe protein-calorie malnutrition (CMS/HCC)   • Anemia of chronic disease   • Hypotension   • Hyponatremia   • Status post insertion of percutaneous endoscopic gastrostomy (PEG) tube (CMS/HCC)   • Hx of tracheostomy   • History of throat surgery   • Hx SBO   • Urinary retention   • Generalized weakness   • Acute otitis externa of right ear   • Hard stool   • Panlobular emphysema (CMS/HCC)   • Cancer of  hypopharynx (CMS/HCC)   • Diarrhea   • Encounter for venous access device care   • Prediabetes   • Status post neck dissection   • S/P partial thyroidectomy   • S/P laryngectomy   • Annual physical exam   • Alcoholic cirrhosis of liver without ascites (CMS/HCC)       Therapy Treatment    Rehabilitation Treatment Summary     Row Name 10/16/19 1437 10/16/19 1400          Treatment Time/Intention    Discipline  physical therapy assistant  -GIAN  occupational therapy assistant  -CS     Document Type  progress note/recertification  -GIAN  therapy note (daily note)  -CS     Subjective Information  --  complains of;pain  -CS     Mode of Treatment  physical therapy;individual therapy  -GIAN  occupational therapy  -CS     Therapy Frequency (PT Clinical Impression)  daily  -GIAN  --     Therapy Frequency (OT Eval)  --  other (see comments) 5-7 days/wk  -CS     Patient Effort  fair  -GIAN  fair  -CS     Existing Precautions/Restrictions  fall;other (see comments) watch gait belt placement s/p abdominal surgery  -GIAN  fall;other (see comments)  -CS     Recorded by [GIAN] Abran Wan, PTA 10/16/19 1448 [CS] Brenda Thomas, MADSEN/L 10/16/19 1431     Row Name 10/16/19 1437 10/16/19 1400          Vital Signs    Pre Systolic BP Rehab  100  -GIAN  --     Pre Treatment Diastolic BP  58  -GIAN  --     Post Systolic BP Rehab  122  -GIAN  --     Post Treatment Diastolic BP  80  -GIAN  --     Pretreatment Heart Rate (beats/min)  90  -GINA  90  -CS     Intratreatment Heart Rate (beats/min)  96  -GIAN  --     Posttreatment Heart Rate (beats/min)  90  -GIAN  --     Pre SpO2 (%)  99  -JC2  98  -CS     O2 Delivery Pre Treatment  trach collar  -JC2  trach collar  -CS     Intra SpO2 (%)  98  -GIAN  --     O2 Delivery Intra Treatment  trach collar  -GIAN  --     Post SpO2 (%)  97  -GIAN  --     O2 Delivery Post Treatment  trach collar  -GIAN  --     Pre Patient Position  Supine  -JC3  Supine  -CS     Post Patient Position  Supine  -JC3  Supine  -CS     Recorded by [GIAN]  Savage Abran R, PTA 10/16/19 1504  [JC2] Savage Abran R, PTA 10/16/19 1448  [JC3] Savage Abran LAWSON, PTA 10/16/19 1605 [CS] Brenda Thomas, MADSEN/L 10/16/19 1538     Row Name 10/16/19 1437 10/16/19 1400          Cognitive Assessment/Intervention- PT/OT    Orientation Status (Cognition)  oriented x 4  -GIAN  oriented x 4  -CS     Follows Commands (Cognition)  WFL  -GIAN  WFL  -CS     Recorded by [GIAN] Katherine Wandariel PATHAK, PTA 10/16/19 1448 [CS] Brenda Thomas, MADSEN/L 10/16/19 1538     Row Name 10/16/19 1437             Safety Issues, Functional Mobility    Impairments Affecting Function (Mobility)  balance;endurance/activity tolerance;shortness of breath;strength  -GIAN      Recorded by [GIAN] Abran Wan, PTA 10/16/19 1448      Row Name 10/16/19 1437             Bed Mobility Assessment/Treatment    Bed Mobility Assessment/Treatment  supine-sit;sit-supine;scooting/bridging  -GIAN      Rolling Right [place] (Bed Mobility)  --  -GIAN      Scooting/Bridging [place] (Bed Mobility)  supervision  -GIAN      Supine-Sit [place] (Bed Mobility)  supervision  -GIAN      Sit-Supine [place] (Bed Mobility)  supervision  -GIAN      Assistive Device (Bed Mobility)  bed rails;head of bed elevated  -JC2      Recorded by [GIAN] Abran Wan, PTA 10/16/19 1504  [JC2] Abran Wan, PTA 10/16/19 1448      Row Name 10/16/19 1437             Transfer Assessment/Treatment    Transfer Assessment/Treatment  sit-stand transfer;stand-sit transfer  -GIAN      Recorded by [GIAN] Abran Wan, PTA 10/16/19 1448      Row Name 10/16/19 1437             Sit-Stand Transfer    Sit-Stand [place] (Transfers)  minimum assist (75% patient effort);verbal cues  -GIAN      Assistive Device (Sit-Stand Transfers)  walker, front-wheeled  -GIAN      Recorded by [GIAN] Abran Wan, PTA 10/16/19 1448      Row Name 10/16/19 1437             Stand-Sit Transfer    Stand-Sit [place] (Transfers)  contact guard;verbal cues  -GIAN      Assistive  Device (Stand-Sit Transfers)  walker, front-wheeled  -GIAN      Recorded by [GIAN] Abran Wan, PTA 10/16/19 1448      Row Name 10/16/19 1437             Gait/Stairs Assessment/Training    Gait/Stairs Assessment/Training  gait/ambulation assistive device  -GIAN      [place] Level (Gait)  contact guard;verbal cues  -JC2      Assistive Device (Gait)  walker, front-wheeled  -GIAN      Distance in Feet (Gait)  12, 16  -JC2      Pattern (Gait)  step-to  -JC2      Deviations/Abnormal Patterns (Gait)  essence decreased;stride length decreased  -JC2      Bilateral Gait Deviations  forward flexed posture  -JC2      Recorded by [GIAN] Abran Wan, PTA 10/16/19 1448  [JC2] Abran Wan, PTA 10/16/19 1504      Row Name 10/16/19 1400             Therapeutic Exercise    Upper Extremity (Therapeutic Exercise)  bicep curl, bilateral  -CS      Upper Extremity Range of Motion (Therapeutic Exercise)  shoulder flexion/extension, bilateral;shoulder internal/external rotation, bilateral;elbow flexion/extension, bilateral;forearm supination/pronation, bilateral;wrist flexion/extension, bilateral  -CS      Hand (Therapeutic Exercise)  finger flexion/extension, bilateral  -CS      Weight/Resistance (Therapeutic Exercise)  1 pound  -CS      Exercise Type (Therapeutic Exercise)  AROM (active range of motion)  -CS      Position (Therapeutic Exercise)  supine  -CS      Sets/Reps (Therapeutic Exercise)  1/20  -CS      Equipment (Therapeutic Exercise)  free weight, barbell  -CS      Expected Outcome (Therapeutic Exercise)  improve functional tolerance, self-care activity;improve performance, BADLs;improve performance, transfer skills;increase active range of motion  -CS      Recorded by [CS] Brenda Thomas COTA/L 10/16/19 1538      Row Name 10/16/19 1437 10/16/19 1400          Positioning and Restraints    Pre-Treatment Position  in bed  -GIAN  in bed  -CS     Post Treatment Position  bed  -GIAN  bed  -CS     In Bed  fowlers;call light  within reach;encouraged to call for assist;exit alarm on all needs met  -GIAN  supine;call light within reach;encouraged to call for assist;exit alarm on  -CS     Recorded by [GIAN] Abran Wan, PTA 10/16/19 1605 [CS] Brenda Thomas, MADSEN/L 10/16/19 1538     Row Name 10/16/19 1437             Pain Assessment    Additional Documentation  Pain Scale: Numbers Pre/Post-Treatment (Group)  -GIAN      Recorded by [GIAN] Abran Wan PTA 10/16/19 1605      Row Name 10/16/19 1437 10/16/19 1400          Pain Scale: Numbers Pre/Post-Treatment    Pain Scale: Numbers, Pretreatment  8/10  -GIAN  8/10  -CS     Pain Scale: Numbers, Post-Treatment  8/10  -GIAN  8/10  -CS     Pain Location  abdomen  -JC2  abdomen  -CS     Pain Intervention(s)  Repositioned  -GIAN  Repositioned;Medication (See MAR)  -CS     Recorded by [GIAN] Abran Wan PTA 10/16/19 1504  [JC2] Abran Wan, MICHAEL 10/16/19 1448 [CS] Brenda Thomas, MADSEN/L 10/16/19 1538     Row Name 10/16/19 1437             Sensory Assessment/Intervention    Sensory General Assessment  --  -GIAN      Recorded by [GIAN] Abran Wan PTA 10/16/19 1605      Row Name                Wound 09/30/19 1330 Left medial abdomen Fistula    Wound - Properties Group Date first assessed: 09/30/19 [SS] Time first assessed: 1330 [SS] Side: Left [SS] Orientation: medial [SS] Location: abdomen [SS] Primary Wound Type: Fistula [SS] Recorded by:  [SS] Citlalli Abdi RN 09/30/19 1619    Row Name                Wound 10/02/19 1347 abdomen Incision    Wound - Properties Group Date first assessed: 10/02/19 [CF] Time first assessed: 1347 [CF] Present on Hospital Admission: N [CF] Location: abdomen [CF] Primary Wound Type: Incision [CF] Recorded by:  [CF] Eileen Tyson 10/02/19 1347    Row Name 10/16/19 1400             Outcome Summary/Treatment Plan (OT)    Daily Summary of Progress (OT)  progress toward functional goals is good  -CS      Plan for Continued Treatment (OT)  cont OT POC  -CS       Anticipated Discharge Disposition (OT)  anticipate therapy at next level of care  -CS      Recorded by [CS] Brenda Thomas, MADSEN/L 10/16/19 1538      Row Name 10/16/19 1437             Outcome Summary/Treatment Plan (PT)    Daily Summary of Progress (PT)  progress toward functional goals as expected  -GIAN      Plan for Continued Treatment (PT)  continue  -GIAN      Anticipated Discharge Disposition (PT)  anticipate therapy at next level of care  -JC2      Recorded by [GIAN] Abran Wan, PTA 10/16/19 1605  [JC2] Abran Wan, PTA 10/16/19 1448        User Key  (r) = Recorded By, (t) = Taken By, (c) = Cosigned By    Initials Name Effective Dates Discipline    SS Citlalli Abdi RN 07/10/18 -  Nurse    Abran Ferreira, PTA 03/07/18 -  PT    CS Brenda Thomas, MADSEN/L 03/07/18 -  OT    CF Eileen Tyson - -          Wound 09/30/19 1330 Left medial abdomen Fistula (Active)   Dressing Appearance dry;intact 10/16/2019 10:10 AM   Closure None 10/16/2019 10:10 AM   Base dressing in place, unable to visualize 10/16/2019 10:10 AM   Drainage Characteristics/Odor purulent 10/15/2019 11:13 PM   Drainage Amount small 10/16/2019 10:10 AM   Care, Wound cleansed with;sterile normal saline;barrier applied;dressing removed 10/15/2019 11:13 PM   Dressing Care, Wound dressing changed 10/15/2019 11:13 PM   Periwound Care, Wound barrier ointment applied 10/15/2019 11:13 PM       Wound 10/02/19 1347 abdomen Incision (Active)   Dressing Appearance dry;intact 10/16/2019 10:10 AM   Closure Open to air 10/15/2019 11:13 PM   Base scab 10/15/2019 11:13 PM   Drainage Amount scant 10/15/2019 11:13 PM   Care, Wound cleansed with;sterile normal saline 10/15/2019 11:13 PM       Rehab Goal Summary     Row Name 10/16/19 1437 10/16/19 1400          Bed Mobility Goal 1 (PT)    Activity/Assistive Device (Bed Mobility Goal 1, PT)  sit to supine;supine to sit  -GIAN  --     [place] Level/Cues Needed (Bed Mobility Goal 1, PT)   independent  -GIAN  --     Time Frame (Bed Mobility Goal 1, PT)  by discharge  -GIAN  --     Progress/Outcomes (Bed Mobility Goal 1, PT)  goal not met  -GIAN  --        Transfer Goal 1 (PT)    Activity/Assistive Device (Transfer Goal 1, PT)  bed-to-chair/chair-to-bed;toilet  -GIAN  --     [place] Level/Cues Needed (Transfer Goal 1, PT)  standby assist  -GIAN  --     Time Frame (Transfer Goal 1, PT)  by discharge  -GIAN  --     Progress/Outcome (Transfer Goal 1, PT)  goal not met;goal revised this date  -GIAN  --        Gait Training Goal 1 (PT)    Activity/Assistive Device (Gait Training Goal 1, PT)  gait (walking locomotion);assistive device use  -  --     [place] Level (Gait Training Goal 1, PT)  contact guard assist  -GIAN  --     Distance (Gait Goal 1, PT)  150ft  -GIAN  --     Time Frame (Gait Training Goal 1, PT)  by discharge  -GIAN  --     Progress/Outcome (Gait Training Goal 1, PT)  goal not met;goal revised this date  -GIAN  --        Stairs Goal 1 (PT)    Activity/Assistive Device (Stairs Goal 1, PT)  ascending stairs;descending stairs  -  --     [place] Level/Cues Needed (Stairs Goal 1, PT)  standby assist  -  --     Number of Stairs (Stairs Goal 1, PT)  5  -GIAN  --     Time Frame (Stairs Goal 1, PT)  by discharge  -GIAN  --     Progress/Outcome (Stairs Goal 1, PT)  goal not met  -GIAN  --        Occupational Therapy Goals    Transfer Goal Selection (OT)  --  transfer, OT goal 1  -CS     Bathing Goal Selection (OT)  --  bathing, OT goal 1  -CS     Dressing Goal Selection (OT)  --  dressing, OT goal 1  -CS     Toileting Goal Selection (OT)  --  toileting, OT goal 1  -CS     Activity Tolerance Goal Selection (OT)  --  activity tolerance, OT goal 1  -CS        Transfer Goal 1 (OT)    Activity/Assistive Device (Transfer Goal 1, OT)  --  sit-to-stand/stand-to-sit;bed-to-chair/chair-to-bed;toilet  -CS     [place] Level/Cues Needed (Transfer Goal 1, OT)  --  supervision required  -CS     Time Frame (Transfer  Goal 1, OT)  --  long term goal (LTG);by discharge  -CS     Progress/Outcome (Transfer Goal 1, OT)  --  goal not met  -CS        Bathing Goal 1 (OT)    Activity/Assistive Device (Bathing Goal 1, OT)  --  bathing skills, all  -CS     [place] Level/Cues Needed (Bathing Goal 1, OT)  --  minimum assist (75% or more patient effort)  -CS     Time Frame (Bathing Goal 1, OT)  --  long term goal (LTG);by discharge  -CS     Barriers (Bathing Goal 1, OT)  --  Pt will need extra time and rest breaks PRN  -CS     Progress/Outcomes (Bathing Goal 1, OT)  --  goal not met  -CS        Dressing Goal 1 (OT)    Activity/Assistive Device (Dressing Goal 1, OT)  --  dressing skills, all  -CS     [place]/Cues Needed (Dressing Goal 1, OT)  --  minimum assist (75% or more patient effort)  -CS     Time Frame (Dressing Goal 1, OT)  --  long term goal (LTG);by discharge  -CS     Barriers (Dressing Goal 1, OT)  --  Pt will need extra time and rest breaks PRN  -CS     Progress/Outcome (Dressing Goal 1, OT)  --  goal not met  -CS        Toileting Goal 1 (OT)    Activity/Device (Toileting Goal 1, OT)  --  toileting skills, all  -CS     [place] Level/Cues Needed (Toileting Goal 1, OT)  --  minimum assist (75% or more patient effort)  -CS     Time Frame (Toileting Goal 1, OT)  --  long term goal (LTG);by discharge  -CS     Barriers (Toileting Goal 1, OT)  --  Pt will need extra time and rest breaks PRN  -CS     Progress/Outcome (Toileting Goal 1, OT)  --  goal not met  -CS         Activity Tolerance Goal 1 (OT)    Activity Level (Endurance Goal 1, OT)  --  15 min activity functional/therapeutic activity  -CS     Time Frame (Activity Tolerance Goal 1, OT)  --  long term goal (LTG);by discharge  -CS     Progress/Outcome (Activity Tolerance Goal 1, OT)  --  goal not met  -CS       User Key  (r) = Recorded By, (t) = Taken By, (c) = Cosigned By    Initials Name Provider Type Discipline    Abran Ferreira, PTA Physical Therapy Assistant  PT    CS Brenda Thomas COTA/LIANE Occupational Therapy Assistant OT          Physical Therapy Education     Title: PT OT SLP Therapies (In Progress)     Topic: Physical Therapy (In Progress)     Point: Mobility training (In Progress)     Learning Progress Summary           Patient Acceptance, E, NR by  at 10/16/2019  4:06 PM    Acceptance, E, NR by  at 10/13/2019  1:00 PM    Acceptance, E, NR by  at 10/11/2019  3:17 PM    Acceptance, E, NR by  at 10/9/2019  2:59 PM    Acceptance, E, NR by  at 10/8/2019  4:00 PM    Acceptance, E, NR by 1 at 10/3/2019  3:43 PM                   Point: Body mechanics (In Progress)     Learning Progress Summary           Patient Acceptance, E, NR by [facility] at 10/3/2019  3:43 PM                   Point: Precautions (In Progress)     Learning Progress Summary           Patient Acceptance, E, NR by [facility] at 10/3/2019  3:43 PM                               User Key     Initials Effective Dates Name Provider Type Discipline    [facility] 04/03/18 -  Judy Watkins, PT Physical Therapist PT     03/07/18 -  Abran Wan, PTA Physical Therapy Assistant PT                PT Recommendation and Plan  Anticipated Discharge Disposition (PT): anticipate therapy at next level of care  Therapy Frequency (PT Clinical Impression): daily  Outcome Summary/Treatment Plan (PT)  Daily Summary of Progress (PT): progress toward functional goals as expected  Plan for Continued Treatment (PT): continue  Anticipated Discharge Disposition (PT): anticipate therapy at next level of care  Plan of Care Reviewed With: patient  Progress: improving  Outcome Summary: pt responded well to tx. pt requires SBA for bed mob and min A for sit-stands. pt w/ increased gait to 14, 16 ft CGA w/ RW. pt requires SBA for sitting balance. no new goals met at this time. pt would continue to benefit from PT services.   Outcome Measures     Row Name 10/16/19 1500 10/16/19 1437 10/15/19 0988       How much help from another person do  you currently need...    Turning from your back to your side while in flat bed without using bedrails?  --  3  -GIAN  3  -JCA    Moving from lying on back to sitting on the side of a flat bed without bedrails?  --  3  -GIAN  3  -JCA    Moving to and from a bed to a chair (including a wheelchair)?  --  3  -GIAN  3  -JCA    Standing up from a chair using your arms (e.g., wheelchair, bedside chair)?  --  3  -GIAN  3  -JCA    Climbing 3-5 steps with a railing?  --  2  -GIAN  2  -JCA    To walk in hospital room?  --  3  -GIAN  3  -JCA    AM-PAC 6 Clicks Score (PT)  --  17  -GIAN  17  -JCA       How much help from another is currently needed...    Putting on and taking off regular lower body clothing?  2  -CS  --  --    Bathing (including washing, rinsing, and drying)  2  -CS  --  --    Toileting (which includes using toilet bed pan or urinal)  2  -CS  --  --    Putting on and taking off regular upper body clothing  2  -CS  --  --    Taking care of personal grooming (such as brushing teeth)  2  -CS  --  --    Eating meals  1  -CS  --  --    AM-PAC 6 Clicks Score (OT)  11  -CS  --  --       Functional Assessment    Outcome Measure Options  --  AM-PAC 6 Clicks Basic Mobility (PT)  -[facility] 6 Clicks Basic Mobility (PT)  -[facility]    Row Name 10/15/19 1300 10/14/19 1300 10/14/19 1100       How much help from another person do you currently need...    Turning from your back to your side while in flat bed without using bedrails?  --  --  2  -KW    Moving from lying on back to sitting on the side of a flat bed without bedrails?  --  --  2  -KW    Moving to and from a bed to a chair (including a wheelchair)?  --  --  2  -KW    Standing up from a chair using your arms (e.g., wheelchair, bedside chair)?  --  --  2  -KW    Climbing 3-5 steps with a railing?  --  --  1  -KW    To walk in hospital room?  --  --  2  -KW    AM-PAC 6 Clicks Score (PT)  --  --  11  -KW       How much help from another is currently needed...    Putting on and taking off  regular lower body clothing?  2  -CS  2  -CS  --    Bathing (including washing, rinsing, and drying)  2  -CS  2  -CS  --    Toileting (which includes using toilet bed pan or urinal)  2  -CS  2  -CS  --    Putting on and taking off regular upper body clothing  2  -CS  2  -CS  --    Taking care of personal grooming (such as brushing teeth)  2  -CS  2  -CS  --    Eating meals  1  -CS  1  -CS  --    AM-PAC 6 Clicks Score (OT)  11  -CS  11  -CS  --       Functional Assessment    Outcome Measure Options  --  --  AM-PAC 6 Clicks Basic Mobility (PT)  -[facility]      User Key  (r) = Recorded By, (t) = Taken By, (c) = Cosigned By    Initials Name Provider Type    Judy Avelar, PT Physical Therapist    Abran Ferreira PTA Physical Therapy Assistant    CS Brenda Thomas, MADSEN/L Occupational Therapy Assistant    KW Caitlyn Victoria, PT Physical Therapist         Time Calculation:   PT Charges     Row Name 10/16/19 1607             Time Calculation    Start Time  1437  -GIAN      Stop Time  1502  -GIAN      Time Calculation (min)  25 min  -GIAN         Time Calculation- PT    Total Timed Code Minutes- PT  25 minute(s)  -GIAN        User Key  (r) = Recorded By, (t) = Taken By, (c) = Cosigned By    Initials Name Provider Type    Abran Ferreira PTA Physical Therapy Assistant        Therapy Charges for Today     Code Description Service Date Service Provider Modifiers Qty    77764945922 HC PT THER SUPP EA 15 MIN 10/15/2019 Abran Wan PTA GP 1    76845604564 HC GAIT TRAINING EA 15 MIN 10/16/2019 Abran Wan PTA GP 1    75302114872 HC PT THERAPEUTIC ACT EA 15 MIN 10/16/2019 Abran Wan PTA GP 1          PT G-Codes  Outcome Measure Options: AM-PAC 6 Clicks Basic Mobility (PT)  AM-PAC 6 Clicks Score (PT): 17  AM-PAC 6 Clicks Score (OT): 11    Abran Wan PTA  10/16/2019

## 2019-10-16 NOTE — PLAN OF CARE
Problem: Patient Care Overview  Goal: Plan of Care Review  Outcome: Ongoing (interventions implemented as appropriate)   10/16/19 1562   Coping/Psychosocial   Plan of Care Reviewed With patient   Plan of Care Review   Progress improving   OTHER   Outcome Summary Pt tolerated tx well this date. Pt gave good effort with UE ther ex. No goals met this tx. Continue OT POC.

## 2019-10-16 NOTE — PLAN OF CARE
Problem: Patient Care Overview  Goal: Plan of Care Review   10/16/19 0250   Coping/Psychosocial   Plan of Care Reviewed With patient   Plan of Care Review   Progress improving   OTHER   Outcome Summary vss, working on pain control, tolerating tube feed @40, resting between care, will cont to monitor.       Problem: Fall Risk (Adult)  Goal: Absence of Fall  Outcome: Ongoing (interventions implemented as appropriate)      Problem: Skin Injury Risk (Adult)  Goal: Skin Health and Integrity  Outcome: Ongoing (interventions implemented as appropriate)

## 2019-10-16 NOTE — PROGRESS NOTES
"   LOS: 15 days   Patient Care Team:  Cristino He MD as PCP - General  Valera, Trenton Lewis MD as Surgeon (General Surgery)  Krysten Martínez MD as Consulting Physician (Hematology and Oncology)  Keaton Alfred MD as Consulting Physician (Radiation Oncology)  Dee Bajwa APRN as Nurse Practitioner (Oncology)    Chief Complaint: POD 14    Subjective     History of Present Illness    Subjective:  Symptoms:  Improved.    Diet:  No nausea or vomiting.    Activity level: Impaired due to weakness.    Pain:  He reports no pain.        History taken from: patient chart    Objective     Vital Signs  Temp:  [97.5 °F (36.4 °C)-97.6 °F (36.4 °C)] 97.5 °F (36.4 °C)  Heart Rate:  [] 107  Resp:  [16-18] 18  BP: (111-113)/(60-64) 113/60    Objective:  General Appearance:  Comfortable.    Vital signs: (most recent): Blood pressure 111/64, pulse 107, temperature 97.6 °F (36.4 °C), temperature source Axillary, resp. rate 18, height 165.1 cm (65\"), weight 51.9 kg (114 lb 8 oz), SpO2 98 %.  Vital signs are normal.  No fever.    Output: Producing urine and producing stool.    Abdomen: Abdomen is soft.              Results Review:     I reviewed the patient's new clinical results.    Medication Review: Done    Assessment/Plan       * No active hospital problems. *      Assessment:    Condition: In stable condition.  Improving.       Plan:   (1. LTACH consult).       Parminder Kc MD  10/15/19  10:55 PM    Time: 15 mins      "

## 2019-10-16 NOTE — SIGNIFICANT NOTE
10/16/19 1102   Rehab Treatment   Discipline occupational therapy assistant   Reason Treatment Not Performed unavailable for treatment  (Pt is off the floor. OT will check back in pm.)

## 2019-10-16 NOTE — PLAN OF CARE
Problem: Patient Care Overview  Goal: Plan of Care Review  Outcome: Ongoing (interventions implemented as appropriate)   10/16/19 6413   Coping/Psychosocial   Plan of Care Reviewed With patient   Plan of Care Review   Progress improving   OTHER   Outcome Summary pt responded well to tx. pt requires SBA for bed mob and min A for sit-stands. pt w/ increased gait to 14, 16 ft CGA w/ RW. pt requires SBA for sitting balance. no new goals met at this time. pt would continue to benefit from PT services.

## 2019-10-16 NOTE — THERAPY TREATMENT NOTE
Acute Care - Occupational Therapy Treatment Note  [facility]     Patient Name: Emerson Bang  : 1960  MRN: [id number]  Today's Date: 10/16/2019  Onset of Illness/Injury or Date of Surgery: 19  Date of Referral to OT: 10/02/19  Referring Physician: Dr. Kc    Admit Date: 2019       ICD-10-CM ICD-9-CM   1. 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 Impaired physical mobility Z74.09 781.99   3. Impaired mobility and activities of daily living Z74.09 799.89     Patient Active Problem List   Diagnosis   • Hyperkalemia   • Iron deficiency anemia   • Gastroesophageal reflux disease with esophagitis   • Elevated AST (SGOT)   • Alcohol abuse   • Hypothyroidism   • Balance problem   • Need for vaccination   • Low magnesium levels   • Squamous cell carcinoma of pharynx (CMS/HCC)   • History of throat cancer   • Vitamin D deficiency   • Alcohol abuse counseling and surveillance   • Encounter for screening for diabetes mellitus   • Hypomagnesemia   • Iron deficiency anemia   • Hyperlipidemia   • Underweight due to inadequate caloric intake   • Need for immunization against influenza   • 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HCC)   • Hypertension   • General medical examination   • Underweight   • Epigastric pain   • Gastritis without bleeding   • Need for influenza vaccination   • Elevated liver function tests   • B12 deficiency   • Intestinal malabsorption   • Throat pain   • Acute pharyngitis   • Upper respiratory tract infection   • Neoplasm of uncertain behavior of larynx   • Pharyngoesophageal dysphagia   • Severe protein-calorie malnutrition (CMS/HCC)   • Anemia of chronic disease   • Hypotension   • Hyponatremia   • Status post insertion of percutaneous endoscopic gastrostomy (PEG) tube (CMS/HCC)   • Hx of tracheostomy   • History of throat surgery   • Hx SBO   • Urinary retention   • Generalized weakness   • Acute otitis externa of right ear   • Hard stool   • Panlobular emphysema  (CMS/HCC)   • Cancer of hypopharynx (CMS/HCC)   • Diarrhea   • Encounter for venous access device care   • Prediabetes   • Status post neck dissection   • S/P partial thyroidectomy   • S/P laryngectomy   • Annual physical exam   • Alcoholic cirrhosis of liver without ascites (CMS/HCC)     Past Medical History:   Diagnosis Date   • Allergic rhinitis     vs URI   • Anemia    • At risk for falls    • Benign prostatic hyperplasia    • Chronic gastritis    • Cirrhosis of liver (CMS/HCC)    • Complication of gastrostomy (CMS/HCC)     site not healing   • COPD (chronic obstructive pulmonary disease) (CMS/HCC)    • Dysphagia     odynophagia   • Epigastric pain    • Esophagitis    • GERD (gastroesophageal reflux disease)    • Hypertension    • Hypothyroidism, unspecified    • Low back pain    • Malaise and fatigue    • Nasal congestion 11/15/2018   • Nausea with vomiting, unspecified    • Pain in left knee    • Pain in right knee    • Primary malignant neoplasm of pharynx (CMS/HCC)    • Screening for malignant neoplasm of colon    • 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 Left upper quadrant abdominal wall abscess debridement. Gastrostomy tube erosion through & through the anterior gastric wall.Lupper quadrant abdominal wall abscess   • COLONOSCOPY  04/21/2014    Internal & external hemorrhoids found.   • COLONOSCOPY  2014    [facility]   • COLONOSCOPY N/A 10/16/2018    Procedure: COLONOSCOPY;  Surgeon: Kaushal Chester MD;  Location: [facility] ENDOSCOPY;  Service: Gastroenterology   • DIAGNOSTIC LAPAROSCOPY EXPLORATORY LAPAROTOMY N/A 7/17/2019    Procedure: DIAGNOSTIC LAPAROSCOPY, EXPLORATORY LAPAROTOMY, LYSIS OF ADHESIONS;  Surgeon: Parminder Kc MD;   Location: [facility] OR;  Service: General   • DIRECT LARYNGOSCOPY, ESOPHAGOSCOPY, BRONCHOSCOPY N/A 4/15/2019    Procedure: DIRECT LARYNGOSCOPY with biopsy, ESOPHAGOSCOPY;  Surgeon: Bharathi Washington MD;  Location: [facility] OR;  Service: ENT   • ENDOSCOPY N/A 10/16/2018    Procedure: ESOPHAGOGASTRODUODENOSCOPY;  Surgeon: Kaushal Chester MD;  Location: [facility] ENDOSCOPY;  Service: Gastroenterology   • GASTROCUTANEOUS FISTULA CLOSURE N/A 10/2/2019    Procedure: GASTROCUTANEOUS FISTULA CLOSURE;  Surgeon: Parminder Kc MD;  Location: [facility] OR;  Service: General   • GASTROSTOMY FEEDING TUBE INSERTION N/A 4/15/2019    Procedure: GASTROSTOMY FEEDING TUBE INSERTION;  Surgeon: Trenton Valera MD;  Location: [facility] OR;  Service: General   • JEJUNOSTOMY N/A 10/2/2019    Procedure: JEJUNOSTOMY;  Surgeon: Parminder Kc MD;  Location: [facility];  Service: General   • TRACHEOSTOMY  11/10/2008    Respiratory failure   • UPPER GASTROINTESTINAL ENDOSCOPY  04/21/2014    Esophagitis seen. Biopsy taken. Gastritis in stomach. Biopsy taken. Normal duodenum. Biopsy taken.   • UPPER GASTROINTESTINAL ENDOSCOPY  10/16/2018   • VENOUS ACCESS DEVICE (PORT) INSERTION N/A 9/11/2019    Procedure: INSERTION VENOUS ACCESS DEVICE (MEDIPORT)        (c-arm#1);  Surgeon: Parminder Kc MD;  Location: [facility];  Service: General       Therapy Treatment    Rehabilitation Treatment Summary     Row Name 10/16/19 1437 10/16/19 1400          Treatment Time/Intention    Discipline  physical therapy assistant  -GIAN  occupational therapy assistant  -CS     Document Type  progress note/recertification  -GIAN  therapy note (daily note)  -CS     Subjective Information  --  complains of;pain  -CS     Mode of Treatment  physical therapy;individual therapy  -GIAN  occupational therapy  -CS     Therapy Frequency (PT Clinical Impression)  daily  -GIAN  --     Therapy Frequency (OT Eval)  --  other (see comments) 5-7 days/wk  -CS     Patient Effort  fair  -GIAN  fair   -CS     Existing Precautions/Restrictions  fall;other (see comments) watch gait belt placement s/p abdominal surgery  -GIAN  fall;other (see comments)  -CS     Recorded by [GIAN] Abran Wan, PTA 10/16/19 1448 [CS] Brenda Thomas MADSEN/L 10/16/19 1431     Row Name 10/16/19 1437 10/16/19 1400          Vital Signs    Pre Systolic BP Rehab  100  -GIAN  --     Pre Treatment Diastolic BP  58  -GIAN  --     Post Systolic BP Rehab  122  -GIAN  --     Post Treatment Diastolic BP  80  -GIAN  --     Pretreatment Heart Rate (beats/min)  90  -GIAN  90  -CS     Intratreatment Heart Rate (beats/min)  96  -GIAN  --     Posttreatment Heart Rate (beats/min)  90  -GIAN  --     Pre SpO2 (%)  99  -JC2  98  -CS     O2 Delivery Pre Treatment  trach collar  -JC2  trach collar  -CS     Intra SpO2 (%)  98  -GIAN  --     O2 Delivery Intra Treatment  trach collar  -GIAN  --     Post SpO2 (%)  97  -GIAN  --     O2 Delivery Post Treatment  trach collar  -GIAN  --     Pre Patient Position  --  Supine  -CS     Post Patient Position  --  Supine  -CS     Recorded by [GIAN] Abran Wan, PTA 10/16/19 1504  [JC2] Abran Wan, PTA 10/16/19 1448 [CS] Brenda Thomas MADSEN/L 10/16/19 1538     Row Name 10/16/19 1437 10/16/19 1400          Cognitive Assessment/Intervention- PT/OT    Orientation Status (Cognition)  oriented x 4  -GIAN  oriented x 4  -CS     Follows Commands (Cognition)  WFL  -GIAN  WFL  -CS     Recorded by [GIAN] Abran Wan, PTA 10/16/19 1448 [CS] Brenda Thomas MADSEN/L 10/16/19 1538     Row Name 10/16/19 1437             Safety Issues, Functional Mobility    Impairments Affecting Function (Mobility)  balance;endurance/activity tolerance;shortness of breath;strength  -GIAN      Recorded by [GIAN] Abran Wan, PTA 10/16/19 1448      Row Name 10/16/19 1437             Bed Mobility Assessment/Treatment    Bed Mobility Assessment/Treatment  supine-sit;sit-supine;scooting/bridging  -GIAN      Rolling Right [place] (Bed Mobility)  --  -GIAN       Scooting/Bridging [place] (Bed Mobility)  supervision  -GIAN      Supine-Sit [place] (Bed Mobility)  supervision  -GIAN      Sit-Supine [place] (Bed Mobility)  supervision  -GIAN      Assistive Device (Bed Mobility)  bed rails;head of bed elevated  -JC2      Recorded by [GIAN] Abran Wan, PTA 10/16/19 1504  [JC2] Abran Wan, PTA 10/16/19 1448      Row Name 10/16/19 1437             Transfer Assessment/Treatment    Transfer Assessment/Treatment  sit-stand transfer;stand-sit transfer  -GIAN      Recorded by [GIAN] Abran Wan, PTA 10/16/19 1448      Row Name 10/16/19 1437             Sit-Stand Transfer    Sit-Stand [place] (Transfers)  minimum assist (75% patient effort);verbal cues  -GIAN      Assistive Device (Sit-Stand Transfers)  walker, front-wheeled  -GIAN      Recorded by [GIAN] Abran Wan, PTA 10/16/19 1448      Row Name 10/16/19 1437             Stand-Sit Transfer    Stand-Sit [place] (Transfers)  contact guard;verbal cues  -GAIN      Assistive Device (Stand-Sit Transfers)  walker, front-wheeled  -GIAN      Recorded by [GIAN] Abran Wan, PTA 10/16/19 1448      Row Name 10/16/19 1437             Gait/Stairs Assessment/Training    Gait/Stairs Assessment/Training  gait/ambulation assistive device  -GIAN      [place] Level (Gait)  contact guard;verbal cues  -JC2      Assistive Device (Gait)  walker, front-wheeled  -GIAN      Distance in Feet (Gait)  12, 16  -JC2      Pattern (Gait)  step-to  -JC2      Deviations/Abnormal Patterns (Gait)  essence decreased;stride length decreased  -JC2      Bilateral Gait Deviations  forward flexed posture  -JC2      Recorded by [GIAN] Abran Wan, PTA 10/16/19 1448  [JC2] Abarn Wan, PTA 10/16/19 1504      Row Name 10/16/19 1400             Therapeutic Exercise    Upper Extremity (Therapeutic Exercise)  bicep curl, bilateral  -CS      Upper Extremity Range of Motion (Therapeutic Exercise)  shoulder flexion/extension, bilateral;shoulder  internal/external rotation, bilateral;elbow flexion/extension, bilateral;forearm supination/pronation, bilateral;wrist flexion/extension, bilateral  -CS      Hand (Therapeutic Exercise)  finger flexion/extension, bilateral  -CS      Weight/Resistance (Therapeutic Exercise)  1 pound  -CS      Exercise Type (Therapeutic Exercise)  AROM (active range of motion)  -CS      Position (Therapeutic Exercise)  supine  -CS      Sets/Reps (Therapeutic Exercise)  1/20  -CS      Equipment (Therapeutic Exercise)  free weight, barbell  -CS      Expected Outcome (Therapeutic Exercise)  improve functional tolerance, self-care activity;improve performance, BADLs;improve performance, transfer skills;increase active range of motion  -CS      Recorded by [CS] Brenda Thomas COTA/L 10/16/19 1538      Row Name 10/16/19 1400             Positioning and Restraints    Pre-Treatment Position  in bed  -CS      Post Treatment Position  bed  -CS      In Bed  supine;call light within reach;encouraged to call for assist;exit alarm on  -CS      Recorded by [CS] Brenda Thomas COTA/L 10/16/19 1538      Row Name 10/16/19 1437 10/16/19 1400          Pain Scale: Numbers Pre/Post-Treatment    Pain Scale: Numbers, Pretreatment  8/10  -GIAN  8/10  -CS     Pain Scale: Numbers, Post-Treatment  8/10  -GIAN  8/10  -CS     Pain Location  abdomen  -JC2  abdomen  -CS     Pain Intervention(s)  Repositioned  -GIAN  Repositioned;Medication (See MAR)  -CS     Recorded by [GIAN] Abran Wan, PTA 10/16/19 1504  [JC2] Abran Wan, PTA 10/16/19 1448 [CS] Brenda Thomas MADSEN/L 10/16/19 1538     Row Name 10/16/19 1437             Sensory Assessment/Intervention    Sensory General Assessment  no sensation deficits identified to lighttouch'Pt did report numbness R thumb  -GIAN      Recorded by [GIAN] Abran Wan, PTA 10/16/19 1448      Row Name                Wound 09/30/19 1330 Left medial abdomen Fistula    Wound - Properties Group Date first assessed:  09/30/19 [SS] Time first assessed: 1330 [SS] Side: Left [SS] Orientation: medial [SS] Location: abdomen [SS] Primary Wound Type: Fistula [SS] Recorded by:  [SS] Citlalli Abdi RN 09/30/19 1619    Row Name                Wound 10/02/19 1347 abdomen Incision    Wound - Properties Group Date first assessed: 10/02/19 [CF] Time first assessed: 1347 [CF] Present on Hospital Admission: N [CF] Location: abdomen [CF] Primary Wound Type: Incision [CF] Recorded by:  [CF] Eileen Tyson 10/02/19 1347    Row Name 10/16/19 1400             Outcome Summary/Treatment Plan (OT)    Daily Summary of Progress (OT)  progress toward functional goals is good  -CS      Plan for Continued Treatment (OT)  cont OT POC  -CS      Anticipated Discharge Disposition (OT)  anticipate therapy at next level of care  -CS      Recorded by [CS] Brenda Thomas MADSEN/L 10/16/19 1538      Row Name 10/16/19 1437             Outcome Summary/Treatment Plan (PT)    Anticipated Discharge Disposition (PT)  anticipate therapy at next level of care  -GIAN      Recorded by [GIAN] Abran Wan, PTA 10/16/19 1448        User Key  (r) = Recorded By, (t) = Taken By, (c) = Cosigned By    Initials Name Effective Dates Discipline    SS Citlalli Abdi RN 07/10/18 -  Nurse    GIAN Abran Wan, PTA 03/07/18 -  PT    CS Brenda Thomas, MADSEN/L 03/07/18 -  OT    CF Eileen Tyson - -        Wound 09/30/19 1330 Left medial abdomen Fistula (Active)   Dressing Appearance dry;intact 10/16/2019 10:10 AM   Closure None 10/16/2019 10:10 AM   Base dressing in place, unable to visualize 10/16/2019 10:10 AM   Drainage Characteristics/Odor purulent 10/15/2019 11:13 PM   Drainage Amount small 10/16/2019 10:10 AM   Care, Wound cleansed with;sterile normal saline;barrier applied;dressing removed 10/15/2019 11:13 PM   Dressing Care, Wound dressing changed 10/15/2019 11:13 PM   Periwound Care, Wound barrier ointment applied 10/15/2019 11:13 PM       Wound 10/02/19 1345  abdomen Incision (Active)   Dressing Appearance dry;intact 10/16/2019 10:10 AM   Closure Open to air 10/15/2019 11:13 PM   Base scab 10/15/2019 11:13 PM   Drainage Amount scant 10/15/2019 11:13 PM   Care, Wound cleansed with;sterile normal saline 10/15/2019 11:13 PM     Rehab Goal Summary     Row Name 10/16/19 1400             Occupational Therapy Goals    Transfer Goal Selection (OT)  transfer, OT goal 1  -CS      Bathing Goal Selection (OT)  bathing, OT goal 1  -CS      Dressing Goal Selection (OT)  dressing, OT goal 1  -CS      Toileting Goal Selection (OT)  toileting, OT goal 1  -CS      Activity Tolerance Goal Selection (OT)  activity tolerance, OT goal 1  -CS         Transfer Goal 1 (OT)    Activity/Assistive Device (Transfer Goal 1, OT)  sit-to-stand/stand-to-sit;bed-to-chair/chair-to-bed;toilet  -CS      [place] Level/Cues Needed (Transfer Goal 1, OT)  supervision required  -CS      Time Frame (Transfer Goal 1, OT)  long term goal (LTG);by discharge  -CS      Progress/Outcome (Transfer Goal 1, OT)  goal not met  -CS         Bathing Goal 1 (OT)    Activity/Assistive Device (Bathing Goal 1, OT)  bathing skills, all  -CS      [place] Level/Cues Needed (Bathing Goal 1, OT)  minimum assist (75% or more patient effort)  -CS      Time Frame (Bathing Goal 1, OT)  long term goal (LTG);by discharge  -CS      Barriers (Bathing Goal 1, OT)  Pt will need extra time and rest breaks PRN  -CS      Progress/Outcomes (Bathing Goal 1, OT)  goal not met  -CS         Dressing Goal 1 (OT)    Activity/Assistive Device (Dressing Goal 1, OT)  dressing skills, all  -CS      [place]/Cues Needed (Dressing Goal 1, OT)  minimum assist (75% or more patient effort)  -CS      Time Frame (Dressing Goal 1, OT)  long term goal (LTG);by discharge  -CS      Barriers (Dressing Goal 1, OT)  Pt will need extra time and rest breaks PRN  -CS      Progress/Outcome (Dressing Goal 1, OT)  goal not met  -CS         Toileting Goal 1 (OT)     Activity/Device (Toileting Goal 1, OT)  toileting skills, all  -CS      [place] Level/Cues Needed (Toileting Goal 1, OT)  minimum assist (75% or more patient effort)  -CS      Time Frame (Toileting Goal 1, OT)  long term goal (LTG);by discharge  -CS      Barriers (Toileting Goal 1, OT)  Pt will need extra time and rest breaks PRN  -CS      Progress/Outcome (Toileting Goal 1, OT)  goal not met  -CS          Activity Tolerance Goal 1 (OT)    Activity Level (Endurance Goal 1, OT)  15 min activity functional/therapeutic activity  -CS      Time Frame (Activity Tolerance Goal 1, OT)  long term goal (LTG);by discharge  -CS      Progress/Outcome (Activity Tolerance Goal 1, OT)  goal not met  -CS        User Key  (r) = Recorded By, (t) = Taken By, (c) = Cosigned By    Initials Name Provider Type Discipline    CS Brenda Thomas COTA/L Occupational Therapy Assistant OT        Occupational Therapy Education     Title: PT OT SLP Therapies (In Progress)     Topic: Occupational Therapy (In Progress)     Point: ADL training (In Progress)     Description: Instruct learner(s) on proper safety adaptation and remediation techniques during self care or transfers.   Instruct in proper use of assistive devices.    Learning Progress Summary           Patient Acceptance, E,TB,D, NR by CS at 10/16/2019  3:43 PM    Acceptance, E,TB,D, NR by CS at 10/15/2019  1:42 PM    Acceptance, E,TB,D, NR by CS at 10/14/2019  1:57 PM    Acceptance, E, NR by BB at 10/9/2019  1:37 PM    Acceptance, E,TB,D, NR by CS at 10/6/2019  1:05 PM    Acceptance, E,TB,D, NR by CS at 10/5/2019 11:11 AM    Acceptance, E, NR by AS at 10/4/2019 11:45 AM    Comment:  Role of OT, OT POC, importance of OOB activity for strength and healing, ADL training                   Point: Home exercise program (In Progress)     Description: Instruct learner(s) on appropriate technique for monitoring, assisting and/or progressing therapeutic exercises/activities.    Learning  Progress Summary           Patient Acceptance, E,TB,D, NR by CS at 10/16/2019  3:43 PM    Acceptance, E,TB,D, NR by CS at 10/15/2019  1:42 PM    Acceptance, E,TB,D, NR by CS at 10/14/2019  1:57 PM    Acceptance, E, NR by BB at 10/8/2019 11:46 AM    Acceptance, E,TB,D, NR by CS at 10/6/2019  1:05 PM    Acceptance, E,TB,D, NR by CS at 10/5/2019 11:11 AM                   Point: Precautions (In Progress)     Description: Instruct learner(s) on prescribed precautions during self-care and functional transfers.    Learning Progress Summary           Patient Acceptance, E,TB,D, NR by CS at 10/16/2019  3:43 PM    Acceptance, E,TB,D, NR by CS at 10/15/2019  1:42 PM    Acceptance, E,TB,D, NR by CS at 10/14/2019  1:57 PM    Acceptance, E,TB,D, NR by CS at 10/6/2019  1:05 PM    Acceptance, E,TB,D, NR by CS at 10/5/2019 11:11 AM    Acceptance, E, NR by AS at 10/4/2019 11:45 AM    Comment:  Role of OT, OT POC, importance of OOB activity for strength and healing, ADL training                   Point: Body mechanics (In Progress)     Description: Instruct learner(s) on proper positioning and spine alignment during self-care, functional mobility activities and/or exercises.    Learning Progress Summary           Patient Acceptance, E,TB,D, NR by CS at 10/16/2019  3:43 PM    Acceptance, E,TB,D, NR by CS at 10/15/2019  1:42 PM    Acceptance, E,TB,D, NR by CS at 10/14/2019  1:57 PM    Acceptance, E,TB,D, NR by CS at 10/6/2019  1:05 PM    Acceptance, E,TB,D, NR by CS at 10/5/2019 11:11 AM                               User Key     Initials Effective Dates Name Provider Type Discipline    BB 03/07/18 -  Kerrie Woodson COTA/L Occupational Therapy Assistant OT    CS 03/07/18 -  Brenda Thomas COTA/LIANE Occupational Therapy Assistant OT    AS 03/25/19 -  Destinee Yarbrough, OT Occupational Therapist OT                OT Recommendation and Plan  Outcome Summary/Treatment Plan (OT)  Daily Summary of Progress (OT): progress toward  functional goals is good  Plan for Continued Treatment (OT): cont OT POC  Anticipated Discharge Disposition (OT): anticipate therapy at next level of care  Therapy Frequency (OT Eval): other (see comments)(5-7 days/wk)  Daily Summary of Progress (OT): progress toward functional goals is good  Plan of Care Review  Plan of Care Reviewed With: patient  Plan of Care Reviewed With: patient  Outcome Summary: Pt tolerated tx well this date. Pt gave good effort with UE ther ex. No goals met this tx. Continue OT POC.  Outcome Measures     Row Name 10/16/19 1500 10/15/19 1554 10/15/19 1300       How much help from another person do you currently need...    Turning from your back to your side while in flat bed without using bedrails?  --  3  -GIAN  --    Moving from lying on back to sitting on the side of a flat bed without bedrails?  --  3  -GIAN  --    Moving to and from a bed to a chair (including a wheelchair)?  --  3  -GIAN  --    Standing up from a chair using your arms (e.g., wheelchair, bedside chair)?  --  3  -GIAN  --    Climbing 3-5 steps with a railing?  --  2  -GIAN  --    To walk in hospital room?  --  3  -GIAN  --    AM-PAC 6 Clicks Score (PT)  --  17  -GIAN  --       How much help from another is currently needed...    Putting on and taking off regular lower body clothing?  2  -CS  --  2  -CS    Bathing (including washing, rinsing, and drying)  2  -CS  --  2  -CS    Toileting (which includes using toilet bed pan or urinal)  2  -CS  --  2  -CS    Putting on and taking off regular upper body clothing  2  -CS  --  2  -CS    Taking care of personal grooming (such as brushing teeth)  2  -CS  --  2  -CS    Eating meals  1  -CS  --  1  -CS    AM-PAC 6 Clicks Score (OT)  11  -CS  --  11  -CS       Functional Assessment    Outcome Measure Options  --  AM-PAC 6 Clicks Basic Mobility (PT)  -GIAN  --    Row Name 10/14/19 1300 10/14/19 1100          How much help from another person do you currently need...    Turning from your back to  your side while in flat bed without using bedrails?  --  2  -KW     Moving from lying on back to sitting on the side of a flat bed without bedrails?  --  2  -KW     Moving to and from a bed to a chair (including a wheelchair)?  --  2  -KW     Standing up from a chair using your arms (e.g., wheelchair, bedside chair)?  --  2  -KW     Climbing 3-5 steps with a railing?  --  1  -KW     To walk in hospital room?  --  2  -KW     AM-PAC 6 Clicks Score (PT)  --  11  -KW        How much help from another is currently needed...    Putting on and taking off regular lower body clothing?  2  -CS  --     Bathing (including washing, rinsing, and drying)  2  -CS  --     Toileting (which includes using toilet bed pan or urinal)  2  -CS  --     Putting on and taking off regular upper body clothing  2  -CS  --     Taking care of personal grooming (such as brushing teeth)  2  -CS  --     Eating meals  1  -CS  --     AM-PAC 6 Clicks Score (OT)  11  -CS  --        Functional Assessment    Outcome Measure Options  --  AM-PAC 6 Clicks Basic Mobility (PT)  -KW       User Key  (r) = Recorded By, (t) = Taken By, (c) = Cosigned By    Initials Name Provider Type    Judy Becerra, PT Physical Therapist    Brenda Lee COTA/LIANE Occupational Therapy Assistant    Caitlyn Moyer, PT Physical Therapist           Time Calculation:   Time Calculation- OT     Row Name 10/16/19 1544             Time Calculation- OT    OT Start Time  1400  -CS      OT Stop Time  1423  -CS      OT Time Calculation (min)  23 min  -CS      Total Timed Code Minutes- OT  23 minute(s)  -CS      OT Received On  10/16/19  -        User Key  (r) = Recorded By, (t) = Taken By, (c) = Cosigned By    Initials Name Provider Type    Brenda Lee COTA/LIANE Occupational Therapy Assistant        Therapy Charges for Today     Code Description Service Date Service Provider Modifiers Qty    37715127491 HC OT THER PROC EA 15 MIN 10/15/2019 Brenda Thomas COTA/L GO 2     [id number]  OT THER PROC EA 15 MIN 10/16/2019 Brenda Thomas COTA/L GO 2               ROSEANNA Barton  10/16/2019

## 2019-10-16 NOTE — PLAN OF CARE
Problem: Patient Care Overview  Goal: Plan of Care Review  Outcome: Ongoing (interventions implemented as appropriate)   10/16/19 4467   Coping/Psychosocial   Plan of Care Reviewed With patient   Plan of Care Review   Progress improving   OTHER   Outcome Summary vss. tube feeding increased to 50ml/hr. will increased tonight to goal rate if tolerating. pain controlled with liquid meds. went to [facility] for treatment today. continue to monitor.       Problem: Fall Risk (Adult)  Goal: Absence of Fall  Outcome: Ongoing (interventions implemented as appropriate)      Problem: Skin Injury Risk (Adult)  Goal: Skin Health and Integrity  Outcome: Ongoing (interventions implemented as appropriate)      Problem: Pain, Chronic (Adult)  Goal: Acceptable Pain/Comfort Level and Functional Ability  Outcome: Ongoing (interventions implemented as appropriate)      Problem: Wound (Includes Pressure Injury) (Adult)  Goal: Signs and Symptoms of Listed Potential Problems Will be Absent, Minimized or Managed (Wound)  Outcome: Ongoing (interventions implemented as appropriate)      Problem: Airway, Artificial (Adult)  Goal: Signs and Symptoms of Listed Potential Problems Will be Absent, Minimized or Managed (Airway, Artificial)  Outcome: Ongoing (interventions implemented as appropriate)      Problem: Nutrition, Enteral (Adult)  Goal: Signs and Symptoms of Listed Potential Problems Will be Absent, Minimized or Managed (Nutrition, Enteral)  Outcome: Ongoing (interventions implemented as appropriate)    Goal: Signs and Symptoms of Listed Potential Problems Will be Absent, Minimized or Managed (Nutrition, Enteral)  Outcome: Ongoing (interventions implemented as appropriate)

## 2019-10-16 NOTE — DISCHARGE PLACEMENT REQUEST
"Ana Bang (59 y.o. 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)   Code Status:  CPR    Ht:  165.1 cm (65\")   Wt:  53.6 kg (118 lb 3.2 oz)    Admission Cmt:  None   Principal Problem:  None                Active Insurance as of 2019     Primary Coverage     Payor Plan Insurance Group Employer/Plan Group    [place]      Payor Plan Address Payor Plan Phone Number Payor Plan Fax Number Effective Dates    [address] [facility] [phone]  7/15/2019 - None Entered    [place] [id number]       Subscriber Name Subscriber Birth Date Member ID       ANA BANG 1960 [id number]                 Emergency Contacts      (Rel.) Home Phone Work Phone Mobile Phone    Martha Darby (Daughter) [phone] -- [phone]    Ev Alexander (Sister) [phone] -- [phone]        Sarah Steve RN [facility]  [phone] phone  [phone] fax    [address] [address]  Phone:  [phone]  Fax:          Patient:     Ana Bang MRN:  [id number]   [address] [facility] [address] :  1960  SSN:    Phone: [phone] Sex:  M      INSURANCE PAYOR PLAN GROUP # SUBSCRIBER ID   Primary:    [place] [id number]   [id number]   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 LTACH evaluation     Specimen Type:   Specimen Source:   Specimen Taken Date:   Specimen Taken Time:                   Authorizing Provider:Parminder Kc MD  Authorizing Provider's NPI: [id number]  Order Entered By: Parminder Kc MD 10/15/2019  6:56 PM     Electronically signed by: Parminder Kc MD 10/15/2019  6:56 PM               History & Physical      Parminder Kc MD at 10/02/19 1257        H&P reviewed. The patient was examined and there are no changes to the H&P.      Temp:  [96.2 °F (35.7 °C)-97.1 °F (36.2 °C)] 97.1 °F (36.2 °C)  Heart Rate:  [56-69] 57  Resp:  [18] 18  BP: ()/(53-67) 140/67      Electronically signed by Parminder Kc MD at 10/02/19 1257   Source Note             Malnutrition Severity Assessment    Patient Name:  Emerson Bang  YOB: 1960  MRN: [id number]  Admit Date:  9/30/2019    Patient meets criteria for : Severe Malnutrition    Comments:  Pt presents at severe malnutrition as evidenced by BMI of 17.64 and being 76% of his IBW (based on UBW of 103#).  He has a hx of Laryngeal CA and has a permanent Trach.  He received EN for for total nutrition.  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  Severe fat loss and muscle wasting present.  Please see below for details:     Malnutrition Severity Assessment  Malnutrition Type: Chronic Disease - Related Malnutrition     Malnutrition Type (last 8 hours)      Malnutrition Severity Assessment     Row Name 10/01/19 1250       Malnutrition Severity Assessment    Malnutrition Type  Chronic Disease - Related Malnutrition    Row Name 10/01/19 1250       Muscle Loss    Loss of Muscle Mass Findings  Severe    [place]  Severe - deep  hollowing/scooping, lack of muscle to touch, facial bones well defined    Clavicle Bone Region  Severe - protruding prominent bone    Acromion Bone Region  Severe - squared shoulders, bones, and acromion process protrusion prominent    Scapular Bone Region  Severe - prominent bones, depressions easily visible between ribs, scapula, spine, shoulders    Dorsal Hand Region  Severe - prominent depression    Row Name 10/01/19 1250       Fat Loss    Subcutaneous Fat Loss Findings  Severe    Orbital Region   Severe - pronounced hollowness/depression, dark circles, loose saggy skin    Upper Arm Region  Severe - mostly skin, very little space between folds, fingers touch    Row Name 10/01/19 1250       Fluid Accumulation (Edema)    Fluid Acumulation Findings  Severe    Row Name 10/01/19 1250       Criteria Met (Must meet criteria for severity in at least 2 of these categories: M Wasting, Fat Loss, Fluid, Secondary Signs, Wt. Status, Intake)    Patient meets criteria for   Severe Malnutrition          Electronically signed by:  Jane Ghosh RD  10/01/19 12:51 PM     Electronically signed by Parminder Kc MD at 10/01/19 1514             Parminder Kc MD at 10/01/19 1251          Malnutrition Severity Assessment    Patient Name:  Emerson Bang  YOB: 1960  MRN: [id number]  Admit Date:  9/30/2019    Patient meets criteria for : Severe Malnutrition    Comments:  Pt presents at severe malnutrition as evidenced by BMI of 17.64 and being 76% of his IBW (based on UBW of 103#).  He has a hx of Laryngeal CA and has a permanent Trach.  He received EN for for total nutrition.  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  Severe fat loss and muscle wasting present.  Please see below for details:     Malnutrition Severity Assessment  Malnutrition Type: Chronic Disease - Related Malnutrition     Malnutrition Type (last 8 hours)      Malnutrition Severity  Assessment     Row Name 10/01/19 1250       Malnutrition Severity Assessment    Malnutrition Type  Chronic Disease - Related Malnutrition    Row Name 10/01/19 1250       Muscle Loss    Loss of Muscle Mass Findings  Severe    [place] Region  Severe - deep hollowing/scooping, lack of muscle to touch, facial bones well defined    Clavicle Bone Region  Severe - protruding prominent bone    Acromion Bone Region  Severe - squared shoulders, bones, and acromion process protrusion prominent    Scapular Bone Region  Severe - prominent bones, depressions easily visible between ribs, scapula, spine, shoulders    Dorsal Hand Region  Severe - prominent depression    Row Name 10/01/19 1250       Fat Loss    Subcutaneous Fat Loss Findings  Severe    Orbital Region   Severe - pronounced hollowness/depression, dark circles, loose saggy skin    Upper Arm Region  Severe - mostly skin, very little space between folds, fingers touch    Row Name 10/01/19 1250       Fluid Accumulation (Edema)    Fluid Acumulation Findings  Severe    Row Name 10/01/19 1250       Criteria Met (Must meet criteria for severity in at least 2 of these categories: M Wasting, Fat Loss, Fluid, Secondary Signs, Wt. Status, Intake)    Patient meets criteria for   Severe Malnutrition          Electronically signed by:  Jane Ghosh RD  10/01/19 12:51 PM    Electronically signed by Parminder Kc MD at 10/01/19 1514     Parminder Kc MD at 10/01/19 1033                Patient Care Team:  Cristino He MD as PCP - General Valera, Trenton Lewis MD as Surgeon (General Surgery)  Krysten Martínez MD as Consulting Physician (Hematology and Oncology)  Keaton Alfred MD as Consulting Physician (Radiation Oncology)  Dee Bajwa APRN as Nurse Practitioner (Oncology)    Chief complaint: Gastrocutaneous fistula    Subjective     This gentleman is 59 years old and has a history of a laryngeal carcinoma requiring laryngectomy and permanent tracheostomy.   He has been fed via a gastrostomy tube that was placed several months ago.  Subsequent to that, he presented to the hospital with a small bowel obstruction and required a laparotomy and adhesio lysis.  The gastrostomy tube was left in place.    He has been fed at home with tube feeding although he has had persistent weight loss.  He was seen in the office yesterday and noted to have a severe burn to the anterior abdominal wall were acid had been leaking from around his gastrostomy tube spilled onto his abdomen for a period of several days.  He was admitted to the hospital and the tube was removed.  Subsequently, he was begun on Silvadene treatment to the abdominal wall.  Despite the tube being removed, he continues to drain a moderate amount of acid from the opening and the gastrocutaneous fistula is obviously still open despite removal of the foreign body.  He is currently receiving only intravenous fluids and is unable to swallow even small pills and small amounts of liquids.        Review of Systems   Unable to perform ROS: Patient nonverbal        Past Medical History:   Diagnosis Date   • Allergic rhinitis     vs URI   • Anemia    • At risk for falls    • Benign prostatic hyperplasia    • Chronic gastritis    • Cirrhosis of liver (CMS/HCC)    • Complication of gastrostomy (CMS/HCC)     site not healing   • COPD (chronic obstructive pulmonary disease) (CMS/HCC)    • Dysphagia     odynophagia   • Epigastric pain    • Esophagitis    • GERD (gastroesophageal reflux disease)    • Hypertension    • Hypothyroidism, unspecified    • Low back pain    • Malaise and fatigue    • Nasal congestion 11/15/2018   • Nausea with vomiting, unspecified    • Pain in left knee    • Pain in right knee    • Primary malignant neoplasm of pharynx (CMS/HCC)    • Screening for malignant neoplasm of colon    • 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 Left upper quadrant abdominal wall abscess debridement. Gastrostomy tube erosion through & through the anterior gastric wall.Lupper quadrant abdominal wall abscess   • COLONOSCOPY  04/21/2014    Internal & external hemorrhoids found.   • COLONOSCOPY  2014    [place]   • COLONOSCOPY N/A 10/16/2018    Procedure: COLONOSCOPY;  Surgeon: Kaushal Chester MD;  Location: [facility] ENDOSCOPY;  Service: Gastroenterology   • DIAGNOSTIC LAPAROSCOPY EXPLORATORY LAPAROTOMY N/A 7/17/2019    Procedure: DIAGNOSTIC LAPAROSCOPY, EXPLORATORY LAPAROTOMY, LYSIS OF ADHESIONS;  Surgeon: Parminder Kc MD;  Location: [facility] OR;  Service: General   • DIRECT LARYNGOSCOPY, ESOPHAGOSCOPY, BRONCHOSCOPY N/A 4/15/2019    Procedure: DIRECT LARYNGOSCOPY with biopsy, ESOPHAGOSCOPY;  Surgeon: Bharathi Washington MD;  Location: [facility] OR;  Service: ENT   • ENDOSCOPY N/A 10/16/2018    Procedure: ESOPHAGOGASTRODUODENOSCOPY;  Surgeon: Kaushal Chester MD;  Location: [facility] ENDOSCOPY;  Service: Gastroenterology   • GASTROSTOMY FEEDING TUBE INSERTION N/A 4/15/2019    Procedure: GASTROSTOMY FEEDING TUBE INSERTION;  Surgeon: Trenton Valera MD;  Location: [facility] OR;  Service: General   • TRACHEOSTOMY  11/10/2008    Respiratory failure   • UPPER GASTROINTESTINAL ENDOSCOPY  04/21/2014    Esophagitis seen. Biopsy taken. Gastritis in stomach. Biopsy taken. Normal duodenum. Biopsy taken.   • UPPER GASTROINTESTINAL ENDOSCOPY  10/16/2018   • VENOUS ACCESS DEVICE (PORT) INSERTION N/A 9/11/2019    Procedure: INSERTION VENOUS ACCESS DEVICE (MEDIPORT)        (c-arm#1);  Surgeon: Parminder Kc MD;  Location: [facility] OR;  Service: General     Family History   Problem Relation Age of Onset   • Coronary artery disease Mother    • Diabetes Mother    • Other Mother      "    \"Heart And Lung Problems\"   • Lung cancer Mother    • Ovarian cancer Sister    • Heart disease Other    • Stroke Other    • Hypertension Other    • Diabetes Other    • Cancer Other    • Colon polyps Other    • Other Father         Unknown   • Other Other         \"Lung Problems\"   • Thyroid disease Neg Hx      Social History     Tobacco Use   • Smoking status: Former Smoker     Packs/day: 1.50     Years: 30.00     Pack years: 45.00     Types: Cigarettes     Last attempt to quit: 2009     Years since quitting: 10.7   • Smokeless tobacco: Former User     Quit date: 4/12/2009   Substance Use Topics   • Alcohol use: No     Comment: 02/19/2019 - Patient confirmed heavy alcoholic beverage consumption in past with current consumption of 2 - 3 beers twice per week.   • Drug use: No     Medications Prior to Admission   Medication Sig Dispense Refill Last Dose   • aspirin (ASPIRIN LOW DOSE) 81 MG EC tablet Take 1 tablet by mouth Daily. 30 tablet 6 9/29/2019 at Unknown time   • dexamethasone (DECADRON) 1 MG tablet Take 1 mg by mouth 2 (Two) Times a Day With Meals.   9/29/2019 at Unknown time   • doxazosin (CARDURA) 1 MG tablet Take 1 tablet by mouth Every Night. 30 tablet 3 9/29/2019 at Unknown time   • fenofibrate (TRICOR) 48 MG tablet Take 1.5 tablets by mouth Daily. 1 tablet by G-Tube daily for Hyperlipidemia 30 tablet 3 9/29/2019 at Unknown time   • ferrous sulfate 220 (44 Fe) MG/5ML solution 5 mL by Per G Tube route Daily. 300 mL 1 9/29/2019 at Unknown time   • levothyroxine (SYNTHROID) 125 MCG tablet Take 1 pill/daily for 6 days a week 30 tablet 3 9/29/2019 at Unknown time   • loperamide (IMODIUM) 1 MG/5ML solution Take 10 mL by mouth 4 (Four) Times a Day As Needed for Diarrhea. 118 mL 1 Past Week at Unknown time   • metoclopramide (REGLAN) 5 MG/5ML solution 10 ML Per G-Tube 4 Times Per Day Before Meals X 30 Days   9/29/2019 at Unknown time   • ondansetron (ZOFRAN) 8 MG tablet Take 1 tablet by mouth 3 (Three) Times a " Day As Needed for Nausea or Vomiting. 30 tablet 5 Past Week at Unknown time   • ondansetron ODT (ZOFRAN-ODT) 4 MG disintegrating tablet Take 1 tablet by mouth Every 6 (Six) Hours As Needed for Nausea or Vomiting. 10 tablet 0 Past Week at Unknown time   • oxyCODONE-acetaminophen (PERCOCET) 7.5-325 MG per tablet Take 1 tablet by mouth Every 6 (Six) Hours As Needed for Severe Pain . 12 tablet 0 9/30/2019 at Unknown time   • vitamin D (ERGOCALCIFEROL) 64268 units capsule capsule 1,000 Units Daily. 1 capsule by mouth weekly on Wednesday X 3 months    Past Week at Unknown time   • Ascorbic Acid (C/DARA HIPS PO) 1,000 mg by Per PEG Tube route 2 (Two) Times a Day.   More than a month at Unknown time     Allergies:  Patient has no known allergies.    Objective      Vital Signs  Temp:  [96.2 °F (35.7 °C)-98 °F (36.7 °C)] 97 °F (36.1 °C)  Heart Rate:  [66-82] 80  Resp:  [18-20] 18  BP: (104-148)/(55-86) 104/58    Physical Exam   Constitutional: He appears well-developed.   Cachectic   HENT:   Head: Normocephalic and atraumatic.   Eyes: EOM are normal. Pupils are equal, round, and reactive to light.   Neck:       Cardiovascular: Normal rate and regular rhythm.   Pulmonary/Chest: Effort normal and breath sounds normal.   Abdominal: Soft. Bowel sounds are normal. There is tenderness.       Vitals reviewed.      Results Review:   I reviewed the patient's new clinical results.  I reviewed the patient's new imaging results and agree with the interpretation.      Assessment/Plan       Gastrocutaneous fistula due to gastrostomy tube      Assessment:    Condition: In stable condition.  Unchanged.   (1.  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     Plan:   (1.  We will plan closure of gastrocutaneous fistula and placement of jejunostomy tube tomorrow and surgery.    The risks of surgery including medical risks of general anesthesia, bleeding, infection, poor tube function, scarring, poor wound healing are all  explained to the patient.  There is a low risk of transfusion.  No other options exist for the treatment of this particular problem.  Understands and agrees.).       I discussed the patients findings and my recommendations with patient and nursing staff    Parminder Kc MD  10/01/19  10:33 AM    Time: 20 minutes    Electronically signed by Parminder Kc MD at 10/01/19 1038          Physician Progress Notes (most recent note)      Parminder Kc MD at 10/16/19 0900        Afebrile normal vital signs  Incisions are healing nicely  Burn is  healing nicely  Tolerating tube feeds    Awaiting LTAC evaluation    Electronically signed by Parminder Kc MD at 10/16/19 0901          Medical Student Notes (most recent note)      Parminder Kc MD at 10/03/19 0526          GENERAL SURGERY PROGRESS NOTE     LOS: 3 days     Chief Complaint:     Gastrocutaneous Fistula Closure & Jejunostomy    Interval History:     Mr. Bang is POD1 following gastrocutaneous fistula closure. Today he is alert, pleasant, and oriented. This morning, RT removed him from ventilator and placed him on 30% trach collar. His berumen catheter was also removed.    Medication Review:     albuterol 2.5 mg Nebulization Q6H - RT   chlorhexidine 15 mL Mouth/Throat Q12H   enoxaparin 40 mg Subcutaneous Daily   silver sulfadiazine  Topical Q12H   sodium chloride 10 mL Intravenous Q12H   sodium chloride 10 mL Intravenous Q12H   sodium chloride 10 mL Intravenous Q12H         lactated ringers 100 mL/hr Last Rate: 100 mL/hr (10/03/19 0517)   norepinephrine 0.02-0.3 mcg/kg/min Last Rate: Stopped (10/03/19 0010)   pantoprazole 40 mg Last Rate: 40 mg (10/03/19 0608)   propofol 5-50 mcg/kg/min Last Rate: Stopped (10/02/19 5509)   Objective     Vital Signs:  Temp:  [95.3 °F (35.2 °C)-98.6 °F (37 °C)] 98.5 °F (36.9 °C)  Heart Rate:  [56-96] 76  Resp:  [6-34] 15  BP: ()/(35-77) 126/68  FiO2 (%):  [30 %-60 %] 30 %    Intake/Output Summary (Last 24 hours) at 10/3/2019  0626  Last data filed at 10/3/2019 0607  Gross per 24 hour   Intake 4071 ml   Output 1525 ml   Net 2546 ml       Physical Exam   Abdominal: Soft. Bowel sounds are normal.              Results Review:    Results from last 7 days   Lab Units 10/03/19  0441 10/02/19  1625 10/02/19  0524   SODIUM mmol/L 136 133* 131*   POTASSIUM mmol/L 4.5 3.5 3.9   CHLORIDE mmol/L 96* 93* 91*   CO2 mmol/L 25.0 25.0 29.0   BUN mg/dL 9 9 9   CREATININE mg/dL 0.74* 0.53* 0.61*   GLUCOSE mg/dL 125* 111* 97   CALCIUM mg/dL 7.2* 7.0* 8.7     Results from last 7 days   Lab Units 10/03/19  0441 10/02/19  1625 10/02/19  0524   WBC 10*3/mm3 11.68* 8.61 6.34   HEMOGLOBIN g/dL 12.7* 8.3* 12.0*   HEMATOCRIT % 36.0* 23.8* 33.7*   PLATELETS 10*3/mm3 259 230 304       Assessment:    * No active hospital problems. *      Mr. Bang is recovering from his surgery satisfactorily.    Plan:    Advance feeding via J-Tube as tolerable. Plan to move to medicine floor once spontaneous breathing via trach is achieved.    Feeding: NPO with goal of starting Peptamen 1.5 via J-Tube once recovered.  Analgesia: Dilaudid 1 mg Q3H PRN until 10/12  Sedation: Propofol was DCd evening of 10/2  Thromboembolic Prophylaxis: Lovenox 40   Head of bed: Elevated  Ulcer:Protonix 20mL/hr  Glycemic Control: None  Spontaneous breathing: RT took Mr. Bang off of ventilator and placed him on 30% trach collar morning of 10/3  Bowel regimen: NPO  Indwelling catheter: Palomo removed morning of 10/3  De-escalation of Abx: Will finish Cefazolin 2g 10/3      This document has been electronically signed by Kaushal Dill, Medical Student on October 3, 2019 6:26 AM        Electronically signed by Parminder Kc MD at 10/03/19 0807       Consult Notes (most recent note)     No notes of this type exist for this encounter.

## 2019-10-17 ENCOUNTER — DOCUMENTATION (OUTPATIENT)
Dept: ONCOLOGY | Facility: CLINIC | Age: 59
End: 2019-10-17

## 2019-10-17 LAB
GLUCOSE BLDC GLUCOMTR-MCNC: 121 MG/DL (ref 70–130)
GLUCOSE BLDC GLUCOMTR-MCNC: 123 MG/DL (ref 70–130)
GLUCOSE BLDC GLUCOMTR-MCNC: 126 MG/DL (ref 70–130)
GLUCOSE BLDC GLUCOMTR-MCNC: 138 MG/DL (ref 70–130)

## 2019-10-17 PROCEDURE — 94799 UNLISTED PULMONARY SVC/PX: CPT

## 2019-10-17 PROCEDURE — 97110 THERAPEUTIC EXERCISES: CPT

## 2019-10-17 PROCEDURE — 97116 GAIT TRAINING THERAPY: CPT

## 2019-10-17 PROCEDURE — 25010000002 ENOXAPARIN PER 10 MG: Performed by: SURGERY

## 2019-10-17 PROCEDURE — 77386: CPT | Performed by: RADIOLOGY

## 2019-10-17 PROCEDURE — 82962 GLUCOSE BLOOD TEST: CPT

## 2019-10-17 PROCEDURE — 94760 N-INVAS EAR/PLS OXIMETRY 1: CPT

## 2019-10-17 PROCEDURE — 97530 THERAPEUTIC ACTIVITIES: CPT

## 2019-10-17 PROCEDURE — 77014 CHG CT GUIDANCE RADIATION THERAPY FLDS PLACEMENT: CPT | Performed by: RADIOLOGY

## 2019-10-17 RX ADMIN — SILVER SULFADIAZINE: 10 CREAM TOPICAL at 09:04

## 2019-10-17 RX ADMIN — HYDROCODONE BITARTRATE AND ACETAMINOPHEN 15 ML: 7.5; 325 SOLUTION ORAL at 04:41

## 2019-10-17 RX ADMIN — ALBUTEROL SULFATE 2.5 MG: 2.5 SOLUTION RESPIRATORY (INHALATION) at 07:41

## 2019-10-17 RX ADMIN — HYDROCODONE BITARTRATE AND ACETAMINOPHEN 15 ML: 7.5; 325 SOLUTION ORAL at 09:08

## 2019-10-17 RX ADMIN — ENOXAPARIN SODIUM 40 MG: 40 INJECTION SUBCUTANEOUS at 09:03

## 2019-10-17 RX ADMIN — ALBUTEROL SULFATE 2.5 MG: 2.5 SOLUTION RESPIRATORY (INHALATION) at 15:45

## 2019-10-17 RX ADMIN — FAMOTIDINE 20 MG: 40 POWDER, FOR SUSPENSION ORAL at 13:45

## 2019-10-17 RX ADMIN — ALBUTEROL SULFATE 2.5 MG: 2.5 SOLUTION RESPIRATORY (INHALATION) at 21:25

## 2019-10-17 RX ADMIN — FAMOTIDINE 20 MG: 40 POWDER, FOR SUSPENSION ORAL at 01:55

## 2019-10-17 RX ADMIN — HYDROCODONE BITARTRATE AND ACETAMINOPHEN 15 ML: 7.5; 325 SOLUTION ORAL at 15:48

## 2019-10-17 RX ADMIN — SODIUM CHLORIDE, PRESERVATIVE FREE 10 ML: 5 INJECTION INTRAVENOUS at 21:36

## 2019-10-17 RX ADMIN — SODIUM CHLORIDE, PRESERVATIVE FREE 10 ML: 5 INJECTION INTRAVENOUS at 09:04

## 2019-10-17 RX ADMIN — SILVER SULFADIAZINE: 10 CREAM TOPICAL at 21:36

## 2019-10-17 RX ADMIN — POTASSIUM CHLORIDE, DEXTROSE MONOHYDRATE AND SODIUM CHLORIDE 50 ML/HR: 150; 5; 450 INJECTION, SOLUTION INTRAVENOUS at 00:04

## 2019-10-17 RX ADMIN — ALBUTEROL SULFATE 2.5 MG: 2.5 SOLUTION RESPIRATORY (INHALATION) at 01:32

## 2019-10-17 NOTE — SIGNIFICANT NOTE
10/17/19 1430   Rehab Treatment   Discipline occupational therapy assistant   Reason Treatment Not Performed unavailable for treatment  (Pt went to the [facility] center. OT will check back tomorrow.)

## 2019-10-17 NOTE — PROGRESS NOTES
Unfortunately, Mr. Bang has been turned down for him in the LTAC.    We are attempting to find placement elsewhere will allow him to continue his radiation therapy.

## 2019-10-17 NOTE — DISCHARGE PLACEMENT REQUEST
"Ana Bang (59 y.o. 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)   Code Status:  CPR    Ht:  165.1 cm (65\")   Wt:  53.5 kg (117 lb 14.4 oz)    Admission Cmt:  None   Principal Problem:  None                Active Insurance as of 9/30/2019     Primary Coverage     Payor Plan Insurance Group Employer/Plan Group    [place]      Payor Plan Address Payor Plan Phone Number Payor Plan Fax Number Effective Dates    [address] [facility] [phone]  7/15/2019 - None Entered    [facility] [id number]       Subscriber Name Subscriber Birth Date Member ID       ANA BANG 1960 [id number]                 Emergency Contacts      (Rel.) Home Phone Work Phone Mobile Phone    Martha Darby (Daughter) [phone] -- [phone]    Ev Alexander (Sister) [phone] -- [phone]        Sarah Steve RN [facility]  [phone] phone  [phone] fax               History & Physical      Parminder Kc MD at 10/02/19 1257        H&P reviewed. The patient was examined and there are no changes to the H&P.      Temp:  [96.2 °F (35.7 °C)-97.1 °F (36.2 °C)] 97.1 °F (36.2 °C)  Heart Rate:  [56-69] 57  Resp:  [18] 18  BP: ()/(53-67) 140/67      Electronically signed by Parminder Kc MD at 10/02/19 1257   Source Note             Malnutrition Severity Assessment    Patient Name:  Ana Bang  Date of " Birth:  1960  MRN: [id number]  Admit Date:  9/30/2019    Patient meets criteria for : Severe Malnutrition    Comments:  Pt presents at severe malnutrition as evidenced by BMI of 17.64 and being 76% of his IBW (based on UBW of 103#).  He has a hx of Laryngeal CA and has a permanent Trach.  He received EN for for total nutrition.  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  Severe fat loss and muscle wasting present.  Please see below for details:     Malnutrition Severity Assessment  Malnutrition Type: Chronic Disease - Related Malnutrition     Malnutrition Type (last 8 hours)      Malnutrition Severity Assessment     Row Name 10/01/19 1250       Malnutrition Severity Assessment    Malnutrition Type  Chronic Disease - Related Malnutrition    Row Name 10/01/19 1250       Muscle Loss    Loss of Muscle Mass Findings  Severe    [place]  Severe - deep hollowing/scooping, lack of muscle to touch, facial bones well defined    Clavicle Bone Region  Severe - protruding prominent bone    Acromion Bone Region  Severe - squared shoulders, bones, and acromion process protrusion prominent    Scapular Bone Region  Severe - prominent bones, depressions easily visible between ribs, scapula, spine, shoulders    Dorsal Hand Region  Severe - prominent depression    Row Name 10/01/19 1250       Fat Loss    Subcutaneous Fat Loss Findings  Severe    Orbital Region   Severe - pronounced hollowness/depression, dark circles, loose saggy skin    Upper Arm Region  Severe - mostly skin, very little space between folds, fingers touch    Row Name 10/01/19 1250       Fluid Accumulation (Edema)    Fluid Acumulation Findings  Severe    Row Name 10/01/19 1250       Criteria Met (Must meet criteria for severity in at least 2 of these categories: M Wasting, Fat Loss, Fluid, Secondary Signs, Wt. Status, Intake)    Patient meets criteria for   Severe Malnutrition           Electronically signed by:  Jane Ghosh RD  10/01/19 12:51 PM     Electronically signed by Parminder Kc MD at 10/01/19 5924             Parminder Kc MD at 10/01/19 1251          Malnutrition Severity Assessment    Patient Name:  Emerson Bang  YOB: 1960  MRN: [id number]  Admit Date:  9/30/2019    Patient meets criteria for : Severe Malnutrition    Comments:  Pt presents at severe malnutrition as evidenced by BMI of 17.64 and being 76% of his IBW (based on UBW of 103#).  He has a hx of Laryngeal CA and has a permanent Trach.  He received EN for for total nutrition.  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  Severe fat loss and muscle wasting present.  Please see below for details:     Malnutrition Severity Assessment  Malnutrition Type: Chronic Disease - Related Malnutrition     Malnutrition Type (last 8 hours)      Malnutrition Severity Assessment     Row Name 10/01/19 1250       Malnutrition Severity Assessment    Malnutrition Type  Chronic Disease - Related Malnutrition    Row Name 10/01/19 1250       Muscle Loss    Loss of Muscle Mass Findings  Severe    [place] Region  Severe - deep hollowing/scooping, lack of muscle to touch, facial bones well defined    Clavicle Bone Region  Severe - protruding prominent bone    Acromion Bone Region  Severe - squared shoulders, bones, and acromion process protrusion prominent    Scapular Bone Region  Severe - prominent bones, depressions easily visible between ribs, scapula, spine, shoulders    Dorsal Hand Region  Severe - prominent depression    Row Name 10/01/19 1250       Fat Loss    Subcutaneous Fat Loss Findings  Severe    Orbital Region   Severe - pronounced hollowness/depression, dark circles, loose saggy skin    Upper Arm Region  Severe - mostly skin, very little space between folds, fingers touch    Row Name 10/01/19 1250       Fluid Accumulation (Edema)    Fluid Acumulation  Findings  Severe    Row Name 10/01/19 1250       Criteria Met (Must meet criteria for severity in at least 2 of these categories: M Wasting, Fat Loss, Fluid, Secondary Signs, Wt. Status, Intake)    Patient meets criteria for   Severe Malnutrition          Electronically signed by:  Jane Ghosh RD  10/01/19 12:51 PM    Electronically signed by Parminder Kc MD at 10/01/19 6514     Parminder Kc MD at 10/01/19 1033                Patient Care Team:  Cristino He MD as PCP - General  Trenton Valera MD as Surgeon (General Surgery)  Krysten Martínez MD as Consulting Physician (Hematology and Oncology)  Keaton Alfred MD as Consulting Physician (Radiation Oncology)  Dee Bajwa APRN as Nurse Practitioner (Oncology)    Chief complaint: Gastrocutaneous fistula    Subjective     This gentleman is 59 years old and has a history of a laryngeal carcinoma requiring laryngectomy and permanent tracheostomy.  He has been fed via a gastrostomy tube that was placed several months ago.  Subsequent to that, he presented to the hospital with a small bowel obstruction and required a laparotomy and adhesio lysis.  The gastrostomy tube was left in place.    He has been fed at home with tube feeding although he has had persistent weight loss.  He was seen in the office yesterday and noted to have a severe burn to the anterior abdominal wall were acid had been leaking from around his gastrostomy tube spilled onto his abdomen for a period of several days.  He was admitted to the hospital and the tube was removed.  Subsequently, he was begun on Silvadene treatment to the abdominal wall.  Despite the tube being removed, he continues to drain a moderate amount of acid from the opening and the gastrocutaneous fistula is obviously still open despite removal of the foreign body.  He is currently receiving only intravenous fluids and is unable to swallow even small pills and small amounts of  liquids.        Review of Systems   Unable to perform ROS: Patient nonverbal        Past Medical History:   Diagnosis Date   • Allergic rhinitis     vs URI   • Anemia    • At risk for falls    • Benign prostatic hyperplasia    • Chronic gastritis    • Cirrhosis of liver (CMS/HCC)    • Complication of gastrostomy (CMS/HCC)     site not healing   • COPD (chronic obstructive pulmonary disease) (CMS/HCC)    • Dysphagia     odynophagia   • Epigastric pain    • Esophagitis    • GERD (gastroesophageal reflux disease)    • Hypertension    • Hypothyroidism, unspecified    • Low back pain    • Malaise and fatigue    • Nasal congestion 11/15/2018   • Nausea with vomiting, unspecified    • Pain in left knee    • Pain in right knee    • Primary malignant neoplasm of pharynx (CMS/HCC)    • Screening for malignant neoplasm of colon    • 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 Left upper quadrant abdominal wall abscess debridement. Gastrostomy tube erosion through & through the anterior gastric wall.Lupper quadrant abdominal wall abscess   • COLONOSCOPY  04/21/2014    Internal & external hemorrhoids found.   • COLONOSCOPY  2014    [place]   • COLONOSCOPY N/A 10/16/2018    Procedure: COLONOSCOPY;  Surgeon: Kaushal Chester MD;  Location: [facility] ENDOSCOPY;  Service: Gastroenterology   • DIAGNOSTIC LAPAROSCOPY EXPLORATORY LAPAROTOMY N/A 7/17/2019    Procedure: DIAGNOSTIC LAPAROSCOPY, EXPLORATORY LAPAROTOMY, LYSIS OF ADHESIONS;  Surgeon: Parminder Kc MD;  Location: [facility] OR;  Service: General   • DIRECT LARYNGOSCOPY, ESOPHAGOSCOPY, BRONCHOSCOPY N/A 4/15/2019    Procedure: DIRECT LARYNGOSCOPY with biopsy, ESOPHAGOSCOPY;  Surgeon: Bharathi Washington MD;  " Location: [facility] OR;  Service: ENT   • ENDOSCOPY N/A 10/16/2018    Procedure: ESOPHAGOGASTRODUODENOSCOPY;  Surgeon: Kaushal Chester MD;  Location: [facility] ENDOSCOPY;  Service: Gastroenterology   • GASTROSTOMY FEEDING TUBE INSERTION N/A 4/15/2019    Procedure: GASTROSTOMY FEEDING TUBE INSERTION;  Surgeon: Trenton Valera MD;  Location: [facility];  Service: General   • TRACHEOSTOMY  11/10/2008    Respiratory failure   • UPPER GASTROINTESTINAL ENDOSCOPY  04/21/2014    Esophagitis seen. Biopsy taken. Gastritis in stomach. Biopsy taken. Normal duodenum. Biopsy taken.   • UPPER GASTROINTESTINAL ENDOSCOPY  10/16/2018   • VENOUS ACCESS DEVICE (PORT) INSERTION N/A 9/11/2019    Procedure: INSERTION VENOUS ACCESS DEVICE (MEDIPORT)        (c-arm#1);  Surgeon: Parminder Kc MD;  Location: [facility];  Service: General     Family History   Problem Relation Age of Onset   • Coronary artery disease Mother    • Diabetes Mother    • Other Mother         \"Heart And Lung Problems\"   • Lung cancer Mother    • Ovarian cancer Sister    • Heart disease Other    • Stroke Other    • Hypertension Other    • Diabetes Other    • Cancer Other    • Colon polyps Other    • Other Father         Unknown   • Other Other         \"Lung Problems\"   • Thyroid disease Neg Hx      Social History     Tobacco Use   • Smoking status: Former Smoker     Packs/day: 1.50     Years: 30.00     Pack years: 45.00     Types: Cigarettes     Last attempt to quit: 2009     Years since quitting: 10.7   • Smokeless tobacco: Former User     Quit date: 4/12/2009   Substance Use Topics   • Alcohol use: No     Comment: 02/19/2019 - Patient confirmed heavy alcoholic beverage consumption in past with current consumption of 2 - 3 beers twice per week.   • Drug use: No     Medications Prior to Admission   Medication Sig Dispense Refill Last Dose   • aspirin (ASPIRIN LOW DOSE) 81 MG EC tablet Take 1 tablet by mouth Daily. 30 tablet 6 9/29/2019 at Unknown time   • " dexamethasone (DECADRON) 1 MG tablet Take 1 mg by mouth 2 (Two) Times a Day With Meals.   9/29/2019 at Unknown time   • doxazosin (CARDURA) 1 MG tablet Take 1 tablet by mouth Every Night. 30 tablet 3 9/29/2019 at Unknown time   • fenofibrate (TRICOR) 48 MG tablet Take 1.5 tablets by mouth Daily. 1 tablet by G-Tube daily for Hyperlipidemia 30 tablet 3 9/29/2019 at Unknown time   • ferrous sulfate 220 (44 Fe) MG/5ML solution 5 mL by Per G Tube route Daily. 300 mL 1 9/29/2019 at Unknown time   • levothyroxine (SYNTHROID) 125 MCG tablet Take 1 pill/daily for 6 days a week 30 tablet 3 9/29/2019 at Unknown time   • loperamide (IMODIUM) 1 MG/5ML solution Take 10 mL by mouth 4 (Four) Times a Day As Needed for Diarrhea. 118 mL 1 Past Week at Unknown time   • metoclopramide (REGLAN) 5 MG/5ML solution 10 ML Per G-Tube 4 Times Per Day Before Meals X 30 Days   9/29/2019 at Unknown time   • ondansetron (ZOFRAN) 8 MG tablet Take 1 tablet by mouth 3 (Three) Times a Day As Needed for Nausea or Vomiting. 30 tablet 5 Past Week at Unknown time   • ondansetron ODT (ZOFRAN-ODT) 4 MG disintegrating tablet Take 1 tablet by mouth Every 6 (Six) Hours As Needed for Nausea or Vomiting. 10 tablet 0 Past Week at Unknown time   • oxyCODONE-acetaminophen (PERCOCET) 7.5-325 MG per tablet Take 1 tablet by mouth Every 6 (Six) Hours As Needed for Severe Pain . 12 tablet 0 9/30/2019 at Unknown time   • vitamin D (ERGOCALCIFEROL) 94866 units capsule capsule 1,000 Units Daily. 1 capsule by mouth weekly on Wednesday X 3 months    Past Week at Unknown time   • Ascorbic Acid (C/DARA HIPS PO) 1,000 mg by Per PEG Tube route 2 (Two) Times a Day.   More than a month at Unknown time     Allergies:  Patient has no known allergies.    Objective      Vital Signs  Temp:  [96.2 °F (35.7 °C)-98 °F (36.7 °C)] 97 °F (36.1 °C)  Heart Rate:  [66-82] 80  Resp:  [18-20] 18  BP: (104-148)/(55-86) 104/58    Physical Exam   Constitutional: He appears well-developed.    Cachectic   HENT:   Head: Normocephalic and atraumatic.   Eyes: EOM are normal. Pupils are equal, round, and reactive to light.   Neck:       Cardiovascular: Normal rate and regular rhythm.   Pulmonary/Chest: Effort normal and breath sounds normal.   Abdominal: Soft. Bowel sounds are normal. There is tenderness.       Vitals reviewed.      Results Review:   I reviewed the patient's new clinical results.  I reviewed the patient's new imaging results and agree with the interpretation.      Assessment/Plan       Gastrocutaneous fistula due to gastrostomy tube      Assessment:    Condition: In stable condition.  Unchanged.   (1.  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     Plan:   (1.  We will plan closure of gastrocutaneous fistula and placement of jejunostomy tube tomorrow and surgery.    The risks of surgery including medical risks of general anesthesia, bleeding, infection, poor tube function, scarring, poor wound healing are all explained to the patient.  There is a low risk of transfusion.  No other options exist for the treatment of this particular problem.  Understands and agrees.).       I discussed the patients findings and my recommendations with patient and nursing staff    Parminder Kc MD  10/01/19  10:33 AM    Time: 20 minutes    Electronically signed by Parminder Kc MD at 10/01/19 [address] [place] [address] Medications (active)       Dose Frequency Start End    albuterol (PROVENTIL) nebulizer solution 0.083% 2.5 mg/3mL 2.5 mg Every 6 Hours - RT 10/2/2019     Sig - Route: Take 2.5 mg by nebulization Every 6 (Six) Hours. - Nebulization    dextrose 5 % and sodium chloride 0.45 % with KCl 20 mEq/L infusion 50 mL/hr Continuous 10/13/2019     Sig - Route: Infuse 50 mL/hr into a venous catheter Continuous. - Intravenous    enoxaparin (LOVENOX) syringe 40 mg 40 mg Daily 10/3/2019     Sig - Route: Inject 0.4 mL under the skin into the appropriate area as directed Daily. - Subcutaneous     "famotidine (PEPCID) 40 MG/5ML suspension 20 mg 20 mg Every 12 Hours 10/13/2019     Sig - Route: Take 2.5 mL by mouth Every 12 (Twelve) Hours. - Oral    HYDROcodone-acetaminophen (HYCET) 7.5-325 MG/15ML solution 15 mL 15 mL Every 4 Hours PRN 10/12/2019 10/22/2019    Sig - Route: 15 mL by Per G Tube route Every 4 (Four) Hours As Needed for Moderate Pain  or Severe Pain . - Per G Tube    Influenza Vac Subunit Quad (FLUCELVAX) injection 0.5 mL 0.5 mL During Hospitalization 10/5/2019     Sig - Route: Inject 0.5 mL into the appropriate muscle as directed by prescriber During Hospitalization for Immunization. - Intramuscular    insulin aspart (novoLOG) injection 0-7 Units 0-7 Units 4 Times Daily Before Meals & Nightly 10/7/2019     Sig - Route: Inject 0-7 Units under the skin into the appropriate area as directed 4 (Four) Times a Day Before Meals & at Bedtime. - Subcutaneous    naloxone (NARCAN) injection 0.1 mg 0.1 mg Every 5 Minutes PRN 10/2/2019     Sig - Route: Infuse 0.25 mL into a venous catheter Every 5 (Five) Minutes As Needed for Respiratory Depression. - Intravenous    Linked Group 1:  \"And\" Linked Group Details        ondansetron ODT (ZOFRAN-ODT) disintegrating tablet 4 mg 4 mg Every 6 Hours PRN 10/13/2019     Sig - Route: Take 1 tablet by mouth Every 6 (Six) Hours As Needed for Nausea or Vomiting. - Oral    silver sulfadiazine (SILVADENE, SSD) 1 % cream  Every 12 Hours Scheduled 10/2/2019     Sig - Route: Apply  topically to the appropriate area as directed Every 12 (Twelve) Hours. - Topical    sodium chloride 0.9 % flush 10 mL 10 mL Every 12 Hours Scheduled 10/2/2019     Sig - Route: Infuse 10 mL into a venous catheter Every 12 (Twelve) Hours. - Intravenous    sodium chloride 0.9 % flush 10 mL 10 mL As Needed 10/2/2019     Sig - Route: Infuse 10 mL into a venous catheter As Needed for Line Care. - Intravenous    sodium chloride 0.9 % flush 10 mL 10 mL As Needed 10/2/2019     Sig - Route: Infuse 10 mL into a " venous catheter As Needed for Line Care (After Medication Administration). - Intravenous    Cosign for Ordering: Accepted by Parminder Kc MD on 10/3/2019  7:57 AM    sodium chloride 0.9 % flush 20 mL 20 mL As Needed 10/2/2019     Sig - Route: Infuse 20 mL into a venous catheter As Needed for Line Care (After Blood Draws or Blood Product Administration). - Intravenous    Cosign for Ordering: Accepted by Parminder Kc MD on 10/3/2019  7:57 AM    sodium chloride 0.9 % infusion 50 mL/hr Continuous 10/7/2019     Sig - Route: Infuse 50 mL/hr into a venous catheter Continuous. - Intravenous             Operative/Procedure Notes (most recent note)      Parminder Kc MD at 10/02/19 1242          GASTROCUTANEOUS FISTULA CLOSURE, JEJUNOSTOMY    Emerson Bang  10/2/2019    Pre-op Diagnosis:   Gastrocutaneous fistula due to gastrostomy tube [K31.6]    Post-op Diagnosis:     Gastrocutaneous fistula due to gastrostomy tube [K31.6]    Procedure:  GASTROCUTANEOUS FISTULA CLOSURE  JEJUNOSTOMY     Surgeon(s):  Parminder Kc MD    Anesthesia: General    Staff:   Circulator: Eileen Tyson, LALITO; Elizabeth Ng RN  Scrub Person: Joann Mccauley Douglas A  Assistant: Tiffanie Thomas CSA    Estimated Blood Loss: 200 mL    Specimens:                ID Type Source Tests Collected by Time   A : gastrocutaneous fistula Tissue Stomach TISSUE PATHOLOGY EXAM Parminder Kc MD 10/2/2019 1429         Drains:   Gastrostomy/Enterostomy Jejunostomy 1 18 Fr. (Active)   Surrounding Skin Dry;Intact 10/3/2019  8:00 AM   Securement sutured to abdomen 10/3/2019  8:00 AM   Drain Status Clamped 10/3/2019  8:00 AM   Site Description Unable to view 10/3/2019  8:00 AM   Dressing Status Clean;Intact;Dry 10/3/2019  8:00 AM   Dressing Type Split gauze 10/3/2019  8:00 AM   Dressing Change Due 10/04/19 10/3/2019  8:00 AM   Feeding Tube Flushed With Other (Comment) 10/3/2019  7:22 AM   Flush/ Irrigation Intake (mL) 30 10/3/2019  8:00 AM        [REMOVED] Urethral Catheter 16 Fr. 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  Has been fed with a gastrostomy tube which has been leaking ascitic contents on his abdominal wall causing a deep partial-thickness burn.  He is brought to the operating room today for closure of his gastrocutaneous fistula which is still open despite tube removal and placement of a jejunostomy tube of note he has had recent laparotomy for small bowel obstruction.    Description of procedure: The patient is brought to the operating room and placed supine on the operating table.  After adequate general endotracheal anesthesia, the abdominal area is prepped and draped in a sterile manner.  A briefing and timeout were performed and all parties are in agreement.  A long midline incision was made to the previous midline laparotomy and carried into the subcutaneous tissue.    We are able to avoid most of the area of burn on the left side of the abdomen.  The linea alba was divided and the abdominal cavity was easily entered with no visceral injury.  Dense adhesions were noted in all areas.  The stomach was able to be brought into the wound and the connection between the stomach and the abdominal wall was detached sharply.  This revealed a persistently large opening within the stomach that had been draining to the outside.  This was grasped with Allises on each side and closed in a transverse manner so that there would be no  narrowing of the lumen.  We used a TA 60 stapler with 4.5 mm height staples for this closure.  Following firing the stapler, there was no hole in the stomach any further and the staple line was oversewn with interrupted 3-0 Vicryl sutures.    There were numerous dense adhesions throughout the abdomen was necessary to take these adhesions down in order to identify the proximal intestine the proximal intestine was ultimately found at the ligament of Treitz with identification of the inferior mesenteric vein.  Adhesions within the progressively taken down from beginning to and sharply.  When area became a bit thin with the adhesions were extremely dense this area was opened and then closed transversely with a single fire of the TA 60 stapler with 3.5 mm height staples.  No leak from the site was noted.  This was felt to be the safest approach rather than resecting the section of intestine.  The terminal ileum was identified at the fold of Treves.    Proximal jejunum was properly identified and a 3-0 Vicryl pursestring suture was placed on the antimesenteric side.  An 18 Malecot catheter was passed through a small opening in the jejunum at the side of the pursestring and passed distally.  The pursestring was then tied and tied to the tube.  30 Vicryl Witzel Tunl. was created over the top of the tube with the last suture placed behind the tube and tied to the tube.  Was brought out through a separate stab incision on the right side of the abdomen and sutured in place with 2-0 nylon suture.  It was therefore brought out in a site that was different from the burn.    The hole in the fascia on the left side of the abdomen where the gastrostomy tube had been present was quite large.  This was opened in an elliptical fashion and the fascia was identified.  Fascia was closed with interrupted #1 PDS sutures.  This wound was left open.    The midline wound was closed with short running segments of #1 PDS suture.  The skin was  brought together with staples.    The procedure was terminated.  He tolerated it well.  Sponge needle counts were correct.  Transferred to the intensive care unit..            This document has been electronically signed by Parminder Kc MD on October 3, 2019 10:43 AM      Date: 10/3/2019  Time: 10:43 AM          Electronically signed by Parminder Kc MD at 10/03/19 1117          Physician Progress Notes (most recent note)      Parminder Kc MD at 10/16/19 0900        Afebrile normal vital signs  Incisions are healing nicely  Burn is  healing nicely  Tolerating tube feeds    Awaiting LTAC evaluation    Electronically signed by Parminder Kc MD at 10/16/19 0901          Medical Student Notes (most recent note)      Parminder Kc MD at 10/03/19 0526          GENERAL SURGERY PROGRESS NOTE     LOS: 3 days     Chief Complaint:     Gastrocutaneous Fistula Closure & Jejunostomy    Interval History:     Mr. Bang is POD1 following gastrocutaneous fistula closure. Today he is alert, pleasant, and oriented. This morning, RT removed him from ventilator and placed him on 30% trach collar. His berumen catheter was also removed.    Medication Review:     albuterol 2.5 mg Nebulization Q6H - RT   chlorhexidine 15 mL Mouth/Throat Q12H   enoxaparin 40 mg Subcutaneous Daily   silver sulfadiazine  Topical Q12H   sodium chloride 10 mL Intravenous Q12H   sodium chloride 10 mL Intravenous Q12H   sodium chloride 10 mL Intravenous Q12H         lactated ringers 100 mL/hr Last Rate: 100 mL/hr (10/03/19 0517)   norepinephrine 0.02-0.3 mcg/kg/min Last Rate: Stopped (10/03/19 0010)   pantoprazole 40 mg Last Rate: 40 mg (10/03/19 0608)   propofol 5-50 mcg/kg/min Last Rate: Stopped (10/02/19 5249)   Objective     Vital Signs:  Temp:  [95.3 °F (35.2 °C)-98.6 °F (37 °C)] 98.5 °F (36.9 °C)  Heart Rate:  [56-96] 76  Resp:  [6-34] 15  BP: ()/(35-77) 126/68  FiO2 (%):  [30 %-60 %] 30 %    Intake/Output Summary (Last 24 hours) at 10/3/2019 0626  Last  data filed at 10/3/2019 0607  Gross per 24 hour   Intake 4071 ml   Output 1525 ml   Net 2546 ml       Physical Exam   Abdominal: Soft. Bowel sounds are normal.              Results Review:    Results from last 7 days   Lab Units 10/03/19  0441 10/02/19  1625 10/02/19  0524   SODIUM mmol/L 136 133* 131*   POTASSIUM mmol/L 4.5 3.5 3.9   CHLORIDE mmol/L 96* 93* 91*   CO2 mmol/L 25.0 25.0 29.0   BUN mg/dL 9 9 9   CREATININE mg/dL 0.74* 0.53* 0.61*   GLUCOSE mg/dL 125* 111* 97   CALCIUM mg/dL 7.2* 7.0* 8.7     Results from last 7 days   Lab Units 10/03/19  0441 10/02/19  1625 10/02/19  0524   WBC 10*3/mm3 11.68* 8.61 6.34   HEMOGLOBIN g/dL 12.7* 8.3* 12.0*   HEMATOCRIT % 36.0* 23.8* 33.7*   PLATELETS 10*3/mm3 259 230 304       Assessment:    * No active hospital problems. *      Mr. Bang is recovering from his surgery satisfactorily.    Plan:    Advance feeding via J-Tube as tolerable. Plan to move to medicine floor once spontaneous breathing via trach is achieved.    Feeding: NPO with goal of starting Peptamen 1.5 via J-Tube once recovered.  Analgesia: Dilaudid 1 mg Q3H PRN until 10/12  Sedation: Propofol was DCd evening of 10/2  Thromboembolic Prophylaxis: Lovenox 40   Head of bed: Elevated  Ulcer:Protonix 20mL/hr  Glycemic Control: None  Spontaneous breathing: RT took Mr. Bang off of ventilator and placed him on 30% trach collar morning of 10/3  Bowel regimen: NPO  Indwelling catheter: Palomo removed morning of 10/3  De-escalation of Abx: Will finish Cefazolin 2g 10/3      This document has been electronically signed by Kaushal Dill, Medical Student on October 3, 2019 6:26 AM        Electronically signed by Parminder Kc MD at 10/03/19 0807       Consult Notes (most recent note)     No notes of this type exist for this encounter.

## 2019-10-17 NOTE — PLAN OF CARE
Problem: Patient Care Overview  Goal: Plan of Care Review  Outcome: Ongoing (interventions implemented as appropriate)   10/17/19 5473   Coping/Psychosocial   Plan of Care Reviewed With patient   Plan of Care Review   Progress improving   OTHER   Outcome Summary Vss, pt tube feeding increased to 60ml this shift so far tolerating well, pain controlled with liquid med through tube, pt resting between care, will cont to monitor.       Problem: Fall Risk (Adult)  Goal: Absence of Fall  Outcome: Ongoing (interventions implemented as appropriate)      Problem: Skin Injury Risk (Adult)  Goal: Skin Health and Integrity  Outcome: Ongoing (interventions implemented as appropriate)

## 2019-10-17 NOTE — THERAPY TREATMENT NOTE
Acute Care - Physical Therapy Treatment Note  [facility]     Patient Name: Emerson Bang  : 1960  MRN: [id number]  Today's Date: 10/17/2019  Onset of Illness/Injury or Date of Surgery: 19     Referring Physician: Dr. Kc    Admit Date: 2019    Visit Dx:    ICD-10-CM ICD-9-CM   1. 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 Impaired physical mobility Z74.09 781.99   3. Impaired mobility and activities of daily living Z74.09 799.89     Patient Active Problem List   Diagnosis   • Hyperkalemia   • Iron deficiency anemia   • Gastroesophageal reflux disease with esophagitis   • Elevated AST (SGOT)   • Alcohol abuse   • Hypothyroidism   • Balance problem   • Need for vaccination   • Low magnesium levels   • Squamous cell carcinoma of pharynx (CMS/HCC)   • History of throat cancer   • Vitamin D deficiency   • Alcohol abuse counseling and surveillance   • Encounter for screening for diabetes mellitus   • Hypomagnesemia   • Iron deficiency anemia   • Hyperlipidemia   • Underweight due to inadequate caloric intake   • Need for immunization against influenza   • 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HCC)   • Hypertension   • General medical examination   • Underweight   • Epigastric pain   • Gastritis without bleeding   • Need for influenza vaccination   • Elevated liver function tests   • B12 deficiency   • Intestinal malabsorption   • Throat pain   • Acute pharyngitis   • Upper respiratory tract infection   • Neoplasm of uncertain behavior of larynx   • Pharyngoesophageal dysphagia   • Severe protein-calorie malnutrition (CMS/HCC)   • Anemia of chronic disease   • Hypotension   • Hyponatremia   • Status post insertion of percutaneous endoscopic gastrostomy (PEG) tube (CMS/HCC)   • Hx of tracheostomy   • History of throat surgery   • Hx SBO   • Urinary retention   • Generalized weakness   • Acute otitis externa of right ear   • Hard stool   • Panlobular emphysema (CMS/HCC)   • Cancer of  hypopharynx (CMS/HCC)   • Diarrhea   • Encounter for venous access device care   • Prediabetes   • Status post neck dissection   • S/P partial thyroidectomy   • S/P laryngectomy   • Annual physical exam   • Alcoholic cirrhosis of liver without ascites (CMS/HCC)       Therapy Treatment    Rehabilitation Treatment Summary     Row Name 10/17/19 1111             Treatment Time/Intention    Discipline  physical therapy assistant  -GIAN      Document Type  therapy note (daily note)  -GIAN      Mode of Treatment  physical therapy;individual therapy  -GIAN      Therapy Frequency (PT Clinical Impression)  daily  -GIAN      Patient Effort  fair  -GIAN      Existing Precautions/Restrictions  fall;other (see comments) watch gait belt placement s/p abdominal surgery  -GIAN      Recorded by [GIAN] Abran Wan, PTA 10/17/19 1124      Row Name 10/17/19 1111             Vital Signs    Pre Systolic BP Rehab  124 taken manually  -GIAN      Pre Treatment Diastolic BP  78  -JC2      Post Systolic BP Rehab  100 taken manually per nsg.   -GIAN      Post Treatment Diastolic BP  50  -JC3      Pretreatment Heart Rate (beats/min)  89  -JC2      Intratreatment Heart Rate (beats/min)  93  -JC3      Posttreatment Heart Rate (beats/min)  91  -JC3      Pre SpO2 (%)  99  -JC2      O2 Delivery Pre Treatment  trach collar  -JC2      Intra SpO2 (%)  96  -JC3      O2 Delivery Intra Treatment  trach collar  -JC3      Post SpO2 (%)  98  -JC3      O2 Delivery Post Treatment  trach collar  -JC3      Pre Patient Position  Supine  -JC3      Post Patient Position  Sitting  -JC3      Recorded by [GIAN] Abran Wan, PTA 10/17/19 1234  [JC2] Abran Wan, PTA 10/17/19 1124  [JC3] Abran Wan, PTA 10/17/19 1148      Row Name 10/17/19 1111             Cognitive Assessment/Intervention- PT/OT    Orientation Status (Cognition)  oriented x 4  -GIAN      Follows Commands (Cognition)  WFL  -GIAN      Recorded by [GIAN] Abran Wan, PTA 10/17/19 1124      Row Name  10/17/19 1111             Safety Issues, Functional Mobility    Impairments Affecting Function (Mobility)  balance;endurance/activity tolerance;shortness of breath;strength  -GIAN      Recorded by [GIAN] Abran Wan, PTA 10/17/19 1124      Row Name 10/17/19 1111             Bed Mobility Assessment/Treatment    Bed Mobility Assessment/Treatment  supine-sit  -GIAN      Scooting/Bridging [place] (Bed Mobility)  --  -GIAN      Supine-Sit [place] (Bed Mobility)  supervision  -JC2      Sit-Supine [place] (Bed Mobility)  --  -GIAN      Assistive Device (Bed Mobility)  bed rails;head of bed elevated  -JC2      Recorded by [GIAN] Abran Wan, PTA 10/17/19 1148  [JC2] Abran Wan, PTA 10/17/19 1124      Row Name 10/17/19 1111             Transfer Assessment/Treatment    Transfer Assessment/Treatment  sit-stand transfer;stand-sit transfer;bed-chair transfer  -GIAN      Recorded by [GIAN] Abran Wan, PTA 10/17/19 1148      Row Name 10/17/19 1111             Bed-Chair Transfer    Bed-Chair [place] (Transfers)  contact guard  -GIAN      Assistive Device (Bed-Chair Transfers)  walker, front-wheeled  -GIAN      Recorded by [GIAN] Abran Wan, PTA 10/17/19 1148      Row Name 10/17/19 1111             Sit-Stand Transfer    Sit-Stand [place] (Transfers)  contact guard  -GIAN      Assistive Device (Sit-Stand Transfers)  walker, front-wheeled  -JC2      Recorded by [GIAN] Abran Wan, PTA 10/17/19 1148  [JC2] Abran Wan, PTA 10/17/19 1124      Row Name 10/17/19 1111             Stand-Sit Transfer    Stand-Sit [place] (Transfers)  contact guard  -GIAN      Assistive Device (Stand-Sit Transfers)  walker, front-wheeled  -JC2      Recorded by [GIAN] Abran Wan, PTA 10/17/19 1148  [JC2] Abran Wan, PTA 10/17/19 1124      Row Name 10/17/19 1111             Gait/Stairs Assessment/Training    Gait/Stairs Assessment/Training  gait/ambulation assistive device  -GIAN      [place] Level (Gait)   contact guard;verbal cues  -GIAN      Assistive Device (Gait)  walker, front-wheeled  -GIAN      Distance in Feet (Gait)  5, 42  -JC2      Pattern (Gait)  step-to  -GIAN      Deviations/Abnormal Patterns (Gait)  essence decreased;stride length decreased  -GIAN      Bilateral Gait Deviations  forward flexed posture  -GIAN      Comment (Gait/Stairs)  gt performed in room, forward and backward.   -JC3      Recorded by [GIAN] Abran Wan, PTA 10/17/19 1124  [JC2] Abran Wan, PTA 10/17/19 1148  [JC3] Abran Wan, PTA 10/17/19 1234      Row Name 10/17/19 1111             Therapeutic Exercise    Therapeutic Exercise  supine, lower extremities  -GIAN      Recorded by [GIAN] Abran Wan, PTA 10/17/19 1148      Row Name 10/17/19 1111             Lower Extremity Supine Therapeutic Exercise    Performed, Supine Lower Extremity (Therapeutic Exercise)  ankle pumps;quadriceps sets;hip abduction/adduction;gluteal sets  -GIAN      Exercise Type, Supine Lower Extremity (Therapeutic Exercise)  AROM (active range of motion)  -GIAN      Sets/Reps Detail, Supine Lower Extremity (Therapeutic Exercise)  20x1 ashley  -GIAN      Comment, Supine Lower Extremity (Therapeutic Exercise)  completed in recliner w/ LEs elevated  -GIAN      Recorded by [GIAN] Abran Wan, PTA 10/17/19 1148      Row Name 10/17/19 1111             Pain Scale: Numbers Pre/Post-Treatment    Pain Scale: Numbers, Pretreatment  8/10  -GIAN      Pain Scale: Numbers, Post-Treatment  8/10  -GIAN      Pain Location  abdomen  -GIAN      Pain Intervention(s)  Repositioned  -JC2      Recorded by [GIAN] Abran Wan, PTA 10/17/19 1124  [JC2] Abran Wan, PTA 10/17/19 1148      Row Name                Wound 09/30/19 1330 Left medial abdomen Fistula    Wound - Properties Group Date first assessed: 09/30/19 [SS] Time first assessed: 1330 [SS] Side: Left [SS] Orientation: medial [SS] Location: abdomen [SS] Primary Wound Type: Fistula [SS] Recorded by:  [SS] Citlalli Abdi RN 09/30/19  1619    Row Name                Wound 10/02/19 1347 abdomen Incision    Wound - Properties Group Date first assessed: 10/02/19 [CF] Time first assessed: 1347 [CF] Present on Hospital Admission: N [CF] Location: abdomen [CF] Primary Wound Type: Incision [CF] Recorded by:  [CF] Eileen Tyson 10/02/19 1347    Row Name 10/17/19 1111             Outcome Summary/Treatment Plan (PT)    Daily Summary of Progress (PT)  progress toward functional goals as expected  -GIAN      Plan for Continued Treatment (PT)  continue  -GIAN      Anticipated Discharge Disposition (PT)  anticipate therapy at next level of care  -JC2      Recorded by [GIAN] Abran Wan, PTA 10/17/19 1148  [JC2] Abran Wan, PTA 10/17/19 1124        User Key  (r) = Recorded By, (t) = Taken By, (c) = Cosigned By    Initials Name Effective Dates Discipline    SS Citlalli Abdi RN 07/10/18 -  Nurse    GIAN Abran Wan, PTA 03/07/18 -  PT    CF Eileen Tyson - -          Wound 09/30/19 1330 Left medial abdomen Fistula (Active)   Dressing Appearance dry;intact 10/17/2019 10:00 AM   Closure None 10/17/2019 10:00 AM   Base dressing in place, unable to visualize 10/17/2019 10:00 AM   Periwound Temperature warm 10/16/2019 11:12 PM   Drainage Amount small 10/17/2019 10:00 AM   Care, Wound cleansed with;sterile normal saline;silver agent applied 10/16/2019 11:12 PM   Dressing Care, Wound dressing changed 10/16/2019 11:12 PM       Wound 10/02/19 1347 abdomen Incision (Active)   Dressing Appearance dry;intact 10/17/2019 10:00 AM   Closure Open to air 10/16/2019 11:12 PM   Base scab;moist 10/16/2019 11:12 PM   Drainage Amount scant 10/16/2019 11:12 PM   Care, Wound cleansed with;sterile normal saline 10/16/2019 11:12 PM   Dressing Care, Wound open to air 10/16/2019 11:12 PM       Rehab Goal Summary     Row Name 10/17/19 1111             Bed Mobility Goal 1 (PT)    Activity/Assistive Device (Bed Mobility Goal 1, PT)  sit to supine;supine to sit  -GIAN       [place] Level/Cues Needed (Bed Mobility Goal 1, PT)  independent  -GIAN      Time Frame (Bed Mobility Goal 1, PT)  by discharge  -GIAN      Progress/Outcomes (Bed Mobility Goal 1, PT)  goal not met  -GIAN         Transfer Goal 1 (PT)    Activity/Assistive Device (Transfer Goal 1, PT)  bed-to-chair/chair-to-bed;toilet  -GIAN      [place] Level/Cues Needed (Transfer Goal 1, PT)  standby assist  -GIAN      Time Frame (Transfer Goal 1, PT)  by discharge  -GIAN      Progress/Outcome (Transfer Goal 1, PT)  goal not met;goal revised this date  -GIAN         Gait Training Goal 1 (PT)    Activity/Assistive Device (Gait Training Goal 1, PT)  gait (walking locomotion);assistive device use  -GIAN      [place] Level (Gait Training Goal 1, PT)  contact guard assist  -GIAN      Distance (Gait Goal 1, PT)  150ft  -GIAN      Time Frame (Gait Training Goal 1, PT)  by discharge  -GIAN      Progress/Outcome (Gait Training Goal 1, PT)  goal not met;goal revised this date  -GIAN         Stairs Goal 1 (PT)    Activity/Assistive Device (Stairs Goal 1, PT)  ascending stairs;descending stairs  -GIAN      [place] Level/Cues Needed (Stairs Goal 1, PT)  standby assist  -GIAN      Number of Stairs (Stairs Goal 1, PT)  5  -GIAN      Time Frame (Stairs Goal 1, PT)  by discharge  -GIAN      Progress/Outcome (Stairs Goal 1, PT)  goal not met  -GIAN        User Key  (r) = Recorded By, (t) = Taken By, (c) = Cosigned By    Initials Name Provider Type Discipline    Abran Ferreira, PTA Physical Therapy Assistant PT          Physical Therapy Education     Title: PT OT SLP Therapies (In Progress)     Topic: Physical Therapy (In Progress)     Point: Mobility training (In Progress)     Learning Progress Summary           Patient Acceptance, E, NR by GIAN at 10/17/2019 12:35 PM    Acceptance, E, NR by GIAN at 10/16/2019  4:06 PM    Acceptance, E, NR by GIAN at 10/13/2019  1:00 PM    Acceptance, E, NR by GIAN at 10/11/2019  3:17 PM    Acceptance, E, NR by GIAN at 10/9/2019  2:59  PM    Acceptance, E, NR by  at 10/8/2019  4:00 PM    Acceptance, E, NR by [facility] at 10/3/2019  3:43 PM                   Point: Body mechanics (In Progress)     Learning Progress Summary           Patient Acceptance, E, NR by [facility] at 10/3/2019  3:43 PM                   Point: Precautions (In Progress)     Learning Progress Summary           Patient Acceptance, E, NR by [facility] at 10/3/2019  3:43 PM                               User Key     Initials Effective Dates Name Provider Type Discipline    [facility] 04/03/18 -  Judy Watkins, PT Physical Therapist PT     03/07/18 -  Abran Wan PTA Physical Therapy Assistant PT                PT Recommendation and Plan  Anticipated Discharge Disposition (PT): anticipate therapy at next level of care  Therapy Frequency (PT Clinical Impression): daily  Outcome Summary/Treatment Plan (PT)  Daily Summary of Progress (PT): progress toward functional goals as expected  Plan for Continued Treatment (PT): continue  Anticipated Discharge Disposition (PT): anticipate therapy at next level of care  Plan of Care Reviewed With: patient  Progress: improving  Outcome Summary: pt responded well to therapy w/ increased gait to 46 ft CGA w/ RW. pt requires SBA for sup-sit and CGA for t/f to recliner. pt participated in LE ther ex. no new goals met at this time. pt would continue to benefit from PT services.   Outcome Measures     Row Name 10/17/19 1111 10/16/19 1500 10/16/19 1437       How much help from another person do you currently need...    Turning from your back to your side while in flat bed without using bedrails?  3  -GIAN  --  3  -GIAN    Moving from lying on back to sitting on the side of a flat bed without bedrails?  3  -GIAN  --  3  -GIAN    Moving to and from a bed to a chair (including a wheelchair)?  3  -GIAN  --  3  -GIAN    Standing up from a chair using your arms (e.g., wheelchair, bedside chair)?  3  -GIAN  --  3  -GIAN    Climbing 3-5 steps with a railing?  2  -GIAN  --  2  -GIAN    To walk  in hospital room?  3  -GIAN  --  3  -GIAN    AM-PAC 6 Clicks Score (PT)  17  -GIAN  --  17  -GIAN       How much help from another is currently needed...    Putting on and taking off regular lower body clothing?  --  2  -CS  --    Bathing (including washing, rinsing, and drying)  --  2  -CS  --    Toileting (which includes using toilet bed pan or urinal)  --  2  -CS  --    Putting on and taking off regular upper body clothing  --  2  -CS  --    Taking care of personal grooming (such as brushing teeth)  --  2  -CS  --    Eating meals  --  1  -CS  --    AM-PAC 6 Clicks Score (OT)  --  11  -CS  --       Functional Assessment    Outcome Measure Options  AM-[facility] 6 Clicks Basic Mobility (PT)  -  --  -[facility] 6 Clicks Basic Mobility (PT)  -    Row Name 10/15/19 1554 10/15/19 1300 10/14/19 1300       How much help from another person do you currently need...    Turning from your back to your side while in flat bed without using bedrails?  3  -JCA  --  --    Moving from lying on back to sitting on the side of a flat bed without bedrails?  3  -JCA  --  --    Moving to and from a bed to a chair (including a wheelchair)?  3  -JCA  --  --    Standing up from a chair using your arms (e.g., wheelchair, bedside chair)?  3  -JCA  --  --    Climbing 3-5 steps with a railing?  2  -JCA  --  --    To walk in hospital room?  3  -JCA  --  --    AM-PAC 6 Clicks Score (PT)  17  -JCA  --  --       How much help from another is currently needed...    Putting on and taking off regular lower body clothing?  --  2  -CS  2  -CS    Bathing (including washing, rinsing, and drying)  --  2  -CS  2  -CS    Toileting (which includes using toilet bed pan or urinal)  --  2  -CS  2  -CS    Putting on and taking off regular upper body clothing  --  2  -CS  2  -CS    Taking care of personal grooming (such as brushing teeth)  --  2  -CS  2  -CS    Eating meals  --  1  -CS  1  -CS    AM-PAC 6 Clicks Score (OT)  --  11  -CS  11  -CS       Functional Assessment     Outcome Measure Options  AM-PAC 6 Clicks Basic Mobility (PT)  -JCA  --  --      User Key  (r) = Recorded By, (t) = Taken By, (c) = Cosigned By    Initials Name Provider Type    Judy Avelar, PT Physical Therapist    Abran Ferreira PTA Physical Therapy Assistant    rBenda Lee, MADSEN/LIANE Occupational Therapy Assistant         Time Calculation:   PT Charges     Row Name 10/17/19 1237             Time Calculation    Start Time  1111  -GIAN      Stop Time  1149  -GIAN      Time Calculation (min)  38 min  -GIAN         Time Calculation- PT    Total Timed Code Minutes- PT  38 minute(s)  -GIAN        User Key  (r) = Recorded By, (t) = Taken By, (c) = Cosigned By    Initials Name Provider Type    Abran Ferreira PTA Physical Therapy Assistant        Therapy Charges for Today     Code Description Service Date Service Provider Modifiers Qty    19116485117 HC GAIT TRAINING EA 15 MIN 10/16/2019 Abran Wan PTA GP 1    45079920684 HC PT THERAPEUTIC ACT EA 15 MIN 10/16/2019 Abran Wan PTA GP 1    51780180070 HC GAIT TRAINING EA 15 MIN 10/17/2019 Abran Wan, MICHAEL GP 1    73153628457 HC PT THERAPEUTIC ACT EA 15 MIN 10/17/2019 Abran Wan PTA GP 1    48902900833 HC PT THER PROC EA 15 MIN 10/17/2019 Abran Wan PTA GP 1          PT G-Codes  Outcome Measure Options: AM-PAC 6 Clicks Basic Mobility (PT)  AM-PAC 6 Clicks Score (PT): 17  AM-PAC 6 Clicks Score (OT): 11    Abran Wan PTA  10/17/2019

## 2019-10-17 NOTE — PLAN OF CARE
Problem: Patient Care Overview  Goal: Plan of Care Review  Outcome: Ongoing (interventions implemented as appropriate)   10/17/19 1111   Coping/Psychosocial   Plan of Care Reviewed With patient   Plan of Care Review   Progress improving   OTHER   Outcome Summary pt responded well to therapy w/ increased gait to 46 ft CGA w/ RW. pt requires SBA for sup-sit and CGA for t/f to recliner. pt participated in LE ther ex. no new goals met at this time. pt would continue to benefit from PT services.

## 2019-10-17 NOTE — PLAN OF CARE
Problem: Patient Care Overview  Goal: Plan of Care Review  Outcome: Ongoing (interventions implemented as appropriate)   10/17/19 9828   Coping/Psychosocial   Plan of Care Reviewed With patient   Plan of Care Review   Progress improving   OTHER   Outcome Summary vss. tolerating tube feeding at goal rate of 60ml/hr. pain controlled with liquid meds. worked with therapy today. went to radiation at [facility] today. waiting for placement. continue to monitor.       Problem: Fall Risk (Adult)  Goal: Absence of Fall  Outcome: Ongoing (interventions implemented as appropriate)      Problem: Skin Injury Risk (Adult)  Goal: Skin Health and Integrity  Outcome: Ongoing (interventions implemented as appropriate)      Problem: Pain, Chronic (Adult)  Goal: Acceptable Pain/Comfort Level and Functional Ability  Outcome: Ongoing (interventions implemented as appropriate)      Problem: Wound (Includes Pressure Injury) (Adult)  Goal: Signs and Symptoms of Listed Potential Problems Will be Absent, Minimized or Managed (Wound)  Outcome: Ongoing (interventions implemented as appropriate)      Problem: Airway, Artificial (Adult)  Goal: Signs and Symptoms of Listed Potential Problems Will be Absent, Minimized or Managed (Airway, Artificial)  Outcome: Ongoing (interventions implemented as appropriate)      Problem: Nutrition, Enteral (Adult)  Goal: Signs and Symptoms of Listed Potential Problems Will be Absent, Minimized or Managed (Nutrition, Enteral)  Outcome: Ongoing (interventions implemented as appropriate)

## 2019-10-17 NOTE — PROGRESS NOTES
Oncology SW following as pt remains in  acute care.  Discussed plan of care with radiation  And medical oncology.  Per conversation with Dr. Martínez, pt is not planned to begin chemotherapy due to his compromised status. He continues to undergo palliative radiation having completed 6 of 30 treatments, leaving 24 remaining radiation  treatments. Undersigned to continue to follow and support in coordination with case management .

## 2019-10-18 LAB
GLUCOSE BLDC GLUCOMTR-MCNC: 131 MG/DL (ref 70–130)
GLUCOSE BLDC GLUCOMTR-MCNC: 135 MG/DL (ref 70–130)
GLUCOSE BLDC GLUCOMTR-MCNC: 136 MG/DL (ref 70–130)

## 2019-10-18 PROCEDURE — 97110 THERAPEUTIC EXERCISES: CPT

## 2019-10-18 PROCEDURE — 82962 GLUCOSE BLOOD TEST: CPT

## 2019-10-18 PROCEDURE — 77386: CPT | Performed by: RADIOLOGY

## 2019-10-18 PROCEDURE — 94799 UNLISTED PULMONARY SVC/PX: CPT

## 2019-10-18 PROCEDURE — 97530 THERAPEUTIC ACTIVITIES: CPT

## 2019-10-18 PROCEDURE — 77014 CHG CT GUIDANCE RADIATION THERAPY FLDS PLACEMENT: CPT | Performed by: RADIOLOGY

## 2019-10-18 PROCEDURE — 97116 GAIT TRAINING THERAPY: CPT

## 2019-10-18 PROCEDURE — 25010000002 ENOXAPARIN PER 10 MG: Performed by: SURGERY

## 2019-10-18 PROCEDURE — 94760 N-INVAS EAR/PLS OXIMETRY 1: CPT

## 2019-10-18 RX ADMIN — ALBUTEROL SULFATE 2.5 MG: 2.5 SOLUTION RESPIRATORY (INHALATION) at 19:21

## 2019-10-18 RX ADMIN — FAMOTIDINE 20 MG: 40 POWDER, FOR SUSPENSION ORAL at 12:56

## 2019-10-18 RX ADMIN — ALBUTEROL SULFATE 2.5 MG: 2.5 SOLUTION RESPIRATORY (INHALATION) at 01:35

## 2019-10-18 RX ADMIN — SILVER SULFADIAZINE: 10 CREAM TOPICAL at 08:29

## 2019-10-18 RX ADMIN — HYDROCODONE BITARTRATE AND ACETAMINOPHEN 15 ML: 7.5; 325 SOLUTION ORAL at 20:46

## 2019-10-18 RX ADMIN — ALBUTEROL SULFATE 2.5 MG: 2.5 SOLUTION RESPIRATORY (INHALATION) at 06:51

## 2019-10-18 RX ADMIN — HYDROCODONE BITARTRATE AND ACETAMINOPHEN 15 ML: 7.5; 325 SOLUTION ORAL at 08:29

## 2019-10-18 RX ADMIN — SILVER SULFADIAZINE: 10 CREAM TOPICAL at 20:47

## 2019-10-18 RX ADMIN — FAMOTIDINE 20 MG: 40 POWDER, FOR SUSPENSION ORAL at 00:27

## 2019-10-18 RX ADMIN — HYDROCODONE BITARTRATE AND ACETAMINOPHEN 15 ML: 7.5; 325 SOLUTION ORAL at 16:19

## 2019-10-18 RX ADMIN — POTASSIUM CHLORIDE, DEXTROSE MONOHYDRATE AND SODIUM CHLORIDE 50 ML/HR: 150; 5; 450 INJECTION, SOLUTION INTRAVENOUS at 00:27

## 2019-10-18 RX ADMIN — ENOXAPARIN SODIUM 40 MG: 40 INJECTION SUBCUTANEOUS at 08:29

## 2019-10-18 RX ADMIN — HYDROCODONE BITARTRATE AND ACETAMINOPHEN 15 ML: 7.5; 325 SOLUTION ORAL at 00:33

## 2019-10-18 NOTE — PLAN OF CARE
Problem: Patient Care Overview  Goal: Plan of Care Review  Outcome: Outcome(s) achieved Date Met: 10/18/19   10/18/19 4800   Coping/Psychosocial   Plan of Care Reviewed With patient   OTHER   Outcome Summary OT re-cert completed. POC and progress towards goals reviewed and discussed. While pt has not met any goals this recert period, pt is making gradual gains in strength and activity tolerance. Per chart review and discussion, pt noted to often decline ADLs, but reports he still wants to address self care goals. Pt encouraged to continue to work towards highest level of function. Pt would benefit from continued skilled OT services to increase strength, endurance, balance, mobility, and ADL participation.

## 2019-10-18 NOTE — PLAN OF CARE
Problem: Patient Care Overview  Goal: Plan of Care Review  Outcome: Ongoing (interventions implemented as appropriate)   10/18/19 1410   Coping/Psychosocial   Plan of Care Reviewed With caregiver   OTHER   Outcome Summary Pt tolerating TF at goal rate, meeting 100% bruce/pro needs. RD monitoring.

## 2019-10-18 NOTE — PLAN OF CARE
Problem: Patient Care Overview  Goal: Plan of Care Review  Outcome: Ongoing (interventions implemented as appropriate)   10/18/19 8452   Coping/Psychosocial   Plan of Care Reviewed With patient   Plan of Care Review   Progress improving   OTHER   Outcome Summary Pt tolerated tx well this date. Pt c/o pain. Pt gave good effort with UE ther ex. No goals met this tx. Continue OT POC.

## 2019-10-18 NOTE — THERAPY PROGRESS REPORT/RE-CERT
Acute Care - Occupational Therapy Progress Note  [facility]     Patient Name: Emerson Bang  : 1960  MRN: [id number]  Today's Date: 10/18/2019  Onset of Illness/Injury or Date of Surgery: 19  Date of Referral to OT: 10/02/19  Referring Physician: Dr. Kc    Admit Date: 2019       ICD-10-CM ICD-9-CM   1. 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 Impaired physical mobility Z74.09 781.99   3. Impaired mobility and activities of daily living Z74.09 799.89     Patient Active Problem List   Diagnosis   • Hyperkalemia   • Iron deficiency anemia   • Gastroesophageal reflux disease with esophagitis   • Elevated AST (SGOT)   • Alcohol abuse   • Hypothyroidism   • Balance problem   • Need for vaccination   • Low magnesium levels   • Squamous cell carcinoma of pharynx (CMS/HCC)   • History of throat cancer   • Vitamin D deficiency   • Alcohol abuse counseling and surveillance   • Encounter for screening for diabetes mellitus   • Hypomagnesemia   • Iron deficiency anemia   • Hyperlipidemia   • Underweight due to inadequate caloric intake   • Need for immunization against influenza   • 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HCC)   • Hypertension   • General medical examination   • Underweight   • Epigastric pain   • Gastritis without bleeding   • Need for influenza vaccination   • Elevated liver function tests   • B12 deficiency   • Intestinal malabsorption   • Throat pain   • Acute pharyngitis   • Upper respiratory tract infection   • Neoplasm of uncertain behavior of larynx   • Pharyngoesophageal dysphagia   • Severe protein-calorie malnutrition (CMS/HCC)   • Anemia of chronic disease   • Hypotension   • Hyponatremia   • Status post insertion of percutaneous endoscopic gastrostomy (PEG) tube (CMS/HCC)   • Hx of tracheostomy   • History of throat surgery   • Hx SBO   • Urinary retention   • Generalized weakness   • Acute otitis externa of right ear   • Hard stool   • Panlobular emphysema  (CMS/HCC)   • Cancer of hypopharynx (CMS/HCC)   • Diarrhea   • Encounter for venous access device care   • Prediabetes   • Status post neck dissection   • S/P partial thyroidectomy   • S/P laryngectomy   • Annual physical exam   • Alcoholic cirrhosis of liver without ascites (CMS/HCC)     Past Medical History:   Diagnosis Date   • Allergic rhinitis     vs URI   • Anemia    • At risk for falls    • Benign prostatic hyperplasia    • Chronic gastritis    • Cirrhosis of liver (CMS/HCC)    • Complication of gastrostomy (CMS/HCC)     site not healing   • COPD (chronic obstructive pulmonary disease) (CMS/HCC)    • Dysphagia     odynophagia   • Epigastric pain    • Esophagitis    • GERD (gastroesophageal reflux disease)    • Hypertension    • Hypothyroidism, unspecified    • Low back pain    • Malaise and fatigue    • Nasal congestion 11/15/2018   • Nausea with vomiting, unspecified    • Pain in left knee    • Pain in right knee    • Primary malignant neoplasm of pharynx (CMS/HCC)    • Screening for malignant neoplasm of colon    • 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 Left upper quadrant abdominal wall abscess debridement. Gastrostomy tube erosion through & through the anterior gastric wall.Lupper quadrant abdominal wall abscess   • COLONOSCOPY  04/21/2014    Internal & external hemorrhoids found.   • COLONOSCOPY  2014    [place]   • COLONOSCOPY N/A 10/16/2018    Procedure: COLONOSCOPY;  Surgeon: Kaushal Chester MD;  Location: [facility] ENDOSCOPY;  Service: Gastroenterology   • DIAGNOSTIC LAPAROSCOPY EXPLORATORY LAPAROTOMY N/A 7/17/2019    Procedure: DIAGNOSTIC LAPAROSCOPY, EXPLORATORY LAPAROTOMY, LYSIS OF ADHESIONS;  Surgeon: Parminder Kc MD;   Location: [facility] OR;  Service: General   • DIRECT LARYNGOSCOPY, ESOPHAGOSCOPY, BRONCHOSCOPY N/A 4/15/2019    Procedure: DIRECT LARYNGOSCOPY with biopsy, ESOPHAGOSCOPY;  Surgeon: Bharathi Washington MD;  Location: [facility] OR;  Service: ENT   • ENDOSCOPY N/A 10/16/2018    Procedure: ESOPHAGOGASTRODUODENOSCOPY;  Surgeon: Kaushal Chester MD;  Location: [facility] ENDOSCOPY;  Service: Gastroenterology   • GASTROCUTANEOUS FISTULA CLOSURE N/A 10/2/2019    Procedure: GASTROCUTANEOUS FISTULA CLOSURE;  Surgeon: Parminder Kc MD;  Location: [facility] OR;  Service: General   • GASTROSTOMY FEEDING TUBE INSERTION N/A 4/15/2019    Procedure: GASTROSTOMY FEEDING TUBE INSERTION;  Surgeon: Trenton Valera MD;  Location: [facility] OR;  Service: General   • JEJUNOSTOMY N/A 10/2/2019    Procedure: JEJUNOSTOMY;  Surgeon: Parminder Kc MD;  Location: [facility] OR;  Service: General   • TRACHEOSTOMY  11/10/2008    Respiratory failure   • UPPER GASTROINTESTINAL ENDOSCOPY  04/21/2014    Esophagitis seen. Biopsy taken. Gastritis in stomach. Biopsy taken. Normal duodenum. Biopsy taken.   • UPPER GASTROINTESTINAL ENDOSCOPY  10/16/2018   • VENOUS ACCESS DEVICE (PORT) INSERTION N/A 9/11/2019    Procedure: INSERTION VENOUS ACCESS DEVICE (MEDIPORT)        (c-arm#1);  Surgeon: Parminder Kc MD;  Location: [facility];  Service: General       Therapy Treatment    Rehabilitation Treatment Summary     Row Name 10/18/19 1614 10/18/19 1406 10/18/19 1130       Treatment Time/Intention    Discipline  occupational therapist  -AS  physical therapy assistant  -GIAN  occupational therapy assistant  -CS    Document Type  progress note/recertification  -AS  therapy note (daily note)  -GIAN  therapy note (daily note)  -CS    Subjective Information  complains of;fatigue  -AS  --  complains of;pain  -CS    Mode of Treatment  individual therapy;occupational therapy  -AS  physical therapy;individual therapy  -GIAN  occupational therapy  -CS    Patient/Family Observations   no family present; pt supine in bed; trach collar, PEG tube, IV  -AS  --  --    Care Plan Review  evaluation/treatment results reviewed;care plan/treatment goals reviewed;risks/benefits reviewed;current/potential barriers reviewed;patient/other agree to care plan  -AS  --  --    Therapy Frequency (PT Clinical Impression)  --  daily  -GIAN  --    Total Minutes, Occupational Therapy Treatment  13  -AS  --  --    Therapy Frequency (OT Eval)  other (see comments) 5-7 days/wk  -AS  --  other (see comments) 5-7 days/wk  -CS    Patient Effort  fair  -AS  adequate  -JC2  fair  -CS    Comment  --  pt coughing much. during gait, noticed pts trach collar was loose and you can see and about and inch of the trach tube. pt  returned to sitting and nsg notified. RT therapy observed and did not seem concerned.   -JC2  --    Existing Precautions/Restrictions  --  fall;other (see comments) watch gait belt placement s/p abdominal surgery  -GIAN  fall;other (see comments) watch gait belt placement  -CS    Recorded by [AS] Destinee Yarbrough, OT 10/18/19 1753 [GIAN] Abran Wan, PTA 10/18/19 1420  [JC2] Abran Wan, PTA 10/18/19 1442 [CS] Brenda Thomas, MADSEN/L 10/18/19 1403    Row Name 10/18/19 1406 10/18/19 1130          Vital Signs    Pre Systolic BP Rehab  103  -GIAN  --     Pre Treatment Diastolic BP  68  -GIAN  --     Post Systolic BP Rehab  135  -JC2  --     Post Treatment Diastolic BP  74  -JC2  --     Pretreatment Heart Rate (beats/min)  98  -GIAN  --     Posttreatment Heart Rate (beats/min)  97  -JC2  --     Pre SpO2 (%)  98  -GIAN  --     O2 Delivery Pre Treatment  trach collar  -GIAN  --     Post SpO2 (%)  97  -JC2  --     O2 Delivery Post Treatment  trach collar  -JC2  --     Pre Patient Position  Sitting  -GIAN  Supine  -CS     Intra Patient Position  --  -JC3  --     Post Patient Position  Supine  -JC3  Supine  -CS     Recorded by [GIAN] Abran Wan, PTA 10/18/19 1420  [JC2] Abran Wan, PTA 10/18/19 1428  [JC3] Savage  Abran PATHAK, PTA 10/18/19 1442 [CS] Brenda Thomas, MADSEN/L 10/18/19 1403     Row Name 10/18/19 1614 10/18/19 1406 10/18/19 1130       Cognitive Assessment/Intervention- PT/OT    Affect/Mental Status (Cognitive)  low arousal/lethargic;flat/blunted affect  -AS  --  unable/difficult to assess;flat/blunted affect  -CS    Behavioral Issues (Cognitive)  --  --  unable/difficult to assess  -CS    Orientation Status (Cognition)  --  oriented x 4  -GIAN  oriented x 4  -CS    Follows Commands (Cognition)  --  WFL  -GIAN  WFL  -CS    Recorded by [AS] Destinee Yarbrough, OT 10/18/19 1753 [GIAN] Abran Wan, PTA 10/18/19 1420 [CS] Brenda Thomas, MADSEN/L 10/18/19 1403    Row Name 10/18/19 1406             Safety Issues, Functional Mobility    Impairments Affecting Function (Mobility)  balance;endurance/activity tolerance;shortness of breath;strength  -GIAN      Recorded by [GIAN] Abran Wan, PTA 10/18/19 1420      Row Name 10/18/19 1406             Bed Mobility Assessment/Treatment    Bed Mobility Assessment/Treatment  sit-supine;scooting/bridging  -GIAN      Scooting/Bridging [place] (Bed Mobility)  supervision  -GIAN      Supine-Sit [place] (Bed Mobility)  supervision  -JC2      Assistive Device (Bed Mobility)  bed rails;head of bed elevated  -JC2      Recorded by [GIAN] Abran Wan, PTA 10/18/19 1442  [JC2] Abran Wan, PTA 10/18/19 1420      Row Name 10/18/19 1406             Transfer Assessment/Treatment    Transfer Assessment/Treatment  sit-stand transfer;stand-sit transfer;chair-bed transfer  -GIAN      Recorded by [GIAN] Abran Wan, PTA 10/18/19 1442      Row Name 10/18/19 1406             Bed-Chair Transfer    Bed-Chair [place] (Transfers)  --  -GIAN      Assistive Device (Bed-Chair Transfers)  --  -GIAN      Recorded by [GIAN] Abran Wan, PTA 10/18/19 1442      Row Name 10/18/19 1406             Chair-Bed Transfer    Chair-Bed [place] (Transfers)  contact guard  -GIAN      Assistive Device  (Chair-Bed Transfers)  walker, front-wheeled  -GIAN      Recorded by [GIAN] Abran Wan, PTA 10/18/19 1442      Row Name 10/18/19 1406             Sit-Stand Transfer    Sit-Stand [place] (Transfers)  contact guard  -GIAN      Assistive Device (Sit-Stand Transfers)  walker, front-wheeled  -GIAN      Recorded by [GIAN] Abran Wan, PTA 10/18/19 1420      Row Name 10/18/19 1406             Stand-Sit Transfer    Stand-Sit [place] (Transfers)  contact guard  -GIAN      Assistive Device (Stand-Sit Transfers)  walker, front-wheeled  -GIAN      Recorded by [GIAN] Abran Wan, PTA 10/18/19 1420      Row Name 10/18/19 1406             Gait/Stairs Assessment/Training    Gait/Stairs Assessment/Training  gait/ambulation assistive device  -GIAN      [place] Level (Gait)  contact guard;verbal cues  -GIAN      Assistive Device (Gait)  walker, front-wheeled  -GIAN      Distance in Feet (Gait)  3, 32  -JC2      Pattern (Gait)  step-to  -GIAN      Deviations/Abnormal Patterns (Gait)  essence decreased;stride length decreased  -GIAN      Bilateral Gait Deviations  forward flexed posture  -GIAN      Recorded by [GIAN] Abran Wan, PTA 10/18/19 1420  [JC2] Abran Wan, PTA 10/18/19 1442      Row Name 10/18/19 1614             General ROM    GENERAL ROM COMMENTS  BUE WFL  -AS      Recorded by [AS] Destinee Yarbrough, OT 10/18/19 1753      Row Name 10/18/19 1614             MMT (Manual Muscle Testing)    General MMT Comments  BUE grossly 4/5 throughout with exception of R SF 4-/5  -AS      Recorded by [AS] Destinee Yarbrough, OT 10/18/19 1753      Row Name 10/18/19 1130             Therapeutic Exercise    Upper Extremity (Therapeutic Exercise)  bicep curl, bilateral  -CS      Upper Extremity Range of Motion (Therapeutic Exercise)  shoulder flexion/extension, bilateral;shoulder internal/external rotation, bilateral;elbow flexion/extension, bilateral;forearm supination/pronation, bilateral;wrist flexion/extension, bilateral  -CS       Hand (Therapeutic Exercise)  finger flexion/extension, bilateral  -CS      Weight/Resistance (Therapeutic Exercise)  1 pound  -CS      Exercise Type (Therapeutic Exercise)  AROM (active range of motion)  -CS      Position (Therapeutic Exercise)  supine  -CS      Sets/Reps (Therapeutic Exercise)  1/20  -CS      Equipment (Therapeutic Exercise)  free weight, barbell  -CS      Expected Outcome (Therapeutic Exercise)  improve functional tolerance, self-care activity;improve performance, BADLs;improve performance, transfer skills;increase active range of motion  -CS      Recorded by [CS] Brenda Thomas COTA/L 10/18/19 1403      Row Name 10/18/19 1614 10/18/19 1406 10/18/19 1130       Positioning and Restraints    Pre-Treatment Position  in bed  -AS  sitting in chair/recliner w/ MT  -GIAN  in bed  -CS    Post Treatment Position  bed  -AS  bed  -GIAN  bed  -CS    In Bed  supine;call light within reach;encouraged to call for assist;exit alarm on;side rails up x2  -AS  fowlers;call light within reach;encouraged to call for assist;exit alarm on  -GIAN  supine;call light within reach;encouraged to call for assist;exit alarm on  -CS    Recorded by [AS] Destinee Yarbrough, OT 10/18/19 1753 [GIAN] Abran Wan, PTA 10/18/19 1442 [CS] Brenda Thomas COTA/L 10/18/19 1403    Row Name 10/18/19 1614 10/18/19 1406          Pain Assessment    Additional Documentation  Pain Scale: Numbers Pre/Post-Treatment (Group)  -AS  Pain Scale: Numbers Pre/Post-Treatment (Group)  -GIAN     Recorded by [AS] Destinee Yarbrough, OT 10/18/19 1753 [GIAN] Abran Wan, PTA 10/18/19 1442     Row Name 10/18/19 1614 10/18/19 1406 10/18/19 1130       Pain Scale: Numbers Pre/Post-Treatment    Pain Scale: Numbers, Pretreatment  8/10  -AS  9/10  -GIAN  7/10  -CS    Pain Scale: Numbers, Post-Treatment  8/10  -AS  9/10  -JC2  7/10  -CS    Pain Location  abdomen  -AS  abdomen  -GIAN  abdomen  -CS    Pain Intervention(s)  --  Repositioned  -JC2  Medication (See MAR)   -CS    Recorded by [AS] Destinee Yarbrough, OT 10/18/19 1753 [GIAN] Abran Wan, PTA 10/18/19 1420  [JC2] Abran Wan, PTA 10/18/19 1442 [CS] Brenda Thomas, MADSEN/L 10/18/19 1403    Row Name                Wound 09/30/19 1330 Left medial abdomen Fistula    Wound - Properties Group Date first assessed: 09/30/19 [SS] Time first assessed: 1330 [SS] Side: Left [SS] Orientation: medial [SS] Location: abdomen [SS] Primary Wound Type: Fistula [SS] Recorded by:  [SS] Citlalli Abdi RN 09/30/19 1619    Row Name                Wound 10/02/19 1347 abdomen Incision    Wound - Properties Group Date first assessed: 10/02/19 [CF] Time first assessed: 1347 [CF] Present on Hospital Admission: N [CF] Location: abdomen [CF] Primary Wound Type: Incision [CF] Recorded by:  [CF] Eileen Tyson 10/02/19 1347    Row Name 10/18/19 1614             Plan of Care Review    Plan of Care Reviewed With  patient  -AS      Recorded by [AS] Destinee Yarbrough, OT 10/18/19 1753      Row Name 10/18/19 1614 10/18/19 1130          Outcome Summary/Treatment Plan (OT)    Daily Summary of Progress (OT)  progress toward functional goals is gradual  -AS  progress toward functional goals is good  -CS     Barriers to Overall Progress (OT)  faitgue  -AS  --     Plan for Continued Treatment (OT)  cont OT POC  -AS  cont OT POC  -CS     Anticipated Discharge Disposition (OT)  anticipate therapy at next level of care  -AS  anticipate therapy at next level of care  -CS     Recorded by [AS] Destinee Yarbrough, OT 10/18/19 1753 [CS] Brenda Thomas, MADSEN/L 10/18/19 1403     Row Name 10/18/19 1406             Outcome Summary/Treatment Plan (PT)    Daily Summary of Progress (PT)  progress toward functional goals as expected  -GIAN      Plan for Continued Treatment (PT)  continue  -GIAN      Anticipated Discharge Disposition (PT)  anticipate therapy at next level of care  -JC2      Recorded by [GIAN] Abran Wan, PTA 10/18/19 1442  [JC2] Abran Wan, PTA  10/18/19 1420        User Key  (r) = Recorded By, (t) = Taken By, (c) = Cosigned By    Initials Name Effective Dates Discipline    SS Citlalli Abdi, RN 07/10/18 -  Nurse    Abran Ferreira, PTA 03/07/18 -  PT    CS Brenda Thomas, MADSEN/L 03/07/18 -  OT    CF Jah, Eileen Godoy - -    AS Destinee Yarbrough, OT 03/25/19 -  OT        Wound 09/30/19 1330 Left medial abdomen Fistula (Active)   Dressing Appearance dry;intact 10/17/2019  8:26 PM   Closure None 10/17/2019  8:26 PM   Base dressing in place, unable to visualize 10/17/2019  8:26 PM   Dressing Care, Wound dressing changed 10/18/2019  8:45 AM       Wound 10/02/19 1347 abdomen Incision (Active)   Dressing Appearance dry;intact 10/18/2019  8:45 AM   Closure Open to air 10/18/2019  8:45 AM   Periwound dry 10/18/2019  8:45 AM   Periwound Temperature warm 10/18/2019  8:45 AM   Periwound Skin Turgor soft 10/18/2019  8:45 AM     Rehab Goal Summary     Row Name 10/18/19 1614 10/18/19 1406 10/18/19 1130       Bed Mobility Goal 1 (PT)    Activity/Assistive Device (Bed Mobility Goal 1, PT)  --  sit to supine;supine to sit  -  --    [place] Level/Cues Needed (Bed Mobility Goal 1, PT)  --  independent  -  --    Time Frame (Bed Mobility Goal 1, PT)  --  by discharge  -  --    Progress/Outcomes (Bed Mobility Goal 1, PT)  --  goal not met  -  --       Transfer Goal 1 (PT)    Activity/Assistive Device (Transfer Goal 1, PT)  --  bed-to-chair/chair-to-bed;toilet  -  --    [place] Level/Cues Needed (Transfer Goal 1, PT)  --  standby assist  -  --    Time Frame (Transfer Goal 1, PT)  --  by discharge  -  --    Progress/Outcome (Transfer Goal 1, PT)  --  goal not met;goal revised this date  -  --       Gait Training Goal 1 (PT)    Activity/Assistive Device (Gait Training Goal 1, PT)  --  gait (walking locomotion);assistive device use  -GIAN  --    [place] Level (Gait Training Goal 1, PT)  --  contact guard assist  -GIAN  --    Distance (Gait Goal 1,  PT)  --  150ft  -  --    Time Frame (Gait Training Goal 1, PT)  --  by discharge  -  --    Progress/Outcome (Gait Training Goal 1, PT)  --  goal not met;goal revised this date  -  --       Stairs Goal 1 (PT)    Activity/Assistive Device (Stairs Goal 1, PT)  --  ascending stairs;descending stairs  -  --    [place] Level/Cues Needed (Stairs Goal 1, PT)  --  standby assist  -  --    Number of Stairs (Stairs Goal 1, PT)  --  5  -GIAN  --    Time Frame (Stairs Goal 1, PT)  --  by discharge  -  --    Progress/Outcome (Stairs Goal 1, PT)  --  goal not met  -  --       Occupational Therapy Goals    Transfer Goal Selection (OT)  transfer, OT goal 1  -AS  --  transfer, OT goal 1  -CS    Bathing Goal Selection (OT)  bathing, OT goal 1  -AS  --  bathing, OT goal 1  -CS    Dressing Goal Selection (OT)  dressing, OT goal 1  -AS  --  dressing, OT goal 1  -CS    Toileting Goal Selection (OT)  toileting, OT goal 1  -AS  --  toileting, OT goal 1  -CS    Activity Tolerance Goal Selection (OT)  activity tolerance, OT goal 1  -AS  --  activity tolerance, OT goal 1  -CS       Transfer Goal 1 (OT)    Activity/Assistive Device (Transfer Goal 1, OT)  sit-to-stand/stand-to-sit;bed-to-chair/chair-to-bed;toilet  -AS  --  sit-to-stand/stand-to-sit;bed-to-chair/chair-to-bed;toilet  -CS    [place] Level/Cues Needed (Transfer Goal 1, OT)  supervision required  -AS  --  supervision required  -CS    Time Frame (Transfer Goal 1, OT)  long term goal (LTG);by discharge  -AS  --  long term goal (LTG);by discharge  -CS    Progress/Outcome (Transfer Goal 1, OT)  goal not met  -AS  --  goal not met  -CS       Bathing Goal 1 (OT)    Activity/Assistive Device (Bathing Goal 1, OT)  bathing skills, all  -AS  --  bathing skills, all  -CS    [place] Level/Cues Needed (Bathing Goal 1, OT)  minimum assist (75% or more patient effort)  -AS  --  minimum assist (75% or more patient effort)  -CS    Time Frame (Bathing Goal 1, OT)  long term  goal (LTG);by discharge  -AS  --  long term goal (LTG);by discharge  -CS    Barriers (Bathing Goal 1, OT)  Pt will need extra time and rest breaks PRN  -AS  --  Pt will need extra time and rest breaks PRN  -CS    Progress/Outcomes (Bathing Goal 1, OT)  goal not met  -AS  --  goal not met  -CS       Dressing Goal 1 (OT)    Activity/Assistive Device (Dressing Goal 1, OT)  dressing skills, all  -AS  --  dressing skills, all  -CS    [place]/Cues Needed (Dressing Goal 1, OT)  minimum assist (75% or more patient effort)  -AS  --  minimum assist (75% or more patient effort)  -CS    Time Frame (Dressing Goal 1, OT)  long term goal (LTG);by discharge  -AS  --  long term goal (LTG);by discharge  -CS    Barriers (Dressing Goal 1, OT)  Pt will need extra time and rest breaks PRN  -AS  --  Pt will need extra time and rest breaks PRN  -CS    Progress/Outcome (Dressing Goal 1, OT)  goal not met  -AS  --  goal not met  -CS       Toileting Goal 1 (OT)    Activity/Device (Toileting Goal 1, OT)  toileting skills, all  -AS  --  toileting skills, all  -CS    [place] Level/Cues Needed (Toileting Goal 1, OT)  minimum assist (75% or more patient effort)  -AS  --  minimum assist (75% or more patient effort)  -CS    Time Frame (Toileting Goal 1, OT)  long term goal (LTG);by discharge  -AS  --  long term goal (LTG);by discharge  -CS    Barriers (Toileting Goal 1, OT)  Pt will need extra time and rest breaks PRN  -AS  --  Pt will need extra time and rest breaks PRN  -CS    Progress/Outcome (Toileting Goal 1, OT)  goal not met  -AS  --  goal not met  -CS        Activity Tolerance Goal 1 (OT)    Activity Level (Endurance Goal 1, OT)  15 min activity functional/therapeutic activity  -AS  --  15 min activity functional/therapeutic activity  -CS    Time Frame (Activity Tolerance Goal 1, OT)  --  --  long term goal (LTG);by discharge  -CS    Progress/Outcome (Activity Tolerance Goal 1, OT)  goal not met  -AS  --  goal not met  -CS       User Key  (r) = Recorded By, (t) = Taken By, (c) = Cosigned By    Initials Name Provider Type Discipline    GIAN Abran Wan, PTA Physical Therapy Assistant PT    CS Brenda Thomas, TENA/LIANE Occupational Therapy Assistant OT    AS Destinee Yarbrough, OT Occupational Therapist OT        Occupational Therapy Education     Title: PT OT SLP Therapies (In Progress)     Topic: Occupational Therapy (In Progress)     Point: ADL training (In Progress)     Description: Instruct learner(s) on proper safety adaptation and remediation techniques during self care or transfers.   Instruct in proper use of assistive devices.    Learning Progress Summary           Patient Acceptance, E,TB,D, NR by CS at 10/18/2019  2:04 PM    Acceptance, E,TB,D, NR by CS at 10/16/2019  3:43 PM    Acceptance, E,TB,D, NR by CS at 10/15/2019  1:42 PM    Acceptance, E,TB,D, NR by CS at 10/14/2019  1:57 PM    Acceptance, E, NR by BB at 10/9/2019  1:37 PM    Acceptance, E,TB,D, NR by CS at 10/6/2019  1:05 PM    Acceptance, E,TB,D, NR by CS at 10/5/2019 11:11 AM    Acceptance, E, NR by AS at 10/4/2019 11:45 AM    Comment:  Role of OT, OT POC, importance of OOB activity for strength and healing, ADL training                   Point: Home exercise program (In Progress)     Description: Instruct learner(s) on appropriate technique for monitoring, assisting and/or progressing therapeutic exercises/activities.    Learning Progress Summary           Patient Acceptance, E,TB,D, NR by CS at 10/18/2019  2:04 PM    Acceptance, E,TB,D, NR by CS at 10/16/2019  3:43 PM    Acceptance, E,TB,D, NR by CS at 10/15/2019  1:42 PM    Acceptance, E,TB,D, NR by CS at 10/14/2019  1:57 PM    Acceptance, E, NR by BB at 10/8/2019 11:46 AM    Acceptance, E,TB,D, NR by CS at 10/6/2019  1:05 PM    Acceptance, E,TB,D, NR by CS at 10/5/2019 11:11 AM                   Point: Precautions (In Progress)     Description: Instruct learner(s) on prescribed precautions during self-care and  functional transfers.    Learning Progress Summary           Patient Acceptance, E, NR by AS at 10/18/2019  5:56 PM    Comment:  OT POC, progress towards goals, tx plan modifications    Acceptance, E,TB,D, NR by CS at 10/18/2019  2:04 PM    Acceptance, E,TB,D, NR by CS at 10/16/2019  3:43 PM    Acceptance, E,TB,D, NR by CS at 10/15/2019  1:42 PM    Acceptance, E,TB,D, NR by CS at 10/14/2019  1:57 PM    Acceptance, E,TB,D, NR by CS at 10/6/2019  1:05 PM    Acceptance, E,TB,D, NR by CS at 10/5/2019 11:11 AM    Acceptance, E, NR by AS at 10/4/2019 11:45 AM    Comment:  Role of OT, OT POC, importance of OOB activity for strength and healing, ADL training                   Point: Body mechanics (In Progress)     Description: Instruct learner(s) on proper positioning and spine alignment during self-care, functional mobility activities and/or exercises.    Learning Progress Summary           Patient Acceptance, E,TB,D, NR by CS at 10/18/2019  2:04 PM    Acceptance, E,TB,D, NR by CS at 10/16/2019  3:43 PM    Acceptance, E,TB,D, NR by CS at 10/15/2019  1:42 PM    Acceptance, E,TB,D, NR by CS at 10/14/2019  1:57 PM    Acceptance, E,TB,D, NR by CS at 10/6/2019  1:05 PM    Acceptance, E,TB,D, NR by CS at 10/5/2019 11:11 AM                               User Key     Initials Effective Dates Name Provider Type Discipline     03/07/18 -  Kerrie Woodson COTA/L Occupational Therapy Assistant OT     03/07/18 -  Brenda Thomas COTA/LIANE Occupational Therapy Assistant OT    AS 03/25/19 -  Destinee Yarbrough OT Occupational Therapist OT                OT Recommendation and Plan  Outcome Summary/Treatment Plan (OT)  Daily Summary of Progress (OT): progress toward functional goals is gradual  Barriers to Overall Progress (OT): faitgue  Plan for Continued Treatment (OT): cont OT POC  Anticipated Discharge Disposition (OT): anticipate therapy at next level of care  Planned Therapy Interventions (OT Eval): activity tolerance  training, BADL retraining, functional balance retraining, occupation/activity based interventions, patient/caregiver education/training, ROM/therapeutic exercise, transfer/mobility retraining, strengthening exercise, adaptive equipment training, neuromuscular control/coordination retraining  Therapy Frequency (OT Eval): other (see comments)(5-7 days/wk)  Daily Summary of Progress (OT): progress toward functional goals is gradual  Plan of Care Review  Plan of Care Reviewed With: patient  Plan of Care Reviewed With: patient  Outcome Summary: OT re-cert completed. POC and progress towards goals reviewed and discussed. While pt has not met any goals this recert period, pt is making gradual gains in strength and activity tolerance. Per chart review and discussion, pt noted to often decline ADLs, but reports he still wants to address self care goals. Pt encouraged to continue to work towards highest level of function. Pt would benefit from continued skilled OT services to increase strength, endurance, balance, mobility, and ADL participation.   Outcome Measures     Row Name 10/18/19 1614 10/18/19 1406 10/18/19 1400       How much help from another person do you currently need...    Turning from your back to your side while in flat bed without using bedrails?  --  3  -GIAN  --    Moving from lying on back to sitting on the side of a flat bed without bedrails?  --  3  -GIAN  --    Moving to and from a bed to a chair (including a wheelchair)?  --  3  -GIAN  --    Standing up from a chair using your arms (e.g., wheelchair, bedside chair)?  --  3  -GIAN  --    Climbing 3-5 steps with a railing?  --  2  -GIAN  --    To walk in hospital room?  --  3  -GIAN  --    AM-PAC 6 Clicks Score (PT)  --  17  -GIAN  --       How much help from another is currently needed...    Putting on and taking off regular lower body clothing?  2  -AS  --  2  -CS    Bathing (including washing, rinsing, and drying)  2  -AS  --  2  -CS    Toileting (which includes  using toilet bed pan or urinal)  2  -AS  --  2  -CS    Putting on and taking off regular upper body clothing  3  -AS  --  2  -CS    Taking care of personal grooming (such as brushing teeth)  3  -AS  --  2  -CS    Eating meals  1  -AS  --  1  -CS    AM-PAC 6 Clicks Score (OT)  13  -AS  --  11  -CS       Functional Assessment    Outcome Measure Options  AM-PAC 6 Clicks Daily Activity (OT)  -AS  AM-[facility] 6 Clicks Basic Mobility (PT)  -GIAN  --    Row Name 10/17/19 1111 10/16/19 1500 10/16/19 1437       How much help from another person do you currently need...    Turning from your back to your side while in flat bed without using bedrails?  3  -GIAN  --  3  -GIAN    Moving from lying on back to sitting on the side of a flat bed without bedrails?  3  -GIAN  --  3  -GIAN    Moving to and from a bed to a chair (including a wheelchair)?  3  -GIAN  --  3  -GIAN    Standing up from a chair using your arms (e.g., wheelchair, bedside chair)?  3  -GIAN  --  3  -GIAN    Climbing 3-5 steps with a railing?  2  -GIAN  --  2  -GIAN    To walk in hospital room?  3  -GIAN  --  3  -GIAN    AM-PAC 6 Clicks Score (PT)  17  -GIAN  --  17  -GIAN       How much help from another is currently needed...    Putting on and taking off regular lower body clothing?  --  2  -CS  --    Bathing (including washing, rinsing, and drying)  --  2  -CS  --    Toileting (which includes using toilet bed pan or urinal)  --  2  -CS  --    Putting on and taking off regular upper body clothing  --  2  -CS  --    Taking care of personal grooming (such as brushing teeth)  --  2  -CS  --    Eating meals  --  1  -CS  --    AM-PAC 6 Clicks Score (OT)  --  11  -CS  --       Functional Assessment    Outcome Measure Options  AM-PAC 6 Clicks Basic Mobility (PT)  -GIAN  --  AM-PAC 6 Clicks Basic Mobility (PT)  -      User Key  (r) = Recorded By, (t) = Taken By, (c) = Cosigned By    Initials Name Provider Type    GIAN Savage, Abran R, PTA Physical Therapy Assistant    CS Brenda Thomas, TENA/LIANE  Occupational Therapy Assistant    AS Destinee Yarbrough OT Occupational Therapist           Time Calculation:   Time Calculation- OT     Row Name 10/18/19 1800 10/18/19 1407          Time Calculation- OT    OT Start Time  1614  -AS  1130  -CS     OT Stop Time  1627  -AS  1153  -CS     OT Time Calculation (min)  13 min  -AS  23 min  -CS     Total Timed Code Minutes- OT  --  23 minute(s)  -CS     OT Received On  10/18/19  -AS  10/18/19  -CS     OT Goal Re-Cert Due Date  10/31/19  -AS  --       User Key  (r) = Recorded By, (t) = Taken By, (c) = Cosigned By    Initials Name Provider Type    CS Brenda Thomas, TENA/LIANE Occupational Therapy Assistant    AS Destinee Yarbrough OT Occupational Therapist        Therapy Charges for Today     Code Description Service Date Service Provider Modifiers Qty    [id number]  OT THERAPEUTIC ACT EA 15 MIN 10/18/2019 Destinee Yarbrough OT GO 1               Destinee Yarbrough OT  10/18/2019

## 2019-10-18 NOTE — THERAPY TREATMENT NOTE
Acute Care - Occupational Therapy Treatment Note  [facility]     Patient Name: Emerson Bang  : 1960  MRN: [id number]  Today's Date: 10/18/2019  Onset of Illness/Injury or Date of Surgery: 19  Date of Referral to OT: 10/02/19  Referring Physician: Dr. Kc    Admit Date: 2019       ICD-10-CM ICD-9-CM   1. 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 Impaired physical mobility Z74.09 781.99   3. Impaired mobility and activities of daily living Z74.09 799.89     Patient Active Problem List   Diagnosis   • Hyperkalemia   • Iron deficiency anemia   • Gastroesophageal reflux disease with esophagitis   • Elevated AST (SGOT)   • Alcohol abuse   • Hypothyroidism   • Balance problem   • Need for vaccination   • Low magnesium levels   • Squamous cell carcinoma of pharynx (CMS/HCC)   • History of throat cancer   • Vitamin D deficiency   • Alcohol abuse counseling and surveillance   • Encounter for screening for diabetes mellitus   • Hypomagnesemia   • Iron deficiency anemia   • Hyperlipidemia   • Underweight due to inadequate caloric intake   • Need for immunization against influenza   • 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HCC)   • Hypertension   • General medical examination   • Underweight   • Epigastric pain   • Gastritis without bleeding   • Need for influenza vaccination   • Elevated liver function tests   • B12 deficiency   • Intestinal malabsorption   • Throat pain   • Acute pharyngitis   • Upper respiratory tract infection   • Neoplasm of uncertain behavior of larynx   • Pharyngoesophageal dysphagia   • Severe protein-calorie malnutrition (CMS/HCC)   • Anemia of chronic disease   • Hypotension   • Hyponatremia   • Status post insertion of percutaneous endoscopic gastrostomy (PEG) tube (CMS/HCC)   • Hx of tracheostomy   • History of throat surgery   • Hx SBO   • Urinary retention   • Generalized weakness   • Acute otitis externa of right ear   • Hard stool   • Panlobular emphysema  (CMS/HCC)   • Cancer of hypopharynx (CMS/HCC)   • Diarrhea   • Encounter for venous access device care   • Prediabetes   • Status post neck dissection   • S/P partial thyroidectomy   • S/P laryngectomy   • Annual physical exam   • Alcoholic cirrhosis of liver without ascites (CMS/HCC)     Past Medical History:   Diagnosis Date   • Allergic rhinitis     vs URI   • Anemia    • At risk for falls    • Benign prostatic hyperplasia    • Chronic gastritis    • Cirrhosis of liver (CMS/HCC)    • Complication of gastrostomy (CMS/HCC)     site not healing   • COPD (chronic obstructive pulmonary disease) (CMS/HCC)    • Dysphagia     odynophagia   • Epigastric pain    • Esophagitis    • GERD (gastroesophageal reflux disease)    • Hypertension    • Hypothyroidism, unspecified    • Low back pain    • Malaise and fatigue    • Nasal congestion 11/15/2018   • Nausea with vomiting, unspecified    • Pain in left knee    • Pain in right knee    • Primary malignant neoplasm of pharynx (CMS/HCC)    • Screening for malignant neoplasm of colon    • 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 Left upper quadrant abdominal wall abscess debridement. Gastrostomy tube erosion through & through the anterior gastric wall.Lupper quadrant abdominal wall abscess   • COLONOSCOPY  04/21/2014    Internal & external hemorrhoids found.   • COLONOSCOPY  2014    [place]   • COLONOSCOPY N/A 10/16/2018    Procedure: COLONOSCOPY;  Surgeon: Kaushal Chester MD;  Location: [facility] ENDOSCOPY;  Service: Gastroenterology   • DIAGNOSTIC LAPAROSCOPY EXPLORATORY LAPAROTOMY N/A 7/17/2019    Procedure: DIAGNOSTIC LAPAROSCOPY, EXPLORATORY LAPAROTOMY, LYSIS OF ADHESIONS;  Surgeon: Parminder Kc MD;   Location: [facility] OR;  Service: General   • DIRECT LARYNGOSCOPY, ESOPHAGOSCOPY, BRONCHOSCOPY N/A 4/15/2019    Procedure: DIRECT LARYNGOSCOPY with biopsy, ESOPHAGOSCOPY;  Surgeon: Bharathi Washington MD;  Location: [facility] OR;  Service: ENT   • ENDOSCOPY N/A 10/16/2018    Procedure: ESOPHAGOGASTRODUODENOSCOPY;  Surgeon: Kaushal Chester MD;  Location: [facility] ENDOSCOPY;  Service: Gastroenterology   • GASTROCUTANEOUS FISTULA CLOSURE N/A 10/2/2019    Procedure: GASTROCUTANEOUS FISTULA CLOSURE;  Surgeon: Parminder Kc MD;  Location: [facility] OR;  Service: General   • GASTROSTOMY FEEDING TUBE INSERTION N/A 4/15/2019    Procedure: GASTROSTOMY FEEDING TUBE INSERTION;  Surgeon: Trenton Valera MD;  Location: [facility] OR;  Service: General   • JEJUNOSTOMY N/A 10/2/2019    Procedure: JEJUNOSTOMY;  Surgeon: Parminder Kc MD;  Location: [facility];  Service: General   • TRACHEOSTOMY  11/10/2008    Respiratory failure   • UPPER GASTROINTESTINAL ENDOSCOPY  04/21/2014    Esophagitis seen. Biopsy taken. Gastritis in stomach. Biopsy taken. Normal duodenum. Biopsy taken.   • UPPER GASTROINTESTINAL ENDOSCOPY  10/16/2018   • VENOUS ACCESS DEVICE (PORT) INSERTION N/A 9/11/2019    Procedure: INSERTION VENOUS ACCESS DEVICE (MEDIPORT)        (c-arm#1);  Surgeon: Parminder Kc MD;  Location: [facility];  Service: General       Therapy Treatment    Rehabilitation Treatment Summary     Row Name 10/18/19 3777             Treatment Time/Intention    Discipline  occupational therapy assistant  -CS      Document Type  therapy note (daily note)  -CS      Subjective Information  complains of;pain  -CS      Mode of Treatment  occupational therapy  -CS      Therapy Frequency (OT Eval)  other (see comments) 5-7 days/wk  -CS      Patient Effort  fair  -CS      Existing Precautions/Restrictions  fall;other (see comments) watch gait belt placement  -CS      Recorded by [CS] Brenda Thomas COTA/L 10/18/19 9904      Row Name 10/18/19 9078              Vital Signs    Pre Patient Position  Supine  -CS      Post Patient Position  Supine  -CS      Recorded by [CS] Brenda Thomas COTA/L 10/18/19 1403      Row Name 10/18/19 1130             Cognitive Assessment/Intervention- PT/OT    Affect/Mental Status (Cognitive)  unable/difficult to assess;flat/blunted affect  -CS      Behavioral Issues (Cognitive)  unable/difficult to assess  -CS      Orientation Status (Cognition)  oriented x 4  -CS      Follows Commands (Cognition)  WFL  -CS      Recorded by [CS] Brenda Thomas COTA/L 10/18/19 1403      Row Name 10/18/19 1130             Therapeutic Exercise    Upper Extremity (Therapeutic Exercise)  bicep curl, bilateral  -CS      Upper Extremity Range of Motion (Therapeutic Exercise)  shoulder flexion/extension, bilateral;shoulder internal/external rotation, bilateral;elbow flexion/extension, bilateral;forearm supination/pronation, bilateral;wrist flexion/extension, bilateral  -CS      Hand (Therapeutic Exercise)  finger flexion/extension, bilateral  -CS      Weight/Resistance (Therapeutic Exercise)  1 pound  -CS      Exercise Type (Therapeutic Exercise)  AROM (active range of motion)  -CS      Position (Therapeutic Exercise)  supine  -CS      Sets/Reps (Therapeutic Exercise)  1/20  -CS      Equipment (Therapeutic Exercise)  free weight, barbell  -CS      Expected Outcome (Therapeutic Exercise)  improve functional tolerance, self-care activity;improve performance, BADLs;improve performance, transfer skills;increase active range of motion  -CS      Recorded by [CS] Brenda Thomas COTA/L 10/18/19 1403      Row Name 10/18/19 1130             Positioning and Restraints    Pre-Treatment Position  in bed  -CS      Post Treatment Position  bed  -CS      In Bed  supine;call light within reach;encouraged to call for assist;exit alarm on  -CS      Recorded by [CS] Brenda Thomas COTA/L 10/18/19 1403      Row Name 10/18/19 1130             Pain Scale: Numbers  Pre/Post-Treatment    Pain Scale: Numbers, Pretreatment  7/10  -CS      Pain Scale: Numbers, Post-Treatment  7/10  -CS      Pain Location  abdomen  -CS      Pain Intervention(s)  Medication (See MAR)  -CS      Recorded by [CS] Brenda Thomas COTA/L 10/18/19 1403      Row Name                Wound 09/30/19 1330 Left medial abdomen Fistula    Wound - Properties Group Date first assessed: 09/30/19 [SS] Time first assessed: 1330 [SS] Side: Left [SS] Orientation: medial [SS] Location: abdomen [SS] Primary Wound Type: Fistula [SS] Recorded by:  [SS] Citlalli Abdi RN 09/30/19 1619    Row Name                Wound 10/02/19 1347 abdomen Incision    Wound - Properties Group Date first assessed: 10/02/19 [CF] Time first assessed: 1347 [CF] Present on Hospital Admission: N [CF] Location: abdomen [CF] Primary Wound Type: Incision [CF] Recorded by:  [CF] Eileen Tyson 10/02/19 1347    Row Name 10/18/19 1130             Outcome Summary/Treatment Plan (OT)    Daily Summary of Progress (OT)  progress toward functional goals is good  -CS      Plan for Continued Treatment (OT)  cont OT POC  -CS      Anticipated Discharge Disposition (OT)  anticipate therapy at next level of care  -CS      Recorded by [CS] Brenda Thomas COTA/L 10/18/19 1403        User Key  (r) = Recorded By, (t) = Taken By, (c) = Cosigned By    Initials Name Effective Dates Discipline     Citlalli Abdi RN 07/10/18 -  Nurse    CS Brenda Thomas COTA/L 03/07/18 -  OT    CF Eileen Tyson - -        Wound 09/30/19 1330 Left medial abdomen Fistula (Active)   Dressing Appearance dry;intact 10/17/2019  8:26 PM   Closure None 10/17/2019  8:26 PM   Base dressing in place, unable to visualize 10/17/2019  8:26 PM   Dressing Care, Wound dressing changed 10/18/2019  8:45 AM       Wound 10/02/19 1347 abdomen Incision (Active)   Dressing Appearance dry;intact 10/18/2019  8:45 AM   Closure Open to air 10/18/2019  8:45 AM   Periwound dry 10/18/2019  8:45  AM   Periwound Temperature warm 10/18/2019  8:45 AM   Periwound Skin Turgor soft 10/18/2019  8:45 AM     Rehab Goal Summary     Row Name 10/18/19 1130             Occupational Therapy Goals    Transfer Goal Selection (OT)  transfer, OT goal 1  -CS      Bathing Goal Selection (OT)  bathing, OT goal 1  -CS      Dressing Goal Selection (OT)  dressing, OT goal 1  -CS      Toileting Goal Selection (OT)  toileting, OT goal 1  -CS      Activity Tolerance Goal Selection (OT)  activity tolerance, OT goal 1  -CS         Transfer Goal 1 (OT)    Activity/Assistive Device (Transfer Goal 1, OT)  sit-to-stand/stand-to-sit;bed-to-chair/chair-to-bed;toilet  -CS      [place] Level/Cues Needed (Transfer Goal 1, OT)  supervision required  -CS      Time Frame (Transfer Goal 1, OT)  long term goal (LTG);by discharge  -CS      Progress/Outcome (Transfer Goal 1, OT)  goal not met  -CS         Bathing Goal 1 (OT)    Activity/Assistive Device (Bathing Goal 1, OT)  bathing skills, all  -CS      [place] Level/Cues Needed (Bathing Goal 1, OT)  minimum assist (75% or more patient effort)  -CS      Time Frame (Bathing Goal 1, OT)  long term goal (LTG);by discharge  -CS      Barriers (Bathing Goal 1, OT)  Pt will need extra time and rest breaks PRN  -CS      Progress/Outcomes (Bathing Goal 1, OT)  goal not met  -CS         Dressing Goal 1 (OT)    Activity/Assistive Device (Dressing Goal 1, OT)  dressing skills, all  -CS      [place]/Cues Needed (Dressing Goal 1, OT)  minimum assist (75% or more patient effort)  -CS      Time Frame (Dressing Goal 1, OT)  long term goal (LTG);by discharge  -CS      Barriers (Dressing Goal 1, OT)  Pt will need extra time and rest breaks PRN  -CS      Progress/Outcome (Dressing Goal 1, OT)  goal not met  -CS         Toileting Goal 1 (OT)    Activity/Device (Toileting Goal 1, OT)  toileting skills, all  -CS      [place] Level/Cues Needed (Toileting Goal 1, OT)  minimum assist (75% or more patient  effort)  -CS      Time Frame (Toileting Goal 1, OT)  long term goal (LTG);by discharge  -CS      Barriers (Toileting Goal 1, OT)  Pt will need extra time and rest breaks PRN  -CS      Progress/Outcome (Toileting Goal 1, OT)  goal not met  -CS          Activity Tolerance Goal 1 (OT)    Activity Level (Endurance Goal 1, OT)  15 min activity functional/therapeutic activity  -CS      Time Frame (Activity Tolerance Goal 1, OT)  long term goal (LTG);by discharge  -CS      Progress/Outcome (Activity Tolerance Goal 1, OT)  goal not met  -CS        User Key  (r) = Recorded By, (t) = Taken By, (c) = Cosigned By    Initials Name Provider Type Discipline    CS Brenda Thomas COTA/L Occupational Therapy Assistant OT        Occupational Therapy Education     Title: PT OT SLP Therapies (In Progress)     Topic: Occupational Therapy (In Progress)     Point: ADL training (In Progress)     Description: Instruct learner(s) on proper safety adaptation and remediation techniques during self care or transfers.   Instruct in proper use of assistive devices.    Learning Progress Summary           Patient Acceptance, E,TB,D, NR by CS at 10/18/2019  2:04 PM    Acceptance, E,TB,D, NR by CS at 10/16/2019  3:43 PM    Acceptance, E,TB,D, NR by CS at 10/15/2019  1:42 PM    Acceptance, E,TB,D, NR by CS at 10/14/2019  1:57 PM    Acceptance, E, NR by BB at 10/9/2019  1:37 PM    Acceptance, E,TB,D, NR by CS at 10/6/2019  1:05 PM    Acceptance, E,TB,D, NR by CS at 10/5/2019 11:11 AM    Acceptance, E, NR by AS at 10/4/2019 11:45 AM    Comment:  Role of OT, OT POC, importance of OOB activity for strength and healing, ADL training                   Point: Home exercise program (In Progress)     Description: Instruct learner(s) on appropriate technique for monitoring, assisting and/or progressing therapeutic exercises/activities.    Learning Progress Summary           Patient Acceptance, E,TB,D, NR by CS at 10/18/2019  2:04 PM    Acceptance, E,TB,D, NR  by CS at 10/16/2019  3:43 PM    Acceptance, E,TB,D, NR by CS at 10/15/2019  1:42 PM    Acceptance, E,TB,D, NR by CS at 10/14/2019  1:57 PM    Acceptance, E, NR by BB at 10/8/2019 11:46 AM    Acceptance, E,TB,D, NR by CS at 10/6/2019  1:05 PM    Acceptance, E,TB,D, NR by CS at 10/5/2019 11:11 AM                   Point: Precautions (In Progress)     Description: Instruct learner(s) on prescribed precautions during self-care and functional transfers.    Learning Progress Summary           Patient Acceptance, E,TB,D, NR by CS at 10/18/2019  2:04 PM    Acceptance, E,TB,D, NR by CS at 10/16/2019  3:43 PM    Acceptance, E,TB,D, NR by CS at 10/15/2019  1:42 PM    Acceptance, E,TB,D, NR by CS at 10/14/2019  1:57 PM    Acceptance, E,TB,D, NR by CS at 10/6/2019  1:05 PM    Acceptance, E,TB,D, NR by CS at 10/5/2019 11:11 AM    Acceptance, E, NR by AS at 10/4/2019 11:45 AM    Comment:  Role of OT, OT POC, importance of OOB activity for strength and healing, ADL training                   Point: Body mechanics (In Progress)     Description: Instruct learner(s) on proper positioning and spine alignment during self-care, functional mobility activities and/or exercises.    Learning Progress Summary           Patient Acceptance, E,TB,D, NR by CS at 10/18/2019  2:04 PM    Acceptance, E,TB,D, NR by CS at 10/16/2019  3:43 PM    Acceptance, E,TB,D, NR by CS at 10/15/2019  1:42 PM    Acceptance, E,TB,D, NR by CS at 10/14/2019  1:57 PM    Acceptance, E,TB,D, NR by CS at 10/6/2019  1:05 PM    Acceptance, E,TB,D, NR by CS at 10/5/2019 11:11 AM                               User Key     Initials Effective Dates Name Provider Type Discipline    BB 03/07/18 -  Kerrie Woodson COTA/L Occupational Therapy Assistant OT    CS 03/07/18 -  Brenda Thomas COTA/LIANE Occupational Therapy Assistant OT    AS 03/25/19 -  Destinee Yarbrough OT Occupational Therapist OT                OT Recommendation and Plan  Outcome Summary/Treatment Plan  (OT)  Daily Summary of Progress (OT): progress toward functional goals is good  Plan for Continued Treatment (OT): cont OT POC  Anticipated Discharge Disposition (OT): anticipate therapy at next level of care  Therapy Frequency (OT Eval): other (see comments)(5-7 days/wk)  Daily Summary of Progress (OT): progress toward functional goals is good  Plan of Care Review  Plan of Care Reviewed With: patient  Plan of Care Reviewed With: patient  Outcome Summary: Pt tolerated tx well this date. Pt c/o pain. Pt gave good effort with UE ther ex. No goals met this tx. Continue OT POC.  Outcome Measures     Row Name 10/18/19 1400 10/17/19 1111 10/16/19 1500       How much help from another person do you currently need...    Turning from your back to your side while in flat bed without using bedrails?  --  3  -GIAN  --    Moving from lying on back to sitting on the side of a flat bed without bedrails?  --  3  -GIAN  --    Moving to and from a bed to a chair (including a wheelchair)?  --  3  -GIAN  --    Standing up from a chair using your arms (e.g., wheelchair, bedside chair)?  --  3  -GIAN  --    Climbing 3-5 steps with a railing?  --  2  -GIAN  --    To walk in hospital room?  --  3  -GIAN  --    AM-PAC 6 Clicks Score (PT)  --  17  -GIAN  --       How much help from another is currently needed...    Putting on and taking off regular lower body clothing?  2  -CS  --  2  -CS    Bathing (including washing, rinsing, and drying)  2  -CS  --  2  -CS    Toileting (which includes using toilet bed pan or urinal)  2  -CS  --  2  -CS    Putting on and taking off regular upper body clothing  2  -CS  --  2  -CS    Taking care of personal grooming (such as brushing teeth)  2  -CS  --  2  -CS    Eating meals  1  -CS  --  1  -CS    AM-PAC 6 Clicks Score (OT)  11  -CS  --  11  -CS       Functional Assessment    Outcome Measure Options  --  AM-PAC 6 Clicks Basic Mobility (PT)  -GIAN  --    Row Name 10/16/19 1437 10/15/19 1554          How much help from  another person do you currently need...    Turning from your back to your side while in flat bed without using bedrails?  3  -GIAN  3  -JCA     Moving from lying on back to sitting on the side of a flat bed without bedrails?  3  -GIAN  3  -JCA     Moving to and from a bed to a chair (including a wheelchair)?  3  -GIAN  3  -JCA     Standing up from a chair using your arms (e.g., wheelchair, bedside chair)?  3  -GIAN  3  -JCA     Climbing 3-5 steps with a railing?  2  -GIAN  2  -JCA     To walk in hospital room?  3  -GIAN  3  -JCA     AM-PAC 6 Clicks Score (PT)  17  -GIAN  17  -JCA        Functional Assessment    Outcome Measure Options  AM-PAC 6 Clicks Basic Mobility (PT)  -GIAN  AM-PAC 6 Clicks Basic Mobility (PT)  -JCA       User Key  (r) = Recorded By, (t) = Taken By, (c) = Cosigned By    Initials Name Provider Type    JC Judy Watkins, PT Physical Therapist    GIAN Abran Wan, PTA Physical Therapy Assistant    Brenda Lee COTA/LIANE Occupational Therapy Assistant           Time Calculation:   Time Calculation- OT     Row Name 10/18/19 1407             Time Calculation- OT    OT Start Time  1130  -CS      OT Stop Time  1153  -CS      OT Time Calculation (min)  23 min  -CS      Total Timed Code Minutes- OT  23 minute(s)  -CS      OT Received On  10/18/19  -        User Key  (r) = Recorded By, (t) = Taken By, (c) = Cosigned By    Initials Name Provider Type     Brenda Thomas COTA/LIANE Occupational Therapy Assistant        Therapy Charges for Today     Code Description Service Date Service Provider Modifiers Qty    [id number]  OT THER PROC EA 15 MIN 10/18/2019 Brenda Thomas COTA/L GO 2               ROSEANNA Barton  10/18/2019

## 2019-10-18 NOTE — PLAN OF CARE
Problem: Patient Care Overview  Goal: Plan of Care Review  Outcome: Ongoing (interventions implemented as appropriate)   10/18/19 0418   Coping/Psychosocial   Plan of Care Reviewed With patient   Plan of Care Review   Progress no change     Goal: Individualization and Mutuality  Outcome: Ongoing (interventions implemented as appropriate)    Goal: Discharge Needs Assessment  Outcome: Ongoing (interventions implemented as appropriate)    Goal: Interprofessional Rounds/Family Conf  Outcome: Ongoing (interventions implemented as appropriate)      Problem: Fall Risk (Adult)  Goal: Absence of Fall  Outcome: Ongoing (interventions implemented as appropriate)      Problem: Skin Injury Risk (Adult)  Goal: Skin Health and Integrity  Outcome: Ongoing (interventions implemented as appropriate)      Problem: Pain, Chronic (Adult)  Goal: Acceptable Pain/Comfort Level and Functional Ability  Outcome: Ongoing (interventions implemented as appropriate)      Problem: Wound (Includes Pressure Injury) (Adult)  Goal: Signs and Symptoms of Listed Potential Problems Will be Absent, Minimized or Managed (Wound)  Outcome: Ongoing (interventions implemented as appropriate)      Problem: Airway, Artificial (Adult)  Goal: Signs and Symptoms of Listed Potential Problems Will be Absent, Minimized or Managed (Airway, Artificial)  Outcome: Ongoing (interventions implemented as appropriate)      Problem: Nutrition, Enteral (Adult)  Goal: Signs and Symptoms of Listed Potential Problems Will be Absent, Minimized or Managed (Nutrition, Enteral)  Outcome: Ongoing (interventions implemented as appropriate)    Goal: Signs and Symptoms of Listed Potential Problems Will be Absent, Minimized or Managed (Nutrition, Enteral)  Outcome: Ongoing (interventions implemented as appropriate)

## 2019-10-18 NOTE — THERAPY TREATMENT NOTE
Acute Care - Physical Therapy Treatment Note  [facility]     Patient Name: Emerson Bang  : 1960  MRN: [id number]  Today's Date: 10/18/2019  Onset of Illness/Injury or Date of Surgery: 19     Referring Physician: Dr. Kc    Admit Date: 2019    Visit Dx:    ICD-10-CM ICD-9-CM   1. 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 Impaired physical mobility Z74.09 781.99   3. Impaired mobility and activities of daily living Z74.09 799.89     Patient Active Problem List   Diagnosis   • Hyperkalemia   • Iron deficiency anemia   • Gastroesophageal reflux disease with esophagitis   • Elevated AST (SGOT)   • Alcohol abuse   • Hypothyroidism   • Balance problem   • Need for vaccination   • Low magnesium levels   • Squamous cell carcinoma of pharynx (CMS/HCC)   • History of throat cancer   • Vitamin D deficiency   • Alcohol abuse counseling and surveillance   • Encounter for screening for diabetes mellitus   • Hypomagnesemia   • Iron deficiency anemia   • Hyperlipidemia   • Underweight due to inadequate caloric intake   • Need for immunization against influenza   • 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HCC)   • Hypertension   • General medical examination   • Underweight   • Epigastric pain   • Gastritis without bleeding   • Need for influenza vaccination   • Elevated liver function tests   • B12 deficiency   • Intestinal malabsorption   • Throat pain   • Acute pharyngitis   • Upper respiratory tract infection   • Neoplasm of uncertain behavior of larynx   • Pharyngoesophageal dysphagia   • Severe protein-calorie malnutrition (CMS/HCC)   • Anemia of chronic disease   • Hypotension   • Hyponatremia   • Status post insertion of percutaneous endoscopic gastrostomy (PEG) tube (CMS/HCC)   • Hx of tracheostomy   • History of throat surgery   • Hx SBO   • Urinary retention   • Generalized weakness   • Acute otitis externa of right ear   • Hard stool   • Panlobular emphysema (CMS/HCC)   • Cancer of  hypopharynx (CMS/HCC)   • Diarrhea   • Encounter for venous access device care   • Prediabetes   • Status post neck dissection   • S/P partial thyroidectomy   • S/P laryngectomy   • Annual physical exam   • Alcoholic cirrhosis of liver without ascites (CMS/HCC)       Therapy Treatment    Rehabilitation Treatment Summary     Row Name 10/18/19 1406 10/18/19 1130          Treatment Time/Intention    Discipline  physical therapy assistant  -GIAN  occupational therapy assistant  -CS     Document Type  therapy note (daily note)  -GIAN  therapy note (daily note)  -CS     Subjective Information  --  complains of;pain  -CS     Mode of Treatment  physical therapy;individual therapy  -GIAN  occupational therapy  -CS     Therapy Frequency (PT Clinical Impression)  daily  -GIAN  --     Therapy Frequency (OT Eval)  --  other (see comments) 5-7 days/wk  -CS     Patient Effort  adequate  -JC2  fair  -CS     Comment  pt coughing much. during gait, noticed pts trach collar was loose and you can see and about and inch of the trach tube. pt  returned to sitting and nsg notified. RT therapy observed and did not seem concerned.   -JC2  --     Existing Precautions/Restrictions  fall;other (see comments) watch gait belt placement s/p abdominal surgery  -GIAN  fall;other (see comments) watch gait belt placement  -CS     Recorded by [GIAN] Abran Wan, PTA 10/18/19 1420  [JC2] Abran Wan, PTA 10/18/19 1442 [CS] Brenda Thomas, MADSEN/L 10/18/19 1403     Row Name 10/18/19 1406 10/18/19 1130          Vital Signs    Pre Systolic BP Rehab  103  -GIAN  --     Pre Treatment Diastolic BP  68  -GIAN  --     Post Systolic BP Rehab  135  -JC2  --     Post Treatment Diastolic BP  74  -JC2  --     Pretreatment Heart Rate (beats/min)  98  -GIAN  --     Posttreatment Heart Rate (beats/min)  97  -JC2  --     Pre SpO2 (%)  98  -GIAN  --     O2 Delivery Pre Treatment  trach collar  -GIAN  --     Post SpO2 (%)  97  -JC2  --     O2 Delivery Post Treatment  trach collar   -JC2  --     Pre Patient Position  Sitting  -GIAN  Supine  -CS     Intra Patient Position  --  -JC3  --     Post Patient Position  Supine  -JC3  Supine  -CS     Recorded by [GIAN] Abran Wan, PTA 10/18/19 1420  [JC2] Abran Wan, PTA 10/18/19 1428  [JC3] Abran Wan, PTA 10/18/19 1442 [CS] Brenda Thomas, MADSEN/L 10/18/19 1403     Row Name 10/18/19 1406 10/18/19 1130          Cognitive Assessment/Intervention- PT/OT    Affect/Mental Status (Cognitive)  --  unable/difficult to assess;flat/blunted affect  -CS     Behavioral Issues (Cognitive)  --  unable/difficult to assess  -CS     Orientation Status (Cognition)  oriented x 4  -GIAN  oriented x 4  -CS     Follows Commands (Cognition)  WFL  -GIAN  WFL  -CS     Recorded by [GIAN] Abran Wan, PTA 10/18/19 1420 [CS] Brenda Thomas, MADSEN/L 10/18/19 1403     Row Name 10/18/19 1406             Safety Issues, Functional Mobility    Impairments Affecting Function (Mobility)  balance;endurance/activity tolerance;shortness of breath;strength  -GIAN      Recorded by [GIAN] Abran Wan, PTA 10/18/19 1420      Row Name 10/18/19 1406             Bed Mobility Assessment/Treatment    Bed Mobility Assessment/Treatment  sit-supine;scooting/bridging  -GIAN      Scooting/Bridging [place] (Bed Mobility)  supervision  -GIAN      Supine-Sit [place] (Bed Mobility)  supervision  -JC2      Assistive Device (Bed Mobility)  bed rails;head of bed elevated  -JC2      Recorded by [GIAN] Abran Wan, PTA 10/18/19 1442  [JC2] Abran Wan, PTA 10/18/19 1420      Row Name 10/18/19 1406             Transfer Assessment/Treatment    Transfer Assessment/Treatment  sit-stand transfer;stand-sit transfer;chair-bed transfer  -GIAN      Recorded by [GIAN] Abran Wan, PTA 10/18/19 1442      Row Name 10/18/19 1406             Bed-Chair Transfer    Bed-Chair [place] (Transfers)  --  -GIAN      Assistive Device (Bed-Chair Transfers)  --  -GIAN      Recorded by [GIAN] Abran Wan  R, PTA 10/18/19 1442      Row Name 10/18/19 1406             Chair-Bed Transfer    Chair-Bed [place] (Transfers)  contact guard  -GIAN      Assistive Device (Chair-Bed Transfers)  walker, front-wheeled  -GIAN      Recorded by [GIAN] Abran Wan, PTA 10/18/19 1442      Row Name 10/18/19 1406             Sit-Stand Transfer    Sit-Stand [place] (Transfers)  contact guard  -GIAN      Assistive Device (Sit-Stand Transfers)  walker, front-wheeled  -GIAN      Recorded by [GIAN] Abran Wan, PTA 10/18/19 1420      Row Name 10/18/19 1406             Stand-Sit Transfer    Stand-Sit [place] (Transfers)  contact guard  -GIAN      Assistive Device (Stand-Sit Transfers)  walker, front-wheeled  -GIAN      Recorded by [GIAN] Abran Wan, PTA 10/18/19 1420      Row Name 10/18/19 1406             Gait/Stairs Assessment/Training    Gait/Stairs Assessment/Training  gait/ambulation assistive device  -GIAN      [place] Level (Gait)  contact guard;verbal cues  -GIAN      Assistive Device (Gait)  walker, front-wheeled  -GIAN      Distance in Feet (Gait)  3, 32  -JC2      Pattern (Gait)  step-to  -GIAN      Deviations/Abnormal Patterns (Gait)  essence decreased;stride length decreased  -GIAN      Bilateral Gait Deviations  forward flexed posture  -GIAN      Recorded by [GIAN] Abran Wan, PTA 10/18/19 1420  [JC2] Abran Wan, PTA 10/18/19 1442      Row Name 10/18/19 1130             Therapeutic Exercise    Upper Extremity (Therapeutic Exercise)  bicep curl, bilateral  -CS      Upper Extremity Range of Motion (Therapeutic Exercise)  shoulder flexion/extension, bilateral;shoulder internal/external rotation, bilateral;elbow flexion/extension, bilateral;forearm supination/pronation, bilateral;wrist flexion/extension, bilateral  -CS      Hand (Therapeutic Exercise)  finger flexion/extension, bilateral  -CS      Weight/Resistance (Therapeutic Exercise)  1 pound  -CS      Exercise Type (Therapeutic Exercise)  AROM (active range of  motion)  -CS      Position (Therapeutic Exercise)  supine  -CS      Sets/Reps (Therapeutic Exercise)  1/20  -CS      Equipment (Therapeutic Exercise)  free weight, barbell  -CS      Expected Outcome (Therapeutic Exercise)  improve functional tolerance, self-care activity;improve performance, BADLs;improve performance, transfer skills;increase active range of motion  -CS      Recorded by [CS] Brenda Thomas MADSEN/L 10/18/19 1403      Row Name 10/18/19 1406 10/18/19 1130          Positioning and Restraints    Pre-Treatment Position  sitting in chair/recliner w/ MT  -GIAN  in bed  -CS     Post Treatment Position  bed  -GIAN  bed  -CS     In Bed  fowlers;call light within reach;encouraged to call for assist;exit alarm on  -GIAN  supine;call light within reach;encouraged to call for assist;exit alarm on  -CS     Recorded by [GIAN] Abran Wan, PTA 10/18/19 1442 [CS] Brenda Thomas MADSEN/L 10/18/19 1403     Row Name 10/18/19 1406             Pain Assessment    Additional Documentation  Pain Scale: Numbers Pre/Post-Treatment (Group)  -GIAN      Recorded by [GIAN] Abran Wan, PTA 10/18/19 1442      Row Name 10/18/19 1406 10/18/19 1130          Pain Scale: Numbers Pre/Post-Treatment    Pain Scale: Numbers, Pretreatment  9/10  -GIAN  7/10  -CS     Pain Scale: Numbers, Post-Treatment  9/10  -JC2  7/10  -CS     Pain Location  abdomen  -GIAN  abdomen  -CS     Pain Intervention(s)  Repositioned  -JC2  Medication (See MAR)  -CS     Recorded by [GIAN] Abran Wan, PTA 10/18/19 1420  [JC2] Abran Wan, PTA 10/18/19 1442 [CS] Brenda Thomas, MADSEN/L 10/18/19 1403     Row Name                Wound 09/30/19 1330 Left medial abdomen Fistula    Wound - Properties Group Date first assessed: 09/30/19 [SS] Time first assessed: 1330 [SS] Side: Left [SS] Orientation: medial [SS] Location: abdomen [SS] Primary Wound Type: Fistula [SS] Recorded by:  [SS] Citlalli Abdi RN 09/30/19 1619    Row Name                Wound 10/02/19  1347 abdomen Incision    Wound - Properties Group Date first assessed: 10/02/19 [CF] Time first assessed: 1347 [CF] Present on Hospital Admission: N [CF] Location: abdomen [CF] Primary Wound Type: Incision [CF] Recorded by:  [CF] Eileen Tyson 10/02/19 1347    Row Name 10/18/19 1130             Outcome Summary/Treatment Plan (OT)    Daily Summary of Progress (OT)  progress toward functional goals is good  -CS      Plan for Continued Treatment (OT)  cont OT POC  -CS      Anticipated Discharge Disposition (OT)  anticipate therapy at next level of care  -CS      Recorded by [CS] Brenda Thomas MADSEN/L 10/18/19 1403      Row Name 10/18/19 1406             Outcome Summary/Treatment Plan (PT)    Daily Summary of Progress (PT)  progress toward functional goals as expected  -GIAN      Plan for Continued Treatment (PT)  continue  -GIAN      Anticipated Discharge Disposition (PT)  anticipate therapy at next level of care  -JC2      Recorded by [GIAN] Abran Wan, PTA 10/18/19 1442  [JC2] Abran Wan, PTA 10/18/19 1420        User Key  (r) = Recorded By, (t) = Taken By, (c) = Cosigned By    Initials Name Effective Dates Discipline    SS Citlalli Abdi RN 07/10/18 -  Nurse    GIAN Abran Wan, PTA 03/07/18 -  PT    CS Brenda Thomas, MADSEN/L 03/07/18 -  OT    CF Eileen Tyson - -          Wound 09/30/19 1330 Left medial abdomen Fistula (Active)   Dressing Appearance dry;intact 10/17/2019  8:26 PM   Closure None 10/17/2019  8:26 PM   Base dressing in place, unable to visualize 10/17/2019  8:26 PM   Dressing Care, Wound dressing changed 10/18/2019  8:45 AM       Wound 10/02/19 1347 abdomen Incision (Active)   Dressing Appearance dry;intact 10/18/2019  8:45 AM   Closure Open to air 10/18/2019  8:45 AM   Periwound dry 10/18/2019  8:45 AM   [place] Temperature warm 10/18/2019  8:45 AM   Periwound Skin Turgor soft 10/18/2019  8:45 AM       Rehab Goal Summary     Row Name 10/18/19 1406 10/18/19 0222           Bed Mobility Goal 1 (PT)    Activity/Assistive Device (Bed Mobility Goal 1, PT)  sit to supine;supine to sit  -GIAN  --     [place] Level/Cues Needed (Bed Mobility Goal 1, PT)  independent  -GIAN  --     Time Frame (Bed Mobility Goal 1, PT)  by discharge  -GIAN  --     Progress/Outcomes (Bed Mobility Goal 1, PT)  goal not met  -GIAN  --        Transfer Goal 1 (PT)    Activity/Assistive Device (Transfer Goal 1, PT)  bed-to-chair/chair-to-bed;toilet  -  --     [place] Level/Cues Needed (Transfer Goal 1, PT)  standby assist  -GIAN  --     Time Frame (Transfer Goal 1, PT)  by discharge  -GIAN  --     Progress/Outcome (Transfer Goal 1, PT)  goal not met;goal revised this date  -  --        Gait Training Goal 1 (PT)    Activity/Assistive Device (Gait Training Goal 1, PT)  gait (walking locomotion);assistive device use  -  --     [place] Level (Gait Training Goal 1, PT)  contact guard assist  -  --     Distance (Gait Goal 1, PT)  150ft  -GIAN  --     Time Frame (Gait Training Goal 1, PT)  by discharge  -GIAN  --     Progress/Outcome (Gait Training Goal 1, PT)  goal not met;goal revised this date  -  --        Stairs Goal 1 (PT)    Activity/Assistive Device (Stairs Goal 1, PT)  ascending stairs;descending stairs  -  --     [place] Level/Cues Needed (Stairs Goal 1, PT)  standby assist  -  --     Number of Stairs (Stairs Goal 1, PT)  5  -GIAN  --     Time Frame (Stairs Goal 1, PT)  by discharge  -GIAN  --     Progress/Outcome (Stairs Goal 1, PT)  goal not met  -  --        Occupational Therapy Goals    Transfer Goal Selection (OT)  --  transfer, OT goal 1  -CS     Bathing Goal Selection (OT)  --  bathing, OT goal 1  -CS     Dressing Goal Selection (OT)  --  dressing, OT goal 1  -CS     Toileting Goal Selection (OT)  --  toileting, OT goal 1  -CS     Activity Tolerance Goal Selection (OT)  --  activity tolerance, OT goal 1  -CS        Transfer Goal 1 (OT)    Activity/Assistive Device (Transfer Goal 1, OT)  --   sit-to-stand/stand-to-sit;bed-to-chair/chair-to-bed;toilet  -CS     [place] Level/Cues Needed (Transfer Goal 1, OT)  --  supervision required  -CS     Time Frame (Transfer Goal 1, OT)  --  long term goal (LTG);by discharge  -CS     Progress/Outcome (Transfer Goal 1, OT)  --  goal not met  -CS        Bathing Goal 1 (OT)    Activity/Assistive Device (Bathing Goal 1, OT)  --  bathing skills, all  -CS     [place] Level/Cues Needed (Bathing Goal 1, OT)  --  minimum assist (75% or more patient effort)  -CS     Time Frame (Bathing Goal 1, OT)  --  long term goal (LTG);by discharge  -CS     Barriers (Bathing Goal 1, OT)  --  Pt will need extra time and rest breaks PRN  -CS     Progress/Outcomes (Bathing Goal 1, OT)  --  goal not met  -CS        Dressing Goal 1 (OT)    Activity/Assistive Device (Dressing Goal 1, OT)  --  dressing skills, all  -CS     [place]/Cues Needed (Dressing Goal 1, OT)  --  minimum assist (75% or more patient effort)  -CS     Time Frame (Dressing Goal 1, OT)  --  long term goal (LTG);by discharge  -CS     Barriers (Dressing Goal 1, OT)  --  Pt will need extra time and rest breaks PRN  -CS     Progress/Outcome (Dressing Goal 1, OT)  --  goal not met  -CS        Toileting Goal 1 (OT)    Activity/Device (Toileting Goal 1, OT)  --  toileting skills, all  -CS     [place] Level/Cues Needed (Toileting Goal 1, OT)  --  minimum assist (75% or more patient effort)  -CS     Time Frame (Toileting Goal 1, OT)  --  long term goal (LTG);by discharge  -CS     Barriers (Toileting Goal 1, OT)  --  Pt will need extra time and rest breaks PRN  -CS     Progress/Outcome (Toileting Goal 1, OT)  --  goal not met  -CS         Activity Tolerance Goal 1 (OT)    Activity Level (Endurance Goal 1, OT)  --  15 min activity functional/therapeutic activity  -CS     Time Frame (Activity Tolerance Goal 1, OT)  --  long term goal (LTG);by discharge  -CS     Progress/Outcome (Activity Tolerance Goal 1, OT)  --  goal  not met  -       User Key  (r) = Recorded By, (t) = Taken By, (c) = Cosigned By    Initials Name Provider Type Discipline     Abran Wan PTA Physical Therapy Assistant PT    CS Brenda Thomas COTA/L Occupational Therapy Assistant OT          Physical Therapy Education     Title: PT OT SLP Therapies (In Progress)     Topic: Physical Therapy (In Progress)     Point: Mobility training (In Progress)     Learning Progress Summary           Patient Acceptance, E, NR by  at 10/18/2019  2:43 PM    Acceptance, E, NR by  at 10/17/2019 12:35 PM    Acceptance, E, NR by  at 10/16/2019  4:06 PM    Acceptance, E, NR by  at 10/13/2019  1:00 PM    Acceptance, E, NR by  at 10/11/2019  3:17 PM    Acceptance, E, NR by  at 10/9/2019  2:59 PM    Acceptance, E, NR by  at 10/8/2019  4:00 PM    Acceptance, E, NR by [facility] at 10/3/2019  3:43 PM                   Point: Body mechanics (In Progress)     Learning Progress Summary           Patient Acceptance, E, NR by [facility] at 10/3/2019  3:43 PM                   Point: Precautions (In Progress)     Learning Progress Summary           Patient Acceptance, E, NR by [facility] at 10/3/2019  3:43 PM                               User Key     Initials Effective Dates Name Provider Type Discipline    [facility] 04/03/18 -  Judy Watkins, PT Physical Therapist PT     03/07/18 -  Abran Wan PTA Physical Therapy Assistant PT                PT Recommendation and Plan  Anticipated Discharge Disposition (PT): anticipate therapy at next level of care  Therapy Frequency (PT Clinical Impression): daily  Outcome Summary/Treatment Plan (PT)  Daily Summary of Progress (PT): progress toward functional goals as expected  Plan for Continued Treatment (PT): continue  Anticipated Discharge Disposition (PT): anticipate therapy at next level of care  Plan of Care Reviewed With: patient  Progress: improving  Outcome Summary: pt responded well to therapy. pt requires CGA for t/fs and gait. pt amb 32  ft CGA w/ RW. pt coughing much throughout tx. noticed that pts trach collar is loose and about an inch of the trach tube is visible. nsg and RT notified. RT is not concerned. no new goals met at this time. pt would continue to benefit from PT services.   Outcome Measures     Row Name 10/18/19 1406 10/18/19 1400 10/17/19 1111       How much help from another person do you currently need...    Turning from your back to your side while in flat bed without using bedrails?  3  -GIAN  --  3  -GIAN    Moving from lying on back to sitting on the side of a flat bed without bedrails?  3  -GIAN  --  3  -GIAN    Moving to and from a bed to a chair (including a wheelchair)?  3  -GIAN  --  3  -GIAN    Standing up from a chair using your arms (e.g., wheelchair, bedside chair)?  3  -GIAN  --  3  -GIAN    Climbing 3-5 steps with a railing?  2  -GIAN  --  2  -GIAN    To walk in hospital room?  3  -GIAN  --  3  -GIAN    AM-PAC 6 Clicks Score (PT)  17  -GIAN  --  17  -GIAN       How much help from another is currently needed...    Putting on and taking off regular lower body clothing?  --  2  -CS  --    Bathing (including washing, rinsing, and drying)  --  2  -CS  --    Toileting (which includes using toilet bed pan or urinal)  --  2  -CS  --    Putting on and taking off regular upper body clothing  --  2  -CS  --    Taking care of personal grooming (such as brushing teeth)  --  2  -CS  --    Eating meals  --  1  -CS  --    AM-PAC 6 Clicks Score (OT)  --  11  -CS  --       Functional Assessment    Outcome Measure Options  AM-PAC 6 Clicks Basic Mobility (PT)  -GIAN  --  AM-PAC 6 Clicks Basic Mobility (PT)  -GIAN    Row Name 10/16/19 1500 10/16/19 1437 10/15/19 1554       How much help from another person do you currently need...    Turning from your back to your side while in flat bed without using bedrails?  --  3  -GIAN  3  -JCA    Moving from lying on back to sitting on the side of a flat bed without bedrails?  --  3  -GIAN  3  -JCA    Moving to and from a bed to a  chair (including a wheelchair)?  --  3  -GIAN  3  -JCA    Standing up from a chair using your arms (e.g., wheelchair, bedside chair)?  --  3  -GIAN  3  -JCA    Climbing 3-5 steps with a railing?  --  2  -GIAN  2  -JCA    To walk in hospital room?  --  3  -GIAN  3  -JCA    AM-PAC 6 Clicks Score (PT)  --  17  -GIAN  17  -JCA       How much help from another is currently needed...    Putting on and taking off regular lower body clothing?  2  -CS  --  --    Bathing (including washing, rinsing, and drying)  2  -CS  --  --    Toileting (which includes using toilet bed pan or urinal)  2  -CS  --  --    Putting on and taking off regular upper body clothing  2  -CS  --  --    Taking care of personal grooming (such as brushing teeth)  2  -CS  --  --    Eating meals  1  -CS  --  --    AM-PAC 6 Clicks Score (OT)  11  -CS  --  --       Functional Assessment    Outcome Measure Options  --  AM-PAC 6 Clicks Basic Mobility (PT)  -GIAN  AM-PAC 6 Clicks Basic Mobility (PT)  -JCA      User Key  (r) = Recorded By, (t) = Taken By, (c) = Cosigned By    Initials Name Provider Type    [facility] Judy Watkins, PT Physical Therapist    Abran Ferreira PTA Physical Therapy Assistant    Brenda Lee, MADSEN/LIANE Occupational Therapy Assistant         Time Calculation:   PT Charges     Row Name 10/18/19 1446             Time Calculation    Start Time  1406  -      Stop Time  1430  -      Time Calculation (min)  24 min  -         Time Calculation- PT    Total Timed Code Minutes- PT  24 minute(s)  -        User Key  (r) = Recorded By, (t) = Taken By, (c) = Cosigned By    Initials Name Provider Type    Abran Ferreira PTA Physical Therapy Assistant        Therapy Charges for Today     Code Description Service Date Service Provider Modifiers Qty    60925739283  GAIT TRAINING EA 15 MIN 10/17/2019 Abran Wan, PTA GP 1    86653920318 HC PT THERAPEUTIC ACT EA 15 MIN 10/17/2019 Abran Wan PTA GP 1    73337520925 HC PT THER PROC EA 15  MIN 10/17/2019 Abran Wan, PTA GP 1    25721202162 HC GAIT TRAINING EA 15 MIN 10/18/2019 Abran Wan, PTA GP 1    54903268334 HC PT THERAPEUTIC ACT EA 15 MIN 10/18/2019 Abran Wan, PTA GP 1          PT G-Codes  Outcome Measure Options: AM-PAC 6 Clicks Basic Mobility (PT)  AM-PAC 6 Clicks Score (PT): 17  AM-PAC 6 Clicks Score (OT): 11    Abran Wan PTA  10/18/2019

## 2019-10-18 NOTE — CONSULTS
Adult Nutrition  Assessment    Patient Name:  Emerson Bang  YOB: 1960  MRN: [id number]  Admit Date:  9/30/2019    Assessment Date:  10/18/2019    Comments:  Pt tolerating TF at goal rate, meeting 100% of bruce/pro needs. Pt denied by LTACH services, awaiting other placement. RD will cont to monitor.     Reason for Assessment     Row Name 10/18/19 1401          Reason for Assessment    Reason For Assessment  follow-up protocol;TF/PN     Identified At Risk by Screening Criteria  tube feeding or parenteral nutrition             Labs/Tests/Procedures/Meds     Row Name 10/18/19 1404          Labs/Procedures/Meds    Lab Results Reviewed  reviewed        Medications    Pertinent Medications Reviewed  reviewed         Physical Findings     Row Name 10/18/19 1404          Physical Findings    Gastrointestinal  feeding tube           Nutrition Prescription Ordered     Row Name 10/18/19 1405          Nutrition Prescription EN    Enteral Route  [place]     Product  Isosource HN (Osmolite 1.2)     Continuous TF Goal Rate (mL/hr)  60 mL/hr     Continuous TF Current Rate (mL/hr)  60 mL/hr         Evaluation of Received Nutrient/Fluid Intake     Row Name 10/18/19 1406          Calories Evaluation    Enteral Calories (kcal)  1728     % of Kcal Needs  100        Protein Evaluation    Enteral Protein (gm)  77     % of Protein Needs  100        Intake Assessment    Energy/Calorie Requirement Assessment  meeting needs     Protein Requirement Assessment  meeting needs               Electronically signed by:  Ashley Swanson RD  10/18/19 2:08 PM

## 2019-10-18 NOTE — PLAN OF CARE
Problem: Patient Care Overview  Goal: Plan of Care Review  Outcome: Ongoing (interventions implemented as appropriate)   10/18/19 2114   Coping/Psychosocial   Plan of Care Reviewed With patient   Plan of Care Review   Progress improving   OTHER   Outcome Summary pt responded well to therapy. pt requires CGA for t/fs and gait. pt amb 32 ft CGA w/ RW. pt coughing much throughout tx. noticed that pts trach collar is loose and about an inch of the trach tube is visible. nsg and RT notified. RT is not concerned. no new goals met at this time. pt would continue to benefit from PT services.

## 2019-10-18 NOTE — PLAN OF CARE
Problem: Patient Care Overview  Goal: Plan of Care Review  Outcome: Ongoing (interventions implemented as appropriate)   10/18/19 1341   Coping/Psychosocial   Plan of Care Reviewed With patient   Plan of Care Review   Progress no change   OTHER   Outcome Summary pt. VSS; no changes at this time     Goal: Individualization and Mutuality  Outcome: Ongoing (interventions implemented as appropriate)    Goal: Discharge Needs Assessment  Outcome: Ongoing (interventions implemented as appropriate)    Goal: Interprofessional Rounds/Family Conf  Outcome: Ongoing (interventions implemented as appropriate)      Problem: Fall Risk (Adult)  Goal: Absence of Fall  Outcome: Ongoing (interventions implemented as appropriate)      Problem: Skin Injury Risk (Adult)  Goal: Skin Health and Integrity  Outcome: Ongoing (interventions implemented as appropriate)      Problem: Pain, Chronic (Adult)  Goal: Acceptable Pain/Comfort Level and Functional Ability  Outcome: Ongoing (interventions implemented as appropriate)      Problem: Wound (Includes Pressure Injury) (Adult)  Goal: Signs and Symptoms of Listed Potential Problems Will be Absent, Minimized or Managed (Wound)  Outcome: Ongoing (interventions implemented as appropriate)      Problem: Airway, Artificial (Adult)  Goal: Signs and Symptoms of Listed Potential Problems Will be Absent, Minimized or Managed (Airway, Artificial)  Outcome: Ongoing (interventions implemented as appropriate)      Problem: Nutrition, Enteral (Adult)  Goal: Signs and Symptoms of Listed Potential Problems Will be Absent, Minimized or Managed (Nutrition, Enteral)  Outcome: Ongoing (interventions implemented as appropriate)    Goal: Signs and Symptoms of Listed Potential Problems Will be Absent, Minimized or Managed (Nutrition, Enteral)  Outcome: Ongoing (interventions implemented as appropriate)

## 2019-10-19 LAB — GLUCOSE BLDC GLUCOMTR-MCNC: 142 MG/DL (ref 70–130)

## 2019-10-19 PROCEDURE — 82962 GLUCOSE BLOOD TEST: CPT

## 2019-10-19 PROCEDURE — 97116 GAIT TRAINING THERAPY: CPT

## 2019-10-19 PROCEDURE — 94760 N-INVAS EAR/PLS OXIMETRY 1: CPT

## 2019-10-19 PROCEDURE — 97110 THERAPEUTIC EXERCISES: CPT

## 2019-10-19 PROCEDURE — 25010000002 ENOXAPARIN PER 10 MG: Performed by: SURGERY

## 2019-10-19 PROCEDURE — 94799 UNLISTED PULMONARY SVC/PX: CPT

## 2019-10-19 PROCEDURE — 97530 THERAPEUTIC ACTIVITIES: CPT

## 2019-10-19 RX ORDER — SODIUM CHLORIDE 9 MG/ML
INJECTION, SOLUTION INTRAVENOUS
Status: COMPLETED
Start: 2019-10-19 | End: 2019-10-19

## 2019-10-19 RX ADMIN — SILVER SULFADIAZINE: 10 CREAM TOPICAL at 08:28

## 2019-10-19 RX ADMIN — ENOXAPARIN SODIUM 40 MG: 40 INJECTION SUBCUTANEOUS at 08:28

## 2019-10-19 RX ADMIN — HYDROCODONE BITARTRATE AND ACETAMINOPHEN 15 ML: 7.5; 325 SOLUTION ORAL at 16:06

## 2019-10-19 RX ADMIN — SODIUM CHLORIDE 500 ML: 9 INJECTION, SOLUTION INTRAVENOUS at 08:48

## 2019-10-19 RX ADMIN — ALBUTEROL SULFATE 2.5 MG: 2.5 SOLUTION RESPIRATORY (INHALATION) at 07:00

## 2019-10-19 RX ADMIN — POTASSIUM CHLORIDE, DEXTROSE MONOHYDRATE AND SODIUM CHLORIDE 50 ML/HR: 150; 5; 450 INJECTION, SOLUTION INTRAVENOUS at 08:49

## 2019-10-19 RX ADMIN — SILVER SULFADIAZINE: 10 CREAM TOPICAL at 21:34

## 2019-10-19 RX ADMIN — ALBUTEROL SULFATE 2.5 MG: 2.5 SOLUTION RESPIRATORY (INHALATION) at 12:01

## 2019-10-19 RX ADMIN — FAMOTIDINE 20 MG: 40 POWDER, FOR SUSPENSION ORAL at 11:47

## 2019-10-19 RX ADMIN — HYDROCODONE BITARTRATE AND ACETAMINOPHEN 15 ML: 7.5; 325 SOLUTION ORAL at 22:07

## 2019-10-19 RX ADMIN — HYDROCODONE BITARTRATE AND ACETAMINOPHEN 15 ML: 7.5; 325 SOLUTION ORAL at 06:46

## 2019-10-19 RX ADMIN — HYDROCODONE BITARTRATE AND ACETAMINOPHEN 15 ML: 7.5; 325 SOLUTION ORAL at 01:08

## 2019-10-19 RX ADMIN — FAMOTIDINE 20 MG: 40 POWDER, FOR SUSPENSION ORAL at 01:08

## 2019-10-19 RX ADMIN — ALBUTEROL SULFATE 2.5 MG: 2.5 SOLUTION RESPIRATORY (INHALATION) at 19:47

## 2019-10-19 RX ADMIN — POTASSIUM CHLORIDE, DEXTROSE MONOHYDRATE AND SODIUM CHLORIDE 50 ML/HR: 150; 5; 450 INJECTION, SOLUTION INTRAVENOUS at 01:08

## 2019-10-19 RX ADMIN — ALBUTEROL SULFATE 2.5 MG: 2.5 SOLUTION RESPIRATORY (INHALATION) at 01:33

## 2019-10-19 NOTE — PLAN OF CARE
Problem: Patient Care Overview  Goal: Plan of Care Review  Outcome: Ongoing (interventions implemented as appropriate)   10/19/19 1129 10/19/19 4807   Coping/Psychosocial   Plan of Care Reviewed With patient --    Plan of Care Review   Progress --  improving   OTHER   Outcome Summary Pt completed BUE ther ex in all planes w/ 2lb HW and fair tolerance w/ RB's PRN. --

## 2019-10-19 NOTE — PLAN OF CARE
Problem: Patient Care Overview  Goal: Plan of Care Review  Outcome: Ongoing (interventions implemented as appropriate)   10/19/19 2279   Coping/Psychosocial   Plan of Care Reviewed With patient   Plan of Care Review   Progress improving   OTHER   Outcome Summary pt. VSS; no changes; tach care done and dressing to abd changed; will cont to monitor     Goal: Individualization and Mutuality  Outcome: Ongoing (interventions implemented as appropriate)    Goal: Discharge Needs Assessment  Outcome: Ongoing (interventions implemented as appropriate)    Goal: Interprofessional Rounds/Family Conf  Outcome: Ongoing (interventions implemented as appropriate)      Problem: Fall Risk (Adult)  Goal: Absence of Fall  Outcome: Ongoing (interventions implemented as appropriate)      Problem: Skin Injury Risk (Adult)  Goal: Skin Health and Integrity  Outcome: Ongoing (interventions implemented as appropriate)      Problem: Pain, Chronic (Adult)  Goal: Acceptable Pain/Comfort Level and Functional Ability  Outcome: Ongoing (interventions implemented as appropriate)      Problem: Wound (Includes Pressure Injury) (Adult)  Goal: Signs and Symptoms of Listed Potential Problems Will be Absent, Minimized or Managed (Wound)  Outcome: Ongoing (interventions implemented as appropriate)      Problem: Airway, Artificial (Adult)  Goal: Signs and Symptoms of Listed Potential Problems Will be Absent, Minimized or Managed (Airway, Artificial)  Outcome: Ongoing (interventions implemented as appropriate)      Problem: Nutrition, Enteral (Adult)  Goal: Signs and Symptoms of Listed Potential Problems Will be Absent, Minimized or Managed (Nutrition, Enteral)  Outcome: Ongoing (interventions implemented as appropriate)    Goal: Signs and Symptoms of Listed Potential Problems Will be Absent, Minimized or Managed (Nutrition, Enteral)  Outcome: Ongoing (interventions implemented as appropriate)

## 2019-10-19 NOTE — THERAPY TREATMENT NOTE
Acute Care - Physical Therapy Progress Note  [facility]     Patient Name: Emerson Bang  : 1960  MRN: [id number]  Today's Date: 10/19/2019  Onset of Illness/Injury or Date of Surgery: 19     Referring Physician: Dr. Kc    Admit Date: 2019    Visit Dx:    ICD-10-CM ICD-9-CM   1. 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 Impaired physical mobility Z74.09 781.99   3. Impaired mobility and activities of daily living Z74.09 799.89     Patient Active Problem List   Diagnosis   • Hyperkalemia   • Iron deficiency anemia   • Gastroesophageal reflux disease with esophagitis   • Elevated AST (SGOT)   • Alcohol abuse   • Hypothyroidism   • Balance problem   • Need for vaccination   • Low magnesium levels   • Squamous cell carcinoma of pharynx (CMS/HCC)   • History of throat cancer   • Vitamin D deficiency   • Alcohol abuse counseling and surveillance   • Encounter for screening for diabetes mellitus   • Hypomagnesemia   • Iron deficiency anemia   • Hyperlipidemia   • Underweight due to inadequate caloric intake   • Need for immunization against influenza   • 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HCC)   • Hypertension   • General medical examination   • Underweight   • Epigastric pain   • Gastritis without bleeding   • Need for influenza vaccination   • Elevated liver function tests   • B12 deficiency   • Intestinal malabsorption   • Throat pain   • Acute pharyngitis   • Upper respiratory tract infection   • Neoplasm of uncertain behavior of larynx   • Pharyngoesophageal dysphagia   • Severe protein-calorie malnutrition (CMS/HCC)   • Anemia of chronic disease   • Hypotension   • Hyponatremia   • Status post insertion of percutaneous endoscopic gastrostomy (PEG) tube (CMS/HCC)   • Hx of tracheostomy   • History of throat surgery   • Hx SBO   • Urinary retention   • Generalized weakness   • Acute otitis externa of right ear   • Hard stool   • Panlobular emphysema (CMS/HCC)   • Cancer of  hypopharynx (CMS/HCC)   • Diarrhea   • Encounter for venous access device care   • Prediabetes   • Status post neck dissection   • S/P partial thyroidectomy   • S/P laryngectomy   • Annual physical exam   • Alcoholic cirrhosis of liver without ascites (CMS/HCC)       Therapy Treatment    Rehabilitation Treatment Summary     Row Name 10/19/19 1040             Treatment Time/Intention    Discipline  physical therapy assistant  -LN      Document Type  therapy note (daily note)  -LN      Subjective Information  complains of;pain wanting to go to the br  -LN      Mode of Treatment  physical therapy;individual therapy  -LN      Therapy Frequency (PT Clinical Impression)  daily  -LN      Patient Effort  adequate  -LN      Existing Precautions/Restrictions  fall;other (see comments) watch gait belt placement s/p abdominal surgery  -LN      Recorded by [LN] Lynda Ramos PTA 10/19/19 1159      Row Name 10/19/19 1040             Vital Signs    Post Systolic BP Rehab  90  -LN      Post Treatment Diastolic BP  58  -LN      Posttreatment Heart Rate (beats/min)  78  -LN      Post SpO2 (%)  93  -LN      O2 Delivery Post Treatment  trach collar  -LN      Pre Patient Position  Supine  -LN      Intra Patient Position  Standing  -LN      Post Patient Position  Supine  -LN      Recorded by [LN] Lynda Ramos PTA 10/19/19 1159      Row Name 10/19/19 1040             Cognitive Assessment/Intervention- PT/OT    Orientation Status (Cognition)  oriented x 4  -LN      Follows Commands (Cognition)  WFL  -LN      Recorded by [LN] Lynda Ramos PTA 10/19/19 1159      Row Name 10/19/19 1040             Safety Issues, Functional Mobility    Impairments Affecting Function (Mobility)  balance;endurance/activity tolerance;shortness of breath;strength  -LN      Recorded by [LN] Lynda Ramos PTA 10/19/19 1159      Row Name 10/19/19 1040             Bed Mobility Assessment/Treatment    Bed Mobility Assessment/Treatment   sit-supine;scooting/bridging  -LN      Scooting/Bridging [place] (Bed Mobility)  supervision  -LN      Supine-Sit [place] (Bed Mobility)  supervision  -LN      Assistive Device (Bed Mobility)  bed rails;head of bed elevated  -LN      Recorded by [LN] Lynda Ramos, PTA 10/19/19 1159      Row Name 10/19/19 1040             Transfer Assessment/Treatment    Transfer Assessment/Treatment  sit-stand transfer;stand-sit transfer;chair-bed transfer  -LN      Recorded by [LN] Lynda Ramos, PTA 10/19/19 1159      Row Name 10/19/19 1040             Chair-Bed Transfer    Chair-Bed [place] (Transfers)  contact guard  -LN      Assistive Device (Chair-Bed Transfers)  walker, front-wheeled  -LN      Recorded by [LN] Lynda Ramos, PTA 10/19/19 1159      Row Name 10/19/19 1040             Sit-Stand Transfer    Sit-Stand [place] (Transfers)  contact guard  -LN      Assistive Device (Sit-Stand Transfers)  walker, front-wheeled  -LN      Recorded by [LN] Lynda Ramos, PTA 10/19/19 1159      Row Name 10/19/19 1040             Stand-Sit Transfer    Stand-Sit [place] (Transfers)  contact guard  -LN      Assistive Device (Stand-Sit Transfers)  walker, front-wheeled  -LN      Recorded by [LN] Lynda Ramos, PTA 10/19/19 1159      Row Name 10/19/19 1040             Toilet Transfer    Type (Toilet Transfer)  sit-stand;stand-sit  -LN      [place] Level (Toilet Transfer)  contact guard  -LN      Assistive Device (Toilet Transfer)  commode;grab bars/safety frame  -LN      Recorded by [LN] Lynda Ramos, PTA 10/19/19 1159      Row Name 10/19/19 1040             Gait/Stairs Assessment/Training    Gait/Stairs Assessment/Training  gait/ambulation assistive device  -LN      [place] Level (Gait)  contact guard;verbal cues  -LN      Assistive Device (Gait)  walker, front-wheeled  -LN      Distance in Feet (Gait)  42  -LN      Pattern (Gait)  step-to  -LN      Deviations/Abnormal Patterns (Gait)  essence  decreased;stride length decreased  -LN      Bilateral Gait Deviations  forward flexed posture  -LN      Recorded by [LN] Lynda Ramos, PTA 10/19/19 1159      Row Name 10/19/19 1040             Lower Extremity Seated Therapeutic Exercise    Performed, Seated Lower Extremity (Therapeutic Exercise)  hip flexion/extension;hip abduction/adduction;ankle dorsiflexion/plantarflexion;LAQ (long arc quad), knee extension  -LN      Exercise Type, Seated Lower Extremity (Therapeutic Exercise)  AROM (active range of motion)  -LN      Sets/Reps Detail, Seated Lower Extremity (Therapeutic Exercise)  1/20  -LN      Recorded by [LN] Lynda Ramos, PTA 10/19/19 1159      Row Name 10/19/19 1040             Positioning and Restraints    Post Treatment Position  bed  -LN      In Bed  supine;call light within reach;encouraged to call for assist;exit alarm on;heels elevated  -LN      Recorded by [LN] Lynda Ramos, PTA 10/19/19 1159      Row Name 10/19/19 1040             Pain Scale: Numbers Pre/Post-Treatment    Pain Scale: Numbers, Pretreatment  8/10  -LN      Pain Scale: Numbers, Post-Treatment  8/10  -LN      Pain Location  abdomen  -LN      Pain Intervention(s)  -- nsg aware  -LN      Recorded by [LN] Lynda Ramos, PTA 10/19/19 1159      Row Name                Wound 09/30/19 1330 Left medial abdomen Fistula    Wound - Properties Group Date first assessed: 09/30/19 [SS] Time first assessed: 1330 [SS] Side: Left [SS] Orientation: medial [SS] Location: abdomen [SS] Primary Wound Type: Fistula [SS] Recorded by:  [SS] Citlalli Abdi RN 09/30/19 1619    Row Name                Wound 10/02/19 1347 abdomen Incision    Wound - Properties Group Date first assessed: 10/02/19 [CF] Time first assessed: 1347 [CF] Present on Hospital Admission: N [CF] Location: abdomen [CF] Primary Wound Type: Incision [CF] Recorded by:  [CF] Eileen Tyson 10/02/19 1347    Row Name 10/19/19 1040             Plan of Care Review    Plan of Care Reviewed With   patient  -LN      Recorded by [LN] Rachel Lynda S, PTA 10/19/19 1159      Row Name 10/19/19 1040             Outcome Summary/Treatment Plan (PT)    Plan for Continued Treatment (PT)  cont  -LN      Anticipated Discharge Disposition (PT)  anticipate therapy at next level of care  -LN      Recorded by [LN] RachelPegjairo VILLATORO, PTA 10/19/19 1159        User Key  (r) = Recorded By, (t) = Taken By, (c) = Cosigned By    Initials Name Effective Dates Discipline    SS Citlalli Abdi RN 07/10/18 -  Nurse    LN Peg Ramosjairo VILLATORO, PTA 03/07/18 -  PT    CF Eileen Tyson Jayne - -          Wound 09/30/19 1330 Left medial abdomen Fistula (Active)   Closure None 10/18/2019  8:30 PM   Care, Wound cleansed with;sterile normal saline 10/18/2019  8:30 PM   Dressing Care, Wound dressing changed;non-adherent 10/19/2019  8:30 AM   Periwound Care, Wound barrier ointment applied 10/19/2019  8:30 AM       Wound 10/02/19 1347 abdomen Incision (Active)   Dressing Appearance dry;intact 10/18/2019  8:30 PM   Closure Open to air 10/19/2019  8:30 AM   Periwound dry 10/19/2019  8:30 AM   Periwound Temperature warm 10/19/2019  8:30 AM   Periwound Skin Turgor soft 10/19/2019  8:30 AM       Rehab Goal Summary     Row Name 10/19/19 1040             Bed Mobility Goal 1 (PT)    Activity/Assistive Device (Bed Mobility Goal 1, PT)  sit to supine;supine to sit  -LN      [place] Level/Cues Needed (Bed Mobility Goal 1, PT)  independent  -LN      Time Frame (Bed Mobility Goal 1, PT)  by discharge  -LN      Progress/Outcomes (Bed Mobility Goal 1, PT)  goal not met  -LN         Transfer Goal 1 (PT)    Activity/Assistive Device (Transfer Goal 1, PT)  bed-to-chair/chair-to-bed;toilet  -LN      [place] Level/Cues Needed (Transfer Goal 1, PT)  standby assist  -LN      Time Frame (Transfer Goal 1, PT)  by discharge  -LN      Progress/Outcome (Transfer Goal 1, PT)  goal not met;goal revised this date  -LN         Gait Training Goal 1 (PT)    Activity/Assistive  Device (Gait Training Goal 1, PT)  gait (walking locomotion);assistive device use  -LN      [place] Level (Gait Training Goal 1, PT)  contact guard assist  -LN      Distance (Gait Goal 1, PT)  150ft  -LN      Time Frame (Gait Training Goal 1, PT)  by discharge  -LN      Progress/Outcome (Gait Training Goal 1, PT)  goal not met;goal revised this date  -LN         Stairs Goal 1 (PT)    Activity/Assistive Device (Stairs Goal 1, PT)  ascending stairs;descending stairs  -LN      [place] Level/Cues Needed (Stairs Goal 1, PT)  standby assist  -LN      Number of Stairs (Stairs Goal 1, PT)  5  -LN      Time Frame (Stairs Goal 1, PT)  by discharge  -LN      Progress/Outcome (Stairs Goal 1, PT)  goal not met  -LN        User Key  (r) = Recorded By, (t) = Taken By, (c) = Cosigned By    Initials Name Provider Type Discipline    Lynda Betts, PTA Physical Therapy Assistant PT          Physical Therapy Education     Title: PT OT SLP Therapies (In Progress)     Topic: Physical Therapy (Done)     Point: Mobility training (Done)     Learning Progress Summary           Patient Acceptance, E,TB, VU by PRASANNA at 10/19/2019 12:00 PM    Acceptance, E, NR by GIAN at 10/18/2019  2:43 PM    Acceptance, E, NR by GIAN at 10/17/2019 12:35 PM    Acceptance, E, NR by GIAN at 10/16/2019  4:06 PM    Acceptance, E, NR by GIAN at 10/13/2019  1:00 PM    Acceptance, E, NR by GIAN at 10/11/2019  3:17 PM    Acceptance, E, NR by GIAN at 10/9/2019  2:59 PM    Acceptance, E, NR by GIAN at 10/8/2019  4:00 PM    Acceptance, E, NR by GIAN1 at 10/3/2019  3:43 PM                   Point: Home exercise program (Done)     Learning Progress Summary           Patient Acceptance, E,TB, VU by PRASANNA at 10/19/2019 12:00 PM                   Point: Body mechanics (Done)     Learning Progress Summary           Patient Acceptance, E,TB, VU by PRASANNA at 10/19/2019 12:00 PM    Acceptance, E, NR by GIAN1 at 10/3/2019  3:43 PM                   Point: Precautions (Done)     Learning Progress  Summary           Patient Acceptance, E,TB, VU by LN at 10/19/2019 12:00 PM    Acceptance, E, NR by [facility] at 10/3/2019  3:43 PM                               User Key     Initials Effective Dates Name Provider Type Discipline    [facility] 04/03/18 -  Judy Watkins, PT Physical Therapist PT    GIAN 03/07/18 -  Abran Wan, PTA Physical Therapy Assistant PT    LN 03/07/18 -  Lynda Ramos, PTA Physical Therapy Assistant PT                PT Recommendation and Plan  Anticipated Discharge Disposition (PT): anticipate therapy at next level of care  Therapy Frequency (PT Clinical Impression): daily  Outcome Summary/Treatment Plan (PT)  Plan for Continued Treatment (PT): cont  Anticipated Discharge Disposition (PT): anticipate therapy at next level of care  Plan of Care Reviewed With: patient  Outcome Summary: sup-sit-sup sba,sit-stand-sit cga of 1,amb 42' with rw and cga of 1,20 reps seated ex-no goals met  Outcome Measures     Row Name 10/19/19 1040 10/18/19 1614 10/18/19 1406       How much help from another person do you currently need...    Turning from your back to your side while in flat bed without using bedrails?  3  -LN  --  3  -GIAN    Moving from lying on back to sitting on the side of a flat bed without bedrails?  3  -LN  --  3  -GIAN    Moving to and from a bed to a chair (including a wheelchair)?  3  -LN  --  3  -GIAN    Standing up from a chair using your arms (e.g., wheelchair, bedside chair)?  3  -LN  --  3  -GIAN    Climbing 3-5 steps with a railing?  2  -LN  --  2  -GIAN    To walk in hospital room?  3  -LN  --  3  -GIAN    AM-PAC 6 Clicks Score (PT)  17  -LN  --  17  -GIAN       How much help from another is currently needed...    Putting on and taking off regular lower body clothing?  --  2  -AS  --    Bathing (including washing, rinsing, and drying)  --  2  -AS  --    Toileting (which includes using toilet bed pan or urinal)  --  2  -AS  --    Putting on and taking off regular upper body clothing  --  3  -AS  --     Taking care of personal grooming (such as brushing teeth)  --  3  -AS  --    Eating meals  --  1  -AS  --    AM-PAC 6 Clicks Score (OT)  --  13  -AS  --       Functional Assessment    Outcome Measure Options  AM-PAC 6 Clicks Basic Mobility (PT)  -LN  AM-PAC 6 Clicks Daily Activity (OT)  -AS  AM-PAC 6 Clicks Basic Mobility (PT)  -GIAN    Row Name 10/18/19 1400 10/17/19 1111 10/16/19 1500       How much help from another person do you currently need...    Turning from your back to your side while in flat bed without using bedrails?  --  3  -GIAN  --    Moving from lying on back to sitting on the side of a flat bed without bedrails?  --  3  -GIAN  --    Moving to and from a bed to a chair (including a wheelchair)?  --  3  -GIAN  --    Standing up from a chair using your arms (e.g., wheelchair, bedside chair)?  --  3  -GIAN  --    Climbing 3-5 steps with a railing?  --  2  -GIAN  --    To walk in hospital room?  --  3  -GIAN  --    AM-PAC 6 Clicks Score (PT)  --  17  -GIAN  --       How much help from another is currently needed...    Putting on and taking off regular lower body clothing?  2  -CS  --  2  -CS    Bathing (including washing, rinsing, and drying)  2  -CS  --  2  -CS    Toileting (which includes using toilet bed pan or urinal)  2  -CS  --  2  -CS    Putting on and taking off regular upper body clothing  2  -CS  --  2  -CS    Taking care of personal grooming (such as brushing teeth)  2  -CS  --  2  -CS    Eating meals  1  -CS  --  1  -CS    AM-PAC 6 Clicks Score (OT)  11  -CS  --  11  -CS       Functional Assessment    Outcome Measure Options  --  AM-PAC 6 Clicks Basic Mobility (PT)  -GIAN  --    Row Name 10/16/19 1437             How much help from another person do you currently need...    Turning from your back to your side while in flat bed without using bedrails?  3  -GIAN      Moving from lying on back to sitting on the side of a flat bed without bedrails?  3  -GIAN      Moving to and from a bed to a chair (including a  wheelchair)?  3  -GIAN      Standing up from a chair using your arms (e.g., wheelchair, bedside chair)?  3  -GIAN      Climbing 3-5 steps with a railing?  2  -GIAN      To walk in hospital room?  3  -GIAN      AM-PAC 6 Clicks Score (PT)  17  -         Functional Assessment    Outcome Measure Options  AM-PAC 6 Clicks Basic Mobility (PT)  -GIAN        User Key  (r) = Recorded By, (t) = Taken By, (c) = Cosigned By    Initials Name Provider Type    GIAN Abran Wan PTA Physical Therapy Assistant    LN Lynda Ramos PTA Physical Therapy Assistant    CS Brenda Thomas, MADSEN/L Occupational Therapy Assistant    AS Destinee Yarbrough, OT Occupational Therapist         Time Calculation:   PT Charges     Row Name 10/19/19 1202             Time Calculation    Start Time  1040  -LN      Stop Time  1118  -LN      Time Calculation (min)  38 min  -LN      PT Received On  10/19/19  -LN         Time Calculation- PT    Total Timed Code Minutes- PT  38 minute(s)  -LN        User Key  (r) = Recorded By, (t) = Taken By, (c) = Cosigned By    Initials Name Provider Type    Lynda Betts PTA Physical Therapy Assistant        Therapy Charges for Today     Code Description Service Date Service Provider Modifiers Qty    74143689577 HC PT THERAPEUTIC ACT EA 15 MIN 10/19/2019 Lynda Ramos, PTA GP 1    95593233454 HC GAIT TRAINING EA 15 MIN 10/19/2019 Lynda Ramos PTA GP 1    06411262658 HC PT THER PROC EA 15 MIN 10/19/2019 Lynda Ramos, MICHAEL GP 1          PT G-Codes  Outcome Measure Options: AM-PAC 6 Clicks Basic Mobility (PT)  AM-PAC 6 Clicks Score (PT): 17  AM-PAC 6 Clicks Score (OT): 13    Lynda S MICHAEL Ramos  10/19/2019

## 2019-10-19 NOTE — PLAN OF CARE
Problem: Patient Care Overview  Goal: Plan of Care Review  Outcome: Ongoing (interventions implemented as appropriate)   10/19/19 9270   Coping/Psychosocial   Plan of Care Reviewed With patient   Plan of Care Review   Progress improving   OTHER   Outcome Summary VSS, pt resting well, no changes       Problem: Fall Risk (Adult)  Goal: Absence of Fall  Outcome: Ongoing (interventions implemented as appropriate)      Problem: Skin Injury Risk (Adult)  Goal: Skin Health and Integrity  Outcome: Ongoing (interventions implemented as appropriate)      Problem: Pain, Chronic (Adult)  Goal: Acceptable Pain/Comfort Level and Functional Ability  Outcome: Ongoing (interventions implemented as appropriate)      Problem: Wound (Includes Pressure Injury) (Adult)  Goal: Signs and Symptoms of Listed Potential Problems Will be Absent, Minimized or Managed (Wound)  Outcome: Ongoing (interventions implemented as appropriate)      Problem: Airway, Artificial (Adult)  Goal: Signs and Symptoms of Listed Potential Problems Will be Absent, Minimized or Managed (Airway, Artificial)  Outcome: Ongoing (interventions implemented as appropriate)      Problem: Nutrition, Enteral (Adult)  Goal: Signs and Symptoms of Listed Potential Problems Will be Absent, Minimized or Managed (Nutrition, Enteral)  Outcome: Ongoing (interventions implemented as appropriate)

## 2019-10-19 NOTE — THERAPY TREATMENT NOTE
Acute Care - Occupational Therapy Treatment Note  [facility]     Patient Name: Emerson Bang  : 1960  MRN: [id number]  Today's Date: 10/19/2019  Onset of Illness/Injury or Date of Surgery: 19  Date of Referral to OT: 10/02/19  Referring Physician: Dr. Kc    Admit Date: 2019       ICD-10-CM ICD-9-CM   1. 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 Impaired physical mobility Z74.09 781.99   3. Impaired mobility and activities of daily living Z74.09 799.89     Patient Active Problem List   Diagnosis   • Hyperkalemia   • Iron deficiency anemia   • Gastroesophageal reflux disease with esophagitis   • Elevated AST (SGOT)   • Alcohol abuse   • Hypothyroidism   • Balance problem   • Need for vaccination   • Low magnesium levels   • Squamous cell carcinoma of pharynx (CMS/HCC)   • History of throat cancer   • Vitamin D deficiency   • Alcohol abuse counseling and surveillance   • Encounter for screening for diabetes mellitus   • Hypomagnesemia   • Iron deficiency anemia   • Hyperlipidemia   • Underweight due to inadequate caloric intake   • Need for immunization against influenza   • 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HCC)   • Hypertension   • General medical examination   • Underweight   • Epigastric pain   • Gastritis without bleeding   • Need for influenza vaccination   • Elevated liver function tests   • B12 deficiency   • Intestinal malabsorption   • Throat pain   • Acute pharyngitis   • Upper respiratory tract infection   • Neoplasm of uncertain behavior of larynx   • Pharyngoesophageal dysphagia   • Severe protein-calorie malnutrition (CMS/HCC)   • Anemia of chronic disease   • Hypotension   • Hyponatremia   • Status post insertion of percutaneous endoscopic gastrostomy (PEG) tube (CMS/HCC)   • Hx of tracheostomy   • History of throat surgery   • Hx SBO   • Urinary retention   • Generalized weakness   • Acute otitis externa of right ear   • Hard stool   • Panlobular emphysema  (CMS/HCC)   • Cancer of hypopharynx (CMS/HCC)   • Diarrhea   • Encounter for venous access device care   • Prediabetes   • Status post neck dissection   • S/P partial thyroidectomy   • S/P laryngectomy   • Annual physical exam   • Alcoholic cirrhosis of liver without ascites (CMS/HCC)     Past Medical History:   Diagnosis Date   • Allergic rhinitis     vs URI   • Anemia    • At risk for falls    • Benign prostatic hyperplasia    • Chronic gastritis    • Cirrhosis of liver (CMS/HCC)    • Complication of gastrostomy (CMS/HCC)     site not healing   • COPD (chronic obstructive pulmonary disease) (CMS/HCC)    • Dysphagia     odynophagia   • Epigastric pain    • Esophagitis    • GERD (gastroesophageal reflux disease)    • Hypertension    • Hypothyroidism, unspecified    • Low back pain    • Malaise and fatigue    • Nasal congestion 11/15/2018   • Nausea with vomiting, unspecified    • Pain in left knee    • Pain in right knee    • Primary malignant neoplasm of pharynx (CMS/HCC)    • Screening for malignant neoplasm of colon    • 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 Left upper quadrant abdominal wall abscess debridement. Gastrostomy tube erosion through & through the anterior gastric wall.Lupper quadrant abdominal wall abscess   • COLONOSCOPY  04/21/2014    Internal & external hemorrhoids found.   • COLONOSCOPY  2014    [place]   • COLONOSCOPY N/A 10/16/2018    Procedure: COLONOSCOPY;  Surgeon: Kaushal Chester MD;  Location: [facility] ENDOSCOPY;  Service: Gastroenterology   • DIAGNOSTIC LAPAROSCOPY EXPLORATORY LAPAROTOMY N/A 7/17/2019    Procedure: DIAGNOSTIC LAPAROSCOPY, EXPLORATORY LAPAROTOMY, LYSIS OF ADHESIONS;  Surgeon: Parminder Kc MD;   Location: [facility] OR;  Service: General   • DIRECT LARYNGOSCOPY, ESOPHAGOSCOPY, BRONCHOSCOPY N/A 4/15/2019    Procedure: DIRECT LARYNGOSCOPY with biopsy, ESOPHAGOSCOPY;  Surgeon: Bharathi Washington MD;  Location: [facility] OR;  Service: ENT   • ENDOSCOPY N/A 10/16/2018    Procedure: ESOPHAGOGASTRODUODENOSCOPY;  Surgeon: Kaushal Chester MD;  Location: [facility] ENDOSCOPY;  Service: Gastroenterology   • GASTROCUTANEOUS FISTULA CLOSURE N/A 10/2/2019    Procedure: GASTROCUTANEOUS FISTULA CLOSURE;  Surgeon: Parminder Kc MD;  Location: [facility] OR;  Service: General   • GASTROSTOMY FEEDING TUBE INSERTION N/A 4/15/2019    Procedure: GASTROSTOMY FEEDING TUBE INSERTION;  Surgeon: Trenton Valera MD;  Location: [facility] OR;  Service: General   • JEJUNOSTOMY N/A 10/2/2019    Procedure: JEJUNOSTOMY;  Surgeon: Parminder Kc MD;  Location: [facility] OR;  Service: General   • TRACHEOSTOMY  11/10/2008    Respiratory failure   • UPPER GASTROINTESTINAL ENDOSCOPY  04/21/2014    Esophagitis seen. Biopsy taken. Gastritis in stomach. Biopsy taken. Normal duodenum. Biopsy taken.   • UPPER GASTROINTESTINAL ENDOSCOPY  10/16/2018   • VENOUS ACCESS DEVICE (PORT) INSERTION N/A 9/11/2019    Procedure: INSERTION VENOUS ACCESS DEVICE (MEDIPORT)        (c-arm#1);  Surgeon: Parminder Kc MD;  Location: [facility];  Service: General       Therapy Treatment    Rehabilitation Treatment Summary     Row Name 10/19/19 1129 10/19/19 1040          Treatment Time/Intention    Discipline  occupational therapy assistant  -KD  physical therapy assistant  -LN     Document Type  therapy note (daily note)  -KD  therapy note (daily note)  -LN     Subjective Information  no complaints  -KD  complains of;pain wanting to go to the br  -LN     Mode of Treatment  occupational therapy  -KD  physical therapy;individual therapy  -LN     Patient/Family Observations  no family present  -KD  --     Therapy Frequency (PT Clinical Impression)  --  daily  -LN     Therapy  Frequency (OT Eval)  other (see comments) 5-7x/wk  -KD  --     Patient Effort  good  -KD  adequate  -LN     Existing Precautions/Restrictions  fall;other (see comments)  -KD  fall;other (see comments) watch gait belt placement s/p abdominal surgery  -LN     Recorded by [KD] Isabelle Schafer MADSEN/L 10/19/19 1449 [LN] Lynda Ramos, PTA 10/19/19 1159     Row Name 10/19/19 1129 10/19/19 1040          Vital Signs    Pre Systolic BP Rehab  90  -KD  --     Pre Treatment Diastolic BP  58  -KD  --     Post Systolic BP Rehab  --  90  -LN     Post Treatment Diastolic BP  --  58  -LN     Pretreatment Heart Rate (beats/min)  72  -KD  --     Posttreatment Heart Rate (beats/min)  78  -KD  78  -LN     Pre SpO2 (%)  98  -KD  --     O2 Delivery Pre Treatment  trach collar  -KD  --     Post SpO2 (%)  96  -KD  93  -LN     O2 Delivery Post Treatment  trach collar  -KD  trach collar  -LN     Pre Patient Position  Supine  -KD  Supine  -LN     Intra Patient Position  Supine  -KD  Standing  -LN     Post Patient Position  Supine  -KD  Supine  -LN     Recorded by [KD] Isabelle Schafer MADSEN/L 10/19/19 1451 [LN] Lynda Ramos, PTA 10/19/19 1159     Row Name 10/19/19 1129 10/19/19 1040          Cognitive Assessment/Intervention- PT/OT    Affect/Mental Status (Cognitive)  low arousal/lethargic  -KD  --     Behavioral Issues (Cognitive)  unable/difficult to assess  -KD  --     Orientation Status (Cognition)  oriented x 4  -KD  oriented x 4  -LN     Follows Commands (Cognition)  WFL  -KD  WFL  -LN     Recorded by [KD] Isabelle Schafer MADSEN/L 10/19/19 1451 [LN] Lynda Ramos, PTA 10/19/19 1159     Row Name 10/19/19 1040             Safety Issues, Functional Mobility    Impairments Affecting Function (Mobility)  balance;endurance/activity tolerance;shortness of breath;strength  -LN      Recorded by [LN] Lynda Ramos, PTA 10/19/19 1159      Row Name 10/19/19 1040             Bed Mobility Assessment/Treatment    Bed Mobility Assessment/Treatment   sit-supine;scooting/bridging  -LN      Scooting/Bridging [place] (Bed Mobility)  supervision  -LN      Supine-Sit [place] (Bed Mobility)  supervision  -LN      Assistive Device (Bed Mobility)  bed rails;head of bed elevated  -LN      Recorded by [LN] Lynda Ramos, PTA 10/19/19 1159      Row Name 10/19/19 1040             Transfer Assessment/Treatment    Transfer Assessment/Treatment  sit-stand transfer;stand-sit transfer;chair-bed transfer  -LN      Recorded by [LN] Lynda Ramos, PTA 10/19/19 1159      Row Name 10/19/19 1040             Chair-Bed Transfer    Chair-Bed [place] (Transfers)  contact guard  -LN      Assistive Device (Chair-Bed Transfers)  walker, front-wheeled  -LN      Recorded by [LN] Lynda Ramos, PTA 10/19/19 1159      Row Name 10/19/19 1040             Sit-Stand Transfer    Sit-Stand [place] (Transfers)  contact guard  -LN      Assistive Device (Sit-Stand Transfers)  walker, front-wheeled  -LN      Recorded by [LN] Lynda Ramos, PTA 10/19/19 1159      Row Name 10/19/19 1040             Stand-Sit Transfer    Stand-Sit [place] (Transfers)  contact guard  -LN      Assistive Device (Stand-Sit Transfers)  walker, front-wheeled  -LN      Recorded by [LN] Lynda Ramos, PTA 10/19/19 1159      Row Name 10/19/19 1040             Toilet Transfer    Type (Toilet Transfer)  sit-stand;stand-sit  -LN      [place] Level (Toilet Transfer)  contact guard  -LN      Assistive Device (Toilet Transfer)  commode;grab bars/safety frame  -LN      Recorded by [LN] Lynda Ramos, PTA 10/19/19 1159      Row Name 10/19/19 1040             Gait/Stairs Assessment/Training    Gait/Stairs Assessment/Training  gait/ambulation assistive device  -LN      [place] Level (Gait)  contact guard;verbal cues  -LN      Assistive Device (Gait)  walker, front-wheeled  -LN      Distance in Feet (Gait)  42  -LN      Pattern (Gait)  step-to  -LN      Deviations/Abnormal Patterns (Gait)  essence  decreased;stride length decreased  -LN      Bilateral Gait Deviations  forward flexed posture  -LN      Recorded by [LN] Lynda Ramos, PTA 10/19/19 1159      Row Name 10/19/19 1040             Lower Extremity Seated Therapeutic Exercise    Performed, Seated Lower Extremity (Therapeutic Exercise)  hip flexion/extension;hip abduction/adduction;ankle dorsiflexion/plantarflexion;LAQ (long arc quad), knee extension  -LN      Exercise Type, Seated Lower Extremity (Therapeutic Exercise)  AROM (active range of motion)  -LN      Sets/Reps Detail, Seated Lower Extremity (Therapeutic Exercise)  1/20  -LN      Recorded by [LN] Lynda Ramos, PTA 10/19/19 1159      Row Name 10/19/19 1129             Therapeutic Exercise    Upper Extremity Range of Motion (Therapeutic Exercise)  shoulder flexion/extension, bilateral;shoulder abduction/adduction, bilateral;shoulder horizontal abduction/adduction, bilateral;shoulder internal/external rotation, bilateral;elbow flexion/extension, bilateral;forearm supination/pronation, bilateral;wrist flexion/extension, bilateral  -KD      Weight/Resistance (Therapeutic Exercise)  2 pounds  -KD      Exercise Type (Therapeutic Exercise)  AROM (active range of motion)  -KD      Position (Therapeutic Exercise)  supine  -KD      Sets/Reps (Therapeutic Exercise)  1/20  -KD      Equipment (Therapeutic Exercise)  free weight, cuff  -KD      Expected Outcome (Therapeutic Exercise)  facilitate normal movement patterns;improve functional tolerance, self-care activity;improve performance, transfer skills  -KD      Recorded by [KD] Isabelle Schafer, TENA/L 10/19/19 1451      Row Name 10/19/19 1129 10/19/19 1040          Positioning and Restraints    Pre-Treatment Position  in bed  -KD  --     Post Treatment Position  bed  -KD  bed  -LN     In Bed  supine;call light within reach;encouraged to call for assist;exit alarm on  -KD  supine;call light within reach;encouraged to call for assist;exit alarm on;heels  elevated  -LN     Recorded by [KD] Isabelle Schafer MADSEN/L 10/19/19 1451 [LN] Lynda Ramos, PTA 10/19/19 1159     Row Name 10/19/19 1129 10/19/19 1040          Pain Scale: Numbers Pre/Post-Treatment    Pain Scale: Numbers, Pretreatment  0/10 - no pain  -KD  8/10  -LN     Pain Scale: Numbers, Post-Treatment  0/10 - no pain  -KD  8/10  -LN     Pain Location  --  abdomen  -LN     Pain Intervention(s)  --  -- nsg aware  -LN     Recorded by [KD] Isabelle Schafer MADSEN/L 10/19/19 1451 [LN] Lynda Ramos, PTA 10/19/19 1159     Row Name                Wound 09/30/19 1330 Left medial abdomen Fistula    Wound - Properties Group Date first assessed: 09/30/19 [SS] Time first assessed: 1330 [SS] Side: Left [SS] Orientation: medial [SS] Location: abdomen [SS] Primary Wound Type: Fistula [SS] Recorded by:  [SS] Citlalli Abdi RN 09/30/19 1619    Row Name                Wound 10/02/19 1347 abdomen Incision    Wound - Properties Group Date first assessed: 10/02/19 [CF] Time first assessed: 1347 [CF] Present on Hospital Admission: N [CF] Location: abdomen [CF] Primary Wound Type: Incision [CF] Recorded by:  [CF] Eileen Tyson 10/02/19 1347    Row Name 10/19/19 1040             Plan of Care Review    Plan of Care Reviewed With  patient  -LN      Recorded by [LN] Lynda Ramos, PTA 10/19/19 1159      Row Name 10/19/19 1129             Outcome Summary/Treatment Plan (OT)    Daily Summary of Progress (OT)  progress toward functional goals as expected  -KD      Plan for Continued Treatment (OT)  cont ot poc  -KD      Anticipated Discharge Disposition (OT)  anticipate therapy at next level of care  -KD      Recorded by [KD] Isabelle Schafer MADSEN/L 10/19/19 1451      Row Name 10/19/19 1040             Outcome Summary/Treatment Plan (PT)    Plan for Continued Treatment (PT)  cont  -LN      Anticipated Discharge Disposition (PT)  anticipate therapy at next level of care  -LN      Recorded by [LN] Lynda Ramos, PTA 10/19/19 9703         User Key  (r) = Recorded By, (t) = Taken By, (c) = Cosigned By    Initials Name Effective Dates Discipline    SS Citlalli Abdi RN 07/10/18 -  Nurse    Lynda Betts, MICHAEL 03/07/18 -  PT    KD Isabelle Schafer, MADSEN/L 03/07/18 -  OT    CF Jah Eileenjairo Godoy - -        Wound 09/30/19 1330 Left medial abdomen Fistula (Active)   Closure None 10/18/2019  8:30 PM   Care, Wound cleansed with;sterile normal saline 10/18/2019  8:30 PM   Dressing Care, Wound dressing changed;non-adherent 10/19/2019  8:30 AM   Periwound Care, Wound barrier ointment applied 10/19/2019  8:30 AM       Wound 10/02/19 1347 abdomen Incision (Active)   Dressing Appearance dry;intact 10/18/2019  8:30 PM   Closure Open to air 10/19/2019  8:30 AM   Periwound dry 10/19/2019  8:30 AM   Periwound Temperature warm 10/19/2019  8:30 AM   Periwound Skin Turgor soft 10/19/2019  8:30 AM     Rehab Goal Summary     Row Name 10/19/19 1129 10/19/19 1040          Bed Mobility Goal 1 (PT)    Activity/Assistive Device (Bed Mobility Goal 1, PT)  --  sit to supine;supine to sit  -LN     [place] Level/Cues Needed (Bed Mobility Goal 1, PT)  --  independent  -LN     Time Frame (Bed Mobility Goal 1, PT)  --  by discharge  -LN     Progress/Outcomes (Bed Mobility Goal 1, PT)  --  goal not met  -LN        Transfer Goal 1 (PT)    Activity/Assistive Device (Transfer Goal 1, PT)  --  bed-to-chair/chair-to-bed;toilet  -LN     [place] Level/Cues Needed (Transfer Goal 1, PT)  --  standby assist  -LN     Time Frame (Transfer Goal 1, PT)  --  by discharge  -LN     Progress/Outcome (Transfer Goal 1, PT)  --  goal not met;goal revised this date  -LN        Gait Training Goal 1 (PT)    Activity/Assistive Device (Gait Training Goal 1, PT)  --  gait (walking locomotion);assistive device use  -LN     [place] Level (Gait Training Goal 1, PT)  --  contact guard assist  -LN     Distance (Gait Goal 1, PT)  --  150ft  -LN     Time Frame (Gait Training Goal 1, PT)  --  by  discharge  -LN     Progress/Outcome (Gait Training Goal 1, PT)  --  goal not met;goal revised this date  -LN        Stairs Goal 1 (PT)    Activity/Assistive Device (Stairs Goal 1, PT)  --  ascending stairs;descending stairs  -LN     [place] Level/Cues Needed (Stairs Goal 1, PT)  --  standby assist  -LN     Number of Stairs (Stairs Goal 1, PT)  --  5  -LN     Time Frame (Stairs Goal 1, PT)  --  by discharge  -LN     Progress/Outcome (Stairs Goal 1, PT)  --  goal not met  -LN        Occupational Therapy Goals    Transfer Goal Selection (OT)  transfer, OT goal 1  -KD  --     Bathing Goal Selection (OT)  bathing, OT goal 1  -KD  --     Dressing Goal Selection (OT)  dressing, OT goal 1  -KD  --     Toileting Goal Selection (OT)  toileting, OT goal 1  -KD  --     Activity Tolerance Goal Selection (OT)  activity tolerance, OT goal 1  -KD  --        Transfer Goal 1 (OT)    Activity/Assistive Device (Transfer Goal 1, OT)  sit-to-stand/stand-to-sit;bed-to-chair/chair-to-bed;toilet  -KD  --     [place] Level/Cues Needed (Transfer Goal 1, OT)  supervision required  -KD  --     Time Frame (Transfer Goal 1, OT)  long term goal (LTG);by discharge  -KD  --     Progress/Outcome (Transfer Goal 1, OT)  goal not met  -KD  --        Bathing Goal 1 (OT)    Activity/Assistive Device (Bathing Goal 1, OT)  bathing skills, all  -KD  --     [place] Level/Cues Needed (Bathing Goal 1, OT)  minimum assist (75% or more patient effort)  -KD  --     Time Frame (Bathing Goal 1, OT)  long term goal (LTG);by discharge  -KD  --     Barriers (Bathing Goal 1, OT)  Pt will need extra time and rest breaks PRN  -KD  --     Progress/Outcomes (Bathing Goal 1, OT)  goal not met  -KD  --        Dressing Goal 1 (OT)    Activity/Assistive Device (Dressing Goal 1, OT)  dressing skills, all  -KD  --     [place]/Cues Needed (Dressing Goal 1, OT)  minimum assist (75% or more patient effort)  -KD  --     Time Frame (Dressing Goal 1, OT)  long term  goal (LTG);by discharge  -KD  --     Barriers (Dressing Goal 1, OT)  Pt will need extra time and rest breaks PRN  -KD  --     Progress/Outcome (Dressing Goal 1, OT)  goal not met  -KD  --        Toileting Goal 1 (OT)    Activity/Device (Toileting Goal 1, OT)  toileting skills, all  -KD  --     [place] Level/Cues Needed (Toileting Goal 1, OT)  minimum assist (75% or more patient effort)  -KD  --     Time Frame (Toileting Goal 1, OT)  long term goal (LTG);by discharge  -KD  --     Barriers (Toileting Goal 1, OT)  Pt will need extra time and rest breaks PRN  -KD  --     Progress/Outcome (Toileting Goal 1, OT)  goal not met  -KD  --         Activity Tolerance Goal 1 (OT)    Activity Level (Endurance Goal 1, OT)  15 min activity functional/therapeutic activity  -KD  --     Progress/Outcome (Activity Tolerance Goal 1, OT)  goal not met  -KD  --       User Key  (r) = Recorded By, (t) = Taken By, (c) = Cosigned By    Initials Name Provider Type Discipline    LN Lynda Ramos, PTA Physical Therapy Assistant PT    KD Isabelle Schafer, TENA/L Occupational Therapy Assistant OT        Occupational Therapy Education     Title: PT OT SLP Therapies (In Progress)     Topic: Occupational Therapy (In Progress)     Point: ADL training (In Progress)     Description: Instruct learner(s) on proper safety adaptation and remediation techniques during self care or transfers.   Instruct in proper use of assistive devices.    Learning Progress Summary           Patient Acceptance, E,TB,D, NR by CS at 10/18/2019  2:04 PM    Acceptance, E,TB,D, NR by CS at 10/16/2019  3:43 PM    Acceptance, E,TB,D, NR by CS at 10/15/2019  1:42 PM    Acceptance, E,TB,D, NR by CS at 10/14/2019  1:57 PM    Acceptance, E, NR by BB at 10/9/2019  1:37 PM    Acceptance, E,TB,D, NR by CS at 10/6/2019  1:05 PM    Acceptance, E,TB,D, NR by CS at 10/5/2019 11:11 AM    Acceptance, E, NR by AS at 10/4/2019 11:45 AM    Comment:  Role of OT, OT POC, importance of OOB  activity for strength and healing, ADL training                   Point: Home exercise program (In Progress)     Description: Instruct learner(s) on appropriate technique for monitoring, assisting and/or progressing therapeutic exercises/activities.    Learning Progress Summary           Patient Acceptance, E,TB,D, NR by CS at 10/18/2019  2:04 PM    Acceptance, E,TB,D, NR by CS at 10/16/2019  3:43 PM    Acceptance, E,TB,D, NR by CS at 10/15/2019  1:42 PM    Acceptance, E,TB,D, NR by CS at 10/14/2019  1:57 PM    Acceptance, E, NR by BB at 10/8/2019 11:46 AM    Acceptance, E,TB,D, NR by CS at 10/6/2019  1:05 PM    Acceptance, E,TB,D, NR by CS at 10/5/2019 11:11 AM                   Point: Precautions (In Progress)     Description: Instruct learner(s) on prescribed precautions during self-care and functional transfers.    Learning Progress Summary           Patient Acceptance, E, NR by AS at 10/18/2019  5:56 PM    Comment:  OT POC, progress towards goals, tx plan modifications    Acceptance, E,TB,D, NR by CS at 10/18/2019  2:04 PM    Acceptance, E,TB,D, NR by CS at 10/16/2019  3:43 PM    Acceptance, E,TB,D, NR by CS at 10/15/2019  1:42 PM    Acceptance, E,TB,D, NR by CS at 10/14/2019  1:57 PM    Acceptance, E,TB,D, NR by CS at 10/6/2019  1:05 PM    Acceptance, E,TB,D, NR by CS at 10/5/2019 11:11 AM    Acceptance, E, NR by AS at 10/4/2019 11:45 AM    Comment:  Role of OT, OT POC, importance of OOB activity for strength and healing, ADL training                   Point: Body mechanics (In Progress)     Description: Instruct learner(s) on proper positioning and spine alignment during self-care, functional mobility activities and/or exercises.    Learning Progress Summary           Patient Acceptance, E,TB,D, NR by CS at 10/18/2019  2:04 PM    Acceptance, E,TB,D, NR by CS at 10/16/2019  3:43 PM    Acceptance, E,TB,D, NR by CS at 10/15/2019  1:42 PM    Acceptance, E,TB,D, NR by CS at 10/14/2019  1:57 PM    Acceptance,  ZEV,ELSA,SABI, NR by  at 10/6/2019  1:05 PM    Acceptance, ZEV,ELSA,SABI, NR by  at 10/5/2019 11:11 AM                               User Key     Initials Effective Dates Name Provider Type Discipline     03/07/18 -  Kerrie Woodson, MADSEN/L Occupational Therapy Assistant OT    CS 03/07/18 -  Brenda Thomas COTA/LIANE Occupational Therapy Assistant OT    AS 03/25/19 -  Destinee Yarbrough, OT Occupational Therapist OT                OT Recommendation and Plan  Outcome Summary/Treatment Plan (OT)  Daily Summary of Progress (OT): progress toward functional goals as expected  Plan for Continued Treatment (OT): cont ot poc  Anticipated Discharge Disposition (OT): anticipate therapy at next level of care  Therapy Frequency (OT Eval): other (see comments)(5-7x/wk)  Daily Summary of Progress (OT): progress toward functional goals as expected  Plan of Care Review  Plan of Care Reviewed With: patient  Plan of Care Reviewed With: patient  Outcome Summary: Pt completed BUE ther ex in all planes w/ 2lb HW and fair tolerance w/ RB's PRN.  Outcome Measures     Row Name 10/19/19 1129 10/19/19 1040 10/18/19 1614       How much help from another person do you currently need...    Turning from your back to your side while in flat bed without using bedrails?  --  3  -LN  --    Moving from lying on back to sitting on the side of a flat bed without bedrails?  --  3  -LN  --    Moving to and from a bed to a chair (including a wheelchair)?  --  3  -LN  --    Standing up from a chair using your arms (e.g., wheelchair, bedside chair)?  --  3  -LN  --    Climbing 3-5 steps with a railing?  --  2  -LN  --    To walk in hospital room?  --  3  -LN  --    AM-PAC 6 Clicks Score (PT)  --  17  -LN  --       How much help from another is currently needed...    Putting on and taking off regular lower body clothing?  2  -KD  --  2  -AS    Bathing (including washing, rinsing, and drying)  2  -KD  --  2  -AS    Toileting (which includes using toilet bed pan or  urinal)  2  -KD  --  2  -AS    Putting on and taking off regular upper body clothing  3  -KD  --  3  -AS    Taking care of personal grooming (such as brushing teeth)  3  -KD  --  3  -AS    Eating meals  1  -KD  --  1  -AS    AM-PAC 6 Clicks Score (OT)  13  -KD  --  13  -AS       Functional Assessment    Outcome Measure Options  --  AM-PAC 6 Clicks Basic Mobility (PT)  -LN  AM-PAC 6 Clicks Daily Activity (OT)  -AS    Row Name 10/18/19 1406 10/18/19 1400 10/17/19 1111       How much help from another person do you currently need...    Turning from your back to your side while in flat bed without using bedrails?  3  -GIAN  --  3  -GIAN    Moving from lying on back to sitting on the side of a flat bed without bedrails?  3  -GIAN  --  3  -GIAN    Moving to and from a bed to a chair (including a wheelchair)?  3  -GIAN  --  3  -GIAN    Standing up from a chair using your arms (e.g., wheelchair, bedside chair)?  3  -GIAN  --  3  -GIAN    Climbing 3-5 steps with a railing?  2  -GIAN  --  2  -GIAN    To walk in hospital room?  3  -GIAN  --  3  -GIAN    AM-PAC 6 Clicks Score (PT)  17  -GIAN  --  17  -GIAN       How much help from another is currently needed...    Putting on and taking off regular lower body clothing?  --  2  -CS  --    Bathing (including washing, rinsing, and drying)  --  2  -CS  --    Toileting (which includes using toilet bed pan or urinal)  --  2  -CS  --    Putting on and taking off regular upper body clothing  --  2  -CS  --    Taking care of personal grooming (such as brushing teeth)  --  2  -CS  --    Eating meals  --  1  -CS  --    AM-PAC 6 Clicks Score (OT)  --  11  -CS  --       Functional Assessment    Outcome Measure Options  AM-PAC 6 Clicks Basic Mobility (PT)  -GIAN  --  AM-PAC 6 Clicks Basic Mobility (PT)  -GIAN    Row Name 10/16/19 1500             How much help from another is currently needed...    Putting on and taking off regular lower body clothing?  2  -CS      Bathing (including washing, rinsing, and drying)  2  -CS       Toileting (which includes using toilet bed pan or urinal)  2  -CS      Putting on and taking off regular upper body clothing  2  -CS      Taking care of personal grooming (such as brushing teeth)  2  -CS      Eating meals  1  -CS      AM-PAC 6 Clicks Score (OT)  11  -CS        User Key  (r) = Recorded By, (t) = Taken By, (c) = Cosigned By    Initials Name Provider Type    GIAN Abran Wan, PTA Physical Therapy Assistant    LN Lynda Ramos, PTA Physical Therapy Assistant    Isabelle Colon COTA/L Occupational Therapy Assistant    Brenda Lee, MADSEN/L Occupational Therapy Assistant    AS Destinee Yarbrough, OT Occupational Therapist           Time Calculation:   Time Calculation- OT     Row Name 10/19/19 1452             Time Calculation- OT    OT Start Time  1129  -KD      OT Stop Time  1152  -KD      OT Time Calculation (min)  23 min  -KD      Total Timed Code Minutes- OT  23 minute(s)  -KD      OT Received On  10/19/19  -KD        User Key  (r) = Recorded By, (t) = Taken By, (c) = Cosigned By    Initials Name Provider Type    Isabelle Colon COTA/L Occupational Therapy Assistant        Therapy Charges for Today     Code Description Service Date Service Provider Modifiers Qty    34456844842 HC OT THER PROC EA 15 MIN 10/19/2019 Isabelle Schafer COTA/L GO 2               ROSEANNA Ruffin  10/19/2019

## 2019-10-19 NOTE — PLAN OF CARE
Problem: Patient Care Overview  Goal: Plan of Care Review   10/19/19 0344 10/19/19 1200   Coping/Psychosocial   Plan of Care Reviewed With --  patient   Plan of Care Review   Progress improving --    OTHER   Outcome Summary --  sup-sit-sup sba,sit-stand-sit cga of 1,amb 42' with rw and cga of 1,20 reps seated ex-no goals met     Goal: Discharge Needs Assessment   10/01/19 1538   Discharge Needs Assessment   Concerns to be Addressed denies needs/concerns at this time   Patient/Family Anticipates Transition to home with family   Patient/Family Anticipated Services at Transition home health care   Transportation Anticipated family or friend will provide   Equipment Needed After Discharge feeding device   Outpatient/Agency/Support Group Needs homecare agency   Discharge Facility/Level of Care Needs home with home health   Offered/Gave Vendor List no   Patient's Choice of Community Agency(s) Vendor list not needed. Patient active with [facility].   Current Discharge Risk chronically ill   Disability   Equipment Currently Used at Home feeding device

## 2019-10-20 LAB
GLUCOSE BLDC GLUCOMTR-MCNC: 118 MG/DL (ref 70–130)
GLUCOSE BLDC GLUCOMTR-MCNC: 120 MG/DL (ref 70–130)
GLUCOSE BLDC GLUCOMTR-MCNC: 98 MG/DL (ref 70–130)

## 2019-10-20 PROCEDURE — 97535 SELF CARE MNGMENT TRAINING: CPT

## 2019-10-20 PROCEDURE — 82962 GLUCOSE BLOOD TEST: CPT

## 2019-10-20 PROCEDURE — 94760 N-INVAS EAR/PLS OXIMETRY 1: CPT

## 2019-10-20 PROCEDURE — 94799 UNLISTED PULMONARY SVC/PX: CPT

## 2019-10-20 PROCEDURE — 25010000002 ENOXAPARIN PER 10 MG: Performed by: SURGERY

## 2019-10-20 PROCEDURE — 97116 GAIT TRAINING THERAPY: CPT

## 2019-10-20 PROCEDURE — 97530 THERAPEUTIC ACTIVITIES: CPT

## 2019-10-20 PROCEDURE — 97110 THERAPEUTIC EXERCISES: CPT

## 2019-10-20 RX ADMIN — SODIUM CHLORIDE, PRESERVATIVE FREE 10 ML: 5 INJECTION INTRAVENOUS at 22:25

## 2019-10-20 RX ADMIN — HYDROCODONE BITARTRATE AND ACETAMINOPHEN 15 ML: 7.5; 325 SOLUTION ORAL at 15:39

## 2019-10-20 RX ADMIN — SILVER SULFADIAZINE: 10 CREAM TOPICAL at 09:04

## 2019-10-20 RX ADMIN — ALBUTEROL SULFATE 2.5 MG: 2.5 SOLUTION RESPIRATORY (INHALATION) at 20:16

## 2019-10-20 RX ADMIN — ALBUTEROL SULFATE 2.5 MG: 2.5 SOLUTION RESPIRATORY (INHALATION) at 07:33

## 2019-10-20 RX ADMIN — FAMOTIDINE 20 MG: 40 POWDER, FOR SUSPENSION ORAL at 01:33

## 2019-10-20 RX ADMIN — SILVER SULFADIAZINE: 10 CREAM TOPICAL at 22:23

## 2019-10-20 RX ADMIN — ENOXAPARIN SODIUM 40 MG: 40 INJECTION SUBCUTANEOUS at 09:04

## 2019-10-20 RX ADMIN — HYDROCODONE BITARTRATE AND ACETAMINOPHEN 15 ML: 7.5; 325 SOLUTION ORAL at 22:23

## 2019-10-20 RX ADMIN — ALBUTEROL SULFATE 2.5 MG: 2.5 SOLUTION RESPIRATORY (INHALATION) at 13:17

## 2019-10-20 RX ADMIN — HYDROCODONE BITARTRATE AND ACETAMINOPHEN 15 ML: 7.5; 325 SOLUTION ORAL at 09:12

## 2019-10-20 RX ADMIN — ALBUTEROL SULFATE 2.5 MG: 2.5 SOLUTION RESPIRATORY (INHALATION) at 01:28

## 2019-10-20 RX ADMIN — FAMOTIDINE 20 MG: 40 POWDER, FOR SUSPENSION ORAL at 11:45

## 2019-10-20 NOTE — PLAN OF CARE
Problem: Patient Care Overview  Goal: Plan of Care Review   10/20/19 1420 10/20/19 1528   Coping/Psychosocial   Plan of Care Reviewed With --  patient   Plan of Care Review   Progress no change --    OTHER   Outcome Summary --  sup-sit-sup cond ind,sit-stand-sit cga of 1,amb 40' with rw and cga of 1-unable to keep 02 secure on trach this rx-1 new goal met     Goal: Discharge Needs Assessment   10/01/19 1538   Discharge Needs Assessment   Concerns to be Addressed denies needs/concerns at this time   Patient/Family Anticipates Transition to home with family   Patient/Family Anticipated Services at Transition home health care   Transportation Anticipated family or friend will provide   Equipment Needed After Discharge feeding device   Outpatient/Agency/Support Group Needs homecare agency   Discharge Facility/Level of Care Needs home with home health   Offered/Gave Vendor List no   Patient's Choice of Community Agency(s) Vendor list not needed. Patient active with [facility].   Current Discharge Risk chronically ill   Disability   Equipment Currently Used at Home feeding device

## 2019-10-20 NOTE — PLAN OF CARE
Problem: Patient Care Overview  Goal: Plan of Care Review  Outcome: Ongoing (interventions implemented as appropriate)  Pt required pain med per peg tube this shift.   10/19/19 0992   Coping/Psychosocial   Plan of Care Reviewed With patient   Plan of Care Review   Progress improving   OTHER   Outcome Summary pt. VSS; no changes; tach care done and dressing to abd changed; will cont to monitor     Goal: Individualization and Mutuality  Outcome: Ongoing (interventions implemented as appropriate)    Goal: Discharge Needs Assessment  Outcome: Ongoing (interventions implemented as appropriate)      Problem: Fall Risk (Adult)  Goal: Absence of Fall  Outcome: Ongoing (interventions implemented as appropriate)      Problem: Skin Injury Risk (Adult)  Goal: Skin Health and Integrity  Outcome: Ongoing (interventions implemented as appropriate)      Problem: Pain, Chronic (Adult)  Goal: Acceptable Pain/Comfort Level and Functional Ability  Outcome: Ongoing (interventions implemented as appropriate)      Problem: Wound (Includes Pressure Injury) (Adult)  Goal: Signs and Symptoms of Listed Potential Problems Will be Absent, Minimized or Managed (Wound)  Outcome: Ongoing (interventions implemented as appropriate)      Problem: Airway, Artificial (Adult)  Goal: Signs and Symptoms of Listed Potential Problems Will be Absent, Minimized or Managed (Airway, Artificial)  Outcome: Ongoing (interventions implemented as appropriate)      Problem: Nutrition, Enteral (Adult)  Goal: Signs and Symptoms of Listed Potential Problems Will be Absent, Minimized or Managed (Nutrition, Enteral)  Outcome: Ongoing (interventions implemented as appropriate)    Goal: Signs and Symptoms of Listed Potential Problems Will be Absent, Minimized or Managed (Nutrition, Enteral)  Outcome: Ongoing (interventions implemented as appropriate)

## 2019-10-20 NOTE — PLAN OF CARE
Problem: Patient Care Overview  Goal: Plan of Care Review  Outcome: Ongoing (interventions implemented as appropriate)   10/20/19 1046   Coping/Psychosocial   Plan of Care Reviewed With patient   Plan of Care Review   Progress improving   OTHER   Outcome Summary pt cga for t/f to [facility]SC and SBA for bowel hygiene. pt performed static standing bedside x 5 mins.

## 2019-10-20 NOTE — THERAPY TREATMENT NOTE
Acute Care - Physical Therapy Treatment Note  [facility]     Patient Name: Emerson Bang  : 1960  MRN: [id number]  Today's Date: 10/20/2019  Onset of Illness/Injury or Date of Surgery: 19     Referring Physician: Dr. Kc    Admit Date: 2019    Visit Dx:    ICD-10-CM ICD-9-CM   1. 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 Impaired physical mobility Z74.09 781.99   3. Impaired mobility and activities of daily living Z74.09 799.89     Patient Active Problem List   Diagnosis   • Hyperkalemia   • Iron deficiency anemia   • Gastroesophageal reflux disease with esophagitis   • Elevated AST (SGOT)   • Alcohol abuse   • Hypothyroidism   • Balance problem   • Need for vaccination   • Low magnesium levels   • Squamous cell carcinoma of pharynx (CMS/HCC)   • History of throat cancer   • Vitamin D deficiency   • Alcohol abuse counseling and surveillance   • Encounter for screening for diabetes mellitus   • Hypomagnesemia   • Iron deficiency anemia   • Hyperlipidemia   • Underweight due to inadequate caloric intake   • Need for immunization against influenza   • 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HCC)   • Hypertension   • General medical examination   • Underweight   • Epigastric pain   • Gastritis without bleeding   • Need for influenza vaccination   • Elevated liver function tests   • B12 deficiency   • Intestinal malabsorption   • Throat pain   • Acute pharyngitis   • Upper respiratory tract infection   • Neoplasm of uncertain behavior of larynx   • Pharyngoesophageal dysphagia   • Severe protein-calorie malnutrition (CMS/HCC)   • Anemia of chronic disease   • Hypotension   • Hyponatremia   • Status post insertion of percutaneous endoscopic gastrostomy (PEG) tube (CMS/HCC)   • Hx of tracheostomy   • History of throat surgery   • Hx SBO   • Urinary retention   • Generalized weakness   • Acute otitis externa of right ear   • Hard stool   • Panlobular emphysema (CMS/HCC)   • Cancer of  hypopharynx (CMS/HCC)   • Diarrhea   • Encounter for venous access device care   • Prediabetes   • Status post neck dissection   • S/P partial thyroidectomy   • S/P laryngectomy   • Annual physical exam   • Alcoholic cirrhosis of liver without ascites (CMS/HCC)       Therapy Treatment    Rehabilitation Treatment Summary     Row Name 10/20/19 1450 10/20/19 1046          Treatment Time/Intention    Discipline  physical therapy assistant  -LN  occupational therapy assistant  -TO     Document Type  therapy note (daily note)  -LN  therapy note (daily note)  -TO     Subjective Information  complains of;pain  -LN  no complaints  -TO     Mode of Treatment  physical therapy;individual therapy  -LN  occupational therapy  -TO     Patient/Family Observations  --  alone in room  -TO     Therapy Frequency (PT Clinical Impression)  daily  -LN  --     Therapy Frequency (OT Eval)  --  other (see comments) 5-7x/week  -TO     Patient Effort  adequate  -LN  good  -TO     Existing Precautions/Restrictions  fall;other (see comments) watch gait belt placement s/p abdominal surgery  -LN  fall  -TO     Recorded by [LN] Lynda Ramos S, PTA 10/20/19 1519 [TO] Nelson Oliveira, MADSEN/L 10/20/19 1316     Row Name 10/20/19 1450 10/20/19 1046          Vital Signs    Pre Systolic BP Rehab  91  -LN  90  -TO     Pre Treatment Diastolic BP  59  -LN  61  -TO     Post Systolic BP Rehab  115  -LN  --     Post Treatment Diastolic BP  63  -LN  --     Pretreatment Heart Rate (beats/min)  89  -LN  72  -TO     Posttreatment Heart Rate (beats/min)  93  -LN  --     Pre SpO2 (%)  98  -LN  98  -TO     O2 Delivery Pre Treatment  trach collar  -LN  trach collar  -TO     Post SpO2 (%)  100  -LN  --     Pre Patient Position  Supine  -LN  --     Intra Patient Position  Standing  -LN  --     Post Patient Position  Supine  -LN  --     Recorded by [LN] Lynda Ramos S, PTA 10/20/19 1519 [TO] Nelson Oliveira MADSEN/L 10/20/19 1316     Row Name 10/20/19 1450 10/20/19 1046           Cognitive Assessment/Intervention- PT/OT    Affect/Mental Status (Cognitive)  --  WFL  -TO     Orientation Status (Cognition)  oriented x 4  -LN  oriented x 4  -TO     Follows Commands (Cognition)  WFL  -LN  WFL  -TO     Recorded by [LN] Lynda Ramos, PTA 10/20/19 1519 [TO] Nelson Oliveira MADSEN/L 10/20/19 1316     Row Name 10/20/19 1450 10/20/19 1046          Safety Issues, Functional Mobility    Impairments Affecting Function (Mobility)  balance;endurance/activity tolerance;shortness of breath;strength  -LN  balance;endurance/activity tolerance;strength  -TO     Recorded by [LN] Lynda Ramos, PTA 10/20/19 1519 [TO] Nelson Oliveira MADSEN/L 10/20/19 1316     Row Name 10/20/19 1450 10/20/19 1046          Bed Mobility Assessment/Treatment    Bed Mobility Assessment/Treatment  sit-supine;scooting/bridging  -LN  supine-sit;sit-supine  -TO     [place] Level (Bed Mobility)  --  supervision  -TO     Scooting/Bridging [place] (Bed Mobility)  supervision  -LN  --     Supine-Sit [place] (Bed Mobility)  conditional independence  -LN  supervision  -TO     Sit-Supine [place] (Bed Mobility)  conditional independence  -LN  supervision  -TO     Assistive Device (Bed Mobility)  bed rails;head of bed elevated  -LN  bed rails;head of bed elevated  -TO     Recorded by [LN] Lynda Ramos, PTA 10/20/19 1519 [TO] Nelson Oliveira MADSEN/L 10/20/19 1316     Row Name 10/20/19 1450             Transfer Assessment/Treatment    Transfer Assessment/Treatment  sit-stand transfer;stand-sit transfer;chair-bed transfer  -LN      Recorded by [LN] Lynda Ramos, PTA 10/20/19 1519      Row Name 10/20/19 1450             Chair-Bed Transfer    Chair-Bed [place] (Transfers)  contact guard  -LN      Assistive Device (Chair-Bed Transfers)  walker, front-wheeled  -LN      Recorded by [LN] Lynda Ramos, PTA 10/20/19 1519      Row Name 10/20/19 1450             Sit-Stand Transfer    Sit-Stand [place] (Transfers)  contact  guard  -LN      Assistive Device (Sit-Stand Transfers)  walker, front-wheeled  -LN      Recorded by [LN] Lynda Ramos, PTA 10/20/19 1519      Row Name 10/20/19 1450             Stand-Sit Transfer    Stand-Sit [place] (Transfers)  contact guard  -LN      Assistive Device (Stand-Sit Transfers)  walker, front-wheeled  -LN      Recorded by [LN] Lynda Ramos, PTA 10/20/19 1519      Row Name 10/20/19 1450 10/20/19 1046          Toilet Transfer    Type (Toilet Transfer)  sit-stand;stand-sit  -LN  stand pivot/stand step  -TO     [place] Level (Toilet Transfer)  not tested  -LN  contact guard  -TO     Assistive Device (Toilet Transfer)  commode;grab bars/safety frame  -LN  commode, 3-in-1  -TO     Recorded by [LN] Lynda Ramos, PTA 10/20/19 1519 [TO] Nelson Oliveira MADSEN/L 10/20/19 1316     Row Name 10/20/19 1450             Gait/Stairs Assessment/Training    Gait/Stairs Assessment/Training  gait/ambulation assistive device  -LN      [place] Level (Gait)  contact guard;verbal cues  -LN      Assistive Device (Gait)  walker, front-wheeled  -LN      Distance in Feet (Gait)  40  -LN      Pattern (Gait)  step-to  -LN      Deviations/Abnormal Patterns (Gait)  essence decreased;stride length decreased  -LN      Bilateral Gait Deviations  forward flexed posture  -LN      Recorded by [LN] Lynda Ramos, PTA 10/20/19 1519      Row Name 10/20/19 1046             Toileting Assessment/Training    [place] Level (Toileting)  toileting skills;perform perineal hygiene;contact guard assist  -TO      Assistive Devices (Toileting)  commode, 3-in-1  -TO      Toileting Position  unsupported sitting  -TO      Recorded by [TO] Nelson Oliveira MADSEN/L 10/20/19 1316      Row Name 10/20/19 1046             Therapeutic Exercise    Upper Extremity Range of Motion (Therapeutic Exercise)  shoulder flexion/extension, bilateral;shoulder internal/external rotation, bilateral;elbow flexion/extension, bilateral prot/ret  -TO       Weight/Resistance (Therapeutic Exercise)  2 pounds  -TO      Exercise Type (Therapeutic Exercise)  resistive exercises  -TO      Sets/Reps (Therapeutic Exercise)  2 x 15  -TO      Recorded by [TO] Nelson Oliveira COTA/L 10/20/19 1316      Row Name 10/20/19 1046             Static Sitting Balance    Time Able to Maintain Position (Unsupported Sitting, Static Balance)  -- entire tx  -TO      Recorded by [TO] Nelson Oliveira COTA/L 10/20/19 1316      Row Name 10/20/19 1046             Dynamic Sitting Balance    Level of [place], Reaches Outside Midline (Sitting, Dynamic Balance)  standby assist  -TO      Sitting Position, Reaches Outside Midline (Sitting, Dynamic Balance)  sitting on edge of bed  -TO      Recorded by [TO] Nelson Oliveira COTA/L 10/20/19 1316      Row Name 10/20/19 1450 10/20/19 1046          Positioning and Restraints    Pre-Treatment Position  --  in bed  -TO     Post Treatment Position  bed  -LN  bed  -TO     In Bed  supine;call light within reach;encouraged to call for assist;exit alarm on;heels elevated  -LN  exit alarm on;encouraged to call for assist;call light within reach;supine  -TO     Recorded by [LN] Lynda Ramos, PTA 10/20/19 1519 [TO] Nelson Oliveira MADSEN/L 10/20/19 1316     Row Name 10/20/19 1450 10/20/19 1046          Pain Scale: Numbers Pre/Post-Treatment    Pain Scale: Numbers, Pretreatment  8/10  -LN  0/10 - no pain  -TO     Pain Scale: Numbers, Post-Treatment  8/10  -LN  0/10 - no pain  -TO     Pain Location  abdomen  -LN  --     Pain Intervention(s)  -- nsg made aware  -LN  --     Recorded by [LN] Lynda Ramos, PTA 10/20/19 1519 [TO] Nelson Oliveira MADSEN/L 10/20/19 1316     Row Name                Wound 09/30/19 1330 Left medial abdomen Fistula    Wound - Properties Group Date first assessed: 09/30/19 [SS] Time first assessed: 1330 [SS] Side: Left [SS] Orientation: medial [SS] Location: abdomen [SS] Primary Wound Type: Fistula [SS] Recorded by:  [SS] Citlalli Abdi  RN 09/30/19 1619    Row Name                Wound 10/02/19 1347 abdomen Incision    Wound - Properties Group Date first assessed: 10/02/19 [CF] Time first assessed: 1347 [CF] Present on Hospital Admission: N [CF] Location: abdomen [CF] Primary Wound Type: Incision [CF] Recorded by:  [CF] Eileen Tyson 10/02/19 1347    Row Name 10/20/19 1450             Plan of Care Review    Plan of Care Reviewed With  patient  -LN      Recorded by [LN] Lynda Ramos, PTA 10/20/19 1519      Row Name 10/20/19 1046             Outcome Summary/Treatment Plan (OT)    Daily Summary of Progress (OT)  progress toward functional goals is good  -TO      Barriers to Overall Progress (OT)  strength, endurance , balance  -TO      Anticipated Discharge Disposition (OT)  anticipate therapy at next level of care  -TO      Recorded by [TO] Nelson Oliveira MADSEN/L 10/20/19 1316      Row Name 10/20/19 1450             Outcome Summary/Treatment Plan (PT)    Anticipated Discharge Disposition (PT)  anticipate therapy at next level of care  -LN      Recorded by [LN] Lynda Ramos, PTA 10/20/19 1519        User Key  (r) = Recorded By, (t) = Taken By, (c) = Cosigned By    Initials Name Effective Dates Discipline    SS Citlalli Abdi RN 07/10/18 -  Nurse    LN Lynda Ramos S, PTA 03/07/18 -  PT    TO Nelson Oliveira, MADSEN/L 03/07/18 -  OT    CF Eileen Tyson - -          Wound 09/30/19 1330 Left medial abdomen Fistula (Active)   Dressing Appearance dry;intact 10/20/2019  8:18 AM   Care, Wound cleansed with 10/20/2019  8:55 AM   Dressing Care, Wound dressing changed 10/20/2019  8:55 AM       Wound 10/02/19 1347 abdomen Incision (Active)   Closure Open to air 10/20/2019  8:18 AM   Periwound dry 10/20/2019  8:18 AM   Periwound Temperature warm 10/20/2019  8:18 AM   Periwound Skin Turgor soft 10/20/2019  8:18 AM       Rehab Goal Summary     Row Name 10/20/19 1450 10/20/19 1046          Bed Mobility Goal 1 (PT)    Activity/Assistive Device (Bed  Mobility Goal 1, PT)  sit to supine;supine to sit  -LN  --     [place] Level/Cues Needed (Bed Mobility Goal 1, PT)  independent  -LN  --     Time Frame (Bed Mobility Goal 1, PT)  by discharge  -LN  --     Progress/Outcomes (Bed Mobility Goal 1, PT)  goal met  -LN  --        Transfer Goal 1 (PT)    Activity/Assistive Device (Transfer Goal 1, PT)  bed-to-chair/chair-to-bed;toilet  -LN  --     [place] Level/Cues Needed (Transfer Goal 1, PT)  standby assist  -LN  --     Time Frame (Transfer Goal 1, PT)  by discharge  -LN  --     Progress/Outcome (Transfer Goal 1, PT)  goal not met;goal revised this date  -LN  --        Gait Training Goal 1 (PT)    Activity/Assistive Device (Gait Training Goal 1, PT)  gait (walking locomotion);assistive device use  -LN  --     [place] Level (Gait Training Goal 1, PT)  contact guard assist  -LN  --     Distance (Gait Goal 1, PT)  150ft  -LN  --     Time Frame (Gait Training Goal 1, PT)  by discharge  -LN  --     Progress/Outcome (Gait Training Goal 1, PT)  goal not met;goal revised this date  -LN  --        Stairs Goal 1 (PT)    Activity/Assistive Device (Stairs Goal 1, PT)  ascending stairs;descending stairs  -LN  --     [place] Level/Cues Needed (Stairs Goal 1, PT)  standby assist  -LN  --     Number of Stairs (Stairs Goal 1, PT)  5  -LN  --     Time Frame (Stairs Goal 1, PT)  by discharge  -LN  --     Progress/Outcome (Stairs Goal 1, PT)  goal not met  -LN  --        Occupational Therapy Goals    Transfer Goal Selection (OT)  --  transfer, OT goal 1  -TO     Bathing Goal Selection (OT)  --  bathing, OT goal 1  -TO     Dressing Goal Selection (OT)  --  dressing, OT goal 1  -TO     Toileting Goal Selection (OT)  --  toileting, OT goal 1  -TO     Activity Tolerance Goal Selection (OT)  --  activity tolerance, OT goal 1  -TO        Transfer Goal 1 (OT)    Activity/Assistive Device (Transfer Goal 1, OT)  --  sit-to-stand/stand-to-sit;bed-to-chair/chair-to-bed;toilet  -TO      [place] Level/Cues Needed (Transfer Goal 1, OT)  --  supervision required  -TO     Time Frame (Transfer Goal 1, OT)  --  long term goal (LTG);by discharge  -TO     Progress/Outcome (Transfer Goal 1, OT)  --  goal not met  -TO        Bathing Goal 1 (OT)    Activity/Assistive Device (Bathing Goal 1, OT)  --  bathing skills, all  -TO     [place] Level/Cues Needed (Bathing Goal 1, OT)  --  minimum assist (75% or more patient effort)  -TO     Time Frame (Bathing Goal 1, OT)  --  long term goal (LTG);by discharge  -TO     Barriers (Bathing Goal 1, OT)  --  Pt will need extra time and rest breaks PRN  -TO     Progress/Outcomes (Bathing Goal 1, OT)  --  goal not met  -TO        Dressing Goal 1 (OT)    Activity/Assistive Device (Dressing Goal 1, OT)  --  dressing skills, all  -TO     [place]/Cues Needed (Dressing Goal 1, OT)  --  minimum assist (75% or more patient effort)  -TO     Time Frame (Dressing Goal 1, OT)  --  long term goal (LTG);by discharge  -TO     Barriers (Dressing Goal 1, OT)  --  Pt will need extra time and rest breaks PRN  -TO     Progress/Outcome (Dressing Goal 1, OT)  --  goal not met  -TO        Toileting Goal 1 (OT)    Activity/Device (Toileting Goal 1, OT)  --  toileting skills, all  -TO     [place] Level/Cues Needed (Toileting Goal 1, OT)  --  minimum assist (75% or more patient effort)  -TO     Time Frame (Toileting Goal 1, OT)  --  long term goal (LTG);by discharge  -TO     Barriers (Toileting Goal 1, OT)  --  Pt will need extra time and rest breaks PRN  -TO     Progress/Outcome (Toileting Goal 1, OT)  --  goal partially met  -TO         Activity Tolerance Goal 1 (OT)    Activity Level (Endurance Goal 1, OT)  --  15 min activity functional/therapeutic activity  -TO     Progress/Outcome (Activity Tolerance Goal 1, OT)  --  goal not met  -TO       User Key  (r) = Recorded By, (t) = Taken By, (c) = Cosigned By    Initials Name Provider Type Discipline    Lynda Betts, PTA  Physical Therapy Assistant PT    TO Nelson Oliveira COTA/L Occupational Therapy Assistant OT          Physical Therapy Education     Title: PT OT SLP Therapies (In Progress)     Topic: Physical Therapy (Done)     Point: Mobility training (Done)     Learning Progress Summary           Patient Acceptance, E,TB, VU by LN at 10/20/2019  3:28 PM    Acceptance, E,TB, VU by LN at 10/19/2019 12:00 PM    Acceptance, E, NR by GIAN at 10/18/2019  2:43 PM    Acceptance, E, NR by GIAN at 10/17/2019 12:35 PM    Acceptance, E, NR by GIAN at 10/16/2019  4:06 PM    Acceptance, E, NR by GIAN at 10/13/2019  1:00 PM    Acceptance, E, NR by GIAN at 10/11/2019  3:17 PM    Acceptance, E, NR by GIAN at 10/9/2019  2:59 PM    Acceptance, E, NR by GIAN at 10/8/2019  4:00 PM    Acceptance, E, NR by 1 at 10/3/2019  3:43 PM                   Point: Home exercise program (Done)     Learning Progress Summary           Patient Acceptance, E,TB, VU by LN at 10/20/2019  3:28 PM    Acceptance, E,TB, VU by LN at 10/19/2019 12:00 PM                   Point: Body mechanics (Done)     Learning Progress Summary           Patient Acceptance, E,TB, VU by LN at 10/20/2019  3:28 PM    Acceptance, E,TB, VU by LN at 10/19/2019 12:00 PM    Acceptance, E, NR by 1 at 10/3/2019  3:43 PM                   Point: Precautions (Done)     Learning Progress Summary           Patient Acceptance, E,TB, VU by LN at 10/20/2019  3:28 PM    Acceptance, E,TB, VU by LN at 10/19/2019 12:00 PM    Acceptance, E, NR by 1 at 10/3/2019  3:43 PM                               User Key     Initials Effective Dates Name Provider Type Discipline    [facility] 04/03/18 -  Judy Watkins, PT Physical Therapist PT     03/07/18 -  Abran Wan PTA Physical Therapy Assistant PT     03/07/18 -  Lynda Ramos PTA Physical Therapy Assistant PT                PT Recommendation and Plan  Anticipated Discharge Disposition (PT): anticipate therapy at next level of care  Therapy Frequency (PT Clinical  Impression): daily  Outcome Summary/Treatment Plan (PT)  Plan for Continued Treatment (PT): cont  Anticipated Discharge Disposition (PT): anticipate therapy at next level of care  Plan of Care Reviewed With: patient  Outcome Summary: sup-sit-sup cond ind,sit-stand-sit cga of 1,amb 40' with rw and cga of 1-unable to keep 02 secure on trach this rx-1 new goal met  Outcome Measures     Row Name 10/20/19 1450 10/20/19 1046 10/19/19 1129       How much help from another person do you currently need...    Turning from your back to your side while in flat bed without using bedrails?  4  -LN  --  --    Moving from lying on back to sitting on the side of a flat bed without bedrails?  4  -LN  --  --    Moving to and from a bed to a chair (including a wheelchair)?  3  -LN  --  --    Standing up from a chair using your arms (e.g., wheelchair, bedside chair)?  3  -LN  --  --    Climbing 3-5 steps with a railing?  2  -LN  --  --    To walk in hospital room?  3  -LN  --  --    AM-PAC 6 Clicks Score (PT)  19  -LN  --  --       How much help from another is currently needed...    Putting on and taking off regular lower body clothing?  --  3  -TO  2  -KD    Bathing (including washing, rinsing, and drying)  --  2  -TO  2  -KD    Toileting (which includes using toilet bed pan or urinal)  --  3  -TO  2  -KD    Putting on and taking off regular upper body clothing  --  3  -TO  3  -KD    Taking care of personal grooming (such as brushing teeth)  --  3  -TO  3  -KD    Eating meals  --  1  -TO  1  -KD    AM-PAC 6 Clicks Score (OT)  --  15  -TO  13  -KD       Functional Assessment    Outcome Measure Options  AM-PAC 6 Clicks Basic Mobility (PT)  -LN  --  --    Row Name 10/19/19 1040 10/18/19 1614 10/18/19 1406       How much help from another person do you currently need...    Turning from your back to your side while in flat bed without using bedrails?  3  -LN  --  3  -GIAN    Moving from lying on back to sitting on the side of a flat bed  without bedrails?  3  -LN  --  3  -GIAN    Moving to and from a bed to a chair (including a wheelchair)?  3  -LN  --  3  -GIAN    Standing up from a chair using your arms (e.g., wheelchair, bedside chair)?  3  -LN  --  3  -GIAN    Climbing 3-5 steps with a railing?  2  -LN  --  2  -GIAN    To walk in hospital room?  3  -LN  --  3  -GIAN    AM-PAC 6 Clicks Score (PT)  17  -LN  --  17  -GIAN       How much help from another is currently needed...    Putting on and taking off regular lower body clothing?  --  2  -AS  --    Bathing (including washing, rinsing, and drying)  --  2  -AS  --    Toileting (which includes using toilet bed pan or urinal)  --  2  -AS  --    Putting on and taking off regular upper body clothing  --  3  -AS  --    Taking care of personal grooming (such as brushing teeth)  --  3  -AS  --    Eating meals  --  1  -AS  --    AM-PAC 6 Clicks Score (OT)  --  13  -AS  --       Functional Assessment    Outcome Measure Options  AM-PAC 6 Clicks Basic Mobility (PT)  -LN  AM-PAC 6 Clicks Daily Activity (OT)  -AS  AM-PAC 6 Clicks Basic Mobility (PT)  -GIAN    Row Name 10/18/19 1400             How much help from another is currently needed...    Putting on and taking off regular lower body clothing?  2  -CS      Bathing (including washing, rinsing, and drying)  2  -CS      Toileting (which includes using toilet bed pan or urinal)  2  -CS      Putting on and taking off regular upper body clothing  2  -CS      Taking care of personal grooming (such as brushing teeth)  2  -CS      Eating meals  1  -CS      AM-PAC 6 Clicks Score (OT)  11  -CS        User Key  (r) = Recorded By, (t) = Taken By, (c) = Cosigned By    Initials Name Provider Type    GIAN Abran Wan, PTA Physical Therapy Assistant    LN Lynda Ramos, PTA Physical Therapy Assistant    Isabelle Colon, MADSEN/L Occupational Therapy Assistant    Nelson Klein, MADSEN/L Occupational Therapy Assistant    Brenda Lee, MADSEN/L Occupational Therapy  Assistant    AS Destinee Yarbrough OT Occupational Therapist         Time Calculation:   PT Charges     Row Name 10/20/19 1530             Time Calculation    Start Time  1450  -LN      Stop Time  1515  -LN      Time Calculation (min)  25 min  -LN      PT Received On  10/20/19  -LN         Time Calculation- PT    Total Timed Code Minutes- PT  25 minute(s)  -LN        User Key  (r) = Recorded By, (t) = Taken By, (c) = Cosigned By    Initials Name Provider Type    LN Lynda Ramos PTA Physical Therapy Assistant        Therapy Charges for Today     Code Description Service Date Service Provider Modifiers Qty    60230380690 HC PT THERAPEUTIC ACT EA 15 MIN 10/19/2019 Lynda Ramos, PTA GP 1    78449593149 HC GAIT TRAINING EA 15 MIN 10/19/2019 Lynda Ramos, PTA GP 1    90566596276 HC PT THER PROC EA 15 MIN 10/19/2019 Lynda Ramos, PTA GP 1    25321186519 HC GAIT TRAINING EA 15 MIN 10/20/2019 Lynda Ramos, PTA GP 1    43167777039 HC PT THER PROC EA 15 MIN 10/20/2019 Lynda Ramos, PTA GP 1          PT G-Codes  Outcome Measure Options: AM-PAC 6 Clicks Basic Mobility (PT)  AM-PAC 6 Clicks Score (PT): 19  AM-PAC 6 Clicks Score (OT): 15    Lyndajairo Ramos PTA  10/20/2019

## 2019-10-20 NOTE — THERAPY TREATMENT NOTE
Acute Care - Occupational Therapy Treatment Note  [facility]     Patient Name: Emerson Bang  : 1960  MRN: [id number]  Today's Date: 10/20/2019  Onset of Illness/Injury or Date of Surgery: 19  Date of Referral to OT: 10/02/19  Referring Physician: Dr. Kc    Admit Date: 2019       ICD-10-CM ICD-9-CM   1. 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 Impaired physical mobility Z74.09 781.99   3. Impaired mobility and activities of daily living Z74.09 799.89     Patient Active Problem List   Diagnosis   • Hyperkalemia   • Iron deficiency anemia   • Gastroesophageal reflux disease with esophagitis   • Elevated AST (SGOT)   • Alcohol abuse   • Hypothyroidism   • Balance problem   • Need for vaccination   • Low magnesium levels   • Squamous cell carcinoma of pharynx (CMS/HCC)   • History of throat cancer   • Vitamin D deficiency   • Alcohol abuse counseling and surveillance   • Encounter for screening for diabetes mellitus   • Hypomagnesemia   • Iron deficiency anemia   • Hyperlipidemia   • Underweight due to inadequate caloric intake   • Need for immunization against influenza   • 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HCC)   • Hypertension   • General medical examination   • Underweight   • Epigastric pain   • Gastritis without bleeding   • Need for influenza vaccination   • Elevated liver function tests   • B12 deficiency   • Intestinal malabsorption   • Throat pain   • Acute pharyngitis   • Upper respiratory tract infection   • Neoplasm of uncertain behavior of larynx   • Pharyngoesophageal dysphagia   • Severe protein-calorie malnutrition (CMS/HCC)   • Anemia of chronic disease   • Hypotension   • Hyponatremia   • Status post insertion of percutaneous endoscopic gastrostomy (PEG) tube (CMS/HCC)   • Hx of tracheostomy   • History of throat surgery   • Hx SBO   • Urinary retention   • Generalized weakness   • Acute otitis externa of right ear   • Hard stool   • Panlobular emphysema  (CMS/HCC)   • Cancer of hypopharynx (CMS/HCC)   • Diarrhea   • Encounter for venous access device care   • Prediabetes   • Status post neck dissection   • S/P partial thyroidectomy   • S/P laryngectomy   • Annual physical exam   • Alcoholic cirrhosis of liver without ascites (CMS/HCC)     Past Medical History:   Diagnosis Date   • Allergic rhinitis     vs URI   • Anemia    • At risk for falls    • Benign prostatic hyperplasia    • Chronic gastritis    • Cirrhosis of liver (CMS/HCC)    • Complication of gastrostomy (CMS/HCC)     site not healing   • COPD (chronic obstructive pulmonary disease) (CMS/HCC)    • Dysphagia     odynophagia   • Epigastric pain    • Esophagitis    • GERD (gastroesophageal reflux disease)    • Hypertension    • Hypothyroidism, unspecified    • Low back pain    • Malaise and fatigue    • Nasal congestion 11/15/2018   • Nausea with vomiting, unspecified    • Pain in left knee    • Pain in right knee    • Primary malignant neoplasm of pharynx (CMS/HCC)    • Screening for malignant neoplasm of colon    • 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 Left upper quadrant abdominal wall abscess debridement. Gastrostomy tube erosion through & through the anterior gastric wall.Lupper quadrant abdominal wall abscess   • COLONOSCOPY  04/21/2014    Internal & external hemorrhoids found.   • COLONOSCOPY  2014    [place]   • COLONOSCOPY N/A 10/16/2018    Procedure: COLONOSCOPY;  Surgeon: Kaushal Chester MD;  Location: [facility] ENDOSCOPY;  Service: Gastroenterology   • DIAGNOSTIC LAPAROSCOPY EXPLORATORY LAPAROTOMY N/A 7/17/2019    Procedure: DIAGNOSTIC LAPAROSCOPY, EXPLORATORY LAPAROTOMY, LYSIS OF ADHESIONS;  Surgeon: Parminder Kc MD;   Location: [facility] OR;  Service: General   • DIRECT LARYNGOSCOPY, ESOPHAGOSCOPY, BRONCHOSCOPY N/A 4/15/2019    Procedure: DIRECT LARYNGOSCOPY with biopsy, ESOPHAGOSCOPY;  Surgeon: Bharathi Washington MD;  Location: [facility] OR;  Service: ENT   • ENDOSCOPY N/A 10/16/2018    Procedure: ESOPHAGOGASTRODUODENOSCOPY;  Surgeon: Kaushal Chester MD;  Location: [facility] ENDOSCOPY;  Service: Gastroenterology   • GASTROCUTANEOUS FISTULA CLOSURE N/A 10/2/2019    Procedure: GASTROCUTANEOUS FISTULA CLOSURE;  Surgeon: Parminder Kc MD;  Location: [facility] OR;  Service: General   • GASTROSTOMY FEEDING TUBE INSERTION N/A 4/15/2019    Procedure: GASTROSTOMY FEEDING TUBE INSERTION;  Surgeon: Trenton Valera MD;  Location: [facility] OR;  Service: General   • JEJUNOSTOMY N/A 10/2/2019    Procedure: JEJUNOSTOMY;  Surgeon: Parminder Kc MD;  Location: [facility] OR;  Service: General   • TRACHEOSTOMY  11/10/2008    Respiratory failure   • UPPER GASTROINTESTINAL ENDOSCOPY  04/21/2014    Esophagitis seen. Biopsy taken. Gastritis in stomach. Biopsy taken. Normal duodenum. Biopsy taken.   • UPPER GASTROINTESTINAL ENDOSCOPY  10/16/2018   • VENOUS ACCESS DEVICE (PORT) INSERTION N/A 9/11/2019    Procedure: INSERTION VENOUS ACCESS DEVICE (MEDIPORT)        (c-arm#1);  Surgeon: Parminder Kc MD;  Location: [facility];  Service: General       Therapy Treatment    Rehabilitation Treatment Summary     Row Name 10/20/19 1046             Treatment Time/Intention    Discipline  occupational therapy assistant  -TO      Document Type  therapy note (daily note)  -TO      Subjective Information  no complaints  -TO      Mode of Treatment  occupational therapy  -TO      Patient/Family Observations  alone in room  -TO      Therapy Frequency (OT Eval)  other (see comments) 5-7x/week  -TO      Patient Effort  good  -TO      Existing Precautions/Restrictions  fall  -TO      Recorded by [TO] Nelson Oliveira COTA/L 10/20/19 1316      Row Name 10/20/19 104              Vital Signs    Pre Systolic BP Rehab  90  -TO      Pre Treatment Diastolic BP  61  -TO      Pretreatment Heart Rate (beats/min)  72  -TO      Pre SpO2 (%)  98  -TO      O2 Delivery Pre Treatment  trach collar  -TO      Recorded by [TO] Nelson Oliveira COTA/L 10/20/19 1316      Row Name 10/20/19 1046             Cognitive Assessment/Intervention- PT/OT    Affect/Mental Status (Cognitive)  WFL  -TO      Orientation Status (Cognition)  oriented x 4  -TO      Follows Commands (Cognition)  WFL  -TO      Recorded by [TO] Nelson Oliveira COTA/L 10/20/19 1316      Row Name 10/20/19 1046             Safety Issues, Functional Mobility    Impairments Affecting Function (Mobility)  balance;endurance/activity tolerance;strength  -TO      Recorded by [TO] Nelson Oliveira MADSEN/L 10/20/19 1316      Row Name 10/20/19 1046             Bed Mobility Assessment/Treatment    Bed Mobility Assessment/Treatment  supine-sit;sit-supine  -TO      [place] Level (Bed Mobility)  supervision  -TO      Supine-Sit [place] (Bed Mobility)  supervision  -TO      Sit-Supine [place] (Bed Mobility)  supervision  -TO      Assistive Device (Bed Mobility)  bed rails;head of bed elevated  -TO      Recorded by [TO] Nelson Oliveira COTA/L 10/20/19 1316      Row Name 10/20/19 1046             Toilet Transfer    Type (Toilet Transfer)  stand pivot/stand step  -TO      [place] Level (Toilet Transfer)  contact guard  -TO      Assistive Device (Toilet Transfer)  commode, 3-in-1  -TO      Recorded by [TO] Nelson Oliveira MADSEN/L 10/20/19 1316      Row Name 10/20/19 1046             Toileting Assessment/Training    [place] Level (Toileting)  toileting skills;perform perineal hygiene;contact guard assist  -TO      Assistive Devices (Toileting)  commode, 3-in-1  -TO      Toileting Position  unsupported sitting  -TO      Recorded by [TO] Nelson Oliveira MADSEN/L 10/20/19 1316      Row Name 10/20/19 1046             Therapeutic Exercise     Upper Extremity Range of Motion (Therapeutic Exercise)  shoulder flexion/extension, bilateral;shoulder internal/external rotation, bilateral;elbow flexion/extension, bilateral prot/ret  -TO      Weight/Resistance (Therapeutic Exercise)  2 pounds  -TO      Exercise Type (Therapeutic Exercise)  resistive exercises  -TO      Sets/Reps (Therapeutic Exercise)  2 x 15  -TO      Recorded by [TO] Nelson Oliveira MADSEN/L 10/20/19 1316      Row Name 10/20/19 1046             Static Sitting Balance    Time Able to Maintain Position (Unsupported Sitting, Static Balance)  -- entire tx  -TO      Recorded by [TO] Nelson Oliveira MADSEN/L 10/20/19 1316      Row Name 10/20/19 1046             Dynamic Sitting Balance    Level of [place], Reaches Outside Midline (Sitting, Dynamic Balance)  standby assist  -TO      Sitting Position, Reaches Outside Midline (Sitting, Dynamic Balance)  sitting on edge of bed  -TO      Recorded by [TO] Nelson Oliveira MADSEN/L 10/20/19 1316      Row Name 10/20/19 1046             Positioning and Restraints    Pre-Treatment Position  in bed  -TO      Post Treatment Position  bed  -TO      In Bed  exit alarm on;encouraged to call for assist;call light within reach;supine  -TO      Recorded by [TO] Nelson Oliveira MADSEN/L 10/20/19 1316      Row Name 10/20/19 1046             Pain Scale: Numbers Pre/Post-Treatment    Pain Scale: Numbers, Pretreatment  0/10 - no pain  -TO      Pain Scale: Numbers, Post-Treatment  0/10 - no pain  -TO      Recorded by [TO] Nelson Oliveira MADSEN/L 10/20/19 1316      Row Name                Wound 09/30/19 1330 Left medial abdomen Fistula    Wound - Properties Group Date first assessed: 09/30/19 [SS] Time first assessed: 1330 [SS] Side: Left [SS] Orientation: medial [SS] Location: abdomen [SS] Primary Wound Type: Fistula [SS] Recorded by:  [SS] Citlalli Abdi RN 09/30/19 1619    Row Name                Wound 10/02/19 1347 abdomen Incision    Wound - Properties Group Date  first assessed: 10/02/19 [CF] Time first assessed: 1347 [CF] Present on Hospital Admission: N [CF] Location: abdomen [CF] Primary Wound Type: Incision [CF] Recorded by:  [CF] Eileen Tyson 10/02/19 1347    Row Name 10/20/19 1046             Outcome Summary/Treatment Plan (OT)    Daily Summary of Progress (OT)  progress toward functional goals is good  -TO      Barriers to Overall Progress (OT)  strength, endurance , balance  -TO      Anticipated Discharge Disposition (OT)  anticipate therapy at next level of care  -TO      Recorded by [TO] Nelson Oliveira MADSEN/L 10/20/19 1316        User Key  (r) = Recorded By, (t) = Taken By, (c) = Cosigned By    Initials Name Effective Dates Discipline    SS Citlalli Abdi RN 07/10/18 -  Nurse    TO Nelson Oliveira MADSEN/L 03/07/18 -  OT    CF Eileen Tyson - -        Wound 09/30/19 1330 Left medial abdomen Fistula (Active)   Dressing Appearance dry;intact 10/20/2019  8:18 AM   Care, Wound cleansed with 10/20/2019  8:55 AM   Dressing Care, Wound dressing changed 10/20/2019  8:55 AM       Wound 10/02/19 1347 abdomen Incision (Active)   Closure Open to air 10/20/2019  8:18 AM   Periwound dry 10/20/2019  8:18 AM   Periwound Temperature warm 10/20/2019  8:18 AM   Periwound Skin Turgor soft 10/20/2019  8:18 AM     Rehab Goal Summary     Row Name 10/20/19 1046             Occupational Therapy Goals    Transfer Goal Selection (OT)  transfer, OT goal 1  -TO      Bathing Goal Selection (OT)  bathing, OT goal 1  -TO      Dressing Goal Selection (OT)  dressing, OT goal 1  -TO      Toileting Goal Selection (OT)  toileting, OT goal 1  -TO      Activity Tolerance Goal Selection (OT)  activity tolerance, OT goal 1  -TO         Transfer Goal 1 (OT)    Activity/Assistive Device (Transfer Goal 1, OT)  sit-to-stand/stand-to-sit;bed-to-chair/chair-to-bed;toilet  -TO      [place] Level/Cues Needed (Transfer Goal 1, OT)  supervision required  -TO      Time Frame (Transfer Goal 1, OT)   long term goal (LTG);by discharge  -TO      Progress/Outcome (Transfer Goal 1, OT)  goal not met  -TO         Bathing Goal 1 (OT)    Activity/Assistive Device (Bathing Goal 1, OT)  bathing skills, all  -TO      [place] Level/Cues Needed (Bathing Goal 1, OT)  minimum assist (75% or more patient effort)  -TO      Time Frame (Bathing Goal 1, OT)  long term goal (LTG);by discharge  -TO      Barriers (Bathing Goal 1, OT)  Pt will need extra time and rest breaks PRN  -TO      Progress/Outcomes (Bathing Goal 1, OT)  goal not met  -TO         Dressing Goal 1 (OT)    Activity/Assistive Device (Dressing Goal 1, OT)  dressing skills, all  -TO      [place]/Cues Needed (Dressing Goal 1, OT)  minimum assist (75% or more patient effort)  -TO      Time Frame (Dressing Goal 1, OT)  long term goal (LTG);by discharge  -TO      Barriers (Dressing Goal 1, OT)  Pt will need extra time and rest breaks PRN  -TO      Progress/Outcome (Dressing Goal 1, OT)  goal not met  -TO         Toileting Goal 1 (OT)    Activity/Device (Toileting Goal 1, OT)  toileting skills, all  -TO      [place] Level/Cues Needed (Toileting Goal 1, OT)  minimum assist (75% or more patient effort)  -TO      Time Frame (Toileting Goal 1, OT)  long term goal (LTG);by discharge  -TO      Barriers (Toileting Goal 1, OT)  Pt will need extra time and rest breaks PRN  -TO      Progress/Outcome (Toileting Goal 1, OT)  goal partially met  -TO          Activity Tolerance Goal 1 (OT)    Activity Level (Endurance Goal 1, OT)  15 min activity functional/therapeutic activity  -TO      Progress/Outcome (Activity Tolerance Goal 1, OT)  goal not met  -TO        User Key  (r) = Recorded By, (t) = Taken By, (c) = Cosigned By    Initials Name Provider Type Discipline    TO Nelson Oliveira COTA/L Occupational Therapy Assistant OT        Occupational Therapy Education     Title: PT OT SLP Therapies (In Progress)     Topic: Occupational Therapy (In Progress)     Point: ADL  training (In Progress)     Description: Instruct learner(s) on proper safety adaptation and remediation techniques during self care or transfers.   Instruct in proper use of assistive devices.    Learning Progress Summary           Patient Acceptance, E,TB,D, NR by CS at 10/18/2019  2:04 PM    Acceptance, E,TB,D, NR by CS at 10/16/2019  3:43 PM    Acceptance, E,TB,D, NR by CS at 10/15/2019  1:42 PM    Acceptance, E,TB,D, NR by CS at 10/14/2019  1:57 PM    Acceptance, E, NR by BB at 10/9/2019  1:37 PM    Acceptance, E,TB,D, NR by CS at 10/6/2019  1:05 PM    Acceptance, E,TB,D, NR by CS at 10/5/2019 11:11 AM    Acceptance, E, NR by AS at 10/4/2019 11:45 AM    Comment:  Role of OT, OT POC, importance of OOB activity for strength and healing, ADL training                   Point: Home exercise program (In Progress)     Description: Instruct learner(s) on appropriate technique for monitoring, assisting and/or progressing therapeutic exercises/activities.    Learning Progress Summary           Patient Acceptance, E,TB,D, NR by CS at 10/18/2019  2:04 PM    Acceptance, E,TB,D, NR by CS at 10/16/2019  3:43 PM    Acceptance, E,TB,D, NR by CS at 10/15/2019  1:42 PM    Acceptance, E,TB,D, NR by CS at 10/14/2019  1:57 PM    Acceptance, E, NR by BB at 10/8/2019 11:46 AM    Acceptance, E,TB,D, NR by CS at 10/6/2019  1:05 PM    Acceptance, E,TB,D, NR by CS at 10/5/2019 11:11 AM                   Point: Precautions (In Progress)     Description: Instruct learner(s) on prescribed precautions during self-care and functional transfers.    Learning Progress Summary           Patient Acceptance, E, NR by AS at 10/18/2019  5:56 PM    Comment:  OT POC, progress towards goals, tx plan modifications    Acceptance, E,TB,D, NR by CS at 10/18/2019  2:04 PM    Acceptance, E,TB,D, NR by CS at 10/16/2019  3:43 PM    Acceptance, E,TB,D, NR by CS at 10/15/2019  1:42 PM    Acceptance, E,TB,D, NR by CS at 10/14/2019  1:57 PM    Acceptance, E,TB,D, NR  by CS at 10/6/2019  1:05 PM    Acceptance, E,TB,D, NR by CS at 10/5/2019 11:11 AM    Acceptance, E, NR by AS at 10/4/2019 11:45 AM    Comment:  Role of OT, OT POC, importance of OOB activity for strength and healing, ADL training                   Point: Body mechanics (In Progress)     Description: Instruct learner(s) on proper positioning and spine alignment during self-care, functional mobility activities and/or exercises.    Learning Progress Summary           Patient Acceptance, E,TB,D, NR by CS at 10/18/2019  2:04 PM    Acceptance, E,TB,D, NR by CS at 10/16/2019  3:43 PM    Acceptance, E,TB,D, NR by CS at 10/15/2019  1:42 PM    Acceptance, E,TB,D, NR by CS at 10/14/2019  1:57 PM    Acceptance, E,TB,D, NR by CS at 10/6/2019  1:05 PM    Acceptance, E,TB,D, NR by CS at 10/5/2019 11:11 AM                               User Key     Initials Effective Dates Name Provider Type Discipline     03/07/18 -  Kerrie Woodson COTA/L Occupational Therapy Assistant OT    CS 03/07/18 -  Brenda Thomas COTA/LIANE Occupational Therapy Assistant OT    AS 03/25/19 -  Destinee Yarbrough, OT Occupational Therapist OT                OT Recommendation and Plan  Outcome Summary/Treatment Plan (OT)  Daily Summary of Progress (OT): progress toward functional goals is good  Barriers to Overall Progress (OT): strength, endurance , balance  Plan for Continued Treatment (OT): cont OT poc  Anticipated Discharge Disposition (OT): anticipate therapy at next level of care  Therapy Frequency (OT Eval): other (see comments)(5-7x/week)  Daily Summary of Progress (OT): progress toward functional goals is good  Plan of Care Review  Plan of Care Reviewed With: patient  Plan of Care Reviewed With: patient  Outcome Summary: pt cga for t/f to BSC and SBA for bowel hygiene. pt performed static standing bedside x 5 mins.  Outcome Measures     Row Name 10/20/19 1046 10/19/19 1129 10/19/19 1040       How much help from another person do you currently  need...    Turning from your back to your side while in flat bed without using bedrails?  --  --  3  -LN    Moving from lying on back to sitting on the side of a flat bed without bedrails?  --  --  3  -LN    Moving to and from a bed to a chair (including a wheelchair)?  --  --  3  -LN    Standing up from a chair using your arms (e.g., wheelchair, bedside chair)?  --  --  3  -LN    Climbing 3-5 steps with a railing?  --  --  2  -LN    To walk in hospital room?  --  --  3  -LN    AM-PAC 6 Clicks Score (PT)  --  --  17  -LN       How much help from another is currently needed...    Putting on and taking off regular lower body clothing?  3  -TO  2  -KD  --    Bathing (including washing, rinsing, and drying)  2  -TO  2  -KD  --    Toileting (which includes using toilet bed pan or urinal)  3  -TO  2  -KD  --    Putting on and taking off regular upper body clothing  3  -TO  3  -KD  --    Taking care of personal grooming (such as brushing teeth)  3  -TO  3  -KD  --    Eating meals  1  -TO  1  -KD  --    AM-PAC 6 Clicks Score (OT)  15  -TO  13  -KD  --       Functional Assessment    Outcome Measure Options  --  --  AM-PAC 6 Clicks Basic Mobility (PT)  -LN    Row Name 10/18/19 1614 10/18/19 1406 10/18/19 1400       How much help from another person do you currently need...    Turning from your back to your side while in flat bed without using bedrails?  --  3  -GIAN  --    Moving from lying on back to sitting on the side of a flat bed without bedrails?  --  3  -GIAN  --    Moving to and from a bed to a chair (including a wheelchair)?  --  3  -GIAN  --    Standing up from a chair using your arms (e.g., wheelchair, bedside chair)?  --  3  -GIAN  --    Climbing 3-5 steps with a railing?  --  2  -GIAN  --    To walk in hospital room?  --  3  -GIAN  --    AM-PAC 6 Clicks Score (PT)  --  17  -GIAN  --       How much help from another is currently needed...    Putting on and taking off regular lower body clothing?  2  -AS  --  2  -CS    Bathing  (including washing, rinsing, and drying)  2  -AS  --  2  -CS    Toileting (which includes using toilet bed pan or urinal)  2  -AS  --  2  -CS    Putting on and taking off regular upper body clothing  3  -AS  --  2  -CS    Taking care of personal grooming (such as brushing teeth)  3  -AS  --  2  -CS    Eating meals  1  -AS  --  1  -CS    AM-PAC 6 Clicks Score (OT)  13  -AS  --  11  -CS       Functional Assessment    Outcome Measure Options  AM-PAC 6 Clicks Daily Activity (OT)  -AS  AM-PAC 6 Clicks Basic Mobility (PT)  -GIAN  --      User Key  (r) = Recorded By, (t) = Taken By, (c) = Cosigned By    Initials Name Provider Type    GIAN Abran Wan, PTA Physical Therapy Assistant    LN Lynda Ramos, PTA Physical Therapy Assistant    KD Isabelle Schafer, MADSEN/L Occupational Therapy Assistant    TO Nelson Oliveira MADSEN/L Occupational Therapy Assistant    Brenda Lee, MADSEN/L Occupational Therapy Assistant    AS Destinee Yarbrough, OT Occupational Therapist           Time Calculation:   Time Calculation- OT     Row Name 10/20/19 1321             Time Calculation- OT    OT Start Time  1046  -TO      OT Stop Time  1127  -TO      OT Time Calculation (min)  41 min  -TO      Total Timed Code Minutes- OT  41 minute(s)  -TO      OT Received On  10/20/19  -TO        User Key  (r) = Recorded By, (t) = Taken By, (c) = Cosigned By    Initials Name Provider Type    TO Nelson Oliveira MADSEN/L Occupational Therapy Assistant        Therapy Charges for Today     Code Description Service Date Service Provider Modifiers Qty    55452098154 HC OT SELF CARE/MGMT/TRAIN EA 15 MIN 10/20/2019 Nelson Oliveira MADSEN/L GO 1    80284633016 HC OT THER PROC EA 15 MIN 10/20/2019 Nelson Oliveira MADSEN/L GO 1    70541229009 HC OT THERAPEUTIC ACT EA 15 MIN 10/20/2019 Nelson Oliveira MADSEN/L GO 1               JOSE MaoA/LIANE  10/20/2019

## 2019-10-20 NOTE — PLAN OF CARE
Problem: Patient Care Overview  Goal: Plan of Care Review  Outcome: Ongoing (interventions implemented as appropriate)   10/20/19 1420   Coping/Psychosocial   Plan of Care Reviewed With patient   Plan of Care Review   Progress no change   OTHER   Outcome Summary pt. VSS; no changes at this time     Goal: Individualization and Mutuality  Outcome: Ongoing (interventions implemented as appropriate)    Goal: Discharge Needs Assessment  Outcome: Ongoing (interventions implemented as appropriate)    Goal: Interprofessional Rounds/Family Conf  Outcome: Ongoing (interventions implemented as appropriate)      Problem: Fall Risk (Adult)  Goal: Absence of Fall  Outcome: Ongoing (interventions implemented as appropriate)      Problem: Skin Injury Risk (Adult)  Goal: Skin Health and Integrity  Outcome: Ongoing (interventions implemented as appropriate)      Problem: Pain, Chronic (Adult)  Goal: Acceptable Pain/Comfort Level and Functional Ability  Outcome: Ongoing (interventions implemented as appropriate)      Problem: Wound (Includes Pressure Injury) (Adult)  Goal: Signs and Symptoms of Listed Potential Problems Will be Absent, Minimized or Managed (Wound)  Outcome: Ongoing (interventions implemented as appropriate)      Problem: Airway, Artificial (Adult)  Goal: Signs and Symptoms of Listed Potential Problems Will be Absent, Minimized or Managed (Airway, Artificial)  Outcome: Ongoing (interventions implemented as appropriate)      Problem: Nutrition, Enteral (Adult)  Goal: Signs and Symptoms of Listed Potential Problems Will be Absent, Minimized or Managed (Nutrition, Enteral)  Outcome: Ongoing (interventions implemented as appropriate)    Goal: Signs and Symptoms of Listed Potential Problems Will be Absent, Minimized or Managed (Nutrition, Enteral)  Outcome: Ongoing (interventions implemented as appropriate)

## 2019-10-21 LAB
GLUCOSE BLDC GLUCOMTR-MCNC: 127 MG/DL (ref 70–130)
GLUCOSE BLDC GLUCOMTR-MCNC: 99 MG/DL (ref 70–130)

## 2019-10-21 PROCEDURE — 97110 THERAPEUTIC EXERCISES: CPT

## 2019-10-21 PROCEDURE — 82962 GLUCOSE BLOOD TEST: CPT

## 2019-10-21 PROCEDURE — 94799 UNLISTED PULMONARY SVC/PX: CPT

## 2019-10-21 PROCEDURE — 77386: CPT | Performed by: RADIOLOGY

## 2019-10-21 PROCEDURE — 99024 POSTOP FOLLOW-UP VISIT: CPT | Performed by: SURGERY

## 2019-10-21 PROCEDURE — 94760 N-INVAS EAR/PLS OXIMETRY 1: CPT

## 2019-10-21 PROCEDURE — 97116 GAIT TRAINING THERAPY: CPT

## 2019-10-21 PROCEDURE — 77014 CHG CT GUIDANCE RADIATION THERAPY FLDS PLACEMENT: CPT | Performed by: RADIOLOGY

## 2019-10-21 PROCEDURE — 25010000002 ENOXAPARIN PER 10 MG: Performed by: SURGERY

## 2019-10-21 RX ADMIN — ALBUTEROL SULFATE 2.5 MG: 2.5 SOLUTION RESPIRATORY (INHALATION) at 19:07

## 2019-10-21 RX ADMIN — FAMOTIDINE 20 MG: 40 POWDER, FOR SUSPENSION ORAL at 14:16

## 2019-10-21 RX ADMIN — ALBUTEROL SULFATE 2.5 MG: 2.5 SOLUTION RESPIRATORY (INHALATION) at 13:43

## 2019-10-21 RX ADMIN — SILVER SULFADIAZINE: 10 CREAM TOPICAL at 20:50

## 2019-10-21 RX ADMIN — ALBUTEROL SULFATE 2.5 MG: 2.5 SOLUTION RESPIRATORY (INHALATION) at 01:22

## 2019-10-21 RX ADMIN — FAMOTIDINE 20 MG: 40 POWDER, FOR SUSPENSION ORAL at 01:18

## 2019-10-21 RX ADMIN — ALBUTEROL SULFATE 2.5 MG: 2.5 SOLUTION RESPIRATORY (INHALATION) at 07:45

## 2019-10-21 RX ADMIN — HYDROCODONE BITARTRATE AND ACETAMINOPHEN 15 ML: 7.5; 325 SOLUTION ORAL at 10:49

## 2019-10-21 RX ADMIN — ENOXAPARIN SODIUM 40 MG: 40 INJECTION SUBCUTANEOUS at 08:55

## 2019-10-21 RX ADMIN — HYDROCODONE BITARTRATE AND ACETAMINOPHEN 15 ML: 7.5; 325 SOLUTION ORAL at 05:54

## 2019-10-21 RX ADMIN — HYDROCODONE BITARTRATE AND ACETAMINOPHEN 15 ML: 7.5; 325 SOLUTION ORAL at 20:50

## 2019-10-21 RX ADMIN — SILVER SULFADIAZINE: 10 CREAM TOPICAL at 08:55

## 2019-10-21 NOTE — PLAN OF CARE
Problem: Patient Care Overview  Goal: Plan of Care Review  Outcome: Ongoing (interventions implemented as appropriate)   10/21/19 4666   Coping/Psychosocial   Plan of Care Reviewed With patient   Plan of Care Review   Progress no change   OTHER   Outcome Summary Pt resting between care. VSS. Pain controlled.

## 2019-10-21 NOTE — PLAN OF CARE
Problem: Patient Care Overview  Goal: Plan of Care Review  Outcome: Ongoing (interventions implemented as appropriate)   10/21/19 2297   Coping/Psychosocial   Plan of Care Reviewed With patient   Plan of Care Review   Progress improving   OTHER   Outcome Summary pt responded well to therapy w/ increased gait to 64 ft CGA w/ RW. pt is mod indep w/ bed mob and participated in seated LE ther ex. tx ended at nsg needed to complete dressing change. no new goals met at this time. pt would continue to benefit from PT services.

## 2019-10-21 NOTE — PLAN OF CARE
Problem: Patient Care Overview  Goal: Plan of Care Review  Outcome: Ongoing (interventions implemented as appropriate)   10/21/19 6595   Coping/Psychosocial   Plan of Care Reviewed With patient   Plan of Care Review   Progress improving   OTHER   Outcome Summary vss. pain controlled. tolerating goal tube feeding at 60ml/hr. worked with therapy. dressing changed. trach care. continue to monitor.       Problem: Fall Risk (Adult)  Goal: Absence of Fall  Outcome: Ongoing (interventions implemented as appropriate)      Problem: Skin Injury Risk (Adult)  Goal: Skin Health and Integrity  Outcome: Ongoing (interventions implemented as appropriate)      Problem: Pain, Chronic (Adult)  Goal: Acceptable Pain/Comfort Level and Functional Ability  Outcome: Ongoing (interventions implemented as appropriate)      Problem: Wound (Includes Pressure Injury) (Adult)  Goal: Signs and Symptoms of Listed Potential Problems Will be Absent, Minimized or Managed (Wound)  Outcome: Ongoing (interventions implemented as appropriate)      Problem: Airway, Artificial (Adult)  Goal: Signs and Symptoms of Listed Potential Problems Will be Absent, Minimized or Managed (Airway, Artificial)  Outcome: Ongoing (interventions implemented as appropriate)      Problem: Nutrition, Enteral (Adult)  Goal: Signs and Symptoms of Listed Potential Problems Will be Absent, Minimized or Managed (Nutrition, Enteral)  Outcome: Ongoing (interventions implemented as appropriate)    Goal: Signs and Symptoms of Listed Potential Problems Will be Absent, Minimized or Managed (Nutrition, Enteral)  Outcome: Ongoing (interventions implemented as appropriate)

## 2019-10-21 NOTE — PLAN OF CARE
Problem: Patient Care Overview  Goal: Plan of Care Review  Outcome: Ongoing (interventions implemented as appropriate)   10/21/19 1320   Coping/Psychosocial   Plan of Care Reviewed With patient   Plan of Care Review   Progress improving   OTHER   Outcome Summary Pt tolerated tx well this date. Pt gave good effort with UE ther ex. Pt was set up/cod I with grooming task. Continue OT POC.

## 2019-10-21 NOTE — THERAPY TREATMENT NOTE
Acute Care - Physical Therapy Treatment Note  [facility]     Patient Name: Emerson Bang  : 1960  MRN: [id number]  Today's Date: 10/21/2019  Onset of Illness/Injury or Date of Surgery: 19     Referring Physician: Dr. Kc    Admit Date: 2019    Visit Dx:    ICD-10-CM ICD-9-CM   1. 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 Impaired physical mobility Z74.09 781.99   3. Impaired mobility and activities of daily living Z74.09 799.89     Patient Active Problem List   Diagnosis   • Hyperkalemia   • Iron deficiency anemia   • Gastroesophageal reflux disease with esophagitis   • Elevated AST (SGOT)   • Alcohol abuse   • Hypothyroidism   • Balance problem   • Need for vaccination   • Low magnesium levels   • Squamous cell carcinoma of pharynx (CMS/HCC)   • History of throat cancer   • Vitamin D deficiency   • Alcohol abuse counseling and surveillance   • Encounter for screening for diabetes mellitus   • Hypomagnesemia   • Iron deficiency anemia   • Hyperlipidemia   • Underweight due to inadequate caloric intake   • Need for immunization against influenza   • 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HCC)   • Hypertension   • General medical examination   • Underweight   • Epigastric pain   • Gastritis without bleeding   • Need for influenza vaccination   • Elevated liver function tests   • B12 deficiency   • Intestinal malabsorption   • Throat pain   • Acute pharyngitis   • Upper respiratory tract infection   • Neoplasm of uncertain behavior of larynx   • Pharyngoesophageal dysphagia   • Severe protein-calorie malnutrition (CMS/HCC)   • Anemia of chronic disease   • Hypotension   • Hyponatremia   • Status post insertion of percutaneous endoscopic gastrostomy (PEG) tube (CMS/HCC)   • Hx of tracheostomy   • History of throat surgery   • Hx SBO   • Urinary retention   • Generalized weakness   • Acute otitis externa of right ear   • Hard stool   • Panlobular emphysema (CMS/HCC)   • Cancer of  hypopharynx (CMS/HCC)   • Diarrhea   • Encounter for venous access device care   • Prediabetes   • Status post neck dissection   • S/P partial thyroidectomy   • S/P laryngectomy   • Annual physical exam   • Alcoholic cirrhosis of liver without ascites (CMS/HCC)       Therapy Treatment    Rehabilitation Treatment Summary     Row Name 10/21/19 1437 10/21/19 1100          Treatment Time/Intention    Discipline  physical therapy assistant  -GIAN  occupational therapy assistant  -CS     Document Type  therapy note (daily note)  -GIAN  therapy note (daily note)  -CS     Subjective Information  --  complains of;pain  -CS     Mode of Treatment  physical therapy;individual therapy  -GIAN  occupational therapy  -CS     Therapy Frequency (PT Clinical Impression)  daily  -GIAN  --     Therapy Frequency (OT Eval)  --  other (see comments) 5-7 days/wk  -CS     Patient Effort  adequate  -GIAN  good  -CS     Existing Precautions/Restrictions  fall;other (see comments) watch gait belt placement s/p abdominal surgery  -GIAN  fall;other (see comments) watch gait belt placement s/p abdominal surgery  -CS     Recorded by [GIAN] Abran Wan, PTA 10/21/19 2736 [CS] Brenda Thomas, TENA/L 10/21/19 1318     Row Name 10/21/19 1437 10/21/19 1100          Vital Signs    Pre Systolic BP Rehab  104  -GIAN  --     Pre Treatment Diastolic BP  60  -GIAN  --     Post Systolic BP Rehab  -- ending vitals not taken due to pt w/ nsg, dressing change  -JC2  --     Pretreatment Heart Rate (beats/min)  91  -GIAN  --     Intratreatment Heart Rate (beats/min)  95  -JC3  --     Pre SpO2 (%)  99  -GIAN  --     O2 Delivery Pre Treatment  trach collar  -GIAN  --     Intra SpO2 (%)  99  -JC3  --     O2 Delivery Intra Treatment  trach collar  -JC3  --     Pre Patient Position  Supine  -JC2  Supine  -CS     Post Patient Position  Supine pt deferred OOB to chair.   -JC2  Supine  -CS     Recorded by [GIAN] Abran Wan, PTA 10/21/19 9843  [JC2] Abran Wan, PTA 10/21/19  1511  [JC3] Abran Wan, PTA 10/21/19 1503 [CS] Brenda Thomas, MADSEN/L 10/21/19 1318     Row Name 10/21/19 1437 10/21/19 1100          Cognitive Assessment/Intervention- PT/OT    Affect/Mental Status (Cognitive)  WFL  -GIAN  WFL  -CS     Behavioral Issues (Cognitive)  --  unable/difficult to assess  -CS     Orientation Status (Cognition)  oriented x 4  -JC2  oriented x 4  -CS     Follows Commands (Cognition)  WFL  -JC2  WFL  -CS     Recorded by [GIAN] Abran Wan, PTA 10/21/19 1503  [JC2] Abran Wan, PTA 10/21/19 1453 [CS] Brenda Thomas, MADSEN/L 10/21/19 1318     Row Name 10/21/19 1437             Safety Issues, Functional Mobility    Impairments Affecting Function (Mobility)  balance;endurance/activity tolerance;shortness of breath;strength  -GIAN      Recorded by [GIAN] Abran Wan, PTA 10/21/19 1453      Row Name 10/21/19 1437             Bed Mobility Assessment/Treatment    Bed Mobility Assessment/Treatment  sit-supine;scooting/bridging;supine-sit  -GIAN      Scooting/Bridging [place] (Bed Mobility)  conditional independence  -GIAN      Supine-Sit [place] (Bed Mobility)  conditional independence  -JC2      Sit-Supine [place] (Bed Mobility)  conditional independence  -JC2      Assistive Device (Bed Mobility)  bed rails;head of bed elevated  -JC2      Recorded by [GIAN] Abran Wan, PTA 10/21/19 1511  [JC2] Abran Wan, PTA 10/21/19 1453      Row Name 10/21/19 1437             Transfer Assessment/Treatment    Transfer Assessment/Treatment  sit-stand transfer;stand-sit transfer  -GIAN      Recorded by [GIAN] Abran Wan, PTA 10/21/19 1511      Row Name 10/21/19 1437             Chair-Bed Transfer    Chair-Bed [place] (Transfers)  --  -GIAN      Assistive Device (Chair-Bed Transfers)  --  -GIAN      Recorded by [GIAN] Abran Wan, PTA 10/21/19 1511      Row Name 10/21/19 1437             Sit-Stand Transfer    Sit-Stand [place] (Transfers)  contact guard  -GIAN       Assistive Device (Sit-Stand Transfers)  walker, front-wheeled  -GIAN      Recorded by [GIAN] Abran Wan, PTA 10/21/19 1453      Row Name 10/21/19 1437             Stand-Sit Transfer    Stand-Sit [place] (Transfers)  contact guard  -GIAN      Assistive Device (Stand-Sit Transfers)  walker, front-wheeled  -GIAN      Recorded by [GIAN] Abran Wan, PTA 10/21/19 1453      Row Name 10/21/19 1437             Toilet Transfer    Type (Toilet Transfer)  --  -GAIN      [place] Level (Toilet Transfer)  --  -GIAN      Assistive Device (Toilet Transfer)  --  -GIAN      Recorded by [GIAN] Abran Wan, PTA 10/21/19 1511      Row Name 10/21/19 1437             Gait/Stairs Assessment/Training    Gait/Stairs Assessment/Training  gait/ambulation assistive device  -GIAN      [place] Level (Gait)  contact guard;verbal cues  -GIAN      Assistive Device (Gait)  walker, front-wheeled  -GIAN      Distance in Feet (Gait)  64  -JC2      Pattern (Gait)  step-to  -GIAN      Deviations/Abnormal Patterns (Gait)  essence decreased;stride length decreased  -GIAN      Bilateral Gait Deviations  forward flexed posture  -GIAN      Recorded by [GIAN] Abran Wan, PTA 10/21/19 1453  [JC2] Abran Wan, PTA 10/21/19 1511      Row Name 10/21/19 1100             Bathing Assessment/Intervention    Comment (Bathing)  pt deferred  -CS      Recorded by [CS] Brenda Thomas COTA/L 10/21/19 1318      Row Name 10/21/19 1100             Grooming Assessment/Training    [place] Level (Grooming)  grooming skills;wash face, hands;set up;conditional independence  -CS      Grooming Position  long sitting  -CS      Recorded by [CS] Brenda Thomas MADSEN/L 10/21/19 1318      Row Name 10/21/19 1437             Therapeutic Exercise    Therapeutic Exercise  seated, lower extremities  -GIAN      Recorded by [GIAN] Abran Wan, PTA 10/21/19 1511      Row Name 10/21/19 1437             Lower Extremity Seated Therapeutic Exercise    Performed, Seated Lower  Extremity (Therapeutic Exercise)  LAQ (long arc quad), knee extension;hip flexion/extension;ankle dorsiflexion/plantarflexion  -GIAN      Exercise Type, Seated Lower Extremity (Therapeutic Exercise)  AROM (active range of motion)  -GIAN      Sets/Reps Detail, Seated Lower Extremity (Therapeutic Exercise)  20x1 ashley  -GIAN      Recorded by [GIAN] Abran Wan PTA 10/21/19 1511      Row Name 10/21/19 1100             Therapeutic Exercise    Upper Extremity (Therapeutic Exercise)  bicep curl, bilateral  -CS      Upper Extremity Range of Motion (Therapeutic Exercise)  shoulder flexion/extension, bilateral;shoulder internal/external rotation, bilateral;elbow flexion/extension, bilateral;forearm supination/pronation, bilateral;wrist flexion/extension, bilateral  -CS      Hand (Therapeutic Exercise)  finger flexion/extension, bilateral  -CS      Weight/Resistance (Therapeutic Exercise)  2 pounds  -CS      Exercise Type (Therapeutic Exercise)  AROM (active range of motion)  -CS      Position (Therapeutic Exercise)  seated  -CS      Sets/Reps (Therapeutic Exercise)  1/20  -CS      Equipment (Therapeutic Exercise)  free weight, barbell  -CS      Expected Outcome (Therapeutic Exercise)  improve functional tolerance, self-care activity;improve performance, BADLs;improve performance, transfer skills;increase active range of motion  -CS      Recorded by [CS] Brenda Thomas, TENA/L 10/21/19 1318      Row Name 10/21/19 1437             Positioning and Restraints    Pre-Treatment Position  in bed  -GIAN      Post Treatment Position  bed  -GIAN      In Bed  fowlers;call light within reach;encouraged to call for assist;exit alarm on;with nsg SCD pumps applied. all needs met  -GIAN      Recorded by [GIAN] Abran Wan, PTA 10/21/19 1511      Row Name 10/21/19 1437             Pain Assessment    Additional Documentation  Pain Scale: Numbers Pre/Post-Treatment (Group)  -GIAN      Recorded by [GIAN] Abran Wan, PTA 10/21/19 1511      Row  Name 10/21/19 1437 10/21/19 1100          Pain Scale: Numbers Pre/Post-Treatment    Pain Scale: Numbers, Pretreatment  8/10  -GINA  6/10  -CS     Pain Scale: Numbers, Post-Treatment  8/10  -GIAN  6/10  -CS     Pain Location  abdomen  -GIAN  abdomen  -CS     Pain Intervention(s)  Repositioned  -JC2  Medication (See MAR)  -CS     Recorded by [GIAN] Abran Wan, PTA 10/21/19 1453  [JC2] Abran Wan, PTA 10/21/19 1511 [CS] Brenda Thomas, MADSEN/L 10/21/19 1318     Row Name                Wound 09/30/19 1330 Left medial abdomen Fistula    Wound - Properties Group Date first assessed: 09/30/19 [SS] Time first assessed: 1330 [SS] Side: Left [SS] Orientation: medial [SS] Location: abdomen [SS] Primary Wound Type: Fistula [SS] Recorded by:  [SS] Citlalli Abdi RN 09/30/19 1619    Row Name                Wound 10/02/19 1347 abdomen Incision    Wound - Properties Group Date first assessed: 10/02/19 [CF] Time first assessed: 1347 [CF] Present on Hospital Admission: N [CF] Location: abdomen [CF] Primary Wound Type: Incision [CF] Recorded by:  [CF] Eileen Tyson 10/02/19 1347    Row Name 10/21/19 1100             Outcome Summary/Treatment Plan (OT)    Daily Summary of Progress (OT)  progress toward functional goals is good  -CS      Plan for Continued Treatment (OT)  cont OT POC  -CS      Anticipated Discharge Disposition (OT)  anticipate therapy at next level of care  -CS      Recorded by [CS] Brenda Thomas MADSEN/L 10/21/19 1318      Row Name 10/21/19 1437             Outcome Summary/Treatment Plan (PT)    Daily Summary of Progress (PT)  progress toward functional goals as expected  -GIAN      Plan for Continued Treatment (PT)  continue  -GIAN      Anticipated Discharge Disposition (PT)  anticipate therapy at next level of care  -JC2      Recorded by [GIAN] Abran Wan, PTA 10/21/19 1511  [JC2] Abran Wan, PTA 10/21/19 1453        User Key  (r) = Recorded By, (t) = Taken By, (c) = Cosigned By    Initials Name  Effective Dates Discipline    SS Citlalli Abdi RN 07/10/18 -  Nurse    Abran Ferreira, PTA 03/07/18 -  PT    CS Brenda Thomas, MADSEN/L 03/07/18 -  OT    CF Eileen Tyson - -          Wound 09/30/19 1330 Left medial abdomen Fistula (Active)   Dressing Appearance dry;intact 10/21/2019  8:59 AM   Closure None 10/21/2019  8:59 AM   Base dressing in place, unable to visualize 10/21/2019  8:59 AM   Drainage Characteristics/Odor purulent 10/21/2019  8:59 AM   Drainage Amount small 10/21/2019  8:59 AM   Care, Wound cleansed with;sterile water 10/21/2019  8:59 AM   Dressing Care, Wound dressing changed 10/21/2019  8:59 AM   Periwound Care, Wound barrier ointment applied 10/21/2019  8:59 AM       Wound 10/02/19 1347 abdomen Incision (Active)   Closure Open to air 10/21/2019  8:59 AM   Periwound dry 10/21/2019  8:59 AM   Periwound Temperature warm 10/21/2019  8:59 AM   Periwound Skin Turgor soft 10/21/2019  8:59 AM   Drainage Amount scant 10/20/2019 10:30 PM   Care, Wound cleansed with;sterile water 10/20/2019 10:30 PM       Rehab Goal Summary     Row Name 10/21/19 1437 10/21/19 1100          Bed Mobility Goal 1 (PT)    Activity/Assistive Device (Bed Mobility Goal 1, PT)  sit to supine;supine to sit  -  --     [place] Level/Cues Needed (Bed Mobility Goal 1, PT)  independent  -  --     Time Frame (Bed Mobility Goal 1, PT)  by discharge  -  --     Progress/Outcomes (Bed Mobility Goal 1, PT)  goal met  -  --        Transfer Goal 1 (PT)    Activity/Assistive Device (Transfer Goal 1, PT)  bed-to-chair/chair-to-bed;toilet  -  --     [place] Level/Cues Needed (Transfer Goal 1, PT)  standby assist  -  --     Time Frame (Transfer Goal 1, PT)  by discharge  -  --     Progress/Outcome (Transfer Goal 1, PT)  goal not met;goal revised this date  -  --        Gait Training Goal 1 (PT)    Activity/Assistive Device (Gait Training Goal 1, PT)  gait (walking locomotion);assistive device use  -GIAN  --      [place] Level (Gait Training Goal 1, PT)  contact guard assist  -  --     Distance (Gait Goal 1, PT)  150ft  -  --     Time Frame (Gait Training Goal 1, PT)  by discharge  -  --     Progress/Outcome (Gait Training Goal 1, PT)  goal not met;goal revised this date  -  --        Stairs Goal 1 (PT)    Activity/Assistive Device (Stairs Goal 1, PT)  ascending stairs;descending stairs  -  --     [place] Level/Cues Needed (Stairs Goal 1, PT)  standby assist  -  --     Number of Stairs (Stairs Goal 1, PT)  5  -GIAN  --     Time Frame (Stairs Goal 1, PT)  by discharge  -  --     Progress/Outcome (Stairs Goal 1, PT)  goal not met  -  --        Occupational Therapy Goals    Transfer Goal Selection (OT)  --  transfer, OT goal 1  -CS     Bathing Goal Selection (OT)  --  bathing, OT goal 1  -CS     Dressing Goal Selection (OT)  --  dressing, OT goal 1  -CS     Toileting Goal Selection (OT)  --  toileting, OT goal 1  -CS     Activity Tolerance Goal Selection (OT)  --  activity tolerance, OT goal 1  -CS        Transfer Goal 1 (OT)    Activity/Assistive Device (Transfer Goal 1, OT)  --  sit-to-stand/stand-to-sit;bed-to-chair/chair-to-bed;toilet  -CS     [place] Level/Cues Needed (Transfer Goal 1, OT)  --  supervision required  -CS     Time Frame (Transfer Goal 1, OT)  --  long term goal (LTG);by discharge  -CS     Progress/Outcome (Transfer Goal 1, OT)  --  goal not met  -CS        Bathing Goal 1 (OT)    Activity/Assistive Device (Bathing Goal 1, OT)  --  bathing skills, all  -CS     [place] Level/Cues Needed (Bathing Goal 1, OT)  --  minimum assist (75% or more patient effort)  -CS     Time Frame (Bathing Goal 1, OT)  --  long term goal (LTG);by discharge  -CS     Barriers (Bathing Goal 1, OT)  --  Pt will need extra time and rest breaks PRN  -CS     Progress/Outcomes (Bathing Goal 1, OT)  --  goal not met  -CS        Dressing Goal 1 (OT)    Activity/Assistive Device (Dressing Goal 1, OT)  --   dressing skills, all  -CS     [place]/Cues Needed (Dressing Goal 1, OT)  --  minimum assist (75% or more patient effort)  -CS     Time Frame (Dressing Goal 1, OT)  --  long term goal (LTG);by discharge  -CS     Barriers (Dressing Goal 1, OT)  --  Pt will need extra time and rest breaks PRN  -CS     Progress/Outcome (Dressing Goal 1, OT)  --  goal not met  -CS        Toileting Goal 1 (OT)    Activity/Device (Toileting Goal 1, OT)  --  toileting skills, all  -CS     [place] Level/Cues Needed (Toileting Goal 1, OT)  --  minimum assist (75% or more patient effort)  -CS     Time Frame (Toileting Goal 1, OT)  --  long term goal (LTG);by discharge  -CS     Barriers (Toileting Goal 1, OT)  --  Pt will need extra time and rest breaks PRN  -CS     Progress/Outcome (Toileting Goal 1, OT)  --  goal partially met  -CS         Activity Tolerance Goal 1 (OT)    Activity Level (Endurance Goal 1, OT)  --  15 min activity functional/therapeutic activity  -CS     Time Frame (Activity Tolerance Goal 1, OT)  --  long term goal (LTG);by discharge  -CS     Progress/Outcome (Activity Tolerance Goal 1, OT)  --  goal not met  -CS       User Key  (r) = Recorded By, (t) = Taken By, (c) = Cosigned By    Initials Name Provider Type Discipline    Abran Ferreira, PTA Physical Therapy Assistant PT    CS Brenda Thomas COTA/L Occupational Therapy Assistant OT          Physical Therapy Education     Title: PT OT SLP Therapies (In Progress)     Topic: Physical Therapy (In Progress)     Point: Mobility training (In Progress)     Learning Progress Summary           Patient Acceptance, E, NR by GIAN at 10/21/2019  3:13 PM    Acceptance, E,TB, VU by PRASANNA at 10/20/2019  3:28 PM    Acceptance, E,TB, VU by PRASANNA at 10/19/2019 12:00 PM    Acceptance, E, NR by GIAN at 10/18/2019  2:43 PM    Acceptance, E, NR by GIAN at 10/17/2019 12:35 PM    Acceptance, E, NR by GIAN at 10/16/2019  4:06 PM    Acceptance, E, NR by GIAN at 10/13/2019  1:00 PM    Acceptance,  E, NR by GIAN at 10/11/2019  3:17 PM    Acceptance, E, NR by GIAN at 10/9/2019  2:59 PM    Acceptance, E, NR by GIAN at 10/8/2019  4:00 PM    Acceptance, E, NR by JC1 at 10/3/2019  3:43 PM                   Point: Home exercise program (Done)     Learning Progress Summary           Patient Acceptance, E,TB, VU by LN at 10/20/2019  3:28 PM    Acceptance, E,TB, VU by LN at 10/19/2019 12:00 PM                   Point: Body mechanics (Done)     Learning Progress Summary           Patient Acceptance, E,TB, VU by LN at 10/20/2019  3:28 PM    Acceptance, E,TB, VU by LN at 10/19/2019 12:00 PM    Acceptance, E, NR by GIAN1 at 10/3/2019  3:43 PM                   Point: Precautions (Done)     Learning Progress Summary           Patient Acceptance, E,TB, VU by LN at 10/20/2019  3:28 PM    Acceptance, E,TB, VU by LN at 10/19/2019 12:00 PM    Acceptance, E, NR by GIAN1 at 10/3/2019  3:43 PM                               User Key     Initials Effective Dates Name Provider Type Discipline    [facility] 04/03/18 -  Judy Watkins, PT Physical Therapist PT     03/07/18 -  Abran Wan PTA Physical Therapy Assistant PT     03/07/18 -  Lynda Ramos PTA Physical Therapy Assistant PT                PT Recommendation and Plan  Anticipated Discharge Disposition (PT): anticipate therapy at next level of care  Therapy Frequency (PT Clinical Impression): daily  Outcome Summary/Treatment Plan (PT)  Daily Summary of Progress (PT): progress toward functional goals as expected  Plan for Continued Treatment (PT): continue  Anticipated Discharge Disposition (PT): anticipate therapy at next level of care  Plan of Care Reviewed With: patient  Progress: improving  Outcome Summary: pt responded well to therapy w/ increased gait to 64 ft CGA w/ RW. pt is mod indep w/ bed mob and participated in seated LE ther ex. tx ended at nsg needed to complete dressing change. no new goals met at this time. pt would continue to benefit from PT services.   Outcome  Measures     Row Name 10/21/19 1437 10/20/19 1450 10/20/19 1046       How much help from another person do you currently need...    Turning from your back to your side while in flat bed without using bedrails?  4  -GIAN  4  -LN  --    Moving from lying on back to sitting on the side of a flat bed without bedrails?  4  -GIAN  4  -LN  --    Moving to and from a bed to a chair (including a wheelchair)?  3  -GIAN  3  -LN  --    Standing up from a chair using your arms (e.g., wheelchair, bedside chair)?  3  -GIAN  3  -LN  --    Climbing 3-5 steps with a railing?  2  -GIAN  2  -LN  --    To walk in hospital room?  3  -GIAN  3  -LN  --    AM-[facility] 6 Clicks Score (PT)  19  -  19  -LN  --       How much help from another is currently needed...    Putting on and taking off regular lower body clothing?  --  --  3  -TO    Bathing (including washing, rinsing, and drying)  --  --  2  -TO    Toileting (which includes using toilet bed pan or urinal)  --  --  3  -TO    Putting on and taking off regular upper body clothing  --  --  3  -TO    Taking care of personal grooming (such as brushing teeth)  --  --  3  -TO    Eating meals  --  --  1  -TO    AM-[facility] 6 Clicks Score (OT)  --  --  15  -TO       Functional Assessment    Outcome Measure Options  [facility] 6 Clicks Basic Mobility (PT)  [place] [id number] Clicks Basic Mobility (PT)  -LN  --    Row Name 10/19/19 1129 10/19/19 1040 10/18/19 1614       How much help from another person do you currently need...    Turning from your back to your side while in flat bed without using bedrails?  --  3  -LN  --    Moving from lying on back to sitting on the side of a flat bed without bedrails?  --  3  -LN  --    Moving to and from a bed to a chair (including a wheelchair)?  --  3  -LN  --    Standing up from a chair using your arms (e.g., wheelchair, bedside chair)?  --  3  -LN  --    Climbing 3-5 steps with a railing?  --  2  -LN  --    To walk in hospital room?  --  3  -LN  --    AM-[facility]AC 6 Clicks Score (PT)  --   17  -LN  --       How much help from another is currently needed...    Putting on and taking off regular lower body clothing?  2  -KD  --  2  -AS    Bathing (including washing, rinsing, and drying)  2  -KD  --  2  -AS    Toileting (which includes using toilet bed pan or urinal)  2  -KD  --  2  -AS    Putting on and taking off regular upper body clothing  3  -KD  --  3  -AS    Taking care of personal grooming (such as brushing teeth)  3  -KD  --  3  -AS    Eating meals  1  -KD  --  1  -AS    AM-PAC 6 Clicks Score (OT)  13  -KD  --  13  -AS       Functional Assessment    Outcome Measure Options  --  AM-PAC 6 Clicks Basic Mobility (PT)  -LN  AM-PAC 6 Clicks Daily Activity (OT)  -AS      User Key  (r) = Recorded By, (t) = Taken By, (c) = Cosigned By    Initials Name Provider Type    Abran Ferreira PTA Physical Therapy Assistant    LN Lynda Ramos, MICHAEL Physical Therapy Assistant    KD Isabelle Schafer, MADSEN/L Occupational Therapy Assistant    TO Nelson Oliveira, MADSEN/L Occupational Therapy Assistant    AS Destinee Yarbrough, OT Occupational Therapist         Time Calculation:   PT Charges     Row Name 10/21/19 1514             Time Calculation    Start Time  1437  -GIAN      Stop Time  1503  -GIAN      Time Calculation (min)  26 min  -GIAN         Time Calculation- PT    Total Timed Code Minutes- PT  26 minute(s)  -GIAN        User Key  (r) = Recorded By, (t) = Taken By, (c) = Cosigned By    Initials Name Provider Type     Abran Wan PTA Physical Therapy Assistant        Therapy Charges for Today     Code Description Service Date Service Provider Modifiers Qty    13046307423 HC GAIT TRAINING EA 15 MIN 10/21/2019 Abran Wan PTA GP 1    43461285850 HC PT THER PROC EA 15 MIN 10/21/2019 Abran Wan PTA GP 1          PT G-Codes  Outcome Measure Options: AM-PAC 6 Clicks Basic Mobility (PT)  AM-PAC 6 Clicks Score (PT): 19  AM-PAC 6 Clicks Score (OT): 15    Abran Wan PTA  10/21/2019

## 2019-10-21 NOTE — THERAPY TREATMENT NOTE
Acute Care - Occupational Therapy Treatment Note  [facility]     Patient Name: Emerson Bang  : 1960  MRN: [id number]  Today's Date: 10/21/2019  Onset of Illness/Injury or Date of Surgery: 19  Date of Referral to OT: 10/02/19  Referring Physician: Dr. Kc    Admit Date: 2019       ICD-10-CM ICD-9-CM   1. 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 Impaired physical mobility Z74.09 781.99   3. Impaired mobility and activities of daily living Z74.09 799.89     Patient Active Problem List   Diagnosis   • Hyperkalemia   • Iron deficiency anemia   • Gastroesophageal reflux disease with esophagitis   • Elevated AST (SGOT)   • Alcohol abuse   • Hypothyroidism   • Balance problem   • Need for vaccination   • Low magnesium levels   • Squamous cell carcinoma of pharynx (CMS/HCC)   • History of throat cancer   • Vitamin D deficiency   • Alcohol abuse counseling and surveillance   • Encounter for screening for diabetes mellitus   • Hypomagnesemia   • Iron deficiency anemia   • Hyperlipidemia   • Underweight due to inadequate caloric intake   • Need for immunization against influenza   • 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HCC)   • Hypertension   • General medical examination   • Underweight   • Epigastric pain   • Gastritis without bleeding   • Need for influenza vaccination   • Elevated liver function tests   • B12 deficiency   • Intestinal malabsorption   • Throat pain   • Acute pharyngitis   • Upper respiratory tract infection   • Neoplasm of uncertain behavior of larynx   • Pharyngoesophageal dysphagia   • Severe protein-calorie malnutrition (CMS/HCC)   • Anemia of chronic disease   • Hypotension   • Hyponatremia   • Status post insertion of percutaneous endoscopic gastrostomy (PEG) tube (CMS/HCC)   • Hx of tracheostomy   • History of throat surgery   • Hx SBO   • Urinary retention   • Generalized weakness   • Acute otitis externa of right ear   • Hard stool   • Panlobular emphysema  (CMS/HCC)   • Cancer of hypopharynx (CMS/HCC)   • Diarrhea   • Encounter for venous access device care   • Prediabetes   • Status post neck dissection   • S/P partial thyroidectomy   • S/P laryngectomy   • Annual physical exam   • Alcoholic cirrhosis of liver without ascites (CMS/HCC)     Past Medical History:   Diagnosis Date   • Allergic rhinitis     vs URI   • Anemia    • At risk for falls    • Benign prostatic hyperplasia    • Chronic gastritis    • Cirrhosis of liver (CMS/HCC)    • Complication of gastrostomy (CMS/HCC)     site not healing   • COPD (chronic obstructive pulmonary disease) (CMS/HCC)    • Dysphagia     odynophagia   • Epigastric pain    • Esophagitis    • GERD (gastroesophageal reflux disease)    • Hypertension    • Hypothyroidism, unspecified    • Low back pain    • Malaise and fatigue    • Nasal congestion 11/15/2018   • Nausea with vomiting, unspecified    • Pain in left knee    • Pain in right knee    • Primary malignant neoplasm of pharynx (CMS/HCC)    • Screening for malignant neoplasm of colon    • 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 Left upper quadrant abdominal wall abscess debridement. Gastrostomy tube erosion through & through the anterior gastric wall.Lupper quadrant abdominal wall abscess   • COLONOSCOPY  04/21/2014    Internal & external hemorrhoids found.   • COLONOSCOPY  2014    [place]   • COLONOSCOPY N/A 10/16/2018    Procedure: COLONOSCOPY;  Surgeon: Kaushal Chester MD;  Location: [facility] ENDOSCOPY;  Service: Gastroenterology   • DIAGNOSTIC LAPAROSCOPY EXPLORATORY LAPAROTOMY N/A 7/17/2019    Procedure: DIAGNOSTIC LAPAROSCOPY, EXPLORATORY LAPAROTOMY, LYSIS OF ADHESIONS;  Surgeon: Parminder Kc MD;   Location: [facility] OR;  Service: General   • DIRECT LARYNGOSCOPY, ESOPHAGOSCOPY, BRONCHOSCOPY N/A 4/15/2019    Procedure: DIRECT LARYNGOSCOPY with biopsy, ESOPHAGOSCOPY;  Surgeon: Bharathi Washington MD;  Location: [facility] OR;  Service: ENT   • ENDOSCOPY N/A 10/16/2018    Procedure: ESOPHAGOGASTRODUODENOSCOPY;  Surgeon: Kaushal Chester MD;  Location: [facility] ENDOSCOPY;  Service: Gastroenterology   • GASTROCUTANEOUS FISTULA CLOSURE N/A 10/2/2019    Procedure: GASTROCUTANEOUS FISTULA CLOSURE;  Surgeon: Parminder Kc MD;  Location: [facility] OR;  Service: General   • GASTROSTOMY FEEDING TUBE INSERTION N/A 4/15/2019    Procedure: GASTROSTOMY FEEDING TUBE INSERTION;  Surgeon: Trenton Valera MD;  Location: [facility] OR;  Service: General   • JEJUNOSTOMY N/A 10/2/2019    Procedure: JEJUNOSTOMY;  Surgeon: Parminder Kc MD;  Location: [facility] OR;  Service: General   • TRACHEOSTOMY  11/10/2008    Respiratory failure   • UPPER GASTROINTESTINAL ENDOSCOPY  04/21/2014    Esophagitis seen. Biopsy taken. Gastritis in stomach. Biopsy taken. Normal duodenum. Biopsy taken.   • UPPER GASTROINTESTINAL ENDOSCOPY  10/16/2018   • VENOUS ACCESS DEVICE (PORT) INSERTION N/A 9/11/2019    Procedure: INSERTION VENOUS ACCESS DEVICE (MEDIPORT)        (c-arm#1);  Surgeon: Parminder Kc MD;  Location: [facility];  Service: General       Therapy Treatment    Rehabilitation Treatment Summary     Row Name 10/21/19 1100             Treatment Time/Intention    Discipline  occupational therapy assistant  -CS      Document Type  therapy note (daily note)  -CS      Subjective Information  complains of;pain  -CS      Mode of Treatment  occupational therapy  -CS      Therapy Frequency (OT Eval)  other (see comments) 5-7 days/wk  -CS      Patient Effort  good  -CS      Existing Precautions/Restrictions  fall;other (see comments) watch gait belt placement s/p abdominal surgery  -CS      Recorded by [CS] Brenda Thomas COTA/L 10/21/19 1313      Row Name  10/21/19 1100             Vital Signs    Pre Patient Position  Supine  -CS      Post Patient Position  Supine  -CS      Recorded by [CS] Brenda Thomas COTA/L 10/21/19 1318      Row Name 10/21/19 1100             Cognitive Assessment/Intervention- PT/OT    Affect/Mental Status (Cognitive)  WFL  -CS      Behavioral Issues (Cognitive)  unable/difficult to assess  -CS      Orientation Status (Cognition)  oriented x 4  -CS      Follows Commands (Cognition)  WFL  -CS      Recorded by [CS] Brenda Thomas COTA/L 10/21/19 1318      Row Name 10/21/19 1100             Bathing Assessment/Intervention    Comment (Bathing)  pt deferred  -CS      Recorded by [CS] Brenda Thomas COTA/L 10/21/19 1318      Row Name 10/21/19 1100             Grooming Assessment/Training    [place] Level (Grooming)  grooming skills;wash face, hands;set up;conditional independence  -CS      Grooming Position  long sitting  -CS      Recorded by [CS] Brenda Thomas COTA/L 10/21/19 1318      Row Name 10/21/19 1100             Therapeutic Exercise    Upper Extremity (Therapeutic Exercise)  bicep curl, bilateral  -CS      Upper Extremity Range of Motion (Therapeutic Exercise)  shoulder flexion/extension, bilateral;shoulder internal/external rotation, bilateral;elbow flexion/extension, bilateral;forearm supination/pronation, bilateral;wrist flexion/extension, bilateral  -CS      Hand (Therapeutic Exercise)  finger flexion/extension, bilateral  -CS      Weight/Resistance (Therapeutic Exercise)  2 pounds  -CS      Exercise Type (Therapeutic Exercise)  AROM (active range of motion)  -CS      Position (Therapeutic Exercise)  seated  -CS      Sets/Reps (Therapeutic Exercise)  1/20  -CS      Equipment (Therapeutic Exercise)  free weight, barbell  -CS      Expected Outcome (Therapeutic Exercise)  improve functional tolerance, self-care activity;improve performance, BADLs;improve performance, transfer skills;increase active range of motion  -CS       Recorded by [CS] Bredna Thomas MADSEN/L 10/21/19 1318      Row Name 10/21/19 1100             Pain Scale: Numbers Pre/Post-Treatment    Pain Scale: Numbers, Pretreatment  6/10  -CS      Pain Scale: Numbers, Post-Treatment  6/10  -CS      Pain Location  abdomen  -CS      Pain Intervention(s)  Medication (See MAR)  -CS      Recorded by [CS] Brenda Tohmas MADSEN/L 10/21/19 1318      Row Name                Wound 09/30/19 1330 Left medial abdomen Fistula    Wound - Properties Group Date first assessed: 09/30/19 [SS] Time first assessed: 1330 [SS] Side: Left [SS] Orientation: medial [SS] Location: abdomen [SS] Primary Wound Type: Fistula [SS] Recorded by:  [SS] Citlalli Abdi RN 09/30/19 1619    Row Name                Wound 10/02/19 1347 abdomen Incision    Wound - Properties Group Date first assessed: 10/02/19 [CF] Time first assessed: 1347 [CF] Present on Hospital Admission: N [CF] Location: abdomen [CF] Primary Wound Type: Incision [CF] Recorded by:  [CF] Eileen Tyson 10/02/19 1347    Row Name 10/21/19 1100             Outcome Summary/Treatment Plan (OT)    Daily Summary of Progress (OT)  progress toward functional goals is good  -CS      Plan for Continued Treatment (OT)  cont OT POC  -CS      Anticipated Discharge Disposition (OT)  anticipate therapy at next level of care  -CS      Recorded by [CS] Brenda Thomas MADSEN/L 10/21/19 1318        User Key  (r) = Recorded By, (t) = Taken By, (c) = Cosigned By    Initials Name Effective Dates Discipline    SS Citlalli Abdi RN 07/10/18 -  Nurse    CS Brenda Thomas MADSEN/L 03/07/18 -  OT    CF Eileen Tyson - -        Wound 09/30/19 1330 Left medial abdomen Fistula (Active)   Dressing Appearance dry;intact 10/21/2019  8:59 AM   Closure None 10/21/2019  8:59 AM   Base dressing in place, unable to visualize 10/21/2019  8:59 AM   Drainage Characteristics/Odor purulent 10/21/2019  8:59 AM   Drainage Amount small 10/21/2019  8:59 AM   Care, Wound  cleansed with;sterile water 10/21/2019  8:59 AM   Dressing Care, Wound dressing changed 10/21/2019  8:59 AM   Periwound Care, Wound barrier ointment applied 10/21/2019  8:59 AM       Wound 10/02/19 1347 abdomen Incision (Active)   Closure Open to air 10/21/2019  8:59 AM   Periwound dry 10/21/2019  8:59 AM   Periwound Temperature warm 10/21/2019  8:59 AM   Periwound Skin Turgor soft 10/21/2019  8:59 AM   Drainage Amount scant 10/20/2019 10:30 PM   Care, Wound cleansed with;sterile water 10/20/2019 10:30 PM     Rehab Goal Summary     Row Name 10/21/19 1100             Occupational Therapy Goals    Transfer Goal Selection (OT)  transfer, OT goal 1  -CS      Bathing Goal Selection (OT)  bathing, OT goal 1  -CS      Dressing Goal Selection (OT)  dressing, OT goal 1  -CS      Toileting Goal Selection (OT)  toileting, OT goal 1  -CS      Activity Tolerance Goal Selection (OT)  activity tolerance, OT goal 1  -CS         Transfer Goal 1 (OT)    Activity/Assistive Device (Transfer Goal 1, OT)  sit-to-stand/stand-to-sit;bed-to-chair/chair-to-bed;toilet  -CS      [place] Level/Cues Needed (Transfer Goal 1, OT)  supervision required  -CS      Time Frame (Transfer Goal 1, OT)  long term goal (LTG);by discharge  -CS      Progress/Outcome (Transfer Goal 1, OT)  goal not met  -CS         Bathing Goal 1 (OT)    Activity/Assistive Device (Bathing Goal 1, OT)  bathing skills, all  -CS      [place] Level/Cues Needed (Bathing Goal 1, OT)  minimum assist (75% or more patient effort)  -CS      Time Frame (Bathing Goal 1, OT)  long term goal (LTG);by discharge  -CS      Barriers (Bathing Goal 1, OT)  Pt will need extra time and rest breaks PRN  -CS      Progress/Outcomes (Bathing Goal 1, OT)  goal not met  -CS         Dressing Goal 1 (OT)    Activity/Assistive Device (Dressing Goal 1, OT)  dressing skills, all  -CS      [place]/Cues Needed (Dressing Goal 1, OT)  minimum assist (75% or more patient effort)  -CS      Time Frame  (Dressing Goal 1, OT)  long term goal (LTG);by discharge  -CS      Barriers (Dressing Goal 1, OT)  Pt will need extra time and rest breaks PRN  -CS      Progress/Outcome (Dressing Goal 1, OT)  goal not met  -CS         Toileting Goal 1 (OT)    Activity/Device (Toileting Goal 1, OT)  toileting skills, all  -CS      [place] Level/Cues Needed (Toileting Goal 1, OT)  minimum assist (75% or more patient effort)  -CS      Time Frame (Toileting Goal 1, OT)  long term goal (LTG);by discharge  -CS      Barriers (Toileting Goal 1, OT)  Pt will need extra time and rest breaks PRN  -CS      Progress/Outcome (Toileting Goal 1, OT)  goal partially met  -CS          Activity Tolerance Goal 1 (OT)    Activity Level (Endurance Goal 1, OT)  15 min activity functional/therapeutic activity  -CS      Time Frame (Activity Tolerance Goal 1, OT)  long term goal (LTG);by discharge  -CS      Progress/Outcome (Activity Tolerance Goal 1, OT)  goal not met  -CS        User Key  (r) = Recorded By, (t) = Taken By, (c) = Cosigned By    Initials Name Provider Type Discipline    CS Brenda Thomas COTA/LIANE Occupational Therapy Assistant OT        Occupational Therapy Education     Title: PT OT SLP Therapies (In Progress)     Topic: Occupational Therapy (In Progress)     Point: ADL training (In Progress)     Description: Instruct learner(s) on proper safety adaptation and remediation techniques during self care or transfers.   Instruct in proper use of assistive devices.    Learning Progress Summary           Patient Acceptance, E,TB,D, NR by CS at 10/18/2019  2:04 PM    Acceptance, E,TB,D, NR by CS at 10/16/2019  3:43 PM    Acceptance, E,TB,D, NR by CS at 10/15/2019  1:42 PM    Acceptance, E,TB,D, NR by CS at 10/14/2019  1:57 PM    Acceptance, E, NR by BB at 10/9/2019  1:37 PM    Acceptance, E,TB,D, NR by CS at 10/6/2019  1:05 PM    Acceptance, E,TB,D, NR by CS at 10/5/2019 11:11 AM    Acceptance, E, NR by AS at 10/4/2019 11:45 AM    Comment:   Role of OT, OT POC, importance of OOB activity for strength and healing, ADL training                   Point: Home exercise program (In Progress)     Description: Instruct learner(s) on appropriate technique for monitoring, assisting and/or progressing therapeutic exercises/activities.    Learning Progress Summary           Patient Acceptance, E,TB,D, NR by CS at 10/18/2019  2:04 PM    Acceptance, E,TB,D, NR by CS at 10/16/2019  3:43 PM    Acceptance, E,TB,D, NR by CS at 10/15/2019  1:42 PM    Acceptance, E,TB,D, NR by CS at 10/14/2019  1:57 PM    Acceptance, E, NR by BB at 10/8/2019 11:46 AM    Acceptance, E,TB,D, NR by CS at 10/6/2019  1:05 PM    Acceptance, E,TB,D, NR by CS at 10/5/2019 11:11 AM                   Point: Precautions (In Progress)     Description: Instruct learner(s) on prescribed precautions during self-care and functional transfers.    Learning Progress Summary           Patient Acceptance, E, NR by AS at 10/18/2019  5:56 PM    Comment:  OT POC, progress towards goals, tx plan modifications    Acceptance, E,TB,D, NR by CS at 10/18/2019  2:04 PM    Acceptance, E,TB,D, NR by CS at 10/16/2019  3:43 PM    Acceptance, E,TB,D, NR by CS at 10/15/2019  1:42 PM    Acceptance, E,TB,D, NR by CS at 10/14/2019  1:57 PM    Acceptance, E,TB,D, NR by CS at 10/6/2019  1:05 PM    Acceptance, E,TB,D, NR by CS at 10/5/2019 11:11 AM    Acceptance, E, NR by AS at 10/4/2019 11:45 AM    Comment:  Role of OT, OT POC, importance of OOB activity for strength and healing, ADL training                   Point: Body mechanics (In Progress)     Description: Instruct learner(s) on proper positioning and spine alignment during self-care, functional mobility activities and/or exercises.    Learning Progress Summary           Patient Acceptance, E,TB,D, NR by CS at 10/18/2019  2:04 PM    Acceptance, E,TB,D, NR by CS at 10/16/2019  3:43 PM    Acceptance, E,TB,D, NR by CS at 10/15/2019  1:42 PM    Acceptance, E,TB,D, NR by CS at  10/14/2019  1:57 PM    Acceptance, E,TB,D, NR by CS at 10/6/2019  1:05 PM    Acceptance, E,TB,D, NR by  at 10/5/2019 11:11 AM                               User Key     Initials Effective Dates Name Provider Type Discipline    BB 03/07/18 -  Kerrie Woodson COTA/L Occupational Therapy Assistant OT    CS 03/07/18 -  Brenda Thomas COTA/LIANE Occupational Therapy Assistant OT    AS 03/25/19 -  Destinee Yarbrough, OT Occupational Therapist OT                OT Recommendation and Plan  Outcome Summary/Treatment Plan (OT)  Daily Summary of Progress (OT): progress toward functional goals is good  Plan for Continued Treatment (OT): cont OT POC  Anticipated Discharge Disposition (OT): anticipate therapy at next level of care  Therapy Frequency (OT Eval): other (see comments)(5-7 days/wk)  Daily Summary of Progress (OT): progress toward functional goals is good  Plan of Care Review  Plan of Care Reviewed With: patient  Plan of Care Reviewed With: patient  Outcome Summary: Pt tolerated tx well this date. Pt gave good effort with UE ther ex. Pt was set up/cod I with grooming task. Continue OT POC.   Outcome Measures     Row Name 10/20/19 1450 10/20/19 1046 10/19/19 1129       How much help from another person do you currently need...    Turning from your back to your side while in flat bed without using bedrails?  4  -LN  --  --    Moving from lying on back to sitting on the side of a flat bed without bedrails?  4  -LN  --  --    Moving to and from a bed to a chair (including a wheelchair)?  3  -LN  --  --    Standing up from a chair using your arms (e.g., wheelchair, bedside chair)?  3  -LN  --  --    Climbing 3-5 steps with a railing?  2  -LN  --  --    To walk in hospital room?  3  -LN  --  --    AM-PAC 6 Clicks Score (PT)  19  -LN  --  --       How much help from another is currently needed...    Putting on and taking off regular lower body clothing?  --  3  -TO  2  -KD    Bathing (including washing, rinsing, and  drying)  --  2  -TO  2  -KD    Toileting (which includes using toilet bed pan or urinal)  --  3  -TO  2  -KD    Putting on and taking off regular upper body clothing  --  3  -TO  3  -KD    Taking care of personal grooming (such as brushing teeth)  --  3  -TO  3  -KD    Eating meals  --  1  -TO  1  -KD    AM-PAC 6 Clicks Score (OT)  --  15  -TO  13  -KD       Functional Assessment    Outcome Measure Options  AM-PAC 6 Clicks Basic Mobility (PT)  -LN  --  --    Row Name 10/19/19 1040 10/18/19 1614 10/18/19 1406       How much help from another person do you currently need...    Turning from your back to your side while in flat bed without using bedrails?  3  -LN  --  3  -GIAN    Moving from lying on back to sitting on the side of a flat bed without bedrails?  3  -LN  --  3  -GIAN    Moving to and from a bed to a chair (including a wheelchair)?  3  -LN  --  3  -GIAN    Standing up from a chair using your arms (e.g., wheelchair, bedside chair)?  3  -LN  --  3  -GIAN    Climbing 3-5 steps with a railing?  2  -LN  --  2  -GIAN    To walk in hospital room?  3  -LN  --  3  -GIAN    AM-PAC 6 Clicks Score (PT)  17  -LN  --  17  -GIAN       How much help from another is currently needed...    Putting on and taking off regular lower body clothing?  --  2  -AS  --    Bathing (including washing, rinsing, and drying)  --  2  -AS  --    Toileting (which includes using toilet bed pan or urinal)  --  2  -AS  --    Putting on and taking off regular upper body clothing  --  3  -AS  --    Taking care of personal grooming (such as brushing teeth)  --  3  -AS  --    Eating meals  --  1  -AS  --    AM-PAC 6 Clicks Score (OT)  --  13  -AS  --       Functional Assessment    Outcome Measure Options  AM-PAC 6 Clicks Basic Mobility (PT)  -LN  AM-PAC 6 Clicks Daily Activity (OT)  -AS  AM-PAC 6 Clicks Basic Mobility (PT)  -GIAN    Row Name 10/18/19 1400             How much help from another is currently needed...    Putting on and taking off regular lower body  clothing?  2  -CS      Bathing (including washing, rinsing, and drying)  2  -CS      Toileting (which includes using toilet bed pan or urinal)  2  -CS      Putting on and taking off regular upper body clothing  2  -CS      Taking care of personal grooming (such as brushing teeth)  2  -CS      Eating meals  1  -CS      AM-PAC 6 Clicks Score (OT)  11  -CS        User Key  (r) = Recorded By, (t) = Taken By, (c) = Cosigned By    Initials Name Provider Type    GIAN Abran Wan, PTA Physical Therapy Assistant    LN Lynda Ramos, PTA Physical Therapy Assistant    KD Isabelle Schafer, MADSEN/L Occupational Therapy Assistant    TO Nelson Oliveira, MADSEN/L Occupational Therapy Assistant    Brenda Lee, TENA/LIANE Occupational Therapy Assistant    AS Destinee Yarbrough, OT Occupational Therapist           Time Calculation:   Time Calculation- OT     Row Name 10/21/19 1322             Time Calculation- OT    OT Start Time  1100  -CS      OT Stop Time  1127  -CS      OT Time Calculation (min)  27 min  -CS      Total Timed Code Minutes- OT  27 minute(s)  -CS      OT Received On  10/21/19  -CS        User Key  (r) = Recorded By, (t) = Taken By, (c) = Cosigned By    Initials Name Provider Type    CS Brenda Thomas COTA/LIANE Occupational Therapy Assistant        Therapy Charges for Today     Code Description Service Date Service Provider Modifiers Qty    02622009990 HC OT THER PROC EA 15 MIN 10/21/2019 Brenda Thomas COTA/L GO 2               ROSEANNA Barton  10/21/2019

## 2019-10-22 LAB
GLUCOSE BLDC GLUCOMTR-MCNC: 103 MG/DL (ref 70–130)
GLUCOSE BLDC GLUCOMTR-MCNC: 109 MG/DL (ref 70–130)
GLUCOSE BLDC GLUCOMTR-MCNC: 118 MG/DL (ref 70–130)
GLUCOSE BLDC GLUCOMTR-MCNC: 128 MG/DL (ref 70–130)
GLUCOSE BLDC GLUCOMTR-MCNC: 129 MG/DL (ref 70–130)
GLUCOSE BLDC GLUCOMTR-MCNC: 131 MG/DL (ref 70–130)
GLUCOSE BLDC GLUCOMTR-MCNC: 134 MG/DL (ref 70–130)
GLUCOSE BLDC GLUCOMTR-MCNC: 142 MG/DL (ref 70–130)
GLUCOSE BLDC GLUCOMTR-MCNC: 86 MG/DL (ref 70–130)

## 2019-10-22 PROCEDURE — 99024 POSTOP FOLLOW-UP VISIT: CPT | Performed by: SURGERY

## 2019-10-22 PROCEDURE — 82962 GLUCOSE BLOOD TEST: CPT

## 2019-10-22 PROCEDURE — 77336 RADIATION PHYSICS CONSULT: CPT | Performed by: RADIOLOGY

## 2019-10-22 PROCEDURE — 77386: CPT | Performed by: RADIOLOGY

## 2019-10-22 PROCEDURE — 77014 CHG CT GUIDANCE RADIATION THERAPY FLDS PLACEMENT: CPT | Performed by: RADIOLOGY

## 2019-10-22 PROCEDURE — 94799 UNLISTED PULMONARY SVC/PX: CPT

## 2019-10-22 PROCEDURE — 25010000002 ENOXAPARIN PER 10 MG: Performed by: SURGERY

## 2019-10-22 PROCEDURE — 94760 N-INVAS EAR/PLS OXIMETRY 1: CPT

## 2019-10-22 RX ADMIN — ALBUTEROL SULFATE 2.5 MG: 2.5 SOLUTION RESPIRATORY (INHALATION) at 06:47

## 2019-10-22 RX ADMIN — FAMOTIDINE 20 MG: 40 POWDER, FOR SUSPENSION ORAL at 00:36

## 2019-10-22 RX ADMIN — FAMOTIDINE 20 MG: 40 POWDER, FOR SUSPENSION ORAL at 13:44

## 2019-10-22 RX ADMIN — HYDROCODONE BITARTRATE AND ACETAMINOPHEN 15 ML: 7.5; 325 SOLUTION ORAL at 01:33

## 2019-10-22 RX ADMIN — HYDROCODONE BITARTRATE AND ACETAMINOPHEN 15 ML: 7.5; 325 SOLUTION ORAL at 10:26

## 2019-10-22 RX ADMIN — HYDROCODONE BITARTRATE AND ACETAMINOPHEN 15 ML: 7.5; 325 SOLUTION ORAL at 21:28

## 2019-10-22 RX ADMIN — ENOXAPARIN SODIUM 40 MG: 40 INJECTION SUBCUTANEOUS at 10:12

## 2019-10-22 RX ADMIN — SODIUM CHLORIDE, PRESERVATIVE FREE 10 ML: 5 INJECTION INTRAVENOUS at 21:16

## 2019-10-22 RX ADMIN — ALBUTEROL SULFATE 2.5 MG: 2.5 SOLUTION RESPIRATORY (INHALATION) at 00:09

## 2019-10-22 RX ADMIN — SILVER SULFADIAZINE: 10 CREAM TOPICAL at 10:26

## 2019-10-22 RX ADMIN — ALBUTEROL SULFATE 2.5 MG: 2.5 SOLUTION RESPIRATORY (INHALATION) at 19:12

## 2019-10-22 RX ADMIN — ALBUTEROL SULFATE 2.5 MG: 2.5 SOLUTION RESPIRATORY (INHALATION) at 13:51

## 2019-10-22 RX ADMIN — SILVER SULFADIAZINE: 10 CREAM TOPICAL at 21:16

## 2019-10-22 RX ADMIN — POTASSIUM CHLORIDE, DEXTROSE MONOHYDRATE AND SODIUM CHLORIDE 50 ML/HR: 150; 5; 450 INJECTION, SOLUTION INTRAVENOUS at 05:34

## 2019-10-22 RX ADMIN — HYDROCODONE BITARTRATE AND ACETAMINOPHEN 15 ML: 7.5; 325 SOLUTION ORAL at 05:34

## 2019-10-22 NOTE — PROGRESS NOTES
Patient remains afebrile with normal vital signs.  He is tolerating tube feeds without difficulty at max level.  I am awaiting discharge disposition for him trying to allowing him home.

## 2019-10-22 NOTE — PLAN OF CARE
Problem: Patient Care Overview  Goal: Plan of Care Review  Outcome: Ongoing (interventions implemented as appropriate)   10/21/19 1557 10/22/19 0357   Coping/Psychosocial   Plan of Care Reviewed With --  patient   Plan of Care Review   Progress --  no change   OTHER   Outcome Summary vss. pain controlled. tolerating goal tube feeding at 60ml/hr. worked with therapy. dressing changed. trach care. continue to monitor. --        Problem: Fall Risk (Adult)  Goal: Absence of Fall  Outcome: Ongoing (interventions implemented as appropriate)      Problem: Skin Injury Risk (Adult)  Goal: Skin Health and Integrity  Outcome: Ongoing (interventions implemented as appropriate)      Problem: Pain, Chronic (Adult)  Goal: Acceptable Pain/Comfort Level and Functional Ability  Outcome: Ongoing (interventions implemented as appropriate)      Problem: Wound (Includes Pressure Injury) (Adult)  Goal: Signs and Symptoms of Listed Potential Problems Will be Absent, Minimized or Managed (Wound)  Outcome: Ongoing (interventions implemented as appropriate)      Problem: Airway, Artificial (Adult)  Goal: Signs and Symptoms of Listed Potential Problems Will be Absent, Minimized or Managed (Airway, Artificial)  Outcome: Ongoing (interventions implemented as appropriate)      Problem: Nutrition, Enteral (Adult)  Goal: Signs and Symptoms of Listed Potential Problems Will be Absent, Minimized or Managed (Nutrition, Enteral)  Outcome: Ongoing (interventions implemented as appropriate)

## 2019-10-22 NOTE — PLAN OF CARE
Problem: Patient Care Overview  Goal: Plan of Care Review  Outcome: Ongoing (interventions implemented as appropriate)   10/22/19 1104   Coping/Psychosocial   Plan of Care Reviewed With caregiver;patient   Plan of Care Review   Progress no change   OTHER   Outcome Summary Pt currently tolerating tube feeding meeting estimated needs. Rd will monitor.        Problem: Nutrition, Enteral (Adult)  Goal: Signs and Symptoms of Listed Potential Problems Will be Absent, Minimized or Managed (Nutrition, Enteral)   10/22/19 1104   Goal/Outcome Evaluation   Problems Assessed (Enteral Nutrition) all   Problems Present (Enteral Nutrition) malnutrition

## 2019-10-22 NOTE — CONSULTS
Adult Nutrition  Assessment    Patient Name:  Emerson Bang  YOB: 1960  MRN: [id number]  Admit Date:  9/30/2019    Assessment Date:  10/22/2019    Comments:  Pt remains NPO with tube feeding for total nutrition.  Tube feeding is meeting ~100% of estimated needs.  Pt is tolerating feedings well.  He will also be followed by RD at the [facility] upon discharge.  She can increase feedings at that time if he continues to do well.  Placement in Progress.  Will monitor.    Reason for Assessment     Row Name 10/22/19 1059          Reason for Assessment    Reason For Assessment  follow-up protocol         Nutrition/Diet History     Row Name 10/22/19 1059          Nutrition/Diet History    Typical Food/Fluid Intake  Pt watching TV.  Shook head yes that he was doing OK and shook head no--NO Gi distress           Labs/Tests/Procedures/Meds     Row Name 10/22/19 1059          Labs/Procedures/Meds    Lab Results Reviewed  reviewed, pertinent        Diagnostic Tests/Procedures    Diagnostic Test/Procedure Reviewed  reviewed, pertinent        Medications    Pertinent Medications Reviewed  reviewed, pertinent         Physical Findings     Row Name 10/22/19 1100          Physical Findings    Overall Physical Appearance  generalized wasting;loss of subcutaneous fat;loss of muscle mass     Gastrointestinal  feeding tube     Tubes  jejunostomy tube           Nutrition Prescription Ordered     Row Name 10/22/19 1100          Nutrition Prescription EN    Enteral Route  Jejunostomy     Product  Isosource HN (Osmolite 1.2)     TF Delivery Method  Continuous     Continuous TF Goal Rate (mL/hr)  60 mL/hr     Continuous TF Current Rate (mL/hr)  60 mL/hr     Continuous TF Goal Volume (mL)  1440 mL     Water flush (mL)   30 mL     Water Flush Frequency  Every 3 hours         Evaluation of Received Nutrient/Fluid Intake     Row Name 10/22/19 1100          Calories Evaluation    Enteral Calories (kcal)  1728     % of Kcal  Needs  100        Protein Evaluation    Enteral Protein (gm)  78     % of Protein Needs  98        Intake Assessment    Energy/Calorie Requirement Assessment  meeting needs     Protein Requirement Assessment  meeting needs               Electronically signed by:  Jane Ghosh RD  10/22/19 11:05 AM

## 2019-10-22 NOTE — SIGNIFICANT NOTE
10/22/19 1500   Rehab Treatment   Discipline occupational therapy assistant   Reason Treatment Not Performed unavailable for treatment  (Pt was off the floor. Pt went to the [facility] center. OT will check back tomorrow.)

## 2019-10-23 ENCOUNTER — RADIATION ONCOLOGY WEEKLY ASSESSMENT (OUTPATIENT)
Dept: RADIATION ONCOLOGY | Facility: HOSPITAL | Age: 59
End: 2019-10-23

## 2019-10-23 VITALS
RESPIRATION RATE: 22 BRPM | HEART RATE: 81 BPM | SYSTOLIC BLOOD PRESSURE: 128 MMHG | DIASTOLIC BLOOD PRESSURE: 71 MMHG | TEMPERATURE: 97.2 F

## 2019-10-23 DIAGNOSIS — C13.9 CANCER OF HYPOPHARYNX (HCC): Primary | ICD-10-CM

## 2019-10-23 LAB
GLUCOSE BLDC GLUCOMTR-MCNC: 106 MG/DL (ref 70–130)
GLUCOSE BLDC GLUCOMTR-MCNC: 110 MG/DL (ref 70–130)
GLUCOSE BLDC GLUCOMTR-MCNC: 111 MG/DL (ref 70–130)
GLUCOSE BLDC GLUCOMTR-MCNC: 127 MG/DL (ref 70–130)
GLUCOSE BLDC GLUCOMTR-MCNC: 92 MG/DL (ref 70–130)

## 2019-10-23 PROCEDURE — 25010000002 ENOXAPARIN PER 10 MG: Performed by: SURGERY

## 2019-10-23 PROCEDURE — 94760 N-INVAS EAR/PLS OXIMETRY 1: CPT

## 2019-10-23 PROCEDURE — 82962 GLUCOSE BLOOD TEST: CPT

## 2019-10-23 PROCEDURE — 77014 CHG CT GUIDANCE RADIATION THERAPY FLDS PLACEMENT: CPT | Performed by: RADIOLOGY

## 2019-10-23 PROCEDURE — 97530 THERAPEUTIC ACTIVITIES: CPT

## 2019-10-23 PROCEDURE — 77427 RADIATION TX MANAGEMENT X5: CPT | Performed by: RADIOLOGY

## 2019-10-23 PROCEDURE — 97110 THERAPEUTIC EXERCISES: CPT

## 2019-10-23 PROCEDURE — 94799 UNLISTED PULMONARY SVC/PX: CPT

## 2019-10-23 PROCEDURE — 99024 POSTOP FOLLOW-UP VISIT: CPT | Performed by: SURGERY

## 2019-10-23 PROCEDURE — 97116 GAIT TRAINING THERAPY: CPT

## 2019-10-23 PROCEDURE — 77386: CPT | Performed by: RADIOLOGY

## 2019-10-23 RX ADMIN — FAMOTIDINE 20 MG: 40 POWDER, FOR SUSPENSION ORAL at 01:14

## 2019-10-23 RX ADMIN — ENOXAPARIN SODIUM 40 MG: 40 INJECTION SUBCUTANEOUS at 09:04

## 2019-10-23 RX ADMIN — SILVER SULFADIAZINE: 10 CREAM TOPICAL at 22:21

## 2019-10-23 RX ADMIN — HYDROCODONE BITARTRATE AND ACETAMINOPHEN 15 ML: 7.5; 325 SOLUTION ORAL at 14:02

## 2019-10-23 RX ADMIN — SILVER SULFADIAZINE: 10 CREAM TOPICAL at 09:04

## 2019-10-23 RX ADMIN — HYDROCODONE BITARTRATE AND ACETAMINOPHEN 15 ML: 7.5; 325 SOLUTION ORAL at 06:04

## 2019-10-23 RX ADMIN — SODIUM CHLORIDE, PRESERVATIVE FREE 10 ML: 5 INJECTION INTRAVENOUS at 22:21

## 2019-10-23 RX ADMIN — ALBUTEROL SULFATE 2.5 MG: 2.5 SOLUTION RESPIRATORY (INHALATION) at 12:40

## 2019-10-23 RX ADMIN — ALBUTEROL SULFATE 2.5 MG: 2.5 SOLUTION RESPIRATORY (INHALATION) at 07:26

## 2019-10-23 RX ADMIN — HYDROCODONE BITARTRATE AND ACETAMINOPHEN 15 ML: 7.5; 325 SOLUTION ORAL at 22:32

## 2019-10-23 RX ADMIN — FAMOTIDINE 20 MG: 40 POWDER, FOR SUSPENSION ORAL at 14:02

## 2019-10-23 RX ADMIN — FAMOTIDINE 20 MG: 40 POWDER, FOR SUSPENSION ORAL at 22:20

## 2019-10-23 RX ADMIN — ALBUTEROL SULFATE 2.5 MG: 2.5 SOLUTION RESPIRATORY (INHALATION) at 19:12

## 2019-10-23 RX ADMIN — ALBUTEROL SULFATE 2.5 MG: 2.5 SOLUTION RESPIRATORY (INHALATION) at 01:28

## 2019-10-23 RX ADMIN — SODIUM CHLORIDE, PRESERVATIVE FREE 10 ML: 5 INJECTION INTRAVENOUS at 09:04

## 2019-10-23 NOTE — PLAN OF CARE
Problem: Patient Care Overview  Goal: Plan of Care Review  Outcome: Ongoing (interventions implemented as appropriate)   10/23/19 0028   Coping/Psychosocial   Plan of Care Reviewed With patient   Plan of Care Review   Progress improving   OTHER   Outcome Summary VSS, pain med given per J tube, he is currently sleeping without complaints, feeding pump cleared and no residual from the J tube. Will cont to monitor.        Problem: Fall Risk (Adult)  Goal: Absence of Fall  Outcome: Ongoing (interventions implemented as appropriate)      Problem: Skin Injury Risk (Adult)  Goal: Skin Health and Integrity  Outcome: Ongoing (interventions implemented as appropriate)      Problem: Pain, Chronic (Adult)  Goal: Acceptable Pain/Comfort Level and Functional Ability  Outcome: Ongoing (interventions implemented as appropriate)      Problem: Wound (Includes Pressure Injury) (Adult)  Goal: Signs and Symptoms of Listed Potential Problems Will be Absent, Minimized or Managed (Wound)  Outcome: Ongoing (interventions implemented as appropriate)      Problem: Airway, Artificial (Adult)  Goal: Signs and Symptoms of Listed Potential Problems Will be Absent, Minimized or Managed (Airway, Artificial)  Outcome: Ongoing (interventions implemented as appropriate)

## 2019-10-23 NOTE — PLAN OF CARE
Problem: Patient Care Overview  Goal: Plan of Care Review  Outcome: Ongoing (interventions implemented as appropriate)   10/23/19 4431   Coping/Psychosocial   Plan of Care Reviewed With patient   Plan of Care Review   Progress improving   OTHER   Outcome Summary vss. pain controlled. dressing changed. trach suctioned and cleaned. worked with therapy. went to [facility] for radiation. d/c tomorrow. continue to monitor.       Problem: Fall Risk (Adult)  Goal: Absence of Fall  Outcome: Ongoing (interventions implemented as appropriate)      Problem: Skin Injury Risk (Adult)  Goal: Skin Health and Integrity  Outcome: Ongoing (interventions implemented as appropriate)      Problem: Pain, Chronic (Adult)  Goal: Acceptable Pain/Comfort Level and Functional Ability  Outcome: Ongoing (interventions implemented as appropriate)      Problem: Wound (Includes Pressure Injury) (Adult)  Goal: Signs and Symptoms of Listed Potential Problems Will be Absent, Minimized or Managed (Wound)  Outcome: Ongoing (interventions implemented as appropriate)      Problem: Airway, Artificial (Adult)  Goal: Signs and Symptoms of Listed Potential Problems Will be Absent, Minimized or Managed (Airway, Artificial)  Outcome: Ongoing (interventions implemented as appropriate)      Problem: Nutrition, Enteral (Adult)  Goal: Signs and Symptoms of Listed Potential Problems Will be Absent, Minimized or Managed (Nutrition, Enteral)  Outcome: Ongoing (interventions implemented as appropriate)    Goal: Signs and Symptoms of Listed Potential Problems Will be Absent, Minimized or Managed (Nutrition, Enteral)  Outcome: Ongoing (interventions implemented as appropriate)

## 2019-10-23 NOTE — PLAN OF CARE
Problem: Patient Care Overview  Goal: Plan of Care Review  Outcome: Ongoing (interventions implemented as appropriate)   10/23/19 1115   Coping/Psychosocial   Plan of Care Reviewed With patient   Plan of Care Review   Progress improving   OTHER   Outcome Summary pt responded well to therapy w/ increased gait to 80 ft in room SBA w/ RW. pt completed t/f from bed to chair SBA w/ RW. pt participated in seated LE ther ex. pt is mod indep for sup-sit. no new goals met at this time. pt is progressing and would continue to benefit from PT services.
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
## 2019-10-23 NOTE — THERAPY TREATMENT NOTE
Acute Care - Physical Therapy Treatment Note  [facility]     Patient Name: Emerson Bang  : 1960  MRN: [id number]  Today's Date: 10/23/2019  Onset of Illness/Injury or Date of Surgery: 19     Referring Physician: Dr. Kc    Admit Date: 2019    Visit Dx:    ICD-10-CM ICD-9-CM   1. 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 Impaired physical mobility Z74.09 781.99   3. Impaired mobility and activities of daily living Z74.09 799.89     Patient Active Problem List   Diagnosis   • Hyperkalemia   • Iron deficiency anemia   • Gastroesophageal reflux disease with esophagitis   • Elevated AST (SGOT)   • Alcohol abuse   • Hypothyroidism   • Balance problem   • Need for vaccination   • Low magnesium levels   • Squamous cell carcinoma of pharynx (CMS/HCC)   • History of throat cancer   • Vitamin D deficiency   • Alcohol abuse counseling and surveillance   • Encounter for screening for diabetes mellitus   • Hypomagnesemia   • Iron deficiency anemia   • Hyperlipidemia   • Underweight due to inadequate caloric intake   • Need for immunization against influenza   • 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HCC)   • Hypertension   • General medical examination   • Underweight   • Epigastric pain   • Gastritis without bleeding   • Need for influenza vaccination   • Elevated liver function tests   • B12 deficiency   • Intestinal malabsorption   • Throat pain   • Acute pharyngitis   • Upper respiratory tract infection   • Neoplasm of uncertain behavior of larynx   • Pharyngoesophageal dysphagia   • Severe protein-calorie malnutrition (CMS/HCC)   • Anemia of chronic disease   • Hypotension   • Hyponatremia   • Status post insertion of percutaneous endoscopic gastrostomy (PEG) tube (CMS/HCC)   • Hx of tracheostomy   • History of throat surgery   • Hx SBO   • Urinary retention   • Generalized weakness   • Acute otitis externa of right ear   • Hard stool   • Panlobular emphysema (CMS/HCC)   • Cancer of  hypopharynx (CMS/HCC)   • Diarrhea   • Encounter for venous access device care   • Prediabetes   • Status post neck dissection   • S/P partial thyroidectomy   • S/P laryngectomy   • Annual physical exam   • Alcoholic cirrhosis of liver without ascites (CMS/HCC)       Therapy Treatment    Rehabilitation Treatment Summary     Row Name 10/23/19 1115             Treatment Time/Intention    Discipline  physical therapy assistant  -GIAN      Document Type  therapy note (daily note)  -GIAN      Mode of Treatment  physical therapy;individual therapy  -GIAN      Therapy Frequency (PT Clinical Impression)  daily  -GIAN      Patient Effort  adequate  -GIAN      Existing Precautions/Restrictions  fall;other (see comments) watch gait belt placement s/p abdominal surgery  -GIAN      Recorded by [GIAN] Abran Wan, PTA 10/23/19 1142      Row Name 10/23/19 1115             Vital Signs    Intra Systolic BP Rehab  120  -GIAN      Intra Treatment Diastolic BP  74  -GIAN      Post Systolic BP Rehab  110  -JC2      Post Treatment Diastolic BP  68  -JC2      Intratreatment Heart Rate (beats/min)  81  -JC2      Posttreatment Heart Rate (beats/min)  90  -JC2      Intra SpO2 (%)  90  -GIAN      O2 Delivery Intra Treatment  trach collar  -GIAN      Post SpO2 (%)  98  -JC2      O2 Delivery Post Treatment  trach collar  -JC2      Pre Patient Position  Supine  -GIAN      Post Patient Position  Sitting  -GIAN      Recorded by [GIAN] Abran Wan, PTA 10/23/19 1142  [JC2] Abran Wan, PTA 10/23/19 1152      Row Name 10/23/19 1115             Cognitive Assessment/Intervention- PT/OT    Affect/Mental Status (Cognitive)  WFL  -GIAN      Orientation Status (Cognition)  oriented x 4  -GIAN      Follows Commands (Cognition)  WFL  -GIAN      Recorded by [GIAN] Abran Wan PTA 10/23/19 1142      Row Name 10/23/19 1115             Safety Issues, Functional Mobility    Impairments Affecting Function (Mobility)  balance;endurance/activity tolerance;shortness of  breath;strength  -GIAN      Recorded by [GIAN] Abran Wan, PTA 10/23/19 1142      Row Name 10/23/19 1115             Bed Mobility Assessment/Treatment    Bed Mobility Assessment/Treatment  supine-sit  -GIAN      Scooting/Bridging Terrell (Bed Mobility)  conditional independence  -GIAN      Assistive Device (Bed Mobility)  bed rails;head of bed elevated  -GIAN      Recorded by [GIAN] Abran Wan, PTA 10/23/19 1142      Row Name 10/23/19 1115             Transfer Assessment/Treatment    Transfer Assessment/Treatment  sit-stand transfer;stand-sit transfer  -GIAN      Recorded by [GIAN] Abran Wan, PTA 10/23/19 1142      Row Name 10/23/19 1115             Sit-Stand Transfer    Sit-Stand Terrell (Transfers)  supervision  -GIAN      Assistive Device (Sit-Stand Transfers)  walker, front-wheeled  -GIAN      Recorded by [GIAN] Abran Wan, PTA 10/23/19 1142      Row Name 10/23/19 1115             Stand-Sit Transfer    Stand-Sit Terrell (Transfers)  supervision  -GIAN      Assistive Device (Stand-Sit Transfers)  walker, front-wheeled  -GIAN      Recorded by [GIAN] Abran Wan, PTA 10/23/19 1142      Row Name 10/23/19 1115             Gait/Stairs Assessment/Training    Gait/Stairs Assessment/Training  gait/ambulation assistive device  -GIAN      Terrell Level (Gait)  supervision  -GIAN      Assistive Device (Gait)  walker, front-wheeled  -GIAN      Distance in Feet (Gait)  80  -GIAN      Pattern (Gait)  step-to  -GIAN      Deviations/Abnormal Patterns (Gait)  essence decreased;stride length decreased  -GIAN      Bilateral Gait Deviations  forward flexed posture  -GIAN      Recorded by [GIAN] Abran Wan, PTA 10/23/19 1142      Row Name 10/23/19 1115             Therapeutic Exercise    Therapeutic Exercise  seated, lower extremities  -GIAN      Recorded by [GIAN] Abran Wan, PTA 10/23/19 1142      Row Name 10/23/19 1115             Lower Extremity Seated Therapeutic Exercise    Performed, Seated Lower Extremity  (Therapeutic Exercise)  ankle dorsiflexion/plantarflexion;LAQ (long arc quad), knee extension;hip flexion/extension;hip abduction/adduction  -GIAN      Exercise Type, Seated Lower Extremity (Therapeutic Exercise)  AAROM (active assistive range of motion)  -GIAN      Sets/Reps Detail, Seated Lower Extremity (Therapeutic Exercise)  20x1 ashley  -GIAN      Recorded by [GIAN] Abran Wan, PTA 10/23/19 1142      Row Name 10/23/19 1115             Positioning and Restraints    Pre-Treatment Position  in bed  -GIAN      Post Treatment Position  chair  -GIAN      In Chair  reclined;call light within reach;encouraged to call for assist;exit alarm on all needs met  -GIAN      Recorded by [GIAN] Abran Wan, PTA 10/23/19 1142      Row Name 10/23/19 1115             Pain Assessment    Additional Documentation  Pain Scale: Numbers Pre/Post-Treatment (Group)  -GIAN      Recorded by [GIAN] Abran Wan, PTA 10/23/19 1142      Row Name 10/23/19 1115             Pain Scale: Numbers Pre/Post-Treatment    Pain Scale: Numbers, Pretreatment  7/10  -GIAN      Pain Scale: Numbers, Post-Treatment  7/10  -GIAN      Pain Location  abdomen  -GIAN      Pain Intervention(s)  Repositioned  -GIAN      Recorded by [GIAN] Abran Wan, PTA 10/23/19 1142      Row Name                Wound 09/30/19 1330 Left medial abdomen Fistula    Wound - Properties Group Date first assessed: 09/30/19 [SS] Time first assessed: 1330 [SS] Side: Left [SS] Orientation: medial [SS] Location: abdomen [SS] Primary Wound Type: Fistula [SS] Recorded by:  [SS] Citlalli Abdi RN 09/30/19 1619    Row Name                Wound 10/02/19 1347 abdomen Incision    Wound - Properties Group Date first assessed: 10/02/19 [CF] Time first assessed: 1347 [CF] Present on Hospital Admission: N [CF] Location: abdomen [CF] Primary Wound Type: Incision [CF] Recorded by:  [CF] Eileen Tyson 10/02/19 1347    Row Name 10/23/19 1115             Outcome Summary/Treatment Plan (PT)    Daily Summary of  Progress (PT)  progress toward functional goals as expected  -GIAN      Plan for Continued Treatment (PT)  continue  -GIAN      Anticipated Discharge Disposition (PT)  anticipate therapy at next level of care  -GIAN      Recorded by [GIAN] Abran Wan, PTA 10/23/19 1142        User Key  (r) = Recorded By, (t) = Taken By, (c) = Cosigned By    Initials Name Effective Dates Discipline    SS Citlalli Abdi RN 07/10/18 -  Nurse    GIAN Abran Wan, PTA 03/07/18 -  PT    CF JahEileen worthington - -          Wound 09/30/19 1330 Left medial abdomen Fistula (Active)   Dressing Appearance dry;intact 10/23/2019 10:00 AM   Closure None 10/23/2019 10:00 AM   Base granulating 10/23/2019 10:00 AM   Periwound redness 10/23/2019 10:00 AM   Periwound Temperature warm 10/23/2019 10:00 AM   Periwound Skin Turgor soft 10/23/2019 10:00 AM   Edges open 10/23/2019 10:00 AM   Drainage Characteristics/Odor purulent 10/23/2019 10:00 AM   Drainage Amount small 10/23/2019 10:00 AM   Care, Wound cleansed with;sterile normal saline 10/23/2019 10:00 AM   Dressing Care, Wound dressing changed 10/23/2019 10:00 AM       Wound 10/02/19 1347 abdomen Incision (Active)   Dressing Appearance dry;intact 10/23/2019 10:00 AM   Periwound dry 10/23/2019 10:00 AM   Periwound Temperature warm 10/23/2019 10:00 AM   Periwound Skin Turgor soft 10/23/2019 10:00 AM   Drainage Characteristics/Odor serosanguineous 10/23/2019 10:00 AM   Drainage Amount scant 10/23/2019 10:00 AM   Care, Wound cleansed with;sterile water 10/22/2019  8:21 PM   Dressing Care, Wound dressing applied;dressing changed 10/22/2019  8:21 PM       Rehab Goal Summary     Row Name 10/23/19 1115             Bed Mobility Goal 1 (PT)    Activity/Assistive Device (Bed Mobility Goal 1, PT)  sit to supine;supine to sit  -GIAN      Powell Level/Cues Needed (Bed Mobility Goal 1, PT)  independent  -GIAN      Time Frame (Bed Mobility Goal 1, PT)  by discharge  -GIAN      Progress/Outcomes (Bed Mobility Goal  1, PT)  goal met  -GIAN         Transfer Goal 1 (PT)    Activity/Assistive Device (Transfer Goal 1, PT)  bed-to-chair/chair-to-bed;toilet  -GIAN      Garvin Level/Cues Needed (Transfer Goal 1, PT)  standby assist  -GIAN      Time Frame (Transfer Goal 1, PT)  by discharge  -GIAN      Progress/Outcome (Transfer Goal 1, PT)  goal not met;goal revised this date  -GIAN         Gait Training Goal 1 (PT)    Activity/Assistive Device (Gait Training Goal 1, PT)  gait (walking locomotion);assistive device use  -GIAN      Garvin Level (Gait Training Goal 1, PT)  contact guard assist  -GIAN      Distance (Gait Goal 1, PT)  150ft  -GIAN      Time Frame (Gait Training Goal 1, PT)  by discharge  -GIAN      Progress/Outcome (Gait Training Goal 1, PT)  goal not met;goal revised this date  -GIAN         Stairs Goal 1 (PT)    Activity/Assistive Device (Stairs Goal 1, PT)  ascending stairs;descending stairs  -GIAN      Garvin Level/Cues Needed (Stairs Goal 1, PT)  standby assist  -GIAN      Number of Stairs (Stairs Goal 1, PT)  5  -GIAN      Time Frame (Stairs Goal 1, PT)  by discharge  -GIAN      Progress/Outcome (Stairs Goal 1, PT)  goal not met  -GIAN        User Key  (r) = Recorded By, (t) = Taken By, (c) = Cosigned By    Initials Name Provider Type Discipline    Abran Ferreira, PTA Physical Therapy Assistant PT          Physical Therapy Education     Title: PT OT SLP Therapies (In Progress)     Topic: Physical Therapy (In Progress)     Point: Mobility training (In Progress)     Learning Progress Summary           Patient Acceptance, E, NR by GIAN at 10/23/2019 11:44 AM    Acceptance, E, NR by GIAN at 10/21/2019  3:13 PM    Acceptance, E,TB, VU by LN at 10/20/2019  3:28 PM    Acceptance, E,TB, VU by LN at 10/19/2019 12:00 PM    Acceptance, E, NR by GIAN at 10/18/2019  2:43 PM    Acceptance, E, NR by GIAN at 10/17/2019 12:35 PM    Acceptance, E, NR by GIAN at 10/16/2019  4:06 PM    Acceptance, E, NR by GIAN at 10/13/2019  1:00 PM    Acceptance, E, NR  by GIAN at 10/11/2019  3:17 PM    Acceptance, E, NR by GIAN at 10/9/2019  2:59 PM    Acceptance, E, NR by GIAN at 10/8/2019  4:00 PM    Acceptance, E, NR by GIAN1 at 10/3/2019  3:43 PM                   Point: Home exercise program (Done)     Learning Progress Summary           Patient Acceptance, E,TB, VU by LN at 10/20/2019  3:28 PM    Acceptance, E,TB, VU by LN at 10/19/2019 12:00 PM                   Point: Body mechanics (Done)     Learning Progress Summary           Patient Acceptance, E,TB, VU by LN at 10/20/2019  3:28 PM    Acceptance, E,TB, VU by LN at 10/19/2019 12:00 PM    Acceptance, E, NR by GIAN1 at 10/3/2019  3:43 PM                   Point: Precautions (Done)     Learning Progress Summary           Patient Acceptance, E,TB, VU by LN at 10/20/2019  3:28 PM    Acceptance, E,TB, VU by LN at 10/19/2019 12:00 PM    Acceptance, E, NR by NANETTE at 10/3/2019  3:43 PM                               User Key     Initials Effective Dates Name Provider Type Discipline    [facility] 04/03/18 -  Judy Watkins, PT Physical Therapist PT     03/07/18 -  Abran Wan, PTA Physical Therapy Assistant PT     03/07/18 -  Lynda Ramos PTA Physical Therapy Assistant PT                PT Recommendation and Plan  Anticipated Discharge Disposition (PT): anticipate therapy at next level of care  Therapy Frequency (PT Clinical Impression): daily  Outcome Summary/Treatment Plan (PT)  Daily Summary of Progress (PT): progress toward functional goals as expected  Plan for Continued Treatment (PT): continue  Anticipated Discharge Disposition (PT): anticipate therapy at next level of care  Plan of Care Reviewed With: patient  Progress: improving  Outcome Summary: pt responded well to therapy w/ increased gait to 80 ft in room SBA w/ RW. pt completed t/f from bed to chair SBA w/ RW. pt participated in seated LE ther ex. pt is mod indep for sup-sit. no new goals met at this time. pt is progressing and would continue to benefit from PT services.    Outcome Measures     Row Name 10/23/19 1115 10/21/19 1437 10/20/19 1450       How much help from another person do you currently need...    Turning from your back to your side while in flat bed without using bedrails?  4  -GIAN  4  -GIAN  4  -LN    Moving from lying on back to sitting on the side of a flat bed without bedrails?  4  -GIAN  4  -GIAN  4  -LN    Moving to and from a bed to a chair (including a wheelchair)?  3  -GIAN  3  -GIAN  3  -LN    Standing up from a chair using your arms (e.g., wheelchair, bedside chair)?  3  -GIAN  3  -GIAN  3  -LN    Climbing 3-5 steps with a railing?  3  -GIAN  2  -GIAN  2  -LN    To walk in hospital room?  3  -GIAN  3  -GIAN  3  -LN    AM-PAC 6 Clicks Score (PT)  20  -GIAN  19  -GIAN  19  -LN       Functional Assessment    Outcome Measure Options  AM-PAC 6 Clicks Basic Mobility (PT)  -GIAN  AM-PAC 6 Clicks Basic Mobility (PT)  -GIAN  AM-PAC 6 Clicks Basic Mobility (PT)  -LN      User Key  (r) = Recorded By, (t) = Taken By, (c) = Cosigned By    Initials Name Provider Type     Abran Wan PTA Physical Therapy Assistant    LN Lynda Ramos PTA Physical Therapy Assistant         Time Calculation:   PT Charges     Row Name 10/23/19 1242             Time Calculation    Start Time  1115  -      Stop Time  1154  -GIAN      Time Calculation (min)  39 min  -GIAN         Time Calculation- PT    Total Timed Code Minutes- PT  39 minute(s)  -GIAN        User Key  (r) = Recorded By, (t) = Taken By, (c) = Cosigned By    Initials Name Provider Type     Abran Wan PTA Physical Therapy Assistant        Therapy Charges for Today     Code Description Service Date Service Provider Modifiers Qty    59090082907 HC GAIT TRAINING EA 15 MIN 10/23/2019 Abran Wan PTA GP 1    36513763759 HC PT THER PROC EA 15 MIN 10/23/2019 Abran Wan PTA GP 1    46955385808 HC PT THERAPEUTIC ACT EA 15 MIN 10/23/2019 Abran Wan PTA GP 1          PT G-Codes  Outcome Measure Options: AM-PAC 6 Clicks Basic Mobility  (PT)  AM-PAC 6 Clicks Score (PT): 20  AM-PAC 6 Clicks Score (OT): 15    Abran Wan, PTA  10/23/2019

## 2019-10-23 NOTE — THERAPY TREATMENT NOTE
Acute Care - Occupational Therapy Treatment Note  [facility]     Patient Name: Emerson Bang  : 1960  MRN: [id number]  Today's Date: 10/23/2019  Onset of Illness/Injury or Date of Surgery: 19  Date of Referral to OT: 10/02/19  Referring Physician: Dr. Kc    Admit Date: 2019       ICD-10-CM ICD-9-CM   1. 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 Impaired physical mobility Z74.09 781.99   3. Impaired mobility and activities of daily living Z74.09 799.89     Patient Active Problem List   Diagnosis   • Hyperkalemia   • Iron deficiency anemia   • Gastroesophageal reflux disease with esophagitis   • Elevated AST (SGOT)   • Alcohol abuse   • Hypothyroidism   • Balance problem   • Need for vaccination   • Low magnesium levels   • Squamous cell carcinoma of pharynx (CMS/HCC)   • History of throat cancer   • Vitamin D deficiency   • Alcohol abuse counseling and surveillance   • Encounter for screening for diabetes mellitus   • Hypomagnesemia   • Iron deficiency anemia   • Hyperlipidemia   • Underweight due to inadequate caloric intake   • Need for immunization against influenza   • 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HCC)   • Hypertension   • General medical examination   • Underweight   • Epigastric pain   • Gastritis without bleeding   • Need for influenza vaccination   • Elevated liver function tests   • B12 deficiency   • Intestinal malabsorption   • Throat pain   • Acute pharyngitis   • Upper respiratory tract infection   • Neoplasm of uncertain behavior of larynx   • Pharyngoesophageal dysphagia   • Severe protein-calorie malnutrition (CMS/HCC)   • Anemia of chronic disease   • Hypotension   • Hyponatremia   • Status post insertion of percutaneous endoscopic gastrostomy (PEG) tube (CMS/HCC)   • Hx of tracheostomy   • History of throat surgery   • Hx SBO   • Urinary retention   • Generalized weakness   • Acute otitis externa of right ear   • Hard stool   • Panlobular emphysema  (CMS/HCC)   • Cancer of hypopharynx (CMS/HCC)   • Diarrhea   • Encounter for venous access device care   • Prediabetes   • Status post neck dissection   • S/P partial thyroidectomy   • S/P laryngectomy   • Annual physical exam   • Alcoholic cirrhosis of liver without ascites (CMS/HCC)     Past Medical History:   Diagnosis Date   • Allergic rhinitis     vs URI   • Anemia    • At risk for falls    • Benign prostatic hyperplasia    • Chronic gastritis    • Cirrhosis of liver (CMS/HCC)    • Complication of gastrostomy (CMS/HCC)     site not healing   • COPD (chronic obstructive pulmonary disease) (CMS/HCC)    • Dysphagia     odynophagia   • Epigastric pain    • Esophagitis    • GERD (gastroesophageal reflux disease)    • Hypertension    • Hypothyroidism, unspecified    • Low back pain    • Malaise and fatigue    • Nasal congestion 11/15/2018   • Nausea with vomiting, unspecified    • Pain in left knee    • Pain in right knee    • Primary malignant neoplasm of pharynx (CMS/HCC)    • Screening for malignant neoplasm of colon    • 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 Left upper quadrant abdominal wall abscess debridement. Gastrostomy tube erosion through & through the anterior gastric wall.Lupper quadrant abdominal wall abscess   • COLONOSCOPY  04/21/2014    Internal & external hemorrhoids found.   • COLONOSCOPY  2014    [place]   • COLONOSCOPY N/A 10/16/2018    Procedure: COLONOSCOPY;  Surgeon: Kaushal Chester MD;  Location: [facility] ENDOSCOPY;  Service: Gastroenterology   • DIAGNOSTIC LAPAROSCOPY EXPLORATORY LAPAROTOMY N/A 7/17/2019    Procedure: DIAGNOSTIC LAPAROSCOPY, EXPLORATORY LAPAROTOMY, LYSIS OF ADHESIONS;  Surgeon: Parminder Kc MD;   Location: [facility] OR;  Service: General   • DIRECT LARYNGOSCOPY, ESOPHAGOSCOPY, BRONCHOSCOPY N/A 4/15/2019    Procedure: DIRECT LARYNGOSCOPY with biopsy, ESOPHAGOSCOPY;  Surgeon: Bharathi Washington MD;  Location: [facility] OR;  Service: ENT   • ENDOSCOPY N/A 10/16/2018    Procedure: ESOPHAGOGASTRODUODENOSCOPY;  Surgeon: Kaushal Chester MD;  Location: [facility] ENDOSCOPY;  Service: Gastroenterology   • GASTROCUTANEOUS FISTULA CLOSURE N/A 10/2/2019    Procedure: GASTROCUTANEOUS FISTULA CLOSURE;  Surgeon: Parminder Kc MD;  Location: [facility] OR;  Service: General   • GASTROSTOMY FEEDING TUBE INSERTION N/A 4/15/2019    Procedure: GASTROSTOMY FEEDING TUBE INSERTION;  Surgeon: Trenton Valera MD;  Location: [facility] OR;  Service: General   • JEJUNOSTOMY N/A 10/2/2019    Procedure: JEJUNOSTOMY;  Surgeon: Parminder Kc MD;  Location: [facility];  Service: General   • TRACHEOSTOMY  11/10/2008    Respiratory failure   • UPPER GASTROINTESTINAL ENDOSCOPY  04/21/2014    Esophagitis seen. Biopsy taken. Gastritis in stomach. Biopsy taken. Normal duodenum. Biopsy taken.   • UPPER GASTROINTESTINAL ENDOSCOPY  10/16/2018   • VENOUS ACCESS DEVICE (PORT) INSERTION N/A 9/11/2019    Procedure: INSERTION VENOUS ACCESS DEVICE (MEDIPORT)        (c-arm#1);  Surgeon: Parminder Kc MD;  Location: [facility];  Service: General       Therapy Treatment    Rehabilitation Treatment Summary     Row Name 10/23/19 1334 10/23/19 1115          Treatment Time/Intention    Discipline  occupational therapy assistant  -CS  physical therapy assistant  -GIAN     Document Type  therapy note (daily note)  -CS  therapy note (daily note)  -GIAN     Mode of Treatment  occupational therapy  -CS  physical therapy;individual therapy  -GIAN     Therapy Frequency (PT Clinical Impression)  --  daily  -GIAN     Therapy Frequency (OT Eval)  other (see comments) 5-7 days/wk  -CS  --     Patient Effort  good  -CS  adequate  -GIAN     Existing Precautions/Restrictions  fall;other  (see comments) watch gait belt placement s/p abdominal surgery  -CS  fall;other (see comments) watch gait belt placement s/p abdominal surgery  -GIAN     Recorded by [CS] Brenda Thomas MADSEN/L 10/23/19 1600 [GIAN] Abran Wan, PTA 10/23/19 1142     Row Name 10/23/19 1334 10/23/19 1115          Vital Signs    Intra Systolic BP Rehab  --  120  -GIAN     Intra Treatment Diastolic BP  --  74  -GIAN     Post Systolic BP Rehab  --  110  -JC2     Post Treatment Diastolic BP  --  68  -JC2     Intratreatment Heart Rate (beats/min)  --  81  -JC2     Posttreatment Heart Rate (beats/min)  --  90  -JC2     Intra SpO2 (%)  --  90  -GIAN     O2 Delivery Intra Treatment  --  trach collar  -GIAN     Post SpO2 (%)  --  98  -JC2     O2 Delivery Post Treatment  --  trach collar  -JC2     Pre Patient Position  Sitting  -CS  Supine  -GIAN     Intra Patient Position  Sitting  -CS  --     Post Patient Position  Sitting  -CS  Sitting  -GIAN     Recorded by [CS] Brenda Thomas MADSEN/L 10/23/19 1600 [GIAN] Abran Wan, PTA 10/23/19 1142  [JC2] Abran Wan, PTA 10/23/19 1152     Row Name 10/23/19 1334 10/23/19 1115          Cognitive Assessment/Intervention- PT/OT    Affect/Mental Status (Cognitive)  WFL  -CS  WFL  -GIAN     Behavioral Issues (Cognitive)  unable/difficult to assess  -CS  --     Orientation Status (Cognition)  oriented x 4  -CS  oriented x 4  -GIAN     Follows Commands (Cognition)  WFL  -CS  WFL  -GIAN     Recorded by [CS] Brenda Thomas MADSEN/L 10/23/19 1600 [GIAN] Abran Wan, PTA 10/23/19 1142     Row Name 10/23/19 1115             Safety Issues, Functional Mobility    Impairments Affecting Function (Mobility)  balance;endurance/activity tolerance;shortness of breath;strength  -GIAN      Recorded by [GIAN] Abran Wan, PTA 10/23/19 1142      Row Name 10/23/19 1115             Bed Mobility Assessment/Treatment    Bed Mobility Assessment/Treatment  supine-sit  -GIAN      Scooting/Bridging [place] (Bed Mobility)   conditional independence  -GIAN      Assistive Device (Bed Mobility)  bed rails;head of bed elevated  -GIAN      Recorded by [GIAN] Abran Wan, PTA 10/23/19 1142      Row Name 10/23/19 1115             Transfer Assessment/Treatment    Transfer Assessment/Treatment  sit-stand transfer;stand-sit transfer  -GIAN      Recorded by [GIAN] Abran Wan, PTA 10/23/19 1142      Row Name 10/23/19 1115             Sit-Stand Transfer    Sit-Stand [place] (Transfers)  supervision  -GIAN      Assistive Device (Sit-Stand Transfers)  walker, front-wheeled  -GIAN      Recorded by [GIAN] Abran Wan, PTA 10/23/19 1142      Row Name 10/23/19 1115             Stand-Sit Transfer    Stand-Sit [place] (Transfers)  supervision  -GIAN      Assistive Device (Stand-Sit Transfers)  walker, front-wheeled  -GIAN      Recorded by [GIAN] Abran Wan, PTA 10/23/19 1142      Row Name 10/23/19 1115             Gait/Stairs Assessment/Training    Gait/Stairs Assessment/Training  gait/ambulation assistive device  -GIAN      [place] Level (Gait)  supervision  -GIAN      Assistive Device (Gait)  walker, front-wheeled  -GIAN      Distance in Feet (Gait)  80  -GIAN      Pattern (Gait)  step-to  -GIAN      Deviations/Abnormal Patterns (Gait)  essence decreased;stride length decreased  -GIAN      Bilateral Gait Deviations  forward flexed posture  -GIAN      Recorded by [GIAN] Abran Wan, PTA 10/23/19 1142      Row Name 10/23/19 1115             Therapeutic Exercise    Therapeutic Exercise  seated, lower extremities  -GIAN      Recorded by [GIAN] Abran Wan, PTA 10/23/19 1142      Row Name 10/23/19 1115             Lower Extremity Seated Therapeutic Exercise    Performed, Seated Lower Extremity (Therapeutic Exercise)  ankle dorsiflexion/plantarflexion;LAQ (long arc quad), knee extension;hip flexion/extension;hip abduction/adduction  -GIAN      Exercise Type, Seated Lower Extremity (Therapeutic Exercise)  AAROM (active assistive range of motion)  -GIAN       Sets/Reps Detail, Seated Lower Extremity (Therapeutic Exercise)  20x1 ashley  -GIAN      Recorded by [GIAN] Abran Wan PTA 10/23/19 1142      Row Name 10/23/19 1334             Therapeutic Exercise    Upper Extremity (Therapeutic Exercise)  bicep curl, bilateral  -CS      Upper Extremity Range of Motion (Therapeutic Exercise)  shoulder flexion/extension, bilateral;shoulder internal/external rotation, bilateral;elbow flexion/extension, bilateral;forearm supination/pronation, bilateral;wrist flexion/extension, bilateral  -CS      Hand (Therapeutic Exercise)  finger flexion/extension, bilateral  -CS      Weight/Resistance (Therapeutic Exercise)  2 pounds  -CS      Exercise Type (Therapeutic Exercise)  AROM (active range of motion)  -CS      Position (Therapeutic Exercise)  seated  -CS      Sets/Reps (Therapeutic Exercise)  1/20  -CS      Equipment (Therapeutic Exercise)  free weight, barbell  -CS      Expected Outcome (Therapeutic Exercise)  improve functional tolerance, self-care activity;improve performance, transfer skills;improve performance, BADLs;increase active range of motion  -CS      Recorded by [CS] Brenda Thomas COTA/L 10/23/19 1600      Row Name 10/23/19 1334 10/23/19 1115          Positioning and Restraints    Pre-Treatment Position  sitting in chair/recliner  -CS  in bed  -IGAN     Post Treatment Position  chair  -CS  chair  -GIAN     In Chair  reclined;call light within reach;encouraged to call for assist;exit alarm on  -CS  reclined;call light within reach;encouraged to call for assist;exit alarm on all needs met  -GIAN     Recorded by [CS] Brenda Thomas COTA/LIANE 10/23/19 1600 [GIAN] Abran Wan PTA 10/23/19 1142     Row Name 10/23/19 1115             Pain Assessment    Additional Documentation  Pain Scale: Numbers Pre/Post-Treatment (Group)  -GIAN      Recorded by [GIAN] Abran Wan PTA 10/23/19 1142      Row Name 10/23/19 1334 10/23/19 1115          Pain Scale: Numbers Pre/Post-Treatment     Pain Scale: Numbers, Pretreatment  7/10  -CS  7/10  -GIAN     Pain Scale: Numbers, Post-Treatment  7/10  -CS  7/10  -GIAN     Pain Location  abdomen  -CS  abdomen  -GIAN     Pain Intervention(s)  --  Repositioned  -GIAN     Recorded by [CS] Brenda Thomas MADSEN/L 10/23/19 1600 [GIAN] Abran Wan, PTA 10/23/19 1142     Row Name                Wound 09/30/19 1330 Left medial abdomen Fistula    Wound - Properties Group Date first assessed: 09/30/19 [SS] Time first assessed: 1330 [SS] Side: Left [SS] Orientation: medial [SS] Location: abdomen [SS] Primary Wound Type: Fistula [SS] Recorded by:  [SS] Citlalli Abdi RN 09/30/19 1619    Row Name                Wound 10/02/19 1347 abdomen Incision    Wound - Properties Group Date first assessed: 10/02/19 [CF] Time first assessed: 1347 [CF] Present on Hospital Admission: N [CF] Location: abdomen [CF] Primary Wound Type: Incision [CF] Recorded by:  [CF] Eileen Tyson 10/02/19 1347    Row Name 10/23/19 1334             Outcome Summary/Treatment Plan (OT)    Daily Summary of Progress (OT)  progress toward functional goals is good  -CS      Plan for Continued Treatment (OT)  cont OT POC  -CS      Anticipated Discharge Disposition (OT)  anticipate therapy at next level of care  -CS      Recorded by [CS] Brenda Thomas MADSEN/L 10/23/19 1600      Row Name 10/23/19 1115             Outcome Summary/Treatment Plan (PT)    Daily Summary of Progress (PT)  progress toward functional goals as expected  -GIAN      Plan for Continued Treatment (PT)  continue  -GINA      Anticipated Discharge Disposition (PT)  anticipate therapy at next level of care  -GIAN      Recorded by [GIAN] Abran Wan, PTA 10/23/19 1142        User Key  (r) = Recorded By, (t) = Taken By, (c) = Cosigned By    Initials Name Effective Dates Discipline     Citlalli Abdi RN 07/10/18 -  Nurse    GIAN Abran Wan, PTA 03/07/18 -  PT    CS Brenda Thomas, MADSEN/L 03/07/18 -  OT    CF Eileen Tyson - -         Wound 09/30/19 1330 Left medial abdomen Fistula (Active)   Dressing Appearance dry;intact 10/23/2019 10:00 AM   Closure None 10/23/2019 10:00 AM   Base granulating 10/23/2019 10:00 AM   Periwound redness 10/23/2019 10:00 AM   Periwound Temperature warm 10/23/2019 10:00 AM   Periwound Skin Turgor soft 10/23/2019 10:00 AM   Edges open 10/23/2019 10:00 AM   Drainage Characteristics/Odor purulent 10/23/2019 10:00 AM   Drainage Amount small 10/23/2019 10:00 AM   Care, Wound cleansed with;sterile normal saline 10/23/2019 10:00 AM   Dressing Care, Wound dressing changed 10/23/2019 10:00 AM       Wound 10/02/19 1347 abdomen Incision (Active)   Dressing Appearance dry;intact 10/23/2019 10:00 AM   Periwound dry 10/23/2019 10:00 AM   Periwound Temperature warm 10/23/2019 10:00 AM   Periwound Skin Turgor soft 10/23/2019 10:00 AM   Drainage Characteristics/Odor serosanguineous 10/23/2019 10:00 AM   Drainage Amount scant 10/23/2019 10:00 AM   Care, Wound cleansed with;sterile water 10/22/2019  8:21 PM   Dressing Care, Wound dressing applied;dressing changed 10/22/2019  8:21 PM     Rehab Goal Summary     Row Name 10/23/19 1334 10/23/19 1115          Bed Mobility Goal 1 (PT)    Activity/Assistive Device (Bed Mobility Goal 1, PT)  --  sit to supine;supine to sit  -     [place] Level/Cues Needed (Bed Mobility Goal 1, PT)  --  independent  -GIAN     Time Frame (Bed Mobility Goal 1, PT)  --  by discharge  -GIAN     Progress/Outcomes (Bed Mobility Goal 1, PT)  --  goal met  -        Transfer Goal 1 (PT)    Activity/Assistive Device (Transfer Goal 1, PT)  --  bed-to-chair/chair-to-bed;toilet  -     [place] Level/Cues Needed (Transfer Goal 1, PT)  --  standby assist  -GIAN     Time Frame (Transfer Goal 1, PT)  --  by discharge  -GIAN     Progress/Outcome (Transfer Goal 1, PT)  --  goal not met;goal revised this date  -GIAN        Gait Training Goal 1 (PT)    Activity/Assistive Device (Gait Training Goal 1, PT)  --  gait (walking  locomotion);assistive device use  -GIAN     [place] Level (Gait Training Goal 1, PT)  --  contact guard assist  -GIAN     Distance (Gait Goal 1, PT)  --  150ft  -GIAN     Time Frame (Gait Training Goal 1, PT)  --  by discharge  -GIAN     Progress/Outcome (Gait Training Goal 1, PT)  --  goal not met;goal revised this date  -GIAN        Stairs Goal 1 (PT)    Activity/Assistive Device (Stairs Goal 1, PT)  --  ascending stairs;descending stairs  -GIAN     [place] Level/Cues Needed (Stairs Goal 1, PT)  --  standby assist  -GIAN     Number of Stairs (Stairs Goal 1, PT)  --  5  -GIAN     Time Frame (Stairs Goal 1, PT)  --  by discharge  -GIAN     Progress/Outcome (Stairs Goal 1, PT)  --  goal not met  -        Occupational Therapy Goals    Transfer Goal Selection (OT)  transfer, OT goal 1  -CS  --     Bathing Goal Selection (OT)  bathing, OT goal 1  -CS  --     Dressing Goal Selection (OT)  dressing, OT goal 1  -CS  --     Toileting Goal Selection (OT)  toileting, OT goal 1  -CS  --     Activity Tolerance Goal Selection (OT)  activity tolerance, OT goal 1  -CS  --        Transfer Goal 1 (OT)    Activity/Assistive Device (Transfer Goal 1, OT)  sit-to-stand/stand-to-sit;bed-to-chair/chair-to-bed;toilet  -CS  --     [place] Level/Cues Needed (Transfer Goal 1, OT)  supervision required  -CS  --     Time Frame (Transfer Goal 1, OT)  long term goal (LTG);by discharge  -CS  --     Progress/Outcome (Transfer Goal 1, OT)  goal not met  -CS  --        Bathing Goal 1 (OT)    Activity/Assistive Device (Bathing Goal 1, OT)  bathing skills, all  -CS  --     [place] Level/Cues Needed (Bathing Goal 1, OT)  minimum assist (75% or more patient effort)  -CS  --     Time Frame (Bathing Goal 1, OT)  long term goal (LTG);by discharge  -CS  --     Barriers (Bathing Goal 1, OT)  Pt will need extra time and rest breaks PRN  -CS  --     Progress/Outcomes (Bathing Goal 1, OT)  goal not met  -CS  --        Dressing Goal 1 (OT)     Activity/Assistive Device (Dressing Goal 1, OT)  dressing skills, all  -CS  --     [place]/Cues Needed (Dressing Goal 1, OT)  minimum assist (75% or more patient effort)  -CS  --     Time Frame (Dressing Goal 1, OT)  long term goal (LTG);by discharge  -CS  --     Barriers (Dressing Goal 1, OT)  Pt will need extra time and rest breaks PRN  -CS  --     Progress/Outcome (Dressing Goal 1, OT)  goal not met  -CS  --        Toileting Goal 1 (OT)    Activity/Device (Toileting Goal 1, OT)  toileting skills, all  -CS  --     [place] Level/Cues Needed (Toileting Goal 1, OT)  minimum assist (75% or more patient effort)  -CS  --     Time Frame (Toileting Goal 1, OT)  long term goal (LTG);by discharge  -CS  --     Barriers (Toileting Goal 1, OT)  Pt will need extra time and rest breaks PRN  -CS  --     Progress/Outcome (Toileting Goal 1, OT)  goal partially met  -CS  --         Activity Tolerance Goal 1 (OT)    Activity Level (Endurance Goal 1, OT)  15 min activity functional/therapeutic activity  -CS  --     Time Frame (Activity Tolerance Goal 1, OT)  long term goal (LTG);by discharge  -CS  --     Progress/Outcome (Activity Tolerance Goal 1, OT)  goal not met  -CS  --       User Key  (r) = Recorded By, (t) = Taken By, (c) = Cosigned By    Initials Name Provider Type Discipline    Abran Ferreira, PTA Physical Therapy Assistant PT    Brenda Lee COTA/LIANE Occupational Therapy Assistant OT        Occupational Therapy Education     Title: PT OT SLP Therapies (In Progress)     Topic: Occupational Therapy (In Progress)     Point: ADL training (In Progress)     Description: Instruct learner(s) on proper safety adaptation and remediation techniques during self care or transfers.   Instruct in proper use of assistive devices.    Learning Progress Summary           Patient Acceptance, E,TB,D, NR by CS at 10/23/2019  4:02 PM    Acceptance, E,TB,D, NR by CS at 10/18/2019  2:04 PM    Acceptance, E,TB,D, NR by CS at  10/16/2019  3:43 PM    Acceptance, E,TB,D, NR by CS at 10/15/2019  1:42 PM    Acceptance, E,TB,D, NR by CS at 10/14/2019  1:57 PM    Acceptance, E, NR by BB at 10/9/2019  1:37 PM    Acceptance, E,TB,D, NR by CS at 10/6/2019  1:05 PM    Acceptance, E,TB,D, NR by CS at 10/5/2019 11:11 AM    Acceptance, E, NR by AS at 10/4/2019 11:45 AM    Comment:  Role of OT, OT POC, importance of OOB activity for strength and healing, ADL training                   Point: Home exercise program (In Progress)     Description: Instruct learner(s) on appropriate technique for monitoring, assisting and/or progressing therapeutic exercises/activities.    Learning Progress Summary           Patient Acceptance, E,TB,D, NR by CS at 10/23/2019  4:02 PM    Acceptance, E,TB,D, NR by CS at 10/18/2019  2:04 PM    Acceptance, E,TB,D, NR by CS at 10/16/2019  3:43 PM    Acceptance, E,TB,D, NR by CS at 10/15/2019  1:42 PM    Acceptance, E,TB,D, NR by CS at 10/14/2019  1:57 PM    Acceptance, E, NR by BB at 10/8/2019 11:46 AM    Acceptance, E,TB,D, NR by CS at 10/6/2019  1:05 PM    Acceptance, E,TB,D, NR by CS at 10/5/2019 11:11 AM                   Point: Precautions (In Progress)     Description: Instruct learner(s) on prescribed precautions during self-care and functional transfers.    Learning Progress Summary           Patient Acceptance, E,TB,D, NR by CS at 10/23/2019  4:02 PM    Acceptance, E, NR by AS at 10/18/2019  5:56 PM    Comment:  OT POC, progress towards goals, tx plan modifications    Acceptance, E,TB,D, NR by CS at 10/18/2019  2:04 PM    Acceptance, E,TB,D, NR by CS at 10/16/2019  3:43 PM    Acceptance, E,TB,D, NR by CS at 10/15/2019  1:42 PM    Acceptance, E,TB,D, NR by CS at 10/14/2019  1:57 PM    Acceptance, ELSA BROTHERS D, NR by CS at 10/6/2019  1:05 PM    Acceptance, ZEV,SABI HUNTER, NR by CS at 10/5/2019 11:11 AM    Acceptance, E, NR by AS at 10/4/2019 11:45 AM    Comment:  Role of OT, OT POC, importance of OOB activity for strength and healing,  ADL training                   Point: Body mechanics (In Progress)     Description: Instruct learner(s) on proper positioning and spine alignment during self-care, functional mobility activities and/or exercises.    Learning Progress Summary           Patient Acceptance, E,TB,D, NR by CS at 10/23/2019  4:02 PM    Acceptance, E,TB,D, NR by CS at 10/18/2019  2:04 PM    Acceptance, E,TB,D, NR by CS at 10/16/2019  3:43 PM    Acceptance, E,TB,D, NR by CS at 10/15/2019  1:42 PM    Acceptance, E,TB,D, NR by CS at 10/14/2019  1:57 PM    Acceptance, E,TB,D, NR by CS at 10/6/2019  1:05 PM    Acceptance, E,TB,D, NR by CS at 10/5/2019 11:11 AM                               User Key     Initials Effective Dates Name Provider Type Discipline    BB 03/07/18 -  Kerrie Woodson COTA/L Occupational Therapy Assistant OT    CS 03/07/18 -  Brenda Thomas COTA/LIANE Occupational Therapy Assistant OT    AS 03/25/19 -  Destinee Yarbrough, OT Occupational Therapist OT                OT Recommendation and Plan  Outcome Summary/Treatment Plan (OT)  Daily Summary of Progress (OT): progress toward functional goals is good  Plan for Continued Treatment (OT): cont OT POC  Anticipated Discharge Disposition (OT): anticipate therapy at next level of care  Therapy Frequency (OT Eval): other (see comments)(5-7 days/wk)  Daily Summary of Progress (OT): progress toward functional goals is good  Plan of Care Review  Plan of Care Reviewed With: patient  Plan of Care Reviewed With: patient  Outcome Summary: Pt tolerated tx well this date. Pt was sitting up in chair upon arrival. Pt gave good effort with UE ther ex. Continue OT POC.  Outcome Measures     Row Name 10/23/19 1600 10/23/19 1115 10/21/19 4167       How much help from another person do you currently need...    Turning from your back to your side while in flat bed without using bedrails?  --  4  -GIAN  4  -GIAN    Moving from lying on back to sitting on the side of a flat bed without bedrails?  --   4  -GIAN  4  -GIAN    Moving to and from a bed to a chair (including a wheelchair)?  --  3  -GIAN  3  -GIAN    Standing up from a chair using your arms (e.g., wheelchair, bedside chair)?  --  3  -GIAN  3  -GIAN    Climbing 3-5 steps with a railing?  --  3  -GIAN  2  -GIAN    To walk in hospital room?  --  3  -GIAN  3  -GIAN    AM-PAC 6 Clicks Score (PT)  --  20  -GIAN  19  -GIAN       How much help from another is currently needed...    Putting on and taking off regular lower body clothing?  3  -CS  --  --    Bathing (including washing, rinsing, and drying)  2  -CS  --  --    Toileting (which includes using toilet bed pan or urinal)  3  -CS  --  --    Putting on and taking off regular upper body clothing  3  -CS  --  --    Taking care of personal grooming (such as brushing teeth)  3  -CS  --  --    Eating meals  1  -CS  --  --    AM-PAC 6 Clicks Score (OT)  15  -CS  --  --       Functional Assessment    Outcome Measure Options  --  AM-PAC 6 Clicks Basic Mobility (PT)  -GIAN  AM-PAC 6 Clicks Basic Mobility (PT)  -      User Key  (r) = Recorded By, (t) = Taken By, (c) = Cosigned By    Initials Name Provider Type    GIAN Abran Wan PTA Physical Therapy Assistant     Brenda Thomas COTA/LIANE Occupational Therapy Assistant           Time Calculation:   Time Calculation- OT     Row Name 10/23/19 1603             Time Calculation- OT    OT Start Time  1334  -CS      OT Stop Time  1400  -      OT Time Calculation (min)  26 min  -      Total Timed Code Minutes- OT  26 minute(s)  -      OT Received On  10/23/19  -        User Key  (r) = Recorded By, (t) = Taken By, (c) = Cosigned By    Initials Name Provider Type     Brenda Thomas COTA/LIANE Occupational Therapy Assistant        Therapy Charges for Today     Code Description Service Date Service Provider Modifiers Qty    [id number]  OT THER PROC EA 15 MIN 10/23/2019 Brenda Thomas COTA/L GO 2               ROSEANNA Barton  10/23/2019

## 2019-10-23 NOTE — PLAN OF CARE
Problem: Patient Care Overview  Goal: Plan of Care Review  Outcome: Ongoing (interventions implemented as appropriate)   10/23/19 1602   Coping/Psychosocial   Plan of Care Reviewed With patient   Plan of Care Review   Progress improving   OTHER   Outcome Summary Pt tolerated tx well this date. Pt was sitting up in chair upon arrival. Pt gave good effort with UE ther ex. Continue OT POC.

## 2019-10-24 LAB
GLUCOSE BLDC GLUCOMTR-MCNC: 131 MG/DL (ref 70–130)
GLUCOSE BLDC GLUCOMTR-MCNC: 154 MG/DL (ref 70–130)
GLUCOSE BLDC GLUCOMTR-MCNC: 92 MG/DL (ref 70–130)
GLUCOSE BLDC GLUCOMTR-MCNC: 95 MG/DL (ref 70–130)

## 2019-10-24 PROCEDURE — G0008 ADMIN INFLUENZA VIRUS VAC: HCPCS | Performed by: SURGERY

## 2019-10-24 PROCEDURE — 25010000002 ENOXAPARIN PER 10 MG: Performed by: SURGERY

## 2019-10-24 PROCEDURE — 94799 UNLISTED PULMONARY SVC/PX: CPT

## 2019-10-24 PROCEDURE — 97535 SELF CARE MNGMENT TRAINING: CPT

## 2019-10-24 PROCEDURE — 94760 N-INVAS EAR/PLS OXIMETRY 1: CPT

## 2019-10-24 PROCEDURE — 25010000002 INFLUENZA VAC SUBUNIT QUAD 0.5 ML SUSPENSION PREFILLED SYRINGE: Performed by: SURGERY

## 2019-10-24 PROCEDURE — 77014 CHG CT GUIDANCE RADIATION THERAPY FLDS PLACEMENT: CPT | Performed by: RADIOLOGY

## 2019-10-24 PROCEDURE — 82962 GLUCOSE BLOOD TEST: CPT

## 2019-10-24 PROCEDURE — 77386: CPT | Performed by: RADIOLOGY

## 2019-10-24 PROCEDURE — 90674 CCIIV4 VAC NO PRSV 0.5 ML IM: CPT | Performed by: SURGERY

## 2019-10-24 RX ORDER — NALOXONE HCL 0.4 MG/ML
0.1 VIAL (ML) INJECTION
Start: 2019-10-24 | End: 2020-01-28

## 2019-10-24 RX ORDER — SODIUM CHLORIDE 0.9 % (FLUSH) 0.9 %
20 SYRINGE (ML) INJECTION AS NEEDED
Status: DISCONTINUED | OUTPATIENT
Start: 2019-10-24 | End: 2019-10-25 | Stop reason: HOSPADM

## 2019-10-24 RX ORDER — SODIUM CHLORIDE 0.9 % (FLUSH) 0.9 %
10 SYRINGE (ML) INJECTION EVERY 12 HOURS SCHEDULED
Status: DISCONTINUED | OUTPATIENT
Start: 2019-10-24 | End: 2019-10-25 | Stop reason: HOSPADM

## 2019-10-24 RX ORDER — ALBUTEROL SULFATE 2.5 MG/3ML
2.5 SOLUTION RESPIRATORY (INHALATION) EVERY 6 HOURS PRN
Qty: 3 ML | Refills: 12 | Status: SHIPPED | OUTPATIENT
Start: 2019-10-24

## 2019-10-24 RX ORDER — SODIUM CHLORIDE 0.9 % (FLUSH) 0.9 %
10 SYRINGE (ML) INJECTION AS NEEDED
Status: DISCONTINUED | OUTPATIENT
Start: 2019-10-24 | End: 2019-10-25 | Stop reason: HOSPADM

## 2019-10-24 RX ORDER — FAMOTIDINE 40 MG/5ML
20 POWDER, FOR SUSPENSION ORAL EVERY 12 HOURS
Qty: 50 ML | Refills: 1 | Status: SHIPPED | OUTPATIENT
Start: 2019-10-24 | End: 2020-01-28

## 2019-10-24 RX ADMIN — POTASSIUM CHLORIDE, DEXTROSE MONOHYDRATE AND SODIUM CHLORIDE 50 ML/HR: 150; 5; 450 INJECTION, SOLUTION INTRAVENOUS at 00:03

## 2019-10-24 RX ADMIN — SILVER SULFADIAZINE: 10 CREAM TOPICAL at 08:52

## 2019-10-24 RX ADMIN — ENOXAPARIN SODIUM 40 MG: 40 INJECTION SUBCUTANEOUS at 08:52

## 2019-10-24 RX ADMIN — ALBUTEROL SULFATE 2.5 MG: 2.5 SOLUTION RESPIRATORY (INHALATION) at 13:30

## 2019-10-24 RX ADMIN — SODIUM CHLORIDE, PRESERVATIVE FREE 500 UNITS: 5 INJECTION INTRAVENOUS at 16:09

## 2019-10-24 RX ADMIN — HYDROCODONE BITARTRATE AND ACETAMINOPHEN 15 ML: 7.5; 325 SOLUTION ORAL at 09:31

## 2019-10-24 RX ADMIN — HYDROCODONE BITARTRATE AND ACETAMINOPHEN 15 ML: 7.5; 325 SOLUTION ORAL at 22:34

## 2019-10-24 RX ADMIN — ALBUTEROL SULFATE 2.5 MG: 2.5 SOLUTION RESPIRATORY (INHALATION) at 06:24

## 2019-10-24 RX ADMIN — HYDROCODONE BITARTRATE AND ACETAMINOPHEN 15 ML: 7.5; 325 SOLUTION ORAL at 17:07

## 2019-10-24 RX ADMIN — HYDROCODONE BITARTRATE AND ACETAMINOPHEN 15 ML: 7.5; 325 SOLUTION ORAL at 04:59

## 2019-10-24 RX ADMIN — INFLUENZA A VIRUS A/IDAHO/07/2018 (H1N1) ANTIGEN (MDCK CELL DERIVED, PROPIOLACTONE INACTIVATED, INFLUENZA A VIRUS A/INDIANA/08/2018 (H3N2) ANTIGEN (MDCK CELL DERIVED, PROPIOLACTONE INACTIVATED), INFLUENZA B VIRUS B/SINGAPORE/INFTT-16-0610/2016 ANTIGEN (MDCK CELL DERIVED, PROPIOLACTONE INACTIVATED), INFLUENZA B VIRUS B/IOWA/06/2017 ANTIGEN (MDCK CELL DERIVED, PROPIOLACTONE INACTIVATED) 0.5 ML: 15; 15; 15; 15 INJECTION, SUSPENSION INTRAMUSCULAR at 16:09

## 2019-10-24 RX ADMIN — FAMOTIDINE 20 MG: 40 POWDER, FOR SUSPENSION ORAL at 13:26

## 2019-10-24 RX ADMIN — ALBUTEROL SULFATE 2.5 MG: 2.5 SOLUTION RESPIRATORY (INHALATION) at 01:17

## 2019-10-24 RX ADMIN — SODIUM CHLORIDE, PRESERVATIVE FREE 10 ML: 5 INJECTION INTRAVENOUS at 08:52

## 2019-10-24 NOTE — PLAN OF CARE
Problem: Patient Care Overview  Goal: Plan of Care Review  Outcome: Ongoing (interventions implemented as appropriate)   10/24/19 0319   Coping/Psychosocial   Plan of Care Reviewed With patient   Plan of Care Review   Progress improving   OTHER   Outcome Summary VSS, pain med given, he is currently sleeping well. No further complaints at this time, may go home in am. Will cont to monitor.       Problem: Fall Risk (Adult)  Goal: Absence of Fall  Outcome: Ongoing (interventions implemented as appropriate)      Problem: Skin Injury Risk (Adult)  Goal: Skin Health and Integrity  Outcome: Ongoing (interventions implemented as appropriate)      Problem: Pain, Chronic (Adult)  Goal: Acceptable Pain/Comfort Level and Functional Ability  Outcome: Ongoing (interventions implemented as appropriate)      Problem: Wound (Includes Pressure Injury) (Adult)  Goal: Signs and Symptoms of Listed Potential Problems Will be Absent, Minimized or Managed (Wound)  Outcome: Ongoing (interventions implemented as appropriate)      Problem: Airway, Artificial (Adult)  Goal: Signs and Symptoms of Listed Potential Problems Will be Absent, Minimized or Managed (Airway, Artificial)  Outcome: Ongoing (interventions implemented as appropriate)

## 2019-10-24 NOTE — SIGNIFICANT NOTE
10/24/19 1533   Rehab Treatment   Discipline physical therapy assistant   Reason Treatment Not Performed unavailable for treatment  (Pt off floor for testing. will check back tomorrow.)
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
## 2019-10-24 NOTE — THERAPY TREATMENT NOTE
Acute Care - Occupational Therapy Treatment Note  [facility]     Patient Name: Emerson Bang  : 1960  MRN: [id number]  Today's Date: 10/24/2019  Onset of Illness/Injury or Date of Surgery: 19  Date of Referral to OT: 10/02/19  Referring Physician: Dr. Kc    Admit Date: 2019       ICD-10-CM ICD-9-CM   1. 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 Impaired physical mobility Z74.09 781.99   3. Impaired mobility and activities of daily living Z74.09 799.89     Patient Active Problem List   Diagnosis   • Hyperkalemia   • Iron deficiency anemia   • Gastroesophageal reflux disease with esophagitis   • Elevated AST (SGOT)   • Alcohol abuse   • Hypothyroidism   • Balance problem   • Need for vaccination   • Low magnesium levels   • Squamous cell carcinoma of pharynx (CMS/HCC)   • History of throat cancer   • Vitamin D deficiency   • Alcohol abuse counseling and surveillance   • Encounter for screening for diabetes mellitus   • Hypomagnesemia   • Iron deficiency anemia   • Hyperlipidemia   • Underweight due to inadequate caloric intake   • Need for immunization against influenza   • 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HCC)   • Hypertension   • General medical examination   • Underweight   • Epigastric pain   • Gastritis without bleeding   • Need for influenza vaccination   • Elevated liver function tests   • B12 deficiency   • Intestinal malabsorption   • Throat pain   • Acute pharyngitis   • Upper respiratory tract infection   • Neoplasm of uncertain behavior of larynx   • Pharyngoesophageal dysphagia   • Severe protein-calorie malnutrition (CMS/HCC)   • Anemia of chronic disease   • Hypotension   • Hyponatremia   • Status post insertion of percutaneous endoscopic gastrostomy (PEG) tube (CMS/HCC)   • Hx of tracheostomy   • History of throat surgery   • Hx SBO   • Urinary retention   • Generalized weakness   • Acute otitis externa of right ear   • Hard stool   • Panlobular emphysema  (CMS/HCC)   • Cancer of hypopharynx (CMS/HCC)   • Diarrhea   • Encounter for venous access device care   • Prediabetes   • Status post neck dissection   • S/P partial thyroidectomy   • S/P laryngectomy   • Annual physical exam   • Alcoholic cirrhosis of liver without ascites (CMS/HCC)     Past Medical History:   Diagnosis Date   • Allergic rhinitis     vs URI   • Anemia    • At risk for falls    • Benign prostatic hyperplasia    • Chronic gastritis    • Cirrhosis of liver (CMS/HCC)    • Complication of gastrostomy (CMS/HCC)     site not healing   • COPD (chronic obstructive pulmonary disease) (CMS/HCC)    • Dysphagia     odynophagia   • Epigastric pain    • Esophagitis    • GERD (gastroesophageal reflux disease)    • Hypertension    • Hypothyroidism, unspecified    • Low back pain    • Malaise and fatigue    • Nasal congestion 11/15/2018   • Nausea with vomiting, unspecified    • Pain in left knee    • Pain in right knee    • Primary malignant neoplasm of pharynx (CMS/HCC)    • Screening for malignant neoplasm of colon    • 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 Left upper quadrant abdominal wall abscess debridement. Gastrostomy tube erosion through & through the anterior gastric wall.Lupper quadrant abdominal wall abscess   • COLONOSCOPY  04/21/2014    Internal & external hemorrhoids found.   • COLONOSCOPY  2014    Home   • COLONOSCOPY N/A 10/16/2018    Procedure: COLONOSCOPY;  Surgeon: Kaushal Chester MD;  Location: [facility] ENDOSCOPY;  Service: Gastroenterology   • DIAGNOSTIC LAPAROSCOPY EXPLORATORY LAPAROTOMY N/A 7/17/2019    Procedure: DIAGNOSTIC LAPAROSCOPY, EXPLORATORY LAPAROTOMY, LYSIS OF ADHESIONS;  Surgeon: Parminder Kc MD;   Location: [facility] OR;  Service: General   • DIRECT LARYNGOSCOPY, ESOPHAGOSCOPY, BRONCHOSCOPY N/A 4/15/2019    Procedure: DIRECT LARYNGOSCOPY with biopsy, ESOPHAGOSCOPY;  Surgeon: Bharathi Washington MD;  Location: [facility] OR;  Service: ENT   • ENDOSCOPY N/A 10/16/2018    Procedure: ESOPHAGOGASTRODUODENOSCOPY;  Surgeon: Kaushal Chester MD;  Location: [facility] ENDOSCOPY;  Service: Gastroenterology   • GASTROCUTANEOUS FISTULA CLOSURE N/A 10/2/2019    Procedure: GASTROCUTANEOUS FISTULA CLOSURE;  Surgeon: Parminder Kc MD;  Location: [facility] OR;  Service: General   • GASTROSTOMY FEEDING TUBE INSERTION N/A 4/15/2019    Procedure: GASTROSTOMY FEEDING TUBE INSERTION;  Surgeon: Trenton Valera MD;  Location: [facility] OR;  Service: General   • JEJUNOSTOMY N/A 10/2/2019    Procedure: JEJUNOSTOMY;  Surgeon: Parminder Kc MD;  Location: [facility];  Service: General   • TRACHEOSTOMY  11/10/2008    Respiratory failure   • UPPER GASTROINTESTINAL ENDOSCOPY  04/21/2014    Esophagitis seen. Biopsy taken. Gastritis in stomach. Biopsy taken. Normal duodenum. Biopsy taken.   • UPPER GASTROINTESTINAL ENDOSCOPY  10/16/2018   • VENOUS ACCESS DEVICE (PORT) INSERTION N/A 9/11/2019    Procedure: INSERTION VENOUS ACCESS DEVICE (MEDIPORT)        (c-arm#1);  Surgeon: Parminder Kc MD;  Location: [facility];  Service: General       Therapy Treatment    Rehabilitation Treatment Summary     Row Name 10/24/19 0852             Treatment Time/Intention    Discipline  occupational therapy assistant  -BB      Document Type  therapy note (daily note)  -BB      Subjective Information  complains of;pain  -BB      Mode of Treatment  individual therapy;occupational therapy  -BB      Patient/Family Observations  no visitors present  -BB      Total Minutes, Occupational Therapy Treatment  38  -BB      Therapy Frequency (OT Eval)  other (see comments) 5-7 days/wk  -BB      Patient Effort  good  -BB      Existing Precautions/Restrictions  fall;other (see  comments) watch gait belt placement 2/p abdominal sx  -BB      Recorded by [BB] Kerrie Woodson MADSEN/L 10/24/19 1142      Row Name 10/24/19 0852             Vital Signs    Pre Systolic BP Rehab  112  -BB      Pre Treatment Diastolic BP  63  -BB      Pretreatment Heart Rate (beats/min)  83  -BB      Posttreatment Heart Rate (beats/min)  80  -BB      Pre SpO2 (%)  98  -BB      O2 Delivery Pre Treatment  trach collar  -BB      Post SpO2 (%)  98  -BB      O2 Delivery Post Treatment  trach collar  -BB      Pre Patient Position  Supine  -BB      Post Patient Position  Sitting  -BB      Recorded by [BB] Kerrie Woodson COTA/L 10/24/19 1142      Row Name 10/24/19 0852             Cognitive Assessment/Intervention- PT/OT    Affect/Mental Status (Cognitive)  WFL  -BB      Behavioral Issues (Cognitive)  unable/difficult to assess  -BB      Orientation Status (Cognition)  oriented x 4  -BB      Follows Commands (Cognition)  WFL  -BB      Recorded by [BB] Kerrie Woodson MADSEN/L 10/24/19 1142      Row Name 10/24/19 0852             Bed Mobility Assessment/Treatment    Supine-Sit [place] (Bed Mobility)  conditional independence  -BB      Sit-Supine [place] (Bed Mobility)  conditional independence  -BB      Assistive Device (Bed Mobility)  bed rails;head of bed elevated  -BB      Recorded by [BB] Kerrie Woodson COTA/L 10/24/19 1142      Row Name 10/24/19 0852             Sit-Stand Transfer    Sit-Stand [place] (Transfers)  supervision  -BB      Assistive Device (Sit-Stand Transfers)  walker, front-wheeled  -BB      Recorded by [BB] Kerrie Woodson COTA/L 10/24/19 1142      Row Name 10/24/19 0852             Stand-Sit Transfer    Stand-Sit [place] (Transfers)  supervision  -BB      Assistive Device (Stand-Sit Transfers)  walker front-wheeled  -BB      Recorded by [BB] Kerrie Woodson COTA/L 10/24/19 1142      Row Name 10/24/19 0852             Bathing Assessment/Intervention     Bathing [place] Level  upper body;set up;conditional independence;lower body;minimum assist (75% patient effort)  -BB      Bathing Position  long sitting;edge of bed sitting  -BB      Recorded by [BB] Kerrie Woodson COTA/L 10/24/19 1142      Row Name 10/24/19 0852             Upper Body Dressing Assessment/Training    Upper Body Dressing [place] Level  doff;don;contact guard assist;verbal cues  -BB      Upper Body Dressing Position  edge of bed sitting  -BB      Recorded by [BB] Kerrie Woodson COTA/L 10/24/19 1142      Row Name 10/24/19 0852             Lower Body Dressing Assessment/Training    Lower Body Dressing [place] Level  doff;don;socks;maximum assist (25% patient effort)  -BB      Lower Body Dressing Position  long sitting  -BB      Recorded by [LISA] Kerrie Woodson COTA/L 10/24/19 1142      Row Name 10/24/19 0852             Grooming Assessment/Training    [place] Level (Grooming)  grooming skills;hair care, combing/brushing;wash face, hands;conditional independence  -BB      Grooming Position  long sitting  -BB      Recorded by [LISA] Kerrie Woodson COTA/L 10/24/19 1142      Row Name 10/24/19 0852             Positioning and Restraints    Pre-Treatment Position  in bed  -BB      Post Treatment Position  bed  -BB      In Bed  fowlers;call light within reach;encouraged to call for assist;exit alarm on  -BB      Recorded by [LISA] Kerrie Woodson COTA/L 10/24/19 1142      Row Name 10/24/19 0852             Pain Assessment    Additional Documentation  Pain Scale: Numbers Pre/Post-Treatment (Group)  -BB      Recorded by [LISA] Kerrie Woodson COTA/L 10/24/19 1142      Row Name 10/24/19 0852             Pain Scale: Numbers Pre/Post-Treatment    Pain Scale: Numbers, Pretreatment  8/10  -BB      Pain Scale: Numbers, Post-Treatment  8/10  -BB      Pain Location  abdomen  -BB      Pain Intervention(s)  Repositioned  -BB      Recorded by [LISA] Kerrie Woodson COTA/LIANE  10/24/19 1142      Row Name                Wound 09/30/19 1330 Left medial abdomen Fistula    Wound - Properties Group Date first assessed: 09/30/19 [SS] Time first assessed: 1330 [SS] Side: Left [SS] Orientation: medial [SS] Location: abdomen [SS] Primary Wound Type: Fistula [SS] Recorded by:  [SS] Citlalli Abdi RN 09/30/19 1619    Row Name                Wound 10/02/19 1347 abdomen Incision    Wound - Properties Group Date first assessed: 10/02/19 [CF] Time first assessed: 1347 [CF] Present on Hospital Admission: N [CF] Location: abdomen [CF] Primary Wound Type: Incision [CF] Recorded by:  [CF] Eileen Tyson 10/02/19 1347    Row Name 10/24/19 0852             Plan of Care Review    Plan of Care Reviewed With  patient  -BB      Recorded by [BB] Kerrie Woodson COTA/L 10/24/19 1142      Row Name 10/24/19 0852             Outcome Summary/Treatment Plan (OT)    Daily Summary of Progress (OT)  progress toward functional goals is good  -BB      Plan for Continued Treatment (OT)  continue POC  -BB      Anticipated Discharge Disposition (OT)  anticipate therapy at next level of care  -BB      Recorded by [BB] Kerrie Woodson COTA/L 10/24/19 1142        User Key  (r) = Recorded By, (t) = Taken By, (c) = Cosigned By    Initials Name Effective Dates Discipline    SS Citlalli Abdi RN 07/10/18 -  Nurse    BB Kerrie Woodson COTA/L 03/07/18 -  OT    CF Eileen Tyson - -        Wound 09/30/19 1330 Left medial abdomen Fistula (Active)   Dressing Appearance dry;intact 10/24/2019  8:30 AM   Closure None 10/24/2019  8:30 AM   Base granulating 10/24/2019  8:30 AM   Periwound redness 10/24/2019  8:30 AM   Periwound Temperature warm 10/24/2019  8:30 AM   Periwound Skin Turgor soft 10/24/2019  8:30 AM   Edges open 10/24/2019  8:30 AM   Drainage Characteristics/Odor purulent 10/24/2019  8:30 AM   Drainage Amount small 10/24/2019  8:30 AM       Wound 10/02/19 1347 abdomen Incision (Active)   Dressing  Appearance dry;intact 10/24/2019  8:30 AM   Periwound dry 10/24/2019  8:30 AM   Periwound Temperature warm 10/24/2019  8:30 AM   Periwound Skin Turgor soft 10/24/2019  8:30 AM   Drainage Characteristics/Odor serosanguineous 10/24/2019  8:30 AM   Drainage Amount scant 10/24/2019  8:30 AM   Care, Wound cleansed with;sterile water 10/23/2019  7:58 PM   Dressing Care, Wound dressing applied;dressing changed 10/23/2019  7:58 PM     Rehab Goal Summary     Row Name 10/24/19 0852             Occupational Therapy Goals    Transfer Goal Selection (OT)  transfer, OT goal 1  -BB      Bathing Goal Selection (OT)  bathing, OT goal 1  -BB      Dressing Goal Selection (OT)  dressing, OT goal 1  -BB      Toileting Goal Selection (OT)  toileting, OT goal 1  -BB      Activity Tolerance Goal Selection (OT)  activity tolerance, OT goal 1  -BB         Transfer Goal 1 (OT)    Activity/Assistive Device (Transfer Goal 1, OT)  sit-to-stand/stand-to-sit;bed-to-chair/chair-to-bed;toilet  -BB      [place] Level/Cues Needed (Transfer Goal 1, OT)  supervision required  -BB      Time Frame (Transfer Goal 1, OT)  long term goal (LTG);by discharge  -BB      Progress/Outcome (Transfer Goal 1, OT)  goal not met  -BB         Bathing Goal 1 (OT)    Activity/Assistive Device (Bathing Goal 1, OT)  bathing skills, all  -BB      [place] Level/Cues Needed (Bathing Goal 1, OT)  minimum assist (75% or more patient effort)  -BB      Time Frame (Bathing Goal 1, OT)  long term goal (LTG);by discharge  -BB      Barriers (Bathing Goal 1, OT)  Pt will need extra time and rest breaks PRN  -BB      Progress/Outcomes (Bathing Goal 1, OT)  goal not met  -BB         Dressing Goal 1 (OT)    Activity/Assistive Device (Dressing Goal 1, OT)  dressing skills, all  -BB      [place]/Cues Needed (Dressing Goal 1, OT)  minimum assist (75% or more patient effort)  -BB      Time Frame (Dressing Goal 1, OT)  long term goal (LTG);by discharge  -BB      Barriers  (Dressing Goal 1, OT)  Pt will need extra time and rest breaks PRN  -BB      Progress/Outcome (Dressing Goal 1, OT)  goal not met  -BB         Toileting Goal 1 (OT)    Activity/Device (Toileting Goal 1, OT)  toileting skills, all  -BB      [place] Level/Cues Needed (Toileting Goal 1, OT)  minimum assist (75% or more patient effort)  -BB      Time Frame (Toileting Goal 1, OT)  long term goal (LTG);by discharge  -BB      Barriers (Toileting Goal 1, OT)  Pt will need extra time and rest breaks PRN  -BB      Progress/Outcome (Toileting Goal 1, OT)  goal partially met  -BB          Activity Tolerance Goal 1 (OT)    Activity Level (Endurance Goal 1, OT)  15 min activity functional/therapeutic activity  -BB      Time Frame (Activity Tolerance Goal 1, OT)  long term goal (LTG);by discharge  -BB      Progress/Outcome (Activity Tolerance Goal 1, OT)  goal not met  -BB        User Key  (r) = Recorded By, (t) = Taken By, (c) = Cosigned By    Initials Name Provider Type Discipline    Kerrie Spencer COTA/L Occupational Therapy Assistant OT        Occupational Therapy Education     Title: PT OT SLP Therapies (In Progress)     Topic: Occupational Therapy (In Progress)     Point: ADL training (Done)     Description: Instruct learner(s) on proper safety adaptation and remediation techniques during self care or transfers.   Instruct in proper use of assistive devices.    Learning Progress Summary           Patient Acceptance, E, VU by BB at 10/24/2019 11:44 AM    Acceptance, E,TB,D, NR by CS at 10/23/2019  4:02 PM    Acceptance, E,TB,D, NR by CS at 10/18/2019  2:04 PM    Acceptance, E,TB,D, NR by CS at 10/16/2019  3:43 PM    Acceptance, E,TB,D, NR by CS at 10/15/2019  1:42 PM    Acceptance, E,TB,D, NR by CS at 10/14/2019  1:57 PM    Acceptance, E, NR by BB at 10/9/2019  1:37 PM    Acceptance, E,TB,D, NR by CS at 10/6/2019  1:05 PM    Acceptance, E,TB,D, NR by CS at 10/5/2019 11:11 AM    Acceptance, E, NR by AS at  10/4/2019 11:45 AM    Comment:  Role of OT, OT POC, importance of OOB activity for strength and healing, ADL training                   Point: Home exercise program (In Progress)     Description: Instruct learner(s) on appropriate technique for monitoring, assisting and/or progressing therapeutic exercises/activities.    Learning Progress Summary           Patient Acceptance, E,TB,D, NR by CS at 10/23/2019  4:02 PM    Acceptance, E,TB,D, NR by CS at 10/18/2019  2:04 PM    Acceptance, E,TB,D, NR by CS at 10/16/2019  3:43 PM    Acceptance, E,TB,D, NR by CS at 10/15/2019  1:42 PM    Acceptance, E,TB,D, NR by CS at 10/14/2019  1:57 PM    Acceptance, E, NR by BB at 10/8/2019 11:46 AM    Acceptance, E,TB,D, NR by CS at 10/6/2019  1:05 PM    Acceptance, E,TB,D, NR by CS at 10/5/2019 11:11 AM                   Point: Precautions (In Progress)     Description: Instruct learner(s) on prescribed precautions during self-care and functional transfers.    Learning Progress Summary           Patient Acceptance, E,TB,D, NR by CS at 10/23/2019  4:02 PM    Acceptance, E, NR by AS at 10/18/2019  5:56 PM    Comment:  OT POC, progress towards goals, tx plan modifications    Acceptance, E,TB,D, NR by CS at 10/18/2019  2:04 PM    Acceptance, E,TB,D, NR by CS at 10/16/2019  3:43 PM    Acceptance, E,TB,D, NR by CS at 10/15/2019  1:42 PM    Acceptance, E,TB,D, NR by CS at 10/14/2019  1:57 PM    Acceptance, E,TB,D, NR by CS at 10/6/2019  1:05 PM    Acceptance, E,TB,D, NR by CS at 10/5/2019 11:11 AM    Acceptance, E, NR by AS at 10/4/2019 11:45 AM    Comment:  Role of OT, OT POC, importance of OOB activity for strength and healing, ADL training                   Point: Body mechanics (In Progress)     Description: Instruct learner(s) on proper positioning and spine alignment during self-care, functional mobility activities and/or exercises.    Learning Progress Summary           Patient Acceptance, E,TB,D, NR by CS at 10/23/2019  4:02 PM     Acceptance, E,TB,D, NR by CS at 10/18/2019  2:04 PM    Acceptance, E,TB,D, NR by CS at 10/16/2019  3:43 PM    Acceptance, E,TB,D, NR by CS at 10/15/2019  1:42 PM    Acceptance, E,TB,D, NR by CS at 10/14/2019  1:57 PM    Acceptance, E,TB,D, NR by CS at 10/6/2019  1:05 PM    Acceptance, E,TB,D, NR by CS at 10/5/2019 11:11 AM                               User Key     Initials Effective Dates Name Provider Type Discipline    BB 03/07/18 -  Kerrie Woodson COTA/L Occupational Therapy Assistant OT    CS 03/07/18 -  Brenda Thomas COTA/LIANE Occupational Therapy Assistant OT    AS 03/25/19 -  Destinee Yarbrough, OT Occupational Therapist OT              Non-skid socks and gait belt in place. Toileting offered. Call light and needs within reach. Pt advised to not get up alone and call the nurse for assistance.  Bed alarm on.     OT Recommendation and Plan  Outcome Summary/Treatment Plan (OT)  Daily Summary of Progress (OT): progress toward functional goals is good  Plan for Continued Treatment (OT): continue POC  Anticipated Discharge Disposition (OT): anticipate therapy at next level of care  Therapy Frequency (OT Eval): other (see comments)(5-7 days/wk)  Daily Summary of Progress (OT): progress toward functional goals is good  Plan of Care Review  Plan of Care Reviewed With: patient  Plan of Care Reviewed With: patient  Outcome Summary: Pt only agreed to B UE bed ex. Pt performed grooming tasks with set up. No goals met this tx.  Outcome Measures     Row Name 10/24/19 0852 10/23/19 1600 10/23/19 1115       How much help from another person do you currently need...    Turning from your back to your side while in flat bed without using bedrails?  --  --  4  -GIAN    Moving from lying on back to sitting on the side of a flat bed without bedrails?  --  --  4  -GIAN    Moving to and from a bed to a chair (including a wheelchair)?  --  --  3  -GIAN    Standing up from a chair using your arms (e.g., wheelchair, bedside chair)?  --   --  3  -GIAN    Climbing 3-5 steps with a railing?  --  --  3  -GIAN    To walk in hospital room?  --  --  3  -GIAN    AM-PAC 6 Clicks Score (PT)  --  --  20  -GIAN       How much help from another is currently needed...    Putting on and taking off regular lower body clothing?  3  -BB  3  -CS  --    Bathing (including washing, rinsing, and drying)  2  -BB  2  -CS  --    Toileting (which includes using toilet bed pan or urinal)  3  -BB  3  -CS  --    Putting on and taking off regular upper body clothing  3  -BB  3  -CS  --    Taking care of personal grooming (such as brushing teeth)  3  -BB  3  -CS  --    Eating meals  1  -BB  1  -CS  --    AM-PAC 6 Clicks Score (OT)  15  -BB  15  -CS  --       Functional Assessment    Outcome Measure Options  --  --  AM-PAC 6 Clicks Basic Mobility (PT)  -    Row Name 10/21/19 1437             How much help from another person do you currently need...    Turning from your back to your side while in flat bed without using bedrails?  4  -GIAN      Moving from lying on back to sitting on the side of a flat bed without bedrails?  4  -GIAN      Moving to and from a bed to a chair (including a wheelchair)?  3  -GIAN      Standing up from a chair using your arms (e.g., wheelchair, bedside chair)?  3  -GIAN      Climbing 3-5 steps with a railing?  2  -GIAN      To walk in hospital room?  3  -GIAN      AM-PAC 6 Clicks Score (PT)  19  -         Functional Assessment    Outcome Measure Options  AM-[facility] 6 Clicks Basic Mobility (PT)  -        User Key  (r) = Recorded By, (t) = Taken By, (c) = Cosigned By    Initials Name Provider Type    GIAN Abran Wan, MICHAEL Physical Therapy Assistant    BB Kerrie Woodson, TENA/L Occupational Therapy Assistant    CS Brenda Thomas, TENA/L Occupational Therapy Assistant           Time Calculation:   Time Calculation- OT     Row Name 10/24/19 1144             Time Calculation- OT    OT Start Time  0852  -BB      OT Stop Time  0930  -BB      OT Time Calculation (min)   38 min  -LISA      Total Timed Code Minutes- OT  38 minute(s)  -BB      OT Received On  10/24/19  -BB        User Key  (r) = Recorded By, (t) = Taken By, (c) = Cosigned By    Initials Name Provider Type    Kerrie Spencer COTA/L Occupational Therapy Assistant        Therapy Charges for Today     Code Description Service Date Service Provider Modifiers Qty    12326716584 HC OT SELF CARE/MGMT/TRAIN EA 15 MIN 10/24/2019 Kerrie Woodson COTA/L GO 3               ROSEANNA Merchant  10/24/2019

## 2019-10-24 NOTE — DISCHARGE INSTRUCTIONS
1. Ambulate several times daily with assistance  2. Abdominal dressings: clean abdominal wound with soap and water gently twice daily and cover with a light layer of silvadene cream and a dry pad  3. Tube feeds at 60 cc/hour; flush every 3 hours with 30 cc NS. No tablets through the tube    GIVE ALL MEDICATIONS THROUGH J-TUBE.

## 2019-10-25 ENCOUNTER — DOCUMENTATION (OUTPATIENT)
Dept: ONCOLOGY | Facility: CLINIC | Age: 59
End: 2019-10-25

## 2019-10-25 VITALS
DIASTOLIC BLOOD PRESSURE: 77 MMHG | SYSTOLIC BLOOD PRESSURE: 122 MMHG | TEMPERATURE: 96.8 F | WEIGHT: 118.8 LBS | OXYGEN SATURATION: 94 % | RESPIRATION RATE: 18 BRPM | HEART RATE: 74 BPM | HEIGHT: 65 IN | BODY MASS INDEX: 19.79 KG/M2

## 2019-10-25 LAB
GLUCOSE BLDC GLUCOMTR-MCNC: 86 MG/DL (ref 70–130)
GLUCOSE BLDC GLUCOMTR-MCNC: 87 MG/DL (ref 70–130)
GLUCOSE BLDC GLUCOMTR-MCNC: 99 MG/DL (ref 70–130)

## 2019-10-25 PROCEDURE — 77014 CHG CT GUIDANCE RADIATION THERAPY FLDS PLACEMENT: CPT | Performed by: RADIOLOGY

## 2019-10-25 PROCEDURE — 97110 THERAPEUTIC EXERCISES: CPT

## 2019-10-25 PROCEDURE — 82962 GLUCOSE BLOOD TEST: CPT

## 2019-10-25 PROCEDURE — 77386: CPT | Performed by: RADIOLOGY

## 2019-10-25 RX ADMIN — FAMOTIDINE 20 MG: 40 POWDER, FOR SUSPENSION ORAL at 01:48

## 2019-10-25 RX ADMIN — HYDROCODONE BITARTRATE AND ACETAMINOPHEN 15 ML: 7.5; 325 SOLUTION ORAL at 03:12

## 2019-10-25 NOTE — PROGRESS NOTES
[facility] for radiation treatments scheduled to resume Monday 10-28-19.  No indication of transport. In turn, CHRISTOS spoke with Valentina at [facility] who called to assure the transportation and to change times.  Pt is scheduled for radiation treatments Mon- Thurs at 3:00 pm and Fridays at 12:00 pm.   Valentina called back to verify the transport via PACS is arranged and times corrected. She will also assure pt is delivered to [address], [address]

## 2019-10-25 NOTE — THERAPY TREATMENT NOTE
Acute Care - Occupational Therapy Treatment Note  [facility]     Patient Name: Emerson Bang  : 1960  MRN: [id number]  Today's Date: 10/25/2019  Onset of Illness/Injury or Date of Surgery: 19  Date of Referral to OT: 10/02/19  Referring Physician: Dr. Kc    Admit Date: 2019       ICD-10-CM ICD-9-CM   1. 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 Impaired physical mobility Z74.09 781.99   3. Impaired mobility and activities of daily living Z74.09 799.89     Patient Active Problem List   Diagnosis   • Hyperkalemia   • Iron deficiency anemia   • Gastroesophageal reflux disease with esophagitis   • Elevated AST (SGOT)   • Alcohol abuse   • Hypothyroidism   • Balance problem   • Need for vaccination   • Low magnesium levels   • Squamous cell carcinoma of pharynx (CMS/HCC)   • History of throat cancer   • Vitamin D deficiency   • Alcohol abuse counseling and surveillance   • Encounter for screening for diabetes mellitus   • Hypomagnesemia   • Iron deficiency anemia   • Hyperlipidemia   • Underweight due to inadequate caloric intake   • Need for immunization against influenza   • 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HCC)   • Hypertension   • General medical examination   • Underweight   • Epigastric pain   • Gastritis without bleeding   • Need for influenza vaccination   • Elevated liver function tests   • B12 deficiency   • Intestinal malabsorption   • Throat pain   • Acute pharyngitis   • Upper respiratory tract infection   • Neoplasm of uncertain behavior of larynx   • Pharyngoesophageal dysphagia   • Severe protein-calorie malnutrition (CMS/HCC)   • Anemia of chronic disease   • Hypotension   • Hyponatremia   • Status post insertion of percutaneous endoscopic gastrostomy (PEG) tube (CMS/HCC)   • Hx of tracheostomy   • History of throat surgery   • Hx SBO   • Urinary retention   • Generalized weakness   • Acute otitis externa of right ear   • Hard stool   • Panlobular emphysema  (CMS/HCC)   • Cancer of hypopharynx (CMS/HCC)   • Diarrhea   • Encounter for venous access device care   • Prediabetes   • Status post neck dissection   • S/P partial thyroidectomy   • S/P laryngectomy   • Annual physical exam   • Alcoholic cirrhosis of liver without ascites (CMS/HCC)     Past Medical History:   Diagnosis Date   • Allergic rhinitis     vs URI   • Anemia    • At risk for falls    • Benign prostatic hyperplasia    • Chronic gastritis    • Cirrhosis of liver (CMS/HCC)    • Complication of gastrostomy (CMS/HCC)     site not healing   • COPD (chronic obstructive pulmonary disease) (CMS/HCC)    • Dysphagia     odynophagia   • Epigastric pain    • Esophagitis    • GERD (gastroesophageal reflux disease)    • Hypertension    • Hypothyroidism, unspecified    • Low back pain    • Malaise and fatigue    • Nasal congestion 11/15/2018   • Nausea with vomiting, unspecified    • Pain in left knee    • Pain in right knee    • Primary malignant neoplasm of pharynx (CMS/HCC)    • Screening for malignant neoplasm of colon    • 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 Left upper quadrant abdominal wall abscess debridement. Gastrostomy tube erosion through & through the anterior gastric wall.Lupper quadrant abdominal wall abscess   • COLONOSCOPY  04/21/2014    Internal & external hemorrhoids found.   • COLONOSCOPY  2014    [place]   • COLONOSCOPY N/A 10/16/2018    Procedure: COLONOSCOPY;  Surgeon: Kaushal Chester MD;  Location: [facility] ENDOSCOPY;  Service: Gastroenterology   • DIAGNOSTIC LAPAROSCOPY EXPLORATORY LAPAROTOMY N/A 7/17/2019    Procedure: DIAGNOSTIC LAPAROSCOPY, EXPLORATORY LAPAROTOMY, LYSIS OF ADHESIONS;  Surgeon: Parminder Kc MD;   Location: [facility] OR;  Service: General   • DIRECT LARYNGOSCOPY, ESOPHAGOSCOPY, BRONCHOSCOPY N/A 4/15/2019    Procedure: DIRECT LARYNGOSCOPY with biopsy, ESOPHAGOSCOPY;  Surgeon: Bharathi Washington MD;  Location: [facility] OR;  Service: ENT   • ENDOSCOPY N/A 10/16/2018    Procedure: ESOPHAGOGASTRODUODENOSCOPY;  Surgeon: Kaushal Chester MD;  Location: [facility] ENDOSCOPY;  Service: Gastroenterology   • GASTROCUTANEOUS FISTULA CLOSURE N/A 10/2/2019    Procedure: GASTROCUTANEOUS FISTULA CLOSURE;  Surgeon: Parminder Kc MD;  Location: [facility] OR;  Service: General   • GASTROSTOMY FEEDING TUBE INSERTION N/A 4/15/2019    Procedure: GASTROSTOMY FEEDING TUBE INSERTION;  Surgeon: Trenton Valera MD;  Location: [facility] OR;  Service: General   • JEJUNOSTOMY N/A 10/2/2019    Procedure: JEJUNOSTOMY;  Surgeon: Parminder Kc MD;  Location: [facility];  Service: General   • TRACHEOSTOMY  11/10/2008    Respiratory failure   • UPPER GASTROINTESTINAL ENDOSCOPY  04/21/2014    Esophagitis seen. Biopsy taken. Gastritis in stomach. Biopsy taken. Normal duodenum. Biopsy taken.   • UPPER GASTROINTESTINAL ENDOSCOPY  10/16/2018   • VENOUS ACCESS DEVICE (PORT) INSERTION N/A 9/11/2019    Procedure: INSERTION VENOUS ACCESS DEVICE (MEDIPORT)        (c-arm#1);  Surgeon: Parminder Kc MD;  Location: [facility];  Service: General       Therapy Treatment    Rehabilitation Treatment Summary     Row Name 10/25/19 1048             Treatment Time/Intention    Discipline  occupational therapy assistant  -CS      Document Type  therapy note (daily note)  -CS      Mode of Treatment  occupational therapy  -CS      Therapy Frequency (OT Eval)  other (see comments) 5-7 days/wk  -CS      Patient Effort  good  -CS      Existing Precautions/Restrictions  fall;other (see comments)  watch gait belt placement  -CS      Recorded by [CS] Brenda Thomas COTA/L 10/25/19 1338      Row Name 10/25/19 1040             Vital Signs    Pre Patient Position  Supine   -CS      Post Patient Position  Supine  -CS      Recorded by [CS] Brenda Thomas COTA/L 10/25/19 1338      Row Name 10/25/19 1043             Cognitive Assessment/Intervention- PT/OT    Affect/Mental Status (Cognitive)  WFL  -CS      Behavioral Issues (Cognitive)  unable/difficult to assess  -CS      Orientation Status (Cognition)  oriented x 4  -CS      Follows Commands (Cognition)  WFL  -CS      Recorded by [CS] Brenda Thomas COTA/L 10/25/19 1338      Row Name 10/25/19 1043             Therapeutic Exercise    Upper Extremity (Therapeutic Exercise)  bicep curl, bilateral  -CS      Upper Extremity Range of Motion (Therapeutic Exercise)  shoulder flexion/extension, bilateral;shoulder internal/external rotation, bilateral;elbow flexion/extension, bilateral;forearm supination/pronation, bilateral;wrist flexion/extension, bilateral  -CS      Hand (Therapeutic Exercise)  finger flexion/extension, bilateral  -CS      Weight/Resistance (Therapeutic Exercise)  2 pounds  -CS      Exercise Type (Therapeutic Exercise)  AROM (active range of motion)  -CS      Position (Therapeutic Exercise)  seated  -CS      Sets/Reps (Therapeutic Exercise)  1/20  -CS      Equipment (Therapeutic Exercise)  free weight, barbell  -CS      Expected Outcome (Therapeutic Exercise)  improve functional tolerance, self-care activity;improve performance, BADLs;improve performance, transfer skills;increase active range of motion  -CS      Recorded by [CS] Brenda Thomas COTA/L 10/25/19 1338      Row Name 10/25/19 1043             Positioning and Restraints    Pre-Treatment Position  in bed  -CS      Post Treatment Position  bed  -CS      In Bed  fowlers;call light within reach;encouraged to call for assist;exit alarm on  -CS      Recorded by [CS] Brenda Thomas COTA/L 10/25/19 1338      Row Name 10/25/19 1043             Pain Scale: Numbers Pre/Post-Treatment    Pain Scale: Numbers, Pretreatment  8/10  -CS      Pain Scale: Numbers,  Post-Treatment  8/10  -CS      Pain Location  abdomen  -CS      Pain Intervention(s)  Medication (See MAR)  -CS      Recorded by [CS] Brenda Thomas COTA/L 10/25/19 1338      Row Name                Wound 09/30/19 1330 Left medial abdomen Fistula    Wound - Properties Group Date first assessed: 09/30/19 [SS] Time first assessed: 1330 [SS] Side: Left [SS] Orientation: medial [SS] Location: abdomen [SS] Primary Wound Type: Fistula [SS] Recorded by:  [SS] Citlalli Abdi RN 09/30/19 1619    Row Name                Wound 10/02/19 1347 abdomen Incision    Wound - Properties Group Date first assessed: 10/02/19 [CF] Time first assessed: 1347 [CF] Present on Hospital Admission: N [CF] Location: abdomen [CF] Primary Wound Type: Incision [CF] Recorded by:  [CF] Eileen Tyson 10/02/19 1347    Row Name 10/25/19 1043             Outcome Summary/Treatment Plan (OT)    Daily Summary of Progress (OT)  progress toward functional goals is good  -CS      Plan for Continued Treatment (OT)  cont OT POC  -CS      Anticipated Discharge Disposition (OT)  anticipate therapy at next level of care  -CS      Recorded by [CS] Brenda Thomas COTA/LIANE 10/25/19 1338        User Key  (r) = Recorded By, (t) = Taken By, (c) = Cosigned By    Initials Name Effective Dates Discipline    SS Citlalli Abdi RN 07/10/18 -  Nurse    CS Brenda Thomas MADSEN/L 03/07/18 -  OT    CF Eileen Tyson - -        Wound 09/30/19 1330 Left medial abdomen Fistula (Active)   Dressing Appearance dry;intact 10/24/2019  8:31 PM   Closure None 10/24/2019  8:31 PM   Base granulating 10/24/2019  8:31 PM   Periwound redness 10/24/2019  8:31 PM   Periwound Temperature warm 10/24/2019  8:31 PM   Periwound Skin Turgor soft 10/24/2019  8:31 PM   Edges open;other (see comments) 10/24/2019  8:31 PM   Drainage Characteristics/Odor purulent 10/24/2019  8:31 PM   Drainage Amount small 10/24/2019  8:31 PM       Wound 10/02/19 1347 abdomen Incision (Active)   Dressing  Appearance dry;intact 10/24/2019  8:31 PM   Periwound dry 10/24/2019  8:31 PM   Periwound Temperature warm 10/24/2019  8:31 PM   Periwound Skin Turgor soft 10/24/2019  8:31 PM   Drainage Characteristics/Odor serosanguineous 10/24/2019  8:31 PM   Drainage Amount scant 10/24/2019  8:31 PM     Rehab Goal Summary     Row Name 10/25/19 1043             Occupational Therapy Goals    Transfer Goal Selection (OT)  transfer, OT goal 1  -CS      Bathing Goal Selection (OT)  bathing, OT goal 1  -CS      Dressing Goal Selection (OT)  dressing, OT goal 1  -CS      Toileting Goal Selection (OT)  toileting, OT goal 1  -CS      Activity Tolerance Goal Selection (OT)  activity tolerance, OT goal 1  -CS         Transfer Goal 1 (OT)    Activity/Assistive Device (Transfer Goal 1, OT)  sit-to-stand/stand-to-sit;bed-to-chair/chair-to-bed;toilet  -CS      [place] Level/Cues Needed (Transfer Goal 1, OT)  supervision required  -CS      Time Frame (Transfer Goal 1, OT)  long term goal (LTG);by discharge  -CS      Progress/Outcome (Transfer Goal 1, OT)  goal not met  -CS         Bathing Goal 1 (OT)    Activity/Assistive Device (Bathing Goal 1, OT)  bathing skills, all  -CS      [place] Level/Cues Needed (Bathing Goal 1, OT)  minimum assist (75% or more patient effort)  -CS      Time Frame (Bathing Goal 1, OT)  long term goal (LTG);by discharge  -CS      Barriers (Bathing Goal 1, OT)  Pt will need extra time and rest breaks PRN  -CS      Progress/Outcomes (Bathing Goal 1, OT)  goal not met  -CS         Dressing Goal 1 (OT)    Activity/Assistive Device (Dressing Goal 1, OT)  dressing skills, all  -CS      [place]/Cues Needed (Dressing Goal 1, OT)  minimum assist (75% or more patient effort)  -CS      Time Frame (Dressing Goal 1, OT)  long term goal (LTG);by discharge  -CS      Barriers (Dressing Goal 1, OT)  Pt will need extra time and rest breaks PRN  -CS      Progress/Outcome (Dressing Goal 1, OT)  goal not met  -CS          Toileting Goal 1 (OT)    Activity/Device (Toileting Goal 1, OT)  toileting skills, all  -CS      [place] Level/Cues Needed (Toileting Goal 1, OT)  minimum assist (75% or more patient effort)  -CS      Time Frame (Toileting Goal 1, OT)  long term goal (LTG);by discharge  -CS      Barriers (Toileting Goal 1, OT)  Pt will need extra time and rest breaks PRN  -CS      Progress/Outcome (Toileting Goal 1, OT)  goal partially met  -CS          Activity Tolerance Goal 1 (OT)    Activity Level (Endurance Goal 1, OT)  15 min activity functional/therapeutic activity  -CS      Time Frame (Activity Tolerance Goal 1, OT)  long term goal (LTG);by discharge  -CS      Progress/Outcome (Activity Tolerance Goal 1, OT)  goal not met  -CS        User Key  (r) = Recorded By, (t) = Taken By, (c) = Cosigned By    Initials Name Provider Type Discipline    CS Brenda Thomas COTA/L Occupational Therapy Assistant OT        Occupational Therapy Education     Title: PT OT SLP Therapies (In Progress)     Topic: Occupational Therapy (In Progress)     Point: ADL training (In Progress)     Description: Instruct learner(s) on proper safety adaptation and remediation techniques during self care or transfers.   Instruct in proper use of assistive devices.    Learning Progress Summary           Patient Acceptance, E,TB,D, NR by CS at 10/25/2019  1:40 PM    Acceptance, E, VU by BB at 10/24/2019 11:44 AM    Acceptance, E,TB,D, NR by CS at 10/23/2019  4:02 PM    Acceptance, E,TB,D, NR by CS at 10/18/2019  2:04 PM    Acceptance, E,TB,D, NR by CS at 10/16/2019  3:43 PM    Acceptance, E,TB,D, NR by CS at 10/15/2019  1:42 PM    Acceptance, E,TB,D, NR by CS at 10/14/2019  1:57 PM    Acceptance, E, NR by BB at 10/9/2019  1:37 PM    Acceptance, E,TB,D, NR by CS at 10/6/2019  1:05 PM    Acceptance, E,TB,D, NR by CS at 10/5/2019 11:11 AM    Acceptance, E, NR by AS at 10/4/2019 11:45 AM    Comment:  Role of OT, OT POC, importance of OOB activity for  strength and healing, ADL training                   Point: Home exercise program (In Progress)     Description: Instruct learner(s) on appropriate technique for monitoring, assisting and/or progressing therapeutic exercises/activities.    Learning Progress Summary           Patient Acceptance, E,TB,D, NR by CS at 10/25/2019  1:40 PM    Acceptance, E,TB,D, NR by CS at 10/23/2019  4:02 PM    Acceptance, E,TB,D, NR by CS at 10/18/2019  2:04 PM    Acceptance, E,TB,D, NR by CS at 10/16/2019  3:43 PM    Acceptance, E,TB,D, NR by CS at 10/15/2019  1:42 PM    Acceptance, E,TB,D, NR by CS at 10/14/2019  1:57 PM    Acceptance, E, NR by BB at 10/8/2019 11:46 AM    Acceptance, E,TB,D, NR by CS at 10/6/2019  1:05 PM    Acceptance, E,TB,D, NR by CS at 10/5/2019 11:11 AM                   Point: Precautions (In Progress)     Description: Instruct learner(s) on prescribed precautions during self-care and functional transfers.    Learning Progress Summary           Patient Acceptance, E,TB,D, NR by CS at 10/25/2019  1:40 PM    Acceptance, E,TB,D, NR by CS at 10/23/2019  4:02 PM    Acceptance, E, NR by AS at 10/18/2019  5:56 PM    Comment:  OT POC, progress towards goals, tx plan modifications    Acceptance, E,TB,D, NR by CS at 10/18/2019  2:04 PM    Acceptance, E,TB,D, NR by CS at 10/16/2019  3:43 PM    Acceptance, E,TB,D, NR by CS at 10/15/2019  1:42 PM    Acceptance, E,TB,D, NR by CS at 10/14/2019  1:57 PM    Acceptance, E,TB,D, NR by CS at 10/6/2019  1:05 PM    Acceptance, E,TB,D, NR by CS at 10/5/2019 11:11 AM    Acceptance, E, NR by AS at 10/4/2019 11:45 AM    Comment:  Role of OT, OT POC, importance of OOB activity for strength and healing, ADL training                   Point: Body mechanics (In Progress)     Description: Instruct learner(s) on proper positioning and spine alignment during self-care, functional mobility activities and/or exercises.    Learning Progress Summary           Patient Acceptance, E,TB,D, NR by CS  at 10/25/2019  1:40 PM    Acceptance, E,TB,D, NR by CS at 10/23/2019  4:02 PM    Acceptance, E,TB,D, NR by CS at 10/18/2019  2:04 PM    Acceptance, E,TB,D, NR by CS at 10/16/2019  3:43 PM    Acceptance, E,TB,D, NR by CS at 10/15/2019  1:42 PM    Acceptance, E,TB,D, NR by CS at 10/14/2019  1:57 PM    Acceptance, E,TB,D, NR by CS at 10/6/2019  1:05 PM    Acceptance, E,TB,D, NR by CS at 10/5/2019 11:11 AM                               User Key     Initials Effective Dates Name Provider Type Discipline    BB 03/07/18 -  Kerrie Woodson COTA/L Occupational Therapy Assistant OT    CS 03/07/18 -  Brenda Thomas COTA/LIANE Occupational Therapy Assistant OT    AS 03/25/19 -  Destinee Yarbrough, OT Occupational Therapist OT                OT Recommendation and Plan  Outcome Summary/Treatment Plan (OT)  Daily Summary of Progress (OT): progress toward functional goals is good  Plan for Continued Treatment (OT): cont OT POC  Anticipated Discharge Disposition (OT): anticipate therapy at next level of care  Therapy Frequency (OT Eval): other (see comments)(5-7 days/wk)  Daily Summary of Progress (OT): progress toward functional goals is good  Plan of Care Review  Plan of Care Reviewed With: patient  Plan of Care Reviewed With: patient  Outcome Summary: Pt tolerated tx well this date. Pt was sitting up in chair upon arrival. Pt gave good effort with UE ther ex. Continue OT POC.  Outcome Measures     Row Name 10/25/19 1300 10/24/19 0852 10/23/19 1600       How much help from another is currently needed...    Putting on and taking off regular lower body clothing?  3  -CS  3  -BB  3  -CS    Bathing (including washing, rinsing, and drying)  2  -CS  2  -BB  2  -CS    Toileting (which includes using toilet bed pan or urinal)  3  -CS  3  -BB  3  -CS    Putting on and taking off regular upper body clothing  3  -CS  3  -BB  3  -CS    Taking care of personal grooming (such as brushing teeth)  3  -CS  3  -BB  3  -CS    Eating meals  1  -CS   1  -BB  1  -CS    AM-PAC 6 Clicks Score (OT)  15  -CS  15  -BB  15  -CS    Row Name 10/23/19 1115             How much help from another person do you currently need...    Turning from your back to your side while in flat bed without using bedrails?  4  -GIAN      Moving from lying on back to sitting on the side of a flat bed without bedrails?  4  -GIAN      Moving to and from a bed to a chair (including a wheelchair)?  3  -GIAN      Standing up from a chair using your arms (e.g., wheelchair, bedside chair)?  3  -GIAN      Climbing 3-5 steps with a railing?  3  -GIAN      To walk in hospital room?  3  -GIAN      AM-PAC 6 Clicks Score (PT)  20  -GIAN         Functional Assessment    Outcome Measure Options  AM-PAC 6 Clicks Basic Mobility (PT)  -GIAN        User Key  (r) = Recorded By, (t) = Taken By, (c) = Cosigned By    Initials Name Provider Type    GIAN Abran Wan, MICHAEL Physical Therapy Assistant    BB Kerrie Woodson COTA/L Occupational Therapy Assistant    Brenda Lee COTA/LIANE Occupational Therapy Assistant           Time Calculation:   Time Calculation- OT     Row Name 10/25/19 1340             Time Calculation- OT    OT Start Time  1043  -CS      OT Stop Time  1110  -CS      OT Time Calculation (min)  27 min  -      Total Timed Code Minutes- OT  27 minute(s)  -CS      OT Received On  10/25/19  -        User Key  (r) = Recorded By, (t) = Taken By, (c) = Cosigned By    Initials Name Provider Type     Brenda Thomas COTA/LIANE Occupational Therapy Assistant        Therapy Charges for Today     Code Description Service Date Service Provider Modifiers Qty    [id number]  OT THER PROC EA 15 MIN 10/25/2019 Brenda Thomas COTA/L GO 2               ROSEANNA Barton  10/25/2019

## 2019-10-26 ENCOUNTER — READMISSION MANAGEMENT (OUTPATIENT)
Dept: CALL CENTER | Facility: HOSPITAL | Age: 59
End: 2019-10-26

## 2019-10-26 NOTE — OUTREACH NOTE
Prep Survey      Responses   Facility patient discharged from?  [place]   Is patient eligible?  No   What are the reasons patient is not eligible?  [facility] [[facility]]   Does the patient have one of the following disease processes/diagnoses(primary or secondary)?  General Surgery   Prep survey completed?  Yes          Alejandra Santiago RN

## 2019-10-28 ENCOUNTER — HOSPITAL ENCOUNTER (OUTPATIENT)
Dept: RADIATION ONCOLOGY | Facility: HOSPITAL | Age: 59
Setting detail: RADIATION/ONCOLOGY SERIES
End: 2019-10-28

## 2019-10-28 ENCOUNTER — HOSPITAL ENCOUNTER (OUTPATIENT)
Dept: RADIATION ONCOLOGY | Facility: HOSPITAL | Age: 59
Discharge: HOME | End: 2019-10-28

## 2019-10-28 PROCEDURE — 77386: CPT | Performed by: RADIOLOGY

## 2019-10-28 PROCEDURE — 77014 CHG CT GUIDANCE RADIATION THERAPY FLDS PLACEMENT: CPT | Performed by: RADIOLOGY

## 2019-10-29 ENCOUNTER — HOSPITAL ENCOUNTER (OUTPATIENT)
Dept: RADIATION ONCOLOGY | Facility: HOSPITAL | Age: 59
Discharge: HOME | End: 2019-10-29

## 2019-10-29 PROCEDURE — 77336 RADIATION PHYSICS CONSULT: CPT | Performed by: RADIOLOGY

## 2019-10-29 PROCEDURE — 77014 CHG CT GUIDANCE RADIATION THERAPY FLDS PLACEMENT: CPT | Performed by: RADIOLOGY

## 2019-10-29 PROCEDURE — 77386: CPT | Performed by: RADIOLOGY

## 2019-10-30 ENCOUNTER — RADIATION ONCOLOGY WEEKLY ASSESSMENT (OUTPATIENT)
Dept: RADIATION ONCOLOGY | Facility: HOSPITAL | Age: 59
End: 2019-10-30

## 2019-10-30 ENCOUNTER — HOSPITAL ENCOUNTER (OUTPATIENT)
Dept: RADIATION ONCOLOGY | Facility: HOSPITAL | Age: 59
Discharge: HOME | End: 2019-10-30

## 2019-10-30 VITALS
OXYGEN SATURATION: 95 % | DIASTOLIC BLOOD PRESSURE: 82 MMHG | WEIGHT: 108.4 LBS | BODY MASS INDEX: 18.06 KG/M2 | HEART RATE: 83 BPM | HEIGHT: 65 IN | SYSTOLIC BLOOD PRESSURE: 137 MMHG | RESPIRATION RATE: 20 BRPM | TEMPERATURE: 98.2 F

## 2019-10-30 DIAGNOSIS — C13.9 CANCER OF HYPOPHARYNX (HCC): Primary | ICD-10-CM

## 2019-10-30 PROCEDURE — 77386: CPT | Performed by: RADIOLOGY

## 2019-10-30 PROCEDURE — 77427 RADIATION TX MANAGEMENT X5: CPT | Performed by: RADIOLOGY

## 2019-10-30 PROCEDURE — 77014 CHG CT GUIDANCE RADIATION THERAPY FLDS PLACEMENT: CPT | Performed by: RADIOLOGY

## 2019-10-30 NOTE — PROGRESS NOTES
On Treatment Visit       Patient: Emerson Bang   YOB: 1960   Medical Record Number: [id number]     Date of Visit  October 30, 2019   Primary Diagnosis: Cancer Staging  Cancer of hypopharynx (CMS/HCC)  Staging form: Pharynx - Hypopharynx, AJCC 8th Edition  - Pathologic: Stage IVB (pT4b, pN0, cM0) - Signed by Keaton Alfred MD on 9/6/2019    No primary diagnosis found.     was seen today for an on treatment visit.  He is receiving radiation therapy to the head and neck. He  has received 2880 cGy in 16 fractions out of a planned dose of 5400 cGy in 30 fractions. He is currently receiving concurrent weekly cisplatin per Dr. Prieto. He received his 1st and most recent cycle of chemotherapy 9/25/2019.  Chemotherapy has been held since then.    Today on exam the patient is tolerating radiation therapy well but has recently been hospitalized for enteral feeding tube site infection for which she has a wound dressing in place.  He has no new complaints.                                           Vitals:     Vitals:    10/30/19 1556   BP: 137/82   Pulse: 83   Resp: 20   Temp: 98.2 °F (36.8 °C)   SpO2: 95%       Weight:   Wt Readings from Last 3 Encounters:   10/30/19 49.2 kg (108 lb 6.4 oz)   10/24/19 53.9 kg (118 lb 12.8 oz)   10/15/19 51.9 kg (114 lb 6.7 oz)      Pain:    Pain Score    10/30/19 1556   PainSc:   7   PainLoc: Abdomen         Plan: We plan to continue radiation therapy as prescribed.          Radiation Oncology    Electronically signed by Bharathi Welch MD 10/30/2019  4:10 PM

## 2019-10-31 ENCOUNTER — HOSPITAL ENCOUNTER (OUTPATIENT)
Dept: RADIATION ONCOLOGY | Facility: HOSPITAL | Age: 59
Discharge: HOME | End: 2019-10-31

## 2019-10-31 PROCEDURE — 77386: CPT | Performed by: RADIOLOGY

## 2019-10-31 PROCEDURE — 77014 CHG CT GUIDANCE RADIATION THERAPY FLDS PLACEMENT: CPT | Performed by: RADIOLOGY

## 2019-11-01 ENCOUNTER — APPOINTMENT (OUTPATIENT)
Dept: RADIATION ONCOLOGY | Facility: HOSPITAL | Age: 59
End: 2019-11-01

## 2019-11-01 ENCOUNTER — TELEPHONE (OUTPATIENT)
Dept: ONCOLOGY | Facility: CLINIC | Age: 59
End: 2019-11-01

## 2019-11-01 ENCOUNTER — HOSPITAL ENCOUNTER (OUTPATIENT)
Dept: RADIATION ONCOLOGY | Facility: HOSPITAL | Age: 59
Discharge: HOME | End: 2019-11-01

## 2019-11-01 ENCOUNTER — HOSPITAL ENCOUNTER (OUTPATIENT)
Dept: RADIATION ONCOLOGY | Facility: HOSPITAL | Age: 59
Setting detail: RADIATION/ONCOLOGY SERIES
End: 2019-11-01

## 2019-11-01 PROCEDURE — 77386: CPT | Performed by: RADIOLOGY

## 2019-11-01 PROCEDURE — 77014 CHG CT GUIDANCE RADIATION THERAPY FLDS PLACEMENT: CPT | Performed by: RADIOLOGY

## 2019-11-01 NOTE — TELEPHONE ENCOUNTER
CHRISTOS López contacted PACS to change time on two upcoming radiation apts. Spoke with Madina at [facility] PACS, [place].  Confirmed change to :  11-6-19 to 10:15; 11-12-19 at 10;15.  All other trips remain the same.   britni

## 2019-11-04 ENCOUNTER — HOSPITAL ENCOUNTER (OUTPATIENT)
Dept: RADIATION ONCOLOGY | Facility: HOSPITAL | Age: 59
Discharge: HOME | End: 2019-11-04

## 2019-11-04 PROCEDURE — 77386: CPT | Performed by: RADIOLOGY

## 2019-11-04 PROCEDURE — 77014 CHG CT GUIDANCE RADIATION THERAPY FLDS PLACEMENT: CPT | Performed by: RADIOLOGY

## 2019-11-05 ENCOUNTER — HOSPITAL ENCOUNTER (OUTPATIENT)
Dept: RADIATION ONCOLOGY | Facility: HOSPITAL | Age: 59
Discharge: HOME | End: 2019-11-05

## 2019-11-05 ENCOUNTER — RADIATION ONCOLOGY WEEKLY ASSESSMENT (OUTPATIENT)
Dept: RADIATION ONCOLOGY | Facility: HOSPITAL | Age: 59
End: 2019-11-05

## 2019-11-05 ENCOUNTER — DOCUMENTATION (OUTPATIENT)
Dept: NUTRITION | Facility: HOSPITAL | Age: 59
End: 2019-11-05

## 2019-11-05 VITALS
BODY MASS INDEX: 17.34 KG/M2 | TEMPERATURE: 98.1 F | RESPIRATION RATE: 18 BRPM | WEIGHT: 104.2 LBS | HEART RATE: 78 BPM | OXYGEN SATURATION: 99 % | DIASTOLIC BLOOD PRESSURE: 81 MMHG | SYSTOLIC BLOOD PRESSURE: 145 MMHG

## 2019-11-05 DIAGNOSIS — C13.9 CANCER OF HYPOPHARYNX (HCC): Primary | ICD-10-CM

## 2019-11-05 PROCEDURE — 77386: CPT | Performed by: RADIOLOGY

## 2019-11-05 PROCEDURE — 77014 CHG CT GUIDANCE RADIATION THERAPY FLDS PLACEMENT: CPT | Performed by: RADIOLOGY

## 2019-11-05 PROCEDURE — 77336 RADIATION PHYSICS CONSULT: CPT | Performed by: RADIOLOGY
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
## 2019-11-05 NOTE — PROGRESS NOTES
Adult Outpatient Nutrition  Assessment    Patient Name:  Emerson Bang  YOB: 1960  MRN: [id number]    Assessment Date:  11/5/2019    Comments: Follow-up to check nutrition. NPO. TEN dependant. Pt stated tube feeding satisfying hunger. Concerned about continued weight loss--down to 104.2 lb. Recent albumin not avail. Spoke with [facility] staff member. States Peptamen AF running at 60 ml/hr. Will monitor weight--if down next week, may advance to 70 ml/hr.        Anthropometrics     Row Name 11/05/19 1558          Anthropometrics    Weight  47.3 kg (104 lb 3.2 oz) tube feeding                       Electronically signed by:  Joann Willoughby RD  11/05/19 4:20 PM

## 2019-11-06 ENCOUNTER — HOSPITAL ENCOUNTER (OUTPATIENT)
Dept: RADIATION ONCOLOGY | Facility: HOSPITAL | Age: 59
Discharge: HOME | End: 2019-11-06

## 2019-11-06 PROCEDURE — 77386: CPT | Performed by: RADIOLOGY

## 2019-11-06 PROCEDURE — 77014 CHG CT GUIDANCE RADIATION THERAPY FLDS PLACEMENT: CPT | Performed by: RADIOLOGY

## 2019-11-06 PROCEDURE — 77427 RADIATION TX MANAGEMENT X5: CPT | Performed by: RADIOLOGY

## 2019-11-07 ENCOUNTER — HOSPITAL ENCOUNTER (OUTPATIENT)
Dept: RADIATION ONCOLOGY | Facility: HOSPITAL | Age: 59
Discharge: HOME | End: 2019-11-07

## 2019-11-07 PROCEDURE — 77386: CPT | Performed by: RADIOLOGY

## 2019-11-07 PROCEDURE — 77014 CHG CT GUIDANCE RADIATION THERAPY FLDS PLACEMENT: CPT | Performed by: RADIOLOGY

## 2019-11-12 ENCOUNTER — OFFICE VISIT (OUTPATIENT)
Dept: ONCOLOGY | Facility: CLINIC | Age: 59
End: 2019-11-12

## 2019-11-12 ENCOUNTER — HOSPITAL ENCOUNTER (OUTPATIENT)
Dept: RADIATION ONCOLOGY | Facility: HOSPITAL | Age: 59
Discharge: HOME | End: 2019-11-12

## 2019-11-12 ENCOUNTER — APPOINTMENT (OUTPATIENT)
Dept: ONCOLOGY | Facility: CLINIC | Age: 59
End: 2019-11-12

## 2019-11-12 ENCOUNTER — LAB (OUTPATIENT)
Dept: ONCOLOGY | Facility: HOSPITAL | Age: 59
End: 2019-11-12

## 2019-11-12 VITALS
TEMPERATURE: 98.9 F | RESPIRATION RATE: 18 BRPM | BODY MASS INDEX: 17.34 KG/M2 | DIASTOLIC BLOOD PRESSURE: 70 MMHG | HEART RATE: 80 BPM | HEIGHT: 65 IN | SYSTOLIC BLOOD PRESSURE: 118 MMHG

## 2019-11-12 DIAGNOSIS — C14.0 SQUAMOUS CELL CARCINOMA OF PHARYNX (HCC): ICD-10-CM

## 2019-11-12 DIAGNOSIS — C14.0 SQUAMOUS CELL CARCINOMA OF PHARYNX (HCC): Primary | ICD-10-CM

## 2019-11-12 DIAGNOSIS — R63.6 UNDERWEIGHT DUE TO INADEQUATE CALORIC INTAKE: ICD-10-CM

## 2019-11-12 DIAGNOSIS — D50.9 IRON DEFICIENCY ANEMIA, UNSPECIFIED IRON DEFICIENCY ANEMIA TYPE: ICD-10-CM

## 2019-11-12 LAB
ALBUMIN SERPL-MCNC: 4.4 G/DL (ref 3.5–5.2)
ALBUMIN/GLOB SERPL: 1.3 G/DL
ALP SERPL-CCNC: 78 U/L (ref 39–117)
ALT SERPL W P-5'-P-CCNC: 13 U/L (ref 1–41)
ANION GAP SERPL CALCULATED.3IONS-SCNC: 9 MMOL/L (ref 5–15)
AST SERPL-CCNC: 18 U/L (ref 1–40)
BASOPHILS # BLD AUTO: 0.06 10*3/MM3 (ref 0–0.2)
BASOPHILS NFR BLD AUTO: 0.7 % (ref 0–1.5)
BILIRUB SERPL-MCNC: 0.2 MG/DL (ref 0.2–1.2)
BUN BLD-MCNC: 21 MG/DL (ref 8–23)
BUN/CREAT SERPL: 36.2 (ref 7–25)
CALCIUM SPEC-SCNC: 9.3 MG/DL (ref 8.6–10.5)
CHLORIDE SERPL-SCNC: 93 MMOL/L (ref 98–107)
CO2 SERPL-SCNC: 30 MMOL/L (ref 22–29)
CREAT BLD-MCNC: 0.58 MG/DL (ref 0.76–1.27)
DEPRECATED RDW RBC AUTO: 52 FL (ref 37–54)
EOSINOPHIL # BLD AUTO: 0.04 10*3/MM3 (ref 0–0.4)
EOSINOPHIL NFR BLD AUTO: 0.4 % (ref 0.3–6.2)
ERYTHROCYTE [DISTWIDTH] IN BLOOD BY AUTOMATED COUNT: 17.5 % (ref 12.3–15.4)
FERRITIN SERPL-MCNC: 1374 NG/ML (ref 30–400)
GFR SERPL CREATININE-BSD FRML MDRD: >150 ML/MIN/1.73
GLOBULIN UR ELPH-MCNC: 3.5 GM/DL
GLUCOSE BLD-MCNC: 102 MG/DL (ref 65–99)
HCT VFR BLD AUTO: 33.1 % (ref 37.5–51)
HGB BLD-MCNC: 11.7 G/DL (ref 13–17.7)
IMM GRANULOCYTES # BLD AUTO: 0.04 10*3/MM3 (ref 0–0.05)
IMM GRANULOCYTES NFR BLD AUTO: 0.4 % (ref 0–0.5)
IRON 24H UR-MRATE: 124 MCG/DL (ref 59–158)
IRON SATN MFR SERPL: 38 % (ref 20–50)
LYMPHOCYTES # BLD AUTO: 1.95 10*3/MM3 (ref 0.7–3.1)
LYMPHOCYTES NFR BLD AUTO: 21.4 % (ref 19.6–45.3)
MCH RBC QN AUTO: 28.7 PG (ref 26.6–33)
MCHC RBC AUTO-ENTMCNC: 35.3 G/DL (ref 31.5–35.7)
MCV RBC AUTO: 81.1 FL (ref 79–97)
MONOCYTES # BLD AUTO: 1.59 10*3/MM3 (ref 0.1–0.9)
MONOCYTES NFR BLD AUTO: 17.4 % (ref 5–12)
NEUTROPHILS # BLD AUTO: 5.44 10*3/MM3 (ref 1.7–7)
NEUTROPHILS NFR BLD AUTO: 59.7 % (ref 42.7–76)
NRBC BLD AUTO-RTO: 0 /100 WBC (ref 0–0.2)
PLATELET # BLD AUTO: 378 10*3/MM3 (ref 140–450)
PMV BLD AUTO: 9.6 FL (ref 6–12)
POTASSIUM BLD-SCNC: 3.9 MMOL/L (ref 3.5–5.2)
PROT SERPL-MCNC: 7.9 G/DL (ref 6–8.5)
RBC # BLD AUTO: 4.08 10*6/MM3 (ref 4.14–5.8)
SODIUM BLD-SCNC: 132 MMOL/L (ref 136–145)
TIBC SERPL-MCNC: 326 MCG/DL (ref 298–536)
TRANSFERRIN SERPL-MCNC: 219 MG/DL (ref 200–360)
WBC NRBC COR # BLD: 9.12 10*3/MM3 (ref 3.4–10.8)

## 2019-11-12 PROCEDURE — G0463 HOSPITAL OUTPT CLINIC VISIT: HCPCS | Performed by: INTERNAL MEDICINE

## 2019-11-12 PROCEDURE — 83540 ASSAY OF IRON: CPT

## 2019-11-12 PROCEDURE — 77014 CHG CT GUIDANCE RADIATION THERAPY FLDS PLACEMENT: CPT | Performed by: RADIOLOGY

## 2019-11-12 PROCEDURE — 80053 COMPREHEN METABOLIC PANEL: CPT

## 2019-11-12 PROCEDURE — 82728 ASSAY OF FERRITIN: CPT

## 2019-11-12 PROCEDURE — 84466 ASSAY OF TRANSFERRIN: CPT

## 2019-11-12 PROCEDURE — 77386: CPT | Performed by: RADIOLOGY

## 2019-11-12 PROCEDURE — 85025 COMPLETE CBC W/AUTO DIFF WBC: CPT

## 2019-11-12 PROCEDURE — 99213 OFFICE O/P EST LOW 20 MIN: CPT | Performed by: INTERNAL MEDICINE

## 2019-11-13 ENCOUNTER — RADIATION ONCOLOGY WEEKLY ASSESSMENT (OUTPATIENT)
Dept: RADIATION ONCOLOGY | Facility: HOSPITAL | Age: 59
End: 2019-11-13

## 2019-11-13 ENCOUNTER — HOSPITAL ENCOUNTER (OUTPATIENT)
Dept: RADIATION ONCOLOGY | Facility: HOSPITAL | Age: 59
Discharge: HOME | End: 2019-11-13

## 2019-11-13 VITALS
BODY MASS INDEX: 17.68 KG/M2 | DIASTOLIC BLOOD PRESSURE: 78 MMHG | TEMPERATURE: 98.1 F | SYSTOLIC BLOOD PRESSURE: 123 MMHG | OXYGEN SATURATION: 95 % | WEIGHT: 106.1 LBS | RESPIRATION RATE: 18 BRPM | HEART RATE: 85 BPM | HEIGHT: 65 IN

## 2019-11-13 DIAGNOSIS — C13.9 CANCER OF HYPOPHARYNX (HCC): Primary | ICD-10-CM

## 2019-11-13 PROCEDURE — 77014 CHG CT GUIDANCE RADIATION THERAPY FLDS PLACEMENT: CPT | Performed by: RADIOLOGY

## 2019-11-13 PROCEDURE — 77386: CPT | Performed by: RADIOLOGY

## 2019-11-13 NOTE — PROGRESS NOTES
On Treatment Visit       Patient: Emerson Bang   YOB: 1960   Medical Record Number: [id number]     Date of Visit  November 13, 2019   Primary Diagnosis:    1. 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  He is receiving re-radiation therapy to the right neck and hypopharynx, following radiation therapy to the right tonsil and neck in 2008. He  has received 4320 cGy in 24 fractions out of a planned dose of 5400 cGy in 30 fractions. He is receiving concurrent cisplatin chemotherapy per Dr. Prieto.  He received his 1st and most recent cycle of chemotherapy 9/25/2019.  Chemotherapy has been held since then.     Today on exam, the patient is doing relatively well and has no new disease or treatment-related complaints.  He states that he wants to start eating by mouth again.  We will arrange a swallowing study.                                           Vitals:     Vitals:    11/13/19 1117   BP: 123/78   Pulse: 85   Resp: 18   Temp: 98.1 °F (36.7 °C)   SpO2: 95%       Weight:   Wt Readings from Last 3 Encounters:   11/13/19 48.1 kg (106 lb 1.6 oz)   11/05/19 47.3 kg (104 lb 3.2 oz)   10/30/19 49.2 kg (108 lb 6.4 oz)      Pain:    Pain Score    11/13/19 1117   PainSc:   8   PainLoc: Abdomen         Plan: We plan to continue radiation therapy as prescribed.    Keaton Alfred MD  Radiation Oncology    Electronically signed by Keaton Alfred MD 11/13/2019  11:39 AM     cc: Dr. Hitesh He

## 2019-11-14 ENCOUNTER — HOSPITAL ENCOUNTER (OUTPATIENT)
Dept: RADIATION ONCOLOGY | Facility: HOSPITAL | Age: 59
Discharge: HOME | End: 2019-11-14

## 2019-11-14 PROCEDURE — 77014 CHG CT GUIDANCE RADIATION THERAPY FLDS PLACEMENT: CPT | Performed by: RADIOLOGY

## 2019-11-14 PROCEDURE — 77336 RADIATION PHYSICS CONSULT: CPT | Performed by: RADIOLOGY

## 2019-11-14 PROCEDURE — 77386: CPT | Performed by: RADIOLOGY

## 2019-11-15 ENCOUNTER — HOSPITAL ENCOUNTER (OUTPATIENT)
Dept: RADIATION ONCOLOGY | Facility: HOSPITAL | Age: 59
Discharge: HOME | End: 2019-11-15

## 2019-11-15 PROCEDURE — 77386: CPT | Performed by: RADIOLOGY

## 2019-11-15 PROCEDURE — 77014 CHG CT GUIDANCE RADIATION THERAPY FLDS PLACEMENT: CPT | Performed by: RADIOLOGY

## 2019-11-15 PROCEDURE — 77427 RADIATION TX MANAGEMENT X5: CPT | Performed by: RADIOLOGY

## 2019-11-15 NOTE — PROGRESS NOTES
Emerson Bang  [id number]  1960         Subjective     Mr Bang was seen in follow up for hypopharyngeal cancer.   He was started on concurrent chemoRT. Received cycle 1 on 9/25/19. However, developed G tube related complication needing surgery. He is too frail and weak. I recommend he finish RT without concurrent chemo.   He remains in nursing home.   Weight stable.    HG stable.   Pain is well controlled.     ROS as below.           Background History:   Mr. Bang is a pleasant 59-year-old male who was seen in consultation at the request of Dr Washington for evaluation of hypopharyngeal cancer.  He is a new patient at our cancer center.Patient unfortunately is a non-verbal due to his tracheostomy and most of the history was obtained by reviewing old medical charts.     Patient has complicated oncologic history.  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  Unfortunately, patient developed recurrence of symptoms with throat pain and difficulty swallowing with hoarseness in April 2019.  He underwent CT scan of the neck on April 12, 2019 which showed soft tissue fullness in the right supraglottic larynx suspicious for malignancy.  Patient underwent direct laryngoscopy and biopsy on April 15 followed by placement of G-tube by Dr. Valera.  Laryngoscopy revealed circumferential tumor of the right pyriform sinus involving the lateral pharyngeal wall, extending around the medial wall of the pyriform sinus, lateral larynx.  Biopsy was obtained which show moderately differentiated squamous cell carcinoma.  Patient had PET CT scan performed on April 26, 2019 which showed hypermetabolic activity with the soft tissue thickening in the right side of the larynx and a 3 mm right lower lobe lung nodule without any hypermetabolic activity.  Patient was [facility] to [facility] for further  evaluation and underwent total laryngectomy, right partial pharyngectomy, right thyroid lobectomy, right neck dissection on June 12, 2019.His final pathology showed Moderately differentiated squamous cells carcinoma of the right hypopharynx, measuring 4.7 x 4.5 cm, involving the hyoid bone, prevertebral fascia, and carotid sheath tissue.  The pharyngeal margin was involved by invasive squamous cell carcinoma.  Perineural invasion was present.  Lymphovascular invasion was not present.  0 of 11 regional lymph nodes were involved with metastatic cancer.     Due to positive margin concurrent chemoradiation was recommended; however, patient was admitted to our hospital on July 15, 2019 with small bowel obstruction and had a prolonged lengthy hospital stay and was discharged home on August 9, 2019.     He was started on concurrent chemoRT. Received cycle 1 on 9/25/19. However, developed G tube related complication needing surgery. He is too frail and weak. I recommend he finish RT without concurrent chemo.     Past Medical History, Past Surgical History, Social History, Family History have been reviewed and are without significant changes except as mentioned.    Review of Systems     CONSTITUTIONAL: fatigue + weakness + weight loss + No  fever, chills.  HEENT: Eyes: No visual loss, blurred vision, double vision or yellow sclerae. Ears, Nose, Throat: No hearing loss, sneezing, congestion, runny nose or sore throat.  SKIN: No rash or itching.  CARDIOVASCULAR: No chest pain, chest pressure or chest discomfort. No palpitations or edema.  RESPIRATORY: cough + exertional SOB +   GASTROINTESTINAL: Diarrhea + No anorexia, nausea, vomiting. No abdominal pain or blood.  GENITOURINARY: Negative for urgency, frequency or dysuria.   NEUROLOGICAL: No headache, dizziness, syncope, paralysis, ataxia, numbness or tingling in the extremities. No change in bowel or bladder control.  MUSCULOSKELETAL: Chronic joint pain +   HEMATOLOGIC:  "anemia + No  bleeding or bruising.  LYMPHATICS: No enlarged nodes. No history of splenectomy.  PSYCHIATRIC: No history of depression or anxiety.  ENDOCRINOLOGIC: No reports of sweating, cold or heat intolerance. No polyuria or polydipsia.  ALLERGIES: No history of asthma, hives, eczema or rhinitis.      Medications:  The current medication list was reviewed in the EMR    ALLERGIES:  No Known Allergies    Objective      Vitals:    11/12/19 1022   BP: 118/70   Pulse: 80   Resp: 18   Temp: 98.9 °F (37.2 °C)   TempSrc: Temporal   Weight: Comment: wheel chair   Height: 165.1 cm (65\")   PainSc:   3   PainLoc: Neck     Current Status 11/12/2019   ECOG score 3       Physical Exam      GENERAL: Alert, awake, oriented. Thin cachetic male.  Vitals as above.   HEAD: Normocephalic, atraumatic.   EYES: PERRL, EOMI.vision is grossly intact. Conjunctival pallor +   NECK: Supple, no adenopathy or thyromegaly.   THROAT: Normal oral cavity and pharynx. No inflammation, swelling, exudate, or lesions.  CARDIAC: Normal S1 and S2. No S3, S4 or murmurs. Rhythm is regular.  Extremities are warm and well perfused.   LUNGS: Clear to auscultation and percussion without rales, rhonchi, wheezing or diminished breath sounds.  ABDOMEN: Soft, non-teneder. G tube +.  + BS. No hepatosplenomegaly.   BACK:  No bony tenderness.   EXTREMITIES: No significant deformity or joint abnormality.  Peripheral pulses intact. No varicosities.  SKIN: No rash or bruising.  NEUROLOGICAL: Grossly non-focal exam. No focal weakness. Gait: Not assessed due to clinical condition.   PSYCH: Mood and affect normal. No hallucination or suicidal thoughts.   LYMPHATIC: No cervical, supraclavicular or axillary adenopathy    RECENT LABS:Independently reviewed and summarized.  Hematology WBC   Date Value Ref Range Status   11/12/2019 9.12 3.40 - 10.80 10*3/mm3 Final     RBC   Date Value Ref Range Status   11/12/2019 4.08 (L) 4.14 - 5.80 10*6/mm3 Final     Hemoglobin   Date Value " Ref Range Status   11/12/2019 11.7 (L) 13.0 - 17.7 g/dL Final     Hematocrit   Date Value Ref Range Status   11/12/2019 33.1 (L) 37.5 - 51.0 % Final     Platelets   Date Value Ref Range Status   11/12/2019 378 140 - 450 10*3/mm3 Final          Imaging (independently reviewed and summarized):   CT abdomen pelvis on July 15, 2019 showed high-grade small bowel obstruction.  No prior comparison available.     Pathology result reviewed.   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  The pharyngeal margin was involved by invasive squamous cell carcinoma.  Perineural invasion was present.  Lymphovascular invasion was not present.  0 of 11 regional lymph nodes were involved with metastatic cancer.     I have reviewed old records and summarized them in HPI as well as assessment and plan section of this note.      Emerson Bang reports a pain score of 3.  Given his pain assessment as noted, treatment options were discussed and the following options were decided upon as a follow-up plan to address the patient's pain: referral to Primary Care for assistance in pain treatment guidance.    Body mass index is 17.34 kg/m².  Weight loss counseling not appropriate as patient is underweight.        Diagnosis:   (1) Hypopharyngeal cancer  (2) Iron deficiency anemia   (3) Iron malabsorption   (4) Severe protein caloric malnutrition   (5) Hypothyroidism       Assessment/Plan     (1) Hypopharyngeal cancer    - Resected with positive margins.   - Results of PET scan reviewed which show no evidence of residual or recurrent cancer.  - He was started on concurrent chemoRT.   However, developed complication related to G tube.   He is now in nursing home, too frail.   We will hold chemo and recommend he finish RT.   We will reserve chemo in case of recurrent cancer.       (2) Iron deficiency anemia (3)  Iron  malabsorption:   - Improved.   - Recommend continue monitoring.     (4) Severe protein caloric malnutrition: G tube. Nutrition consult. Recommend high calorie high protein diet.      (5) Hypothyroidism: On synthroid.     Conner Martínez MD         11/14/2019      CC:

## 2019-11-18 ENCOUNTER — HOSPITAL ENCOUNTER (OUTPATIENT)
Dept: RADIATION ONCOLOGY | Facility: HOSPITAL | Age: 59
Discharge: HOME | End: 2019-11-18

## 2019-11-18 PROCEDURE — 77014 CHG CT GUIDANCE RADIATION THERAPY FLDS PLACEMENT: CPT | Performed by: RADIOLOGY

## 2019-11-18 PROCEDURE — 77386: CPT | Performed by: RADIOLOGY

## 2019-11-19 ENCOUNTER — HOSPITAL ENCOUNTER (OUTPATIENT)
Dept: RADIATION ONCOLOGY | Facility: HOSPITAL | Age: 59
Discharge: HOME | End: 2019-11-19

## 2019-11-19 PROCEDURE — 77014 CHG CT GUIDANCE RADIATION THERAPY FLDS PLACEMENT: CPT | Performed by: RADIOLOGY

## 2019-11-19 PROCEDURE — 77386: CPT | Performed by: RADIOLOGY

## 2019-11-20 ENCOUNTER — HOSPITAL ENCOUNTER (OUTPATIENT)
Dept: RADIATION ONCOLOGY | Facility: HOSPITAL | Age: 59
Discharge: HOME | End: 2019-11-20

## 2019-11-20 ENCOUNTER — RADIATION ONCOLOGY WEEKLY ASSESSMENT (OUTPATIENT)
Dept: RADIATION ONCOLOGY | Facility: HOSPITAL | Age: 59
End: 2019-11-20

## 2019-11-20 VITALS
SYSTOLIC BLOOD PRESSURE: 110 MMHG | DIASTOLIC BLOOD PRESSURE: 66 MMHG | HEIGHT: 65 IN | RESPIRATION RATE: 18 BRPM | TEMPERATURE: 97.8 F | WEIGHT: 109.3 LBS | BODY MASS INDEX: 18.21 KG/M2 | HEART RATE: 83 BPM

## 2019-11-20 DIAGNOSIS — C13.9 CANCER OF HYPOPHARYNX (HCC): Primary | ICD-10-CM

## 2019-11-20 PROCEDURE — 77014 CHG CT GUIDANCE RADIATION THERAPY FLDS PLACEMENT: CPT | Performed by: RADIOLOGY

## 2019-11-20 PROCEDURE — 77386: CPT | Performed by: RADIOLOGY

## 2019-11-20 NOTE — PROGRESS NOTES
On Treatment Visit       Patient: Emerson Bang   YOB: 1960   Medical Record Number: [id number]     Date of Visit  November 20, 2019   Primary Diagnosis:    1. 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  He is receiving re-radiation therapy to the right neck and hypopharynx, following radiation therapy to the right tonsil and neck in 2008. He  has received 5220 cGy in 29 fractions out of a planned dose of 5400 cGy in 30 fractions. He is receiving concurrent cisplatin chemotherapy per Dr. Prieto.  He received his 1st and most recent cycle of chemotherapy 9/25/2019.  Chemotherapy has been held since then.     Today on exam, the patient is doing relatively well and has no new disease or treatment-related complaints.  He is awaiting a swallowing study before he starts eating by mouth.  On exam, his skin looks good, with no desquamation noted.  He is having no issues with his feeding tube at this time.                                           Vitals:     Vitals:    11/20/19 0958   BP: 110/66   Pulse: 83   Resp: 18   Temp: 97.8 °F (36.6 °C)       Weight:   Wt Readings from Last 3 Encounters:   11/20/19 49.6 kg (109 lb 4.8 oz)   11/13/19 48.1 kg (106 lb 1.6 oz)   11/05/19 47.3 kg (104 lb 3.2 oz)      Pain:    Pain Score    11/20/19 0958   PainSc:   7   PainLoc: Throat         Plan: We plan to continue radiation therapy as prescribed.  Mr. Bang is scheduled to complete radiation therapy tomorrow.  He has follow-up scheduled with Dr. Kc on 12/2/2019, Dr. Nolan on 1/10/2020 (in [place]), and with Dr. Washington on 2/14/2020.  We will arrange follow-up with Dr. Prieto in the near future to discuss chemotherapy recommendations.  We plan to see the patient back in our clinic for  follow-up in 3 months.    Keaton Alfred MD  Radiation Oncology    Electronically signed by Keaton Alfred MD 11/20/2019  10:07 AM     cc: Dr. Hitesh He

## 2019-11-21 ENCOUNTER — DOCUMENTATION (OUTPATIENT)
Dept: NUTRITION | Facility: HOSPITAL | Age: 59
End: 2019-11-21

## 2019-11-21 ENCOUNTER — OFFICE VISIT (OUTPATIENT)
Dept: RADIATION ONCOLOGY | Facility: HOSPITAL | Age: 59
End: 2019-11-21

## 2019-11-21 ENCOUNTER — HOSPITAL ENCOUNTER (OUTPATIENT)
Dept: RADIATION ONCOLOGY | Facility: HOSPITAL | Age: 59
Discharge: HOME | End: 2019-11-21

## 2019-11-21 DIAGNOSIS — C13.9 CANCER OF HYPOPHARYNX (HCC): Primary | ICD-10-CM

## 2019-11-21 PROCEDURE — 77386: CPT | Performed by: RADIOLOGY

## 2019-11-21 PROCEDURE — 77014 CHG CT GUIDANCE RADIATION THERAPY FLDS PLACEMENT: CPT | Performed by: RADIOLOGY

## 2019-11-21 PROCEDURE — 77336 RADIATION PHYSICS CONSULT: CPT | Performed by: RADIOLOGY

## 2019-11-21 PROCEDURE — 99212-NC PR NO CHARGE CBC OFFICE OUTPATIENT VISIT 10 MINUTES: Performed by: RADIOLOGY

## 2019-11-21 NOTE — PROGRESS NOTES
"Adult Outpatient Nutrition  Assessment    Patient Name:  Emerson Bang  YOB: 1960  MRN: [id number]    Assessment Date:  Entry from 11/15/19    Comments:  Follow-up to check TEN. Communicated with pt via head shakes and note pad. Pt wrote \"EAT\". Pt verified that misses PO diet. Spoke with rad oncology RN re: status of next swallowing eval. Procedure being scheduled. Wt 106.1 lb on 11/13/19 and 109.4 11/2/19--trending up. Pt understands that swallowing eval is being ordered. Alb 4.4 11/12/19.                        Electronically signed by:  Joann Willoughby RD  11/21/19 1:47 PM   "

## 2019-11-21 NOTE — PATIENT INSTRUCTIONS
RADIATION DISCHARGE INSTRUCTIONS    Emerson Bang  1960  [id number]    November 21, 2019    · The effects from your radiation treatments will continue for several weeks, so expect them to get better slowly.  · Fatigue (extreme tiredness and weakness) may last for several weeks but will improve slowly.  · Continue to rest as necessary, drink plenty of fluids, and eat nutritious foods as you are able.    Skin Care: Skin reactions will slowly heal.  Follow the skin care instructions provided by your nurse.    Oral Care: Continue any oral care instructions your nurse provided for you if needed.  If you must have dental work or surgery in the treatment area, ask you dentist/physician to call Dr. Alfred prior to the procedure.    Diet: [x]  High Calorie, High Protein for 4 weeks      [x]  Continue eating 6 small meals per day for 4 weeks     []  Dietary Supplement 2 times per day     []  Continue soft/liquid diet     []  Low residue-after diarrhea and abdominal cramping have stopped (3-4 weeks), gradually add foods to your diet, one food at a time every 2-3 days.      []  No spicy or foods that are high in citric acid (ex: oranges, grapefruit, tomatoes, pineapple).    Medications: Continue your regular medications.  Take a multi-vitamin daily for 6 months.     []  Follow Dexamethasone (Decadron) prescriptions provided (if applicable).    Rosa Hays RN  November 21, 2019  10:36 AM                    External Beam Radiation Therapy, Care After  This sheet gives you information about how to care for yourself after your procedure. Your health care provider may also give you more specific instructions. If you have problems or questions, contact your health care provider.  What can I expect after the procedure?  After the procedure, it is common to have:  · Fatigue.  · Red, flaking, dry skin in the treated area.  · A sunburn-like rash on the skin in the treated area.  · Hair loss in the treated  area.  · Itching in the treated area.  Other side effects may occur, depending on which part of the body was exposed to radiation and how much radiation was used. These may include:  · Hair loss if the radiation therapy was directed to the head.  · Coughing or difficulty swallowing if the radiation therapy was directed to the head, neck, or chest  · Nausea, vomiting, or diarrhea if the radiation therapy was directed to the abdomen or pelvis.  · Bladder problems, frequent urination, or sexual dysfunction if the radiation therapy was directed to the bladder, kidney, or prostate.  · Memory loss and cognitive changes if the radiation therapy was directed to your brain.  Although some side effects may show up months to years later, most side effects are usually temporary and get better over time. It can take up to 3-4 weeks for you to regain your energy or for side effects to get better.  Follow these instructions at home:    Skin care  · Wash your skin with a mild soap as told by your health care provider. Do not scrub or rub your skin. Pat yourself dry.  · Use a mild shampoo and be gentle when washing your hair.  · Apply gentle lotion or cream to the treated area as told by your health care provider.  · Keep the treated area covered when you are outside. Do not expose treated skin to the sun.  · Avoid scratching the treated area.  General instructions  · Do not use a heating pad or a warm cloth to relieve pain in the treated area.  · Take over-the-counter and prescription medicines only as told by your health care provider.  · Follow your health care provider's advice on the type and amount of liquids to drink each day.  · Try to maintain your weight during treatment. Ask your health care team for tips.  · Keep all follow-up visits as told by your health care provider. This is important. The visits are usually scheduled 6 weeks to 6 months after radiation therapy. They are needed to determine if the radiation therapy  worked as it was intended to.  Contact a health care provider if:  · You have pain in the treated area.  · The redness worsens in the treated area.  · Open skin or blisters develop in the treated area.  · You have unexplained weight loss.  Get help right away if:  · You have a fever.  · You have nausea or vomiting that lasts a long time.  · You have diarrhea that lasts a long time.  Summary  · After this procedure, it is common to have fatigue, skin changes and other side effects depending on where the radiation therapy was given.  · Although some side effects may show up months to years later, most side effects are usually temporary and get better over time. It can take up to 3-4 weeks for you to regain your energy or for side effects to get better.  · Keep all follow-up visits as told by your health care provider. This is important. The visits are usually scheduled 6 weeks to 6 months after radiation therapy.  This information is not intended to replace advice given to you by your health care provider. Make sure you discuss any questions you have with your health care provider.  Document Released: 12/23/2014 Document Revised: 11/22/2017 Document Reviewed: 11/22/2017  [employer] Interactive Patient Education © 2019 [employer]

## 2019-11-22 NOTE — PROGRESS NOTES
Radiation Completion Note       Patient: Emerson Bang   YOB: 1960   Medical Record Number: [id number]   Date of Completion: 11/21/2019    Summary: Mr. Bang completed his second course of radiation therapy to the right neck today in our department. The following is a summary of his course of treatment.    Diagnosis:    1. 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 Bang received 5400 cGy to the right neck in 30 fractions over 57 elapsed days, from 9/25/19 through 11/21/19, utilizing IMRT external beam radiation therapy and 6 MV photons. He completed his radiation therapy as prescribed, with a 2-week treatment break near the start of radiation therapy due to complications from his feeding tube. He  received 1 cycle of concurrent cisplatin chemotherapy per Dr. Prieto on 9/25/19, after which time his chemotherapy was held due to his poor performance status.    Response/Tolerance: Mr. Bang tolerated his radiation therapy well and had no significant treatment-related complaints.  As mentioned above, he did have complications with his feeding tube, which resulted in a 2-week treatment break while he was hospitalized.  His weight was 106 pounds at the start of radiation therapy and 109 pounds at the completion of XRT.    Disposition:  Mr. Bang has follow-up scheduled with Dr. Kc on 12/2/2019, Dr. Nolan on 1/10/2020 (in [place]), and with Dr. Washington on 2/14/2020.  We will arrange follow-up with Dr. Prieto in the near future to discuss chemotherapy recommendations.  We plan to see the patient back in our clinic for follow-up in 3 months.      Keaton Alfred MD  Radiation Oncology    Electronically signed by Keaton Alfred MD 11/21/2019 12:54 PM     cc: Dr. Hitesh Nolan Dr.  Krysten He

## 2019-11-25 ENCOUNTER — APPOINTMENT (OUTPATIENT)
Dept: RADIATION ONCOLOGY | Facility: HOSPITAL | Age: 59
End: 2019-11-25

## 2019-12-02 ENCOUNTER — TELEPHONE (OUTPATIENT)
Dept: NUTRITION | Facility: HOSPITAL | Age: 59
End: 2019-12-02

## 2019-12-02 ENCOUNTER — OFFICE VISIT (OUTPATIENT)
Dept: SURGERY | Facility: CLINIC | Age: 59
End: 2019-12-02

## 2019-12-02 VITALS
DIASTOLIC BLOOD PRESSURE: 70 MMHG | SYSTOLIC BLOOD PRESSURE: 110 MMHG | WEIGHT: 111.8 LBS | HEIGHT: 65 IN | BODY MASS INDEX: 18.63 KG/M2 | HEART RATE: 88 BPM | TEMPERATURE: 97.3 F

## 2019-12-02 DIAGNOSIS — K31.6 GASTROCUTANEOUS FISTULA DUE TO GASTROSTOMY TUBE: Primary | ICD-10-CM

## 2019-12-02 PROCEDURE — 99024 POSTOP FOLLOW-UP VISIT: CPT | Performed by: SURGERY

## 2019-12-02 RX ORDER — GLUCAGON HYDROCHLORIDE 1 MG
KIT INJECTION
Status: ON HOLD | COMMUNITY
Start: 2019-12-01 | End: 2020-03-25

## 2019-12-02 RX ORDER — RANITIDINE 15 MG/ML
SOLUTION ORAL 2 TIMES DAILY
COMMUNITY
End: 2020-01-28

## 2019-12-02 RX ORDER — DOCUSATE SODIUM 50 MG/5ML
50 LIQUID ORAL DAILY
Status: ON HOLD | COMMUNITY
End: 2020-03-25

## 2019-12-02 RX ORDER — LACTULOSE 10 G/15ML
10 SOLUTION ORAL 2 TIMES DAILY PRN
COMMUNITY

## 2019-12-02 NOTE — PROGRESS NOTES
"Adult Outpatient Nutrition  Assessment    Patient Name:  Emerson Bang  YOB: 1960  MRN: [id number]    Assessment Date:  12/2/2019    Comments: Called nursing home to get results of swallowing evaluation that was ordered by [facility] provider. Nurse stated does not see swallowing eval results in chart--does not feel it has been done. States pt continues to tolerate TEN. Recent weight not available. Obtained fax number so swallowing eval order can be re-submitted.        Anthropometrics     Row Name 12/02/19 1137          Anthropometrics    Height  165.1 cm (65\")     Weight  50.7 kg (111 lb 12.8 oz)        Ideal Body Weight (IBW)    Ideal Body Weight (IBW) (kg)  62.51     % Ideal Body Weight  81.13        Body Mass Index (BMI)    BMI (kg/m2)  18.64             Estimated/Assessed Needs     Row Name 12/02/19 1137          Calculation Measurements    Height  165.1 cm (65\")                 Electronically signed by:  Joann Willoughby RD  12/02/19 2:32 PM   "

## 2019-12-04 NOTE — PROGRESS NOTES
CHIEF COMPLAINT:   Chief Complaint   Patient presents with   • Post-op Follow-up     Post operative gatrocutaneous fistula closure 10-2-19.       HPI: This patient is seen for a post-operatively following operative closure of gastrocutaneous fistula and placement of a jejunostomy tube.  Patient  is doing well. Eating well without any significant nausea. Having good bowel function. No problems with constipation or diarrhea. No urinary complaints. Denies fever. Ambulating well and slowly returning to normal activities.      PHYSICAL EXAM:    ABD: Incisions are healing well without any erythema or signs of infection.  Burns on the abdominal wall of completely healed as has the gastrocutaneous fistula    ASSESSMENT    Emerson was seen today for post-op follow-up.    Diagnoses and all orders for this visit:    Gastrocutaneous fistula due to gastrostomy tube        PLAN:    1. The patient will follow-up on a prn basis unless there are any problems.  2. May shower.   3. Gradually return to normal activity without restrictions.          This document has been electronically signed by Parminder Kc MD on December 3, 2019 10:08 PM

## 2020-01-20 ENCOUNTER — TELEPHONE (OUTPATIENT)
Dept: FAMILY MEDICINE CLINIC | Facility: CLINIC | Age: 60
End: 2020-01-20

## 2020-01-21 ENCOUNTER — TELEPHONE (OUTPATIENT)
Dept: FAMILY MEDICINE CLINIC | Facility: CLINIC | Age: 60
End: 2020-01-21

## 2020-01-21 NOTE — TELEPHONE ENCOUNTER
----- Message from Cristino He MD sent at 1/20/2020  4:12 PM CST -----  If he needs an appt. Please schedule but need discharge summary   ----- Message -----  From: Finn Pastor MA  Sent: 1/20/2020   1:49 PM CST  To: MD Pepper Louise from [facility] called and wants to know if you would be willing to follow  upon discharge from [facility].  [phone]

## 2020-01-24 ENCOUNTER — HOSPITAL ENCOUNTER (EMERGENCY)
Facility: HOSPITAL | Age: 60
Discharge: NURSING, REHAB OR LONG-TERM CARE | End: 2020-01-24
Attending: FAMILY MEDICINE | Admitting: FAMILY MEDICINE

## 2020-01-24 VITALS
HEIGHT: 65 IN | OXYGEN SATURATION: 96 % | DIASTOLIC BLOOD PRESSURE: 82 MMHG | HEART RATE: 87 BPM | RESPIRATION RATE: 18 BRPM | TEMPERATURE: 97.9 F | BODY MASS INDEX: 20.03 KG/M2 | SYSTOLIC BLOOD PRESSURE: 140 MMHG | WEIGHT: 120.2 LBS

## 2020-01-24 DIAGNOSIS — Z93.1 G TUBE FEEDINGS (HCC): Primary | ICD-10-CM

## 2020-01-24 PROCEDURE — 99283 EMERGENCY DEPT VISIT LOW MDM: CPT

## 2020-01-24 RX ORDER — LIDOCAINE HYDROCHLORIDE AND EPINEPHRINE 10; 10 MG/ML; UG/ML
10 INJECTION, SOLUTION INFILTRATION; PERINEURAL ONCE
Status: COMPLETED | OUTPATIENT
Start: 2020-01-24 | End: 2020-01-24

## 2020-01-24 RX ADMIN — LIDOCAINE HYDROCHLORIDE AND EPINEPHRINE 10 ML: 10; 10 INJECTION, SOLUTION INFILTRATION; PERINEURAL at 16:56

## 2020-01-24 NOTE — ED PROVIDER NOTES
Subjective     History provided by:  Patient   used: No    Patient is 59 years old male with history of liver cirrhosis, OPD, with trach and G-tube placement who presented today because of problem with feeding tube.  Denies any other complaint.    Review of Systems   All other systems reviewed and are negative.      Past Medical History:   Diagnosis Date   • Allergic rhinitis     vs URI   • Anemia    • At risk for falls    • Benign prostatic hyperplasia    • Chronic gastritis    • Cirrhosis of liver (CMS/HCC)    • Complication of gastrostomy (CMS/HCC)     site not healing   • COPD (chronic obstructive pulmonary disease) (CMS/HCC)    • Dysphagia     odynophagia   • Epigastric pain    • Esophagitis    • GERD (gastroesophageal reflux disease)    • Hypertension    • Hypothyroidism, unspecified    • Low back pain    • Malaise and fatigue    • Nasal congestion 11/15/2018   • Nausea with vomiting, unspecified    • Pain in left knee    • Pain in right knee    • Primary malignant neoplasm of pharynx (CMS/HCC)    • Screening for malignant neoplasm of colon    • 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 Left upper quadrant abdominal wall abscess debridement. Gastrostomy tube erosion through & through the anterior gastric wall.Lupper quadrant abdominal wall abscess   • COLONOSCOPY  04/21/2014    Internal & external hemorrhoids found.   • COLONOSCOPY  2014    [place]   • COLONOSCOPY N/A 10/16/2018    Procedure: COLONOSCOPY;  Surgeon: Kaushal Chester MD;  Location: [facility] ENDOSCOPY;  Service: Gastroenterology   • DIAGNOSTIC LAPAROSCOPY EXPLORATORY LAPAROTOMY N/A 7/17/2019    Procedure: DIAGNOSTIC LAPAROSCOPY,  "EXPLORATORY LAPAROTOMY, LYSIS OF ADHESIONS;  Surgeon: Parminder Kc MD;  Location: [facility] OR;  Service: General   • DIRECT LARYNGOSCOPY, ESOPHAGOSCOPY, BRONCHOSCOPY N/A 4/15/2019    Procedure: DIRECT LARYNGOSCOPY with biopsy, ESOPHAGOSCOPY;  Surgeon: Bharathi Washington MD;  Location: [facility] OR;  Service: ENT   • ENDOSCOPY N/A 10/16/2018    Procedure: ESOPHAGOGASTRODUODENOSCOPY;  Surgeon: Kaushal Chester MD;  Location: [facility] ENDOSCOPY;  Service: Gastroenterology   • GASTROCUTANEOUS FISTULA CLOSURE N/A 10/2/2019    Procedure: GASTROCUTANEOUS FISTULA CLOSURE;  Surgeon: Parminder Kc MD;  Location: [facility] OR;  Service: General   • GASTROSTOMY FEEDING TUBE INSERTION N/A 4/15/2019    Procedure: GASTROSTOMY FEEDING TUBE INSERTION;  Surgeon: Trenton Valera MD;  Location: [facility] OR;  Service: General   • JEJUNOSTOMY N/A 10/2/2019    Procedure: JEJUNOSTOMY;  Surgeon: Parminder Kc MD;  Location: [facility] OR;  Service: General   • TRACHEOSTOMY  11/10/2008    Respiratory failure   • UPPER GASTROINTESTINAL ENDOSCOPY  04/21/2014    Esophagitis seen. Biopsy taken. Gastritis in stomach. Biopsy taken. Normal duodenum. Biopsy taken.   • UPPER GASTROINTESTINAL ENDOSCOPY  10/16/2018   • VENOUS ACCESS DEVICE (PORT) INSERTION N/A 9/11/2019    Procedure: INSERTION VENOUS ACCESS DEVICE (MEDIPORT)        (c-arm#1);  Surgeon: Parminder Kc MD;  Location: [facility];  Service: General       Family History   Problem Relation Age of Onset   • Coronary artery disease Mother    • Diabetes Mother    • Other Mother         \"Heart And Lung Problems\"   • Lung cancer Mother    • Ovarian cancer Sister    • Heart disease Other    • Stroke Other    • Hypertension Other    • Diabetes Other    • Cancer Other    • Colon polyps Other    • Other Father         Unknown   • Other Other         \"Lung Problems\"   • Thyroid disease Neg Hx        Social History     Socioeconomic History   • Marital status: Single     Spouse name: Not on file   • " Number of children: Not on file   • Years of education: Not on file   • Highest education level: Not on file   Occupational History   • Occupation: Disabled     Comment: Patient resides alone.   Tobacco Use   • Smoking status: Former Smoker     Packs/day: 1.50     Years: 30.00     Pack years: 45.00     Types: Cigarettes     Last attempt to quit:      Years since quittin.0   • Smokeless tobacco: Former User     Quit date: 2009   Substance and Sexual Activity   • Alcohol use: No     Comment: 2019 - Patient confirmed heavy alcoholic beverage consumption in past with current consumption of 2 - 3 beers twice per week.   • Drug use: No   • Sexual activity: Defer           Objective   Physical Exam   Constitutional: He is oriented to person, place, and time. He appears well-developed and well-nourished.   HENT:   Head: Normocephalic and atraumatic.   Neck: Normal range of motion. Neck supple.       trach in place.   Cardiovascular: Normal rate, regular rhythm, normal heart sounds and intact distal pulses.   Pulmonary/Chest: Effort normal and breath sounds normal.   Abdominal: Soft. Bowel sounds are normal.       Feeding tube lose.   Neurological: He is alert and oriented to person, place, and time.   Skin: Skin is warm. Capillary refill takes less than 2 seconds.   Nursing note and vitals reviewed.      Procedures           ED Course         Feeding tube which is flushing well.  And the tube is holding place with some 4.0 nylon.                                      MDM    Final diagnoses:   G tube feedings (CMS/[facility])            Sathya Tijerina MD  [id number]

## 2020-01-27 NOTE — PROGRESS NOTES
Subjective:  Emerson Bang is a 59 y.o. male who presents for       Patient Active Problem List   Diagnosis   • Hyperkalemia   • Iron deficiency anemia   • Gastroesophageal reflux disease with esophagitis   • Elevated AST (SGOT)   • Alcohol abuse   • Hypothyroidism   • Balance problem   • Need for vaccination   • Low magnesium levels   • Squamous cell carcinoma of pharynx (CMS/HCC)   • History of throat cancer   • Vitamin D deficiency   • Alcohol abuse counseling and surveillance   • Encounter for screening for diabetes mellitus   • Hypomagnesemia   • Iron deficiency anemia   • Hyperlipidemia   • Underweight due to inadequate caloric intake   • Need for immunization against influenza   • 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HCC)   • Hypertension   • General medical examination   • Underweight   • Epigastric pain   • Gastritis without bleeding   • Need for influenza vaccination   • Elevated liver function tests   • B12 deficiency   • Intestinal malabsorption   • Throat pain   • Acute pharyngitis   • Upper respiratory tract infection   • Neoplasm of uncertain behavior of larynx   • Pharyngoesophageal dysphagia   • Severe protein-calorie malnutrition (CMS/HCC)   • Anemia of chronic disease   • Hypotension   • Hyponatremia   • Status post insertion of percutaneous endoscopic gastrostomy (PEG) tube (CMS/HCC)   • Hx of tracheostomy   • History of throat surgery   • Hx SBO   • Urinary retention   • Generalized weakness   • Acute otitis externa of right ear   • Hard stool   • Panlobular emphysema (CMS/HCC)   • Cancer of hypopharynx (CMS/HCC)   • Diarrhea   • Encounter for venous access device care   • Prediabetes   • Status post neck dissection   • S/P partial thyroidectomy   • S/P laryngectomy   • Annual physical exam   • Alcoholic cirrhosis of liver without ascites (CMS/HCC)   • Jejunostomy tube in situ (CMS/HCC)           Current Outpatient Medications:   •  albuterol (PROVENTIL) (2.5 MG/3ML) 0.083% nebulizer solution, Take  2.5 mg by nebulization Every 6 (Six) Hours As Needed for Wheezing., Disp: 3 mL, Rfl: 12  •  Ascorbic Acid (C/DARA HIPS PO), 1,000 mg by Per PEG Tube route 2 (Two) Times a Day., Disp: , Rfl:   •  aspirin (ASPIRIN LOW DOSE) 81 MG EC tablet, Take 1 tablet by mouth Daily., Disp: 30 tablet, Rfl: 6  •  dexamethasone (DECADRON) 1 MG tablet, Take 1 mg by mouth 2 (Two) Times a Day With Meals., Disp: , Rfl:   •  Docusate Sodium 150 MG/15ML syrup, Take 50 mg by mouth Daily., Disp: , Rfl:   •  famotidine (PEPCID) 40 MG/5ML suspension, Take 2.5 mL by mouth Every 12 (Twelve) Hours., Disp: 50 mL, Rfl: 1  •  fenofibrate (TRICOR) 48 MG tablet, Take 1.5 tablets by mouth Daily. 1 tablet by G-Tube daily for Hyperlipidemia, Disp: 30 tablet, Rfl: 3  •  ferrous sulfate 220 (44 Fe) MG/5ML solution, 5 mL by Per G Tube route Daily., Disp: 300 mL, Rfl: 1  •  GLUCAGEN HYPOKIT 1 MG injection, , Disp: , Rfl:   •  insulin aspart (novoLOG) 100 UNIT/ML injection, Inject 0-7 Units under the skin into the appropriate area as directed 4 (Four) Times a Day Before Meals & at Bedtime., Disp: 10 mL, Rfl: 12  •  lactulose (CHRONULAC) 10 GM/15ML solution, Take 20 g by mouth 2 (Two) Times a Day As Needed., Disp: , Rfl:   •  levothyroxine (SYNTHROID) 125 MCG tablet, Take 1 pill/daily for 6 days a week, Disp: 30 tablet, Rfl: 3  •  loperamide (IMODIUM) 1 MG/5ML solution, Take 10 mL by mouth 4 (Four) Times a Day As Needed for Diarrhea., Disp: 118 mL, Rfl: 1  •  metoclopramide (REGLAN) 5 MG/5ML solution, 10 ML Per G-Tube 4 Times Per Day Before Meals X 30 Days, Disp: , Rfl:   •  naloxone (NARCAN) 0.4 MG/ML injection, Infuse 0.25 mL into a venous catheter Every 5 (Five) Minutes As Needed for Respiratory Depression., Disp: , Rfl:   •  ondansetron ODT (ZOFRAN-ODT) 4 MG disintegrating tablet, Take 1 tablet by mouth Every 6 (Six) Hours As Needed for Nausea or Vomiting., Disp: 10 tablet, Rfl: 0  •  silver sulfadiazine (SILVADENE, SSD) 1 % cream, Apply  topically to the  appropriate area as directed Every 12 (Twelve) Hours., Disp: 50 g, Rfl: 1  •  vitamin D (ERGOCALCIFEROL) 39003 units capsule capsule, 1,000 Units Daily. 1 capsule by mouth weekly on Wednesday X 3 months , Disp: , Rfl:     HPI     Pt is 59 yo male with history of throat cancer  (cancer hypopharynx) sp chemoradiation, squamous cell carcinoma of right tonsil in 2008.  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 Iron deficiency anemia, anemia of chronic disease   Underweight,  HLP., COPD(panlobar emphysema), chronic constipation, sp PEG tube, severe protein calorie malnutrition , history of SBO, sp total laryngectomy 2019, right partial pharyngectomy 2019 right thyroid lobectomy 2019 right neck dissection in 2019, currently on chemoradiation therapy, sp chemotherapy port placement (Power Port) sp closure of gastrocutaneous fistula and placement of jejunostomy tube      8/16/19 Pt is here for recheck and hospital followup.  Pt is vague historian since he has difficulty talking and has tracheostomy. He uses a pen and paper today to help communicateHe was diagnosed in 2008 with carcinoma of right tonsil.   Since last visit pt has been admitted twice to [facility] on 4/12/19 for hoarsness odynophagia and hoarseness for the past previous month. He saw ENT and had schedule a procedure but was found to have BUN CR of 122/4.24.  Pt was sent to ED also had a 20 lbs weight loss and decreased urine output. Pt was given Iv fluids D5 1/2 NS and was given IV synthroid.  Nephrology was consulted and pt underwent direct laryngoscopy with biopsies performed by Dr. Washington. A G tube placement was done by Dr. Valera on 4/15/19.  Tissues biopsies showed infiltrating sqaumous cell carcinoma.  Surgercal resection was recommended.  Pt was on tube feedings  tolerated formula and was accepted to [facility]. He was counseled to have PET scanning  . PET CT scan in April 262019 showed soft tissue thickening in the right sdie of laryngx and 3 mm right lower lobe lung nodule.  Pt was discharged on 424/19. He had office visit on 4/30/19  By Dr. Washington who recommended a potential surgical resection along with laryngopharyngectomy and likely some reconstructive procedure. He wa referred to Dr. Nolan in [facility]  He underwent a total laryngectomy right partial pharyngectomy and right thyroid lobectomy in June 12 2019   He was seen by Gastroenterology Dr. Chester on 5/21/19 and 5/28/19  Who recommend no further workup Pt was readmitted to [facility] on  7/15/19 for abdominal pain.  He was found to have a high grade SBO and undrwent surgery for lysis of adhesion on 7/17/19.  He was admitted to ICU and was on TPN for severe protein caloric malnutrition. His feedings improved He was given IV hydration.  He was also seen by Dietician.  He was discharged home in stable condition with G tube feedings boluses 5 times daily.   Pt also had acute urinary retention and was seen by Urology and Palomo Catheter was placed.  He was advised to see Urologsit in 3-4 days.  He also had hyponatremia and was treated with fluid restriction and sodium salt. He had PT/OT. Nephrology was also consulted to help with his hyponatremia with Dr. Naik.  Dr. Prieto (Oncology) was consulted.    The final pathology showed moderately differentiated squamous cell carcinoma of right hypopharynx involving the hyoid bone prevertebral  Fascia and carotid sheath.  0 of 11 regional lymph nodes were involved with metastatic cancer. He is to follow  With Oncology outpatient about possible adjuvant chemoradiation.  He is now on iron 220 mg per g tube daily. miodrine 10 mg PO TID before meals and sodium chlorid 1 gram PO TID with meals  He is currently getting Home Health for his PEG tube and  Tracheostomy care. He  also has wound on his left forearm and left leg from previous surgery. He is getting wound care and they see him 4 x a week  He is getting Home Health with [facility]  He states he gets constipated and needs a stool softener. He also has issues with his right ear along with right ear pain.     9/13/19 pt is here for recheck and followup.   sincel ast visit pt has been following Oncology for his cancer of hypopharynx.  Had PET Scan done recently that showed no recurrent or residual cancer. Pt proceeded and agreed to concurrent chemoradiation therapy. He has had issues with diarrhea and was prescribed imodium. He continues to have a tracheostomy.  Pt has history of stage IV carcinoma of right tonsil in 2008 and received chemoradiation therapy. He developed recurrence of symptoms in April 2019 and had CT of neck taht showed righ supraglottic larynx suspicious for maligancy. Laryngoscopy and biopsy on 4/15/19 showed squamous cell carinoma with tumo of the right pyriform sinus involving the lateral pharyngeal wall. PET Scan on $/26/19 showed thickening of right side of laryng and a 3 mm right lower lobe lung nodule 3 mm in size. Was referred to [facility] and underwent total laryngectomy right partial pharyngectomy right thyroid lobectomy right neck dissection on 6/12/19.  Final pathology showed squamous cell carcinoma of right hypopharynx that involved the hyoid bone prevertebral fascia and carotid sheath tissues.   0 of 11 regional lymph nodes were not involved with metastatic cancer. He is currently getting weekly cisplastin,  He did have port placement for chemotherapy performed by Dr. Kc on 9/11/19,  He also is getting iron IV therapy for iron deficiency. Had labwork on 8/19/19 that showed normal Vitamin D  TSH was at 0.055 with T4 at 2.26 and T3 at 4.84. Lipid panel showed HDL at 35 with LDL at 96 hga1c at 6.21. CMP   shwoed CR at 0.57 sodiim at 135 from 132 on 8/8/19 chloride stable at 96. GFR >150.  CBC showed hemoglobin at 9.5. Saw oncology yesterday on 9/12/19 to discuss chemotherapy. Pt is stable today. He lost 2 lbs since last visit.  No chest pain.  He continues to have diarrhea but cannot place his imodium that was prescribe.d Has appt with speech and swallow next week.  May start chemotherapy next week for 6 weeks along with 30 treatments of radiation therapy. Home health is coming once a week for tracheostomy care, PEG tube and  IV port     1/28/20 pt is here for recheck and followup since last visit. Pt is vague historian due to underlying health issues Pt has been continuing to see Radiation ONcologsit for radiation treatment for head and right on 10/15/19, 11/5/19 , 11/13/29 , 11/20/19.  Neck and hypopharynx He is also getting weekly cisplastin per Dr Prieto. He received his first round of chemotherapy on 9/25/19.  He was admitted to [facility] on 10/1/29 after suffering abdominal burning secondary to gastric tube leaking onto his abdomen for several days. As a result he had a 2 week treatment break of radiation therapy while being hospitalized Gastrostomy tube was removed but has been subsequently been replaced. He has been also seeing Nutrition regarding his tube feedings. Saw Oncology on 11/12/29 and results of recent PET scan showed no evidence of recurrent cancer. He was in nursing home. His chemotherapy was held and pt was to finish his radiation treatment. Pt saw Dr. Kc (General Surgery) on 12/2/20 for recheck on J tube.  At the time pt doing well no nausea, good bowel function and was to followup with General Surgery PRN . He developed issued with feeding tube on 1/24/20 and presented to [facility] ER and his feeding tube was flushed. Pt gained 15 lbs since last visit and weight currently at 119 lbs . He is now at [facility] [place] . His J tube is now out . He continues to have his feeding tube. He is now on insulin regimen novolog for his sugars. No chest pain no dizzines no fever  .does have right shoulder pain            Diarrhea    This is a recurrent problem. The current episode started more than 1 month ago. The patient states that diarrhea does not awaken him from sleep. Associated symptoms include arthralgias and coughing. Pertinent negatives include no abdominal pain, bloating, chills, fever, headaches, increased  flatus, myalgias, URI or vomiting. He has tried nothing (imodium ) for the symptoms. The treatment provided mild relief. There is no history of bowel resection, inflammatory bowel disease, irritable bowel syndrome, malabsorption, a recent abdominal surgery or short gut syndrome.   Weight Loss   This is a chronic problem. The current episode started more than 1 year ago. The problem occurs constantly. The problem has been waxing and waning. Associated symptoms include abdominal pain, arthralgias, a change in bowel habit, congestion, fatigue, numbness, a sore throat and weakness. Pertinent negatives include no anorexia, chest pain, chills, coughing, diaphoresis, fever, headaches, joint swelling, myalgias, nausea, neck pain, rash, swollen glands, vertigo, visual change or vomiting. Nothing aggravates the symptoms. Treatments tried: PEG tube feedings  The treatment provided mild relief.   Cancer   This is a recurrent problem. The current episode started more than 1 year ago. The problem occurs intermittently. The problem has been gradually worsening. Associated symptoms include abdominal pain, arthralgias, a change in bowel habit, congestion, fatigue, numbness, a sore throat and weakness. Pertinent negatives include no anorexia, chest pain, chills, coughing, diaphoresis, fever, headaches, joint swelling, myalgias, nausea, neck pain, rash, swollen glands, vertigo, visual change or vomiting. Nothing aggravates the symptoms. Treatments tried: throat surgery/ chemoradiation therapy  The treatment provided mild relief.   Thyroid Problem   Presents for follow-up visit. Symptoms  include fatigue and weight loss. Patient reports no anxiety, cold intolerance, constipation, depressed mood, diaphoresis, diarrhea, dry skin, hair loss, heat intolerance, hoarse voice, leg swelling, menstrual problem, nail problem, palpitations, tremors or visual change. The symptoms have been stable.   Sore Throat    This is a new problem. The current episode started in the past 7 days. The problem has been unchanged. There has been no fever. The pain is at a severity of 4/10. The pain is mild. Associated symptoms include congestion, coughing, a plugged ear sensation and trouble swallowing. Pertinent negatives include no abdominal pain, diarrhea, drooling, ear discharge, ear pain, headaches, hoarse voice, neck pain, shortness of breath, stridor, swollen glands or vomiting. He has tried nothing for the symptoms. The treatment provided no relief.   Cough   This is a new problem. The current episode started more than 1 year ago. The problem has been unchanged. Associated symptoms include a sore throat. Pertinent negatives include no chest pain, chills, ear congestion, ear pain, fever, headaches, heartburn, hemoptysis, myalgias, nasal congestion, postnasal drip or shortness of breath    Review of Systems  Review of Systems   Constitutional: Positive for activity change, fatigue and unexpected weight change. Negative for appetite change, chills, diaphoresis and fever.   HENT: Positive for congestion, drooling, sore throat, trouble swallowing and voice change. Negative for postnasal drip, rhinorrhea, sinus pressure, sinus pain and sneezing.    Respiratory: Negative for cough, choking, chest tightness, shortness of breath, wheezing and stridor.    Cardiovascular: Negative for chest pain.   Gastrointestinal: Negative for diarrhea, nausea and vomiting.   Musculoskeletal: Positive for arthralgias and back pain.   Neurological: Positive for weakness and numbness. Negative for headaches.       Patient Active Problem List    Diagnosis   • Hyperkalemia   • Iron deficiency anemia   • Gastroesophageal reflux disease with esophagitis   • Elevated AST (SGOT)   • Alcohol abuse   • Hypothyroidism   • Balance problem   • Need for vaccination   • Low magnesium levels   • Squamous cell carcinoma of pharynx (CMS/HCC)   • History of throat cancer   • Vitamin D deficiency   • Alcohol abuse counseling and surveillance   • Encounter for screening for diabetes mellitus   • Hypomagnesemia   • Iron deficiency anemia   • Hyperlipidemia   • Underweight due to inadequate caloric intake   • Need for immunization against influenza   • 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HCC)   • Hypertension   • General medical examination   • Underweight   • Epigastric pain   • Gastritis without bleeding   • Need for influenza vaccination   • Elevated liver function tests   • B12 deficiency   • Intestinal malabsorption   • Throat pain   • Acute pharyngitis   • Upper respiratory tract infection   • Neoplasm of uncertain behavior of larynx   • Pharyngoesophageal dysphagia   • Severe protein-calorie malnutrition (CMS/HCC)   • Anemia of chronic disease   • Hypotension   • Hyponatremia   • Status post insertion of percutaneous endoscopic gastrostomy (PEG) tube (CMS/HCC)   • Hx of tracheostomy   • History of throat surgery   • Hx SBO   • Urinary retention   • Generalized weakness   • Acute otitis externa of right ear   • Hard stool   • Panlobular emphysema (CMS/HCC)   • Cancer of hypopharynx (CMS/HCC)   • Diarrhea   • Encounter for venous access device care   • Prediabetes   • Status post neck dissection   • S/P partial thyroidectomy   • S/P laryngectomy   • Annual physical exam   • Alcoholic cirrhosis of liver without ascites (CMS/HCC)   • Jejunostomy tube in situ (CMS/HCC)     Past Surgical History:   Procedure Laterality Date   • ABDOMINAL WALL SURGERY  11/04/2008    Laparotomy with repair of stomach wall perforation. 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 Left upper quadrant abdominal wall  abscess debridement. Gastrostomy tube erosion through & through the anterior gastric wall.Lupper quadrant abdominal wall abscess   • COLONOSCOPY  04/21/2014    Internal & external hemorrhoids found.   • COLONOSCOPY  2014    [place]   • COLONOSCOPY N/A 10/16/2018    Procedure: COLONOSCOPY;  Surgeon: Kaushal Chester MD;  Location: [facility] ENDOSCOPY;  Service: Gastroenterology   • DIAGNOSTIC LAPAROSCOPY EXPLORATORY LAPAROTOMY N/A 7/17/2019    Procedure: DIAGNOSTIC LAPAROSCOPY, EXPLORATORY LAPAROTOMY, LYSIS OF ADHESIONS;  Surgeon: Parminder Kc MD;  Location: [facility] OR;  Service: General   • DIRECT LARYNGOSCOPY, ESOPHAGOSCOPY, BRONCHOSCOPY N/A 4/15/2019    Procedure: DIRECT LARYNGOSCOPY with biopsy, ESOPHAGOSCOPY;  Surgeon: Bharathi Washington MD;  Location: [facility] OR;  Service: ENT   • ENDOSCOPY N/A 10/16/2018    Procedure: ESOPHAGOGASTRODUODENOSCOPY;  Surgeon: Kaushal Chester MD;  Location: [facility] ENDOSCOPY;  Service: Gastroenterology   • GASTROCUTANEOUS FISTULA CLOSURE N/A 10/2/2019    Procedure: GASTROCUTANEOUS FISTULA CLOSURE;  Surgeon: Parminder Kc MD;  Location: [facility] OR;  Service: General   • GASTROSTOMY FEEDING TUBE INSERTION N/A 4/15/2019    Procedure: GASTROSTOMY FEEDING TUBE INSERTION;  Surgeon: Trenton Valera MD;  Location: [facility] OR;  Service: General   • JEJUNOSTOMY N/A 10/2/2019    Procedure: JEJUNOSTOMY;  Surgeon: Parminder Kc MD;  Location: [facility] OR;  Service: General   • TRACHEOSTOMY  11/10/2008    Respiratory failure   • UPPER GASTROINTESTINAL ENDOSCOPY  04/21/2014    Esophagitis seen. Biopsy taken. Gastritis in stomach. Biopsy taken. Normal duodenum. Biopsy taken.   • UPPER GASTROINTESTINAL ENDOSCOPY  10/16/2018   • VENOUS ACCESS DEVICE (PORT) INSERTION N/A 9/11/2019    Procedure: INSERTION VENOUS ACCESS DEVICE (MEDIPORT)        (c-arm#1);  Surgeon: Parminder Kc MD;  Location: [facility] OR;  Service: General     Social History     Socioeconomic History   • Marital status:  "Single     Spouse name: Not on file   • Number of children: Not on file   • Years of education: Not on file   • Highest education level: Not on file   Occupational History   • Occupation: Disabled     Comment: Patient resides alone.   Tobacco Use   • Smoking status: Former Smoker     Packs/day: 1.50     Years: 30.00     Pack years: 45.00     Types: Cigarettes     Last attempt to quit:      Years since quittin.0   • Smokeless tobacco: Former User     Quit date: 2009   Substance and Sexual Activity   • Alcohol use: No     Comment: 2019 - Patient confirmed heavy alcoholic beverage consumption in past with current consumption of 2 - 3 beers twice per week.   • Drug use: No   • Sexual activity: Defer     Family History   Problem Relation Age of Onset   • Coronary artery disease Mother    • Diabetes Mother    • Other Mother         \"Heart And Lung Problems\"   • Lung cancer Mother    • Ovarian cancer Sister    • Heart disease Other    • Stroke Other    • Hypertension Other    • Diabetes Other    • Cancer Other    • Colon polyps Other    • Other Father         Unknown   • Other Other         \"Lung Problems\"   • Thyroid disease Neg Hx      Lab on 2019   Component Date Value Ref Range Status   • Glucose 2019 102* 65 - 99 mg/dL Final   • BUN 2019 21  8 - 23 mg/dL Final   • Creatinine 2019 0.58* 0.76 - 1.27 mg/dL Final   • Sodium 2019 132* 136 - 145 mmol/L Final   • Potassium 2019 3.9  3.5 - 5.2 mmol/L Final   • Chloride 2019 93* 98 - 107 mmol/L Final   • CO2 2019 30.0* 22.0 - 29.0 mmol/L Final   • Calcium 2019 9.3  8.6 - 10.5 mg/dL Final   • Total Protein 2019 7.9  6.0 - 8.5 g/dL Final   • Albumin 2019 4.40  3.50 - 5.20 g/dL Final   • ALT (SGPT) 2019 13  1 - 41 U/L Final   • AST (SGOT) 2019 18  1 - 40 U/L Final   • Alkaline Phosphatase 2019 78  39 - 117 U/L Final   • Total Bilirubin 2019 0.2  0.2 - 1.2 mg/dL Final   • " eGFR   Amer 11/12/2019 >150  >60 mL/min/1.73 Final   • Globulin 11/12/2019 3.5  gm/dL Final   • A/G Ratio 11/12/2019 1.3  g/dL Final   • BUN/Creatinine Ratio 11/12/2019 36.2* 7.0 - 25.0 Final   • Anion Gap 11/12/2019 9.0  5.0 - 15.0 mmol/L Final   • Ferritin 11/12/2019 1,374.00* 30.00 - 400.00 ng/mL Final   • Iron 11/12/2019 124  59 - 158 mcg/dL Final   • Iron Saturation 11/12/2019 38  20 - 50 % Final   • Transferrin 11/12/2019 219  200 - 360 mg/dL Final   • TIBC 11/12/2019 326  298 - 536 mcg/dL Final   • WBC 11/12/2019 9.12  3.40 - 10.80 10*3/mm3 Final   • RBC 11/12/2019 4.08* 4.14 - 5.80 10*6/mm3 Final   • Hemoglobin 11/12/2019 11.7* 13.0 - 17.7 g/dL Final   • Hematocrit 11/12/2019 33.1* 37.5 - 51.0 % Final   • MCV 11/12/2019 81.1  79.0 - 97.0 fL Final   • MCH 11/12/2019 28.7  26.6 - 33.0 pg Final   • MCHC 11/12/2019 35.3  31.5 - 35.7 g/dL Final   • RDW 11/12/2019 17.5* 12.3 - 15.4 % Final   • RDW-SD 11/12/2019 52.0  37.0 - 54.0 fl Final   • MPV 11/12/2019 9.6  6.0 - 12.0 fL Final   • Platelets 11/12/2019 378  140 - 450 10*3/mm3 Final   • Neutrophil % 11/12/2019 59.7  42.7 - 76.0 % Final   • Lymphocyte % 11/12/2019 21.4  19.6 - 45.3 % Final   • Monocyte % 11/12/2019 17.4* 5.0 - 12.0 % Final   • Eosinophil % 11/12/2019 0.4  0.3 - 6.2 % Final   • Basophil % 11/12/2019 0.7  0.0 - 1.5 % Final   • Immature Grans % 11/12/2019 0.4  0.0 - 0.5 % Final   • Neutrophils, Absolute 11/12/2019 5.44  1.70 - 7.00 10*3/mm3 Final   • Lymphocytes, Absolute 11/12/2019 1.95  0.70 - 3.10 10*3/mm3 Final   • Monocytes, Absolute 11/12/2019 1.59* 0.10 - 0.90 10*3/mm3 Final   • Eosinophils, Absolute 11/12/2019 0.04  0.00 - 0.40 10*3/mm3 Final   • Basophils, Absolute 11/12/2019 0.06  0.00 - 0.20 10*3/mm3 Final   • Immature Grans, Absolute 11/12/2019 0.04  0.00 - 0.05 10*3/mm3 Final   • nRBC 11/12/2019 0.0  0.0 - 0.2 /100 WBC Final   No results displayed because visit has over 200 results.      Admission on 09/27/2019, Discharged on  09/28/2019   Component Date Value Ref Range Status   • Lipase 09/27/2019 9* 13 - 60 U/L Final   • Glucose 09/27/2019 102* 65 - 99 mg/dL Final   • BUN 09/27/2019 29* 8 - 23 mg/dL Final   • Creatinine 09/27/2019 0.54* 0.76 - 1.27 mg/dL Final   • Sodium 09/27/2019 136  136 - 145 mmol/L Final   • Potassium 09/27/2019 4.1  3.5 - 5.2 mmol/L Final   • Chloride 09/27/2019 97* 98 - 107 mmol/L Final   • CO2 09/27/2019 29.0  22.0 - 29.0 mmol/L Final   • Calcium 09/27/2019 8.9  8.6 - 10.5 mg/dL Final   • Total Protein 09/27/2019 8.5  6.0 - 8.5 g/dL Final   • Albumin 09/27/2019 4.40  3.50 - 5.20 g/dL Final   • ALT (SGPT) 09/27/2019 27  1 - 41 U/L Final   • AST (SGOT) 09/27/2019 33  1 - 40 U/L Final   • Alkaline Phosphatase 09/27/2019 82  39 - 117 U/L Final   • Total Bilirubin 09/27/2019 0.2  0.2 - 1.2 mg/dL Final   • eGFR   Amer 09/27/2019 >150  >60 mL/min/1.73 Final   • Globulin 09/27/2019 4.1  gm/dL Final   • A/G Ratio 09/27/2019 1.1  g/dL Final   • BUN/Creatinine Ratio 09/27/2019 53.7* 7.0 - 25.0 Final   • Anion Gap 09/27/2019 10.0  5.0 - 15.0 mmol/L Final   • Color, UA 09/27/2019 Yellow  Yellow, Straw, Dark Yellow, Lisa Final   • Appearance, UA 09/27/2019 Cloudy* Clear Final   • pH, UA 09/27/2019 6.0  5.0 - 9.0 Final   • Specific Gravity, UA 09/27/2019 1.015  1.003 - 1.030 Final   • Glucose, UA 09/27/2019 Negative  Negative Final   • Ketones, UA 09/27/2019 Trace* Negative Final   • Bilirubin, UA 09/27/2019 Negative  Negative Final   • Blood, UA 09/27/2019 Trace* Negative Final   • Protein, UA 09/27/2019 Trace* Negative Final   • Leuk Esterase, UA 09/27/2019 Large (3+)* Negative Final   • Nitrite, UA 09/27/2019 Positive* Negative Final   • Urobilinogen, UA 09/27/2019 0.2 E.U./dL  0.2 - 1.0 E.U./dL Final   • WBC 09/27/2019 7.45  3.40 - 10.80 10*3/mm3 Final   • RBC 09/27/2019 4.46  4.14 - 5.80 10*6/mm3 Final   • Hemoglobin 09/27/2019 11.6* 13.0 - 17.7 g/dL Final   • Hematocrit 09/27/2019 33.4* 37.5 - 51.0 % Final   •  MCV 09/27/2019 74.9* 79.0 - 97.0 fL Final   • MCH 09/27/2019 26.0* 26.6 - 33.0 pg Final   • MCHC 09/27/2019 34.7  31.5 - 35.7 g/dL Final   • RDW 09/27/2019 18.0* 12.3 - 15.4 % Final   • RDW-SD 09/27/2019 49.1  37.0 - 54.0 fl Final   • MPV 09/27/2019 10.8  6.0 - 12.0 fL Final   • Platelets 09/27/2019 394  140 - 450 10*3/mm3 Final   • Neutrophil % 09/27/2019 51.9  42.7 - 76.0 % Final   • Lymphocyte % 09/27/2019 33.0  19.6 - 45.3 % Final   • Monocyte % 09/27/2019 12.6* 5.0 - 12.0 % Final   • Eosinophil % 09/27/2019 1.5  0.3 - 6.2 % Final   • Basophil % 09/27/2019 0.9  0.0 - 1.5 % Final   • Immature Grans % 09/27/2019 0.1  0.0 - 0.5 % Final   • Neutrophils, Absolute 09/27/2019 3.86  1.70 - 7.00 10*3/mm3 Final   • Lymphocytes, Absolute 09/27/2019 2.46  0.70 - 3.10 10*3/mm3 Final   • Monocytes, Absolute 09/27/2019 0.94* 0.10 - 0.90 10*3/mm3 Final   • Eosinophils, Absolute 09/27/2019 0.11  0.00 - 0.40 10*3/mm3 Final   • Basophils, Absolute 09/27/2019 0.07  0.00 - 0.20 10*3/mm3 Final   • Immature Grans, Absolute 09/27/2019 0.01  0.00 - 0.05 10*3/mm3 Final   • nRBC 09/27/2019 0.0  0.0 - 0.2 /100 WBC Final   • RBC, UA 09/27/2019 0-2* None Seen /HPF Final   • WBC, UA 09/27/2019 Too Numerous to Count* None Seen, 0-2, 3-5 /HPF Final   • Bacteria, UA 09/27/2019 4+* None Seen /HPF Final   • Squamous Epithelial Cells, UA 09/27/2019 None Seen  None Seen, 0-2 /HPF Final   • Hyaline Casts, UA 09/27/2019 None Seen  None Seen /LPF Final   • Methodology 09/27/2019 Automated Microscopy   Final   • Urine Culture 09/27/2019 >100,000 CFU/mL Klebsiella aerogenes*  Final   Infusion on 09/25/2019   Component Date Value Ref Range Status   • Glucose 09/25/2019 149* 65 - 99 mg/dL Final   • BUN 09/25/2019 27* 8 - 23 mg/dL Final   • Creatinine 09/25/2019 0.50* 0.76 - 1.27 mg/dL Final   • Sodium 09/25/2019 136  136 - 145 mmol/L Final   • Potassium 09/25/2019 3.9  3.5 - 5.2 mmol/L Final   • Chloride 09/25/2019 100  98 - 107 mmol/L Final   • CO2  09/25/2019 27.0  22.0 - 29.0 mmol/L Final   • Calcium 09/25/2019 9.0  8.6 - 10.5 mg/dL Final   • Total Protein 09/25/2019 8.1  6.0 - 8.5 g/dL Final   • Albumin 09/25/2019 4.20  3.50 - 5.20 g/dL Final   • ALT (SGPT) 09/25/2019 29  1 - 41 U/L Final   • AST (SGOT) 09/25/2019 39  1 - 40 U/L Final   • Alkaline Phosphatase 09/25/2019 79  39 - 117 U/L Final   • Total Bilirubin 09/25/2019 <0.2* 0.2 - 1.2 mg/dL Final   • eGFR   Amer 09/25/2019 >150  >60 mL/min/1.73 Final   • Globulin 09/25/2019 3.9  gm/dL Final   • A/G Ratio 09/25/2019 1.1  g/dL Final   • BUN/Creatinine Ratio 09/25/2019 54.0* 7.0 - 25.0 Final   • Anion Gap 09/25/2019 9.0  5.0 - 15.0 mmol/L Final   • Magnesium 09/25/2019 2.2  1.6 - 2.4 mg/dL Final   • WBC 09/25/2019 9.20  3.40 - 10.80 10*3/mm3 Final   • RBC 09/25/2019 4.03* 4.14 - 5.80 10*6/mm3 Final   • Hemoglobin 09/25/2019 10.4* 13.0 - 17.7 g/dL Final   • Hematocrit 09/25/2019 30.0* 37.5 - 51.0 % Final   • MCV 09/25/2019 74.4* 79.0 - 97.0 fL Final   • MCH 09/25/2019 25.8* 26.6 - 33.0 pg Final   • MCHC 09/25/2019 34.7  31.5 - 35.7 g/dL Final   • RDW 09/25/2019 17.7* 12.3 - 15.4 % Final   • RDW-SD 09/25/2019 47.6  37.0 - 54.0 fl Final   • MPV 09/25/2019 10.4  6.0 - 12.0 fL Final   • Platelets 09/25/2019 380  140 - 450 10*3/mm3 Final   • Neutrophil % 09/25/2019 74.1  42.7 - 76.0 % Final   • Lymphocyte % 09/25/2019 17.8* 19.6 - 45.3 % Final   • Monocyte % 09/25/2019 5.0  5.0 - 12.0 % Final   • Eosinophil % 09/25/2019 2.0  0.3 - 6.2 % Final   • Basophil % 09/25/2019 0.9  0.0 - 1.5 % Final   • Immature Grans % 09/25/2019 0.2  0.0 - 0.5 % Final   • Neutrophils, Absolute 09/25/2019 6.82  1.70 - 7.00 10*3/mm3 Final   • Lymphocytes, Absolute 09/25/2019 1.64  0.70 - 3.10 10*3/mm3 Final   • Monocytes, Absolute 09/25/2019 0.46  0.10 - 0.90 10*3/mm3 Final   • Eosinophils, Absolute 09/25/2019 0.18  0.00 - 0.40 10*3/mm3 Final   • Basophils, Absolute 09/25/2019 0.08  0.00 - 0.20 10*3/mm3 Final   • Immature Grans,  Absolute 09/25/2019 0.02  0.00 - 0.05 10*3/mm3 Final   • nRBC 09/25/2019 0.0  0.0 - 0.2 /100 WBC Final   Appointment on 09/06/2019   Component Date Value Ref Range Status   • PTT 09/06/2019 32.5  20.0 - 40.3 seconds Final   • Protime 09/06/2019 16.4* 11.1 - 15.3 Seconds Final   • INR 09/06/2019 1.34* 0.80 - 1.20 Final   Lab on 08/19/2019   Component Date Value Ref Range Status   • WBC 08/19/2019 7.32  3.40 - 10.80 10*3/mm3 Final   • RBC 08/19/2019 3.76* 4.14 - 5.80 10*6/mm3 Final   • Hemoglobin 08/19/2019 9.5* 13.0 - 17.7 g/dL Final   • Hematocrit 08/19/2019 28.7* 37.5 - 51.0 % Final   • MCV 08/19/2019 76.3* 79.0 - 97.0 fL Final   • MCH 08/19/2019 25.3* 26.6 - 33.0 pg Final   • MCHC 08/19/2019 33.1  31.5 - 35.7 g/dL Final   • RDW 08/19/2019 17.8* 12.3 - 15.4 % Final   • RDW-SD 08/19/2019 48.7  37.0 - 54.0 fl Final   • MPV 08/19/2019 12.7* 6.0 - 12.0 fL Final   • Platelets 08/19/2019 391  140 - 450 10*3/mm3 Final   • Neutrophil % 08/19/2019 52.0  42.7 - 76.0 % Final   • Lymphocyte % 08/19/2019 27.5  19.6 - 45.3 % Final   • Monocyte % 08/19/2019 17.5* 5.0 - 12.0 % Final   • Eosinophil % 08/19/2019 1.6  0.3 - 6.2 % Final   • Basophil % 08/19/2019 1.1  0.0 - 1.5 % Final   • Immature Grans % 08/19/2019 0.3  0.0 - 0.5 % Final   • Neutrophils, Absolute 08/19/2019 3.81  1.70 - 7.00 10*3/mm3 Final   • Lymphocytes, Absolute 08/19/2019 2.01  0.70 - 3.10 10*3/mm3 Final   • Monocytes, Absolute 08/19/2019 1.28* 0.10 - 0.90 10*3/mm3 Final   • Eosinophils, Absolute 08/19/2019 0.12  0.00 - 0.40 10*3/mm3 Final   • Basophils, Absolute 08/19/2019 0.08  0.00 - 0.20 10*3/mm3 Final   • Immature Grans, Absolute 08/19/2019 0.02  0.00 - 0.05 10*3/mm3 Final   • nRBC 08/19/2019 0.0  0.0 - 0.2 /100 WBC Final   • Glucose 08/19/2019 110* 65 - 99 mg/dL Final   • BUN 08/19/2019 17  8 - 23 mg/dL Final   • Creatinine 08/19/2019 0.57* 0.76 - 1.27 mg/dL Final   • Sodium 08/19/2019 135* 136 - 145 mmol/L Final   • Potassium 08/19/2019 4.4  3.5 - 5.2  mmol/L Final   • Chloride 08/19/2019 96* 98 - 107 mmol/L Final   • CO2 08/19/2019 25.7  22.0 - 29.0 mmol/L Final   • Calcium 08/19/2019 10.5  8.6 - 10.5 mg/dL Final   • Total Protein 08/19/2019 8.5  6.0 - 8.5 g/dL Final   • Albumin 08/19/2019 3.90  3.50 - 5.20 g/dL Final   • ALT (SGPT) 08/19/2019 12  1 - 41 U/L Final   • AST (SGOT) 08/19/2019 18  1 - 40 U/L Final   • Alkaline Phosphatase 08/19/2019 84  39 - 117 U/L Final   • Total Bilirubin 08/19/2019 0.2  0.2 - 1.2 mg/dL Final   • eGFR   Amer 08/19/2019 >150  >60 mL/min/1.73 Final   • Globulin 08/19/2019 4.6  gm/dL Final   • A/G Ratio 08/19/2019 0.8  g/dL Final   • BUN/Creatinine Ratio 08/19/2019 29.8* 7.0 - 25.0 Final   • Anion Gap 08/19/2019 13.3  5.0 - 15.0 mmol/L Final   • Hemoglobin A1C 08/19/2019 6.21* 4.80 - 5.60 % Final   • Total Cholesterol 08/19/2019 140  0 - 200 mg/dL Final   • Triglycerides 08/19/2019 47  0 - 150 mg/dL Final   • HDL Cholesterol 08/19/2019 35* 40 - 60 mg/dL Final   • LDL Cholesterol  08/19/2019 96  0 - 100 mg/dL Final   • VLDL Cholesterol 08/19/2019 9.4  5 - 40 mg/dL Final   • LDL/HDL Ratio 08/19/2019 2.73   Final   • TSH 08/19/2019 0.055* 0.270 - 4.200 mIU/mL Final   • Free T4 08/19/2019 2.26* 0.93 - 1.70 ng/dL Final   • T3, Free 08/19/2019 4.84* 2.00 - 4.40 pg/mL Final   • 25 Hydroxy, Vitamin D 08/19/2019 30.3  30.0 - 100.0 ng/ml Final   • Vitamin B-12 08/19/2019 1,619* 211 - 946 pg/mL Final   Lab on 08/16/2019   Component Date Value Ref Range Status   • Ferritin 08/16/2019 1,247.00* 30.00 - 400.00 ng/mL Final   • Iron 08/16/2019 39* 59 - 158 mcg/dL Final   • Iron Saturation 08/16/2019 13* 20 - 50 % Final   • Transferrin 08/16/2019 205  200 - 360 mg/dL Final   • TIBC 08/16/2019 305  298 - 536 mcg/dL Final   • WBC 08/16/2019 9.80  3.40 - 10.80 10*3/mm3 Final   • RBC 08/16/2019 3.54* 4.14 - 5.80 10*6/mm3 Final   • Hemoglobin 08/16/2019 8.8* 13.0 - 17.7 g/dL Final   • Hematocrit 08/16/2019 26.1* 37.5 - 51.0 % Final   • MCV  08/16/2019 73.7* 79.0 - 97.0 fL Final   • MCH 08/16/2019 24.9* 26.6 - 33.0 pg Final   • MCHC 08/16/2019 33.7  31.5 - 35.7 g/dL Final   • RDW 08/16/2019 17.4* 12.3 - 15.4 % Final   • RDW-SD 08/16/2019 45.7  37.0 - 54.0 fl Final   • MPV 08/16/2019 11.2  6.0 - 12.0 fL Final   • Platelets 08/16/2019 360  140 - 450 10*3/mm3 Final   • Neutrophil % 08/16/2019 62.2  42.7 - 76.0 % Final   • Lymphocyte % 08/16/2019 20.5  19.6 - 45.3 % Final   • Monocyte % 08/16/2019 15.1* 5.0 - 12.0 % Final   • Eosinophil % 08/16/2019 1.2  0.3 - 6.2 % Final   • Basophil % 08/16/2019 0.7  0.0 - 1.5 % Final   • Immature Grans % 08/16/2019 0.3  0.0 - 0.5 % Final   • Neutrophils, Absolute 08/16/2019 6.09  1.70 - 7.00 10*3/mm3 Final   • Lymphocytes, Absolute 08/16/2019 2.01  0.70 - 3.10 10*3/mm3 Final   • Monocytes, Absolute 08/16/2019 1.48* 0.10 - 0.90 10*3/mm3 Final   • Eosinophils, Absolute 08/16/2019 0.12  0.00 - 0.40 10*3/mm3 Final   • Basophils, Absolute 08/16/2019 0.07  0.00 - 0.20 10*3/mm3 Final   • Immature Grans, Absolute 08/16/2019 0.03  0.00 - 0.05 10*3/mm3 Final   • nRBC 08/16/2019 0.0  0.0 - 0.2 /100 WBC Final   No results displayed because visit has over 200 results.         XR Abdomen KUB  Narrative: Abdomen two view on 9/27/2019    CLINICAL INDICATION: Feeding tube evaluation    COMPARISON: 7/26/2019    FINDINGS: Two images were obtained with one  exam and a  second image obtained following the administration of 10 mL of  Isovue-300 contrast through the patient's abdominal feeding tube.  Bowel gas pattern is unremarkable. Contrast is noted in the  duodenum with no spillage of contrast. No abnormal calcification  or mass effect is noted. No bony abnormality is noted.  Impression: Patient's feeding tube is apparently within the  proximal duodenum.    Electronically signed by:  Cameron Lujan  9/27/2019 11:43 PM  CDT Workstation: [id number]    @[email or web address]@  Immunization History   Administered Date(s) Administered   • Flu Mist  "10/19/2012   • Flu Vaccine Quad PF >36MO 11/01/2017   • Tdap 02/13/2017   • Tetanus 01/01/2009   • flucelvax quad pfs =>4 YRS 11/15/2018, 10/24/2019   • influenza Split 10/07/2016       The following portions of the patient's history were reviewed and updated as appropriate: allergies, current medications, past family history, past medical history, past social history, past surgical history and problem list.        Physical Exam  BP 96/58 (BP Location: Left arm, Patient Position: Sitting, Cuff Size: Adult)   Pulse 82   Temp 98.5 °F (36.9 °C) (Oral)   Ht 165.1 cm (65\")   Wt 54.3 kg (119 lb 9.6 oz)   SpO2 98%   BMI 19.90 kg/m²     Physical Exam   Constitutional: He is oriented to person, place, and time. He appears well-developed and well-nourished.   HENT:   Head: Normocephalic and atraumatic.   Right Ear: External ear normal.   Eyes: Pupils are equal, round, and reactive to light. Conjunctivae and EOM are normal.   Neck: Normal range of motion. Neck supple.   Cardiovascular: Normal rate and regular rhythm.   Murmur heard.  Pulmonary/Chest: Effort normal and breath sounds normal. No respiratory distress.   Abdominal: Soft. Bowel sounds are normal. He exhibits no distension. There is no tenderness.   Active bowel sounds    No pain or tendernss on palpation    Feeding tube on lower right quadrant of abdomen   Musculoskeletal: Normal range of motion. He exhibits no edema or deformity.   Neurological: He is alert and oriented to person, place, and time. No cranial nerve deficit.   Skin: Skin is warm. No rash noted. He is not diaphoretic. No erythema. No pallor.   Psychiatric: He has a normal mood and affect. His behavior is normal.   Nursing note and vitals reviewed.      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 S/P " laryngectomy     3. S/P partial thyroidectomy     4. Status post insertion of percutaneous endoscopic gastrostomy (PEG) tube (CMS/HCC)     5. Status post neck dissection     6. Vitamin D deficiency     7. Iron deficiency anemia, unspecified iron deficiency anemia type     8. Essential hypertension     9. Hyperlipidemia, unspecified hyperlipidemia type     10. Hx of tracheostomy     11. Hx SBO     12. Generalized weakness     13. Cancer of hypopharynx (CMS/HCC)  CBC Auto Differential    Comprehensive Metabolic Panel    Hemoglobin A1c    Lipid Panel    TSH    T4, Free    T3, Free    Vitamin D 25 Hydroxy   14. Alcohol abuse counseling and surveillance     15. B12 deficiency            -recommend new labwork   -annual physical exam today   -pt now with [facility]. Pt currently continues to get tube feedings.  carlie is up fro malst visit   -diarrhea - on imodium. Refilled medication today  -continue home health for tracheostomy care, Port Care and Peg Tube care    -reviewed last [facility] ER note.     -chronic pain - currently on Norco and gabapentin  -check hga1c - pt now on insulin novolog   -underweight - currently has PEG tube and is getting tube feedings. Weight stable from last visit in April   -squamous cell carcinoma of pharynx ENT following   Throat surgeyr.  [facility] following along with Radiation oncology with DR. Vázquez. Pt has [facility] chemoradiation therapy. Has upcoming appt with Dr. Washington and Dr. Alfred in February 2020   -vitamin D deficiency on vitamin D supplements  -HLP- on tricor   -urinary retention off cardura   -hyponatremia/hypochlroemia  - recheck   -history of SBO - sp surgery.  General Surgery following.sp exploratory laparotomy lysis of adhession.    -advised to go to ER or call 911 if symptoms worrisome or sever  -continue synthroid 1125 mcg PO q daily for hypothryoidism - Endocrinology following. Pt will need to make an appt. Last TSH low and T3 and T4 elevated.  Recommend synthroid 175 mcg for 6 days a week  -alcohol abuse/ - counseled pt >5 minutes to quit drinking alcohol. Gave information on withdrawal  he has not had alcohol    -alcohol cirrhosis - Gastroenterology following. He will also need to make appt         This document has been electronically signed by Cristino He MD on January 28, 2020 11:16 AM

## 2020-01-28 ENCOUNTER — OFFICE VISIT (OUTPATIENT)
Dept: FAMILY MEDICINE CLINIC | Facility: CLINIC | Age: 60
End: 2020-01-28

## 2020-01-28 ENCOUNTER — LAB (OUTPATIENT)
Dept: LAB | Facility: HOSPITAL | Age: 60
End: 2020-01-28

## 2020-01-28 VITALS
SYSTOLIC BLOOD PRESSURE: 96 MMHG | OXYGEN SATURATION: 98 % | HEART RATE: 82 BPM | HEIGHT: 65 IN | DIASTOLIC BLOOD PRESSURE: 58 MMHG | TEMPERATURE: 98.5 F | BODY MASS INDEX: 19.93 KG/M2 | WEIGHT: 119.6 LBS

## 2020-01-28 DIAGNOSIS — C13.9 CANCER OF HYPOPHARYNX (HCC): ICD-10-CM

## 2020-01-28 DIAGNOSIS — E53.8 B12 DEFICIENCY: ICD-10-CM

## 2020-01-28 DIAGNOSIS — Z93.1 STATUS POST INSERTION OF PERCUTANEOUS ENDOSCOPIC GASTROSTOMY (PEG) TUBE (HCC): ICD-10-CM

## 2020-01-28 DIAGNOSIS — E89.0 S/P PARTIAL THYROIDECTOMY: ICD-10-CM

## 2020-01-28 DIAGNOSIS — C14.0 SQUAMOUS CELL CARCINOMA OF PHARYNX (HCC): Primary | ICD-10-CM

## 2020-01-28 DIAGNOSIS — Z98.890 STATUS POST NECK DISSECTION: ICD-10-CM

## 2020-01-28 DIAGNOSIS — Z98.890 HX OF TRACHEOSTOMY: ICD-10-CM

## 2020-01-28 DIAGNOSIS — I10 ESSENTIAL HYPERTENSION: Chronic | ICD-10-CM

## 2020-01-28 DIAGNOSIS — C14.0 SQUAMOUS CELL CARCINOMA OF PHARYNX (HCC): ICD-10-CM

## 2020-01-28 DIAGNOSIS — Z90.02 S/P LARYNGECTOMY: ICD-10-CM

## 2020-01-28 DIAGNOSIS — E78.5 HYPERLIPIDEMIA, UNSPECIFIED HYPERLIPIDEMIA TYPE: ICD-10-CM

## 2020-01-28 DIAGNOSIS — R53.1 GENERALIZED WEAKNESS: ICD-10-CM

## 2020-01-28 DIAGNOSIS — Z87.19 HX SBO: ICD-10-CM

## 2020-01-28 DIAGNOSIS — E55.9 VITAMIN D DEFICIENCY: ICD-10-CM

## 2020-01-28 DIAGNOSIS — Z71.41 ALCOHOL ABUSE COUNSELING AND SURVEILLANCE: ICD-10-CM

## 2020-01-28 DIAGNOSIS — D50.9 IRON DEFICIENCY ANEMIA, UNSPECIFIED IRON DEFICIENCY ANEMIA TYPE: ICD-10-CM

## 2020-01-28 PROBLEM — Z93.4 JEJUNOSTOMY TUBE IN SITU: Status: ACTIVE | Noted: 2020-01-28

## 2020-01-28 PROCEDURE — 99214 OFFICE O/P EST MOD 30 MIN: CPT | Performed by: FAMILY MEDICINE

## 2020-01-28 PROCEDURE — 84439 ASSAY OF FREE THYROXINE: CPT

## 2020-01-28 PROCEDURE — 85025 COMPLETE CBC W/AUTO DIFF WBC: CPT

## 2020-01-28 PROCEDURE — 83735 ASSAY OF MAGNESIUM: CPT

## 2020-01-28 PROCEDURE — 82306 VITAMIN D 25 HYDROXY: CPT

## 2020-01-28 PROCEDURE — 83036 HEMOGLOBIN GLYCOSYLATED A1C: CPT

## 2020-01-28 PROCEDURE — 80061 LIPID PANEL: CPT

## 2020-01-28 PROCEDURE — 84481 FREE ASSAY (FT-3): CPT

## 2020-01-28 PROCEDURE — 84443 ASSAY THYROID STIM HORMONE: CPT

## 2020-01-28 PROCEDURE — 80053 COMPREHEN METABOLIC PANEL: CPT

## 2020-01-28 RX ORDER — GABAPENTIN 100 MG/1
100 CAPSULE ORAL DAILY
COMMUNITY
Start: 2020-01-06 | End: 2020-06-25 | Stop reason: DRUGHIGH

## 2020-01-28 RX ORDER — DEXAMETHASONE 0.5 MG/5ML
0.5 ELIXIR ORAL 2 TIMES DAILY
COMMUNITY
Start: 2020-01-20

## 2020-01-29 LAB
25(OH)D3 SERPL-MCNC: 17.8 NG/ML (ref 30–100)
ALBUMIN SERPL-MCNC: 4.7 G/DL (ref 3.5–5.2)
ALBUMIN/GLOB SERPL: 1.4 G/DL
ALP SERPL-CCNC: 60 U/L (ref 39–117)
ALT SERPL W P-5'-P-CCNC: 20 U/L (ref 1–41)
ANION GAP SERPL CALCULATED.3IONS-SCNC: 17.7 MMOL/L (ref 5–15)
AST SERPL-CCNC: 21 U/L (ref 1–40)
BASOPHILS # BLD AUTO: 0.09 10*3/MM3 (ref 0–0.2)
BASOPHILS NFR BLD AUTO: 1.3 % (ref 0–1.5)
BILIRUB SERPL-MCNC: 0.2 MG/DL (ref 0.2–1.2)
BUN BLD-MCNC: 21 MG/DL (ref 8–23)
BUN/CREAT SERPL: 33.9 (ref 7–25)
CALCIUM SPEC-SCNC: 10.3 MG/DL (ref 8.6–10.5)
CHLORIDE SERPL-SCNC: 94 MMOL/L (ref 98–107)
CHOLEST SERPL-MCNC: 312 MG/DL (ref 0–200)
CO2 SERPL-SCNC: 23.3 MMOL/L (ref 22–29)
CREAT BLD-MCNC: 0.62 MG/DL (ref 0.76–1.27)
DEPRECATED RDW RBC AUTO: 41.3 FL (ref 37–54)
EOSINOPHIL # BLD AUTO: 0.08 10*3/MM3 (ref 0–0.4)
EOSINOPHIL NFR BLD AUTO: 1.1 % (ref 0.3–6.2)
ERYTHROCYTE [DISTWIDTH] IN BLOOD BY AUTOMATED COUNT: 13.2 % (ref 12.3–15.4)
GFR SERPL CREATININE-BSD FRML MDRD: >150 ML/MIN/1.73
GLOBULIN UR ELPH-MCNC: 3.3 GM/DL
GLUCOSE BLD-MCNC: 114 MG/DL (ref 65–99)
HBA1C MFR BLD: 5.63 % (ref 4.8–5.6)
HCT VFR BLD AUTO: 36.7 % (ref 37.5–51)
HDLC SERPL-MCNC: 69 MG/DL (ref 40–60)
HGB BLD-MCNC: 12.5 G/DL (ref 13–17.7)
IMM GRANULOCYTES # BLD AUTO: 0.09 10*3/MM3 (ref 0–0.05)
IMM GRANULOCYTES NFR BLD AUTO: 1.3 % (ref 0–0.5)
LDLC SERPL CALC-MCNC: 203 MG/DL (ref 0–100)
LDLC/HDLC SERPL: 2.94 {RATIO}
LYMPHOCYTES # BLD AUTO: 1.8 10*3/MM3 (ref 0.7–3.1)
LYMPHOCYTES NFR BLD AUTO: 25.8 % (ref 19.6–45.3)
MAGNESIUM SERPL-MCNC: 2.5 MG/DL (ref 1.6–2.4)
MCH RBC QN AUTO: 29.7 PG (ref 26.6–33)
MCHC RBC AUTO-ENTMCNC: 34.1 G/DL (ref 31.5–35.7)
MCV RBC AUTO: 87.2 FL (ref 79–97)
MONOCYTES # BLD AUTO: 1.17 10*3/MM3 (ref 0.1–0.9)
MONOCYTES NFR BLD AUTO: 16.7 % (ref 5–12)
NEUTROPHILS # BLD AUTO: 3.76 10*3/MM3 (ref 1.7–7)
NEUTROPHILS NFR BLD AUTO: 53.8 % (ref 42.7–76)
NRBC BLD AUTO-RTO: 0.3 /100 WBC (ref 0–0.2)
PLATELET # BLD AUTO: 343 10*3/MM3 (ref 140–450)
PMV BLD AUTO: 11.5 FL (ref 6–12)
POTASSIUM BLD-SCNC: 4.3 MMOL/L (ref 3.5–5.2)
PROT SERPL-MCNC: 8 G/DL (ref 6–8.5)
RBC # BLD AUTO: 4.21 10*6/MM3 (ref 4.14–5.8)
SODIUM BLD-SCNC: 135 MMOL/L (ref 136–145)
T3FREE SERPL-MCNC: 1.82 PG/ML (ref 2–4.4)
T4 FREE SERPL-MCNC: 0.85 NG/DL (ref 0.93–1.7)
TRIGL SERPL-MCNC: 202 MG/DL (ref 0–150)
TSH SERPL DL<=0.05 MIU/L-ACNC: 89.6 UIU/ML (ref 0.27–4.2)
VLDLC SERPL-MCNC: 40.4 MG/DL (ref 5–40)
WBC NRBC COR # BLD: 6.99 10*3/MM3 (ref 3.4–10.8)

## 2020-02-03 ENCOUNTER — TELEPHONE (OUTPATIENT)
Dept: FAMILY MEDICINE CLINIC | Facility: CLINIC | Age: 60
End: 2020-02-03

## 2020-02-04 ENCOUNTER — TELEPHONE (OUTPATIENT)
Dept: ONCOLOGY | Facility: HOSPITAL | Age: 60
End: 2020-02-04

## 2020-02-04 DIAGNOSIS — C14.0 SQUAMOUS CELL CARCINOMA OF PHARYNX (HCC): Primary | ICD-10-CM

## 2020-02-04 DIAGNOSIS — C13.9 CANCER OF HYPOPHARYNX (HCC): Primary | ICD-10-CM

## 2020-02-04 NOTE — TELEPHONE ENCOUNTER
Spoke to Noa at [facility], explained that at the request of Nurse Navigator/Ovidio GUZMAN-ENT, we have ordered the patient's PET/CT for [facility].  If the PET is approved by insurance, patient will have PET/CT on 2/14 or 2/21.  Once the PET/CT is scheduled we will fax PET instructions with appointment to [facility]#/[phone] and call Areli the Nurse Navigator #/[phone] so that she can schedule a follow-up appointment with Paramjit.  I will make a referral to our Nurse Navigator to assist with patient care needs/follow-up.  Pricila Dacosta RN  February 4, 2020  4:43 PM

## 2020-02-11 NOTE — TELEPHONE ENCOUNTER
[facility]/[phone].  Pricila Dacosta RN  February 11, 2020  5:11 PM

## 2020-02-14 ENCOUNTER — HOSPITAL ENCOUNTER (OUTPATIENT)
Dept: PET IMAGING | Facility: HOSPITAL | Age: 60
Discharge: HOME | End: 2020-02-14
Admitting: RADIOLOGY

## 2020-02-14 DIAGNOSIS — C13.9 CANCER OF HYPOPHARYNX (HCC): ICD-10-CM

## 2020-02-14 PROCEDURE — 0 FLUDEOXYGLUCOSE F18 SOLUTION: Performed by: RADIOLOGY

## 2020-02-14 PROCEDURE — A9552 F18 FDG: HCPCS | Performed by: RADIOLOGY

## 2020-02-14 PROCEDURE — 78815 PET IMAGE W/CT SKULL-THIGH: CPT

## 2020-02-14 RX ADMIN — FLUDEOXYGLUCOSE F18 1 DOSE: 300 INJECTION INTRAVENOUS at 09:51

## 2020-02-24 ENCOUNTER — TELEPHONE (OUTPATIENT)
Dept: RADIATION ONCOLOGY | Facility: HOSPITAL | Age: 60
End: 2020-02-24

## 2020-02-24 NOTE — TELEPHONE ENCOUNTER
[facility] was [facility] for this week. The nurse said  that he was in [facility] with pneumonia.

## 2020-02-25 ENCOUNTER — TELEPHONE (OUTPATIENT)
Dept: OTOLARYNGOLOGY | Facility: CLINIC | Age: 60
End: 2020-02-25

## 2020-02-25 NOTE — TELEPHONE ENCOUNTER
Telephoned patients sister to state that when he was released from the hospital for him to make an appointment. Dr would like to see patient to compare with the PET scan.

## 2020-03-10 ENCOUNTER — TELEPHONE (OUTPATIENT)
Dept: RADIATION ONCOLOGY | Facility: HOSPITAL | Age: 60
End: 2020-03-10

## 2020-03-23 ENCOUNTER — TELEPHONE (OUTPATIENT)
Dept: RADIATION ONCOLOGY | Facility: HOSPITAL | Age: 60
End: 2020-03-23

## 2020-03-23 NOTE — TELEPHONE ENCOUNTER
I called to see how Mr. Bang was doing. His O2 Sat is staying in 90's. Per his Nurse. He will be seeing Dr Chester for  G Tube leaking. I told her we will call after the COVID 19  Quarantine is over.

## 2020-03-24 ENCOUNTER — OFFICE VISIT (OUTPATIENT)
Dept: GASTROENTEROLOGY | Facility: CLINIC | Age: 60
End: 2020-03-24

## 2020-03-24 ENCOUNTER — OFFICE VISIT (OUTPATIENT)
Dept: SURGERY | Facility: CLINIC | Age: 60
End: 2020-03-24

## 2020-03-24 VITALS
SYSTOLIC BLOOD PRESSURE: 120 MMHG | DIASTOLIC BLOOD PRESSURE: 70 MMHG | BODY MASS INDEX: 19.03 KG/M2 | HEART RATE: 72 BPM | WEIGHT: 114.2 LBS | HEIGHT: 65 IN | OXYGEN SATURATION: 98 %

## 2020-03-24 DIAGNOSIS — K94.23 PEG TUBE MALFUNCTION (HCC): Primary | ICD-10-CM

## 2020-03-24 DIAGNOSIS — C32.9 LARYNGEAL CANCER (HCC): Primary | ICD-10-CM

## 2020-03-24 PROCEDURE — 99213 OFFICE O/P EST LOW 20 MIN: CPT | Performed by: SURGERY

## 2020-03-24 PROCEDURE — 99213 OFFICE O/P EST LOW 20 MIN: CPT | Performed by: INTERNAL MEDICINE

## 2020-03-24 RX ORDER — BUPIVACAINE HCL/0.9 % NACL/PF 0.1 %
2 PLASTIC BAG, INJECTION (ML) EPIDURAL ONCE
Status: CANCELLED | OUTPATIENT
Start: 2020-03-25 | End: 2020-03-24

## 2020-03-24 RX ORDER — SODIUM CHLORIDE 9 MG/ML
100 INJECTION, SOLUTION INTRAVENOUS CONTINUOUS
Status: CANCELLED | OUTPATIENT
Start: 2020-03-25

## 2020-03-24 NOTE — PATIENT INSTRUCTIONS

## 2020-03-24 NOTE — H&P (VIEW-ONLY)
Chief complaint: Gastrostomy feeding tube has come out       HPI  59-year-old man who is had a laryngectomy.  This was done for cancer and is also had radiation therapy.  He has been fed and recently with a jejunostomy tube as he had previously had gastrostomy tube placement.  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  The jejunostomy has now fallen out and the entire abdominal wall has healed from his previous burn.  As he has no way for nutrition other than with tube feeds, the plan is today to replace his gastrostomy tube.  Because of multiple abdominal surgeries, this will have to be done open.  Past Medical History:   Diagnosis Date   • Allergic rhinitis     vs URI   • Anemia    • At risk for falls    • Benign prostatic hyperplasia    • Chronic gastritis    • Cirrhosis of liver (CMS/HCC)    • Complication of gastrostomy (CMS/HCC)     site not healing   • COPD (chronic obstructive pulmonary disease) (CMS/HCC)    • Dysphagia     odynophagia   • Epigastric pain    • Esophagitis    • GERD (gastroesophageal reflux disease)    • Hypertension    • Hypothyroidism, unspecified    • Low back pain    • Malaise and fatigue    • Nasal congestion 11/15/2018   • Nausea with vomiting, unspecified    • Pain in left knee    • Pain in right knee    • Primary malignant neoplasm of pharynx (CMS/HCC)    • Screening for malignant neoplasm of colon    • 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 Left upper quadrant abdominal wall abscess debridement. Gastrostomy tube erosion through & through the anterior gastric wall.Lupper quadrant abdominal wall  abscess   • COLONOSCOPY  04/21/2014    Internal & external hemorrhoids found.   • COLONOSCOPY  2014    [place]   • COLONOSCOPY N/A 10/16/2018    Procedure: COLONOSCOPY;  Surgeon: Kaushal Chester MD;  Location: [facility] ENDOSCOPY;  Service: Gastroenterology   • DIAGNOSTIC LAPAROSCOPY EXPLORATORY LAPAROTOMY N/A 7/17/2019    Procedure: DIAGNOSTIC LAPAROSCOPY, EXPLORATORY LAPAROTOMY, LYSIS OF ADHESIONS;  Surgeon: Parminder Kc MD;  Location: [facility] OR;  Service: General   • DIRECT LARYNGOSCOPY, ESOPHAGOSCOPY, BRONCHOSCOPY N/A 4/15/2019    Procedure: DIRECT LARYNGOSCOPY with biopsy, ESOPHAGOSCOPY;  Surgeon: Bharathi Washington MD;  Location: [facility] OR;  Service: ENT   • ENDOSCOPY N/A 10/16/2018    Procedure: ESOPHAGOGASTRODUODENOSCOPY;  Surgeon: Kaushal Chester MD;  Location: [facility] ENDOSCOPY;  Service: Gastroenterology   • GASTROCUTANEOUS FISTULA CLOSURE N/A 10/2/2019    Procedure: GASTROCUTANEOUS FISTULA CLOSURE;  Surgeon: Parminder Kc MD;  Location: [facility] OR;  Service: General   • GASTROSTOMY FEEDING TUBE INSERTION N/A 4/15/2019    Procedure: GASTROSTOMY FEEDING TUBE INSERTION;  Surgeon: Trenton Valera MD;  Location: [facility] OR;  Service: General   • JEJUNOSTOMY N/A 10/2/2019    Procedure: JEJUNOSTOMY;  Surgeon: Parminder Kc MD;  Location: [facility] OR;  Service: General   • TRACHEOSTOMY  11/10/2008    Respiratory failure   • UPPER GASTROINTESTINAL ENDOSCOPY  04/21/2014    Esophagitis seen. Biopsy taken. Gastritis in stomach. Biopsy taken. Normal duodenum. Biopsy taken.   • UPPER GASTROINTESTINAL ENDOSCOPY  10/16/2018   • VENOUS ACCESS DEVICE (PORT) INSERTION N/A 9/11/2019    Procedure: INSERTION VENOUS ACCESS DEVICE (MEDIPORT)        (c-arm#1);  Surgeon: Parminder Kc MD;  Location: [facility] OR;  Service: General         Current Outpatient Medications:   •  ADMELOG 100 UNIT/ML injection, , Disp: , Rfl:   •  albuterol (PROVENTIL) (2.5 MG/3ML) 0.083% nebulizer solution, Take 2.5 mg by nebulization  "Every 6 (Six) Hours As Needed for Wheezing., Disp: 3 mL, Rfl: 12  •  Ascorbic Acid (C/DARA HIPS PO), 1,000 mg by Per PEG Tube route 2 (Two) Times a Day., Disp: , Rfl:   •  aspirin (ASPIRIN LOW DOSE) 81 MG EC tablet, Take 1 tablet by mouth Daily., Disp: 30 tablet, Rfl: 6  •  dexamethasone 0.5 MG/5ML elixir, , Disp: , Rfl:   •  Docusate Sodium 150 MG/15ML syrup, Take 50 mg by mouth Daily., Disp: , Rfl:   •  ferrous sulfate 220 (44 Fe) MG/5ML solution, 5 mL by Per G Tube route Daily., Disp: 300 mL, Rfl: 1  •  gabapentin (NEURONTIN) 100 MG capsule, , Disp: , Rfl:   •  GLUCAGEN HYPOKIT 1 MG injection, , Disp: , Rfl:   •  HYDROcodone-acetaminophen (HYCET) 7.5-325 MG/15ML solution, , Disp: , Rfl:   •  insulin aspart (novoLOG) 100 UNIT/ML injection, Inject 0-7 Units under the skin into the appropriate area as directed 4 (Four) Times a Day Before Meals & at Bedtime., Disp: 10 mL, Rfl: 12  •  lactulose (CHRONULAC) 10 GM/15ML solution, Take 20 g by mouth 2 (Two) Times a Day As Needed., Disp: , Rfl:   •  levothyroxine (SYNTHROID) 125 MCG tablet, Take 1 pill/daily for 6 days a week, Disp: 30 tablet, Rfl: 3  •  loperamide (IMODIUM) 1 MG/5ML solution, Take 10 mL by mouth 4 (Four) Times a Day As Needed for Diarrhea., Disp: 118 mL, Rfl: 1  •  metoclopramide (REGLAN) 5 MG/5ML solution, 10 ML Per G-Tube 4 Times Per Day Before Meals X 30 Days, Disp: , Rfl:     No Known Allergies    Family History   Problem Relation Age of Onset   • Coronary artery disease Mother    • Diabetes Mother    • Other Mother         \"Heart And Lung Problems\"   • Lung cancer Mother    • Ovarian cancer Sister    • Heart disease Other    • Stroke Other    • Hypertension Other    • Diabetes Other    • Cancer Other    • Colon polyps Other    • Other Father         Unknown   • Other Other         \"Lung Problems\"   • Thyroid disease Neg Hx        Social History     Socioeconomic History   • Marital status: Single     Spouse name: Not on file   • Number of children: " Not on file   • Years of education: Not on file   • Highest education level: Not on file   Occupational History   • Occupation: Disabled     Comment: Patient resides alone.   Tobacco Use   • Smoking status: Former Smoker     Packs/day: 1.50     Years: 30.00     Pack years: 45.00     Types: Cigarettes     Last attempt to quit: 2009     Years since quittin.2   • Smokeless tobacco: Former User     Quit date: 2009   Substance and Sexual Activity   • Alcohol use: No     Comment: 2019 - Patient confirmed heavy alcoholic beverage consumption in past with current consumption of 2 - 3 beers twice per week.   • Drug use: No   • Sexual activity: Defer       Review of Systems  The patient cannot provide a review of systems as he is noncommunicative due to his laryngectomy    Physical examination  Neck previous bilateral neck dissections  Heart regular rate and rhythm without murmur  Lungs bilateral rhonchi are noted  Abdomen multiple abdominal incisions are noted.  Attempts to replace the jejunostomy tube on the right side of the abdomen are not successful.    ASSESSMENT    Laryngeal cancer    PLAN    1.  Open gastrostomy tube placement today    Procedure is explained to the patient and his family member with the risks of bleeding, infection, difficult operation due to previous surgeries, blood transfusion, poor wound healing.  He understands and agrees no other options exist as nasogastric feeding has not proven possible.              This document has been electronically signed by Parminder Kc MD on 2020 11:48
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
## 2020-03-25 ENCOUNTER — HOSPITAL ENCOUNTER (OUTPATIENT)
Facility: HOSPITAL | Age: 60
Setting detail: HOSPITAL OUTPATIENT SURGERY
Discharge: NURSING, REHAB OR LONG-TERM CARE | End: 2020-03-25
Attending: SURGERY | Admitting: SURGERY

## 2020-03-25 ENCOUNTER — ANESTHESIA EVENT (OUTPATIENT)
Dept: PERIOP | Facility: HOSPITAL | Age: 60
End: 2020-03-25

## 2020-03-25 ENCOUNTER — ANESTHESIA (OUTPATIENT)
Dept: PERIOP | Facility: HOSPITAL | Age: 60
End: 2020-03-25

## 2020-03-25 ENCOUNTER — APPOINTMENT (OUTPATIENT)
Dept: GENERAL RADIOLOGY | Facility: HOSPITAL | Age: 60
End: 2020-03-25

## 2020-03-25 VITALS
HEIGHT: 65 IN | TEMPERATURE: 97.6 F | WEIGHT: 114 LBS | OXYGEN SATURATION: 96 % | HEART RATE: 80 BPM | BODY MASS INDEX: 18.99 KG/M2 | SYSTOLIC BLOOD PRESSURE: 99 MMHG | DIASTOLIC BLOOD PRESSURE: 63 MMHG | RESPIRATION RATE: 18 BRPM

## 2020-03-25 DIAGNOSIS — C32.9 LARYNGEAL CANCER (HCC): ICD-10-CM

## 2020-03-25 PROCEDURE — 25010000002 NEOSTIGMINE 4 MG/4ML SOLUTION PREFILLED SYRINGE: Performed by: NURSE ANESTHETIST, CERTIFIED REGISTERED

## 2020-03-25 PROCEDURE — 25010000002 PHENYLEPHRINE PER 1 ML: Performed by: NURSE ANESTHETIST, CERTIFIED REGISTERED

## 2020-03-25 PROCEDURE — 25010000002 MIDAZOLAM PER 1 MG: Performed by: NURSE ANESTHETIST, CERTIFIED REGISTERED

## 2020-03-25 PROCEDURE — 25010000002 PROPOFOL 10 MG/ML EMULSION: Performed by: NURSE ANESTHETIST, CERTIFIED REGISTERED

## 2020-03-25 PROCEDURE — 43830 GSTRST OPEN WO CONSTJ TUBE: CPT | Performed by: SURGERY

## 2020-03-25 PROCEDURE — 76000 FLUOROSCOPY <1 HR PHYS/QHP: CPT

## 2020-03-25 PROCEDURE — 25010000002 FENTANYL CITRATE (PF) 100 MCG/2ML SOLUTION: Performed by: NURSE ANESTHETIST, CERTIFIED REGISTERED

## 2020-03-25 PROCEDURE — 25010000002 ONDANSETRON PER 1 MG: Performed by: NURSE ANESTHETIST, CERTIFIED REGISTERED

## 2020-03-25 RX ORDER — NEOSTIGMINE METHYLSULFATE 4 MG/4 ML
SYRINGE (ML) INTRAVENOUS AS NEEDED
Status: DISCONTINUED | OUTPATIENT
Start: 2020-03-25 | End: 2020-03-25 | Stop reason: SURG

## 2020-03-25 RX ORDER — PROPOFOL 10 MG/ML
VIAL (ML) INTRAVENOUS AS NEEDED
Status: DISCONTINUED | OUTPATIENT
Start: 2020-03-25 | End: 2020-03-25 | Stop reason: SURG

## 2020-03-25 RX ORDER — BUPIVACAINE HYDROCHLORIDE AND EPINEPHRINE 5; 5 MG/ML; UG/ML
INJECTION, SOLUTION EPIDURAL; INTRACAUDAL; PERINEURAL AS NEEDED
Status: DISCONTINUED | OUTPATIENT
Start: 2020-03-25 | End: 2020-03-25 | Stop reason: HOSPADM

## 2020-03-25 RX ORDER — SODIUM CHLORIDE 0.9 % (FLUSH) 0.9 %
10 SYRINGE (ML) INJECTION AS NEEDED
Status: DISCONTINUED | OUTPATIENT
Start: 2020-03-25 | End: 2020-03-25 | Stop reason: HOSPADM

## 2020-03-25 RX ORDER — MIDAZOLAM HYDROCHLORIDE 1 MG/ML
INJECTION INTRAMUSCULAR; INTRAVENOUS AS NEEDED
Status: DISCONTINUED | OUTPATIENT
Start: 2020-03-25 | End: 2020-03-25 | Stop reason: SURG

## 2020-03-25 RX ORDER — SODIUM CHLORIDE 9 MG/ML
100 INJECTION, SOLUTION INTRAVENOUS CONTINUOUS
Status: DISCONTINUED | OUTPATIENT
Start: 2020-03-25 | End: 2020-03-25 | Stop reason: HOSPADM

## 2020-03-25 RX ORDER — BUPIVACAINE HCL/0.9 % NACL/PF 0.1 %
2 PLASTIC BAG, INJECTION (ML) EPIDURAL ONCE
Status: COMPLETED | OUTPATIENT
Start: 2020-03-25 | End: 2020-03-25

## 2020-03-25 RX ORDER — SUCRALFATE ORAL 1 G/10ML
1 SUSPENSION ORAL 3 TIMES DAILY
COMMUNITY

## 2020-03-25 RX ORDER — ROCURONIUM BROMIDE 10 MG/ML
INJECTION, SOLUTION INTRAVENOUS AS NEEDED
Status: DISCONTINUED | OUTPATIENT
Start: 2020-03-25 | End: 2020-03-25 | Stop reason: SURG

## 2020-03-25 RX ORDER — ONDANSETRON 2 MG/ML
INJECTION INTRAMUSCULAR; INTRAVENOUS AS NEEDED
Status: DISCONTINUED | OUTPATIENT
Start: 2020-03-25 | End: 2020-03-25 | Stop reason: SURG

## 2020-03-25 RX ORDER — UREA 10 %
1 LOTION (ML) TOPICAL NIGHTLY
COMMUNITY

## 2020-03-25 RX ORDER — ONDANSETRON 2 MG/ML
4 INJECTION INTRAMUSCULAR; INTRAVENOUS ONCE AS NEEDED
Status: DISCONTINUED | OUTPATIENT
Start: 2020-03-25 | End: 2020-03-25 | Stop reason: HOSPADM

## 2020-03-25 RX ORDER — HEPARIN SODIUM (PORCINE) LOCK FLUSH IV SOLN 100 UNIT/ML 100 UNIT/ML
5 SOLUTION INTRAVENOUS AS NEEDED
Status: DISCONTINUED | OUTPATIENT
Start: 2020-03-25 | End: 2020-03-25 | Stop reason: HOSPADM

## 2020-03-25 RX ORDER — FENTANYL CITRATE 50 UG/ML
INJECTION, SOLUTION INTRAMUSCULAR; INTRAVENOUS AS NEEDED
Status: DISCONTINUED | OUTPATIENT
Start: 2020-03-25 | End: 2020-03-25 | Stop reason: SURG

## 2020-03-25 RX ADMIN — ROCURONIUM BROMIDE 20 MG: 10 INJECTION INTRAVENOUS at 13:15

## 2020-03-25 RX ADMIN — SODIUM CHLORIDE 100 ML/HR: 900 INJECTION, SOLUTION INTRAVENOUS at 12:36

## 2020-03-25 RX ADMIN — PROPOFOL 90 MG: 10 INJECTION, EMULSION INTRAVENOUS at 12:48

## 2020-03-25 RX ADMIN — FENTANYL CITRATE 25 MCG: 50 INJECTION, SOLUTION INTRAMUSCULAR; INTRAVENOUS at 14:55

## 2020-03-25 RX ADMIN — FENTANYL CITRATE 25 MCG: 50 INJECTION, SOLUTION INTRAMUSCULAR; INTRAVENOUS at 13:12

## 2020-03-25 RX ADMIN — SODIUM CHLORIDE: 900 INJECTION, SOLUTION INTRAVENOUS at 14:39

## 2020-03-25 RX ADMIN — ONDANSETRON 4 MG: 2 INJECTION INTRAMUSCULAR; INTRAVENOUS at 14:38

## 2020-03-25 RX ADMIN — Medication 2 MG: at 14:38

## 2020-03-25 RX ADMIN — FENTANYL CITRATE 25 MCG: 50 INJECTION, SOLUTION INTRAMUSCULAR; INTRAVENOUS at 12:55

## 2020-03-25 RX ADMIN — PHENYLEPHRINE HYDROCHLORIDE 100 MCG: 10 INJECTION INTRAVENOUS at 13:19

## 2020-03-25 RX ADMIN — MIDAZOLAM HYDROCHLORIDE 2 MG: 2 INJECTION, SOLUTION INTRAMUSCULAR; INTRAVENOUS at 12:43

## 2020-03-25 RX ADMIN — PHENYLEPHRINE HYDROCHLORIDE 100 MCG: 10 INJECTION INTRAVENOUS at 14:09

## 2020-03-25 RX ADMIN — FENTANYL CITRATE 25 MCG: 50 INJECTION, SOLUTION INTRAMUSCULAR; INTRAVENOUS at 14:12

## 2020-03-25 RX ADMIN — PHENYLEPHRINE HYDROCHLORIDE 100 MCG: 10 INJECTION INTRAVENOUS at 13:13

## 2020-03-25 RX ADMIN — PHENYLEPHRINE HYDROCHLORIDE 100 MCG: 10 INJECTION INTRAVENOUS at 12:57

## 2020-03-25 RX ADMIN — GLYCOPYRROLATE 0.4 MG: 0.2 INJECTION, SOLUTION INTRAMUSCULAR; INTRAVITREAL at 14:38

## 2020-03-25 RX ADMIN — Medication 2 G: at 12:57

## 2020-03-25 NOTE — DISCHARGE INSTRUCTIONS
Dr. Parminder Kc  [address] [place] [address]  [phone] (office)  [phone] (hospital)  [phone] (cell)  Discharge Instructions for G tube placement      1. Go home, rest and take it easy today; however, you should get up and move about several times today to reduce the risk of developing a clot in your legs.    2. You may experience some dizziness or memory loss from the anesthesia.  This may last for the next 24 hours.  Someone should plan on staying with you for the first 24 hours for your safety.  3. Do not make any important legal decisions or sign any legal papers for the next 24 hours.    4. Eat and drink lightly today.  Start off with liquids, jello, soup, crackers or other bland foods at first. You may advance your diet tomorrow as tolerated as long as you do not experience any nausea or vomiting.   5. You may remove your outer dressings in 3 days.  The white tapes called steri-strips should stay in place.  They will fall off on their own in 1-2 weeks.  Do not worry if they come off sooner.    6. You may notice some bleeding/drainage on your outer dressings. A little bloody drainage is normal. If the bleeding/drainage is such that the bandage cannot absorb it, remove the dressing, apply clean gauze and apply firm pressure for a full 15 minutes.  If the bleeding continues, please call me.  7. You may shower tomorrow.  No tub baths for at least 1 week until your incisions are completely healed.       8. You have received a prescription for a narcotic pain medicine, as you will have some pain following surgery.   You will not be totally pain free, but your pain medicine should make the pain tolerable.  Please take your pain medicine as prescribed and always take your pills with food to prevent nausea. If you are having severe pain that cannot be controlled by the pain medicine, please contact me.    9. If needed, you may receive a prescription for an anti-nausea medicine.  Please take this  as prescribed for any nausea or vomiting.  Nausea could be a result of the anesthesia or a result of the narcotic pain medicine.  If you experience severe nausea and vomiting that cannot be controlled by the nausea medicine, please call me.    10. It is not unusual to experience pain/discomfort in your shoulders or under your ribs after surgery.  It is from the gas used during the laparoscopic procedure and usually lasts 1-3 days.  The prescription pain medicine is used to treat the surgical pain and does not typically alleviate this “gassy” pain.   11. No driving for 24 hours and for as long as you are taking your prescription pain medicine.    12. Start tube feeds in 2 days    13. If an appointment is not given to you before discharge, you will need to call the office at [phone] to schedule a follow-up appointment in 5-7 days.   14. Remember to contact me for any of the following:    • Fever > 101 degrees  • Severe pain that cannot be controlled by taking your pain pills  • Severe nausea or vomiting that cannot be controlled by taking your nausea pills  • Significant bleeding of your incisions  • Drainage that has a bad smell or is yellow or green in appearance  • Any other questions or concerns

## 2020-03-25 NOTE — INTERVAL H&P NOTE
H&P reviewed. The patient was examined and there are no changes to the H&P.      Temp:  [98.9 °F (37.2 °C)] 98.9 °F (37.2 °C)  Heart Rate:  [82] 82  Resp:  [18] 18  BP: (93)/(60) 93/60

## 2020-03-25 NOTE — ANESTHESIA PREPROCEDURE EVALUATION
Anesthesia Evaluation     no history of anesthetic complications:  NPO Solid Status: > 8 hours  NPO Liquid Status: > 8 hours           Airway   Comment: Trach (cuffed) w/ trach collar for supplemental O2  Dental      Pulmonary    (+) a smoker Former, COPD (panlobular emphysema, COPD), asthma,decreased breath sounds,     ROS comment: Trach in place  Laryngeal CA  PE comment: nonlabored  Cardiovascular   Exercise tolerance: poor (<4 METS)    ECG reviewed  Rhythm: regular  Rate: abnormal    (+) dysrhythmias (PACs & PVCs),   (-) hypertension, past MI, angina, cardiac stents, DVT, hyperlipidemia    ROS comment: Sinus rhythm with occasional premature ventricular complexes and premature  atrial complexes  Otherwise normal ECG  When compared with ECG of 17-JUL-2019 08:48,  Nonspecific T wave abnormality no longer evident in Inferior leads  T wave amplitude has increased in Anterior leads  Confirmed by     Neuro/Psych  (+) headaches,     (-) seizures, TIA, CVA, psychiatric history  GI/Hepatic/Renal/Endo    (+)  GERD,  liver disease (cirrhosis) history of elevated LFT, diabetes mellitus (preDM) type 2 using insulin, thyroid problem hypothyroidism  (-) hepatitis, no renal disease    ROS Comment: Gastritis;  Pharyngoesophageal dysphagia;  Gastrocutaneous Fistula    Musculoskeletal     (+) back pain, neck pain,   Abdominal    Substance History   (+) alcohol use,   (-) drug use     OB/GYN          Other   blood dyscrasia (anemia of chronic disease),   history of cancer (pharyngeal cancer-->chronic trach )      Other Comment: malnutrition      Phys Exam Other: Trach in place for 1 yr                  Anesthesia Plan    ASA 4     general   (Trach in place  Daughter in room with patient)  intravenous induction     Anesthetic plan, all risks, benefits, and alternatives have been provided, discussed and informed consent has been obtained with: patient and child.

## 2020-03-25 NOTE — ANESTHESIA POSTPROCEDURE EVALUATION
Patient: Emerson Bang    Procedure Summary     Date:  03/25/20 Room / Location:  [facility] OR [address] [place] [address] OR    Anesthesia Start:  1243 Anesthesia Stop:  1513    Procedure:  GASTROSTOMY FEEDING TUBE INSERTION (N/A Abdomen) Diagnosis:       Laryngeal cancer (CMS/HCC)      (Laryngeal cancer (CMS/HCC) [C32.9])    Surgeon:  Parminder Kc MD Provider:  Tom Carnes MD    Anesthesia Type:  general ASA Status:  4          Anesthesia Type: general    Vitals  Vitals Value Taken Time   /73 3/25/2020  3:06 PM   Temp 97.1 °F (36.2 °C) 3/25/2020  3:06 PM   Pulse 86 3/25/2020  3:06 PM   Resp 18 3/25/2020  3:06 PM   SpO2 97 % 3/25/2020  3:06 PM           Post Anesthesia Care and Evaluation    Patient location during evaluation: PACU  Patient participation: complete - patient participated  Level of consciousness: awake and alert  Pain score: 0  Pain management: adequate  Airway patency: patent  Anesthetic complications: No anesthetic complications  PONV Status: none  Cardiovascular status: acceptable  Respiratory status: acceptable and spontaneous ventilation  Hydration status: acceptable

## 2020-03-25 NOTE — ANESTHESIA PROCEDURE NOTES
Airway  Urgency: emergent    Date/Time: 3/25/2020 12:51 PM  Airway not difficult    General Information and Staff    Patient location during procedure: OR  CRNA: Gerry Graves CRNA    Consent for Airway (if performed for an anesthetic, see related documentation for consents)  Patient identity confirmed: arm band, verbally with patient and hospital-assigned identification number  Consent: No emergent situation. Written consent obtained.  Risks and benefits: risks, benefits and alternatives were discussed  Consent given by: patient and power of       Indications and Patient Condition  Indications for airway management: airway protection    Preoxygenated: yes  Mask difficulty assessment: 0 - not attempted    Final Airway Details  Final airway type: endotracheal airway      Successful airway: ETT  Cuffed: yes   Intubation method: 7.0 reinforced ETT placed through current tracheostomy opening.  Endotracheal tube insertion site: tracheostomy  ETT size (mm): 7.0  Placement verified by: chest auscultation and capnometry   Measured from: stoma  Number of attempts at approach: 1  Assessment: lips, teeth, and gum same as pre-op and atraumatic intubation

## 2020-03-26 NOTE — OP NOTE
GASTROSTOMY FEEDING TUBE INSERTION  Procedure Note    Emerson Bang  3/25/2020    Pre-op Diagnosis:   Laryngeal cancer (CMS/HCC) [C32.9]    Post-op Diagnosis:     Post-Op Diagnosis Codes:     * Laryngeal cancer (CMS/HCC) [C32.9]    Procedure:  Open GASTROSTOMY FEEDING TUBE INSERTION    Surgeon(s):  Parminder Kc MD    Anesthesia: General    Staff:   Circulator: Stephane Cam RN; Elizabeth Ng RN  Scrub Person: Cathy Yates  Assistant: Tiffanie Thomas CSA    Estimated Blood Loss: minimal    Specimens:                None      Drains:   Gastrostomy/Enterostomy Gastrostomy LUQ (Active)   Surrounding Skin Unable to view 3/25/2020  3:33 PM   Securement sutured to abdomen 3/25/2020  3:26 PM   Drain Status Clamped 3/25/2020  3:33 PM   Dressing Status Clean;Dry;Intact 3/25/2020  3:33 PM   Dressing Type Dry dressing 3/25/2020  3:33 PM       Urethral Catheter (Active)   Daily Indications Chronic Urinary Retention related to Obstructive, Infectious/Inflammatory, Neurologic or Traumatic Causes 3/25/2020  3:33 PM   Collection Container Standard drainage bag 3/25/2020  3:33 PM   Output (mL) 200 mL 3/25/2020  3:26 PM       [REMOVED] Gastrostomy/Enterostomy Jejunostomy 1 18 Fr. (Removed)       Findings: Dense adhesions in the left upper quadrant    Complications: None    Indications: This is a 59-year-old man status post laryngectomy who has a gastrostomy feeding tube in place through which he is fed.  The tube came out a few days ago.  He has had multiple tubes and multiple abdominal operations.  He was therefore felt not to be a candidate for PEG placement was brought to the operating room today for open gastrostomy.    Description of procedure: The patient is brought to the operating room and placed supine on the operating table.  After adequate general anesthesia via a established tracheostomy, the abdominal area was prepped and draped in a sterile manner.  A briefing and timeout were performed and all parties  were in agreement.    Transverse incision was made through previous incisions in the left upper quadrant and carried to the anterior rectus sheath, rectus muscle, and posterior rectus sheath with a easy and atraumatic entry into the abdominal cavity.  Dense adhesions to the anterior abdominal wall were noted and these were taken down sharply until ultimately we had clear visualization of the stomach.  This was covered with scar from previous gastrostomy's and repair of gastrocutaneous fistula.  In any event 2-0 Vicryl suture was placed as a pursestring and the stomach was opened.  A 20 Palomo catheter was passed through the abdominal wall and placed into the stomach.  It was filled with saline and the pursestring was tied.  X-rays were taken with contrast and initially demonstrated the tube was partially obstructing the pylorus.  For this reason it was readjusted and redirected and subsequent x-rays demonstrated no further obstruction of the duodenum.  Before tube placement I was able to place muscle and small finger and sure that the tube was placed proximal to the pylorus.  It was impossible to obtain a view of more than if few centimeters of stomach in all direction.    After placement of the tube, 3-0 Vicryl Witzel tunnel was created over the top of the tube.  Excellent flow and aspiration were achieved with no leak.    The tube was sutured in place with 2-0 nylon suture.  The wound was closed with 0 Vicryl suture posteriorly in a running manner and #1 PDS in a running manner on the anterior sheath.  Skin was brought together with continuous 4-0 subcuticular Vicryl.    Procedure was terminated.  Tolerated well.  Sponge and needle counts are correct.  Returned recovery in satisfactory condition.            This document has been electronically signed by Parminder Kc MD on March 26, 2020 09:33      Date: 3/26/2020  Time: 09:33

## 2020-04-01 ENCOUNTER — APPOINTMENT (OUTPATIENT)
Dept: GENERAL RADIOLOGY | Facility: HOSPITAL | Age: 60
End: 2020-04-01

## 2020-04-01 ENCOUNTER — HOSPITAL ENCOUNTER (EMERGENCY)
Facility: HOSPITAL | Age: 60
Discharge: NURSING, REHAB OR LONG-TERM CARE | End: 2020-04-01
Attending: FAMILY MEDICINE | Admitting: FAMILY MEDICINE

## 2020-04-01 VITALS
OXYGEN SATURATION: 100 % | SYSTOLIC BLOOD PRESSURE: 141 MMHG | DIASTOLIC BLOOD PRESSURE: 72 MMHG | HEIGHT: 65 IN | HEART RATE: 88 BPM | WEIGHT: 124.4 LBS | TEMPERATURE: 98.6 F | BODY MASS INDEX: 20.73 KG/M2 | RESPIRATION RATE: 16 BRPM

## 2020-04-01 DIAGNOSIS — R11.2 NON-INTRACTABLE VOMITING WITH NAUSEA, UNSPECIFIED VOMITING TYPE: ICD-10-CM

## 2020-04-01 DIAGNOSIS — T81.30XA WOUND DEHISCENCE: Primary | ICD-10-CM

## 2020-04-01 PROCEDURE — 74018 RADEX ABDOMEN 1 VIEW: CPT

## 2020-04-01 PROCEDURE — 99284 EMERGENCY DEPT VISIT MOD MDM: CPT

## 2020-04-01 RX ADMIN — SILVER SULFADIAZINE 1 APPLICATION: 10 CREAM TOPICAL at 17:17
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
## 2020-04-01 NOTE — DISCHARGE INSTRUCTIONS
Dr. Kc would like to be called tomorrow morning for instructions on wound care for the patient, please call him tomorrow morning.

## 2020-04-01 NOTE — ED PROVIDER NOTES
Subjective   Patient was sent to the emergency department for drainage from an old feeding tube site.  Roughly 1 week ago patient had his feeding tube replaced in the OR by Dr. Kc.  Patient has had several failed feeding tubes in the past.      Wound Check   Associated symptoms: nausea and vomiting    Associated symptoms: no abdominal pain, no chest pain, no congestion, no cough, no diarrhea, no ear pain, no fatigue, no fever, no headaches, no myalgias, no rash, no rhinorrhea, no shortness of breath, no sore throat and no wheezing        Review of Systems   Constitutional: Negative for appetite change, chills, diaphoresis, fatigue and fever.   HENT: Negative for congestion, ear discharge, ear pain, nosebleeds, rhinorrhea, sinus pressure, sore throat and trouble swallowing.    Eyes: Negative for discharge and redness.   Respiratory: Negative for apnea, cough, chest tightness, shortness of breath and wheezing.    Cardiovascular: Negative for chest pain.   Gastrointestinal: Positive for nausea and vomiting. Negative for abdominal pain and diarrhea.   Endocrine: Negative for polyuria.   Genitourinary: Negative for dysuria, frequency and urgency.   Musculoskeletal: Negative for myalgias and neck pain.   Skin: Negative for color change and rash.   Allergic/Immunologic: Negative for immunocompromised state.   Neurological: Negative for dizziness, seizures, syncope, weakness, light-headedness and headaches.   Hematological: Negative for adenopathy. Does not bruise/bleed easily.   Psychiatric/Behavioral: Negative for behavioral problems and confusion.   All other systems reviewed and are negative.      Past Medical History:   Diagnosis Date   • Allergic rhinitis     vs URI   • Anemia    • At risk for falls    • Benign prostatic hyperplasia    • Chronic gastritis    • Cirrhosis of liver (CMS/HCC)    • Complication of gastrostomy (CMS/HCC)     site not healing   • COPD (chronic obstructive pulmonary disease) (CMS/HCC)    •  Dysphagia     odynophagia   • Epigastric pain    • Esophagitis    • GERD (gastroesophageal reflux disease)    • Hypertension    • Hypothyroidism, unspecified    • Low back pain    • Malaise and fatigue    • Nasal congestion 11/15/2018   • Nausea with vomiting, unspecified    • Pain in left knee    • Pain in right knee    • Primary malignant neoplasm of pharynx (CMS/HCC)    • Screening for malignant neoplasm of colon    • 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 Left upper quadrant abdominal wall abscess debridement. Gastrostomy tube erosion through & through the anterior gastric wall.Lupper quadrant abdominal wall abscess   • COLONOSCOPY  04/21/2014    Internal & external hemorrhoids found.   • COLONOSCOPY  2014    [place]   • COLONOSCOPY N/A 10/16/2018    Procedure: COLONOSCOPY;  Surgeon: Kaushal Chester MD;  Location: [facility] ENDOSCOPY;  Service: Gastroenterology   • DIAGNOSTIC LAPAROSCOPY EXPLORATORY LAPAROTOMY N/A 7/17/2019    Procedure: DIAGNOSTIC LAPAROSCOPY, EXPLORATORY LAPAROTOMY, LYSIS OF ADHESIONS;  Surgeon: Parminder Kc MD;  Location: [facility] OR;  Service: General   • DIRECT LARYNGOSCOPY, ESOPHAGOSCOPY, BRONCHOSCOPY N/A 4/15/2019    Procedure: DIRECT LARYNGOSCOPY with biopsy, ESOPHAGOSCOPY;  Surgeon: Bharathi Washington MD;  Location: [facility] OR;  Service: ENT   • ENDOSCOPY N/A 10/16/2018    Procedure: ESOPHAGOGASTRODUODENOSCOPY;  Surgeon: Kaushal Chester MD;  Location: [facility] ENDOSCOPY;  Service: Gastroenterology   • GASTROCUTANEOUS FISTULA CLOSURE N/A 10/2/2019    Procedure: GASTROCUTANEOUS FISTULA CLOSURE;  Surgeon: Parminder Kc MD;  Location: [facility] OR;  Service: General   • GASTROSTOMY FEEDING  "TUBE INSERTION N/A 4/15/2019    Procedure: GASTROSTOMY FEEDING TUBE INSERTION;  Surgeon: Trenton Valera MD;  Location: [facility];  Service: General   • GASTROSTOMY FEEDING TUBE INSERTION N/A 3/25/2020    Procedure: GASTROSTOMY FEEDING TUBE INSERTION;  Surgeon: Parminder Kc MD;  Location: [facility];  Service: General;  Laterality: N/A;   • JEJUNOSTOMY N/A 10/2/2019    Procedure: JEJUNOSTOMY;  Surgeon: Parminder Kc MD;  Location: [facility];  Service: General   • TRACHEOSTOMY  11/10/2008    Respiratory failure   • UPPER GASTROINTESTINAL ENDOSCOPY  2014    Esophagitis seen. Biopsy taken. Gastritis in stomach. Biopsy taken. Normal duodenum. Biopsy taken.   • UPPER GASTROINTESTINAL ENDOSCOPY  10/16/2018   • VENOUS ACCESS DEVICE (PORT) INSERTION N/A 2019    Procedure: INSERTION VENOUS ACCESS DEVICE (MEDIPORT)        (c-arm#1);  Surgeon: Parminder Kc MD;  Location: [facility];  Service: General       Family History   Problem Relation Age of Onset   • Coronary artery disease Mother    • Diabetes Mother    • Other Mother         \"Heart And Lung Problems\"   • Lung cancer Mother    • Ovarian cancer Sister    • Heart disease Other    • Stroke Other    • Hypertension Other    • Diabetes Other    • Cancer Other    • Colon polyps Other    • Other Father         Unknown   • Other Other         \"Lung Problems\"   • Thyroid disease Neg Hx        Social History     Socioeconomic History   • Marital status: Single     Spouse name: Not on file   • Number of children: Not on file   • Years of education: Not on file   • Highest education level: Not on file   Occupational History   • Occupation: Disabled     Comment: Patient resides alone.   Tobacco Use   • Smoking status: Former Smoker     Packs/day: 1.50     Years: 30.00     Pack years: 45.00     Types: Cigarettes     Last attempt to quit:      Years since quittin.2   • Smokeless tobacco: Former User     Quit date: 2009   Substance and Sexual Activity "   • Alcohol use: No     Comment: 02/19/2019 - Patient confirmed heavy alcoholic beverage consumption in past with current consumption of 2 - 3 beers twice per week.   • Drug use: No   • Sexual activity: Defer           Objective   Physical Exam   Constitutional: He is oriented to person, place, and time. He appears well-developed and well-nourished.   HENT:   Head: Normocephalic and atraumatic.   Nose: Nose normal.   Mouth/Throat: Oropharynx is clear and moist.   Eyes: Pupils are equal, round, and reactive to light. Conjunctivae and EOM are normal. Right eye exhibits no discharge. Left eye exhibits no discharge. No scleral icterus.   Neck: Normal range of motion. Neck supple. No tracheal deviation present.   Cardiovascular: Normal rate, regular rhythm and normal heart sounds.   No murmur heard.  Pulmonary/Chest: Effort normal and breath sounds normal. No stridor. No respiratory distress. He has no wheezes. He has no rales.   Abdominal: Soft. Bowel sounds are normal. He exhibits no distension and no mass. There is no tenderness. There is no rebound and no guarding.       Wound dehiscence noted with some clear drainage.  There is also chronic scarring around the old surgical site secondary to chemical burn on the skin from gastric acid.  The insertion site of the current feeding tube does not demonstrate any drainage or significant surrounding erythema and enters the skin out side of the dehisced area.   Musculoskeletal: He exhibits no edema.   Neurological: He is alert and oriented to person, place, and time. Coordination normal.   Skin: Skin is warm and dry. No rash noted. No erythema.   Psychiatric: He has a normal mood and affect. His behavior is normal. Thought content normal.   Nursing note and vitals reviewed.      Procedures           ED Course  ED Course as of Apr 01 1746 Wed Apr 01, 2020   1732 Patient was seen and evaluated by Dr. Kc in the emergency department.  X-ray findings are negative for  extravasation of contrast, suggesting the feeding tube is in the proper position.  The patient's wound was redressed.  Dr. Kc will follow up with patient as an outpatient.    [CB]      ED Course User Index  [CB] Sidney Ambriz MD                                           [facility]    Final diagnoses:   Wound dehiscence   Non-intractable vomiting with nausea, unspecified vomiting type            Sidney Ambriz MD  04/01/20 4517

## 2020-04-01 NOTE — ED NOTES
This RN spoke with NH staff at [facility] to give report. They are to call Dr Kc's office tomorrow for specific instructions for wound care.      Eva Quintanilla RN  04/01/20 4428

## 2020-04-01 NOTE — ED NOTES
Pt presents to ED via EMS from [facility] in [facility]. Pt was seen at [facility] yesterday and discharged back to NH. Pt had feeding tube placed by Dr Kc recently, cough, incision opened up per NH staff and was told to come to this ED.     Eva Quintanilla, RN  04/01/20 3265

## 2020-04-02 NOTE — ED NOTES
Pt leaving at this time via [facility] to be transported to NH. NH informed      Eva Quintanilla RN  04/01/20 2013

## 2020-05-22 PROBLEM — E87.5 HYPERKALEMIA: Status: RESOLVED | Noted: 2017-01-09 | Resolved: 2020-05-22

## 2020-05-22 PROBLEM — R63.6 UNDERWEIGHT DUE TO INADEQUATE CALORIC INTAKE: Status: RESOLVED | Noted: 2017-09-15 | Resolved: 2020-05-22

## 2020-05-22 PROBLEM — R19.5 HARD STOOL: Status: RESOLVED | Noted: 2019-08-16 | Resolved: 2020-05-22

## 2020-05-22 PROBLEM — Z23 NEED FOR INFLUENZA VACCINATION: Status: RESOLVED | Noted: 2018-11-15 | Resolved: 2020-05-22

## 2020-05-22 PROBLEM — Z23 NEED FOR VACCINATION: Status: RESOLVED | Noted: 2017-02-13 | Resolved: 2020-05-22

## 2020-05-22 PROBLEM — R79.89 ELEVATED LIVER FUNCTION TESTS: Status: RESOLVED | Noted: 2018-11-20 | Resolved: 2020-05-22

## 2020-05-22 PROBLEM — E53.8 B12 DEFICIENCY: Status: RESOLVED | Noted: 2019-01-11 | Resolved: 2020-05-22

## 2020-05-22 PROBLEM — R33.9 URINARY RETENTION: Status: RESOLVED | Noted: 2019-08-16 | Resolved: 2020-05-22

## 2020-05-22 PROBLEM — D58.2 HEMOGLOBIN C TRAIT (HCC): Chronic | Status: RESOLVED | Noted: 2018-02-01 | Resolved: 2020-05-22

## 2020-05-22 PROBLEM — Z00.00 ANNUAL PHYSICAL EXAM: Status: RESOLVED | Noted: 2019-09-13 | Resolved: 2020-05-22

## 2020-05-22 PROBLEM — R74.01 ELEVATED AST (SGOT): Status: RESOLVED | Noted: 2017-01-09 | Resolved: 2020-05-22

## 2020-05-22 PROBLEM — H60.501 ACUTE OTITIS EXTERNA OF RIGHT EAR: Status: RESOLVED | Noted: 2019-08-16 | Resolved: 2020-05-22

## 2020-05-22 PROBLEM — R19.7 DIARRHEA: Status: RESOLVED | Noted: 2019-09-08 | Resolved: 2020-05-22

## 2020-05-22 PROBLEM — D50.9 IRON DEFICIENCY ANEMIA: Status: RESOLVED | Noted: 2017-01-09 | Resolved: 2020-05-22

## 2020-05-22 PROBLEM — J06.9 UPPER RESPIRATORY TRACT INFECTION: Status: RESOLVED | Noted: 2019-04-02 | Resolved: 2020-05-22

## 2020-05-22 PROBLEM — R63.6 UNDERWEIGHT: Status: RESOLVED | Noted: 2018-06-21 | Resolved: 2020-05-22

## 2020-05-22 PROBLEM — R79.0 LOW MAGNESIUM LEVELS: Status: RESOLVED | Noted: 2017-03-16 | Resolved: 2020-05-22

## 2020-05-22 PROBLEM — R26.89 BALANCE PROBLEM: Status: RESOLVED | Noted: 2017-02-13 | Resolved: 2020-05-22

## 2020-05-22 PROBLEM — Z23 NEED FOR IMMUNIZATION AGAINST INFLUENZA: Status: RESOLVED | Noted: 2017-11-01 | Resolved: 2020-05-22

## 2020-05-22 PROBLEM — R10.13 EPIGASTRIC PAIN: Status: RESOLVED | Noted: 2018-09-13 | Resolved: 2020-05-22

## 2020-05-22 PROBLEM — J02.9 ACUTE PHARYNGITIS: Status: RESOLVED | Noted: 2019-04-02 | Resolved: 2020-05-22

## 2020-05-22 PROBLEM — R53.1 GENERALIZED WEAKNESS: Status: RESOLVED | Noted: 2019-08-16 | Resolved: 2020-05-22

## 2020-05-26 ENCOUNTER — TELEPHONE (OUTPATIENT)
Dept: SURGERY | Facility: CLINIC | Age: 60
End: 2020-05-26

## 2020-05-26 NOTE — TELEPHONE ENCOUNTER
ASK FOR RUBIN 1 NURSE     HAVING A LARGE AMOUNT OF RESIDUAL FROM FEEDING TUBE.      A USUAL AMOUNT IS 0-10 ML AND HE IS HAVING 320 - 400 ML THE LAST FEW DAYS.      KUB ON FRIDAY WAS NEGATIVE FOR ANY PROBLEMS.      PLEASE CALL  [phone]

## 2020-05-28 NOTE — TELEPHONE ENCOUNTER
I have spoken with the nurse at the nursing home.  I have told her to pull the Palomo catheter has been used is a gastrostomy tube back until resistance is felt against the balloon.  I instructed her to secure the tube at this point.  Feedings may be retried-if this fails to work, we can change him to bolus feedings.

## 2020-06-25 ENCOUNTER — OFFICE VISIT (OUTPATIENT)
Dept: RADIATION ONCOLOGY | Facility: HOSPITAL | Age: 60
End: 2020-06-25

## 2020-06-25 ENCOUNTER — HOSPITAL ENCOUNTER (OUTPATIENT)
Dept: RADIATION ONCOLOGY | Facility: HOSPITAL | Age: 60
Setting detail: RADIATION/ONCOLOGY SERIES
End: 2020-06-25

## 2020-06-25 ENCOUNTER — APPOINTMENT (OUTPATIENT)
Dept: RADIATION ONCOLOGY | Facility: HOSPITAL | Age: 60
End: 2020-06-25

## 2020-06-25 VITALS
TEMPERATURE: 97.2 F | HEART RATE: 99 BPM | BODY MASS INDEX: 18.26 KG/M2 | HEIGHT: 65 IN | DIASTOLIC BLOOD PRESSURE: 63 MMHG | WEIGHT: 109.6 LBS | SYSTOLIC BLOOD PRESSURE: 114 MMHG | RESPIRATION RATE: 18 BRPM

## 2020-06-25 DIAGNOSIS — C13.9 CANCER OF HYPOPHARYNX (HCC): Primary | ICD-10-CM

## 2020-06-25 PROCEDURE — 99214 OFFICE O/P EST MOD 30 MIN: CPT | Performed by: RADIOLOGY

## 2020-06-25 RX ORDER — GABAPENTIN 100 MG/1
300 CAPSULE ORAL 3 TIMES DAILY
Status: DISCONTINUED | OUTPATIENT
Start: 2020-06-25 | End: 2020-06-25

## 2020-06-25 RX ORDER — GABAPENTIN 300 MG/1
300 CAPSULE ORAL 3 TIMES DAILY
Qty: 90 CAPSULE | Refills: 0 | Status: SHIPPED | OUTPATIENT
Start: 2020-06-25 | End: 2020-07-25

## 2020-06-25 NOTE — PROGRESS NOTES
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  He was evaluated and recommended to undergo surgical resection followed by adjuvant radiotherapy as his best chance for cure.  His resection was performed at the [facility] by Dr. Hitesh Nolan on 6/12/2019 and he was referred to our clinic for adjuvant radiotherapy.  He was treated to a dose of 5400 cGy in 30 fractions completing on 11/21/2019.  He received 1 cycle of concurrent cisplatin however he was unable to tolerate further cycles due to his poor performance status.    Since completion of his therapy, he has had significant difficulties with his medical conditions including multiple revisions of his feeding tube.  PET scan on 2/14/2020 demonstrated additional disease within the right neck standing up into the nasopharynx.  He was unable to come to his follow-ups due to multiple hospitalizations related to his feeding tube and other medical comorbidities.  He was recently seen at [facility] for a witnessed seizure and MRI of the brain was obtained.  This did not demonstrate any evidence of intracranial disease but again confirmed local failure within the right neck.  He contacted the head and neck coordinator at  [facility] in [place] who recommended that he be evaluated in our clinic for a possible third course of salvage radiation versus systemic chemo/immunotherapy.    Symptomatically at today's visit Mr. Bang is doing extremely poorly.  He states his pain is a 10 out of 10 without pain medicine, and only improved to a 7-8 out of 10 with pain medicine.  He does continue to maintain his weight although he remains very frail.    (Old medical records were requested, reviewed, and summarized in the HPI)    Review of Systems       All other systems reviewed and are negative.      Past Medical History:   Diagnosis Date   • Allergic rhinitis     vs URI   • Anemia    • At risk for falls    • Benign prostatic hyperplasia    • Chronic gastritis    • Cirrhosis of liver (CMS/HCC)    • Complication of gastrostomy (CMS/HCC)     site not healing   • COPD (chronic obstructive pulmonary disease) (CMS/HCC)    • Dysphagia     odynophagia   • Epigastric pain    • Esophagitis    • GERD (gastroesophageal reflux disease)    • Hypertension    • Hypothyroidism, unspecified    • Low back pain    • Malaise and fatigue    • Nasal congestion 11/15/2018   • Nausea with vomiting, unspecified    • Pain in left knee    • Pain in right knee    • Primary malignant neoplasm of pharynx (CMS/HCC)    • Screening for malignant neoplasm of colon    • Tonsil cancer (CMS/HCC) 2008    Right Tonsil         Chemo/Radiation   • Urination disorder     difficulty   • Vitamin D deficiency         Past Surgical History:   Procedure Laterality Date   • ABDOMINAL WALL SURGERY  11/04/2008    Laparotomy with repair of stomach wall perforation. 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 Left upper quadrant abdominal wall abscess debridement. Gastrostomy tube erosion through & through the anterior gastric wall.Lupper quadrant abdominal wall abscess   • COLONOSCOPY  04/21/2014    Internal & external hemorrhoids found.   • COLONOSCOPY  2014    [place]   • COLONOSCOPY N/A  10/16/2018    Procedure: COLONOSCOPY;  Surgeon: Kaushal Chester MD;  Location: [facility] ENDOSCOPY;  Service: Gastroenterology   • DIAGNOSTIC LAPAROSCOPY EXPLORATORY LAPAROTOMY N/A 7/17/2019    Procedure: DIAGNOSTIC LAPAROSCOPY, EXPLORATORY LAPAROTOMY, LYSIS OF ADHESIONS;  Surgeon: Parminder Kc MD;  Location: [facility] OR;  Service: General   • DIRECT LARYNGOSCOPY, ESOPHAGOSCOPY, BRONCHOSCOPY N/A 4/15/2019    Procedure: DIRECT LARYNGOSCOPY with biopsy, ESOPHAGOSCOPY;  Surgeon: Bharathi Washington MD;  Location: [facility] OR;  Service: ENT   • ENDOSCOPY N/A 10/16/2018    Procedure: ESOPHAGOGASTRODUODENOSCOPY;  Surgeon: Kaushal Chester MD;  Location: [facility] ENDOSCOPY;  Service: Gastroenterology   • GASTROCUTANEOUS FISTULA CLOSURE N/A 10/2/2019    Procedure: GASTROCUTANEOUS FISTULA CLOSURE;  Surgeon: Parminder Kc MD;  Location: [facility] OR;  Service: General   • GASTROSTOMY FEEDING TUBE INSERTION N/A 4/15/2019    Procedure: GASTROSTOMY FEEDING TUBE INSERTION;  Surgeon: Trenton Valera MD;  Location: [facility] OR;  Service: General   • GASTROSTOMY FEEDING TUBE INSERTION N/A 3/25/2020    Procedure: GASTROSTOMY FEEDING TUBE INSERTION;  Surgeon: Parminder Kc MD;  Location: [facility] OR;  Service: General;  Laterality: N/A;   • JEJUNOSTOMY N/A 10/2/2019    Procedure: JEJUNOSTOMY;  Surgeon: Parminder Kc MD;  Location: [facility] OR;  Service: General   • TRACHEOSTOMY  11/10/2008    Respiratory failure   • UPPER GASTROINTESTINAL ENDOSCOPY  04/21/2014    Esophagitis seen. Biopsy taken. Gastritis in stomach. Biopsy taken. Normal duodenum. Biopsy taken.   • UPPER GASTROINTESTINAL ENDOSCOPY  10/16/2018   • VENOUS ACCESS DEVICE (PORT) INSERTION N/A 9/11/2019    Procedure: INSERTION VENOUS ACCESS DEVICE (MEDIPORT)        (c-arm#1);  Surgeon: Parminder Kc MD;  Location: [facility] OR;  Service: General      Family History   Problem Relation Age of Onset   • Coronary artery disease Mother    • Diabetes Mother    • Other Mother          "\"Heart And Lung Problems\"   • Lung cancer Mother    • Ovarian cancer Sister    • Heart disease Other    • Stroke Other    • Hypertension Other    • Diabetes Other    • Cancer Other    • Colon polyps Other    • Other Father         Unknown   • Other Other         \"Lung Problems\"   • Thyroid disease Neg Hx         Social History     Socioeconomic History   • Marital status: Single     Spouse name: Not on file   • Number of children: Not on file   • Years of education: Not on file   • Highest education level: Not on file   Occupational History   • Occupation: Disabled     Comment: Patient resides alone.   Tobacco Use   • Smoking status: Former Smoker     Packs/day: 1.50     Years: 30.00     Pack years: 45.00     Types: Cigarettes     Last attempt to quit:      Years since quittin.4   • Smokeless tobacco: Former User     Quit date: 2009   Substance and Sexual Activity   • Alcohol use: No     Comment: 2019 - Patient confirmed heavy alcoholic beverage consumption in past with current consumption of 2 - 3 beers twice per week.   • Drug use: No   • Sexual activity: Defer       Allergies:  Patient has no known allergies.   Prior to Admission medications    Medication Sig Start Date End Date Taking? Authorizing Provider   ADMELOG 100 UNIT/ML injection  20  Yes Esperanza Foote MD   albuterol (PROVENTIL) (2.5 MG/3ML) 0.083% nebulizer solution Take 2.5 mg by nebulization Every 6 (Six) Hours As Needed for Wheezing. 10/24/19  Yes Parminder Kc MD   Ascorbic Acid (C/DARA HIPS PO) 500 mg by Per PEG Tube route 2 (Two) Times a Day.   Yes Esperanza Foote MD   Cholecalciferol (VITAMIN D3) 125 MCG (5000 UT) capsule capsule Take 5,000 Units by mouth Daily.   Yes Esperanza Foote MD   dexamethasone 0.5 MG/5ML elixir Take 0.5 mg by mouth 2 (Two) Times a Day. 20  Yes Esperanza Foote MD   enoxaparin (LOVENOX) 40 MG/0.4ML solution syringe Inject 40 mg under the skin into the appropriate area " as directed Every 12 (Twelve) Hours.   Yes Esperanza Foote MD   ferrous sulfate 220 (44 Fe) MG/5ML solution 5 mL by Per G Tube route Daily. 8/10/19  Yes Saul Gomez MD   HYDROcodone-acetaminophen (HYCET) 7.5-325 MG/15ML solution  1/24/20  Yes Esperanza Foote MD   lactulose (CHRONULAC) 10 GM/15ML solution Take 10 g by mouth 2 (Two) Times a Day As Needed.   Yes Esperanza Foote MD   levothyroxine (SYNTHROID) 125 MCG tablet Take 1 pill/daily for 6 days a week  Patient taking differently: Take 150 mcg by mouth Daily. Take 1 pill/daily for 6 days a week 9/13/19  Yes Cristino He MD   melatonin 1 MG tablet Take 1 mg by mouth Every Night.   Yes Esperanza Foote MD   metoclopramide (REGLAN) 5 MG/5ML solution Take 5 mg by mouth 4 (Four) Times a Day Before Meals & at Bedtime. 10 ML Per G-Tube 4 Times Per Day Before Meals X 30 Days    Yes Esperanza Foote MD   sucralfate (CARAFATE) 1 GM/10ML suspension Take 1 g by mouth 3 (Three) Times a Day.   Yes Esperanza Foote MD   gabapentin (NEURONTIN) 100 MG capsule Take 100 mg by mouth Daily. 1/6/20 6/25/20 Yes Esperanza Foote MD   gabapentin (NEURONTIN) 300 MG capsule 1 capsule by Per G Tube route 3 (Three) Times a Day for 30 days. For nerve pain related to head and neck cancer 6/25/20 7/25/20  Nemesio Mejía MD      Pain:(on a scale of 0-10)    Pain Score    06/25/20 1427   PainSc:   7   PainLoc: Neck        Emerson Bang reports a pain score of 7.  Given his pain assessment as noted, treatment options were discussed and the following options were decided upon as a follow-up plan to address the patient's pain: prescription for non-opiod analgesics. Neurontin dose increased from 100mg QHS to 300md TID.       Quality of Life:   KPS: 40 - Must Use Wheelchair Most of Time  ECOG: (3) Capable of limited self-care, confined to bed or chair > 50% of waking hours      Physical Examination:  Vitals:    Vitals:    06/25/20 1427   BP:  "114/63   Pulse: 99   Resp: 18   Temp: 97.2 °F (36.2 °C)     Height: 165.1 cm (65\")  Weight: Weight: 49.7 kg (109 lb 9.6 oz) Body mass index is 18.24 kg/m².      Constitutional: The patient is a thin, frail 59-year-old gentleman in no acute distress.  Alert and oriented ×3.  HEENT: Normocephalic, atraumatic.  There is woody fibrosis along the right neck greater than left.  There is a firm mass in the postauricular area extending down below the level of the mandible suspicious for local recurrence.  The patient has significant trismus with approximately 2 cm of mouth opening.  Tracheostomy intact.  Lymphatics: There is right sided adenopathy which is firm and confluent with fibrosis.  Respiratory: Respirations symmetric and nonlabored.  GI: Feeding tube in place.  Extremities: No clubbing, cyanosis, or edema.  Neurologic: Cranial nerves II through XII are grossly intact, with no focal neurological deficits noted on exam.  Gait was not assessed as patient is in a wheelchair.  Psychiatric: Alert and oriented x3. Normal affect, with no anxiety or depression noted.    Radiographs :   PET scan 2/14/2020  CONCLUSION: 1. Unfavorable change. Recurrent disease. Large focus of increased F-18 FDG glucose uptake in the left inferior nasopharynx, floor the mouth and base of the tongue. This therefore indicates recurrent disease. 2. Solitary metastatic lymph node posterior to the right mandible.     These images were reviewed personally.    Labs:   Lab Results   Component Value Date    GLUCOSE 114 (H) 01/28/2020    BUN 21 01/28/2020    CREATININE 0.62 (L) 01/28/2020    EGFRIFAFRI >150 01/28/2020    BCR 33.9 (H) 01/28/2020    K 4.3 01/28/2020    CO2 23.3 01/28/2020    CALCIUM 10.3 01/28/2020    ALBUMIN 4.70 01/28/2020    AST 21 01/28/2020    ALT 20 01/28/2020      WBC   Date Value Ref Range Status   01/28/2020 6.99 3.40 - 10.80 10*3/mm3 Final     Hemoglobin   Date Value Ref Range Status   01/28/2020 12.5 (L) 13.0 - 17.7 g/dL Final "     Hematocrit   Date Value Ref Range Status   01/28/2020 36.7 (L) 37.5 - 51.0 % Final     Platelets   Date Value Ref Range Status   01/28/2020 343 140 - 450 10*3/mm3 Final          ASSESSMENT/PLAN: Mr. Emerson Boyer is a pleasant 59-year-old gentleman with 2 prior head and neck cancers including a cT4N2 right tonsil cancer treated with definitive concurrent chemoradiation in 2008 to a dose of 7000 cGy.  He appears to remain free of this disease.  Unfortunately in early 2019 he was diagnosed with a cT4bN0 right hypopharynx cancer with significant local extension.  This was treated with surgical resection followed by adjuvant radiotherapy to a dose of 5400 cGy in 30 fractions completing on 11/21/2019.    Despite this he continues to develop additional disease within the head and neck.  Given his 2 prior courses of radiation, I do not feel that it is safe to administer a third, as the risks would be significant and the likelihood for local control would be minimal.  I recommend he be evaluated by Dr. Martínez for possible immunotherapy if it is felt he could tolerate this.  The patient and his daughter do understand these treatments are palliative in nature and his cancer is not curable at this time.  I had a long discussion with them about the possible need for enrolling in hospice in the near future should immunotherapy not be feasible.    Due to the significant pain he is having which he describes in a nerve distribution, I have increased his gabapentin from 100 mg at night to 300 mg 3 times daily.  He may need further titration of his narcotics in the future.  Should he develop any painful lesions outside of his prior radiotherapy ports, we will happily reevaluate him for palliative radiation, however at this time we do not feel we have anything to offer him.  He will return to our clinic on an as-needed basis.  This was discussed with the patient and his daughter and they are both agreeable.  Enck you for  allowing us to participate in the care of this patient if you have any questions or concerns please feel free to contact our office.    Sincerely,      Electronically signed by Nemesio Mejía MD 6/25/2020  14:57

## 2020-06-30 ENCOUNTER — TELEPHONE (OUTPATIENT)
Dept: ONCOLOGY | Facility: CLINIC | Age: 60
End: 2020-06-30

## 2020-06-30 NOTE — TELEPHONE ENCOUNTER
NEGAR ORELLANA WITH [facility] CALLED TO REQUEST PT'S LAST PROGRESS NOTES FROM 6/25/20 APPT WITH DR BEST.    -  [phone]  -  [phone]

## 2020-07-09 ENCOUNTER — TELEPHONE (OUTPATIENT)
Dept: ONCOLOGY | Facility: CLINIC | Age: 60
End: 2020-07-09

## 2020-07-09 NOTE — TELEPHONE ENCOUNTER
Spoke with Maddie at NH. Informed her that patient had appointment with Dr. Prieto on 7/2 that he did not show up to. Rescheduled patient per NH request. Gave date/time of new appointment. She verbalized understanding.

## 2020-07-09 NOTE — TELEPHONE ENCOUNTER
Orlin from [facility] is wanting to know if patient needs a follow up appt or what is the next step for patient       Orlin phone # [phone]  If orlin is not there ask for Thakkar 1 charge nurse

## 2020-07-16 ENCOUNTER — DOCUMENTATION (OUTPATIENT)
Dept: ONCOLOGY | Facility: CLINIC | Age: 60
End: 2020-07-16

## 2020-07-16 ENCOUNTER — TELEPHONE (OUTPATIENT)
Dept: ONCOLOGY | Facility: CLINIC | Age: 60
End: 2020-07-16

## 2020-07-16 ENCOUNTER — CONSULT (OUTPATIENT)
Dept: ONCOLOGY | Facility: CLINIC | Age: 60
End: 2020-07-16

## 2020-07-16 VITALS
SYSTOLIC BLOOD PRESSURE: 103 MMHG | DIASTOLIC BLOOD PRESSURE: 62 MMHG | RESPIRATION RATE: 18 BRPM | HEART RATE: 99 BPM | TEMPERATURE: 96.3 F

## 2020-07-16 DIAGNOSIS — G89.3 CANCER ASSOCIATED PAIN: ICD-10-CM

## 2020-07-16 DIAGNOSIS — Z71.89 GOALS OF CARE, COUNSELING/DISCUSSION: ICD-10-CM

## 2020-07-16 DIAGNOSIS — C13.9 CANCER OF HYPOPHARYNX (HCC): Primary | ICD-10-CM

## 2020-07-16 PROCEDURE — G0463 HOSPITAL OUTPT CLINIC VISIT: HCPCS | Performed by: INTERNAL MEDICINE

## 2020-07-16 PROCEDURE — 99214 OFFICE O/P EST MOD 30 MIN: CPT | Performed by: INTERNAL MEDICINE

## 2020-07-16 NOTE — PROGRESS NOTES
Emerson Bang  [id number]  1960    Subjective     Emerson Bang was added on my schedule for follow-up for metastatic recurrent hypopharyngeal cancer.  He has received surgery in the past with positive margin.  Subsequently he was treated with adjuvant radiation therapy alone.  He was started on chemotherapy however due to multiple medical complication was not able to finish the chemotherapy.  He ended up finishing adjuvant radiation therapy by itself.  He was then lost to follow-up and ended up in a nursing home due to multiple medical complications.  Patient with recurrent metastatic hypopharyngeal cancer.  He has large neck mass which is likely eroding his mandible.  His face is markedly swollen.  Patient is extremely frail.  He lives in a nursing home.  He is spending almost all of his time in the bed.  He needs assistance with day-to-day activities.  He is not eating anything by mouth and is using feeding tube for all nutritional purposes.  He has already received surgery and was treated with adjuvant radiation therapy due to positive margins.  He is not any candidate for any systemic therapy given he is extremely weak and frail.  I had a very lengthy discussion with patient and his daughter about his overall diagnosis, prognosis and treatment options.  I do not believe any systemic therapy at this point will improve his quality of life.  Recommend we focus our attention to making him comfortable with hospice care.  After lengthy discussion patient and the family were in agreement with this plan.  Order for hospice was placed in the computer.  Our  will notify nursing home and will make appropriate arrangements.  All questions answered.      Past Medical History, Past Surgical History, Social History, Family History have been reviewed and are without significant changes except as mentioned.    Review of Systems     Unable to obtain as patient is drowsy and lethargic.  He also has  tracheostomy and unable to communicate.    Medications:  The current medication list was reviewed in the EMR    ALLERGIES:  No Known Allergies    Objective      Vitals:    07/16/20 0832   BP: 103/62   Pulse: 99   Resp: 18   Temp: 96.3 °F (35.7 °C)   PainSc: 10-Worst pain ever   PainLoc: Head     Current Status 7/16/2020   ECOG score 2       Physical Exam      GENERAL: Alert, awake, oriented.  Chronically ill looking cachectic male +. Vitals as above.   HEAD: Normocephalic, atraumatic.   EYES: PERRL, EOMI.  vision is grossly intact.  NECK: Right neck swelling + .   THROAT: oral mass + . No inflammation, swelling, exudate, or lesions.  CARDIAC: Normal S1 and S2. No S3, S4 or murmurs. Rhythm is regular.  Extremities are warm and well perfused.   LUNGS: Clear to auscultation and percussion without rales, rhonchi, wheezing or diminished breath sounds.  ABDOMEN: Positive bowel sounds. Soft, nondistended, nontender. No guarding or rebound. No masses.  BACK:  No bony tenderness.   EXTREMITIES: No significant deformity or joint abnormality.  Peripheral pulses intact. No varicosities.  SKIN: No rash or bruising.  NEUROLOGICAL: Grossly non-focal exam.  Generalized weakness throughout +. Gait: In wheelchair +   PSYCH: depressed mood + . No hallucination or suicidal thoughts.   LYMPHATIC: cervical adenopathy. No axillary adenopathy. .       RECENT LABS: Independently reviewed and summarized  Hematology WBC   Date Value Ref Range Status   01/28/2020 6.99 3.40 - 10.80 10*3/mm3 Final     RBC   Date Value Ref Range Status   01/28/2020 4.21 4.14 - 5.80 10*6/mm3 Final     Hemoglobin   Date Value Ref Range Status   01/28/2020 12.5 (L) 13.0 - 17.7 g/dL Final     Hematocrit   Date Value Ref Range Status   01/28/2020 36.7 (L) 37.5 - 51.0 % Final     Platelets   Date Value Ref Range Status   01/28/2020 343 140 - 450 10*3/mm3 Final          Emerson Bang reports a pain score of 10.  Given his pain assessment as noted, treatment  options were discussed and the following options were decided upon as a follow-up plan to address the patient's pain: Patient will enroll in hospice for pain control .    Advance Care Planning   ACP discussion was held with the patient during this visit. Patient has an advance directive (not in EMR), copy requested.      Diagnosis:   (1) Recurrent metastatic head and neck cancer       Assessment/Plan     Patient with recurrent metastatic hypopharyngeal cancer.  He has large neck mass which is likely eroding his mandible.  His face is markedly swollen.  Patient is extremely frail.  He lives in a nursing home.  He is spending almost all of his time in the bed.  He needs assistance with day-to-day activities.  He is not eating anything by mouth and is using feeding tube for all nutritional purposes.  He has already received surgery and was treated with adjuvant radiation therapy due to positive margins.  He is not any candidate for any systemic therapy given he is extremely weak and frail.  I had a very lengthy discussion with patient and his daughter about his overall diagnosis, prognosis and treatment options.  I do not believe any systemic therapy at this point will improve his quality of life.  Recommend we focus our attention to making him comfortable with hospice care.  After lengthy discussion patient and the family were in agreement with this plan.  Order for hospice was placed in the computer.  Our  will notify nursing home and will make appropriate arrangements.  All questions answered.    Recommendations:   - Recommend hospice.   -  to assist with placement.     Discussed with daughter at length.     I have spent > 25 minutes times face to face with the patient and more than 50% of time was spent on counseling/coordinating care.     Conner Martínez MD     7/16/2020      CC:

## 2020-07-16 NOTE — PROGRESS NOTES
Oncology SW encounter with patient as he presents to medical oncology for follow up. Pt. Is known to SW from prior interventions as he underwent treatment for stage IV laryngeal cancer.  Pt. Is currently residing at [facility] in [facility].  He is accompanied in the room by his daughter who presents attentive and supportive.  SW referral from physician after discussing options for care. Further palliative treatment is not suggested and comfort care options have been dicussed and recommended. Pt/daughter would like to know options in NH and SW contacted [facility] and spoke with Valentina . She indicates NH does provide comfort care measures and this would be coordinated with NH physician.  Also, they are contracted with [facility] to provide care in the NH.    The above options were discussed with daughter and pt with undersigned.  Daughter states she would like to proceed with [facility] care in [place]; she states goal that should patient be able to transition to home with hospice this is a consideration, but not at this time.  PerValentina at NH she will coordinate a meeting/phone conference with daughter/patient staff today to discuss plan.  Dr. Martínez advised and order written indicating no more life prolong measures and hospice recommendation.  SW to advise [facility] of referral.  SW provided pt/daughter with contact information in the event further support is indicated.  SW offered emotional support to pt/daughter as pt transitions to comfort care.      Subsequent to visit:  SW contacted [facility] with referral, spoke with Xenia who states she will contact daughter and NH regarding Hospice in the NH. Pertinent information faxed to hospice successfully.

## 2020-07-16 NOTE — TELEPHONE ENCOUNTER
ISIDRO [facility] CALLED TO SPEAK TO MICHELLE KAPOOR.    THEY WANT HER TO KNOW THEY HAVE ADMITTED THE PT.    BEST CALL BACK # [phone]

## 2020-07-17 NOTE — TELEPHONE ENCOUNTER
Otilia with [facility] calling for Myrna López. Wants her to know that patient has been admitted into their care and if she has any further questions she can call [phone]

## 2020-12-15 NOTE — PROGRESS NOTES
[facility]      Chief Complaint:   Chief Complaint   Patient presents with   • Underweight due to inadequate caloric intake       Subjective     HPI:   Patient with squamous cell cancer of the pharynx.  Patient is going for surgery in [place] for this in June.  Patient has history of EtOH abuse but is no longer drinking alcohol because he is in a nursing home.  Patient has a PEG tube in for nutrition.  Patient has gained weight over the past 1 month.  Patient is using the feeding tube daily.  From a GI standpoint no further work-up is needed other than the seeing the PEG tube after the surgery.    Plan; outpatient follow-up in 3 months to be sure that the PEG tube is removed when patient is eating.    Past Medical History:   Past Medical History:   Diagnosis Date   • Alcohol abuse    • Allergic rhinitis     vs URI   • Anemia    • At risk for falls    • Benign prostatic hyperplasia    • Chronic gastritis    • Cirrhosis of liver (CMS/HCC)    • Complication of gastrostomy (CMS/HCC)     site not healing   • COPD (chronic obstructive pulmonary disease) (CMS/HCC)    • Dysphagia     odynophagia   • Epigastric pain    • Esophagitis    • GERD (gastroesophageal reflux disease)    • Hypertension    • Hypothyroidism, unspecified    • Low back pain    • Malaise and fatigue    • Nasal congestion 11/15/2018   • Nausea with vomiting, unspecified    • Pain in left knee    • Pain in right knee    • Primary malignant neoplasm of pharynx (CMS/HCC)    • Screening for malignant neoplasm of colon    • Vitamin D deficiency        Past Surgical History:  Past Surgical History:   Procedure Laterality Date   • ABDOMINAL WALL SURGERY  11/04/2008    Laparotomy with repair of stomach wall perforation. 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 Left upper quadrant abdominal wall abscess debridement. Gastrostomy tube erosion through & through the anterior gastric wall.Lupper quadrant abdominal wall abscess   • COLONOSCOPY   "04/21/2014    Internal & external hemorrhoids found.   • COLONOSCOPY  2014    [place]   • COLONOSCOPY N/A 10/16/2018    Procedure: COLONOSCOPY;  Surgeon: Kaushal Chester MD;  Location: [facility] ENDOSCOPY;  Service: Gastroenterology   • DIRECT LARYNGOSCOPY, ESOPHAGOSCOPY, BRONCHOSCOPY N/A 4/15/2019    Procedure: DIRECT LARYNGOSCOPY with biopsy, ESOPHAGOSCOPY;  Surgeon: Bharathi Washington MD;  Location: [facility] OR;  Service: ENT   • ENDOSCOPY N/A 10/16/2018    Procedure: ESOPHAGOGASTRODUODENOSCOPY;  Surgeon: Kaushal Chester MD;  Location: [facility] ENDOSCOPY;  Service: Gastroenterology   • GASTROSTOMY FEEDING TUBE INSERTION N/A 4/15/2019    Procedure: GASTROSTOMY FEEDING TUBE INSERTION;  Surgeon: Trenton Valera MD;  Location: [facility] OR;  Service: General   • TRACHEOSTOMY  11/10/2008    Respiratory failure   • UPPER GASTROINTESTINAL ENDOSCOPY  04/21/2014    Esophagitis seen. Biopsy taken. Gastritis in stomach. Biopsy taken. Normal duodenum. Biopsy taken.   • UPPER GASTROINTESTINAL ENDOSCOPY  10/16/2018       Family History:  Family History   Problem Relation Age of Onset   • Coronary artery disease Mother    • Diabetes Mother    • Other Mother         \"Heart And Lung Problems\"   • Liver cancer Sister    • Heart disease Other    • Stroke Other    • Hypertension Other    • Diabetes Other    • Cancer Other    • Colon polyps Other    • Other Father         Unknown   • Other Other         \"Lung Problems\"   • Thyroid disease Neg Hx        Social History:   reports that he has quit smoking. His smoking use included cigarettes. He has a 45.00 pack-year smoking history. He quit smokeless tobacco use about 10 years ago. He reports that he does not drink alcohol or use drugs.    Medications:   Prior to Admission medications    Medication Sig Start Date End Date Taking? Authorizing Provider   acetaminophen (TYLENOL) 160 MG/5ML solution 20.3 mL by Per G Tube route Every 6 (Six) Hours As Needed for Mild Pain . " "4/24/19  Yes Adonis Matthews MD   aspirin (ASPIRIN LOW DOSE) 81 MG EC tablet Take 1 tablet by mouth Daily. 11/15/18  Yes Cristino He MD   famotidine (PEPCID) 20 MG tablet 1 tablet by Per G Tube route 2 (Two) Times a Day. 4/24/19  Yes Adonis Matthews MD   levothyroxine (SYNTHROID, LEVOTHROID) 175 MCG tablet Take 1 tablet by mouth Daily. 5/22/19 5/21/20 Yes Toby Ruffin APRN   magnesium oxide (MAG-OX) 400 MG tablet  3/26/19  Yes Provider, MD Esperanza   Scopolamine (TRANSDERM-SCOP) 1.5 MG/3DAYS patch Place 1 patch on the skin as directed by provider Every 72 (Seventy-Two) Hours. 4/26/19  Yes Adonis Matthews MD       Allergies:  Patient has no known allergies.    ROS:    Review of Systems   Constitutional: Negative for activity change, appetite change and unexpected weight change.   HENT: Negative for congestion, sore throat and trouble swallowing.    Respiratory: Negative for cough, choking and shortness of breath.    Cardiovascular: Negative for chest pain.   Gastrointestinal: Negative for abdominal distention, abdominal pain, anal bleeding, blood in stool, constipation, diarrhea, nausea, rectal pain and vomiting.   Endocrine: Negative for heat intolerance, polydipsia and polyphagia.   Genitourinary: Negative for difficulty urinating.   Musculoskeletal: Negative for arthralgias.   Skin: Negative for color change, pallor, rash and wound.   Allergic/Immunologic: Negative for food allergies.   Neurological: Negative for dizziness, syncope, weakness and headaches.   Psychiatric/Behavioral: Negative for agitation, behavioral problems, confusion and decreased concentration.     Objective     Blood pressure 110/60, pulse 94, height 165.1 cm (65\"), weight 48.8 kg (107 lb 9.6 oz), SpO2 95 %.    Physical Exam   Constitutional: He is oriented to person, place, and time. He appears well-developed and well-nourished. No distress.   HENT:   Head: Normocephalic and atraumatic. "   Cardiovascular: Normal rate, regular rhythm, normal heart sounds and intact distal pulses. Exam reveals no gallop and no friction rub.   No murmur heard.  Pulmonary/Chest: Breath sounds normal. No respiratory distress. He has no wheezes. He has no rales. He exhibits no tenderness.   Abdominal: Soft. Bowel sounds are normal. He exhibits no distension and no mass. There is no tenderness. There is no rebound and no guarding. No hernia.   Musculoskeletal: Normal range of motion. He exhibits no edema.   Neurological: He is alert and oriented to person, place, and time.   Skin: Skin is warm and dry. No rash noted. He is not diaphoretic. No erythema. No pallor.   Psychiatric: He has a normal mood and affect. His behavior is normal. Judgment and thought content normal.        Assessment/Plan   Emerson was seen today for underweight due to inadequate caloric intake.    Diagnoses and all orders for this visit:    Alcohol abuse    Underweight due to inadequate caloric intake        * Surgery not found *     Diagnosis Plan   1. Alcohol abuse     2. Underweight due to inadequate caloric intake         Anticipated Surgical Procedure:  No orders of the defined types were placed in this encounter.      The risks, benefits, and alternatives of this procedure have been discussed with the patient or the responsible party- the patient understands and agrees to proceed.                                                                 Mirena: Mirena: Lot [id number]  Exp: Jan 2023  Fluid deficit: 50 Mirena: Lot [id number]  Exp: Jan 2023  Fluid deficit: 50  Dictation Number: [id number]

## 2024-02-05 NOTE — PROGRESS NOTES
[facility]      Chief Complaint:   Chief Complaint   Patient presents with   • Alcohol Problem       Subjective     HPI:   Patient with history of laryngeal cancer.  Patient has had feeding tube placed but states that yesterday this has come out.  On evaluation of patient's abdomen patient's had multiple surgeries on the abdomen I believe this gastrostomy tube was placed surgically possibly by Dr. Kc.  Because of the multiple surgeries patient has had I do not believe this can be placed endoscopically and will need surgical evaluation for replacement of gastrostomy tube.  Patient takes medications through this tube.    Plan; we will schedule patient for evaluation by surgery hopefully today.    Past Medical History:   Past Medical History:   Diagnosis Date   • Allergic rhinitis     vs URI   • Anemia    • At risk for falls    • Benign prostatic hyperplasia    • Chronic gastritis    • Cirrhosis of liver (CMS/HCC)    • Complication of gastrostomy (CMS/HCC)     site not healing   • COPD (chronic obstructive pulmonary disease) (CMS/HCC)    • Dysphagia     odynophagia   • Epigastric pain    • Esophagitis    • GERD (gastroesophageal reflux disease)    • Hypertension    • Hypothyroidism, unspecified    • Low back pain    • Malaise and fatigue    • Nasal congestion 11/15/2018   • Nausea with vomiting, unspecified    • Pain in left knee    • Pain in right knee    • Primary malignant neoplasm of pharynx (CMS/HCC)    • Screening for malignant neoplasm of colon    • 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 Left upper quadrant abdominal wall abscess debridement. Gastrostomy tube erosion through & through  Seen 1/15/24, has f/u 5/20/24     Med initiated at last OV- Ok to fill as requested?    the anterior gastric wall.Lupper quadrant abdominal wall abscess   • COLONOSCOPY  04/21/2014    Internal & external hemorrhoids found.   • COLONOSCOPY  2014    [place]   • COLONOSCOPY N/A 10/16/2018    Procedure: COLONOSCOPY;  Surgeon: Kaushal Chester MD;  Location: [facility] ENDOSCOPY;  Service: Gastroenterology   • DIAGNOSTIC LAPAROSCOPY EXPLORATORY LAPAROTOMY N/A 7/17/2019    Procedure: DIAGNOSTIC LAPAROSCOPY, EXPLORATORY LAPAROTOMY, LYSIS OF ADHESIONS;  Surgeon: Parminder Kc MD;  Location: [facility] OR;  Service: General   • DIRECT LARYNGOSCOPY, ESOPHAGOSCOPY, BRONCHOSCOPY N/A 4/15/2019    Procedure: DIRECT LARYNGOSCOPY with biopsy, ESOPHAGOSCOPY;  Surgeon: Bharathi Washington MD;  Location: [facility] OR;  Service: ENT   • ENDOSCOPY N/A 10/16/2018    Procedure: ESOPHAGOGASTRODUODENOSCOPY;  Surgeon: Kaushal Chester MD;  Location: [facility] ENDOSCOPY;  Service: Gastroenterology   • GASTROCUTANEOUS FISTULA CLOSURE N/A 10/2/2019    Procedure: GASTROCUTANEOUS FISTULA CLOSURE;  Surgeon: Parminder Kc MD;  Location: [facility] OR;  Service: General   • GASTROSTOMY FEEDING TUBE INSERTION N/A 4/15/2019    Procedure: GASTROSTOMY FEEDING TUBE INSERTION;  Surgeon: Trenton Valera MD;  Location: [facility] OR;  Service: General   • JEJUNOSTOMY N/A 10/2/2019    Procedure: JEJUNOSTOMY;  Surgeon: Parminder Kc MD;  Location: [facility] OR;  Service: General   • TRACHEOSTOMY  11/10/2008    Respiratory failure   • UPPER GASTROINTESTINAL ENDOSCOPY  04/21/2014    Esophagitis seen. Biopsy taken. Gastritis in stomach. Biopsy taken. Normal duodenum. Biopsy taken.   • UPPER GASTROINTESTINAL ENDOSCOPY  10/16/2018   • VENOUS ACCESS DEVICE (PORT) INSERTION N/A 9/11/2019    Procedure: INSERTION VENOUS ACCESS DEVICE (MEDIPORT)        (c-arm#1);  Surgeon: Parminder Kc MD;  Location: [facility] OR;  Service: General       Family History:  Family History   Problem Relation Age of Onset   • Coronary artery disease Mother    • Diabetes Mother    •  "Other Mother         \"Heart And Lung Problems\"   • Lung cancer Mother    • Ovarian cancer Sister    • Heart disease Other    • Stroke Other    • Hypertension Other    • Diabetes Other    • Cancer Other    • Colon polyps Other    • Other Father         Unknown   • Other Other         \"Lung Problems\"   • Thyroid disease Neg Hx        Social History:   reports that he quit smoking about 11 years ago. His smoking use included cigarettes. He has a 45.00 pack-year smoking history. He quit smokeless tobacco use about 10 years ago. He reports that he does not drink alcohol or use drugs.    Medications:   Prior to Admission medications    Medication Sig Start Date End Date Taking? Authorizing Provider   ADMELOG 100 UNIT/ML injection  1/27/20  Yes Esperanza Foote MD   albuterol (PROVENTIL) (2.5 MG/3ML) 0.083% nebulizer solution Take 2.5 mg by nebulization Every 6 (Six) Hours As Needed for Wheezing. 10/24/19  Yes Parminder Kc MD   Ascorbic Acid (C/DARA HIPS PO) 1,000 mg by Per PEG Tube route 2 (Two) Times a Day.   Yes Esperanza Foote MD   aspirin (ASPIRIN LOW DOSE) 81 MG EC tablet Take 1 tablet by mouth Daily. 8/16/19  Yes Cristino He MD   dexamethasone 0.5 MG/5ML elixir  1/20/20  Yes Esperanza Foote MD   Docusate Sodium 150 MG/15ML syrup Take 50 mg by mouth Daily.   Yes ProviderEsperanza MD   ferrous sulfate 220 (44 Fe) MG/5ML solution 5 mL by Per G Tube route Daily. 8/10/19  Yes Saul Gomez MD   gabapentin (NEURONTIN) 100 MG capsule  1/6/20  Yes ProviderEsperanza MD   GLUCAGEN HYPOKIT 1 MG injection  12/1/19  Yes ProviderEsperanza MD   HYDROcodone-acetaminophen (HYCET) 7.5-325 MG/15ML solution  1/24/20  Yes Esperanza Foote MD   insulin aspart (novoLOG) 100 UNIT/ML injection Inject 0-7 Units under the skin into the appropriate area as directed 4 (Four) Times a Day Before Meals & at Bedtime. 10/24/19  Yes Parminder Kc MD   lactulose (CHRONULAC) 10 GM/15ML solution Take 20 g by " "mouth 2 (Two) Times a Day As Needed.   Yes Provider, MD Esperanza   levothyroxine (SYNTHROID) 125 MCG tablet Take 1 pill/daily for 6 days a week 9/13/19  Yes Cristino He MD   loperamide (IMODIUM) 1 MG/5ML solution Take 10 mL by mouth 4 (Four) Times a Day As Needed for Diarrhea. 9/25/19  Yes Dee Bajwa APRN   metoclopramide (REGLAN) 5 MG/5ML solution 10 ML Per G-Tube 4 Times Per Day Before Meals X 30 Days   Yes Provider, MD Esperanza       Allergies:  Patient has no known allergies.    ROS:    Review of Systems   Constitutional: Positive for unexpected weight change. Negative for activity change and appetite change.   HENT: Negative for congestion, sore throat and trouble swallowing.    Respiratory: Negative for cough, choking and shortness of breath.    Cardiovascular: Negative for chest pain.   Gastrointestinal: Negative for abdominal distention, abdominal pain, anal bleeding, blood in stool, constipation, diarrhea, nausea, rectal pain and vomiting.   Endocrine: Negative for heat intolerance, polydipsia and polyphagia.   Genitourinary: Negative for difficulty urinating.   Musculoskeletal: Negative for arthralgias.   Skin: Negative for color change, pallor, rash and wound.   Allergic/Immunologic: Negative for food allergies.   Neurological: Negative for dizziness, syncope, weakness and headaches.   Psychiatric/Behavioral: Negative for agitation, behavioral problems, confusion and decreased concentration.     Objective     Blood pressure 120/70, pulse 72, height 165.1 cm (65\"), weight 51.8 kg (114 lb 3.2 oz), SpO2 98 %.    Physical Exam   Constitutional: He is oriented to person, place, and time. He appears well-developed and well-nourished. No distress.   HENT:   Head: Normocephalic and atraumatic.   Cardiovascular: Normal rate, regular rhythm, normal heart sounds and intact distal pulses. Exam reveals no gallop and no friction rub.   No murmur heard.  Pulmonary/Chest: Breath sounds normal. No " respiratory distress. He has no wheezes. He has no rales. He exhibits no tenderness.   Abdominal: Soft. Bowel sounds are normal. He exhibits no distension and no mass. There is no tenderness. There is no rebound and no guarding. No hernia.   Musculoskeletal: Normal range of motion. He exhibits no edema.   Neurological: He is alert and oriented to person, place, and time.   Skin: Skin is warm and dry. No rash noted. He is not diaphoretic. No erythema. No pallor.   Psychiatric: He has a normal mood and affect. His behavior is normal. Judgment and thought content normal.        Assessment/Plan   Emerson was seen today for alcohol problem.    Diagnoses and all orders for this visit:    PEG tube malfunction (CMS/HCC)        * Surgery not found *     Diagnosis Plan   1. PEG tube malfunction (CMS/HCC)         Anticipated Surgical Procedure:  No orders of the defined types were placed in this encounter.      The risks, benefits, and alternatives of this procedure have been discussed with the patient or the responsible party- the patient understands and agrees to proceed.

## (undated) DEVICE — SPNG DRN AMD EXCILON 6PLY 4X4IN PK/2

## (undated) DEVICE — GLV SURG SENSICARE PI PF LF 7 GRN STRL

## (undated) DEVICE — STERILE POLYISOPRENE POWDER-FREE SURGICAL GLOVES WITH EMOLLIENT COATING: Brand: PROTEXIS

## (undated) DEVICE — SUT VIC 0 CT1 36IN J946H

## (undated) DEVICE — SUT PROLN 3/0 SH D/A 36IN 8522H

## (undated) DEVICE — CATHETER,FOLEY,100%SILICONE,20FR,10ML,LF: Brand: MEDLINE

## (undated) DEVICE — STPLR SKIN VISISTAT WD 35CT

## (undated) DEVICE — CONTAINER,SPECIMEN,OR STERILE,4OZ: Brand: MEDLINE

## (undated) DEVICE — CATH MALECOT 4WING 16F

## (undated) DEVICE — SUT PDS CLOSURE CT1 1/0 27IN Z341H

## (undated) DEVICE — SUT VICRYL 3-0 SH-1 PO 18IN J772D

## (undated) DEVICE — SUT NLY 2/0 664G

## (undated) DEVICE — GLV SURG SENSICARE POLYISPRN W/ALOE PF LF 6.5 GRN STRL

## (undated) DEVICE — SEALANT HEMOS FLOSEAL MATRX W/MALL TP 10ML EA/6

## (undated) DEVICE — SUT VIC 2/0 TIES 18IN J111T

## (undated) DEVICE — CODMAN® SURGICAL PATTIES 1/2" X 1" (1.27CM X 2.54CM): Brand: CODMAN®

## (undated) DEVICE — TOWEL,OR,DSP,ST,BLUE,DLX,4/PK,20PK/CS: Brand: MEDLINE

## (undated) DEVICE — SOL IRR NACL 0.9PCT BT 1000ML

## (undated) DEVICE — VISUALIZATION SYSTEM: Brand: CLEARIFY

## (undated) DEVICE — PK ENT LF 60

## (undated) DEVICE — GLV SURG SENSICARE POLYISPRN W/ALOE PF LF 6 GRN STRL

## (undated) DEVICE — TRY IRR

## (undated) DEVICE — 3M™ IOBAN™ 2 ANTIMICROBIAL INCISE DRAPE 6651EZ: Brand: IOBAN™ 2

## (undated) DEVICE — ANTIBACTERIAL UNDYED BRAIDED (POLYGLACTIN 910), SYNTHETIC ABSORBABLE SUTURE: Brand: COATED VICRYL

## (undated) DEVICE — CATH SUPRAPUB MALECOT LTX 4WING 18F

## (undated) DEVICE — DRSNG WND GZ CURAD OIL EMULSION 3X3IN STRL

## (undated) DEVICE — SUT VIC 2/0 SH 27IN

## (undated) DEVICE — PAD,ABDOMINAL,8"X10",ST,LF: Brand: MEDLINE

## (undated) DEVICE — SUT SILK 0 TIES 18IN A186H BX36

## (undated) DEVICE — GLV SURG TRIUMPH LT PF LTX 7.5 STRL

## (undated) DEVICE — PLUG,CATHETER,DRAINAGE PROTECTOR,TUBE: Brand: MEDLINE

## (undated) DEVICE — STERILE POLYISOPRENE POWDER-FREE SURGICAL GLOVES: Brand: PROTEXIS

## (undated) DEVICE — TOTAL TRAY, 16FR 10ML SIL FOLEY, URN: Brand: MEDLINE

## (undated) DEVICE — GLV SURG TRIUMPH PF LTX 6.5 STRL

## (undated) DEVICE — SUT VICRYL 0 TIES J112T

## (undated) DEVICE — SPNG GZ WOVN 4X4IN 12PLY 10/BX STRL

## (undated) DEVICE — CVR SURG EQUIP BND RECTG 36X28

## (undated) DEVICE — GLV SURG TRIUMPH LT PF LTX 6.5 STRL

## (undated) DEVICE — GAUZE,SPONGE,4"X4",16PLY,XRAY,STRL,LF: Brand: MEDLINE

## (undated) DEVICE — GLV SURG SENSICARE GREEN W/ALOE PF LF 7 STRL

## (undated) DEVICE — GLV SURG TRIUMPH LT PF LTX 8 STRL

## (undated) DEVICE — CATH MALECOT 4WING 20F

## (undated) DEVICE — SUT ETHLN 2/0 FS 18IN 664H

## (undated) DEVICE — MONOPOLAR METZENBAUM SCISSOR TIP, DISPOSABLE: Brand: MONOPOLAR METZENBAUM SCISSOR TIP, DISPOSABLE

## (undated) DEVICE — SHEET,DRAPE,53X77,STERILE: Brand: MEDLINE

## (undated) DEVICE — GOWN,AURORA,NOREINF,RAGLAN,XL,STERILE: Brand: MEDLINE

## (undated) DEVICE — 3M™ STERI-STRIP™ REINFORCED ADHESIVE SKIN CLOSURES, R1547, 1/2 IN X 4 IN (12 MM X 100 MM), 6 STRIPS/ENVELOPE: Brand: 3M™ STERI-STRIP™

## (undated) DEVICE — WOUND RETRACTOR AND PROTECTOR: Brand: ALEXIS O WOUND PROTECTOR-RETRACTOR

## (undated) DEVICE — COVER,MAYO STAND,STERILE: Brand: MEDLINE

## (undated) DEVICE — DUAL LUMEN STOMACH TUBE: Brand: SALEM SUMP

## (undated) DEVICE — DECANT BG O JET

## (undated) DEVICE — ENDOPATH XCEL BLADELESS TROCARS WITH STABILITY SLEEVES: Brand: ENDOPATH XCEL

## (undated) DEVICE — SPNG LAP 18X18IN LF STRL PK/5

## (undated) DEVICE — DRSNG TELFA PAD NONADH STR 1S 3X8IN

## (undated) DEVICE — GLV SURG TRIUMPH LT PF LTX 6 STRL

## (undated) DEVICE — SUT VIC 3/0 TIES 18IN J110T

## (undated) DEVICE — DEFOGGER!" ANTI FOG KIT: Brand: DEROYAL

## (undated) DEVICE — SUT VIC 3/0 RB1 27IN J215H

## (undated) DEVICE — TOWEL,OR,DSP,ST,BLUE,DLX,8/PK,10PK/CS: Brand: MEDLINE

## (undated) DEVICE — PK MAJ PROC LF 60

## (undated) DEVICE — SUT PROLN 3/0 RB1 D/A 36IN 8558H

## (undated) DEVICE — GLV SURG SENSICARE PI LF PF 8 GRN STRL

## (undated) DEVICE — DRP WARMR MACH

## (undated) DEVICE — GLV SURG SIGNATURE ESSENTIAL PF LTX SZ6.5

## (undated) DEVICE — NDL SFTY GLD 21X1

## (undated) DEVICE — TP SXN YANKR BLB TIP W/TBG 10F LF STRL

## (undated) DEVICE — SOL IRRIG NACL 1000ML

## (undated) DEVICE — KT CATH SXN MINI W/GLV LF 10F

## (undated) DEVICE — BITEBLOCK ENDO W/STRAP 60F A/ LF DISP

## (undated) DEVICE — PK LAP CHOLE LF 60

## (undated) DEVICE — SYR LUERLOK 10CC

## (undated) DEVICE — GRSPR BABCOCK 10MM 31CM 1P/U

## (undated) DEVICE — INTENDED FOR TISSUE SEPARATION, AND OTHER PROCEDURES THAT REQUIRE A SHARP SURGICAL BLADE TO PUNCTURE OR CUT.: Brand: BARD-PARKER ® CARBON RIB-BACK BLADES

## (undated) DEVICE — ADHS LIQ MASTISOL 2/3ML

## (undated) DEVICE — SUT SILK 2/0 FS BLK 18IN 685G

## (undated) DEVICE — GUARD TEETH

## (undated) DEVICE — ADAPT LUER STUB INTRAMEDIC PE/160/190 18G CA/1000

## (undated) DEVICE — POSTN HD RING CUSH 9IN LF

## (undated) DEVICE — DRAINBAG,ANTI-REFLUX TOWER,L/F,2000ML,LL: Brand: MEDLINE

## (undated) DEVICE — SINGLE-USE BIOPSY FORCEPS: Brand: RADIAL JAW 4

## (undated) DEVICE — DRN PENRS SIL 1X18IN LXF

## (undated) DEVICE — GLV SURG TRIUMPH ORTHO W/ALOE PF LTX 6.5 STRL

## (undated) DEVICE — GLV SURG SENSICARE GREEN W/ALOE PF LF 8 STRL

## (undated) DEVICE — ST CVR PROB PULLUP ULTRASND 5X48IN